# Patient Record
Sex: FEMALE | Race: WHITE | URBAN - METROPOLITAN AREA
[De-identification: names, ages, dates, MRNs, and addresses within clinical notes are randomized per-mention and may not be internally consistent; named-entity substitution may affect disease eponyms.]

---

## 2017-01-01 ENCOUNTER — ONCOLOGY VISIT (OUTPATIENT)
Dept: ONCOLOGY | Facility: CLINIC | Age: 57
End: 2017-01-01
Attending: INTERNAL MEDICINE
Payer: COMMERCIAL

## 2017-01-01 ENCOUNTER — DOCUMENTATION ONLY (OUTPATIENT)
Dept: ONCOLOGY | Facility: CLINIC | Age: 57
End: 2017-01-01

## 2017-01-01 ENCOUNTER — APPOINTMENT (OUTPATIENT)
Dept: GENERAL RADIOLOGY | Facility: CLINIC | Age: 57
End: 2017-01-01
Attending: EMERGENCY MEDICINE
Payer: COMMERCIAL

## 2017-01-01 ENCOUNTER — TELEPHONE (OUTPATIENT)
Dept: ONCOLOGY | Facility: CLINIC | Age: 57
End: 2017-01-01

## 2017-01-01 ENCOUNTER — HOSPITAL ENCOUNTER (OUTPATIENT)
Dept: MRI IMAGING | Facility: CLINIC | Age: 57
Discharge: HOME | End: 2017-11-06
Attending: INTERNAL MEDICINE | Admitting: INTERNAL MEDICINE
Payer: COMMERCIAL

## 2017-01-01 ENCOUNTER — INFUSION THERAPY VISIT (OUTPATIENT)
Dept: INFUSION THERAPY | Facility: CLINIC | Age: 57
End: 2017-01-01
Attending: INTERNAL MEDICINE
Payer: COMMERCIAL

## 2017-01-01 ENCOUNTER — APPOINTMENT (OUTPATIENT)
Dept: CARDIOLOGY | Facility: CLINIC | Age: 57
DRG: 444 | End: 2017-01-01
Payer: COMMERCIAL

## 2017-01-01 ENCOUNTER — APPOINTMENT (OUTPATIENT)
Dept: GENERAL RADIOLOGY | Facility: CLINIC | Age: 57
DRG: 199 | End: 2017-01-01
Attending: PHYSICIAN ASSISTANT
Payer: COMMERCIAL

## 2017-01-01 ENCOUNTER — SURGERY (OUTPATIENT)
Age: 57
End: 2017-01-01

## 2017-01-01 ENCOUNTER — APPOINTMENT (OUTPATIENT)
Dept: GENERAL RADIOLOGY | Facility: CLINIC | Age: 57
DRG: 199 | End: 2017-01-01
Payer: COMMERCIAL

## 2017-01-01 ENCOUNTER — HOSPITAL ENCOUNTER (EMERGENCY)
Facility: CLINIC | Age: 57
Discharge: ANOTHER HOSPITAL | End: 2017-07-12
Attending: EMERGENCY MEDICINE | Admitting: EMERGENCY MEDICINE
Payer: COMMERCIAL

## 2017-01-01 ENCOUNTER — HOSPITAL ENCOUNTER (OUTPATIENT)
Dept: CT IMAGING | Facility: CLINIC | Age: 57
Discharge: HOME | End: 2017-11-13
Attending: INTERNAL MEDICINE | Admitting: INTERNAL MEDICINE
Payer: COMMERCIAL

## 2017-01-01 ENCOUNTER — HOSPITAL ENCOUNTER (OUTPATIENT)
Facility: CLINIC | Age: 57
Discharge: HOME | End: 2017-05-25
Attending: INTERNAL MEDICINE | Admitting: INTERNAL MEDICINE
Payer: COMMERCIAL

## 2017-01-01 ENCOUNTER — DOCUMENTATION ONLY (OUTPATIENT)
Dept: SLEEP MEDICINE | Facility: CLINIC | Age: 57
End: 2017-01-01

## 2017-01-01 ENCOUNTER — MYC MEDICAL ADVICE (OUTPATIENT)
Dept: ONCOLOGY | Facility: CLINIC | Age: 57
End: 2017-01-01

## 2017-01-01 ENCOUNTER — OFFICE VISIT (OUTPATIENT)
Dept: FAMILY MEDICINE | Facility: CLINIC | Age: 57
End: 2017-01-01
Payer: COMMERCIAL

## 2017-01-01 ENCOUNTER — OFFICE VISIT (OUTPATIENT)
Dept: SLEEP MEDICINE | Facility: CLINIC | Age: 57
End: 2017-01-01
Payer: COMMERCIAL

## 2017-01-01 ENCOUNTER — HOSPITAL ENCOUNTER (INPATIENT)
Facility: CLINIC | Age: 57
LOS: 12 days | Discharge: HOME | DRG: 199 | End: 2017-07-24
Attending: EMERGENCY MEDICINE | Admitting: INTERNAL MEDICINE
Payer: COMMERCIAL

## 2017-01-01 ENCOUNTER — HOSPITAL ENCOUNTER (OUTPATIENT)
Dept: PHYSICAL THERAPY | Facility: CLINIC | Age: 57
Setting detail: THERAPIES SERIES
End: 2017-11-07
Attending: INTERNAL MEDICINE
Payer: COMMERCIAL

## 2017-01-01 ENCOUNTER — TELEPHONE (OUTPATIENT)
Dept: TRANSPLANT | Facility: CLINIC | Age: 57
End: 2017-01-01

## 2017-01-01 ENCOUNTER — ANESTHESIA (OUTPATIENT)
Dept: SURGERY | Facility: CLINIC | Age: 57
End: 2017-01-01
Payer: COMMERCIAL

## 2017-01-01 ENCOUNTER — NURSE TRIAGE (OUTPATIENT)
Dept: NURSING | Facility: CLINIC | Age: 57
End: 2017-01-01

## 2017-01-01 ENCOUNTER — HOSPITAL ENCOUNTER (EMERGENCY)
Facility: CLINIC | Age: 57
Discharge: HOME | End: 2017-10-22
Attending: PHYSICIAN ASSISTANT | Admitting: PHYSICIAN ASSISTANT
Payer: COMMERCIAL

## 2017-01-01 ENCOUNTER — TELEPHONE (OUTPATIENT)
Dept: FAMILY MEDICINE | Facility: CLINIC | Age: 57
End: 2017-01-01

## 2017-01-01 ENCOUNTER — HOSPITAL ENCOUNTER (OUTPATIENT)
Dept: PHYSICAL THERAPY | Facility: CLINIC | Age: 57
Setting detail: THERAPIES SERIES
End: 2017-10-24
Attending: INTERNAL MEDICINE
Payer: COMMERCIAL

## 2017-01-01 ENCOUNTER — HOSPITAL ENCOUNTER (OUTPATIENT)
Dept: PHYSICAL THERAPY | Facility: CLINIC | Age: 57
Setting detail: THERAPIES SERIES
End: 2017-11-21
Attending: INTERNAL MEDICINE
Payer: COMMERCIAL

## 2017-01-01 ENCOUNTER — HOSPITAL ENCOUNTER (OUTPATIENT)
Dept: GENERAL RADIOLOGY | Facility: CLINIC | Age: 57
End: 2017-07-27
Attending: INTERNAL MEDICINE
Payer: COMMERCIAL

## 2017-01-01 ENCOUNTER — OFFICE VISIT (OUTPATIENT)
Dept: SURGERY | Facility: CLINIC | Age: 57
End: 2017-01-01
Attending: STUDENT IN AN ORGANIZED HEALTH CARE EDUCATION/TRAINING PROGRAM
Payer: COMMERCIAL

## 2017-01-01 ENCOUNTER — HOSPITAL ENCOUNTER (OUTPATIENT)
Dept: PHYSICAL THERAPY | Facility: CLINIC | Age: 57
Setting detail: THERAPIES SERIES
End: 2017-11-30
Attending: INTERNAL MEDICINE
Payer: COMMERCIAL

## 2017-01-01 ENCOUNTER — TELEPHONE (OUTPATIENT)
Dept: PULMONOLOGY | Facility: CLINIC | Age: 57
End: 2017-01-01

## 2017-01-01 ENCOUNTER — HOSPITAL ENCOUNTER (OUTPATIENT)
Dept: LAB | Facility: CLINIC | Age: 57
Discharge: HOME | End: 2017-07-27
Attending: INTERNAL MEDICINE | Admitting: INTERNAL MEDICINE
Payer: COMMERCIAL

## 2017-01-01 ENCOUNTER — APPOINTMENT (OUTPATIENT)
Dept: GENERAL RADIOLOGY | Facility: CLINIC | Age: 57
End: 2017-01-01
Attending: INTERNAL MEDICINE
Payer: COMMERCIAL

## 2017-01-01 ENCOUNTER — HOSPITAL ENCOUNTER (INPATIENT)
Facility: CLINIC | Age: 57
LOS: 5 days | Discharge: HOME | DRG: 444 | End: 2017-12-12
Attending: EMERGENCY MEDICINE | Admitting: INTERNAL MEDICINE
Payer: COMMERCIAL

## 2017-01-01 ENCOUNTER — HOSPITAL ENCOUNTER (OUTPATIENT)
Dept: PHYSICAL THERAPY | Facility: CLINIC | Age: 57
Setting detail: THERAPIES SERIES
End: 2017-11-28
Attending: INTERNAL MEDICINE
Payer: COMMERCIAL

## 2017-01-01 ENCOUNTER — APPOINTMENT (OUTPATIENT)
Dept: GENERAL RADIOLOGY | Facility: CLINIC | Age: 57
DRG: 199 | End: 2017-01-01
Attending: NURSE PRACTITIONER
Payer: COMMERCIAL

## 2017-01-01 ENCOUNTER — TELEPHONE (OUTPATIENT)
Dept: PHARMACY | Facility: OTHER | Age: 57
End: 2017-01-01

## 2017-01-01 ENCOUNTER — PRE VISIT (OUTPATIENT)
Dept: SURGERY | Facility: CLINIC | Age: 57
End: 2017-01-01

## 2017-01-01 ENCOUNTER — OFFICE VISIT (OUTPATIENT)
Dept: CARDIOLOGY | Facility: CLINIC | Age: 57
End: 2017-01-01
Payer: COMMERCIAL

## 2017-01-01 ENCOUNTER — APPOINTMENT (OUTPATIENT)
Dept: CARDIOLOGY | Facility: CLINIC | Age: 57
DRG: 444 | End: 2017-01-01
Attending: PHYSICIAN ASSISTANT
Payer: COMMERCIAL

## 2017-01-01 ENCOUNTER — HOSPITAL ENCOUNTER (OUTPATIENT)
Dept: CT IMAGING | Facility: CLINIC | Age: 57
Discharge: HOME | End: 2017-09-11
Attending: INTERNAL MEDICINE | Admitting: INTERNAL MEDICINE
Payer: COMMERCIAL

## 2017-01-01 ENCOUNTER — HOSPITAL ENCOUNTER (OUTPATIENT)
Dept: CT IMAGING | Facility: CLINIC | Age: 57
Discharge: HOME | End: 2017-12-06
Attending: INTERNAL MEDICINE | Admitting: INTERNAL MEDICINE
Payer: COMMERCIAL

## 2017-01-01 ENCOUNTER — CARE COORDINATION (OUTPATIENT)
Dept: GASTROENTEROLOGY | Facility: CLINIC | Age: 57
End: 2017-01-01

## 2017-01-01 ENCOUNTER — APPOINTMENT (OUTPATIENT)
Dept: CT IMAGING | Facility: CLINIC | Age: 57
DRG: 199 | End: 2017-01-01
Attending: EMERGENCY MEDICINE
Payer: COMMERCIAL

## 2017-01-01 ENCOUNTER — DOCUMENTATION ONLY (OUTPATIENT)
Dept: LAB | Facility: CLINIC | Age: 57
End: 2017-01-01

## 2017-01-01 ENCOUNTER — TELEPHONE (OUTPATIENT)
Dept: SURGERY | Facility: CLINIC | Age: 57
End: 2017-01-01

## 2017-01-01 ENCOUNTER — APPOINTMENT (OUTPATIENT)
Dept: CT IMAGING | Facility: CLINIC | Age: 57
DRG: 199 | End: 2017-01-01
Payer: COMMERCIAL

## 2017-01-01 ENCOUNTER — HOSPITAL ENCOUNTER (OUTPATIENT)
Dept: CARDIOLOGY | Facility: CLINIC | Age: 57
Discharge: HOME | End: 2017-12-20
Attending: INTERNAL MEDICINE | Admitting: INTERNAL MEDICINE
Payer: COMMERCIAL

## 2017-01-01 ENCOUNTER — ANESTHESIA EVENT (OUTPATIENT)
Dept: SURGERY | Facility: CLINIC | Age: 57
DRG: 444 | End: 2017-01-01
Payer: COMMERCIAL

## 2017-01-01 ENCOUNTER — HOSPITAL ENCOUNTER (OUTPATIENT)
Dept: PHYSICAL THERAPY | Facility: CLINIC | Age: 57
Setting detail: THERAPIES SERIES
End: 2017-11-09
Attending: INTERNAL MEDICINE
Payer: COMMERCIAL

## 2017-01-01 ENCOUNTER — HOSPITAL ENCOUNTER (OUTPATIENT)
Dept: GENERAL RADIOLOGY | Facility: CLINIC | Age: 57
Discharge: HOME | End: 2017-12-29
Attending: INTERNAL MEDICINE | Admitting: INTERNAL MEDICINE
Payer: COMMERCIAL

## 2017-01-01 ENCOUNTER — HOSPITAL ENCOUNTER (OUTPATIENT)
Facility: CLINIC | Age: 57
Discharge: HOME | End: 2017-08-17
Attending: FAMILY MEDICINE | Admitting: FAMILY MEDICINE
Payer: COMMERCIAL

## 2017-01-01 ENCOUNTER — OFFICE VISIT (OUTPATIENT)
Dept: PULMONOLOGY | Facility: CLINIC | Age: 57
End: 2017-01-01
Attending: INTERNAL MEDICINE
Payer: COMMERCIAL

## 2017-01-01 ENCOUNTER — HOSPITAL ENCOUNTER (OUTPATIENT)
Dept: PHYSICAL THERAPY | Facility: CLINIC | Age: 57
Setting detail: THERAPIES SERIES
End: 2017-12-05
Attending: INTERNAL MEDICINE
Payer: COMMERCIAL

## 2017-01-01 ENCOUNTER — ANESTHESIA (OUTPATIENT)
Dept: SURGERY | Facility: CLINIC | Age: 57
DRG: 444 | End: 2017-01-01
Payer: COMMERCIAL

## 2017-01-01 ENCOUNTER — APPOINTMENT (OUTPATIENT)
Dept: GENERAL RADIOLOGY | Facility: CLINIC | Age: 57
DRG: 444 | End: 2017-01-01
Attending: INTERNAL MEDICINE
Payer: COMMERCIAL

## 2017-01-01 ENCOUNTER — TRANSFERRED RECORDS (OUTPATIENT)
Dept: HEALTH INFORMATION MANAGEMENT | Facility: CLINIC | Age: 57
End: 2017-01-01

## 2017-01-01 ENCOUNTER — HOSPITAL ENCOUNTER (OUTPATIENT)
Dept: PHYSICAL THERAPY | Facility: CLINIC | Age: 57
Setting detail: THERAPIES SERIES
End: 2017-11-14
Attending: INTERNAL MEDICINE
Payer: COMMERCIAL

## 2017-01-01 ENCOUNTER — HOSPITAL ENCOUNTER (OUTPATIENT)
Dept: CARDIOLOGY | Facility: CLINIC | Age: 57
Discharge: HOME | End: 2017-11-15
Attending: INTERNAL MEDICINE | Admitting: INTERNAL MEDICINE
Payer: COMMERCIAL

## 2017-01-01 ENCOUNTER — MYC MEDICAL ADVICE (OUTPATIENT)
Dept: FAMILY MEDICINE | Facility: CLINIC | Age: 57
End: 2017-01-01

## 2017-01-01 ENCOUNTER — HOSPITAL ENCOUNTER (OUTPATIENT)
Facility: CLINIC | Age: 57
Discharge: HOME | End: 2017-09-28
Attending: INTERNAL MEDICINE | Admitting: INTERNAL MEDICINE
Payer: COMMERCIAL

## 2017-01-01 ENCOUNTER — HOSPITAL ENCOUNTER (OUTPATIENT)
Dept: PHYSICAL THERAPY | Facility: CLINIC | Age: 57
Setting detail: THERAPIES SERIES
End: 2017-11-01
Attending: INTERNAL MEDICINE
Payer: COMMERCIAL

## 2017-01-01 ENCOUNTER — APPOINTMENT (OUTPATIENT)
Dept: CARDIOLOGY | Facility: CLINIC | Age: 57
DRG: 444 | End: 2017-01-01
Attending: NURSE PRACTITIONER
Payer: COMMERCIAL

## 2017-01-01 ENCOUNTER — HOSPITAL ENCOUNTER (OUTPATIENT)
Facility: CLINIC | Age: 57
Discharge: HOME | End: 2017-06-28
Attending: INTERNAL MEDICINE | Admitting: INTERNAL MEDICINE
Payer: COMMERCIAL

## 2017-01-01 ENCOUNTER — HOSPITAL ENCOUNTER (OUTPATIENT)
Dept: ULTRASOUND IMAGING | Facility: CLINIC | Age: 57
End: 2017-11-30
Attending: INTERNAL MEDICINE
Payer: COMMERCIAL

## 2017-01-01 ENCOUNTER — ANESTHESIA EVENT (OUTPATIENT)
Dept: SURGERY | Facility: CLINIC | Age: 57
End: 2017-01-01
Payer: COMMERCIAL

## 2017-01-01 ENCOUNTER — TELEPHONE (OUTPATIENT)
Dept: NURSING | Facility: CLINIC | Age: 57
End: 2017-01-01

## 2017-01-01 ENCOUNTER — ANESTHESIA (OUTPATIENT)
Dept: SURGERY | Facility: CLINIC | Age: 57
DRG: 199 | End: 2017-01-01
Payer: COMMERCIAL

## 2017-01-01 ENCOUNTER — HOSPITAL ENCOUNTER (OUTPATIENT)
Facility: CLINIC | Age: 57
Discharge: HOME | End: 2017-09-25
Attending: INTERNAL MEDICINE | Admitting: INTERNAL MEDICINE
Payer: COMMERCIAL

## 2017-01-01 ENCOUNTER — HOSPITAL ENCOUNTER (OUTPATIENT)
Facility: CLINIC | Age: 57
Discharge: HOME | End: 2017-09-07
Attending: INTERNAL MEDICINE | Admitting: INTERNAL MEDICINE
Payer: COMMERCIAL

## 2017-01-01 ENCOUNTER — APPOINTMENT (OUTPATIENT)
Dept: GENERAL RADIOLOGY | Facility: CLINIC | Age: 57
DRG: 199 | End: 2017-01-01
Attending: INTERNAL MEDICINE
Payer: COMMERCIAL

## 2017-01-01 ENCOUNTER — OFFICE VISIT (OUTPATIENT)
Dept: SURGERY | Facility: CLINIC | Age: 57
End: 2017-01-01
Attending: THORACIC SURGERY (CARDIOTHORACIC VASCULAR SURGERY)
Payer: COMMERCIAL

## 2017-01-01 ENCOUNTER — HOSPITAL ENCOUNTER (OUTPATIENT)
Facility: CLINIC | Age: 57
End: 2017-01-01
Attending: INTERNAL MEDICINE

## 2017-01-01 ENCOUNTER — APPOINTMENT (OUTPATIENT)
Dept: GENERAL RADIOLOGY | Facility: CLINIC | Age: 57
DRG: 199 | End: 2017-01-01
Attending: EMERGENCY MEDICINE
Payer: COMMERCIAL

## 2017-01-01 ENCOUNTER — HOSPITAL ENCOUNTER (OUTPATIENT)
Dept: LAB | Facility: CLINIC | Age: 57
Discharge: HOME | End: 2017-11-30
Attending: INTERNAL MEDICINE | Admitting: INTERNAL MEDICINE
Payer: COMMERCIAL

## 2017-01-01 ENCOUNTER — ANESTHESIA EVENT (OUTPATIENT)
Dept: SURGERY | Facility: CLINIC | Age: 57
DRG: 199 | End: 2017-01-01
Payer: COMMERCIAL

## 2017-01-01 ENCOUNTER — HOSPITAL ENCOUNTER (OUTPATIENT)
Facility: CLINIC | Age: 57
Discharge: HOME | End: 2017-11-09
Attending: INTERNAL MEDICINE | Admitting: INTERNAL MEDICINE
Payer: COMMERCIAL

## 2017-01-01 ENCOUNTER — APPOINTMENT (OUTPATIENT)
Dept: PHYSICAL THERAPY | Facility: CLINIC | Age: 57
DRG: 444 | End: 2017-01-01
Attending: PHYSICIAN ASSISTANT
Payer: COMMERCIAL

## 2017-01-01 ENCOUNTER — HOSPITAL ENCOUNTER (OUTPATIENT)
Facility: CLINIC | Age: 57
Discharge: HOME | End: 2017-12-21
Attending: INTERNAL MEDICINE | Admitting: INTERNAL MEDICINE
Payer: COMMERCIAL

## 2017-01-01 ENCOUNTER — HOSPITAL ENCOUNTER (OUTPATIENT)
Dept: PHYSICAL THERAPY | Facility: CLINIC | Age: 57
Setting detail: THERAPIES SERIES
End: 2017-12-07
Attending: INTERNAL MEDICINE
Payer: COMMERCIAL

## 2017-01-01 ENCOUNTER — HOSPITAL ENCOUNTER (OUTPATIENT)
Dept: LAB | Facility: CLINIC | Age: 57
Discharge: HOME | End: 2017-12-27
Attending: INTERNAL MEDICINE | Admitting: INTERNAL MEDICINE
Payer: COMMERCIAL

## 2017-01-01 VITALS
RESPIRATION RATE: 18 BRPM | HEART RATE: 61 BPM | TEMPERATURE: 98.6 F | BODY MASS INDEX: 32.62 KG/M2 | SYSTOLIC BLOOD PRESSURE: 124 MMHG | OXYGEN SATURATION: 100 % | DIASTOLIC BLOOD PRESSURE: 73 MMHG | HEIGHT: 64 IN | WEIGHT: 191.1 LBS

## 2017-01-01 VITALS
BODY MASS INDEX: 31.62 KG/M2 | OXYGEN SATURATION: 96 % | DIASTOLIC BLOOD PRESSURE: 74 MMHG | RESPIRATION RATE: 18 BRPM | HEART RATE: 113 BPM | WEIGHT: 185.2 LBS | HEIGHT: 64 IN | SYSTOLIC BLOOD PRESSURE: 110 MMHG | TEMPERATURE: 99.9 F

## 2017-01-01 VITALS
RESPIRATION RATE: 12 BRPM | DIASTOLIC BLOOD PRESSURE: 51 MMHG | HEIGHT: 64 IN | OXYGEN SATURATION: 99 % | HEART RATE: 81 BPM | WEIGHT: 197.5 LBS | SYSTOLIC BLOOD PRESSURE: 133 MMHG | TEMPERATURE: 99.3 F | BODY MASS INDEX: 33.72 KG/M2

## 2017-01-01 VITALS
DIASTOLIC BLOOD PRESSURE: 72 MMHG | RESPIRATION RATE: 18 BRPM | HEIGHT: 64 IN | SYSTOLIC BLOOD PRESSURE: 126 MMHG | BODY MASS INDEX: 32.93 KG/M2 | TEMPERATURE: 98.7 F | OXYGEN SATURATION: 99 % | WEIGHT: 192.9 LBS

## 2017-01-01 VITALS
TEMPERATURE: 100.7 F | BODY MASS INDEX: 33.27 KG/M2 | HEIGHT: 65 IN | WEIGHT: 199.7 LBS | DIASTOLIC BLOOD PRESSURE: 75 MMHG | HEART RATE: 120 BPM | SYSTOLIC BLOOD PRESSURE: 108 MMHG | RESPIRATION RATE: 20 BRPM | OXYGEN SATURATION: 97 %

## 2017-01-01 VITALS
BODY MASS INDEX: 33.38 KG/M2 | WEIGHT: 195.5 LBS | HEIGHT: 64 IN | RESPIRATION RATE: 18 BRPM | SYSTOLIC BLOOD PRESSURE: 112 MMHG | TEMPERATURE: 97.9 F | DIASTOLIC BLOOD PRESSURE: 73 MMHG | HEART RATE: 79 BPM | OXYGEN SATURATION: 99 %

## 2017-01-01 VITALS
HEIGHT: 64 IN | OXYGEN SATURATION: 96 % | WEIGHT: 200.9 LBS | TEMPERATURE: 98.6 F | SYSTOLIC BLOOD PRESSURE: 108 MMHG | RESPIRATION RATE: 18 BRPM | DIASTOLIC BLOOD PRESSURE: 73 MMHG | HEART RATE: 87 BPM | BODY MASS INDEX: 34.3 KG/M2

## 2017-01-01 VITALS
TEMPERATURE: 98.4 F | SYSTOLIC BLOOD PRESSURE: 112 MMHG | WEIGHT: 198 LBS | RESPIRATION RATE: 17 BRPM | DIASTOLIC BLOOD PRESSURE: 73 MMHG | HEIGHT: 64 IN | BODY MASS INDEX: 33.8 KG/M2 | HEART RATE: 78 BPM | OXYGEN SATURATION: 99 %

## 2017-01-01 VITALS
WEIGHT: 198.2 LBS | BODY MASS INDEX: 33.84 KG/M2 | SYSTOLIC BLOOD PRESSURE: 144 MMHG | RESPIRATION RATE: 20 BRPM | HEART RATE: 90 BPM | DIASTOLIC BLOOD PRESSURE: 59 MMHG | HEIGHT: 64 IN | TEMPERATURE: 98.8 F

## 2017-01-01 VITALS
DIASTOLIC BLOOD PRESSURE: 69 MMHG | WEIGHT: 197.8 LBS | TEMPERATURE: 98.3 F | BODY MASS INDEX: 33.77 KG/M2 | OXYGEN SATURATION: 100 % | SYSTOLIC BLOOD PRESSURE: 136 MMHG | HEART RATE: 67 BPM | RESPIRATION RATE: 16 BRPM | HEIGHT: 64 IN

## 2017-01-01 VITALS
BODY MASS INDEX: 32.61 KG/M2 | WEIGHT: 195.7 LBS | HEART RATE: 68 BPM | HEIGHT: 65 IN | SYSTOLIC BLOOD PRESSURE: 114 MMHG | TEMPERATURE: 99.6 F | DIASTOLIC BLOOD PRESSURE: 72 MMHG

## 2017-01-01 VITALS
HEIGHT: 65 IN | OXYGEN SATURATION: 100 % | DIASTOLIC BLOOD PRESSURE: 52 MMHG | RESPIRATION RATE: 18 BRPM | TEMPERATURE: 99.2 F | WEIGHT: 200.5 LBS | HEART RATE: 86 BPM | SYSTOLIC BLOOD PRESSURE: 109 MMHG | BODY MASS INDEX: 33.41 KG/M2

## 2017-01-01 VITALS
OXYGEN SATURATION: 99 % | RESPIRATION RATE: 16 BRPM | SYSTOLIC BLOOD PRESSURE: 135 MMHG | DIASTOLIC BLOOD PRESSURE: 67 MMHG | TEMPERATURE: 98.5 F | BODY MASS INDEX: 33.06 KG/M2 | WEIGHT: 195.55 LBS

## 2017-01-01 VITALS
OXYGEN SATURATION: 97 % | HEIGHT: 65 IN | HEART RATE: 82 BPM | DIASTOLIC BLOOD PRESSURE: 70 MMHG | SYSTOLIC BLOOD PRESSURE: 101 MMHG | BODY MASS INDEX: 33.47 KG/M2 | WEIGHT: 200.9 LBS

## 2017-01-01 VITALS
RESPIRATION RATE: 18 BRPM | BODY MASS INDEX: 33.27 KG/M2 | DIASTOLIC BLOOD PRESSURE: 69 MMHG | HEART RATE: 90 BPM | SYSTOLIC BLOOD PRESSURE: 114 MMHG | BODY MASS INDEX: 32.26 KG/M2 | DIASTOLIC BLOOD PRESSURE: 90 MMHG | RESPIRATION RATE: 20 BRPM | WEIGHT: 199.7 LBS | OXYGEN SATURATION: 97 % | HEART RATE: 84 BPM | TEMPERATURE: 98.5 F | WEIGHT: 190.92 LBS | HEIGHT: 65 IN | TEMPERATURE: 98.1 F | SYSTOLIC BLOOD PRESSURE: 128 MMHG | OXYGEN SATURATION: 95 %

## 2017-01-01 VITALS
WEIGHT: 191 LBS | DIASTOLIC BLOOD PRESSURE: 75 MMHG | OXYGEN SATURATION: 99 % | HEIGHT: 64 IN | HEART RATE: 69 BPM | BODY MASS INDEX: 32.61 KG/M2 | SYSTOLIC BLOOD PRESSURE: 111 MMHG | RESPIRATION RATE: 18 BRPM | TEMPERATURE: 98 F

## 2017-01-01 VITALS
OXYGEN SATURATION: 99 % | HEIGHT: 64 IN | BODY MASS INDEX: 33.17 KG/M2 | TEMPERATURE: 99.9 F | HEART RATE: 68 BPM | DIASTOLIC BLOOD PRESSURE: 70 MMHG | WEIGHT: 194.3 LBS | SYSTOLIC BLOOD PRESSURE: 120 MMHG | RESPIRATION RATE: 18 BRPM

## 2017-01-01 VITALS
DIASTOLIC BLOOD PRESSURE: 84 MMHG | OXYGEN SATURATION: 95 % | SYSTOLIC BLOOD PRESSURE: 120 MMHG | RESPIRATION RATE: 22 BRPM | HEART RATE: 99 BPM | BODY MASS INDEX: 34.5 KG/M2 | WEIGHT: 202.6 LBS | TEMPERATURE: 99.6 F

## 2017-01-01 VITALS
BODY MASS INDEX: 32.76 KG/M2 | HEIGHT: 65 IN | WEIGHT: 196.65 LBS | HEART RATE: 69 BPM | SYSTOLIC BLOOD PRESSURE: 105 MMHG | TEMPERATURE: 98.4 F | OXYGEN SATURATION: 96 % | RESPIRATION RATE: 16 BRPM | DIASTOLIC BLOOD PRESSURE: 69 MMHG

## 2017-01-01 VITALS
RESPIRATION RATE: 20 BRPM | DIASTOLIC BLOOD PRESSURE: 79 MMHG | SYSTOLIC BLOOD PRESSURE: 137 MMHG | TEMPERATURE: 93 F | HEART RATE: 105 BPM | OXYGEN SATURATION: 92 %

## 2017-01-01 VITALS
SYSTOLIC BLOOD PRESSURE: 125 MMHG | BODY MASS INDEX: 33.55 KG/M2 | TEMPERATURE: 99.3 F | HEART RATE: 108 BPM | OXYGEN SATURATION: 97 % | DIASTOLIC BLOOD PRESSURE: 61 MMHG | WEIGHT: 196.5 LBS | HEIGHT: 64 IN | RESPIRATION RATE: 16 BRPM

## 2017-01-01 VITALS
SYSTOLIC BLOOD PRESSURE: 107 MMHG | HEART RATE: 71 BPM | TEMPERATURE: 99.3 F | DIASTOLIC BLOOD PRESSURE: 53 MMHG | OXYGEN SATURATION: 98 % | RESPIRATION RATE: 16 BRPM

## 2017-01-01 VITALS
BODY MASS INDEX: 34.09 KG/M2 | WEIGHT: 199.7 LBS | SYSTOLIC BLOOD PRESSURE: 123 MMHG | RESPIRATION RATE: 16 BRPM | HEIGHT: 64 IN | TEMPERATURE: 98.4 F | OXYGEN SATURATION: 96 % | HEART RATE: 95 BPM | DIASTOLIC BLOOD PRESSURE: 68 MMHG

## 2017-01-01 VITALS
BODY MASS INDEX: 34.23 KG/M2 | WEIGHT: 200.5 LBS | HEIGHT: 64 IN | OXYGEN SATURATION: 98 % | HEART RATE: 90 BPM | SYSTOLIC BLOOD PRESSURE: 128 MMHG | TEMPERATURE: 99.9 F | DIASTOLIC BLOOD PRESSURE: 68 MMHG | RESPIRATION RATE: 16 BRPM

## 2017-01-01 VITALS
HEART RATE: 92 BPM | WEIGHT: 200.5 LBS | HEIGHT: 64 IN | BODY MASS INDEX: 34.23 KG/M2 | DIASTOLIC BLOOD PRESSURE: 69 MMHG | SYSTOLIC BLOOD PRESSURE: 116 MMHG | OXYGEN SATURATION: 97 %

## 2017-01-01 VITALS
DIASTOLIC BLOOD PRESSURE: 82 MMHG | OXYGEN SATURATION: 91 % | SYSTOLIC BLOOD PRESSURE: 132 MMHG | RESPIRATION RATE: 20 BRPM | HEART RATE: 131 BPM | HEIGHT: 64 IN | BODY MASS INDEX: 34.47 KG/M2 | WEIGHT: 201.9 LBS | TEMPERATURE: 99.6 F

## 2017-01-01 VITALS
OXYGEN SATURATION: 94 % | SYSTOLIC BLOOD PRESSURE: 138 MMHG | WEIGHT: 205.7 LBS | RESPIRATION RATE: 20 BRPM | DIASTOLIC BLOOD PRESSURE: 81 MMHG | HEART RATE: 82 BPM | HEIGHT: 64 IN | BODY MASS INDEX: 35.12 KG/M2 | TEMPERATURE: 98.9 F

## 2017-01-01 VITALS
HEIGHT: 64 IN | SYSTOLIC BLOOD PRESSURE: 119 MMHG | HEART RATE: 94 BPM | RESPIRATION RATE: 18 BRPM | TEMPERATURE: 100.1 F | DIASTOLIC BLOOD PRESSURE: 71 MMHG | WEIGHT: 191.6 LBS | OXYGEN SATURATION: 94 % | BODY MASS INDEX: 32.71 KG/M2

## 2017-01-01 VITALS — HEART RATE: 96 BPM | SYSTOLIC BLOOD PRESSURE: 138 MMHG | DIASTOLIC BLOOD PRESSURE: 59 MMHG

## 2017-01-01 DIAGNOSIS — J45.40 MODERATE PERSISTENT ASTHMA WITHOUT COMPLICATION: ICD-10-CM

## 2017-01-01 DIAGNOSIS — C78.7 SECONDARY MALIGNANT NEOPLASM OF LIVER (H): ICD-10-CM

## 2017-01-01 DIAGNOSIS — C79.51 BONE METASTASIS: ICD-10-CM

## 2017-01-01 DIAGNOSIS — C50.911 CARCINOMA OF RIGHT BREAST METASTATIC TO AXILLARY LYMPH NODE (H): ICD-10-CM

## 2017-01-01 DIAGNOSIS — T45.1X5A CHEMOTHERAPY-INDUCED NEUTROPENIA (H): ICD-10-CM

## 2017-01-01 DIAGNOSIS — C77.3 BREAST CANCER METASTASIZED TO AXILLARY LYMPH NODE, RIGHT (H): ICD-10-CM

## 2017-01-01 DIAGNOSIS — J95.811 POSTPROCEDURAL PNEUMOTHORAX: ICD-10-CM

## 2017-01-01 DIAGNOSIS — K12.31 MUCOSITIS DUE TO CHEMOTHERAPY: Primary | ICD-10-CM

## 2017-01-01 DIAGNOSIS — C50.911 BREAST CANCER METASTASIZED TO AXILLARY LYMPH NODE, RIGHT (H): ICD-10-CM

## 2017-01-01 DIAGNOSIS — E78.5 HYPERLIPIDEMIA LDL GOAL <130: Primary | ICD-10-CM

## 2017-01-01 DIAGNOSIS — C50.912 BILATERAL MALIGNANT NEOPLASM OF BREAST IN FEMALE, UNSPECIFIED SITE OF BREAST: Chronic | ICD-10-CM

## 2017-01-01 DIAGNOSIS — C78.7 CARCINOMA OF BREAST METASTATIC TO LIVER, UNSPECIFIED LATERALITY (H): ICD-10-CM

## 2017-01-01 DIAGNOSIS — C78.7 CARCINOMA OF BREAST METASTATIC TO LIVER, UNSPECIFIED LATERALITY (H): Primary | ICD-10-CM

## 2017-01-01 DIAGNOSIS — C77.3 CARCINOMA OF RIGHT BREAST METASTATIC TO AXILLARY LYMPH NODE (H): ICD-10-CM

## 2017-01-01 DIAGNOSIS — G47.01 INSOMNIA DUE TO MEDICAL CONDITION: Primary | ICD-10-CM

## 2017-01-01 DIAGNOSIS — T50.905A DRUG-RELATED HAIR LOSS: ICD-10-CM

## 2017-01-01 DIAGNOSIS — I31.39 PERICARDIAL EFFUSION: ICD-10-CM

## 2017-01-01 DIAGNOSIS — Z17.0 BILATERAL MALIGNANT NEOPLASM OF BREAST IN FEMALE, ESTROGEN RECEPTOR POSITIVE, UNSPECIFIED SITE OF BREAST (H): Primary | Chronic | ICD-10-CM

## 2017-01-01 DIAGNOSIS — J93.11 PRIMARY SPONTANEOUS PNEUMOTHORAX: Primary | ICD-10-CM

## 2017-01-01 DIAGNOSIS — N39.0 URINARY TRACT INFECTION WITHOUT HEMATURIA, SITE UNSPECIFIED: ICD-10-CM

## 2017-01-01 DIAGNOSIS — R74.8 ELEVATED LIVER ENZYMES: ICD-10-CM

## 2017-01-01 DIAGNOSIS — J43.9 EMPHYSEMATOUS BLEB OF LUNG (H): ICD-10-CM

## 2017-01-01 DIAGNOSIS — C78.7 SECONDARY MALIGNANT NEOPLASM OF LIVER (H): Primary | ICD-10-CM

## 2017-01-01 DIAGNOSIS — E03.9 HYPOTHYROIDISM, UNSPECIFIED TYPE: ICD-10-CM

## 2017-01-01 DIAGNOSIS — C50.919 CARCINOMA OF BREAST METASTATIC TO LIVER, UNSPECIFIED LATERALITY (H): Primary | ICD-10-CM

## 2017-01-01 DIAGNOSIS — C50.919 CARCINOMA OF BREAST METASTATIC TO LIVER, UNSPECIFIED LATERALITY (H): ICD-10-CM

## 2017-01-01 DIAGNOSIS — E03.9 HYPOTHYROIDISM, UNSPECIFIED TYPE: Primary | ICD-10-CM

## 2017-01-01 DIAGNOSIS — E78.5 HYPERLIPIDEMIA LDL GOAL <130: ICD-10-CM

## 2017-01-01 DIAGNOSIS — C50.911 BREAST CANCER METASTASIZED TO AXILLARY LYMPH NODE, RIGHT (H): Primary | ICD-10-CM

## 2017-01-01 DIAGNOSIS — K12.31 MUCOSITIS DUE TO CHEMOTHERAPY: ICD-10-CM

## 2017-01-01 DIAGNOSIS — C50.911 BILATERAL MALIGNANT NEOPLASM OF BREAST IN FEMALE, ESTROGEN RECEPTOR POSITIVE, UNSPECIFIED SITE OF BREAST (H): Chronic | ICD-10-CM

## 2017-01-01 DIAGNOSIS — J45.40 MODERATE PERSISTENT ASTHMA, UNCOMPLICATED: ICD-10-CM

## 2017-01-01 DIAGNOSIS — E03.4 HYPOTHYROIDISM DUE TO ACQUIRED ATROPHY OF THYROID: ICD-10-CM

## 2017-01-01 DIAGNOSIS — Z17.0 BILATERAL MALIGNANT NEOPLASM OF BREAST IN FEMALE, ESTROGEN RECEPTOR POSITIVE, UNSPECIFIED SITE OF BREAST (H): Chronic | ICD-10-CM

## 2017-01-01 DIAGNOSIS — I89.0 LYMPHEDEMA OF BREAST: ICD-10-CM

## 2017-01-01 DIAGNOSIS — K83.1 BILIARY OBSTRUCTION (H): ICD-10-CM

## 2017-01-01 DIAGNOSIS — J90 PLEURAL EFFUSION: ICD-10-CM

## 2017-01-01 DIAGNOSIS — D70.1 CHEMOTHERAPY-INDUCED NEUTROPENIA (H): ICD-10-CM

## 2017-01-01 DIAGNOSIS — L65.8 DRUG-RELATED HAIR LOSS: ICD-10-CM

## 2017-01-01 DIAGNOSIS — E78.5 HYPERLIPIDEMIA LDL GOAL <100: ICD-10-CM

## 2017-01-01 DIAGNOSIS — C50.911 BILATERAL MALIGNANT NEOPLASM OF BREAST IN FEMALE, ESTROGEN RECEPTOR POSITIVE, UNSPECIFIED SITE OF BREAST (H): Primary | Chronic | ICD-10-CM

## 2017-01-01 DIAGNOSIS — C77.3 BREAST CANCER METASTASIZED TO AXILLARY LYMPH NODE, RIGHT (H): Primary | ICD-10-CM

## 2017-01-01 DIAGNOSIS — C50.911 BILATERAL MALIGNANT NEOPLASM OF BREAST IN FEMALE, UNSPECIFIED SITE OF BREAST: Primary | Chronic | ICD-10-CM

## 2017-01-01 DIAGNOSIS — I31.31 MALIGNANT PERICARDIAL EFFUSION (H): ICD-10-CM

## 2017-01-01 DIAGNOSIS — N30.00 ACUTE CYSTITIS WITHOUT HEMATURIA: ICD-10-CM

## 2017-01-01 DIAGNOSIS — D49.89 PERICARDIAL TUMOR: ICD-10-CM

## 2017-01-01 DIAGNOSIS — R17 ELEVATED BILIRUBIN: ICD-10-CM

## 2017-01-01 DIAGNOSIS — R93.89 ABNORMAL CT SCAN, CHEST: Primary | ICD-10-CM

## 2017-01-01 DIAGNOSIS — I89.0 LYMPHEDEMA: Primary | ICD-10-CM

## 2017-01-01 DIAGNOSIS — J93.12 SECONDARY SPONTANEOUS PNEUMOTHORAX: ICD-10-CM

## 2017-01-01 DIAGNOSIS — C50.912 BILATERAL MALIGNANT NEOPLASM OF BREAST IN FEMALE, ESTROGEN RECEPTOR POSITIVE, UNSPECIFIED SITE OF BREAST (H): Primary | Chronic | ICD-10-CM

## 2017-01-01 DIAGNOSIS — C50.912 BILATERAL MALIGNANT NEOPLASM OF BREAST IN FEMALE, ESTROGEN RECEPTOR POSITIVE, UNSPECIFIED SITE OF BREAST (H): Chronic | ICD-10-CM

## 2017-01-01 DIAGNOSIS — B37.0 THRUSH: ICD-10-CM

## 2017-01-01 DIAGNOSIS — G47.01 INSOMNIA DUE TO MEDICAL CONDITION: ICD-10-CM

## 2017-01-01 DIAGNOSIS — C50.911 BILATERAL MALIGNANT NEOPLASM OF BREAST IN FEMALE, UNSPECIFIED SITE OF BREAST: Chronic | ICD-10-CM

## 2017-01-01 DIAGNOSIS — Z17.0 BILATERAL MALIGNANT NEOPLASM OF BREAST IN FEMALE, ESTROGEN RECEPTOR POSITIVE, UNSPECIFIED SITE OF BREAST (H): ICD-10-CM

## 2017-01-01 DIAGNOSIS — G47.00 INSOMNIA, UNSPECIFIED: ICD-10-CM

## 2017-01-01 DIAGNOSIS — G47.33 OSA (OBSTRUCTIVE SLEEP APNEA): ICD-10-CM

## 2017-01-01 DIAGNOSIS — J31.0 CHRONIC RHINITIS: ICD-10-CM

## 2017-01-01 DIAGNOSIS — G89.3 CANCER ASSOCIATED PAIN: ICD-10-CM

## 2017-01-01 DIAGNOSIS — C50.912 BILATERAL MALIGNANT NEOPLASM OF BREAST IN FEMALE, UNSPECIFIED SITE OF BREAST: Primary | Chronic | ICD-10-CM

## 2017-01-01 DIAGNOSIS — C50.912 BILATERAL MALIGNANT NEOPLASM OF BREAST IN FEMALE, ESTROGEN RECEPTOR POSITIVE, UNSPECIFIED SITE OF BREAST (H): ICD-10-CM

## 2017-01-01 DIAGNOSIS — C50.911 BILATERAL MALIGNANT NEOPLASM OF BREAST IN FEMALE, ESTROGEN RECEPTOR POSITIVE, UNSPECIFIED SITE OF BREAST (H): ICD-10-CM

## 2017-01-01 DIAGNOSIS — J93.9 PNEUMOTHORAX: ICD-10-CM

## 2017-01-01 DIAGNOSIS — A04.72 C. DIFFICILE COLITIS: ICD-10-CM

## 2017-01-01 DIAGNOSIS — Z85.3 PERSONAL HISTORY OF MALIGNANT NEOPLASM OF BREAST: ICD-10-CM

## 2017-01-01 DIAGNOSIS — C50.912 BILATERAL MALIGNANT NEOPLASM OF BREAST IN FEMALE (H): Chronic | ICD-10-CM

## 2017-01-01 DIAGNOSIS — J93.12 SECONDARY SPONTANEOUS PNEUMOTHORAX: Primary | ICD-10-CM

## 2017-01-01 DIAGNOSIS — C79.51 BONE METASTASIS: Primary | ICD-10-CM

## 2017-01-01 DIAGNOSIS — N39.46 MIXED INCONTINENCE URGE AND STRESS (MALE)(FEMALE): ICD-10-CM

## 2017-01-01 DIAGNOSIS — J45.909 REACTIVE AIRWAY DISEASE, UNSPECIFIED ASTHMA SEVERITY, UNCOMPLICATED: Primary | ICD-10-CM

## 2017-01-01 DIAGNOSIS — J93.9 PNEUMOTHORAX, RIGHT: ICD-10-CM

## 2017-01-01 DIAGNOSIS — C50.911 BILATERAL MALIGNANT NEOPLASM OF BREAST IN FEMALE (H): Chronic | ICD-10-CM

## 2017-01-01 DIAGNOSIS — K83.1: ICD-10-CM

## 2017-01-01 DIAGNOSIS — D25.9 UTERINE LEIOMYOMA, UNSPECIFIED LOCATION: ICD-10-CM

## 2017-01-01 DIAGNOSIS — Z01.818 PREOP GENERAL PHYSICAL EXAM: Primary | ICD-10-CM

## 2017-01-01 LAB
ABO + RH BLD: NORMAL
ABO + RH BLD: NORMAL
ADENOSINE DEAMINASE PCAR-CCNC: 13.1 U/L (ref 0–11.3)
ALBUMIN FLD-MCNC: 2.7 G/DL
ALBUMIN SERPL-MCNC: 2.2 G/DL (ref 3.4–5)
ALBUMIN SERPL-MCNC: 2.3 G/DL (ref 3.4–5)
ALBUMIN SERPL-MCNC: 2.6 G/DL (ref 3.4–5)
ALBUMIN SERPL-MCNC: 2.6 G/DL (ref 3.4–5)
ALBUMIN SERPL-MCNC: 2.8 G/DL (ref 3.4–5)
ALBUMIN SERPL-MCNC: 2.9 G/DL (ref 3.4–5)
ALBUMIN SERPL-MCNC: 2.9 G/DL (ref 3.4–5)
ALBUMIN SERPL-MCNC: 3 G/DL (ref 3.4–5)
ALBUMIN SERPL-MCNC: 3 G/DL (ref 3.4–5)
ALBUMIN SERPL-MCNC: 3.1 G/DL (ref 3.4–5)
ALBUMIN SERPL-MCNC: 3.2 G/DL (ref 3.4–5)
ALBUMIN SERPL-MCNC: 3.3 G/DL (ref 3.4–5)
ALBUMIN SERPL-MCNC: 3.5 G/DL (ref 3.4–5)
ALBUMIN SERPL-MCNC: 3.5 G/DL (ref 3.4–5)
ALBUMIN SERPL-MCNC: 3.6 G/DL (ref 3.4–5)
ALBUMIN SERPL-MCNC: 3.8 G/DL (ref 3.4–5)
ALBUMIN UR-MCNC: 10 MG/DL
ALBUMIN UR-MCNC: 30 MG/DL
ALBUMIN UR-MCNC: NEGATIVE MG/DL
ALP SERPL-CCNC: 180 U/L (ref 40–150)
ALP SERPL-CCNC: 302 U/L (ref 40–150)
ALP SERPL-CCNC: 348 U/L (ref 40–150)
ALP SERPL-CCNC: 349 U/L (ref 40–150)
ALP SERPL-CCNC: 390 U/L (ref 40–150)
ALP SERPL-CCNC: 520 U/L (ref 40–150)
ALP SERPL-CCNC: 63 U/L (ref 40–150)
ALP SERPL-CCNC: 65 U/L (ref 40–150)
ALP SERPL-CCNC: 672 U/L (ref 40–150)
ALP SERPL-CCNC: 677 U/L (ref 40–150)
ALP SERPL-CCNC: 70 U/L (ref 40–150)
ALP SERPL-CCNC: 719 U/L (ref 40–150)
ALP SERPL-CCNC: 72 U/L (ref 40–150)
ALP SERPL-CCNC: 77 U/L (ref 40–150)
ALP SERPL-CCNC: 82 U/L (ref 40–150)
ALP SERPL-CCNC: 85 U/L (ref 40–150)
ALP SERPL-CCNC: 86 U/L (ref 40–150)
ALP SERPL-CCNC: 86 U/L (ref 40–150)
ALP SERPL-CCNC: 88 U/L (ref 40–150)
ALP SERPL-CCNC: 91 U/L (ref 40–150)
ALP SERPL-CCNC: 96 U/L (ref 40–150)
ALT SERPL W P-5'-P-CCNC: 102 U/L (ref 0–50)
ALT SERPL W P-5'-P-CCNC: 14 U/L (ref 0–50)
ALT SERPL W P-5'-P-CCNC: 144 U/L (ref 0–50)
ALT SERPL W P-5'-P-CCNC: 16 U/L (ref 0–50)
ALT SERPL W P-5'-P-CCNC: 161 U/L (ref 0–50)
ALT SERPL W P-5'-P-CCNC: 17 U/L (ref 0–50)
ALT SERPL W P-5'-P-CCNC: 185 U/L (ref 0–50)
ALT SERPL W P-5'-P-CCNC: 19 U/L (ref 0–50)
ALT SERPL W P-5'-P-CCNC: 20 U/L (ref 0–50)
ALT SERPL W P-5'-P-CCNC: 21 U/L (ref 0–50)
ALT SERPL W P-5'-P-CCNC: 21 U/L (ref 0–50)
ALT SERPL W P-5'-P-CCNC: 24 U/L (ref 0–50)
ALT SERPL W P-5'-P-CCNC: 32 U/L (ref 0–50)
ALT SERPL W P-5'-P-CCNC: 394 U/L (ref 0–50)
ALT SERPL W P-5'-P-CCNC: 43 U/L (ref 0–50)
ALT SERPL W P-5'-P-CCNC: 45 U/L (ref 0–50)
ALT SERPL W P-5'-P-CCNC: 60 U/L (ref 0–50)
ALT SERPL W P-5'-P-CCNC: 74 U/L (ref 0–50)
ANION GAP SERPL CALCULATED.3IONS-SCNC: 10 MMOL/L (ref 3–14)
ANION GAP SERPL CALCULATED.3IONS-SCNC: 4 MMOL/L (ref 3–14)
ANION GAP SERPL CALCULATED.3IONS-SCNC: 4 MMOL/L (ref 3–14)
ANION GAP SERPL CALCULATED.3IONS-SCNC: 5 MMOL/L (ref 3–14)
ANION GAP SERPL CALCULATED.3IONS-SCNC: 6 MMOL/L (ref 3–14)
ANION GAP SERPL CALCULATED.3IONS-SCNC: 7 MMOL/L (ref 3–14)
ANION GAP SERPL CALCULATED.3IONS-SCNC: 8 MMOL/L (ref 3–14)
ANION GAP SERPL CALCULATED.3IONS-SCNC: 9 MMOL/L (ref 3–14)
APPEARANCE FLD: NORMAL
APPEARANCE UR: ABNORMAL
APPEARANCE UR: ABNORMAL
APPEARANCE UR: CLEAR
APTT IMM NP PPP: 36 SEC (ref 31–45)
APTT IMM NP PPP: 36 SEC (ref 31–45)
APTT PPP: 51 SEC (ref 22–37)
APTT PPP: 53 SEC (ref 22–37)
AST SERPL W P-5'-P-CCNC: 149 U/L (ref 0–45)
AST SERPL W P-5'-P-CCNC: 149 U/L (ref 0–45)
AST SERPL W P-5'-P-CCNC: 167 U/L (ref 0–45)
AST SERPL W P-5'-P-CCNC: 17 U/L (ref 0–45)
AST SERPL W P-5'-P-CCNC: 18 U/L (ref 0–45)
AST SERPL W P-5'-P-CCNC: 19 U/L (ref 0–45)
AST SERPL W P-5'-P-CCNC: 19 U/L (ref 0–45)
AST SERPL W P-5'-P-CCNC: 21 U/L (ref 0–45)
AST SERPL W P-5'-P-CCNC: 23 U/L (ref 0–45)
AST SERPL W P-5'-P-CCNC: 27 U/L (ref 0–45)
AST SERPL W P-5'-P-CCNC: 32 U/L (ref 0–45)
AST SERPL W P-5'-P-CCNC: 347 U/L (ref 0–45)
AST SERPL W P-5'-P-CCNC: 39 U/L (ref 0–45)
AST SERPL W P-5'-P-CCNC: 41 U/L (ref 0–45)
AST SERPL W P-5'-P-CCNC: 42 U/L (ref 0–45)
AST SERPL W P-5'-P-CCNC: 45 U/L (ref 0–45)
AST SERPL W P-5'-P-CCNC: 48 U/L (ref 0–45)
AST SERPL W P-5'-P-CCNC: 54 U/L (ref 0–45)
AST SERPL W P-5'-P-CCNC: 87 U/L (ref 0–45)
BACTERIA #/AREA URNS HPF: ABNORMAL /HPF
BACTERIA SPEC CULT: ABNORMAL
BACTERIA SPEC CULT: ABNORMAL
BACTERIA SPEC CULT: NO GROWTH
BACTERIA SPEC CULT: NORMAL
BASE EXCESS BLDV CALC-SCNC: 1.2 MMOL/L
BASOPHILS # BLD AUTO: 0 10E9/L (ref 0–0.2)
BASOPHILS NFR BLD AUTO: 0 %
BASOPHILS NFR BLD AUTO: 0.1 %
BASOPHILS NFR BLD AUTO: 0.2 %
BASOPHILS NFR BLD AUTO: 0.3 %
BASOPHILS NFR BLD AUTO: 0.4 %
BASOPHILS NFR BLD AUTO: 0.6 %
BASOPHILS NFR BLD AUTO: 0.8 %
BILIRUB DIRECT SERPL-MCNC: 4 MG/DL (ref 0–0.2)
BILIRUB DIRECT SERPL-MCNC: 6.1 MG/DL (ref 0–0.2)
BILIRUB SERPL-MCNC: 0.8 MG/DL (ref 0.2–1.3)
BILIRUB SERPL-MCNC: 0.8 MG/DL (ref 0.2–1.3)
BILIRUB SERPL-MCNC: 0.9 MG/DL (ref 0.2–1.3)
BILIRUB SERPL-MCNC: 1.1 MG/DL (ref 0.2–1.3)
BILIRUB SERPL-MCNC: 1.2 MG/DL (ref 0.2–1.3)
BILIRUB SERPL-MCNC: 1.5 MG/DL (ref 0.2–1.3)
BILIRUB SERPL-MCNC: 1.6 MG/DL (ref 0.2–1.3)
BILIRUB SERPL-MCNC: 1.8 MG/DL (ref 0.2–1.3)
BILIRUB SERPL-MCNC: 1.8 MG/DL (ref 0.2–1.3)
BILIRUB SERPL-MCNC: 1.9 MG/DL (ref 0.2–1.3)
BILIRUB SERPL-MCNC: 2 MG/DL (ref 0.2–1.3)
BILIRUB SERPL-MCNC: 2.3 MG/DL (ref 0.2–1.3)
BILIRUB SERPL-MCNC: 2.7 MG/DL (ref 0.2–1.3)
BILIRUB SERPL-MCNC: 3.2 MG/DL (ref 0.2–1.3)
BILIRUB SERPL-MCNC: 5.8 MG/DL (ref 0.2–1.3)
BILIRUB SERPL-MCNC: 8.1 MG/DL (ref 0.2–1.3)
BILIRUB SERPL-MCNC: 8.2 MG/DL (ref 0.2–1.3)
BILIRUB UR QL STRIP: ABNORMAL
BILIRUB UR QL STRIP: NEGATIVE
BLD GP AB SCN SERPL QL: NORMAL
BLOOD BANK CMNT PATIENT-IMP: NORMAL
BUN SERPL-MCNC: 10 MG/DL (ref 7–30)
BUN SERPL-MCNC: 11 MG/DL (ref 7–30)
BUN SERPL-MCNC: 11 MG/DL (ref 7–30)
BUN SERPL-MCNC: 12 MG/DL (ref 7–30)
BUN SERPL-MCNC: 13 MG/DL (ref 7–30)
BUN SERPL-MCNC: 14 MG/DL (ref 7–30)
BUN SERPL-MCNC: 6 MG/DL (ref 7–30)
BUN SERPL-MCNC: 8 MG/DL (ref 7–30)
BUN SERPL-MCNC: 8 MG/DL (ref 7–30)
BUN SERPL-MCNC: 9 MG/DL (ref 7–30)
C DIFF TOX B STL QL: POSITIVE
CALCIUM SERPL-MCNC: 7.5 MG/DL (ref 8.5–10.1)
CALCIUM SERPL-MCNC: 7.7 MG/DL (ref 8.5–10.1)
CALCIUM SERPL-MCNC: 7.8 MG/DL (ref 8.5–10.1)
CALCIUM SERPL-MCNC: 8 MG/DL (ref 8.5–10.1)
CALCIUM SERPL-MCNC: 8 MG/DL (ref 8.5–10.1)
CALCIUM SERPL-MCNC: 8.4 MG/DL (ref 8.5–10.1)
CALCIUM SERPL-MCNC: 8.5 MG/DL (ref 8.5–10.1)
CALCIUM SERPL-MCNC: 8.6 MG/DL (ref 8.5–10.1)
CALCIUM SERPL-MCNC: 8.7 MG/DL (ref 8.5–10.1)
CALCIUM SERPL-MCNC: 8.8 MG/DL (ref 8.5–10.1)
CALCIUM SERPL-MCNC: 8.9 MG/DL (ref 8.5–10.1)
CALCIUM SERPL-MCNC: 9 MG/DL (ref 8.5–10.1)
CALCIUM SERPL-MCNC: 9.1 MG/DL (ref 8.5–10.1)
CALCIUM SERPL-MCNC: 9.1 MG/DL (ref 8.5–10.1)
CALCIUM SERPL-MCNC: 9.2 MG/DL (ref 8.5–10.1)
CALCIUM SERPL-MCNC: 9.3 MG/DL (ref 8.5–10.1)
CALCIUM SERPL-MCNC: 9.4 MG/DL (ref 8.5–10.1)
CALCIUM SERPL-MCNC: 9.5 MG/DL (ref 8.5–10.1)
CANCER AG27-29 SERPL-ACNC: 117 U/ML (ref 0–39)
CANCER AG27-29 SERPL-ACNC: 132 U/ML (ref 0–39)
CANCER AG27-29 SERPL-ACNC: 152 U/ML (ref 0–39)
CANCER AG27-29 SERPL-ACNC: 43 U/ML (ref 0–39)
CANCER AG27-29 SERPL-ACNC: 49 U/ML (ref 0–39)
CANCER AG27-29 SERPL-ACNC: 50 U/ML (ref 0–39)
CANCER AG27-29 SERPL-ACNC: 55 U/ML (ref 0–39)
CANCER AG27-29 SERPL-ACNC: 59 U/ML (ref 0–39)
CANCER AG27-29 SERPL-ACNC: 59 U/ML (ref 0–39)
CANCER AG27-29 SERPL-ACNC: 70 U/ML (ref 0–39)
CANCER AG27-29 SERPL-ACNC: 85 U/ML (ref 0–39)
CEA SERPL-MCNC: 10.8 UG/L (ref 0–2.5)
CHLORIDE SERPL-SCNC: 102 MMOL/L (ref 94–109)
CHLORIDE SERPL-SCNC: 104 MMOL/L (ref 94–109)
CHLORIDE SERPL-SCNC: 104 MMOL/L (ref 94–109)
CHLORIDE SERPL-SCNC: 105 MMOL/L (ref 94–109)
CHLORIDE SERPL-SCNC: 105 MMOL/L (ref 94–109)
CHLORIDE SERPL-SCNC: 106 MMOL/L (ref 94–109)
CHLORIDE SERPL-SCNC: 107 MMOL/L (ref 94–109)
CHLORIDE SERPL-SCNC: 107 MMOL/L (ref 94–109)
CHLORIDE SERPL-SCNC: 108 MMOL/L (ref 94–109)
CHLORIDE SERPL-SCNC: 109 MMOL/L (ref 94–109)
CHLORIDE SERPL-SCNC: 109 MMOL/L (ref 94–109)
CHLORIDE SERPL-SCNC: 113 MMOL/L (ref 94–109)
CHOLEST SERPL-MCNC: 151 MG/DL
CO2 SERPL-SCNC: 22 MMOL/L (ref 20–32)
CO2 SERPL-SCNC: 23 MMOL/L (ref 20–32)
CO2 SERPL-SCNC: 24 MMOL/L (ref 20–32)
CO2 SERPL-SCNC: 26 MMOL/L (ref 20–32)
CO2 SERPL-SCNC: 26 MMOL/L (ref 20–32)
CO2 SERPL-SCNC: 27 MMOL/L (ref 20–32)
CO2 SERPL-SCNC: 28 MMOL/L (ref 20–32)
CO2 SERPL-SCNC: 28 MMOL/L (ref 20–32)
CO2 SERPL-SCNC: 30 MMOL/L (ref 20–32)
CO2 SERPL-SCNC: 31 MMOL/L (ref 20–32)
COLOR FLD: NORMAL
COLOR UR AUTO: ABNORMAL
COLOR UR AUTO: YELLOW
COLOR UR AUTO: YELLOW
COPATH REPORT: NORMAL
COPATH REPORT: NORMAL
CREAT SERPL-MCNC: 0.43 MG/DL (ref 0.52–1.04)
CREAT SERPL-MCNC: 0.46 MG/DL (ref 0.52–1.04)
CREAT SERPL-MCNC: 0.5 MG/DL (ref 0.52–1.04)
CREAT SERPL-MCNC: 0.54 MG/DL (ref 0.52–1.04)
CREAT SERPL-MCNC: 0.55 MG/DL (ref 0.52–1.04)
CREAT SERPL-MCNC: 0.55 MG/DL (ref 0.52–1.04)
CREAT SERPL-MCNC: 0.59 MG/DL (ref 0.52–1.04)
CREAT SERPL-MCNC: 0.59 MG/DL (ref 0.52–1.04)
CREAT SERPL-MCNC: 0.6 MG/DL (ref 0.52–1.04)
CREAT SERPL-MCNC: 0.61 MG/DL (ref 0.52–1.04)
CREAT SERPL-MCNC: 0.61 MG/DL (ref 0.52–1.04)
CREAT SERPL-MCNC: 0.63 MG/DL (ref 0.52–1.04)
CREAT SERPL-MCNC: 0.66 MG/DL (ref 0.52–1.04)
CREAT SERPL-MCNC: 0.66 MG/DL (ref 0.52–1.04)
CREAT SERPL-MCNC: 0.7 MG/DL (ref 0.52–1.04)
CREAT SERPL-MCNC: 0.71 MG/DL (ref 0.52–1.04)
CREAT SERPL-MCNC: 0.72 MG/DL (ref 0.52–1.04)
CREAT SERPL-MCNC: 0.8 MG/DL (ref 0.52–1.04)
DIFFERENTIAL METHOD BLD: ABNORMAL
DIFFERENTIAL METHOD BLD: NORMAL
EOSINOPHIL # BLD AUTO: 0 10E9/L (ref 0–0.7)
EOSINOPHIL # BLD AUTO: 0.1 10E9/L (ref 0–0.7)
EOSINOPHIL # BLD AUTO: 0.2 10E9/L (ref 0–0.7)
EOSINOPHIL # BLD AUTO: 0.3 10E9/L (ref 0–0.7)
EOSINOPHIL # BLD AUTO: 0.5 10E9/L (ref 0–0.7)
EOSINOPHIL # BLD AUTO: 0.5 10E9/L (ref 0–0.7)
EOSINOPHIL # BLD AUTO: 0.6 10E9/L (ref 0–0.7)
EOSINOPHIL NFR BLD AUTO: 0 %
EOSINOPHIL NFR BLD AUTO: 0.1 %
EOSINOPHIL NFR BLD AUTO: 0.7 %
EOSINOPHIL NFR BLD AUTO: 0.8 %
EOSINOPHIL NFR BLD AUTO: 1 %
EOSINOPHIL NFR BLD AUTO: 1.7 %
EOSINOPHIL NFR BLD AUTO: 10.3 %
EOSINOPHIL NFR BLD AUTO: 12.1 %
EOSINOPHIL NFR BLD AUTO: 2.4 %
EOSINOPHIL NFR BLD AUTO: 2.5 %
EOSINOPHIL NFR BLD AUTO: 2.7 %
EOSINOPHIL NFR BLD AUTO: 2.9 %
EOSINOPHIL NFR BLD AUTO: 3.1 %
EOSINOPHIL NFR BLD AUTO: 3.3 %
EOSINOPHIL NFR BLD AUTO: 3.9 %
EOSINOPHIL NFR BLD AUTO: 4.2 %
EOSINOPHIL NFR BLD AUTO: 4.9 %
EOSINOPHIL NFR BLD AUTO: 6.1 %
EOSINOPHIL NFR BLD AUTO: 7.7 %
ERCP: NORMAL
ERYTHROCYTE [DISTWIDTH] IN BLOOD BY AUTOMATED COUNT: 13 % (ref 10–15)
ERYTHROCYTE [DISTWIDTH] IN BLOOD BY AUTOMATED COUNT: 13.9 % (ref 10–15)
ERYTHROCYTE [DISTWIDTH] IN BLOOD BY AUTOMATED COUNT: 14.1 % (ref 10–15)
ERYTHROCYTE [DISTWIDTH] IN BLOOD BY AUTOMATED COUNT: 14.9 % (ref 10–15)
ERYTHROCYTE [DISTWIDTH] IN BLOOD BY AUTOMATED COUNT: 14.9 % (ref 10–15)
ERYTHROCYTE [DISTWIDTH] IN BLOOD BY AUTOMATED COUNT: 15 % (ref 10–15)
ERYTHROCYTE [DISTWIDTH] IN BLOOD BY AUTOMATED COUNT: 15.2 % (ref 10–15)
ERYTHROCYTE [DISTWIDTH] IN BLOOD BY AUTOMATED COUNT: 15.4 % (ref 10–15)
ERYTHROCYTE [DISTWIDTH] IN BLOOD BY AUTOMATED COUNT: 15.5 % (ref 10–15)
ERYTHROCYTE [DISTWIDTH] IN BLOOD BY AUTOMATED COUNT: 15.6 % (ref 10–15)
ERYTHROCYTE [DISTWIDTH] IN BLOOD BY AUTOMATED COUNT: 15.7 % (ref 10–15)
ERYTHROCYTE [DISTWIDTH] IN BLOOD BY AUTOMATED COUNT: 17 % (ref 10–15)
ERYTHROCYTE [DISTWIDTH] IN BLOOD BY AUTOMATED COUNT: 17.2 % (ref 10–15)
ERYTHROCYTE [DISTWIDTH] IN BLOOD BY AUTOMATED COUNT: 17.4 % (ref 10–15)
ERYTHROCYTE [DISTWIDTH] IN BLOOD BY AUTOMATED COUNT: 18.7 % (ref 10–15)
ERYTHROCYTE [DISTWIDTH] IN BLOOD BY AUTOMATED COUNT: 18.9 % (ref 10–15)
ERYTHROCYTE [DISTWIDTH] IN BLOOD BY AUTOMATED COUNT: 20.4 % (ref 10–15)
ERYTHROCYTE [DISTWIDTH] IN BLOOD BY AUTOMATED COUNT: 20.5 % (ref 10–15)
ERYTHROCYTE [DISTWIDTH] IN BLOOD BY AUTOMATED COUNT: 21.2 % (ref 10–15)
ERYTHROCYTE [DISTWIDTH] IN BLOOD BY AUTOMATED COUNT: 21.4 % (ref 10–15)
ERYTHROCYTE [DISTWIDTH] IN BLOOD BY AUTOMATED COUNT: 21.9 % (ref 10–15)
ERYTHROCYTE [DISTWIDTH] IN BLOOD BY AUTOMATED COUNT: 22.4 % (ref 10–15)
ERYTHROCYTE [DISTWIDTH] IN BLOOD BY AUTOMATED COUNT: 22.9 % (ref 10–15)
FIBRINOGEN PPP-MCNC: 574 MG/DL (ref 200–420)
FLUAV H1 2009 PAND RNA SPEC QL NAA+PROBE: NEGATIVE
FLUAV H1 RNA SPEC QL NAA+PROBE: NEGATIVE
FLUAV H3 RNA SPEC QL NAA+PROBE: NEGATIVE
FLUAV RNA SPEC QL NAA+PROBE: NEGATIVE
FLUAV+FLUBV AG SPEC QL: NEGATIVE
FLUAV+FLUBV AG SPEC QL: NEGATIVE
FLUBV RNA SPEC QL NAA+PROBE: NEGATIVE
GFR SERPL CREATININE-BSD FRML MDRD: 73 ML/MIN/1.7M2
GFR SERPL CREATININE-BSD FRML MDRD: 83 ML/MIN/1.7M2
GFR SERPL CREATININE-BSD FRML MDRD: 84 ML/MIN/1.7M2
GFR SERPL CREATININE-BSD FRML MDRD: 86 ML/MIN/1.7M2
GFR SERPL CREATININE-BSD FRML MDRD: >90 ML/MIN/1.7M2
GFR SERPL CREATININE-BSD FRML MDRD: ABNORMAL ML/MIN/1.7M2
GLUCOSE BLDC GLUCOMTR-MCNC: 97 MG/DL (ref 70–99)
GLUCOSE FLD-MCNC: 51 MG/DL
GLUCOSE SERPL-MCNC: 102 MG/DL (ref 70–99)
GLUCOSE SERPL-MCNC: 103 MG/DL (ref 70–99)
GLUCOSE SERPL-MCNC: 104 MG/DL (ref 70–99)
GLUCOSE SERPL-MCNC: 104 MG/DL (ref 70–99)
GLUCOSE SERPL-MCNC: 106 MG/DL (ref 70–99)
GLUCOSE SERPL-MCNC: 109 MG/DL (ref 70–99)
GLUCOSE SERPL-MCNC: 110 MG/DL (ref 70–99)
GLUCOSE SERPL-MCNC: 110 MG/DL (ref 70–99)
GLUCOSE SERPL-MCNC: 114 MG/DL (ref 70–99)
GLUCOSE SERPL-MCNC: 122 MG/DL (ref 70–99)
GLUCOSE SERPL-MCNC: 126 MG/DL (ref 70–99)
GLUCOSE SERPL-MCNC: 127 MG/DL (ref 70–99)
GLUCOSE SERPL-MCNC: 171 MG/DL (ref 70–99)
GLUCOSE SERPL-MCNC: 87 MG/DL (ref 70–99)
GLUCOSE SERPL-MCNC: 89 MG/DL (ref 70–99)
GLUCOSE SERPL-MCNC: 90 MG/DL (ref 70–99)
GLUCOSE SERPL-MCNC: 91 MG/DL (ref 70–99)
GLUCOSE SERPL-MCNC: 92 MG/DL (ref 70–99)
GLUCOSE SERPL-MCNC: 93 MG/DL (ref 70–99)
GLUCOSE SERPL-MCNC: 94 MG/DL (ref 70–99)
GLUCOSE UR STRIP-MCNC: 30 MG/DL
GLUCOSE UR STRIP-MCNC: 70 MG/DL
GLUCOSE UR STRIP-MCNC: NEGATIVE MG/DL
GRAM STN SPEC: NORMAL
HADV DNA SPEC QL NAA+PROBE: NEGATIVE
HADV DNA SPEC QL NAA+PROBE: NEGATIVE
HCO3 BLDV-SCNC: 26 MMOL/L (ref 21–28)
HCT VFR BLD AUTO: 34.9 % (ref 35–47)
HCT VFR BLD AUTO: 34.9 % (ref 35–47)
HCT VFR BLD AUTO: 35.2 % (ref 35–47)
HCT VFR BLD AUTO: 35.4 % (ref 35–47)
HCT VFR BLD AUTO: 35.5 % (ref 35–47)
HCT VFR BLD AUTO: 35.7 % (ref 35–47)
HCT VFR BLD AUTO: 36 % (ref 35–47)
HCT VFR BLD AUTO: 36.2 % (ref 35–47)
HCT VFR BLD AUTO: 36.3 % (ref 35–47)
HCT VFR BLD AUTO: 36.4 % (ref 35–47)
HCT VFR BLD AUTO: 36.5 % (ref 35–47)
HCT VFR BLD AUTO: 36.5 % (ref 35–47)
HCT VFR BLD AUTO: 36.6 % (ref 35–47)
HCT VFR BLD AUTO: 37.3 % (ref 35–47)
HCT VFR BLD AUTO: 37.5 % (ref 35–47)
HCT VFR BLD AUTO: 38.1 % (ref 35–47)
HCT VFR BLD AUTO: 38.8 % (ref 35–47)
HCT VFR BLD AUTO: 39.4 % (ref 35–47)
HCT VFR BLD AUTO: 40.1 % (ref 35–47)
HCT VFR BLD AUTO: 40.1 % (ref 35–47)
HCT VFR BLD AUTO: 40.2 % (ref 35–47)
HCT VFR BLD AUTO: 40.4 % (ref 35–47)
HCT VFR BLD AUTO: 40.6 % (ref 35–47)
HCT VFR BLD AUTO: 40.6 % (ref 35–47)
HCT VFR BLD AUTO: 41.3 % (ref 35–47)
HDLC SERPL-MCNC: 64 MG/DL
HGB BLD-MCNC: 10.9 G/DL (ref 11.7–15.7)
HGB BLD-MCNC: 11 G/DL (ref 11.7–15.7)
HGB BLD-MCNC: 11.3 G/DL (ref 11.7–15.7)
HGB BLD-MCNC: 11.4 G/DL (ref 11.7–15.7)
HGB BLD-MCNC: 11.4 G/DL (ref 11.7–15.7)
HGB BLD-MCNC: 11.6 G/DL (ref 11.7–15.7)
HGB BLD-MCNC: 11.8 G/DL (ref 11.7–15.7)
HGB BLD-MCNC: 11.8 G/DL (ref 11.7–15.7)
HGB BLD-MCNC: 11.9 G/DL (ref 11.7–15.7)
HGB BLD-MCNC: 11.9 G/DL (ref 11.7–15.7)
HGB BLD-MCNC: 12.1 G/DL (ref 11.7–15.7)
HGB BLD-MCNC: 12.1 G/DL (ref 11.7–15.7)
HGB BLD-MCNC: 12.2 G/DL (ref 11.7–15.7)
HGB BLD-MCNC: 12.3 G/DL (ref 11.7–15.7)
HGB BLD-MCNC: 12.5 G/DL (ref 11.7–15.7)
HGB BLD-MCNC: 12.7 G/DL (ref 11.7–15.7)
HGB BLD-MCNC: 12.8 G/DL (ref 11.7–15.7)
HGB BLD-MCNC: 12.9 G/DL (ref 11.7–15.7)
HGB BLD-MCNC: 12.9 G/DL (ref 11.7–15.7)
HGB BLD-MCNC: 13 G/DL (ref 11.7–15.7)
HGB BLD-MCNC: 13 G/DL (ref 11.7–15.7)
HGB BLD-MCNC: 13.2 G/DL (ref 11.7–15.7)
HGB BLD-MCNC: 13.7 G/DL (ref 11.7–15.7)
HGB UR QL STRIP: NEGATIVE
HMPV RNA SPEC QL NAA+PROBE: NEGATIVE
HPIV1 RNA SPEC QL NAA+PROBE: NEGATIVE
HPIV2 RNA SPEC QL NAA+PROBE: NEGATIVE
HPIV3 RNA SPEC QL NAA+PROBE: NEGATIVE
HYALINE CASTS #/AREA URNS LPF: 3 /LPF (ref 0–2)
IMM GRANULOCYTES # BLD: 0 10E9/L (ref 0–0.4)
IMM GRANULOCYTES # BLD: 0.1 10E9/L (ref 0–0.4)
IMM GRANULOCYTES # BLD: 0.1 10E9/L (ref 0–0.4)
IMM GRANULOCYTES NFR BLD: 0.2 %
IMM GRANULOCYTES NFR BLD: 0.3 %
IMM GRANULOCYTES NFR BLD: 0.4 %
IMM GRANULOCYTES NFR BLD: 0.5 %
IMM GRANULOCYTES NFR BLD: 0.6 %
IMM GRANULOCYTES NFR BLD: 0.6 %
IMM GRANULOCYTES NFR BLD: 0.8 %
IMM GRANULOCYTES NFR BLD: 1.2 %
IMM GRANULOCYTES NFR BLD: 1.7 %
INR PPP: 1 (ref 0.86–1.14)
INR PPP: 1.11 (ref 0.86–1.14)
INTERPRETATION ECG - MUSE: NORMAL
INTERPRETATION ECG - MUSE: NORMAL
KETONES UR STRIP-MCNC: NEGATIVE MG/DL
LACTATE BLD-SCNC: 0.9 MMOL/L (ref 0.7–2)
LACTATE BLD-SCNC: 1 MMOL/L (ref 0.7–2.1)
LDH FLD L TO P-CCNC: 2847 U/L
LDLC SERPL CALC-MCNC: 64 MG/DL
LEUKOCYTE ESTERASE UR QL STRIP: ABNORMAL
LIPASE SERPL-CCNC: 860 U/L (ref 73–393)
LIPASE SERPL-CCNC: 97 U/L (ref 73–393)
LYMPHOCYTES # BLD AUTO: 0.6 10E9/L (ref 0.8–5.3)
LYMPHOCYTES # BLD AUTO: 0.7 10E9/L (ref 0.8–5.3)
LYMPHOCYTES # BLD AUTO: 0.7 10E9/L (ref 0.8–5.3)
LYMPHOCYTES # BLD AUTO: 0.8 10E9/L (ref 0.8–5.3)
LYMPHOCYTES # BLD AUTO: 0.9 10E9/L (ref 0.8–5.3)
LYMPHOCYTES # BLD AUTO: 1 10E9/L (ref 0.8–5.3)
LYMPHOCYTES # BLD AUTO: 1.1 10E9/L (ref 0.8–5.3)
LYMPHOCYTES # BLD AUTO: 1.2 10E9/L (ref 0.8–5.3)
LYMPHOCYTES # BLD AUTO: 1.2 10E9/L (ref 0.8–5.3)
LYMPHOCYTES # BLD AUTO: 1.3 10E9/L (ref 0.8–5.3)
LYMPHOCYTES # BLD AUTO: 1.4 10E9/L (ref 0.8–5.3)
LYMPHOCYTES # BLD AUTO: 1.4 10E9/L (ref 0.8–5.3)
LYMPHOCYTES # BLD AUTO: 1.7 10E9/L (ref 0.8–5.3)
LYMPHOCYTES NFR BLD AUTO: 11.6 %
LYMPHOCYTES NFR BLD AUTO: 11.7 %
LYMPHOCYTES NFR BLD AUTO: 12.1 %
LYMPHOCYTES NFR BLD AUTO: 15.9 %
LYMPHOCYTES NFR BLD AUTO: 16.1 %
LYMPHOCYTES NFR BLD AUTO: 16.2 %
LYMPHOCYTES NFR BLD AUTO: 17 %
LYMPHOCYTES NFR BLD AUTO: 17.1 %
LYMPHOCYTES NFR BLD AUTO: 18.1 %
LYMPHOCYTES NFR BLD AUTO: 21.3 %
LYMPHOCYTES NFR BLD AUTO: 21.6 %
LYMPHOCYTES NFR BLD AUTO: 25.8 %
LYMPHOCYTES NFR BLD AUTO: 26.5 %
LYMPHOCYTES NFR BLD AUTO: 27 %
LYMPHOCYTES NFR BLD AUTO: 28.1 %
LYMPHOCYTES NFR BLD AUTO: 30.4 %
LYMPHOCYTES NFR BLD AUTO: 31.5 %
LYMPHOCYTES NFR BLD AUTO: 31.6 %
LYMPHOCYTES NFR BLD AUTO: 32.9 %
LYMPHOCYTES NFR BLD AUTO: 9.3 %
LYMPHOCYTES NFR BLD AUTO: 9.6 %
LYMPHOCYTES NFR FLD MANUAL: 5 %
Lab: ABNORMAL
Lab: NORMAL
MAGNESIUM SERPL-MCNC: 2.1 MG/DL (ref 1.6–2.3)
MAGNESIUM SERPL-MCNC: 2.3 MG/DL (ref 1.6–2.3)
MCH RBC QN AUTO: 28.8 PG (ref 26.5–33)
MCH RBC QN AUTO: 28.9 PG (ref 26.5–33)
MCH RBC QN AUTO: 29.6 PG (ref 26.5–33)
MCH RBC QN AUTO: 30.3 PG (ref 26.5–33)
MCH RBC QN AUTO: 30.6 PG (ref 26.5–33)
MCH RBC QN AUTO: 30.6 PG (ref 26.5–33)
MCH RBC QN AUTO: 30.8 PG (ref 26.5–33)
MCH RBC QN AUTO: 31 PG (ref 26.5–33)
MCH RBC QN AUTO: 31.2 PG (ref 26.5–33)
MCH RBC QN AUTO: 32.7 PG (ref 26.5–33)
MCH RBC QN AUTO: 33.2 PG (ref 26.5–33)
MCH RBC QN AUTO: 33.3 PG (ref 26.5–33)
MCH RBC QN AUTO: 33.9 PG (ref 26.5–33)
MCH RBC QN AUTO: 34.1 PG (ref 26.5–33)
MCH RBC QN AUTO: 34.2 PG (ref 26.5–33)
MCH RBC QN AUTO: 34.3 PG (ref 26.5–33)
MCH RBC QN AUTO: 34.4 PG (ref 26.5–33)
MCH RBC QN AUTO: 34.5 PG (ref 26.5–33)
MCH RBC QN AUTO: 34.6 PG (ref 26.5–33)
MCH RBC QN AUTO: 34.6 PG (ref 26.5–33)
MCH RBC QN AUTO: 34.8 PG (ref 26.5–33)
MCH RBC QN AUTO: 35 PG (ref 26.5–33)
MCH RBC QN AUTO: 35.1 PG (ref 26.5–33)
MCH RBC QN AUTO: 36.1 PG (ref 26.5–33)
MCH RBC QN AUTO: 36.4 PG (ref 26.5–33)
MCHC RBC AUTO-ENTMCNC: 31.1 G/DL (ref 31.5–36.5)
MCHC RBC AUTO-ENTMCNC: 31.2 G/DL (ref 31.5–36.5)
MCHC RBC AUTO-ENTMCNC: 31.7 G/DL (ref 31.5–36.5)
MCHC RBC AUTO-ENTMCNC: 31.7 G/DL (ref 31.5–36.5)
MCHC RBC AUTO-ENTMCNC: 31.8 G/DL (ref 31.5–36.5)
MCHC RBC AUTO-ENTMCNC: 31.9 G/DL (ref 31.5–36.5)
MCHC RBC AUTO-ENTMCNC: 31.9 G/DL (ref 31.5–36.5)
MCHC RBC AUTO-ENTMCNC: 32.1 G/DL (ref 31.5–36.5)
MCHC RBC AUTO-ENTMCNC: 32.3 G/DL (ref 31.5–36.5)
MCHC RBC AUTO-ENTMCNC: 32.4 G/DL (ref 31.5–36.5)
MCHC RBC AUTO-ENTMCNC: 32.4 G/DL (ref 31.5–36.5)
MCHC RBC AUTO-ENTMCNC: 32.6 G/DL (ref 31.5–36.5)
MCHC RBC AUTO-ENTMCNC: 32.7 G/DL (ref 31.5–36.5)
MCHC RBC AUTO-ENTMCNC: 33 G/DL (ref 31.5–36.5)
MCHC RBC AUTO-ENTMCNC: 33.2 G/DL (ref 31.5–36.5)
MCHC RBC AUTO-ENTMCNC: 33.3 G/DL (ref 31.5–36.5)
MCHC RBC AUTO-ENTMCNC: 33.5 G/DL (ref 31.5–36.5)
MCHC RBC AUTO-ENTMCNC: 34.2 G/DL (ref 31.5–36.5)
MCHC RBC AUTO-ENTMCNC: 34.7 G/DL (ref 31.5–36.5)
MCHC RBC AUTO-ENTMCNC: 35 G/DL (ref 31.5–36.5)
MCV RBC AUTO: 100 FL (ref 78–100)
MCV RBC AUTO: 100 FL (ref 78–100)
MCV RBC AUTO: 101 FL (ref 78–100)
MCV RBC AUTO: 104 FL (ref 78–100)
MCV RBC AUTO: 105 FL (ref 78–100)
MCV RBC AUTO: 106 FL (ref 78–100)
MCV RBC AUTO: 106 FL (ref 78–100)
MCV RBC AUTO: 107 FL (ref 78–100)
MCV RBC AUTO: 107 FL (ref 78–100)
MCV RBC AUTO: 108 FL (ref 78–100)
MCV RBC AUTO: 108 FL (ref 78–100)
MCV RBC AUTO: 109 FL (ref 78–100)
MCV RBC AUTO: 109 FL (ref 78–100)
MCV RBC AUTO: 110 FL (ref 78–100)
MCV RBC AUTO: 110 FL (ref 78–100)
MCV RBC AUTO: 91 FL (ref 78–100)
MCV RBC AUTO: 91 FL (ref 78–100)
MCV RBC AUTO: 93 FL (ref 78–100)
MCV RBC AUTO: 93 FL (ref 78–100)
MCV RBC AUTO: 94 FL (ref 78–100)
MCV RBC AUTO: 95 FL (ref 78–100)
MCV RBC AUTO: 96 FL (ref 78–100)
MCV RBC AUTO: 97 FL (ref 78–100)
MICRO REPORT STATUS: ABNORMAL
MICRO REPORT STATUS: NORMAL
MICRO REPORT STATUS: NORMAL
MICROBIOLOGIST REVIEW: ABNORMAL
MICROORGANISM SPEC CULT: ABNORMAL
MONOCYTES # BLD AUTO: 0.4 10E9/L (ref 0–1.3)
MONOCYTES # BLD AUTO: 0.5 10E9/L (ref 0–1.3)
MONOCYTES # BLD AUTO: 0.6 10E9/L (ref 0–1.3)
MONOCYTES # BLD AUTO: 0.7 10E9/L (ref 0–1.3)
MONOCYTES # BLD AUTO: 0.8 10E9/L (ref 0–1.3)
MONOCYTES # BLD AUTO: 0.9 10E9/L (ref 0–1.3)
MONOCYTES # BLD AUTO: 1 10E9/L (ref 0–1.3)
MONOCYTES NFR BLD AUTO: 10.1 %
MONOCYTES NFR BLD AUTO: 10.4 %
MONOCYTES NFR BLD AUTO: 10.5 %
MONOCYTES NFR BLD AUTO: 10.6 %
MONOCYTES NFR BLD AUTO: 10.8 %
MONOCYTES NFR BLD AUTO: 11 %
MONOCYTES NFR BLD AUTO: 11 %
MONOCYTES NFR BLD AUTO: 11.6 %
MONOCYTES NFR BLD AUTO: 11.9 %
MONOCYTES NFR BLD AUTO: 12.1 %
MONOCYTES NFR BLD AUTO: 12.2 %
MONOCYTES NFR BLD AUTO: 12.6 %
MONOCYTES NFR BLD AUTO: 13 %
MONOCYTES NFR BLD AUTO: 13.2 %
MONOCYTES NFR BLD AUTO: 13.3 %
MONOCYTES NFR BLD AUTO: 13.6 %
MONOCYTES NFR BLD AUTO: 14.1 %
MONOCYTES NFR BLD AUTO: 7.7 %
MONOCYTES NFR BLD AUTO: 8 %
MONOCYTES NFR BLD AUTO: 8.5 %
MONOCYTES NFR BLD AUTO: 9.9 %
MUCOUS THREADS #/AREA URNS LPF: PRESENT /LPF
NEUTROPHILS # BLD AUTO: 1.9 10E9/L (ref 1.6–8.3)
NEUTROPHILS # BLD AUTO: 2 10E9/L (ref 1.6–8.3)
NEUTROPHILS # BLD AUTO: 2.1 10E9/L (ref 1.6–8.3)
NEUTROPHILS # BLD AUTO: 2.2 10E9/L (ref 1.6–8.3)
NEUTROPHILS # BLD AUTO: 2.4 10E9/L (ref 1.6–8.3)
NEUTROPHILS # BLD AUTO: 2.5 10E9/L (ref 1.6–8.3)
NEUTROPHILS # BLD AUTO: 2.6 10E9/L (ref 1.6–8.3)
NEUTROPHILS # BLD AUTO: 2.6 10E9/L (ref 1.6–8.3)
NEUTROPHILS # BLD AUTO: 3 10E9/L (ref 1.6–8.3)
NEUTROPHILS # BLD AUTO: 3.5 10E9/L (ref 1.6–8.3)
NEUTROPHILS # BLD AUTO: 3.9 10E9/L (ref 1.6–8.3)
NEUTROPHILS # BLD AUTO: 4.2 10E9/L (ref 1.6–8.3)
NEUTROPHILS # BLD AUTO: 5.1 10E9/L (ref 1.6–8.3)
NEUTROPHILS # BLD AUTO: 5.2 10E9/L (ref 1.6–8.3)
NEUTROPHILS # BLD AUTO: 5.3 10E9/L (ref 1.6–8.3)
NEUTROPHILS # BLD AUTO: 5.3 10E9/L (ref 1.6–8.3)
NEUTROPHILS # BLD AUTO: 5.8 10E9/L (ref 1.6–8.3)
NEUTROPHILS # BLD AUTO: 6.2 10E9/L (ref 1.6–8.3)
NEUTROPHILS # BLD AUTO: 6.5 10E9/L (ref 1.6–8.3)
NEUTROPHILS NFR BLD AUTO: 42.2 %
NEUTROPHILS NFR BLD AUTO: 51.2 %
NEUTROPHILS NFR BLD AUTO: 51.7 %
NEUTROPHILS NFR BLD AUTO: 52.2 %
NEUTROPHILS NFR BLD AUTO: 53.2 %
NEUTROPHILS NFR BLD AUTO: 57.6 %
NEUTROPHILS NFR BLD AUTO: 58.2 %
NEUTROPHILS NFR BLD AUTO: 58.8 %
NEUTROPHILS NFR BLD AUTO: 62.3 %
NEUTROPHILS NFR BLD AUTO: 63.2 %
NEUTROPHILS NFR BLD AUTO: 64.3 %
NEUTROPHILS NFR BLD AUTO: 66.6 %
NEUTROPHILS NFR BLD AUTO: 67.3 %
NEUTROPHILS NFR BLD AUTO: 69 %
NEUTROPHILS NFR BLD AUTO: 70.6 %
NEUTROPHILS NFR BLD AUTO: 72.6 %
NEUTROPHILS NFR BLD AUTO: 72.9 %
NEUTROPHILS NFR BLD AUTO: 73.5 %
NEUTROPHILS NFR BLD AUTO: 76.3 %
NEUTROPHILS NFR BLD AUTO: 79.8 %
NEUTROPHILS NFR BLD AUTO: 81.8 %
NEUTS BAND NFR FLD MANUAL: 71 %
NITRATE UR QL: NEGATIVE
NONHDLC SERPL-MCNC: 87 MG/DL
NRBC # BLD AUTO: 0 10*3/UL
NRBC BLD AUTO-RTO: 0 /100
OTHER CELLS FLD MANUAL: 24 %
PCO2 BLDV: 40 MM HG (ref 40–50)
PH BLDV: 7.42 PH (ref 7.32–7.43)
PH UR STRIP: 5 PH (ref 5–7)
PH UR STRIP: 5.5 PH (ref 5–7)
PH UR STRIP: 5.5 PH (ref 5–7)
PH UR STRIP: 6 PH (ref 5–7)
PH UR STRIP: 6 PH (ref 5–7)
PHOSPHATE SERPL-MCNC: 2.8 MG/DL (ref 2.5–4.5)
PLATELET # BLD AUTO: 140 10E9/L (ref 150–450)
PLATELET # BLD AUTO: 153 10E9/L (ref 150–450)
PLATELET # BLD AUTO: 155 10E9/L (ref 150–450)
PLATELET # BLD AUTO: 156 10E9/L (ref 150–450)
PLATELET # BLD AUTO: 158 10E9/L (ref 150–450)
PLATELET # BLD AUTO: 162 10E9/L (ref 150–450)
PLATELET # BLD AUTO: 166 10E9/L (ref 150–450)
PLATELET # BLD AUTO: 174 10E9/L (ref 150–450)
PLATELET # BLD AUTO: 177 10E9/L (ref 150–450)
PLATELET # BLD AUTO: 181 10E9/L (ref 150–450)
PLATELET # BLD AUTO: 189 10E9/L (ref 150–450)
PLATELET # BLD AUTO: 196 10E9/L (ref 150–450)
PLATELET # BLD AUTO: 208 10E9/L (ref 150–450)
PLATELET # BLD AUTO: 211 10E9/L (ref 150–450)
PLATELET # BLD AUTO: 213 10E9/L (ref 150–450)
PLATELET # BLD AUTO: 214 10E9/L (ref 150–450)
PLATELET # BLD AUTO: 217 10E9/L (ref 150–450)
PLATELET # BLD AUTO: 237 10E9/L (ref 150–450)
PLATELET # BLD AUTO: 238 10E9/L (ref 150–450)
PLATELET # BLD AUTO: 239 10E9/L (ref 150–450)
PLATELET # BLD AUTO: 245 10E9/L (ref 150–450)
PLATELET # BLD AUTO: 247 10E9/L (ref 150–450)
PLATELET # BLD AUTO: 250 10E9/L (ref 150–450)
PLATELET # BLD AUTO: 291 10E9/L (ref 150–450)
PLATELET # BLD AUTO: 297 10E9/L (ref 150–450)
PO2 BLDV: 26 MM HG (ref 25–47)
POTASSIUM SERPL-SCNC: 3.1 MMOL/L (ref 3.4–5.3)
POTASSIUM SERPL-SCNC: 3.2 MMOL/L (ref 3.4–5.3)
POTASSIUM SERPL-SCNC: 3.3 MMOL/L (ref 3.4–5.3)
POTASSIUM SERPL-SCNC: 3.4 MMOL/L (ref 3.4–5.3)
POTASSIUM SERPL-SCNC: 3.4 MMOL/L (ref 3.4–5.3)
POTASSIUM SERPL-SCNC: 3.5 MMOL/L (ref 3.4–5.3)
POTASSIUM SERPL-SCNC: 3.7 MMOL/L (ref 3.4–5.3)
POTASSIUM SERPL-SCNC: 3.8 MMOL/L (ref 3.4–5.3)
POTASSIUM SERPL-SCNC: 3.9 MMOL/L (ref 3.4–5.3)
POTASSIUM SERPL-SCNC: 4 MMOL/L (ref 3.4–5.3)
POTASSIUM SERPL-SCNC: 4.1 MMOL/L (ref 3.4–5.3)
PROT FLD-MCNC: 5.6 G/DL
PROT SERPL-MCNC: 6 G/DL (ref 6.8–8.8)
PROT SERPL-MCNC: 6.1 G/DL (ref 6.8–8.8)
PROT SERPL-MCNC: 6.2 G/DL (ref 6.8–8.8)
PROT SERPL-MCNC: 6.3 G/DL (ref 6.8–8.8)
PROT SERPL-MCNC: 6.3 G/DL (ref 6.8–8.8)
PROT SERPL-MCNC: 6.5 G/DL (ref 6.8–8.8)
PROT SERPL-MCNC: 6.5 G/DL (ref 6.8–8.8)
PROT SERPL-MCNC: 6.6 G/DL (ref 6.8–8.8)
PROT SERPL-MCNC: 6.7 G/DL (ref 6.8–8.8)
PROT SERPL-MCNC: 6.8 G/DL (ref 6.8–8.8)
PROT SERPL-MCNC: 6.8 G/DL (ref 6.8–8.8)
PROT SERPL-MCNC: 7 G/DL (ref 6.8–8.8)
PROT SERPL-MCNC: 7 G/DL (ref 6.8–8.8)
PROT SERPL-MCNC: 7.1 G/DL (ref 6.8–8.8)
PROT SERPL-MCNC: 7.1 G/DL (ref 6.8–8.8)
PROT SERPL-MCNC: 7.2 G/DL (ref 6.8–8.8)
PROT SERPL-MCNC: 7.4 G/DL (ref 6.8–8.8)
PROT SERPL-MCNC: 7.4 G/DL (ref 6.8–8.8)
PROT SERPL-MCNC: 7.5 G/DL (ref 6.8–8.8)
RADIOLOGIST FLAGS: ABNORMAL
RBC # BLD AUTO: 3.19 10E12/L (ref 3.8–5.2)
RBC # BLD AUTO: 3.21 10E12/L (ref 3.8–5.2)
RBC # BLD AUTO: 3.3 10E12/L (ref 3.8–5.2)
RBC # BLD AUTO: 3.3 10E12/L (ref 3.8–5.2)
RBC # BLD AUTO: 3.31 10E12/L (ref 3.8–5.2)
RBC # BLD AUTO: 3.31 10E12/L (ref 3.8–5.2)
RBC # BLD AUTO: 3.32 10E12/L (ref 3.8–5.2)
RBC # BLD AUTO: 3.41 10E12/L (ref 3.8–5.2)
RBC # BLD AUTO: 3.42 10E12/L (ref 3.8–5.2)
RBC # BLD AUTO: 3.42 10E12/L (ref 3.8–5.2)
RBC # BLD AUTO: 3.5 10E12/L (ref 3.8–5.2)
RBC # BLD AUTO: 3.62 10E12/L (ref 3.8–5.2)
RBC # BLD AUTO: 3.66 10E12/L (ref 3.8–5.2)
RBC # BLD AUTO: 3.75 10E12/L (ref 3.8–5.2)
RBC # BLD AUTO: 3.86 10E12/L (ref 3.8–5.2)
RBC # BLD AUTO: 3.92 10E12/L (ref 3.8–5.2)
RBC # BLD AUTO: 3.94 10E12/L (ref 3.8–5.2)
RBC # BLD AUTO: 4.02 10E12/L (ref 3.8–5.2)
RBC # BLD AUTO: 4.04 10E12/L (ref 3.8–5.2)
RBC # BLD AUTO: 4.17 10E12/L (ref 3.8–5.2)
RBC # BLD AUTO: 4.23 10E12/L (ref 3.8–5.2)
RBC # BLD AUTO: 4.33 10E12/L (ref 3.8–5.2)
RBC # BLD AUTO: 4.45 10E12/L (ref 3.8–5.2)
RBC # BLD AUTO: 4.47 10E12/L (ref 3.8–5.2)
RBC # BLD AUTO: 4.48 10E12/L (ref 3.8–5.2)
RBC #/AREA URNS AUTO: 1 /HPF (ref 0–2)
RBC #/AREA URNS AUTO: 3 /HPF (ref 0–2)
RBC #/AREA URNS AUTO: 5 /HPF (ref 0–2)
RBC #/AREA URNS AUTO: <1 /HPF (ref 0–2)
RBC #/AREA URNS AUTO: <1 /HPF (ref 0–2)
RHINOVIRUS RNA SPEC QL NAA+PROBE: POSITIVE
RSV RNA SPEC QL NAA+PROBE: NEGATIVE
RSV RNA SPEC QL NAA+PROBE: NEGATIVE
SODIUM SERPL-SCNC: 137 MMOL/L (ref 133–144)
SODIUM SERPL-SCNC: 137 MMOL/L (ref 133–144)
SODIUM SERPL-SCNC: 138 MMOL/L (ref 133–144)
SODIUM SERPL-SCNC: 139 MMOL/L (ref 133–144)
SODIUM SERPL-SCNC: 140 MMOL/L (ref 133–144)
SODIUM SERPL-SCNC: 141 MMOL/L (ref 133–144)
SODIUM SERPL-SCNC: 141 MMOL/L (ref 133–144)
SODIUM SERPL-SCNC: 143 MMOL/L (ref 133–144)
SOURCE: ABNORMAL
SP GR UR STRIP: 1 (ref 1–1.03)
SP GR UR STRIP: 1.01 (ref 1–1.03)
SP GR UR STRIP: 1.02 (ref 1–1.03)
SPECIMEN EXP DATE BLD: NORMAL
SPECIMEN SOURCE FLD: NORMAL
SPECIMEN SOURCE: ABNORMAL
SPECIMEN SOURCE: NORMAL
SQUAMOUS #/AREA URNS AUTO: 1 /HPF (ref 0–1)
SQUAMOUS #/AREA URNS AUTO: 1 /HPF (ref 0–1)
SQUAMOUS #/AREA URNS AUTO: 2 /HPF (ref 0–1)
SQUAMOUS #/AREA URNS AUTO: 2 /HPF (ref 0–1)
SQUAMOUS #/AREA URNS AUTO: 4 /HPF (ref 0–1)
TRANS CELLS #/AREA URNS HPF: 1 /HPF (ref 0–1)
TRANS CELLS #/AREA URNS HPF: <1 /HPF (ref 0–1)
TRIGL SERPL-MCNC: 117 MG/DL
TSH SERPL DL<=0.005 MIU/L-ACNC: 1.07 MU/L (ref 0.4–4)
URN SPEC COLLECT METH UR: ABNORMAL
URN SPEC COLLECT METH UR: ABNORMAL
UROBILINOGEN UR STRIP-MCNC: 2 MG/DL (ref 0–2)
UROBILINOGEN UR STRIP-MCNC: NORMAL MG/DL (ref 0–2)
WBC # BLD AUTO: 3.6 10E9/L (ref 4–11)
WBC # BLD AUTO: 3.8 10E9/L (ref 4–11)
WBC # BLD AUTO: 3.8 10E9/L (ref 4–11)
WBC # BLD AUTO: 4 10E9/L (ref 4–11)
WBC # BLD AUTO: 4.2 10E9/L (ref 4–11)
WBC # BLD AUTO: 4.4 10E9/L (ref 4–11)
WBC # BLD AUTO: 4.6 10E9/L (ref 4–11)
WBC # BLD AUTO: 4.7 10E9/L (ref 4–11)
WBC # BLD AUTO: 5 10E9/L (ref 4–11)
WBC # BLD AUTO: 5.2 10E9/L (ref 4–11)
WBC # BLD AUTO: 5.5 10E9/L (ref 4–11)
WBC # BLD AUTO: 5.7 10E9/L (ref 4–11)
WBC # BLD AUTO: 5.7 10E9/L (ref 4–11)
WBC # BLD AUTO: 5.9 10E9/L (ref 4–11)
WBC # BLD AUTO: 6.2 10E9/L (ref 4–11)
WBC # BLD AUTO: 6.6 10E9/L (ref 4–11)
WBC # BLD AUTO: 7 10E9/L (ref 4–11)
WBC # BLD AUTO: 7.2 10E9/L (ref 4–11)
WBC # BLD AUTO: 7.4 10E9/L (ref 4–11)
WBC # BLD AUTO: 7.8 10E9/L (ref 4–11)
WBC # BLD AUTO: 7.9 10E9/L (ref 4–11)
WBC # BLD AUTO: 8.1 10E9/L (ref 4–11)
WBC # BLD AUTO: 8.2 10E9/L (ref 4–11)
WBC # BLD AUTO: 8.3 10E9/L (ref 4–11)
WBC # BLD AUTO: 9.3 10E9/L (ref 4–11)
WBC # FLD AUTO: 3093 /UL
WBC #/AREA URNS AUTO: 4 /HPF (ref 0–2)
WBC #/AREA URNS AUTO: 6 /HPF (ref 0–2)
WBC #/AREA URNS AUTO: 82 /HPF (ref 0–2)
WBC #/AREA URNS AUTO: <1 /HPF (ref 0–2)
WBC #/AREA URNS AUTO: >182 /HPF (ref 0–2)
WBC CLUMPS #/AREA URNS HPF: PRESENT /HPF

## 2017-01-01 PROCEDURE — 80053 COMPREHEN METABOLIC PANEL: CPT | Performed by: NURSE PRACTITIONER

## 2017-01-01 PROCEDURE — 25000125 ZZHC RX 250: Performed by: INTERNAL MEDICINE

## 2017-01-01 PROCEDURE — 33010 ZZHC PERICARDIOCENTESIS INITIAL: CPT

## 2017-01-01 PROCEDURE — 86300 IMMUNOASSAY TUMOR CA 15-3: CPT | Performed by: PHYSICIAN ASSISTANT

## 2017-01-01 PROCEDURE — 74177 CT ABD & PELVIS W/CONTRAST: CPT

## 2017-01-01 PROCEDURE — 99215 OFFICE O/P EST HI 40 MIN: CPT | Performed by: INTERNAL MEDICINE

## 2017-01-01 PROCEDURE — 85025 COMPLETE CBC W/AUTO DIFF WBC: CPT | Performed by: NURSE PRACTITIONER

## 2017-01-01 PROCEDURE — 25000128 H RX IP 250 OP 636: Performed by: NURSE PRACTITIONER

## 2017-01-01 PROCEDURE — 36592 COLLECT BLOOD FROM PICC: CPT | Performed by: NURSE PRACTITIONER

## 2017-01-01 PROCEDURE — 70553 MRI BRAIN STEM W/O & W/DYE: CPT

## 2017-01-01 PROCEDURE — 36415 COLL VENOUS BLD VENIPUNCTURE: CPT | Performed by: INTERNAL MEDICINE

## 2017-01-01 PROCEDURE — 84132 ASSAY OF SERUM POTASSIUM: CPT | Performed by: INTERNAL MEDICINE

## 2017-01-01 PROCEDURE — 40000099 ZZH STATISTIC LYMPHEDEMA VISIT: Performed by: PHYSICAL THERAPIST

## 2017-01-01 PROCEDURE — 36000061 ZZH SURGERY LEVEL 3 W FLUORO 1ST 30 MIN - UMMC: Performed by: INTERNAL MEDICINE

## 2017-01-01 PROCEDURE — 97530 THERAPEUTIC ACTIVITIES: CPT | Mod: GP

## 2017-01-01 PROCEDURE — 81001 URINALYSIS AUTO W/SCOPE: CPT | Performed by: NURSE PRACTITIONER

## 2017-01-01 PROCEDURE — 99211 OFF/OP EST MAY X REQ PHY/QHP: CPT

## 2017-01-01 PROCEDURE — 25000125 ZZHC RX 250: Performed by: RADIOLOGY

## 2017-01-01 PROCEDURE — 85025 COMPLETE CBC W/AUTO DIFF WBC: CPT | Performed by: EMERGENCY MEDICINE

## 2017-01-01 PROCEDURE — 25000128 H RX IP 250 OP 636: Performed by: INTERNAL MEDICINE

## 2017-01-01 PROCEDURE — 25000132 ZZH RX MED GY IP 250 OP 250 PS 637: Performed by: PHYSICIAN ASSISTANT

## 2017-01-01 PROCEDURE — 27210762 ZZH DEVICE SUTURELESS SECUREMENT EA CR2

## 2017-01-01 PROCEDURE — C9399 UNCLASSIFIED DRUGS OR BIOLOG: HCPCS | Performed by: NURSE ANESTHETIST, CERTIFIED REGISTERED

## 2017-01-01 PROCEDURE — 25000132 ZZH RX MED GY IP 250 OP 250 PS 637: Performed by: NURSE PRACTITIONER

## 2017-01-01 PROCEDURE — 71020 XR CHEST 2 VW: CPT | Mod: 77

## 2017-01-01 PROCEDURE — 93308 TTE F-UP OR LMTD: CPT | Mod: 26 | Performed by: INTERNAL MEDICINE

## 2017-01-01 PROCEDURE — 71020 XR CHEST 2 VW: CPT

## 2017-01-01 PROCEDURE — 37000008 ZZH ANESTHESIA TECHNICAL FEE, 1ST 30 MIN: Performed by: INTERNAL MEDICINE

## 2017-01-01 PROCEDURE — 93321 DOPPLER ECHO F-UP/LMTD STD: CPT | Mod: 26 | Performed by: INTERNAL MEDICINE

## 2017-01-01 PROCEDURE — 27210794 ZZH OR GENERAL SUPPLY STERILE: Performed by: INTERNAL MEDICINE

## 2017-01-01 PROCEDURE — 85025 COMPLETE CBC W/AUTO DIFF WBC: CPT | Performed by: INTERNAL MEDICINE

## 2017-01-01 PROCEDURE — 71000015 ZZH RECOVERY PHASE 1 LEVEL 2 EA ADDTL HR: Performed by: INTERNAL MEDICINE

## 2017-01-01 PROCEDURE — 99233 SBSQ HOSP IP/OBS HIGH 50: CPT | Performed by: INTERNAL MEDICINE

## 2017-01-01 PROCEDURE — 0W9930Z DRAINAGE OF RIGHT PLEURAL CAVITY WITH DRAINAGE DEVICE, PERCUTANEOUS APPROACH: ICD-10-PCS | Performed by: STUDENT IN AN ORGANIZED HEALTH CARE EDUCATION/TRAINING PROGRAM

## 2017-01-01 PROCEDURE — 71260 CT THORAX DX C+: CPT

## 2017-01-01 PROCEDURE — 25000132 ZZH RX MED GY IP 250 OP 250 PS 637: Performed by: INTERNAL MEDICINE

## 2017-01-01 PROCEDURE — 36000057 ZZH SURGERY LEVEL 3 1ST 30 MIN - UMMC: Performed by: INTERNAL MEDICINE

## 2017-01-01 PROCEDURE — 25000128 H RX IP 250 OP 636

## 2017-01-01 PROCEDURE — 71010 XR CHEST PORT 1 VW: CPT

## 2017-01-01 PROCEDURE — 80053 COMPREHEN METABOLIC PANEL: CPT | Performed by: INTERNAL MEDICINE

## 2017-01-01 PROCEDURE — 27210785 ZZH KIT PERICARDIAL TAP CR8

## 2017-01-01 PROCEDURE — 96366 THER/PROPH/DIAG IV INF ADDON: CPT

## 2017-01-01 PROCEDURE — 93005 ELECTROCARDIOGRAM TRACING: CPT

## 2017-01-01 PROCEDURE — 25000125 ZZHC RX 250: Performed by: ANESTHESIOLOGY

## 2017-01-01 PROCEDURE — 99232 SBSQ HOSP IP/OBS MODERATE 35: CPT | Mod: 25 | Performed by: INTERNAL MEDICINE

## 2017-01-01 PROCEDURE — 97161 PT EVAL LOW COMPLEX 20 MIN: CPT | Mod: GP

## 2017-01-01 PROCEDURE — 32551 INSERTION OF CHEST TUBE: CPT

## 2017-01-01 PROCEDURE — 96375 TX/PRO/DX INJ NEW DRUG ADDON: CPT

## 2017-01-01 PROCEDURE — 20000002 ZZH R&B BMT INTERMEDIATE

## 2017-01-01 PROCEDURE — 0FC98ZZ EXTIRPATION OF MATTER FROM COMMON BILE DUCT, VIA NATURAL OR ARTIFICIAL OPENING ENDOSCOPIC: ICD-10-PCS | Performed by: INTERNAL MEDICINE

## 2017-01-01 PROCEDURE — 87086 URINE CULTURE/COLONY COUNT: CPT | Performed by: EMERGENCY MEDICINE

## 2017-01-01 PROCEDURE — 99204 OFFICE O/P NEW MOD 45 MIN: CPT | Performed by: INTERNAL MEDICINE

## 2017-01-01 PROCEDURE — 99285 EMERGENCY DEPT VISIT HI MDM: CPT | Mod: 25

## 2017-01-01 PROCEDURE — 85027 COMPLETE CBC AUTOMATED: CPT | Performed by: NURSE PRACTITIONER

## 2017-01-01 PROCEDURE — 33015 ZZHC TUBE PERICARDIOSTOMY: CPT

## 2017-01-01 PROCEDURE — 36592 COLLECT BLOOD FROM PICC: CPT | Performed by: INTERNAL MEDICINE

## 2017-01-01 PROCEDURE — 85732 THROMBOPLASTIN TIME PARTIAL: CPT | Performed by: NURSE PRACTITIONER

## 2017-01-01 PROCEDURE — 96372 THER/PROPH/DIAG INJ SC/IM: CPT | Mod: XU

## 2017-01-01 PROCEDURE — 25500064 ZZH RX 255 OP 636: Performed by: INTERNAL MEDICINE

## 2017-01-01 PROCEDURE — 99213 OFFICE O/P EST LOW 20 MIN: CPT

## 2017-01-01 PROCEDURE — 37000009 ZZH ANESTHESIA TECHNICAL FEE, EACH ADDTL 15 MIN: Performed by: INTERNAL MEDICINE

## 2017-01-01 PROCEDURE — 96360 HYDRATION IV INFUSION INIT: CPT

## 2017-01-01 PROCEDURE — 76930 ECHO PERICARDIOCENTESIS: CPT | Mod: 26 | Performed by: INTERNAL MEDICINE

## 2017-01-01 PROCEDURE — 25000125 ZZHC RX 250: Performed by: NURSE ANESTHETIST, CERTIFIED REGISTERED

## 2017-01-01 PROCEDURE — 27210982 ZZH KIT RT HC TOTES DISP CR7

## 2017-01-01 PROCEDURE — 25000128 H RX IP 250 OP 636: Performed by: NURSE ANESTHETIST, CERTIFIED REGISTERED

## 2017-01-01 PROCEDURE — 97140 MANUAL THERAPY 1/> REGIONS: CPT | Mod: GP | Performed by: PHYSICAL THERAPIST

## 2017-01-01 PROCEDURE — 99212 OFFICE O/P EST SF 10 MIN: CPT | Mod: ZF

## 2017-01-01 PROCEDURE — 27210795 ZZH PAD DEFIB QUICK CR4

## 2017-01-01 PROCEDURE — 99211 OFF/OP EST MAY X REQ PHY/QHP: CPT | Mod: 25

## 2017-01-01 PROCEDURE — 83605 ASSAY OF LACTIC ACID: CPT | Performed by: INTERNAL MEDICINE

## 2017-01-01 PROCEDURE — 76930 ECHO PERICARDIOCENTESIS: CPT

## 2017-01-01 PROCEDURE — 40000986 XR CHEST PORT 1 VW

## 2017-01-01 PROCEDURE — 93308 TTE F-UP OR LMTD: CPT

## 2017-01-01 PROCEDURE — 81001 URINALYSIS AUTO W/SCOPE: CPT | Performed by: INTERNAL MEDICINE

## 2017-01-01 PROCEDURE — 0BH58GZ INSERTION OF ENDOBRONCHIAL VALVE INTO RIGHT MIDDLE LOBE BRONCHUS, VIA NATURAL OR ARTIFICIAL OPENING ENDOSCOPIC: ICD-10-PCS | Performed by: INTERNAL MEDICINE

## 2017-01-01 PROCEDURE — 87205 SMEAR GRAM STAIN: CPT | Performed by: INTERNAL MEDICINE

## 2017-01-01 PROCEDURE — 82310 ASSAY OF CALCIUM: CPT | Performed by: INTERNAL MEDICINE

## 2017-01-01 PROCEDURE — 32551 INSERTION OF CHEST TUBE: CPT | Performed by: EMERGENCY MEDICINE

## 2017-01-01 PROCEDURE — 71020 XR CHEST 2 VW: CPT | Mod: 76

## 2017-01-01 PROCEDURE — C1769 GUIDE WIRE: HCPCS | Performed by: INTERNAL MEDICINE

## 2017-01-01 PROCEDURE — 99232 SBSQ HOSP IP/OBS MODERATE 35: CPT | Performed by: INTERNAL MEDICINE

## 2017-01-01 PROCEDURE — 87088 URINE BACTERIA CULTURE: CPT | Performed by: EMERGENCY MEDICINE

## 2017-01-01 PROCEDURE — 71000014 ZZH RECOVERY PHASE 1 LEVEL 2 FIRST HR: Performed by: INTERNAL MEDICINE

## 2017-01-01 PROCEDURE — 85025 COMPLETE CBC W/AUTO DIFF WBC: CPT | Performed by: PHYSICIAN ASSISTANT

## 2017-01-01 PROCEDURE — 96374 THER/PROPH/DIAG INJ IV PUSH: CPT

## 2017-01-01 PROCEDURE — 80076 HEPATIC FUNCTION PANEL: CPT | Performed by: INTERNAL MEDICINE

## 2017-01-01 PROCEDURE — 36593 DECLOT VASCULAR DEVICE: CPT

## 2017-01-01 PROCEDURE — 71000027 ZZH RECOVERY PHASE 2 EACH 15 MINS: Performed by: INTERNAL MEDICINE

## 2017-01-01 PROCEDURE — 96365 THER/PROPH/DIAG IV INF INIT: CPT

## 2017-01-01 PROCEDURE — 25000128 H RX IP 250 OP 636: Performed by: ANESTHESIOLOGY

## 2017-01-01 PROCEDURE — 40000278 XR SURGERY CARM FLUORO LESS THAN 5 MIN: Mod: TC

## 2017-01-01 PROCEDURE — 87040 BLOOD CULTURE FOR BACTERIA: CPT | Performed by: NURSE PRACTITIONER

## 2017-01-01 PROCEDURE — 88342 IMHCHEM/IMCYTCHM 1ST ANTB: CPT | Performed by: INTERNAL MEDICINE

## 2017-01-01 PROCEDURE — 99213 OFFICE O/P EST LOW 20 MIN: CPT | Mod: ZF

## 2017-01-01 PROCEDURE — 85027 COMPLETE CBC AUTOMATED: CPT | Performed by: DERMATOLOGY

## 2017-01-01 PROCEDURE — 88305 TISSUE EXAM BY PATHOLOGIST: CPT | Performed by: INTERNAL MEDICINE

## 2017-01-01 PROCEDURE — 00000102 ZZHCL STATISTIC CYTO WRIGHT STAIN TC: Performed by: INTERNAL MEDICINE

## 2017-01-01 PROCEDURE — 71250 CT THORAX DX C-: CPT

## 2017-01-01 PROCEDURE — 93325 DOPPLER ECHO COLOR FLOW MAPG: CPT | Mod: 26 | Performed by: INTERNAL MEDICINE

## 2017-01-01 PROCEDURE — 97110 THERAPEUTIC EXERCISES: CPT | Mod: GP

## 2017-01-01 PROCEDURE — 25000132 ZZH RX MED GY IP 250 OP 250 PS 637: Performed by: HOSPITALIST

## 2017-01-01 PROCEDURE — 0F798DZ DILATION OF COMMON BILE DUCT WITH INTRALUMINAL DEVICE, VIA NATURAL OR ARTIFICIAL OPENING ENDOSCOPIC: ICD-10-PCS | Performed by: INTERNAL MEDICINE

## 2017-01-01 PROCEDURE — 99239 HOSP IP/OBS DSCHRG MGMT >30: CPT | Performed by: INTERNAL MEDICINE

## 2017-01-01 PROCEDURE — 81001 URINALYSIS AUTO W/SCOPE: CPT | Performed by: EMERGENCY MEDICINE

## 2017-01-01 PROCEDURE — 87086 URINE CULTURE/COLONY COUNT: CPT | Performed by: PHYSICIAN ASSISTANT

## 2017-01-01 PROCEDURE — 25000128 H RX IP 250 OP 636: Performed by: RADIOLOGY

## 2017-01-01 PROCEDURE — 96372 THER/PROPH/DIAG INJ SC/IM: CPT

## 2017-01-01 PROCEDURE — 86900 BLOOD TYPING SEROLOGIC ABO: CPT | Performed by: EMERGENCY MEDICINE

## 2017-01-01 PROCEDURE — 99214 OFFICE O/P EST MOD 30 MIN: CPT | Performed by: FAMILY MEDICINE

## 2017-01-01 PROCEDURE — 89051 BODY FLUID CELL COUNT: CPT | Performed by: INTERNAL MEDICINE

## 2017-01-01 PROCEDURE — 36000059 ZZH SURGERY LEVEL 3 EA 15 ADDTL MIN UMMC: Performed by: INTERNAL MEDICINE

## 2017-01-01 PROCEDURE — 82040 ASSAY OF SERUM ALBUMIN: CPT | Performed by: INTERNAL MEDICINE

## 2017-01-01 PROCEDURE — 93010 ELECTROCARDIOGRAM REPORT: CPT | Performed by: INTERNAL MEDICINE

## 2017-01-01 PROCEDURE — 84443 ASSAY THYROID STIM HORMONE: CPT | Performed by: FAMILY MEDICINE

## 2017-01-01 PROCEDURE — 80053 COMPREHEN METABOLIC PANEL: CPT | Performed by: PHYSICIAN ASSISTANT

## 2017-01-01 PROCEDURE — 99285 EMERGENCY DEPT VISIT HI MDM: CPT | Mod: 25 | Performed by: EMERGENCY MEDICINE

## 2017-01-01 PROCEDURE — 87070 CULTURE OTHR SPECIMN AEROBIC: CPT | Performed by: INTERNAL MEDICINE

## 2017-01-01 PROCEDURE — 96361 HYDRATE IV INFUSION ADD-ON: CPT

## 2017-01-01 PROCEDURE — 86300 IMMUNOASSAY TUMOR CA 15-3: CPT | Performed by: INTERNAL MEDICINE

## 2017-01-01 PROCEDURE — 40000193 ZZH STATISTIC PT WARD VISIT

## 2017-01-01 PROCEDURE — 83735 ASSAY OF MAGNESIUM: CPT | Performed by: INTERNAL MEDICINE

## 2017-01-01 PROCEDURE — C1726 CATH, BAL DIL, NON-VASCULAR: HCPCS | Performed by: INTERNAL MEDICINE

## 2017-01-01 PROCEDURE — 36591 DRAW BLOOD OFF VENOUS DEVICE: CPT

## 2017-01-01 PROCEDURE — 85384 FIBRINOGEN ACTIVITY: CPT | Performed by: NURSE PRACTITIONER

## 2017-01-01 PROCEDURE — 80076 HEPATIC FUNCTION PANEL: CPT | Performed by: NURSE PRACTITIONER

## 2017-01-01 PROCEDURE — 93306 TTE W/DOPPLER COMPLETE: CPT | Mod: 26 | Performed by: INTERNAL MEDICINE

## 2017-01-01 PROCEDURE — 76705 ECHO EXAM OF ABDOMEN: CPT

## 2017-01-01 PROCEDURE — 84100 ASSAY OF PHOSPHORUS: CPT | Performed by: INTERNAL MEDICINE

## 2017-01-01 PROCEDURE — 87040 BLOOD CULTURE FOR BACTERIA: CPT | Performed by: EMERGENCY MEDICINE

## 2017-01-01 PROCEDURE — 25000128 H RX IP 250 OP 636: Performed by: PHYSICIAN ASSISTANT

## 2017-01-01 PROCEDURE — 00000167 ZZHCL STATISTIC INR NC: Performed by: NURSE PRACTITIONER

## 2017-01-01 PROCEDURE — 99222 1ST HOSP IP/OBS MODERATE 55: CPT | Mod: 25 | Performed by: INTERNAL MEDICINE

## 2017-01-01 PROCEDURE — C1876 STENT, NON-COA/NON-COV W/DEL: HCPCS | Performed by: INTERNAL MEDICINE

## 2017-01-01 PROCEDURE — 85610 PROTHROMBIN TIME: CPT | Performed by: NURSE PRACTITIONER

## 2017-01-01 PROCEDURE — 99285 EMERGENCY DEPT VISIT HI MDM: CPT | Mod: Z6 | Performed by: EMERGENCY MEDICINE

## 2017-01-01 PROCEDURE — 0W9D30Z DRAINAGE OF PERICARDIAL CAVITY WITH DRAINAGE DEVICE, PERCUTANEOUS APPROACH: ICD-10-PCS | Performed by: INTERNAL MEDICINE

## 2017-01-01 PROCEDURE — 96413 CHEMO IV INFUSION 1 HR: CPT

## 2017-01-01 PROCEDURE — 40000170 ZZH STATISTIC PRE-PROCEDURE ASSESSMENT II: Performed by: INTERNAL MEDICINE

## 2017-01-01 PROCEDURE — 80061 LIPID PANEL: CPT | Performed by: INTERNAL MEDICINE

## 2017-01-01 PROCEDURE — 76000 FLUOROSCOPY <1 HR PHYS/QHP: CPT

## 2017-01-01 PROCEDURE — 82962 GLUCOSE BLOOD TEST: CPT

## 2017-01-01 PROCEDURE — 0BH48GZ INSERTION OF ENDOBRONCHIAL VALVE INTO RIGHT UPPER LOBE BRONCHUS, VIA NATURAL OR ARTIFICIAL OPENING ENDOSCOPIC: ICD-10-PCS | Performed by: INTERNAL MEDICINE

## 2017-01-01 PROCEDURE — 96376 TX/PRO/DX INJ SAME DRUG ADON: CPT

## 2017-01-01 PROCEDURE — 87186 SC STD MICRODIL/AGAR DIL: CPT | Performed by: PHYSICIAN ASSISTANT

## 2017-01-01 PROCEDURE — 88341 IMHCHEM/IMCYTCHM EA ADD ANTB: CPT | Performed by: INTERNAL MEDICINE

## 2017-01-01 PROCEDURE — 83690 ASSAY OF LIPASE: CPT | Performed by: NURSE PRACTITIONER

## 2017-01-01 PROCEDURE — 40000333

## 2017-01-01 PROCEDURE — 27211024 ZZHC OR SUPPLY OTHER OPNP: Performed by: INTERNAL MEDICINE

## 2017-01-01 PROCEDURE — 87633 RESP VIRUS 12-25 TARGETS: CPT | Performed by: NURSE PRACTITIONER

## 2017-01-01 PROCEDURE — 88112 CYTOPATH CELL ENHANCE TECH: CPT | Performed by: INTERNAL MEDICINE

## 2017-01-01 PROCEDURE — 99213 OFFICE O/P EST LOW 20 MIN: CPT | Performed by: PHYSICIAN ASSISTANT

## 2017-01-01 PROCEDURE — 87804 INFLUENZA ASSAY W/OPTIC: CPT | Performed by: NURSE PRACTITIONER

## 2017-01-01 PROCEDURE — 25000566 ZZH SEVOFLURANE, EA 15 MIN: Performed by: INTERNAL MEDICINE

## 2017-01-01 PROCEDURE — 12000008 ZZH R&B INTERMEDIATE UMMC

## 2017-01-01 PROCEDURE — 99213 OFFICE O/P EST LOW 20 MIN: CPT | Performed by: FAMILY MEDICINE

## 2017-01-01 PROCEDURE — 40000279 XR SURGERY CARM FLUORO GREATER THAN 5 MIN W STILLS: Mod: TC

## 2017-01-01 PROCEDURE — 85730 THROMBOPLASTIN TIME PARTIAL: CPT | Performed by: NURSE PRACTITIONER

## 2017-01-01 PROCEDURE — 80053 COMPREHEN METABOLIC PANEL: CPT | Performed by: FAMILY MEDICINE

## 2017-01-01 PROCEDURE — 99233 SBSQ HOSP IP/OBS HIGH 50: CPT | Mod: GC | Performed by: INTERNAL MEDICINE

## 2017-01-01 PROCEDURE — 84132 ASSAY OF SERUM POTASSIUM: CPT | Performed by: NURSE PRACTITIONER

## 2017-01-01 PROCEDURE — 87186 SC STD MICRODIL/AGAR DIL: CPT | Performed by: EMERGENCY MEDICINE

## 2017-01-01 PROCEDURE — 82042 OTHER SOURCE ALBUMIN QUAN EA: CPT | Performed by: INTERNAL MEDICINE

## 2017-01-01 PROCEDURE — 27210995 ZZH RX 272: Performed by: INTERNAL MEDICINE

## 2017-01-01 PROCEDURE — 80048 BASIC METABOLIC PNL TOTAL CA: CPT | Performed by: EMERGENCY MEDICINE

## 2017-01-01 PROCEDURE — 99232 SBSQ HOSP IP/OBS MODERATE 35: CPT | Mod: GC | Performed by: INTERNAL MEDICINE

## 2017-01-01 PROCEDURE — 86850 RBC ANTIBODY SCREEN: CPT | Performed by: EMERGENCY MEDICINE

## 2017-01-01 PROCEDURE — 25000128 H RX IP 250 OP 636: Performed by: EMERGENCY MEDICINE

## 2017-01-01 PROCEDURE — 82945 GLUCOSE OTHER FLUID: CPT | Performed by: INTERNAL MEDICINE

## 2017-01-01 PROCEDURE — 97161 PT EVAL LOW COMPLEX 20 MIN: CPT | Mod: GP | Performed by: PHYSICAL THERAPIST

## 2017-01-01 PROCEDURE — 82378 CARCINOEMBRYONIC ANTIGEN: CPT | Performed by: INTERNAL MEDICINE

## 2017-01-01 PROCEDURE — 40000985 XR CHEST PORT 1 VW

## 2017-01-01 PROCEDURE — 33015 ZZHC TUBE PERICARDIOSTOMY: CPT | Performed by: INTERNAL MEDICINE

## 2017-01-01 PROCEDURE — 12000006 ZZH R&B IMCU INTERMEDIATE UMMC

## 2017-01-01 PROCEDURE — 87088 URINE BACTERIA CULTURE: CPT | Performed by: PHYSICIAN ASSISTANT

## 2017-01-01 PROCEDURE — 25900017 H RX MED GY IP 259 OP 259 PS 637: Performed by: NURSE PRACTITIONER

## 2017-01-01 PROCEDURE — 99238 HOSP IP/OBS DSCHRG MGMT 30/<: CPT | Performed by: INTERNAL MEDICINE

## 2017-01-01 PROCEDURE — 83690 ASSAY OF LIPASE: CPT | Performed by: EMERGENCY MEDICINE

## 2017-01-01 PROCEDURE — 25000132 ZZH RX MED GY IP 250 OP 250 PS 637: Performed by: ANESTHESIOLOGY

## 2017-01-01 PROCEDURE — 84157 ASSAY OF PROTEIN OTHER: CPT | Performed by: INTERNAL MEDICINE

## 2017-01-01 PROCEDURE — 93306 TTE W/DOPPLER COMPLETE: CPT

## 2017-01-01 PROCEDURE — 00000401 ZZHCL STATISTIC THROMBIN TIME NC: Performed by: NURSE PRACTITIONER

## 2017-01-01 PROCEDURE — 87015 SPECIMEN INFECT AGNT CONCNTJ: CPT | Performed by: INTERNAL MEDICINE

## 2017-01-01 PROCEDURE — 99152 MOD SED SAME PHYS/QHP 5/>YRS: CPT

## 2017-01-01 PROCEDURE — 86901 BLOOD TYPING SEROLOGIC RH(D): CPT | Performed by: EMERGENCY MEDICINE

## 2017-01-01 PROCEDURE — 86300 IMMUNOASSAY TUMOR CA 15-3: CPT | Performed by: FAMILY MEDICINE

## 2017-01-01 PROCEDURE — 71010 XR CHEST PORT 1 VW: CPT | Mod: 76

## 2017-01-01 PROCEDURE — 99221 1ST HOSP IP/OBS SF/LOW 40: CPT | Performed by: INTERNAL MEDICINE

## 2017-01-01 PROCEDURE — 83615 LACTATE (LD) (LDH) ENZYME: CPT | Performed by: INTERNAL MEDICINE

## 2017-01-01 PROCEDURE — 85610 PROTHROMBIN TIME: CPT | Performed by: EMERGENCY MEDICINE

## 2017-01-01 PROCEDURE — 84311 SPECTROPHOTOMETRY: CPT | Performed by: INTERNAL MEDICINE

## 2017-01-01 PROCEDURE — 80053 COMPREHEN METABOLIC PANEL: CPT | Performed by: EMERGENCY MEDICINE

## 2017-01-01 PROCEDURE — 99214 OFFICE O/P EST MOD 30 MIN: CPT | Performed by: INTERNAL MEDICINE

## 2017-01-01 PROCEDURE — 88300 SURGICAL PATH GROSS: CPT | Performed by: INTERNAL MEDICINE

## 2017-01-01 PROCEDURE — 99207 ZZC APP CREDIT; MD BILLING SHARED VISIT: CPT | Mod: Z6 | Performed by: EMERGENCY MEDICINE

## 2017-01-01 PROCEDURE — 87493 C DIFF AMPLIFIED PROBE: CPT | Performed by: INTERNAL MEDICINE

## 2017-01-01 PROCEDURE — 82803 BLOOD GASES ANY COMBINATION: CPT | Performed by: EMERGENCY MEDICINE

## 2017-01-01 PROCEDURE — 85025 COMPLETE CBC W/AUTO DIFF WBC: CPT | Performed by: FAMILY MEDICINE

## 2017-01-01 PROCEDURE — 80076 HEPATIC FUNCTION PANEL: CPT | Performed by: EMERGENCY MEDICINE

## 2017-01-01 PROCEDURE — 83605 ASSAY OF LACTIC ACID: CPT | Performed by: EMERGENCY MEDICINE

## 2017-01-01 PROCEDURE — 27810169 ZZH OR IMPLANT GENERAL: Performed by: INTERNAL MEDICINE

## 2017-01-01 PROCEDURE — C1757 CATH, THROMBECTOMY/EMBOLECT: HCPCS | Performed by: INTERNAL MEDICINE

## 2017-01-01 PROCEDURE — 85027 COMPLETE CBC AUTOMATED: CPT | Performed by: INTERNAL MEDICINE

## 2017-01-01 PROCEDURE — A9585 GADOBUTROL INJECTION: HCPCS | Performed by: INTERNAL MEDICINE

## 2017-01-01 PROCEDURE — 12000001 ZZH R&B MED SURG/OB UMMC

## 2017-01-01 PROCEDURE — 99212 OFFICE O/P EST SF 10 MIN: CPT

## 2017-01-01 PROCEDURE — 00000155 ZZHCL STATISTIC H-CELL BLOCK W/STAIN: Performed by: INTERNAL MEDICINE

## 2017-01-01 DEVICE — STENT BILIARY EVOLUTION 10MMX6CM G23128: Type: IMPLANTABLE DEVICE | Site: BILE DUCT | Status: FUNCTIONAL

## 2017-01-01 RX ORDER — PACLITAXEL 100 MG/20ML
100 INJECTION, POWDER, LYOPHILIZED, FOR SUSPENSION INTRAVENOUS ONCE
Status: CANCELLED
Start: 2017-01-01

## 2017-01-01 RX ORDER — HEPARIN SODIUM (PORCINE) LOCK FLUSH IV SOLN 100 UNIT/ML 100 UNIT/ML
5 SOLUTION INTRAVENOUS
Status: DISCONTINUED | OUTPATIENT
Start: 2017-01-01 | End: 2017-01-01 | Stop reason: HOSPADM

## 2017-01-01 RX ORDER — DOBUTAMINE HYDROCHLORIDE 200 MG/100ML
2-20 INJECTION INTRAVENOUS CONTINUOUS PRN
Status: DISCONTINUED | OUTPATIENT
Start: 2017-01-01 | End: 2017-01-01 | Stop reason: HOSPADM

## 2017-01-01 RX ORDER — HEPARIN SODIUM,PORCINE 10 UNIT/ML
3 VIAL (ML) INTRAVENOUS ONCE
Status: CANCELLED | OUTPATIENT
Start: 2017-01-01

## 2017-01-01 RX ORDER — LIDOCAINE HYDROCHLORIDE 10 MG/ML
30 INJECTION, SOLUTION EPIDURAL; INFILTRATION; INTRACAUDAL; PERINEURAL
Status: DISCONTINUED | OUTPATIENT
Start: 2017-01-01 | End: 2017-01-01 | Stop reason: HOSPADM

## 2017-01-01 RX ORDER — MEPERIDINE HYDROCHLORIDE 25 MG/ML
25 INJECTION INTRAMUSCULAR; INTRAVENOUS; SUBCUTANEOUS EVERY 30 MIN PRN
Status: CANCELLED | OUTPATIENT
Start: 2017-01-01

## 2017-01-01 RX ORDER — PROPOFOL 10 MG/ML
INJECTION, EMULSION INTRAVENOUS PRN
Status: DISCONTINUED | OUTPATIENT
Start: 2017-01-01 | End: 2017-01-01

## 2017-01-01 RX ORDER — LEVOTHYROXINE SODIUM 25 UG/1
25 TABLET ORAL DAILY
Status: DISCONTINUED | OUTPATIENT
Start: 2017-01-01 | End: 2017-01-01 | Stop reason: HOSPADM

## 2017-01-01 RX ORDER — ACETAMINOPHEN 325 MG/1
650 TABLET ORAL EVERY 6 HOURS PRN
Status: DISCONTINUED | OUTPATIENT
Start: 2017-01-01 | End: 2017-01-01 | Stop reason: HOSPADM

## 2017-01-01 RX ORDER — HEPARIN SODIUM,PORCINE 10 UNIT/ML
5-10 VIAL (ML) INTRAVENOUS
Status: DISCONTINUED | OUTPATIENT
Start: 2017-01-01 | End: 2017-01-01 | Stop reason: HOSPADM

## 2017-01-01 RX ORDER — ALBUTEROL SULFATE 90 UG/1
2 AEROSOL, METERED RESPIRATORY (INHALATION) EVERY 6 HOURS PRN
Qty: 1 INHALER | Refills: 1 | Status: SHIPPED | OUTPATIENT
Start: 2017-01-01 | End: 2017-01-01

## 2017-01-01 RX ORDER — CHLORHEXIDINE GLUCONATE 40 MG/ML
SOLUTION TOPICAL ONCE
Status: CANCELLED | OUTPATIENT
Start: 2017-01-01 | End: 2017-01-01

## 2017-01-01 RX ORDER — SODIUM CHLORIDE 9 MG/ML
1000 INJECTION, SOLUTION INTRAVENOUS CONTINUOUS PRN
Status: CANCELLED
Start: 2017-01-01

## 2017-01-01 RX ORDER — ARGATROBAN 1 MG/ML
150 INJECTION, SOLUTION INTRAVENOUS
Status: DISCONTINUED | OUTPATIENT
Start: 2017-01-01 | End: 2017-01-01 | Stop reason: HOSPADM

## 2017-01-01 RX ORDER — EPTIFIBATIDE 2 MG/ML
2 INJECTION, SOLUTION INTRAVENOUS CONTINUOUS PRN
Status: DISCONTINUED | OUTPATIENT
Start: 2017-01-01 | End: 2017-01-01 | Stop reason: HOSPADM

## 2017-01-01 RX ORDER — HEPARIN SODIUM (PORCINE) LOCK FLUSH IV SOLN 100 UNIT/ML 100 UNIT/ML
SOLUTION INTRAVENOUS
Status: COMPLETED
Start: 2017-01-01 | End: 2017-01-01

## 2017-01-01 RX ORDER — LIDOCAINE 4 G/G
1-2 PATCH TOPICAL EVERY 24 HOURS
Qty: 30 PATCH | Refills: 0 | Status: ON HOLD | OUTPATIENT
Start: 2017-01-01 | End: 2018-01-01

## 2017-01-01 RX ORDER — ATORVASTATIN CALCIUM 10 MG/1
10 TABLET, FILM COATED ORAL DAILY
Qty: 90 TABLET | Refills: 3 | Status: ON HOLD | OUTPATIENT
Start: 2017-01-01 | End: 2017-01-01

## 2017-01-01 RX ORDER — EPINEPHRINE 1 MG/ML
0.3 INJECTION, SOLUTION, CONCENTRATE INTRAVENOUS EVERY 5 MIN PRN
Status: CANCELLED | OUTPATIENT
Start: 2017-01-01

## 2017-01-01 RX ORDER — LORAZEPAM 2 MG/ML
0.5 INJECTION INTRAMUSCULAR EVERY 4 HOURS PRN
Status: CANCELLED
Start: 2017-01-01

## 2017-01-01 RX ORDER — ONDANSETRON 4 MG/1
4 TABLET, ORALLY DISINTEGRATING ORAL EVERY 30 MIN PRN
Status: DISCONTINUED | OUTPATIENT
Start: 2017-01-01 | End: 2017-01-01 | Stop reason: HOSPADM

## 2017-01-01 RX ORDER — MEPERIDINE HYDROCHLORIDE 25 MG/ML
12.5 INJECTION INTRAMUSCULAR; INTRAVENOUS; SUBCUTANEOUS
Status: DISCONTINUED | OUTPATIENT
Start: 2017-01-01 | End: 2017-01-01 | Stop reason: HOSPADM

## 2017-01-01 RX ORDER — ASPIRIN 81 MG/1
81-324 TABLET, CHEWABLE ORAL
Status: DISCONTINUED | OUTPATIENT
Start: 2017-01-01 | End: 2017-01-01 | Stop reason: HOSPADM

## 2017-01-01 RX ORDER — NICARDIPINE HYDROCHLORIDE 2.5 MG/ML
100 INJECTION INTRAVENOUS
Status: DISCONTINUED | OUTPATIENT
Start: 2017-01-01 | End: 2017-01-01 | Stop reason: HOSPADM

## 2017-01-01 RX ORDER — LIDOCAINE HYDROCHLORIDE 20 MG/ML
INJECTION, SOLUTION INFILTRATION; PERINEURAL PRN
Status: DISCONTINUED | OUTPATIENT
Start: 2017-01-01 | End: 2017-01-01

## 2017-01-01 RX ORDER — EVEROLIMUS 10 MG/1
TABLET ORAL
COMMUNITY
Start: 2017-04-24 | End: 2017-01-01

## 2017-01-01 RX ORDER — LORAZEPAM 2 MG/ML
.5-2 INJECTION INTRAMUSCULAR EVERY 4 HOURS PRN
Status: DISCONTINUED | OUTPATIENT
Start: 2017-01-01 | End: 2017-01-01 | Stop reason: HOSPADM

## 2017-01-01 RX ORDER — METHYLPREDNISOLONE SODIUM SUCCINATE 125 MG/2ML
125 INJECTION, POWDER, LYOPHILIZED, FOR SOLUTION INTRAMUSCULAR; INTRAVENOUS
Status: DISCONTINUED | OUTPATIENT
Start: 2017-01-01 | End: 2017-01-01 | Stop reason: HOSPADM

## 2017-01-01 RX ORDER — NALOXONE HYDROCHLORIDE 0.4 MG/ML
0.4 INJECTION, SOLUTION INTRAMUSCULAR; INTRAVENOUS; SUBCUTANEOUS EVERY 5 MIN PRN
Status: DISCONTINUED | OUTPATIENT
Start: 2017-01-01 | End: 2017-01-01 | Stop reason: HOSPADM

## 2017-01-01 RX ORDER — AMOXICILLIN 250 MG
1 CAPSULE ORAL 2 TIMES DAILY PRN
Status: DISCONTINUED | OUTPATIENT
Start: 2017-01-01 | End: 2017-01-01 | Stop reason: HOSPADM

## 2017-01-01 RX ORDER — ALBUTEROL SULFATE 0.83 MG/ML
1 SOLUTION RESPIRATORY (INHALATION) EVERY 4 HOURS PRN
Status: DISCONTINUED | OUTPATIENT
Start: 2017-01-01 | End: 2017-01-01 | Stop reason: HOSPADM

## 2017-01-01 RX ORDER — NITROGLYCERIN 20 MG/100ML
.07-2 INJECTION INTRAVENOUS CONTINUOUS PRN
Status: DISCONTINUED | OUTPATIENT
Start: 2017-01-01 | End: 2017-01-01 | Stop reason: HOSPADM

## 2017-01-01 RX ORDER — HYDRALAZINE HYDROCHLORIDE 20 MG/ML
10-20 INJECTION INTRAMUSCULAR; INTRAVENOUS
Status: DISCONTINUED | OUTPATIENT
Start: 2017-01-01 | End: 2017-01-01 | Stop reason: HOSPADM

## 2017-01-01 RX ORDER — PACLITAXEL 100 MG/20ML
200 INJECTION, POWDER, LYOPHILIZED, FOR SUSPENSION INTRAVENOUS ONCE
Status: COMPLETED | OUTPATIENT
Start: 2017-01-01 | End: 2017-01-01

## 2017-01-01 RX ORDER — SODIUM CHLORIDE, SODIUM LACTATE, POTASSIUM CHLORIDE, CALCIUM CHLORIDE 600; 310; 30; 20 MG/100ML; MG/100ML; MG/100ML; MG/100ML
INJECTION, SOLUTION INTRAVENOUS CONTINUOUS
Status: DISCONTINUED | OUTPATIENT
Start: 2017-01-01 | End: 2017-01-01 | Stop reason: HOSPADM

## 2017-01-01 RX ORDER — CIPROFLOXACIN 500 MG/1
500 TABLET, FILM COATED ORAL EVERY 12 HOURS SCHEDULED
Status: DISCONTINUED | OUTPATIENT
Start: 2017-01-01 | End: 2017-01-01

## 2017-01-01 RX ORDER — SODIUM CHLORIDE, SODIUM LACTATE, POTASSIUM CHLORIDE, CALCIUM CHLORIDE 600; 310; 30; 20 MG/100ML; MG/100ML; MG/100ML; MG/100ML
INJECTION, SOLUTION INTRAVENOUS CONTINUOUS PRN
Status: DISCONTINUED | OUTPATIENT
Start: 2017-01-01 | End: 2017-01-01

## 2017-01-01 RX ORDER — HYDROMORPHONE HYDROCHLORIDE 1 MG/ML
.3-.5 INJECTION, SOLUTION INTRAMUSCULAR; INTRAVENOUS; SUBCUTANEOUS EVERY 10 MIN PRN
Status: DISCONTINUED | OUTPATIENT
Start: 2017-01-01 | End: 2017-01-01 | Stop reason: HOSPADM

## 2017-01-01 RX ORDER — ACETAMINOPHEN 325 MG/1
650 TABLET ORAL EVERY 4 HOURS PRN
Status: DISCONTINUED | OUTPATIENT
Start: 2017-01-01 | End: 2017-01-01 | Stop reason: HOSPADM

## 2017-01-01 RX ORDER — FENTANYL CITRATE 50 UG/ML
25-50 INJECTION, SOLUTION INTRAMUSCULAR; INTRAVENOUS EVERY 5 MIN PRN
Status: DISCONTINUED | OUTPATIENT
Start: 2017-01-01 | End: 2017-01-01 | Stop reason: HOSPADM

## 2017-01-01 RX ORDER — OXYCODONE HYDROCHLORIDE 5 MG/1
5 TABLET ORAL EVERY 4 HOURS PRN
Qty: 60 TABLET | Refills: 0 | Status: ON HOLD | OUTPATIENT
Start: 2017-01-01 | End: 2018-01-01

## 2017-01-01 RX ORDER — ATROPINE SULFATE 0.1 MG/ML
.5-1 INJECTION INTRAVENOUS
Status: DISCONTINUED | OUTPATIENT
Start: 2017-01-01 | End: 2017-01-01 | Stop reason: HOSPADM

## 2017-01-01 RX ORDER — METOCLOPRAMIDE 10 MG/1
10 TABLET ORAL 2 TIMES DAILY PRN
Status: DISCONTINUED | OUTPATIENT
Start: 2017-01-01 | End: 2017-01-01 | Stop reason: HOSPADM

## 2017-01-01 RX ORDER — CAPECITABINE 500 MG/1
TABLET, FILM COATED ORAL
Qty: 126 TABLET | Refills: 0 | Status: SHIPPED | OUTPATIENT
Start: 2017-01-01 | End: 2017-01-01

## 2017-01-01 RX ORDER — EPINEPHRINE 0.3 MG/.3ML
0.3 INJECTION SUBCUTANEOUS EVERY 5 MIN PRN
Status: CANCELLED | OUTPATIENT
Start: 2017-01-01

## 2017-01-01 RX ORDER — POTASSIUM CHLORIDE 750 MG/1
20-40 TABLET, EXTENDED RELEASE ORAL
Status: DISCONTINUED | OUTPATIENT
Start: 2017-01-01 | End: 2017-01-01 | Stop reason: HOSPADM

## 2017-01-01 RX ORDER — HYDROMORPHONE HYDROCHLORIDE 2 MG/ML
INJECTION, SOLUTION INTRAMUSCULAR; INTRAVENOUS; SUBCUTANEOUS
Status: COMPLETED
Start: 2017-01-01 | End: 2017-01-01

## 2017-01-01 RX ORDER — PROMETHAZINE HYDROCHLORIDE 25 MG/ML
6.25-25 INJECTION, SOLUTION INTRAMUSCULAR; INTRAVENOUS EVERY 4 HOURS PRN
Status: DISCONTINUED | OUTPATIENT
Start: 2017-01-01 | End: 2017-01-01 | Stop reason: HOSPADM

## 2017-01-01 RX ORDER — IOPAMIDOL 510 MG/ML
50 INJECTION, SOLUTION INTRAVASCULAR ONCE
Status: COMPLETED | OUTPATIENT
Start: 2017-01-01 | End: 2017-01-01

## 2017-01-01 RX ORDER — PROCHLORPERAZINE MALEATE 10 MG
10 TABLET ORAL EVERY 6 HOURS PRN
Qty: 30 TABLET | Refills: 2 | Status: SHIPPED | OUTPATIENT
Start: 2017-01-01 | End: 2017-01-01

## 2017-01-01 RX ORDER — FLUCONAZOLE 100 MG/1
100 TABLET ORAL DAILY
Qty: 5 TABLET | Refills: 0 | Status: SHIPPED | OUTPATIENT
Start: 2017-01-01 | End: 2017-01-01

## 2017-01-01 RX ORDER — SODIUM CHLORIDE 9 MG/ML
INJECTION, SOLUTION INTRAVENOUS CONTINUOUS PRN
Status: DISCONTINUED | OUTPATIENT
Start: 2017-01-01 | End: 2017-01-01

## 2017-01-01 RX ORDER — POTASSIUM CHLORIDE 29.8 MG/ML
20 INJECTION INTRAVENOUS
Status: DISCONTINUED | OUTPATIENT
Start: 2017-01-01 | End: 2017-01-01 | Stop reason: RX

## 2017-01-01 RX ORDER — DIPHENHYDRAMINE HYDROCHLORIDE 50 MG/ML
50 INJECTION INTRAMUSCULAR; INTRAVENOUS
Status: CANCELLED
Start: 2017-01-01

## 2017-01-01 RX ORDER — SCOLOPAMINE TRANSDERMAL SYSTEM 1 MG/1
1 PATCH, EXTENDED RELEASE TRANSDERMAL ONCE
Status: COMPLETED | OUTPATIENT
Start: 2017-01-01 | End: 2017-01-01

## 2017-01-01 RX ORDER — FUROSEMIDE 10 MG/ML
20-100 INJECTION INTRAMUSCULAR; INTRAVENOUS
Status: DISCONTINUED | OUTPATIENT
Start: 2017-01-01 | End: 2017-01-01 | Stop reason: HOSPADM

## 2017-01-01 RX ORDER — POTASSIUM CHLORIDE 7.45 MG/ML
10 INJECTION INTRAVENOUS
Status: DISCONTINUED | OUTPATIENT
Start: 2017-01-01 | End: 2017-01-01 | Stop reason: HOSPADM

## 2017-01-01 RX ORDER — LIDOCAINE 40 MG/G
CREAM TOPICAL
Status: DISCONTINUED | OUTPATIENT
Start: 2017-01-01 | End: 2017-01-01 | Stop reason: HOSPADM

## 2017-01-01 RX ORDER — FENTANYL CITRATE 50 UG/ML
50 INJECTION, SOLUTION INTRAMUSCULAR; INTRAVENOUS ONCE
Status: COMPLETED | OUTPATIENT
Start: 2017-01-01 | End: 2017-01-01

## 2017-01-01 RX ORDER — DIPHENHYDRAMINE HCL 25 MG
25 TABLET ORAL EVERY 8 HOURS PRN
Qty: 1 TABLET | Refills: 0 | COMMUNITY
Start: 2017-01-01

## 2017-01-01 RX ORDER — ALBUTEROL SULFATE 90 UG/1
1-2 AEROSOL, METERED RESPIRATORY (INHALATION)
Status: CANCELLED
Start: 2017-01-01

## 2017-01-01 RX ORDER — ADENOSINE 3 MG/ML
12-12000 INJECTION, SOLUTION INTRAVENOUS
Status: DISCONTINUED | OUTPATIENT
Start: 2017-01-01 | End: 2017-01-01 | Stop reason: HOSPADM

## 2017-01-01 RX ORDER — CIPROFLOXACIN 500 MG/1
500 TABLET, FILM COATED ORAL EVERY 12 HOURS SCHEDULED
Status: DISCONTINUED | OUTPATIENT
Start: 2017-01-01 | End: 2017-01-01 | Stop reason: HOSPADM

## 2017-01-01 RX ORDER — HEPARIN SODIUM,PORCINE 10 UNIT/ML
5-10 VIAL (ML) INTRAVENOUS EVERY 24 HOURS
Status: DISCONTINUED | OUTPATIENT
Start: 2017-01-01 | End: 2017-01-01 | Stop reason: HOSPADM

## 2017-01-01 RX ORDER — FENTANYL CITRATE 50 UG/ML
25-50 INJECTION, SOLUTION INTRAMUSCULAR; INTRAVENOUS
Status: DISCONTINUED | OUTPATIENT
Start: 2017-01-01 | End: 2017-01-01 | Stop reason: HOSPADM

## 2017-01-01 RX ORDER — FEXOFENADINE HCL 60 MG/1
60 TABLET, FILM COATED ORAL DAILY
Status: DISCONTINUED | OUTPATIENT
Start: 2017-01-01 | End: 2017-01-01 | Stop reason: HOSPADM

## 2017-01-01 RX ORDER — MAGNESIUM CARB/ALUMINUM HYDROX 105-160MG
296 TABLET,CHEWABLE ORAL DAILY PRN
Status: DISCONTINUED | OUTPATIENT
Start: 2017-01-01 | End: 2017-01-01 | Stop reason: HOSPADM

## 2017-01-01 RX ORDER — KETOROLAC TROMETHAMINE 30 MG/ML
30 INJECTION, SOLUTION INTRAMUSCULAR; INTRAVENOUS EVERY 6 HOURS PRN
Status: DISCONTINUED | OUTPATIENT
Start: 2017-01-01 | End: 2017-01-01 | Stop reason: HOSPADM

## 2017-01-01 RX ORDER — AMOXICILLIN 250 MG
2 CAPSULE ORAL 2 TIMES DAILY PRN
Status: DISCONTINUED | OUTPATIENT
Start: 2017-01-01 | End: 2017-01-01 | Stop reason: HOSPADM

## 2017-01-01 RX ORDER — CAPECITABINE 500 MG/1
2000 TABLET, FILM COATED ORAL 2 TIMES DAILY
Qty: 112 TABLET | Refills: 0 | Status: SHIPPED | OUTPATIENT
Start: 2017-01-01 | End: 2017-01-01

## 2017-01-01 RX ORDER — ONDANSETRON 2 MG/ML
8 INJECTION INTRAMUSCULAR; INTRAVENOUS EVERY 8 HOURS PRN
Status: DISCONTINUED | OUTPATIENT
Start: 2017-01-01 | End: 2017-01-01

## 2017-01-01 RX ORDER — ONDANSETRON 2 MG/ML
4 INJECTION INTRAMUSCULAR; INTRAVENOUS EVERY 30 MIN PRN
Status: DISCONTINUED | OUTPATIENT
Start: 2017-01-01 | End: 2017-01-01 | Stop reason: HOSPADM

## 2017-01-01 RX ORDER — LABETALOL HYDROCHLORIDE 5 MG/ML
INJECTION, SOLUTION INTRAVENOUS PRN
Status: DISCONTINUED | OUTPATIENT
Start: 2017-01-01 | End: 2017-01-01

## 2017-01-01 RX ORDER — ZOLPIDEM TARTRATE 5 MG/1
10 TABLET ORAL AT BEDTIME
Status: DISCONTINUED | OUTPATIENT
Start: 2017-01-01 | End: 2017-01-01 | Stop reason: HOSPADM

## 2017-01-01 RX ORDER — PHENYLEPHRINE HCL IN 0.9% NACL 1 MG/10 ML
20-100 SYRINGE (ML) INTRAVENOUS
Status: DISCONTINUED | OUTPATIENT
Start: 2017-01-01 | End: 2017-01-01 | Stop reason: HOSPADM

## 2017-01-01 RX ORDER — NYSTATIN 100000/ML
500000 SUSPENSION, ORAL (FINAL DOSE FORM) ORAL 4 TIMES DAILY
Status: DISCONTINUED | OUTPATIENT
Start: 2017-01-01 | End: 2017-01-01 | Stop reason: HOSPADM

## 2017-01-01 RX ORDER — OXYBUTYNIN CHLORIDE 5 MG/1
10 TABLET, EXTENDED RELEASE ORAL AT BEDTIME
Status: DISCONTINUED | OUTPATIENT
Start: 2017-01-01 | End: 2017-01-01 | Stop reason: HOSPADM

## 2017-01-01 RX ORDER — AMOXICILLIN 250 MG
1-2 CAPSULE ORAL 2 TIMES DAILY
Status: DISCONTINUED | OUTPATIENT
Start: 2017-01-01 | End: 2017-01-01 | Stop reason: HOSPADM

## 2017-01-01 RX ORDER — CEFTRIAXONE 1 G/1
1 INJECTION, POWDER, FOR SOLUTION INTRAMUSCULAR; INTRAVENOUS EVERY 12 HOURS
Status: DISCONTINUED | OUTPATIENT
Start: 2017-01-01 | End: 2017-01-01

## 2017-01-01 RX ORDER — ONDANSETRON 2 MG/ML
4 INJECTION INTRAMUSCULAR; INTRAVENOUS EVERY 4 HOURS PRN
Status: DISCONTINUED | OUTPATIENT
Start: 2017-01-01 | End: 2017-01-01 | Stop reason: HOSPADM

## 2017-01-01 RX ORDER — POTASSIUM CHLORIDE 29.8 MG/ML
20 INJECTION INTRAVENOUS
Status: DISCONTINUED | OUTPATIENT
Start: 2017-01-01 | End: 2017-01-01 | Stop reason: HOSPADM

## 2017-01-01 RX ORDER — METRONIDAZOLE 500 MG/1
500 TABLET ORAL EVERY 6 HOURS
Qty: 12 TABLET | Refills: 0 | Status: SHIPPED | OUTPATIENT
Start: 2017-01-01 | End: 2017-01-01

## 2017-01-01 RX ORDER — NALOXONE HYDROCHLORIDE 0.4 MG/ML
.1-.4 INJECTION, SOLUTION INTRAMUSCULAR; INTRAVENOUS; SUBCUTANEOUS
Status: DISCONTINUED | OUTPATIENT
Start: 2017-01-01 | End: 2017-01-01 | Stop reason: HOSPADM

## 2017-01-01 RX ORDER — DEXAMETHASONE 0.5 MG/5ML
SOLUTION ORAL
Refills: 1 | COMMUNITY
Start: 2017-01-01 | End: 2017-01-01

## 2017-01-01 RX ORDER — HEPARIN SODIUM (PORCINE) LOCK FLUSH IV SOLN 100 UNIT/ML 100 UNIT/ML
5 SOLUTION INTRAVENOUS ONCE
Status: COMPLETED | OUTPATIENT
Start: 2017-01-01 | End: 2017-01-01

## 2017-01-01 RX ORDER — DEXAMETHASONE 4 MG/1
TABLET ORAL
Qty: 10 TABLET | Refills: 0 | Status: SHIPPED | OUTPATIENT
Start: 2017-01-01 | End: 2018-01-01

## 2017-01-01 RX ORDER — MAGNESIUM HYDROXIDE 1200 MG/15ML
1000 LIQUID ORAL CONTINUOUS PRN
Status: DISCONTINUED | OUTPATIENT
Start: 2017-01-01 | End: 2017-01-01 | Stop reason: HOSPADM

## 2017-01-01 RX ORDER — PROTAMINE SULFATE 10 MG/ML
25-100 INJECTION, SOLUTION INTRAVENOUS EVERY 5 MIN PRN
Status: DISCONTINUED | OUTPATIENT
Start: 2017-01-01 | End: 2017-01-01 | Stop reason: HOSPADM

## 2017-01-01 RX ORDER — ONDANSETRON 4 MG/1
4 TABLET, FILM COATED ORAL 2 TIMES DAILY
Qty: 60 TABLET | Refills: 1 | Status: ON HOLD | OUTPATIENT
Start: 2017-01-01 | End: 2017-01-01

## 2017-01-01 RX ORDER — NALOXONE HYDROCHLORIDE 0.4 MG/ML
.1-.4 INJECTION, SOLUTION INTRAMUSCULAR; INTRAVENOUS; SUBCUTANEOUS
Status: CANCELLED | OUTPATIENT
Start: 2017-01-01 | End: 2017-01-01

## 2017-01-01 RX ORDER — OXYBUTYNIN CHLORIDE 10 MG/1
10 TABLET, EXTENDED RELEASE ORAL DAILY
Qty: 90 TABLET | Refills: 0 | Status: SHIPPED | OUTPATIENT
Start: 2017-01-01 | End: 2017-01-01

## 2017-01-01 RX ORDER — HYDROMORPHONE HYDROCHLORIDE 1 MG/ML
.3-.5 INJECTION, SOLUTION INTRAMUSCULAR; INTRAVENOUS; SUBCUTANEOUS
Status: DISCONTINUED | OUTPATIENT
Start: 2017-01-01 | End: 2017-01-01

## 2017-01-01 RX ORDER — METOPROLOL TARTRATE 1 MG/ML
1-2 INJECTION, SOLUTION INTRAVENOUS EVERY 5 MIN PRN
Status: DISCONTINUED | OUTPATIENT
Start: 2017-01-01 | End: 2017-01-01 | Stop reason: HOSPADM

## 2017-01-01 RX ORDER — NITROGLYCERIN 5 MG/ML
100-200 VIAL (ML) INTRAVENOUS
Status: DISCONTINUED | OUTPATIENT
Start: 2017-01-01 | End: 2017-01-01 | Stop reason: HOSPADM

## 2017-01-01 RX ORDER — IOPAMIDOL 755 MG/ML
97 INJECTION, SOLUTION INTRAVASCULAR ONCE
Status: COMPLETED | OUTPATIENT
Start: 2017-01-01 | End: 2017-01-01

## 2017-01-01 RX ORDER — ZOLPIDEM TARTRATE 10 MG/1
TABLET ORAL
Qty: 30 TABLET | Refills: 0 | Status: SHIPPED | OUTPATIENT
Start: 2017-01-01 | End: 2017-01-01

## 2017-01-01 RX ORDER — LORAZEPAM 0.5 MG/1
0.5 TABLET ORAL EVERY 4 HOURS PRN
Status: DISCONTINUED | OUTPATIENT
Start: 2017-01-01 | End: 2017-01-01 | Stop reason: HOSPADM

## 2017-01-01 RX ORDER — PRASUGREL 10 MG/1
10-60 TABLET, FILM COATED ORAL
Status: DISCONTINUED | OUTPATIENT
Start: 2017-01-01 | End: 2017-01-01 | Stop reason: HOSPADM

## 2017-01-01 RX ORDER — EPTIFIBATIDE 2 MG/ML
180 INJECTION, SOLUTION INTRAVENOUS EVERY 10 MIN PRN
Status: DISCONTINUED | OUTPATIENT
Start: 2017-01-01 | End: 2017-01-01 | Stop reason: HOSPADM

## 2017-01-01 RX ORDER — FLUMAZENIL 0.1 MG/ML
0.2 INJECTION, SOLUTION INTRAVENOUS
Status: CANCELLED | OUTPATIENT
Start: 2017-01-01 | End: 2017-01-01

## 2017-01-01 RX ORDER — OXYCODONE HYDROCHLORIDE 5 MG/1
5 TABLET ORAL EVERY 4 HOURS PRN
Qty: 30 TABLET | Refills: 0 | Status: SHIPPED | OUTPATIENT
Start: 2017-01-01 | End: 2017-01-01

## 2017-01-01 RX ORDER — ZOLPIDEM TARTRATE 10 MG/1
TABLET ORAL
Qty: 30 TABLET | Refills: 3 | Status: SHIPPED | OUTPATIENT
Start: 2017-01-01 | End: 2017-01-01

## 2017-01-01 RX ORDER — ALBUTEROL SULFATE 90 UG/1
2 AEROSOL, METERED RESPIRATORY (INHALATION) EVERY 4 HOURS PRN
Status: DISCONTINUED | OUTPATIENT
Start: 2017-01-01 | End: 2017-01-01 | Stop reason: HOSPADM

## 2017-01-01 RX ORDER — HEPARIN SODIUM 1000 [USP'U]/ML
1000-10000 INJECTION, SOLUTION INTRAVENOUS; SUBCUTANEOUS EVERY 5 MIN PRN
Status: DISCONTINUED | OUTPATIENT
Start: 2017-01-01 | End: 2017-01-01 | Stop reason: HOSPADM

## 2017-01-01 RX ORDER — GLYCOPYRROLATE 0.2 MG/ML
INJECTION, SOLUTION INTRAMUSCULAR; INTRAVENOUS PRN
Status: DISCONTINUED | OUTPATIENT
Start: 2017-01-01 | End: 2017-01-01

## 2017-01-01 RX ORDER — SULFAMETHOXAZOLE/TRIMETHOPRIM 800-160 MG
1 TABLET ORAL 2 TIMES DAILY
Qty: 6 TABLET | Refills: 0 | Status: SHIPPED | OUTPATIENT
Start: 2017-01-01 | End: 2017-01-01

## 2017-01-01 RX ORDER — ASPIRIN 325 MG
325 TABLET ORAL
Status: DISCONTINUED | OUTPATIENT
Start: 2017-01-01 | End: 2017-01-01 | Stop reason: HOSPADM

## 2017-01-01 RX ORDER — ONDANSETRON 2 MG/ML
INJECTION INTRAMUSCULAR; INTRAVENOUS PRN
Status: DISCONTINUED | OUTPATIENT
Start: 2017-01-01 | End: 2017-01-01

## 2017-01-01 RX ORDER — CAPECITABINE 500 MG/1
2500 TABLET, FILM COATED ORAL EVERY MORNING
Status: COMPLETED | OUTPATIENT
Start: 2017-01-01 | End: 2017-01-01

## 2017-01-01 RX ORDER — NITROGLYCERIN 5 MG/ML
100-500 VIAL (ML) INTRAVENOUS
Status: DISCONTINUED | OUTPATIENT
Start: 2017-01-01 | End: 2017-01-01 | Stop reason: HOSPADM

## 2017-01-01 RX ORDER — DIPHENHYDRAMINE HYDROCHLORIDE 50 MG/ML
25-50 INJECTION INTRAMUSCULAR; INTRAVENOUS
Status: DISCONTINUED | OUTPATIENT
Start: 2017-01-01 | End: 2017-01-01 | Stop reason: HOSPADM

## 2017-01-01 RX ORDER — POTASSIUM CL/LIDO/0.9 % NACL 10MEQ/0.1L
10 INTRAVENOUS SOLUTION, PIGGYBACK (ML) INTRAVENOUS
Status: DISCONTINUED | OUTPATIENT
Start: 2017-01-01 | End: 2017-01-01 | Stop reason: HOSPADM

## 2017-01-01 RX ORDER — NIFEDIPINE 10 MG/1
10 CAPSULE ORAL
Status: DISCONTINUED | OUTPATIENT
Start: 2017-01-01 | End: 2017-01-01 | Stop reason: HOSPADM

## 2017-01-01 RX ORDER — ARGATROBAN 1 MG/ML
350 INJECTION, SOLUTION INTRAVENOUS
Status: DISCONTINUED | OUTPATIENT
Start: 2017-01-01 | End: 2017-01-01 | Stop reason: HOSPADM

## 2017-01-01 RX ORDER — CAPECITABINE 500 MG/1
2500 TABLET, FILM COATED ORAL EVERY MORNING
Status: DISCONTINUED | OUTPATIENT
Start: 2017-01-01 | End: 2017-01-01

## 2017-01-01 RX ORDER — LEVOFLOXACIN 750 MG/1
750 TABLET, FILM COATED ORAL DAILY
Status: DISCONTINUED | OUTPATIENT
Start: 2017-01-01 | End: 2017-01-01

## 2017-01-01 RX ORDER — DEXTROSE MONOHYDRATE 25 G/50ML
12.5-5 INJECTION, SOLUTION INTRAVENOUS EVERY 30 MIN PRN
Status: DISCONTINUED | OUTPATIENT
Start: 2017-01-01 | End: 2017-01-01 | Stop reason: HOSPADM

## 2017-01-01 RX ORDER — MOMETASONE FUROATE AND FORMOTEROL FUMARATE DIHYDRATE 200; 5 UG/1; UG/1
AEROSOL RESPIRATORY (INHALATION)
Qty: 3 INHALER | Refills: 3 | Status: SHIPPED | OUTPATIENT
Start: 2017-01-01 | End: 2018-01-01

## 2017-01-01 RX ORDER — ONDANSETRON 8 MG/1
8 TABLET, ORALLY DISINTEGRATING ORAL EVERY 8 HOURS PRN
Status: DISCONTINUED | OUTPATIENT
Start: 2017-01-01 | End: 2017-01-01 | Stop reason: HOSPADM

## 2017-01-01 RX ORDER — PROTAMINE SULFATE 10 MG/ML
1-5 INJECTION, SOLUTION INTRAVENOUS
Status: DISCONTINUED | OUTPATIENT
Start: 2017-01-01 | End: 2017-01-01 | Stop reason: HOSPADM

## 2017-01-01 RX ORDER — DIPHENHYDRAMINE HYDROCHLORIDE 50 MG/ML
50 INJECTION INTRAMUSCULAR; INTRAVENOUS
Status: CANCELLED | OUTPATIENT
Start: 2017-01-01

## 2017-01-01 RX ORDER — POTASSIUM CHLORIDE 750 MG/1
20-40 TABLET, EXTENDED RELEASE ORAL
Status: DISCONTINUED | OUTPATIENT
Start: 2017-01-01 | End: 2017-01-01

## 2017-01-01 RX ORDER — EXEMESTANE 25 MG/1
25 TABLET ORAL DAILY
Qty: 28 TABLET | Refills: 0 | Status: SHIPPED | OUTPATIENT
Start: 2017-01-01 | End: 2017-01-01 | Stop reason: ALTCHOICE

## 2017-01-01 RX ORDER — DOCUSATE SODIUM 100 MG/1
100 CAPSULE, LIQUID FILLED ORAL DAILY
Status: DISCONTINUED | OUTPATIENT
Start: 2017-01-01 | End: 2017-01-01 | Stop reason: HOSPADM

## 2017-01-01 RX ORDER — FLUCONAZOLE 100 MG/1
100 TABLET ORAL DAILY
Qty: 7 TABLET | Refills: 0 | Status: SHIPPED | OUTPATIENT
Start: 2017-01-01 | End: 2018-01-01

## 2017-01-01 RX ORDER — ALBUTEROL SULFATE 0.83 MG/ML
1 SOLUTION RESPIRATORY (INHALATION) EVERY 4 HOURS PRN
Qty: 30 VIAL | Refills: 3 | Status: SHIPPED | OUTPATIENT
Start: 2017-01-01

## 2017-01-01 RX ORDER — PREDNISONE 20 MG/1
20 TABLET ORAL 2 TIMES DAILY
Qty: 10 TABLET | Refills: 1 | Status: SHIPPED | OUTPATIENT
Start: 2017-01-01 | End: 2018-01-01

## 2017-01-01 RX ORDER — POTASSIUM CHLORIDE 1.5 G/1.58G
20-40 POWDER, FOR SOLUTION ORAL
Status: DISCONTINUED | OUTPATIENT
Start: 2017-01-01 | End: 2017-01-01 | Stop reason: HOSPADM

## 2017-01-01 RX ORDER — LIDOCAINE HYDROCHLORIDE 10 MG/ML
INJECTION, SOLUTION EPIDURAL; INFILTRATION; INTRACAUDAL; PERINEURAL
Status: DISCONTINUED
Start: 2017-01-01 | End: 2017-01-01 | Stop reason: HOSPADM

## 2017-01-01 RX ORDER — IOPAMIDOL 755 MG/ML
94 INJECTION, SOLUTION INTRAVASCULAR ONCE
Status: COMPLETED | OUTPATIENT
Start: 2017-01-01 | End: 2017-01-01

## 2017-01-01 RX ORDER — MAGNESIUM SULFATE HEPTAHYDRATE 40 MG/ML
4 INJECTION, SOLUTION INTRAVENOUS EVERY 4 HOURS PRN
Status: DISCONTINUED | OUTPATIENT
Start: 2017-01-01 | End: 2017-01-01 | Stop reason: HOSPADM

## 2017-01-01 RX ORDER — CHLORHEXIDINE GLUCONATE 40 MG/ML
SOLUTION TOPICAL ONCE
Status: DISCONTINUED | OUTPATIENT
Start: 2017-01-01 | End: 2017-01-01 | Stop reason: HOSPADM

## 2017-01-01 RX ORDER — PROPOFOL 10 MG/ML
INJECTION, EMULSION INTRAVENOUS CONTINUOUS PRN
Status: DISCONTINUED | OUTPATIENT
Start: 2017-01-01 | End: 2017-01-01

## 2017-01-01 RX ORDER — HEPARIN SODIUM,PORCINE 10 UNIT/ML
3 VIAL (ML) INTRAVENOUS
Status: DISCONTINUED | OUTPATIENT
Start: 2017-01-01 | End: 2017-01-01 | Stop reason: HOSPADM

## 2017-01-01 RX ORDER — METOCLOPRAMIDE 10 MG/1
10 TABLET ORAL 2 TIMES DAILY PRN
Qty: 120 TABLET | Refills: 1 | Status: SHIPPED | OUTPATIENT
Start: 2017-01-01

## 2017-01-01 RX ORDER — AZELASTINE 1 MG/ML
1-2 SPRAY, METERED NASAL 2 TIMES DAILY PRN
Status: DISCONTINUED | OUTPATIENT
Start: 2017-01-01 | End: 2017-01-01 | Stop reason: HOSPADM

## 2017-01-01 RX ORDER — ZOLPIDEM TARTRATE 6.25 MG/1
12.5 TABLET, FILM COATED, EXTENDED RELEASE ORAL
Status: DISCONTINUED | OUTPATIENT
Start: 2017-01-01 | End: 2017-01-01 | Stop reason: HOSPADM

## 2017-01-01 RX ORDER — EPINEPHRINE 1 MG/ML
0.3 INJECTION, SOLUTION, CONCENTRATE INTRAVENOUS
Status: DISCONTINUED | OUTPATIENT
Start: 2017-01-01 | End: 2017-01-01 | Stop reason: HOSPADM

## 2017-01-01 RX ORDER — SULFAMETHOXAZOLE/TRIMETHOPRIM 800-160 MG
1 TABLET ORAL 2 TIMES DAILY
Status: COMPLETED | OUTPATIENT
Start: 2017-01-01 | End: 2017-01-01

## 2017-01-01 RX ORDER — METRONIDAZOLE 500 MG/1
500 TABLET ORAL 3 TIMES DAILY
Qty: 20 TABLET | Refills: 0 | Status: SHIPPED | OUTPATIENT
Start: 2017-01-01 | End: 2018-01-01

## 2017-01-01 RX ORDER — LORATADINE 10 MG/1
10 TABLET ORAL DAILY
Status: DISCONTINUED | OUTPATIENT
Start: 2017-01-01 | End: 2017-01-01

## 2017-01-01 RX ORDER — CEFTRIAXONE 2 G/1
2 INJECTION, POWDER, FOR SOLUTION INTRAMUSCULAR; INTRAVENOUS EVERY 24 HOURS
Status: DISCONTINUED | OUTPATIENT
Start: 2017-01-01 | End: 2017-01-01

## 2017-01-01 RX ORDER — PROCHLORPERAZINE MALEATE 10 MG
10 TABLET ORAL EVERY 6 HOURS PRN
Qty: 30 TABLET | Refills: 2 | Status: SHIPPED | OUTPATIENT
Start: 2017-01-01

## 2017-01-01 RX ORDER — CIPROFLOXACIN 500 MG/1
500 TABLET, FILM COATED ORAL EVERY 12 HOURS
Qty: 6 TABLET | Refills: 0 | Status: SHIPPED | OUTPATIENT
Start: 2017-01-01 | End: 2017-01-01

## 2017-01-01 RX ORDER — LORAZEPAM 0.5 MG/1
0.5 TABLET ORAL EVERY 4 HOURS PRN
Qty: 30 TABLET | Refills: 2 | Status: SHIPPED | OUTPATIENT
Start: 2017-01-01 | End: 2017-01-01

## 2017-01-01 RX ORDER — GUAIFENESIN/DEXTROMETHORPHAN 100-10MG/5
5 SYRUP ORAL EVERY 4 HOURS PRN
Status: DISCONTINUED | OUTPATIENT
Start: 2017-01-01 | End: 2017-01-01 | Stop reason: HOSPADM

## 2017-01-01 RX ORDER — HEPARIN SODIUM (PORCINE) LOCK FLUSH IV SOLN 100 UNIT/ML 100 UNIT/ML
5 SOLUTION INTRAVENOUS
Status: CANCELLED | OUTPATIENT
Start: 2017-01-01

## 2017-01-01 RX ORDER — EXEMESTANE 25 MG/1
TABLET ORAL
Refills: 0 | COMMUNITY
Start: 2017-01-01 | End: 2017-01-01

## 2017-01-01 RX ORDER — LETROZOLE 2.5 MG/1
2.5 TABLET, FILM COATED ORAL DAILY
Qty: 30 TABLET | Refills: 3 | Status: ON HOLD | OUTPATIENT
Start: 2017-01-01 | End: 2018-01-01

## 2017-01-01 RX ORDER — METRONIDAZOLE 500 MG/1
500 TABLET ORAL EVERY 6 HOURS SCHEDULED
Status: DISCONTINUED | OUTPATIENT
Start: 2017-01-01 | End: 2017-01-01

## 2017-01-01 RX ORDER — CEFTRIAXONE 1 G/1
1 INJECTION, POWDER, FOR SOLUTION INTRAMUSCULAR; INTRAVENOUS ONCE
Status: COMPLETED | OUTPATIENT
Start: 2017-01-01 | End: 2017-01-01

## 2017-01-01 RX ORDER — CELECOXIB 50 MG/1
50 CAPSULE ORAL ONCE
Status: COMPLETED | OUTPATIENT
Start: 2017-01-01 | End: 2017-01-01

## 2017-01-01 RX ORDER — AMOXICILLIN 250 MG
1-2 CAPSULE ORAL 2 TIMES DAILY PRN
Qty: 100 TABLET | COMMUNITY
Start: 2017-01-01 | End: 2018-01-01

## 2017-01-01 RX ORDER — METRONIDAZOLE 500 MG/1
500 TABLET ORAL EVERY 6 HOURS SCHEDULED
Status: DISCONTINUED | OUTPATIENT
Start: 2017-01-01 | End: 2017-01-01 | Stop reason: HOSPADM

## 2017-01-01 RX ORDER — HYDROMORPHONE HYDROCHLORIDE 1 MG/ML
.5-1 INJECTION, SOLUTION INTRAMUSCULAR; INTRAVENOUS; SUBCUTANEOUS
Status: DISCONTINUED | OUTPATIENT
Start: 2017-01-01 | End: 2017-01-01 | Stop reason: HOSPADM

## 2017-01-01 RX ORDER — FENTANYL CITRATE 50 UG/ML
INJECTION, SOLUTION INTRAMUSCULAR; INTRAVENOUS PRN
Status: DISCONTINUED | OUTPATIENT
Start: 2017-01-01 | End: 2017-01-01

## 2017-01-01 RX ORDER — HEPARIN SODIUM,PORCINE 10 UNIT/ML
3 VIAL (ML) INTRAVENOUS ONCE
Status: COMPLETED | OUTPATIENT
Start: 2017-01-01 | End: 2017-01-01

## 2017-01-01 RX ORDER — METHYLPREDNISOLONE SODIUM SUCCINATE 125 MG/2ML
125 INJECTION, POWDER, LYOPHILIZED, FOR SOLUTION INTRAMUSCULAR; INTRAVENOUS
Status: CANCELLED
Start: 2017-01-01

## 2017-01-01 RX ORDER — LIDOCAINE 4 G/G
1-2 PATCH TOPICAL
Status: DISCONTINUED | OUTPATIENT
Start: 2017-01-01 | End: 2017-01-01 | Stop reason: HOSPADM

## 2017-01-01 RX ORDER — OXYCODONE HYDROCHLORIDE 5 MG/1
5 TABLET ORAL EVERY 4 HOURS PRN
Status: DISCONTINUED | OUTPATIENT
Start: 2017-01-01 | End: 2017-01-01

## 2017-01-01 RX ORDER — DEXAMETHASONE SODIUM PHOSPHATE 4 MG/ML
INJECTION, SOLUTION INTRA-ARTICULAR; INTRALESIONAL; INTRAMUSCULAR; INTRAVENOUS; SOFT TISSUE PRN
Status: DISCONTINUED | OUTPATIENT
Start: 2017-01-01 | End: 2017-01-01

## 2017-01-01 RX ORDER — ALBUTEROL SULFATE 0.83 MG/ML
2.5 SOLUTION RESPIRATORY (INHALATION) EVERY 4 HOURS PRN
Status: DISCONTINUED | OUTPATIENT
Start: 2017-01-01 | End: 2017-01-01 | Stop reason: HOSPADM

## 2017-01-01 RX ORDER — MORPHINE SULFATE 15 MG/1
15 TABLET, FILM COATED, EXTENDED RELEASE ORAL EVERY 12 HOURS
Qty: 60 TABLET | Refills: 0 | Status: SHIPPED | OUTPATIENT
Start: 2017-01-01 | End: 2018-01-01

## 2017-01-01 RX ORDER — SODIUM CHLORIDE 9 MG/ML
1000 INJECTION, SOLUTION INTRAVENOUS CONTINUOUS
Status: DISCONTINUED | OUTPATIENT
Start: 2017-01-01 | End: 2017-01-01 | Stop reason: HOSPADM

## 2017-01-01 RX ORDER — CAPECITABINE 500 MG/1
2000 TABLET, FILM COATED ORAL EVERY EVENING
Status: DISCONTINUED | OUTPATIENT
Start: 2017-01-01 | End: 2017-01-01

## 2017-01-01 RX ORDER — GADOBUTROL 604.72 MG/ML
8 INJECTION INTRAVENOUS ONCE
Status: COMPLETED | OUTPATIENT
Start: 2017-01-01 | End: 2017-01-01

## 2017-01-01 RX ORDER — ACETAMINOPHEN 325 MG/1
975 TABLET ORAL
Status: COMPLETED | OUTPATIENT
Start: 2017-01-01 | End: 2017-01-01

## 2017-01-01 RX ORDER — POTASSIUM CHLORIDE 1.5 G/1.58G
20-40 POWDER, FOR SOLUTION ORAL
Status: DISCONTINUED | OUTPATIENT
Start: 2017-01-01 | End: 2017-01-01

## 2017-01-01 RX ORDER — LORATADINE 10 MG/1
10 TABLET ORAL DAILY PRN
COMMUNITY

## 2017-01-01 RX ORDER — OXYCODONE HYDROCHLORIDE 5 MG/1
5-10 TABLET ORAL EVERY 4 HOURS PRN
Status: DISCONTINUED | OUTPATIENT
Start: 2017-01-01 | End: 2017-01-01 | Stop reason: HOSPADM

## 2017-01-01 RX ORDER — LABETALOL HYDROCHLORIDE 5 MG/ML
10 INJECTION, SOLUTION INTRAVENOUS
Status: DISCONTINUED | OUTPATIENT
Start: 2017-01-01 | End: 2017-01-01 | Stop reason: HOSPADM

## 2017-01-01 RX ORDER — LIDOCAINE 50 MG/G
1-2 PATCH TOPICAL
Status: DISCONTINUED | OUTPATIENT
Start: 2017-01-01 | End: 2017-01-01 | Stop reason: RX

## 2017-01-01 RX ORDER — FEXOFENADINE HCL 180 MG/1
180 TABLET ORAL DAILY PRN
Status: DISCONTINUED | OUTPATIENT
Start: 2017-01-01 | End: 2017-01-01 | Stop reason: HOSPADM

## 2017-01-01 RX ORDER — CARBOXYMETHYLCELLULOSE SODIUM 5 MG/ML
2 SOLUTION/ DROPS OPHTHALMIC 3 TIMES DAILY PRN
Status: DISCONTINUED | OUTPATIENT
Start: 2017-01-01 | End: 2017-01-01 | Stop reason: HOSPADM

## 2017-01-01 RX ORDER — ALBUTEROL SULFATE 0.83 MG/ML
2.5 SOLUTION RESPIRATORY (INHALATION) EVERY 6 HOURS PRN
Status: DISCONTINUED | OUTPATIENT
Start: 2017-01-01 | End: 2017-01-01 | Stop reason: HOSPADM

## 2017-01-01 RX ORDER — ALBUTEROL SULFATE 0.83 MG/ML
2.5 SOLUTION RESPIRATORY (INHALATION) ONCE
Status: COMPLETED | OUTPATIENT
Start: 2017-01-01 | End: 2017-01-01

## 2017-01-01 RX ORDER — FEXOFENADINE HCL 180 MG/1
180 TABLET ORAL DAILY PRN
COMMUNITY

## 2017-01-01 RX ORDER — NITROGLYCERIN 0.4 MG/1
0.4 TABLET SUBLINGUAL EVERY 5 MIN PRN
Status: DISCONTINUED | OUTPATIENT
Start: 2017-01-01 | End: 2017-01-01 | Stop reason: HOSPADM

## 2017-01-01 RX ORDER — ALBUTEROL SULFATE 0.83 MG/ML
2.5 SOLUTION RESPIRATORY (INHALATION)
Status: CANCELLED | OUTPATIENT
Start: 2017-01-01

## 2017-01-01 RX ORDER — POLYETHYLENE GLYCOL 3350 17 G/17G
17 POWDER, FOR SOLUTION ORAL DAILY
Status: DISCONTINUED | OUTPATIENT
Start: 2017-01-01 | End: 2017-01-01 | Stop reason: HOSPADM

## 2017-01-01 RX ORDER — POTASSIUM CL/LIDO/0.9 % NACL 10MEQ/0.1L
10 INTRAVENOUS SOLUTION, PIGGYBACK (ML) INTRAVENOUS
Status: DISCONTINUED | OUTPATIENT
Start: 2017-01-01 | End: 2017-01-01

## 2017-01-01 RX ORDER — ZOLPIDEM TARTRATE 10 MG/1
TABLET ORAL
Refills: 0 | COMMUNITY
Start: 2017-01-01 | End: 2018-01-01

## 2017-01-01 RX ORDER — CLOPIDOGREL BISULFATE 75 MG/1
300-600 TABLET ORAL
Status: DISCONTINUED | OUTPATIENT
Start: 2017-01-01 | End: 2017-01-01 | Stop reason: HOSPADM

## 2017-01-01 RX ORDER — HYDROMORPHONE HYDROCHLORIDE 1 MG/ML
.3-.5 INJECTION, SOLUTION INTRAMUSCULAR; INTRAVENOUS; SUBCUTANEOUS EVERY 5 MIN PRN
Status: DISCONTINUED | OUTPATIENT
Start: 2017-01-01 | End: 2017-01-01 | Stop reason: HOSPADM

## 2017-01-01 RX ORDER — LORATADINE 10 MG/1
10 TABLET ORAL DAILY PRN
Status: DISCONTINUED | OUTPATIENT
Start: 2017-01-01 | End: 2017-01-01 | Stop reason: HOSPADM

## 2017-01-01 RX ORDER — POTASSIUM CHLORIDE 7.45 MG/ML
10 INJECTION INTRAVENOUS
Status: DISCONTINUED | OUTPATIENT
Start: 2017-01-01 | End: 2017-01-01

## 2017-01-01 RX ORDER — ZOLPIDEM TARTRATE 12.5 MG/1
TABLET, FILM COATED, EXTENDED RELEASE ORAL
Qty: 30 TABLET | Refills: 5 | Status: ON HOLD | OUTPATIENT
Start: 2017-01-01 | End: 2018-01-01

## 2017-01-01 RX ORDER — POLYETHYLENE GLYCOL 3350 17 G/17G
17 POWDER, FOR SOLUTION ORAL DAILY PRN
Status: DISCONTINUED | OUTPATIENT
Start: 2017-01-01 | End: 2017-01-01 | Stop reason: HOSPADM

## 2017-01-01 RX ORDER — SODIUM CHLORIDE 9 MG/ML
INJECTION, SOLUTION INTRAVENOUS CONTINUOUS
Status: DISCONTINUED | OUTPATIENT
Start: 2017-01-01 | End: 2017-01-01

## 2017-01-01 RX ORDER — CAPECITABINE 500 MG/1
TABLET, FILM COATED ORAL
Qty: 140 TABLET | Refills: 0 | Status: SHIPPED | OUTPATIENT
Start: 2017-01-01 | End: 2017-01-01

## 2017-01-01 RX ORDER — PREDNISONE 20 MG/1
20 TABLET ORAL DAILY
Qty: 5 TABLET | Refills: 0 | Status: SHIPPED | OUTPATIENT
Start: 2017-01-01 | End: 2017-01-01

## 2017-01-01 RX ORDER — PROCHLORPERAZINE MALEATE 5 MG
5-10 TABLET ORAL EVERY 6 HOURS PRN
Status: DISCONTINUED | OUTPATIENT
Start: 2017-01-01 | End: 2017-01-01 | Stop reason: HOSPADM

## 2017-01-01 RX ORDER — METOPROLOL TARTRATE 1 MG/ML
5 INJECTION, SOLUTION INTRAVENOUS EVERY 5 MIN PRN
Status: DISCONTINUED | OUTPATIENT
Start: 2017-01-01 | End: 2017-01-01 | Stop reason: HOSPADM

## 2017-01-01 RX ORDER — CAPECITABINE 500 MG/1
2000 TABLET, FILM COATED ORAL 2 TIMES DAILY
Qty: 112 TABLET | Refills: 3 | Status: CANCELLED | OUTPATIENT
Start: 2017-01-01 | End: 2017-01-01

## 2017-01-01 RX ORDER — ONDANSETRON 8 MG/1
8 TABLET, FILM COATED ORAL EVERY 8 HOURS PRN
Status: DISCONTINUED | OUTPATIENT
Start: 2017-01-01 | End: 2017-01-01 | Stop reason: HOSPADM

## 2017-01-01 RX ORDER — IPRATROPIUM BROMIDE AND ALBUTEROL SULFATE 2.5; .5 MG/3ML; MG/3ML
3 SOLUTION RESPIRATORY (INHALATION) ONCE
Status: DISCONTINUED | OUTPATIENT
Start: 2017-01-01 | End: 2017-01-01 | Stop reason: HOSPADM

## 2017-01-01 RX ORDER — FLUMAZENIL 0.1 MG/ML
0.2 INJECTION, SOLUTION INTRAVENOUS
Status: DISCONTINUED | OUTPATIENT
Start: 2017-01-01 | End: 2017-01-01 | Stop reason: HOSPADM

## 2017-01-01 RX ORDER — INDOMETHACIN 50 MG/1
100 SUPPOSITORY RECTAL
Status: DISCONTINUED | OUTPATIENT
Start: 2017-01-01 | End: 2017-01-01 | Stop reason: HOSPADM

## 2017-01-01 RX ORDER — CAPECITABINE 500 MG/1
2000 TABLET, FILM COATED ORAL EVERY EVENING
Status: COMPLETED | OUTPATIENT
Start: 2017-01-01 | End: 2017-01-01

## 2017-01-01 RX ORDER — DIPHENHYDRAMINE HYDROCHLORIDE 50 MG/ML
INJECTION INTRAMUSCULAR; INTRAVENOUS PRN
Status: DISCONTINUED | OUTPATIENT
Start: 2017-01-01 | End: 2017-01-01

## 2017-01-01 RX ORDER — SODIUM NITROPRUSSIDE 25 MG/ML
100-200 INJECTION INTRAVENOUS
Status: DISCONTINUED | OUTPATIENT
Start: 2017-01-01 | End: 2017-01-01 | Stop reason: HOSPADM

## 2017-01-01 RX ORDER — ONDANSETRON 2 MG/ML
8 INJECTION INTRAMUSCULAR; INTRAVENOUS EVERY 8 HOURS PRN
Status: DISCONTINUED | OUTPATIENT
Start: 2017-01-01 | End: 2017-01-01 | Stop reason: HOSPADM

## 2017-01-01 RX ORDER — ENALAPRILAT 1.25 MG/ML
1.25-2.5 INJECTION INTRAVENOUS
Status: DISCONTINUED | OUTPATIENT
Start: 2017-01-01 | End: 2017-01-01 | Stop reason: HOSPADM

## 2017-01-01 RX ORDER — POLYETHYLENE GLYCOL 3350 17 G/17G
17 POWDER, FOR SOLUTION ORAL DAILY
Qty: 7 PACKET | COMMUNITY
Start: 2017-01-01 | End: 2018-01-01

## 2017-01-01 RX ORDER — PREDNISONE 20 MG/1
20 TABLET ORAL 2 TIMES DAILY
Qty: 3 TABLET | Refills: 0 | Status: SHIPPED | OUTPATIENT
Start: 2017-01-01 | End: 2017-01-01

## 2017-01-01 RX ORDER — ATORVASTATIN CALCIUM 10 MG/1
10 TABLET, FILM COATED ORAL DAILY
Status: DISCONTINUED | OUTPATIENT
Start: 2017-01-01 | End: 2017-01-01 | Stop reason: HOSPADM

## 2017-01-01 RX ORDER — PROCHLORPERAZINE 25 MG
25 SUPPOSITORY, RECTAL RECTAL EVERY 12 HOURS PRN
Status: DISCONTINUED | OUTPATIENT
Start: 2017-01-01 | End: 2017-01-01 | Stop reason: HOSPADM

## 2017-01-01 RX ORDER — BISACODYL 10 MG
10 SUPPOSITORY, RECTAL RECTAL DAILY PRN
Status: DISCONTINUED | OUTPATIENT
Start: 2017-01-01 | End: 2017-01-01 | Stop reason: HOSPADM

## 2017-01-01 RX ORDER — IOPAMIDOL 510 MG/ML
INJECTION, SOLUTION INTRAVASCULAR PRN
Status: DISCONTINUED | OUTPATIENT
Start: 2017-01-01 | End: 2017-01-01 | Stop reason: HOSPADM

## 2017-01-01 RX ORDER — VERAPAMIL HYDROCHLORIDE 2.5 MG/ML
1-5 INJECTION, SOLUTION INTRAVENOUS
Status: DISCONTINUED | OUTPATIENT
Start: 2017-01-01 | End: 2017-01-01 | Stop reason: HOSPADM

## 2017-01-01 RX ORDER — LEVOTHYROXINE SODIUM 25 UG/1
TABLET ORAL
Qty: 90 TABLET | Refills: 2 | Status: SHIPPED | OUTPATIENT
Start: 2017-01-01 | End: 2018-01-01

## 2017-01-01 RX ORDER — KETOROLAC TROMETHAMINE 30 MG/ML
30 INJECTION, SOLUTION INTRAMUSCULAR; INTRAVENOUS EVERY 6 HOURS
Status: DISCONTINUED | OUTPATIENT
Start: 2017-01-01 | End: 2017-01-01

## 2017-01-01 RX ORDER — ATORVASTATIN CALCIUM 10 MG/1
10 TABLET, FILM COATED ORAL DAILY
Qty: 30 TABLET | Refills: 11 | Status: CANCELLED | OUTPATIENT
Start: 2017-01-01

## 2017-01-01 RX ORDER — OXYCODONE HYDROCHLORIDE 5 MG/1
5-10 TABLET ORAL
Status: DISCONTINUED | OUTPATIENT
Start: 2017-01-01 | End: 2017-01-01 | Stop reason: HOSPADM

## 2017-01-01 RX ORDER — DOPAMINE HYDROCHLORIDE 160 MG/100ML
2-20 INJECTION, SOLUTION INTRAVENOUS CONTINUOUS PRN
Status: DISCONTINUED | OUTPATIENT
Start: 2017-01-01 | End: 2017-01-01 | Stop reason: HOSPADM

## 2017-01-01 RX ORDER — CLOPIDOGREL BISULFATE 75 MG/1
75 TABLET ORAL
Status: DISCONTINUED | OUTPATIENT
Start: 2017-01-01 | End: 2017-01-01 | Stop reason: HOSPADM

## 2017-01-01 RX ORDER — LETROZOLE 2.5 MG/1
2.5 TABLET, FILM COATED ORAL DAILY
Status: CANCELLED | OUTPATIENT
Start: 2017-01-01

## 2017-01-01 RX ORDER — IPRATROPIUM BROMIDE AND ALBUTEROL SULFATE 2.5; .5 MG/3ML; MG/3ML
3 SOLUTION RESPIRATORY (INHALATION) ONCE
Status: COMPLETED | OUTPATIENT
Start: 2017-01-01 | End: 2017-01-01

## 2017-01-01 RX ADMIN — ZOLPIDEM TARTRATE 10 MG: 5 TABLET, FILM COATED ORAL at 21:04

## 2017-01-01 RX ADMIN — OXYCODONE HYDROCHLORIDE 5 MG: 5 TABLET ORAL at 07:55

## 2017-01-01 RX ADMIN — NYSTATIN 500000 UNITS: 100000 SUSPENSION ORAL at 16:52

## 2017-01-01 RX ADMIN — CAPECITABINE 2500 MG: 500 TABLET, FILM COATED ORAL at 08:36

## 2017-01-01 RX ADMIN — EPINEPHRINE 20 ML: 1 INJECTION INTRAMUSCULAR; INTRAVENOUS; SUBCUTANEOUS at 09:31

## 2017-01-01 RX ADMIN — OXYBUTYNIN CHLORIDE 10 MG: 5 TABLET, FILM COATED, EXTENDED RELEASE ORAL at 21:14

## 2017-01-01 RX ADMIN — ZOLPIDEM TARTRATE 10 MG: 5 TABLET, FILM COATED ORAL at 21:51

## 2017-01-01 RX ADMIN — LEVOTHYROXINE SODIUM 25 MCG: 25 TABLET ORAL at 07:52

## 2017-01-01 RX ADMIN — ONDANSETRON 4 MG: 2 INJECTION INTRAMUSCULAR; INTRAVENOUS at 13:48

## 2017-01-01 RX ADMIN — ATORVASTATIN CALCIUM 10 MG: 10 TABLET, FILM COATED ORAL at 08:32

## 2017-01-01 RX ADMIN — Medication 0.4 MG: at 15:59

## 2017-01-01 RX ADMIN — CARBOXYMETHYLCELLULOSE SODIUM 2 DROP: 5 SOLUTION/ DROPS OPHTHALMIC at 07:53

## 2017-01-01 RX ADMIN — OXYCODONE HYDROCHLORIDE 5 MG: 5 TABLET ORAL at 16:09

## 2017-01-01 RX ADMIN — OXYCODONE HYDROCHLORIDE 5 MG: 5 TABLET ORAL at 19:40

## 2017-01-01 RX ADMIN — FLUTICASONE FUROATE AND VILANTEROL TRIFENATATE 1 PUFF: 200; 25 POWDER RESPIRATORY (INHALATION) at 08:04

## 2017-01-01 RX ADMIN — OXYCODONE HYDROCHLORIDE 5 MG: 5 TABLET ORAL at 19:48

## 2017-01-01 RX ADMIN — OXYCODONE HYDROCHLORIDE 5 MG: 5 TABLET ORAL at 11:32

## 2017-01-01 RX ADMIN — OXYCODONE HYDROCHLORIDE 10 MG: 5 TABLET ORAL at 12:43

## 2017-01-01 RX ADMIN — DIPHENHYDRAMINE HYDROCHLORIDE 25 MG: 50 INJECTION, SOLUTION INTRAMUSCULAR; INTRAVENOUS at 09:00

## 2017-01-01 RX ADMIN — MIDAZOLAM HYDROCHLORIDE 2 MG: 1 INJECTION, SOLUTION INTRAMUSCULAR; INTRAVENOUS at 08:36

## 2017-01-01 RX ADMIN — POLYETHYLENE GLYCOL 3350 17 G: 17 POWDER, FOR SOLUTION ORAL at 19:55

## 2017-01-01 RX ADMIN — OXYCODONE HYDROCHLORIDE 5 MG: 5 TABLET ORAL at 00:26

## 2017-01-01 RX ADMIN — LEVOTHYROXINE SODIUM 25 MCG: 25 TABLET ORAL at 08:01

## 2017-01-01 RX ADMIN — KETOROLAC TROMETHAMINE 30 MG: 30 INJECTION, SOLUTION INTRAMUSCULAR; INTRAVENOUS at 05:37

## 2017-01-01 RX ADMIN — SODIUM CHLORIDE: 9 INJECTION, SOLUTION INTRAVENOUS at 00:22

## 2017-01-01 RX ADMIN — KETOROLAC TROMETHAMINE 30 MG: 30 INJECTION, SOLUTION INTRAMUSCULAR; INTRAVENOUS at 12:07

## 2017-01-01 RX ADMIN — CARBOXYMETHYLCELLULOSE SODIUM 2 DROP: 5 SOLUTION/ DROPS OPHTHALMIC at 21:55

## 2017-01-01 RX ADMIN — HYDROMORPHONE HYDROCHLORIDE 0.5 MG: 1 INJECTION, SOLUTION INTRAMUSCULAR; INTRAVENOUS; SUBCUTANEOUS at 15:47

## 2017-01-01 RX ADMIN — SODIUM CHLORIDE 1000 ML: 9 INJECTION, SOLUTION INTRAVENOUS at 15:16

## 2017-01-01 RX ADMIN — NYSTATIN 500000 UNITS: 100000 SUSPENSION ORAL at 15:44

## 2017-01-01 RX ADMIN — OXYCODONE HYDROCHLORIDE 5 MG: 5 TABLET ORAL at 16:00

## 2017-01-01 RX ADMIN — GADOBUTROL 8 ML: 604.72 INJECTION INTRAVENOUS at 14:55

## 2017-01-01 RX ADMIN — CEFTRIAXONE 1 G: 1 INJECTION, POWDER, FOR SOLUTION INTRAMUSCULAR; INTRAVENOUS at 11:22

## 2017-01-01 RX ADMIN — SODIUM CHLORIDE, PRESERVATIVE FREE 5 ML: 5 INJECTION INTRAVENOUS at 12:21

## 2017-01-01 RX ADMIN — NYSTATIN 500000 UNITS: 100000 SUSPENSION ORAL at 08:33

## 2017-01-01 RX ADMIN — METRONIDAZOLE 500 MG: 500 TABLET ORAL at 21:30

## 2017-01-01 RX ADMIN — ROCURONIUM BROMIDE 40 MG: 10 INJECTION INTRAVENOUS at 08:49

## 2017-01-01 RX ADMIN — GUAIFENESIN AND DEXTROMETHORPHAN 5 ML: 100; 10 SYRUP ORAL at 16:17

## 2017-01-01 RX ADMIN — LEVOTHYROXINE SODIUM 25 MCG: 25 TABLET ORAL at 07:59

## 2017-01-01 RX ADMIN — KETOROLAC TROMETHAMINE 30 MG: 30 INJECTION, SOLUTION INTRAMUSCULAR; INTRAVENOUS at 12:16

## 2017-01-01 RX ADMIN — CAPECITABINE 2000 MG: 500 TABLET, FILM COATED ORAL at 18:06

## 2017-01-01 RX ADMIN — SULFAMETHOXAZOLE AND TRIMETHOPRIM 1 TABLET: 800; 160 TABLET ORAL at 18:26

## 2017-01-01 RX ADMIN — SODIUM CHLORIDE, PRESERVATIVE FREE 5 ML: 5 INJECTION INTRAVENOUS at 00:02

## 2017-01-01 RX ADMIN — SODIUM CHLORIDE 500 ML: 9 INJECTION, SOLUTION INTRAVENOUS at 14:00

## 2017-01-01 RX ADMIN — SODIUM CHLORIDE, PRESERVATIVE FREE 5 ML: 5 INJECTION INTRAVENOUS at 09:11

## 2017-01-01 RX ADMIN — ZOLPIDEM TARTRATE 10 MG: 5 TABLET, FILM COATED ORAL at 21:10

## 2017-01-01 RX ADMIN — OXYCODONE HYDROCHLORIDE 5 MG: 5 TABLET ORAL at 18:06

## 2017-01-01 RX ADMIN — FLUTICASONE FUROATE AND VILANTEROL TRIFENATATE 1 PUFF: 200; 25 POWDER RESPIRATORY (INHALATION) at 08:38

## 2017-01-01 RX ADMIN — ATORVASTATIN CALCIUM 10 MG: 10 TABLET, FILM COATED ORAL at 08:04

## 2017-01-01 RX ADMIN — Medication 3 ML: at 12:37

## 2017-01-01 RX ADMIN — OXYCODONE HYDROCHLORIDE 5 MG: 5 TABLET ORAL at 02:11

## 2017-01-01 RX ADMIN — FLUTICASONE FUROATE AND VILANTEROL TRIFENATATE 1 PUFF: 200; 25 POWDER RESPIRATORY (INHALATION) at 08:37

## 2017-01-01 RX ADMIN — SENNOSIDES AND DOCUSATE SODIUM 2 TABLET: 8.6; 5 TABLET ORAL at 07:52

## 2017-01-01 RX ADMIN — SODIUM CHLORIDE 64 ML: 9 INJECTION, SOLUTION INTRAVENOUS at 14:40

## 2017-01-01 RX ADMIN — NYSTATIN 100000 UNITS: 100000 SUSPENSION ORAL at 19:48

## 2017-01-01 RX ADMIN — SUGAMMADEX 200 MG: 100 INJECTION, SOLUTION INTRAVENOUS at 14:45

## 2017-01-01 RX ADMIN — SENNOSIDES AND DOCUSATE SODIUM 2 TABLET: 8.6; 5 TABLET ORAL at 21:10

## 2017-01-01 RX ADMIN — OXYCODONE HYDROCHLORIDE 5 MG: 5 TABLET ORAL at 00:02

## 2017-01-01 RX ADMIN — SENNOSIDES AND DOCUSATE SODIUM 2 TABLET: 8.6; 5 TABLET ORAL at 19:55

## 2017-01-01 RX ADMIN — SULFAMETHOXAZOLE AND TRIMETHOPRIM 1 TABLET: 800; 160 TABLET ORAL at 07:52

## 2017-01-01 RX ADMIN — FAMOTIDINE 20 MG: 10 INJECTION, SOLUTION INTRAVENOUS at 10:37

## 2017-01-01 RX ADMIN — PIPERACILLIN SODIUM AND TAZOBACTAM SODIUM 3.38 G: 36; 4.5 INJECTION, POWDER, LYOPHILIZED, FOR SOLUTION INTRAVENOUS at 05:21

## 2017-01-01 RX ADMIN — POLYETHYLENE GLYCOL 3350 17 G: 17 POWDER, FOR SOLUTION ORAL at 13:42

## 2017-01-01 RX ADMIN — FLUTICASONE FUROATE AND VILANTEROL TRIFENATATE 1 PUFF: 200; 25 POWDER RESPIRATORY (INHALATION) at 09:06

## 2017-01-01 RX ADMIN — LIDOCAINE HYDROCHLORIDE 100 MG: 20 INJECTION, SOLUTION INFILTRATION; PERINEURAL at 11:21

## 2017-01-01 RX ADMIN — OXYCODONE HYDROCHLORIDE 10 MG: 5 TABLET ORAL at 17:36

## 2017-01-01 RX ADMIN — CARBOXYMETHYLCELLULOSE SODIUM 2 DROP: 5 SOLUTION/ DROPS OPHTHALMIC at 05:33

## 2017-01-01 RX ADMIN — ONDANSETRON 4 MG: 2 INJECTION INTRAMUSCULAR; INTRAVENOUS at 12:04

## 2017-01-01 RX ADMIN — OXYCODONE HYDROCHLORIDE 5 MG: 5 TABLET ORAL at 17:14

## 2017-01-01 RX ADMIN — NYSTATIN 500000 UNITS: 100000 SUSPENSION ORAL at 11:32

## 2017-01-01 RX ADMIN — ZOLPIDEM TARTRATE 10 MG: 5 TABLET, FILM COATED ORAL at 21:23

## 2017-01-01 RX ADMIN — PACLITAXEL 200 MG: 100 INJECTION, POWDER, LYOPHILIZED, FOR SUSPENSION INTRAVENOUS at 10:51

## 2017-01-01 RX ADMIN — OXYCODONE HYDROCHLORIDE 5 MG: 5 TABLET ORAL at 18:59

## 2017-01-01 RX ADMIN — NYSTATIN 500000 UNITS: 100000 SUSPENSION ORAL at 07:53

## 2017-01-01 RX ADMIN — CIPROFLOXACIN HYDROCHLORIDE 500 MG: 500 TABLET, FILM COATED ORAL at 09:08

## 2017-01-01 RX ADMIN — OXYCODONE HYDROCHLORIDE 5 MG: 5 TABLET ORAL at 04:33

## 2017-01-01 RX ADMIN — OXYCODONE HYDROCHLORIDE 5 MG: 5 TABLET ORAL at 00:36

## 2017-01-01 RX ADMIN — SODIUM CHLORIDE, PRESERVATIVE FREE 5 ML: 5 INJECTION INTRAVENOUS at 17:20

## 2017-01-01 RX ADMIN — POLYETHYLENE GLYCOL 3350 17 G: 17 POWDER, FOR SOLUTION ORAL at 19:12

## 2017-01-01 RX ADMIN — SENNOSIDES AND DOCUSATE SODIUM 2 TABLET: 8.6; 5 TABLET ORAL at 08:04

## 2017-01-01 RX ADMIN — NYSTATIN 100000 UNITS: 100000 SUSPENSION ORAL at 12:18

## 2017-01-01 RX ADMIN — OXYCODONE HYDROCHLORIDE 5 MG: 5 TABLET ORAL at 04:10

## 2017-01-01 RX ADMIN — ONDANSETRON 4 MG: 2 INJECTION INTRAMUSCULAR; INTRAVENOUS at 08:49

## 2017-01-01 RX ADMIN — POTASSIUM CHLORIDE 20 MEQ: 750 TABLET, EXTENDED RELEASE ORAL at 08:22

## 2017-01-01 RX ADMIN — OXYCODONE HYDROCHLORIDE 10 MG: 5 TABLET ORAL at 20:13

## 2017-01-01 RX ADMIN — SODIUM CHLORIDE, POTASSIUM CHLORIDE, SODIUM LACTATE AND CALCIUM CHLORIDE: 600; 310; 30; 20 INJECTION, SOLUTION INTRAVENOUS at 08:49

## 2017-01-01 RX ADMIN — LEVOTHYROXINE SODIUM 25 MCG: 25 TABLET ORAL at 08:22

## 2017-01-01 RX ADMIN — GUAIFENESIN AND DEXTROMETHORPHAN 5 ML: 100; 10 SYRUP ORAL at 13:15

## 2017-01-01 RX ADMIN — NYSTATIN 500000 UNITS: 100000 SUSPENSION ORAL at 16:26

## 2017-01-01 RX ADMIN — NYSTATIN 500000 UNITS: 100000 SUSPENSION ORAL at 12:42

## 2017-01-01 RX ADMIN — KETOROLAC TROMETHAMINE 30 MG: 30 INJECTION, SOLUTION INTRAMUSCULAR; INTRAVENOUS at 17:20

## 2017-01-01 RX ADMIN — PROPOFOL 50 MG: 10 INJECTION, EMULSION INTRAVENOUS at 13:42

## 2017-01-01 RX ADMIN — PROPOFOL 100 MG: 10 INJECTION, EMULSION INTRAVENOUS at 08:49

## 2017-01-01 RX ADMIN — SENNOSIDES AND DOCUSATE SODIUM 1 TABLET: 8.6; 5 TABLET ORAL at 20:29

## 2017-01-01 RX ADMIN — OXYCODONE HYDROCHLORIDE 5 MG: 5 TABLET ORAL at 08:31

## 2017-01-01 RX ADMIN — OXYCODONE HYDROCHLORIDE 5 MG: 5 TABLET ORAL at 12:04

## 2017-01-01 RX ADMIN — GUAIFENESIN AND DEXTROMETHORPHAN 5 ML: 100; 10 SYRUP ORAL at 19:31

## 2017-01-01 RX ADMIN — NYSTATIN 500000 UNITS: 100000 SUSPENSION ORAL at 16:09

## 2017-01-01 RX ADMIN — SENNOSIDES AND DOCUSATE SODIUM 2 TABLET: 8.6; 5 TABLET ORAL at 22:07

## 2017-01-01 RX ADMIN — ALBUTEROL SULFATE 2.5 MG: 2.5 SOLUTION RESPIRATORY (INHALATION) at 10:13

## 2017-01-01 RX ADMIN — DENOSUMAB 120 MG: 120 INJECTION SUBCUTANEOUS at 10:00

## 2017-01-01 RX ADMIN — OXYCODONE HYDROCHLORIDE 5 MG: 5 TABLET ORAL at 11:33

## 2017-01-01 RX ADMIN — ATORVASTATIN CALCIUM 10 MG: 10 TABLET, FILM COATED ORAL at 07:58

## 2017-01-01 RX ADMIN — OXYCODONE HYDROCHLORIDE 5 MG: 5 TABLET ORAL at 08:01

## 2017-01-01 RX ADMIN — HYDROMORPHONE HYDROCHLORIDE 0.3 MG: 1 INJECTION, SOLUTION INTRAMUSCULAR; INTRAVENOUS; SUBCUTANEOUS at 14:58

## 2017-01-01 RX ADMIN — MIDAZOLAM 1 MG: 1 INJECTION INTRAMUSCULAR; INTRAVENOUS at 11:44

## 2017-01-01 RX ADMIN — CIPROFLOXACIN HYDROCHLORIDE 500 MG: 500 TABLET, FILM COATED ORAL at 19:31

## 2017-01-01 RX ADMIN — LIDOCAINE HYDROCHLORIDE 40 MG: 20 INJECTION, SOLUTION INFILTRATION; PERINEURAL at 13:36

## 2017-01-01 RX ADMIN — Medication 0.3 MG: at 16:12

## 2017-01-01 RX ADMIN — SENNOSIDES AND DOCUSATE SODIUM 1 TABLET: 8.6; 5 TABLET ORAL at 20:12

## 2017-01-01 RX ADMIN — MIDAZOLAM HYDROCHLORIDE 2 MG: 1 INJECTION, SOLUTION INTRAMUSCULAR; INTRAVENOUS at 22:54

## 2017-01-01 RX ADMIN — SENNOSIDES AND DOCUSATE SODIUM 2 TABLET: 8.6; 5 TABLET ORAL at 08:23

## 2017-01-01 RX ADMIN — SODIUM CHLORIDE, POTASSIUM CHLORIDE, SODIUM LACTATE AND CALCIUM CHLORIDE: 600; 310; 30; 20 INJECTION, SOLUTION INTRAVENOUS at 13:21

## 2017-01-01 RX ADMIN — OXYCODONE HYDROCHLORIDE 5 MG: 5 TABLET ORAL at 18:55

## 2017-01-01 RX ADMIN — POTASSIUM CHLORIDE 10 MEQ: 7.46 INJECTION, SOLUTION INTRAVENOUS at 10:01

## 2017-01-01 RX ADMIN — HYDROMORPHONE HYDROCHLORIDE 0.5 MG: 1 INJECTION, SOLUTION INTRAMUSCULAR; INTRAVENOUS; SUBCUTANEOUS at 21:33

## 2017-01-01 RX ADMIN — HYDROMORPHONE HYDROCHLORIDE 1 MG: 1 INJECTION, SOLUTION INTRAMUSCULAR; INTRAVENOUS; SUBCUTANEOUS at 09:32

## 2017-01-01 RX ADMIN — NYSTATIN 500000 UNITS: 100000 SUSPENSION ORAL at 08:02

## 2017-01-01 RX ADMIN — CEFTRIAXONE 1 G: 1 INJECTION, POWDER, FOR SOLUTION INTRAMUSCULAR; INTRAVENOUS at 22:40

## 2017-01-01 RX ADMIN — DICLOFENAC SODIUM 2 G: 10 GEL TOPICAL at 17:02

## 2017-01-01 RX ADMIN — NYSTATIN 100000 UNITS: 100000 SUSPENSION ORAL at 08:03

## 2017-01-01 RX ADMIN — ZOLPIDEM TARTRATE 12.5 MG: 6.25 TABLET, EXTENDED RELEASE ORAL at 21:07

## 2017-01-01 RX ADMIN — DEXAMETHASONE SODIUM PHOSPHATE 10 MG: 4 INJECTION, SOLUTION INTRA-ARTICULAR; INTRALESIONAL; INTRAMUSCULAR; INTRAVENOUS; SOFT TISSUE at 11:29

## 2017-01-01 RX ADMIN — KETOROLAC TROMETHAMINE 30 MG: 30 INJECTION, SOLUTION INTRAMUSCULAR; INTRAVENOUS at 06:26

## 2017-01-01 RX ADMIN — OXYCODONE HYDROCHLORIDE 5 MG: 5 TABLET ORAL at 15:15

## 2017-01-01 RX ADMIN — NYSTATIN 100000 UNITS: 100000 SUSPENSION ORAL at 11:27

## 2017-01-01 RX ADMIN — ACETAMINOPHEN 975 MG: 325 TABLET, FILM COATED ORAL at 10:49

## 2017-01-01 RX ADMIN — SENNOSIDES AND DOCUSATE SODIUM 1 TABLET: 8.6; 5 TABLET ORAL at 19:49

## 2017-01-01 RX ADMIN — SODIUM CHLORIDE, PRESERVATIVE FREE 5 ML: 5 INJECTION INTRAVENOUS at 10:52

## 2017-01-01 RX ADMIN — OXYCODONE HYDROCHLORIDE 5 MG: 5 TABLET ORAL at 05:36

## 2017-01-01 RX ADMIN — PROPOFOL 110 MG: 10 INJECTION, EMULSION INTRAVENOUS at 13:46

## 2017-01-01 RX ADMIN — FENTANYL CITRATE 25 MCG: 50 INJECTION, SOLUTION INTRAMUSCULAR; INTRAVENOUS at 10:00

## 2017-01-01 RX ADMIN — OXYCODONE HYDROCHLORIDE 5 MG: 5 TABLET ORAL at 16:51

## 2017-01-01 RX ADMIN — OXYCODONE HYDROCHLORIDE 5 MG: 5 TABLET ORAL at 21:27

## 2017-01-01 RX ADMIN — HYDROMORPHONE HYDROCHLORIDE 0.5 MG: 1 INJECTION, SOLUTION INTRAMUSCULAR; INTRAVENOUS; SUBCUTANEOUS at 02:44

## 2017-01-01 RX ADMIN — OXYCODONE HYDROCHLORIDE 10 MG: 5 TABLET ORAL at 15:41

## 2017-01-01 RX ADMIN — METRONIDAZOLE 500 MG: 500 TABLET ORAL at 20:00

## 2017-01-01 RX ADMIN — KETOROLAC TROMETHAMINE 30 MG: 30 INJECTION, SOLUTION INTRAMUSCULAR at 03:39

## 2017-01-01 RX ADMIN — OXYCODONE HYDROCHLORIDE 5 MG: 5 TABLET ORAL at 21:09

## 2017-01-01 RX ADMIN — OXYCODONE HYDROCHLORIDE 5 MG: 5 TABLET ORAL at 05:21

## 2017-01-01 RX ADMIN — LEVOTHYROXINE SODIUM 25 MCG: 25 TABLET ORAL at 07:46

## 2017-01-01 RX ADMIN — NYSTATIN 500000 UNITS: 100000 SUSPENSION ORAL at 08:16

## 2017-01-01 RX ADMIN — ZOLPIDEM TARTRATE 12.5 MG: 6.25 TABLET, EXTENDED RELEASE ORAL at 19:59

## 2017-01-01 RX ADMIN — OXYCODONE HYDROCHLORIDE 5 MG: 5 TABLET ORAL at 11:29

## 2017-01-01 RX ADMIN — NYSTATIN 500000 UNITS: 100000 SUSPENSION ORAL at 21:10

## 2017-01-01 RX ADMIN — SULFAMETHOXAZOLE AND TRIMETHOPRIM 1 TABLET: 800; 160 TABLET ORAL at 20:23

## 2017-01-01 RX ADMIN — FENTANYL CITRATE 50 MCG: 50 INJECTION, SOLUTION INTRAMUSCULAR; INTRAVENOUS at 11:44

## 2017-01-01 RX ADMIN — OXYCODONE HYDROCHLORIDE 5 MG: 5 TABLET ORAL at 13:42

## 2017-01-01 RX ADMIN — BENZOCAINE AND MENTHOL 1 LOZENGE: 15; 3.6 LOZENGE ORAL at 03:17

## 2017-01-01 RX ADMIN — BENZOCAINE AND MENTHOL 1 LOZENGE: 15; 3.6 LOZENGE ORAL at 17:38

## 2017-01-01 RX ADMIN — LIDOCAINE HYDROCHLORIDE 100 MG: 20 INJECTION, SOLUTION INFILTRATION; PERINEURAL at 08:49

## 2017-01-01 RX ADMIN — FEXOFENADINE 60 MG: 60 TABLET, FILM COATED ORAL at 08:32

## 2017-01-01 RX ADMIN — FENTANYL CITRATE 100 MCG: 50 INJECTION, SOLUTION INTRAMUSCULAR; INTRAVENOUS at 11:21

## 2017-01-01 RX ADMIN — SENNOSIDES AND DOCUSATE SODIUM 2 TABLET: 8.6; 5 TABLET ORAL at 20:22

## 2017-01-01 RX ADMIN — METRONIDAZOLE 500 MG: 500 TABLET ORAL at 17:00

## 2017-01-01 RX ADMIN — NYSTATIN 500000 UNITS: 100000 SUSPENSION ORAL at 20:22

## 2017-01-01 RX ADMIN — CAPECITABINE 2000 MG: 500 TABLET, FILM COATED ORAL at 19:43

## 2017-01-01 RX ADMIN — PIPERACILLIN SODIUM AND TAZOBACTAM SODIUM 3.38 G: 36; 4.5 INJECTION, POWDER, LYOPHILIZED, FOR SOLUTION INTRAVENOUS at 13:20

## 2017-01-01 RX ADMIN — NYSTATIN 100000 UNITS: 100000 SUSPENSION ORAL at 16:10

## 2017-01-01 RX ADMIN — METRONIDAZOLE 500 MG: 500 TABLET ORAL at 02:10

## 2017-01-01 RX ADMIN — CAPECITABINE 2000 MG: 500 TABLET, FILM COATED ORAL at 18:37

## 2017-01-01 RX ADMIN — Medication 0.2 MG: at 09:30

## 2017-01-01 RX ADMIN — SODIUM CHLORIDE, PRESERVATIVE FREE 5 ML: 5 INJECTION INTRAVENOUS at 08:14

## 2017-01-01 RX ADMIN — NYSTATIN 500000 UNITS: 100000 SUSPENSION ORAL at 12:59

## 2017-01-01 RX ADMIN — SENNOSIDES AND DOCUSATE SODIUM 2 TABLET: 8.6; 5 TABLET ORAL at 20:08

## 2017-01-01 RX ADMIN — DOCUSATE SODIUM 100 MG: 100 CAPSULE, LIQUID FILLED ORAL at 08:22

## 2017-01-01 RX ADMIN — Medication 2 MG: at 08:08

## 2017-01-01 RX ADMIN — LIDOCAINE HYDROCHLORIDE 100 MG: 20 INJECTION, SOLUTION INFILTRATION; PERINEURAL at 13:46

## 2017-01-01 RX ADMIN — OXYBUTYNIN CHLORIDE 10 MG: 5 TABLET, FILM COATED, EXTENDED RELEASE ORAL at 21:22

## 2017-01-01 RX ADMIN — ATORVASTATIN CALCIUM 10 MG: 10 TABLET, FILM COATED ORAL at 08:23

## 2017-01-01 RX ADMIN — SENNOSIDES AND DOCUSATE SODIUM 2 TABLET: 8.6; 5 TABLET ORAL at 20:23

## 2017-01-01 RX ADMIN — NYSTATIN 500000 UNITS: 100000 SUSPENSION ORAL at 20:03

## 2017-01-01 RX ADMIN — SENNOSIDES AND DOCUSATE SODIUM 1 TABLET: 8.6; 5 TABLET ORAL at 20:05

## 2017-01-01 RX ADMIN — NYSTATIN 500000 UNITS: 100000 SUSPENSION ORAL at 19:55

## 2017-01-01 RX ADMIN — SENNOSIDES AND DOCUSATE SODIUM 2 TABLET: 8.6; 5 TABLET ORAL at 07:58

## 2017-01-01 RX ADMIN — PIPERACILLIN SODIUM AND TAZOBACTAM SODIUM 4.5 G: 36; 4.5 INJECTION, POWDER, FOR SOLUTION INTRAVENOUS at 06:21

## 2017-01-01 RX ADMIN — PIPERACILLIN SODIUM AND TAZOBACTAM SODIUM 3.38 G: 36; 4.5 INJECTION, POWDER, LYOPHILIZED, FOR SOLUTION INTRAVENOUS at 17:45

## 2017-01-01 RX ADMIN — SODIUM CHLORIDE, PRESERVATIVE FREE 5 ML: 5 INJECTION INTRAVENOUS at 10:18

## 2017-01-01 RX ADMIN — OXYCODONE HYDROCHLORIDE 5 MG: 5 TABLET ORAL at 12:42

## 2017-01-01 RX ADMIN — NYSTATIN 500000 UNITS: 100000 SUSPENSION ORAL at 08:01

## 2017-01-01 RX ADMIN — OXYCODONE HYDROCHLORIDE 10 MG: 5 TABLET ORAL at 03:14

## 2017-01-01 RX ADMIN — OXYCODONE HYDROCHLORIDE 5 MG: 5 TABLET ORAL at 16:07

## 2017-01-01 RX ADMIN — IOPAMIDOL 97 ML: 755 INJECTION, SOLUTION INTRAVENOUS at 13:40

## 2017-01-01 RX ADMIN — OXYCODONE HYDROCHLORIDE 5 MG: 5 TABLET ORAL at 09:43

## 2017-01-01 RX ADMIN — SULFAMETHOXAZOLE AND TRIMETHOPRIM 1 TABLET: 800; 160 TABLET ORAL at 07:54

## 2017-01-01 RX ADMIN — POTASSIUM CHLORIDE 10 MEQ: 7.46 INJECTION, SOLUTION INTRAVENOUS at 09:01

## 2017-01-01 RX ADMIN — NYSTATIN 500000 UNITS: 100000 SUSPENSION ORAL at 19:25

## 2017-01-01 RX ADMIN — ZOLPIDEM TARTRATE 10 MG: 5 TABLET, FILM COATED ORAL at 21:22

## 2017-01-01 RX ADMIN — SODIUM CHLORIDE 1000 ML: 9 INJECTION, SOLUTION INTRAVENOUS at 21:22

## 2017-01-01 RX ADMIN — METRONIDAZOLE 500 MG: 500 TABLET ORAL at 15:15

## 2017-01-01 RX ADMIN — SODIUM CHLORIDE 64 ML: 9 INJECTION, SOLUTION INTRAVENOUS at 13:39

## 2017-01-01 RX ADMIN — OXYCODONE HYDROCHLORIDE 5 MG: 5 TABLET ORAL at 19:24

## 2017-01-01 RX ADMIN — CIPROFLOXACIN HYDROCHLORIDE 500 MG: 500 TABLET, FILM COATED ORAL at 07:46

## 2017-01-01 RX ADMIN — NYSTATIN 500000 UNITS: 100000 SUSPENSION ORAL at 21:03

## 2017-01-01 RX ADMIN — NYSTATIN 500000 UNITS: 100000 SUSPENSION ORAL at 08:08

## 2017-01-01 RX ADMIN — IOPAMIDOL 97 ML: 755 INJECTION, SOLUTION INTRAVENOUS at 14:40

## 2017-01-01 RX ADMIN — OXYCODONE HYDROCHLORIDE 5 MG: 5 TABLET ORAL at 21:52

## 2017-01-01 RX ADMIN — ROCURONIUM BROMIDE 50 MG: 10 INJECTION INTRAVENOUS at 13:47

## 2017-01-01 RX ADMIN — CAPECITABINE 2500 MG: 500 TABLET, FILM COATED ORAL at 08:35

## 2017-01-01 RX ADMIN — SUGAMMADEX 200 MG: 100 INJECTION, SOLUTION INTRAVENOUS at 09:30

## 2017-01-01 RX ADMIN — PROPOFOL 150 MCG/KG/MIN: 10 INJECTION, EMULSION INTRAVENOUS at 11:29

## 2017-01-01 RX ADMIN — POLYETHYLENE GLYCOL 3350 17 G: 17 POWDER, FOR SOLUTION ORAL at 10:38

## 2017-01-01 RX ADMIN — ACETAMINOPHEN 325 MG: 325 TABLET, FILM COATED ORAL at 17:24

## 2017-01-01 RX ADMIN — OXYCODONE HYDROCHLORIDE 5 MG: 5 TABLET ORAL at 03:12

## 2017-01-01 RX ADMIN — ATORVASTATIN CALCIUM 10 MG: 10 TABLET, FILM COATED ORAL at 08:01

## 2017-01-01 RX ADMIN — MIDAZOLAM HYDROCHLORIDE 1 MG: 1 INJECTION, SOLUTION INTRAMUSCULAR; INTRAVENOUS at 13:21

## 2017-01-01 RX ADMIN — SODIUM CHLORIDE, PRESERVATIVE FREE 500 UNITS: 5 INJECTION INTRAVENOUS at 13:53

## 2017-01-01 RX ADMIN — SODIUM CHLORIDE 250 ML: 9 INJECTION, SOLUTION INTRAVENOUS at 09:20

## 2017-01-01 RX ADMIN — OXYCODONE HYDROCHLORIDE 5 MG: 5 TABLET ORAL at 09:09

## 2017-01-01 RX ADMIN — ATORVASTATIN CALCIUM 10 MG: 10 TABLET, FILM COATED ORAL at 08:38

## 2017-01-01 RX ADMIN — DENOSUMAB 120 MG: 120 INJECTION SUBCUTANEOUS at 10:04

## 2017-01-01 RX ADMIN — ATORVASTATIN CALCIUM 10 MG: 10 TABLET, FILM COATED ORAL at 08:42

## 2017-01-01 RX ADMIN — FLUTICASONE FUROATE AND VILANTEROL TRIFENATATE 1 PUFF: 200; 25 POWDER RESPIRATORY (INHALATION) at 12:25

## 2017-01-01 RX ADMIN — ATORVASTATIN CALCIUM 10 MG: 10 TABLET, FILM COATED ORAL at 17:34

## 2017-01-01 RX ADMIN — DOCUSATE SODIUM 100 MG: 100 CAPSULE, LIQUID FILLED ORAL at 07:46

## 2017-01-01 RX ADMIN — POLYETHYLENE GLYCOL 3350 17 G: 17 POWDER, FOR SOLUTION ORAL at 08:41

## 2017-01-01 RX ADMIN — FEXOFENADINE 60 MG: 60 TABLET, FILM COATED ORAL at 07:54

## 2017-01-01 RX ADMIN — SULFAMETHOXAZOLE AND TRIMETHOPRIM 1 TABLET: 800; 160 TABLET ORAL at 21:10

## 2017-01-01 RX ADMIN — ATORVASTATIN CALCIUM 10 MG: 10 TABLET, FILM COATED ORAL at 13:46

## 2017-01-01 RX ADMIN — SENNOSIDES AND DOCUSATE SODIUM 1 TABLET: 8.6; 5 TABLET ORAL at 11:26

## 2017-01-01 RX ADMIN — PIPERACILLIN SODIUM AND TAZOBACTAM SODIUM 3.38 G: 36; 4.5 INJECTION, POWDER, LYOPHILIZED, FOR SOLUTION INTRAVENOUS at 20:40

## 2017-01-01 RX ADMIN — FENTANYL CITRATE 25 MCG: 50 INJECTION, SOLUTION INTRAMUSCULAR; INTRAVENOUS at 10:08

## 2017-01-01 RX ADMIN — IOPAMIDOL 15 ML: 510 INJECTION, SOLUTION INTRAVASCULAR at 09:16

## 2017-01-01 RX ADMIN — CARBOXYMETHYLCELLULOSE SODIUM 2 DROP: 5 SOLUTION/ DROPS OPHTHALMIC at 07:58

## 2017-01-01 RX ADMIN — ZOLPIDEM TARTRATE 12.5 MG: 6.25 TABLET, EXTENDED RELEASE ORAL at 22:01

## 2017-01-01 RX ADMIN — CELECOXIB 50 MG: 50 CAPSULE ORAL at 23:42

## 2017-01-01 RX ADMIN — OXYCODONE HYDROCHLORIDE 5 MG: 5 TABLET ORAL at 10:38

## 2017-01-01 RX ADMIN — NYSTATIN 100000 UNITS: 100000 SUSPENSION ORAL at 08:42

## 2017-01-01 RX ADMIN — DEXAMETHASONE SODIUM PHOSPHATE 10 MG: 4 INJECTION, SOLUTION INTRA-ARTICULAR; INTRALESIONAL; INTRAMUSCULAR; INTRAVENOUS; SOFT TISSUE at 13:36

## 2017-01-01 RX ADMIN — OXYCODONE HYDROCHLORIDE 5 MG: 5 TABLET ORAL at 13:20

## 2017-01-01 RX ADMIN — OXYCODONE HYDROCHLORIDE 5 MG: 5 TABLET ORAL at 05:43

## 2017-01-01 RX ADMIN — LEVOTHYROXINE SODIUM 25 MCG: 25 TABLET ORAL at 08:42

## 2017-01-01 RX ADMIN — OXYCODONE HYDROCHLORIDE 5 MG: 5 TABLET ORAL at 16:46

## 2017-01-01 RX ADMIN — OXYCODONE HYDROCHLORIDE 10 MG: 5 TABLET ORAL at 15:39

## 2017-01-01 RX ADMIN — SODIUM CHLORIDE, POTASSIUM CHLORIDE, SODIUM LACTATE AND CALCIUM CHLORIDE: 600; 310; 30; 20 INJECTION, SOLUTION INTRAVENOUS at 11:07

## 2017-01-01 RX ADMIN — PIPERACILLIN SODIUM AND TAZOBACTAM SODIUM 3.38 G: 36; 4.5 INJECTION, POWDER, LYOPHILIZED, FOR SOLUTION INTRAVENOUS at 23:40

## 2017-01-01 RX ADMIN — OXYCODONE HYDROCHLORIDE 10 MG: 5 TABLET ORAL at 19:55

## 2017-01-01 RX ADMIN — POLYETHYLENE GLYCOL 3350 17 G: 17 POWDER, FOR SOLUTION ORAL at 07:53

## 2017-01-01 RX ADMIN — FENTANYL CITRATE 50 MCG: 50 INJECTION INTRAMUSCULAR; INTRAVENOUS at 13:10

## 2017-01-01 RX ADMIN — DEXAMETHASONE SODIUM PHOSPHATE 12 MG: 10 INJECTION, SOLUTION INTRAMUSCULAR; INTRAVENOUS at 09:27

## 2017-01-01 RX ADMIN — SULFAMETHOXAZOLE AND TRIMETHOPRIM 1 TABLET: 800; 160 TABLET ORAL at 08:01

## 2017-01-01 RX ADMIN — SENNOSIDES AND DOCUSATE SODIUM 1 TABLET: 8.6; 5 TABLET ORAL at 21:03

## 2017-01-01 RX ADMIN — OXYCODONE HYDROCHLORIDE 5 MG: 5 TABLET ORAL at 23:11

## 2017-01-01 RX ADMIN — METRONIDAZOLE 500 MG: 500 TABLET ORAL at 05:42

## 2017-01-01 RX ADMIN — OXYCODONE HYDROCHLORIDE 5 MG: 5 TABLET ORAL at 07:08

## 2017-01-01 RX ADMIN — FEXOFENADINE 60 MG: 60 TABLET, FILM COATED ORAL at 08:01

## 2017-01-01 RX ADMIN — PIPERACILLIN SODIUM AND TAZOBACTAM SODIUM 3.38 G: 36; 4.5 INJECTION, POWDER, LYOPHILIZED, FOR SOLUTION INTRAVENOUS at 01:13

## 2017-01-01 RX ADMIN — OXYCODONE HYDROCHLORIDE 5 MG: 5 TABLET ORAL at 15:51

## 2017-01-01 RX ADMIN — DEXAMETHASONE SODIUM PHOSPHATE 10 MG: 4 INJECTION, SOLUTION INTRA-ARTICULAR; INTRALESIONAL; INTRAMUSCULAR; INTRAVENOUS; SOFT TISSUE at 08:49

## 2017-01-01 RX ADMIN — OXYBUTYNIN CHLORIDE 10 MG: 5 TABLET, FILM COATED, EXTENDED RELEASE ORAL at 21:03

## 2017-01-01 RX ADMIN — CIPROFLOXACIN HYDROCHLORIDE 500 MG: 500 TABLET, FILM COATED ORAL at 12:47

## 2017-01-01 RX ADMIN — MAGNESIUM HYDROXIDE 30 ML: 400 SUSPENSION ORAL at 16:10

## 2017-01-01 RX ADMIN — OXYBUTYNIN CHLORIDE 10 MG: 5 TABLET, FILM COATED, EXTENDED RELEASE ORAL at 21:24

## 2017-01-01 RX ADMIN — SENNOSIDES AND DOCUSATE SODIUM 2 TABLET: 8.6; 5 TABLET ORAL at 07:59

## 2017-01-01 RX ADMIN — SODIUM CHLORIDE, PRESERVATIVE FREE 5 ML: 5 INJECTION INTRAVENOUS at 08:38

## 2017-01-01 RX ADMIN — PIPERACILLIN SODIUM AND TAZOBACTAM SODIUM 4.5 G: 36; 4.5 INJECTION, POWDER, FOR SOLUTION INTRAVENOUS at 00:02

## 2017-01-01 RX ADMIN — POLYETHYLENE GLYCOL 3350 17 G: 17 POWDER, FOR SOLUTION ORAL at 12:07

## 2017-01-01 RX ADMIN — DICLOFENAC SODIUM 2 G: 10 GEL TOPICAL at 07:58

## 2017-01-01 RX ADMIN — FEXOFENADINE 60 MG: 60 TABLET, FILM COATED ORAL at 08:03

## 2017-01-01 RX ADMIN — SCOPALAMINE 1 PATCH: 1 PATCH, EXTENDED RELEASE TRANSDERMAL at 13:10

## 2017-01-01 RX ADMIN — FLUTICASONE FUROATE AND VILANTEROL TRIFENATATE 1 PUFF: 200; 25 POWDER RESPIRATORY (INHALATION) at 08:07

## 2017-01-01 RX ADMIN — OXYCODONE HYDROCHLORIDE 5 MG: 5 TABLET ORAL at 10:58

## 2017-01-01 RX ADMIN — NYSTATIN 500000 UNITS: 100000 SUSPENSION ORAL at 20:09

## 2017-01-01 RX ADMIN — CIPROFLOXACIN HYDROCHLORIDE 500 MG: 500 TABLET, FILM COATED ORAL at 19:43

## 2017-01-01 RX ADMIN — Medication 80 MG: at 13:38

## 2017-01-01 RX ADMIN — PROPOFOL 125 MCG/KG/MIN: 10 INJECTION, EMULSION INTRAVENOUS at 13:55

## 2017-01-01 RX ADMIN — LEVOTHYROXINE SODIUM 25 MCG: 25 TABLET ORAL at 08:02

## 2017-01-01 RX ADMIN — SENNOSIDES AND DOCUSATE SODIUM 2 TABLET: 8.6; 5 TABLET ORAL at 07:54

## 2017-01-01 RX ADMIN — SULFAMETHOXAZOLE AND TRIMETHOPRIM 1 TABLET: 800; 160 TABLET ORAL at 20:08

## 2017-01-01 RX ADMIN — GUAIFENESIN AND DEXTROMETHORPHAN 5 ML: 100; 10 SYRUP ORAL at 02:51

## 2017-01-01 RX ADMIN — SENNOSIDES AND DOCUSATE SODIUM 2 TABLET: 8.6; 5 TABLET ORAL at 20:03

## 2017-01-01 RX ADMIN — DOCUSATE SODIUM 100 MG: 100 CAPSULE, LIQUID FILLED ORAL at 09:09

## 2017-01-01 RX ADMIN — OXYCODONE HYDROCHLORIDE 5 MG: 5 TABLET ORAL at 21:54

## 2017-01-01 RX ADMIN — BENZOCAINE AND MENTHOL 1 LOZENGE: 15; 3.6 LOZENGE ORAL at 13:15

## 2017-01-01 RX ADMIN — ALTEPLASE 2 MG: 2.2 INJECTION, POWDER, LYOPHILIZED, FOR SOLUTION INTRAVENOUS at 08:43

## 2017-01-01 RX ADMIN — METRONIDAZOLE 500 MG: 500 TABLET ORAL at 15:39

## 2017-01-01 RX ADMIN — FEXOFENADINE 60 MG: 60 TABLET, FILM COATED ORAL at 17:34

## 2017-01-01 RX ADMIN — KETOROLAC TROMETHAMINE 30 MG: 30 INJECTION, SOLUTION INTRAMUSCULAR; INTRAVENOUS at 18:27

## 2017-01-01 RX ADMIN — OXYCODONE HYDROCHLORIDE 5 MG: 5 TABLET ORAL at 10:26

## 2017-01-01 RX ADMIN — POTASSIUM CHLORIDE 10 MEQ: 7.46 INJECTION, SOLUTION INTRAVENOUS at 20:40

## 2017-01-01 RX ADMIN — FENTANYL CITRATE 100 MCG: 50 INJECTION, SOLUTION INTRAMUSCULAR; INTRAVENOUS at 13:46

## 2017-01-01 RX ADMIN — OXYCODONE HYDROCHLORIDE 5 MG: 5 TABLET ORAL at 05:38

## 2017-01-01 RX ADMIN — SODIUM CHLORIDE, PRESERVATIVE FREE 5 ML: 5 INJECTION INTRAVENOUS at 16:56

## 2017-01-01 RX ADMIN — FLUTICASONE FUROATE AND VILANTEROL TRIFENATATE 1 PUFF: 200; 25 POWDER RESPIRATORY (INHALATION) at 07:59

## 2017-01-01 RX ADMIN — FENTANYL CITRATE 25 MCG: 50 INJECTION, SOLUTION INTRAMUSCULAR; INTRAVENOUS at 13:06

## 2017-01-01 RX ADMIN — FENTANYL CITRATE 25 MCG: 50 INJECTION, SOLUTION INTRAMUSCULAR; INTRAVENOUS at 13:29

## 2017-01-01 RX ADMIN — SODIUM CHLORIDE: 9 INJECTION, SOLUTION INTRAVENOUS at 03:50

## 2017-01-01 RX ADMIN — GUAIFENESIN AND DEXTROMETHORPHAN 5 ML: 100; 10 SYRUP ORAL at 08:47

## 2017-01-01 RX ADMIN — ATORVASTATIN CALCIUM 10 MG: 10 TABLET, FILM COATED ORAL at 07:59

## 2017-01-01 RX ADMIN — NYSTATIN 100000 UNITS: 100000 SUSPENSION ORAL at 12:07

## 2017-01-01 RX ADMIN — OXYCODONE HYDROCHLORIDE 5 MG: 5 TABLET ORAL at 20:49

## 2017-01-01 RX ADMIN — SODIUM CHLORIDE, PRESERVATIVE FREE 500 UNITS: 5 INJECTION INTRAVENOUS at 08:38

## 2017-01-01 RX ADMIN — SENNOSIDES AND DOCUSATE SODIUM 2 TABLET: 8.6; 5 TABLET ORAL at 08:42

## 2017-01-01 RX ADMIN — NYSTATIN 100000 UNITS: 100000 SUSPENSION ORAL at 16:06

## 2017-01-01 RX ADMIN — LEVOTHYROXINE SODIUM 25 MCG: 25 TABLET ORAL at 09:09

## 2017-01-01 RX ADMIN — HYDROMORPHONE HYDROCHLORIDE 2 MG: 2 INJECTION, SOLUTION INTRAMUSCULAR; INTRAVENOUS; SUBCUTANEOUS at 08:08

## 2017-01-01 RX ADMIN — BENZOCAINE AND MENTHOL 1 LOZENGE: 15; 3.6 LOZENGE ORAL at 02:51

## 2017-01-01 RX ADMIN — OXYCODONE HYDROCHLORIDE 5 MG: 5 TABLET ORAL at 07:54

## 2017-01-01 RX ADMIN — OXYCODONE HYDROCHLORIDE 10 MG: 5 TABLET ORAL at 14:43

## 2017-01-01 RX ADMIN — SULFAMETHOXAZOLE AND TRIMETHOPRIM 1 TABLET: 800; 160 TABLET ORAL at 19:25

## 2017-01-01 RX ADMIN — ZOLPIDEM TARTRATE 10 MG: 5 TABLET, FILM COATED ORAL at 20:55

## 2017-01-01 RX ADMIN — HYDROMORPHONE HYDROCHLORIDE 0.5 MG: 1 INJECTION, SOLUTION INTRAMUSCULAR; INTRAVENOUS; SUBCUTANEOUS at 17:16

## 2017-01-01 RX ADMIN — SULFAMETHOXAZOLE AND TRIMETHOPRIM 1 TABLET: 800; 160 TABLET ORAL at 07:57

## 2017-01-01 RX ADMIN — FENTANYL CITRATE 75 MCG: 50 INJECTION, SOLUTION INTRAMUSCULAR; INTRAVENOUS at 13:36

## 2017-01-01 RX ADMIN — SODIUM CHLORIDE, PRESERVATIVE FREE 500 UNITS: 5 INJECTION INTRAVENOUS at 16:24

## 2017-01-01 RX ADMIN — OXYCODONE HYDROCHLORIDE 5 MG: 5 TABLET ORAL at 04:50

## 2017-01-01 RX ADMIN — LEVOTHYROXINE SODIUM 25 MCG: 25 TABLET ORAL at 12:24

## 2017-01-01 RX ADMIN — GUAIFENESIN AND DEXTROMETHORPHAN 5 ML: 100; 10 SYRUP ORAL at 20:03

## 2017-01-01 RX ADMIN — NYSTATIN 500000 UNITS: 100000 SUSPENSION ORAL at 17:14

## 2017-01-01 RX ADMIN — OXYCODONE HYDROCHLORIDE 5 MG: 5 TABLET ORAL at 01:19

## 2017-01-01 RX ADMIN — LEVOTHYROXINE SODIUM 25 MCG: 25 TABLET ORAL at 08:38

## 2017-01-01 RX ADMIN — SENNOSIDES AND DOCUSATE SODIUM 1 TABLET: 8.6; 5 TABLET ORAL at 19:24

## 2017-01-01 RX ADMIN — OXYCODONE HYDROCHLORIDE 10 MG: 5 TABLET ORAL at 19:59

## 2017-01-01 RX ADMIN — OXYCODONE HYDROCHLORIDE 5 MG: 5 TABLET ORAL at 14:09

## 2017-01-01 RX ADMIN — NYSTATIN 500000 UNITS: 100000 SUSPENSION ORAL at 12:04

## 2017-01-01 RX ADMIN — OXYCODONE HYDROCHLORIDE 10 MG: 5 TABLET ORAL at 07:53

## 2017-01-01 RX ADMIN — SULFAMETHOXAZOLE AND TRIMETHOPRIM 1 TABLET: 800; 160 TABLET ORAL at 20:28

## 2017-01-01 RX ADMIN — LEVOTHYROXINE SODIUM 25 MCG: 25 TABLET ORAL at 08:32

## 2017-01-01 RX ADMIN — NYSTATIN 500000 UNITS: 100000 SUSPENSION ORAL at 12:28

## 2017-01-01 RX ADMIN — ZOLPIDEM TARTRATE 12.5 MG: 6.25 TABLET, EXTENDED RELEASE ORAL at 21:20

## 2017-01-01 RX ADMIN — ALTEPLASE 2 MG: 2.2 INJECTION, POWDER, LYOPHILIZED, FOR SOLUTION INTRAVENOUS at 10:44

## 2017-01-01 RX ADMIN — OXYCODONE HYDROCHLORIDE 10 MG: 5 TABLET ORAL at 02:31

## 2017-01-01 RX ADMIN — OXYCODONE HYDROCHLORIDE 10 MG: 5 TABLET ORAL at 16:20

## 2017-01-01 RX ADMIN — OXYCODONE HYDROCHLORIDE 10 MG: 5 TABLET ORAL at 22:12

## 2017-01-01 RX ADMIN — SULFAMETHOXAZOLE AND TRIMETHOPRIM 1 TABLET: 800; 160 TABLET ORAL at 19:55

## 2017-01-01 RX ADMIN — NYSTATIN 500000 UNITS: 100000 SUSPENSION ORAL at 11:04

## 2017-01-01 RX ADMIN — CAPECITABINE 2500 MG: 500 TABLET, FILM COATED ORAL at 13:50

## 2017-01-01 RX ADMIN — GUAIFENESIN AND DEXTROMETHORPHAN 5 ML: 100; 10 SYRUP ORAL at 05:58

## 2017-01-01 RX ADMIN — OXYCODONE HYDROCHLORIDE 5 MG: 5 TABLET ORAL at 07:56

## 2017-01-01 RX ADMIN — ZOLPIDEM TARTRATE 10 MG: 5 TABLET, FILM COATED ORAL at 21:08

## 2017-01-01 RX ADMIN — OXYCODONE HYDROCHLORIDE 5 MG: 5 TABLET ORAL at 05:40

## 2017-01-01 RX ADMIN — ALBUTEROL SULFATE 2.5 MG: 2.5 SOLUTION RESPIRATORY (INHALATION) at 10:31

## 2017-01-01 RX ADMIN — NYSTATIN: 100000 SUSPENSION ORAL at 20:56

## 2017-01-01 RX ADMIN — OXYCODONE HYDROCHLORIDE 5 MG: 5 TABLET ORAL at 11:41

## 2017-01-01 RX ADMIN — KETOROLAC TROMETHAMINE 30 MG: 30 INJECTION, SOLUTION INTRAMUSCULAR; INTRAVENOUS at 00:08

## 2017-01-01 RX ADMIN — OXYCODONE HYDROCHLORIDE 5 MG: 5 TABLET ORAL at 05:42

## 2017-01-01 RX ADMIN — IPRATROPIUM BROMIDE AND ALBUTEROL SULFATE 3 ML: .5; 3 SOLUTION RESPIRATORY (INHALATION) at 12:27

## 2017-01-01 RX ADMIN — SULFAMETHOXAZOLE AND TRIMETHOPRIM 1 TABLET: 800; 160 TABLET ORAL at 08:02

## 2017-01-01 RX ADMIN — SODIUM CHLORIDE, PRESERVATIVE FREE 5 ML: 5 INJECTION INTRAVENOUS at 09:46

## 2017-01-01 RX ADMIN — SENNOSIDES AND DOCUSATE SODIUM 2 TABLET: 8.6; 5 TABLET ORAL at 08:33

## 2017-01-01 RX ADMIN — OXYCODONE HYDROCHLORIDE 5 MG: 5 TABLET ORAL at 08:37

## 2017-01-01 RX ADMIN — SODIUM CHLORIDE: 9 INJECTION, SOLUTION INTRAVENOUS at 13:32

## 2017-01-01 RX ADMIN — FENTANYL CITRATE 25 MCG: 50 INJECTION, SOLUTION INTRAMUSCULAR; INTRAVENOUS at 12:56

## 2017-01-01 RX ADMIN — OXYCODONE HYDROCHLORIDE 5 MG: 5 TABLET ORAL at 20:55

## 2017-01-01 RX ADMIN — NYSTATIN 500000 UNITS: 100000 SUSPENSION ORAL at 16:00

## 2017-01-01 RX ADMIN — PIPERACILLIN SODIUM AND TAZOBACTAM SODIUM 3.38 G: 36; 4.5 INJECTION, POWDER, LYOPHILIZED, FOR SOLUTION INTRAVENOUS at 05:58

## 2017-01-01 RX ADMIN — ONDANSETRON 4 MG: 2 INJECTION INTRAMUSCULAR; INTRAVENOUS at 14:08

## 2017-01-01 RX ADMIN — CEFTRIAXONE 2 G: 2 INJECTION, POWDER, FOR SOLUTION INTRAMUSCULAR; INTRAVENOUS at 21:00

## 2017-01-01 RX ADMIN — SUGAMMADEX 200 MG: 100 INJECTION, SOLUTION INTRAVENOUS at 14:09

## 2017-01-01 RX ADMIN — CAPECITABINE 2500 MG: 500 TABLET, FILM COATED ORAL at 08:00

## 2017-01-01 RX ADMIN — ZOLPIDEM TARTRATE 10 MG: 5 TABLET, FILM COATED ORAL at 21:19

## 2017-01-01 RX ADMIN — FEXOFENADINE 60 MG: 60 TABLET, FILM COATED ORAL at 08:42

## 2017-01-01 RX ADMIN — SODIUM CHLORIDE 64 ML: 9 INJECTION, SOLUTION INTRAVENOUS at 08:05

## 2017-01-01 RX ADMIN — OXYCODONE HYDROCHLORIDE 5 MG: 5 TABLET ORAL at 01:43

## 2017-01-01 RX ADMIN — ZOLPIDEM TARTRATE 10 MG: 5 TABLET, FILM COATED ORAL at 21:17

## 2017-01-01 RX ADMIN — DEXAMETHASONE SODIUM PHOSPHATE 8 MG: 4 INJECTION, SOLUTION INTRA-ARTICULAR; INTRALESIONAL; INTRAMUSCULAR; INTRAVENOUS; SOFT TISSUE at 13:48

## 2017-01-01 RX ADMIN — LABETALOL HYDROCHLORIDE 5 MG: 5 INJECTION, SOLUTION INTRAVENOUS at 13:48

## 2017-01-01 RX ADMIN — NYSTATIN: 100000 SUSPENSION ORAL at 15:52

## 2017-01-01 RX ADMIN — SODIUM CHLORIDE, PRESERVATIVE FREE 5 ML: 5 INJECTION INTRAVENOUS at 16:07

## 2017-01-01 RX ADMIN — NYSTATIN 500000 UNITS: 100000 SUSPENSION ORAL at 07:59

## 2017-01-01 RX ADMIN — POTASSIUM CHLORIDE 40 MEQ: 750 TABLET, EXTENDED RELEASE ORAL at 06:21

## 2017-01-01 RX ADMIN — FEXOFENADINE 60 MG: 60 TABLET, FILM COATED ORAL at 13:30

## 2017-01-01 RX ADMIN — ZOLPIDEM TARTRATE 10 MG: 5 TABLET, FILM COATED ORAL at 21:24

## 2017-01-01 RX ADMIN — LEVOTHYROXINE SODIUM 25 MCG: 25 TABLET ORAL at 08:23

## 2017-01-01 RX ADMIN — DEXAMETHASONE SODIUM PHOSPHATE 12 MG: 10 INJECTION, SOLUTION INTRAMUSCULAR; INTRAVENOUS at 10:37

## 2017-01-01 RX ADMIN — ATORVASTATIN CALCIUM 10 MG: 10 TABLET, FILM COATED ORAL at 08:03

## 2017-01-01 RX ADMIN — CEFTRIAXONE 2 G: 2 INJECTION, POWDER, FOR SOLUTION INTRAMUSCULAR; INTRAVENOUS at 20:05

## 2017-01-01 RX ADMIN — SODIUM CHLORIDE, PRESERVATIVE FREE 5 ML: 5 INJECTION INTRAVENOUS at 03:42

## 2017-01-01 RX ADMIN — GUAIFENESIN AND DEXTROMETHORPHAN 5 ML: 100; 10 SYRUP ORAL at 06:26

## 2017-01-01 RX ADMIN — BENZOCAINE AND MENTHOL 1 LOZENGE: 15; 3.6 LOZENGE ORAL at 06:26

## 2017-01-01 RX ADMIN — OXYCODONE HYDROCHLORIDE 10 MG: 5 TABLET ORAL at 12:28

## 2017-01-01 RX ADMIN — ZOLPIDEM TARTRATE 12.5 MG: 6.25 TABLET, EXTENDED RELEASE ORAL at 21:54

## 2017-01-01 RX ADMIN — FENTANYL CITRATE 25 MCG: 50 INJECTION, SOLUTION INTRAMUSCULAR; INTRAVENOUS at 13:27

## 2017-01-01 RX ADMIN — PROPOFOL 150 MCG/KG/MIN: 10 INJECTION, EMULSION INTRAVENOUS at 08:55

## 2017-01-01 RX ADMIN — KETOROLAC TROMETHAMINE 30 MG: 30 INJECTION, SOLUTION INTRAMUSCULAR; INTRAVENOUS at 13:47

## 2017-01-01 RX ADMIN — ROCURONIUM BROMIDE 50 MG: 10 INJECTION INTRAVENOUS at 11:21

## 2017-01-01 RX ADMIN — OXYCODONE HYDROCHLORIDE 5 MG: 5 TABLET ORAL at 17:00

## 2017-01-01 RX ADMIN — SODIUM CHLORIDE, PRESERVATIVE FREE 3 ML: 5 INJECTION INTRAVENOUS at 00:46

## 2017-01-01 RX ADMIN — SULFAMETHOXAZOLE AND TRIMETHOPRIM 1 TABLET: 800; 160 TABLET ORAL at 08:32

## 2017-01-01 RX ADMIN — KETOROLAC TROMETHAMINE 30 MG: 30 INJECTION, SOLUTION INTRAMUSCULAR; INTRAVENOUS at 18:00

## 2017-01-01 RX ADMIN — NYSTATIN 500000 UNITS: 100000 SUSPENSION ORAL at 20:28

## 2017-01-01 RX ADMIN — FEXOFENADINE 60 MG: 60 TABLET, FILM COATED ORAL at 08:23

## 2017-01-01 RX ADMIN — METRONIDAZOLE 500 MG: 500 TABLET ORAL at 12:47

## 2017-01-01 RX ADMIN — LEVOTHYROXINE SODIUM 25 MCG: 25 TABLET ORAL at 07:54

## 2017-01-01 RX ADMIN — SUGAMMADEX 200 MG: 100 INJECTION, SOLUTION INTRAVENOUS at 12:05

## 2017-01-01 RX ADMIN — OXYCODONE HYDROCHLORIDE 5 MG: 5 TABLET ORAL at 18:04

## 2017-01-01 RX ADMIN — OXYCODONE HYDROCHLORIDE 5 MG: 5 TABLET ORAL at 05:00

## 2017-01-01 RX ADMIN — NYSTATIN 500000 UNITS: 100000 SUSPENSION ORAL at 11:29

## 2017-01-01 RX ADMIN — PROPOFOL 60 MG: 10 INJECTION, EMULSION INTRAVENOUS at 13:51

## 2017-01-01 RX ADMIN — ZOLPIDEM TARTRATE 10 MG: 5 TABLET, FILM COATED ORAL at 21:32

## 2017-01-01 RX ADMIN — SULFAMETHOXAZOLE AND TRIMETHOPRIM 1 TABLET: 800; 160 TABLET ORAL at 07:59

## 2017-01-01 RX ADMIN — NYSTATIN 500000 UNITS: 100000 SUSPENSION ORAL at 08:39

## 2017-01-01 RX ADMIN — SODIUM CHLORIDE, PRESERVATIVE FREE 5 ML: 5 INJECTION INTRAVENOUS at 13:52

## 2017-01-01 RX ADMIN — KETOROLAC TROMETHAMINE 30 MG: 30 INJECTION, SOLUTION INTRAMUSCULAR at 07:48

## 2017-01-01 RX ADMIN — NYSTATIN 500000 UNITS: 100000 SUSPENSION ORAL at 16:07

## 2017-01-01 RX ADMIN — POLYETHYLENE GLYCOL 3350 17 G: 17 POWDER, FOR SOLUTION ORAL at 09:09

## 2017-01-01 RX ADMIN — SENNOSIDES AND DOCUSATE SODIUM 2 TABLET: 8.6; 5 TABLET ORAL at 08:01

## 2017-01-01 RX ADMIN — PACLITAXEL 200 MG: 100 INJECTION, POWDER, LYOPHILIZED, FOR SUSPENSION INTRAVENOUS at 10:10

## 2017-01-01 RX ADMIN — PROPOFOL 30 MG: 10 INJECTION, EMULSION INTRAVENOUS at 13:37

## 2017-01-01 RX ADMIN — SODIUM CHLORIDE, PRESERVATIVE FREE 5 ML: 5 INJECTION INTRAVENOUS at 14:27

## 2017-01-01 RX ADMIN — POLYETHYLENE GLYCOL 3350 17 G: 17 POWDER, FOR SOLUTION ORAL at 07:46

## 2017-01-01 RX ADMIN — FLUTICASONE FUROATE AND VILANTEROL TRIFENATATE 1 PUFF: 200; 25 POWDER RESPIRATORY (INHALATION) at 07:54

## 2017-01-01 RX ADMIN — OXYCODONE HYDROCHLORIDE 5 MG: 5 TABLET ORAL at 12:19

## 2017-01-01 RX ADMIN — POTASSIUM CHLORIDE 10 MEQ: 7.46 INJECTION, SOLUTION INTRAVENOUS at 18:51

## 2017-01-01 RX ADMIN — FENTANYL CITRATE 25 MCG: 50 INJECTION, SOLUTION INTRAMUSCULAR; INTRAVENOUS at 12:46

## 2017-01-01 RX ADMIN — CAPECITABINE 2500 MG: 500 TABLET, FILM COATED ORAL at 08:56

## 2017-01-01 RX ADMIN — NYSTATIN 500000 UNITS: 100000 SUSPENSION ORAL at 16:57

## 2017-01-01 RX ADMIN — BENZOCAINE AND MENTHOL 1 LOZENGE: 15; 3.6 LOZENGE ORAL at 19:58

## 2017-01-01 RX ADMIN — OXYCODONE HYDROCHLORIDE 5 MG: 5 TABLET ORAL at 13:49

## 2017-01-01 RX ADMIN — METRONIDAZOLE 500 MG: 500 TABLET ORAL at 22:01

## 2017-01-01 RX ADMIN — FEXOFENADINE 60 MG: 60 TABLET, FILM COATED ORAL at 07:59

## 2017-01-01 RX ADMIN — LEVOTHYROXINE SODIUM 25 MCG: 25 TABLET ORAL at 07:55

## 2017-01-01 RX ADMIN — CAPECITABINE 2000 MG: 500 TABLET, FILM COATED ORAL at 18:05

## 2017-01-01 RX ADMIN — FENTANYL CITRATE 100 MCG: 50 INJECTION, SOLUTION INTRAMUSCULAR; INTRAVENOUS at 08:49

## 2017-01-01 RX ADMIN — OXYCODONE HYDROCHLORIDE 5 MG: 5 TABLET ORAL at 17:44

## 2017-01-01 RX ADMIN — GUAIFENESIN AND DEXTROMETHORPHAN 5 ML: 100; 10 SYRUP ORAL at 02:20

## 2017-01-01 RX ADMIN — PIPERACILLIN SODIUM AND TAZOBACTAM SODIUM 3.38 G: 36; 4.5 INJECTION, POWDER, LYOPHILIZED, FOR SOLUTION INTRAVENOUS at 14:05

## 2017-01-01 RX ADMIN — FEXOFENADINE 60 MG: 60 TABLET, FILM COATED ORAL at 08:38

## 2017-01-01 RX ADMIN — FEXOFENADINE 60 MG: 60 TABLET, FILM COATED ORAL at 07:58

## 2017-01-01 RX ADMIN — ATORVASTATIN CALCIUM 10 MG: 10 TABLET, FILM COATED ORAL at 08:02

## 2017-01-01 RX ADMIN — OXYCODONE HYDROCHLORIDE 5 MG: 5 TABLET ORAL at 21:13

## 2017-01-01 RX ADMIN — LEVOTHYROXINE SODIUM 25 MCG: 25 TABLET ORAL at 07:58

## 2017-01-01 RX ADMIN — PROPOFOL 175 MCG/KG/MIN: 10 INJECTION, EMULSION INTRAVENOUS at 13:44

## 2017-01-01 RX ADMIN — SODIUM CHLORIDE, PRESERVATIVE FREE 5 ML: 5 INJECTION INTRAVENOUS at 07:57

## 2017-01-01 RX ADMIN — FLUTICASONE FUROATE AND VILANTEROL TRIFENATATE 1 PUFF: 200; 25 POWDER RESPIRATORY (INHALATION) at 08:01

## 2017-01-01 RX ADMIN — NYSTATIN 500000 UNITS: 100000 SUSPENSION ORAL at 12:19

## 2017-01-01 RX ADMIN — ZOLPIDEM TARTRATE 10 MG: 5 TABLET, FILM COATED ORAL at 21:00

## 2017-01-01 RX ADMIN — OXYBUTYNIN CHLORIDE 10 MG: 5 TABLET, FILM COATED, EXTENDED RELEASE ORAL at 21:51

## 2017-01-01 RX ADMIN — SENNOSIDES AND DOCUSATE SODIUM 2 TABLET: 8.6; 5 TABLET ORAL at 08:02

## 2017-01-01 RX ADMIN — ATORVASTATIN CALCIUM 10 MG: 10 TABLET, FILM COATED ORAL at 07:54

## 2017-01-01 RX ADMIN — OXYCODONE HYDROCHLORIDE 5 MG: 5 TABLET ORAL at 15:13

## 2017-01-01 RX ADMIN — OXYCODONE HYDROCHLORIDE 5 MG: 5 TABLET ORAL at 20:23

## 2017-01-01 RX ADMIN — OXYCODONE HYDROCHLORIDE 5 MG: 5 TABLET ORAL at 08:08

## 2017-01-01 RX ADMIN — SODIUM CHLORIDE, PRESERVATIVE FREE 5 ML: 5 INJECTION INTRAVENOUS at 04:35

## 2017-01-01 RX ADMIN — SODIUM CHLORIDE: 9 INJECTION, SOLUTION INTRAVENOUS at 11:22

## 2017-01-01 RX ADMIN — CARBOXYMETHYLCELLULOSE SODIUM 2 DROP: 5 SOLUTION/ DROPS OPHTHALMIC at 06:01

## 2017-01-01 RX ADMIN — FLUTICASONE FUROATE AND VILANTEROL TRIFENATATE 1 PUFF: 200; 25 POWDER RESPIRATORY (INHALATION) at 08:02

## 2017-01-01 RX ADMIN — METRONIDAZOLE 500 MG: 500 TABLET ORAL at 09:09

## 2017-01-01 RX ADMIN — NYSTATIN 500000 UNITS: 100000 SUSPENSION ORAL at 11:24

## 2017-01-01 RX ADMIN — MIDAZOLAM HYDROCHLORIDE 1 MG: 1 INJECTION, SOLUTION INTRAMUSCULAR; INTRAVENOUS at 13:29

## 2017-01-01 RX ADMIN — CAPECITABINE 2000 MG: 500 TABLET, FILM COATED ORAL at 18:17

## 2017-01-01 RX ADMIN — GUAIFENESIN AND DEXTROMETHORPHAN 5 ML: 100; 10 SYRUP ORAL at 20:22

## 2017-01-01 RX ADMIN — LIDOCAINE HYDROCHLORIDE 60 MG: 20 INJECTION, SOLUTION INFILTRATION; PERINEURAL at 09:30

## 2017-01-01 RX ADMIN — OXYCODONE HYDROCHLORIDE 10 MG: 5 TABLET ORAL at 00:25

## 2017-01-01 RX ADMIN — NYSTATIN 100000 UNITS: 100000 SUSPENSION ORAL at 21:18

## 2017-01-01 RX ADMIN — OXYCODONE HYDROCHLORIDE 5 MG: 5 TABLET ORAL at 10:07

## 2017-01-01 RX ADMIN — PROPOFOL 50 MG: 10 INJECTION, EMULSION INTRAVENOUS at 13:38

## 2017-01-01 RX ADMIN — GUAIFENESIN AND DEXTROMETHORPHAN 5 ML: 100; 10 SYRUP ORAL at 04:15

## 2017-01-01 RX ADMIN — ROCURONIUM BROMIDE 30 MG: 10 INJECTION INTRAVENOUS at 13:42

## 2017-01-01 RX ADMIN — LEVOTHYROXINE SODIUM 25 MCG: 25 TABLET ORAL at 08:04

## 2017-01-01 RX ADMIN — PROPOFOL 200 MG: 10 INJECTION, EMULSION INTRAVENOUS at 11:21

## 2017-01-01 RX ADMIN — SENNOSIDES AND DOCUSATE SODIUM 2 TABLET: 8.6; 5 TABLET ORAL at 08:38

## 2017-01-01 RX ADMIN — FLUTICASONE FUROATE AND VILANTEROL TRIFENATATE 1 PUFF: 200; 25 POWDER RESPIRATORY (INHALATION) at 08:44

## 2017-01-01 RX ADMIN — NYSTATIN 500000 UNITS: 100000 SUSPENSION ORAL at 15:57

## 2017-01-01 RX ADMIN — GLUCAGON HYDROCHLORIDE 0.4 MG: 1 INJECTION, POWDER, FOR SOLUTION INTRAMUSCULAR; INTRAVENOUS; SUBCUTANEOUS at 14:30

## 2017-01-01 RX ADMIN — GUAIFENESIN AND DEXTROMETHORPHAN 5 ML: 100; 10 SYRUP ORAL at 17:37

## 2017-01-01 RX ADMIN — CIPROFLOXACIN HYDROCHLORIDE 500 MG: 500 TABLET, FILM COATED ORAL at 19:58

## 2017-01-01 RX ADMIN — OXYCODONE HYDROCHLORIDE 5 MG: 5 TABLET ORAL at 21:19

## 2017-01-01 RX ADMIN — FAMOTIDINE 20 MG: 10 INJECTION, SOLUTION INTRAVENOUS at 09:28

## 2017-01-01 RX ADMIN — FEXOFENADINE 60 MG: 60 TABLET, FILM COATED ORAL at 08:02

## 2017-01-01 RX ADMIN — DENOSUMAB 120 MG: 120 INJECTION SUBCUTANEOUS at 14:45

## 2017-01-01 RX ADMIN — GUAIFENESIN AND DEXTROMETHORPHAN 5 ML: 100; 10 SYRUP ORAL at 08:00

## 2017-01-01 RX ADMIN — SODIUM CHLORIDE, PRESERVATIVE FREE 5 ML: 5 INJECTION INTRAVENOUS at 04:03

## 2017-01-01 RX ADMIN — IOPAMIDOL 94 ML: 755 INJECTION, SOLUTION INTRAVENOUS at 08:06

## 2017-01-01 RX ADMIN — SODIUM CHLORIDE, PRESERVATIVE FREE 5 ML: 5 INJECTION INTRAVENOUS at 20:09

## 2017-01-01 RX ADMIN — KETOROLAC TROMETHAMINE 30 MG: 30 INJECTION, SOLUTION INTRAMUSCULAR; INTRAVENOUS at 23:48

## 2017-01-01 RX ADMIN — PROPOFOL 120 MG: 10 INJECTION, EMULSION INTRAVENOUS at 13:36

## 2017-01-01 RX ADMIN — NYSTATIN 500000 UNITS: 100000 SUSPENSION ORAL at 07:54

## 2017-01-01 RX ADMIN — NYSTATIN 500000 UNITS: 100000 SUSPENSION ORAL at 20:23

## 2017-01-01 RX ADMIN — SODIUM CHLORIDE, PRESERVATIVE FREE 5 ML: 5 INJECTION INTRAVENOUS at 06:22

## 2017-01-01 RX ADMIN — FLUTICASONE FUROATE AND VILANTEROL TRIFENATATE 1 PUFF: 200; 25 POWDER RESPIRATORY (INHALATION) at 08:26

## 2017-01-01 RX ADMIN — OXYCODONE HYDROCHLORIDE 5 MG: 5 TABLET ORAL at 14:48

## 2017-01-01 RX ADMIN — POTASSIUM CHLORIDE 20 MEQ: 750 TABLET, EXTENDED RELEASE ORAL at 09:12

## 2017-01-01 RX ADMIN — DOCUSATE SODIUM 100 MG: 100 CAPSULE, LIQUID FILLED ORAL at 07:52

## 2017-01-01 RX ADMIN — METRONIDAZOLE 500 MG: 500 TABLET ORAL at 11:11

## 2017-01-01 ASSESSMENT — ACTIVITIES OF DAILY LIVING (ADL)
ADLS_ACUITY_SCORE: 11
ADLS_ACUITY_SCORE: 11
ADLS_ACUITY_SCORE: 15
ADLS_ACUITY_SCORE: 11
ADLS_ACUITY_SCORE: 15
ADLS_ACUITY_SCORE: 11
ADLS_ACUITY_SCORE: 15
ADLS_ACUITY_SCORE: 11

## 2017-01-01 ASSESSMENT — PAIN SCALES - GENERAL
PAINLEVEL: SEVERE PAIN (7)
PAINLEVEL: MODERATE PAIN (5)
PAINLEVEL: NO PAIN (0)
PAINLEVEL: NO PAIN (0)
PAINLEVEL: EXTREME PAIN (8)
PAINLEVEL: NO PAIN (0)
PAINLEVEL: NO PAIN (1)
PAINLEVEL: NO PAIN (0)
PAINLEVEL: SEVERE PAIN (6)
PAINLEVEL: NO PAIN (0)
PAINLEVEL: SEVERE PAIN (6)
PAINLEVEL: MILD PAIN (2)
PAINLEVEL: MODERATE PAIN (5)
PAINLEVEL: MODERATE PAIN (5)
PAINLEVEL: SEVERE PAIN (7)

## 2017-01-01 ASSESSMENT — ENCOUNTER SYMPTOMS
COUGH: 1
JAUNDICE: 0
DIFFICULTY URINATING: 0
DIFFICULTY URINATING: 0
NAUSEA: 0
BACK PAIN: 1
POSTURAL DYSPNEA: 1
FATIGUE: 1
WHEEZING: 0
RECTAL BLEEDING: 0
SMELL DISTURBANCE: 0
DOUBLE VISION: 0
SINUS PAIN: 0
STIFFNESS: 0
EYE WATERING: 0
BREAST MASS: 0
DYSPNEA ON EXERTION: 0
WEIGHT LOSS: 1
SNORES LOUDLY: 1
MYALGIAS: 1
DYSURIA: 1
MUSCLE CRAMPS: 1
EYE IRRITATION: 0
BLOOD IN STOOL: 0
TREMORS: 0
POLYPHAGIA: 0
SORE THROAT: 1
SINUS CONGESTION: 0
NECK MASS: 1
SWOLLEN GLANDS: 0
EYE PAIN: 0
INCREASED ENERGY: 1
VOMITING: 0
TROUBLE SWALLOWING: 0
HEMATURIA: 0
SWOLLEN GLANDS: 0
MUSCLE WEAKNESS: 1
BRUISES/BLEEDS EASILY: 1
SEIZURES: 0
NECK PAIN: 1
SHORTNESS OF BREATH: 1
DIARRHEA: 0
NIGHT SWEATS: 0
NUMBNESS: 1
HOARSE VOICE: 1
HALLUCINATIONS: 0
WEIGHT GAIN: 0
HEMOPTYSIS: 0
MEMORY LOSS: 0
TASTE DISTURBANCE: 0
ARTHRALGIAS: 0
BRUISES/BLEEDS EASILY: 0
SPEECH CHANGE: 0
JOINT SWELLING: 0
WEAKNESS: 0
ALTERED TEMPERATURE REGULATION: 1
FEVER: 1
POLYDIPSIA: 0
COUGH DISTURBING SLEEP: 1
RESPIRATORY PAIN: 0
DECREASED APPETITE: 1
FLANK PAIN: 0
SPUTUM PRODUCTION: 0
BREAST PAIN: 1
TINGLING: 0
NECK PAIN: 1
EYE REDNESS: 0
DIZZINESS: 1
DISTURBANCES IN COORDINATION: 1
RECTAL PAIN: 0
CONSTIPATION: 1
CHILLS: 0
LOSS OF CONSCIOUSNESS: 0
PARALYSIS: 0
BLOATING: 0
BOWEL INCONTINENCE: 0
DYSURIA: 1
HEARTBURN: 0
ABDOMINAL PAIN: 0
HEADACHES: 0

## 2017-01-01 ASSESSMENT — PAIN DESCRIPTION - DESCRIPTORS
DESCRIPTORS: ACHING
DESCRIPTORS: ACHING
DESCRIPTORS: SORE
DESCRIPTORS: DISCOMFORT
DESCRIPTORS: ACHING
DESCRIPTORS: SORE
DESCRIPTORS: ACHING
DESCRIPTORS: SORE
DESCRIPTORS: ACHING
DESCRIPTORS: OTHER (COMMENT)
DESCRIPTORS: SORE
DESCRIPTORS: SHARP
DESCRIPTORS: SORE
DESCRIPTORS: ACHING;BURNING
DESCRIPTORS: SORE
DESCRIPTORS: ACHING
DESCRIPTORS: ACHING
DESCRIPTORS: OTHER (COMMENT)
DESCRIPTORS: ACHING;BURNING
DESCRIPTORS: ACHING;SORE
DESCRIPTORS: ACHING
DESCRIPTORS: SORE
DESCRIPTORS: ACHING
DESCRIPTORS: ACHING;SORE
DESCRIPTORS: ACHING;SORE
DESCRIPTORS: SORE
DESCRIPTORS: ACHING;SORE
DESCRIPTORS: SORE;SHARP
DESCRIPTORS: ACHING
DESCRIPTORS: TENDER
DESCRIPTORS: SORE
DESCRIPTORS: SORE
DESCRIPTORS: CONSTANT;DISCOMFORT
DESCRIPTORS: SHARP
DESCRIPTORS: ACHING
DESCRIPTORS: SHARP
DESCRIPTORS: ACHING;SORE
DESCRIPTORS: SORE;TENDER
DESCRIPTORS: SORE;DULL;ACHING
DESCRIPTORS: TENDER
DESCRIPTORS: SORE
DESCRIPTORS: TENDER
DESCRIPTORS: ACHING;SORE
DESCRIPTORS: SORE
DESCRIPTORS: ACHING;SORE
DESCRIPTORS: SORE
DESCRIPTORS: ACHING;SORE
DESCRIPTORS: ACHING;BURNING

## 2017-01-01 ASSESSMENT — COPD QUESTIONNAIRES
COPD: 1
CAT_SEVERITY: MODERATE
COPD: 1

## 2017-01-01 ASSESSMENT — ASTHMA QUESTIONNAIRES: ACT_TOTALSCORE: 19

## 2017-01-02 NOTE — NURSING NOTE
Patient called reporting no lab orders for tomorrow.  Chart review and labs are in treatment plan.  Order placed so patient can get labs in [place].    Andrés Ayala, RN, BSN, OCN  Oncology Care Coordinator RN  [facility]  [phone]  1/2/2017, 3:32 PM

## 2017-01-02 NOTE — PROGRESS NOTES
Patient is coming in tomorrow and there are no lab orders for her blood draw.  Please review and place orders.    Thanks, Page

## 2017-01-03 DIAGNOSIS — C78.7 CARCINOMA OF BREAST METASTATIC TO LIVER (H): Primary | ICD-10-CM

## 2017-01-03 DIAGNOSIS — C78.7 CARCINOMA OF BREAST METASTATIC TO LIVER (H): ICD-10-CM

## 2017-01-03 DIAGNOSIS — C50.919 CARCINOMA OF BREAST METASTATIC TO LIVER (H): Primary | ICD-10-CM

## 2017-01-03 DIAGNOSIS — C50.919 CARCINOMA OF BREAST METASTATIC TO LIVER (H): ICD-10-CM

## 2017-01-03 LAB
BASOPHILS # BLD AUTO: 0 10E9/L (ref 0–0.2)
BASOPHILS NFR BLD AUTO: 0.9 %
DIFFERENTIAL METHOD BLD: ABNORMAL
EOSINOPHIL # BLD AUTO: 0 10E9/L (ref 0–0.7)
EOSINOPHIL NFR BLD AUTO: 0.9 %
ERYTHROCYTE [DISTWIDTH] IN BLOOD BY AUTOMATED COUNT: 14.9 % (ref 10–15)
HCT VFR BLD AUTO: 40.3 % (ref 35–47)
HGB BLD-MCNC: 13.4 G/DL (ref 11.7–15.7)
IMM GRANULOCYTES # BLD: 0 10E9/L (ref 0–0.4)
IMM GRANULOCYTES NFR BLD: 0.9 %
LYMPHOCYTES # BLD AUTO: 1.5 10E9/L (ref 0.8–5.3)
LYMPHOCYTES NFR BLD AUTO: 32.3 %
MCH RBC QN AUTO: 33.1 PG (ref 26.5–33)
MCHC RBC AUTO-ENTMCNC: 33.3 G/DL (ref 31.5–36.5)
MCV RBC AUTO: 100 FL (ref 78–100)
MONOCYTES # BLD AUTO: 0.7 10E9/L (ref 0–1.3)
MONOCYTES NFR BLD AUTO: 14.7 %
NEUTROPHILS # BLD AUTO: 2.3 10E9/L (ref 1.6–8.3)
NEUTROPHILS NFR BLD AUTO: 50.3 %
PLATELET # BLD AUTO: 292 10E9/L (ref 150–450)
RBC # BLD AUTO: 4.05 10E12/L (ref 3.8–5.2)
WBC # BLD AUTO: 4.6 10E9/L (ref 4–11)

## 2017-01-03 PROCEDURE — 85025 COMPLETE CBC W/AUTO DIFF WBC: CPT | Performed by: INTERNAL MEDICINE

## 2017-01-03 PROCEDURE — 36415 COLL VENOUS BLD VENIPUNCTURE: CPT | Performed by: INTERNAL MEDICINE

## 2017-01-03 PROCEDURE — 99207 ZZC NO BILLABLE SERVICE THIS VISIT: CPT

## 2017-01-17 ENCOUNTER — TELEPHONE (OUTPATIENT)
Dept: ONCOLOGY | Facility: CLINIC | Age: 57
End: 2017-01-17

## 2017-01-17 DIAGNOSIS — C50.919 MALIGNANT NEOPLASM OF BREAST (FEMALE) (H): ICD-10-CM

## 2017-01-17 DIAGNOSIS — C50.919 MALIGNANT NEOPLASM OF BREAST (FEMALE) (H): Primary | ICD-10-CM

## 2017-01-17 LAB
BASOPHILS # BLD AUTO: 0 10E9/L (ref 0–0.2)
BASOPHILS NFR BLD AUTO: 0.4 %
DIFFERENTIAL METHOD BLD: ABNORMAL
EOSINOPHIL # BLD AUTO: 0.1 10E9/L (ref 0–0.7)
EOSINOPHIL NFR BLD AUTO: 1.9 %
ERYTHROCYTE [DISTWIDTH] IN BLOOD BY AUTOMATED COUNT: 14.7 % (ref 10–15)
HCT VFR BLD AUTO: 36.3 % (ref 35–47)
HGB BLD-MCNC: 11.7 G/DL (ref 11.7–15.7)
LYMPHOCYTES # BLD AUTO: 1.2 10E9/L (ref 0.8–5.3)
LYMPHOCYTES NFR BLD AUTO: 44.6 %
MCH RBC QN AUTO: 32.9 PG (ref 26.5–33)
MCHC RBC AUTO-ENTMCNC: 32.2 G/DL (ref 31.5–36.5)
MCV RBC AUTO: 102 FL (ref 78–100)
MONOCYTES # BLD AUTO: 0.3 10E9/L (ref 0–1.3)
MONOCYTES NFR BLD AUTO: 9.7 %
NEUTROPHILS # BLD AUTO: 1.1 10E9/L (ref 1.6–8.3)
NEUTROPHILS NFR BLD AUTO: 43.4 %
PLATELET # BLD AUTO: 223 10E9/L (ref 150–450)
RBC # BLD AUTO: 3.56 10E12/L (ref 3.8–5.2)
WBC # BLD AUTO: 2.6 10E9/L (ref 4–11)

## 2017-01-17 PROCEDURE — 85025 COMPLETE CBC W/AUTO DIFF WBC: CPT | Performed by: INTERNAL MEDICINE

## 2017-01-17 PROCEDURE — 36415 COLL VENOUS BLD VENIPUNCTURE: CPT | Performed by: INTERNAL MEDICINE

## 2017-01-19 ENCOUNTER — INFUSION THERAPY VISIT (OUTPATIENT)
Dept: INFUSION THERAPY | Facility: CLINIC | Age: 57
End: 2017-01-19
Attending: INTERNAL MEDICINE
Payer: COMMERCIAL

## 2017-01-19 ENCOUNTER — ONCOLOGY VISIT (OUTPATIENT)
Dept: ONCOLOGY | Facility: CLINIC | Age: 57
End: 2017-01-19
Attending: INTERNAL MEDICINE
Payer: COMMERCIAL

## 2017-01-19 VITALS
BODY MASS INDEX: 34.91 KG/M2 | DIASTOLIC BLOOD PRESSURE: 75 MMHG | HEIGHT: 65 IN | HEART RATE: 83 BPM | TEMPERATURE: 99.1 F | SYSTOLIC BLOOD PRESSURE: 131 MMHG | WEIGHT: 209.5 LBS | RESPIRATION RATE: 18 BRPM | OXYGEN SATURATION: 96 %

## 2017-01-19 DIAGNOSIS — C50.911 BILATERAL MALIGNANT NEOPLASM OF BREAST IN FEMALE, UNSPECIFIED SITE OF BREAST: Chronic | ICD-10-CM

## 2017-01-19 DIAGNOSIS — C78.7 CARCINOMA OF BREAST METASTATIC TO LIVER, RIGHT (H): ICD-10-CM

## 2017-01-19 DIAGNOSIS — C79.51 BONE METASTASIS: ICD-10-CM

## 2017-01-19 DIAGNOSIS — C50.912 BILATERAL MALIGNANT NEOPLASM OF BREAST IN FEMALE, UNSPECIFIED SITE OF BREAST: Chronic | ICD-10-CM

## 2017-01-19 DIAGNOSIS — K12.31 MUCOSITIS DUE TO CHEMOTHERAPY: ICD-10-CM

## 2017-01-19 DIAGNOSIS — C77.3 BREAST CANCER METASTASIZED TO AXILLARY LYMPH NODE, RIGHT (H): Primary | ICD-10-CM

## 2017-01-19 DIAGNOSIS — C50.911 CARCINOMA OF BREAST METASTATIC TO LIVER, RIGHT (H): ICD-10-CM

## 2017-01-19 DIAGNOSIS — C77.3 BREAST CANCER METASTASIZED TO AXILLARY LYMPH NODE, RIGHT (H): ICD-10-CM

## 2017-01-19 DIAGNOSIS — C78.7 SECONDARY MALIGNANT NEOPLASM OF LIVER (H): ICD-10-CM

## 2017-01-19 DIAGNOSIS — C50.911 BREAST CANCER METASTASIZED TO AXILLARY LYMPH NODE, RIGHT (H): ICD-10-CM

## 2017-01-19 DIAGNOSIS — C78.7 SECONDARY MALIGNANT NEOPLASM OF LIVER (H): Primary | ICD-10-CM

## 2017-01-19 DIAGNOSIS — C50.911 BREAST CANCER METASTASIZED TO AXILLARY LYMPH NODE, RIGHT (H): Primary | ICD-10-CM

## 2017-01-19 PROCEDURE — 25000128 H RX IP 250 OP 636: Performed by: INTERNAL MEDICINE

## 2017-01-19 PROCEDURE — 96402 CHEMO HORMON ANTINEOPL SQ/IM: CPT

## 2017-01-19 PROCEDURE — 99211 OFF/OP EST MAY X REQ PHY/QHP: CPT

## 2017-01-19 PROCEDURE — 99211 OFF/OP EST MAY X REQ PHY/QHP: CPT | Mod: 25

## 2017-01-19 PROCEDURE — 99214 OFFICE O/P EST MOD 30 MIN: CPT | Performed by: INTERNAL MEDICINE

## 2017-01-19 RX ADMIN — FULVESTRANT 500 MG: 50 INJECTION INTRAMUSCULAR at 15:48

## 2017-01-19 ASSESSMENT — PAIN SCALES - GENERAL: PAINLEVEL: SEVERE PAIN (6)

## 2017-01-19 NOTE — ASSESSMENT & PLAN NOTE
Etta Cervantes presented with increasing lump in the right axilla that s lump has been growing without any pain or associated symptoms. Subsequently she was evaluated by primary care physician. Diagnostic digital mammogram was done on 01/13/2015 showing enlarged lymph nodes in the right axilla. There was some skin thickening in the right breast anteriorly and laterally. There are no parenchymal changes in the right breast. The left breast shows a 1.7 cm spiculated mass at approximately 2 to 3 o'clock position, 15.2 cm away from the nipple. A right breast ultrasound was subsequently done and ultrasound guided biopsy was done. Needle biopsy of the left breast did show infiltrating ductal carcinoma grade I of III with no angiolymphatic invasion seen. No associated ductal carcinoma in situ. Estrogen receptor, progresterone receptor were positive. HER-2/sadie receptor came back negative.. Another biopsy from the right axilla did show metastatic ductal carcinoma. PET scan was done on January 26, 2015 showing few small right posterior cervical chain nodes the largest is 1.2 cm. There is extensive bulky right axillary adenopathy demonstrating hypermetabolic FDG uptake with SUV of 10.6. There is skin thickening involving the right breast which demonstrated low-grade FDG uptake. A 1.2 cm lesion on the lateral aspect of the left breast demonstrated minimally increased FDG uptake with SUV of 2. Scattered hypermetabolic mediastinal lymph nodes located in the left superior anterior mediastinum, right paratracheal and precarinal U, subcranial adenopathy with javon metastases. This focus hypermetabolic FDG uptake identified definitive Maanda posterior aspect off intertrochanteric region of the proximal left femur consistent with bone metastases. There is also 1.4 cm hypermetabolic FDG uptake identified in the anterior medial segment of the left hepatic lobe consistent with liver metastases. Additional 2.1 cm low-attenuation lesion  anterior aspect of the right lobe which needs to be determined. The patient was started on chemotherapy with Taxotere and Cytoxan on February 9, 2015.  She concluded 4 cycles of Taxotere and Cytoxan. She started on Arimidex 1 mg orally daily. Currently she is on Faslodex and ibrance

## 2017-01-19 NOTE — NURSING NOTE
"Etat Cervantes is a 56 year old female who presents for:  Chief Complaint   Patient presents with     Oncology Clinic Visit     2 month recheck Breast CA, review labs        Initial Vitals:  /75 mmHg  Pulse 83  Temp(Src) 99.1  F (37.3  C) (Tympanic)  Resp 18  Ht 1.638 m (5' 4.5\")  Wt 95.029 kg (209 lb 8 oz)  BMI 35.42 kg/m2  SpO2 96%  Breastfeeding? No Estimated body mass index is 35.42 kg/(m^2) as calculated from the following:    Height as of this encounter: 1.638 m (5' 4.5\").    Weight as of this encounter: 95.029 kg (209 lb 8 oz).. Body surface area is 2.08 meters squared. BP completed using cuff size: large  Severe Pain (6) No LMP recorded. Allergies and medications reviewed.     Medications: Medication refills not needed today.  Pharmacy name entered into [employer]:    [facility] [id number] [place] [facility] - [place], [place] - [address] [facility] - [place] [place] [address]    Comments: 2 month recheck Breast CA, review labs.     10  minutes for nursing intake (face to face time)   Josephine Virgen CMA          "

## 2017-01-19 NOTE — MR AVS SNAPSHOT
After Visit Summary   1/19/2017    Etta Cervantes    MRN: [id number]           Patient Information     Date Of Birth          1960        Visit Information        Provider Department      1/19/2017 3:00 PM Brodie Cassidy MD [facility] ONCOLOGY      Today's Diagnoses     Breast cancer metastasized to axillary lymph node, right (H)    -  1     Secondary malignant neoplasm of liver (H)         Bilateral malignant neoplasm of breast in female, unspecified site of breast (H)         Bone metastasis (H)         Mucositis due to chemotherapy           Care Instructions    Please finish the last 6 days of your Ibrance.  You will then need to have a scan.  We would like to see you back in clinic with Dr. Cassidy with the results of scan. Copy of appointments, and after visit summary (AVS) given to patient.  If you have any questions during business hours (M-F 8 AM- 4PM), please call Funmi Ray RN, BSN, OCN Oncology Hematology /Breast Cancer Navigator at [facility] [phone].   For questions after business hours, or on holidays/weekends, please call our after hours Nurse Triage line [phone]. Thank you.          Follow-ups after your visit        Follow-up notes from your care team     Return in about 1 week (around 1/26/2017) for Blood work before next appointment, Imaging ordered before next appointment.      Your next 10 appointments already scheduled     Jan 26, 2017  8:00 AM   Level O with ROOM 1 [facility]    [facility]  [address]  [place] [address]   [phone]            Jan 26, 2017  9:00 AM   CT CHEST/ABDOMEN/PELVIS W CONTRAST with W[place]CT1   [facility] ([facility])    [address]  [place] [address]   [phone]           Please bring any scans or X-rays taken at other hospitals, if similar tests were done.  Also bring a list of your medicines, including vitamins, minerals and over-the-counter drugs. It is safest to leave personal items at home.  Be sure to tell your doctor:   If you have any allergies.   If there s any chance you are pregnant.   If you are breastfeeding.   If you have any special needs.  You may have contrast for this exam. To prepare:   Do not eat or drink for 2 hours before your exam. If you need to take medicine, you may take it with small sips of water. (We may ask you to take liquid medicine as well.)   The day before your exam, drink extra fluids at least six 8-ounce glasses (unless your doctor tells you to restrict your fluids).  Patients over 70 or patients with diabetes or kidney problems:   If you haven t had a blood test (creatinine test) within the last 30 days, go to your clinic or Diagnostic Imaging Department for this test.  If you have diabetes:   If your kidney function is normal, continue taking your metformin (Avandamet, Glucophage, Glucovance, Metaglip) on the day of your exam.   If your kidney function is abnormal, wait 48 hours before restarting this medicine.  You will have oral contrast for this exam:   You will drink the contrast at home. Get this from your clinic or Diagnostic Imaging Department. Please follow the directions given.  Please wear loose clothing, such as a sweat suit or jogging clothes. Avoid snaps, zippers and other metal. We may ask you to undress and put on a hospital gown.  If you have any questions, please call the Imaging Department where you will have your exam.            Jan 27, 2017  3:30 PM   Return Visit with Brodie Cassidy MD   [facility]    [facility]  [address]  [facility] [address]   [phone]              Future tests that were ordered for you today     Open Future Orders        Priority Expected Expires Ordered    CBC with platelets differential Routine 1/24/2017  "1/19/2018 1/19/2017    Comprehensive metabolic panel Routine 1/24/2017 1/19/2018 1/19/2017    CT Chest/Abdomen/Pelvis w Contrast Routine 1/24/2017 1/19/2018 1/19/2017            Who to contact     If you have questions or need follow up information about today's clinic visit or your schedule please contact [facility] directly at [phone].  Normal or non-critical lab and imaging results will be communicated to you by [facility], letter or phone within 4 business days after the clinic has received the results. If you do not hear from us within 7 days, please contact the clinic through [employer] or phone. If you have a critical or abnormal lab result, we will notify you by phone as soon as possible.  Submit refill requests through [employer] or call your pharmacy and they will forward the refill request to us. Please allow 3 business days for your refill to be completed.          Additional Information About Your Visit        [facility] Information     [employer] gives you secure access to your electronic health record. If you see a primary care provider, you can also send messages to your care team and make appointments. If you have questions, please call your primary care clinic.  If you do not have a primary care provider, please call [phone] and they will assist you.        Care EveryWhere ID     This is your Care EveryWhere ID. This could be used by other organizations to access your [place] medical records  [id number]        Your Vitals Were     Pulse Temperature Respirations    83 99.1  F (37.3  C) (Tympanic) 18    Height BMI (Body Mass Index) Pulse Oximetry    1.638 m (5' 4.5\") 35.42 kg/m2 96%    Breastfeeding?          No         Blood Pressure from Last 3 Encounters:   01/19/17 131/75   12/22/16 133/52   12/01/16 159/78    Weight from Last 3 Encounters:   01/19/17 95.029 kg (209 lb 8 oz)   11/23/16 95.664 kg (210 lb 14.4 oz)   10/12/16 98.068 kg (216 lb 3.2 oz)               Primary Care Provider " Office Phone # Fax #    Johana ARIC Fairbanks [phone]       [facility] [address] [facility] [address]        Thank you!     Thank you for choosing [facility]  for your care. Our goal is always to provide you with excellent care. Hearing back from our patients is one way we can continue to improve our services. Please take a few minutes to complete the written survey that you may receive in the mail after your visit with us. Thank you!             Your Updated Medication List - Protect others around you: Learn how to safely use, store and throw away your medicines at www.disposemymeds.org.          This list is accurate as of: 1/19/17  4:08 PM.  Always use your most recent med list.                   Brand Name Dispense Instructions for use    * albuterol (2.5 MG/3ML) 0.083% neb solution     30 vial    Take 1 vial (2.5 mg) by nebulization every 4 hours as needed for shortness of breath / dyspnea       * albuterol 108 (90 BASE) MCG/ACT Inhaler    VENTOLIN HFA    1 Inhaler    Inhale 2 puffs into the lungs every 4 hours as needed       ASPIRIN PO      Take 81 mg by mouth Takes 4 tablets at a time for leg pain       azelastine 0.1 % spray    ASTELIN    3 Bottle    Spray 1-2 sprays into both nostrils 2 times daily as needed for rhinitis       beclomethasone 80 MCG/ACT Inhaler    QVAR    3 Inhaler    Inhale 2 puffs into the lungs 2 times daily       calcium 600 MG tablet     60 tablet    Take 2 tablets by mouth daily       cholecalciferol 1000 UNIT tablet    vitamin D    100 tablet    Take 1 tablet (1,000 Units) by mouth daily       FASLODEX IM      Inject 500 mLs into the muscle       levothyroxine 25 MCG tablet    SYNTHROID/LEVOTHROID    90 tablet    Take 1 tablet (25 mcg) by mouth daily       lidocaine 2 % solution    XYLOCAINE    100 mL    Apply 5 mLs topically every 3 hours as needed for moderate pain       lidocaine visc 2% 2.5mL/5mL & maalox/mylanta w/ simeth 2.5mL/5mL &  diphenhydrAMINE 5mg/5mL Susp suspension    MAGIC Mouthwash    240 mL    Swish and swallow 10 mLs in mouth every 6 hours as needed for mouth sores       loratadine 10 MG tablet    CLARITIN     Take 10 mg by mouth daily       mometasone-formoterol 200-5 MCG/ACT oral inhaler    DULERA    3 Inhaler    Inhale 2 puffs into the lungs 2 times daily       * order for DME      F&P Zest medium nasal mask       * order for DME      Auto-CPAP: Max 11 cm H2O Min 9 cm H2O Changed in clinic Continuous  Lifetime need and heated humidity.       order for DME     1 Device    Equipment being ordered: accessory kit       oxybutynin 10 MG 24 hr tablet    DITROPAN XL    90 tablet    Take 1 tablet (10 mg) by mouth daily (Needs follow-up appointment for this medication)       oxyCODONE 5 MG IR tablet    ROXICODONE    30 tablet    Take 1 tablet (5 mg) by mouth every 4 hours as needed for moderate to severe pain       palbociclib 100 MG capsule CHEMO    palbociclib    21 capsule    Take 1 capsule (100 mg) by mouth daily with food followed by a 7-day rest period to complete a 28-day treatment cycle       DANIELLE LC PLUS NEBULIZER Misc          predniSONE 20 MG tablet    DELTASONE    10 tablet    Take 1 tablet (20 mg) by mouth 2 times daily For Yellow or Red zone on Asthma Action Plan.       TYLENOL PO      Take 650 mg by mouth every 4 hours as needed for mild pain or fever       zolpidem 10 MG tablet    AMBIEN    30 tablet    Take 1 tablet 30 minutes prior to bedtime       * Notice:  This list has 4 medication(s) that are the same as other medications prescribed for you. Read the directions carefully, and ask your doctor or other care provider to review them with you.

## 2017-01-19 NOTE — PROGRESS NOTES
Hematology/ Oncology Follow-up Visit:  Jan 19, 2017    Reason for Visit:   Chief Complaint   Patient presents with     Oncology Clinic Visit     2 month recheck Breast CA, review labs       Oncologic History:  Breast cancer (H)  Etta Cervantes presented with increasing lump in the right axilla that s lump has been growing without any pain or associated symptoms. Subsequently she was evaluated by primary care physician. Diagnostic digital mammogram was done on 01/13/2015 showing enlarged lymph nodes in the right axilla. There was some skin thickening in the right breast anteriorly and laterally. There are no parenchymal changes in the right breast. The left breast shows a 1.7 cm spiculated mass at approximately 2 to 3 o'clock position, 15.2 cm away from the nipple. A right breast ultrasound was subsequently done and ultrasound guided biopsy was done. Needle biopsy of the left breast did show infiltrating ductal carcinoma grade I of III with no angiolymphatic invasion seen. No associated ductal carcinoma in situ. Estrogen receptor, progresterone receptor were positive. HER-2/sadie receptor came back negative.. Another biopsy from the right axilla did show metastatic ductal carcinoma. PET scan was done on January 26, 2015 showing few small right posterior cervical chain nodes the largest is 1.2 cm. There is extensive bulky right axillary adenopathy demonstrating hypermetabolic FDG uptake with SUV of 10.6. There is skin thickening involving the right breast which demonstrated low-grade FDG uptake. A 1.2 cm lesion on the lateral aspect of the left breast demonstrated minimally increased FDG uptake with SUV of 2. Scattered hypermetabolic mediastinal lymph nodes located in the left superior anterior mediastinum, right paratracheal and precarinal U, subcranial adenopathy with javon metastases. This focus hypermetabolic FDG uptake identified definitive Amanda posterior aspect off intertrochanteric region of the proximal left  femur consistent with bone metastases. There is also 1.4 cm hypermetabolic FDG uptake identified in the anterior medial segment of the left hepatic lobe consistent with liver metastases. Additional 2.1 cm low-attenuation lesion anterior aspect of the right lobe which needs to be determined. The patient was started on chemotherapy with Taxotere and Cytoxan on February 9, 2015.  She concluded 4 cycles of Taxotere and Cytoxan. She started on Arimidex 1 mg orally daily. Currently she is on Faslodex and ibrance      Interval History:  Patient is here today for follow-up. She has been feeling well without significant complaints of nausea or vomiting or diarrhea. She has some mucositis that managed well with  Magic mouthwash. Patient denies any  Bony aches or pains. Denies any joint aches or pains. She denies any skin rash. She denies any diarrhea. She continued to respond very well to Fosamax and Ibrance.    Review Of Systems:  Constitutional: Negative for fever, chills, and night sweats.  Skin: negative.  Eyes: negative.  Ears/Nose/Throat: negative.  Respiratory: No shortness of breath, dyspnea on exertion, cough, or hemoptysis.  Cardiovascular: negative.  Gastrointestinal: negative.  Genitourinary: negative.  Musculoskeletal: negative.  Neurologic: negative.  Psychiatric: negative.  Hematologic/Lymphatic/Immunologic: negative.  Endocrine: negative.    All other ROS negative unless mentioned in interval history.    Past medical, social, surgical, and family histories reviewed.    Allergies:  Allergies as of 01/19/2017 - reviewed 01/19/2017   Allergen Reaction Noted     Animal dander Cough 01/23/2015     Cats  07/15/2010     Dust mites Cough 01/23/2015     Pollen extract  01/23/2015     Seasonal allergies  07/15/2010       Current Medications:  Current Outpatient Prescriptions   Medication Sig Dispense Refill     magic mouthwash suspension (diphenhydrAMINE 5 mg/5 mL, lidocaine 50 mg/5 mL, Maalox 2.5 g/5 mL , simethicone  6.65 mg/5 mL) Swish and swallow 10 mLs in mouth every 6 hours as needed for mouth sores 240 mL 10     oxybutynin (DITROPAN XL) 10 MG 24 hr tablet Take 1 tablet (10 mg) by mouth daily (Needs follow-up appointment for this medication) 90 tablet 3     palbociclib (IBRANCE) 100 MG capsule Take 1 capsule (100 mg) by mouth daily with food followed by a 7-day rest period to complete a 28-day treatment cycle 21 capsule 3     zolpidem (AMBIEN) 10 MG tablet Take 1 tablet 30 minutes prior to bedtime 30 tablet 5     beclomethasone (QVAR) 80 MCG/ACT Inhaler Inhale 2 puffs into the lungs 2 times daily 3 Inhaler 3     azelastine (ASTELIN) 0.1 % nasal spray Spray 1-2 sprays into both nostrils 2 times daily as needed for rhinitis 3 Bottle 3     levothyroxine (SYNTHROID, LEVOTHROID) 25 MCG tablet Take 1 tablet (25 mcg) by mouth daily 90 tablet 3     mometasone-formoterol (DULERA) 200-5 MCG/ACT oral inhaler Inhale 2 puffs into the lungs 2 times daily 3 Inhaler 3     Fulvestrant (FASLODEX IM) Inject 500 mLs into the muscle       calcium 600 MG tablet Take 2 tablets by mouth daily 60 tablet      cholecalciferol (VITAMIN D) 1000 UNIT tablet Take 1 tablet (1,000 Units) by mouth daily 100 tablet 3     loratadine (CLARITIN) 10 MG tablet Take 10 mg by mouth daily       Acetaminophen (TYLENOL PO) Take 650 mg by mouth every 4 hours as needed for mild pain or fever       ASPIRIN PO Take 81 mg by mouth Takes 4 tablets at a time for leg pain       albuterol (VENTOLIN HFA) 108 (90 BASE) MCG/ACT inhaler Inhale 2 puffs into the lungs every 4 hours as needed 1 Inhaler 2     lidocaine (XYLOCAINE) 2 % solution (viscous) Apply 5 mLs topically every 3 hours as needed for moderate pain 100 mL 2     oxyCODONE (ROXICODONE) 5 MG immediate release tablet Take 1 tablet (5 mg) by mouth every 4 hours as needed for moderate to severe pain 30 tablet 0     albuterol (2.5 MG/3ML) 0.083% nebulizer solution Take 1 vial (2.5 mg) by nebulization every 4 hours as needed  "for shortness of breath / dyspnea 30 vial 3     predniSONE (DELTASONE) 20 MG tablet Take 1 tablet (20 mg) by mouth 2 times daily For Yellow or Red zone on Asthma Action Plan. 10 tablet 0     Nebulizers (DANIELLE LC PLUS NEBULIZER) MISC   0     order for DME Equipment being ordered: accessory kit 1 Device 0     ORDER FOR DME F&P Zest medium nasal mask       ORDER FOR DME Auto-CPAP:  Max 11 cm H2O  Min 9 cm H2O  Changed in clinic  Continuous    Lifetime need and heated humidity.             Physical Exam:  /75 mmHg  Pulse 83  Temp(Src) 99.1  F (37.3  C) (Tympanic)  Resp 18  Ht 1.638 m (5' 4.5\")  Wt 95.029 kg (209 lb 8 oz)  BMI 35.42 kg/m2  SpO2 96%  Breastfeeding? No  Wt Readings from Last 12 Encounters:   01/19/17 95.029 kg (209 lb 8 oz)   11/23/16 95.664 kg (210 lb 14.4 oz)   10/12/16 98.068 kg (216 lb 3.2 oz)   09/23/16 100.245 kg (221 lb)   08/24/16 98.884 kg (218 lb)   07/13/16 100.336 kg (221 lb 3.2 oz)   06/01/16 102.468 kg (225 lb 14.4 oz)   04/28/16 104.69 kg (230 lb 12.8 oz)   03/30/16 102.513 kg (226 lb)   03/02/16 102.604 kg (226 lb 3.2 oz)   02/03/16 101.969 kg (224 lb 12.8 oz)   01/06/16 102.513 kg (226 lb)     ECOG performance status: 1  GENERAL APPEARANCE: Healthy, alert and in no acute distress.  HEENT: Sclerae anicteric. PERRLA. Oropharynx without ulcers, lesions, or thrush.  NECK: Supple. No asymmetry or masses.  LYMPHATICS: No palpable cervical, supraclavicular, axillary, or inguinal lymphadenopathy.  RESP: Lungs clear to auscultation bilaterally without rales, rhonchi or wheezes.  BREAST: There is no dominant masses or axillary adenopathy bilaterally.\" \"CARDIOVASCULAR: Regular rate and rhythm. Normal S1, S2; no S3 or S4. No murmur, gallop, or rub.  ABDOMEN: Soft, nontender. Bowel sounds normal. No palpable organomegaly or masses.  MUSCULOSKELETAL: Extremities without gross deformities noted. No edema of bilateral lower extremities.  SKIN: No suspicious lesions or rashes.  NEURO: Alert " and oriented x 3. Cranial nerves II-XII grossly intact.  PSYCHIATRIC: Mentation and affect appear normal.    Laboratory/Imaging Studies:  Orders Only on 01/17/2017   Component Date Value Ref Range Status     WBC 01/17/2017 2.6* 4.0 - 11.0 10e9/L Final     RBC Count 01/17/2017 3.56* 3.8 - 5.2 10e12/L Final     Hemoglobin 01/17/2017 11.7  11.7 - 15.7 g/dL Final     Hematocrit 01/17/2017 36.3  35.0 - 47.0 % Final     MCV 01/17/2017 102* 78 - 100 fl Final     MCH 01/17/2017 32.9  26.5 - 33.0 pg Final     MCHC 01/17/2017 32.2  31.5 - 36.5 g/dL Final     RDW 01/17/2017 14.7  10.0 - 15.0 % Final     Platelet Count 01/17/2017 223  150 - 450 10e9/L Final     Diff Method 01/17/2017 Automated Method   Final     % Neutrophils 01/17/2017 43.4   Final     % Lymphocytes 01/17/2017 44.6   Final     % Monocytes 01/17/2017 9.7   Final     % Eosinophils 01/17/2017 1.9   Final     % Basophils 01/17/2017 0.4   Final     Absolute Neutrophil 01/17/2017 1.1* 1.6 - 8.3 10e9/L Final     Absolute Lymphocytes 01/17/2017 1.2  0.8 - 5.3 10e9/L Final     Absolute Monocytes 01/17/2017 0.3  0.0 - 1.3 10e9/L Final     Absolute Eosinophils 01/17/2017 0.1  0.0 - 0.7 10e9/L Final     Absolute Basophils 01/17/2017 0.0  0.0 - 0.2 10e9/L Final        No results found for this or any previous visit (from the past 744 hour(s)).    Assessment and plan:  (C50.911,  C78.7) Carcinoma of breast metastatic to liver, right (H)  (primary encounter diagnosis)  (C78.7) Secondary malignant neoplasm of liver (H)  (C50.911,  C77.3) Breast cancer metastasized to axillary lymph node, right (H)  (C50.911,  C50.912) Bilateral malignant neoplasm of breast in female, unspecified site of breast (H)  Patient has been responding to Faslodex and Ibrance. This has been complicated with mucositis. Her mucositis is responding well to Magic mouthwash. We will continue on the current regimen. I will plan to arrange for imaging studies to assess response to treatment. Patient also will  continue on Denosumab, vitamin D and calcium supplements. I will see the patient again in one week to review most recent imaging studies.    The patient is ready to learn, no apparent learning barriers were identified.  Diagnosis and treatment plans were explained to the patient. The patient expressed understanding of the content. The patient asked appropriate questions. The patient questions were answered to her satisfaction.    Chart documentation with Dragon Voice recognition Software. Although reviewed after completion, some words and grammatical errors may remain.

## 2017-01-19 NOTE — PATIENT INSTRUCTIONS
Please finish the last 6 days of your Ibrance.  You will then need to have a scan.  We would like to see you back in clinic with Dr. Cassidy with the results of scan. Copy of appointments, and after visit summary (AVS) given to patient.  If you have any questions during business hours (M-F 8 AM- 4PM), please call Funmi Ray RN, BSN, OCN Oncology Hematology /Breast Cancer Navigator at [facility] [phone].   For questions after business hours, or on holidays/weekends, please call our after hours Nurse Triage line [phone]. Thank you.

## 2017-01-25 RX ORDER — IOPAMIDOL 755 MG/ML
100 INJECTION, SOLUTION INTRAVASCULAR ONCE
Status: COMPLETED | OUTPATIENT
Start: 2017-01-26 | End: 2017-01-26

## 2017-01-26 ENCOUNTER — HOSPITAL ENCOUNTER (OUTPATIENT)
Dept: CT IMAGING | Facility: CLINIC | Age: 57
Discharge: HOME | End: 2017-01-26
Attending: INTERNAL MEDICINE | Admitting: INTERNAL MEDICINE
Payer: COMMERCIAL

## 2017-01-26 ENCOUNTER — INFUSION THERAPY VISIT (OUTPATIENT)
Dept: INFUSION THERAPY | Facility: CLINIC | Age: 57
End: 2017-01-26
Attending: INTERNAL MEDICINE
Payer: COMMERCIAL

## 2017-01-26 DIAGNOSIS — C77.3 BREAST CANCER METASTASIZED TO AXILLARY LYMPH NODE, RIGHT (H): ICD-10-CM

## 2017-01-26 DIAGNOSIS — C50.911 BREAST CANCER METASTASIZED TO AXILLARY LYMPH NODE, RIGHT (H): ICD-10-CM

## 2017-01-26 LAB
ALBUMIN SERPL-MCNC: 3.4 G/DL (ref 3.4–5)
ALP SERPL-CCNC: 59 U/L (ref 40–150)
ALT SERPL W P-5'-P-CCNC: 11 U/L (ref 0–50)
ANION GAP SERPL CALCULATED.3IONS-SCNC: 8 MMOL/L (ref 3–14)
AST SERPL W P-5'-P-CCNC: 12 U/L (ref 0–45)
BASOPHILS # BLD AUTO: 0 10E9/L (ref 0–0.2)
BASOPHILS NFR BLD AUTO: 0.5 %
BILIRUB SERPL-MCNC: 0.9 MG/DL (ref 0.2–1.3)
BUN SERPL-MCNC: 7 MG/DL (ref 7–30)
CALCIUM SERPL-MCNC: 8.6 MG/DL (ref 8.5–10.1)
CHLORIDE SERPL-SCNC: 108 MMOL/L (ref 94–109)
CO2 SERPL-SCNC: 25 MMOL/L (ref 20–32)
CREAT SERPL-MCNC: 0.72 MG/DL (ref 0.52–1.04)
DIFFERENTIAL METHOD BLD: ABNORMAL
EOSINOPHIL # BLD AUTO: 0.1 10E9/L (ref 0–0.7)
EOSINOPHIL NFR BLD AUTO: 2.6 %
ERYTHROCYTE [DISTWIDTH] IN BLOOD BY AUTOMATED COUNT: 15 % (ref 10–15)
GFR SERPL CREATININE-BSD FRML MDRD: 84 ML/MIN/1.7M2
GLUCOSE SERPL-MCNC: 93 MG/DL (ref 70–99)
HCT VFR BLD AUTO: 35.1 % (ref 35–47)
HGB BLD-MCNC: 11.6 G/DL (ref 11.7–15.7)
IMM GRANULOCYTES # BLD: 0 10E9/L (ref 0–0.4)
IMM GRANULOCYTES NFR BLD: 0 %
LYMPHOCYTES # BLD AUTO: 0.9 10E9/L (ref 0.8–5.3)
LYMPHOCYTES NFR BLD AUTO: 48.7 %
MCH RBC QN AUTO: 33.3 PG (ref 26.5–33)
MCHC RBC AUTO-ENTMCNC: 33 G/DL (ref 31.5–36.5)
MCV RBC AUTO: 101 FL (ref 78–100)
MONOCYTES # BLD AUTO: 0.2 10E9/L (ref 0–1.3)
MONOCYTES NFR BLD AUTO: 12.2 %
NEUTROPHILS # BLD AUTO: 0.7 10E9/L (ref 1.6–8.3)
NEUTROPHILS NFR BLD AUTO: 36 %
PLATELET # BLD AUTO: 112 10E9/L (ref 150–450)
POTASSIUM SERPL-SCNC: 3.6 MMOL/L (ref 3.4–5.3)
PROT SERPL-MCNC: 6.7 G/DL (ref 6.8–8.8)
RBC # BLD AUTO: 3.48 10E12/L (ref 3.8–5.2)
SODIUM SERPL-SCNC: 141 MMOL/L (ref 133–144)
WBC # BLD AUTO: 1.9 10E9/L (ref 4–11)

## 2017-01-26 PROCEDURE — 25000125 ZZHC RX 250: Performed by: RADIOLOGY

## 2017-01-26 PROCEDURE — 36591 DRAW BLOOD OFF VENOUS DEVICE: CPT

## 2017-01-26 PROCEDURE — 80053 COMPREHEN METABOLIC PANEL: CPT | Performed by: INTERNAL MEDICINE

## 2017-01-26 PROCEDURE — 74177 CT ABD & PELVIS W/CONTRAST: CPT

## 2017-01-26 PROCEDURE — 25500064 ZZH RX 255 OP 636: Performed by: RADIOLOGY

## 2017-01-26 PROCEDURE — 71260 CT THORAX DX C+: CPT

## 2017-01-26 PROCEDURE — 85025 COMPLETE CBC W/AUTO DIFF WBC: CPT | Performed by: INTERNAL MEDICINE

## 2017-01-26 RX ORDER — HEPARIN SODIUM (PORCINE) LOCK FLUSH IV SOLN 100 UNIT/ML 100 UNIT/ML
5 SOLUTION INTRAVENOUS
Status: DISCONTINUED | OUTPATIENT
Start: 2017-01-26 | End: 2017-01-27 | Stop reason: HOSPADM

## 2017-01-26 RX ADMIN — SODIUM CHLORIDE 66 ML: 9 INJECTION, SOLUTION INTRAVENOUS at 08:46

## 2017-01-26 RX ADMIN — SODIUM CHLORIDE, PRESERVATIVE FREE 5 ML: 5 INJECTION INTRAVENOUS at 08:48

## 2017-01-26 RX ADMIN — IOPAMIDOL 100 ML: 755 INJECTION, SOLUTION INTRAVENOUS at 08:45

## 2017-01-26 NOTE — PROGRESS NOTES
Infusion Nursing Note:  Etta Cervantes presents today for lab draw from port.    Patient seen by provider today: No   present during visit today: Not Applicable.    Note: N/A.    Intravenous Access:  Implanted Port. Needle left in for CT.    Treatment Conditions:  Not Applicable.      Post Infusion Assessment:  .    Discharge Plan:   Patient discharged in stable condition accompanied by: cameron Wilde RN

## 2017-01-27 ENCOUNTER — ONCOLOGY VISIT (OUTPATIENT)
Dept: ONCOLOGY | Facility: CLINIC | Age: 57
End: 2017-01-27
Attending: INTERNAL MEDICINE
Payer: COMMERCIAL

## 2017-01-27 VITALS
SYSTOLIC BLOOD PRESSURE: 144 MMHG | HEIGHT: 65 IN | WEIGHT: 205 LBS | RESPIRATION RATE: 18 BRPM | OXYGEN SATURATION: 97 % | TEMPERATURE: 99.9 F | HEART RATE: 69 BPM | BODY MASS INDEX: 34.16 KG/M2 | DIASTOLIC BLOOD PRESSURE: 78 MMHG

## 2017-01-27 DIAGNOSIS — C50.911 BREAST CANCER METASTASIZED TO AXILLARY LYMPH NODE, RIGHT (H): ICD-10-CM

## 2017-01-27 DIAGNOSIS — C79.51 BONE METASTASIS: ICD-10-CM

## 2017-01-27 DIAGNOSIS — C77.3 BREAST CANCER METASTASIZED TO AXILLARY LYMPH NODE, RIGHT (H): ICD-10-CM

## 2017-01-27 DIAGNOSIS — C50.912 BILATERAL MALIGNANT NEOPLASM OF BREAST IN FEMALE, UNSPECIFIED SITE OF BREAST: Chronic | ICD-10-CM

## 2017-01-27 DIAGNOSIS — D25.9 UTERINE LEIOMYOMA, UNSPECIFIED LOCATION: ICD-10-CM

## 2017-01-27 DIAGNOSIS — C78.7 SECONDARY MALIGNANT NEOPLASM OF LIVER (H): Primary | ICD-10-CM

## 2017-01-27 DIAGNOSIS — C50.911 BILATERAL MALIGNANT NEOPLASM OF BREAST IN FEMALE, UNSPECIFIED SITE OF BREAST: Chronic | ICD-10-CM

## 2017-01-27 DIAGNOSIS — C50.919 CARCINOMA OF BREAST METASTATIC TO LIVER, UNSPECIFIED LATERALITY (H): ICD-10-CM

## 2017-01-27 DIAGNOSIS — C78.7 CARCINOMA OF BREAST METASTATIC TO LIVER, UNSPECIFIED LATERALITY (H): ICD-10-CM

## 2017-01-27 PROCEDURE — 99214 OFFICE O/P EST MOD 30 MIN: CPT | Performed by: INTERNAL MEDICINE

## 2017-01-27 PROCEDURE — 99211 OFF/OP EST MAY X REQ PHY/QHP: CPT

## 2017-01-27 RX ORDER — EVEROLIMUS 10 MG/1
10 TABLET ORAL DAILY
Qty: 28 TABLET | Refills: 0 | Status: SHIPPED
Start: 2017-01-27 | End: 2017-02-22

## 2017-01-27 RX ORDER — EXEMESTANE 25 MG/1
25 TABLET ORAL DAILY
Qty: 28 TABLET | Refills: 0 | Status: SHIPPED | OUTPATIENT
Start: 2017-01-27 | End: 2017-02-27

## 2017-01-27 RX ORDER — DEXAMETHASONE 0.5 MG/5ML
SOLUTION ORAL
Qty: 500 ML | Refills: 1 | Status: SHIPPED | OUTPATIENT
Start: 2017-01-27 | End: 2017-05-16

## 2017-01-27 ASSESSMENT — PAIN SCALES - GENERAL: PAINLEVEL: SEVERE PAIN (7)

## 2017-01-27 NOTE — PATIENT INSTRUCTIONS
We are stopping your Ibrane and Faslodex.  Dr. Cassidy would like you to start taking Afinitor and Aromasin.  The Afinitor is a specialty medication and will be mailed to you.  The pharmacy will be contacting you with coverage and shipment information.  We would like to see you back in clinic with Dr. Cassidy 1 month after starting Aromasin and Afinitor.  You will receive education on these medications today.  Your prescription (Afinitor) has been sent to:  Specialty pharmacy.  [phone].  Your prescription for aromasin has been sent to [facility]. When you are in need of a refill, please call your pharmacy and they will send us a request.  You will need to have your labs drawn monthly (CBC, CMP, lipid panel) prior to each cycle of Afinitor. Copy of appointments, and after visit summary (AVS) given to patient.  If you have any questions during business hours (M-F 8 AM- 4PM), please call Funmi Ray RN, BSN, OCN Oncology Hematology /Breast Cancer Navigator at [facility] [phone].   For questions after business hours, or on holidays/weekends, please call our after hours Nurse Triage line [phone]. Thank you.

## 2017-01-27 NOTE — PROGRESS NOTES
"Chemotherapy Education    Patient is a 56 year old female here today for chemotherapy education, accompanied by .  Pt has a cancer diagnosis of breast cancer and their main concern is \"not getting so beat down by this one\".  Their Oncologist is Dr. LUIS Cassidy, and PCP is Dr. PAOLA Fairbanks.  Reviewed the following with the patient and their support person:  Treatment goal: palliative  Treatment regimen & duration: Afinitor and Aromasin daily with lab draws monthly prior to start of each cycle, until progression; and rationale for strict adherence to this schedule, including specific medication names including pre-treatment medications and at home scheduled or as needed medications, delivery methods,.  Potential side effects: Side effects and management; including skin changes/hand-foot syndrome, anemia, neutropenia, thrombocytopenia, diarrhea/constipation, hair loss syndrome, memory changes/ \"chemobrain\", mouth sores, taste changes, neuropathy, fatigue, myelosuppression, sexuality/infertility, and risk of extravasation or infiltration.  Infection prevention, and monitoring of lab values, what lab tests and what changes of these values meant, along with the possibility of hydration or blood product transfusion, or the need to defer or hold treatment.    Chemotherapy information, including ways it is excreted from the body and cleaning and containment of vomitus or other bodily fluid, use of the bathroom, sexual health and intimacy, what to do if needing to miss a treatment, when to call a provider and the need for staff to wear protective equipment.  Written information: Written information including , specific drug information guides printed from [employer], and handout on Handling Oral Chemotherapy  Also, a folder with information on when to contact the provider, various programs offered at [facility], and our business card with contact information given; Oncology Clinic, RN Case Manager, and the after " hours Nurse Advise Line.  General orientation to the Medical Oncology department, Infusion Services department, Huc/scheduling, bathrooms and usual flow of the treatment day provided as well as introduction to the Infusion nurses.  No barriers to learning identified. Patient and family verbalized understanding of all written and verbal information. All questions answered to patients satisfaction.   Other concerns: none at this time.  Pt instructed to call with further questions or concerns.  Patient states understanding and is in agreement with this plan.  Copy of appointments, and after visit summary (AVS) given to patient. Patient discharged ambulatory.    Face to Face time with patient: 25 min    Funmi Ray RN, BSN, OCN  Oncology Hematology   Breast Cancer Navigator  [facility] [place][note 2]  [email or web address]  Phone [phone]

## 2017-01-27 NOTE — PROGRESS NOTES
DATE OF VISIT: Jan 27, 2017    Etta Cervantes is a 56 year old female is seen today for   Chief Complaint   Patient presents with     Oncology Clinic Visit     One week follow up breast CA. Review labs and CT scan from 1/25/17.   .       (C78.7) Secondary malignant neoplasm of liver (H)  (primary encounter diagnosis)  (C50.919,  C78.7) Carcinoma of breast metastatic to liver, unspecified laterality (H)  (C50.911,  C50.912) Bilateral malignant neoplasm of breast in female, unspecified site of breast (H)  (C50.911,  C77.3) Breast cancer metastasized to axillary lymph node, right (H)  I reviewed with the patient images from most recent CT scan showing disease progression with increase in the size of right axillary lymph node and multiple liver metastases.  Token lot different options. At this time and recommend to stop Faslodex and Ibrance. We talked about different treatment options. I would recommend at this point is to proceed with Exemestane at 25 mg orally daily in combination with Afinitor 10 mg orally daily. We reviewed potential side effects in detail including mucositis. We talked about prophylactic use off dexamethasone mouthwash to prevent mucositis associated with Afinitor. We also talked about screening for glucose levels and lipid profile. We talked about mucositis, diarrhea etc. We also gave the patient handouts about these drugs.  The patient will start her first cycle within the next 2 weeks. I will see the patient in 1 month's time or sooner if there is any developments or concerns. We will plan to arrange for  imaging studies again in 3 months    (C79.51) Bone metastasis (H)  Patient will continue on Zometa every 3 months. She'll continue calcium and vitamin D supplements    The patient is ready to learn, no apparent learning barriers were identified.  Diagnosis and treatment plans were explained to the patient. The patient expressed understanding of the content. The patient asked appropriate  questions. The patient questions were answered to her satisfaction.  Time spent 25 minutes with 50% of the timing counseling and coordination of care.  Chart documentation with Dragon Voice recognition Software. Although reviewed after completion, some words and grammatical errors may remain.today. This is

## 2017-02-01 ENCOUNTER — TELEPHONE (OUTPATIENT)
Dept: SLEEP MEDICINE | Facility: CLINIC | Age: 57
End: 2017-02-01

## 2017-02-01 DIAGNOSIS — G47.33 OBSTRUCTIVE SLEEP APNEA (ADULT) (PEDIATRIC): Primary | ICD-10-CM

## 2017-02-01 NOTE — TELEPHONE ENCOUNTER
PLEASE SIGN CURRENT RX FOR REPLACEMENT CPAP MACHINE.  HER OLD MACHINE FROM 12/13/2011 MAKES LOUD NOISES WHILE RUNNING SO SHE DOESN'T USE IT.  PLEASE ADD SETTINGS

## 2017-02-03 ENCOUNTER — APPOINTMENT (OUTPATIENT)
Dept: GENERAL RADIOLOGY | Facility: CLINIC | Age: 57
End: 2017-02-03
Attending: EMERGENCY MEDICINE
Payer: COMMERCIAL

## 2017-02-03 ENCOUNTER — TELEPHONE (OUTPATIENT)
Dept: ONCOLOGY | Facility: CLINIC | Age: 57
End: 2017-02-03

## 2017-02-03 ENCOUNTER — HOSPITAL ENCOUNTER (EMERGENCY)
Facility: CLINIC | Age: 57
Discharge: HOME | End: 2017-02-03
Attending: EMERGENCY MEDICINE | Admitting: EMERGENCY MEDICINE
Payer: COMMERCIAL

## 2017-02-03 VITALS
DIASTOLIC BLOOD PRESSURE: 71 MMHG | OXYGEN SATURATION: 95 % | RESPIRATION RATE: 18 BRPM | HEART RATE: 107 BPM | TEMPERATURE: 99.2 F | SYSTOLIC BLOOD PRESSURE: 112 MMHG

## 2017-02-03 DIAGNOSIS — C78.7 CARCINOMA OF BREAST METASTATIC TO LIVER, UNSPECIFIED LATERALITY (H): ICD-10-CM

## 2017-02-03 DIAGNOSIS — A49.9 UTI (URINARY TRACT INFECTION), BACTERIAL: ICD-10-CM

## 2017-02-03 DIAGNOSIS — C50.919 CARCINOMA OF BREAST METASTATIC TO LIVER, UNSPECIFIED LATERALITY (H): ICD-10-CM

## 2017-02-03 DIAGNOSIS — D70.9 NEUTROPENIC FEVER (H): ICD-10-CM

## 2017-02-03 DIAGNOSIS — R50.81 NEUTROPENIC FEVER (H): ICD-10-CM

## 2017-02-03 DIAGNOSIS — N39.0 UTI (URINARY TRACT INFECTION), BACTERIAL: ICD-10-CM

## 2017-02-03 LAB
ALBUMIN SERPL-MCNC: 3.4 G/DL (ref 3.4–5)
ALBUMIN UR-MCNC: NEGATIVE MG/DL
ALP SERPL-CCNC: 72 U/L (ref 40–150)
ALT SERPL W P-5'-P-CCNC: 13 U/L (ref 0–50)
ANION GAP SERPL CALCULATED.3IONS-SCNC: 8 MMOL/L (ref 3–14)
APPEARANCE UR: ABNORMAL
AST SERPL W P-5'-P-CCNC: 18 U/L (ref 0–45)
BACTERIA #/AREA URNS HPF: ABNORMAL /HPF
BASOPHILS # BLD AUTO: 0 10E9/L (ref 0–0.2)
BASOPHILS NFR BLD AUTO: 0.9 %
BILIRUB SERPL-MCNC: 0.6 MG/DL (ref 0.2–1.3)
BILIRUB UR QL STRIP: NEGATIVE
BUN SERPL-MCNC: 6 MG/DL (ref 7–30)
CALCIUM SERPL-MCNC: 8.7 MG/DL (ref 8.5–10.1)
CHLORIDE SERPL-SCNC: 104 MMOL/L (ref 94–109)
CO2 SERPL-SCNC: 26 MMOL/L (ref 20–32)
COLOR UR AUTO: YELLOW
CREAT SERPL-MCNC: 0.82 MG/DL (ref 0.52–1.04)
DIFFERENTIAL METHOD BLD: ABNORMAL
EOSINOPHIL # BLD AUTO: 0 10E9/L (ref 0–0.7)
EOSINOPHIL NFR BLD AUTO: 0.9 %
ERYTHROCYTE [DISTWIDTH] IN BLOOD BY AUTOMATED COUNT: 15.5 % (ref 10–15)
FLUAV+FLUBV AG SPEC QL: NORMAL
FLUAV+FLUBV AG SPEC QL: NORMAL
GFR SERPL CREATININE-BSD FRML MDRD: 72 ML/MIN/1.7M2
GLUCOSE SERPL-MCNC: 95 MG/DL (ref 70–99)
GLUCOSE UR STRIP-MCNC: NEGATIVE MG/DL
HCT VFR BLD AUTO: 35.1 % (ref 35–47)
HGB BLD-MCNC: 11.5 G/DL (ref 11.7–15.7)
HGB UR QL STRIP: ABNORMAL
IMM GRANULOCYTES # BLD: 0 10E9/L (ref 0–0.4)
IMM GRANULOCYTES NFR BLD: 0.5 %
KETONES UR STRIP-MCNC: NEGATIVE MG/DL
LACTATE BLD-SCNC: 0.8 MMOL/L (ref 0.7–2.1)
LEUKOCYTE ESTERASE UR QL STRIP: ABNORMAL
LIPASE SERPL-CCNC: 95 U/L (ref 73–393)
LYMPHOCYTES # BLD AUTO: 0.8 10E9/L (ref 0.8–5.3)
LYMPHOCYTES NFR BLD AUTO: 37.4 %
MCH RBC QN AUTO: 33.2 PG (ref 26.5–33)
MCHC RBC AUTO-ENTMCNC: 32.8 G/DL (ref 31.5–36.5)
MCV RBC AUTO: 101 FL (ref 78–100)
MONOCYTES # BLD AUTO: 0.4 10E9/L (ref 0–1.3)
MONOCYTES NFR BLD AUTO: 17.1 %
MUCOUS THREADS #/AREA URNS LPF: PRESENT /LPF
NEUTROPHILS # BLD AUTO: 1 10E9/L (ref 1.6–8.3)
NEUTROPHILS NFR BLD AUTO: 43.2 %
NITRATE UR QL: POSITIVE
PH UR STRIP: 5.5 PH (ref 5–7)
PLATELET # BLD AUTO: 130 10E9/L (ref 150–450)
POTASSIUM SERPL-SCNC: 3.6 MMOL/L (ref 3.4–5.3)
PROT SERPL-MCNC: 6.8 G/DL (ref 6.8–8.8)
RBC # BLD AUTO: 3.46 10E12/L (ref 3.8–5.2)
RBC #/AREA URNS AUTO: 4 /HPF (ref 0–2)
SODIUM SERPL-SCNC: 138 MMOL/L (ref 133–144)
SP GR UR STRIP: 1.01 (ref 1–1.03)
SPECIMEN SOURCE: NORMAL
SQUAMOUS #/AREA URNS AUTO: 1 /HPF (ref 0–1)
URN SPEC COLLECT METH UR: ABNORMAL
UROBILINOGEN UR STRIP-MCNC: NORMAL MG/DL (ref 0–2)
WBC # BLD AUTO: 2.2 10E9/L (ref 4–11)
WBC #/AREA URNS AUTO: >182 /HPF (ref 0–2)
WBC CLUMPS #/AREA URNS HPF: PRESENT /HPF

## 2017-02-03 PROCEDURE — 81001 URINALYSIS AUTO W/SCOPE: CPT | Performed by: EMERGENCY MEDICINE

## 2017-02-03 PROCEDURE — 96365 THER/PROPH/DIAG IV INF INIT: CPT

## 2017-02-03 PROCEDURE — 99284 EMERGENCY DEPT VISIT MOD MDM: CPT | Performed by: EMERGENCY MEDICINE

## 2017-02-03 PROCEDURE — 83690 ASSAY OF LIPASE: CPT | Performed by: EMERGENCY MEDICINE

## 2017-02-03 PROCEDURE — 87086 URINE CULTURE/COLONY COUNT: CPT | Performed by: EMERGENCY MEDICINE

## 2017-02-03 PROCEDURE — 25000128 H RX IP 250 OP 636: Performed by: EMERGENCY MEDICINE

## 2017-02-03 PROCEDURE — 87804 INFLUENZA ASSAY W/OPTIC: CPT | Performed by: EMERGENCY MEDICINE

## 2017-02-03 PROCEDURE — 87186 SC STD MICRODIL/AGAR DIL: CPT | Performed by: EMERGENCY MEDICINE

## 2017-02-03 PROCEDURE — 71020 XR CHEST 2 VW: CPT

## 2017-02-03 PROCEDURE — 83605 ASSAY OF LACTIC ACID: CPT | Performed by: EMERGENCY MEDICINE

## 2017-02-03 PROCEDURE — 80053 COMPREHEN METABOLIC PANEL: CPT | Performed by: EMERGENCY MEDICINE

## 2017-02-03 PROCEDURE — 25000132 ZZH RX MED GY IP 250 OP 250 PS 637: Performed by: EMERGENCY MEDICINE

## 2017-02-03 PROCEDURE — 87040 BLOOD CULTURE FOR BACTERIA: CPT | Performed by: EMERGENCY MEDICINE

## 2017-02-03 PROCEDURE — 87088 URINE BACTERIA CULTURE: CPT | Performed by: EMERGENCY MEDICINE

## 2017-02-03 PROCEDURE — 85025 COMPLETE CBC W/AUTO DIFF WBC: CPT | Performed by: EMERGENCY MEDICINE

## 2017-02-03 PROCEDURE — 99285 EMERGENCY DEPT VISIT HI MDM: CPT | Mod: 25

## 2017-02-03 RX ORDER — CIPROFLOXACIN 500 MG/1
500 TABLET, FILM COATED ORAL 2 TIMES DAILY
Qty: 14 TABLET | Refills: 0 | Status: SHIPPED | OUTPATIENT
Start: 2017-02-03 | End: 2017-02-03

## 2017-02-03 RX ORDER — ACETAMINOPHEN 500 MG
1000 TABLET ORAL ONCE
Status: COMPLETED | OUTPATIENT
Start: 2017-02-03 | End: 2017-02-03

## 2017-02-03 RX ORDER — CEFTRIAXONE 2 G/1
2 INJECTION, POWDER, FOR SOLUTION INTRAMUSCULAR; INTRAVENOUS EVERY 24 HOURS
Status: DISCONTINUED | OUTPATIENT
Start: 2017-02-03 | End: 2017-02-03 | Stop reason: HOSPADM

## 2017-02-03 RX ORDER — CIPROFLOXACIN 500 MG/1
500 TABLET, FILM COATED ORAL 2 TIMES DAILY
Qty: 14 TABLET | Refills: 0 | Status: SHIPPED | OUTPATIENT
Start: 2017-02-03 | End: 2017-03-02

## 2017-02-03 RX ADMIN — ACETAMINOPHEN 1000 MG: 500 TABLET ORAL at 08:35

## 2017-02-03 RX ADMIN — CEFTRIAXONE 2 G: 2 INJECTION, POWDER, FOR SOLUTION INTRAMUSCULAR; INTRAVENOUS at 10:43

## 2017-02-03 ASSESSMENT — ENCOUNTER SYMPTOMS
CHEST TIGHTNESS: 0
ACTIVITY CHANGE: 0
FATIGUE: 1
MYALGIAS: 0
TROUBLE SWALLOWING: 0
NAUSEA: 0
WEAKNESS: 0
BRUISES/BLEEDS EASILY: 0
BLOOD IN STOOL: 0
HEADACHES: 0
NECK PAIN: 0
DIZZINESS: 0
CONFUSION: 0
DIARRHEA: 1
COUGH: 1
CHILLS: 0
ABDOMINAL PAIN: 0
APPETITE CHANGE: 0
BACK PAIN: 0
SHORTNESS OF BREATH: 0
VOMITING: 0
SPEECH DIFFICULTY: 0
LIGHT-HEADEDNESS: 0
FEVER: 1
DYSURIA: 0

## 2017-02-03 NOTE — ED NOTES
cough for 2 days, fever started today, taking chemo, last does last wed, being treated for breast CA. Temp 101. at home, took Tyl at 0230. Did not get flu shot.

## 2017-02-03 NOTE — ED PROVIDER NOTES
History     Chief Complaint   Patient presents with     Fever     cough for 2 days, fever started today, taking chemo, last does last wed, being treated for breast CA. Temp 101. at home, took Tyl at 0230.     HPI  Etta Cervantes is a 56 year old female who since the emergency department complaining of fever congestion and nausea.  Patient has a history of metastatic breast cancer and has been on chemotherapy agents with the last taken 9 days ago.  She is switching over to new chemotherapy agents.  Patient has been battling oral lesions and developed a head cold yesterday with congestion and a mild cough.  She did not have a fever yesterday but about 2:30 this morning developed a temp of 102.2.  She took some Tylenol and her temperature came down to 101.  She has been a little tired and otherwise has not had any nausea vomiting abdominal pain.  She denies any chest pain.  She has not had a headache.  She has nasal congestion with a moderate cough.  Her throat is not sore.  She denies any urinary symptoms.  She had some loose stools yesterday but none today.  Denies any focal numbness weakness in any extremity.  Past Medical History   Diagnosis Date     Hypothyroid      Cataract 2010     surgery both eyes     Fibroids      Menorrhagia      Hypercholesterolemia      Obesity      Incontinence of urine      mixed     ASCUS favor benign 1/2015     Neg HPV     PONV (postoperative nausea and vomiting)      Uncomplicated asthma      Cancer (H)        No current facility-administered medications on file prior to encounter.  Current Outpatient Prescriptions on File Prior to Encounter:  everolimus (AFINITOR) 10 MG tablet CHEMO Take 1 tablet (10 mg) by mouth daily for 28 days Avoid grapefruit and grapefruit juice. May be given with or without food, but should be consistent.   exemestane (AROMASIN) 25 MG tablet Take 1 tablet (25 mg) by mouth daily for 28 days Take after a meal.   dexamethasone 0.1 MG/ML solution swish for 2  minutes and spit 4 times per day and not to eat for 1 hour after using the mouthwash.   magic mouthwash suspension (diphenhydrAMINE 5 mg/5 mL, lidocaine 50 mg/5 mL, Maalox 2.5 g/5 mL , simethicone 6.65 mg/5 mL) Swish and swallow 10 mLs in mouth every 6 hours as needed for mouth sores   oxybutynin (DITROPAN XL) 10 MG 24 hr tablet Take 1 tablet (10 mg) by mouth daily (Needs follow-up appointment for this medication)   ASPIRIN PO Take 81 mg by mouth Takes 4 tablets at a time for leg pain   zolpidem (AMBIEN) 10 MG tablet Take 1 tablet 30 minutes prior to bedtime   beclomethasone (QVAR) 80 MCG/ACT Inhaler Inhale 2 puffs into the lungs 2 times daily   azelastine (ASTELIN) 0.1 % nasal spray Spray 1-2 sprays into both nostrils 2 times daily as needed for rhinitis   levothyroxine (SYNTHROID, LEVOTHROID) 25 MCG tablet Take 1 tablet (25 mcg) by mouth daily   mometasone-formoterol (DULERA) 200-5 MCG/ACT oral inhaler Inhale 2 puffs into the lungs 2 times daily   albuterol (VENTOLIN HFA) 108 (90 BASE) MCG/ACT inhaler Inhale 2 puffs into the lungs every 4 hours as needed   Fulvestrant (FASLODEX IM) Inject 500 mLs into the muscle   lidocaine (XYLOCAINE) 2 % solution (viscous) Apply 5 mLs topically every 3 hours as needed for moderate pain   oxyCODONE (ROXICODONE) 5 MG immediate release tablet Take 1 tablet (5 mg) by mouth every 4 hours as needed for moderate to severe pain   albuterol (2.5 MG/3ML) 0.083% nebulizer solution Take 1 vial (2.5 mg) by nebulization every 4 hours as needed for shortness of breath / dyspnea   predniSONE (DELTASONE) 20 MG tablet Take 1 tablet (20 mg) by mouth 2 times daily For Yellow or Red zone on Asthma Action Plan.   Nebulizers (DANIELLE LC PLUS NEBULIZER) MISC    order for DME Equipment being ordered: accessory kit   calcium 600 MG tablet Take 2 tablets by mouth daily   cholecalciferol (VITAMIN D) 1000 UNIT tablet Take 1 tablet (1,000 Units) by mouth daily   loratadine (CLARITIN) 10 MG tablet Take 10 mg by  mouth daily   Acetaminophen (TYLENOL PO) Take 650 mg by mouth every 4 hours as needed for mild pain or fever   ORDER FOR DME F&P Zest medium nasal mask   ORDER FOR DME Auto-CPAP:Max 11 cm H2OMin 9 cm [id number] in [facility] need and heated humidity.      Social History     Social History     Marital Status:      Spouse Name: Lucian Cervantes     Number of Children: 2     Years of Education: 15+     Occupational History     Customer Service [employer]     Social History Main Topics     Smoking status: Never Smoker      Smokeless tobacco: Never Used     Alcohol Use: No     Drug Use: No     Sexual Activity:     Partners: Male     Birth Control/ Protection: Post-menopausal     Other Topics Concern     Parent/Sibling W/ Cabg, Mi Or Angioplasty Before 65f 55m? No     Social History Narrative       I have reviewed the Medications, Allergies, Past Medical and Surgical History, and Social History in the Epic system.    Review of Systems   Constitutional: Positive for fever and fatigue. Negative for chills, activity change and appetite change.   HENT: Positive for congestion. Negative for trouble swallowing.    Respiratory: Positive for cough. Negative for chest tightness and shortness of breath.    Gastrointestinal: Positive for diarrhea. Negative for nausea, vomiting, abdominal pain and blood in stool.   Genitourinary: Negative for dysuria and decreased urine volume.   Musculoskeletal: Negative for myalgias, back pain and neck pain.   Skin: Negative for rash.   Neurological: Negative for dizziness, speech difficulty, weakness, light-headedness and headaches.   Hematological: Does not bruise/bleed easily.   Psychiatric/Behavioral: Negative for confusion.       Physical Exam   BP: (!) 139/95 mmHg  Pulse: 107  Temp: 99.2  F (37.3  C)  Resp: 18  SpO2: 94 %  Physical Exam   Constitutional: She appears well-developed and well-nourished.   HENT:   Head: Normocephalic.   Mouth/Throat: Oropharynx is clear and  moist.   U superficial oral lesions noted patient has had these and they are improved.  Posterior pharynx without significant erythema or edema.   Eyes: Conjunctivae are normal. Pupils are equal, round, and reactive to light.   Neck: Normal range of motion. Neck supple.   Cardiovascular: Normal rate, regular rhythm, normal heart sounds and intact distal pulses.    No murmur heard.  Pulmonary/Chest: Effort normal and breath sounds normal. She has no wheezes. She has no rales.   Abdominal: Soft. Bowel sounds are normal. There is no tenderness. There is no rebound.   Musculoskeletal: Normal range of motion. She exhibits no tenderness.   Neurological: She is alert. She exhibits normal muscle tone.   Skin: Skin is warm and dry. No rash noted.   Psychiatric: She has a normal mood and affect.   Nursing note and vitals reviewed.      ED Course   Procedures             Critical Care time:  none               Labs Ordered and Resulted from Time of ED Arrival Up to the Time of Departure from the ED   CBC WITH PLATELETS DIFFERENTIAL - Abnormal; Notable for the following:     WBC 2.2 (*)     RBC Count 3.46 (*)     Hemoglobin 11.5 (*)      (*)     MCH 33.2 (*)     RDW 15.5 (*)     Platelet Count 130 (*)     Absolute Neutrophil 1.0 (*)     All other components within normal limits   COMPREHENSIVE METABOLIC PANEL - Abnormal; Notable for the following:     Urea Nitrogen 6 (*)     All other components within normal limits   URINE MACROSCOPIC WITH REFLEX TO MICRO - Abnormal; Notable for the following:     Blood Urine Trace (*)     Nitrite Urine Positive (*)     Leukocyte Esterase Urine Large (*)     RBC Urine 4 (*)     WBC Urine >182 (*)     WBC Clumps Present (*)     Bacteria Urine Moderate (*)     Mucous Urine Present (*)     All other components within normal limits   LIPASE   LACTIC ACID WHOLE BLOOD   BLOOD CULTURE   BLOOD CULTURE   INFLUENZA A/B ANTIGEN     Results for orders placed or performed during the hospital  encounter of 02/03/17   Chest XR,  PA & LAT    Narrative    XR CHEST 2 VW 2/3/2017 10:06 AM    HISTORY: Fever.    COMPARISON: July 18, 2016      Impression    IMPRESSION: No significant change from prior exam. Left-sided  portacatheter in position as before. Large right upper lung bulla and  mid lung scarring or atelectasis is unchanged. No new focal pulmonary  opacities. No pleural effusions.    LUIS HERRMANN MD     Medications   acetaminophen (TYLENOL) tablet 1,000 mg (1,000 mg Oral Given 2/3/17 0835)       Assessments & Plan (with Medical Decision Making) labs were obtained as well as blood cultures old records were reviewed.  White count was 2.2 which is low but improved from previous.  Platelet count was 1:30 absolute neutrophil was 1.0.  Comprehensive metabolic panel without significant abnormality.  Influenza was negative.  Lactic acid was 0.8 .  Urinalysis had nitrate positive with a large loose skull situs rate greater than 182 WBCs with WC clumps present and moderate bacteria.  Findings discussed with patient.  I'm going to cover the patient with 2 g of ceftriaxone.  I discussed the case with her Oncologist  Dr. Goetz who  was in agreement with this plan.  He recommended covering with Cipro orally and having her follow-up for repeat CBC on Monday either in oncology clinic or with her primary care.  Patient is feeling better at this time and feels comfortable going home at this time.  She will return if worsening fevers chills altered mental status weakness decreased urine output or other symptoms present.       I have reviewed the nursing notes.    I have reviewed the findings, diagnosis, plan and need for follow up with the patient.    Discharge Medication List as of 2/3/2017 11:39 AM      START taking these medications    Details   ciprofloxacin (CIPRO) 500 MG tablet Take 1 tablet (500 mg) by mouth 2 times daily, Disp-14 tablet, R-0, Local Print             Final diagnoses:   Neutropenic fever (H)    UTI (urinary tract infection), bacterial   Carcinoma of breast metastatic to liver, unspecified laterality (H)       2/3/2017   [facility] EMERGENCY DEPARTMENT      Milton Mckay MD  02/04/17 0667

## 2017-02-03 NOTE — ED NOTES
Pt d/c instructions reviewed and received. There are no unanswered questions at the time of discharge. Pt escorted to lobby for discharge.

## 2017-02-03 NOTE — TELEPHONE ENCOUNTER
Called Pt to inform her that her Afinitor prescription has been sent to [facility] and is covered at a $0 copayment and that they should be contacting her for delivery. Pt verbalized understanding stating that she Dr. Cassidy advised her to wait until her mouth sores cleared up and to check her labs prior to starting her Afinitor. Pt stated that she still has the mouth sores but hopes that they'll clear up within the next week or so and will call us at that time to schedule her lab check prior to initiating tx.     Pt also stated that her PORT seems to be a little sluggish lately and wondering if anything could be done for that. Informed Pt that we could make her an appt in our infusion center to check it at any time to evaluate it but pt stated that she'd prefer to wait until she needs to come in for her lab draw.     Pt also aware that she will need to schedule a 1 month f/u with Dr. Gonzalez and for labs once she has strated the Afinitor.

## 2017-02-03 NOTE — ED AVS SNAPSHOT
" [facility] Emergency Department    [address] [facility] [address]    Phone:  [phone]    Fax:  [phone]                                       Etta Cervantes   MRN: [id number]    Department:  [facility] Emergency Department   Date of Visit:  2/3/2017           Patient Information     Date Of Birth          1960        Your diagnoses for this visit were:     Neutropenic fever (H)     UTI (urinary tract infection), bacterial     Carcinoma of breast metastatic to liver, unspecified laterality (H)        You were seen by Milton Mckay MD.      Follow-up Information     Follow up with Johana Fairbanks MD.    Specialty:  Family Practice    Why:  On Monday for recheck of CBC    Contact information:    [facility]  [address] [id number] [phone]          Follow up with [facility] Emergency Department.    Specialty:  EMERGENCY MEDICINE    Why:  If symptoms worsen    Contact information:    [address] [place] [address] [phone]    Additional information:    The medical center is located at   [address] [facility] (between [address] and   [address] in [place], four miles north   of [place]).        Discharge Instructions          Return if symptoms worsen or new symptoms develop.  Follow-up with primary care physician next available.  Drink plenty of fluids.  If increased fevers, flank or abdominal pain, weakness and altered mental status or other symptoms present please return for recheck.  Have your CBC rechecked in oncology clinic on Monday.  Take antibiotics as directed.  * BLADDER INFECTION,Female (Adult)    A bladder infection (\"cystitis\" or \"UTI\") usually causes a constant urge to urinate and a burning when passing urine. Urine may be cloudy, smelly or dark. There may be pain in the lower abdomen. A bladder infection occurs when bacteria from the vaginal area enter the bladder opening (urethra). This can occur from sexual " intercourse, wearing tight clothing, dehydration and other factors.  HOME CARE:    Drink lots of fluids (at least 6-8 glasses a day, unless you must restrict fluids for other medical reasons). This will force the medicine into your urinary system and flush the bacteria out of your body. Cranberry juice has been shown to help clear out the bacteria.    Avoid sexual intercourse until your symptoms are gone.    A bladder infection is treated with antibiotics. You may also be given Pyridium (generic = phenazopyridine) to reduce the burning sensation. This medicine will cause your urine to become a bright orange color. The orange urine may stain clothing. You may wear a pad or panty-liner to protect clothing.  PREVENTING FUTURE INFECTIONS:    Always wipe from front to back after a bowel movement.    Keep the genital area clean and dry.    Drink plenty of fluids each day to avoid dehydration.    Urinate right after intercourse to flush out the bladder.    Wear cotton underwear and cotton-lined panty hose; avoid tight-fitting pants.    If you are on birth control pills and are having frequent bladder infections, discuss with your doctor.  FOLLOW UP: Return to this facility or see your doctor if ALL symptoms are not gone after three days of treatment.  GET PROMPT MEDICAL ATTENTION if any of the following occur:    Fever over 101 F (38.3 C)    No improvement by the third day of treatment    Increasing back or abdominal pain    Repeated vomiting; unable to keep medicine down    Weakness, dizziness or fainting    Vaginal discharge    Pain, redness or swelling in the labia (outer vaginal area)    8927-7170 [employer], [address] [place] [address], [place] [address]. All rights reserved. This information is not intended as a substitute for professional medical care. Always follow your healthcare professional's instructions.      Neutropenia  White blood cells (WBCs) help protect the body from infection. Neutrophils are a  type of white blood cell. Their main job is to help the body fight bacterial and fungal infections. Neutropenia occurs when there are fewer neutrophils in the blood than normal. It can range from mild to severe. This depends on the number of neutrophils in the blood. Severe neutropenia puts a person at higher risk for having more infections. Bacterial and fungal infections are most common. Your doctor can tell you more about your condition and whether it needs to be treated.    Types and causes of neutropenia  There are 2 main types of neutropenia: congenital and acquired. Each type has many causes:    Congenital neutropenia. These are the types that are present at birth. They are caused by certain rare genetic conditions, such as Kostmann s syndrome.    Acquired neutropenia. This type is not present at birth. Causes include:    Certain medicines, such as antibiotics and chemotherapy drugs    Certain autoimmune conditions    Certain viral, bacterial, or parasitic infections    Too little folate or vitamin B12 in the diet    Underlying bone marrow problem, such as leukemia or myelodysplastic syndrome (MDS)    Other causes  Diagnosing neutropenia  Your doctor may check for neutropenia if you have frequent infections. Your doctor may also check for neutropenia if you re having certain treatments, such as chemotherapy, which is known to cause a lower neutrophil count. Tests will be done to confirm the problem. These may include:    A complete blood cell count (CBC). This test measures the amounts of the different types of cells in your blood. This includes the WBCs. The WBC count can be broken down further to find the number of neutrophils and immature neutrophils (bands) in your blood. This is called an absolute neutrophil count (ANC).    A blood smear. This test checks for the different types of blood cells in your blood and how they appear. A sample of your blood is spread on a glass slide and viewed under a  microscope. A stain is used so the blood cells can be seen.    A bone marrow aspiration and biopsy. This test checks for problems with how your bone marrow makes blood cells. A needle is used to remove a sample of the bone marrow in your hip bone. The sample is then sent to a lab to be tested for problems.  Treating neutropenia    If there is a clear cause of neutropenia, it is addressed. For instance, if a medicine is the cause, it may be stopped or changed.    For mild cases, often no treatment is needed. Your doctor monitors your symptoms to see if they improve. Blood tests are also done to see if your neutrophil count returns to normal on its own.    For moderate to severe cases, treatment is likely needed. This may include:    G-CSF (granulocyte-colony stimulating factor). This is a special type of protein. It helps promote the growth and activity of neutrophils. G-CSF is given by injection.    Bone marrow transplant. This treatment replaces diseased bone marrow cells with healthy cells from a matched donor. This treatment is only done in specific severe cases.  Long-term outcome of neutropenia  The outcome of neutropenia varies for each person. For some people, neutropenia may resolve after a few weeks or months. For other people, it may be long-lasting. In these cases, ongoing care and treatment is needed. Your doctor will talk to you more about what to expect from your condition.  When should I call my doctor?  Call your doctor right away if you have any of the following:    Fever of 100.4 F (38 C) or higher (call 911 or 24-hour urgent care -- this is especially important if you have severe neutropenia, which puts you at higher risk for life-threatening infection)    Cold sweat or chills    Chest pain or trouble breathing    Sore throat    Extreme tiredness or fatigue    Nausea and vomiting    Redness, warmth, or drainage from any open cuts or wounds    Pain or burning with urination; frequent  urination    Pain, burning, or bleeding in the rectum    Severe constipation or diarrhea    Bloody stool or urine    Preventing infections: What you can do  With neutropenia, you need to take extra care to protect yourself from infection. Be sure to:    Wash your hands often, especially before eating and after using the bathroom. Use warm water and soap. Or use a hand gel that contains at least 60 percent alcohol.    Avoid close contact with others who may be ill.    Clean items you use often with disinfectant wipes. This includes phones and computer keyboards.    Avoid touching your eyes, nose, and mouth, especially if your hands are not clean.    Practice good oral hygiene. Use a soft toothbrush. Also, brush and floss your teeth gently.    Always wipe from front to back after a bowel movement.    Keep cuts and scrapes clean and covered until they heal.    Avoid sharing items such as towels, toothbrushes, razors, clothing, and sports equipment.    Store and handle foods safely to prevent food-borne illness.    Ask your doctor if you need to take antibiotics before and after having any dental or medical procedures.    Ask your doctor if you need to wear a special mask near construction sites or farm areas.    4428-3585 The . [address] [place] [address]. All rights reserved. This information is not intended as a substitute for professional medical care. Always follow your healthcare professional's instructions.          Future Appointments        Provider Department Dept Phone Center    2/13/2017 8:00 AM [facility] [phone] [facility]      24 Hour Appointment Hotline       To make an appointment at any [facility], call [phone] ([phone]). If you don't have a family doctor or clinic, we will help you find one. [facility] are conveniently located to serve the needs of you and your family.             Review of your medicines       START taking        Dose / Directions Last dose taken    ciprofloxacin 500 MG tablet   Commonly known as:  CIPRO   Dose:  500 mg   Quantity:  14 tablet        Take 1 tablet (500 mg) by mouth 2 times daily   Refills:  0          Our records show that you are taking the medicines listed below. If these are incorrect, please call your family doctor or clinic.        Dose / Directions Last dose taken    * albuterol (2.5 MG/3ML) 0.083% neb solution   Dose:  1 vial   Quantity:  30 vial        Take 1 vial (2.5 mg) by nebulization every 4 hours as needed for shortness of breath / dyspnea   Refills:  3        * albuterol 108 (90 BASE) MCG/ACT Inhaler   Commonly known as:  VENTOLIN HFA   Dose:  2 puff   Quantity:  1 Inhaler        Inhale 2 puffs into the lungs every 4 hours as needed   Refills:  2        ASPIRIN PO   Dose:  81 mg        Take 81 mg by mouth Takes 4 tablets at a time for leg pain   Refills:  0        azelastine 0.1 % spray   Commonly known as:  ASTELIN   Dose:  1-2 spray   Quantity:  3 Bottle        Spray 1-2 sprays into both nostrils 2 times daily as needed for rhinitis   Refills:  3        beclomethasone 80 MCG/ACT Inhaler   Commonly known as:  QVAR   Dose:  2 puff   Quantity:  3 Inhaler        Inhale 2 puffs into the lungs 2 times daily   Refills:  3        calcium 600 MG tablet   Dose:  2 tablet   Quantity:  60 tablet        Take 2 tablets by mouth daily   Refills:  0        cholecalciferol 1000 UNIT tablet   Commonly known as:  vitamin D   Dose:  1000 Units   Quantity:  100 tablet        Take 1 tablet (1,000 Units) by mouth daily   Refills:  3        dexamethasone 0.1 MG/ML solution   Quantity:  500 mL        swish for 2 minutes and spit 4 times per day and not to eat for 1 hour after using the mouthwash.   Refills:  1        everolimus 10 MG tablet CHEMO   Commonly known as:  AFINITOR   Dose:  10 mg   Quantity:  28 tablet        Take 1 tablet (10 mg) by mouth daily for 28 days Avoid grapefruit and  grapefruit juice. May be given with or without food, but should be consistent.   Refills:  0        exemestane 25 MG tablet   Commonly known as:  AROMASIN   Dose:  25 mg   Quantity:  28 tablet        Take 1 tablet (25 mg) by mouth daily for 28 days Take after a meal.   Refills:  0        FASLODEX IM   Dose:  500 mL        Inject 500 mLs into the muscle   Refills:  0        levothyroxine 25 MCG tablet   Commonly known as:  SYNTHROID/LEVOTHROID   Dose:  25 mcg   Quantity:  90 tablet        Take 1 tablet (25 mcg) by mouth daily   Refills:  3        lidocaine 2 % solution   Commonly known as:  XYLOCAINE   Dose:  5 mL   Quantity:  100 mL        Apply 5 mLs topically every 3 hours as needed for moderate pain   Refills:  2        lidocaine visc 2% 2.5mL/5mL & maalox/mylanta w/ simeth 2.5mL/5mL & diphenhydrAMINE 5mg/5mL Susp suspension   Commonly known as:  MAGIC Mouthwash   Dose:  10 mL   Quantity:  240 mL        Swish and swallow 10 mLs in mouth every 6 hours as needed for mouth sores   Refills:  10        loratadine 10 MG tablet   Commonly known as:  CLARITIN   Dose:  10 mg        Take 10 mg by mouth daily   Refills:  0        mometasone-formoterol 200-5 MCG/ACT oral inhaler   Commonly known as:  DULERA   Dose:  2 puff   Quantity:  3 Inhaler        Inhale 2 puffs into the lungs 2 times daily   Refills:  3        * order for DME        F&P Zest medium nasal mask   Refills:  0        * order for DME        Auto-CPAP: Max 11 cm H2O Min 9 cm H2O Changed in clinic Continuous  Lifetime need and heated humidity.   Refills:  0        order for DME   Quantity:  1 Device        Equipment being ordered: accessory kit   Refills:  0        oxybutynin 10 MG 24 hr tablet   Commonly known as:  DITROPAN XL   Dose:  10 mg   Quantity:  90 tablet        Take 1 tablet (10 mg) by mouth daily (Needs follow-up appointment for this medication)   Refills:  3        oxyCODONE 5 MG IR tablet   Commonly known as:  ROXICODONE   Dose:  5 mg    Quantity:  30 tablet        Take 1 tablet (5 mg) by mouth every 4 hours as needed for moderate to severe pain   Refills:  0        DANIELLE LC PLUS NEBULIZER Misc        Refills:  0        predniSONE 20 MG tablet   Commonly known as:  DELTASONE   Dose:  20 mg   Quantity:  10 tablet        Take 1 tablet (20 mg) by mouth 2 times daily For Yellow or Red zone on Asthma Action Plan.   Refills:  0        TYLENOL PO   Dose:  650 mg        Take 650 mg by mouth every 4 hours as needed for mild pain or fever   Refills:  0        zolpidem 10 MG tablet   Commonly known as:  AMBIEN   Indication:  Trouble Sleeping   Quantity:  30 tablet        Take 1 tablet 30 minutes prior to bedtime   Refills:  5        * Notice:  This list has 4 medication(s) that are the same as other medications prescribed for you. Read the directions carefully, and ask your doctor or other care provider to review them with you.            Prescriptions were sent or printed at these locations (1 Prescription)                   [facility] [address] [place] [address] [place] [id number]    Telephone:  [phone]   Fax:  [phone]   Hours:                  Printed at Department/Unit printer (1 of 1)         ciprofloxacin (CIPRO) 500 MG tablet                Procedures and tests performed during your visit     Procedure/Test Number of Times Performed    Blood culture 2    CBC with platelets, differential 1    Chest XR,  PA & LAT 1    Comprehensive metabolic panel 1    Influenza A/B antigen 1    Lactic acid whole blood 1    Lipase 1    UA reflex to Microscopic 1    Urine Culture Aerobic Bacterial 1      Orders Needing Specimen Collection     None      Pending Results     Date and Time Order Name Status Description    2/3/2017 1018 Urine Culture Aerobic Bacterial In process     2/3/2017 0833 Blood culture In process     2/3/2017 0833 Blood culture In process             Pending Culture Results     Date and Time Order  Name Status Description    2/3/2017 1018 Urine Culture Aerobic Bacterial In process     2/3/2017 0833 Blood culture In process     2/3/2017 0833 Blood culture In process        Test Results from your hospital stay           2/3/2017  9:37 AM - Interface, Flexilab Results      Component Results     Component Value Ref Range & Units Status    WBC 2.2 (L) 4.0 - 11.0 10e9/L Final    RBC Count 3.46 (L) 3.8 - 5.2 10e12/L Final    Hemoglobin 11.5 (L) 11.7 - 15.7 g/dL Final    Hematocrit 35.1 35.0 - 47.0 % Final     (H) 78 - 100 fl Final    MCH 33.2 (H) 26.5 - 33.0 pg Final    MCHC 32.8 31.5 - 36.5 g/dL Final    RDW 15.5 (H) 10.0 - 15.0 % Final    Platelet Count 130 (L) 150 - 450 10e9/L Final    Diff Method Automated Method  Final    % Neutrophils 43.2 % Final    % Lymphocytes 37.4 % Final    % Monocytes 17.1 % Final    % Eosinophils 0.9 % Final    % Basophils 0.9 % Final    % Immature Granulocytes 0.5 % Final    Absolute Neutrophil 1.0 (L) 1.6 - 8.3 10e9/L Final    Absolute Lymphocytes 0.8 0.8 - 5.3 10e9/L Final    Absolute Monocytes 0.4 0.0 - 1.3 10e9/L Final    Absolute Eosinophils 0.0 0.0 - 0.7 10e9/L Final    Absolute Basophils 0.0 0.0 - 0.2 10e9/L Final    Abs Immature Granulocytes 0.0 0 - 0.4 10e9/L Final         2/3/2017  9:49 AM - Interface, Flexilab Results      Component Results     Component Value Ref Range & Units Status    Sodium 138 133 - 144 mmol/L Final    Potassium 3.6 3.4 - 5.3 mmol/L Final    Chloride 104 94 - 109 mmol/L Final    Carbon Dioxide 26 20 - 32 mmol/L Final    Anion Gap 8 3 - 14 mmol/L Final    Glucose 95 70 - 99 mg/dL Final    Urea Nitrogen 6 (L) 7 - 30 mg/dL Final    Creatinine 0.82 0.52 - 1.04 mg/dL Final    GFR Estimate 72 >60 mL/min/1.7m2 Final    Non  GFR Calc    GFR Estimate If Black 87 >60 mL/min/1.7m2 Final    African American GFR Calc    Calcium 8.7 8.5 - 10.1 mg/dL Final    Bilirubin Total 0.6 0.2 - 1.3 mg/dL Final    Albumin 3.4 3.4 - 5.0 g/dL Final     Protein Total 6.8 6.8 - 8.8 g/dL Final    Alkaline Phosphatase 72 40 - 150 U/L Final    ALT 13 0 - 50 U/L Final    AST 18 0 - 45 U/L Final         2/3/2017  9:47 AM - Interface, Flexilab Results      Component Results     Component Value Ref Range & Units Status    Lipase 95 73 - 393 U/L Final         2/3/2017  9:31 AM - Interface, Flexilab Results      Component Results     Component Value Ref Range & Units Status    Lactic Acid 0.8 0.7 - 2.1 mmol/L Final         2/3/2017 10:16 AM - Interface, Flexilab Results      Component Results     Component Value Ref Range & Units Status    Color Urine Yellow  Final    Appearance Urine Slightly Cloudy  Final    Glucose Urine Negative NEG mg/dL Final    Bilirubin Urine Negative NEG Final    Ketones Urine Negative NEG mg/dL Final    Specific Gravity Urine 1.009 1.003 - 1.035 Final    Blood Urine Trace (A) NEG Final    pH Urine 5.5 5.0 - 7.0 pH Final    Protein Albumin Urine Negative NEG mg/dL Final    Urobilinogen mg/dL Normal 0.0 - 2.0 mg/dL Final    Nitrite Urine Positive (A) NEG Final    Leukocyte Esterase Urine Large (A) NEG Final    Source Midstream Urine  Final    RBC Urine 4 (H) 0 - 2 /HPF Final    WBC Urine >182 (H) 0 - 2 /HPF Final    WBC Clumps Present (A) NEG /HPF Final    Bacteria Urine Moderate (A) NEG /HPF Final    Squamous Epithelial /HPF Urine 1 0 - 1 /HPF Final    Mucous Urine Present (A) NEG /LPF Final         2/3/2017 10:16 AM - Interface, Radiant Ib      Narrative     XR CHEST 2 VW 2/3/2017 10:06 AM    HISTORY: Fever.    COMPARISON: July 18, 2016        Impression     IMPRESSION: No significant change from prior exam. Left-sided  portacatheter in position as before. Large right upper lung bulla and  mid lung scarring or atelectasis is unchanged. No new focal pulmonary  opacities. No pleural effusions.    LUIS HERRMANN MD         2/3/2017  9:29 AM - Interface, Flexilab Results         2/3/2017  9:53 AM - Interface, Flexilab Results         2/3/2017 10:09 AM -  Interface, Flexilab Results      Component Results     Component Value Ref Range & Units Status    Influenza A/B Agn Specimen Nasopharyngeal  Final    Influenza A  NEG Final    Negative   Test results must be correlated with clinical data. If necessary, results   should be confirmed by a molecular assay or viral culture.      Influenza B  NEG Final    Negative   Test results must be correlated with clinical data. If necessary, results   should be confirmed by a molecular assay or viral culture.           2/3/2017 10:23 AM - Interface, Flexilab Results                Thank you for choosing [facility]       Thank you for choosing [facility] for your care. Our goal is always to provide you with excellent care. Hearing back from our patients is one way we can continue to improve our services. Please take a few minutes to complete the written survey that you may receive in the mail after you visit with us. Thank you!        [employer] Information     [employer] gives you secure access to your electronic health record. If you see a primary care provider, you can also send messages to your care team and make appointments. If you have questions, please call your primary care clinic.  If you do not have a primary care provider, please call [phone] and they will assist you.        Care EveryWhere ID     This is your Care EveryWhere ID. This could be used by other organizations to access your [facility] medical records  [id number]        After Visit Summary       This is your record. Keep this with you and show to your community pharmacist(s) and doctor(s) at your next visit.

## 2017-02-03 NOTE — ED AVS SNAPSHOT
[facility] Emergency Department    [address] [facility] [facility] [address]    Phone:  [phone]    Fax:  [phone]                                       Etta Cervantes   MRN: [id number]    Department:  [facility] Emergency Department   Date of Visit:  2/3/2017           After Visit Summary Signature Page     I have received my discharge instructions, and my questions have been answered. I have discussed any challenges I see with this plan with the nurse or doctor.    ..........................................................................................................................................  Patient/Patient Representative Signature      ..........................................................................................................................................  Patient Representative Print Name and Relationship to Patient    ..................................................               ................................................  Date                                            Time    ..........................................................................................................................................  Reviewed by Signature/Title    ...................................................              ..............................................  Date                                                            Time

## 2017-02-03 NOTE — ED NOTES
Pt having sinus congestion and feeling like cold is more in head.  Had fever of 102 at 0230 this morning, took Tylenol at that time.  Fever 101 this morning.  Pt took last oral chemo, Ibrance, 9 days ago.  Congested cough.

## 2017-02-03 NOTE — DISCHARGE INSTRUCTIONS
"   Return if symptoms worsen or new symptoms develop.  Follow-up with primary care physician next available.  Drink plenty of fluids.  If increased fevers, flank or abdominal pain, weakness and altered mental status or other symptoms present please return for recheck.  Have your CBC rechecked in oncology clinic on Monday.  Take antibiotics as directed.  * BLADDER INFECTION,Female (Adult)    A bladder infection (\"cystitis\" or \"UTI\") usually causes a constant urge to urinate and a burning when passing urine. Urine may be cloudy, smelly or dark. There may be pain in the lower abdomen. A bladder infection occurs when bacteria from the vaginal area enter the bladder opening (urethra). This can occur from sexual intercourse, wearing tight clothing, dehydration and other factors.  HOME CARE:    Drink lots of fluids (at least 6-8 glasses a day, unless you must restrict fluids for other medical reasons). This will force the medicine into your urinary system and flush the bacteria out of your body. Cranberry juice has been shown to help clear out the bacteria.    Avoid sexual intercourse until your symptoms are gone.    A bladder infection is treated with antibiotics. You may also be given Pyridium (generic = phenazopyridine) to reduce the burning sensation. This medicine will cause your urine to become a bright orange color. The orange urine may stain clothing. You may wear a pad or panty-liner to protect clothing.  PREVENTING FUTURE INFECTIONS:    Always wipe from front to back after a bowel movement.    Keep the genital area clean and dry.    Drink plenty of fluids each day to avoid dehydration.    Urinate right after intercourse to flush out the bladder.    Wear cotton underwear and cotton-lined panty hose; avoid tight-fitting pants.    If you are on birth control pills and are having frequent bladder infections, discuss with your doctor.  FOLLOW UP: Return to this facility or see your doctor if ALL symptoms are not gone " after three days of treatment.  GET PROMPT MEDICAL ATTENTION if any of the following occur:    Fever over 101 F (38.3 C)    No improvement by the third day of treatment    Increasing back or abdominal pain    Repeated vomiting; unable to keep medicine down    Weakness, dizziness or fainting    Vaginal discharge    Pain, redness or swelling in the labia (outer vaginal area)    3259-6504 [employer], [address] [place] [address], [place] [address]. All rights reserved. This information is not intended as a substitute for professional medical care. Always follow your healthcare professional's instructions.      Neutropenia  White blood cells (WBCs) help protect the body from infection. Neutrophils are a type of white blood cell. Their main job is to help the body fight bacterial and fungal infections. Neutropenia occurs when there are fewer neutrophils in the blood than normal. It can range from mild to severe. This depends on the number of neutrophils in the blood. Severe neutropenia puts a person at higher risk for having more infections. Bacterial and fungal infections are most common. Your doctor can tell you more about your condition and whether it needs to be treated.    Types and causes of neutropenia  There are 2 main types of neutropenia: congenital and acquired. Each type has many causes:    Congenital neutropenia. These are the types that are present at birth. They are caused by certain rare genetic conditions, such as Kostmann s syndrome.    Acquired neutropenia. This type is not present at birth. Causes include:    Certain medicines, such as antibiotics and chemotherapy drugs    Certain autoimmune conditions    Certain viral, bacterial, or parasitic infections    Too little folate or vitamin B12 in the diet    Underlying bone marrow problem, such as leukemia or myelodysplastic syndrome (MDS)    Other causes  Diagnosing neutropenia  Your doctor may check for neutropenia if you have frequent infections.  Your doctor may also check for neutropenia if you re having certain treatments, such as chemotherapy, which is known to cause a lower neutrophil count. Tests will be done to confirm the problem. These may include:    A complete blood cell count (CBC). This test measures the amounts of the different types of cells in your blood. This includes the WBCs. The WBC count can be broken down further to find the number of neutrophils and immature neutrophils (bands) in your blood. This is called an absolute neutrophil count (ANC).    A blood smear. This test checks for the different types of blood cells in your blood and how they appear. A sample of your blood is spread on a glass slide and viewed under a microscope. A stain is used so the blood cells can be seen.    A bone marrow aspiration and biopsy. This test checks for problems with how your bone marrow makes blood cells. A needle is used to remove a sample of the bone marrow in your hip bone. The sample is then sent to a lab to be tested for problems.  Treating neutropenia    If there is a clear cause of neutropenia, it is addressed. For instance, if a medicine is the cause, it may be stopped or changed.    For mild cases, often no treatment is needed. Your doctor monitors your symptoms to see if they improve. Blood tests are also done to see if your neutrophil count returns to normal on its own.    For moderate to severe cases, treatment is likely needed. This may include:    G-CSF (granulocyte-colony stimulating factor). This is a special type of protein. It helps promote the growth and activity of neutrophils. G-CSF is given by injection.    Bone marrow transplant. This treatment replaces diseased bone marrow cells with healthy cells from a matched donor. This treatment is only done in specific severe cases.  Long-term outcome of neutropenia  The outcome of neutropenia varies for each person. For some people, neutropenia may resolve after a few weeks or months. For  other people, it may be long-lasting. In these cases, ongoing care and treatment is needed. Your doctor will talk to you more about what to expect from your condition.  When should I call my doctor?  Call your doctor right away if you have any of the following:    Fever of 100.4 F (38 C) or higher (call 911 or 24-hour urgent care -- this is especially important if you have severe neutropenia, which puts you at higher risk for life-threatening infection)    Cold sweat or chills    Chest pain or trouble breathing    Sore throat    Extreme tiredness or fatigue    Nausea and vomiting    Redness, warmth, or drainage from any open cuts or wounds    Pain or burning with urination; frequent urination    Pain, burning, or bleeding in the rectum    Severe constipation or diarrhea    Bloody stool or urine    Preventing infections: What you can do  With neutropenia, you need to take extra care to protect yourself from infection. Be sure to:    Wash your hands often, especially before eating and after using the bathroom. Use warm water and soap. Or use a hand gel that contains at least 60 percent alcohol.    Avoid close contact with others who may be ill.    Clean items you use often with disinfectant wipes. This includes phones and computer keyboards.    Avoid touching your eyes, nose, and mouth, especially if your hands are not clean.    Practice good oral hygiene. Use a soft toothbrush. Also, brush and floss your teeth gently.    Always wipe from front to back after a bowel movement.    Keep cuts and scrapes clean and covered until they heal.    Avoid sharing items such as towels, toothbrushes, razors, clothing, and sports equipment.    Store and handle foods safely to prevent food-borne illness.    Ask your doctor if you need to take antibiotics before and after having any dental or medical procedures.    Ask your doctor if you need to wear a special mask near construction sites or farm areas.    4900-1667 [facility]  [employer], [employer]. [address] [place] [address], [place] [address]. All rights reserved. This information is not intended as a substitute for professional medical care. Always follow your healthcare professional's instructions.

## 2017-02-05 LAB
BACTERIA SPEC CULT: ABNORMAL
MICRO REPORT STATUS: ABNORMAL
MICROORGANISM SPEC CULT: ABNORMAL
SPECIMEN SOURCE: ABNORMAL

## 2017-02-09 LAB
BACTERIA SPEC CULT: NORMAL
BACTERIA SPEC CULT: NORMAL
Lab: NORMAL
MICRO REPORT STATUS: NORMAL
MICRO REPORT STATUS: NORMAL
SPECIMEN SOURCE: NORMAL
SPECIMEN SOURCE: NORMAL

## 2017-02-13 ENCOUNTER — DOCUMENTATION ONLY (OUTPATIENT)
Dept: SLEEP MEDICINE | Facility: CLINIC | Age: 57
End: 2017-02-13

## 2017-02-13 ENCOUNTER — TELEPHONE (OUTPATIENT)
Dept: ONCOLOGY | Facility: CLINIC | Age: 57
End: 2017-02-13

## 2017-02-13 ENCOUNTER — TELEPHONE (OUTPATIENT)
Dept: SLEEP MEDICINE | Facility: CLINIC | Age: 57
End: 2017-02-13

## 2017-02-13 DIAGNOSIS — C50.912 BILATERAL MALIGNANT NEOPLASM OF BREAST IN FEMALE, UNSPECIFIED SITE OF BREAST: Primary | Chronic | ICD-10-CM

## 2017-02-13 DIAGNOSIS — G47.33 OBSTRUCTIVE SLEEP APNEA (ADULT) (PEDIATRIC): Primary | ICD-10-CM

## 2017-02-13 DIAGNOSIS — C50.911 BILATERAL MALIGNANT NEOPLASM OF BREAST IN FEMALE, UNSPECIFIED SITE OF BREAST: Primary | Chronic | ICD-10-CM

## 2017-02-13 NOTE — PROGRESS NOTES
Patient was offered choice of vendor and chose [facility].  Patient Etta Cervantes was set up at [facility]  on February 13, 2017. Patient received a Resmed AirSense 10 Auto. Pressures were set at 9-11 cm H2O.   Patient s ramp is 5 cm H2O for Auto and FLEX/EPR is 2.   , heated tubing and heated humidifier.  Patient is not enrolled in the STM Program and does not need to meet compliance.   Adeline Gustafson

## 2017-02-13 NOTE — TELEPHONE ENCOUNTER
"Received VM from patient to return call.  Called patient back.  Patient reports that he mouth sores are much better.  \"There are some white spots but getting much better\".  Patient reports that this is much better than it has been in the past.  She also reports that she has had a cold and was seen in the ER on 2/3/17 for a UTI and was started on antibiotics.  Patient denies needing follow up at this time and will wait until after she start Afinitor.  Chart review and patient is to wait until her mouth sores heal up, do labs, and then start Afinitor.  Patient feels as though she would like to go ahead with labs in order to get her port assessed due to it being sluggish in the past.  Scheduling will call patient to schedule.    Andrés Ayala RN, BSN, OCN  2/13/2017, 3:54 PM      "

## 2017-02-14 ENCOUNTER — INFUSION THERAPY VISIT (OUTPATIENT)
Dept: INFUSION THERAPY | Facility: CLINIC | Age: 57
End: 2017-02-14
Attending: INTERNAL MEDICINE
Payer: COMMERCIAL

## 2017-02-14 ENCOUNTER — HOSPITAL ENCOUNTER (OUTPATIENT)
Facility: CLINIC | Age: 57
Discharge: HOME | End: 2017-02-14
Attending: INTERNAL MEDICINE | Admitting: INTERNAL MEDICINE
Payer: COMMERCIAL

## 2017-02-14 DIAGNOSIS — C50.911 BILATERAL MALIGNANT NEOPLASM OF BREAST IN FEMALE, UNSPECIFIED SITE OF BREAST: Chronic | ICD-10-CM

## 2017-02-14 DIAGNOSIS — C78.7 SECONDARY MALIGNANT NEOPLASM OF LIVER (H): ICD-10-CM

## 2017-02-14 DIAGNOSIS — C77.3 BREAST CANCER METASTASIZED TO AXILLARY LYMPH NODE, RIGHT (H): ICD-10-CM

## 2017-02-14 DIAGNOSIS — C79.51 BONE METASTASIS: ICD-10-CM

## 2017-02-14 DIAGNOSIS — C50.912 BILATERAL MALIGNANT NEOPLASM OF BREAST IN FEMALE, UNSPECIFIED SITE OF BREAST: Chronic | ICD-10-CM

## 2017-02-14 DIAGNOSIS — C50.919 CARCINOMA OF BREAST METASTATIC TO LIVER, UNSPECIFIED LATERALITY (H): ICD-10-CM

## 2017-02-14 DIAGNOSIS — D25.9 UTERINE LEIOMYOMA, UNSPECIFIED LOCATION: ICD-10-CM

## 2017-02-14 DIAGNOSIS — C50.911 BREAST CANCER METASTASIZED TO AXILLARY LYMPH NODE, RIGHT (H): ICD-10-CM

## 2017-02-14 DIAGNOSIS — C78.7 CARCINOMA OF BREAST METASTATIC TO LIVER, UNSPECIFIED LATERALITY (H): ICD-10-CM

## 2017-02-14 LAB
ALBUMIN SERPL-MCNC: 3.5 G/DL (ref 3.4–5)
ALP SERPL-CCNC: 74 U/L (ref 40–150)
ALT SERPL W P-5'-P-CCNC: 31 U/L (ref 0–50)
ANION GAP SERPL CALCULATED.3IONS-SCNC: 9 MMOL/L (ref 3–14)
AST SERPL W P-5'-P-CCNC: 26 U/L (ref 0–45)
BASOPHILS # BLD AUTO: 0.1 10E9/L (ref 0–0.2)
BASOPHILS NFR BLD AUTO: 1 %
BILIRUB SERPL-MCNC: 0.7 MG/DL (ref 0.2–1.3)
BUN SERPL-MCNC: 10 MG/DL (ref 7–30)
CALCIUM SERPL-MCNC: 8.9 MG/DL (ref 8.5–10.1)
CHLORIDE SERPL-SCNC: 108 MMOL/L (ref 94–109)
CHOLEST SERPL-MCNC: 234 MG/DL
CO2 SERPL-SCNC: 25 MMOL/L (ref 20–32)
CREAT SERPL-MCNC: 0.68 MG/DL (ref 0.52–1.04)
DIFFERENTIAL METHOD BLD: ABNORMAL
EOSINOPHIL # BLD AUTO: 0.1 10E9/L (ref 0–0.7)
EOSINOPHIL NFR BLD AUTO: 2 %
ERYTHROCYTE [DISTWIDTH] IN BLOOD BY AUTOMATED COUNT: 14.9 % (ref 10–15)
GFR SERPL CREATININE-BSD FRML MDRD: 89 ML/MIN/1.7M2
GLUCOSE SERPL-MCNC: 92 MG/DL (ref 70–99)
HCT VFR BLD AUTO: 36 % (ref 35–47)
HDLC SERPL-MCNC: 68 MG/DL
HGB BLD-MCNC: 12 G/DL (ref 11.7–15.7)
IMM GRANULOCYTES # BLD: 0 10E9/L (ref 0–0.4)
IMM GRANULOCYTES NFR BLD: 0.4 %
LDLC SERPL CALC-MCNC: 134 MG/DL
LYMPHOCYTES # BLD AUTO: 1.4 10E9/L (ref 0.8–5.3)
LYMPHOCYTES NFR BLD AUTO: 27.8 %
MCH RBC QN AUTO: 33.2 PG (ref 26.5–33)
MCHC RBC AUTO-ENTMCNC: 33.3 G/DL (ref 31.5–36.5)
MCV RBC AUTO: 100 FL (ref 78–100)
MONOCYTES # BLD AUTO: 0.8 10E9/L (ref 0–1.3)
MONOCYTES NFR BLD AUTO: 16.9 %
NEUTROPHILS # BLD AUTO: 2.6 10E9/L (ref 1.6–8.3)
NEUTROPHILS NFR BLD AUTO: 51.9 %
NONHDLC SERPL-MCNC: 166 MG/DL
PLATELET # BLD AUTO: 258 10E9/L (ref 150–450)
POTASSIUM SERPL-SCNC: 3.7 MMOL/L (ref 3.4–5.3)
PROT SERPL-MCNC: 6.9 G/DL (ref 6.8–8.8)
RBC # BLD AUTO: 3.61 10E12/L (ref 3.8–5.2)
SODIUM SERPL-SCNC: 142 MMOL/L (ref 133–144)
TRIGL SERPL-MCNC: 160 MG/DL
WBC # BLD AUTO: 5 10E9/L (ref 4–11)

## 2017-02-14 PROCEDURE — 80061 LIPID PANEL: CPT | Performed by: INTERNAL MEDICINE

## 2017-02-14 PROCEDURE — 85025 COMPLETE CBC W/AUTO DIFF WBC: CPT | Performed by: INTERNAL MEDICINE

## 2017-02-14 PROCEDURE — 80053 COMPREHEN METABOLIC PANEL: CPT | Performed by: INTERNAL MEDICINE

## 2017-02-14 PROCEDURE — 25000128 H RX IP 250 OP 636

## 2017-02-14 PROCEDURE — 36591 DRAW BLOOD OFF VENOUS DEVICE: CPT

## 2017-02-14 RX ORDER — HEPARIN SODIUM (PORCINE) LOCK FLUSH IV SOLN 100 UNIT/ML 100 UNIT/ML
SOLUTION INTRAVENOUS
Status: COMPLETED
Start: 2017-02-14 | End: 2017-02-14

## 2017-02-14 RX ADMIN — SODIUM CHLORIDE, PRESERVATIVE FREE 500 UNITS: 5 INJECTION INTRAVENOUS at 15:44

## 2017-02-14 NOTE — PROGRESS NOTES
Infusion Nursing Note:  Etta Cervantes presents today for PAC labs.    Patient seen by provider today: No   present during visit today: Not Applicable.    Note: N/A.    Intravenous Access:  Implanted Port.    Treatment Conditions:  Not Applicable.      Post Infusion Assessment:  Patient tolerated procedure without incident.    Discharge Plan:   Patient discharged in stable condition accompanied by: self.    Hunter Urbina RN

## 2017-02-14 NOTE — MR AVS SNAPSHOT
After Visit Summary   2/14/2017    Etta Cervantes    MRN: [id number]           Patient Information     Date Of Birth          1960        Visit Information        Provider Department      2/14/2017 3:30 PM ROOM 1 [facility]        Today's Diagnoses     Secondary malignant neoplasm of liver (H)        Uterine leiomyoma, unspecified location        Carcinoma of breast metastatic to liver, unspecified laterality (H)        Bilateral malignant neoplasm of breast in female, unspecified site of breast (H)        Bone metastasis (H)        Breast cancer metastasized to axillary lymph node, right (H)           Follow-ups after your visit        Who to contact     If you have questions or need follow up information about today's clinic visit or your schedule please contact [facility] directly at [phone].  Normal or non-critical lab and imaging results will be communicated to you by Nimbithart, letter or phone within 4 business days after the clinic has received the results. If you do not hear from us within 7 days, please contact the clinic through [employer] or phone. If you have a critical or abnormal lab result, we will notify you by phone as soon as possible.  Submit refill requests through [employer] or call your pharmacy and they will forward the refill request to us. Please allow 3 business days for your refill to be completed.          Additional Information About Your Visit        My[employer]DataRank Information     [employer] gives you secure access to your electronic health record. If you see a primary care provider, you can also send messages to your care team and make appointments. If you have questions, please call your primary care clinic.  If you do not have a primary care provider, please call [phone] and they will assist you.        Care EveryWhere ID     This is your Care EveryWhere ID. This could be used by other organizations to access your [facility]  records  [id number]         Blood Pressure from Last 3 Encounters:   02/03/17 112/71   01/27/17 144/78   01/19/17 131/75    Weight from Last 3 Encounters:   01/27/17 93 kg (205 lb)   01/19/17 95 kg (209 lb 8 oz)   11/23/16 95.7 kg (210 lb 14.4 oz)              We Performed the Following     CBC with platelets differential     Comprehensive metabolic panel     Lipid Profile        Primary Care Provider Office Phone # Fax #    Johana Fairbanks [phone]       [facility] [address] [facility] [address]        Thank you!     Thank you for choosing [facility]  for your care. Our goal is always to provide you with excellent care. Hearing back from our patients is one way we can continue to improve our services. Please take a few minutes to complete the written survey that you may receive in the mail after your visit with us. Thank you!             Your Updated Medication List - Protect others around you: Learn how to safely use, store and throw away your medicines at www.disposemymeds.org.          This list is accurate as of: 2/14/17  4:05 PM.  Always use your most recent med list.                   Brand Name Dispense Instructions for use    * albuterol (2.5 MG/3ML) 0.083% neb solution     30 vial    Take 1 vial (2.5 mg) by nebulization every 4 hours as needed for shortness of breath / dyspnea       * albuterol 108 (90 BASE) MCG/ACT Inhaler    VENTOLIN HFA    1 Inhaler    Inhale 2 puffs into the lungs every 4 hours as needed       ASPIRIN PO      Take 81 mg by mouth Takes 4 tablets at a time for leg pain       azelastine 0.1 % spray    ASTELIN    3 Bottle    Spray 1-2 sprays into both nostrils 2 times daily as needed for rhinitis       beclomethasone 80 MCG/ACT Inhaler    QVAR    3 Inhaler    Inhale 2 puffs into the lungs 2 times daily       calcium 600 MG tablet     60 tablet    Take 2 tablets by mouth daily       cholecalciferol 1000 UNIT tablet    vitamin D    100  tablet    Take 1 tablet (1,000 Units) by mouth daily       ciprofloxacin 500 MG tablet    CIPRO    14 tablet    Take 1 tablet (500 mg) by mouth 2 times daily       dexamethasone 0.1 MG/ML solution     500 mL    swish for 2 minutes and spit 4 times per day and not to eat for 1 hour after using the mouthwash.       everolimus 10 MG tablet CHEMO    AFINITOR    28 tablet    Take 1 tablet (10 mg) by mouth daily for 28 days Avoid grapefruit and grapefruit juice. May be given with or without food, but should be consistent.       exemestane 25 MG tablet    AROMASIN    28 tablet    Take 1 tablet (25 mg) by mouth daily for 28 days Take after a meal.       FASLODEX IM      Inject 500 mLs into the muscle       levothyroxine 25 MCG tablet    SYNTHROID/LEVOTHROID    90 tablet    Take 1 tablet (25 mcg) by mouth daily       lidocaine 2 % solution    XYLOCAINE    100 mL    Apply 5 mLs topically every 3 hours as needed for moderate pain       lidocaine visc 2% 2.5mL/5mL & maalox/mylanta w/ simeth 2.5mL/5mL & diphenhydrAMINE 5mg/5mL Susp suspension    MAGIC Mouthwash    240 mL    Swish and swallow 10 mLs in mouth every 6 hours as needed for mouth sores       loratadine 10 MG tablet    CLARITIN     Take 10 mg by mouth daily       mometasone-formoterol 200-5 MCG/ACT oral inhaler    DULERA    3 Inhaler    Inhale 2 puffs into the lungs 2 times daily       * order for DME      F&P Zest medium nasal mask       order for DME      Equipment being ordered: CPAP AIRSENSE 10 9-11 CM H20 # [id number]  DN# [id number]       * order for DME      Auto-CPAP: Max 11 cm H2O Min 9 cm H2O Changed in clinic Continuous  Lifetime need and heated humidity.       order for DME     1 Device    Equipment being ordered: accessory kit       oxybutynin 10 MG 24 hr tablet    DITROPAN XL    90 tablet    Take 1 tablet (10 mg) by mouth daily (Needs follow-up appointment for this medication)       oxyCODONE 5 MG IR tablet    ROXICODONE    30 tablet    Take 1 tablet (5 mg)  by mouth every 4 hours as needed for moderate to severe pain       DANIELLE LC PLUS NEBULIZER Misc          predniSONE 20 MG tablet    DELTASONE    10 tablet    Take 1 tablet (20 mg) by mouth 2 times daily For Yellow or Red zone on Asthma Action Plan.       TYLENOL PO      Take 650 mg by mouth every 4 hours as needed for mild pain or fever       zolpidem 10 MG tablet    AMBIEN    30 tablet    Take 1 tablet 30 minutes prior to bedtime       * Notice:  This list has 4 medication(s) that are the same as other medications prescribed for you. Read the directions carefully, and ask your doctor or other care provider to review them with you.

## 2017-02-16 NOTE — TELEPHONE ENCOUNTER
"Touched base with patient in regards to mouth sores.  Patient reports they are still present, \"but healing every day\".  Blood counts are improving.  Patient will have a repeat CBC next week and call us with an update on the mouth sores.  Sores need to be completely healed prior to starting Afinitor.  "

## 2017-02-22 DIAGNOSIS — D25.9 UTERINE LEIOMYOMA, UNSPECIFIED LOCATION: ICD-10-CM

## 2017-02-22 DIAGNOSIS — C78.7 CARCINOMA OF BREAST METASTATIC TO LIVER, UNSPECIFIED LATERALITY (H): ICD-10-CM

## 2017-02-22 DIAGNOSIS — C50.911 BILATERAL MALIGNANT NEOPLASM OF BREAST IN FEMALE, UNSPECIFIED SITE OF BREAST: Chronic | ICD-10-CM

## 2017-02-22 DIAGNOSIS — C50.919 CARCINOMA OF BREAST METASTATIC TO LIVER, UNSPECIFIED LATERALITY (H): ICD-10-CM

## 2017-02-22 DIAGNOSIS — C50.911 BREAST CANCER METASTASIZED TO AXILLARY LYMPH NODE, RIGHT (H): ICD-10-CM

## 2017-02-22 DIAGNOSIS — C78.7 SECONDARY MALIGNANT NEOPLASM OF LIVER (H): ICD-10-CM

## 2017-02-22 DIAGNOSIS — C79.51 BONE METASTASIS: ICD-10-CM

## 2017-02-22 DIAGNOSIS — J45.40 MODERATE PERSISTENT ASTHMA WITHOUT COMPLICATION: ICD-10-CM

## 2017-02-22 DIAGNOSIS — C50.912 BILATERAL MALIGNANT NEOPLASM OF BREAST IN FEMALE, UNSPECIFIED SITE OF BREAST: Chronic | ICD-10-CM

## 2017-02-22 DIAGNOSIS — C77.3 BREAST CANCER METASTASIZED TO AXILLARY LYMPH NODE, RIGHT (H): ICD-10-CM

## 2017-02-22 RX ORDER — EVEROLIMUS 10 MG/1
10 TABLET ORAL DAILY
Qty: 28 TABLET | Refills: 0 | Status: SHIPPED | OUTPATIENT
Start: 2017-02-22 | End: 2017-03-22

## 2017-02-22 NOTE — TELEPHONE ENCOUNTER
Medication is being filled for 1 time refill only due to:  Patient needs to be seen because due for 6 month follow up.. Manoj Brewer RN

## 2017-02-22 NOTE — TELEPHONE ENCOUNTER
mometasone-formoterol (DULERA) 200-5 MCG/ACT oral inhaler       Last Written Prescription Date: 8/24/16  Last Fill Quantity: 3, # refills: 3    Last Office Visit with [facility], P or [facility] prescribing provider:  8/24/16   Future Office Visit:       Date of Last Asthma Action Plan Letter:   Asthma Action Plan Q1 Year    Topic Date Due     Asthma Action Plan - yearly  08/24/2017      Asthma Control Test:   ACT Total Scores 8/24/2016   ACT TOTAL SCORE -   ASTHMA ER VISITS -   ASTHMA HOSPITALIZATIONS -   ACT TOTAL SCORE (Goal Greater than or Equal to 20) 18   In the past 12 months, how many times did you visit the emergency room for your asthma without being admitted to the hospital? 0   In the past 12 months, how many times were you hospitalized overnight because of your asthma? 0       Date of Last Spirometry Test:   No results found for this or any previous visit.

## 2017-02-23 DIAGNOSIS — C50.911 BILATERAL MALIGNANT NEOPLASM OF BREAST IN FEMALE, UNSPECIFIED SITE OF BREAST: Chronic | ICD-10-CM

## 2017-02-23 DIAGNOSIS — C50.912 BILATERAL MALIGNANT NEOPLASM OF BREAST IN FEMALE, UNSPECIFIED SITE OF BREAST: Chronic | ICD-10-CM

## 2017-02-23 LAB
BASOPHILS # BLD AUTO: 0 10E9/L (ref 0–0.2)
BASOPHILS NFR BLD AUTO: 0.2 %
DIFFERENTIAL METHOD BLD: ABNORMAL
EOSINOPHIL # BLD AUTO: 0.1 10E9/L (ref 0–0.7)
EOSINOPHIL NFR BLD AUTO: 1.5 %
ERYTHROCYTE [DISTWIDTH] IN BLOOD BY AUTOMATED COUNT: 14.5 % (ref 10–15)
HCT VFR BLD AUTO: 37 % (ref 35–47)
HGB BLD-MCNC: 12.7 G/DL (ref 11.7–15.7)
LYMPHOCYTES # BLD AUTO: 1 10E9/L (ref 0.8–5.3)
LYMPHOCYTES NFR BLD AUTO: 18.8 %
MCH RBC QN AUTO: 34.3 PG (ref 26.5–33)
MCHC RBC AUTO-ENTMCNC: 34.3 G/DL (ref 31.5–36.5)
MCV RBC AUTO: 100 FL (ref 78–100)
MONOCYTES # BLD AUTO: 0.5 10E9/L (ref 0–1.3)
MONOCYTES NFR BLD AUTO: 9.9 %
NEUTROPHILS # BLD AUTO: 3.7 10E9/L (ref 1.6–8.3)
NEUTROPHILS NFR BLD AUTO: 69.6 %
PLATELET # BLD AUTO: 233 10E9/L (ref 150–450)
RBC # BLD AUTO: 3.7 10E12/L (ref 3.8–5.2)
WBC # BLD AUTO: 5.3 10E9/L (ref 4–11)

## 2017-02-23 PROCEDURE — 85025 COMPLETE CBC W/AUTO DIFF WBC: CPT | Performed by: INTERNAL MEDICINE

## 2017-02-23 PROCEDURE — 36415 COLL VENOUS BLD VENIPUNCTURE: CPT | Performed by: INTERNAL MEDICINE

## 2017-02-23 NOTE — TELEPHONE ENCOUNTER
Message left for patient to please return call to clinic in regards to status check, and reminder left that labs need to be drawn this week.  Direct line provided.

## 2017-02-24 NOTE — TELEPHONE ENCOUNTER
CBC results and Dr. Cassidy's recommendations reviewed with patient.  Continue to hold Afinitor, utilize magic mouthwash for mouth sores, and repeat CBC next week.  Patient denies questions or concerns, and is in agreement with this plan.  Will call back if any arise.  Direct line provided.

## 2017-02-27 DIAGNOSIS — C78.7 SECONDARY MALIGNANT NEOPLASM OF LIVER (H): ICD-10-CM

## 2017-02-27 DIAGNOSIS — C50.911 BREAST CANCER METASTASIZED TO AXILLARY LYMPH NODE, RIGHT (H): ICD-10-CM

## 2017-02-27 DIAGNOSIS — C50.911 BILATERAL MALIGNANT NEOPLASM OF BREAST IN FEMALE, UNSPECIFIED SITE OF BREAST: Chronic | ICD-10-CM

## 2017-02-27 DIAGNOSIS — C79.51 BONE METASTASIS: ICD-10-CM

## 2017-02-27 DIAGNOSIS — C50.919 CARCINOMA OF BREAST METASTATIC TO LIVER, UNSPECIFIED LATERALITY (H): ICD-10-CM

## 2017-02-27 DIAGNOSIS — C78.7 CARCINOMA OF BREAST METASTATIC TO LIVER, UNSPECIFIED LATERALITY (H): ICD-10-CM

## 2017-02-27 DIAGNOSIS — C77.3 BREAST CANCER METASTASIZED TO AXILLARY LYMPH NODE, RIGHT (H): ICD-10-CM

## 2017-02-27 DIAGNOSIS — C50.912 BILATERAL MALIGNANT NEOPLASM OF BREAST IN FEMALE, UNSPECIFIED SITE OF BREAST: Chronic | ICD-10-CM

## 2017-02-27 DIAGNOSIS — D25.9 UTERINE LEIOMYOMA, UNSPECIFIED LOCATION: ICD-10-CM

## 2017-02-27 RX ORDER — EXEMESTANE 25 MG/1
25 TABLET ORAL DAILY
Qty: 28 TABLET | Refills: 0 | Status: SHIPPED | OUTPATIENT
Start: 2017-02-27 | End: 2017-03-22

## 2017-02-28 ENCOUNTER — TELEPHONE (OUTPATIENT)
Dept: ONCOLOGY | Facility: CLINIC | Age: 57
End: 2017-02-28

## 2017-02-28 NOTE — TELEPHONE ENCOUNTER
Received a call from Pt stating that she continues to have mucositis and has developed several new lesions in the last few days. Pt states that she was supposed to start Afinitor weeks ago once these mouth sores resolved and was told to schedule a f/u appt with Dr. Cassidy if they did not.     Pt schedule for labs and a f/u this Thursday 3/2/17 at 8:30 am with Dr. Cassidy. Pt verbalized understanding.

## 2017-03-02 ENCOUNTER — ONCOLOGY VISIT (OUTPATIENT)
Dept: ONCOLOGY | Facility: CLINIC | Age: 57
End: 2017-03-02
Attending: INTERNAL MEDICINE
Payer: COMMERCIAL

## 2017-03-02 ENCOUNTER — HOSPITAL ENCOUNTER (OUTPATIENT)
Dept: LAB | Facility: CLINIC | Age: 57
Discharge: HOME | End: 2017-03-02
Attending: INTERNAL MEDICINE | Admitting: INTERNAL MEDICINE
Payer: COMMERCIAL

## 2017-03-02 VITALS
HEART RATE: 71 BPM | OXYGEN SATURATION: 98 % | WEIGHT: 203.1 LBS | BODY MASS INDEX: 33.84 KG/M2 | SYSTOLIC BLOOD PRESSURE: 143 MMHG | DIASTOLIC BLOOD PRESSURE: 84 MMHG | TEMPERATURE: 98.5 F | RESPIRATION RATE: 18 BRPM | HEIGHT: 65 IN

## 2017-03-02 DIAGNOSIS — C50.911 BILATERAL MALIGNANT NEOPLASM OF BREAST IN FEMALE, UNSPECIFIED SITE OF BREAST: Chronic | ICD-10-CM

## 2017-03-02 DIAGNOSIS — C78.7 SECONDARY MALIGNANT NEOPLASM OF LIVER (H): ICD-10-CM

## 2017-03-02 DIAGNOSIS — C77.3 BREAST CANCER METASTASIZED TO AXILLARY LYMPH NODE, RIGHT (H): ICD-10-CM

## 2017-03-02 DIAGNOSIS — C78.7 CARCINOMA OF BREAST METASTATIC TO LIVER, UNSPECIFIED LATERALITY (H): Primary | ICD-10-CM

## 2017-03-02 DIAGNOSIS — C50.919 CARCINOMA OF BREAST METASTATIC TO LIVER, UNSPECIFIED LATERALITY (H): Primary | ICD-10-CM

## 2017-03-02 DIAGNOSIS — C50.911 BREAST CANCER METASTASIZED TO AXILLARY LYMPH NODE, RIGHT (H): ICD-10-CM

## 2017-03-02 DIAGNOSIS — C50.912 BILATERAL MALIGNANT NEOPLASM OF BREAST IN FEMALE, UNSPECIFIED SITE OF BREAST: Chronic | ICD-10-CM

## 2017-03-02 DIAGNOSIS — C79.51 BONE METASTASIS: ICD-10-CM

## 2017-03-02 DIAGNOSIS — C50.919 CARCINOMA OF BREAST METASTATIC TO LIVER, UNSPECIFIED LATERALITY (H): ICD-10-CM

## 2017-03-02 DIAGNOSIS — C78.7 CARCINOMA OF BREAST METASTATIC TO LIVER, UNSPECIFIED LATERALITY (H): ICD-10-CM

## 2017-03-02 DIAGNOSIS — D25.9 UTERINE LEIOMYOMA, UNSPECIFIED LOCATION: ICD-10-CM

## 2017-03-02 LAB
ALBUMIN SERPL-MCNC: 3.7 G/DL (ref 3.4–5)
ALP SERPL-CCNC: 96 U/L (ref 40–150)
ALT SERPL W P-5'-P-CCNC: 38 U/L (ref 0–50)
ANION GAP SERPL CALCULATED.3IONS-SCNC: 7 MMOL/L (ref 3–14)
AST SERPL W P-5'-P-CCNC: 33 U/L (ref 0–45)
BASOPHILS # BLD AUTO: 0 10E9/L (ref 0–0.2)
BASOPHILS NFR BLD AUTO: 0.8 %
BILIRUB SERPL-MCNC: 1 MG/DL (ref 0.2–1.3)
BUN SERPL-MCNC: 11 MG/DL (ref 7–30)
CALCIUM SERPL-MCNC: 9.2 MG/DL (ref 8.5–10.1)
CHLORIDE SERPL-SCNC: 106 MMOL/L (ref 94–109)
CHOLEST SERPL-MCNC: 258 MG/DL
CO2 SERPL-SCNC: 29 MMOL/L (ref 20–32)
CREAT SERPL-MCNC: 0.68 MG/DL (ref 0.52–1.04)
DIFFERENTIAL METHOD BLD: ABNORMAL
EOSINOPHIL # BLD AUTO: 0.2 10E9/L (ref 0–0.7)
EOSINOPHIL NFR BLD AUTO: 4.7 %
ERYTHROCYTE [DISTWIDTH] IN BLOOD BY AUTOMATED COUNT: 13.5 % (ref 10–15)
GFR SERPL CREATININE-BSD FRML MDRD: 88 ML/MIN/1.7M2
GLUCOSE SERPL-MCNC: 90 MG/DL (ref 70–99)
HCT VFR BLD AUTO: 40.4 % (ref 35–47)
HDLC SERPL-MCNC: 73 MG/DL
HGB BLD-MCNC: 13.2 G/DL (ref 11.7–15.7)
IMM GRANULOCYTES # BLD: 0 10E9/L (ref 0–0.4)
IMM GRANULOCYTES NFR BLD: 0.6 %
LDLC SERPL CALC-MCNC: 159 MG/DL
LYMPHOCYTES # BLD AUTO: 1.2 10E9/L (ref 0.8–5.3)
LYMPHOCYTES NFR BLD AUTO: 24.9 %
MCH RBC QN AUTO: 33.2 PG (ref 26.5–33)
MCHC RBC AUTO-ENTMCNC: 32.7 G/DL (ref 31.5–36.5)
MCV RBC AUTO: 102 FL (ref 78–100)
MONOCYTES # BLD AUTO: 0.7 10E9/L (ref 0–1.3)
MONOCYTES NFR BLD AUTO: 13.5 %
NEUTROPHILS # BLD AUTO: 2.7 10E9/L (ref 1.6–8.3)
NEUTROPHILS NFR BLD AUTO: 55.5 %
NONHDLC SERPL-MCNC: 185 MG/DL
PLATELET # BLD AUTO: 206 10E9/L (ref 150–450)
POTASSIUM SERPL-SCNC: 4 MMOL/L (ref 3.4–5.3)
PROT SERPL-MCNC: 7.4 G/DL (ref 6.8–8.8)
RBC # BLD AUTO: 3.97 10E12/L (ref 3.8–5.2)
SODIUM SERPL-SCNC: 142 MMOL/L (ref 133–144)
TRIGL SERPL-MCNC: 132 MG/DL
WBC # BLD AUTO: 4.9 10E9/L (ref 4–11)

## 2017-03-02 PROCEDURE — 80061 LIPID PANEL: CPT | Performed by: INTERNAL MEDICINE

## 2017-03-02 PROCEDURE — 36415 COLL VENOUS BLD VENIPUNCTURE: CPT | Performed by: INTERNAL MEDICINE

## 2017-03-02 PROCEDURE — 99211 OFF/OP EST MAY X REQ PHY/QHP: CPT

## 2017-03-02 PROCEDURE — 99214 OFFICE O/P EST MOD 30 MIN: CPT | Performed by: INTERNAL MEDICINE

## 2017-03-02 PROCEDURE — 80053 COMPREHEN METABOLIC PANEL: CPT | Performed by: INTERNAL MEDICINE

## 2017-03-02 PROCEDURE — 85025 COMPLETE CBC W/AUTO DIFF WBC: CPT | Performed by: INTERNAL MEDICINE

## 2017-03-02 RX ORDER — PALBOCICLIB 100 MG/1
CAPSULE ORAL
COMMUNITY
Start: 2016-12-27 | End: 2017-03-22

## 2017-03-02 RX ORDER — FLUCONAZOLE 100 MG/1
100 TABLET ORAL DAILY
Qty: 5 TABLET | Refills: 0 | Status: SHIPPED | OUTPATIENT
Start: 2017-03-02 | End: 2017-03-22

## 2017-03-02 ASSESSMENT — PAIN SCALES - GENERAL: PAINLEVEL: MILD PAIN (3)

## 2017-03-02 NOTE — PATIENT INSTRUCTIONS
We would like to see you back in clinic with Dr. Cassidy 03.22.17 when you are here for your Xgeva.  You may start your Afinitor today.  Your prescription (Diflucan) has been sent to:   [id number] [place] [facility] [address] [facility]  [address] [facility] [address]  Phone: [phone] Fax: [phone]  When you are in need of a refill, please call your pharmacy and they will send us a request.  Copy of appointments, and after visit summary (AVS) given to patient.  If you have any questions during business hours (M-F 8 AM- 4PM), please call Funmi Ray RN, BSN, OCN Oncology Hematology /Breast Cancer Navigator at [facility] [phone].   For questions after business hours, or on holidays/weekends, please call our after hours Nurse Triage line [phone]. Thank you.

## 2017-03-02 NOTE — PROGRESS NOTES
Hematology/ Oncology Follow-up Visit:  Mar 2, 2017    Reason for Visit:   Chief Complaint   Patient presents with     Oncology Clinic Visit     Recheck Breast CA & Mucocitis, Labs today       Oncologic History:  Breast cancer (H)  Etta Cervantes presented with increasing lump in the right axilla that s lump has been growing without any pain or associated symptoms. Subsequently she was evaluated by primary care physician. Diagnostic digital mammogram was done on 01/13/2015 showing enlarged lymph nodes in the right axilla. There was some skin thickening in the right breast anteriorly and laterally. There are no parenchymal changes in the right breast. The left breast shows a 1.7 cm spiculated mass at approximately 2 to 3 o'clock position, 15.2 cm away from the nipple. A right breast ultrasound was subsequently done and ultrasound guided biopsy was done. Needle biopsy of the left breast did show infiltrating ductal carcinoma grade I of III with no angiolymphatic invasion seen. No associated ductal carcinoma in situ. Estrogen receptor, progresterone receptor were positive. HER-2/sadie receptor came back negative.. Another biopsy from the right axilla did show metastatic ductal carcinoma. PET scan was done on January 26, 2015 showing few small right posterior cervical chain nodes the largest is 1.2 cm. There is extensive bulky right axillary adenopathy demonstrating hypermetabolic FDG uptake with SUV of 10.6. There is skin thickening involving the right breast which demonstrated low-grade FDG uptake. A 1.2 cm lesion on the lateral aspect of the left breast demonstrated minimally increased FDG uptake with SUV of 2. Scattered hypermetabolic mediastinal lymph nodes located in the left superior anterior mediastinum, right paratracheal and precarinal U, subcranial adenopathy with javon metastases. This focus hypermetabolic FDG uptake identified definitive Amanda posterior aspect off intertrochanteric region of the proximal left  femur consistent with bone metastases. There is also 1.4 cm hypermetabolic FDG uptake identified in the anterior medial segment of the left hepatic lobe consistent with liver metastases. Additional 2.1 cm low-attenuation lesion anterior aspect of the right lobe which needs to be determined. The patient was started on chemotherapy with Taxotere and Cytoxan on February 9, 2015.  She concluded 4 cycles of Taxotere and Cytoxan. She started on Arimidex 1 mg orally daily. Currently she is on Faslodex and ibrance    Interval History:  Patient is attended today for follow-up. She denies having issues with h mucositis and mouth ulcers. She has been using Magic mouthwash with some improvement. She is eating well without any significant dysphagia. She denies any recent weight loss. She denies any shortness of breath or cough or wheezing. She denies any nausea or vomiting or diarrhea.    Review Of Systems:  Constitutional: Negative for fever, chills, and night sweats.  Skin: negative.  Eyes: negative.  Ears/Nose/Throat: Mouth ulcers as mentioned above  Respiratory: No shortness of breath, dyspnea on exertion, cough, or hemoptysis.  Cardiovascular: negative.  Gastrointestinal: negative.  Genitourinary: negative.  Musculoskeletal: negative.  Neurologic: negative.  Psychiatric: negative.  Hematologic/Lymphatic/Immunologic: negative.  Endocrine: negative.    All other ROS negative unless mentioned in interval history.    Past medical, social, surgical, and family histories reviewed.    Allergies:  Allergies as of 03/02/2017 - Manuel as Reviewed 03/02/2017   Allergen Reaction Noted     Animal dander Cough 01/23/2015     Cats  07/15/2010     Dust mites Cough 01/23/2015     Pollen extract  01/23/2015     Seasonal allergies  07/15/2010       Current Medications:  Current Outpatient Prescriptions   Medication Sig Dispense Refill     fluconazole (DIFLUCAN) 100 MG tablet Take 1 tablet (100 mg) by mouth daily 5 tablet 0      mometasone-formoterol (DULERA) 200-5 MCG/ACT oral inhaler Inhale 2 puffs into the lungs 2 times daily 3 Inhaler 0     magic mouthwash suspension (diphenhydrAMINE 5 mg/5 mL, lidocaine 50 mg/5 mL, Maalox 2.5 g/5 mL , simethicone 6.65 mg/5 mL) Swish and swallow 10 mLs in mouth every 6 hours as needed for mouth sores 240 mL 10     oxybutynin (DITROPAN XL) 10 MG 24 hr tablet Take 1 tablet (10 mg) by mouth daily (Needs follow-up appointment for this medication) 90 tablet 3     zolpidem (AMBIEN) 10 MG tablet Take 1 tablet 30 minutes prior to bedtime 30 tablet 5     beclomethasone (QVAR) 80 MCG/ACT Inhaler Inhale 2 puffs into the lungs 2 times daily 3 Inhaler 3     azelastine (ASTELIN) 0.1 % nasal spray Spray 1-2 sprays into both nostrils 2 times daily as needed for rhinitis 3 Bottle 3     levothyroxine (SYNTHROID, LEVOTHROID) 25 MCG tablet Take 1 tablet (25 mcg) by mouth daily 90 tablet 3     albuterol (VENTOLIN HFA) 108 (90 BASE) MCG/ACT inhaler Inhale 2 puffs into the lungs every 4 hours as needed 1 Inhaler 2     albuterol (2.5 MG/3ML) 0.083% nebulizer solution Take 1 vial (2.5 mg) by nebulization every 4 hours as needed for shortness of breath / dyspnea 30 vial 3     calcium 600 MG tablet Take 2 tablets by mouth daily 60 tablet      cholecalciferol (VITAMIN D) 1000 UNIT tablet Take 1 tablet (1,000 Units) by mouth daily 100 tablet 3     loratadine (CLARITIN) 10 MG tablet Take 10 mg by mouth daily       Acetaminophen (TYLENOL PO) Take 650 mg by mouth every 4 hours as needed for mild pain or fever       PALBOCICLIB 100 MG capsule        exemestane (AROMASIN) 25 MG tablet Take 1 tablet (25 mg) by mouth daily Take after a meal. (Patient not taking: Reported on 3/2/2017) 28 tablet 0     everolimus (AFINITOR) 10 MG tablet CHEMO Take 1 tablet (10 mg) by mouth daily Avoid grapefruit and grapefruit juice. May be given with or without food, but should be consistent. (Patient not taking: Reported on 3/2/2017) 28 tablet 0     order  "for DME Equipment being ordered: CPAP  AIRSENSE 10  9-11 CM H20  # [id number]  DN# [id number]       dexamethasone 0.1 MG/ML solution swish for 2 minutes and spit 4 times per day and not to eat for 1 hour after using the mouthwash. (Patient not taking: Reported on 3/2/2017) 500 mL 1     ASPIRIN PO Take 81 mg by mouth Reported on 3/2/2017       lidocaine (XYLOCAINE) 2 % solution (viscous) Apply 5 mLs topically every 3 hours as needed for moderate pain (Patient not taking: Reported on 3/2/2017) 100 mL 2     oxyCODONE (ROXICODONE) 5 MG immediate release tablet Take 1 tablet (5 mg) by mouth every 4 hours as needed for moderate to severe pain (Patient not taking: Reported on 3/2/2017) 30 tablet 0     predniSONE (DELTASONE) 20 MG tablet Take 1 tablet (20 mg) by mouth 2 times daily For Yellow or Red zone on Asthma Action Plan. (Patient not taking: Reported on 3/2/2017) 10 tablet 0     Nebulizers (DANIELLE LC PLUS NEBULIZER) MISC   0     order for DME Equipment being ordered: accessory kit 1 Device 0     ORDER FOR DME F&P Zest medium nasal mask       ORDER FOR DME Auto-CPAP:  Max 11 cm H2O  Min 9 cm H2O  Changed in clinic  Continuous    Lifetime need and heated humidity.             Physical Exam:  /84 (BP Location: Right arm, Patient Position: Chair, Cuff Size: Adult Large)  Pulse 71  Temp 98.5  F (36.9  C) (Tympanic)  Resp 18  Ht 1.638 m (5' 4.5\")  Wt 92.1 kg (203 lb 1.6 oz)  SpO2 98%  Breastfeeding? No  BMI 34.32 kg/m2  Wt Readings from Last 12 Encounters:   03/02/17 92.1 kg (203 lb 1.6 oz)   01/27/17 93 kg (205 lb)   01/19/17 95 kg (209 lb 8 oz)   11/23/16 95.7 kg (210 lb 14.4 oz)   10/12/16 98.1 kg (216 lb 3.2 oz)   09/23/16 100.2 kg (221 lb)   08/24/16 98.9 kg (218 lb)   07/13/16 100.3 kg (221 lb 3.2 oz)   06/01/16 102.5 kg (225 lb 14.4 oz)   04/28/16 104.7 kg (230 lb 12.8 oz)   03/30/16 102.5 kg (226 lb)   03/02/16 102.6 kg (226 lb 3.2 oz)     ECOG performance status: 1  GENERAL APPEARANCE: Healthy, alert and in no " acute distress.  HEENT: Sclerae anicteric. PERRLA. Oropharynx without ulcers, lesions, or thrush.  NECK: Supple. No asymmetry or masses.  LYMPHATICS: No palpable cervical, supraclavicular, axillary, or inguinal lymphadenopathy.  RESP: Lungs clear to auscultation bilaterally without rales, rhonchi or wheezes.  CARDIOVASCULAR: Regular rate and rhythm. Normal S1, S2; no S3 or S4. No murmur, gallop, or rub.  ABDOMEN: Soft, nontender. Bowel sounds normal. No palpable organomegaly or masses.  MUSCULOSKELETAL: Extremities without gross deformities noted. No edema of bilateral lower extremities.  SKIN: No suspicious lesions or rashes.  NEURO: Alert and oriented x 3. Cranial nerves II-XII grossly intact.  PSYCHIATRIC: Mentation and affect appear normal.    Laboratory/Imaging Studies:  Orders Only on 02/23/2017   Component Date Value Ref Range Status     WBC 02/23/2017 5.3  4.0 - 11.0 10e9/L Final     RBC Count 02/23/2017 3.70* 3.8 - 5.2 10e12/L Final     Hemoglobin 02/23/2017 12.7  11.7 - 15.7 g/dL Final     Hematocrit 02/23/2017 37.0  35.0 - 47.0 % Final     MCV 02/23/2017 100  78 - 100 fl Final     MCH 02/23/2017 34.3* 26.5 - 33.0 pg Final     MCHC 02/23/2017 34.3  31.5 - 36.5 g/dL Final     RDW 02/23/2017 14.5  10.0 - 15.0 % Final     Platelet Count 02/23/2017 233  150 - 450 10e9/L Final     Diff Method 02/23/2017 Automated Method   Final     % Neutrophils 02/23/2017 69.6  % Final     % Lymphocytes 02/23/2017 18.8  % Final     % Monocytes 02/23/2017 9.9  % Final     % Eosinophils 02/23/2017 1.5  % Final     % Basophils 02/23/2017 0.2  % Final     Absolute Neutrophil 02/23/2017 3.7  1.6 - 8.3 10e9/L Final     Absolute Lymphocytes 02/23/2017 1.0  0.8 - 5.3 10e9/L Final     Absolute Monocytes 02/23/2017 0.5  0.0 - 1.3 10e9/L Final     Absolute Eosinophils 02/23/2017 0.1  0.0 - 0.7 10e9/L Final     Absolute Basophils 02/23/2017 0.0  0.0 - 0.2 10e9/L Final        Recent Results (from the past 744 hour(s))   Chest XR,  PA & LAT     Narrative    XR CHEST 2 VW 2/3/2017 10:06 AM    HISTORY: Fever.    COMPARISON: July 18, 2016      Impression    IMPRESSION: No significant change from prior exam. Left-sided  portacatheter in position as before. Large right upper lung bulla and  mid lung scarring or atelectasis is unchanged. No new focal pulmonary  opacities. No pleural effusions.    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 I would recommend at this point is to proceed with Exemestane at 25 mg orally daily in combination with Afinitor 10 mg orally daily. We reviewed potential side effects in detail including mucositis. We talked about prophylactic use off dexamethasone mouthwash to prevent mucositis associated with Afinitor. We also talked about screening for glucose levels and lipid profile. We talked about mucositis, diarrhea etc. We also gave the patient handouts about these drugs. The patient will start her first cycle today. I will see the patient again in one month time or sooner if there is new developments or concerns.    (C79.51) Bone metastasis (H)  Patient will continue on Denosumab 120 mg subcutaneously every 3 months. She will continue calcium and vitamin D supplements.    The patient is ready to learn, no apparent learning barriers were identified.  Diagnosis and treatment plans were explained to the patient. The patient expressed understanding of the content. The patient asked appropriate questions. The patient questions were answered to her satisfaction.    Chart documentation with Dragon Voice recognition Software. Although reviewed after completion, some words  and grammatical errors may remain.

## 2017-03-02 NOTE — MR AVS SNAPSHOT
After Visit Summary   3/2/2017    Etta Cervantes    MRN: [id number]           Patient Information     Date Of Birth          1960        Visit Information        Provider Department      3/2/2017 8:30 AM Brodie Cassidy MD [facility] ONCOLOGY      Today's Diagnoses     Carcinoma of breast metastatic to liver, unspecified laterality (H)    -  1    Bilateral malignant neoplasm of breast in female, unspecified site of breast (H)        Bone metastasis (H)        Breast cancer metastasized to axillary lymph node, right (H)        Secondary malignant neoplasm of liver (H)          Care Instructions    We would like to see you back in clinic with Dr. Cassidy 03.22.17 when you are here for your Xgeva.  You may start your Afinitor today.  Your prescription (Diflucan) has been sent [facility]:   [id number] [place] [facility] [address]  [address] [address]  Phone: [phone] Fax: [phone]  When you are in need of a refill, please call your pharmacy and they will send us a request.  Copy of appointments, and after visit summary (AVS) given to patient.  If you have any questions during business hours (M-F 8 AM- 4PM), please call Funmi Ray RN, BSN, OCN Oncology Hematology /Breast Cancer Navigator at [facility] [phone].   For questions after business hours, or on holidays/weekends, please call our after hours Nurse Triage line [phone]. Thank you.          Follow-ups after your visit        Follow-up notes from your care team     Return in about 3 weeks (around 3/22/2017).      Your next 10 appointments already scheduled     Mar 22, 2017  8:30 AM CDT   Level 1 with ROOM 4 [facility] ([facility])    n [facility]  [address]  [facility] [place] [address]   [phone]            Mar 22, 2017  9:00 AM CDT   Return Visit with Brodie Cassidy MD  "  [facility])    [facility]  [address]  [facility] [address] [phone]              Who to contact     If you have questions or need follow up information about today's clinic visit or your schedule please contact [facility] directly at [phone].  Normal or non-critical lab and imaging results will be communicated to you by MyChart, letter or phone within 4 business days after the clinic has received the results. If you do not hear from us within 7 days, please contact the clinic through [facility] or phone. If you have a critical or abnormal lab result, we will notify you by phone as soon as possible.  Submit refill requests through [employer] or call your pharmacy and they will forward the refill request to us. Please allow 3 business days for your refill to be completed.          Additional Information About Your Visit        [facility] pte Information     [facility] gives you secure access to your electronic health record. If you see a primary care provider, you can also send messages to your care team and make appointments. If you have questions, please call your primary care clinic.  If you do not have a primary care provider, please call [phone] and they will assist you.        Care EveryWhere ID     This is your Care EveryWhere ID. This could be used by other organizations to access your [facility] medical records  [id number]        Your Vitals Were     Pulse Temperature Respirations Height Pulse Oximetry Breastfeeding?    71 98.5  F (36.9  C) (Tympanic) 18 1.638 m (5' 4.5\") 98% No    BMI (Body Mass Index)                   34.32 kg/m2            Blood Pressure from Last 3 Encounters:   03/02/17 143/84   02/03/17 112/71   01/27/17 144/78    Weight from Last 3 Encounters:   03/02/17 92.1 kg (203 lb 1.6 oz)   01/27/17 93 kg (205 lb)   01/19/17 95 kg (209 lb 8 oz)              Today, you had the following     No orders found for " display         Today's Medication Changes          These changes are accurate as of: 3/2/17  8:55 AM.  If you have any questions, ask your nurse or doctor.               Start taking these medicines.        Dose/Directions    fluconazole 100 MG tablet   Commonly known as:  DIFLUCAN   Used for:  Bilateral malignant neoplasm of breast in female, unspecified site of breast (H)   Started by:  Brodie Cassidy MD        Dose:  100 mg   Take 1 tablet (100 mg) by mouth daily   Quantity:  5 tablet   Refills:  0            Where to get your medicines      These medications were sent to [facility] [place], [place] - [address]  [address] [address]     Phone:  [phone]     fluconazole 100 MG tablet                Primary Care Provider Office Phone # Fax #    Johana Fairbanks [phone]       [facility] [address] [address]        Thank you!     Thank you for choosing [facility]  for your care. Our goal is always to provide you with excellent care. Hearing back from our patients is one way we can continue to improve our services. Please take a few minutes to complete the written survey that you may receive in the mail after your visit with us. Thank you!             Your Updated Medication List - Protect others around you: Learn how to safely use, store and throw away your medicines at www.disposemymeds.org.          This list is accurate as of: 3/2/17  8:55 AM.  Always use your most recent med list.                   Brand Name Dispense Instructions for use    * albuterol (2.5 MG/3ML) 0.083% neb solution     30 vial    Take 1 vial (2.5 mg) by nebulization every 4 hours as needed for shortness of breath / dyspnea       * albuterol 108 (90 BASE) MCG/ACT Inhaler    VENTOLIN HFA    1 Inhaler    Inhale 2 puffs into the lungs every 4 hours as needed       ASPIRIN PO      Take 81 mg by mouth Reported on 3/2/2017       azelastine 0.1 %  spray    ASTELIN    3 Bottle    Spray 1-2 sprays into both nostrils 2 times daily as needed for rhinitis       beclomethasone 80 MCG/ACT Inhaler    QVAR    3 Inhaler    Inhale 2 puffs into the lungs 2 times daily       calcium 600 MG tablet     60 tablet    Take 2 tablets by mouth daily       cholecalciferol 1000 UNIT tablet    vitamin D    100 tablet    Take 1 tablet (1,000 Units) by mouth daily       dexamethasone 0.1 MG/ML solution     500 mL    swish for 2 minutes and spit 4 times per day and not to eat for 1 hour after using the mouthwash.       everolimus 10 MG tablet CHEMO    AFINITOR    28 tablet    Take 1 tablet (10 mg) by mouth daily Avoid grapefruit and grapefruit juice. May be given with or without food, but should be consistent.       exemestane 25 MG tablet    AROMASIN    28 tablet    Take 1 tablet (25 mg) by mouth daily Take after a meal.       fluconazole 100 MG tablet    DIFLUCAN    5 tablet    Take 1 tablet (100 mg) by mouth daily       levothyroxine 25 MCG tablet    SYNTHROID/LEVOTHROID    90 tablet    Take 1 tablet (25 mcg) by mouth daily       lidocaine 2 % solution    XYLOCAINE    100 mL    Apply 5 mLs topically every 3 hours as needed for moderate pain       lidocaine visc 2% 2.5mL/5mL & maalox/mylanta w/ simeth 2.5mL/5mL & diphenhydrAMINE 5mg/5mL Susp suspension    MAGIC Mouthwash    240 mL    Swish and swallow 10 mLs in mouth every 6 hours as needed for mouth sores       loratadine 10 MG tablet    CLARITIN     Take 10 mg by mouth daily       mometasone-formoterol 200-5 MCG/ACT oral inhaler    DULERA    3 Inhaler    Inhale 2 puffs into the lungs 2 times daily       * order for DME      F&P Zest medium nasal mask       order for DME      Equipment being ordered: CPAP AIRSENSE 10 9-11 CM H20 # [id number]  DN# [id number]       * order for DME      Auto-CPAP: Max 11 cm H2O Min 9 cm H2O Changed in clinic Continuous  Lifetime need and heated humidity.       order for DME     1 Device    Equipment being  ordered: accessory kit       oxybutynin 10 MG 24 hr tablet    DITROPAN XL    90 tablet    Take 1 tablet (10 mg) by mouth daily (Needs follow-up appointment for this medication)       oxyCODONE 5 MG IR tablet    ROXICODONE    30 tablet    Take 1 tablet (5 mg) by mouth every 4 hours as needed for moderate to severe pain       palbociclib 100 MG capsule CHEMO   Generic drug:  palbociclib          DANIELLE LC PLUS NEBULIZER Misc          predniSONE 20 MG tablet    DELTASONE    10 tablet    Take 1 tablet (20 mg) by mouth 2 times daily For Yellow or Red zone on Asthma Action Plan.       TYLENOL PO      Take 650 mg by mouth every 4 hours as needed for mild pain or fever       zolpidem 10 MG tablet    AMBIEN    30 tablet    Take 1 tablet 30 minutes prior to bedtime       * Notice:  This list has 4 medication(s) that are the same as other medications prescribed for you. Read the directions carefully, and ask your doctor or other care provider to review them with you.

## 2017-03-02 NOTE — NURSING NOTE
"Etta Cervantes is a 56 year old female who presents for:  Chief Complaint   Patient presents with     Oncology Clinic Visit     Recheck Breast CA & Mucocitis, Labs today        Initial Vitals:  /84 (BP Location: Right arm, Patient Position: Chair, Cuff Size: Adult Large)  Pulse 71  Temp 98.5  F (36.9  C) (Tympanic)  Resp 18  Ht 1.638 m (5' 4.5\")  Wt 92.1 kg (203 lb 1.6 oz)  SpO2 98%  Breastfeeding? No  BMI 34.32 kg/m2 Estimated body mass index is 34.32 kg/(m^2) as calculated from the following:    Height as of this encounter: 1.638 m (5' 4.5\").    Weight as of this encounter: 92.1 kg (203 lb 1.6 oz).. Body surface area is 2.05 meters squared. BP completed using cuff size: large  Mild Pain (3) No LMP recorded. Allergies and medications reviewed.     Medications: Medication refills not needed today.  Pharm[facility] entered into [id number] [place] [address]    Comments: Recheck Breast CA & Mucocitis, Labs today.     7  minutes for nursing intake (face to face time)   Josephine Virgen CMA        "

## 2017-03-02 NOTE — ASSESSMENT & PLAN NOTE
Etta Cervantes presented with increasing lump in the right axilla that s lump has been growing without any pain or associated symptoms. Subsequently she was evaluated by primary care physician. Diagnostic digital mammogram was done on 01/13/2015 showing enlarged lymph nodes in the right axilla. There was some skin thickening in the right breast anteriorly and laterally. There are no parenchymal changes in the right breast. The left breast shows a 1.7 cm spiculated mass at approximately 2 to 3 o'clock position, 15.2 cm away from the nipple. A right breast ultrasound was subsequently done and ultrasound guided biopsy was done. Needle biopsy of the left breast did show infiltrating ductal carcinoma grade I of III with no angiolymphatic invasion seen. No associated ductal carcinoma in situ. Estrogen receptor, progresterone receptor were positive. HER-2/sadie receptor came back negative.. Another biopsy from the right axilla did show metastatic ductal carcinoma. PET scan was done on January 26, 2015 showing few small right posterior cervical chain nodes the largest is 1.2 cm. There is extensive bulky right axillary adenopathy demonstrating hypermetabolic FDG uptake with SUV of 10.6. There is skin thickening involving the right breast which demonstrated low-grade FDG uptake. A 1.2 cm lesion on the lateral aspect of the left breast demonstrated minimally increased FDG uptake with SUV of 2. Scattered hypermetabolic mediastinal lymph nodes located in the left superior anterior mediastinum, right paratracheal and precarinal U, subcranial adenopathy with javon metastases. This focus hypermetabolic FDG uptake identified definitive Amanda posterior aspect off intertrochanteric region of the proximal left femur consistent with bone metastases. There is also 1.4 cm hypermetabolic FDG uptake identified in the anterior medial segment of the left hepatic lobe consistent with liver metastases. Additional 2.1 cm low-attenuation lesion  anterior aspect of the right lobe which needs to be determined. The patient was started on chemotherapy with Taxotere and Cytoxan on February 9, 2015.  She concluded 4 cycles of Taxotere and Cytoxan. She started on Arimidex 1 mg orally daily. Currently she is on Faslodex and ibrance

## 2017-03-20 DIAGNOSIS — G47.00 INSOMNIA, UNSPECIFIED: ICD-10-CM

## 2017-03-20 RX ORDER — ZOLPIDEM TARTRATE 10 MG/1
TABLET ORAL
Qty: 30 TABLET | Refills: 5 | Status: SHIPPED | OUTPATIENT
Start: 2017-03-20 | End: 2017-01-01

## 2017-03-20 NOTE — TELEPHONE ENCOUNTER
Refill request received via fax by pharmacy    Pending Prescriptions:                       Disp   Refills    zolpidem (AMBIEN) 10 MG tablet            30 tab*5            Sig: Take 1 tablet 30 minutes prior to bedtime         Last Written Prescription Date:  09/23/2016  Last Fill Quantity: 30 per 30,   # refills: 5  Last Office Visit with [facility], [facility] or [facility] prescribing provider: 09/23/2016  Future Office visit: one year   Next 5 appointments (look out 90 days)     Mar 22, 2017  9:00 AM CDT   Return Visit with Brodie Cassidy MD   [facility] ([facility])    [facility]  [address]  [facility] [address]   [phone]                   Routing refill request to provider for review/approval because:  Drug not on the [facility], [facility] or  [facility] refill protocol or controlled substance

## 2017-03-22 ENCOUNTER — ONCOLOGY VISIT (OUTPATIENT)
Dept: ONCOLOGY | Facility: CLINIC | Age: 57
End: 2017-03-22
Attending: INTERNAL MEDICINE
Payer: COMMERCIAL

## 2017-03-22 ENCOUNTER — INFUSION THERAPY VISIT (OUTPATIENT)
Dept: INFUSION THERAPY | Facility: CLINIC | Age: 57
End: 2017-03-22
Attending: INTERNAL MEDICINE
Payer: COMMERCIAL

## 2017-03-22 ENCOUNTER — HOSPITAL ENCOUNTER (OUTPATIENT)
Facility: CLINIC | Age: 57
Discharge: HOME | End: 2017-03-22
Attending: INTERNAL MEDICINE | Admitting: INTERNAL MEDICINE
Payer: COMMERCIAL

## 2017-03-22 VITALS
HEART RATE: 67 BPM | DIASTOLIC BLOOD PRESSURE: 76 MMHG | WEIGHT: 207.3 LBS | BODY MASS INDEX: 35.39 KG/M2 | TEMPERATURE: 98.2 F | SYSTOLIC BLOOD PRESSURE: 156 MMHG | RESPIRATION RATE: 18 BRPM | OXYGEN SATURATION: 100 % | HEIGHT: 64 IN

## 2017-03-22 DIAGNOSIS — C50.911 BILATERAL MALIGNANT NEOPLASM OF BREAST IN FEMALE, UNSPECIFIED SITE OF BREAST: Chronic | ICD-10-CM

## 2017-03-22 DIAGNOSIS — C77.3 BREAST CANCER METASTASIZED TO AXILLARY LYMPH NODE, RIGHT (H): ICD-10-CM

## 2017-03-22 DIAGNOSIS — C79.51 BONE METASTASIS: Primary | ICD-10-CM

## 2017-03-22 DIAGNOSIS — C50.912 BILATERAL MALIGNANT NEOPLASM OF BREAST IN FEMALE, UNSPECIFIED SITE OF BREAST: Chronic | ICD-10-CM

## 2017-03-22 DIAGNOSIS — C50.911 BREAST CANCER METASTASIZED TO AXILLARY LYMPH NODE, RIGHT (H): ICD-10-CM

## 2017-03-22 DIAGNOSIS — C50.919 CARCINOMA OF BREAST METASTATIC TO LIVER, UNSPECIFIED LATERALITY (H): ICD-10-CM

## 2017-03-22 DIAGNOSIS — C78.7 CARCINOMA OF BREAST METASTATIC TO LIVER, UNSPECIFIED LATERALITY (H): ICD-10-CM

## 2017-03-22 DIAGNOSIS — C50.912 BILATERAL MALIGNANT NEOPLASM OF BREAST IN FEMALE, UNSPECIFIED SITE OF BREAST: Primary | Chronic | ICD-10-CM

## 2017-03-22 DIAGNOSIS — C78.7 SECONDARY MALIGNANT NEOPLASM OF LIVER (H): ICD-10-CM

## 2017-03-22 DIAGNOSIS — C50.911 BILATERAL MALIGNANT NEOPLASM OF BREAST IN FEMALE, UNSPECIFIED SITE OF BREAST: Primary | Chronic | ICD-10-CM

## 2017-03-22 DIAGNOSIS — C79.51 BONE METASTASIS: ICD-10-CM

## 2017-03-22 LAB
ALBUMIN SERPL-MCNC: 3.3 G/DL (ref 3.4–5)
BASOPHILS # BLD AUTO: 0 10E9/L (ref 0–0.2)
BASOPHILS NFR BLD AUTO: 0.2 %
CALCIUM SERPL-MCNC: 8.3 MG/DL (ref 8.5–10.1)
DIFFERENTIAL METHOD BLD: NORMAL
EOSINOPHIL # BLD AUTO: 0.1 10E9/L (ref 0–0.7)
EOSINOPHIL NFR BLD AUTO: 3 %
ERYTHROCYTE [DISTWIDTH] IN BLOOD BY AUTOMATED COUNT: 13.1 % (ref 10–15)
HCT VFR BLD AUTO: 38.3 % (ref 35–47)
HGB BLD-MCNC: 12.4 G/DL (ref 11.7–15.7)
IMM GRANULOCYTES # BLD: 0 10E9/L (ref 0–0.4)
IMM GRANULOCYTES NFR BLD: 0.4 %
LYMPHOCYTES # BLD AUTO: 1.8 10E9/L (ref 0.8–5.3)
LYMPHOCYTES NFR BLD AUTO: 38.4 %
MAGNESIUM SERPL-MCNC: 2.1 MG/DL (ref 1.6–2.3)
MCH RBC QN AUTO: 32 PG (ref 26.5–33)
MCHC RBC AUTO-ENTMCNC: 32.4 G/DL (ref 31.5–36.5)
MCV RBC AUTO: 99 FL (ref 78–100)
MONOCYTES # BLD AUTO: 0.5 10E9/L (ref 0–1.3)
MONOCYTES NFR BLD AUTO: 9.5 %
NEUTROPHILS # BLD AUTO: 2.3 10E9/L (ref 1.6–8.3)
NEUTROPHILS NFR BLD AUTO: 48.5 %
PLATELET # BLD AUTO: 184 10E9/L (ref 150–450)
RBC # BLD AUTO: 3.88 10E12/L (ref 3.8–5.2)
WBC # BLD AUTO: 4.7 10E9/L (ref 4–11)

## 2017-03-22 PROCEDURE — 82310 ASSAY OF CALCIUM: CPT | Performed by: INTERNAL MEDICINE

## 2017-03-22 PROCEDURE — 83735 ASSAY OF MAGNESIUM: CPT | Performed by: INTERNAL MEDICINE

## 2017-03-22 PROCEDURE — 99214 OFFICE O/P EST MOD 30 MIN: CPT | Performed by: INTERNAL MEDICINE

## 2017-03-22 PROCEDURE — 25000128 H RX IP 250 OP 636: Performed by: INTERNAL MEDICINE

## 2017-03-22 PROCEDURE — 85025 COMPLETE CBC W/AUTO DIFF WBC: CPT | Performed by: INTERNAL MEDICINE

## 2017-03-22 PROCEDURE — 96401 CHEMO ANTI-NEOPL SQ/IM: CPT

## 2017-03-22 PROCEDURE — 82040 ASSAY OF SERUM ALBUMIN: CPT | Performed by: INTERNAL MEDICINE

## 2017-03-22 RX ORDER — EVEROLIMUS 10 MG/1
10 TABLET ORAL DAILY
Qty: 28 TABLET | Refills: 0 | Status: SHIPPED | OUTPATIENT
Start: 2017-03-22 | End: 2017-04-19

## 2017-03-22 RX ORDER — HEPARIN SODIUM (PORCINE) LOCK FLUSH IV SOLN 100 UNIT/ML 100 UNIT/ML
5 SOLUTION INTRAVENOUS
Status: DISCONTINUED | OUTPATIENT
Start: 2017-03-22 | End: 2017-03-22 | Stop reason: HOSPADM

## 2017-03-22 RX ORDER — EXEMESTANE 25 MG/1
25 TABLET ORAL DAILY
Qty: 28 TABLET | Refills: 0 | Status: SHIPPED | OUTPATIENT
Start: 2017-03-22 | End: 2017-04-19

## 2017-03-22 RX ADMIN — DENOSUMAB 120 MG: 120 INJECTION SUBCUTANEOUS at 09:35

## 2017-03-22 RX ADMIN — SODIUM CHLORIDE, PRESERVATIVE FREE 5 ML: 5 INJECTION INTRAVENOUS at 09:35

## 2017-03-22 ASSESSMENT — PAIN SCALES - GENERAL: PAINLEVEL: MODERATE PAIN (5)

## 2017-03-22 NOTE — MR AVS SNAPSHOT
After Visit Summary   3/22/2017    Etta Cervantes    MRN: [id number]           Patient Information     Date Of Birth          1960        Visit Information        Provider Department      3/22/2017 9:00 AM Brodie Cassidy MD [facility] ONCOLOGY      Today's Diagnoses     Bilateral malignant neoplasm of breast in female, unspecified site of breast (H)    -  1    Secondary malignant neoplasm of liver (H)        Carcinoma of breast metastatic to liver, unspecified laterality (H)        Bone metastasis (H)        Breast cancer metastasized to axillary lymph node, right (H)          Care Instructions    We would like to see you back in 4 weeks for a follow up with labs prior.  Your prescription has been sent to:     [facility] - [place], [place] - [address]  [address]  [address]  [facility] [address]  Phone: [phone] Fax: [phone]    When you are in need of a refill, please call your pharmacy and they will send us a request.  Copy of appointments, and after visit summary (AVS) given to patient.  If you have any questions during business hours (M-F 8 AM- 4PM), please call Funmi Ray RN, BSN, OCN Oncology Hematology /Breast Cancer Navigator at [facility] [phone].   For questions after business hours, or on holidays/weekends, please call our after hours Nurse Triage line [phone]. Thank you.          Follow-ups after your visit        Follow-up notes from your care team     Return in about 4 weeks (around 4/19/2017) for Blood work before next appointment, Schedule for chemotherapy as per treatment plan.      Your next 10 appointments already scheduled     Mar 28, 2017  8:00 AM CDT   Level O with ROOM 2 [facility] ([facility])    [facility]  [address]  [facility] [address]   [phone]            Apr 19, 2017  8:30 AM CDT  "  Level O with ROOM 2 [facility] ([facility])    [facility]  [address]  [facility] [address] [phone]            Apr 19, 2017  9:30 AM CDT   Return Visit with Brodie Cassidy MD   [facility] ([facility])    [facility]  [address]  [facility] [id number] [phone]              Who to contact     If you have questions or need follow up information about today's clinic visit or your schedule please contact [facility] directly at [phone].  Normal or non-critical lab and imaging results will be communicated to you by IPextremehart, letter or phone within 4 business days after the clinic has received the results. If you do not hear from us within 7 days, please contact the clinic through IPextremehart or phone. If you have a critical or abnormal lab result, we will notify you by phone as soon as possible.  Submit refill requests through [employer] or call your pharmacy and they will forward the refill request to us. Please allow 3 business days for your refill to be completed.          Additional Information About Your Visit        IPextremeharNautal Information     [employer] gives you secure access to your electronic health record. If you see a primary care provider, you can also send messages to your care team and make appointments. If you have questions, please call your primary care clinic.  If you do not have a primary care provider, please call [phone] and they will assist you.        Care EveryWhere ID     This is your Care EveryWhere ID. This could be used by other organizations to access your [place] medical records  [id number]        Your Vitals Were     Pulse Temperature Respirations Height Pulse Oximetry Breastfeeding?    67 98.2  F (36.8  C) (Tympanic) 18 1.638 m (5' 4.49\") 100% No    BMI (Body Mass Index)                   35.05 kg/m2            Blood Pressure from Last 3 " Encounters:   03/22/17 156/76   03/02/17 143/84   02/03/17 112/71    Weight from Last 3 Encounters:   03/22/17 94 kg (207 lb 4.8 oz)   03/02/17 92.1 kg (203 lb 1.6 oz)   01/27/17 93 kg (205 lb)              Today, you had the following     No orders found for display         Today's Medication Changes          These changes are accurate as of: 3/22/17  9:51 AM.  If you have any questions, ask your nurse or doctor.               Stop taking these medicines if you haven't already. Please contact your care team if you have questions.     palbociclib 100 MG capsule CHEMO   Generic drug:  palbociclib   Stopped by:  Brodie Cassidy MD                Where to get your medicines      These medications were sent to [facility] - [place], [place] - [address]  [address], [facility] [address]     Phone:  [phone]     everolimus 10 MG tablet CHEMO    exemestane 25 MG tablet                Primary Care Provider Office Phone # Fax #    Johana Fairbanks [phone]       [facility] [address] [facility] [address]        Thank you!     Thank you for choosing [facility]  for your care. Our goal is always to provide you with excellent care. Hearing back from our patients is one way we can continue to improve our services. Please take a few minutes to complete the written survey that you may receive in the mail after your visit with us. Thank you!             Your Updated Medication List - Protect others around you: Learn how to safely use, store and throw away your medicines at www.disposemymeds.org.          This list is accurate as of: 3/22/17  9:51 AM.  Always use your most recent med list.                   Brand Name Dispense Instructions for use    * albuterol (2.5 MG/3ML) 0.083% neb solution     30 vial    Take 1 vial (2.5 mg) by nebulization every 4 hours as needed for shortness of breath / dyspnea       * albuterol 108 (90 BASE)  MCG/ACT Inhaler    VENTOLIN HFA    1 Inhaler    Inhale 2 puffs into the lungs every 4 hours as needed       azelastine 0.1 % spray    ASTELIN    3 Bottle    Spray 1-2 sprays into both nostrils 2 times daily as needed for rhinitis       beclomethasone 80 MCG/ACT Inhaler    QVAR    3 Inhaler    Inhale 2 puffs into the lungs 2 times daily       calcium 600 MG tablet     60 tablet    Take 2 tablets by mouth daily       cholecalciferol 1000 UNIT tablet    vitamin D    100 tablet    Take 1 tablet (1,000 Units) by mouth daily       dexamethasone 0.1 MG/ML solution     500 mL    swish for 2 minutes and spit 4 times per day and not to eat for 1 hour after using the mouthwash.       everolimus 10 MG tablet CHEMO    AFINITOR    28 tablet    Take 1 tablet (10 mg) by mouth daily Avoid grapefruit and grapefruit juice. May be given with or without food, but should be consistent.       exemestane 25 MG tablet    AROMASIN    28 tablet    Take 1 tablet (25 mg) by mouth daily Take after a meal.       levothyroxine 25 MCG tablet    SYNTHROID/LEVOTHROID    90 tablet    Take 1 tablet (25 mcg) by mouth daily       lidocaine 2 % solution    XYLOCAINE    100 mL    Apply 5 mLs topically every 3 hours as needed for moderate pain       lidocaine visc 2% 2.5mL/5mL & maalox/mylanta w/ simeth 2.5mL/5mL & diphenhydrAMINE 5mg/5mL Susp suspension    MAGIC Mouthwash    240 mL    Swish and swallow 10 mLs in mouth every 6 hours as needed for mouth sores       loratadine 10 MG tablet    CLARITIN     Take 10 mg by mouth daily       mometasone-formoterol 200-5 MCG/ACT oral inhaler    DULERA    3 Inhaler    Inhale 2 puffs into the lungs 2 times daily       * order for DME      F&P Zest medium nasal mask       order for DME      Equipment being ordered: CPAP AIRSENSE 10 9-11 CM H20 SN# [id number]  DN# [id number]       * order for DME      Auto-CPAP: Max 11 cm H2O Min 9 cm H2O Changed in clinic Continuous  Lifetime need and heated humidity.       order for DME      1 Device    Equipment being ordered: accessory kit       oxybutynin 10 MG 24 hr tablet    DITROPAN XL    90 tablet    Take 1 tablet (10 mg) by mouth daily (Needs follow-up appointment for this medication)       oxyCODONE 5 MG IR tablet    ROXICODONE    30 tablet    Take 1 tablet (5 mg) by mouth every 4 hours as needed for moderate to severe pain       DANIELLE LC PLUS NEBULIZER Misc          predniSONE 20 MG tablet    DELTASONE    10 tablet    Take 1 tablet (20 mg) by mouth 2 times daily For Yellow or Red zone on Asthma Action Plan.       TYLENOL PO      Take 650 mg by mouth every 4 hours as needed for mild pain or fever       zolpidem 10 MG tablet    AMBIEN    30 tablet    Take 1 tablet 30 minutes prior to bedtime       * Notice:  This list has 4 medication(s) that are the same as other medications prescribed for you. Read the directions carefully, and ask your doctor or other care provider to review them with you.

## 2017-03-22 NOTE — MR AVS SNAPSHOT
After Visit Summary   3/22/2017    Etta Cervantes    MRN: [id number]           Patient Information     Date Of Birth          1960        Visit Information        Provider Department      3/22/2017 8:30 AM ROOM 4 [facility]        Today's Diagnoses     Bone metastasis (H)    -  1    Breast cancer metastasized to axillary lymph node, right (H)        Bilateral malignant neoplasm of breast in female, unspecified site of breast (H)           Follow-ups after your visit        Your next 10 appointments already scheduled     Mar 28, 2017  8:00 AM CDT   Level O with ROOM 2 [facility] ([facility])    [facility]  [address]  [facility] [address]   [phone]            Apr 19, 2017  8:30 AM CDT   Level O with ROOM 2 [facility] ([facility])    [facility]  [address]  [facility] [address]   [phone]            Apr 19, 2017  9:30 AM CDT   Return Visit with Brodie Cassidy MD   [facility] ([facility])    [facility]  [address]  [facility] [address]   [phone]              Who to contact     If you have questions or need follow up information about today's clinic visit or your schedule please contact [facility] directly at [phone].  Normal or non-critical lab and imaging results will be communicated to you by MyChart, letter or phone within 4 business days after the clinic has received the results. If you do not hear from us within 7 days, please contact the clinic through MyChart or phone. If you have a critical or abnormal lab result, we will notify you by phone as soon as possible.  Submit refill requests through [employer] or call your pharmacy and they will forward the refill request to us. Please allow 3 business days for your refill to be completed.          Additional  Information About Your Visit        MyChart Information     [employer] gives you secure access to your electronic health record. If you see a primary care provider, you can also send messages to your care team and make appointments. If you have questions, please call your primary care clinic.  If you do not have a primary care provider, please call [phone] and they will assist you.        Care EveryWhere ID     This is your Care EveryWhere ID. This could be used by other organizations to access your [place] medical records  [id number]         Blood Pressure from Last 3 Encounters:   03/22/17 156/76   03/02/17 143/84   02/03/17 112/71    Weight from Last 3 Encounters:   03/22/17 94 kg (207 lb 4.8 oz)   03/02/17 92.1 kg (203 lb 1.6 oz)   01/27/17 93 kg (205 lb)              We Performed the Following     Albumin level     Calcium     CBC with platelets differential     Central Venous Catheter: implanted port (Port-A-Cath, Power Port)     Magnesium     Treatment Conditions          Today's Medication Changes          These changes are accurate as of: 3/22/17  1:23 PM.  If you have any questions, ask your nurse or doctor.               Stop taking these medicines if you haven't already. Please contact your care team if you have questions.     palbociclib 100 MG capsule CHEMO   Generic drug:  palbociclib   Stopped by:  Brdoie Cassidy MD                Where to get your medicines      These medications were sent to [facility] - [place], [place] - [address]  [address], [facility] [address]     Phone:  [phone]     everolimus 10 MG tablet CHEMO    exemestane 25 MG tablet                Primary Care Provider Office Phone # Fax #    Johana Fairbanks [phone]       [facility] [address] [facility] [address]        Thank you!     Thank you for choosing [facility]  for your care. Our goal is always to provide you with  excellent care. Hearing back from our patients is one way we can continue to improve our services. Please take a few minutes to complete the written survey that you may receive in the mail after your visit with us. Thank you!             Your Updated Medication List - Protect others around you: Learn how to safely use, store and throw away your medicines at www.disposemymeds.org.          This list is accurate as of: 3/22/17  1:23 PM.  Always use your most recent med list.                   Brand Name Dispense Instructions for use    * albuterol (2.5 MG/3ML) 0.083% neb solution     30 vial    Take 1 vial (2.5 mg) by nebulization every 4 hours as needed for shortness of breath / dyspnea       * albuterol 108 (90 BASE) MCG/ACT Inhaler    VENTOLIN HFA    1 Inhaler    Inhale 2 puffs into the lungs every 4 hours as needed       azelastine 0.1 % spray    ASTELIN    3 Bottle    Spray 1-2 sprays into both nostrils 2 times daily as needed for rhinitis       beclomethasone 80 MCG/ACT Inhaler    QVAR    3 Inhaler    Inhale 2 puffs into the lungs 2 times daily       calcium 600 MG tablet     60 tablet    Take 2 tablets by mouth daily       cholecalciferol 1000 UNIT tablet    vitamin D    100 tablet    Take 1 tablet (1,000 Units) by mouth daily       dexamethasone 0.1 MG/ML solution     500 mL    swish for 2 minutes and spit 4 times per day and not to eat for 1 hour after using the mouthwash.       everolimus 10 MG tablet CHEMO    AFINITOR    28 tablet    Take 1 tablet (10 mg) by mouth daily Avoid grapefruit and grapefruit juice. May be given with or without food, but should be consistent.       exemestane 25 MG tablet    AROMASIN    28 tablet    Take 1 tablet (25 mg) by mouth daily Take after a meal.       levothyroxine 25 MCG tablet    SYNTHROID/LEVOTHROID    90 tablet    Take 1 tablet (25 mcg) by mouth daily       lidocaine 2 % solution    XYLOCAINE    100 mL    Apply 5 mLs topically every 3 hours as needed for moderate pain        lidocaine visc 2% 2.5mL/5mL & maalox/mylanta w/ simeth 2.5mL/5mL & diphenhydrAMINE 5mg/5mL Susp suspension    MAGIC Mouthwash    240 mL    Swish and swallow 10 mLs in mouth every 6 hours as needed for mouth sores       loratadine 10 MG tablet    CLARITIN     Take 10 mg by mouth daily       mometasone-formoterol 200-5 MCG/ACT oral inhaler    DULERA    3 Inhaler    Inhale 2 puffs into the lungs 2 times daily       * order for DME      F&P Zest medium nasal mask       order for DME      Equipment being ordered: CPAP AIRSENSE 10 9-11 CM H20 # [id number]  DN# [id number]       * order for DME      Auto-CPAP: Max 11 cm H2O Min 9 cm H2O Changed in clinic Continuous  Lifetime need and heated humidity.       order for DME     1 Device    Equipment being ordered: accessory kit       oxybutynin 10 MG 24 hr tablet    DITROPAN XL    90 tablet    Take 1 tablet (10 mg) by mouth daily (Needs follow-up appointment for this medication)       oxyCODONE 5 MG IR tablet    ROXICODONE    30 tablet    Take 1 tablet (5 mg) by mouth every 4 hours as needed for moderate to severe pain       DANIELLE LC PLUS NEBULIZER Misc          predniSONE 20 MG tablet    DELTASONE    10 tablet    Take 1 tablet (20 mg) by mouth 2 times daily For Yellow or Red zone on Asthma Action Plan.       TYLENOL PO      Take 650 mg by mouth every 4 hours as needed for mild pain or fever       zolpidem 10 MG tablet    AMBIEN    30 tablet    Take 1 tablet 30 minutes prior to bedtime       * Notice:  This list has 4 medication(s) that are the same as other medications prescribed for you. Read the directions carefully, and ask your doctor or other care provider to review them with you.

## 2017-03-22 NOTE — PROGRESS NOTES
Hematology/ Oncology Follow-up Visit:  Mar 22, 2017    Reason for Visit:   Chief Complaint   Patient presents with     Oncology Clinic Visit     Recheck Breast CA, Labs        Oncologic History:  Breast cancer (H)  Etta Cervantes presented with increasing lump in the right axilla that s lump has been growing without any pain or associated symptoms. Subsequently she was evaluated by primary care physician. Diagnostic digital mammogram was done on 01/13/2015 showing enlarged lymph nodes in the right axilla. There was some skin thickening in the right breast anteriorly and laterally. There are no parenchymal changes in the right breast. The left breast shows a 1.7 cm spiculated mass at approximately 2 to 3 o'clock position, 15.2 cm away from the nipple. A right breast ultrasound was subsequently done and ultrasound guided biopsy was done. Needle biopsy of the left breast did show infiltrating ductal carcinoma grade I of III with no angiolymphatic invasion seen. No associated ductal carcinoma in situ. Estrogen receptor, progresterone receptor were positive. HER-2/sadie receptor came back negative.. Another biopsy from the right axilla did show metastatic ductal carcinoma. PET scan was done on January 26, 2015 showing few small right posterior cervical chain nodes the largest is 1.2 cm. There is extensive bulky right axillary adenopathy demonstrating hypermetabolic FDG uptake with SUV of 10.6. There is skin thickening involving the right breast which demonstrated low-grade FDG uptake. A 1.2 cm lesion on the lateral aspect of the left breast demonstrated minimally increased FDG uptake with SUV of 2. Scattered hypermetabolic mediastinal lymph nodes located in the left superior anterior mediastinum, right paratracheal and precarinal U, subcranial adenopathy with javon metastases. This focus hypermetabolic FDG uptake identified definitive Amanda posterior aspect off intertrochanteric region of the proximal left femur consistent  with bone metastases. There is also 1.4 cm hypermetabolic FDG uptake identified in the anterior medial segment of the left hepatic lobe consistent with liver metastases. Additional 2.1 cm low-attenuation lesion anterior aspect of the right lobe which needs to be determined. The patient was started on chemotherapy with Taxotere and Cytoxan on February 9, 2015.  She concluded 4 cycles of Taxotere and Cytoxan. She started on Arimidex 1 mg orally daily. Currently she is on Faslodex and ibrance    Interval History:  Patient is here today for follow-up visit. She started on Afinitor and exemestane 3 weeks ago. She has been tolerating the drug very well without significant side effects. Mucositis has been under control. She has been using dexamethasone mouthwash.She denies any bony aches or pains. She denies any shortness of breath or cough or wheezing. She denies any nausea vomiting or diarrhea.    Review Of Systems:  Constitutional: Negative for fever, chills, and night sweats.  Skin: negative.  Eyes: negative.  Ears/Nose/Throat: negative.  Respiratory: No shortness of breath, dyspnea on exertion, cough, or hemoptysis.  Cardiovascular: negative.  Gastrointestinal: negative.  Genitourinary: negative.  Musculoskeletal: negative.  Neurologic: negative.  Psychiatric: negative.  Hematologic/Lymphatic/Immunologic: negative.  Endocrine: negative.    All other ROS negative unless mentioned in interval history.    Past medical, social, surgical, and family histories reviewed.    Allergies:  Allergies as of 03/22/2017 - Manuel as Reviewed 03/22/2017   Allergen Reaction Noted     Animal dander Cough 01/23/2015     Cats  07/15/2010     Dust mites Cough 01/23/2015     Pollen extract  01/23/2015     Seasonal allergies  07/15/2010       Current Medications:  Current Outpatient Prescriptions   Medication Sig Dispense Refill     exemestane (AROMASIN) 25 MG tablet Take 1 tablet (25 mg) by mouth daily Take after a meal. 28 tablet 0      everolimus (AFINITOR) 10 MG tablet CHEMO Take 1 tablet (10 mg) by mouth daily Avoid grapefruit and grapefruit juice. May be given with or without food, but should be consistent. 28 tablet 0     zolpidem (AMBIEN) 10 MG tablet Take 1 tablet 30 minutes prior to bedtime 30 tablet 5     mometasone-formoterol (DULERA) 200-5 MCG/ACT oral inhaler Inhale 2 puffs into the lungs 2 times daily 3 Inhaler 0     dexamethasone 0.1 MG/ML solution swish for 2 minutes and spit 4 times per day and not to eat for 1 hour after using the mouthwash. 500 mL 1     magic mouthwash suspension (diphenhydrAMINE 5 mg/5 mL, lidocaine 50 mg/5 mL, Maalox 2.5 g/5 mL , simethicone 6.65 mg/5 mL) Swish and swallow 10 mLs in mouth every 6 hours as needed for mouth sores 240 mL 10     oxybutynin (DITROPAN XL) 10 MG 24 hr tablet Take 1 tablet (10 mg) by mouth daily (Needs follow-up appointment for this medication) 90 tablet 3     beclomethasone (QVAR) 80 MCG/ACT Inhaler Inhale 2 puffs into the lungs 2 times daily 3 Inhaler 3     azelastine (ASTELIN) 0.1 % nasal spray Spray 1-2 sprays into both nostrils 2 times daily as needed for rhinitis 3 Bottle 3     levothyroxine (SYNTHROID, LEVOTHROID) 25 MCG tablet Take 1 tablet (25 mcg) by mouth daily 90 tablet 3     albuterol (VENTOLIN HFA) 108 (90 BASE) MCG/ACT inhaler Inhale 2 puffs into the lungs every 4 hours as needed 1 Inhaler 2     albuterol (2.5 MG/3ML) 0.083% nebulizer solution Take 1 vial (2.5 mg) by nebulization every 4 hours as needed for shortness of breath / dyspnea 30 vial 3     calcium 600 MG tablet Take 2 tablets by mouth daily 60 tablet      cholecalciferol (VITAMIN D) 1000 UNIT tablet Take 1 tablet (1,000 Units) by mouth daily 100 tablet 3     loratadine (CLARITIN) 10 MG tablet Take 10 mg by mouth daily       Acetaminophen (TYLENOL PO) Take 650 mg by mouth every 4 hours as needed for mild pain or fever       [DISCONTINUED] exemestane (AROMASIN) 25 MG tablet Take 1 tablet (25 mg) by mouth daily  "Take after a meal. 28 tablet 0     [DISCONTINUED] everolimus (AFINITOR) 10 MG tablet CHEMO Take 1 tablet (10 mg) by mouth daily Avoid grapefruit and grapefruit juice. May be given with or without food, but should be consistent. 28 tablet 0     order for DME Equipment being ordered: CPAP  AIRSENSE 10  9-11 CM H20  SN# [id number]  DN# [id number]       lidocaine (XYLOCAINE) 2 % solution (viscous) Apply 5 mLs topically every 3 hours as needed for moderate pain (Patient not taking: Reported on 3/2/2017) 100 mL 2     oxyCODONE (ROXICODONE) 5 MG immediate release tablet Take 1 tablet (5 mg) by mouth every 4 hours as needed for moderate to severe pain (Patient not taking: Reported on 3/2/2017) 30 tablet 0     predniSONE (DELTASONE) 20 MG tablet Take 1 tablet (20 mg) by mouth 2 times daily For Yellow or Red zone on Asthma Action Plan. (Patient not taking: Reported on 3/2/2017) 10 tablet 0     Nebulizers (CommonTime LC PLUS NEBULIZER) MISC   0     order for DME Equipment being ordered: accessory kit 1 Device 0     ORDER FOR DME F&P Zest medium nasal mask       ORDER FOR DME Auto-CPAP:  Max 11 cm H2O  Min 9 cm H2O  Changed in clinic  Continuous    Lifetime need and heated humidity.             Physical Exam:  /76 (BP Location: Right arm, Patient Position: Chair, Cuff Size: Adult Large)  Pulse 67  Temp 98.2  F (36.8  C) (Tympanic)  Resp 18  Ht 1.638 m (5' 4.49\")  Wt 94 kg (207 lb 4.8 oz)  SpO2 100%  Breastfeeding? No  BMI 35.05 kg/m2  Wt Readings from Last 12 Encounters:   03/22/17 94 kg (207 lb 4.8 oz)   03/02/17 92.1 kg (203 lb 1.6 oz)   01/27/17 93 kg (205 lb)   01/19/17 95 kg (209 lb 8 oz)   11/23/16 95.7 kg (210 lb 14.4 oz)   10/12/16 98.1 kg (216 lb 3.2 oz)   09/23/16 100.2 kg (221 lb)   08/24/16 98.9 kg (218 lb)   07/13/16 100.3 kg (221 lb 3.2 oz)   06/01/16 102.5 kg (225 lb 14.4 oz)   04/28/16 104.7 kg (230 lb 12.8 oz)   03/30/16 102.5 kg (226 lb)     ECOG performance status: 1  GENERAL APPEARANCE: Healthy, alert and " in no acute distress.  HEENT: Sclerae anicteric. PERRLA. Oropharynx without ulcers, lesions, or thrush.  NECK: Supple. No asymmetry or masses.  LYMPHATICS: No palpable cervical, supraclavicular, axillary, or inguinal lymphadenopathy.  RESP: Lungs clear to auscultation bilaterally without rales, rhonchi or wheezes.  CARDIOVASCULAR: Regular rate and rhythm. Normal S1, S2; no S3 or S4. No murmur, gallop, or rub.  ABDOMEN: Soft, nontender. Bowel sounds normal. No palpable organomegaly or masses.  MUSCULOSKELETAL: Extremities without gross deformities noted. No edema of bilateral lower extremities.  SKIN: No suspicious lesions or rashes.  NEURO: Alert and oriented x 3. Cranial nerves II-XII grossly intact.  PSYCHIATRIC: Mentation and affect appear normal.    Laboratory/Imaging Studies:  Infusion Therapy Visit on 03/22/2017   Component Date Value Ref Range Status     Calcium 03/22/2017 8.3* 8.5 - 10.1 mg/dL Final     Albumin 03/22/2017 3.3* 3.4 - 5.0 g/dL Final     WBC 03/22/2017 4.7  4.0 - 11.0 10e9/L Final     RBC Count 03/22/2017 3.88  3.8 - 5.2 10e12/L Final     Hemoglobin 03/22/2017 12.4  11.7 - 15.7 g/dL Final     Hematocrit 03/22/2017 38.3  35.0 - 47.0 % Final     MCV 03/22/2017 99  78 - 100 fl Final     MCH 03/22/2017 32.0  26.5 - 33.0 pg Final     MCHC 03/22/2017 32.4  31.5 - 36.5 g/dL Final     RDW 03/22/2017 13.1  10.0 - 15.0 % Final     Platelet Count 03/22/2017 184  150 - 450 10e9/L Final     Diff Method 03/22/2017 Automated Method   Final     % Neutrophils 03/22/2017 48.5  % Final     % Lymphocytes 03/22/2017 38.4  % Final     % Monocytes 03/22/2017 9.5  % Final     % Eosinophils 03/22/2017 3.0  % Final     % Basophils 03/22/2017 0.2  % Final     % Immature Granulocytes 03/22/2017 0.4  % Final     Absolute Neutrophil 03/22/2017 2.3  1.6 - 8.3 10e9/L Final     Absolute Lymphocytes 03/22/2017 1.8  0.8 - 5.3 10e9/L Final     Absolute Monocytes 03/22/2017 0.5  0.0 - 1.3 10e9/L Final     Absolute Eosinophils  03/22/2017 0.1  0.0 - 0.7 10e9/L Final     Absolute Basophils 03/22/2017 0.0  0.0 - 0.2 10e9/L Final     Abs Immature Granulocytes 03/22/2017 0.0  0 - 0.4 10e9/L Final     Magnesium 03/22/2017 2.1  1.6 - 2.3 mg/dL Final          Assessment and plan:    (C50.911,  C50.912) Bilateral malignant neoplasm of breast in female, unspecified site of breast (H)  (primary encounter diagnosis)  (C50.911,  C77.3) Breast cancer metastasized to axillary lymph node, right (H)  (C78.7) Secondary malignant neoplasm of liver (H)  (C50.919,  C78.7) Carcinoma of breast metastatic to liver, unspecified laterality (H)  Patient has been tolerating treatment with Afinitor and exemestane.. We will continue on the current regimen. We will plan to repeat imaging studies again after cycle #3 I will see the patient again in 1 month or sooner if there is new developments or concerns.    Plan: exemestane (AROMASIN) 25 MG tablet, everolimus (AFINITOR) 10 MG tablet     (C79.51) Bone metastasis (H)  Patient will continue on denosumab 120 mg subcutaneously every 3 months.    The patient is ready to learn, no apparent learning barriers were identified.  Diagnosis and treatment plans were explained to the patient. The patient expressed understanding of the content. The patient asked appropriate questions. The patient questions were answered to her satisfaction.    Chart documentation with Dragon Voice recognition Software. Although reviewed after completion, some words and grammatical errors may remain.

## 2017-03-22 NOTE — PATIENT INSTRUCTIONS
We would like to see you back in 4 weeks for a follow up with labs prior.  Your prescription has been sent to:     [facility] - [place], [place] - [address]  [address]  [address]  [facility] [address]  Phone: [phone] Fax: [phone]    When you are in need of a refill, please call your pharmacy and they will send us a request.  Copy of appointments, and after visit summary (AVS) given to patient.  If you have any questions during business hours (M-F 8 AM- 4PM), please call Funmi Ray RN, BSN, OCN Oncology Hematology /Breast Cancer Navigator at [facility] [phone].   For questions after business hours, or on holidays/weekends, please call our after hours Nurse Triage line [phone]. Thank you.

## 2017-03-22 NOTE — NURSING NOTE
"Etta Cervantes is a 56 year old female who presents for:  Chief Complaint   Patient presents with     Oncology Clinic Visit     Recheck Breast CA, Labs         Initial Vitals:  /76 (BP Location: Right arm, Patient Position: Chair, Cuff Size: Adult Large)  Pulse 67  Temp 98.2  F (36.8  C) (Tympanic)  Resp 18  Ht 1.638 m (5' 4.49\")  Wt 94 kg (207 lb 4.8 oz)  SpO2 100%  Breastfeeding? No  BMI 35.05 kg/m2 Estimated body mass index is 35.05 kg/(m^2) as calculated from the following:    Height as of this encounter: 1.638 m (5' 4.49\").    Weight as of this encounter: 94 kg (207 lb 4.8 oz).. Body surface area is 2.07 meters squared. BP completed using cuff size: large  Moderate Pain (5) No LMP recorded. Allergies and medications reviewed.     Medications: MEDICATION REFILLS NEEDED TODAY.  Pharmacy name entered into [facility]:    [facility] [id number] [place] [facility] - [place], [place] - [address] [place] [address] [facility] - [place], [place] [address]    Comments: Recheck Breast CA, Labs. Patient reports some insomnia.     7  minutes for nursing intake (face to face time)   Josephine Virgen CMA        "

## 2017-03-22 NOTE — PROGRESS NOTES
Infusion Nursing Note:  Etta Cervantes presents today for port lab draw and Xgeva.    Patient seen by provider today: Yes: Dr. Cassidy   present during visit today: Not Applicable.    Note: N/A.    Intravenous Access:  Implanted Port.    Treatment Conditions:  Lab Results   Component Value Date     03/02/2017                   Lab Results   Component Value Date    POTASSIUM 4.0 03/02/2017           No results found for: MAG         Lab Results   Component Value Date    CR 0.68 03/02/2017                   Lab Results   Component Value Date    BEN 8.3 03/22/2017                Lab Results   Component Value Date    BILITOTAL 1.0 03/02/2017           Lab Results   Component Value Date    ALBUMIN 3.3 03/22/2017                    Lab Results   Component Value Date    ALT 38 03/02/2017           Lab Results   Component Value Date    AST 33 03/02/2017     Results reviewed, labs MET treatment parameters, ok to proceed with treatment.      Post Infusion Assessment:  Patient tolerated injection without incident.  Blood return noted from port.  Site patent and intact, free from redness, edema or discomfort.  No evidence of extravasations.  Access discontinued per protocol.    Discharge Plan:   Patient discharged in stable condition accompanied by: .  Departure Mode: Ambulatory.    Dionne Zayas RN

## 2017-03-24 ENCOUNTER — MYC MEDICAL ADVICE (OUTPATIENT)
Dept: FAMILY MEDICINE | Facility: CLINIC | Age: 57
End: 2017-03-24

## 2017-03-24 DIAGNOSIS — Z11.59 NEED FOR HEPATITIS C SCREENING TEST: Primary | ICD-10-CM

## 2017-03-28 ENCOUNTER — INFUSION THERAPY VISIT (OUTPATIENT)
Dept: INFUSION THERAPY | Facility: CLINIC | Age: 57
End: 2017-03-28
Attending: INTERNAL MEDICINE
Payer: COMMERCIAL

## 2017-03-28 ENCOUNTER — HOSPITAL ENCOUNTER (OUTPATIENT)
Facility: CLINIC | Age: 57
Discharge: HOME | End: 2017-03-28
Attending: INTERNAL MEDICINE | Admitting: INTERNAL MEDICINE
Payer: COMMERCIAL

## 2017-03-28 DIAGNOSIS — Z11.59 NEED FOR HEPATITIS C SCREENING TEST: ICD-10-CM

## 2017-03-28 DIAGNOSIS — C78.7 SECONDARY MALIGNANT NEOPLASM OF LIVER (H): ICD-10-CM

## 2017-03-28 DIAGNOSIS — C78.7 CARCINOMA OF BREAST METASTATIC TO LIVER, UNSPECIFIED LATERALITY (H): ICD-10-CM

## 2017-03-28 DIAGNOSIS — C50.912 BILATERAL MALIGNANT NEOPLASM OF BREAST IN FEMALE, UNSPECIFIED SITE OF BREAST: Primary | Chronic | ICD-10-CM

## 2017-03-28 DIAGNOSIS — C50.911 BREAST CANCER METASTASIZED TO AXILLARY LYMPH NODE, RIGHT (H): ICD-10-CM

## 2017-03-28 DIAGNOSIS — C79.51 BONE METASTASIS: ICD-10-CM

## 2017-03-28 DIAGNOSIS — C50.919 CARCINOMA OF BREAST METASTATIC TO LIVER, UNSPECIFIED LATERALITY (H): ICD-10-CM

## 2017-03-28 DIAGNOSIS — C50.911 BILATERAL MALIGNANT NEOPLASM OF BREAST IN FEMALE, UNSPECIFIED SITE OF BREAST: Primary | Chronic | ICD-10-CM

## 2017-03-28 DIAGNOSIS — C77.3 BREAST CANCER METASTASIZED TO AXILLARY LYMPH NODE, RIGHT (H): ICD-10-CM

## 2017-03-28 LAB
ALBUMIN SERPL-MCNC: 3.4 G/DL (ref 3.4–5)
ALP SERPL-CCNC: 65 U/L (ref 40–150)
ALT SERPL W P-5'-P-CCNC: 28 U/L (ref 0–50)
ANION GAP SERPL CALCULATED.3IONS-SCNC: 7 MMOL/L (ref 3–14)
AST SERPL W P-5'-P-CCNC: 23 U/L (ref 0–45)
BASOPHILS # BLD AUTO: 0 10E9/L (ref 0–0.2)
BASOPHILS NFR BLD AUTO: 0 %
BILIRUB SERPL-MCNC: 0.6 MG/DL (ref 0.2–1.3)
BUN SERPL-MCNC: 8 MG/DL (ref 7–30)
CALCIUM SERPL-MCNC: 8.3 MG/DL (ref 8.5–10.1)
CHLORIDE SERPL-SCNC: 110 MMOL/L (ref 94–109)
CHOLEST SERPL-MCNC: 263 MG/DL
CO2 SERPL-SCNC: 27 MMOL/L (ref 20–32)
CREAT SERPL-MCNC: 0.55 MG/DL (ref 0.52–1.04)
DIFFERENTIAL METHOD BLD: NORMAL
EOSINOPHIL # BLD AUTO: 0.1 10E9/L (ref 0–0.7)
EOSINOPHIL NFR BLD AUTO: 2.2 %
ERYTHROCYTE [DISTWIDTH] IN BLOOD BY AUTOMATED COUNT: 12.9 % (ref 10–15)
GFR SERPL CREATININE-BSD FRML MDRD: ABNORMAL ML/MIN/1.7M2
GLUCOSE SERPL-MCNC: 84 MG/DL (ref 70–99)
HCT VFR BLD AUTO: 39 % (ref 35–47)
HCV AB SERPL QL IA: NORMAL
HDLC SERPL-MCNC: 69 MG/DL
HGB BLD-MCNC: 12.7 G/DL (ref 11.7–15.7)
IMM GRANULOCYTES # BLD: 0 10E9/L (ref 0–0.4)
IMM GRANULOCYTES NFR BLD: 0.2 %
LDLC SERPL CALC-MCNC: 163 MG/DL
LYMPHOCYTES # BLD AUTO: 1.4 10E9/L (ref 0.8–5.3)
LYMPHOCYTES NFR BLD AUTO: 33.3 %
MCH RBC QN AUTO: 32.1 PG (ref 26.5–33)
MCHC RBC AUTO-ENTMCNC: 32.6 G/DL (ref 31.5–36.5)
MCV RBC AUTO: 99 FL (ref 78–100)
MONOCYTES # BLD AUTO: 0.5 10E9/L (ref 0–1.3)
MONOCYTES NFR BLD AUTO: 12.6 %
NEUTROPHILS # BLD AUTO: 2.1 10E9/L (ref 1.6–8.3)
NEUTROPHILS NFR BLD AUTO: 51.7 %
NONHDLC SERPL-MCNC: 194 MG/DL
PLATELET # BLD AUTO: 184 10E9/L (ref 150–450)
POTASSIUM SERPL-SCNC: 3.7 MMOL/L (ref 3.4–5.3)
PROT SERPL-MCNC: 6.9 G/DL (ref 6.8–8.8)
RBC # BLD AUTO: 3.96 10E12/L (ref 3.8–5.2)
SODIUM SERPL-SCNC: 144 MMOL/L (ref 133–144)
TRIGL SERPL-MCNC: 157 MG/DL
WBC # BLD AUTO: 4.1 10E9/L (ref 4–11)

## 2017-03-28 PROCEDURE — 36591 DRAW BLOOD OFF VENOUS DEVICE: CPT

## 2017-03-28 PROCEDURE — 80053 COMPREHEN METABOLIC PANEL: CPT | Performed by: INTERNAL MEDICINE

## 2017-03-28 PROCEDURE — 25000128 H RX IP 250 OP 636: Performed by: INTERNAL MEDICINE

## 2017-03-28 PROCEDURE — 80061 LIPID PANEL: CPT | Performed by: INTERNAL MEDICINE

## 2017-03-28 PROCEDURE — 86803 HEPATITIS C AB TEST: CPT | Performed by: INTERNAL MEDICINE

## 2017-03-28 PROCEDURE — 85025 COMPLETE CBC W/AUTO DIFF WBC: CPT | Performed by: INTERNAL MEDICINE

## 2017-03-28 RX ORDER — HEPARIN SODIUM (PORCINE) LOCK FLUSH IV SOLN 100 UNIT/ML 100 UNIT/ML
5 SOLUTION INTRAVENOUS
Status: DISCONTINUED | OUTPATIENT
Start: 2017-03-28 | End: 2017-03-28 | Stop reason: HOSPADM

## 2017-03-28 RX ADMIN — SODIUM CHLORIDE, PRESERVATIVE FREE 5 ML: 5 INJECTION INTRAVENOUS at 08:17

## 2017-03-28 NOTE — MR AVS SNAPSHOT
After Visit Summary   3/28/2017    Etta Cervantes    MRN: [id number]           Patient Information     Date Of Birth          1960        Visit Information        Provider Department      3/28/2017 8:00 AM ROOM 2 [facility]        Today's Diagnoses     Bilateral malignant neoplasm of breast in female, unspecified site of breast (H)    -  1    Secondary malignant neoplasm of liver (H)        Carcinoma of breast metastatic to liver, unspecified laterality (H)        Bone metastasis (H)        Breast cancer metastasized to axillary lymph node, right (H)        Need for hepatitis C screening test           Follow-ups after your visit        Your next 10 appointments already scheduled     Apr 19, 2017  8:30 AM CDT   Level O with ROOM 2 [facility] ([facility])    [facility]  [address]  [facility] [address]   [phone]            Apr 19, 2017  9:30 AM CDT   Return Visit with Brodie Cassidy MD   [facility] ([facility])    [facility]  [address]  [facility] [address]   [phone]              Who to contact     If you have questions or need follow up information about today's clinic visit or your schedule please contact [facility] directly at [phone].  Normal or non-critical lab and imaging results will be communicated to you by MyChart, letter or phone within 4 business days after the clinic has received the results. If you do not hear from us within 7 days, please contact the clinic through MyChart or phone. If you have a critical or abnormal lab result, we will notify you by phone as soon as possible.  Submit refill requests through [employer] or call your pharmacy and they will forward the refill request to us. Please allow 3 business days for your refill to be completed.          Additional Information About Your Visit        [facility]  Information     [employer] gives you secure access to your electronic health record. If you see a primary care provider, you can also send messages to your care team and make appointments. If you have questions, please call your primary care clinic.  If you do not have a primary care provider, please call [phone] and they will assist you.        Care EveryWhere ID     This is your Care EveryWhere ID. This could be used by other organizations to access your [place] medical records  [id number]         Blood Pressure from Last 3 Encounters:   03/22/17 156/76   03/02/17 143/84   02/03/17 112/71    Weight from Last 3 Encounters:   03/22/17 94 kg (207 lb 4.8 oz)   03/02/17 92.1 kg (203 lb 1.6 oz)   01/27/17 93 kg (205 lb)              We Performed the Following     CBC with platelets differential     Central Venous Catheter: implanted port (Port-A-Cath, Power Port)     Comprehensive metabolic panel     Hepatitis C antibody     Lipid panel        Primary Care Provider Office Phone # Fax #    Johana Fairbanks [phone]       [facility] [address] [facility] [address]        Thank you!     Thank you for choosing [facility]  for your care. Our goal is always to provide you with excellent care. Hearing back from our patients is one way we can continue to improve our services. Please take a few minutes to complete the written survey that you may receive in the mail after your visit with us. Thank you!             Your Updated Medication List - Protect others around you: Learn how to safely use, store and throw away your medicines at www.disposemymeds.org.          This list is accurate as of: 3/28/17  8:44 AM.  Always use your most recent med list.                   Brand Name Dispense Instructions for use    * albuterol (2.5 MG/3ML) 0.083% neb solution     30 vial    Take 1 vial (2.5 mg) by nebulization every 4 hours as needed for shortness of breath / dyspnea       *  albuterol 108 (90 BASE) MCG/ACT Inhaler    VENTOLIN HFA    1 Inhaler    Inhale 2 puffs into the lungs every 4 hours as needed       azelastine 0.1 % spray    ASTELIN    3 Bottle    Spray 1-2 sprays into both nostrils 2 times daily as needed for rhinitis       beclomethasone 80 MCG/ACT Inhaler    QVAR    3 Inhaler    Inhale 2 puffs into the lungs 2 times daily       calcium 600 MG tablet     60 tablet    Take 2 tablets by mouth daily       cholecalciferol 1000 UNIT tablet    vitamin D    100 tablet    Take 1 tablet (1,000 Units) by mouth daily       dexamethasone 0.1 MG/ML solution     500 mL    swish for 2 minutes and spit 4 times per day and not to eat for 1 hour after using the mouthwash.       everolimus 10 MG tablet CHEMO    AFINITOR    28 tablet    Take 1 tablet (10 mg) by mouth daily Avoid grapefruit and grapefruit juice. May be given with or without food, but should be consistent.       exemestane 25 MG tablet    AROMASIN    28 tablet    Take 1 tablet (25 mg) by mouth daily Take after a meal.       levothyroxine 25 MCG tablet    SYNTHROID/LEVOTHROID    90 tablet    Take 1 tablet (25 mcg) by mouth daily       lidocaine 2 % solution    XYLOCAINE    100 mL    Apply 5 mLs topically every 3 hours as needed for moderate pain       lidocaine visc 2% 2.5mL/5mL & maalox/mylanta w/ simeth 2.5mL/5mL & diphenhydrAMINE 5mg/5mL Susp suspension    MAGIC Mouthwash    240 mL    Swish and swallow 10 mLs in mouth every 6 hours as needed for mouth sores       loratadine 10 MG tablet    CLARITIN     Take 10 mg by mouth daily       mometasone-formoterol 200-5 MCG/ACT oral inhaler    DULERA    3 Inhaler    Inhale 2 puffs into the lungs 2 times daily       * order for DME      F&P Zest medium nasal mask       order for DME      Equipment being ordered: CPAP AIRSENSE 10 9-11 CM H20 # [id number]  DN# [id number]       * order for DME      Auto-CPAP: Max 11 cm H2O Min 9 cm H2O Changed in clinic Continuous  Lifetime need and heated humidity.        order for DME     1 Device    Equipment being ordered: accessory kit       oxybutynin 10 MG 24 hr tablet    DITROPAN XL    90 tablet    Take 1 tablet (10 mg) by mouth daily (Needs follow-up appointment for this medication)       oxyCODONE 5 MG IR tablet    ROXICODONE    30 tablet    Take 1 tablet (5 mg) by mouth every 4 hours as needed for moderate to severe pain       DANIELLE LC PLUS NEBULIZER Misc          predniSONE 20 MG tablet    DELTASONE    10 tablet    Take 1 tablet (20 mg) by mouth 2 times daily For Yellow or Red zone on Asthma Action Plan.       TYLENOL PO      Take 650 mg by mouth every 4 hours as needed for mild pain or fever       zolpidem 10 MG tablet    AMBIEN    30 tablet    Take 1 tablet 30 minutes prior to bedtime       * Notice:  This list has 4 medication(s) that are the same as other medications prescribed for you. Read the directions carefully, and ask your doctor or other care provider to review them with you.

## 2017-04-19 ENCOUNTER — ONCOLOGY VISIT (OUTPATIENT)
Dept: ONCOLOGY | Facility: CLINIC | Age: 57
End: 2017-04-19
Attending: INTERNAL MEDICINE
Payer: COMMERCIAL

## 2017-04-19 ENCOUNTER — INFUSION THERAPY VISIT (OUTPATIENT)
Dept: INFUSION THERAPY | Facility: CLINIC | Age: 57
End: 2017-04-19
Attending: INTERNAL MEDICINE
Payer: COMMERCIAL

## 2017-04-19 ENCOUNTER — HOSPITAL ENCOUNTER (OUTPATIENT)
Facility: CLINIC | Age: 57
Discharge: HOME | End: 2017-04-19
Attending: INTERNAL MEDICINE | Admitting: INTERNAL MEDICINE
Payer: COMMERCIAL

## 2017-04-19 VITALS
WEIGHT: 206.8 LBS | HEIGHT: 64 IN | TEMPERATURE: 98.4 F | OXYGEN SATURATION: 99 % | BODY MASS INDEX: 35.3 KG/M2 | DIASTOLIC BLOOD PRESSURE: 80 MMHG | HEART RATE: 75 BPM | SYSTOLIC BLOOD PRESSURE: 134 MMHG | RESPIRATION RATE: 18 BRPM

## 2017-04-19 DIAGNOSIS — C50.911 BILATERAL MALIGNANT NEOPLASM OF BREAST IN FEMALE, UNSPECIFIED SITE OF BREAST: Chronic | ICD-10-CM

## 2017-04-19 DIAGNOSIS — C50.912 BILATERAL MALIGNANT NEOPLASM OF BREAST IN FEMALE, UNSPECIFIED SITE OF BREAST: Chronic | ICD-10-CM

## 2017-04-19 DIAGNOSIS — E03.9 HYPOTHYROIDISM, UNSPECIFIED TYPE: Primary | ICD-10-CM

## 2017-04-19 DIAGNOSIS — C78.7 CARCINOMA OF BREAST METASTATIC TO LIVER, UNSPECIFIED LATERALITY (H): ICD-10-CM

## 2017-04-19 DIAGNOSIS — C50.911 BREAST CANCER METASTASIZED TO AXILLARY LYMPH NODE, RIGHT (H): ICD-10-CM

## 2017-04-19 DIAGNOSIS — C79.51 BONE METASTASIS: ICD-10-CM

## 2017-04-19 DIAGNOSIS — C78.7 SECONDARY MALIGNANT NEOPLASM OF LIVER (H): ICD-10-CM

## 2017-04-19 DIAGNOSIS — D25.9 UTERINE LEIOMYOMA, UNSPECIFIED LOCATION: ICD-10-CM

## 2017-04-19 DIAGNOSIS — C50.919 CARCINOMA OF BREAST METASTATIC TO LIVER, UNSPECIFIED LATERALITY (H): ICD-10-CM

## 2017-04-19 DIAGNOSIS — K12.30 STOMATITIS AND MUCOSITIS: ICD-10-CM

## 2017-04-19 DIAGNOSIS — K12.1 STOMATITIS AND MUCOSITIS: ICD-10-CM

## 2017-04-19 DIAGNOSIS — G47.01 INSOMNIA DUE TO MEDICAL CONDITION: ICD-10-CM

## 2017-04-19 DIAGNOSIS — C77.3 BREAST CANCER METASTASIZED TO AXILLARY LYMPH NODE, RIGHT (H): ICD-10-CM

## 2017-04-19 DIAGNOSIS — K12.31 MUCOSITIS DUE TO CHEMOTHERAPY: ICD-10-CM

## 2017-04-19 LAB
ALBUMIN SERPL-MCNC: 3.3 G/DL (ref 3.4–5)
ALP SERPL-CCNC: 70 U/L (ref 40–150)
ALT SERPL W P-5'-P-CCNC: 24 U/L (ref 0–50)
ANION GAP SERPL CALCULATED.3IONS-SCNC: 9 MMOL/L (ref 3–14)
AST SERPL W P-5'-P-CCNC: 18 U/L (ref 0–45)
BASOPHILS # BLD AUTO: 0 10E9/L (ref 0–0.2)
BASOPHILS NFR BLD AUTO: 0.2 %
BILIRUB SERPL-MCNC: 0.7 MG/DL (ref 0.2–1.3)
BUN SERPL-MCNC: 12 MG/DL (ref 7–30)
CALCIUM SERPL-MCNC: 8.7 MG/DL (ref 8.5–10.1)
CHLORIDE SERPL-SCNC: 108 MMOL/L (ref 94–109)
CHOLEST SERPL-MCNC: 253 MG/DL
CO2 SERPL-SCNC: 26 MMOL/L (ref 20–32)
CREAT SERPL-MCNC: 0.58 MG/DL (ref 0.52–1.04)
DIFFERENTIAL METHOD BLD: NORMAL
EOSINOPHIL # BLD AUTO: 0.1 10E9/L (ref 0–0.7)
EOSINOPHIL NFR BLD AUTO: 1.3 %
ERYTHROCYTE [DISTWIDTH] IN BLOOD BY AUTOMATED COUNT: 12.7 % (ref 10–15)
GFR SERPL CREATININE-BSD FRML MDRD: ABNORMAL ML/MIN/1.7M2
GLUCOSE SERPL-MCNC: 93 MG/DL (ref 70–99)
HCT VFR BLD AUTO: 40 % (ref 35–47)
HDLC SERPL-MCNC: 81 MG/DL
HGB BLD-MCNC: 12.9 G/DL (ref 11.7–15.7)
IMM GRANULOCYTES # BLD: 0 10E9/L (ref 0–0.4)
IMM GRANULOCYTES NFR BLD: 0 %
LDLC SERPL CALC-MCNC: 146 MG/DL
LYMPHOCYTES # BLD AUTO: 1.7 10E9/L (ref 0.8–5.3)
LYMPHOCYTES NFR BLD AUTO: 37.3 %
MCH RBC QN AUTO: 30.7 PG (ref 26.5–33)
MCHC RBC AUTO-ENTMCNC: 32.3 G/DL (ref 31.5–36.5)
MCV RBC AUTO: 95 FL (ref 78–100)
MONOCYTES # BLD AUTO: 0.5 10E9/L (ref 0–1.3)
MONOCYTES NFR BLD AUTO: 11.6 %
NEUTROPHILS # BLD AUTO: 2.3 10E9/L (ref 1.6–8.3)
NEUTROPHILS NFR BLD AUTO: 49.6 %
NONHDLC SERPL-MCNC: 172 MG/DL
PLATELET # BLD AUTO: 179 10E9/L (ref 150–450)
POTASSIUM SERPL-SCNC: 3.6 MMOL/L (ref 3.4–5.3)
PROT SERPL-MCNC: 6.9 G/DL (ref 6.8–8.8)
RBC # BLD AUTO: 4.2 10E12/L (ref 3.8–5.2)
SODIUM SERPL-SCNC: 143 MMOL/L (ref 133–144)
TRIGL SERPL-MCNC: 131 MG/DL
WBC # BLD AUTO: 4.6 10E9/L (ref 4–11)

## 2017-04-19 PROCEDURE — 25000128 H RX IP 250 OP 636: Performed by: INTERNAL MEDICINE

## 2017-04-19 PROCEDURE — 99215 OFFICE O/P EST HI 40 MIN: CPT | Performed by: INTERNAL MEDICINE

## 2017-04-19 PROCEDURE — 80061 LIPID PANEL: CPT | Performed by: INTERNAL MEDICINE

## 2017-04-19 PROCEDURE — 85025 COMPLETE CBC W/AUTO DIFF WBC: CPT | Performed by: INTERNAL MEDICINE

## 2017-04-19 PROCEDURE — 80053 COMPREHEN METABOLIC PANEL: CPT | Performed by: INTERNAL MEDICINE

## 2017-04-19 PROCEDURE — 99211 OFF/OP EST MAY X REQ PHY/QHP: CPT

## 2017-04-19 PROCEDURE — 36591 DRAW BLOOD OFF VENOUS DEVICE: CPT

## 2017-04-19 RX ORDER — EVEROLIMUS 10 MG/1
10 TABLET ORAL DAILY
Qty: 28 TABLET | Refills: 0 | Status: SHIPPED | OUTPATIENT
Start: 2017-04-19 | End: 2017-01-01 | Stop reason: ALTCHOICE

## 2017-04-19 RX ORDER — EXEMESTANE 25 MG/1
25 TABLET ORAL DAILY
Qty: 28 TABLET | Refills: 0 | Status: SHIPPED | OUTPATIENT
Start: 2017-04-19 | End: 2017-01-01

## 2017-04-19 RX ORDER — HEPARIN SODIUM (PORCINE) LOCK FLUSH IV SOLN 100 UNIT/ML 100 UNIT/ML
SOLUTION INTRAVENOUS
Status: DISCONTINUED
Start: 2017-04-19 | End: 2017-04-19 | Stop reason: HOSPADM

## 2017-04-19 RX ORDER — HEPARIN SODIUM (PORCINE) LOCK FLUSH IV SOLN 100 UNIT/ML 100 UNIT/ML
500 SOLUTION INTRAVENOUS ONCE
Status: COMPLETED | OUTPATIENT
Start: 2017-04-19 | End: 2017-04-19

## 2017-04-19 RX ADMIN — SODIUM CHLORIDE, PRESERVATIVE FREE 500 UNITS: 5 INJECTION INTRAVENOUS at 08:52

## 2017-04-19 ASSESSMENT — PAIN SCALES - GENERAL: PAINLEVEL: SEVERE PAIN (7)

## 2017-04-19 NOTE — PROGRESS NOTES
Hematology/ Oncology Follow-up Visit:  Apr 19, 2017    Reason for Visit:   Chief Complaint   Patient presents with     Oncology Clinic Visit     Recheck Breast CA, review labs        Oncologic History:  Breast cancer (H)  Etta Cervantes presented with increasing lump in the right axilla that s lump has been growing without any pain or associated symptoms. Subsequently she was evaluated by primary care physician. Diagnostic digital mammogram was done on 01/13/2015 showing enlarged lymph nodes in the right axilla. There was some skin thickening in the right breast anteriorly and laterally. There are no parenchymal changes in the right breast. The left breast shows a 1.7 cm spiculated mass at approximately 2 to 3 o'clock position, 15.2 cm away from the nipple. A right breast ultrasound was subsequently done and ultrasound guided biopsy was done. Needle biopsy of the left breast did show infiltrating ductal carcinoma grade I of III with no angiolymphatic invasion seen. No associated ductal carcinoma in situ. Estrogen receptor, progresterone receptor were positive. HER-2/sadie receptor came back negative.. Another biopsy from the right axilla did show metastatic ductal carcinoma. PET scan was done on January 26, 2015 showing few small right posterior cervical chain nodes the largest is 1.2 cm. There is extensive bulky right axillary adenopathy demonstrating hypermetabolic FDG uptake with SUV of 10.6. There is skin thickening involving the right breast which demonstrated low-grade FDG uptake. A 1.2 cm lesion on the lateral aspect of the left breast demonstrated minimally increased FDG uptake with SUV of 2. Scattered hypermetabolic mediastinal lymph nodes located in the left superior anterior mediastinum, right paratracheal and precarinal U, subcranial adenopathy with javon metastases. This focus hypermetabolic FDG uptake identified definitive Amanda posterior aspect off intertrochanteric region of the proximal left femur  consistent with bone metastases. There is also 1.4 cm hypermetabolic FDG uptake identified in the anterior medial segment of the left hepatic lobe consistent with liver metastases. Additional 2.1 cm low-attenuation lesion anterior aspect of the right lobe which needs to be determined. The patient was started on chemotherapy with Taxotere and Cytoxan on February 9, 2015.  She concluded 4 cycles of Taxotere and Cytoxan. She started on Arimidex 1 mg orally daily. Currently she is on Faslodex and ibrance      Interval History:  Patient is here today for follow-up visit. She has been on Afinitor and exemestane without significant side effects. She denies any recent history of nausea or vomiting or diarrhea. Her mucositis has been under control with dexamethasone mouthwash. She's been having lower back pain greatly. She denies any shortness of breath or cough or wheezing.    Review Of Systems:  Constitutional: Negative for fever, chills, and night sweats.  Skin: negative.  Eyes: negative.  Ears/Nose/Throat: negative.  Respiratory: No shortness of breath, dyspnea on exertion, cough, or hemoptysis.  Cardiovascular: negative.  Gastrointestinal: negative.  Genitourinary: negative.  Musculoskeletal: Lower back pain as mentioned above  Neurologic: negative.  Psychiatric: negative.  Hematologic/Lymphatic/Immunologic: negative.  Endocrine: negative.    All other ROS negative unless mentioned in interval history.    Past medical, social, surgical, and family histories reviewed.    Allergies:  Allergies as of 04/19/2017 - Manuel as Reviewed 04/19/2017   Allergen Reaction Noted     Animal dander Cough 01/23/2015     Cats  07/15/2010     Dust mites Cough 01/23/2015     Pollen extract  01/23/2015     Seasonal allergies  07/15/2010       Current Medications:  Current Outpatient Prescriptions   Medication Sig Dispense Refill     exemestane (AROMASIN) 25 MG tablet Take 1 tablet (25 mg) by mouth daily Take after a meal. 28 tablet 0      everolimus (AFINITOR) 10 MG tablet CHEMO Take 1 tablet (10 mg) by mouth daily Avoid grapefruit and grapefruit juice. May be given with or without food, but should be consistent. 28 tablet 0     zolpidem (AMBIEN) 10 MG tablet Take 1 tablet 30 minutes prior to bedtime 30 tablet 5     dexamethasone 0.1 MG/ML solution swish for 2 minutes and spit 4 times per day and not to eat for 1 hour after using the mouthwash. 500 mL 1     magic mouthwash suspension (diphenhydrAMINE 5 mg/5 mL, lidocaine 50 mg/5 mL, Maalox 2.5 g/5 mL , simethicone 6.65 mg/5 mL) Swish and swallow 10 mLs in mouth every 6 hours as needed for mouth sores 240 mL 10     oxybutynin (DITROPAN XL) 10 MG 24 hr tablet Take 1 tablet (10 mg) by mouth daily (Needs follow-up appointment for this medication) 90 tablet 3     beclomethasone (QVAR) 80 MCG/ACT Inhaler Inhale 2 puffs into the lungs 2 times daily 3 Inhaler 3     azelastine (ASTELIN) 0.1 % nasal spray Spray 1-2 sprays into both nostrils 2 times daily as needed for rhinitis 3 Bottle 3     levothyroxine (SYNTHROID, LEVOTHROID) 25 MCG tablet Take 1 tablet (25 mcg) by mouth daily 90 tablet 3     albuterol (VENTOLIN HFA) 108 (90 BASE) MCG/ACT inhaler Inhale 2 puffs into the lungs every 4 hours as needed 1 Inhaler 2     albuterol (2.5 MG/3ML) 0.083% nebulizer solution Take 1 vial (2.5 mg) by nebulization every 4 hours as needed for shortness of breath / dyspnea 30 vial 3     loratadine (CLARITIN) 10 MG tablet Take 10 mg by mouth daily       Acetaminophen (TYLENOL PO) Take 650 mg by mouth every 4 hours as needed for mild pain or fever       [DISCONTINUED] exemestane (AROMASIN) 25 MG tablet Take 1 tablet (25 mg) by mouth daily Take after a meal. 28 tablet 0     [DISCONTINUED] everolimus (AFINITOR) 10 MG tablet CHEMO Take 1 tablet (10 mg) by mouth daily Avoid grapefruit and grapefruit juice. May be given with or without food, but should be consistent. 28 tablet 0     mometasone-formoterol (DULERA) 200-5 MCG/ACT  "oral inhaler Inhale 2 puffs into the lungs 2 times daily 3 Inhaler 0     order for DME Equipment being ordered: CPAP  AIRSENSE 10  9-11 CM H20  # [id number]  DN# [id number]       oxyCODONE (ROXICODONE) 5 MG immediate release tablet Take 1 tablet (5 mg) by mouth every 4 hours as needed for moderate to severe pain (Patient not taking: Reported on 4/19/2017) 30 tablet 0     predniSONE (DELTASONE) 20 MG tablet Take 1 tablet (20 mg) by mouth 2 times daily For Yellow or Red zone on Asthma Action Plan. (Patient not taking: Reported on 3/2/2017) 10 tablet 0     Nebulizers (DANIELLE LC PLUS NEBULIZER) MISC   0     order for DME Equipment being ordered: accessory kit 1 Device 0     calcium 600 MG tablet Take 3 tablets by mouth daily  60 tablet      cholecalciferol (VITAMIN D) 1000 UNIT tablet Take 1,500 Units by mouth daily  100 tablet 3     ORDER FOR DME F&P Zest medium nasal mask       ORDER FOR DME Auto-CPAP:  Max 11 cm H2O  Min 9 cm H2O  Changed in clinic  Continuous    Lifetime need and heated humidity.             Physical Exam:  /80 (BP Location: Right arm, Patient Position: Chair, Cuff Size: Adult Large)  Pulse 75  Temp 98.4  F (36.9  C) (Tympanic)  Resp 18  Ht 1.638 m (5' 4.49\")  Wt 93.8 kg (206 lb 12.8 oz)  SpO2 99%  Breastfeeding? No  BMI 34.96 kg/m2  Wt Readings from Last 12 Encounters:   04/19/17 93.8 kg (206 lb 12.8 oz)   03/22/17 94 kg (207 lb 4.8 oz)   03/02/17 92.1 kg (203 lb 1.6 oz)   01/27/17 93 kg (205 lb)   01/19/17 95 kg (209 lb 8 oz)   11/23/16 95.7 kg (210 lb 14.4 oz)   10/12/16 98.1 kg (216 lb 3.2 oz)   09/23/16 100.2 kg (221 lb)   08/24/16 98.9 kg (218 lb)   07/13/16 100.3 kg (221 lb 3.2 oz)   06/01/16 102.5 kg (225 lb 14.4 oz)   04/28/16 104.7 kg (230 lb 12.8 oz)     ECOG performance status: 1  GENERAL APPEARANCE: Healthy, alert and in no acute distress.  HEENT: Sclerae anicteric. PERRLA. Oropharynx without ulcers, lesions, or thrush.  NECK: Supple. No asymmetry or masses.  LYMPHATICS: No " "palpable cervical, supraclavicular, axillary, or inguinal lymphadenopathy.  RESP: Lungs clear to auscultation bilaterally without rales, rhonchi or wheezes.  BREAST:there are 2 axillary lymph nodes in the right axilla. No dominant masses or axillary adenopathy on the left breast.\"CARDIOVASCULAR: Regular rate and rhythm. Normal S1, S2; no S3 or S4. No murmur, gallop, or rub.  ABDOMEN: Soft, nontender. Bowel sounds normal. No palpable organomegaly or masses.  MUSCULOSKELETAL: Extremities without gross deformities noted. No edema of bilateral lower extremities.  SKIN: No suspicious lesions or rashes.  NEURO: Alert and oriented x 3. Cranial nerves II-XII grossly intact.  PSYCHIATRIC: Mentation and affect appear normal.    Laboratory/Imaging Studies:  Infusion Therapy Visit on 04/19/2017   Component Date Value Ref Range Status     WBC 04/19/2017 4.6  4.0 - 11.0 10e9/L Final     RBC Count 04/19/2017 4.20  3.8 - 5.2 10e12/L Final     Hemoglobin 04/19/2017 12.9  11.7 - 15.7 g/dL Final     Hematocrit 04/19/2017 40.0  35.0 - 47.0 % Final     MCV 04/19/2017 95  78 - 100 fl Final     MCH 04/19/2017 30.7  26.5 - 33.0 pg Final     MCHC 04/19/2017 32.3  31.5 - 36.5 g/dL Final     RDW 04/19/2017 12.7  10.0 - 15.0 % Final     Platelet Count 04/19/2017 179  150 - 450 10e9/L Final     Diff Method 04/19/2017 Automated Method   Final     % Neutrophils 04/19/2017 49.6  % Final     % Lymphocytes 04/19/2017 37.3  % Final     % Monocytes 04/19/2017 11.6  % Final     % Eosinophils 04/19/2017 1.3  % Final     % Basophils 04/19/2017 0.2  % Final     % Immature Granulocytes 04/19/2017 0.0  % Final     Absolute Neutrophil 04/19/2017 2.3  1.6 - 8.3 10e9/L Final     Absolute Lymphocytes 04/19/2017 1.7  0.8 - 5.3 10e9/L Final     Absolute Monocytes 04/19/2017 0.5  0.0 - 1.3 10e9/L Final     Absolute Eosinophils 04/19/2017 0.1  0.0 - 0.7 10e9/L Final     Absolute Basophils 04/19/2017 0.0  0.0 - 0.2 10e9/L Final     Abs Immature Granulocytes " 04/19/2017 0.0  0 - 0.4 10e9/L Final     Sodium 04/19/2017 143  133 - 144 mmol/L Final     Potassium 04/19/2017 3.6  3.4 - 5.3 mmol/L Final     Chloride 04/19/2017 108  94 - 109 mmol/L Final     Carbon Dioxide 04/19/2017 26  20 - 32 mmol/L Final     Anion Gap 04/19/2017 9  3 - 14 mmol/L Final     Glucose 04/19/2017 93  70 - 99 mg/dL Final     Urea Nitrogen 04/19/2017 12  7 - 30 mg/dL Final     Creatinine 04/19/2017 0.58  0.52 - 1.04 mg/dL Final     GFR Estimate 04/19/2017   >60 mL/min/1.7m2 Final                    Value:>90  Non  GFR Calc       GFR Estimate If Black 04/19/2017   >60 mL/min/1.7m2 Final                    Value:>90   GFR Calc       Calcium 04/19/2017 8.7  8.5 - 10.1 mg/dL Final     Bilirubin Total 04/19/2017 0.7  0.2 - 1.3 mg/dL Final     Albumin 04/19/2017 3.3* 3.4 - 5.0 g/dL Final     Protein Total 04/19/2017 6.9  6.8 - 8.8 g/dL Final     Alkaline Phosphatase 04/19/2017 70  40 - 150 U/L Final     ALT 04/19/2017 24  0 - 50 U/L Final     AST 04/19/2017 18  0 - 45 U/L Final     Cholesterol 04/19/2017 253* <200 mg/dL Final    Desirable:       <200 mg/dl     Triglycerides 04/19/2017 131  <150 mg/dL Final     HDL Cholesterol 04/19/2017 81  >49 mg/dL Final     LDL Cholesterol Calculated 04/19/2017 146* <100 mg/dL Final    Comment: Above desirable:  100-129 mg/dl   Borderline High:  130-159 mg/dL   High:             160-189 mg/dL   Very high:       >189 mg/dl       Non HDL Cholesterol 04/19/2017 172* <130 mg/dL Final    Comment: Above Desirable:  130-159 mg/dl   Borderline high:  160-189 mg/dl   High:             190-219 mg/dl   Very high:       >219 mg/dl          Assessment and plan:  (E03.9) Hypothyroidism, unspecified type  (  (C50.911,  C50.912) Bilateral malignant neoplasm of breast in female, unspecified site of breast (H)  (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 We will continue on the current regimen. We will plan to arrange for staging workup with a bone scan and CT scan of the chest abdomen and pelvis in 1 month time. I will see the patient again in one month or sooner as needed developments or concerns.    Plan: exemestane (AROMASIN) 25 MG tablet, everolimus (AFINITOR) 10 MG tablet CHEMO, CBC with platelets differential, Comprehensive metabolic panel, Ca27.29  breast tumor marker, CEA, CT Chest/Abdomen/Pelvis w Contrast, NM Bone Scan  Whole Body    (C79.51) Bone metastasis (H)  Patient will continue on Denosumab 120 mg subcutaneously every 3 months. The patient also will continue on calcium and vitamin D supplements.    (K12.31) Mucositis due to chemotherapy  Patient will continue on dexamethasone Mouthwash.    (G47.01) Insomnia due to medical condition  Patient is currently on Ambien 10 mg orally every night.    The patient is ready to learn, no apparent learning barriers were identified.  Diagnosis and treatment plans were explained to the patient. The patient expressed understanding of the content. The patient asked appropriate questions. The patient questions were answered to her satisfaction.    Chart documentation with Dragon Voice recognition Software. Although reviewed after completion, some words and grammatical errors may remain.

## 2017-04-19 NOTE — MR AVS SNAPSHOT
After Visit Summary   4/19/2017    Etta Cervantes    MRN: [id number]           Patient Information     Date Of Birth          1960        Visit Information        Provider Department      4/19/2017 8:30 AM ROOM 2 [facility]        Today's Diagnoses     Secondary malignant neoplasm of liver (H)        Uterine leiomyoma, unspecified location        Carcinoma of breast metastatic to liver, unspecified laterality (H)        Bilateral malignant neoplasm of breast in female, unspecified site of breast (H)        Bone metastasis (H)        Breast cancer metastasized to axillary lymph node, right (H)           Follow-ups after your visit        Your next 10 appointments already scheduled     Apr 19, 2017  9:30 AM CDT   Return Visit with Brodie Cassidy MD   [facility] ([facility])    [facility]  [address]  [facility] [address] [phone]              Who to contact     If you have questions or need follow up information about today's clinic visit or your schedule please contact [facility] [place] [facility] directly at [phone].  Normal or non-critical lab and imaging results will be communicated to you by KnotProfithart, letter or phone within 4 business days after the clinic has received the results. If you do not hear from us within 7 days, please contact the clinic through KnotProfithart or phone. If you have a critical or abnormal lab result, we will notify you by phone as soon as possible.  Submit refill requests through Snapfish or call your pharmacy and they will forward the refill request to us. Please allow 3 business days for your refill to be completed.          Additional Information About Your Visit        MyChart Information     Snapfish gives you secure access to your electronic health record. If you see a primary care provider, you can also send messages to your care team and make appointments. If you have questions, please  call your primary care clinic.  If you do not have a primary care provider, please call [phone] and they will assist you.        Care EveryWhere ID     This is your Care EveryWhere ID. This could be used by other organizations to access your [place] medical records  [id number]         Blood Pressure from Last 3 Encounters:   03/22/17 156/76   03/02/17 143/84   02/03/17 112/71    Weight from Last 3 Encounters:   03/22/17 94 kg (207 lb 4.8 oz)   03/02/17 92.1 kg (203 lb 1.6 oz)   01/27/17 93 kg (205 lb)              We Performed the Following     CBC with platelets differential     Comprehensive metabolic panel     Lipid Profile        Primary Care Provider Office Phone # Fax #    Johana Fairbanks [phone]       [facility] [address] [facility] [address]        Thank you!     Thank you for choosing [facility]  for your care. Our goal is always to provide you with excellent care. Hearing back from our patients is one way we can continue to improve our services. Please take a few minutes to complete the written survey that you may receive in the mail after your visit with us. Thank you!             Your Updated Medication List - Protect others around you: Learn how to safely use, store and throw away your medicines at www.disposemymeds.org.          This list is accurate as of: 4/19/17  9:12 AM.  Always use your most recent med list.                   Brand Name Dispense Instructions for use    * albuterol (2.5 MG/3ML) 0.083% neb solution     30 vial    Take 1 vial (2.5 mg) by nebulization every 4 hours as needed for shortness of breath / dyspnea       * albuterol 108 (90 BASE) MCG/ACT Inhaler    VENTOLIN HFA    1 Inhaler    Inhale 2 puffs into the lungs every 4 hours as needed       azelastine 0.1 % spray    ASTELIN    3 Bottle    Spray 1-2 sprays into both nostrils 2 times daily as needed for rhinitis       beclomethasone 80 MCG/ACT Inhaler    QVAR    3 Inhaler     Inhale 2 puffs into the lungs 2 times daily       calcium 600 MG tablet     60 tablet    Take 3 tablets by mouth daily       cholecalciferol 1000 UNIT tablet    vitamin D    100 tablet    Take 1,500 Units by mouth daily       dexamethasone 0.1 MG/ML solution     500 mL    swish for 2 minutes and spit 4 times per day and not to eat for 1 hour after using the mouthwash.       everolimus 10 MG tablet CHEMO    AFINITOR    28 tablet    Take 1 tablet (10 mg) by mouth daily Avoid grapefruit and grapefruit juice. May be given with or without food, but should be consistent.       exemestane 25 MG tablet    AROMASIN    28 tablet    Take 1 tablet (25 mg) by mouth daily Take after a meal.       levothyroxine 25 MCG tablet    SYNTHROID/LEVOTHROID    90 tablet    Take 1 tablet (25 mcg) by mouth daily       lidocaine visc 2% 2.5mL/5mL & maalox/mylanta w/ simeth 2.5mL/5mL & diphenhydrAMINE 5mg/5mL Susp suspension    [facility] Mouth[facility]    240 mL    Swish and swallow 10 mLs in mouth every 6 hours as needed for mouth sores       loratadine 10 MG tablet    CLARITIN     Take 10 mg by mouth daily       mometasone-formoterol 200-5 MCG/ACT oral inhaler    DULERA    3 Inhaler    Inhale 2 puffs into the lungs 2 times daily       * order for DME      F&P Zest medium nasal mask       order for DME      Equipment being ordered: CPAP AIRSENSE 10 9-11 CM H20 # [id number]  DN# [id number]       * order for DME      Auto-CPAP: Max 11 cm H2O Min 9 cm H2O Changed in clinic Continuous  Lifetime need and heated humidity.       order for DME     1 Device    Equipment being ordered: accessory kit       oxybutynin 10 MG 24 hr tablet    DITROPAN XL    90 tablet    Take 1 tablet (10 mg) by mouth daily (Needs follow-up appointment for this medication)       oxyCODONE 5 MG IR tablet    ROXICODONE    30 tablet    Take 1 tablet (5 mg) by mouth every 4 hours as needed for moderate to severe pain       DANIELLE LC PLUS NEBULIZER Misc          predniSONE 20 MG tablet     DELTASONE    10 tablet    Take 1 tablet (20 mg) by mouth 2 times daily For Yellow or Red zone on Asthma Action Plan.       TYLENOL PO      Take 650 mg by mouth every 4 hours as needed for mild pain or fever       zolpidem 10 MG tablet    AMBIEN    30 tablet    Take 1 tablet 30 minutes prior to bedtime       * Notice:  This list has 4 medication(s) that are the same as other medications prescribed for you. Read the directions carefully, and ask your doctor or other care provider to review them with you.

## 2017-04-19 NOTE — NURSING NOTE
"Etta Cervantes is a 56 year old female who presents for:  Chief Complaint   Patient presents with     Oncology Clinic Visit     Recheck Breast CA, review labs         Initial Vitals:  /80 (BP Location: Right arm, Patient Position: Chair, Cuff Size: Adult Large)  Pulse 75  Temp 98.4  F (36.9  C) (Tympanic)  Resp 18  Ht 1.638 m (5' 4.49\")  Wt 93.8 kg (206 lb 12.8 oz)  SpO2 99%  Breastfeeding? No  BMI 34.96 kg/m2 Estimated body mass index is 34.96 kg/(m^2) as calculated from the following:    Height as of this encounter: 1.638 m (5' 4.49\").    Weight as of this encounter: 93.8 kg (206 lb 12.8 oz).. Body surface area is 2.07 meters squared. BP completed using cuff size: large  Severe Pain (7) No LMP recorded. Allergies and medications reviewed.     Medications: MEDICATION REFILLS NEEDED TODAY.  Pharmacy name entered into Digital Path:    [facility] [id number] [place] [facility] - [place], [place] - [address] [place] [address] [facility] - [place], [place] - [address]    Comments:Recheck Breast CA, review labs.     10  minutes for nursing intake (face to face time)   Josephine Virgen CMA        "

## 2017-04-19 NOTE — PROGRESS NOTES
Infusion Nursing Note:  Etta Cervantes presents today for PAC labs.    Patient seen by provider today: Yes: Dr. Cassidy   present during visit today: Not Applicable.    Note: N/A.    Intravenous Access:  Implanted Port.    Treatment Conditions:  Not Applicable.      Post Infusion Assessment:  Patient tolerated infusion without incident.    Discharge Plan:   Patient discharged in stable condition accompanied by: self.    Hunter Urbina RN

## 2017-04-19 NOTE — PATIENT INSTRUCTIONS
Raul Quiles~           We'll see you be back in clinic with Dr. Cassidy in about 4 weeks with imaging and lab work to be done beforehand.  Thank you.    As always, please done hesitate to call us with any questions or concerns.      Sincerely,    Pratima Felipe RN, OCN  Oncology/Hematology  [phone]

## 2017-04-19 NOTE — MR AVS SNAPSHOT
After Visit Summary   4/19/2017    Etta Cervantes    MRN: [id number]           Patient Information     Date Of Birth          1960        Visit Information        Provider Department      4/19/2017 9:30 AM Brodie Cassidy MD [facility] ONCOLOGY      Today's Diagnoses     Secondary malignant neoplasm of liver (H)        Carcinoma of breast metastatic to liver, unspecified laterality (H)        Bone metastasis (H)        Breast cancer metastasized to axillary lymph node, right (H)        Bilateral malignant neoplasm of breast in female, unspecified site of breast (H)          Care Instructions    Hi Etta~           We'll see you be back in clinic with Dr. Cassidy in about 4 weeks with imaging and lab work to be done beforehand.  Thank you.    As always, please done hesitate to call us with any questions or concerns.      Sincerely,    Pratima Felipe RN, N  Oncology/Hematology  [phone]        Follow-ups after your visit        Follow-up notes from your care team     Return in about 4 weeks (around 5/17/2017) for Imaging ordered before next appointment, Blood work before next appointment.      Your next 10 appointments already scheduled     May 16, 2017  7:45 AM CDT   NM INJECTION with [id number]   [facility] ([facility])    [address] [address]   [phone]            May 16, 2017  8:00 AM CDT   Level O with ROOM 2 [facility] ([facility])    n [facility]  [address]  [facility] [address]   [phone]            May 16, 2017  9:30 AM CDT   CT CHEST/ABDOMEN/PELVIS W CONTRAST with WYCT1   [facility] CT ([facility])    [address] [address]   [phone]           Please bring any scans or X-rays taken at other hospitals, if similar tests were done. Also bring a list of your medicines, including vitamins,  minerals and over-the-counter drugs. It is safest to leave personal items at home.  Be sure to tell your doctor:   If you have any allergies.   If there s any chance you are pregnant.   If you are breastfeeding.   If you have any special needs.  You may have contrast for this exam. To prepare:   Do not eat or drink for 2 hours before your exam. If you need to take medicine, you may take it with small sips of water. (We may ask you to take liquid medicine as well.)   The day before your exam, drink extra fluids at least six 8-ounce glasses (unless your doctor tells you to restrict your fluids).  Patients over 70 or patients with diabetes or kidney problems:   If you haven t had a blood test (creatinine test) within the last 30 days, go to your clinic or Diagnostic Imaging Department for this test.  If you have diabetes:   If your kidney function is normal, continue taking your metformin (Avandamet, Glucophage, Glucovance, Metaglip) on the day of your exam.   If your kidney function is abnormal, wait 48 hours before restarting this medicine.  You will have oral contrast for this exam:   You will drink the contrast at home. Get this from your clinic or Diagnostic Imaging Department. Please follow the directions given.  Please wear loose clothing, such as a sweat suit or jogging clothes. Avoid snaps, zippers and other metal. We may ask you to undress and put on a hospital gown.  If you have any questions, please call the Imaging Department where you will have your exam.            May 16, 2017 11:00 AM CDT   NM BONE SCAN WHOLE BODY with WYNM1   [facility] ([facility])    [address] [address]   [phone]           Please bring a list of your medicines to the exam. (Include vitamins, minerals and over-the-counter drugs.) You should wear comfortable clothes. Leave your valuables at home. Please bring related prior results and films. Tell your doctor:   If you  are breastfeeding or may be pregnant.   If you have had a barium test within the past few days. Barium may change the results of certain exams.   If you think you may need sedation (medicine to help you relax).  You may eat and drink as normal.  Drink plenty of fluids and empty bladder frequently between injection and scans.            May 18, 2017  9:30 AM CDT   Return Visit with Brodie Cassidy MD   [facility] ([facility])    [facility]  [address] [facility] [address]  [facility] [address]   [phone]              Future tests that were ordered for you today     Open Future Orders        Priority Expected Expires Ordered    CBC with platelets differential Routine 5/17/2017 4/19/2018 4/19/2017    Comprehensive metabolic panel Routine 5/17/2017 4/19/2018 4/19/2017    Ca27.29  breast tumor marker Routine 5/17/2017 4/19/2018 4/19/2017    CEA Routine 5/17/2017 4/19/2018 4/19/2017    CT Chest/Abdomen/Pelvis w Contrast Routine 5/17/2017 4/19/2018 4/19/2017    NM Bone Scan Whole Body Routine 5/17/2017 4/19/2018 4/19/2017            Who to contact     If you have questions or need follow up information about today's clinic visit or your schedule please contact [facility] directly at [phone].  Normal or non-critical lab and imaging results will be communicated to you by Krauttoolshart, letter or phone within 4 business days after the clinic has received the results. If you do not hear from us within 7 days, please contact the clinic through Krauttoolshart or phone. If you have a critical or abnormal lab result, we will notify you by phone as soon as possible.  Submit refill requests through Flywheel or call your pharmacy and they will forward the refill request to us. Please allow 3 business days for your refill to be completed.          Additional Information About Your Visit        [employer] Information     Flywheel gives you secure access to your electronic health record. If  "you see a primary care provider, you can also send messages to your care team and make appointments. If you have questions, please call your primary care clinic.  If you do not have a primary care provider, please call [phone] and they will assist you.        Care EveryWhere ID     This is your Care EveryWhere ID. This could be used by other organizations to access your [facility] medical records  [id number]        Your Vitals Were     Pulse Temperature Respirations Height Pulse Oximetry Breastfeeding?    75 98.4  F (36.9  C) (Tympanic) 18 1.638 m (5' 4.49\") 99% No    BMI (Body Mass Index)                   34.96 kg/m2            Blood Pressure from Last 3 Encounters:   04/19/17 134/80   03/22/17 156/76   03/02/17 143/84    Weight from Last 3 Encounters:   04/19/17 93.8 kg (206 lb 12.8 oz)   03/22/17 94 kg (207 lb 4.8 oz)   03/02/17 92.1 kg (203 lb 1.6 oz)                 Where to get your medicines      These medications were sent to [facility] - [place], [place] - [address]  [address], [facility] [address]     Phone:  [phone]     everolimus 10 MG tablet CHEMO    exemestane 25 MG tablet          Primary Care Provider Office Phone # Fax #    Johana Fairbanks [phone]       [facility] [address] [facility] [address]        Thank you!     Thank you for choosing [facility]  for your care. Our goal is always to provide you with excellent care. Hearing back from our patients is one way we can continue to improve our services. Please take a few minutes to complete the written survey that you may receive in the mail after your visit with us. Thank you!             Your Updated Medication List - Protect others around you: Learn how to safely use, store and throw away your medicines at www.disposemymeds.org.          This list is accurate as of: 4/19/17  9:57 AM.  Always use your most recent med list.                   Brand " Name Dispense Instructions for use    * albuterol (2.5 MG/3ML) 0.083% neb solution     30 vial    Take 1 vial (2.5 mg) by nebulization every 4 hours as needed for shortness of breath / dyspnea       * albuterol 108 (90 BASE) MCG/ACT Inhaler    VENTOLIN HFA    1 Inhaler    Inhale 2 puffs into the lungs every 4 hours as needed       azelastine 0.1 % spray    ASTELIN    3 Bottle    Spray 1-2 sprays into both nostrils 2 times daily as needed for rhinitis       beclomethasone 80 MCG/ACT Inhaler    QVAR    3 Inhaler    Inhale 2 puffs into the lungs 2 times daily       calcium 600 MG tablet     60 tablet    Take 3 tablets by mouth daily       cholecalciferol 1000 UNIT tablet    vitamin D    100 tablet    Take 1,500 Units by mouth daily       dexamethasone 0.1 MG/ML solution     500 mL    swish for 2 minutes and spit 4 times per day and not to eat for 1 hour after using the mouthwash.       everolimus 10 MG tablet CHEMO    AFINITOR    28 tablet    Take 1 tablet (10 mg) by mouth daily Avoid grapefruit and grapefruit juice. May be given with or without food, but should be consistent.       exemestane 25 MG tablet    AROMASIN    28 tablet    Take 1 tablet (25 mg) by mouth daily Take after a meal.       levothyroxine 25 MCG tablet    SYNTHROID/LEVOTHROID    90 tablet    Take 1 tablet (25 mcg) by mouth daily       lidocaine visc 2% 2.5mL/5mL & maalox/mylanta w/ simeth 2.5mL/5mL & diphenhydrAMINE 5mg/5mL Susp suspension    [facility] Mouth[facility]    240 mL    Swish and swallow 10 mLs in mouth every 6 hours as needed for mouth sores       loratadine 10 MG tablet    CLARITIN     Take 10 mg by mouth daily       mometasone-formoterol 200-5 MCG/ACT oral inhaler    DULERA    3 Inhaler    Inhale 2 puffs into the lungs 2 times daily       * order for DME      F&P Zest medium nasal mask       order for DME      Equipment being ordered: CPAP AIRSENSE 10 9-11 CM H20 # [id number]  DN# [id number]       * order for DME      Auto-CPAP: Max 11 cm  H2O Min 9 cm H2O Changed in clinic Continuous  Lifetime need and heated humidity.       order for DME     1 Device    Equipment being ordered: accessory kit       oxybutynin 10 MG 24 hr tablet    DITROPAN XL    90 tablet    Take 1 tablet (10 mg) by mouth daily (Needs follow-up appointment for this medication)       oxyCODONE 5 MG IR tablet    ROXICODONE    30 tablet    Take 1 tablet (5 mg) by mouth every 4 hours as needed for moderate to severe pain       DANIELLE LC PLUS NEBULIZER Misc          predniSONE 20 MG tablet    DELTASONE    10 tablet    Take 1 tablet (20 mg) by mouth 2 times daily For Yellow or Red zone on Asthma Action Plan.       TYLENOL PO      Take 650 mg by mouth every 4 hours as needed for mild pain or fever       zolpidem 10 MG tablet    AMBIEN    30 tablet    Take 1 tablet 30 minutes prior to bedtime       * Notice:  This list has 4 medication(s) that are the same as other medications prescribed for you. Read the directions carefully, and ask your doctor or other care provider to review them with you.

## 2017-04-28 ENCOUNTER — OFFICE VISIT (OUTPATIENT)
Dept: FAMILY MEDICINE | Facility: CLINIC | Age: 57
End: 2017-04-28
Payer: COMMERCIAL

## 2017-04-28 VITALS
BODY MASS INDEX: 34.16 KG/M2 | SYSTOLIC BLOOD PRESSURE: 122 MMHG | HEIGHT: 65 IN | WEIGHT: 205 LBS | TEMPERATURE: 99.6 F | HEART RATE: 96 BPM | DIASTOLIC BLOOD PRESSURE: 82 MMHG

## 2017-04-28 DIAGNOSIS — E78.5 HYPERLIPIDEMIA LDL GOAL <100: Primary | ICD-10-CM

## 2017-04-28 DIAGNOSIS — S29.012A MUSCLE STRAIN OF RIGHT UPPER BACK, INITIAL ENCOUNTER: ICD-10-CM

## 2017-04-28 PROCEDURE — 99213 OFFICE O/P EST LOW 20 MIN: CPT | Performed by: FAMILY MEDICINE

## 2017-04-28 RX ORDER — ATORVASTATIN CALCIUM 10 MG/1
10 TABLET, FILM COATED ORAL DAILY
Qty: 30 TABLET | Refills: 11 | Status: SHIPPED | OUTPATIENT
Start: 2017-04-28 | End: 2017-01-01

## 2017-04-28 NOTE — PROGRESS NOTES
SUBJECTIVE:                                                    Etta Cervantes is a 56 year old female who presents to clinic today for the following health issues:    Chief Complaint   Patient presents with     Medication Problem     Lipid are increasing potentially from exemestane (AROMASIN) 25 MG tablet       Problem list and histories reviewed & adjusted, as indicated.  Additional history:   LIPIDS RECHECK (brief)  Etta Cervantes presents for initial treatment of hyperlipidemia.    The patient reports no nausea or myalgias.   The patient is currently following a low fat diet plan and is not following a regular exercise plan.   Her chemo seems to be making her have the much higher cholesterol   Also has some right flank pain   Hurt with certain movements and right before bowel movements   She is going on a 2 week trip in 4 weeks   Patient Active Problem List   Diagnosis     Hypothyroid     Fibroids     CARDIOVASCULAR SCREENING; LDL GOAL LESS THAN 160     Menorrhagia     Moderate persistent asthma     DIAMOND (obstructive sleep apnea)     Insomnia     Health Care Home     ASCUS favor benign     Breast cancer (H)     Carcinoma of breast metastatic to liver (H)     Bone metastasis (H)     Breast cancer metastasized to axillary lymph node (H)     Drug-related hair loss     Mucositis due to chemotherapy     Secondary malignant neoplasm of liver (H)     Thyroid nodule     Emphysematous bleb of lung (H)     Chemotherapy-induced neutropenia (H)     Lipid Panel:  Recent Labs   Lab Test  04/19/17   0850  03/28/17   0810   03/18/15   0822  05/28/13   0935   CHOL  253*  263*   < >  177  221*   HDL  81  69   < >  49*  57   LDL  146*  163*   < >  101  141*   TRIG  131  157*   < >  133  117   CHOLHDLRATIO   --    --    --   3.6  4.0    < > = values in this interval not displayed.     Current Outpatient Prescriptions   Medication Sig Dispense Refill     Docusate Calcium (STOOL SOFTENER PO) Take by mouth as needed       CRANBERRY PO Take  by mouth daily       atorvastatin (LIPITOR) 10 MG tablet Take 1 tablet (10 mg) by mouth daily 30 tablet 11     exemestane (AROMASIN) 25 MG tablet Take 1 tablet (25 mg) by mouth daily Take after a meal. 28 tablet 0     everolimus (AFINITOR) 10 MG tablet CHEMO Take 1 tablet (10 mg) by mouth daily Avoid grapefruit and grapefruit juice. May be given with or without food, but should be consistent. 28 tablet 0     zolpidem (AMBIEN) 10 MG tablet Take 1 tablet 30 minutes prior to bedtime 30 tablet 5     mometasone-formoterol (DULERA) 200-5 MCG/ACT oral inhaler Inhale 2 puffs into the lungs 2 times daily 3 Inhaler 0     order for DME Equipment being ordered: CPAP  AIRSENSE 10  9-11 CM H20  # [id number]  DN# [id number]       dexamethasone 0.1 MG/ML solution swish for 2 minutes and spit 4 times per day and not to eat for 1 hour after using the mouthwash. 500 mL 1     magic mouthwash suspension (diphenhydrAMINE 5 mg/5 mL, lidocaine 50 mg/5 mL, Maalox 2.5 g/5 mL , simethicone 6.65 mg/5 mL) Swish and swallow 10 mLs in mouth every 6 hours as needed for mouth sores 240 mL 10     oxybutynin (DITROPAN XL) 10 MG 24 hr tablet Take 1 tablet (10 mg) by mouth daily (Needs follow-up appointment for this medication) 90 tablet 3     beclomethasone (QVAR) 80 MCG/ACT Inhaler Inhale 2 puffs into the lungs 2 times daily 3 Inhaler 3     azelastine (ASTELIN) 0.1 % nasal spray Spray 1-2 sprays into both nostrils 2 times daily as needed for rhinitis 3 Bottle 3     levothyroxine (SYNTHROID, LEVOTHROID) 25 MCG tablet Take 1 tablet (25 mcg) by mouth daily 90 tablet 3     albuterol (VENTOLIN HFA) 108 (90 BASE) MCG/ACT inhaler Inhale 2 puffs into the lungs every 4 hours as needed 1 Inhaler 2     albuterol (2.5 MG/3ML) 0.083% nebulizer solution Take 1 vial (2.5 mg) by nebulization every 4 hours as needed for shortness of breath / dyspnea 30 vial 3     predniSONE (DELTASONE) 20 MG tablet Take 1 tablet (20 mg) by mouth 2 times daily For Yellow or Red zone on  "Asthma Action Plan. 10 tablet 0     Nebulizers (DANIELLE LC PLUS NEBULIZER) MISC   0     order for DME Equipment being ordered: accessory kit 1 Device 0     calcium 600 MG tablet Take 3 tablets by mouth daily  60 tablet      cholecalciferol (VITAMIN D) 1000 UNIT tablet Take 1,500 Units by mouth daily  100 tablet 3     loratadine (CLARITIN) 10 MG tablet Take 10 mg by mouth daily       Acetaminophen (TYLENOL PO) Take 650 mg by mouth every 4 hours as needed for mild pain or fever       ORDER FOR DME F&P Zest medium nasal mask       ORDER FOR DME Auto-CPAP:  Max 11 cm H2O  Min 9 cm H2O  Changed in clinic  Continuous    Lifetime need and heated humidity.          oxyCODONE (ROXICODONE) 5 MG immediate release tablet Take 1 tablet (5 mg) by mouth every 4 hours as needed for moderate to severe pain (Patient not taking: Reported on 4/19/2017) 30 tablet 0     O: NAD      A: Hyperlipidemia    P: Regular exercise and a low fat diet are encouraged.  A fasting lipid profile is obtained today.  Side effects of cholesterol medications discussed.  Etta Cervantes is getting regular lipid testing for her chemo and will review this in 4 and 8 weeks to see how this is affecting her.       Labs reviewed in EPIC    ROS:  Constitutional, HEENT, cardiovascular, pulmonary, gi and gu systems are negative, except as otherwise noted.    OBJECTIVE:                                                    /82  Pulse 96  Temp 99.6  F (37.6  C) (Tympanic)  Ht 5' 4.5\" (1.638 m)  Wt 205 lb (93 kg)  BMI 34.64 kg/m2 Body mass index is 34.64 kg/(m^2).   GENERAL APPEARANCE: healthy, alert and no distress  Comprehensive back pain exam:  Tenderness of right lateral flank muscles mild no bony thoracic tenderness, Range of motion not limited by pain and Lower extremity strength functional and equal on both sides       ASSESSMENT/PLAN:                                                    1. Hyperlipidemia LDL goal <100statrt med   - atorvastatin (LIPITOR) 10 MG " tablet; Take 1 tablet (10 mg) by mouth daily  Dispense: 30 tablet; Refill: 11    2. Muscle strain of right upper back, initial encounter  stretchign encouraged follow up if not improving will be having bone scan      reports that she has never smoked. She has never used smokeless tobacco.          Johana Fairbanks M.D.  [facility]

## 2017-04-28 NOTE — MR AVS SNAPSHOT
After Visit Summary   4/28/2017    Etta Cervantes    MRN: [id number]           Patient Information     Date Of Birth          1960        Visit Information        Provider Department      4/28/2017 8:30 AM Johana Fairbanks MD [facility]        Today's Diagnoses     Hyperlipidemia LDL goal <100    -  1    Muscle strain of right upper back, initial encounter           Follow-ups after your visit        Your next 10 appointments already scheduled     May 16, 2017  7:45 AM CDT   NM INJECTION with WYNMINJ1   [facility] Medicine ([facility])    [address] [facility] [address]   [phone]            May 16, 2017  8:00 AM CDT   Level O with ROOM 2 [facility]fusion ([facility])    [facility]  [address]  [facility] [address]   [phone]            May 16, 2017  9:30 AM CDT   CT CHEST/ABDOMEN/PELVIS W CONTRAST with WYCT1   [facility] CT ([facility])    [address] [facility] [address]   [phone]           Please bring any scans or X-rays taken at other hospitals, if similar tests were done. Also bring a list of your medicines, including vitamins, minerals and over-the-counter drugs. It is safest to leave personal items at home.  Be sure to tell your doctor:   If you have any allergies.   If there s any chance you are pregnant.   If you are breastfeeding.   If you have any special needs.  You may have contrast for this exam. To prepare:   Do not eat or drink for 2 hours before your exam. If you need to take medicine, you may take it with small sips of water. (We may ask you to take liquid medicine as well.)   The day before your exam, drink extra fluids at least six 8-ounce glasses (unless your doctor tells you to restrict your fluids).  Patients over 70 or patients with diabetes or kidney problems:   If you haven t had a blood test (creatinine test)  within the last 30 days, go to your clinic or Diagnostic Imaging Department for this test.  If you have diabetes:   If your kidney function is normal, continue taking your metformin (Avandamet, Glucophage, Glucovance, Metaglip) on the day of your exam.   If your kidney function is abnormal, wait 48 hours before restarting this medicine.  You will have oral contrast for this exam:   You will drink the contrast at home. Get this from your clinic or Diagnostic Imaging Department. Please follow the directions given.  Please wear loose clothing, such as a sweat suit or jogging clothes. Avoid snaps, zippers and other metal. We may ask you to undress and put on a hospital gown.  If you have any questions, please call the Imaging Department where you will have your exam.            May 16, 2017 11:00 AM CDT   NM BONE SCAN WHOLE BODY with WYNM1   [facility] Medicine ([facility])    [address] [facility] [address]   [phone]           Please bring a list of your medicines to the exam. (Include vitamins, minerals and over-the-counter drugs.) You should wear comfortable clothes. Leave your valuables at home. Please bring related prior results and films. Tell your doctor:   If you are breastfeeding or may be pregnant.   If you have had a barium test within the past few days. Barium may change the results of certain exams.   If you think you may need sedation (medicine to help you relax).  You may eat and drink as normal.  Drink plenty of fluids and empty bladder frequently between injection and scans.            May 16, 2017  1:00 PM CDT   SLEEP DME MASK FITTING with CL SC DME   [facility] ([facility])    [address]  [facility] [address]   [phone]            May 18, 2017  9:30 AM CDT   Return Visit with Brodie Cassidy MD   [facility] ([facility])    [facility]  [address] [facility]  [address]  [facility] [address]   [phone]              Who to contact     Normal or non-critical lab and imaging results will be communicated to you by Optimushart, letter or phone within 4 business days after the clinic has received the results. If you do not hear from us within 7 days, please contact the clinic through Optimushart or phone. If you have a critical or abnormal lab result, we will notify you by phone as soon as possible.  Submit refill requests through icomasoft or call your pharmacy and they will forward the refill request to us. Please allow 3 business days for your refill to be completed.          If you need to speak with a  for additional information , please call: [phone]             Additional Information About Your Visit        icomasoft Information     icomasoft gives you secure access to your electronic health record. If you see a primary care provider, you can also send messages to your care team and make appointments. If you have questions, please call your primary care clinic.  If you do not have a primary care provider, please call [phone] and they will assist you.        Care EveryWhere ID     This is your Care EveryWhere ID. This could be used by other organizations to access your [place] medical records  [id number]        Your Vitals Were     Pulse Temperature Height BMI (Body Mass Index)          96 99.6  F (37.6  C) (Tympanic) 5' 4.5\" (1.638 m) 34.64 kg/m2         Blood Pressure from Last 3 Encounters:   04/28/17 122/82   04/19/17 134/80   03/22/17 156/76    Weight from Last 3 Encounters:   04/28/17 205 lb (93 kg)   04/19/17 206 lb 12.8 oz (93.8 kg)   03/22/17 207 lb 4.8 oz (94 kg)              Today, you had the following     No orders found for display         Today's Medication Changes          These changes are accurate as of: 4/28/17  9:28 AM.  If you have any questions, ask your nurse or doctor.               Start taking these medicines.        " Dose/Directions    atorvastatin 10 MG tablet   Commonly known as:  LIPITOR   Used for:  Hyperlipidemia LDL goal <100   Started by:  Johana Fairbanks MD        Dose:  10 mg   Take 1 tablet (10 mg) by mouth daily   Quantity:  30 tablet   Refills:  11            Where to get your medicines      These medications were sent to [place] [address] [place] [facility] - [place], [place] - [address] [facility]  [address] [facility] [address]     Phone:  [phone]     atorvastatin 10 MG tablet                Primary Care Provider Office Phone # Fax #    Johana Fairbanks [phone]       [facility] [address] [facility] [address]        Thank you!     Thank you for choosing [facility]  for your care. Our goal is always to provide you with excellent care. Hearing back from our patients is one way we can continue to improve our services. Please take a few minutes to complete the written survey that you may receive in the mail after your visit with us. Thank you!             Your Updated Medication List - Protect others around you: Learn how to safely use, store and throw away your medicines at www.disposemymeds.org.          This list is accurate as of: 4/28/17  9:28 AM.  Always use your most recent med list.                   Brand Name Dispense Instructions for use    * albuterol (2.5 MG/3ML) 0.083% neb solution     30 vial    Take 1 vial (2.5 mg) by nebulization every 4 hours as needed for shortness of breath / dyspnea       * albuterol 108 (90 BASE) MCG/ACT Inhaler    VENTOLIN HFA    1 Inhaler    Inhale 2 puffs into the lungs every 4 hours as needed       atorvastatin 10 MG tablet    LIPITOR    30 tablet    Take 1 tablet (10 mg) by mouth daily       azelastine 0.1 % spray    ASTELIN    3 Bottle    Spray 1-2 sprays into both nostrils 2 times daily as needed for rhinitis       beclomethasone 80 MCG/ACT Inhaler    QVAR    3 Inhaler    Inhale 2 puffs into the lungs 2 times daily       calcium  600 MG tablet     60 tablet    Take 3 tablets by mouth daily       cholecalciferol 1000 UNIT tablet    vitamin D    100 tablet    Take 1,500 Units by mouth daily       CRANBERRY PO      Take by mouth daily       dexamethasone 0.1 MG/ML solution     500 mL    swish for 2 minutes and spit 4 times per day and not to eat for 1 hour after using the mouthwash.       everolimus 10 MG tablet CHEMO    AFINITOR    28 tablet    Take 1 tablet (10 mg) by mouth daily Avoid grapefruit and grapefruit juice. May be given with or without food, but should be consistent.       exemestane 25 MG tablet    AROMASIN    28 tablet    Take 1 tablet (25 mg) by mouth daily Take after a meal.       levothyroxine 25 MCG tablet    SYNTHROID/LEVOTHROID    90 tablet    Take 1 tablet (25 mcg) by mouth daily       lidocaine visc 2% 2.5mL/5mL & maalox/mylanta w/ simeth 2.5mL/5mL & diphenhydrAMINE 5mg/5mL Susp suspension    [facility] Mouth[facility]    240 mL    Swish and swallow 10 mLs in mouth every 6 hours as needed for mouth sores       loratadine 10 MG tablet    CLARITIN     Take 10 mg by mouth daily       mometasone-formoterol 200-5 MCG/ACT oral inhaler    DULERA    3 Inhaler    Inhale 2 puffs into the lungs 2 times daily       * order for DME      F&P Zest medium nasal mask       order for DME      Equipment being ordered: CPAP AIRSENSE 10 9-11 CM H20 # [id number]  DN# [id number]       * order for DME      Auto-CPAP: Max 11 cm H2O Min 9 cm H2O Changed in clinic Continuous  Lifetime need and heated humidity.       order for DME     1 Device    Equipment being ordered: accessory kit       oxybutynin 10 MG 24 hr tablet    DITROPAN XL    90 tablet    Take 1 tablet (10 mg) by mouth daily (Needs follow-up appointment for this medication)       oxyCODONE 5 MG IR tablet    ROXICODONE    30 tablet    Take 1 tablet (5 mg) by mouth every 4 hours as needed for moderate to severe pain       DANIELLE LC PLUS NEBULIZER Misc          predniSONE 20 MG tablet    DELTASONE     10 tablet    Take 1 tablet (20 mg) by mouth 2 times daily For Yellow or Red zone on Asthma Action Plan.       STOOL SOFTENER PO      Take by mouth as needed       TYLENOL PO      Take 650 mg by mouth every 4 hours as needed for mild pain or fever       zolpidem 10 MG tablet    AMBIEN    30 tablet    Take 1 tablet 30 minutes prior to bedtime       * Notice:  This list has 4 medication(s) that are the same as other medications prescribed for you. Read the directions carefully, and ask your doctor or other care provider to review them with you.

## 2017-04-28 NOTE — NURSING NOTE
"Chief Complaint   Patient presents with     Medication Problem     Lipid are increasing potentially from exemestane (AROMASIN) 25 MG tablet       Initial /82  Pulse 96  Temp 99.6  F (37.6  C) (Tympanic)  Ht 5' 4.5\" (1.638 m)  Wt 205 lb (93 kg)  BMI 34.64 kg/m2 Estimated body mass index is 34.64 kg/(m^2) as calculated from the following:    Height as of this encounter: 5' 4.5\" (1.638 m).    Weight as of this encounter: 205 lb (93 kg).  Medication Reconciliation: complete   Gilma Velasquez CMA    "

## 2017-04-29 ASSESSMENT — ASTHMA QUESTIONNAIRES: ACT_TOTALSCORE: 24

## 2017-05-15 RX ORDER — IOPAMIDOL 755 MG/ML
100 INJECTION, SOLUTION INTRAVASCULAR ONCE
Status: COMPLETED | OUTPATIENT
Start: 2017-05-16 | End: 2017-05-16

## 2017-05-16 ENCOUNTER — INFUSION THERAPY VISIT (OUTPATIENT)
Dept: INFUSION THERAPY | Facility: CLINIC | Age: 57
End: 2017-05-16
Attending: INTERNAL MEDICINE
Payer: COMMERCIAL

## 2017-05-16 ENCOUNTER — HOSPITAL ENCOUNTER (OUTPATIENT)
Dept: NUCLEAR MEDICINE | Facility: CLINIC | Age: 57
Setting detail: NUCLEAR MEDICINE
End: 2017-05-16
Attending: INTERNAL MEDICINE
Payer: COMMERCIAL

## 2017-05-16 ENCOUNTER — DOCUMENTATION ONLY (OUTPATIENT)
Dept: SLEEP MEDICINE | Facility: CLINIC | Age: 57
End: 2017-05-16

## 2017-05-16 ENCOUNTER — HOSPITAL ENCOUNTER (OUTPATIENT)
Dept: CT IMAGING | Facility: CLINIC | Age: 57
Discharge: HOME | End: 2017-05-16
Attending: INTERNAL MEDICINE | Admitting: INTERNAL MEDICINE
Payer: COMMERCIAL

## 2017-05-16 DIAGNOSIS — C77.3 BREAST CANCER METASTASIZED TO AXILLARY LYMPH NODE, RIGHT (H): ICD-10-CM

## 2017-05-16 DIAGNOSIS — C50.912 BILATERAL MALIGNANT NEOPLASM OF BREAST IN FEMALE, UNSPECIFIED SITE OF BREAST: Chronic | ICD-10-CM

## 2017-05-16 DIAGNOSIS — C78.7 CARCINOMA OF BREAST METASTATIC TO LIVER, UNSPECIFIED LATERALITY (H): ICD-10-CM

## 2017-05-16 DIAGNOSIS — C79.51 BONE METASTASIS: ICD-10-CM

## 2017-05-16 DIAGNOSIS — C50.919 CARCINOMA OF BREAST METASTATIC TO LIVER, UNSPECIFIED LATERALITY (H): ICD-10-CM

## 2017-05-16 DIAGNOSIS — C50.911 BILATERAL MALIGNANT NEOPLASM OF BREAST IN FEMALE, UNSPECIFIED SITE OF BREAST: Chronic | ICD-10-CM

## 2017-05-16 DIAGNOSIS — C50.911 BREAST CANCER METASTASIZED TO AXILLARY LYMPH NODE, RIGHT (H): ICD-10-CM

## 2017-05-16 DIAGNOSIS — C78.7 SECONDARY MALIGNANT NEOPLASM OF LIVER (H): ICD-10-CM

## 2017-05-16 DIAGNOSIS — D25.9 UTERINE LEIOMYOMA, UNSPECIFIED LOCATION: ICD-10-CM

## 2017-05-16 LAB
ALBUMIN SERPL-MCNC: 3.2 G/DL (ref 3.4–5)
ALP SERPL-CCNC: 69 U/L (ref 40–150)
ALT SERPL W P-5'-P-CCNC: 23 U/L (ref 0–50)
ANION GAP SERPL CALCULATED.3IONS-SCNC: 11 MMOL/L (ref 3–14)
AST SERPL W P-5'-P-CCNC: 35 U/L (ref 0–45)
BASOPHILS # BLD AUTO: 0 10E9/L (ref 0–0.2)
BASOPHILS NFR BLD AUTO: 0.2 %
BILIRUB SERPL-MCNC: 0.7 MG/DL (ref 0.2–1.3)
BUN SERPL-MCNC: 9 MG/DL (ref 7–30)
CALCIUM SERPL-MCNC: 8.4 MG/DL (ref 8.5–10.1)
CANCER AG27-29 SERPL-ACNC: 96 U/ML (ref 0–39)
CEA SERPL-MCNC: 7.7 UG/L (ref 0–2.5)
CHLORIDE SERPL-SCNC: 110 MMOL/L (ref 94–109)
CO2 SERPL-SCNC: 23 MMOL/L (ref 20–32)
CREAT SERPL-MCNC: 0.63 MG/DL (ref 0.52–1.04)
DIFFERENTIAL METHOD BLD: NORMAL
EOSINOPHIL # BLD AUTO: 0.1 10E9/L (ref 0–0.7)
EOSINOPHIL NFR BLD AUTO: 1.9 %
ERYTHROCYTE [DISTWIDTH] IN BLOOD BY AUTOMATED COUNT: 12.8 % (ref 10–15)
GFR SERPL CREATININE-BSD FRML MDRD: ABNORMAL ML/MIN/1.7M2
GLUCOSE SERPL-MCNC: 125 MG/DL (ref 70–99)
HCT VFR BLD AUTO: 40.3 % (ref 35–47)
HGB BLD-MCNC: 12.9 G/DL (ref 11.7–15.7)
IMM GRANULOCYTES # BLD: 0 10E9/L (ref 0–0.4)
IMM GRANULOCYTES NFR BLD: 0 %
LYMPHOCYTES # BLD AUTO: 1.1 10E9/L (ref 0.8–5.3)
LYMPHOCYTES NFR BLD AUTO: 24.4 %
MCH RBC QN AUTO: 29.8 PG (ref 26.5–33)
MCHC RBC AUTO-ENTMCNC: 32 G/DL (ref 31.5–36.5)
MCV RBC AUTO: 93 FL (ref 78–100)
MONOCYTES # BLD AUTO: 0.5 10E9/L (ref 0–1.3)
MONOCYTES NFR BLD AUTO: 10.4 %
NEUTROPHILS # BLD AUTO: 2.9 10E9/L (ref 1.6–8.3)
NEUTROPHILS NFR BLD AUTO: 63.1 %
PLATELET # BLD AUTO: 194 10E9/L (ref 150–450)
POTASSIUM SERPL-SCNC: 3.3 MMOL/L (ref 3.4–5.3)
PROT SERPL-MCNC: 7.2 G/DL (ref 6.8–8.8)
RBC # BLD AUTO: 4.33 10E12/L (ref 3.8–5.2)
SODIUM SERPL-SCNC: 144 MMOL/L (ref 133–144)
WBC # BLD AUTO: 4.6 10E9/L (ref 4–11)

## 2017-05-16 PROCEDURE — 36591 DRAW BLOOD OFF VENOUS DEVICE: CPT

## 2017-05-16 PROCEDURE — 78306 BONE IMAGING WHOLE BODY: CPT

## 2017-05-16 PROCEDURE — 34300033 ZZH RX 343: Performed by: INTERNAL MEDICINE

## 2017-05-16 PROCEDURE — A9561 TC99M OXIDRONATE: HCPCS | Performed by: INTERNAL MEDICINE

## 2017-05-16 RX ORDER — DEXAMETHASONE 0.5 MG/5ML
SOLUTION ORAL
Qty: 500 ML | Refills: 1 | Status: SHIPPED | OUTPATIENT
Start: 2017-05-16 | End: 2017-01-01 | Stop reason: ALTCHOICE

## 2017-05-16 RX ORDER — HEPARIN SODIUM (PORCINE) LOCK FLUSH IV SOLN 100 UNIT/ML 100 UNIT/ML
500 SOLUTION INTRAVENOUS ONCE
Status: COMPLETED | OUTPATIENT
Start: 2017-05-16 | End: 2017-05-16

## 2017-05-16 RX ADMIN — SODIUM CHLORIDE 65 ML: 9 INJECTION, SOLUTION INTRAVENOUS at 09:30

## 2017-05-16 RX ADMIN — IOPAMIDOL 100 ML: 755 INJECTION, SOLUTION INTRAVENOUS at 09:30

## 2017-05-16 RX ADMIN — SODIUM CHLORIDE, PRESERVATIVE FREE 500 UNITS: 5 INJECTION INTRAVENOUS at 09:37

## 2017-05-16 RX ADMIN — Medication 26.2 MILLICURIE: at 08:29

## 2017-05-16 NOTE — MR AVS SNAPSHOT
After Visit Summary   5/16/2017    Etta Cervantes    MRN: [id number]           Patient Information     Date Of Birth          1960        Visit Information        Provider Department      5/16/2017 8:00 AM ROOM 2 [facility]        Today's Diagnoses     Carcinoma of breast metastatic to liver, unspecified laterality (H)           Follow-ups after your visit        Your next 10 appointments already scheduled     May 16, 2017  9:30 AM CDT   CT CHEST/ABDOMEN/PELVIS W CONTRAST with WYCT1   [facility] CT ([facility])    [address] [facility] [address]   [phone]           Please bring any scans or X-rays taken at other hospitals, if similar tests were done. Also bring a list of your medicines, including vitamins, minerals and over-the-counter drugs. It is safest to leave personal items at home.  Be sure to tell your doctor:   If you have any allergies.   If there s any chance you are pregnant.   If you are breastfeeding.   If you have any special needs.  You may have contrast for this exam. To prepare:   Do not eat or drink for 2 hours before your exam. If you need to take medicine, you may take it with small sips of water. (We may ask you to take liquid medicine as well.)   The day before your exam, drink extra fluids at least six 8-ounce glasses (unless your doctor tells you to restrict your fluids).  Patients over 70 or patients with diabetes or kidney problems:   If you haven t had a blood test (creatinine test) within the last 30 days, go to your clinic or Diagnostic Imaging Department for this test.  If you have diabetes:   If your kidney function is normal, continue taking your metformin (Avandamet, Glucophage, Glucovance, Metaglip) on the day of your exam.   If your kidney function is abnormal, wait 48 hours before restarting this medicine.  You will have oral contrast for this exam:   You will drink the contrast at home. Get this from your  clinic or Diagnostic Imaging Department. Please follow the directions given.  Please wear loose clothing, such as a sweat suit or jogging clothes. Avoid snaps, zippers and other metal. We may ask you to undress and put on a hospital gown.  If you have any questions, please call the Imaging Department where you will have your exam.            May 16, 2017 11:00 AM CDT   NM BONE SCAN WHOLE BODY with WYNM1   [facility] Medicine ([facility])    [address]  [facility] [address] [phone]           Please bring a list of your medicines to the exam. (Include vitamins, minerals and over-the-counter drugs.) You should wear comfortable clothes. Leave your valuables at home. Please bring related prior results and films. Tell your doctor:   If you are breastfeeding or may be pregnant.   If you have had a barium test within the past few days. Barium may change the results of certain exams.   If you think you may need sedation (medicine to help you relax).  You may eat and drink as normal.  Drink plenty of fluids and empty bladder frequently between injection and scans.            May 16, 2017  1:00 PM CDT   SLEEP DME MASK FITTING with CL SC DME   [facility] ([facility])    [address]  [facility] [address] [phone]            May 18, 2017  9:30 AM CDT   Return Visit with Brodie Cassidy MD   [facility] ([facility])    [facility]  [address]  [facility] [address] [phone]              Who to contact     If you have questions or need follow up information about today's clinic visit or your schedule please contact [facility] directly at [phone].  Normal or non-critical lab and imaging results will be communicated to you by MyChart, letter or phone within 4 business days after the clinic has received the results. If you do not hear from us within 7  days, please contact the clinic through Funanga or phone. If you have a critical or abnormal lab result, we will notify you by phone as soon as possible.  Submit refill requests through Funanga or call your pharmacy and they will forward the refill request to us. Please allow 3 business days for your refill to be completed.          Additional Information About Your Visit        [employer]Access [employer] Information     Funanga gives you secure access to your electronic health record. If you see a primary care provider, you can also send messages to your care team and make appointments. If you have questions, please call your primary care clinic.  If you do not have a primary care provider, please call [phone] and they will assist you.        Care EveryWhere ID     This is your Care EveryWhere ID. This could be used by other organizations to access your [place] medical records  [id number]         Blood Pressure from Last 3 Encounters:   04/28/17 122/82   04/19/17 134/80   03/22/17 156/76    Weight from Last 3 Encounters:   04/28/17 93 kg (205 lb)   04/19/17 93.8 kg (206 lb 12.8 oz)   03/22/17 94 kg (207 lb 4.8 oz)              We Performed the Following     Ca27.29  breast tumor marker     CBC with platelets differential     CEA     Comprehensive metabolic panel        Primary Care Provider Office Phone # Fax #    Johana Fairbanks [phone]       [facility] [address] [facility] [address]        Thank you!     Thank you for choosing [facility]  for your care. Our goal is always to provide you with excellent care. Hearing back from our patients is one way we can continue to improve our services. Please take a few minutes to complete the written survey that you may receive in the mail after your visit with us. Thank you!             Your Updated Medication List - Protect others around you: Learn how to safely use, store and throw away your medicines at www.disposemymeds.org.           This list is accurate as of: 5/16/17  8:05 AM.  Always use your most recent med list.                   Brand Name Dispense Instructions for use    * albuterol (2.5 MG/3ML) 0.083% neb solution     30 vial    Take 1 vial (2.5 mg) by nebulization every 4 hours as needed for shortness of breath / dyspnea       * albuterol 108 (90 BASE) MCG/ACT Inhaler    VENTOLIN HFA    1 Inhaler    Inhale 2 puffs into the lungs every 4 hours as needed       atorvastatin 10 MG tablet    LIPITOR    30 tablet    Take 1 tablet (10 mg) by mouth daily       azelastine 0.1 % spray    ASTELIN    3 Bottle    Spray 1-2 sprays into both nostrils 2 times daily as needed for rhinitis       beclomethasone 80 MCG/ACT Inhaler    QVAR    3 Inhaler    Inhale 2 puffs into the lungs 2 times daily       calcium 600 MG tablet     60 tablet    Take 3 tablets by mouth daily       cholecalciferol 1000 UNIT tablet    vitamin D    100 tablet    Take 1,500 Units by mouth daily       CRANBERRY PO      Take by mouth daily       dexamethasone 0.1 MG/ML solution     500 mL    swish for 2 minutes and spit 4 times per day and not to eat for 1 hour after using the mouthwash.       everolimus 10 MG tablet CHEMO    AFINITOR    28 tablet    Take 1 tablet (10 mg) by mouth daily Avoid grapefruit and grapefruit juice. May be given with or without food, but should be consistent.       exemestane 25 MG tablet    AROMASIN    28 tablet    Take 1 tablet (25 mg) by mouth daily Take after a meal.       levothyroxine 25 MCG tablet    SYNTHROID/LEVOTHROID    90 tablet    Take 1 tablet (25 mcg) by mouth daily       lidocaine visc 2% 2.5mL/5mL & maalox/mylanta w/ simeth 2.5mL/5mL & diphenhydrAMINE 5mg/5mL Susp suspension    [facility] Mouthwash [facility]    240 mL    Swish and swallow 10 mLs in mouth every 6 hours as needed for mouth sores       loratadine 10 MG tablet    CLARITIN     Take 10 mg by mouth daily       mometasone-formoterol 200-5 MCG/ACT oral inhaler    DULERA    3 Inhaler     Inhale 2 puffs into the lungs 2 times daily       * order for DME      F&P Zest medium nasal mask       order for DME      Equipment being ordered: CPAP AIRSENSE 10 9-11 CM H20 # [id number]  DN# [id number]       * order for DME      Auto-CPAP: Max 11 cm H2O Min 9 cm H2O Changed in clinic Continuous  Lifetime need and heated humidity.       order for DME     1 Device    Equipment being ordered: accessory kit       oxybutynin 10 MG 24 hr tablet    DITROPAN XL    90 tablet    Take 1 tablet (10 mg) by mouth daily (Needs follow-up appointment for this medication)       oxyCODONE 5 MG IR tablet    ROXICODONE    30 tablet    Take 1 tablet (5 mg) by mouth every 4 hours as needed for moderate to severe pain       DANIELLE LC PLUS NEBULIZER Misc          predniSONE 20 MG tablet    DELTASONE    10 tablet    Take 1 tablet (20 mg) by mouth 2 times daily For Yellow or Red zone on Asthma Action Plan.       STOOL SOFTENER PO      Take by mouth as needed       TYLENOL PO      Take 650 mg by mouth every 4 hours as needed for mild pain or fever       zolpidem 10 MG tablet    AMBIEN    30 tablet    Take 1 tablet 30 minutes prior to bedtime       * Notice:  This list has 4 medication(s) that are the same as other medications prescribed for you. Read the directions carefully, and ask your doctor or other care provider to review them with you.

## 2017-05-16 NOTE — PROGRESS NOTES
BOLIVAR CAME TO SLEEP LAB WANTING NEW NASAL MASK/HEADGEAR  SHE TRIED ON AIRFIT N20 SMALL , MEDIUM AND LARGE  MIRAGE FX WIDE AND SMALL  ESON MEDIUM AND LARGE  ESON 2 MEDIUM AND LARGE.  HER MASK OF CHOICE IS ESON 2 MEDIUM.  LISSETH ALSO RECEIVED FILTERS and TUBING.

## 2017-05-18 NOTE — PATIENT INSTRUCTIONS
Stop taking the Afinitor in 6 days (complete your cycle).  Please continue taking the Aromoasin until you are back from vacation, June 11th.  June 12th is when you should start taking the Xeloda.  You will need to have lab draw prior to starting this. You will receive education today on Xeloda. We would like to see you back in clinic with Dr. Cassidy around June 28th, after you have been on the Xeloda for 2 weeks with lab prior.      Your prescription (Xeloda) has been sent to:  Specialty pharmacy. Your prescriptions (Compazine, Ativan) have been sent to: [facility].When you are in need of a refill, please call your pharmacy and they will send us a request.      Copy of appointments, and after visit summary (AVS) given to patient.  If you have any questions during business hours (M-F 8 AM- 4PM), please call Funmi Ray RN, BSN, OCN Oncology Hematology /Breast Cancer Navigator at [facility] [phone].   For questions after business hours, or on holidays/weekends, please call our after hours Nurse Triage line [phone]. Thank you.

## 2017-05-18 NOTE — NURSING NOTE
"Oncology Rooming Note    May 18, 2017 9:36 AM   Etta Cervantes is a 56 year old female who presents for:    Chief Complaint   Patient presents with     Oncology Clinic Visit     Recheck Breast CA, review Labs & CT      Initial Vitals: /81 (BP Location: Right arm, Patient Position: Chair, Cuff Size: Adult Large)  Pulse 82  Temp 98.9  F (37.2  C) (Tympanic)  Resp 20  Ht 1.632 m (5' 4.25\")  Wt 93.3 kg (205 lb 11.2 oz)  SpO2 94%  Breastfeeding? No  BMI 35.03 kg/m2 Estimated body mass index is 35.03 kg/(m^2) as calculated from the following:    Height as of this encounter: 1.632 m (5' 4.25\").    Weight as of this encounter: 93.3 kg (205 lb 11.2 oz). Body surface area is 2.06 meters squared.  Severe Pain (6) Comment: & mid back   No LMP recorded.  Allergies reviewed: Yes  Medications reviewed: Yes    Medications: MEDICATION REFILLS NEEDED TODAY. Provider was notified.  Pharmacy name entered into HealthR[employer]:    [place] [id number] [place] [facility] [place], [place] - [address] [facility] [place], [place] - [address]    Clinical concerns: Recheck Breast CA, review Labs & CT. Please refill Affinitor & Aromasin.     7 minutes for nursing intake (face to face time)     Josephine Virgen CMA              "

## 2017-05-18 NOTE — PROGRESS NOTES
Hematology/ Oncology Follow-up Visit:  May 18, 2017    Reason for Visit:   Chief Complaint   Patient presents with     Oncology Clinic Visit     Recheck Breast CA, review Labs & CT        Oncologic History:  Breast cancer (H)  Etta Cervantes presented with increasing lump in the right axilla that s lump has been growing without any pain or associated symptoms. Subsequently she was evaluated by primary care physician. Diagnostic digital mammogram was done on 01/13/2015 showing enlarged lymph nodes in the right axilla. There was some skin thickening in the right breast anteriorly and laterally. There are no parenchymal changes in the right breast. The left breast shows a 1.7 cm spiculated mass at approximately 2 to 3 o'clock position, 15.2 cm away from the nipple. A right breast ultrasound was subsequently done and ultrasound guided biopsy was done. Needle biopsy of the left breast did show infiltrating ductal carcinoma grade I of III with no angiolymphatic invasion seen. No associated ductal carcinoma in situ. Estrogen receptor, progresterone receptor were positive. HER-2/sadie receptor came back negative.. Another biopsy from the right axilla did show metastatic ductal carcinoma. PET scan was done on January 26, 2015 showing few small right posterior cervical chain nodes the largest is 1.2 cm. There is extensive bulky right axillary adenopathy demonstrating hypermetabolic FDG uptake with SUV of 10.6. There is skin thickening involving the right breast which demonstrated low-grade FDG uptake. A 1.2 cm lesion on the lateral aspect of the left breast demonstrated minimally increased FDG uptake with SUV of 2. Scattered hypermetabolic mediastinal lymph nodes located in the left superior anterior mediastinum, right paratracheal and precarinal U, subcranial adenopathy with javon metastases. This focus hypermetabolic FDG uptake identified definitive Amanda posterior aspect off intertrochanteric region of the proximal left  femur consistent with bone metastases. There is also 1.4 cm hypermetabolic FDG uptake identified in the anterior medial segment of the left hepatic lobe consistent with liver metastases. Additional 2.1 cm low-attenuation lesion anterior aspect of the right lobe which needs to be determined. The patient was started on chemotherapy with Taxotere and Cytoxan on February 9, 2015.  She concluded 4 cycles of Taxotere and Cytoxan. She started on Arimidex 1 mg orally daily. Currently she is on Faslodex and ibrance    Interval History:  Patient is returning today for follow-up. She's been having pain in the middle of her back. She denies any shortness of breath or cough or wheezing. Denies any nausea or vomiting or diarrhea. She denies any fever or chills. She is currently on Afinitor and Aromasin without significant side effects.    Review Of Systems:  Constitutional: Negative for fever, chills, and night sweats.  Skin: negative.  Eyes: negative.  Ears/Nose/Throat: negative.  Respiratory: No shortness of breath, dyspnea on exertion, cough, or hemoptysis.  Cardiovascular: negative.  Gastrointestinal: negative.  Genitourinary: negative.  Musculoskeletal: negative.  Neurologic: negative.  Psychiatric: negative.  Hematologic/Lymphatic/Immunologic: negative.  Endocrine: negative.    All other ROS negative unless mentioned in interval history.    Past medical, social, surgical, and family histories reviewed.    Allergies:  Allergies as of 05/18/2017 - Manuel as Reviewed 05/18/2017   Allergen Reaction Noted     Animal dander Cough 01/23/2015     Cats  07/15/2010     Dust mites Cough 01/23/2015     Pollen extract  01/23/2015     Seasonal allergies  07/15/2010       Current Medications:  Current Outpatient Prescriptions   Medication Sig Dispense Refill     MAGIC MOUTHWASH, ENTER INGREDIENTS IN COMMENTS, SWISH AND SWALLOW 10 ML BY MOUTH EVERY 6 HOURS AS NEEDED FOR MOUTH SORES  10     lidocaine (XYLOCAINE) 2 % solution        exemestane  (AROMASIN) 25 MG tablet Take 1 tablet (25 mg) by mouth daily Take after a meal. 28 tablet 0     dexamethasone 0.1 MG/ML solution swish for 2 minutes and spit 4 times per day and not to eat for 1 hour after using the mouthwash. 500 mL 1     Docusate Calcium (STOOL SOFTENER PO) Take by mouth as needed       CRANBERRY PO Take by mouth daily       atorvastatin (LIPITOR) 10 MG tablet Take 1 tablet (10 mg) by mouth daily 30 tablet 11     everolimus (AFINITOR) 10 MG tablet CHEMO Take 1 tablet (10 mg) by mouth daily Avoid grapefruit and grapefruit juice. May be given with or without food, but should be consistent. 28 tablet 0     zolpidem (AMBIEN) 10 MG tablet Take 1 tablet 30 minutes prior to bedtime 30 tablet 5     mometasone-formoterol (DULERA) 200-5 MCG/ACT oral inhaler Inhale 2 puffs into the lungs 2 times daily 3 Inhaler 0     order for DME Equipment being ordered: CPAP  AIRSENSE 10  9-11 CM H20  # [id number]  DN# [id number]       magic mouthwash suspension (diphenhydrAMINE 5 mg/5 mL, lidocaine 50 mg/5 mL, Maalox 2.5 g/5 mL , simethicone 6.65 mg/5 mL) Swish and swallow 10 mLs in mouth every 6 hours as needed for mouth sores 240 mL 10     oxybutynin (DITROPAN XL) 10 MG 24 hr tablet Take 1 tablet (10 mg) by mouth daily (Needs follow-up appointment for this medication) 90 tablet 3     beclomethasone (QVAR) 80 MCG/ACT Inhaler Inhale 2 puffs into the lungs 2 times daily 3 Inhaler 3     azelastine (ASTELIN) 0.1 % nasal spray Spray 1-2 sprays into both nostrils 2 times daily as needed for rhinitis 3 Bottle 3     levothyroxine (SYNTHROID, LEVOTHROID) 25 MCG tablet Take 1 tablet (25 mcg) by mouth daily 90 tablet 3     albuterol (VENTOLIN HFA) 108 (90 BASE) MCG/ACT inhaler Inhale 2 puffs into the lungs every 4 hours as needed 1 Inhaler 2     oxyCODONE (ROXICODONE) 5 MG immediate release tablet Take 1 tablet (5 mg) by mouth every 4 hours as needed for moderate to severe pain 30 tablet 0     albuterol (2.5 MG/3ML) 0.083% nebulizer  "solution Take 1 vial (2.5 mg) by nebulization every 4 hours as needed for shortness of breath / dyspnea 30 vial 3     predniSONE (DELTASONE) 20 MG tablet Take 1 tablet (20 mg) by mouth 2 times daily For Yellow or Red zone on Asthma Action Plan. 10 tablet 0     Nebulizers (DANIELLE LC PLUS NEBULIZER) MISC   0     order for DME Equipment being ordered: accessory kit 1 Device 0     calcium 600 MG tablet Take 3 tablets by mouth daily  60 tablet      cholecalciferol (VITAMIN D) 1000 UNIT tablet Take 1,500 Units by mouth daily  100 tablet 3     loratadine (CLARITIN) 10 MG tablet Take 10 mg by mouth daily       Acetaminophen (TYLENOL PO) Take 650 mg by mouth every 4 hours as needed for mild pain or fever       ORDER FOR DME F&P Zest medium nasal mask       ORDER FOR DME Auto-CPAP:  Max 11 cm H2O  Min 9 cm H2O  Changed in clinic  Continuous    Lifetime need and heated humidity.          LORazepam (ATIVAN) 0.5 MG tablet Take 1 tablet (0.5 mg) by mouth every 4 hours as needed (Anxiety, Nausea/Vomiting or Sleep) 30 tablet 2     prochlorperazine (COMPAZINE) 10 MG tablet Take 1 tablet (10 mg) by mouth every 6 hours as needed (Nausea/Vomiting) 30 tablet 2     [DISCONTINUED] exemestane (AROMASIN) 25 MG tablet Take 1 tablet (25 mg) by mouth daily Take after a meal. 28 tablet 0        Physical Exam:  /81 (BP Location: Right arm, Patient Position: Chair, Cuff Size: Adult Large)  Pulse 82  Temp 98.9  F (37.2  C) (Tympanic)  Resp 20  Ht 1.632 m (5' 4.25\")  Wt 93.3 kg (205 lb 11.2 oz)  SpO2 94%  Breastfeeding? No  BMI 35.03 kg/m2  Wt Readings from Last 12 Encounters:   05/18/17 93.3 kg (205 lb 11.2 oz)   04/28/17 93 kg (205 lb)   04/19/17 93.8 kg (206 lb 12.8 oz)   03/22/17 94 kg (207 lb 4.8 oz)   03/02/17 92.1 kg (203 lb 1.6 oz)   01/27/17 93 kg (205 lb)   01/19/17 95 kg (209 lb 8 oz)   11/23/16 95.7 kg (210 lb 14.4 oz)   10/12/16 98.1 kg (216 lb 3.2 oz)   09/23/16 100.2 kg (221 lb)   08/24/16 98.9 kg (218 lb)   07/13/16 " 100.3 kg (221 lb 3.2 oz)     ECOG performance status: 1  GENERAL APPEARANCE: Healthy, alert and in no acute distress.  HEENT: Sclerae anicteric. PERRLA. Oropharynx without ulcers, lesions, or thrush.  NECK: Supple. No asymmetry or masses.  LYMPHATICS: No palpable cervical, supraclavicular, , or inguinal lymphadenopathy. There is right axillary adenopathy about 2 x 2 centimeter. RESP: Lungs clear to auscultation bilaterally without rales, rhonchi or wheezes.  CARDIOVASCULAR: Regular rate and rhythm. Normal S1, S2; no S3 or S4. No murmur, gallop, or rub.  ABDOMEN: Soft, nontender. Bowel sounds normal. No palpable organomegaly or masses.  MUSCULOSKELETAL: Extremities without gross deformities noted. No edema of bilateral lower extremities.  SKIN: No suspicious lesions or rashes.  NEURO: Alert and oriented x 3. Cranial nerves II-XII grossly intact.  PSYCHIATRIC: Mentation and affect appear normal.    Laboratory/Imaging Studies:  Infusion Therapy Visit on 05/16/2017   Component Date Value Ref Range Status     WBC 05/16/2017 4.6  4.0 - 11.0 10e9/L Final     RBC Count 05/16/2017 4.33  3.8 - 5.2 10e12/L Final     Hemoglobin 05/16/2017 12.9  11.7 - 15.7 g/dL Final     Hematocrit 05/16/2017 40.3  35.0 - 47.0 % Final     MCV 05/16/2017 93  78 - 100 fl Final     MCH 05/16/2017 29.8  26.5 - 33.0 pg Final     MCHC 05/16/2017 32.0  31.5 - 36.5 g/dL Final     RDW 05/16/2017 12.8  10.0 - 15.0 % Final     Platelet Count 05/16/2017 194  150 - 450 10e9/L Final     Diff Method 05/16/2017 Automated Method   Final     % Neutrophils 05/16/2017 63.1  % Final     % Lymphocytes 05/16/2017 24.4  % Final     % Monocytes 05/16/2017 10.4  % Final     % Eosinophils 05/16/2017 1.9  % Final     % Basophils 05/16/2017 0.2  % Final     % Immature Granulocytes 05/16/2017 0.0  % Final     Absolute Neutrophil 05/16/2017 2.9  1.6 - 8.3 10e9/L Final     Absolute Lymphocytes 05/16/2017 1.1  0.8 - 5.3 10e9/L Final     Absolute Monocytes 05/16/2017 0.5  0.0 -  1.3 10e9/L Final     Absolute Eosinophils 05/16/2017 0.1  0.0 - 0.7 10e9/L Final     Absolute Basophils 05/16/2017 0.0  0.0 - 0.2 10e9/L Final     Abs Immature Granulocytes 05/16/2017 0.0  0 - 0.4 10e9/L Final     Sodium 05/16/2017 144  133 - 144 mmol/L Final     Potassium 05/16/2017 3.3* 3.4 - 5.3 mmol/L Final     Chloride 05/16/2017 110* 94 - 109 mmol/L Final     Carbon Dioxide 05/16/2017 23  20 - 32 mmol/L Final     Anion Gap 05/16/2017 11  3 - 14 mmol/L Final     Glucose 05/16/2017 125* 70 - 99 mg/dL Final     Urea Nitrogen 05/16/2017 9  7 - 30 mg/dL Final     Creatinine 05/16/2017 0.63  0.52 - 1.04 mg/dL Final     GFR Estimate 05/16/2017   >60 mL/min/1.7m2 Final                    Value:>90  Non  GFR Calc       GFR Estimate If Black 05/16/2017   >60 mL/min/1.7m2 Final                    Value:>90   GFR Calc       Calcium 05/16/2017 8.4* 8.5 - 10.1 mg/dL Final     Bilirubin Total 05/16/2017 0.7  0.2 - 1.3 mg/dL Final     Albumin 05/16/2017 3.2* 3.4 - 5.0 g/dL Final     Protein Total 05/16/2017 7.2  6.8 - 8.8 g/dL Final     Alkaline Phosphatase 05/16/2017 69  40 - 150 U/L Final     ALT 05/16/2017 23  0 - 50 U/L Final     AST 05/16/2017 35  0 - 45 U/L Final     CA 27-29 05/16/2017 96* 0 - 39 U/mL Final    Assay Method:  Chemiluminescence using Siemens Centaur XP     CEA 05/16/2017 7.7* 0 - 2.5 ug/L Final    Comment: Smoking may cause CEA results to be elevated.   Assay Method:  Chemiluminescence using Siemens Centaur XP          Recent Results (from the past 744 hour(s))   CT Chest/Abdomen/Pelvis w Contrast    Narrative    CT CHEST/ABDOMEN/PELVIS W CONTRAST 5/16/2017 9:43 AM    HISTORY: Breast cancer. Follow-up.    CONTRAST:  100 mL Isovue 370.    TECHNIQUE: CT of the chest, abdomen, and pelvis is performed with IV  contrast.    Routine evaluated structures in the chest include the lungs,  mediastinal structures, pleura, and chest wall.    Routine assessed structures in the abdomen  include the liver, spleen,  pancreas, adrenal glands, and kidneys. Also assessed are the  retroperitoneum, gastrointestinal tract, and the abdominal wall.    Intrapelvic anatomy is also assessed.    Radiation dose for this scan is reduced using automated exposure  control, adjustment of the mA and/or kV according to patient size, or  iterative reconstruction technique.    COMPARISON: 1/26/2017.    FINDINGS:    Chest: A large cyst in the right lung is again seen. There is more  skin thickening and edema in the right breast compared to the prior  study. There is more adenopathy in the right axilla. A dominant lymph  node measures 2.1 cm compared to 1.4 cm on the prior study. A  multinodular thyroid gland remains stable.    Abdomen: Liver metastases have worsened in the interim. A large one in  the left lobe on image #55 measures 5.1 cm compared to 2.3 cm on the  prior study. There is new gastrohepatic ligament adenopathy and there  is adenopathy in the portal area. A gastrohepatic ligament lymph node  on image #59 measures 1.4 cm. A portal lymph node on image #65  measures 2.4 cm.    Pelvis: Sigmoid diverticulosis is present. Bilateral fat-containing  inguinal hernias are present.      Impression    IMPRESSION:  1.  There is more/larger right axillary adenopathy.  2. Hepatic metastases have worsened.  3. Gastrohepatic ligament/portal adenopathy are present.    LETI CHASE MD   NM Bone Scan Whole Body    Narrative    NUCLEAR MEDICINE BONE SCAN WHOLE BODY   5/16/2017 11:36 AM    HISTORY: Malignant neoplasm of unspecified site of unspecified female  breast. Secondary malignant neoplasm of liver and intrahepatic bile  duct.    DOSE:  26.2 mCi technetium HDP.    COMPARISON:  Prior bone scan: None available.   Relevant imaging study: Chest CT from today    FINDINGS: Increased activity along the patient's central line.  Increased activity at the medial aspect of the right fifth rib;  corresponding sclerosis is seen on chest  CT from today. There is a  tiny asymmetric focus within the T8 vertebral body on the right. Chest  CT demonstrates some faint asymmetric sclerosis, possibly representing  a second subtle metastatic lesion.      Impression    IMPRESSION: Right fifth rib lesion, consistent with metastasis.  Possible metastasis within T8.    TARA BERGER MD       Assessment and plan:    (C50.911,  C77.3) Breast cancer metastasized to axillary lymph node, right (H)  (primary encounter diagnosis)  (C78.7) Secondary malignant neoplasm of liver (H)  (C50.919,  C78.7) Carcinoma of breast metastatic to liver, unspecified laterality (H)  C50.911,  C50.912) Bilateral malignant neoplasm of breast in female, unspecified site of breast   I reviewed with the patient today the images from the CT scan and bone scan. There is disease progression.   progression. We reviewed the different treatment options. I would recommend capecitabine 2000 mg twice daily for 14 days on and 7 days off.  Capecitabine in detail as well as major side effects including, but not limited to immediate/short term side effects of  life-threatening infection, fever,headache, nausea/vomiting, diarrhea, mucositis, appetite changes, rare alopecia, hand-foot syndrome, eye and skin irritation, myalgias, photosensitivity, anemia, neutropenia, thrombocytopenia, and weakness and fatigue. The patient was instructed to take Capecitabine following a meal with a full glass of water and to discontinue and refrain from administration of non-steroidal anti-inflammatory medications, aspirin, and blood thinners. Patient instructed to call with signs or symptoms of infection including, but not limited to temperature greater than or equal to 100.5 degrees Fahrenheit, chills, severe sore throat, ear or sinus pain, mouth sores, cough, painful urination, and/or non-healing wound. The patient was given written information about  Capecitabine, agrees to proceed with treatment, and denies any further  questions at this time.    (C79.51) Bone metastasis (H)  Patient will continue on denosumab 120 mg subcutaneously every 3 months. Patient will continue on calcium and vitamin D supplements.    (K12.31) Mucositis due to chemotherapy  The patient will continue on Magic mouth wash.    (E03.9) Hypothyroidism, unspecified type  Patient will continue on 25  g daily    (E78.5) Hyperlipidemia LDL goal <130  Patient currently on Lipitor 10 mg orally daily.    The patient is ready to learn, no apparent learning barriers were identified.  Diagnosis and treatment plans were explained to the patient. The patient expressed understanding of the content. The patient asked appropriate questions. The patient questions were answered to her satisfaction.    Time spent 40 minutes more than 50% of the time in counseling and coordination of care including discussion of capecitabine potential side effects, monitoring and management of side effects.    Chart documentation with Dragon Voice recognition Software. Although reviewed after completion, some words and grammatical errors may remain.

## 2017-05-18 NOTE — MR AVS SNAPSHOT
After Visit Summary   5/18/2017    Etta Cervantes    MRN: [id number]           Patient Information     Date Of Birth          1960        Visit Information        Provider Department      5/18/2017 9:30 AM Brodie Cassidy MD [facility] ONCOLOGY      Today's Diagnoses     Secondary malignant neoplasm of liver (H)    -  1    Carcinoma of breast metastatic to liver, unspecified laterality (H)        Bone metastasis (H)        Breast cancer metastasized to axillary lymph node, right (H)        Bilateral malignant neoplasm of breast in female, unspecified site of breast (H)          Care Instructions    Stop taking the Afinitor in 6 days (complete your cycle).  Please continue taking the Aromoasin until you are back from vacation, June 11th.  June 12th is when you should start taking the Xeloda.  You will need to have lab draw prior to starting this. You will receive education today on Xeloda. We would like to see you back in clinic with Dr. Cassidy around June 28th, after you have been on the Xeloda for 2 weeks with lab prior.      Your prescription (Xeloda) has been sent to:  Specialty pharmacy. Your prescriptions (Compazine, Ativan) have been sent to: [facility].When you are in need of a refill, please call your pharmacy and they will send us a request.      Copy of appointments, and after visit summary (AVS) given to patient.  If you have any questions during business hours (M-F 8 AM- 4PM), please call Funmi Ray RN, BSN, OCN Oncology Hematology /Breast Cancer Navigator at [facility] [phone].   For questions after business hours, or on holidays/weekends, please call our after hours Nurse Triage line [phone]. Thank you.          Follow-ups after your visit        Your next 10 appointments already scheduled     Jun 12, 2017  8:15 AM CDT   LAB with [facility] LAB   [facility] ([facility] [place])    [id number]  [place] [facility] [address]   [phone]           Patient must bring picture ID.  Patient should be prepared to give a urine specimen  Please do not eat 10-12 hours before your appointment if you are coming in fasting for labs on lipids, cholesterol, or glucose (sugar).  Pregnant women should follow their Care Team instructions. Water with medications is okay. Do not drink coffee or other fluids.   If you have concerns about taking  your medications, please ask at office or if scheduling via [email or web address], send a message by clicking on Secure Messaging, Message Your Care Team.            Jun 28, 2017  8:30 AM CDT   Level O with ROOM 8 [facility]    [facility]  [address]  [facility] [address]   [phone]            Jun 28, 2017  9:30 AM CDT   Return Visit with Brodie Cassidy MD   [facility] ([facility])    [facility]  [address]  [facility] [address]   [phone]              Who to contact     If you have questions or need follow up information about today's clinic visit or your schedule please contact [facility] directly at [phone].  Normal or non-critical lab and imaging results will be communicated to you by Chegginhart, letter or phone within 4 business days after the clinic has received the results. If you do not hear from us within 7 days, please contact the clinic through Chegginhart or phone. If you have a critical or abnormal lab result, we will notify you by phone as soon as possible.  Submit refill requests through [email or web address] or call your pharmacy and they will forward the refill request to us. Please allow 3 business days for your refill to be completed.          Additional Information About Your Visit        [email or web address] Information     [email or web address] gives you secure access to your electronic health record. If you see a primary care provider, you can also send  "messages to your care team and make appointments. If you have questions, please call your primary care clinic.  If you do not have a primary care provider, please call [phone] and they will assist you.        Care EveryWhere ID     This is your Care EveryWhere ID. This could be used by other organizations to access your [facility] medical records  [id number]        Your Vitals Were     Pulse Temperature Respirations Height Pulse Oximetry Breastfeeding?    82 98.9  F (37.2  C) (Tympanic) 20 1.632 m (5' 4.25\") 94% No    BMI (Body Mass Index)                   35.03 kg/m2            Blood Pressure from Last 3 Encounters:   05/18/17 138/81   04/28/17 122/82   04/19/17 134/80    Weight from Last 3 Encounters:   05/18/17 93.3 kg (205 lb 11.2 oz)   04/28/17 93 kg (205 lb)   04/19/17 93.8 kg (206 lb 12.8 oz)              Today, you had the following     No orders found for display         Today's Medication Changes          These changes are accurate as of: 5/18/17 10:35 AM.  If you have any questions, ask your nurse or doctor.               Start taking these medicines.        Dose/Directions    LORazepam 0.5 MG tablet   Commonly known as:  ATIVAN   Used for:  Secondary malignant neoplasm of liver (H), Carcinoma of breast metastatic to liver, unspecified laterality (H), Bone metastasis (H), Breast cancer metastasized to axillary lymph node, right (H)   Started by:  Brodie Cassidy MD        Dose:  0.5 mg   Take 1 tablet (0.5 mg) by mouth every 4 hours as needed (Anxiety, Nausea/Vomiting or Sleep)   Quantity:  30 tablet   Refills:  2       prochlorperazine 10 MG tablet   Commonly known as:  COMPAZINE   Used for:  Secondary malignant neoplasm of liver (H), Carcinoma of breast metastatic to liver, unspecified laterality (H), Bone metastasis (H), Breast cancer metastasized to axillary lymph node, right (H)   Started by:  Brodie Cassidy MD        Dose:  10 mg   Take 1 tablet (10 mg) by mouth every 6 hours as " needed (Nausea/Vomiting)   Quantity:  30 tablet   Refills:  2            Where to get your medicines      [id number] [place], [place] - [address]  [address] [address]     Phone:  [phone]     exemestane 25 MG tablet    prochlorperazine 10 MG tablet         Some of these will need a paper prescription and others can be bought over the counter.  Ask your nurse if you have questions.     Bring a paper prescription for each of these medications     LORazepam 0.5 MG tablet                Primary Care Provider Office Phone # Fax #    Johana Fairbanks [phone]       [facility] [address] [facility] [address]        Thank you!     Thank you for choosing [facility]  for your care. Our goal is always to provide you with excellent care. Hearing back from our patients is one way we can continue to improve our services. Please take a few minutes to complete the written survey that you may receive in the mail after your visit with us. Thank you!             Your Updated Medication List - Protect others around you: Learn how to safely use, store and throw away your medicines at www.disposemymeds.org.          This list is accurate as of: 5/18/17 10:35 AM.  Always use your most recent med list.                   Brand Name Dispense Instructions for use    * albuterol (2.5 MG/3ML) 0.083% neb solution     30 vial    Take 1 vial (2.5 mg) by nebulization every 4 hours as needed for shortness of breath / dyspnea       * albuterol 108 (90 BASE) MCG/ACT Inhaler    VENTOLIN HFA    1 Inhaler    Inhale 2 puffs into the lungs every 4 hours as needed       atorvastatin 10 MG tablet    LIPITOR    30 tablet    Take 1 tablet (10 mg) by mouth daily       azelastine 0.1 % spray    ASTELIN    3 Bottle    Spray 1-2 sprays into both nostrils 2 times daily as needed for rhinitis       beclomethasone 80 MCG/ACT Inhaler    QVAR    3  Inhaler    Inhale 2 puffs into the lungs 2 times daily       calcium 600 MG tablet     60 tablet    Take 3 tablets by mouth daily       cholecalciferol 1000 UNIT tablet    vitamin D    100 tablet    Take 1,500 Units by mouth daily       CRANBERRY PO      Take by mouth daily       dexamethasone 0.1 MG/ML solution     500 mL    swish for 2 minutes and spit 4 times per day and not to eat for 1 hour after using the mouthwash.       everolimus 10 MG tablet CHEMO    AFINITOR    28 tablet    Take 1 tablet (10 mg) by mouth daily Avoid grapefruit and grapefruit juice. May be given with or without food, but should be consistent.       exemestane 25 MG tablet    AROMASIN    28 tablet    Take 1 tablet (25 mg) by mouth daily Take after a meal.       levothyroxine 25 MCG tablet    SYNTHROID/LEVOTHROID    90 tablet    Take 1 tablet (25 mcg) by mouth daily       lidocaine 2 % solution    XYLOCAINE         lidocaine visc 2% 2.5mL/5mL & maalox/mylanta w/ simeth 2.5mL/5mL & diphenhydrAMINE 5mg/5mL Susp suspension    MAGIC Mouthwash HOSPITAL    240 mL    Swish and swallow 10 mLs in mouth every 6 hours as needed for mouth sores       loratadine 10 MG tablet    CLARITIN     Take 10 mg by mouth daily       LORazepam 0.5 MG tablet    ATIVAN    30 tablet    Take 1 tablet (0.5 mg) by mouth every 4 hours as needed (Anxiety, Nausea/Vomiting or Sleep)       MAGIC MOUTHWASH (ENTER INGREDIENTS IN COMMENTS)      SWISH AND SWALLOW 10 ML BY MOUTH EVERY 6 HOURS AS NEEDED FOR MOUTH SORES       mometasone-formoterol 200-5 MCG/ACT oral inhaler    DULERA    3 Inhaler    Inhale 2 puffs into the lungs 2 times daily       * order for DME      F&P Zest medium nasal mask       order for DME      Equipment being ordered: CPAP AIRSENSE 10 9-11 CM H20 # [id number]  DN# [id number]       * order for DME      Auto-CPAP: Max 11 cm H2O Min 9 cm H2O Changed in clinic Continuous  Lifetime need and heated humidity.       order for DME     1 Device    Equipment being  ordered: accessory kit       oxybutynin 10 MG 24 hr tablet    DITROPAN XL    90 tablet    Take 1 tablet (10 mg) by mouth daily (Needs follow-up appointment for this medication)       oxyCODONE 5 MG IR tablet    ROXICODONE    30 tablet    Take 1 tablet (5 mg) by mouth every 4 hours as needed for moderate to severe pain       DANIELLE LC PLUS NEBULIZER Misc          predniSONE 20 MG tablet    DELTASONE    10 tablet    Take 1 tablet (20 mg) by mouth 2 times daily For Yellow or Red zone on Asthma Action Plan.       prochlorperazine 10 MG tablet    COMPAZINE    30 tablet    Take 1 tablet (10 mg) by mouth every 6 hours as needed (Nausea/Vomiting)       STOOL SOFTENER PO      Take by mouth as needed       TYLENOL PO      Take 650 mg by mouth every 4 hours as needed for mild pain or fever       zolpidem 10 MG tablet    AMBIEN    30 tablet    Take 1 tablet 30 minutes prior to bedtime       * Notice:  This list has 4 medication(s) that are the same as other medications prescribed for you. Read the directions carefully, and ask your doctor or other care provider to review them with you.

## 2017-05-18 NOTE — PROGRESS NOTES
"Chemotherapy Education    Patient is a 56 year old female here today for chemotherapy education, accompanied by spouse.  Pt has a cancer diagnosis of breast cancer and their main concern is \"hopefuly this one works\".  Their Oncologist is Dr. LUIS Cassidy, and PCP is Johana Fairbanks.  Reviewed the following with the patient and their support person:  Treatment goal: Palliative  Treatment regimen & duration: Xeloda 2000 mg twice daily x 14 days then 7 off, every 21 days; and rationale for strict adherence to this schedule, including specific medication names including pre-treatment medications and at home scheduled or as needed medications, delivery methods,.  Potential side effects: Side effects and management; including skin changes/hand-foot syndrome, anemia, neutropenia, thrombocytopenia, diarrhea/constipation, hair loss syndrome, memory changes/ \"chemobrain\", mouth sores, taste changes, neuropathy, fatigue, myelosuppression, sexuality/infertility, and risk of extravasation or infiltration.  Infection prevention, and monitoring of lab values, what lab tests and what changes of these values meant, along with the possibility of hydration or blood product transfusion, or the need to defer or hold treatment.    Chemotherapy information, including ways it is excreted from the body and cleaning and containment of vomitus or other bodily fluid, use of the bathroom, sexual health and intimacy, what to do if needing to miss a treatment, when to call a provider and the need for staff to wear protective equipment.  Importance of Central line care (port) or IV site care.  Proper use of the take home infusion pump, troubleshooting, and who to call if there is a malfunction.  Written information: Written information including the \"Getting Ready for Chemotherapy: What to Expect, Before, During, and After your Treatment\" booklet, specific drug information guides printed from [employer], [employer] booklets \"Eating Hints: Before, " "During, and After Cancer Treatment\" and \"Chemotherapy and You\".  Also, a folder with information on when to contact the provider, various programs offered at [facility], and our business card with contact information given; Oncology Clinic, RN Case Manager, and the after hours Nurse Advise Line.  General orientation to the Medical Oncology department, Infusion Services department, Huc/scheduling, bathrooms and usual flow of the treatment day provided as well as introduction to the Infusion nurses.  No barriers to learning identified. Patient and family verbalized understanding of all written and verbal information. All questions answered to patients satisfaction.   Other concerns: None at this time.  Pt instructed to call with further questions or concerns.  Patient states understanding and is in agreement with this plan.  Copy of appointments, and after visit summary (AVS) given to patient. Patient discharged ambulatory.    Face to Face time with patient: 20 min    Funmi Ray RN, BSN, OCN  Oncology Hematology   Breast Cancer Navigator  [facility]  [email or web address]  Phone [phone]    "

## 2017-05-23 NOTE — PROGRESS NOTES
Xeloda (Capecitabine) RX was sent to  Specialty pharmacy 05.18.17.  Patient is restricted to [facility].  Capecitabine e-scribed to [facility] today 05.23.17.  Spoke with Kaykay at [facility], they have received the script.  Patient is going on vacation this weekend for approximately 10 days and would like this prescription delivered to her by Thursday 05.25.17 if at all possible.  Per Kaykay, patient is able to call the following number to expedite delivery:  [phone], select option #1 (pt order line) then option #3 (oncology services).  Per Kaykay, the capecitabine should be able to go out on delivery tomorrow (Wednesday) for delivery date of Thursday 05.25.17, and they will call us to let us know once delivery has been scheduled.    Message left with patient, stating the above and to please call with questions or concerns. Direct line provided.

## 2017-05-23 NOTE — TELEPHONE ENCOUNTER
Telephone Triage:    Pt is a 56 year old female, on cycle 3 Afinitor/Aromasin.  2 capsules of Afinitor left.  Is to discontinue these medications once Xeloda has been received.      Diagnosis: Breast cancer    Primary care provider is: Johana Fairbanks    Oncology provider is:  Dr. LUIS Cassidy    Chief complaint: sore tongue, mouth, and throat.  White patches on tongue, roof of mouth feels swollen and bumpy.  Any other side effects noted from treatment?  Assessment: Patient reports the above was noted Friday and over the weekend has progressively worsened.  Low grade temp ranging 99-100F over the weekend.  Afebrile today T 97.7F. No other issues at this time.  Recommendations: Per Dr. Cassidy, continue with last 2 days of Afinitor, stop dexamethasone mouth wash, start magic mouth wash.  Prescription for Fluconazole escribed to [facility] pharmacy.  Pt is to call us if symptoms persist. Also, advised patient to utilize PRN medications as needed, eat nutritious meals, drink plenty of fluids, and keep scheduled appointments. If symptoms or other concerns develop after hours, encouraged patient to call our after hours number: [phone].  Patient instructed to call with any questions or concerns.  Patient states understanding and is in agreement with this plan.      Patient instructed to call with any questions or concerns.  Patient states understanding and is in agreement with this plan.    Approximately 10 minutes spent on telephone with patient reviewing assessment and symptom(s) and providing symptom management.    Funmi Ray, RN, BSN, OCN  Oncology Hematology   Breast Cancer Navigator  [facility]  [email or web address]  [phone]

## 2017-05-24 NOTE — PROGRESS NOTES
Prior Authorization Approval    Authorization Effective Date:05/24/2017    Authorization Expiration Date:  05/24/2018  Medication: xeloda (capecitabine) 500mg tablets  Approved Dose/Quantity: not indicated  Reference #: [id number]   Insurance Company:  [facility]  Expected CoPay: $90.00  CoPay Card Available:   No, generic  Foundation Assistance Needed:  ?  Which Pharmacy is filling the prescription (Not needed for infusion/clinic administered):  [facility]

## 2017-05-24 NOTE — PROGRESS NOTES
[facility] and they are stating that we do not need to do anything on our end, they are waiting for the insurance ([employer]?) to respond back to them with an update on the prior authorization.     I will call them again tomorrow morning to check on the status.    Marcie Sparrow [facility] Oncology Pharmacy Liaison  [phone]

## 2017-05-24 NOTE — PROGRESS NOTES
After further review I was able to give the pharmacy the diagnosis code and state that the xeloda is being prescribed as a monotherapy.    The prior auth has been approved for a year.    The pharmacy is contacting the patient now to set up delivery.

## 2017-05-24 NOTE — PROGRESS NOTES
Patient called to update our office that [facility] called and told her a prior authorization was required for the Xeloda.  Email message sent to ISIDORO Viramontes to see if she is able to look into this for us.

## 2017-05-25 NOTE — MR AVS SNAPSHOT
After Visit Summary   5/25/2017    Etta Cervantes    MRN: [id number]           Patient Information     Date Of Birth          1960        Visit Information        Provider Department      5/25/2017 3:00 PM ROOM 1 [facility]        Today's Diagnoses     Bilateral malignant neoplasm of breast in female, unspecified site of breast (H)    -  1    Secondary malignant neoplasm of liver (H)        Carcinoma of breast metastatic to liver, unspecified laterality (H)        Bone metastasis (H)        Breast cancer metastasized to axillary lymph node, right (H)        Mucositis due to chemotherapy           Follow-ups after your visit        Your next 10 appointments already scheduled     Jun 12, 2017  8:15 AM CDT   LAB with [facility] ([facility])    [address] [address] [phone]           Patient must bring picture ID.  Patient should be prepared to give a urine specimen  Please do not eat 10-12 hours before your appointment if you are coming in fasting for labs on lipids, cholesterol, or glucose (sugar).  Pregnant women should follow their Care Team instructions. Water with medications is okay. Do not drink coffee or other fluids.   If you have concerns about taking  your medications, please ask at office or if scheduling via [employer], send a message by clicking on Secure Messaging, Message Your Care Team.            Jun 28, 2017  8:30 AM CDT   Level O with ROOM 8 [facility] ([facility])    [facility]  [address]  [facility] [place] [address]   [phone]            Jun 28, 2017  9:30 AM CDT   Return Visit with Brodie Cassidy MD   [facility] ([facility])    [facility]  [address]  [facility] [place] [address]   [phone]              Future tests that were ordered for you today     Open Future Orders         Priority Expected Expires Ordered    CBC with platelets differential Routine 5/25/2017 5/25/2018 5/25/2017            Who to contact     If you have questions or need follow up information about today's clinic visit or your schedule please contact [facility] directly at [phone].  Normal or non-critical lab and imaging results will be communicated to you by MyChart, letter or phone within 4 business days after the clinic has received the results. If you do not hear from us within 7 days, please contact the clinic through [employer] or phone. If you have a critical or abnormal lab result, we will notify you by phone as soon as possible.  Submit refill requests through [employer] or call your pharmacy and they will forward the refill request to us. Please allow 3 business days for your refill to be completed.          Additional Information About Your Visit        MyChart Information     [employer] gives you secure access to your electronic health record. If you see a primary care provider, you can also send messages to your care team and make appointments. If you have questions, please call your primary care clinic.  If you do not have a primary care provider, please call [phone] and they will assist you.        Care EveryWhere ID     This is your Care EveryWhere ID. This could be used by other organizations to access your [place] medical records  [id number]         Blood Pressure from Last 3 Encounters:   05/25/17 132/82   05/18/17 138/81   04/28/17 122/82    Weight from Last 3 Encounters:   05/25/17 91.6 kg (201 lb 14.4 oz)   05/18/17 93.3 kg (205 lb 11.2 oz)   04/28/17 93 kg (205 lb)              We Performed the Following     CBC with platelets differential        Primary Care Provider Office Phone # Fax #    Johana Fairbanks [phone]       [facility] [address] [address]        Thank you!     Thank you for choosing [facility]  for your care.  Our goal is always to provide you with excellent care. Hearing back from our patients is one way we can continue to improve our services. Please take a few minutes to complete the written survey that you may receive in the mail after your visit with us. Thank you!             Your Updated Medication List - Protect others around you: Learn how to safely use, store and throw away your medicines at www.disposemymeds.org.          This list is accurate as of: 5/25/17  4:31 PM.  Always use your most recent med list.                   Brand Name Dispense Instructions for use    AFINITOR 10 MG tablet CHEMO   Generic drug:  everolimus          * albuterol (2.5 MG/3ML) 0.083% neb solution     30 vial    Take 1 vial (2.5 mg) by nebulization every 4 hours as needed for shortness of breath / dyspnea       * albuterol 108 (90 BASE) MCG/ACT Inhaler    VENTOLIN HFA    1 Inhaler    Inhale 2 puffs into the lungs every 4 hours as needed       atorvastatin 10 MG tablet    LIPITOR    30 tablet    Take 1 tablet (10 mg) by mouth daily       azelastine 0.1 % spray    ASTELIN    3 Bottle    Spray 1-2 sprays into both nostrils 2 times daily as needed for rhinitis       beclomethasone 80 MCG/ACT Inhaler    QVAR    3 Inhaler    Inhale 2 puffs into the lungs 2 times daily       calcium 600 MG tablet     60 tablet    Take 3 tablets by mouth daily       capecitabine 500 MG tablet CHEMO    XELODA    112 tablet    Take 4 tablets (2,000 mg) by mouth 2 times daily for 14 days Days 1 through 14, then off for 7 days. Take within 30 mins after meal.       cholecalciferol 1000 UNIT tablet    vitamin D    100 tablet    Take 1,500 Units by mouth daily       CRANBERRY PO      Take by mouth daily       dexamethasone 0.1 MG/ML solution      SWISH 5-10ML FOR 2 MIN AND SPIT 4 TIMES PER DAY. NOT TO EAT FOR 1 HOUR AFTER USING THE MOUTHWASH.       exemestane 25 MG tablet    AROMASIN     TAKE 1 TABLET BY MOUTH DAILY AFTER A MEAL.       fluconazole 100 MG tablet     DIFLUCAN    5 tablet    Take 1 tablet (100 mg) by mouth daily       levothyroxine 25 MCG tablet    SYNTHROID/LEVOTHROID    90 tablet    Take 1 tablet (25 mcg) by mouth daily       lidocaine 2 % solution    XYLOCAINE         lidocaine visc 2% 2.5mL/5mL & maalox/mylanta w/ simeth 2.5mL/5mL & diphenhydrAMINE 5mg/5mL Susp suspension    MAGIC Mouthwash HOSPITAL    240 mL    Swish and swallow 10 mLs in mouth every 6 hours as needed for mouth sores       loratadine 10 MG tablet    CLARITIN     Take 10 mg by mouth daily       LORazepam 0.5 MG tablet    ATIVAN    30 tablet    Take 1 tablet (0.5 mg) by mouth every 4 hours as needed (Anxiety, Nausea/Vomiting or Sleep)       MAGIC MOUTHWASH (ENTER INGREDIENTS IN COMMENTS)      SWISH AND SWALLOW 10 ML BY MOUTH EVERY 6 HOURS AS NEEDED FOR MOUTH SORES       mometasone-formoterol 200-5 MCG/ACT oral inhaler    DULERA    3 Inhaler    Inhale 2 puffs into the lungs 2 times daily       * order for DME      F&P Zest medium nasal mask       order for DME      Equipment being ordered: CPAP AIRSENSE 10 9-11 CM H20 # [id number]  DN# [id number]       * order for DME      Auto-CPAP: Max 11 cm H2O Min 9 cm H2O Changed in clinic Continuous  Lifetime need and heated humidity.       order for DME     1 Device    Equipment being ordered: accessory kit       oxybutynin 10 MG 24 hr tablet    DITROPAN XL    90 tablet    Take 1 tablet (10 mg) by mouth daily (Needs follow-up appointment for this medication)       oxyCODONE 5 MG IR tablet    ROXICODONE    30 tablet    Take 1 tablet (5 mg) by mouth every 4 hours as needed for moderate to severe pain       DANIELLE LC PLUS NEBULIZER Misc          predniSONE 20 MG tablet    DELTASONE    10 tablet    Take 1 tablet (20 mg) by mouth 2 times daily For Yellow or Red zone on Asthma Action Plan.       prochlorperazine 10 MG tablet    COMPAZINE    30 tablet    Take 1 tablet (10 mg) by mouth every 6 hours as needed (Nausea/Vomiting)       STOOL SOFTENER PO      Take by  mouth as needed       TYLENOL PO      Take 650 mg by mouth every 4 hours as needed for mild pain or fever       zolpidem 10 MG tablet    AMBIEN    30 tablet    Take 1 tablet 30 minutes prior to bedtime       * Notice:  This list has 4 medication(s) that are the same as other medications prescribed for you. Read the directions carefully, and ask your doctor or other care provider to review them with you.

## 2017-05-25 NOTE — MR AVS SNAPSHOT
After Visit Summary   5/25/2017    Etta Cervantes    MRN: [id number]           Patient Information     Date Of Birth          1960        Visit Information        Provider Department      5/25/2017 2:30 PM Brodie Cassidy MD [facility]        Today's Diagnoses     Carcinoma of breast metastatic to liver, unspecified laterality (H)    -  1      Care Instructions    You will need to have labs drawn today, we will call you with your results.  You will also need to have 1 liter of IV fluid today.We would like to see you back in clinic with Dr. Cassidy as previously scheduled.  Copy of appointments, and after visit summary (AVS) given to patient.  If you have any questions during business hours (M-F 8 AM- 4PM), please call Funmi Ray RN, BSN, OCN Oncology Hematology /Breast Cancer Navigator at [facility] [phone].   For questions after business hours, or on holidays/weekends, please call our after hours Nurse Triage line [phone]. Thank you.            Follow-ups after your visit        Your next 10 appointments already scheduled     May 25, 2017  3:00 PM CDT   Level 1 with ROOM 1 [facility] ([facility])    [facility]  [address]  [facility] [address]   [phone]            Jun 12, 2017  8:15 AM CDT   LAB with [facility] ([facility])    [address] [address] [phone]           Patient must bring picture ID.  Patient should be prepared to give a urine specimen  Please do not eat 10-12 hours before your appointment if you are coming in fasting for labs on lipids, cholesterol, or glucose (sugar).  Pregnant women should follow their Care Team instructions. Water with medications is okay. Do not drink coffee or other fluids.   If you have concerns about taking  your medications, please ask at office or if  scheduling via [employer], send a message by clicking on Secure Messaging, Message Your Care Team.            Jun 28, 2017  8:30 AM CDT   Level O with ROOM 8 [facility] ([facility])    [facility]  [address]  [facility] [place] [address]   [phone]            Jun 28, 2017  9:30 AM CDT   Return Visit with Brodie Cassidy MD   [facility] ([facility])    [facility]  [address]  [facility] [address]   [phone]              Future tests that were ordered for you today     Open Future Orders        Priority Expected Expires Ordered    CBC with platelets differential Routine 5/25/2017 5/25/2018 5/25/2017            Who to contact     If you have questions or need follow up information about today's clinic visit or your schedule please contact [facility] directly at [phone].  Normal or non-critical lab and imaging results will be communicated to you by [employer], letter or phone within 4 business days after the clinic has received the results. If you do not hear from us within 7 days, please contact the clinic through [employer] or phone. If you have a critical or abnormal lab result, we will notify you by phone as soon as possible.  Submit refill requests through [employer] or call your pharmacy and they will forward the refill request to us. Please allow 3 business days for your refill to be completed.          Additional Information About Your Visit        [employer] Information     [employer] gives you secure access to your electronic health record. If you see a primary care provider, you can also send messages to your care team and make appointments. If you have questions, please call your primary care clinic.  If you do not have a primary care provider, please call [phone] and they will assist you.        Care EveryWhere ID     This is your Care EveryWhere ID. This could be used by  "other organizations to access your [facility] medical records  [id number]        Your Vitals Were     Pulse Temperature Respirations Height Pulse Oximetry Breastfeeding?    131 99.6  F (37.6  C) (Tympanic) 20 1.632 m (5' 4.25\") 91% No    BMI (Body Mass Index)                   34.38 kg/m2            Blood Pressure from Last 3 Encounters:   05/25/17 132/82   05/18/17 138/81   04/28/17 122/82    Weight from Last 3 Encounters:   05/25/17 91.6 kg (201 lb 14.4 oz)   05/18/17 93.3 kg (205 lb 11.2 oz)   04/28/17 93 kg (205 lb)              We Performed the Following     Comprehensive metabolic panel     Magnesium        Primary Care Provider Office Phone # Fax #    Johana Fairbanks [phone]       [facility] [address] [address] [address]        Thank you!     Thank you for choosing [facility]  for your care. Our goal is always to provide you with excellent care. Hearing back from our patients is one way we can continue to improve our services. Please take a few minutes to complete the written survey that you may receive in the mail after your visit with us. Thank you!             Your Updated Medication List - Protect others around you: Learn how to safely use, store and throw away your medicines at www.disposemymeds.org.          This list is accurate as of: 5/25/17  2:57 PM.  Always use your most recent med list.                   Brand Name Dispense Instructions for use    AFINITOR 10 MG tablet CHEMO   Generic drug:  everolimus          * albuterol (2.5 MG/3ML) 0.083% neb solution     30 vial    Take 1 vial (2.5 mg) by nebulization every 4 hours as needed for shortness of breath / dyspnea       * albuterol 108 (90 BASE) MCG/ACT Inhaler    VENTOLIN HFA    1 Inhaler    Inhale 2 puffs into the lungs every 4 hours as needed       atorvastatin 10 MG tablet    LIPITOR    30 tablet    Take 1 tablet (10 mg) by mouth daily       azelastine 0.1 % spray    ASTELIN    3 Bottle    Spray " 1-2 sprays into both nostrils 2 times daily as needed for rhinitis       beclomethasone 80 MCG/ACT Inhaler    QVAR    3 Inhaler    Inhale 2 puffs into the lungs 2 times daily       calcium 600 MG tablet     60 tablet    Take 3 tablets by mouth daily       capecitabine 500 MG tablet CHEMO    XELODA    112 tablet    Take 4 tablets (2,000 mg) by mouth 2 times daily for 14 days Days 1 through 14, then off for 7 days. Take within 30 mins after meal.       cholecalciferol 1000 UNIT tablet    vitamin D    100 tablet    Take 1,500 Units by mouth daily       CRANBERRY PO      Take by mouth daily       dexamethasone 0.1 MG/ML solution      SWISH 5-10ML FOR 2 MIN AND SPIT 4 TIMES PER DAY. NOT TO EAT FOR 1 HOUR AFTER USING THE MOUTHWASH.       exemestane 25 MG tablet    AROMASIN     TAKE 1 TABLET BY MOUTH DAILY AFTER A MEAL.       fluconazole 100 MG tablet    DIFLUCAN    5 tablet    Take 1 tablet (100 mg) by mouth daily       levothyroxine 25 MCG tablet    SYNTHROID/LEVOTHROID    90 tablet    Take 1 tablet (25 mcg) by mouth daily       lidocaine 2 % solution    XYLOCAINE         lidocaine visc 2% 2.5mL/5mL & maalox/mylanta w/ simeth 2.5mL/5mL & diphenhydrAMINE 5mg/5mL Susp suspension    MAGIC Mouthwash HOSPITAL    240 mL    Swish and swallow 10 mLs in mouth every 6 hours as needed for mouth sores       loratadine 10 MG tablet    CLARITIN     Take 10 mg by mouth daily       LORazepam 0.5 MG tablet    ATIVAN    30 tablet    Take 1 tablet (0.5 mg) by mouth every 4 hours as needed (Anxiety, Nausea/Vomiting or Sleep)       MAGIC MOUTHWASH (ENTER INGREDIENTS IN COMMENTS)      SWISH AND SWALLOW 10 ML BY MOUTH EVERY 6 HOURS AS NEEDED FOR MOUTH SORES       mometasone-formoterol 200-5 MCG/ACT oral inhaler    DULERA    3 Inhaler    Inhale 2 puffs into the lungs 2 times daily       * order for DME      F&P Zest medium nasal mask       order for DME      Equipment being ordered: CPAP AIRSENSE 10 9-11 CM H20 # [id number]  DN# [id number]       *  order for DME      Auto-CPAP: Max 11 cm H2O Min 9 cm H2O Changed in clinic Continuous  Lifetime need and heated humidity.       order for DME     1 Device    Equipment being ordered: accessory kit       oxybutynin 10 MG 24 hr tablet    DITROPAN XL    90 tablet    Take 1 tablet (10 mg) by mouth daily (Needs follow-up appointment for this medication)       oxyCODONE 5 MG IR tablet    ROXICODONE    30 tablet    Take 1 tablet (5 mg) by mouth every 4 hours as needed for moderate to severe pain       DANIELLE LC PLUS NEBULIZER Misc          predniSONE 20 MG tablet    DELTASONE    10 tablet    Take 1 tablet (20 mg) by mouth 2 times daily For Yellow or Red zone on Asthma Action Plan.       prochlorperazine 10 MG tablet    COMPAZINE    30 tablet    Take 1 tablet (10 mg) by mouth every 6 hours as needed (Nausea/Vomiting)       STOOL SOFTENER PO      Take by mouth as needed       TYLENOL PO      Take 650 mg by mouth every 4 hours as needed for mild pain or fever       zolpidem 10 MG tablet    AMBIEN    30 tablet    Take 1 tablet 30 minutes prior to bedtime       * Notice:  This list has 4 medication(s) that are the same as other medications prescribed for you. Read the directions carefully, and ask your doctor or other care provider to review them with you.

## 2017-05-25 NOTE — PROGRESS NOTES
Infusion Nursing Note:  Etta Cervantes presents today for IV fluids and lab draw.    Patient seen by provider today: Yes: Dr. Cassidy   present during visit today: Not Applicable.    Note: N/A.    Intravenous Access:  Labs drawn via port.    Treatment Conditions:  Not Applicable.      Post Infusion Assessment:  Patient tolerated infusion without incident.  Access discontinued per protocol.    Discharge Plan:   Patient discharged in stable condition accompanied by: .  Departure Mode: Ambulatory.  Pt will return for follow up with MD as scheduled on 6/28.    Kathy Cavazos RN

## 2017-05-25 NOTE — NURSING NOTE
"Oncology Rooming Note    May 25, 2017 2:33 PM   Etta Cervantes is a 56 year old female who presents for:    Chief Complaint   Patient presents with     Oncology Clinic Visit     Recheck Breast CA, thrush, fever & rash     Initial Vitals: /82 (BP Location: Right arm, Patient Position: Chair, Cuff Size: Adult Large)  Pulse 131  Temp 99.6  F (37.6  C) (Tympanic)  Resp 20  Ht 1.632 m (5' 4.25\")  Wt 91.6 kg (201 lb 14.4 oz)  SpO2 91%  Breastfeeding? No  BMI 34.38 kg/m2 Estimated body mass index is 34.38 kg/(m^2) as calculated from the following:    Height as of this encounter: 1.632 m (5' 4.25\").    Weight as of this encounter: 91.6 kg (201 lb 14.4 oz). Body surface area is 2.04 meters squared.  Moderate Pain (5) Comment: mid back    No LMP recorded.  Allergies reviewed: No  Medications reviewed: Yes    Medications: Medication refills not needed today.  Pharmacy name entered into EPIC: Data Unavailable    Clinical concerns Recheck Breast CA, thrush, fever & rash    7  minutes for nursing intake (face to face time)     Josephine Virgen CMA              "

## 2017-05-25 NOTE — PATIENT INSTRUCTIONS
You will need to have labs drawn today, we will call you with your results.  You will also need to have 1 liter of IV fluid today.We would like to see you back in clinic with Dr. Cassidy as previously scheduled.  Copy of appointments, and after visit summary (AVS) given to patient.  If you have any questions during business hours (M-F 8 AM- 4PM), please call Funmi Ray RN, BSN, OCN Oncology Hematology /Breast Cancer Navigator at [facility] [phone].   For questions after business hours, or on holidays/weekends, please call our after hours Nurse Triage line [phone]. Thank you.

## 2017-05-26 NOTE — PROGRESS NOTES
"Status Check:    Pt is a 56 year old female, recently in clinic for thrush, fever, and mouth sores.  Call placed for status check.    Primary care provider is: Johana Fairbanks    Diagnosis: Breast cancer     Oncology provider is:  Dr. LUIS Cassidy    How are you doing/feeling?: \"much better, the fluids really helped\".  Denies fever nor chills, is eating soft, bland foods and drinking water and gatorade. Denies requiring IV fluids today.  Any further issues?: reviewed lab results and Dr. Cassidy's recommendations with patient. Also, reviewed Xeloda start date and the monitoring of this medication. Patient will be picking this up today.  Next Follow up/Recommendations: Advised patient to utilize PRN medications as needed, eat nutritious meals, drink plenty of fluids, and keep scheduled appointments. If symptoms or other concerns develop after hours, encouraged patient to call our after hours number: [phone].  Patient instructed to call with any questions or concerns.  Patient states understanding and is in agreement with this plan.    Patient instructed to call with any questions or concerns.  Patient states understanding and is in agreement with this plan.    Approximately 6 minutes spent on telephone with patient reviewing assessment and symptom(s) and providing symptom management.    Funmi Ray RN, BSN, OCN  Oncology Hematology   Breast Cancer Navigator  [facility]  [email or web address]  [phone]    "

## 2017-05-26 NOTE — PROGRESS NOTES
Hematology/ Oncology Follow-up Visit:  May 25, 2017    Reason for Visit:   Chief Complaint   Patient presents with     Oncology Clinic Visit     Recheck Breast CA, thrush, fever & rash       Oncologic History:  Breast cancer (H)  Etta Cervantes presented with increasing lump in the right axilla that s lump has been growing without any pain or associated symptoms. Subsequently she was evaluated by primary care physician. Diagnostic digital mammogram was done on 01/13/2015 showing enlarged lymph nodes in the right axilla. There was some skin thickening in the right breast anteriorly and laterally. There are no parenchymal changes in the right breast. The left breast shows a 1.7 cm spiculated mass at approximately 2 to 3 o'clock position, 15.2 cm away from the nipple. A right breast ultrasound was subsequently done and ultrasound guided biopsy was done. Needle biopsy of the left breast did show infiltrating ductal carcinoma grade I of III with no angiolymphatic invasion seen. No associated ductal carcinoma in situ. Estrogen receptor, progresterone receptor were positive. HER-2/sadie receptor came back negative.. Another biopsy from the right axilla did show metastatic ductal carcinoma. PET scan was done on January 26, 2015 showing few small right posterior cervical chain nodes the largest is 1.2 cm. There is extensive bulky right axillary adenopathy demonstrating hypermetabolic FDG uptake with SUV of 10.6. There is skin thickening involving the right breast which demonstrated low-grade FDG uptake. A 1.2 cm lesion on the lateral aspect of the left breast demonstrated minimally increased FDG uptake with SUV of 2. Scattered hypermetabolic mediastinal lymph nodes located in the left superior anterior mediastinum, right paratracheal and precarinal U, subcranial adenopathy with javon metastases. This focus hypermetabolic FDG uptake identified definitive Amanda posterior aspect off intertrochanteric region of the proximal left  femur consistent with bone metastases. There is also 1.4 cm hypermetabolic FDG uptake identified in the anterior medial segment of the left hepatic lobe consistent with liver metastases. Additional 2.1 cm low-attenuation lesion anterior aspect of the right lobe which needs to be determined. The patient was started on chemotherapy with Taxotere and Cytoxan on February 9, 2015.  She concluded 4 cycles of Taxotere and Cytoxan. She started on Arimidex 1 mg orally daily. Currently she is on Faslodex and ibrance      Interval History:  Patient is here today because of increasing difficulty with swallowing and sore throat. She also has a low-grade fever. She denies any cough or wheezing. She denies any nausea or vomiting or diarrhea.    Review Of Systems:  Constitutional: Negative for fever, chills, and night sweats.  Skin: negative.  Eyes: negative.  Ears/Nose/Throat: negative.  Respiratory: No shortness of breath, dyspnea on exertion, cough, or hemoptysis.  Cardiovascular: negative.  Gastrointestinal: negative.  Genitourinary: negative.  Musculoskeletal: negative.  Neurologic: negative.  Psychiatric: negative.  Hematologic/Lymphatic/Immunologic: negative.  Endocrine: negative.    All other ROS negative unless mentioned in interval history.    Past medical, social, surgical, and family histories reviewed.    Allergies:  Allergies as of 05/25/2017 - Manuel as Reviewed 05/25/2017   Allergen Reaction Noted     Animal dander Cough 01/23/2015     Cats  07/15/2010     Dust mites Cough 01/23/2015     Pollen extract  01/23/2015     Seasonal allergies  07/15/2010       Current Medications:  Current Outpatient Prescriptions   Medication Sig Dispense Refill     AFINITOR 10 MG tablet CHEMO        magic mouthwash suspension (diphenhydrAMINE, lidocaine, aluminum-magnesium & simethicone) Swish and swallow 10 mLs in mouth every 6 hours as needed for mouth sores 240 mL 10     fluconazole (DIFLUCAN) 100 MG tablet Take 1 tablet (100 mg) by  mouth daily 5 tablet 0     capecitabine (XELODA) 500 MG tablet CHEMO Take 4 tablets (2,000 mg) by mouth 2 times daily for 14 days Days 1 through 14, then off for 7 days. Take within 30 mins after meal. 112 tablet 0     MAGIC MOUTHWASH, ENTER INGREDIENTS IN COMMENTS, SWISH AND SWALLOW 10 ML BY MOUTH EVERY 6 HOURS AS NEEDED FOR MOUTH SORES  10     lidocaine (XYLOCAINE) 2 % solution        LORazepam (ATIVAN) 0.5 MG tablet Take 1 tablet (0.5 mg) by mouth every 4 hours as needed (Anxiety, Nausea/Vomiting or Sleep) 30 tablet 2     prochlorperazine (COMPAZINE) 10 MG tablet Take 1 tablet (10 mg) by mouth every 6 hours as needed (Nausea/Vomiting) 30 tablet 2     Docusate Calcium (STOOL SOFTENER PO) Take by mouth as needed       CRANBERRY PO Take by mouth daily       atorvastatin (LIPITOR) 10 MG tablet Take 1 tablet (10 mg) by mouth daily 30 tablet 11     zolpidem (AMBIEN) 10 MG tablet Take 1 tablet 30 minutes prior to bedtime 30 tablet 5     mometasone-formoterol (DULERA) 200-5 MCG/ACT oral inhaler Inhale 2 puffs into the lungs 2 times daily 3 Inhaler 0     order for DME Equipment being ordered: CPAP  AIRSENSE 10  9-11 CM H20  SN# [id number]  DN# [id number]       oxybutynin (DITROPAN XL) 10 MG 24 hr tablet Take 1 tablet (10 mg) by mouth daily (Needs follow-up appointment for this medication) 90 tablet 3     beclomethasone (QVAR) 80 MCG/ACT Inhaler Inhale 2 puffs into the lungs 2 times daily 3 Inhaler 3     azelastine (ASTELIN) 0.1 % nasal spray Spray 1-2 sprays into both nostrils 2 times daily as needed for rhinitis 3 Bottle 3     levothyroxine (SYNTHROID, LEVOTHROID) 25 MCG tablet Take 1 tablet (25 mcg) by mouth daily 90 tablet 3     albuterol (VENTOLIN HFA) 108 (90 BASE) MCG/ACT inhaler Inhale 2 puffs into the lungs every 4 hours as needed 1 Inhaler 2     oxyCODONE (ROXICODONE) 5 MG immediate release tablet Take 1 tablet (5 mg) by mouth every 4 hours as needed for moderate to severe pain 30 tablet 0     albuterol (2.5 MG/3ML)  "0.083% nebulizer solution Take 1 vial (2.5 mg) by nebulization every 4 hours as needed for shortness of breath / dyspnea 30 vial 3     predniSONE (DELTASONE) 20 MG tablet Take 1 tablet (20 mg) by mouth 2 times daily For Yellow or Red zone on Asthma Action Plan. 10 tablet 0     Nebulizers (DANIELLE LC PLUS NEBULIZER) MISC   0     order for DME Equipment being ordered: accessory kit 1 Device 0     calcium 600 MG tablet Take 3 tablets by mouth daily  60 tablet      cholecalciferol (VITAMIN D) 1000 UNIT tablet Take 1,500 Units by mouth daily  100 tablet 3     loratadine (CLARITIN) 10 MG tablet Take 10 mg by mouth daily       Acetaminophen (TYLENOL PO) Take 650 mg by mouth every 4 hours as needed for mild pain or fever       ORDER FOR DME F&P Zest medium nasal mask       ORDER FOR DME Auto-CPAP:  Max 11 cm H2O  Min 9 cm H2O  Changed in clinic  Continuous    Lifetime need and heated humidity.          exemestane (AROMASIN) 25 MG tablet TAKE 1 TABLET BY MOUTH DAILY AFTER A MEAL.  0     dexamethasone 0.1 MG/ML solution SWISH 5-10ML FOR 2 MIN AND SPIT 4 TIMES PER DAY. NOT TO EAT FOR 1 HOUR AFTER USING THE MOUTHWASH.  1        Physical Exam:  /82 (BP Location: Right arm, Patient Position: Chair, Cuff Size: Adult Large)  Pulse 131  Temp 99.6  F (37.6  C) (Tympanic)  Resp 20  Ht 1.632 m (5' 4.25\")  Wt 91.6 kg (201 lb 14.4 oz)  SpO2 91%  Breastfeeding? No  BMI 34.38 kg/m2  Wt Readings from Last 12 Encounters:   05/25/17 91.6 kg (201 lb 14.4 oz)   05/18/17 93.3 kg (205 lb 11.2 oz)   04/28/17 93 kg (205 lb)   04/19/17 93.8 kg (206 lb 12.8 oz)   03/22/17 94 kg (207 lb 4.8 oz)   03/02/17 92.1 kg (203 lb 1.6 oz)   01/27/17 93 kg (205 lb)   01/19/17 95 kg (209 lb 8 oz)   11/23/16 95.7 kg (210 lb 14.4 oz)   10/12/16 98.1 kg (216 lb 3.2 oz)   09/23/16 100.2 kg (221 lb)   08/24/16 98.9 kg (218 lb)     ECOG performance status: 1  GENERAL APPEARANCE: Healthy, alert and in no acute distress.  HEENT: Sclerae anicteric. There is 1 x " 3 cm lesion on the rule for the mouth with ulceration. There is thrush. NECK: Supple. No asymmetry or masses.  LYMPHATICS: No palpable cervical, supraclavicular, axillary, or inguinal lymphadenopathy.  RESP: Lungs clear to auscultation bilaterally without rales, rhonchi or wheezes.  CARDIOVASCULAR: Regular rate and rhythm. Normal S1, S2; no S3 or S4. No murmur, gallop, or rub.  ABDOMEN: Soft, nontender. Bowel sounds normal. No palpable organomegaly or masses.  MUSCULOSKELETAL: Extremities without gross deformities noted. No edema of bilateral lower extremities.  SKIN: No suspicious lesions or rashes.  NEURO: Alert and oriented x 3. Cranial nerves II-XII grossly intact.  PSYCHIATRIC: Mentation and affect appear normal.    Laboratory/Imaging Studies:  Infusion Therapy Visit on 05/16/2017   Component Date Value Ref Range Status     WBC 05/16/2017 4.6  4.0 - 11.0 10e9/L Final     RBC Count 05/16/2017 4.33  3.8 - 5.2 10e12/L Final     Hemoglobin 05/16/2017 12.9  11.7 - 15.7 g/dL Final     Hematocrit 05/16/2017 40.3  35.0 - 47.0 % Final     MCV 05/16/2017 93  78 - 100 fl Final     MCH 05/16/2017 29.8  26.5 - 33.0 pg Final     MCHC 05/16/2017 32.0  31.5 - 36.5 g/dL Final     RDW 05/16/2017 12.8  10.0 - 15.0 % Final     Platelet Count 05/16/2017 194  150 - 450 10e9/L Final     Diff Method 05/16/2017 Automated Method   Final     % Neutrophils 05/16/2017 63.1  % Final     % Lymphocytes 05/16/2017 24.4  % Final     % Monocytes 05/16/2017 10.4  % Final     % Eosinophils 05/16/2017 1.9  % Final     % Basophils 05/16/2017 0.2  % Final     % Immature Granulocytes 05/16/2017 0.0  % Final     Absolute Neutrophil 05/16/2017 2.9  1.6 - 8.3 10e9/L Final     Absolute Lymphocytes 05/16/2017 1.1  0.8 - 5.3 10e9/L Final     Absolute Monocytes 05/16/2017 0.5  0.0 - 1.3 10e9/L Final     Absolute Eosinophils 05/16/2017 0.1  0.0 - 0.7 10e9/L Final     Absolute Basophils 05/16/2017 0.0  0.0 - 0.2 10e9/L Final     Abs Immature Granulocytes  05/16/2017 0.0  0 - 0.4 10e9/L Final     Sodium 05/16/2017 144  133 - 144 mmol/L Final     Potassium 05/16/2017 3.3* 3.4 - 5.3 mmol/L Final     Chloride 05/16/2017 110* 94 - 109 mmol/L Final     Carbon Dioxide 05/16/2017 23  20 - 32 mmol/L Final     Anion Gap 05/16/2017 11  3 - 14 mmol/L Final     Glucose 05/16/2017 125* 70 - 99 mg/dL Final     Urea Nitrogen 05/16/2017 9  7 - 30 mg/dL Final     Creatinine 05/16/2017 0.63  0.52 - 1.04 mg/dL Final     GFR Estimate 05/16/2017   >60 mL/min/1.7m2 Final                    Value:>90  Non  GFR Calc       GFR Estimate If Black 05/16/2017   >60 mL/min/1.7m2 Final                    Value:>90   GFR Calc       Calcium 05/16/2017 8.4* 8.5 - 10.1 mg/dL Final     Bilirubin Total 05/16/2017 0.7  0.2 - 1.3 mg/dL Final     Albumin 05/16/2017 3.2* 3.4 - 5.0 g/dL Final     Protein Total 05/16/2017 7.2  6.8 - 8.8 g/dL Final     Alkaline Phosphatase 05/16/2017 69  40 - 150 U/L Final     ALT 05/16/2017 23  0 - 50 U/L Final     AST 05/16/2017 35  0 - 45 U/L Final     CA 27-29 05/16/2017 96* 0 - 39 U/mL Final    Assay Method:  Chemiluminescence using Siemens Centaur XP     CEA 05/16/2017 7.7* 0 - 2.5 ug/L Final    Comment: Smoking may cause CEA results to be elevated.   Assay Method:  Chemiluminescence using Siemens Centaur XP          Recent Results (from the past 744 hour(s))   CT Chest/Abdomen/Pelvis w Contrast    Narrative    CT CHEST/ABDOMEN/PELVIS W CONTRAST 5/16/2017 9:43 AM    HISTORY: Breast cancer. Follow-up.    CONTRAST:  100 mL Isovue 370.    TECHNIQUE: CT of the chest, abdomen, and pelvis is performed with IV  contrast.    Routine evaluated structures in the chest include the lungs,  mediastinal structures, pleura, and chest wall.    Routine assessed structures in the abdomen include the liver, spleen,  pancreas, adrenal glands, and kidneys. Also assessed are the  retroperitoneum, gastrointestinal tract, and the abdominal wall.    Intrapelvic  anatomy is also assessed.    Radiation dose for this scan is reduced using automated exposure  control, adjustment of the mA and/or kV according to patient size, or  iterative reconstruction technique.    COMPARISON: 1/26/2017.    FINDINGS:    Chest: A large cyst in the right lung is again seen. There is more  skin thickening and edema in the right breast compared to the prior  study. There is more adenopathy in the right axilla. A dominant lymph  node measures 2.1 cm compared to 1.4 cm on the prior study. A  multinodular thyroid gland remains stable.    Abdomen: Liver metastases have worsened in the interim. A large one in  the left lobe on image #55 measures 5.1 cm compared to 2.3 cm on the  prior study. There is new gastrohepatic ligament adenopathy and there  is adenopathy in the portal area. A gastrohepatic ligament lymph node  on image #59 measures 1.4 cm. A portal lymph node on image #65  measures 2.4 cm.    Pelvis: Sigmoid diverticulosis is present. Bilateral fat-containing  inguinal hernias are present.      Impression    IMPRESSION:  1.  There is more/larger right axillary adenopathy.  2. Hepatic metastases have worsened.  3. Gastrohepatic ligament/portal adenopathy are present.    LETI CHASE MD   NM Bone Scan Whole Body    Narrative    NUCLEAR MEDICINE BONE SCAN WHOLE BODY   5/16/2017 11:36 AM    HISTORY: Malignant neoplasm of unspecified site of unspecified female  breast. Secondary malignant neoplasm of liver and intrahepatic bile  duct.    DOSE:  26.2 mCi technetium HDP.    COMPARISON:  Prior bone scan: None available.   Relevant imaging study: Chest CT from today    FINDINGS: Increased activity along the patient's central line.  Increased activity at the medial aspect of the right fifth rib;  corresponding sclerosis is seen on chest CT from today. There is a  tiny asymmetric focus within the T8 vertebral body on the right. Chest  CT demonstrates some faint asymmetric sclerosis, possibly  representing  a second subtle metastatic lesion.      Impression    IMPRESSION: Right fifth rib lesion, consistent with metastasis.  Possible metastasis within T8.    TARA BERGER MD       Assessment and plan:  C50.911,  C77.3) Breast cancer metastasized to axillary lymph node, right (H)  (primary encounter diagnosis)  (C78.7) Secondary malignant neoplasm of liver (H)  (C50.919,  C78.7) Carcinoma of breast metastatic to liver, unspecified laterality (H)  C50.911,  C50.912) Bilateral malignant neoplasm of breast in female, unspecified site of breast   I reviewed with the patient today the images from the CT scan and bone scan. There is disease progression.   progression. We reviewed the different treatment options. I would recommend capecitabine 2000 mg twice daily for 14 days on and 7 days off.  Capecitabine in detail as well as major side effects including, but not limited to immediate/short term side effects of  life-threatening infection, fever,headache, nausea/vomiting, diarrhea, mucositis, appetite changes, rare alopecia, hand-foot syndrome, eye and skin irritation, myalgias, photosensitivity, anemia, neutropenia, thrombocytopenia, and weakness and fatigue. The patient was instructed to take Capecitabine following a meal with a full glass of water and to discontinue and refrain from administration of non-steroidal anti-inflammatory medications, aspirin, and blood thinners. Patient instructed to call with signs or symptoms of infection including, but not limited to temperature greater than or equal to 100.5 degrees Fahrenheit, chills, severe sore throat, ear or sinus pain, mouth sores, cough, painful urination, and/or non-healing wound. The patient was given written information about  Capecitabine, agrees to proceed with treatment, and denies any further questions at this time.     (C79.51) Bone metastasis (H)  Patient will continue on denosumab 120 mg subcutaneously every 3 months. Patient will continue on  calcium and vitamin D supplements.     (K12.31) Mucositis due to chemotherapy  The patient will continue on Magic mouth wash. she will also continue on Diflucan. Because of poor oral intake and dehydration we will proceed with 1 L of normal saline today.     (E03.9) Hypothyroidism, unspecified type  Patient will continue on 25  g daily     (E78.5) Hyperlipidemia LDL goal <130  Patient currently on Lipitor 10 mg orally daily.    The patient is ready to learn, no apparent learning barriers were identified.  Diagnosis and treatment plans were explained to the patient. The patient expressed understanding of the content. The patient asked appropriate questions. The patient questions were answered to her satisfaction.    Chart documentation with Dragon Voice recognition Software. Although reviewed after completion, some words and grammatical errors may remain.

## 2017-06-01 NOTE — TELEPHONE ENCOUNTER
Pharmacy gave her a coupon and patient paid for this out of pocket. Verbal is not needed.  Consuelo Payton RN

## 2017-06-01 NOTE — TELEPHONE ENCOUNTER
[facility] pharmacy from [place], [place] is calling.  Etta apparently has moved her scripts there.  Her insurance has changed and they are looking for a verbal OK to fill proair inhaler for her instead of ventolin.  Insurance will not cover the ventolin.  Please review and advise. Thank you..Sandra Parekh

## 2017-06-19 NOTE — MR AVS SNAPSHOT
After Visit Summary   6/19/2017    Etta Cervantes    MRN: [id number]           Patient Information     Date Of Birth          1960        Visit Information        Provider Department      6/19/2017 1:00 PM Johana Fairbanks MD [facility]        Today's Diagnoses     Moderate persistent asthma, uncomplicated           Follow-ups after your visit        Your next 10 appointments already scheduled     Jun 28, 2017  8:30 AM CDT   Level O with ROOM 8 [facility] ([facility])    [facility]  [address]  [facility] [address]   [phone]            Jun 28, 2017  9:30 AM CDT   Return Visit with Brodie Cassidy MD   [facility] ([facility])    [facility]  [address]  [facility] [address]   [phone]              Who to contact     Normal or non-critical lab and imaging results will be communicated to you by GlobalTranzhart, letter or phone within 4 business days after the clinic has received the results. If you do not hear from us within 7 days, please contact the clinic through [employer] or phone. If you have a critical or abnormal lab result, we will notify you by phone as soon as possible.  Submit refill requests through Amicus or call your pharmacy and they will forward the refill request to us. Please allow 3 business days for your refill to be completed.          If you need to speak with a  for additional information , please call: [phone]             Additional Information About Your Visit        Amicus Information     [employer]micus gives you secure access to your electronic health record. If you see a primary care provider, you can also send messages to your care team and make appointments. If you have questions, please call your primary care clinic.  If you do not have a primary care provider, please call [phone] and they will assist you.         Care EveryWhere ID     This is your Care EveryWhere ID. This could be used by other organizations to access your [facility] medical records  [id number]        Your Vitals Were     Pulse Temperature Respirations Pulse Oximetry BMI (Body Mass Index)       99 99.6  F (37.6  C) (Tympanic) 22 95% 34.5 kg/m2        Blood Pressure from Last 3 Encounters:   06/19/17 120/84   05/25/17 132/82   05/18/17 138/81    Weight from Last 3 Encounters:   06/19/17 202 lb 9.6 oz (91.9 kg)   05/25/17 201 lb 14.4 oz (91.6 kg)   05/18/17 205 lb 11.2 oz (93.3 kg)              Today, you had the following     No orders found for display         Today's Medication Changes          These changes are accurate as of: 6/19/17  2:03 PM.  If you have any questions, ask your nurse or doctor.               These medicines have changed or have updated prescriptions.        Dose/Directions    * albuterol 108 (90 BASE) MCG/ACT Inhaler   Commonly known as:  VENTOLIN HFA   This may have changed:  Another medication with the same name was added. Make sure you understand how and when to take each.   Used for:  Moderate persistent asthma, uncomplicated   Changed by:  Johana Fairbanks MD        Dose:  2 puff   Inhale 2 puffs into the lungs every 4 hours as needed   Quantity:  1 Inhaler   Refills:  2       * albuterol (2.5 MG/3ML) 0.083% neb solution   This may have changed:  Another medication with the same name was added. Make sure you understand how and when to take each.   Used for:  Moderate persistent asthma, uncomplicated   Changed by:  Johana Fairbanks MD        Dose:  1 vial   Take 1 vial (2.5 mg) by nebulization every 4 hours as needed for shortness of breath / dyspnea   Quantity:  30 vial   Refills:  3       * albuterol 108 (90 BASE) MCG/ACT Inhaler   Commonly known as:  PROAIR HFA/PROVENTIL HFA/VENTOLIN HFA   This may have changed:  You were already taking a medication with the same name, and this prescription was added. Make sure you  understand how and when to take each.   Used for:  Moderate persistent asthma, uncomplicated   Changed by:  Johana Fairbanks MD        Dose:  2 puff   Inhale 2 puffs into the lungs every 6 hours as needed for shortness of breath / dyspnea or wheezing   Quantity:  1 Inhaler   Refills:  1       * Notice:  This list has 3 medication(s) that are the same as other medications prescribed for you. Read the directions carefully, and ask your doctor or other care provider to review them with you.         Where to get your medicines      These medications were sent to [place] [address] [facility] [address]  [address] [facility] [address]     Phone:  [phone]     albuterol (2.5 MG/3ML) 0.083% neb solution    albuterol 108 (90 BASE) MCG/ACT Inhaler    predniSONE 20 MG tablet                Primary Care Provider Office Phone # Fax #    Johana Fairbanks [phone]       [facility] [id number] [place] [address]        Thank you!     Thank you for choosing [facility]  for your care. Our goal is always to provide you with excellent care. Hearing back from our patients is one way we can continue to improve our services. Please take a few minutes to complete the written survey that you may receive in the mail after your visit with us. Thank you!             Your Updated Medication List - Protect others around you: Learn how to safely use, store and throw away your medicines at www.disposemymeds.org.          This list is accurate as of: 6/19/17  2:03 PM.  Always use your most recent med list.                   Brand Name Dispense Instructions for use    AFINITOR 10 MG tablet CHEMO   Generic drug:  everolimus          * albuterol 108 (90 BASE) MCG/ACT Inhaler    VENTOLIN HFA    1 Inhaler    Inhale 2 puffs into the lungs every 4 hours as needed       * albuterol (2.5 MG/3ML) 0.083% neb solution     30 vial    Take 1 vial (2.5 mg) by nebulization every 4  hours as needed for shortness of breath / dyspnea       * albuterol 108 (90 BASE) MCG/ACT Inhaler    PROAIR HFA/PROVENTIL HFA/VENTOLIN HFA    1 Inhaler    Inhale 2 puffs into the lungs every 6 hours as needed for shortness of breath / dyspnea or wheezing       atorvastatin 10 MG tablet    LIPITOR    30 tablet    Take 1 tablet (10 mg) by mouth daily       azelastine 0.1 % spray    ASTELIN    3 Bottle    Spray 1-2 sprays into both nostrils 2 times daily as needed for rhinitis       beclomethasone 80 MCG/ACT Inhaler    QVAR    3 Inhaler    Inhale 2 puffs into the lungs 2 times daily       calcium 600 MG tablet     60 tablet    Take 3 tablets by mouth daily       capecitabine 500 MG tablet CHEMO    XELODA    112 tablet    Take 4 tablets (2,000 mg) by mouth 2 times daily for 14 days Days 1 through 14, then off for 7 days. Take within 30 mins after meal.       cholecalciferol 1000 UNIT tablet    vitamin D    100 tablet    Take 1,500 Units by mouth daily       CRANBERRY PO      Take by mouth daily       dexamethasone 0.1 MG/ML solution      SWISH 5-10ML FOR 2 MIN AND SPIT 4 TIMES PER DAY. NOT TO EAT FOR 1 HOUR AFTER USING THE MOUTHWASH.       exemestane 25 MG tablet    AROMASIN     TAKE 1 TABLET BY MOUTH DAILY AFTER A MEAL.       levothyroxine 25 MCG tablet    SYNTHROID/LEVOTHROID    90 tablet    Take 1 tablet (25 mcg) by mouth daily       lidocaine 2 % solution    XYLOCAINE         lidocaine visc 2% 2.5mL/5mL & maalox/mylanta w/ simeth 2.5mL/5mL & diphenhydrAMINE 5mg/5mL Susp suspension    MAGIC Mouthwash [facility]    240 mL    Swish and swallow 10 mLs in mouth every 6 hours as needed for mouth sores       loratadine 10 MG tablet    CLARITIN     Take 10 mg by mouth daily       LORazepam 0.5 MG tablet    ATIVAN    30 tablet    Take 1 tablet (0.5 mg) by mouth every 4 hours as needed (Anxiety, Nausea/Vomiting or Sleep)       MAGIC MOUTHWASH (ENTER INGREDIENTS IN COMMENTS)      SWISH AND SWALLOW 10 ML BY MOUTH EVERY 6 HOURS AS  NEEDED FOR MOUTH SORES       mometasone-formoterol 200-5 MCG/ACT oral inhaler    DULERA    3 Inhaler    Inhale 2 puffs into the lungs 2 times daily       * order for DME      F&P Zest medium nasal mask       order for DME      Equipment being ordered: CPAP AIRSENSE 10 9-11 CM H20 # [id number]  DN# [id number]       * order for DME      Auto-CPAP: Max 11 cm H2O Min 9 cm H2O Changed in clinic Continuous  Lifetime need and heated humidity.       order for DME     1 Device    Equipment being ordered: accessory kit       oxybutynin 10 MG 24 hr tablet    DITROPAN XL    90 tablet    Take 1 tablet (10 mg) by mouth daily (Needs follow-up appointment for this medication)       oxyCODONE 5 MG IR tablet    ROXICODONE    30 tablet    Take 1 tablet (5 mg) by mouth every 4 hours as needed for moderate to severe pain       DANIELLE LC PLUS NEBULIZER Misc          predniSONE 20 MG tablet    DELTASONE    10 tablet    Take 1 tablet (20 mg) by mouth 2 times daily For Yellow or Red zone on Asthma Action Plan.       prochlorperazine 10 MG tablet    COMPAZINE    30 tablet    Take 1 tablet (10 mg) by mouth every 6 hours as needed (Nausea/Vomiting)       STOOL SOFTENER PO      Take by mouth as needed       TYLENOL PO      Take 650 mg by mouth every 4 hours as needed for mild pain or fever       zolpidem 10 MG tablet    AMBIEN    30 tablet    Take 1 tablet 30 minutes prior to bedtime       * Notice:  This list has 5 medication(s) that are the same as other medications prescribed for you. Read the directions carefully, and ask your doctor or other care provider to review them with you.

## 2017-06-19 NOTE — NURSING NOTE
"Chief Complaint   Patient presents with     Asthma     Flare Up       Initial /84 (BP Location: Right arm, Patient Position: Chair, Cuff Size: Adult Large)  Pulse 99  Temp 99.6  F (37.6  C) (Tympanic)  Resp 22  Wt 202 lb 9.6 oz (91.9 kg)  SpO2 95%  BMI 34.5 kg/m2 Estimated body mass index is 34.5 kg/(m^2) as calculated from the following:    Height as of 5/25/17: 5' 4.25\" (1.632 m).    Weight as of this encounter: 202 lb 9.6 oz (91.9 kg).  Medication Reconciliation: complete    Health Maintenance that is potentially due pending provider review:  NONE    n/a      "

## 2017-06-19 NOTE — PROGRESS NOTES
SUBJECTIVE:                                                    Etta Cervantes is a 57 year old female who presents to clinic today for the following health issues:      Asthma Flare Up      Duration: 3 weeks    Description (location/character/radiation): Patient can not get out of yellow zone for ashtma action plan. Patient has not felt well for about 3 weeks, she saw her oncologist recently and was ok's to go on vacation. The elevation where she went was very high and she was in the red zone for asthma action plan. She used inhaler and nebs. She started a new chemo drug 1 week ago, she experienced enlarged breast, this has been getting slowly better. Overall patient does not feel well.     Intensity:  moderate    Accompanying signs and symptoms: hard time breathing, wheezing, hard time holding breath, coughing    History (similar episodes/previous evaluation): None    Precipitating or alleviating factors: None    Therapies tried and outcome: inhalers, nebulizers, prednisone       She was out in [facility] and she was up at elevation she was not feeling the greatest while she was out there . She took the prednisone while she was out there and has been using her neb 2 times per day   She was missing work before she even went on vacation  Just started new chemo for the breast cancer  Feels like she can't breath as well  When she uses the nebulizer she feels like it is better for a little while  She is on the dulera that had been working well  She has been not using the albuterol inhaler  The right lung has a large bleb in it and she also has a large tumor mass/load in the right axilla and the breast itself has enlarged. The skin had been very tight       Problem list and histories reviewed & adjusted, as indicated.  Additional history: as documented    Patient Active Problem List   Diagnosis     Hypothyroid     Fibroids     CARDIOVASCULAR SCREENING; LDL GOAL LESS THAN 160     Menorrhagia     Moderate persistent  asthma     DIAMOND (obstructive sleep apnea)     Insomnia     Health Care Home     ASCUS favor benign     Breast cancer (H)     Carcinoma of breast metastatic to liver (H)     Bone metastasis (H)     Breast cancer metastasized to axillary lymph node (H)     Drug-related hair loss     Mucositis due to chemotherapy     Secondary malignant neoplasm of liver (H)     Thyroid nodule     Emphysematous bleb of lung (H)     Chemotherapy-induced neutropenia (H)     Hyperlipidemia LDL goal <130     Past Surgical History:   Procedure Laterality Date     CATARACT IOL, RT/LT  2010     COLONOSCOPY  2010     DILATION AND CURETTAGE, HYSTEROSCOPY, ABLATE ENDOMETRIUM NOVASURE, COMBINED  3/2/2011    COMBINED DILATION AND CURETTAGE, HYSTEROSCOPY, ABLATE ENDOMETRIUM NOVASURE performed by LAURA VARGAS at [facility]     INSERT PORT VASCULAR ACCESS N/A 2/12/2015    Procedure: INSERT PORT VASCULAR ACCESS;  Surgeon: Noé Schaefer MD;  Location: [facility]     SURGICAL HISTORY OF -   2005    bladder sling     SURGICAL HISTORY OF -       Cystectomy-lower lip     TUBAL LIGATION         Social History   Substance Use Topics     Smoking status: Never Smoker     Smokeless tobacco: Never Used     Alcohol use No     Family History   Problem Relation Age of Onset     Depression Mother      Eye Disorder Mother      Gynecology Mother      Alzheimer Disease Mother      CANCER Father      Other Cancer Father      HEART DISEASE Maternal Grandfather      Allergies Paternal Grandfather      Allergies Son            Reviewed and updated as needed this visit by clinical staff  Tobacco  Allergies  Med Hx  Surg Hx  Fam Hx  Soc Hx      Reviewed and updated as needed this visit by Provider         ROS:  Constitutional, HEENT, cardiovascular, pulmonary, gi and gu systems are negative, except as otherwise noted.    OBJECTIVE:                                                    /84 (BP Location: Right arm, Patient Position: Chair, Cuff Size: Adult Large)   Pulse 99  Temp 99.6  F (37.6  C) (Tympanic)  Resp 22  Wt 202 lb 9.6 oz (91.9 kg)  SpO2 95%  BMI 34.5 kg/m2  Body mass index is 34.5 kg/(m^2).  GENERAL APPEARANCE: alert and no distress  NECK: no asymmetry, masses, or scars  RESP: right decreased sounds no wheezing left with no wheezing   CV: regular rates and rhythm, normal S1 S2, no S3 or S4 and no murmur, click or rub    Diagnostic test results:  Diagnostic Test Results:  none      ASSESSMENT/PLAN:                                                    1. Moderate persistent asthma, uncomplicated  Suspect this may be due to some external compression as she is not really noting a lot of improvement from the albuterol she can do this more often if she would like.   - albuterol (2.5 MG/3ML) 0.083% neb solution; Take 1 vial (2.5 mg) by nebulization every 4 hours as needed for shortness of breath / dyspnea  Dispense: 30 vial; Refill: 3  - predniSONE (DELTASONE) 20 MG tablet; Take 1 tablet (20 mg) by mouth 2 times daily For Yellow or Red zone on Asthma Action Plan.  Dispense: 10 tablet; Refill: 1  - albuterol (PROAIR HFA/PROVENTIL HFA/VENTOLIN HFA) 108 (90 BASE) MCG/ACT Inhaler; Inhale 2 puffs into the lungs every 6 hours as needed for shortness of breath / dyspnea or wheezing  Dispense: 1 Inhaler; Refill: 1      Follow up if not improving     Johana Fairbanks MD  [facility]

## 2017-06-21 NOTE — TELEPHONE ENCOUNTER
Status Check:    Pt is a 56 year old female, recently called clinic with c/o breast pain.  Call placed for status check. Message left for patient to return call to clinic. Message was left 06.19.17 as well.    Primary care provider is: Johana Fairbanks    Diagnosis:   Encounter Diagnoses   Name Primary?     Carcinoma of breast metastatic to liver, unspecified laterality (H) Yes     Secondary malignant neoplasm of liver (H)      Bone metastasis (H)      Breast cancer metastasized to axillary lymph node, right (H)        Oncology provider is: Dr. Cassidy    How are you doing/feeling?: unknown.  Any further issues?: unknown  Next Follow up/Recommendations: Advised patient to return call to clinic. Direct line provided.    Patient instructed to call with any questions or concerns.  Patient states understanding and is in agreement with this plan.    Approximately 0 minutes spent on telephone with patient reviewing assessment and symptom(s) and providing symptom management.    Funmi Ray RN, BSN, OCN  Oncology Hematology   Breast Cancer Navigator  [facility]  [email or web address]  [phone]

## 2017-06-21 NOTE — TELEPHONE ENCOUNTER
"Patient returned call to clinic.  Reports pain is better, under control and the swelling has decreased \"quite a bit\".  Pt will call back if there are any changes, worsening or new symptoms.  "

## 2017-06-28 NOTE — PROGRESS NOTES
Hematology/ Oncology Follow-up Visit:  Jun 28, 2017    Reason for Visit:   Chief Complaint   Patient presents with     Oncology Clinic Visit     6 week recheck Breast CA, Labs today       Oncologic History:  Breast cancer (H)  Etta Cervantes presented with increasing lump in the right axilla that s lump has been growing without any pain or associated symptoms. Subsequently she was evaluated by primary care physician. Diagnostic digital mammogram was done on 01/13/2015 showing enlarged lymph nodes in the right axilla. There was some skin thickening in the right breast anteriorly and laterally. There are no parenchymal changes in the right breast. The left breast shows a 1.7 cm spiculated mass at approximately 2 to 3 o'clock position, 15.2 cm away from the nipple. A right breast ultrasound was subsequently done and ultrasound guided biopsy was done. Needle biopsy of the left breast did show infiltrating ductal carcinoma grade I of III with no angiolymphatic invasion seen. No associated ductal carcinoma in situ. Estrogen receptor, progresterone receptor were positive. HER-2/sadie receptor came back negative.. Another biopsy from the right axilla did show metastatic ductal carcinoma. PET scan was done on January 26, 2015 showing few small right posterior cervical chain nodes the largest is 1.2 cm. There is extensive bulky right axillary adenopathy demonstrating hypermetabolic FDG uptake with SUV of 10.6. There is skin thickening involving the right breast which demonstrated low-grade FDG uptake. A 1.2 cm lesion on the lateral aspect of the left breast demonstrated minimally increased FDG uptake with SUV of 2. Scattered hypermetabolic mediastinal lymph nodes located in the left superior anterior mediastinum, right paratracheal and precarinal U, subcranial adenopathy with javon metastases. This focus hypermetabolic FDG uptake identified definitive Amanda posterior aspect off intertrochanteric region of the proximal left  femur consistent with bone metastases. There is also 1.4 cm hypermetabolic FDG uptake identified in the anterior medial segment of the left hepatic lobe consistent with liver metastases. Additional 2.1 cm low-attenuation lesion anterior aspect of the right lobe which needs to be determined. The patient was started on chemotherapy with Taxotere and Cytoxan on February 9, 2015.  She concluded 4 cycles of Taxotere and Cytoxan. She started on Arimidex 1 mg orally daily. Currently she is on Faslodex and ibrance      Interval History:  Patient is here today for follow-up. She concluded her first cycle of Xeloda. She tolerated Xeloda very well except for mild nausea. She denies any diarrhea. She noted discomfort, swelling and redness of the right breast. Her mucositis has improved. She denies any weight loss or fever or chills.She has been having exacerbation of asthma responded well to oral steroids and inhalers..    Review Of Systems:  Constitutional: Negative for fever, chills, and night sweats.  Skin: negative.  Eyes: negative.  Ears/Nose/Throat: negative.  Respiratory: Increasing shortness of breath, wheezing and cough utilize makes is a patient.  Cardiovascular: negative.  Gastrointestinal: Mild nausea. She denies any vomiting or diarrhea  Genitourinary: negative.  Musculoskeletal: swelling and discomfort in the right breast.  Neurologic: negative.  Psychiatric: negative.  Hematologic/Lymphatic/Immunologic: negative.  Endocrine: negative.    All other ROS negative unless mentioned in interval history.    Past medical, social, surgical, and family histories reviewed.    Allergies:  Allergies as of 06/28/2017 - Manuel as Reviewed 06/28/2017   Allergen Reaction Noted     Animal dander Cough 01/23/2015     Cats  07/15/2010     Dust mites Cough 01/23/2015     Pollen extract  01/23/2015     Seasonal allergies  07/15/2010       Current Medications:  Current Outpatient Prescriptions   Medication Sig Dispense Refill      ondansetron (ZOFRAN) 4 MG tablet Take 1 tablet (4 mg) by mouth 2 times daily 30 minutes before Xeloda 60 tablet 1     albuterol (2.5 MG/3ML) 0.083% neb solution Take 1 vial (2.5 mg) by nebulization every 4 hours as needed for shortness of breath / dyspnea 30 vial 3     capecitabine (XELODA) 500 MG tablet CHEMO Take 4 tablets (2,000 mg) by mouth 2 times daily for 14 days Days 1 through 14, then off for 7 days. Take within 30 mins after meal. 112 tablet 0     prochlorperazine (COMPAZINE) 10 MG tablet Take 1 tablet (10 mg) by mouth every 6 hours as needed (Nausea/Vomiting) 30 tablet 2     atorvastatin (LIPITOR) 10 MG tablet Take 1 tablet (10 mg) by mouth daily 30 tablet 11     zolpidem (AMBIEN) 10 MG tablet Take 1 tablet 30 minutes prior to bedtime 30 tablet 5     mometasone-formoterol (DULERA) 200-5 MCG/ACT oral inhaler Inhale 2 puffs into the lungs 2 times daily 3 Inhaler 0     beclomethasone (QVAR) 80 MCG/ACT Inhaler Inhale 2 puffs into the lungs 2 times daily 3 Inhaler 3     azelastine (ASTELIN) 0.1 % nasal spray Spray 1-2 sprays into both nostrils 2 times daily as needed for rhinitis 3 Bottle 3     levothyroxine (SYNTHROID, LEVOTHROID) 25 MCG tablet Take 1 tablet (25 mcg) by mouth daily 90 tablet 3     albuterol (VENTOLIN HFA) 108 (90 BASE) MCG/ACT inhaler Inhale 2 puffs into the lungs every 4 hours as needed 1 Inhaler 2     cholecalciferol (VITAMIN D) 1000 UNIT tablet Take 1,500 Units by mouth daily  100 tablet 3     loratadine (CLARITIN) 10 MG tablet Take 10 mg by mouth daily       Acetaminophen (TYLENOL PO) Take 650 mg by mouth every 4 hours as needed for mild pain or fever       capecitabine (XELODA) 500 MG tablet CHEMO Take 5 cap in AM and 4 cap in PM on Days 1 through 14, then off for 7 days. Take within 30 mins after meal. 126 tablet 0     predniSONE (DELTASONE) 20 MG tablet Take 1 tablet (20 mg) by mouth 2 times daily For Yellow or Red zone on Asthma Action Plan. (Patient not taking: Reported on 6/28/2017)  10 tablet 1     [DISCONTINUED] albuterol (PROAIR HFA/PROVENTIL HFA/VENTOLIN HFA) 108 (90 BASE) MCG/ACT Inhaler Inhale 2 puffs into the lungs every 6 hours as needed for shortness of breath / dyspnea or wheezing 1 Inhaler 1     dexamethasone 0.1 MG/ML solution SWISH 5-10ML FOR 2 MIN AND SPIT 4 TIMES PER DAY. NOT TO EAT FOR 1 HOUR AFTER USING THE MOUTHWASH.  1     magic mouthwash suspension (diphenhydrAMINE, lidocaine, aluminum-magnesium & simethicone) Swish and swallow 10 mLs in mouth every 6 hours as needed for mouth sores (Patient not taking: Reported on 6/28/2017) 240 mL 10     MAGIC MOUTHWASH, ENTER INGREDIENTS IN COMMENTS, SWISH AND SWALLOW 10 ML BY MOUTH EVERY 6 HOURS AS NEEDED FOR MOUTH SORES  10     lidocaine (XYLOCAINE) 2 % solution        LORazepam (ATIVAN) 0.5 MG tablet Take 1 tablet (0.5 mg) by mouth every 4 hours as needed (Anxiety, Nausea/Vomiting or Sleep) (Patient not taking: Reported on 6/28/2017) 30 tablet 2     Docusate Calcium (STOOL SOFTENER PO) Take by mouth as needed       CRANBERRY PO Take by mouth daily       order for DME Equipment being ordered: CPAP  AIRSENSE 10  9-11 CM H20  # [id number]  DN# [id number]       oxybutynin (DITROPAN XL) 10 MG 24 hr tablet Take 1 tablet (10 mg) by mouth daily (Needs follow-up appointment for this medication) 90 tablet 3     oxyCODONE (ROXICODONE) 5 MG immediate release tablet Take 1 tablet (5 mg) by mouth every 4 hours as needed for moderate to severe pain 30 tablet 0     Nebulizers (DANIELLE LC PLUS NEBULIZER) MISC   0     order for DME Equipment being ordered: accessory kit 1 Device 0     calcium 600 MG tablet Take 3 tablets by mouth daily  60 tablet      ORDER FOR DME F&P Zest medium nasal mask       ORDER FOR DME Auto-CPAP:  Max 11 cm H2O  Min 9 cm H2O  Changed in clinic  Continuous    Lifetime need and heated humidity.             Physical Exam:  /90 (BP Location: Left arm, Patient Position: Sitting, Cuff Size: Adult Regular)  Pulse 90  Temp 98.5  F  "(36.9  C) (Tympanic)  Resp 20  Ht 1.638 m (5' 4.5\")  Wt 90.6 kg (199 lb 11.2 oz)  SpO2 95%  Breastfeeding? No  BMI 33.75 kg/m2  Wt Readings from Last 12 Encounters:   06/28/17 90.6 kg (199 lb 11.2 oz)   06/19/17 91.9 kg (202 lb 9.6 oz)   05/25/17 91.6 kg (201 lb 14.4 oz)   05/18/17 93.3 kg (205 lb 11.2 oz)   04/28/17 93 kg (205 lb)   04/19/17 93.8 kg (206 lb 12.8 oz)   03/22/17 94 kg (207 lb 4.8 oz)   03/02/17 92.1 kg (203 lb 1.6 oz)   01/27/17 93 kg (205 lb)   01/19/17 95 kg (209 lb 8 oz)   11/23/16 95.7 kg (210 lb 14.4 oz)   10/12/16 98.1 kg (216 lb 3.2 oz)     ECOG performance status: 1  GENERAL APPEARANCE: Healthy, alert and in no acute distress.  HEENT: Sclerae anicteric. PERRLA. Oropharynx without ulcers, lesions, or thrush.  NECK: Supple. No asymmetry or masses.  LYMPHATICS: No palpable cervical, supraclavicular, axillary, or inguinal lymphadenopathy.  RESP: Lungs clear to auscultation bilaterally without rales, rhonchi or wheezes.  BREAST: Right breast and there is induration, redness and peau d'orange appearance. Right axillary adenopathy. Left breast no dominant masses or axillary adenopathy. \"CARDIOVASCULAR: Regular rate and rhythm. Normal S1, S2; no S3 or S4. No murmur, gallop, or rub.  ABDOMEN: Soft, nontender. Bowel sounds normal. No palpable organomegaly or masses.  MUSCULOSKELETAL: Extremities without gross deformities noted. No edema of bilateral lower extremities.  SKIN: No suspicious lesions or rashes.  NEURO: Alert and oriented x 3. Cranial nerves II-XII grossly intact.  PSYCHIATRIC: Mentation and affect appear normal.    Laboratory/Imaging Studies:  Infusion Therapy Visit on 06/28/2017   Component Date Value Ref Range Status     WBC 06/28/2017 5.2  4.0 - 11.0 10e9/L Final     RBC Count 06/28/2017 4.33  3.8 - 5.2 10e12/L Final     Hemoglobin 06/28/2017 12.8  11.7 - 15.7 g/dL Final     Hematocrit 06/28/2017 40.2  35.0 - 47.0 % Final     MCV 06/28/2017 93  78 - 100 fl Final     MCH 06/28/2017 " 29.6  26.5 - 33.0 pg Final     MCHC 06/28/2017 31.8  31.5 - 36.5 g/dL Final     RDW 06/28/2017 18.7* 10.0 - 15.0 % Final     Platelet Count 06/28/2017 250  150 - 450 10e9/L Final     Diff Method 06/28/2017 Automated Method   Final     % Neutrophils 06/28/2017 58.8  % Final     % Lymphocytes 06/28/2017 27.0  % Final     % Monocytes 06/28/2017 10.1  % Final     % Eosinophils 06/28/2017 2.5  % Final     % Basophils 06/28/2017 0.4  % Final     % Immature Granulocytes 06/28/2017 1.2  % Final     Absolute Neutrophil 06/28/2017 3.0  1.6 - 8.3 10e9/L Final     Absolute Lymphocytes 06/28/2017 1.4  0.8 - 5.3 10e9/L Final     Absolute Monocytes 06/28/2017 0.5  0.0 - 1.3 10e9/L Final     Absolute Eosinophils 06/28/2017 0.1  0.0 - 0.7 10e9/L Final     Absolute Basophils 06/28/2017 0.0  0.0 - 0.2 10e9/L Final     Abs Immature Granulocytes 06/28/2017 0.1  0 - 0.4 10e9/L Final     Sodium 06/28/2017 141  133 - 144 mmol/L Final     Potassium 06/28/2017 3.4  3.4 - 5.3 mmol/L Final     Chloride 06/28/2017 109  94 - 109 mmol/L Final     Carbon Dioxide 06/28/2017 24  20 - 32 mmol/L Final     Anion Gap 06/28/2017 8  3 - 14 mmol/L Final     Glucose 06/28/2017 93  70 - 99 mg/dL Final     Urea Nitrogen 06/28/2017 9  7 - 30 mg/dL Final     Creatinine 06/28/2017 0.60  0.52 - 1.04 mg/dL Final     GFR Estimate 06/28/2017   >60 mL/min/1.7m2 Final                    Value:>90  Non  GFR Calc       GFR Estimate If Black 06/28/2017   >60 mL/min/1.7m2 Final                    Value:>90   GFR Calc       Calcium 06/28/2017 8.9  8.5 - 10.1 mg/dL Final     Bilirubin Total 06/28/2017 1.2  0.2 - 1.3 mg/dL Final     Albumin 06/28/2017 3.3* 3.4 - 5.0 g/dL Final     Protein Total 06/28/2017 7.0  6.8 - 8.8 g/dL Final     Alkaline Phosphatase 06/28/2017 86  40 - 150 U/L Final     ALT 06/28/2017 24  0 - 50 U/L Final     AST 06/28/2017 32  0 - 45 U/L Final     Cholesterol 06/28/2017 151  <200 mg/dL Final     Triglycerides 06/28/2017  117  <150 mg/dL Final     HDL Cholesterol 06/28/2017 64  >49 mg/dL Final     LDL Cholesterol Calculated 06/28/2017 64  <100 mg/dL Final    Desirable:       <100 mg/dl     Non HDL Cholesterol 06/28/2017 87  <130 mg/dL Final            Assessment and plan:  (C78.7) Secondary malignant neoplasm of liver (H)  (C50.911,  Z17.0,  C50.912) Bilateral malignant neoplasm of breast in female, estrogen receptor positive, unspecified site of breast (H)  (primary encounter diagnosis)  (C50.911,  C77.3) Breast cancer metastasized to axillary lymph node, right (H)  I explained to the patient the discomfort in the right breast probably related to disease progression. We will continue to monitor these symptoms.We may consider palliative radiation therapy if symptoms worsen. The patient has been tolerating Xeloda treatment. I reviewed with her management of nausea. Patient will be receiving Zofran 8 mg orally daily twice daily before her Xeloda treatment. The dose of Xeloda will be increased to 2500 mg orally in the morning and 2000 Milligram in the evening daily for 14 days on and 7 days off. I will see the patient  After next cycle to reassess side effects.    (C79.51) Bone metastasis (H)  Patient will continue Denosumab 120 mg subcutaneously every 90 days. The patient will continue on calcium and vitamin D supplements.    (E78.5) Hyperlipidemia LDL goal <130  Patient will continue on Lipitor 10 mg orally daily.    (E03.9) Hypothyroidism, unspecified type  Patient will continue on Synthroid 25  g daily.    (D70.1) Chemotherapy-induced neutropenia (H)  Neutrophil count is normal.    (K12.31) Mucositis due to chemotherapy  The patient mucositis has improved.    The patient is ready to learn, no apparent learning barriers were identified.  Diagnosis and treatment plans were explained to the patient. The patient expressed understanding of the content. The patient asked appropriate questions. The patient questions were answered to her  satisfaction.    Chart documentation with Dragon Voice recognition Software. Although reviewed after completion, some words and grammatical errors may remain.

## 2017-06-28 NOTE — PROGRESS NOTES
Infusion Nursing Note:  Etta Cervantes presents today for PAC labs and flush    Patient seen by provider today: Yes: Dr. Cassidy   present during visit today: Not Applicable.    Note: N/A.    Intravenous Access:  Labs drawn without difficulty.  Implanted Port.    Treatment Conditions:  Labs reviewed, within parameters for Xgeva.       Post Infusion Assessment:  Pt tolerated Xgeva injection SQ to left upper arm without incident.  Patient tolerated port labs and flush.  Blood return noted.  Access discontinued per protocol.    Discharge Plan:   Patient discharged in stable condition accompanied by: .  Departure Mode: Ambulatory.  Pt sees Dr. Cassidy today.    Pratima Felipe RN

## 2017-06-28 NOTE — PATIENT INSTRUCTIONS
You will need a tumor marker drawn today and next week.   Dr. Cassidy has changed the dosing of your Xeloda; Please start taking 5 tablets in the morning and 4 tablets in the evening.  You will need to take Zofran prior to each of these doses to help prevent nausea. We would like to see you back in clinic with Dr. Cassidy in 3 weeks with chemotherapy to be scheduled per treatment plan.  Your prescription (Zofran) has been sent to:   [place] [id number] [place] [id number] [place] [id number] [facility] [address]  Phone: [phone] Fax: [phone]  Your prescription (Xeloda) has been escribed to [facility] pharmacy.   When you are in need of a refill, please call your pharmacy and they will send us a request.  Copy of appointments, and after visit summary (AVS) given to patient.  If you have any questions during business hours (M-F 8 AM- 4PM), please call Funmi Ray RN, BSN, OCN Oncology Hematology /Breast Cancer Navigator at [facility] [phone].   For questions after business hours, or on holidays/weekends, please call our after hours Nurse Triage line [phone]. Thank you.

## 2017-06-28 NOTE — MR AVS SNAPSHOT
After Visit Summary   6/28/2017    Etta Cervantes    MRN: [id number]           Patient Information     Date Of Birth          1960        Visit Information        Provider Department      6/28/2017 8:30 AM ROOM 8 [facility]        Today's Diagnoses     Secondary malignant neoplasm of liver (H)        Uterine leiomyoma, unspecified location        Carcinoma of breast metastatic to liver, unspecified laterality (H)        Bilateral malignant neoplasm of breast in female (H)        Bone metastasis (H)        Breast cancer metastasized to axillary lymph node, right (H)           Follow-ups after your visit        Your next 10 appointments already scheduled     Jun 28, 2017  9:30 AM CDT   Return Visit with Brodie Cassidy MD   [facility] ([facility])    [facility]  [address]  [facility] [address] [phone]              Who to contact     If you have questions or need follow up information about today's clinic visit or your schedule please contact [facility] directly at [phone].  Normal or non-critical lab and imaging results will be communicated to you by Hycretehart, letter or phone within 4 business days after the clinic has received the results. If you do not hear from us within 7 days, please contact the clinic through Roomtagt or phone. If you have a critical or abnormal lab result, we will notify you by phone as soon as possible.  Submit refill requests through zulily or call your pharmacy and they will forward the refill request to us. Please allow 3 business days for your refill to be completed.          Additional Information About Your Visit        Hycretehart Information     zulily gives you secure access to your electronic health record. If you see a primary care provider, you can also send messages to your care team and make appointments. If you have questions, please call your primary care clinic.   If you do not have a primary care provider, please call [phone] and they will assist you.        Care EveryWhere ID     This is your Care EveryWhere ID. This could be used by other organizations to access your [place] medical records  [id number]         Blood Pressure from Last 3 Encounters:   06/19/17 120/84   05/25/17 132/82   05/18/17 138/81    Weight from Last 3 Encounters:   06/19/17 91.9 kg (202 lb 9.6 oz)   05/25/17 91.6 kg (201 lb 14.4 oz)   05/18/17 93.3 kg (205 lb 11.2 oz)              We Performed the Following     CBC with platelets differential     Comprehensive metabolic panel     Lipid Profile        Primary Care Provider Office Phone # Fax #    Johana Fairbanks [phone]       [facility] [address] [facility] [address]        Equal Access to Services     [facility] Campus[facility] : Hadii aad ku hadasho Soomaali, waaxda luqadaha, qaybta kaalmada adeegyada, waxay melviin haylorrin geoffrey rodriguez . So [facility] [phone].    ATENCIÓN: Si habla español, tiene a parekh disposición servicios gratuitos de asistencia lingüística. [place] al [phone].    We comply with applicable federal civil rights laws and [place] laws. We do not discriminate on the basis of race, color, national origin, age, disability sex, sexual orientation or gender identity.            Thank you!     Thank you for choosing [facility]  for your care. Our goal is always to provide you with excellent care. Hearing back from our patients is one way we can continue to improve our services. Please take a few minutes to complete the written survey that you may receive in the mail after your visit with us. Thank you!             Your Updated Medication List - Protect others around you: Learn how to safely use, store and throw away your medicines at www.disposemymeds.org.          This list is accurate as of: 6/28/17  9:00 AM.  Always use your most recent med list.                   Brand Name Dispense  Instructions for use Diagnosis    * albuterol 108 (90 BASE) MCG/ACT Inhaler    VENTOLIN HFA    1 Inhaler    Inhale 2 puffs into the lungs every 4 hours as needed    Moderate persistent asthma, uncomplicated       * albuterol (2.5 MG/3ML) 0.083% neb solution     30 vial    Take 1 vial (2.5 mg) by nebulization every 4 hours as needed for shortness of breath / dyspnea    Moderate persistent asthma, uncomplicated       atorvastatin 10 MG tablet    LIPITOR    30 tablet    Take 1 tablet (10 mg) by mouth daily    Hyperlipidemia LDL goal <100       azelastine 0.1 % spray    ASTELIN    3 Bottle    Spray 1-2 sprays into both nostrils 2 times daily as needed for rhinitis    Chronic rhinitis       beclomethasone 80 MCG/ACT Inhaler    QVAR    3 Inhaler    Inhale 2 puffs into the lungs 2 times daily    Chronic rhinitis       calcium 600 MG tablet     60 tablet    Take 3 tablets by mouth daily        capecitabine 500 MG tablet CHEMO    XELODA    112 tablet    Take 4 tablets (2,000 mg) by mouth 2 times daily for 14 days Days 1 through 14, then off for 7 days. Take within 30 mins after meal.    Secondary malignant neoplasm of liver (H), Carcinoma of breast metastatic to liver, unspecified laterality (H), Bone metastasis (H), Breast cancer metastasized to axillary lymph node, right (H)       cholecalciferol 1000 UNIT tablet    vitamin D    100 tablet    Take 1,500 Units by mouth daily        CRANBERRY PO      Take by mouth daily        dexamethasone 0.1 MG/ML solution      SWISH 5-10ML FOR 2 MIN AND SPIT 4 TIMES PER DAY. NOT TO EAT FOR 1 HOUR AFTER USING THE MOUTHWASH.        exemestane 25 MG tablet    AROMASIN     TAKE 1 TABLET BY MOUTH DAILY AFTER A MEAL.        levothyroxine 25 MCG tablet    SYNTHROID/LEVOTHROID    90 tablet    Take 1 tablet (25 mcg) by mouth daily    Hypothyroidism due to acquired atrophy of thyroid       lidocaine 2 % solution    XYLOCAINE          lidocaine visc 2% 2.5mL/5mL & maalox/mylanta w/ simeth 2.5mL/5mL  & diphenhydrAMINE 5mg/5mL Susp suspension    MAGIC Mouthwash [facility]    240 mL    Swish and swallow 10 mLs in mouth every 6 hours as needed for mouth sores    Mucositis due to chemotherapy, Breast cancer metastasized to axillary lymph node, right (H)       loratadine 10 MG tablet    CLARITIN     Take 10 mg by mouth daily        LORazepam 0.5 MG tablet    ATIVAN    30 tablet    Take 1 tablet (0.5 mg) by mouth every 4 hours as needed (Anxiety, Nausea/Vomiting or Sleep)    Secondary malignant neoplasm of liver (H), Carcinoma of breast metastatic to liver, unspecified laterality (H), Bone metastasis (H), Breast cancer metastasized to axillary lymph node, right (H)       MAGIC MOUTHWASH (ENTER INGREDIENTS IN COMMENTS)      SWISH AND SWALLOW 10 ML BY MOUTH EVERY 6 HOURS AS NEEDED FOR MOUTH SORES        mometasone-formoterol 200-5 MCG/ACT oral inhaler    DULERA    3 Inhaler    Inhale 2 puffs into the lungs 2 times daily    Moderate persistent asthma without complication       * order for DME      F&P Zest medium nasal mask        order for DME      Equipment being ordered: CPAP AIRSENSE 10 9-11 CM H20 # [id number]  DN# [id number]        * order for DME      Auto-CPAP: Max 11 cm H2O Min 9 cm H2O Changed in clinic Continuous  Lifetime need and heated humidity.    DIAMOND (obstructive sleep apnea)       order for DME     1 Device    Equipment being ordered: accessory kit    Asthma exacerbation       oxybutynin 10 MG 24 hr tablet    DITROPAN XL    90 tablet    Take 1 tablet (10 mg) by mouth daily (Needs follow-up appointment for this medication)    Mixed incontinence urge and stress (male)(female)       oxyCODONE 5 MG IR tablet    ROXICODONE    30 tablet    Take 1 tablet (5 mg) by mouth every 4 hours as needed for moderate to severe pain    Secondary malignant neoplasm of liver (H)       DANIELLE LC PLUS NEBULIZER Misc           predniSONE 20 MG tablet    DELTASONE    10 tablet    Take 1 tablet (20 mg) by mouth 2 times daily For Yellow  or Red zone on Asthma Action Plan.    Moderate persistent asthma, uncomplicated       prochlorperazine 10 MG tablet    COMPAZINE    30 tablet    Take 1 tablet (10 mg) by mouth every 6 hours as needed (Nausea/Vomiting)    Secondary malignant neoplasm of liver (H), Carcinoma of breast metastatic to liver, unspecified laterality (H), Bone metastasis (H), Breast cancer metastasized to axillary lymph node, right (H)       STOOL SOFTENER PO      Take by mouth as needed        TYLENOL PO      Take 650 mg by mouth every 4 hours as needed for mild pain or fever        zolpidem 10 MG tablet    AMBIEN    30 tablet    Take 1 tablet 30 minutes prior to bedtime    Insomnia, unspecified       * Notice:  This list has 4 medication(s) that are the same as other medications prescribed for you. Read the directions carefully, and ask your doctor or other care provider to review them with you.

## 2017-06-28 NOTE — NURSING NOTE
"Oncology Rooming Note    June 28, 2017 9:00 AM   Etta Cervantes is a 57 year old female who presents for:    Chief Complaint   Patient presents with     Oncology Clinic Visit     6 week recheck Breast CA, Labs today     Initial Vitals: /90 (BP Location: Left arm, Patient Position: Sitting, Cuff Size: Adult Regular)  Pulse 90  Temp 98.5  F (36.9  C) (Tympanic)  Resp 20  Ht 1.638 m (5' 4.5\")  Wt 90.6 kg (199 lb 11.2 oz)  SpO2 95%  Breastfeeding? No  BMI 33.75 kg/m2 Estimated body mass index is 33.75 kg/(m^2) as calculated from the following:    Height as of this encounter: 1.638 m (5' 4.5\").    Weight as of this encounter: 90.6 kg (199 lb 11.2 oz). Body surface area is 2.03 meters squared.  Severe Pain (7) Comment: Right Neck & Breast    No LMP recorded.  Allergies reviewed: Yes  Medications reviewed: Yes    Medications: Medication refills not needed today.  Pharmacy name entered into [employer]: [id number] [place] [address]    Clinical concerns: 6 week recheck Breast CA, Labs today. Patient reports she is in the Yellow Zone for her Asthma & should be taking her prednisone but is NOT. She reports nebulizer & inhalers are not helping her difficulty with breathing. She also reports Right breast swelling & hardness.   10 minutes for nursing intake (face to face time)     Josephine Virgen [facility]              "

## 2017-06-28 NOTE — MR AVS SNAPSHOT
After Visit Summary   6/28/2017    Etta Cervantes    MRN: [id number]           Patient Information     Date Of Birth          1960        Visit Information        Provider Department      6/28/2017 9:30 AM Brodie Cassidy MD [facility] ONCOLOGY      Today's Diagnoses     Bilateral malignant neoplasm of breast in female, estrogen receptor positive, unspecified site of breast (H)    -  1    Breast cancer metastasized to axillary lymph node, right (H)        Bone metastasis (H)        Carcinoma of breast metastatic to liver, unspecified laterality (H)        Hyperlipidemia LDL goal <130        Hypothyroidism, unspecified type        Chemotherapy-induced neutropenia (H)        Mucositis due to chemotherapy        Secondary malignant neoplasm of liver (H)          Care Instructions    You will need a tumor marker drawn today and next week.   Dr. Cassidy has changed the dosing of your Xeloda; Please start taking 5 tablets in the morning and 4 tablets in the evening.  You will need to take Zofran prior to each of these doses to help prevent nausea. We would like to see you back in clinic with Dr. Cassidy in 3 weeks with chemotherapy to be scheduled per treatment plan.  Your prescription (Zofran) has been sent to:   [place] [address] [place] [address] [address] [place] [address]  Phone: [phone] Fax: [phone]  Your prescription (Xeloda) has been escribed to [facility] pharmacy.   When you are in need of a refill, please call your pharmacy and they will send us a request.  Copy of appointments, and after visit summary (AVS) given to patient.  If you have any questions during business hours (M-F 8 AM- 4PM), please call Funmi Ray RN, BSN, OCN Oncology Hematology /Breast Cancer Navigator at [facility] [phone].   For questions after business hours, or on holidays/weekends, please call our after  hours Nurse Triage line [phone]. Thank you.            Follow-ups after your visit        Follow-up notes from your care team     Return in about 3 weeks (around 7/19/2017) for Schedule for chemotherapy as per treatment plan.      Your next 10 appointments already scheduled     Jul 20, 2017  9:30 AM CDT   Level O with ROOM 9 [facility] ([facility])    [facility]  [address]  [facility] [address]   [phone]            Jul 20, 2017 10:30 AM CDT   Return Visit with Brodie Cassidy MD   [facility] ([facility])    [facility]  [address]  [facility] [address] [phone]              Who to contact     If you have questions or need follow up information about today's clinic visit or your schedule please contact [facility] directly at [phone].  Normal or non-critical lab and imaging results will be communicated to you by [employer], letter or phone within 4 business days after the clinic has received the results. If you do not hear from us within 7 days, please contact the clinic through [employer] or phone. If you have a critical or abnormal lab result, we will notify you by phone as soon as possible.  Submit refill requests through [employer] or call your pharmacy and they will forward the refill request to us. Please allow 3 business days for your refill to be completed.          Additional Information About Your Visit        [employer] Information     [employer] gives you secure access to your electronic health record. If you see a primary care provider, you can also send messages to your care team and make appointments. If you have questions, please call your primary care clinic.  If you do not have a primary care provider, please call [phone] and they will assist you.        Care EveryWhere ID     This is your Care EveryWhere ID. This could be used by other  "organizations to access your [place] medical records  [id number]        Your Vitals Were     Pulse Temperature Respirations Height Pulse Oximetry Breastfeeding?    90 98.5  F (36.9  C) (Tympanic) 20 1.638 m (5' 4.5\") 95% No    BMI (Body Mass Index)                   33.75 kg/m2            Blood Pressure from Last 3 Encounters:   06/28/17 128/90   06/19/17 120/84   05/25/17 132/82    Weight from Last 3 Encounters:   06/28/17 90.6 kg (199 lb 11.2 oz)   06/19/17 91.9 kg (202 lb 9.6 oz)   05/25/17 91.6 kg (201 lb 14.4 oz)              We Performed the Following     Ca27.29  breast tumor marker          Today's Medication Changes          These changes are accurate as of: 6/28/17  9:51 AM.  If you have any questions, ask your nurse or doctor.               Start taking these medicines.        Dose/Directions    ondansetron 4 MG tablet   Commonly known as:  ZOFRAN   Used for:  Bilateral malignant neoplasm of breast in female, estrogen receptor positive, unspecified site of breast (H)   Started by:  Brodie Cassidy MD        Dose:  4 mg   Take 1 tablet (4 mg) by mouth 2 times daily 30 minutes before Xeloda   Quantity:  60 tablet   Refills:  1         These medicines have changed or have updated prescriptions.        Dose/Directions    * capecitabine 500 MG tablet CHEMO   Commonly known as:  XELODA   This may have changed:  Another medication with the same name was added. Make sure you understand how and when to take each.   Used for:  Secondary malignant neoplasm of liver (H), Carcinoma of breast metastatic to liver, unspecified laterality (H), Bone metastasis (H), Breast cancer metastasized to axillary lymph node, right (H)   Changed by:  Funmi Ray RN        Dose:  2000 mg   Take 4 tablets (2,000 mg) by mouth 2 times daily for 14 days Days 1 through 14, then off for 7 days. Take within 30 mins after meal.   Quantity:  112 tablet   Refills:  0       * capecitabine 500 MG tablet CHEMO   Commonly known as:  " XELODA   This may have changed:  You were already taking a medication with the same name, and this prescription was added. Make sure you understand how and when to take each.   Used for:  Secondary malignant neoplasm of liver (H), Carcinoma of breast metastatic to liver, unspecified laterality (H), Bone metastasis (H), Breast cancer metastasized to axillary lymph node, right (H)   Changed by:  Brodie Cassidy MD        Take 5 cap in AM and 4 cap in PM on Days 1 through 14, then off for 7 days. Take within 30 mins after meal.   Quantity:  126 tablet   Refills:  0       * Notice:  This list has 2 medication(s) that are the same as other medications prescribed for you. Read the directions carefully, and ask your doctor or other care provider to review them with you.         Where to get your medicines      These medications were sent to [place] [address] [place] [facility] [address]  [address] [facility] [address]     Phone:  [phone]     capecitabine 500 MG tablet CHEMO    ondansetron 4 MG tablet                Primary Care Provider Office Phone # Fax #    Johana Fairbanks [phone]       [facility] [address] [facility] [address]        Equal Access to Services     JACKIE MIX AH: Hadjavier deleono Soisaura, waaxda luqadaha, qaybta kaalmada adeegyada, hari guardado. So [facility] [phone].    ATENCIÓN: Si habla español, tiene a parekh disposición servicios gratuitos de asistencia lingüística. Llame al [phone].    We comply with applicable federal civil rights laws and [place] laws. We do not discriminate on the basis of race, color, national origin, age, disability sex, sexual orientation or gender identity.            Thank you!     Thank you for choosing [facility]  for your care. Our goal is always to provide you with excellent care. Hearing back from our patients is one way we can continue to improve our  services. Please take a few minutes to complete the written survey that you may receive in the mail after your visit with us. Thank you!             Your Updated Medication List - Protect others around you: Learn how to safely use, store and throw away your medicines at www.disposemymeds.org.          This list is accurate as of: 6/28/17  9:51 AM.  Always use your most recent med list.                   Brand Name Dispense Instructions for use Diagnosis    * albuterol 108 (90 BASE) MCG/ACT Inhaler    VENTOLIN HFA    1 Inhaler    Inhale 2 puffs into the lungs every 4 hours as needed    Moderate persistent asthma, uncomplicated       * albuterol (2.5 MG/3ML) 0.083% neb solution     30 vial    Take 1 vial (2.5 mg) by nebulization every 4 hours as needed for shortness of breath / dyspnea    Moderate persistent asthma, uncomplicated       atorvastatin 10 MG tablet    LIPITOR    30 tablet    Take 1 tablet (10 mg) by mouth daily    Hyperlipidemia LDL goal <100       azelastine 0.1 % spray    ASTELIN    3 Bottle    Spray 1-2 sprays into both nostrils 2 times daily as needed for rhinitis    Chronic rhinitis       beclomethasone 80 MCG/ACT Inhaler    QVAR    3 Inhaler    Inhale 2 puffs into the lungs 2 times daily    Chronic rhinitis       calcium 600 MG tablet     60 tablet    Take 3 tablets by mouth daily        * capecitabine 500 MG tablet CHEMO    XELODA    112 tablet    Take 4 tablets (2,000 mg) by mouth 2 times daily for 14 days Days 1 through 14, then off for 7 days. Take within 30 mins after meal.    Secondary malignant neoplasm of liver (H), Carcinoma of breast metastatic to liver, unspecified laterality (H), Bone metastasis (H), Breast cancer metastasized to axillary lymph node, right (H)       * capecitabine 500 MG tablet CHEMO    XELODA    126 tablet    Take 5 cap in AM and 4 cap in PM on Days 1 through 14, then off for 7 days. Take within 30 mins after meal.    Secondary malignant neoplasm of liver (H), Carcinoma  of breast metastatic to liver, unspecified laterality (H), Bone metastasis (H), Breast cancer metastasized to axillary lymph node, right (H)       cholecalciferol 1000 UNIT tablet    vitamin D    100 tablet    Take 1,500 Units by mouth daily        CRANBERRY PO      Take by mouth daily        dexamethasone 0.1 MG/ML solution      SWISH 5-10ML FOR 2 MIN AND SPIT 4 TIMES PER DAY. NOT TO EAT FOR 1 HOUR AFTER USING THE MOUTHWASH.        levothyroxine 25 MCG tablet    SYNTHROID/LEVOTHROID    90 tablet    Take 1 tablet (25 mcg) by mouth daily    Hypothyroidism due to acquired atrophy of thyroid       lidocaine 2 % solution    XYLOCAINE          lidocaine visc 2% 2.5mL/5mL & maalox/mylanta w/ simeth 2.5mL/5mL & diphenhydrAMINE 5mg/5mL Susp suspension    MAGIC Mouthwash HOSPITAL    240 mL    Swish and swallow 10 mLs in mouth every 6 hours as needed for mouth sores    Mucositis due to chemotherapy, Breast cancer metastasized to axillary lymph node, right (H)       loratadine 10 MG tablet    CLARITIN     Take 10 mg by mouth daily        LORazepam 0.5 MG tablet    ATIVAN    30 tablet    Take 1 tablet (0.5 mg) by mouth every 4 hours as needed (Anxiety, Nausea/Vomiting or Sleep)    Secondary malignant neoplasm of liver (H), Carcinoma of breast metastatic to liver, unspecified laterality (H), Bone metastasis (H), Breast cancer metastasized to axillary lymph node, right (H)       MAGIC MOUTHWASH (ENTER INGREDIENTS IN COMMENTS)      SWISH AND SWALLOW 10 ML BY MOUTH EVERY 6 HOURS AS NEEDED FOR MOUTH SORES        mometasone-formoterol 200-5 MCG/ACT oral inhaler    DULERA    3 Inhaler    Inhale 2 puffs into the lungs 2 times daily    Moderate persistent asthma without complication       ondansetron 4 MG tablet    ZOFRAN    60 tablet    Take 1 tablet (4 mg) by mouth 2 times daily 30 minutes before Xeloda    Bilateral malignant neoplasm of breast in female, estrogen receptor positive, unspecified site of breast (H)       * order for DME       F&P Zest medium nasal mask        order for DME      Equipment being ordered: CPAP AIRSENSE 10 9-11 CM H20 # [id number]  DN# [id number]        * order for DME      Auto-CPAP: Max 11 cm H2O Min 9 cm H2O Changed in clinic Continuous  Lifetime need and heated humidity.    DIAMOND (obstructive sleep apnea)       order for DME     1 Device    Equipment being ordered: accessory kit    Asthma exacerbation       oxybutynin 10 MG 24 hr tablet    DITROPAN XL    90 tablet    Take 1 tablet (10 mg) by mouth daily (Needs follow-up appointment for this medication)    Mixed incontinence urge and stress (male)(female)       oxyCODONE 5 MG IR tablet    ROXICODONE    30 tablet    Take 1 tablet (5 mg) by mouth every 4 hours as needed for moderate to severe pain    Secondary malignant neoplasm of liver (H)       DANIELLE LC PLUS NEBULIZER Misc           predniSONE 20 MG tablet    DELTASONE    10 tablet    Take 1 tablet (20 mg) by mouth 2 times daily For Yellow or Red zone on Asthma Action Plan.    Moderate persistent asthma, uncomplicated       prochlorperazine 10 MG tablet    COMPAZINE    30 tablet    Take 1 tablet (10 mg) by mouth every 6 hours as needed (Nausea/Vomiting)    Secondary malignant neoplasm of liver (H), Carcinoma of breast metastatic to liver, unspecified laterality (H), Bone metastasis (H), Breast cancer metastasized to axillary lymph node, right (H)       STOOL SOFTENER PO      Take by mouth as needed        TYLENOL PO      Take 650 mg by mouth every 4 hours as needed for mild pain or fever        zolpidem 10 MG tablet    AMBIEN    30 tablet    Take 1 tablet 30 minutes prior to bedtime    Insomnia, unspecified       * Notice:  This list has 6 medication(s) that are the same as other medications prescribed for you. Read the directions carefully, and ask your doctor or other care provider to review them with you.

## 2017-06-29 NOTE — PROGRESS NOTES
Pt is having labs drawn at [facility].  They're unable to release labs from tx plan.  Orders placed in open orders.

## 2017-07-06 NOTE — TELEPHONE ENCOUNTER
Refill request received via phone by patient    Pending Prescriptions:                       Disp   Refills    zolpidem (AMBIEN) 10 MG tablet            30 tab*5            Sig: Take 1 tablet 30 minutes prior to bedtime         Last Written Prescription Date:  03/20/2017  Last Fill Quantity: 30 per 30 days,   # refills: 5  Last Office Visit with [facility], [facility] or [facility] prescribing provider: 09/23/2016  Future Office visit:    Next 5 appointments (look out 90 days)     Jul 20, 2017 10:30 AM CDT   Return Visit with Brodie Cassidy MD   [facility] ([facility])    [facility]  [address]  [facility] [address]   [phone]                   Routing refill request to provider for review/approval because:  Drug not on the [facility], [facility] or [facility] refill protocol or controlled substance       Etta visited [place] and had her prescription transferred there not realizing it wouldn't get transferred again. She is now back home. She was notified that we would want a yearly follow up in Sept 2017.

## 2017-07-06 NOTE — TELEPHONE ENCOUNTER
Prescription manually faxed to [facility] [address] [place] [facility] [place], [place] - [address] informed of message via telephone call.

## 2017-07-07 NOTE — TELEPHONE ENCOUNTER
"Pt called reporting being \"extemely constipated\" and no BM since Monday. Pt reported she has been drinking lots of fluids and only taking Tylenol for pain meds since last week when she had taken Oxycodone. Pt report she has been taking Dulcolax since Wednesday with no results. Advised pt to take Magnesium citrate per our guidelines. Advised pt to take 20ml PO up to four times a day and to call the us back or the after hours nurse line if this does not work in the next day or so. Pt verbalized understanding.  "

## 2017-07-12 PROBLEM — N10 ACUTE PYELONEPHRITIS: Status: ACTIVE | Noted: 2017-01-01

## 2017-07-12 PROBLEM — J93.9 PNEUMOTHORAX: Status: ACTIVE | Noted: 2017-01-01

## 2017-07-12 NOTE — TELEPHONE ENCOUNTER
"  Reason for Disposition    Fever > 100.5 F (38.1 C)    Additional Information    Negative: Shock suspected (e.g., cold/pale/clammy skin, too weak to stand, low BP, rapid pulse)    Negative: Sounds like a life-threatening emergency to the triager    Negative: Followed a genital area injury    Negative: Taking antibiotic for urinary tract infection (UTI)    Negative: Pregnant    Negative: Postpartum < 1 month    Negative: [1] Unable to urinate (or only a few drops) > 4 hours AND     [2] bladder feels very full (e.g., palpable bladder or strong urge to urinate)    Negative: Patient sounds very sick or weak to the triager    Negative: [1] SEVERE pain with urination  (e.g., excruciating) AND [2] not improved after 2 hours of pain medicine and Sitz bath    Protocols used: URINATION PAIN - FEMALE-ADULT-AH  \"I have had burning with urination last evening and today. This evening my temp is 100.5 po. I have also been dealing with a slight cough and asthma symptoms. I take Xeloda.\" This nurse placed call to Dr Paz for second level triage because Etta is currently taking oral chemotherapy medication. Dr Paz recommends ER tonight. Called Etta back and gave her this information. Her  will drive her to the hospital.\"  Almita Hood RN  [facility]    "

## 2017-07-12 NOTE — ED NOTES
MD at bedside tried various setting of suction unable to obtain flutter   Is able to pull air back with syringe easily

## 2017-07-12 NOTE — PROGRESS NOTES
Patient's cook catheter connected to low continuous suction directly to suction canister without atrium per Dr. Jewell's request.

## 2017-07-12 NOTE — PHARMACY-ADMISSION MEDICATION HISTORY
Admission medication history interview status for the 7/12/2017 admission is complete. See Epic admission navigator for allergy information, pharmacy, prior to admission medications and immunization status.     Medication history interview sources:  Patient, epic generated med list    Changes made to PTA medication list (reason):  Added:   -Allegra (Fexofenidine) 180 mg Tablet: Take one tablet by mouth once daily. (per patient prefers Allegra over Claritin.   Deleted:   -Loratadine (Claritin) 10 mg Tablet: Take one tablet by mouth daily. (per patient)  -Lidocaine 2%: no instructions listed (per patient not using)   -Calcium 600 mg Tablet: Take 3 tablets by mouth daily. (per patient)  -Ondansetron 4 mg Tablet: Take 1 tablet by mouth 2 times daily 30 minutes before Xeloda.   Changed: None     Additional medication history information (including reliability of information, actions taken by pharmacist):  -Spoke with patient who was knowledgeable about medications, indications, strengths and last doses.   -Patient is currently on Chemotherapy regimen with Xeloda. She takes the Cranberry supplement for UTI prevention due to increased UTI's associated with chemo.   -She stated that when she takes ondansetron prior to Xeloda she gets severely constipated so she discontinued using it.   -Prednisone prescription on file for Asthma action plan. She completed a 5 day course at the end of May and another 5 day course 7/7-7/9 for shortness of breath.   -Verified allergies with patient. No changes made.   -Patient's home pharmacy is in [place], [place] at [facility] (PH: [phone])  -Patient did not get her influenza vaccination last fall due to cancer/chemotherapy.     Prior to Admission medications    Medication Sig Last Dose Taking? Auth Provider   fexofenadine (ALLEGRA ALLERGY) 180 MG tablet Take 180 mg by mouth daily as needed for allergies Past Week at PRN Yes Unknown, Entered By History   calcium carb-cholecalciferol (  CALCIUM 600+D3) 600-500 MG-UNIT CAPS Take 2 tablets by mouth daily 7/10/2017 at AM Yes Reported, Patient   zolpidem (AMBIEN) 10 MG tablet Take 1 tablet 30 minutes prior to bedtime 7/10/2017 at HS Yes Rajesh Ahuja MD   capecitabine (XELODA) 500 MG tablet CHEMO Take 5 cap in AM and 4 cap in PM on Days 1 through 14, then off for 7 days. Take within 30 mins after meal. 7/10/2017 at PM Yes Brodie Cassidy MD   albuterol (2.5 MG/3ML) 0.083% neb solution Take 1 vial (2.5 mg) by nebulization every 4 hours as needed for shortness of breath / dyspnea Past Week at PRN Yes Johana Fairbanks MD   magic mouthwash suspension (diphenhydrAMINE, lidocaine, aluminum-magnesium & simethicone) Swish and swallow 10 mLs in mouth every 6 hours as needed for mouth sores 7/10/2017 at HS Yes Brodie Cassidy MD   prochlorperazine (COMPAZINE) 10 MG tablet Take 1 tablet (10 mg) by mouth every 6 hours as needed (Nausea/Vomiting) Past Week at PRN Yes Brodie Cassidy MD   Docusate Calcium (STOOL SOFTENER PO) Take 100 mg by mouth daily  7/11/2017 at AM Yes Reported, Patient   CRANBERRY PO Take 1 capsule by mouth daily  7/10/2017 at AM Yes Reported, Patient   atorvastatin (LIPITOR) 10 MG tablet Take 1 tablet (10 mg) by mouth daily 7/10/2017 at HS Yes Johana Fairbanks MD   mometasone-formoterol (DULERA) 200-5 MCG/ACT oral inhaler Inhale 2 puffs into the lungs 2 times daily 7/10/2017 at PM Yes Johana Fairbanks MD   oxybutynin (DITROPAN XL) 10 MG 24 hr tablet Take 1 tablet (10 mg) by mouth daily (Needs follow-up appointment for this medication) 7/10/2017 at 0630 Yes SELAM Grady MD   beclomethasone (QVAR) 80 MCG/ACT Inhaler Inhale 2 puffs into the lungs 2 times daily 7/10/2017 at HS Yes Johana Fairbanks MD   azelastine (ASTELIN) 0.1 % nasal spray Spray 1-2 sprays into both nostrils 2 times daily as needed for rhinitis Past Week at PRN Yes Johana Fairbanks MD   levothyroxine (SYNTHROID, LEVOTHROID) 25 MCG tablet Take 1 tablet  (25 mcg) by mouth daily 7/10/2017 at PM Yes Johana Fairbanks MD   albuterol (VENTOLIN HFA) 108 (90 BASE) MCG/ACT inhaler Inhale 2 puffs into the lungs every 4 hours as needed Past Week at PRN Yes Johana Fairbanks MD   oxyCODONE (ROXICODONE) 5 MG immediate release tablet Take 1 tablet (5 mg) by mouth every 4 hours as needed for moderate to severe pain 7/11/2017 at 1600 Yes Brodie Cassidy MD   Acetaminophen (TYLENOL PO) Take 650 mg by mouth every 4 hours as needed for mild pain or fever 7/11/2017 at 2240 Yes Reported, Patient   predniSONE (DELTASONE) 20 MG tablet Take 1 tablet (20 mg) by mouth 2 times daily For Yellow or Red zone on Asthma Action Plan. 7/9/2017 at AM  Johana Fairbanks MD   LORazepam (ATIVAN) 0.5 MG tablet Take 1 tablet (0.5 mg) by mouth every 4 hours as needed (Anxiety, Nausea/Vomiting or Sleep)  Patient not taking: Reported on 6/28/2017 More than a month at PRN  Brodie Cassidy MD     Medication history completed by:   Laura Ledezma, PharmD Student

## 2017-07-12 NOTE — ED NOTES
Patient had fever at home, concerned that she had a UTI she went to the ED. They incidentally found a pneumothorax, cook catheter placed x 2 without resolution of pneumothorax. Transfer here for thoracic surgery evaluation.

## 2017-07-12 NOTE — H&P
[facility], [place]    History and Physical  Hematology / Oncology     Date of Admission:  7/12/2017  Date of Service (when I saw the patient): 07/12/17    Assessment & Plan     Principal Problem:    Pneumothorax  Active Problems:    Carcinoma of breast metastatic to liver (H)    Bone metastasis (H)    Breast cancer metastasized to axillary lymph node (H)    Emphysematous bleb of lung (H)    Acute pyelonephritis    Breast cancer (H)    #Large right pneumothorax:  -Emphysematous bleb seen on imaging, and large right pneumothorax.  -Thoracic surgery successfully placed right chest tube, which is attached to suction with present airleak, draining ~80cc since placement.  Continuous suction. Monitor output.   -Repeat CXR this afternoon to monitor, moderate pneumothorax remains, improved from earlier this AM.     #UTI with possible pyelonephritis:   -dysuria, urinary frequency; history of UTIs.  Last UTI 2/2017 with Klebsiella.   -UA positive for large amt leukocytes, high WBC, and bacteria.    - Micro with >100,000 colonies/ml Gram Negative Rods; awaiting sensitivities  - Continue cetriaxone; transition to 2g Daily tonight; This should have good coverage for klebsiella   -UA/UCx, Blood cultures x2 in ED 7/11PM    #Metastatic Breast Cancer (bone/liver):  - Currently under care of Dr. Cassidy; On Xeloda 2500mg QAM and 2000mgQPM, 14 days on, 7 days off. She missed her dose last night, and has not eaten this AM. We can restart tonight.    #Nausea:   - Nausea: She does have nausea after she takes Xeloda. Severe constipation with Zofran. We can trial compazine PO in hospital to see if she tolerates better; also available IV.  Ativan available PRN. Zofran last option given previous constipation.     #Pain:  - chest, back and neck pain, secondary to metastatic breast cancer, exacerbated by pneumothorax and ([id number] and successful) chest tube placement, and possible pyelonephritis.  -Continue  oxycodone 5-10mg PO Q4 hours PRN for pain  -Dilaudid 1mg IV Q4hrs PRN; Can increase frequency if needed to manage pain.   - Toradol 30mg QID.   -OIC bowel regimen.     #Thrush:  -Coating on tongue, start nystatin QID.   -She also has magic mouth wash available if needed.     #DIAMOND:   -CPAP with home machine per home orders    #FEN:   -NS 100ml/hr  -Electrolytes: replete as needed  -Nutrition: As tolerated    #PPx:  -GERD: Deferred at time.  -VTE: Hold anticoagulation at the time s/p chest tube placement; PCD ordered; encouraged ambulation. We can reassess during admission.     #Disposition: Anticipate d/c home on 7/14 if pneumothorax continues to improve/resolve, afebrile and improvement of UTI.     Code Status   Full Code    Primary Care Physician   Johana Cassidy ([facility])    Chief Complaint   Large right pneumothorax  UTI with possible Pyelonephritis    History is obtained from the patient    History of Present Illness   Etta Cervantes is a 57 year old female with metastatic breast cancer (bone/liver) on oral chemotherapy who presents with fever last night and urinary discomfort, lower back pain for the past few days.  She reports a history of UTIs since starting chemotherapy 2.5 years ago. She also had complaints of cough and right sided chest/neck pain for several days. She usually takes oxycodone for her cancer related pain. She does note significant constipation last week while on zofran as a premedication for her PO Xeloda (chemotherapy).  She did manage this with mag citrate and since has discontinued the zofran. She notes nausea after taking Xeloda, no vomiting.   She was seen at OSH ED and diagnosed with of pyelonephritis and right pneumothorax. They did attempt to place chest tube twice (anteriorly) without success and she was thus transferred to [facility].    No abdominal pain, black or bloody stools.  No urinary tract symptoms.  No new or unusual musculoskeletal symptoms. No new bleeding  or bruising.    Past Medical History    I have reviewed this patient's medical history and updated it with pertinent information if needed.   Past Medical History:   Diagnosis Date     ASCUS favor benign 1/2015    Neg HPV     Cancer (H)      Cataract 2010    surgery both eyes     Emphysematous bleb of lung (H)      Fibroids      Hypercholesterolemia      Hypothyroid      Incontinence of urine     mixed     Menorrhagia      Obesity      Pneumothorax      PONV (postoperative nausea and vomiting)      Uncomplicated asthma        Past Surgical History   I have reviewed this patient's surgical history and updated it with pertinent information if needed.  Past Surgical History:   Procedure Laterality Date     CATARACT IOL, RT/LT  2010     COLONOSCOPY  2010     DILATION AND CURETTAGE, HYSTEROSCOPY, ABLATE ENDOMETRIUM NOVASURE, COMBINED  3/2/2011    COMBINED DILATION AND CURETTAGE, HYSTEROSCOPY, ABLATE ENDOMETRIUM NOVASURE performed by LAURA VARGAS at [facility] OR     INSERT PORT VASCULAR ACCESS N/A 2/12/2015    Procedure: INSERT PORT VASCULAR ACCESS;  Surgeon: Noé Schaefer MD;  Location: [facility] OR     SURGICAL HISTORY OF -   2005    bladder sling     SURGICAL HISTORY OF -       Cystectomy-lower lip     TUBAL LIGATION  1987       Prior to Admission Medications   Prior to Admission Medications   Prescriptions Last Dose Informant Patient Reported? Taking?   Acetaminophen (TYLENOL PO)  Self Yes No   Sig: Take 650 mg by mouth every 4 hours as needed for mild pain or fever   CRANBERRY PO  Self Yes No   Sig: Take 1 capsule by mouth daily    Docusate Calcium (STOOL SOFTENER PO)  Self Yes No   Sig: Take 100 mg by mouth daily    LORazepam (ATIVAN) 0.5 MG tablet  Self No No   Sig: Take 1 tablet (0.5 mg) by mouth every 4 hours as needed (Anxiety, Nausea/Vomiting or Sleep)   Patient not taking: Reported on 6/28/2017   Nebulizers (DANIELLE LC PLUS NEBULIZER) MISC  Self Yes No   ORDER FOR DME  Self No No   Sig: Auto-CPAP:  Max 11 cm  H2O  Min 9 cm H2O  Changed in clinic  Continuous    Lifetime need and heated humidity.      ORDER FOR DME  Self Yes No   Sig: F&P Zest medium nasal mask   albuterol (2.5 MG/3ML) 0.083% neb solution  Self No No   Sig: Take 1 vial (2.5 mg) by nebulization every 4 hours as needed for shortness of breath / dyspnea   albuterol (VENTOLIN HFA) 108 (90 BASE) MCG/ACT inhaler  Self No No   Sig: Inhale 2 puffs into the lungs every 4 hours as needed   atorvastatin (LIPITOR) 10 MG tablet  Self No No   Sig: Take 1 tablet (10 mg) by mouth daily   azelastine (ASTELIN) 0.1 % nasal spray  Self No No   Sig: Spray 1-2 sprays into both nostrils 2 times daily as needed for rhinitis   beclomethasone (QVAR) 80 MCG/ACT Inhaler  Self No No   Sig: Inhale 2 puffs into the lungs 2 times daily   calcium 600 MG tablet  Self Yes No   Sig: Take 3 tablets by mouth daily    calcium carb-cholecalciferol (KP CALCIUM 600+D3) 600-500 MG-UNIT CAPS  Self Yes No   Sig: Take 2 tablets by mouth daily   capecitabine (XELODA) 500 MG tablet CHEMO  Self No No   Sig: Take 5 cap in AM and 4 cap in PM on Days 1 through 14, then off for 7 days. Take within 30 mins after meal.   levothyroxine (SYNTHROID, LEVOTHROID) 25 MCG tablet  Self No No   Sig: Take 1 tablet (25 mcg) by mouth daily   lidocaine (XYLOCAINE) 2 % solution  Self Yes No   loratadine (CLARITIN) 10 MG tablet  Self Yes No   Sig: Take 10 mg by mouth daily   magic mouthwash suspension (diphenhydrAMINE, lidocaine, aluminum-magnesium & simethicone)  Self No No   Sig: Swish and swallow 10 mLs in mouth every 6 hours as needed for mouth sores   mometasone-formoterol (DULERA) 200-5 MCG/ACT oral inhaler  Self No No   Sig: Inhale 2 puffs into the lungs 2 times daily   ondansetron (ZOFRAN) 4 MG tablet  Self No No   Sig: Take 1 tablet (4 mg) by mouth 2 times daily 30 minutes before Xeloda   order for DME  Self No No   Sig: Equipment being ordered: accessory kit   order for DME  Self Yes No   Sig: Equipment being  ordered: CPAP  AIRSENSE 10  9-11 CM H20  # [id number]  DN# [id number]   oxyCODONE (ROXICODONE) 5 MG immediate release tablet  Self No No   Sig: Take 1 tablet (5 mg) by mouth every 4 hours as needed for moderate to severe pain   oxybutynin (DITROPAN XL) 10 MG 24 hr tablet  Self No No   Sig: Take 1 tablet (10 mg) by mouth daily (Needs follow-up appointment for this medication)   predniSONE (DELTASONE) 20 MG tablet  Self No No   Sig: Take 1 tablet (20 mg) by mouth 2 times daily For Yellow or Red zone on Asthma Action Plan.   prochlorperazine (COMPAZINE) 10 MG tablet  Self No No   Sig: Take 1 tablet (10 mg) by mouth every 6 hours as needed (Nausea/Vomiting)   zolpidem (AMBIEN) 10 MG tablet  Self No No   Sig: Take 1 tablet 30 minutes prior to bedtime      Facility-Administered Medications: None     Allergies   Allergies   Allergen Reactions     Animal Dander Cough     Itchy eyes     Cats      Dust Mites Cough     Itchy eyes     Pollen Extract      Other reaction(s): Cough  Itchy eyes     Seasonal Allergies        Social History   I have reviewed this patient's social history and updated it with pertinent information if needed. Etta Cervantes  reports that she has never smoked. She has never used smokeless tobacco. She reports that she does not drink alcohol or use illicit drugs.    Family History   I have reviewed this patient's family history and updated it with pertinent information if needed.   Family History   Problem Relation Age of Onset     Depression Mother      Eye Disorder Mother      Gynecology Mother      Alzheimer Disease Mother      CANCER Father      Other Cancer Father      HEART DISEASE Maternal Grandfather      Allergies Paternal Grandfather      Allergies Son        Review of Systems   See HPI above.      Physical Exam   Temp: 97.8  F (36.6  C) Temp src: Oral BP: 114/57 Pulse: 99 Heart Rate: 80 Resp: 16 SpO2: 94 % O2 Device: Nasal cannula Oxygen Delivery: 1 LPM  Vital Signs with Ranges  Temp:  [93  F (33.9   C)-100.7  F (38.2  C)] 97.8  F (36.6  C)  Pulse:  [] 99  Heart Rate:  [] 80  Resp:  [14-22] 16  BP: (110-157)/() 114/57  SpO2:  [91 %-98 %] 94 %  0 lbs 0 oz    Constitutional: Pleasant female seen resting comfortably, appears in pain, grimacing. Alert and interactive.   HEENT: NCAT. PERRL, EOMI, anicteric sclera. Oral mucosa pink and moist with no lesions. + thrush.  Hematologic / Lymphatic: No overt bleeding. No cervical or clavicular adenopathy.  Respiratory: Non-labored breathing, good air exchange, lungs clear to auscultation in left lung. RLL absent, RUL diminished. No cough or wheeze noted.   Cardiovascular: Regular rate and rhythm. No murmur or rub.   GI: Normoactive bowel sounds. Abdomen soft, non-distended, and non-tender. No palpable masses or organomegaly.  Genitourinary: Deferred.   Skin: Warm and dry, slightly flushed. No concerning lesions or rash on exposed surfaces.  Right chest tube covered, no drainage.   Musculoskeletal: Extremities grossly normal, non-tender, no edema. Strong peripheral pulses. Good strength and ROM in bed.   Neurologic: A&O x 3, CNs 2-12 grossly intact, speech normal, gait normal, sensation to light touch grossly WNL. Grossly non-focal.  Neuropsychiatric: Mentation and affect appear normal/appropriate.  Vascular Access: RCW mediport is CDI without erythema, swelling, or discharge.       Data   I personally reviewed the chest x-ray image(s) showing large right pneumothorax with mild improvement s/p 3rd chest tube placement attempt.   and the chest CT image(s) showing large right pneumothorax with midline shift, bleb and misplaced chest tube.  .  Results for orders placed or performed during the hospital encounter of 07/12/17 (from the past 24 hour(s))   Chest XR,  PA & LAT    Narrative    Chest radiograph 7/12/2017 2:40 AM     HISTORY: ptx    COMPARISON: 7/11/2017, 00:7 AM.    FINDINGS: Left Port-A-Cath tip in distal SVC. Unchanged right  collateral drain.  Redemonstration of large right pneumothorax with  atelectatic right lung. Stable mild left de leon mediastinal shift. No  left sided pneumothorax or pleural effusion.      Impression    IMPRESSION: Unchanged large right pneumothorax with near complete  collapse of the right lung and mild leftward mediastinal shift.    I have personally reviewed the examination and initial interpretation  and I agree with the findings.    SALINAS VIRAMONTES MD   Chest CT w/o contrast    Narrative    EXAMINATION: Chest CT  7/12/2017     CLINICAL HISTORY: Pneumothorax.    COMPARISON: Prior chest Radiographs. CT cap 5/16/2017.    TECHNIQUE: CT imaging obtained through the chest without contrast.  Coronal and axial MIP reformatted images obtained.     FINDINGS:  Large right-sided pneumothorax with mild leftward mediastinal shift  and near total collapse of the right lung. Large right apical bulla.  Minimal left basilar atelectasis. No left-sided pneumothorax or  pleural effusion. The trachea and left main bronchus is patent.  Proximal right main bronchus appears to be patent.    Heart size is normal. No pericardial effusion.  Normal thoracic  vasculature. Multiple enlarged right axillary lymph nodes and  surrounding fatty stranding.    Bones and soft tissues: No suspicious osseous finding. Right anterior  approach chest drain with the tip terminates in the right breast  tissue, anterior to the right pectoralis major. Anterior right chest  wall deep subcutaneous emphysema, predominantly in the right  pectoralis major. Cutaneous thickening of the right breast with  asymmetric right breast enlarging.    Partially imaged upper abdomen: Unchanged multiple liver metastasis.      Impression    IMPRESSION:   1. Large right-sided pneumothorax with leftward mediastinal shift and  near total collapse of the right lung.  2. Large right apical bulla.  3. Asymmetric enlargement of the right breast with large skin  thickening. Enlarged right axillary  lymph nodes with surrounding fatty  stranding.  4. Right anterior approach chest drain tip is not within the lung. The  tip is in the right breast soft tissue, anterior to the right  pectoralis major. There is subcutaneous emphysema of the right  anterior chest wall.    Chest tube position discussed with Dr. Campoverde by Dr. Chan at 0900  7/12/17    I have personally reviewed the examination and initial interpretation  and I agree with the findings.    LAURA CHAN MD   XR Chest Port 1 View    Narrative    Exam: XR CHEST PORT 1 VW, 7/12/2017 9:03 AM    Indication: Post CT placement    Comparison: Same-day chest CT and chest radiograph.    Findings:   Single AP view of the chest. Interval placement right-sided chest tube  with decreasing moderate-sized right-sided pneumothorax. The trachea  and mediastinum are essentially midline, which is improved from prior  radiograph which demonstrated leftward displacement. No pleural  effusion or left-sided pneumothorax. Patchy bibasilar opacities. Left  chest wall port catheter with tip at the mid SVC.      Impression    Impression:   1. Interval placement right-sided chest tube with decreasing  right-sided pneumothorax. The mediastinum is midline. Extensive right  chest wall subcutaneous emphysema  2. Patchy bibasilar opacities, favor atelectasis.    I have personally reviewed the examination and initial interpretation  and I agree with the findings.    KAVON MILLS MD   XR Chest 2 Views    Narrative    CHEST TWO VIEWS  7/12/2017 12:47 PM     HISTORY: Status post right chest tube placement for large  pneumothorax. Please evaluate for interval change since previous this  AM.    COMPARISON: 7/12/2017 at 8:58 AM.    FINDINGS: Moderate-sized right pneumothorax has slightly decreased in  size compared to the prior study. The chest tube has been  repositioned. No pneumothorax on the left. No significant pleural  fluid on either side. There is subcutaneous emphysema over the  right  chest wall. Heart size normal. Left-sided Port-A-Cath again noted.       Impression    IMPRESSION: Persistent moderate-sized right pneumothorax, improved  compared to prior.      Above plan discussed with staff physician, Dr. Liang.    Kerri Sanchez, [facility]  Hematology/Oncology  [phone]

## 2017-07-12 NOTE — ED PROVIDER NOTES
History     Chief Complaint   Patient presents with     Chest Injury     HPI  Etta Cervantes is a 57 year old female with a history of metastatic breast cancer on oral chemotherapy who was transferred from an outside ER with a pneumothorax and diagnosis of pyelonephritis.  She states that she had a fever at home of 101.4 along with dysuria.  She has no back pain and has no vomiting or diarrhea.  Urinalysis was consistent with infection and she was treated with Rocephin prior to transfer.  She was also noted to have a  right-sided complete pneumothorax, and a Cook catheter was attempted twice without any improvement in her pneumothorax.  She denies chest pain or shortness of breath.  She has no fever or chills.   She was transferred to the [facility] for evaluation.       PAST MEDICAL HISTORY:   Past Medical History:   Diagnosis Date     ASCUS favor benign 1/2015    Neg HPV     Cancer (H)      Cataract 2010    surgery both eyes     Emphysematous bleb of lung (H)      Fibroids      Hypercholesterolemia      Hypothyroid      Incontinence of urine     mixed     Menorrhagia      Obesity      PONV (postoperative nausea and vomiting)      Uncomplicated asthma        PAST SURGICAL HISTORY:   Past Surgical History:   Procedure Laterality Date     CATARACT IOL, RT/LT  2010     COLONOSCOPY  2010     DILATION AND CURETTAGE, HYSTEROSCOPY, ABLATE ENDOMETRIUM NOVASURE, COMBINED  3/2/2011    COMBINED DILATION AND CURETTAGE, HYSTEROSCOPY, ABLATE ENDOMETRIUM NOVASURE performed by LAURA VARGAS at [facility]     INSERT PORT VASCULAR ACCESS N/A 2/12/2015    Procedure: INSERT PORT VASCULAR ACCESS;  Surgeon: Noé Schaefer MD;  Location: [facility] OR     SURGICAL HISTORY OF -   2005    bladder sling     SURGICAL HISTORY OF -       Cystectomy-lower lip     TUBAL LIGATION         FAMILY HISTORY:   Family History   Problem Relation Age of Onset     Depression Mother      Eye Disorder Mother      Gynecology Mother      Alzheimer Disease  Mother      CANCER Father      Other Cancer Father      HEART DISEASE Maternal Grandfather      Allergies Paternal Grandfather      Allergies Son        SOCIAL HISTORY:   Social History   Substance Use Topics     Smoking status: Never Smoker     Smokeless tobacco: Never Used     Alcohol use No       Patient's Medications   New Prescriptions    No medications on file   Previous Medications    ACETAMINOPHEN (TYLENOL PO)    Take 650 mg by mouth every 4 hours as needed for mild pain or fever    ALBUTEROL (2.5 MG/3ML) 0.083% NEB SOLUTION    Take 1 vial (2.5 mg) by nebulization every 4 hours as needed for shortness of breath / dyspnea    ALBUTEROL (VENTOLIN HFA) 108 (90 BASE) MCG/ACT INHALER    Inhale 2 puffs into the lungs every 4 hours as needed    ATORVASTATIN (LIPITOR) 10 MG TABLET    Take 1 tablet (10 mg) by mouth daily    AZELASTINE (ASTELIN) 0.1 % NASAL SPRAY    Spray 1-2 sprays into both nostrils 2 times daily as needed for rhinitis    BECLOMETHASONE (QVAR) 80 MCG/ACT INHALER    Inhale 2 puffs into the lungs 2 times daily    CALCIUM 600 MG TABLET    Take 3 tablets by mouth daily     CALCIUM CARB-CHOLECALCIFEROL ( CALCIUM 600+D3) 600-500 MG-UNIT CAPS    Take 2 tablets by mouth daily    CAPECITABINE (XELODA) 500 MG TABLET CHEMO    Take 5 cap in AM and 4 cap in PM on Days 1 through 14, then off for 7 days. Take within 30 mins after meal.    CRANBERRY PO    Take 1 capsule by mouth daily     DOCUSATE CALCIUM (STOOL SOFTENER PO)    Take 100 mg by mouth daily     LEVOTHYROXINE (SYNTHROID, LEVOTHROID) 25 MCG TABLET    Take 1 tablet (25 mcg) by mouth daily    LIDOCAINE (XYLOCAINE) 2 % SOLUTION        LORATADINE (CLARITIN) 10 MG TABLET    Take 10 mg by mouth daily    LORAZEPAM (ATIVAN) 0.5 MG TABLET    Take 1 tablet (0.5 mg) by mouth every 4 hours as needed (Anxiety, Nausea/Vomiting or Sleep)    MAGIC MOUTHWASH SUSPENSION (DIPHENHYDRAMINE, LIDOCAINE, ALUMINUM-MAGNESIUM & SIMETHICONE)    Swish and swallow 10 mLs in mouth  every 6 hours as needed for mouth sores    MOMETASONE-FORMOTEROL (DULERA) 200-5 MCG/ACT ORAL INHALER    Inhale 2 puffs into the lungs 2 times daily    NEBULIZERS (DANIELLE LC PLUS NEBULIZER) MISC        ONDANSETRON (ZOFRAN) 4 MG TABLET    Take 1 tablet (4 mg) by mouth 2 times daily 30 minutes before Xeloda    ORDER FOR DME    Auto-CPAP:  Max 11 cm H2O  Min 9 cm H2O  Changed in clinic  Continuous    Lifetime need and heated humidity.       ORDER FOR DME    F&P Zest medium nasal mask    ORDER FOR DME    Equipment being ordered: accessory kit    ORDER FOR DME    Equipment being ordered: CPAP  AIRSENSE 10  9-11 CM H20  # [id number]  DN# [id number]    OXYBUTYNIN (DITROPAN XL) 10 MG 24 HR TABLET    Take 1 tablet (10 mg) by mouth daily (Needs follow-up appointment for this medication)    OXYCODONE (ROXICODONE) 5 MG IMMEDIATE RELEASE TABLET    Take 1 tablet (5 mg) by mouth every 4 hours as needed for moderate to severe pain    PREDNISONE (DELTASONE) 20 MG TABLET    Take 1 tablet (20 mg) by mouth 2 times daily For Yellow or Red zone on Asthma Action Plan.    PROCHLORPERAZINE (COMPAZINE) 10 MG TABLET    Take 1 tablet (10 mg) by mouth every 6 hours as needed (Nausea/Vomiting)    ZOLPIDEM (AMBIEN) 10 MG TABLET    Take 1 tablet 30 minutes prior to bedtime   Modified Medications    No medications on file   Discontinued Medications    No medications on file          Allergies   Allergen Reactions     Animal Dander Cough     Itchy eyes     Cats      Dust Mites Cough     Itchy eyes     Pollen Extract      Other reaction(s): Cough  Itchy eyes     Seasonal Allergies          I have reviewed the Medications, Allergies, Past Medical and Surgical History, and Social History in the Epic system.    Review of Systems   All other systems reviewed and are negative.      Physical Exam   BP: 141/86  Pulse: 99  Heart Rate: 92  Temp: 100.7  F (38.2  C)  Resp: 18  SpO2: 96 %  Physical Exam   Constitutional: She is oriented to person, place, and time. No  distress.   HENT:   Head: Normocephalic and atraumatic.   Right Ear: External ear normal.   Left Ear: External ear normal.   Mouth/Throat: Oropharynx is clear and moist. No oropharyngeal exudate.   Eyes: Conjunctivae are normal. Pupils are equal, round, and reactive to light. Right eye exhibits no discharge. Left eye exhibits no discharge. No scleral icterus.   Neck: Normal range of motion. Neck supple.   Cardiovascular: Normal rate and intact distal pulses.    Pulmonary/Chest: Effort normal. No respiratory distress. She has no wheezes. She has no rales. She exhibits no tenderness.   Cook catheter in the R chest wall   Abdominal: Soft. She exhibits no distension. There is no tenderness. There is no rebound and no guarding.   Musculoskeletal: Normal range of motion. She exhibits no edema or tenderness.   Neurological: She is alert and oriented to person, place, and time. She exhibits normal muscle tone.   Skin: Skin is warm and dry. No rash noted. She is not diaphoretic.   Psychiatric: She has a normal mood and affect. Her behavior is normal. Thought content normal.   Nursing note and vitals reviewed.      ED Course     ED Course     Procedures             Critical Care time:  none               Labs Ordered and Resulted from Time of ED Arrival Up to the Time of Departure from the ED - No data to display         Assessments & Plan (with Medical Decision Making)   1.  PTX  2.  Pyelonephritis    57-year-old female with history of metastatic breast cancer on chemotherapy who was transferred from an outside ER with pneumothorax and pyelonephritis.  CT of the chest showed the right Cook catheter was in the chest wall, and thoracic surgery was consulted for placement of a chest tube.  The patient remained hemodynamically stable in the ER.  She received Rocephin for her UTI prior to transfer.  She will be admitted to the Hem/Onc for continued IV antibiotics and monitoring of her right pneumothorax. Thoracic surgery will  place a Chest tube in the ER.    I have reviewed the nursing notes.    I have reviewed the findings, diagnosis, plan and need for follow up with the patient.    New Prescriptions    No medications on file       Final diagnoses:   Pneumothorax       7/12/2017   [facility], [place], EMERGENCY DEPARTMENT     Kp Jewell MD  07/12/17 0758       Kp Jewell MD  07/12/17 0814       Kp Jewell MD  07/12/17 0816

## 2017-07-12 NOTE — ED NOTES
Pt on her second round of oral Chemo she is on day 9 of 14. Stage 4 breast CA that is now at her C-5 and has had pain in this area today along with her rt shoulder and underarm but also a lymph node that she feels is getting larger again. Is also have dysuria for the past 2 days. Up this am not really feeling well today left work early, home to rest, has been sleeping/resting all day and has used her pain meds today also. Pt states her temp at home 101.4. Not ate or drank a lot today due to sleeping most of the day

## 2017-07-12 NOTE — PROCEDURES
Thoracic Surgery Procedure Note  Preprocedure Diagnosis: R pneumothorax  Postprocedure Diagnosis: R pneumothorax  Procedure: R sided chest tube placement .  (28F in 5th intercostal space at mid axillary line.  Secured at 16cm.)    Premed: 2mg dilaudid  Additional Meds: local lido    Etta Cervantes was prepped and draped in the usual fashion. Lidocaine was locally injected into the subcutaneous tissue as well as periostium and the pleural space.  After pleural air was aspirated to confirm presence in the pleural space, it was entered with a Milagros clamp and the chest tube was placed guided in a posterior and superior direction.  There was immediate evacuation of approximately 30cc of serosanguinous fluid.  The patient tolerated the procedure well, and there were no complications.     A STAT CXR was ordered to confirm proper placement. The tube was in the pleural cavity. The tube appeared to be at a right angle and potentially kinked, but at the level of the draining hole in the tube. However, she was tidling and there was a positive airleak on the pleuravac..    Procedure was performed with oversight by staff, Dr. Guevara, and the fellow, Dr. Griffin.    Cristino Driscoll MD  PGY-1 Surgery  pager [phone]

## 2017-07-12 NOTE — ED NOTES
Bed: ED02  Expected date:   Expected time:   Means of arrival: Ambulance  Comments:  Etta Cervantes (37 years old)   Stage 4 breast with mets to liver, spine, and rib.  Pt complaining of fever (101.4 home t-max) and dysuria, pt has a UTI - rocephin 1g given.  Pt has a 18 R AC    Cxr showed that pt has a R sided tension pneumothorax - cook cath placed but ended up in the bleb.  150 cc air was taken out of bleb.   catheter was pulled and another chest tube placed.  Pts lung   is still not inflated with the second tube but the MD was able to pull greater than 200 cc of air.     Pt is A/Ox4 -  Vitally stable 137/79, 93 HR, 94%-96% RA

## 2017-07-12 NOTE — CONSULTS
Thoracic Surgery Consult Note    Staff: Dr. Guevara  Date: 7/12/2017   Consulted for: right sided pneumothorax by ED    Assessment/Plan:  57 year old female with metastatic breast cancer and chronic large emphysematous bleb who presents with right sided pneumothorax. She endorses some change in her breathing over the past several weeks but largely appears to be minimally symptomatic. She is hemodynamically stable. CXR demonstrates cook catheter in correct position but with continued collapse of right long and stable but mild mediastinal shift.     - Additional imaging: CT chest  - Cook catheter to pneumovac and place to suction   - Admit to medicine, thoracic surgery to follow    Discussed with Dr Griffin, Thoracic fellow    Neli Campoverde MD  General Surgery, PGY-2  Pg [phone]    ------------------------------------------  HPI:   Etta Cervantes is a 57 year old female who is being evaluated for right sided pneumothorax. PMH is significant for stage IV breast cancer and asthma Patient has been feeling generally poor over the past 2-3 days. She developed right sided abdominal and shoulder pain. Has also had fevers and chills. Endorses difficulty urinating which as been longstanding but has also had more burning with urination. Endorses a history of recurrent UTIs. She presented to her local ED and CXR demonstrated a right sided complete pneumothorax. There is a long history of large right sided bleb that has just been monitored with serial imaging. She noticed some change in breathing for the last several weeks but attributed it to her asthma. She had a cook catheter placed at the OSH ED but has not noticed any change in her breathing or pain since the catheter was placed.    Review of Systems:  ROS: 10 point ROS neg other than the symptoms noted above in the HPI.    PMH:  Past Medical History:   Diagnosis Date     ASCUS favor benign 1/2015    Neg HPV     Cancer (H)      Cataract 2010    surgery both eyes      Emphysematous bleb of lung (H)      Fibroids      Hypercholesterolemia      Hypothyroid      Incontinence of urine     mixed     Menorrhagia      Obesity      PONV (postoperative nausea and vomiting)      Uncomplicated asthma         PSHx:  Past Surgical History:   Procedure Laterality Date     CATARACT IOL, RT/LT  2010     COLONOSCOPY  2010     DILATION AND CURETTAGE, HYSTEROSCOPY, ABLATE ENDOMETRIUM NOVASURE, COMBINED  3/2/2011    COMBINED DILATION AND CURETTAGE, HYSTEROSCOPY, ABLATE ENDOMETRIUM NOVASURE performed by LAURA VARGAS at [facility]     INSERT PORT VASCULAR ACCESS N/A 2/12/2015    Procedure: INSERT PORT VASCULAR ACCESS;  Surgeon: Noé Schaefer MD;  Location: [facility] OR     SURGICAL HISTORY OF -   2005    bladder sling     SURGICAL HISTORY OF -       Cystectomy-lower lip     TUBAL LIGATION          Medications:    Current Facility-Administered Medications on File Prior to Encounter:  [COMPLETED] 0.9% sodium chloride BOLUS   [COMPLETED] cefTRIAXone (ROCEPHIN) 1 g vial to attach to  mL bag for ADULTS or NS 50 mL bag for PEDS   [COMPLETED] midazolam (VERSED) injection 2 mg   [DISCONTINUED] 0.9% sodium chloride infusion   [DISCONTINUED] ondansetron (ZOFRAN) injection 4 mg     Current Outpatient Prescriptions on File Prior to Encounter:  calcium carb-cholecalciferol ( CALCIUM 600+D3) 600-500 MG-UNIT CAPS Take 2 tablets by mouth daily   zolpidem (AMBIEN) 10 MG tablet Take 1 tablet 30 minutes prior to bedtime   ondansetron (ZOFRAN) 4 MG tablet Take 1 tablet (4 mg) by mouth 2 times daily 30 minutes before Xeloda   capecitabine (XELODA) 500 MG tablet CHEMO Take 5 cap in AM and 4 cap in PM on Days 1 through 14, then off for 7 days. Take within 30 mins after meal.   albuterol (2.5 MG/3ML) 0.083% neb solution Take 1 vial (2.5 mg) by nebulization every 4 hours as needed for shortness of breath / dyspnea   predniSONE (DELTASONE) 20 MG tablet Take 1 tablet (20 mg) by mouth 2 times daily For Yellow or Red  zone on Asthma Action Plan.   magic mouthwash suspension (diphenhydrAMINE, lidocaine, aluminum-magnesium & simethicone) Swish and swallow 10 mLs in mouth every 6 hours as needed for mouth sores   lidocaine (XYLOCAINE) 2 % solution    LORazepam (ATIVAN) 0.5 MG tablet Take 1 tablet (0.5 mg) by mouth every 4 hours as needed (Anxiety, Nausea/Vomiting or Sleep) (Patient not taking: Reported on 6/28/2017)   prochlorperazine (COMPAZINE) 10 MG tablet Take 1 tablet (10 mg) by mouth every 6 hours as needed (Nausea/Vomiting)   Docusate Calcium (STOOL SOFTENER PO) Take 100 mg by mouth daily    CRANBERRY PO Take 1 capsule by mouth daily    atorvastatin (LIPITOR) 10 MG tablet Take 1 tablet (10 mg) by mouth daily   mometasone-formoterol (DULERA) 200-5 MCG/ACT oral inhaler Inhale 2 puffs into the lungs 2 times daily   order for DME Equipment being ordered: CPAPAIRSENSE 109-11 CM H20SN# [id number]  DN# [id number]   oxybutynin (DITROPAN XL) 10 MG 24 hr tablet Take 1 tablet (10 mg) by mouth daily (Needs follow-up appointment for this medication)   beclomethasone (QVAR) 80 MCG/ACT Inhaler Inhale 2 puffs into the lungs 2 times daily   azelastine (ASTELIN) 0.1 % nasal spray Spray 1-2 sprays into both nostrils 2 times daily as needed for rhinitis   levothyroxine (SYNTHROID, LEVOTHROID) 25 MCG tablet Take 1 tablet (25 mcg) by mouth daily   albuterol (VENTOLIN HFA) 108 (90 BASE) MCG/ACT inhaler Inhale 2 puffs into the lungs every 4 hours as needed   oxyCODONE (ROXICODONE) 5 MG immediate release tablet Take 1 tablet (5 mg) by mouth every 4 hours as needed for moderate to severe pain   Nebulizers (DANIELLE LC PLUS NEBULIZER) MISC    order for DME Equipment being ordered: accessory kit   calcium 600 MG tablet Take 3 tablets by mouth daily    loratadine (CLARITIN) 10 MG tablet Take 10 mg by mouth daily   Acetaminophen (TYLENOL PO) Take 650 mg by mouth every 4 hours as needed for mild pain or fever   ORDER FOR DME F&P Zest medium nasal mask   ORDER FOR  DME Auto-CPAP:Max 11 cm H2OMin 9 cm [id number] in clinicContinuousLifetime need and heated humidity.        Allergies:   Animal dander; Cats; Dust mites; Pollen extract; and Seasonal allergies     SocHx:  Social History     Social History     Marital status:      Spouse name: Lucian Cervantes     Number of children: 2     Years of education: 15+     Occupational History     Customer Service [employer]     Social History Main Topics     Smoking status: Never Smoker     Smokeless tobacco: Never Used     Alcohol use No     Drug use: No     Sexual activity: Yes     Partners: Male     Birth control/ protection: Post-menopausal     Other Topics Concern     Parent/Sibling W/ Cabg, Mi Or Angioplasty Before 65f 55m? No     Social History Narrative     Denies tobacco, alcohol, and illicit drug use.    FamHx:  Family History   Problem Relation Age of Onset     Depression Mother      Eye Disorder Mother      Gynecology Mother      Alzheimer Disease Mother      CANCER Father      Other Cancer Father      HEART DISEASE Maternal Grandfather      Allergies Paternal Grandfather      Allergies Son       Negative for bleeding disorders, clotting disorders, or problems with anesthesia    Physical Examination   /84  Pulse 99  Temp 100.7  F (38.2  C) (Oral)  Resp 14  SpO2 97%  General: Awake and alert. NAD  Pulm: Absent breath sounds on right side, no tachypnea. No tracheal deviation.  CV: RRR, no JVD  Musculoskel/Extremities: no edema, erythema, tenderness   Skin: no rashes, no diaphoresis    Labs: Reviewed   Cr 0.72  Lactate 1.0  WBC 8.2  Hgb 13.7    Imaging: Reviewed series of CXRs and prior CTs

## 2017-07-12 NOTE — ED NOTES
Mahesh removed by MD vyas placed to the side X ray called for confirmation  Pt tolerated well  Spouse remains at bedside

## 2017-07-12 NOTE — IP AVS SNAPSHOT
[address] [address]    Phone:  [phone]    Fax:  [phone]                                       After Visit Summary   7/12/2017    Etta Cervantes    MRN: [id number]           After Visit Summary Signature Page     I have received my discharge instructions, and my questions have been answered. I have discussed any challenges I see with this plan with the nurse or doctor.    ..........................................................................................................................................  Patient/Patient Representative Signature      ..........................................................................................................................................  Patient Representative Print Name and Relationship to Patient    ..................................................               ................................................  Date                                            Time    ..........................................................................................................................................  Reviewed by Signature/Title    ...................................................              ..............................................  Date                                                            Time

## 2017-07-12 NOTE — ED NOTES
Report to [facility], belongings sent with . Sharita signed. Report to [facility] RN. Pt ambulatory to cart in [facility]. VSS Pain controlled.

## 2017-07-12 NOTE — PLAN OF CARE
Problem: Goal Outcome Summary  Goal: Goal Outcome Summary  Pt arrived from ED this am for right sided pneumothorax. Chest tube to - 20 H20. Dressing with small amount of bleeding noted. Reinforced with pressure dressing. Air leak noted and thoracic is aware. Repeat chest x ray done. Some improvement noted. Pt ambulating with sba. Voiding spont. Fair appetite. AVSS. Pt sating mid 90's on 2 liters nc. Dyspnea with exertion. Cough frequent, nonproductive. Port accessed. Admission profile done. Reviewed PTA meds. Rocephin for UTI. Right sided back pain managed with scheduled toradol and prn oxycodone. Continue with poc.

## 2017-07-12 NOTE — IP AVS SNAPSHOT
MRN:[id number]                      After Visit Summary   7/12/2017    Etta Cervantes    MRN: [id number]           Thank you!     Thank you for choosing [facility] for your care. Our goal is always to provide you with excellent care. Hearing back from our patients is one way we can continue to improve our services. Please take a few minutes to complete the written survey that you may receive in the mail after you visit with us. Thank you!        Patient Information     Date Of Birth          1960        Designated Caregiver       Most Recent Value    Caregiver    Will someone help with your care after discharge? yes    Name of designated caregiver Lucian Cervantes     Phone number of caregiver [phone]    Caregiver address [address]      About your hospital stay     You were admitted on:  July 12, 2017 You last received care in the:  [address] [facility]    You were discharged on:  July 24, 2017        Reason for your hospital stay       You were here because there was air in your chest, which caused your lung to collapse (pneumothorax).                  Who to Call     For medical emergencies, please call 911.  For non-urgent questions about your medical care, please call your primary care provider or clinic, [phone]  For questions related to your surgery, please call your surgery clinic        Attending Provider     Provider Specialty    Kp Jewell MD Emergency Medicine    [facility], Tobi Khan MD Oncology       Primary Care Provider Office Phone # Fax #    Johana Fairbanks [phone]       When to contact your care team       Please call the [facility] at [phone] for temperature above 100.4, shortness of breath, chest pain, headaches, vision changes, bleeding, uncontrolled nausea, vomiting, diarrhea, or pain.                  After Care Instructions     Activity       Your  activity upon discharge: activity as tolerated            Diet       Follow this diet upon discharge: regular                  Follow-up Appointments     Follow Up and recommended labs and tests       Follow-up appointments as listed below.                  Your next 10 appointments already scheduled     Jul 27, 2017  9:00 AM CDT   Return Visit with Brodie Cassidy MD   [facility] ([facility])    [facility]  [address]  [facility] [address]   [phone]            Sep 28, 2017  9:00 AM CDT   Level O with ROOM 8 [facility] ([facility])    [facility]  [address]  [facility] [address]   [phone]              Further instructions from your care team       Post Bronchoscopy Patient Instructions:    July 20, 2017  Etta Cervantes    Your procedure completed (bronchoscopy with valve placement in the right lung) without any immediate complications.  You may cough up scant amount of blood for the next 12 hours. If you have excessive cough with blood, chest pain, shortness of breath, please report to the closest emergency room.    You may experience low grade (less than 100.5 F) fever next 24 hours, if so you can take tylenol. If the fever persists more than 24 hours contact to our office or your primary care provider.    Our office (Thoracic/Pulmonary--[phone]) will call you as soon as the results of biopsies are ready.    Should you have any question, please do not hesitate to call our office.    BETZAIDA Mcgowan MD    Additional Information     If you use hormonal birth control (such as the pill, patch, ring or implants): You'll need a second form of birth control for 7 days (condoms, a diaphragm or contraceptive foam). While in the hospital, you received a medicine called Bridion. Your normal birth control will not work as well for a week after taking this medicine.          Pending  Results     No orders found from 7/10/2017 to 7/13/2017.            Statement of Approval     Ordered          07/24/17 1115  I have reviewed and agree with all the recommendations and orders detailed in this document.  EFFECTIVE NOW     Approved and electronically signed by:  Alessandra Menjivar PA-C             Admission Information     Date & Time Provider Department Dept. Phone    7/12/2017 Tobi Liang MD [address] [facility] [place] [phone]      Your Vitals Were     Blood Pressure Pulse Temperature Respirations Weight Pulse Oximetry    114/69 (BP Location: Right arm) 84 98.1  F (36.7  C) (Axillary) 18 86.6 kg (190 lb 14.7 oz) 97%    BMI (Body Mass Index)                   32.26 kg/m2           MyChart Information     [facility] gives you secure access to your electronic health record. If you see a primary care provider, you can also send messages to your care team and make appointments. If you have questions, please call your primary care clinic.  If you do not have a primary care provider, please call [phone] and they will assist you.        Care EveryWhere ID     This is your Care EveryWhere ID. This could be used by other organizations to access your [facility] medical records  [id number]        Equal Access to Services     JACKIE MIX AH: Rupali Rodriguez, wamary jauregui, qagerardo kaalmada lexi, hari guardado. So [facility] [phone].    ATENCIÓN: Si habla español, tiene a parekh disposición servicios gratuitos de asistencia lingüística. Llame al [phone].    We comply with applicable federal civil rights laws and [place] laws. We do not discriminate on the basis of race, color, national origin, age, disability sex, sexual orientation or gender identity.               Review of your medicines      CONTINUE these medicines which have NOT CHANGED        Dose / Directions    * albuterol 108 (90 BASE) MCG/ACT Inhaler   Commonly known as:  VENTOLIN  HFA   Used for:  Moderate persistent asthma, uncomplicated        Dose:  2 puff   Inhale 2 puffs into the lungs every 4 hours as needed   Quantity:  1 Inhaler   Refills:  2       * albuterol (2.5 MG/3ML) 0.083% neb solution   Used for:  Moderate persistent asthma, uncomplicated        Dose:  1 vial   Take 1 vial (2.5 mg) by nebulization every 4 hours as needed for shortness of breath / dyspnea   Quantity:  30 vial   Refills:  3       ALLEGRA ALLERGY 180 MG tablet   Generic drug:  fexofenadine        Dose:  180 mg   Take 180 mg by mouth daily as needed for allergies   Refills:  0       atorvastatin 10 MG tablet   Commonly known as:  LIPITOR   Used for:  Hyperlipidemia LDL goal <100        Dose:  10 mg   Take 1 tablet (10 mg) by mouth daily   Quantity:  30 tablet   Refills:  11       azelastine 0.1 % spray   Commonly known as:  ASTELIN   Used for:  Chronic rhinitis        Dose:  1-2 spray   Spray 1-2 sprays into both nostrils 2 times daily as needed for rhinitis   Quantity:  3 Bottle   Refills:  3       beclomethasone 80 MCG/ACT Inhaler   Commonly known as:  QVAR   Used for:  Chronic rhinitis        Dose:  2 puff   Inhale 2 puffs into the lungs 2 times daily   Quantity:  3 Inhaler   Refills:  3       capecitabine 500 MG tablet CHEMO   Commonly known as:  XELODA   Used for:  Secondary malignant neoplasm of liver (H), Carcinoma of breast metastatic to liver, unspecified laterality (H), Bone metastasis (H), Breast cancer metastasized to axillary lymph node, right (H), Hyperlipidemia LDL goal <130, Hypothyroidism, unspecified type, Chemotherapy-induced neutropenia (H), Mucositis due to chemotherapy        Take 5 cap in AM and 4 cap in PM on Days 1 through 14, then off for 7 days. Take within 30 mins after meal.   Quantity:  126 tablet   Refills:  0       CRANBERRY PO        Dose:  1 capsule   Take 1 capsule by mouth daily   Refills:  0       KP CALCIUM 600+D3 600-500 MG-UNIT Caps   Generic drug:  calcium  carb-cholecalciferol        Dose:  2 tablet   Take 2 tablets by mouth daily   Refills:  0       levothyroxine 25 MCG tablet   Commonly known as:  SYNTHROID/LEVOTHROID   Used for:  Hypothyroidism due to acquired atrophy of thyroid        Dose:  25 mcg   Take 1 tablet (25 mcg) by mouth daily   Quantity:  90 tablet   Refills:  3       lidocaine visc 2% 2.5mL/5mL & maalox/mylanta w/ simeth 2.5mL/5mL & diphenhydrAMINE 5mg/5mL Susp suspension   Commonly known as:  [facility]IC [facility]wash [facility]   Used for:  Mucositis due to chemotherapy, Breast cancer metastasized to axillary lymph node, right (H)        Dose:  10 mL   Swish and swallow 10 mLs in mouth every 6 hours as needed for mouth sores   Quantity:  240 mL   Refills:  10       LORazepam 0.5 MG tablet   Commonly known as:  ATIVAN   Used for:  Secondary malignant neoplasm of liver (H), Carcinoma of breast metastatic to liver, unspecified laterality (H), Bone metastasis (H), Breast cancer metastasized to axillary lymph node, right (H)        Dose:  0.5 mg   Take 1 tablet (0.5 mg) by mouth every 4 hours as needed (Anxiety, Nausea/Vomiting or Sleep)   Quantity:  30 tablet   Refills:  2       mometasone-formoterol 200-5 MCG/ACT oral inhaler   Commonly known as:  DULERA   Used for:  Moderate persistent asthma without complication        Dose:  2 puff   Inhale 2 puffs into the lungs 2 times daily   Quantity:  3 Inhaler   Refills:  0       oxybutynin 10 MG 24 hr tablet   Commonly known as:  DITROPAN XL   Used for:  Mixed incontinence urge and stress (male)(female)        Dose:  10 mg   Take 1 tablet (10 mg) by mouth daily (Needs follow-up appointment for this medication)   Quantity:  90 tablet   Refills:  3       oxyCODONE 5 MG IR tablet   Commonly known as:  ROXICODONE   Used for:  Secondary malignant neoplasm of liver (H)        Dose:  5 mg   Take 1 tablet (5 mg) by mouth every 4 hours as needed for moderate to severe pain   Quantity:  30 tablet   Refills:  0       predniSONE 20  MG tablet   Commonly known as:  DELTASONE   Used for:  Moderate persistent asthma, uncomplicated        Dose:  20 mg   Take 1 tablet (20 mg) by mouth 2 times daily For Yellow or Red zone on Asthma Action Plan.   Quantity:  10 tablet   Refills:  1       prochlorperazine 10 MG tablet   Commonly known as:  COMPAZINE   Used for:  Secondary malignant neoplasm of liver (H), Carcinoma of breast metastatic to liver, unspecified laterality (H), Bone metastasis (H), Breast cancer metastasized to axillary lymph node, right (H)        Dose:  10 mg   Take 1 tablet (10 mg) by mouth every 6 hours as needed (Nausea/Vomiting)   Quantity:  30 tablet   Refills:  2       STOOL SOFTENER PO        Dose:  100 mg   Take 100 mg by mouth daily   Refills:  0       TYLENOL PO        Dose:  650 mg   Take 650 mg by mouth every 4 hours as needed for mild pain or fever   Refills:  0       zolpidem 10 MG tablet   Commonly known as:  AMBIEN   Indication:  Trouble Sleeping   Used for:  Insomnia, unspecified        Take 1 tablet 30 minutes prior to bedtime   Quantity:  30 tablet   Refills:  3       * Notice:  This list has 2 medication(s) that are the same as other medications prescribed for you. Read the directions carefully, and ask your doctor or other care provider to review them with you.             Protect others around you: Learn how to safely use, store and throw away your medicines at www.disposemymeds.org.             Medication List: This is a list of all your medications and when to take them. Check marks below indicate your daily home schedule. Keep this list as a reference.      Medications           Morning Afternoon Evening Bedtime As Needed    * albuterol 108 (90 BASE) MCG/ACT Inhaler   Commonly known as:  VENTOLIN HFA   Inhale 2 puffs into the lungs every 4 hours as needed                                * albuterol (2.5 MG/3ML) 0.083% neb solution   Take 1 vial (2.5 mg) by nebulization every 4 hours as needed for shortness of breath  / dyspnea   Last time this was given:  2.5 mg on 7/20/2017 10:31 AM                                ALLEGRA ALLERGY 180 MG tablet   Take 180 mg by mouth daily as needed for allergies   Last time this was given:  60 mg on 7/24/2017  8:42 AM   Generic drug:  fexofenadine                                atorvastatin 10 MG tablet   Commonly known as:  LIPITOR   Take 1 tablet (10 mg) by mouth daily   Last time this was given:  10 mg on 7/24/2017  8:42 AM                                azelastine 0.1 % spray   Commonly known as:  ASTELIN   Spray 1-2 sprays into both nostrils 2 times daily as needed for rhinitis                                beclomethasone 80 MCG/ACT Inhaler   Commonly known as:  QVAR   Inhale 2 puffs into the lungs 2 times daily   Last time this was given:  2 puffs on 7/24/2017  8:42 AM                                capecitabine 500 MG tablet CHEMO   Commonly known as:  XELODA   Take 5 cap in AM and 4 cap in PM on Days 1 through 14, then off for 7 days. Take within 30 mins after meal.   Last time this was given:  2,000 mg on 7/16/2017  6:37 PM                                CRANBERRY PO   Take 1 capsule by mouth daily                                KP CALCIUM 600+D3 600-500 MG-UNIT Caps   Take 2 tablets by mouth daily   Generic drug:  calcium carb-cholecalciferol                                levothyroxine 25 MCG tablet   Commonly known as:  SYNTHROID/LEVOTHROID   Take 1 tablet (25 mcg) by mouth daily   Last time this was given:  25 mcg on 7/24/2017  8:42 AM                                lidocaine visc 2% 2.5mL/5mL & maalox/mylanta w/ simeth 2.5mL/5mL & diphenhydrAMINE 5mg/5mL Susp suspension   Commonly known as:  [facility]IC Mouthwash [facility]   Swish and swallow 10 mLs in mouth every 6 hours as needed for mouth sores                                LORazepam 0.5 MG tablet   Commonly known as:  ATIVAN   Take 1 tablet (0.5 mg) by mouth every 4 hours as needed (Anxiety, Nausea/Vomiting or Sleep)                                 mometasone-formoterol 200-5 MCG/ACT oral inhaler   Commonly known as:  DULERA   Inhale 2 puffs into the lungs 2 times daily                                oxybutynin 10 MG 24 hr tablet   Commonly known as:  DITROPAN XL   Take 1 tablet (10 mg) by mouth daily (Needs follow-up appointment for this medication)   Last time this was given:  10 mg on 7/23/2017  9:22 PM                                oxyCODONE 5 MG IR tablet   Commonly known as:  ROXICODONE   Take 1 tablet (5 mg) by mouth every 4 hours as needed for moderate to severe pain   Last time this was given:  5 mg on 7/23/2017  8:08 AM                                predniSONE 20 MG tablet   Commonly known as:  DELTASONE   Take 1 tablet (20 mg) by mouth 2 times daily For Yellow or Red zone on Asthma Action Plan.                                prochlorperazine 10 MG tablet   Commonly known as:  COMPAZINE   Take 1 tablet (10 mg) by mouth every 6 hours as needed (Nausea/Vomiting)                                STOOL SOFTENER PO   Take 100 mg by mouth daily                                TYLENOL PO   Take 650 mg by mouth every 4 hours as needed for mild pain or fever                                zolpidem 10 MG tablet   Commonly known as:  AMBIEN   Take 1 tablet 30 minutes prior to bedtime   Last time this was given:  10 mg on 7/23/2017  9:22 PM                                * Notice:  This list has 2 medication(s) that are the same as other medications prescribed for you. Read the directions carefully, and ask your doctor or other care provider to review them with you.

## 2017-07-12 NOTE — ED PROVIDER NOTES
"  History     Chief Complaint   Patient presents with     Fever     cancer patient with fever  is on chemo  oral  XLEODA is oral medication   had traditional chemo  2 years ago        HPI  Etta Cervantes is a 57 year old female who presents with complaints of not feeling well.  Patient states when she went to work today she was having some right-sided shoulder and arm pain.  This radiated up into her neck.  She admits to mild nonproductive cough.  Patient also developed a progressively increasing fever and maximum was 101.4.  She feels nauseated but has not vomited.  She's also had some burning with urination.  She describes some right-sided flank and abdominal pain.  She was to heating pad on this area and may have a first-degree burn from the heating pad.  Currently she denies headache.  No change in her hearing, vision or speech.  She denies difficulty with speech or swallowing.  She denies chest pain currently but states she took Percocet 2 times today for the discomfort in her arm and neck.  She does not feel short of breath.  She has a history of asthma.  History of pneumonia in her 20s.  She has breast cancer and is on her 2nd round of oral chemo.  She had additional chemo 2 years ago.  Patient had been diagnosed with stage IV breast cancer.  She states one week ago her blood tests were \"good\".  She states someone at work that is a few cubicles behind her has been coughing.  She was in [facility] for a few weeks earlier this year but had difficulty with breathing due to the elevation.  She is taking daily inhalers and has nebulization to use if necessary.  She admits to previous urinary tract infections but does not think she's had pyelonephritis.  She took Tylenol in addition to the Percocet earlier today.  She does state that her right breast skin is usually reddened color.  This is been going on for a long time and does not think it's change.  She denies any discharge from the nipple.  She did have " significant swelling of the breast and states that's better since starting chemo.    I have reviewed the Medications, Allergies, Past Medical and Surgical History, and Social History in the Epic system.         Review of Systems all other systems reviewed and are negative.  Past Medical History:   Diagnosis Date     ASCUS favor benign 1/2015    Neg HPV     Cancer (H)      Cataract 2010    surgery both eyes     Emphysematous bleb of lung (H)      Fibroids      Hypercholesterolemia      Hypothyroid      Incontinence of urine     mixed     Menorrhagia      Obesity      PONV (postoperative nausea and vomiting)      Uncomplicated asthma      Patient Active Problem List   Diagnosis     Hypothyroid     Fibroids     CARDIOVASCULAR SCREENING; LDL GOAL LESS THAN 160     Menorrhagia     Moderate persistent asthma     DIAMOND (obstructive sleep apnea)     Insomnia     Health Care Home     ASCUS favor benign     Breast cancer (H)     Carcinoma of breast metastatic to liver (H)     Bone metastasis (H)     Breast cancer metastasized to axillary lymph node (H)     Drug-related hair loss     Mucositis due to chemotherapy     Secondary malignant neoplasm of liver (H)     Thyroid nodule     Emphysematous bleb of lung (H)     Chemotherapy-induced neutropenia (H)     Hyperlipidemia LDL goal <130     Current Facility-Administered Medications   Medication     0.9% sodium chloride infusion     ondansetron (ZOFRAN) injection 4 mg     Current Outpatient Prescriptions   Medication     calcium carb-cholecalciferol ( CALCIUM 600+D3) 600-500 MG-UNIT CAPS     zolpidem (AMBIEN) 10 MG tablet     capecitabine (XELODA) 500 MG tablet CHEMO     albuterol (2.5 MG/3ML) 0.083% neb solution     predniSONE (DELTASONE) 20 MG tablet     magic mouthwash suspension (diphenhydrAMINE, lidocaine, aluminum-magnesium & simethicone)     Docusate Calcium (STOOL SOFTENER PO)     CRANBERRY PO     atorvastatin (LIPITOR) 10 MG tablet     mometasone-formoterol (DULERA) 200-5  MCG/ACT oral inhaler     oxybutynin (DITROPAN XL) 10 MG 24 hr tablet     beclomethasone (QVAR) 80 MCG/ACT Inhaler     azelastine (ASTELIN) 0.1 % nasal spray     levothyroxine (SYNTHROID, LEVOTHROID) 25 MCG tablet     albuterol (VENTOLIN HFA) 108 (90 BASE) MCG/ACT inhaler     oxyCODONE (ROXICODONE) 5 MG immediate release tablet     calcium 600 MG tablet     Acetaminophen (TYLENOL PO)     ORDER FOR DME     ondansetron (ZOFRAN) 4 MG tablet     lidocaine (XYLOCAINE) 2 % solution     LORazepam (ATIVAN) 0.5 MG tablet     prochlorperazine (COMPAZINE) 10 MG tablet     order for DME     Nebulizers (DANIELLE LC PLUS NEBULIZER) MISC     order for DME     loratadine (CLARITIN) 10 MG tablet     ORDER FOR DME        Allergies   Allergen Reactions     Animal Dander Cough     Itchy eyes     Cats      Dust Mites Cough     Itchy eyes     Pollen Extract      Other reaction(s): Cough  Itchy eyes     Seasonal Allergies      Social History     Social History     Marital status:      Spouse name: Lucian Cervantes     Number of children: 2     Years of education: 15+     Occupational History     Customer Service [employer]     Social History Main Topics     Smoking status: Never Smoker     Smokeless tobacco: Never Used     Alcohol use No     Drug use: No     Sexual activity: Yes     Partners: Male     Birth control/ protection: Post-menopausal     Other Topics Concern     Parent/Sibling W/ Cabg, Mi Or Angioplasty Before 65f 55m? No     Social History Narrative     Family History   Problem Relation Age of Onset     Depression Mother      Eye Disorder Mother      Gynecology Mother      Alzheimer Disease Mother      CANCER Father      Other Cancer Father      HEART DISEASE Maternal Grandfather      Allergies Paternal Grandfather      Allergies Son      Physical Exam   BP: 142/77  Pulse: 105  Temp: 93  F (33.9  C)  Resp: 18  SpO2: 93 %  Physical Exam Gen. alert cooperative female in mild to moderate distress.  HEENT shows no facial  "swelling or asymmetry.  Her ears are clear bilaterally.  Eyes show no injection or mattering.  Nasal passage are patent without discharge.  Orally no lesion and because is moist.  Speech is clear and concise.  Neck is supple without bruits or stridor.  Lungs reveal hyperresonance on the right lung field where she has a \"bleb\".  Cardiac regular rate without murmur.  Back she has right CVA tenderness to percussion.  Abdomen reveals active bowel sounds.  Palpation does not have localizing tenderness on palpation causes her to feel somewhat nauseated.  There is no organomegaly or masses.  Extremities reveal no swelling, calf or thigh tenderness, and Homans was negative.  Neurologically nonfocal.  Skin exam she does have erythema and warmth on the superior aspect of her breast.  No fluctuance is felt.  She also has a reddened area on her right lateral chest where she had a heating pad may suspect this is a first-degree burn.    ED Course     ED Course    I personally reviewed the patient's EKG.  It shows a normal sinus rhythm.  Rate of 95 bpm.  Nonspecific T-wave flattening.  No acute ST elevation.  Compared to previous EKG the T waves in the anterior leads are more flattened.  Final diagnosis normal sinus EKG  Procedures       After discussion of the patient's chest x-ray showing a complete pneumothorax on the right side where she has her extensive bleb formation.  Discussion for treatment including chest tube for decompression.  Patient and  are in agreement and consent is signed.  Patient has the right-sided breast cancer and quite pendulous breasts.  In addition the tissue in the lateral chest is edematous and thickened.  Decision was made to try an anterior approach in the inferior clavicular region.  The skin was prepped with Betadine and then ChloraPrep.  Marcaine for anesthetic.  #11 blade for a stab incision.  A 8 [place] Cook catheter was advanced without difficulty.  Initially was kinking positioning had " excursion of air.  I was able to suction approximately 120 cc of air.  However there was no further description of air.  Despite attempts with repositioning this persisted.  An x-ray was obtained and showed that the tube was in the large bleb.  Discussed this with the patient and we reattempted the more lateral aspect with the identical prep and approach.  At that point we had excursion and was able to withdraw greater than 250 cc easily.  Heimlich valve was placed.  Repeat x-ray showed the catheter in good location outside the bleb but persistent pneumothorax.  Question if the bleb continues to leak.  I am easily able to remove air with a syringe.  We attempted intermittent low-grade wall suction without success.     Results for orders placed or performed during the hospital encounter of 07/11/17   XR Chest 2 Views     Value    Radiologist flags Pneumothorax (AA)    Narrative    CHEST TWO VIEW   7/11/2017 9:58 PM     HISTORY: Cough with right-sided chest and neck pain.    COMPARISON: 02/03/2017    FINDINGS: There is a large right pneumothorax not present on the prior  exam. There is a huge right apical bleb that was present on the prior  exam. This bleb may have ruptured in the interval resulting in  pneumothorax. Port-A-Cath in place with tip in the SVC. Left lung  clear.      Impression    IMPRESSION: Large right pneumothorax.    [Critical Result: Pneumothorax]    Finding was identified on 7/11/2017 10:03 PM.     Dr. Liz was contacted by me on 7/11/2017 10:05 PM and verbalized  understanding of the critical result.     LETI JAMA MD   Chest  XR, 1 view portable     Value    Radiologist flags Tension pneumothorax (AA)    Narrative    CHEST SINGLE VIEW PORTABLE  7/11/2017 11:32 PM     HISTORY: Chest tube placement.    COMPARISON: 7/11/2017 at 2200 hours.    FINDINGS: A large right pneumothorax with near-complete collapse of  the right lung is again noted. A large bleb is again noted in the  upper right  lung. A right pleural drain is not definitely visualized.  There is slight shift of the mediastinum to the left. The left lung is  clear. Normal-sized cardiac silhouette. Left anterior chest wall port  with catheter entering the left subclavian vein and distal tip in the  superior vena cava again noted.      Impression    IMPRESSION:  1. No convincing interval change since 7/11/2017 at 2200 hours.  2. Large right pneumothorax with near-complete collapse of the right  lung again noted. There is slight shift of the mediastinum to the  left, suggesting tension pneumothorax.  3. The chest tube described in the clinical history is not definitely  visualized on this study.    [Critical Result: Tension pneumothorax]    Finding was identified on 7/11/2017 11:58 PM.     Dr. Liz was contacted by me on 7/12/2017 12:04 AM and verbalized  understanding of the critical result.     NIESHA MAJOR MD   XR Chest Port 1 View    Narrative    CHEST SINGLE VIEW PORTABLE  7/12/2017 12:16 AM     HISTORY: Chest tube placement.    COMPARISON: 7/11/2017 at 2326 hours.    FINDINGS: Large right pneumothorax with near complete collapse of the  right lung again noted, not convincingly changed since the recent  study dated 7/11/2017 at 2326 hours. A large bleb is again noted in  the upper right lung. Interval placement of a right pleural drain with  distal tip projecting over the mid right lung. There is again mild  shift of the mediastinum to the right. The left lung is clear.  Normal-sized cardiac silhouette. Left anterior chest wall chest port  with catheter entering the left subclavian vein and distal tip in the  superior vena cava.      Impression    IMPRESSION:  1. Interval placement of a right pleural drain with distal tip  projecting over the mid right lung.  2. Persistent large right pneumothorax with near complete collapse of  the right lung, unchanged. There is again mild shift of the  mediastinum to the right, suggesting a tension  pneumothorax.       Critical Care time:  none               Labs Ordered and Resulted from Time of ED Arrival Up to the Time of Departure from the ED   CBC WITH PLATELETS DIFFERENTIAL - Abnormal; Notable for the following:        Result Value    RDW 21.2 (*)     All other components within normal limits   COMPREHENSIVE METABOLIC PANEL - Abnormal; Notable for the following:     Glucose 110 (*)     Bilirubin Total 1.9 (*)     All other components within normal limits   URINE MACROSCOPIC WITH REFLEX TO MICRO - Abnormal; Notable for the following:     Leukocyte Esterase Urine Large (*)     WBC Urine >182 (*)     WBC Clumps Present (*)     Bacteria Urine Few (*)     Squamous Epithelial /HPF Urine 2 (*)     Mucous Urine Present (*)     All other components within normal limits   INR   LIPASE   LACTIC ACID WHOLE BLOOD   BLOOD GAS VENOUS   BLOOD CULTURE   BLOOD CULTURE   URINE CULTURE AEROBIC BACTERIAL     IV established and blood work is obtained.  Patient a chest x-ray obtained.  And showed the large right-sided bleb with a complete pneumothorax.  This is probably what the patient experienced earlier with her right-sided upper chest and neck pain.  Patient's blood work was relatively unremarkable except her total bilirubin was 1.9.  Her urine was markedly positive and a culture is added.  She's also had blood cultures obtained.  She is given IV Rocephin.  Assessments & Plan (with Medical Decision Making)   Patient is a 57-year-old female with stage IV breast cancer currently on 2nd course of oral chemotherapy.  Traditional chemotherapy 2 years ago.  She presents with complaints of right sided upper chest and neck pain earlier today that improved with oral Percocet.  However she has a large right-sided bleb that was previously known.  Initial evaluation she had hyperresonant breath sounds on the right.  An x-ray was obtained and showed 100% pneumothorax with the aforementioned bleb.  We attempted to address this with chest  tubes described above.  The 1st tube was unsuccessful and I feel it may have been in the bleb itself.  2nd tube were able to remove air with a syringe quite easily but could not reinflate the lung.  Suspect she has a leak in the bleb.  We've attempted wall suction but this was not successful.  Question if the rubber catheter collapses with that suction.  In addition we worked her up for her fever and did find a urinary tract infection.  Urine culture and blood cultures are obtained.  She was given IV fluids and Rocephin.  The main concern for me this evening is not her urinary tract infection and she is vitally stable and not neutropenic.  She needs further assessment from the pneumothorax standpoint as we cannot reinflate the lung despite repeated attempts.  Suspect the bleb is still leaking.  He'll be transferred by a ALS ambulance to [facility] ER for reassessment and discussion with thoracic surgeon for possible surgical treatment. The patient and her  are aware of this and are in agreement with the plan.  Just prior to transfer the patient had a pulse of 105.  Her blood pressure is 129/77 and her sats were 95%.  Patient was talking in complete sentences.  She denies different chest pain or shortness of breath.  She had a mild nonproductive cough.  I discussed the case with  in the emergency room and patient will be seen on arrival.  I have reviewed the nursing notes.    I have reviewed the findings, diagnosis, plan and need for follow up with the patient.       New Prescriptions    No medications on file       Final diagnoses:   Pneumothorax, right   Urinary tract infection without hematuria, site unspecified       7/11/2017   [facility] EMERGENCY DEPARTMENT     Boyd Liz MD  07/12/17 0102       Boyd Liz MD  07/12/17 0103

## 2017-07-12 NOTE — ED NOTES
family at bedside  MD at bedside R side chest tube decompression in process pt tolerating procedure well

## 2017-07-13 NOTE — PROGRESS NOTES
Thoracic Surgery Progress Note    Subjective:  No acute issues overnight. Lung expanded well yesterday after chest tube placed in ED. Pain controlled. Denies fevers, chills, CP, SOB, and N/V.    Vitals:  /73  Pulse 78  Temp 98.4  F (36.9  C) (Axillary)  Resp 16  Wt 98.4 kg (216 lb 14.9 oz)  SpO2 92%  BMI 36.66 kg/m2  Gen: Awake, alert, NAD , interactive  Resp: non-labored  CT: Good seal. Air leak. To suction.   Abd: Soft, non-distended, NTTP  Ext: warm and well perfused, no edema    A/P: 57 year old female with metastatic breast cancer and chronic large emphysematous bleb who presents with right sided pneumothorax no s/p chest tube. Lung has re-expanded well but patient still has an air leak.    -AM CXR  -Keep CT to waterseal w/ repeat CXR at 1800. If OK, keep to water seal. If increased PTX, please put back on suction.   -Recommend hep lock IVF  -No CPAP or BiPAP for 1 week as lung heals      Seen and discussed with Dr. Griffin, fellow. To be discussed with staff.    Cristino Driscoll MD  PGY-1 Surgery  pager [phone]

## 2017-07-13 NOTE — PLAN OF CARE
Problem: Goal Outcome Summary  Goal: Goal Outcome Summary  AVSS. Pt sating low 90's on room air. Chest tube to -20 H2O. Air leak present. Team aware. Chest x ray done this am, imaging shows decrease in pneumothorax. Pressure dressing to chest tube site c/d/i. Moderate serosang output recorded. Cough becoming less frequent. Pt tolerating regular diet. Ambulating with sba. Voiding spont. Commode available if needed. Port infusing. Scheduled toradol given, along with prn oxycodone x 1. Continue with poc.     Spoke to thoracic surgery. Pt placed to water seal at 1430. Portable chest xray ordered for 1800. May resume suction if pt becomes sob.

## 2017-07-13 NOTE — PROGRESS NOTES
[facility], [place]    Hematology / Oncology Progress Note    Date of Service (when I saw the patient): 07/13/2017  Hospital Day 2     Assessment & Plan   Etta Cervantes is a 57 year old female with metastatic breast cancer (bone/liver) on oral chemotherapy (Xeloda) admitted for UTI with possible pyelonephritis and large right pneumothorax on 7/12.     #Large right pneumothorax:  -Emphysematous bleb seen on imaging, and large right pneumothorax 7/11.  -Thoracic surgery successfully placed right chest tube, which is attached to suction with present airleak, small amount of drainage since placement.    -Repeat CXR 7/13AM showed significant improvement of pneumothorax- now only small. Opacities in RML and RLL, ? atelectasis, hemorrhage, or pulmonary edema. Thoracic surgery may want repeat CT chest.   - Continuous suction for now (per thoracic team). Monitor output.   - HOLD CPAP until 7/22 to allow lungs to heal.     #UTI with possible pyelonephritis:   -dysuria, urinary frequency; history of UTIs.  Last UTI 2/2017 with Klebsiella.   -UA positive for large amt leukocytes, high WBC, and bacteria.    - Micro with >100,000 colonies/ml Gram Negative Rods; awaiting micro/sensitivities  - Continue ceftriaxone; transitioned to 2g daily; This should have good coverage for klebsiella   -UA/UCx, Blood cultures x2 in ED 7/11PM     #Metastatic Breast Cancer (bone/liver):  - Currently under care of Dr. Cassidy; On Xeloda 2500mg QAM and 2000mgQPM, 14 days on, 7 days off. Continue while inpatient.      #Nausea:   - Nausea: She does have nausea after she takes Xeloda. Severe constipation with Zofran. We can trial compazine PO in hospital to see if she tolerates better; also available IV.  Ativan available PRN. Zofran last option given previous constipation. She took this AM without any nausea premedications, or reported nausea.      #Pain:  - chest, back and neck pain, secondary to metastatic breast cancer,  exacerbated by pneumothorax and ([id number] and [id number]) chest tube placement, and possible pyelonephritis. Improving.   -Continue oxycodone 5-10mg PO Q4 hours PRN for pain  -Dilaudid 1mg IV Q4hrs PRN; has not required since 7/12PM.   - Toradol 30mg QID.   -OIC bowel regimen.      #Thrush:  -Coating on tongue, start nystatin QID.   -She also has magic mouth wash available if needed.      #DIAMOND:   -CPAP on HOLD as above due to pneumothorax.      #FEN:   -NS 100ml/hr  -Electrolytes: replete as needed  -Nutrition: As tolerated     #PPx:  -GERD: Deferred at time.  -VTE: Hold anticoagulation at the time s/p chest tube placement w bleeding; PCD ordered; encouraged ambulation. We can reassess during admission.      #Disposition: Anticipate d/c home on 7/14 or 7/15 if pneumothorax continues to improve/resolve, afebrile and micro/sensitivies for UTI.      Interval History   She feels significantly better than yesterday. She denies any fevers, chills or significant malaise. She feels her breathing is significantly better, and she is coughing less.  She finds her pain is overall much better, though she does have discomfort mostly at the CT site. She is able to ambulate. She has not had a BM since Tuesday, and is agreeable to trialing stool softener/laxatives. She denies dysuria, foul odor or urinary frequency. She does not she has a hard time distinguishing between need to urinate vs BM, and states this has been the case for several years.  She denies any nausea or vomiting.     Physical Exam   Temp: 99.4  F (37.4  C) Temp src: Axillary BP: 128/68 Pulse: 78 Heart Rate: 91 Resp: 16 SpO2: 93 % O2 Device: None (Room air) Oxygen Delivery: 1 LPM  Vitals:    07/13/17 0840   Weight: 98.4 kg (216 lb 14.9 oz)     Vital Signs with Ranges  Temp:  [98.4  F (36.9  C)-99.4  F (37.4  C)] 99.4  F (37.4  C)  Pulse:  [78] 78  Heart Rate:  [79-95] 91  Resp:  [16] 16  BP: (110-129)/(46-79) 128/68  SpO2:  [92 %-97 %] 93 %  I/O last 3  completed shifts:  In: 2351.67 [P.O.:1140; I.V.:1211.67]  Out: 1223 [Urine:950; Chest Tube:273]    Constitutional: Pleasant female seen resting comfortably, appears in pain, grimacing. Alert and interactive.   HEENT: NCAT. PERRL, EOMI, anicteric sclera. Oral mucosa pink and moist with no lesions. + thrush.  Hematologic / Lymphatic: No overt bleeding. No cervical or clavicular adenopathy.  Respiratory: Non-labored breathing, good air exchange, lungs clear to auscultation in left lung. Right lung with +lung sounds, +crackles at bases.  No cough or wheeze noted.   Cardiovascular: Regular rate and rhythm. No murmur or rub.   GI: Normoactive bowel sounds. Abdomen soft, non-distended, and non-tender. No palpable masses or organomegaly.  Genitourinary: Deferred.   Skin: Warm and dry, slightly flushed. No concerning lesions or rash on exposed surfaces.  Right chest tube covered, no drainage.   Musculoskeletal: Extremities grossly normal, non-tender, no edema. Up walking in room.    Neurologic: A&O x 3, CNs 2-12 grossly intact, speech normal, gait normal, sensation to light touch grossly WNL. Grossly non-focal.  Neuropsychiatric: Mentation and affect appear normal/appropriate.  Vascular Access: LCW mediport is CDI without erythema, swelling, or discharge.     Medications     - MEDICATION INSTRUCTIONS -       NaCl 100 mL/hr at 07/12/17 1122       fexofenadine  60 mg Oral Daily     senna-docusate  1-2 tablet Oral BID     atorvastatin  10 mg Oral Daily     levothyroxine  25 mcg Oral Daily     fluticasone-vilanterol  1 puff Inhalation Daily     zolpidem  10 mg Oral At Bedtime     nystatin  100,000 Units Oral 4x Daily     capecitabine  2,500 mg Oral QAM     capecitabine  2,000 mg Oral QPM     ketorolac  30 mg Intravenous Q6H     cefTRIAXone  2 g Intravenous Q24H     beclomethasone  2 puff Inhalation BID     heparin lock flush  5-10 mL Intracatheter Q24H     heparin  5 mL Intracatheter Q28 Days       Data   Results for orders  placed or performed during the hospital encounter of 07/12/17 (from the past 24 hour(s))   CBC with platelets   Result Value Ref Range    WBC 7.4 4.0 - 11.0 10e9/L    RBC Count 4.02 3.8 - 5.2 10e12/L    Hemoglobin 12.3 11.7 - 15.7 g/dL    Hematocrit 38.1 35.0 - 47.0 %    MCV 95 78 - 100 fl    MCH 30.6 26.5 - 33.0 pg    MCHC 32.3 31.5 - 36.5 g/dL    RDW 21.9 (H) 10.0 - 15.0 %    Platelet Count 162 150 - 450 10e9/L   Comprehensive metabolic panel   Result Value Ref Range    Sodium 140 133 - 144 mmol/L    Potassium 3.7 3.4 - 5.3 mmol/L    Chloride 108 94 - 109 mmol/L    Carbon Dioxide 26 20 - 32 mmol/L    Anion Gap 6 3 - 14 mmol/L    Glucose 90 70 - 99 mg/dL    Urea Nitrogen 9 7 - 30 mg/dL    Creatinine 0.54 0.52 - 1.04 mg/dL    GFR Estimate >90  Non  GFR Calc   >60 mL/min/1.7m2    GFR Estimate If Black >90   GFR Calc   >60 mL/min/1.7m2    Calcium 8.0 (L) 8.5 - 10.1 mg/dL    Bilirubin Total 1.1 0.2 - 1.3 mg/dL    Albumin 2.8 (L) 3.4 - 5.0 g/dL    Protein Total 6.3 (L) 6.8 - 8.8 g/dL    Alkaline Phosphatase 63 40 - 150 U/L    ALT 14 0 - 50 U/L    AST 17 0 - 45 U/L   Magnesium   Result Value Ref Range    Magnesium 2.1 1.6 - 2.3 mg/dL   Phosphorus   Result Value Ref Range    Phosphorus 2.8 2.5 - 4.5 mg/dL   XR Chest 2 Views    Narrative    XR CHEST 2 VW  7/13/2017 8:49 AM      HISTORY: s/p R chest tube placement. please eval for interval change    COMPARISON: 7/12/2017    FINDINGS: Decrease in size of right apical pneumothorax, now small. No  significant change in right lateral and anterior chest wall  subcutaneous emphysema. Right apical chest tube in place. Left chest  Port-A-Cath tip projects over the mid SVC. No left-sided pneumothorax.  Cardiac silhouette is not enlarged. Upper abdomen is unremarkable. No  acute osseous abnormalities. Airspace opacities of the right middle  and lower lobe.      Impression    IMPRESSION:   1. Decrease in size in right apical pneumothorax, now  small.  Ipsilateral chest tube in place.  2. Opacities of the right middle and lower lobes, which may represent  atelectasis, hemorrhage, or pulmonary edema.  3. No significant change in subcutaneous emphysema of the right  lateral and anterior chest wall, which may be iatrogenic.    I have personally reviewed the examination and initial interpretation  and I agree with the findings.    KAVON MILLS MD

## 2017-07-13 NOTE — PLAN OF CARE
Problem: Goal Outcome Summary  Goal: Goal Outcome Summary  Outcome: No Change  Neuro: A&Ox4.   Cardiac: VSS, afebrile.            Respiratory: CPAP at night, tolerating, Sats >92.     GI/: Voiding spontaneously. No BM this shift.   Diet/appetite: Tolerating regular diet. Denies nausea   Activity: Up with 1 assist    Pain: . Denies. Scheduled toradol administered.   Skin: Intact, no new deficits noted.  Lines: Port infusing NS at 100ml/hr   Drains: Chest tube on R side to -20mmhg suction, air leak present-MD aware. Lung sounds diminished   Replacements: None      Pt has been resting comfortably throughout night, will continue to monitor and follow plan of care.

## 2017-07-13 NOTE — PLAN OF CARE
Problem: Respiratory Insufficiency (Adult)  Intervention: Provide Oxygenation/Ventilation/Perfusion Support    07/12/17 2241   Activity   Activity Type activity adjusted per tolerance;ambulated to bathroom   Positioning   Head of Bed (HOB) HOB at 30-45 degrees   Respiratory Interventions   Airway/Ventilation Management airway patency maintained;calming measures promoted;pulmonary hygiene promoted       Intervention: Optimize/Manage Energy Expenditure    07/12/17 2241   Coping/Psychosocial Interventions   Environmental Support calm environment promoted;personal routine supported;rest periods encouraged   Nutrition Interventions   Oral Nutrition Promotion calorie dense foods provided;physical activity promoted;rest periods promoted   Pain/Comfort/Sleep Interventions   Sleep/Rest Enhancement awakenings minimized;consistent schedule promoted;family presence promoted;noise level reduced;regular sleep/rest pattern promoted;relaxation techniques promoted         AVSS. Weaned to room air this afternoon from 2L, tolerating well. Pt instructed on use of IS and has been using as directed. Pt on home CPAP overnight, sats 92-93% with no additional O2. If additional O2 is provided she will need to apply nasal cannula underneath the CPAP. Chest tube to right lateral chest to -20cm wall suction. 114cc of serosanguinous output, airleak is present (treating pneumothorax). Pt is also getting rocephin for UTI. Portacath to L chest with NS infusing at 100 ml/hr. SBA to bathroom, calls in appropriately with needs. Given oxycodone for R chest pain x1 with good relief. Pt is also using aqua-K pad.      Goal: Identify Related Risk Factors and Signs and Symptoms  Related risk factors and signs and symptoms are identified upon initiation of Human Response Clinical Practice Guideline (CPG)  Outcome: Improving    07/12/17 2241   Respiratory Insufficiency   Related Risk Factors (Respiratory Insufficiency) activity intolerance;knowledge  deficit;obesity;physiological factors;sleep disturbance   Signs and Symptoms (Respiratory Insufficiency) cough (productive/ineffective);decreased oxygen saturation       Goal: Acid/Base Balance  Patient will demonstrate the desired outcomes by discharge/transition of care.  Outcome: Improving    07/12/17 9082   Respiratory Insufficiency (Adult)   Acid/Base Balance making progress toward outcome

## 2017-07-14 NOTE — PLAN OF CARE
Problem: Goal Outcome Summary  Goal: Goal Outcome Summary  Outcome: No Change  Neuro: A&Ox4.   Cardiac: VSS, afebrile.            Respiratory: 2L at night Sats >92.  Pt is not to use CPAP for 1 week.   GI/: Voiding spontaneously. No BM this shift.   Diet/appetite: Tolerating regular diet. Denies nausea   Activity: Up with 1 assist    Pain: . Treated with oxycodone x1   Skin: Intact, no new deficits noted.  Lines: Port infusing NS at 100ml/hr   Drains: Chest tube on R side to water seal, air leak present-MD aware. Lung sounds diminished. Output has increased overnight- 240cc serosang  Replacements: None       Pt has been resting comfortably throughout night, will continue to monitor and follow plan of care

## 2017-07-14 NOTE — PROGRESS NOTES
Thoracic Surgery Progress Note    Subjective:  No acute issues overnight. Lung continues to be well expanded after placing to waterseal. Pain controlled. Denies fevers, chills, CP, SOB, and N/V.    Vitals:  /63  Pulse 100  Temp 98.9  F (37.2  C) (Axillary)  Resp 16  Wt 98.4 kg (216 lb 14.9 oz)  SpO2 98%  BMI 36.66 kg/m2  Gen: Awake, alert, NAD , interactive  Resp: non-labored  CT: Good seal. Very small air leak  Abd: Soft, non-distended, NTTP  Ext: warm and well perfused, no edema    A/P: 57 year old female with metastatic breast cancer and chronic large emphysematous bleb who presents with right sided pneumothorax now s/p chest tube. Lung has re-expanded well from initial, but still has decent pneumothorax.     -AM CXR revealed slightly larger ptx  -Keep Chest tube to water seal for now  -Recommend hep lock IVF  -No CPAP or BiPAP for 1 week as lung heals      Seen and discussed with Dr. Griffin, fellow. To be discussed with staff.    Cristino Driscoll MD  PGY-1 Surgery  pager [phone]

## 2017-07-14 NOTE — PLAN OF CARE
Problem: Goal Outcome Summary  Goal: Goal Outcome Summary  Outcome: Therapy, progress toward functional goals is gradual  Lungs sound very diminished on the right side, clear on the left. Denies SOB/RODRIGUEZ. O2 sat 92-98 % room air. Chest tube intact, site C/D. 90 cc serosanguinous drng this shift. Pt is hoping to have CT removed but CXR showed increased pneumothorax. No orders have been written for suction as of yet. Stand by assist up to commode. Constipated, no stool since July 11. Tash lax  & senna given. Appetite good. VSS. Refused levaquin as this drug makes her have bad dreams and rash.

## 2017-07-14 NOTE — PLAN OF CARE
Problem: Respiratory Insufficiency (Adult)  Goal: Identify Related Risk Factors and Signs and Symptoms  Related risk factors and signs and symptoms are identified upon initiation of Human Response Clinical Practice Guideline (CPG)   Outcome: Improving    07/13/17 4006   Respiratory Insufficiency   Related Risk Factors (Respiratory Insufficiency) obesity;pain;physiological factors   Signs and Symptoms (Respiratory Insufficiency) abnormal breath sounds;chest pain;cough (productive/ineffective);sleeplessness/fatigue      AVSS on room air. O2 sats in mid to high 90s on room air. Pt is not to use CPAP again for a week, her  took CPAP machine home. Breath sounds diminished to bases. Pt has pain to chest tube site, given oxy x2 and scheduled toradol. 40cc out of chest tube, chest tube to water seal, air leak continues. CXR this evening was improved since this morning, repeat ordered tomorrow morning, will remain on water seal overnight. Chest tube dressing changed this evening, multiple layers of gauze were saturated with serosanguinous drainage. Pt complained of dysuria, on rocephin for UTI. NS infusing at 100 ml/hr into portacath. Regular diet, good appetite. Pt complains of fatigue, rest promoted. Given PO chemo this evening.    Pt down to 85% when sleeping, 2L NC applied.

## 2017-07-14 NOTE — PROGRESS NOTES
[facility], [place]    Hematology / Oncology Progress Note    Date of Service (when I saw the patient): 07/14/2017  Hospital Day 2     Assessment & Plan   Etta Cervantes is a 57 year old female with metastatic breast cancer (bone/liver) on oral chemotherapy (Xeloda) admitted for UTI with possible pyelonephritis and large right pneumothorax on 7/12.     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  -Thoracic surgery successfully placed right chest tube.  - Tube has had water seal since 7/13 at 1400. Follow up CXR 7/13PM show significant improvement.  Slight increase in drainage output, now over 700cc total drained since placement.  7/14AM CXR show enlarging right apical pneumothorax. We will therefore maintain chest tube to water seal currently.  We will re-image 7/15AM.    - HOLD CPAP until 7/22 to allow lungs to heal.     #UTI with possible pyelonephritis:   -dysuria, urinary frequency; history of UTIs.  Last UTI 2/2017 with Klebsiella.   -UA positive for large amt leukocytes, high WBC, and bacteria.    - Micro with >100,000 colonies/ml Gram Negative Rods; Micro and sensitivities indicate it is E.Coli.    - We will transition from ceftriaxone 2g daily to Bactrim DS BID.  This should have good coverage for klebsiella   -UA/UCx, Blood cultures x2 in ED 7/11PM  - Repeat UA with reflex given dysuria overnight; if negative, continue abx plan.       #Metastatic Breast Cancer (bone/liver):  - Currently under care of Dr. Cassidy; On Xeloda 2500mg QAM and 2000mgQPM, 14 days on, 7 days off. Continue while inpatient. Last dose 7/16PM.     #Nausea:   - Nausea: She does have nausea after she takes Xeloda. Severe constipation with Zofran. We can trial compazine PO in hospital to see if she tolerates better; also available IV.  Ativan available PRN. Zofran last option given previous constipation. She took this AM without any nausea  premedications, or reported nausea.      #Pain:  - chest, back and neck pain, secondary to metastatic breast cancer, exacerbated by pneumothorax and ([id number] and [id number]) chest tube placement, and possible pyelonephritis. Improving.   -Continue oxycodone 5-10mg PO Q4 hours PRN for pain  -Dilaudid 1mg IV Q4hrs PRN; has not required since 7/12PM.   - Toradol 30mg QID.   -OIC bowel regimen.      #Thrush:  -Coating on tongue, start nystatin QID.   -She also has magic mouth wash available if needed.      #DIAMOND:   -CPAP on HOLD as above due to pneumothorax.      #FEN:   -NS 100ml/hr  -Electrolytes: replete as needed  -Nutrition: As tolerated     #PPx:  -GERD: Deferred at time.  -VTE: Hold anticoagulation at the time s/p chest tube placement w bleeding; PCD ordered; encouraged ambulation. We can reassess during admission.      #Disposition: Anticipate d/c home on 7/14 or 7/15 if pneumothorax continues to improve/resolve, afebrile and micro/sensitivies for UTI.      Interval History   She continues to feel well, with no SOB, cough. She does have pain, mostly at the CT site. She is able to ambulate. She has not had a BM since Tuesday, she will trial miralax now. She did have dysuria last night, but that has since resolved this AM. She denies any nausea or vomiting.She does feel the right breast has been softening up and the tumor is improved since starting this second cycle of chemotherapy.  Otherwise denies fevers, chills, malaise,headaches, vision changes, nasal congestion, sore throat, shortness of breath, cough, swelling, abdominal pain, nausea, vomiting, diarrhea, or dysuria.     Physical Exam   Temp: 97.7  F (36.5  C) Temp src: Axillary BP: 129/57 Pulse: 64 Heart Rate: 68 Resp: 16 SpO2: 98 % O2 Device: Nasal cannula Oxygen Delivery: 2 LPM  Vitals:    07/13/17 0840 07/14/17 0714   Weight: 98.4 kg (216 lb 14.9 oz) 91.6 kg (202 lb)     Vital Signs with Ranges  Temp:  [97.7  F (36.5  C)-99.8  F (37.7  C)] 97.7  F  (36.5  C)  Pulse:  [] 64  Heart Rate:  [68-98] 68  Resp:  [16] 16  BP: (110-143)/(57-68) 129/57  SpO2:  [85 %-98 %] 98 %  I/O last 3 completed shifts:  In: 3758.33 [P.O.:1260; I.V.:2498.33]  Out: 2475 [Urine:2050; Chest Tube:425]    Constitutional: Pleasant female seen resting comfortably, appears in pain, grimacing. Alert and interactive.   HEENT: NCAT. PERRL, EOMI, anicteric sclera. Oral mucosa pink and moist with no lesions. + thrush.  Hematologic / Lymphatic: No overt bleeding. No cervical or clavicular adenopathy.  Respiratory: Non-labored breathing, good air exchange, lungs clear to auscultation in left lung. Right lung with +lung sounds, +crackles at bases.  No cough or wheeze noted.   Cardiovascular: Regular rate and rhythm. No murmur or rub.   GI: Normoactive bowel sounds. Abdomen soft, non-distended, and non-tender. No palpable masses or organomegaly.  Genitourinary: Deferred.   Skin: Warm and dry, slightly flushed. No concerning lesions or rash on exposed surfaces.  Right chest tube covered, no drainage.   Musculoskeletal: Extremities grossly normal, non-tender, no edema. Up walking in room.    Neurologic: A&O x 3, CNs 2-12 grossly intact, speech normal, gait normal, sensation to light touch grossly WNL. Grossly non-focal.  Neuropsychiatric: Mentation and affect appear normal/appropriate.  Vascular Access: LCW mediport is CDI without erythema, swelling, or discharge.     Medications     - MEDICATION INSTRUCTIONS -       NaCl 100 mL/hr at 07/14/17 0350       fexofenadine  60 mg Oral Daily     senna-docusate  1-2 tablet Oral BID     atorvastatin  10 mg Oral Daily     levothyroxine  25 mcg Oral Daily     fluticasone-vilanterol  1 puff Inhalation Daily     zolpidem  10 mg Oral At Bedtime     nystatin  100,000 Units Oral 4x Daily     capecitabine  2,500 mg Oral QAM     capecitabine  2,000 mg Oral QPM     ketorolac  30 mg Intravenous Q6H     cefTRIAXone  2 g Intravenous Q24H     beclomethasone  2 puff  Inhalation BID     heparin lock flush  5-10 mL Intracatheter Q24H     heparin  5 mL Intracatheter Q28 Days     Data  Results for orders placed or performed during the hospital encounter of 07/12/17 (from the past 24 hour(s))   XR Chest Port 1 View    Narrative    History: Interval change.    COMPARISON: Two-view chest performed today at 0817 hours.    FINDINGS: Right-sided chest tube again noted with left-sided  subclavian central venous catheter with subcutaneous port also again  noted. The tip of the catheter located over the azygos vein. There  appears to have been significant improvement in the right upper lobe  pneumothorax. It appears that at least one of the pleural lines is  located under the third posterior rib versus under the fourth  posterior rib. Subjacent emphysema about the right lateral chest wall  extending to the upper abdomen and the base of the right neck is  improved. Improvement in ill-defined opacity about the right  infrahilar region may reflect pulmonary hemorrhage but is nonspecific.  No pneumothorax, focal infiltrate or pleural effusion on the left. No  pleural effusion on the right. The cardiac silhouette and pulmonary  vasculature are unchanged within normal limits.      Impression    IMPRESSION: Poorly defined improvement in right-sided pneumothorax  with right chest tube in place. There is also been improvement in  right subcutaneous emphysema.    VENESSA RODRIUGES MD   XR Chest 2 Views   Result Value Ref Range    Radiologist flags Enlarging right apical pneumothorax (Urgent)     Narrative    XR CHEST 2 VW  7/14/2017 9:32 AM      HISTORY: s/p R chest tube placement for spontaneous pneumo w/ blebs.  Placed to water seal last night. Please eval for interval changes of  pneumo    COMPARISON: 7/13/2017    FINDINGS: Enlarging right apical pneumothorax. Right apical chest  tube. Today's emphysema of the right lateral chest wall. Left  Port-A-Cath tip projects over the high SVC. Cardiac  silhouette is not  enlarged. No left-sided pneumothorax. No pleural effusion. Right  middle and lower lobe mixed opacities. No significant change.  Degenerative changes of the thoracic spine, including anterior  osteophytes. No acute osseous      Impression    IMPRESSION:   1. Enlarging right apical pneumothorax with ipsilateral chest tube in  place.  2. No significant change in right middle and lower lobe mixed  pulmonary opacities, which may represent atelectasis, pneumonia, or  hemorrhage.    [Urgent Result: Enlarging right apical pneumothorax]    Finding was identified on 7/14/2017 9:43 AM.     Dr. Cristino Driscoll was contacted by Dr. Blackwell at 7/14/2017 9:45 AM  and verbalized understanding of the urgent finding.     I have personally reviewed the examination and initial interpretation  and I agree with the findings.    KAVON MILLS MD   UA reflex to Microscopic and Culture   Result Value Ref Range    Color Urine Yellow     Appearance Urine Clear     Glucose Urine Negative NEG mg/dL    Bilirubin Urine Negative NEG    Ketones Urine Negative NEG mg/dL    Specific Gravity Urine 1.008 1.003 - 1.035    Blood Urine Negative NEG    pH Urine 5.5 5.0 - 7.0 pH    Protein Albumin Urine Negative NEG mg/dL    Urobilinogen mg/dL Normal 0.0 - 2.0 mg/dL    Nitrite Urine Negative NEG    Leukocyte Esterase Urine Trace (A) NEG    Source Midstream Urine     RBC Urine <1 0 - 2 /HPF    WBC Urine 6 (H) 0 - 2 /HPF    Squamous Epithelial /HPF Urine 1 0 - 1 /HPF    Transitional Epi <1 0 - 1 /HPF

## 2017-07-14 NOTE — PLAN OF CARE
Problem: Goal Outcome Summary  Goal: Goal Outcome Summary  s-pt CT returns yellow at start of shift...serousang at 1700 ...this occurred while pt still recumbant in bed. Pt up in rock for walk around the unit.. Slow steady pace with not SOB noted. Able to converse and push iv pole. Pt able to endure entire walk steadily. Up in chiar after for meal. Up to BR with results of laxative... Med amt soft form BM. Ate 3/4 of meal --salad- cantaloupe-beverage.  in room and supportive. Lungs unchanged--clear left--dim right.   b-continued monitoring of pneumothorax need  A-pt's cxr indicated increase of pneumothorax.  r-continue per md order on water-seal... Monitor respiratory status. Encourage activity and IS use cautiously-    Pt up walking to BR hourly post laxatives.

## 2017-07-14 NOTE — TELEPHONE ENCOUNTER
Reason for Call:  Other call back    Detailed comments: Etta LEFT MESSAGE:  She is currently in hospital due to a collapsed lung and kidney infection.  Antibiotic that they have her on is not working and want to switch her antibiotic.  She states that a few years back she was treated for something here and she had a reaction to a medication and the hospital needs to know what that is.  Could you please call and assess. Thank you..Sandra Parekh    Phone Number Patient can be reached at: Cell number on file:    Telephone Information:   Mobile [phone]       Best Time: anytime    Can we leave a detailed message on this number? did not specify in message    Call taken on 7/14/2017 at 2:02 PM by Sandra Parekh

## 2017-07-15 NOTE — PLAN OF CARE
Problem: Goal Outcome Summary  Goal: Goal Outcome Summary  Outcome: Therapy, progress towards functional goals is fair  Ambulated to the bathroom and stood at the sink to get cleaned up. Otherwise has been resting in bed most of the day. Company visiting this afternoon. Appetite good. Mild soreness from CTube relieved with oxy 5. Chest tube to waterseal drng 160 cc/shift. Return yellowish this morning, tan this afternoon. CXR done showing stable pneumothorax. O2 sats 92-96% room air. CT drsg C/D/I. VSS. Lesion on upper palate unchanged.

## 2017-07-15 NOTE — PROGRESS NOTES
[facility], [place]    Hematology / Oncology Progress Note    Date of Service (when I saw the patient): 07/15/2017  Hospital Day 2     Assessment & Plan   Etta Cervantes is a 57 year old female with metastatic breast cancer (bone/liver) on oral chemotherapy (Xeloda) admitted for UTI with possible pyelonephritis and large right pneumothorax.    Plan today:  -CXR this AM. Results will dictate chest tube next steps.    -Continue to treat UTI/pyelo with bactrim.   -Pain control with oxycodone prn.     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  -Thoracic surgery successfully placed right chest tube on admission.  -Tube has had water seal since 7/13 at 1400. Follow up CXR 7/13PM show significant improvement. 7/14AM CXR showed enlarging right apical pneumothorax so kept at water seal. 7/15AM CXR demonstrates stable ptx - ?keep to water seal vs trial clamping. Will touch base with T Surgery.   -HOLD CPAP until 7/22 to allow lungs to heal.     #UTI with possible pyelonephritis:   -dysuria, urinary frequency; history of UTIs.  Last UTI 2/2017 with Klebsiella.   -UA positive for large amt leukocytes, high WBC, and bacteria.    - Micro with >100,000 colonies/ml Gram Negative Rods; Micro and sensitivities indicate it is E.Coli.    -We will transition from ceftriaxone 2g daily to Bactrim DS BID.    -UA/UCx, Blood cultures x2 in ED 7/11PM     #Metastatic Breast Cancer (bone/liver):  -Currently under care of Dr. Cassidy; On Xeloda 2500mg QAM and 2000mgQPM, 14 days on, 7 days off. Continue while inpatient. Last dose 7/16PM then will be on week break.      #Nausea:    Nausea: She does have nausea after she takes Xeloda. Severe constipation with Zofran. We can trial compazine PO in hospital to see if she tolerates better; also available IV.  Ativan available PRN. Zofran last option given previous constipation. She took this AM without any nausea  premedications, or reported nausea.      #Pain:  -Chest, back and neck pain, secondary to metastatic breast cancer, exacerbated by pneumothorax and ([id number] and [id number]) chest tube placement, and possible pyelonephritis. Improving.   -Continue oxycodone 5-10mg PO Q4 hours PRN for pain. This is controlling pain so will d/c toradol and IV dilaudid.   -OIC bowel regimen.      #Thrush:  -Coating on tongue, continue nystatin rinses.   -She also has magic mouth wash available if needed.      #DIAMOND:   -CPAP on HOLD as above due to pneumothorax.      #FEN:   -Encourage oral hydration  -Electrolytes: replete as needed  -Nutrition: As tolerated     #PPx:  -VTE: mechanical      #Disposition: Anticipate d/c home on 7/16 if pneumothorax continues to improve/resolve and chest tube is removed.      Interval History   Etta is doing ok. She went for a walk yesterday and tolerated well with no increased SOB. She continues to have pain at chest tube insertion site that is controlled with oxycodone if she takes q4 hours. She had good BM last night with mag citrate. Eating and drinking adequately. Dysuria improved. Denies fever, sob, abd pain, n/v. Anxious to have chest tube removed.      Physical Exam   Temp: 97.3  F (36.3  C) Temp src: Axillary BP: 116/59   Heart Rate: 69 Resp: 18 SpO2: 95 % O2 Device: None (Room air) Oxygen Delivery: 2 LPM  Vitals:    07/13/17 0840 07/14/17 0714 07/15/17 0735   Weight: 98.4 kg (216 lb 14.9 oz) 91.6 kg (202 lb) 92 kg (202 lb 13.2 oz)     Vital Signs with Ranges  Temp:  [97  F (36.1  C)-98.9  F (37.2  C)] 97.3  F (36.3  C)  Heart Rate:  [68-84] 69  Resp:  [16-20] 18  BP: (111-134)/(55-75) 116/59  SpO2:  [94 %-99 %] 95 %  I/O last 3 completed shifts:  In: 4720 [P.O.:2320; I.V.:2400]  Out: 2660 [Urine:2200; Stool:200; Chest Tube:260]    Constitutional: Pleasant woman seen resting in bed in NAD. Alert and interactive.   HEENT: NCAT. Oral mucosa pink and moist with no lesions. + thrush-  improving.  Respiratory: Non-labored breathing on RA, good air exchange, lungs clear to auscultation in left lung. Right lung with +lung sounds, diminished with +crackles at base. No cough or wheeze noted.   Cardiovascular: Regular rate and rhythm. No murmur or rub.   GI: Normoactive bowel sounds. Abdomen soft, non-distended, and non-tender.  Skin: Warm and dry. No concerning lesions or rash on exposed surfaces. Right chest tube covered, no drainage.   Musculoskeletal: Extremities grossly normal, non-tender, no edema.   Neurologic: A&O, speech normal, no focal deficits.   Neuropsychiatric: Mentation and affect appear normal/appropriate.  Vascular Access: PAC is CDI.    Medications   Current Facility-Administered Medications   Medication     carboxymethylcellulose (REFRESH PLUS) 0.5 % ophthalmic solution 2 drop     nystatin (MYCOSTATIN) suspension 500,000 Units     capecitabine (XELODA) tablet CHEMO 2,500 mg     capecitabine (XELODA) tablet CHEMO 2,000 mg     sulfamethoxazole-trimethoprim (BACTRIM DS/SEPTRA DS) 800-160 MG per tablet 1 tablet     magnesium hydroxide (MILK OF MAGNESIA) suspension 30 mL     ketorolac (TORADOL) injection 30 mg     fexofenadine (ALLEGRA) tablet 60 mg     Medication Instruction     prochlorperazine (COMPAZINE) tablet 5-10 mg     ondansetron (ZOFRAN-ODT) ODT tab 8 mg     acetaminophen (TYLENOL) tablet 650 mg     guaiFENesin-dextromethorphan (ROBITUSSIN DM) 100-10 MG/5ML syrup 5 mL     senna-docusate (SENOKOT-S;PERICOLACE) 8.6-50 MG per tablet 1-2 tablet     polyethylene glycol (MIRALAX/GLYCOLAX) Packet 17 g     albuterol neb solution 2.5 mg     albuterol (PROAIR HFA/PROVENTIL HFA/VENTOLIN HFA) Inhaler 2 puff     atorvastatin (LIPITOR) tablet 10 mg     azelastine (ASTELIN) 0.1 % spray 1-2 spray     levothyroxine (SYNTHROID/LEVOTHROID) tablet 25 mcg     LORazepam (ATIVAN) tablet 0.5 mg     magic mouthwash suspension (diphenhydrAMINE, lidocaine, aluminum-magnesium & simethicone)      fluticasone-vilanterol (BREO ELLIPTA) 200-25 MCG/INH oral inhaler 1 puff     oxyCODONE (ROXICODONE) IR tablet 5-10 mg     zolpidem (AMBIEN) tablet 10 mg     naloxone (NARCAN) injection 0.1-0.4 mg     beclomethasone (QVAR) 80 MCG/ACT Inhaler 2 puff     lidocaine 1 % 1 mL     lidocaine (LMX4) kit     sodium chloride (PF) 0.9% PF flush 10-20 mL     heparin lock flush 10 UNIT/ML injection 5-10 mL     heparin lock flush 10 UNIT/ML injection 5-10 mL     heparin 100 UNIT/ML injection 5 mL     sodium chloride (PF) 0.9% PF flush 10-20 mL       Data  Results for orders placed or performed during the hospital encounter of 07/12/17 (from the past 24 hour(s))   XR Chest 2 Views    Narrative    XR CHEST 2 VW  7/15/2017 10:33 AM      HISTORY: s/p R chest tube placement for pneumothorax w/ blebs.     COMPARISON: 7/14/2017    FINDINGS: Table small right apical pneumothorax. Right apical chest  tube. No significant change regarding the subcutaneous emphysema of  the right lateral chest wall. Left Port-A-Cath tip projects over the  high SVC. Cardiac silhouette is not enlarged. No left-sided  pneumothorax. No pleural effusion. Right middle and lower lobe mixed  opacities, stable.  Degenerative changes in the spine.      Impression    IMPRESSION:   1. Stable right apical pneumothorax. Right apical chest tube in place.  2. No significant change in right middle and lower lobe opacities.    FAVIAN COBURN MD

## 2017-07-15 NOTE — PROGRESS NOTES
Thoracic Surgery Progress Note    Subjective:  No acute issues overnight. Lung continues to be well expanded after placing to waterseal. Pain controlled. Denies fevers, chills, CP, SOB, and N/V.    Vitals:  /60 (BP Location: Right arm)  Pulse 64  Temp 98.2  F (36.8  C) (Axillary)  Resp 20  Wt 91.6 kg (202 lb)  SpO2 97%  BMI 34.14 kg/m2  Gen: Awake, alert, NAD , interactive  Resp: non-labored  CT: Good seal. Very small air leak  Abd: Soft, non-distended, NTTP  Ext: warm and well perfused, no edema    A/P: 57 year old female with metastatic breast cancer and chronic large emphysematous bleb who presents with right sided pneumothorax now s/p chest tube. Lung has re-expanded well from initial, but still has decent pneumothorax.     -AM CXR, if OK will keep chest tube to waterseal   -if increased ptx, will put to suction.  -Recommend hep lock IVF  -No CPAP or BiPAP for 1 week as lung heals      Seen and discussed with Dr. Griffin, fellow. To be discussed with staff.    Cristino Driscoll MD  PGY-1 Surgery  pager [phone]

## 2017-07-15 NOTE — PLAN OF CARE
Problem: Goal Outcome Summary  Goal: Goal Outcome Summary  Outcome: No Change  Patient using supplemental oxygen per nasal cannula at 2 L instead of usual cpap machine due to pneumothorax. Continuous oximetry monitoring. Occasional cough. Patient has chest tube right side, to water seal. Serosang drainage from chest tube. Had 130 ml CT output overnight. Having right sided chest pain. Using Oxycodone and Toradol for pain management. Up to commode at bedside, with assist of one. Voiding yellow urine, less discomfort with voiding. Patient responded to laxatives given yesterday, but no stool overnight. Patient complained of dry eyes. New order for Refresh Plus eye drops, which were given. Afebrile, vitals stable. Maintenance IV fluid at 100 ml/hour.     Problem: Respiratory Insufficiency (Adult)  Goal: Identify Related Risk Factors and Signs and Symptoms  Related risk factors and signs and symptoms are identified upon initiation of Human Response Clinical Practice Guideline (CPG)   Outcome: No Change    07/15/17 0712   Respiratory Insufficiency   Related Risk Factors (Respiratory Insufficiency) pain   Signs and Symptoms (Respiratory Insufficiency) abnormal breath sounds;dyspnea;shortness of breath

## 2017-07-16 NOTE — PROGRESS NOTES
[facility], [place]    Hematology / Oncology Progress Note    Date of Service (when I saw the patient): 07/16/2017  Hospital Day 2     Assessment & Plan   Etta Cervantes is a 57 year old female with metastatic breast cancer (bone/liver) on oral chemotherapy (Xeloda) admitted for UTI with possible pyelonephritis and large right pneumothorax.    Plan today:  -CXR this AM. If stable/improved, will clamp chest tube for 24 hours. If worse, will keep to water seal. Repeat CXR tmrw AM.   -Continue to treat UTI/pyelo with bactrim.   -Pain control with oxycodone prn.     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  -Thoracic surgery successfully placed right chest tube on admission.  -Tube has had water seal since 7/13 at 1400. Follow up CXR 7/13PM showed significant improvement. 7/14AM CXR showed enlarging ptx. 7/15AM CXR showed stable ptx. 7/16AM CXR pending.  -HOLD CPAP until 7/22 to allow lungs to heal.     #UTI with possible pyelonephritis:   Dysuria, urinary frequency; history of UTIs (h/o klebsiella).  -UA positive for large amt leukocytes, high WBC, and bacteria.    -Micro with >100,000 colonies/ml E.Coli.    -We will transition from ceftriaxone 2g daily to Bactrim DS BID.    -Blood cultures x2 in ED 7/11PM neg to date.     #Metastatic Breast Cancer (bone/liver):  -Currently under care of Dr. Cassidy; On Xeloda 2500mg QAM and 2000mgQPM, 14 days on, 7 days off. Continue while inpatient. Day 14 is 7/16PM then will be on week break.      #Nausea:   She does have nausea after she takes Xeloda, less during this admission than at home. Severe constipation with Zofran. We can trial compazine PO in hospital to see if she tolerates better; also available IV.  Ativan available PRN. Zofran last option given previous constipation. She took this AM without any nausea premedications, or reported nausea.      #Pain:  Chest, back and neck pain,  secondary to metastatic breast cancer, exacerbated by pneumothorax and ([id number] and [id number]) chest tube placement, and possible pyelonephritis. Improving.   -Continue oxycodone 5-10mg PO Q4 hours PRN for pain. This is controlling pain so will d/c toradol and IV dilaudid.   -OIC bowel regimen.      #Thrush:  -Coating on tongue, continue nystatin rinses.   -She also has magic mouth wash available if needed.      #DIAMOND:   -CPAP on HOLD as above due to pneumothorax.      #FEN:   -Encourage oral hydration  -Electrolytes: replete as needed  -Nutrition: diet as tolerated     #PPx:  -VTE: mechanical      #Disposition: Anticipate d/c home on 7/17 if pneumothorax continues to improve and chest tube is removed.      Interval History   Etta is doing ok. She is tolerating OOB activity without increased chest pain or SOB. Good BM 2 days ago, no BM yesterday. Will let us know if she needs more laxatives. Continues to have pain at chest tube insertion site that is controlled with oxycodone if she takes q4 hours. Denies fever, sob, abd pain, n/v.     Physical Exam   Temp: 97.5  F (36.4  C) Temp src: Axillary BP: 136/71   Heart Rate: 67 Resp: 18 SpO2: 95 % O2 Device: None (Room air) Oxygen Delivery: 2 LPM  Vitals:    07/14/17 0714 07/15/17 0735 07/16/17 0726   Weight: 91.6 kg (202 lb) 92 kg (202 lb 13.2 oz) 91.2 kg (201 lb 1.6 oz)     Vital Signs with Ranges  Temp:  [97.3  F (36.3  C)-100.2  F (37.9  C)] 97.5  F (36.4  C)  Heart Rate:  [67-80] 67  Resp:  [18] 18  BP: (116-136)/(56-71) 136/71  SpO2:  [91 %-97 %] 95 %  I/O last 3 completed shifts:  In: 1640 [P.O.:940; I.V.:700]  Out: 2860 [Urine:2500; Chest Tube:360]    Constitutional: Pleasant woman seen resting in bed in NAD. Alert and interactive.   HEENT: NCAT. Oral mucosa pink and moist, thrush improving.   Respiratory: Non-labored breathing on RA, good air exchange, left lung clear to auscultation. Right lung with +lung sounds, diminished with +crackles at base but o/w  better air movement today. No cough or wheeze noted.   Cardiovascular: Regular rate and rhythm. No murmur or rub.   GI: NABS. Abd soft, ntnd.   Skin: Warm and dry. No concerning lesions or rash on exposed surfaces. Right chest tube covered.  Musculoskeletal: Extremities grossly normal, non-tender, no edema.   Neurologic: A&O, speech normal, no focal deficits.   Neuropsychiatric: Mentation and affect appear normal/appropriate.  Vascular Access: PAC is CDI.    Medications   Current Facility-Administered Medications   Medication     magnesium citrate solution 296 mL     carboxymethylcellulose (REFRESH PLUS) 0.5 % ophthalmic solution 2 drop     nystatin (MYCOSTATIN) suspension 500,000 Units     capecitabine (XELODA) tablet CHEMO 2,000 mg     sulfamethoxazole-trimethoprim (BACTRIM DS/SEPTRA DS) 800-160 MG per tablet 1 tablet     magnesium hydroxide (MILK OF MAGNESIA) suspension 30 mL     ketorolac (TORADOL) injection 30 mg     fexofenadine (ALLEGRA) tablet 60 mg     Medication Instruction     prochlorperazine (COMPAZINE) tablet 5-10 mg     ondansetron (ZOFRAN-ODT) ODT tab 8 mg     acetaminophen (TYLENOL) tablet 650 mg     guaiFENesin-dextromethorphan (ROBITUSSIN DM) 100-10 MG/5ML syrup 5 mL     senna-docusate (SENOKOT-S;PERICOLACE) 8.6-50 MG per tablet 1-2 tablet     polyethylene glycol (MIRALAX/GLYCOLAX) Packet 17 g     albuterol neb solution 2.5 mg     albuterol (PROAIR HFA/PROVENTIL HFA/VENTOLIN HFA) Inhaler 2 puff     atorvastatin (LIPITOR) tablet 10 mg     azelastine (ASTELIN) 0.1 % spray 1-2 spray     levothyroxine (SYNTHROID/LEVOTHROID) tablet 25 mcg     LORazepam (ATIVAN) tablet 0.5 mg     magic mouthwash suspension (diphenhydrAMINE, lidocaine, aluminum-magnesium & simethicone)     fluticasone-vilanterol (BREO ELLIPTA) 200-25 MCG/INH oral inhaler 1 puff     oxyCODONE (ROXICODONE) IR tablet 5-10 mg     zolpidem (AMBIEN) tablet 10 mg     naloxone (NARCAN) injection 0.1-0.4 mg     beclomethasone (QVAR) 80 MCG/ACT  Inhaler 2 puff     lidocaine 1 % 1 mL     lidocaine (LMX4) kit     sodium chloride (PF) 0.9% PF flush 10-20 mL     heparin lock flush 10 UNIT/ML injection 5-10 mL     heparin lock flush 10 UNIT/ML injection 5-10 mL     heparin 100 UNIT/ML injection 5 mL     sodium chloride (PF) 0.9% PF flush 10-20 mL       Data  Results for orders placed or performed during the hospital encounter of 07/12/17 (from the past 24 hour(s))   XR Chest 2 Views    Narrative    XR CHEST 2 VW  7/15/2017 10:33 AM      HISTORY: s/p R chest tube placement for pneumothorax w/ blebs.     COMPARISON: 7/14/2017    FINDINGS: Table small right apical pneumothorax. Right apical chest  tube. No significant change regarding the subcutaneous emphysema of  the right lateral chest wall. Left Port-A-Cath tip projects over the  high SVC. Cardiac silhouette is not enlarged. No left-sided  pneumothorax. No pleural effusion. Right middle and lower lobe mixed  opacities, stable.  Degenerative changes in the spine.      Impression    IMPRESSION:   1. Stable right apical pneumothorax. Right apical chest tube in place.  2. No significant change in right middle and lower lobe opacities.    FAVIAN COBURN MD

## 2017-07-16 NOTE — PLAN OF CARE
Problem: Goal Outcome Summary  Goal: Goal Outcome Summary  Outcome: No Change  Patient sleeping soundly at 2400 after taking Ambien for sleep on previous PM shift. Visually checked patient, breathing unlabored. When patient awoken later, she declined pain med, saying the pain wasn't bad while she lying still. Patient getting up to use commode at bedside. Voiding ok. No further bowel movements overnight. Intermittent constipation. Needs additional laxatives later today; especially with narcotic use.  Restarted supplemental oxygen at 2 L per nasal cannula due to lower sats of 91-92% on room air. Her sats improved immediately  into mid to high 90's. Infrequent non-productive cough. Continues to have right sided chest tube to water seal. Chest tube: yellow drainage; 90 ml of drainage overnight. Continues to have intermittent discomfort at chest tube insertion site. Given Oxycodone once for that pain. Temperature afebrile. No labs ordered for this morning. Patient heplocked overnight as maintenance IV fluids dc'd yesterday.     Problem: Respiratory Insufficiency (Adult)  Goal: Identify Related Risk Factors and Signs and Symptoms  Related risk factors and signs and symptoms are identified upon initiation of Human Response Clinical Practice Guideline (CPG)   Outcome: No Change    07/16/17 0871   Respiratory Insufficiency   Related Risk Factors (Respiratory Insufficiency) pain   Signs and Symptoms (Respiratory Insufficiency) abnormal breath sounds;decreased oxygen saturation;shortness of breath

## 2017-07-16 NOTE — PLAN OF CARE
Problem: Goal Outcome Summary  Goal: Goal Outcome Summary  Outcome: Therapy, unable to show any progress toward functional goals  Dr Matos (radiologist) called 5C alerting Shasha Tatum RN that CXR showed increased R pneumothorax. Shasha text paged moonlighter. Awaiting orders.  Ctube to water seal drng 70/shift yellow secretions. Lungs clear on the left, diminished on the right. O2 sats WNL. Denies SOB/RODRIGUEZ. Ctube site is sore and discomfort relieved with oxycodone, taking 5-10 q4hr. No stool today despite senna & miralax. Accessed port WNL. Parish bra to support right enlarged breast.

## 2017-07-16 NOTE — PROGRESS NOTES
Thoracic Surgery Progress Note    Subjective:  No acute issues overnight. Lung continues to be well expanded after placing to waterseal. Pain controlled. Denies fevers, chills, CP, SOB, and N/V.    Vitals:  /63 (BP Location: Right arm)  Pulse 64  Temp 97.9  F (36.6  C) (Axillary)  Resp 18  Wt 92 kg (202 lb 13.2 oz)  SpO2 97%  BMI 34.28 kg/m2  Gen: Awake, alert, NAD , interactive  Resp: non-labored  CT: Good seal. No air leak  Abd: Soft, non-distended, NTTP  Ext: warm and well perfused, no edema    A/P: 57 year old female with metastatic breast cancer and chronic large emphysematous bleb who presents with right sided pneumothorax now s/p chest tube. Lung has re-expanded well from initial, but still has decent pneumothorax.       -AM CXR, if ptx stable or decreased, will clamp for 24 hours   -if increased ptx, will keep to waterseal  -Recommend hep lock IVF  -No CPAP or BiPAP for 1 week as lung heals      Seen and discussed with Dr. Griffin, fellow. To be discussed with staff.    Cristino Driscoll MD  PGY-1 Surgery  pager [phone]

## 2017-07-16 NOTE — PLAN OF CARE
Problem: Goal Outcome Summary  Goal: Goal Outcome Summary  Outcome: No Change  Tmax 100.2 axillary, decreased on own.  Other VSS.  Oxycodone x's 2 for pain at chest tube site.  Denies nausea and vomiting.  Up with standby assist.  Voiding without issue.  No BM per patient today.  Walked in halls with  and RN once around unit.  Using IS independently at bedside.  Chest tube dressing C/D/I, still at water seal, output on shift was 270cc. Given scheduled Ambien for sleep.   at bedside during evening shift.  Continue to monitor.

## 2017-07-17 NOTE — PLAN OF CARE
"Problem: Goal Outcome Summary  Goal: Goal Outcome Summary  Outcome: No Change  Tmax 100F. OVSS on RA. LS CTA, although fairly diminished in RLL/RML; using IS regularly. CT to waterseal and draining minimal amount of serous fluid, no airleaks detected. Disappointed and surprised to hear that imaging described pneumo as \"worse\", as pt states she has been feeling like her breathing is so much \"better\". C/o pain r/t breast and CT, given 5 mg oxy x 2 w/ acceptable relief. Denies N/V/D; very small BM x 1, continues to use senna. Appetite good. Good UOP, no c/o dysuria. Last dose of PO chemo tonight, now 1 week off. Offers no other complaints.  here much of evening, and ambulated in rock w/ pt x 2. Will continue to monitor per POC.       Problem: Respiratory Insufficiency (Adult)  Goal: Identify Related Risk Factors and Signs and Symptoms  Related risk factors and signs and symptoms are identified upon initiation of Human Response Clinical Practice Guideline (CPG)   Outcome: No Change    07/16/17 2213   Respiratory Insufficiency   Related Risk Factors (Respiratory Insufficiency) physiological factors   Signs and Symptoms (Respiratory Insufficiency) abnormal breath sounds;chest pain       Goal: Acid/Base Balance  Patient will demonstrate the desired outcomes by discharge/transition of care.   Outcome: No Change    07/16/17 2213   Respiratory Insufficiency (Adult)   Acid/Base Balance making progress toward outcome       Goal: Effective Ventilation  Patient will demonstrate the desired outcomes by discharge/transition of care.   Outcome: No Change    07/16/17 2213   Respiratory Insufficiency (Adult)   Effective Ventilation making progress toward outcome           "

## 2017-07-17 NOTE — TELEPHONE ENCOUNTER
Patient called and states it was the Levaquin that patient had a reaction to. Updated the allergy list to reflect this. Patient states she had violent dreams and a rash from this medication in the past. RIANA Garcia

## 2017-07-17 NOTE — PROGRESS NOTES
[facility], [place]  Hematology / Oncology Progress Note    Date of Service (when I saw the patient): 07/17/2017     Assessment & Plan   Etta Cervantes is a 57 year old female with metastatic breast cancer (bone/liver) on oral chemotherapy (Xeloda) admitted for UTI with possible pyelonephritis and large right pneumothorax.    Plan today:  -CXR this AM with ? Slight increase in PTX. Per CT surgery, clamp tube x 4 hours and re-check CXR at 1530 today.  -Continue to treat UTI/pyelo with Bactrim DS, plan for 10 day course.     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  -Thoracic surgery successfully placed right chest tube on admission.  -Tube has had water seal since 7/13 at 1400. Follow up CXR 7/13PM showed significant improvement. 7/14AM CXR showed enlarging ptx. 7/15AM CXR showed stable ptx. 7/16 and 7/17 CXRs showed slight worsening of PTX respectively.  -HOLD CPAP until 7/22 to allow lungs to heal.   -Per CT surgery, to clamp tube x 4 hours and re-check CXR this afternoon to determine plan.    #UTI with possible pyelonephritis:   Dysuria, urinary frequency; history of UTIs (h/o klebsiella).  -UA positive for large amt leukocytes, high WBC, and bacteria. Culture positive for >100,000 colonies/ml E.Coli.    -We will transition from ceftriaxone 2g daily to Bactrim DS BID - continue through Thursday 7/20/17 to complete 10-day course of antibiotics.    -Blood cultures x2 in ED 7/11PM neg to date.     #Metastatic Breast Cancer (bone/liver):  -Currently under care of Dr. Cassidy; On Xeloda 2500mg QAM and 2000mgQPM, 14 days on, 7 days off - completed 14 day cycle yesterday, now 7 days off.      #Nausea:   She does have nausea after she takes Xeloda, less during this admission than at home. Improved over the past couple of days, continue PRN antiemetics.     #Pain:  Chest, back and neck pain, secondary to metastatic breast cancer,  exacerbated by pneumothorax and ([id number] and [id number]) chest tube placement, and possible pyelonephritis. Improving.   -Continue oxycodone 5-10mg PO Q4 hours PRN for pain. This is controlling pain so will d/c toradol and IV dilaudid.   -OIC bowel regimen.      #Thrush:  -Coating on tongue, continue nystatin rinses.   -She also has magic mouth wash available if needed.      #DIAMOND:   -CPAP on HOLD as above due to pneumothorax.      #FEN:   -Encourage oral hydration  -Electrolytes: replete as needed  -Nutrition: diet as tolerated     #PPx:  -VTE: mechanical      #Disposition: Awaiting stability/improvement of PTX. Hopeful for d/c in next 1-2 days if possible.    Patient and plan of care discussed with staff attending, Dr. Liang.     Trish Kilpatrick PA-C  Hematology/Oncology  [phone]     Interval History   Etta is doing ok today. Able to get up and shower this morning. Not having increased pain or shortness of breath. She had a BM overnight. Urinating normally. No fevers, sweats or chills. No nausea or vomiting. Eating and drinking ok.    Physical Exam   Temp: 98.3  F (36.8  C) Temp src: Axillary BP: 125/57   Heart Rate: 80 Resp: 18 SpO2: 94 % O2 Device: None (Room air)    Vitals:    07/15/17 0735 07/16/17 0726 07/17/17 0811   Weight: 92 kg (202 lb 13.2 oz) 91.2 kg (201 lb 1.6 oz) 89.9 kg (198 lb 3.2 oz)     Vital Signs with Ranges  Temp:  [97.1  F (36.2  C)-100  F (37.8  C)] 98.3  F (36.8  C)  Heart Rate:  [66-80] 80  Resp:  [14-20] 18  BP: (125-137)/(54-68) 125/57  SpO2:  [93 %-98 %] 94 %  I/O last 3 completed shifts:  In: 570 [P.O.:570]  Out: 2860 [Urine:2800; Chest Tube:60]    Constitutional: Awake, alert, interactive, no acute distress.  Respiratory: Non-labored breathing on RA, good air exchange, left lung clear to auscultation. Right lung with +lung sounds, diminished with +crackles at base. No cough or wheeze noted.   Cardiovascular: Regular rate and rhythm. No murmur or rub.   GI: NABS. Abd soft,  ntnd.   Skin: Warm and dry. No concerning lesions or rash on exposed surfaces. Right chest tube covered.  Musculoskeletal: Extremities grossly normal, non-tender, no edema.   Neurologic: A&O, speech normal, no focal deficits.   Neuropsychiatric: Mentation and affect appear normal/appropriate.  Vascular Access: PAC is CDI.    Medications   Current Facility-Administered Medications   Medication     magnesium citrate solution 296 mL     carboxymethylcellulose (REFRESH PLUS) 0.5 % ophthalmic solution 2 drop     nystatin (MYCOSTATIN) suspension 500,000 Units     sulfamethoxazole-trimethoprim (BACTRIM DS/SEPTRA DS) 800-160 MG per tablet 1 tablet     magnesium hydroxide (MILK OF MAGNESIA) suspension 30 mL     ketorolac (TORADOL) injection 30 mg     fexofenadine (ALLEGRA) tablet 60 mg     Medication Instruction     prochlorperazine (COMPAZINE) tablet 5-10 mg     ondansetron (ZOFRAN-ODT) ODT tab 8 mg     acetaminophen (TYLENOL) tablet 650 mg     guaiFENesin-dextromethorphan (ROBITUSSIN DM) 100-10 MG/5ML syrup 5 mL     senna-docusate (SENOKOT-S;PERICOLACE) 8.6-50 MG per tablet 1-2 tablet     polyethylene glycol (MIRALAX/GLYCOLAX) Packet 17 g     albuterol neb solution 2.5 mg     albuterol (PROAIR HFA/PROVENTIL HFA/VENTOLIN HFA) Inhaler 2 puff     atorvastatin (LIPITOR) tablet 10 mg     azelastine (ASTELIN) 0.1 % spray 1-2 spray     levothyroxine (SYNTHROID/LEVOTHROID) tablet 25 mcg     LORazepam (ATIVAN) tablet 0.5 mg     magic mouthwash suspension (diphenhydrAMINE, lidocaine, aluminum-magnesium & simethicone)     fluticasone-vilanterol (BREO ELLIPTA) 200-25 MCG/INH oral inhaler 1 puff     oxyCODONE (ROXICODONE) IR tablet 5-10 mg     zolpidem (AMBIEN) tablet 10 mg     naloxone (NARCAN) injection 0.1-0.4 mg     beclomethasone (QVAR) 80 MCG/ACT Inhaler 2 puff     lidocaine 1 % 1 mL     lidocaine (LMX4) kit     sodium chloride (PF) 0.9% PF flush 10-20 mL     heparin lock flush 10 UNIT/ML injection 5-10 mL     heparin lock flush  10 UNIT/ML injection 5-10 mL     heparin 100 UNIT/ML injection 5 mL     sodium chloride (PF) 0.9% PF flush 10-20 mL       Data  Results for orders placed or performed during the hospital encounter of 07/12/17 (from the past 24 hour(s))   XR Chest 2 Views   Result Value Ref Range    Radiologist flags Enlarging right apical pneumothorax (Urgent)     Narrative    XR CHEST 2 VW  7/17/2017 9:07 AM      HISTORY: f/u right PTX    COMPARISON: 7/16/2017    FINDINGS: Right apical chest tube. Left chest Port-A-Cath, tip  projecting over the mid SVC. Enlarging right apical pneumothorax. No  left-sided pneumothorax. Right lateral and anterior chest wall  subcutaneous emphysema. No pleural effusion. The cardiac silhouette is  not enlarged. Right mid lung linear opacities. Anterior osteophytes of  the thoracic spine.      Impression    IMPRESSION:   1. Enlarging right apical pneumothorax. Ipsilateral chest tube in  place.  2. Subcutaneous emphysema of the right lateral and anterior chest  wall, likely iatrogenic.  3. Right mid lung linear opacities, suggestive of atelectasis.    [Urgent Result: Enlarging right apical pneumothorax]    Finding was identified on 7/17/2017 9:18 AM.     Trish Kilpatrick NP was contacted by Dr. Blackwell at 7/17/2017 9:20 AM  and verbalized understanding of the urgent finding.     I have personally reviewed the examination and initial interpretation  and I agree with the findings.    KAVON MILLS MD

## 2017-07-17 NOTE — PLAN OF CARE
Problem: Goal Outcome Summary  Goal: Goal Outcome Summary  Outcome: No Change  Patient sleeping soundly at 2400. Breathing regular without distress. Occasional cough. Patient reporting pain right side of chest (site of chest tube). Medicated with Oxycodone twice overnight for chest tube pain, with some relief. Chest tube remains to water seal. Chest tube had 50 ml serous, yellow drainage by morning. Patient afebrile. Vitals stable. Sats ok on room air. Infrequent non-productive cough. Lungs diminished right side. Due for chest xray again today for follow up for pneumothorax. Indra cath heplocked overnight. No IV fluid. Patient using commode at bedside to void. Voiding clear yellow urine. Has been having ongoing constipation, especially with narcotic management of pain. Offered laxative this morning but she declined. She went into bathroom and had medium soft BM. Also passing flatus. Patient using eye gtts for dry eyes. No labs (blood draw) ordered for this morning. Patient denies having any nausea following last dose of this cycle of oral chemo. Has one week off chemo now for this period.     Problem: Respiratory Insufficiency (Adult)  Goal: Identify Related Risk Factors and Signs and Symptoms  Related risk factors and signs and symptoms are identified upon initiation of Human Response Clinical Practice Guideline (CPG)   Outcome: No Change    07/17/17 0656   Respiratory Insufficiency   Signs and Symptoms (Respiratory Insufficiency) abnormal breath sounds;chest pain

## 2017-07-17 NOTE — PROGRESS NOTES
Thoracic Surgery Progress Note    Subjective:  No acute issues overnight. Lung continues to be fairly well expanded after placing to waterseal. Pain controlled. Denies fevers, chills, CP, SOB, and N/V.    Vitals:  /59 (BP Location: Right arm)  Pulse 64  Temp 97.1  F (36.2  C) (Axillary)  Resp 20  Wt 91.2 kg (201 lb 1.6 oz)  SpO2 98%  BMI 33.99 kg/m2  Gen: Awake, alert, NAD , interactive  Resp: non-labored  CT: Good seal. No air leak  Abd: Soft, non-distended, NTTP  Ext: warm and well perfused, no edema    A/P: 57 year old female with metastatic breast cancer and chronic large emphysematous bleb who presents with right sided pneumothorax now s/p chest tube. Lung has re-expanded well from initial, but still has decent pneumothorax.      -AM CXR, if ptx stable or decreased, will clamp for 4 hours with repeat CXR, then continue clamp for 24 hours if stable.   -if increased ptx, will keep to waterseal  -Recommend hep lock IVF  -No CPAP or BiPAP for 1 week as lung heals      Seen and discussed with Dr. Griffin, fellow. To be discussed with staff.    Cristino Driscoll MD  PGY-1 Surgery  pager [phone]

## 2017-07-17 NOTE — PLAN OF CARE
Problem: Goal Outcome Summary  Goal: Goal Outcome Summary  Outcome: Therapy, progress toward functional goals is gradual  CXR done, pneumothorax enlarging. Spirits dampened but then MD clamped chest tube around 1130 and ordered CXR at 1530. Etta hopeful. Lungs sound unchanged, diminished  (very faint/absent RLL) on the right, clear left. Comfortably managed chest tube site pain with  oxycodone 5 mg q2hr. Stools x 2 today. Senna and rhona lax daily working well. Appetite good. Up and about in room. Steady on feet.

## 2017-07-18 NOTE — PROGRESS NOTES
[facility], [place]  Hematology / Oncology Progress Note    Date of Service (when I saw the patient): 07/18/2017     Assessment & Plan   Etta Cervantes is a 57 year old female with metastatic breast cancer (bone/liver) on oral chemotherapy (Xeloda) admitted for UTI with possible pyelonephritis and large right pneumothorax.    Plan today:  -CXR this AM with continued increase in PTX. Discussed at thoracic conference, per CT surgery will plan for endobronchial stent with valve placement, timing as directed by their team.   -Continue to treat UTI/pyelo with Bactrim DS, plan for 10 day course.     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  -Thoracic surgery successfully placed right chest tube on admission.  -Tube has had water seal since 7/13 at 1400. Follow up CXR 7/13PM showed significant improvement. 7/14AM CXR showed enlarging ptx. 7/15AM CXR showed stable ptx. 7/16 and 7/17 CXRs showed slight worsening of PTX respectively, continued worsening as of this AM.  -HOLD CPAP until 7/22 to allow lungs to heal.   -Per CT surgery, plan to place endobronchial stent with valve.    #UTI with possible pyelonephritis:   Dysuria, urinary frequency; history of UTIs (h/o klebsiella).  -UA positive for large amt leukocytes, high WBC, and bacteria. Culture positive for >100,000 colonies/ml E.Coli.    -We will transition from ceftriaxone 2g daily to Bactrim DS BID - continue through Thursday 7/20/17 to complete 10-day course of antibiotics.    -Blood cultures x2 in ED 7/11PM neg to date.     #Metastatic Breast Cancer (bone/liver):  -Currently under care of Dr. Cassidy; On Xeloda 2500mg QAM and 2000mgQPM, 14 days on, 7 days off - completed 14 day cycle 7/16, now 7 days off.      #Nausea:   She does have nausea after she takes Xeloda, less during this admission than at home. Improved over the past couple of days, continue PRN  antiemetics.     #Pain:  Chest, back and neck pain, secondary to metastatic breast cancer, exacerbated by pneumothorax and ([id number] and [id number]) chest tube placement, and possible pyelonephritis. Improving.   -Continue oxycodone 5-10mg PO Q4 hours PRN for pain. This is controlling pain so will d/c toradol and IV dilaudid.   -OIC bowel regimen.      #Thrush:  -Coating on tongue, continue nystatin rinses.   -She also has magic mouth wash available if needed.      #DIAMOND:   -CPAP on HOLD as above due to pneumothorax.      #FEN:   -Encourage oral hydration  -Electrolytes: replete as needed  -Nutrition: diet as tolerated     #PPx:  -VTE: mechanical      #Disposition: Awaiting placement of endobronchial stent with valve.    Patient and plan of care discussed with staff attending, Dr. Liang.     Trish Kilpatrick PA-C  Hematology/Oncology  [phone]     Interval History   Etta is doing ok today. Needed oxygen overnight while sleeping. Pain controlled. Breathing about the same. Having BMs. Urinating normally. No fevers, sweats or chills. No nausea or vomiting. Eating and drinking ok.    Physical Exam   Temp: 97.3  F (36.3  C) Temp src: Axillary BP: 118/56   Heart Rate: 84 Resp: 18 SpO2: 96 % O2 Device: None (Room air) Oxygen Delivery: 3 LPM  Vitals:    07/16/17 0726 07/17/17 0811 07/18/17 0837   Weight: 91.2 kg (201 lb 1.6 oz) 89.9 kg (198 lb 3.2 oz) 89.4 kg (197 lb 1.5 oz)     Vital Signs with Ranges  Temp:  [96.4  F (35.8  C)-98.6  F (37  C)] 97.3  F (36.3  C)  Heart Rate:  [70-88] 84  Resp:  [16-18] 18  BP: (118-131)/(56-75) 118/56  SpO2:  [93 %-98 %] 96 %  I/O last 3 completed shifts:  In: 3280 [P.O.:3280]  Out: 2525 [Urine:2525]    Constitutional: Awake, alert, interactive, no acute distress. Seen sitting up in chair.  Respiratory: Non-labored breathing on RA, good air exchange, left lung clear to auscultation. Right lung with +lung sounds, at base only. Frequent dry cough. Chest tube in place to R lung,  clamped.  Cardiovascular: Regular rate and rhythm. No murmur or rub.   GI: NABS. Abd soft, ntnd.   Skin: Warm and dry. No concerning lesions or rash on exposed surfaces. Right chest tube covered.  Musculoskeletal: Extremities grossly normal, non-tender, no edema.   Neurologic: A&O, speech normal, no focal deficits.   Neuropsychiatric: Mentation and affect appear normal/appropriate.  Vascular Access: PAC is CDI.    Medications   Current Facility-Administered Medications   Medication     magnesium citrate solution 296 mL     carboxymethylcellulose (REFRESH PLUS) 0.5 % ophthalmic solution 2 drop     nystatin (MYCOSTATIN) suspension 500,000 Units     sulfamethoxazole-trimethoprim (BACTRIM DS/SEPTRA DS) 800-160 MG per tablet 1 tablet     magnesium hydroxide (MILK OF MAGNESIA) suspension 30 mL     ketorolac (TORADOL) injection 30 mg     fexofenadine (ALLEGRA) tablet 60 mg     Medication Instruction     prochlorperazine (COMPAZINE) tablet 5-10 mg     ondansetron (ZOFRAN-ODT) ODT tab 8 mg     acetaminophen (TYLENOL) tablet 650 mg     guaiFENesin-dextromethorphan (ROBITUSSIN DM) 100-10 MG/5ML syrup 5 mL     senna-docusate (SENOKOT-S;PERICOLACE) 8.6-50 MG per tablet 1-2 tablet     polyethylene glycol (MIRALAX/GLYCOLAX) Packet 17 g     albuterol neb solution 2.5 mg     albuterol (PROAIR HFA/PROVENTIL HFA/VENTOLIN HFA) Inhaler 2 puff     atorvastatin (LIPITOR) tablet 10 mg     azelastine (ASTELIN) 0.1 % spray 1-2 spray     levothyroxine (SYNTHROID/LEVOTHROID) tablet 25 mcg     LORazepam (ATIVAN) tablet 0.5 mg     magic mouthwash suspension (diphenhydrAMINE, lidocaine, aluminum-magnesium & simethicone)     fluticasone-vilanterol (BREO ELLIPTA) 200-25 MCG/INH oral inhaler 1 puff     oxyCODONE (ROXICODONE) IR tablet 5-10 mg     zolpidem (AMBIEN) tablet 10 mg     naloxone (NARCAN) injection 0.1-0.4 mg     beclomethasone (QVAR) 80 MCG/ACT Inhaler 2 puff     lidocaine 1 % 1 mL     lidocaine (LMX4) kit     sodium chloride (PF) 0.9% PF  flush 10-20 mL     heparin lock flush 10 UNIT/ML injection 5-10 mL     heparin lock flush 10 UNIT/ML injection 5-10 mL     heparin 100 UNIT/ML injection 5 mL     sodium chloride (PF) 0.9% PF flush 10-20 mL       Data  Results for orders placed or performed during the hospital encounter of 07/12/17 (from the past 24 hour(s))   XR Chest 2 Views   Result Value Ref Range    Radiologist flags Increase in right apical pneumothorax (Urgent)     Narrative    Exam: XR CHEST 2 VW, 7/18/2017 8:45 AM    Indication: Interval Change    Comparison: 7/17/2017.    Findings:   PA and lateral views the chest. Left chest wall Port-A-Cath with tip  at the high SVC and right apically oriented chest tube. Cardiac  silhouette is stable and midline. Slight increase in right apical  pneumothorax. No left-sided pneumothorax. Mixed airspace opacities are  unchanged. Right midlung streaky opacities. Small right-sided pleural  effusion. Left costophrenic clear. Right chest wall subcutaneous  emphysema.      Impression    Impression:   1. Increase in right apical pneumothorax. Chest tubes in position.  Right chest wall associates.  2. Patchy mixed opacities are unchanged.  3. Small right-sided pleural effusion.    [Urgent Result: Increase in right apical pneumothorax]    Finding was identified on 7/18/2017 8:46 AM.     Dr. Soria was contacted by Dr. Smith at 7/18/2017 9:05 AM and verbalized  understanding of the urgent finding.     I have personally reviewed the examination and initial interpretation  and I agree with the findings.    KAVON MILLS MD

## 2017-07-18 NOTE — PLAN OF CARE
Problem: Goal Outcome Summary  Goal: Goal Outcome Summary  Outcome: No Change  Patient afebrile, other VSS. Continues to receive Oxycodone at least q 2 hrs for pain in her chest tube sites. Denies any nausea and vomiting. Eating ok. Given Miralax x1. Chest tube to water seal - site c/d/i. CXR showed worsened PTX. She is NPO after midnight tonight for planned endobronchial stent placement tomorrow. Patient aware.  at bedside and is very supportive. Walked in the rock x1 and tolerated it good. Continue with plan of care.

## 2017-07-18 NOTE — PLAN OF CARE
Problem: Individualization  Goal: Patient Preferences  s-pt up in room in chair in-ambulated rock. MD- reviewed latest xray after 4hrs of CT clamped-ordered to continue with CT clamping. Pt with dry weak cough that she reports due to dry throat-declined ice chips. witll try freq sips of water. Reports decreased cough. Pt requesting oxycodone q2 hr of 5mg that she reports works better for her than 10mg q4hrs. Instructed on oxycontin if she needs on going pain coverage and will bring to MD's attention to consider ordering this.  Pt states her breathing is good. sats wnl and on room air.  at bedside this kay and supportive.   b-pul w/sl changes with clamped CT   A-pt stable repiratory status with ongong clamp of CT  r-f/u with md for long acting analgesia-oxycontin. F/u with md on pulmonary status/plan as pt/ are asking when the tube will be out and when she can be discharged

## 2017-07-18 NOTE — PROGRESS NOTES
Thoracic Surgery Progress Note    Subjective:  No acute issues overnight. Lung continues to be fairly well expanded after clamping. Pain controlled. Denies fevers, chills, CP, SOB, and N/V.    Vitals:  /65 (BP Location: Right arm)  Pulse 64  Temp 98.6  F (37  C) (Axillary)  Resp 18  Wt 89.9 kg (198 lb 3.2 oz)  SpO2 97%  BMI 33.5 kg/m2  Gen: Awake, alert, NAD , interactive  Resp: non-labored  CT: Good seal. small air leak  Abd: Soft, non-distended, NTTP  Ext: warm and well perfused, no edema    A/P: 57 year old female with metastatic breast cancer and chronic large emphysematous bleb who presents with right sided pneumothorax now s/p chest tube. Lung has re-expanded well from initial, but still has decent pneumothorax.      -AM CXR revealed increased pneumothorax on clamp trial. Will place to waterseal  -Plan to go with pulmonology to place airway valves on Thursday  -Recommend hep lock IVF  -No CPAP or BiPAP for 1 week as lung heals      Seen and discussed with Dr. Griffin, fellow. To be discussed with staff.    Cristino Driscoll MD  PGY-1 Surgery  pager [phone]

## 2017-07-18 NOTE — PLAN OF CARE
Problem: Goal Outcome Summary  Goal: Goal Outcome Summary  Outcome: Improving  [id number] : Patient reports she slept well last night; Oxygen per NC at 3 LPM overnight; O2 sats remained between 96-98% over course of night.  Patient denies pain this morning but states that she would like to take her PRN oxycodone in order to stay ahead of pain once she is up for the day.  5mg Oxycodone given at 0530.  Patient up to bedside commode; voiding adequate amounts.  Chest tube remains clamped.  Dressing is clean, dry and intact.  No new complaints; continue to monitor and notify care team of any status changes.  Continue current plan of care.

## 2017-07-19 NOTE — PROGRESS NOTES
Thoracic Surgery Progress Note    Subjective:  No acute issues overnight. Chest tube back to suction after enlarged ptx. Pain controlled. Denies fevers, chills, CP, SOB, and N/V.    Vitals:  /63 (BP Location: Right arm)  Pulse 74  Temp 98.3  F (36.8  C) (Axillary)  Resp 18  Wt 88.5 kg (195 lb 3.2 oz)  SpO2 95%  BMI 32.99 kg/m2  Gen: Awake, alert, NAD , interactive  Resp: non-labored  CT: Good seal. small air leak  Abd: Soft, non-distended, NTTP  Ext: warm and well perfused, no edema    A/P: 57 year old female with metastatic breast cancer and chronic large emphysematous bleb who presents with right sided pneumothorax now s/p chest tube. Lung has re-expanded well from initial, but still has decent pneumothorax.      -AM CXR revealed increased pneumothorax on clamp trial. Will place to waterseal  -Plan to go with pulmonology to OR for endobronchial valves on Thursday  -Recommend hep lock IVF  -No CPAP or BiPAP for 1 week as lung heals      Noel Wells PA-C  P: [phone]

## 2017-07-19 NOTE — PLAN OF CARE
Problem: Goal Outcome Summary  Goal: Goal Outcome Summary  Outcome: No Change  Patient afebrile, other VSS. Pain meds at leats q 2 hrs for chest tube site pain with relief. Port-a-cath needle changed and dressing changed. Eating well. Chest tube to water seal with serosanguinous drainage. Up to walk in the rock x2. Up to BR independently. She is NPO after midnight tonight for endobronchial stent with valve placement. Patient aware. Daughter was here to visit patient. She is in good spirit. Continue with plan of care.

## 2017-07-19 NOTE — PLAN OF CARE
Problem: Goal Outcome Summary  Goal: Goal Outcome Summary  Outcome: No Change  /60 (BP Location: Right arm)  Pulse 74  Temp 97  F (36.1  C) (Axillary)  Resp 20  Wt 89.4 kg (197 lb 1.5 oz)  SpO2 95%  BMI 33.31 kg/m2  Pt's AVSS, maintaining sat's in the mid 90's while on RA and continue to c/o left lateral chest tube site pain. Oxycodone given as ordered. Pt is npo for Endobronchial stent with valve placement today per MD note. Right chest tube intact, continue to be on water seal with small output. Keep monitoring pt as ordered and notify MD with any new changes.

## 2017-07-19 NOTE — PROGRESS NOTES
[facility], [place]  Hematology / Oncology Progress Note    Date of Service (when I saw the patient): 07/19/2017     Assessment & Plan   Etta Cervantes is a 57 year old female with metastatic breast cancer (bone/liver) on oral chemotherapy (Xeloda) admitted for UTI with possible pyelonephritis and large right pneumothorax.    Plan today:  -Discussed at thoracic conference 7/18, per CT surgery will plan for endobronchial stent with valve placement which is planned for tomorrow 7/20. NPO at midnight.  -Continue to treat UTI/pyelo with Bactrim DS, plan for 10 day course, to finish tomorrow.     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  -Thoracic surgery successfully placed right chest tube on admission.  -Tube has had water seal since 7/13 at 1400. Follow up CXR 7/13PM showed significant improvement. 7/14AM CXR showed enlarging ptx. 7/15AM CXR showed stable ptx. 7/16 and 7/17 CXRs showed slight worsening of PTX respectively, continued worsening as of 7/18  -HOLD CPAP until 7/22 to allow lungs to heal.   -Per CT surgery, plan to place endobronchial stent with valve. Procedure planned for tomorrow.    #UTI with possible pyelonephritis:   Dysuria, urinary frequency; history of UTIs (h/o klebsiella).  -UA positive for large amt leukocytes, high WBC, and bacteria. Culture positive for >100,000 colonies/ml E.Coli.    -We will transition from ceftriaxone 2g daily to Bactrim DS BID - continue through tomorrow, Thursday 7/20/17 to complete 10-day course of antibiotics.    -Blood cultures x2 in ED 7/11PM neg to date.     #Metastatic Breast Cancer (bone/liver):  -Currently under care of Dr. Cassidy; On Xeloda 2500mg QAM and 2000mgQPM, 14 days on, 7 days off - completed 14 day cycle 7/16, now 7 days off. Message sent to Dr. Cassidy and [facility] to discuss resumption of Xeloda on Monday as planned, labs ordered for tomorrow (CBC, CMP, CA  27.29).     #Nausea:   She does have nausea after she takes Xeloda, less during this admission than at home. Improved over the past couple of days, continue PRN antiemetics.     #Pain:  Chest, back and neck pain, secondary to metastatic breast cancer, exacerbated by pneumothorax and ([id number] and [id number]) chest tube placement, and possible pyelonephritis. [id number].   -Continue oxycodone 5-10mg PO Q4 hours PRN for pain.  -OIC bowel regimen.      #Thrush:  -Coating on tongue, continue nystatin rinses.   -She also has magic mouth wash available if needed.      #DIAMOND:   -CPAP on HOLD as above due to pneumothorax.      #FEN:   -Encourage oral hydration  -Electrolytes: replete as needed  -Nutrition: diet as tolerated     #PPx:  -VTE: mechanical      #Disposition: Awaiting placement of endobronchial stent with valve tomorrow.    Patient and plan of care discussed with staff attending, Dr. Liang.     JANET TidwellC  Hematology/Oncology  [phone]     Interval History   Etta feels about the same today. Pain controlled. Disappointed that procedure isn't happening until tomorrow so she will miss appt with Dr. Cassidy. Cough stable, possibly a bit better. No nausea or vomiting. Having BMs. Urinating normally. No fevers, sweats or chills. Eating and drinking ok.    Physical Exam   Temp: 98.3  F (36.8  C) Temp src: Axillary BP: 124/63 Pulse: 74 Heart Rate: 71 Resp: 18 SpO2: 95 % O2 Device: None (Room air)    Vitals:    07/17/17 0811 07/18/17 0837 07/19/17 0837   Weight: 89.9 kg (198 lb 3.2 oz) 89.4 kg (197 lb 1.5 oz) 88.5 kg (195 lb 3.2 oz)     Vital Signs with Ranges  Temp:  [97  F (36.1  C)-99.6  F (37.6  C)] 98.3  F (36.8  C)  Pulse:  [74-81] 74  Heart Rate:  [71-84] 71  Resp:  [18-20] 18  BP: (111-133)/(55-71) 124/63  SpO2:  [93 %-96 %] 95 %  I/O last 3 completed shifts:  In: 1340 [P.O.:1340]  Out: 3010 [Urine:2900; Chest Tube:110]    Constitutional: Awake, alert, interactive, no acute distress.  Seen sitting up in chair.  Respiratory: Non-labored breathing on RA, good air exchange, left lung clear to auscultation. Right lung with +lung sounds, at base only. Frequent dry cough. Chest tube in place to R lung, to water seal.  Cardiovascular: Regular rate and rhythm. No murmur or rub.   GI: NABS. Abd soft, ntnd.   Skin: Warm and dry. No concerning lesions or rash on exposed surfaces. Right chest tube covered.  Musculoskeletal: Extremities grossly normal, non-tender, no edema.   Neurologic: A&O, speech normal, no focal deficits.   Neuropsychiatric: Mentation and affect appear normal/appropriate.  Vascular Access: PAC is CDI.    Medications   Current Facility-Administered Medications   Medication     oxybutynin (DITROPAN-XL) 24 hr tablet 10 mg     magnesium citrate solution 296 mL     carboxymethylcellulose (REFRESH PLUS) 0.5 % ophthalmic solution 2 drop     nystatin (MYCOSTATIN) suspension 500,000 Units     sulfamethoxazole-trimethoprim (BACTRIM DS/SEPTRA DS) 800-160 MG per tablet 1 tablet     magnesium hydroxide (MILK OF MAGNESIA) suspension 30 mL     ketorolac (TORADOL) injection 30 mg     fexofenadine (ALLEGRA) tablet 60 mg     Medication Instruction     prochlorperazine (COMPAZINE) tablet 5-10 mg     ondansetron (ZOFRAN-ODT) ODT tab 8 mg     acetaminophen (TYLENOL) tablet 650 mg     guaiFENesin-dextromethorphan (ROBITUSSIN DM) 100-10 MG/5ML syrup 5 mL     senna-docusate (SENOKOT-S;PERICOLACE) 8.6-50 MG per tablet 1-2 tablet     polyethylene glycol (MIRALAX/GLYCOLAX) Packet 17 g     albuterol neb solution 2.5 mg     albuterol (PROAIR HFA/PROVENTIL HFA/VENTOLIN HFA) Inhaler 2 puff     atorvastatin (LIPITOR) tablet 10 mg     azelastine (ASTELIN) 0.1 % spray 1-2 spray     levothyroxine (SYNTHROID/LEVOTHROID) tablet 25 mcg     LORazepam (ATIVAN) tablet 0.5 mg     magic mouthwash suspension (diphenhydrAMINE, lidocaine, aluminum-magnesium & simethicone)     fluticasone-vilanterol (BREO ELLIPTA) 200-25 MCG/INH oral  inhaler 1 puff     oxyCODONE (ROXICODONE) IR tablet 5-10 mg     zolpidem (AMBIEN) tablet 10 mg     naloxone (NARCAN) injection 0.1-0.4 mg     beclomethasone (QVAR) 80 MCG/ACT Inhaler 2 puff     lidocaine 1 % 1 mL     lidocaine (LMX4) kit     sodium chloride (PF) 0.9% PF flush 10-20 mL     heparin lock flush 10 UNIT/ML injection 5-10 mL     heparin lock flush 10 UNIT/ML injection 5-10 mL     heparin 100 UNIT/ML injection 5 mL     sodium chloride (PF) 0.9% PF flush 10-20 mL       Data  Results for orders placed or performed during the hospital encounter of 07/12/17 (from the past 24 hour(s))   CBC with platelets   Result Value Ref Range    WBC 3.8 (L) 4.0 - 11.0 10e9/L    RBC Count 3.86 3.8 - 5.2 10e12/L    Hemoglobin 11.8 11.7 - 15.7 g/dL    Hematocrit 36.4 35.0 - 47.0 %    MCV 94 78 - 100 fl    MCH 30.6 26.5 - 33.0 pg    MCHC 32.4 31.5 - 36.5 g/dL    RDW 22.4 (H) 10.0 - 15.0 %    Platelet Count 174 150 - 450 10e9/L

## 2017-07-19 NOTE — PROGRESS NOTES
"CLINICAL NUTRITION SERVICES - ASSESSMENT NOTE     Nutrition Prescription  Malnutrition Status:    Criteria not met, but at risk.    Future/Additional Recommendations:  Encourage intake of >75% of meals/snacks throughout the day. Encourage intake of good protein sources TID for healing and maintenance of muscle mass. If pt is eating poor or less than this, start on calorie counts and offer nutrition supplements PRN to support nutrition.         REASON FOR ASSESSMENT  Etta Cervantes is a/an 57 year old female assessed by the dietitian for LOS    NUTRITION HISTORY  - Etta reports eating well PTA. She notes intentionally losing 50 lbs for health maintenance/dieting and then once she started chemotherapy she became weaker and lost another 25 lbs unintentionally. She reports nausea at home d/t taking all her pills before work without eating anything after.     CURRENT NUTRITION ORDERS  Diet: Regular  Intake/Tolerance: Good. Pt reports eating 3 good meals per day in the past week. Noted that she has not had nausea since admission. Intake documented as % of meals (usually 75% at minimum).    LABS  Labs reviewed    MEDICATIONS  Medications reviewed    ANTHROPOMETRICS  Ht Readings from Last 1 Encounters:   06/28/17 1.638 m (5' 4.5\")   Most Recent Weight: 88.5 kg (195 lb 3.2 oz)    IBW: 55.7 kg  BMI: Obesity Grade I BMI 30-34.9  Weight History:  Wt Readings from Last 20 Encounters:   07/19/17 88.5 kg (195 lb 3.2 oz)   06/28/17 90.6 kg (199 lb 11.2 oz)   06/19/17 91.9 kg (202 lb 9.6 oz)   05/25/17 91.6 kg (201 lb 14.4 oz)   05/18/17 93.3 kg (205 lb 11.2 oz)   04/28/17 93 kg (205 lb)   04/19/17 93.8 kg (206 lb 12.8 oz)   03/22/17 94 kg (207 lb 4.8 oz)   03/02/17 92.1 kg (203 lb 1.6 oz)   01/27/17 93 kg (205 lb)   01/19/17 95 kg (209 lb 8 oz)   11/23/16 95.7 kg (210 lb 14.4 oz)   10/12/16 98.1 kg (216 lb 3.2 oz)   09/23/16 100.2 kg (221 lb)   08/24/16 98.9 kg (218 lb)   07/13/16 100.3 kg (221 lb 3.2 oz)   06/01/16 102.5 kg " (225 lb 14.4 oz)   04/28/16 104.7 kg (230 lb 12.8 oz)   03/30/16 102.5 kg (226 lb)   03/02/16 102.6 kg (226 lb 3.2 oz)   26 lb weight loss (12%) in the past year, 10 lbs (5%) in the past 2 months.    Dosing Weight: 64 kg (adjusted weight using driest weight this admission of 88.5 kg taken 7/19/17)    ASSESSED NUTRITION NEEDS  Estimated Energy Needs: 1280 - 1600+ kcals/day (20 - 25 + kcals/kg)  Justification: Overweight; Post-op healing  Estimated Protein Needs: 96 - 128 grams protein/day (1.5 - 2 grams of pro/kg)  Justification: [place] needs d/t overweight; Post-op healing   Estimated Fluid Needs: 1 mL/kcal  Justification: Maintenance    PHYSICAL FINDINGS  See malnutrition section below.    MALNUTRITION  % Intake: Decreased intake does not meet criteria  % Weight Loss: Weight loss does not meet criteria  Subcutaneous Fat Loss: None observed  Muscle Loss: None observed  Fluid Accumulation/Edema: None noted  Malnutrition Diagnosis: Patient does not meet two of the above criteria necessary for diagnosing malnutrition but is at risk for malnutrition    NUTRITION DIAGNOSIS  Unintentional weight loss r/t nausea from chemotherapy hindering intake AEB 26 lb weight loss (12%) in the past year, 10 lbs (5%) in the past 2 months.      INTERVENTIONS  Implementation  Nutrition education for recommended modifications   - Encouraged Etta to follow a high protein diet at minimum while nausea is well controlled d/t upcoming surgery/procedures and ongoing weight loss. Discussed importance of protein intake for healing post-operatively and for maintenance of remaining muscle mass. Reviewed protein sources.     Goals  1.  Weight to remain at or above IBW of 55.7 kg throughout this admission.  2.  Patient to consume % of nutritionally adequate meal trays TID, or the equivalent with supplements/snacks.     Monitoring/Evaluation  Progress toward goals will be monitored and evaluated per protocol.    Demario Ferguson RD, FABIO  5C/BMT  Dietitian  Pager: [phone]

## 2017-07-20 NOTE — PROCEDURES
Procedure(s):    Bronchoscopy  Balloon occlusion  Intrabronchial valve placement x 5    Indication:  Prolonged air leak, previously known large right lung bulla, hx of breast cancer (mets to liver and bone)    Attending of Record:     MD Feroz Pepe MD    Trainees Present:   Magaly Mata MD    Medications:    General Anesthesia - See anesthesia flowsheet for details    Sedation Time:  Per Anesthesia Care Provider    Time Out:  Performed    The patient's medical record has been reviewed.  The indication for the procedure was reviewed.  The necessary history and physical examination was performed and reviewed.  The risks, benefits and alternatives of the procedure were discussed with the the patient in detail and she had the opportunity to ask questions.  I discussed in particular the potential complications including risks of minor or life-threatening bleeding and/or infection, respiratory failure, vocal cord trauma / paralysis, pneumothorax, and discomfort. Sedation risks were also discussed including abnormal heart rhythms, low blood pressure, and respiratory failure. All questions were answered to the best of my ability.  Verbal and written informed consent was obtained.  The proposed procedure and the patient's identification were verified prior to the procedure by the physician and the nurse.    The patient was assessed for the adequacy for the procedure and to receive medications.   Mental Status:  Alert and oriented x 3  Airway examination:  Class I (Complete visualization of soft palate)  Pulmonary:  Clear to ausculation bilaterally  CV:  RRR, no murmurs or gallops  ASA Grade:  (III)  Severe systemic disease that is not incapacitating    After clinical evaluation and reviewing the indication, risks, alternatives and benefits of the procedure the patient was deemed to be in satisfactory condition to undergo the procedure.      Immediately before administration of medications the  patient was re-assessed for adequacy to receive sedatives including the heart rate, respiratory rate, mental status, oxygen saturation, blood pressure and adequacy of pulmonary ventilation. These same parameters were continuously monitored throughout the procedure.    Maneuvers / Procedure:     The bronchoscope was inserted through the mouth via ETT.      Airway Examination:  A complete airway examination was performed from the distal trachea to the subsegmental level in each lobe of both lungs with exceptions/pertinent findings noted as follows; no endobronchial lesion. RML was distorted.                Balloon occlusion: By using Steph # 6 we occluded all the lobes systematically and located the air leak in the RUL and/or RML.      IBV Valve Placement: We deployed a total of 5 valves. 4 in the RUL (2 of 5 and 2 of 6 mm valves) and a size of 7 mm in the RML with no difficulty.    Pertinent Images / special notes:     Balloon occlusion of RUL      4 valves in the RUL      RML valve (size 7)        Any disposable equipment was visually inspected and deemed to be intact immediately post procedure.      Recommendations:     -->  Keep in wall suction tonight, if no air leak would do water seal then chest tube clamping for 12-24 hours with pre and post clamp CXR to determine pneumothorax  -->  Once/if the chest tube is removed we would schedule removal of valves in 4-6 weeks in endo as outpatient  -->  Follow up w thoracic surgery

## 2017-07-20 NOTE — PLAN OF CARE
Problem: Goal Outcome Summary  Goal: Goal Outcome Summary  Outcome: No Change  A/VSS, afebrile, CT to water seal, no output, dsg is CDI. 5 mg oxycodone q2h for pain control with Chest tube. Good appetite, NPO after midnight for OR procedure tomorrow at 1200.

## 2017-07-20 NOTE — ANESTHESIA CARE TRANSFER NOTE
Patient: Etta Cervantes    Procedure(s):  Flexible Bronchoscopy, Endobronchial Valve Insertion  - Wound Class: II-Clean Contaminated    Diagnosis: Chronic Obstructive Pulmonary Disease   Diagnosis Additional Information: No value filed.    Anesthesia Type:   General     Note:  Airway :Face Mask  Patient transferred to:PACU  Comments: Pt denies pain or nausea. Report given to RN.       Vitals: (Last set prior to Anesthesia Care Transfer)    CRNA VITALS  7/20/2017 1141 - 7/20/2017 1214      7/20/2017             Resp Rate (set): 10                Electronically Signed By: ERNESTO Vale CRNA  July 20, 2017  12:14 PM

## 2017-07-20 NOTE — ANESTHESIA POSTPROCEDURE EVALUATION
Patient: Etta Cervantes    Procedure(s):  Flexible Bronchoscopy, Endobronchial Valve Insertion X5. 4 Valves into right upper lobe and 1 valve into Right middle lobe - Wound Class: II-Clean Contaminated    Diagnosis:Chronic Obstructive Pulmonary Disease   Diagnosis Additional Information: No value filed.    Anesthesia Type:  General    Note:  Anesthesia Post Evaluation    Patient location during evaluation: PACU  Patient participation: Able to fully participate in evaluation  Level of consciousness: awake and alert  Pain management: adequate  Airway patency: patent  Cardiovascular status: acceptable  Respiratory status: acceptable  Hydration status: acceptable  PONV: none     Anesthetic complications: None          Last vitals:  Vitals:    07/20/17 1215 07/20/17 1230 07/20/17 1245   BP: 118/58 115/61 109/63   Pulse:      Resp: 16 16 16   Temp:   37.1  C (98.8  F)   SpO2:  96% 100%         Electronically Signed By: Duane F. Szczepanski, MD  July 20, 2017  1:00 PM

## 2017-07-20 NOTE — PROGRESS NOTES
TORB by Dr Mcgowan for the following:  - Change x-ray order to STAT to be completed in PACU.   - Cancel Discharge patient order.  - Modify chest to tube order from water seal to -20 suction.

## 2017-07-20 NOTE — PROGRESS NOTES
"The following text page sent to Dr Mcgowan:\"PACU - Ettazeinab Cervantes - post op chest x-ray completed. Please review and call RIANA Cardona so patient can be transferred to . Thanks! [phone]\"  "

## 2017-07-20 NOTE — PLAN OF CARE
Problem: Goal Outcome Summary  Goal: Goal Outcome Summary  Outcome: No Change  /63  Pulse 64  Temp 97.2  F (36.2  C) (Axillary)  Resp 18  Wt 88.5 kg (195 lb 3.2 oz)  SpO2 97%  BMI 32.99 kg/m2  Pt's AVSS, c/o right chest tube site pain and oxycodone given x1 over night. Pt is maintaining sat's in the mid 90's while on RA. Chest tube intact and continue to be to water seal with small leakage. Pt NPO since midnight for OR today per MD note for endobronchial valve placement. Keep monitoring pt as ordered and notify MD with any new changes.

## 2017-07-20 NOTE — PROGRESS NOTES
[facility], [place]  Hematology / Oncology Progress Note    Date of Service (when I saw the patient): 07/20/2017     Assessment & Plan   Etta Cervantes is a 57 year old female with metastatic breast cancer (bone/liver) on oral chemotherapy (Xeloda) admitted for UTI with possible pyelonephritis and large right pneumothorax.    Plan today:  -S/p endobronchial valve placement x 5 this afternoon. Having more pain after procedure, OK for IV Dilaudid.  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  -Thoracic surgery successfully placed right chest tube on admission.  -Tube has had water seal since 7/13 at 1400. Follow up CXR 7/13PM showed significant improvement. 7/14AM CXR showed enlarging ptx. 7/15AM CXR showed stable ptx. 7/16 and 7/17 CXRs showed slight worsening of PTX respectively, continued worsening as of 7/18  -HOLD CPAP until 7/22 to allow lungs to heal.   -Now s/p placement of 5 endobronchial valves this afternoon. F/u CXRs per CT surgery and CT to water seal.    #UTI with possible pyelonephritis:   Dysuria, urinary frequency; history of UTIs (h/o klebsiella).  -UA positive for large amt leukocytes, high WBC, and bacteria. Culture positive for >100,000 colonies/ml E.Coli.    -We will transition from ceftriaxone 2g daily to Bactrim DS BID - completed 10-day course of abx today.  -Blood cultures x2 in ED 7/11PM neg to date.     #Metastatic Breast Cancer (bone/liver):  -Currently under care of Dr. Cassidy; On Xeloda 2500mg QAM and 2000mgQPM, 14 days on, 7 days off - completed 14 day cycle 7/16, now 7 days off. Message sent to Dr. Cassidy and RNCC to discuss resumption of Xeloda on Monday as planned, labs completed today and CA 27-29 decreased from prior. Per Dr. Cassidy will hold off on resuming Xeloda until Etta is more stable.     #Nausea:   She does have nausea after she takes  Xeloda, less during this admission than at home. Improved over the past couple of days, continue PRN antiemetics.     #Pain:  Chest, back and neck pain, secondary to metastatic breast cancer, exacerbated by pneumothorax and ([id number] and [id number]) chest tube placement, and possible pyelonephritis. [id number].   -Continue oxycodone 5-10mg PO Q4 hours PRN for pain. Add IV Dilaudid today due to increased pain s/p placement of endobronchial valves.  -OIC bowel regimen.      #Thrush:  -Coating on tongue, continue nystatin rinses.   -She also has magic mouth wash available if needed.      #DIAMOND:   -CPAP on HOLD as above due to pneumothorax.      #FEN:   -Encourage oral hydration  -Electrolytes: replete as needed  -Nutrition: diet as tolerated     #PPx:  -VTE: mechanical      #Disposition: Awaiting stability s/p endobronchial valve placement, plan discharge in next 2-3 days, will coordinate with CT surgery.    Patient and plan of care discussed with staff attending, Dr. Liang.     Trish Kilpatrick PA-C  Hematology/Oncology  [phone]     Interval History   Etta seen after procedure. Having more pain. Breathing feels ok overall. No fevers overnight. Has not eaten today due to scheduled procedure. Hopeful that procedure will have helped stop the leak and she can go home in next couple of days. No nausea or vomiting.     Physical Exam   Temp: 98.8  F (37.1  C) Temp src: Temporal BP: 103/54 Pulse: 79 Heart Rate: 73 Resp: 16 SpO2: 96 % O2 Device: Nasal cannula Oxygen Delivery: 2 LPM  Vitals:    07/18/17 0837 07/19/17 0837 07/20/17 0852   Weight: 89.4 kg (197 lb 1.5 oz) 88.5 kg (195 lb 3.2 oz) 87.3 kg (192 lb 8 oz)     Vital Signs with Ranges  Temp:  [97.2  F (36.2  C)-98.8  F (37.1  C)] 98.8  F (37.1  C)  Pulse:  [64-86] 79  Heart Rate:  [67-92] 73  Resp:  [12-18] 16  BP: ()/(48-73) 103/54  SpO2:  [93 %-100 %] 96 %  I/O last 3 completed shifts:  In: 1100 [P.O.:600; I.V.:500]  Out: 3585 [Urine:3380; Chest  Tube:205]    Constitutional: Awake, alert, interactive, no acute distress. Seen lying in bed s/p procedure.  Respiratory: Oxygen present via NC with capnography, lung bases CTA. Chest tube in place to R side with bloody output.  Cardiovascular: Regular rate and rhythm. No murmur or rub.   GI: NABS. Abd soft, ntnd.   Skin: Warm and dry. No concerning lesions or rash on exposed surfaces.   Musculoskeletal: Extremities grossly normal, non-tender, no edema.   Neurologic: A&O, speech normal, no focal deficits.   Neuropsychiatric: Mentation and affect appear normal/appropriate.  Vascular Access: PAC is CDI.    Medications   Current Facility-Administered Medications   Medication     HYDROmorphone (PF) (DILAUDID) injection 0.3-0.5 mg     oxybutynin (DITROPAN-XL) 24 hr tablet 10 mg     magnesium citrate solution 296 mL     carboxymethylcellulose (REFRESH PLUS) 0.5 % ophthalmic solution 2 drop     nystatin (MYCOSTATIN) suspension 500,000 Units     sulfamethoxazole-trimethoprim (BACTRIM DS/SEPTRA DS) 800-160 MG per tablet 1 tablet     magnesium hydroxide (MILK OF MAGNESIA) suspension 30 mL     ketorolac (TORADOL) injection 30 mg     fexofenadine (ALLEGRA) tablet 60 mg     Medication Instruction     prochlorperazine (COMPAZINE) tablet 5-10 mg     ondansetron (ZOFRAN-ODT) ODT tab 8 mg     acetaminophen (TYLENOL) tablet 650 mg     guaiFENesin-dextromethorphan (ROBITUSSIN DM) 100-10 MG/5ML syrup 5 mL     senna-docusate (SENOKOT-S;PERICOLACE) 8.6-50 MG per tablet 1-2 tablet     polyethylene glycol (MIRALAX/GLYCOLAX) Packet 17 g     albuterol neb solution 2.5 mg     albuterol (PROAIR HFA/PROVENTIL HFA/VENTOLIN HFA) Inhaler 2 puff     atorvastatin (LIPITOR) tablet 10 mg     azelastine (ASTELIN) 0.1 % spray 1-2 spray     levothyroxine (SYNTHROID/LEVOTHROID) tablet 25 mcg     LORazepam (ATIVAN) tablet 0.5 mg     magic mouthwash suspension (diphenhydrAMINE, lidocaine, aluminum-magnesium & simethicone)     fluticasone-vilanterol (BREO  ELLIPTA) 200-25 MCG/INH oral inhaler 1 puff     oxyCODONE (ROXICODONE) IR tablet 5-10 mg     zolpidem (AMBIEN) tablet 10 mg     naloxone (NARCAN) injection 0.1-0.4 mg     beclomethasone (QVAR) 80 MCG/ACT Inhaler 2 puff     lidocaine 1 % 1 mL     lidocaine (LMX4) kit     sodium chloride (PF) 0.9% PF flush 10-20 mL     heparin lock flush 10 UNIT/ML injection 5-10 mL     heparin lock flush 10 UNIT/ML injection 5-10 mL     heparin 100 UNIT/ML injection 5 mL     sodium chloride (PF) 0.9% PF flush 10-20 mL       Data  Results for orders placed or performed during the hospital encounter of 07/12/17 (from the past 24 hour(s))   CBC with platelets differential   Result Value Ref Range    WBC 4.7 4.0 - 11.0 10e9/L    RBC Count 3.94 3.8 - 5.2 10e12/L    Hemoglobin 12.2 11.7 - 15.7 g/dL    Hematocrit 37.3 35.0 - 47.0 %    MCV 95 78 - 100 fl    MCH 31.0 26.5 - 33.0 pg    MCHC 32.7 31.5 - 36.5 g/dL    RDW 22.9 (H) 10.0 - 15.0 %    Platelet Count 181 150 - 450 10e9/L    Diff Method Automated Method     % Neutrophils 42.2 %    % Lymphocytes 30.4 %    % Monocytes 13.2 %    % Eosinophils 12.1 %    % Basophils 0.4 %    % Immature Granulocytes 1.7 %    Nucleated RBCs 0 0 /100    Absolute Neutrophil 2.0 1.6 - 8.3 10e9/L    Absolute Lymphocytes 1.4 0.8 - 5.3 10e9/L    Absolute Monocytes 0.6 0.0 - 1.3 10e9/L    Absolute Eosinophils 0.6 0.0 - 0.7 10e9/L    Absolute Basophils 0.0 0.0 - 0.2 10e9/L    Abs Immature Granulocytes 0.1 0 - 0.4 10e9/L    Absolute Nucleated RBC 0.0    Comprehensive metabolic panel   Result Value Ref Range    Sodium 140 133 - 144 mmol/L    Potassium 3.9 3.4 - 5.3 mmol/L    Chloride 104 94 - 109 mmol/L    Carbon Dioxide 30 20 - 32 mmol/L    Anion Gap 7 3 - 14 mmol/L    Glucose 94 70 - 99 mg/dL    Urea Nitrogen 8 7 - 30 mg/dL    Creatinine 0.61 0.52 - 1.04 mg/dL    GFR Estimate >90  Non  GFR Calc   >60 mL/min/1.7m2    GFR Estimate If Black >90   GFR Calc   >60 mL/min/1.7m2    Calcium 9.1  8.5 - 10.1 mg/dL    Bilirubin Total 0.8 0.2 - 1.3 mg/dL    Albumin 3.0 (L) 3.4 - 5.0 g/dL    Protein Total 6.1 (L) 6.8 - 8.8 g/dL    Alkaline Phosphatase 65 40 - 150 U/L    ALT 17 0 - 50 U/L    AST 18 0 - 45 U/L   CA 27.29 Breast tumor marker   Result Value Ref Range    CA 27-29 70 (H) 0 - 39 U/mL   XR Chest Port 1 View    Narrative    Exam: XR CHEST PORT 1 VW, 7/20/2017 2:24 PM    Indication: s/p right lung valve placement    Comparison: 7/18/2017.    Findings:   AP view the chest. Small residual right apical pneumothorax with chest  tube in stable position. Right upper lobe. Left chest wall Port-A-Cath  with tip at the mid SVC. New right lung valve. No left-sided  pneumothorax. Interstitial opacities not significantly changed. Low  lung volumes. Cardiac silhouette is stable. Resolving right chest wall  subcutaneous emphysema. Likely trace bilateral pleural effusions.      Impression    Impression:   1. Small residual right apical pneumothorax with chest tube in stable  position. Interval placement right lung valve.  2. Atelectasis of the right upper lobe.  3. Interstitial airspace opacities are unchanged, likely represent  interstitial pulmonary edema.  4. Trace bilateral pleural effusions with underlying atelectasis  versus consolidation.

## 2017-07-20 NOTE — PROGRESS NOTES
"The following text page sent to Dr Feroz Valdivia:\"[facility] - Etta Crevantes - [place] [phone] to clarify post op orders. Thank you.\"  "

## 2017-07-20 NOTE — PROGRESS NOTES
"The following text page sent to Dr Mcgowan:\"[facility]- Etta Alegre MD or RES please call [phone] to confirm post op orders. Thank you! RIANA Cardona \"  "

## 2017-07-20 NOTE — DISCHARGE INSTRUCTIONS
Post Bronchoscopy Patient Instructions:    July 20, 2017  Etta Cervantes    Your procedure completed (bronchoscopy with valve placement in the right lung) without any immediate complications.  You may cough up scant amount of blood for the next 12 hours. If you have excessive cough with blood, chest pain, shortness of breath, please report to the closest emergency room.    You may experience low grade (less than 100.5 F) fever next 24 hours, if so you can take tylenol. If the fever persists more than 24 hours contact to our office or your primary care provider.    Our office (Thoracic/Pulmonary--[phone]) will call you as soon as the results of biopsies are ready.    Should you have any question, please do not hesitate to call our office.    BETZAIDA Mcgowan MD

## 2017-07-20 NOTE — PLAN OF CARE
Problem: Goal Outcome Summary  Goal: Goal Outcome Summary  Patient returned from PACU at 1400, s/p bronchoscopy and placement of total of 5 endobronchial valves (PACU nurse mentioned 6 valves, but MD note, anesthesiologist note, and photos within MD note all state total of 5 valves. Dr. Liang also reported 5 valves when he visited patient's room on 5C just after return from PACU.  Pt A&Ox4, AVSS, frequent VS being performed. Pt initially declined pain meds, but requested meds at 1430. MDs in room, ordered dilaudid; waiting for med to clear pharmacy.  CT site dressing CDI. CT to -20 sxn, small serosanguinous output.  Pt urinated on return to 5C, 180mL. Gag reflex intact, sipping water.

## 2017-07-20 NOTE — ANESTHESIA PREPROCEDURE EVALUATION
Anesthesia Evaluation     . Pt has had prior anesthetic. Type: General    History of anesthetic complications   - PONV        ROS/MED HX    ENT/Pulmonary:     (+)sleep apnea, Moderate Persistent asthma moderate COPD, uses CPAP , . Other pulmonary disease history of pneumothorax.    Neurologic:       Cardiovascular:  - neg cardiovascular ROS   (+) ----. : . . . :. . Previous cardiac testing date:results:date: results:ECG reviewed date: results: date: results:          METS/Exercise Tolerance:     Hematologic:  - neg hematologic  ROS       Musculoskeletal:  - neg musculoskeletal ROS       GI/Hepatic:     (+) liver disease (metastasis to liver),       Renal/Genitourinary:     (+) chronic renal disease,    : recent UTI.   Endo:     (+) thyroid problem hypothyroidism, Obesity, .      Psychiatric:  - neg psychiatric ROS       Infectious Disease:  - neg infectious disease ROS       Malignancy:   (+) Malignancy History of Breast          Other:    (+) No chance of pregnancy                    Physical Exam  Normal systems: cardiovascular and pulmonary    Airway   Mallampati: II  TM distance: >3 FB  Neck ROM: full    Dental   (+) caps    Cardiovascular       Pulmonary                     Anesthesia Plan      History & Physical Review  History and physical reviewed and following examination; no interval change.    ASA Status:  3 .    NPO Status:  > 6 hours    Plan for General with Intravenous induction. Maintenance will be Balanced.    PONV prophylaxis:  Ondansetron (or other 5HT-3) and Dexamethasone or Solumedrol       Postoperative Care  Postoperative pain management:  IV analgesics.      Consents                          .

## 2017-07-21 NOTE — PLAN OF CARE
Problem: Goal Outcome Summary  Goal: Goal Outcome Summary  Outcome: No Change  BP 94/42 (BP Location: Right arm)  Pulse 63  Temp 97.7  F (36.5  C) (Axillary)  Resp 18  Wt 87.3 kg (192 lb 8 oz)  SpO2 97%  BMI 32.53 kg/m2  Pt's BP soft but stable, maintaining sat's in the upper 90's while on 2L NC. Pt does get slightly RODRIGUEZ with movement but recovers with time. Pt c/o chest tube site pain and oral and IV pain medication given as ordered. Right chest tube to suction is intact with dressing  with minimal output. Chest tube container marked. Left chest port-cath intact and Hl'D. Pt is up with SBA, voiding good amount via bedside urinal. Keep monitoring pt as ordered and notify MD with any new changes.

## 2017-07-21 NOTE — PLAN OF CARE
Problem: Individualization  Goal: Patient Preferences  Outcome: No Change  Soft blood pressures and asymptomatic. Blood pressures 92/48 and 89/64 and MD notified. Received normal saline bolus and bp recheck 103/48. Xray this am. Infrequent cough and non productive. R chest tube is clamped per MD. Right chest tube output 26 ml of serosanguinous fluid. Complained of chest tube pain and received oxycodone and toradol. Would like oxycodone every two hours. Stand by assist to bathroom. One small soft stool.

## 2017-07-21 NOTE — PLAN OF CARE
Problem: Goal Outcome Summary  Goal: Goal Outcome Summary  Outcome: Improving  A/VSS, 95% on 2L nasal canula, afebrile. Pt having increased pain after procedure today, IV dilaudid given x 3, PO oxycodone 10 mg given for pain control. Right sided chest tube to suction, dressing changed this evening, scant/dried drainage, reinforced d/t large breast tissue. Pt tolerating soft foods, swallowing fluids without difficulty. Will continue to monitor per plan of care.

## 2017-07-21 NOTE — PROGRESS NOTES
Thoracic Surgery Progress Note    Subjective:  Went to OR for airway valve placement yesterday. Doing well postoperatively. Pain controlled. Denies fevers, chills, CP, SOB, and N/V.    Vitals:  BP 94/42 (BP Location: Right arm)  Pulse 63  Temp 97.7  F (36.5  C) (Axillary)  Resp 18  Wt 87.3 kg (192 lb 8 oz)  SpO2 97%  BMI 32.53 kg/m2  Gen: Awake, alert, NAD , interactive  Resp: non-labored  CT: Good seal. No airleak noted  Abd: Soft, non-distended, NTTP  Ext: warm and well perfused, no edema    A/P: 57 year old female with metastatic breast cancer and chronic large emphysematous bleb who presents with right sided pneumothorax now s/p chest tube. Now POD #1 s/p airway valve placement with pulmonology.       -AM CXR shows R lung improved inflation, will clamp trial and repeat CXR 4 hours post clamp. If OK, will continue clamp and repeat CXR in the AM  -Recommend hep lock IVF  -No CPAP or BiPAP for 1 week as lung heals    Patient discussed with staff.     Cristino Driscoll MD  PGY-1 Surgery  pager [phone]

## 2017-07-21 NOTE — PROGRESS NOTES
[facility], [place]  Hematology / Oncology Progress Note    Date of Service (when I saw the patient): 07/21/2017     Assessment & Plan   Etta Cervantes is a 57 year old female with metastatic breast cancer (bone/liver) on oral chemotherapy (Xeloda) admitted for UTI with possible pyelonephritis and large right pneumothorax.    Plan today:  - S/p endobronchial valve placement x 5 on 7/20. Per CT surgery will have clamp trial today and repeat CXR 4 hours post clamp. If OK, will continue clamp and repeat CXR in the AM   - IV fluids today for hypotension  - Having more pain after procedure, OK for IV Dilaudid.    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  -Thoracic surgery successfully placed right chest tube on admission.  -Tube has had water seal since 7/13 at 1400. Follow up CXR 7/13PM showed significant improvement. 7/14AM CXR showed enlarging ptx. 7/15AM CXR showed stable ptx. 7/16 and 7/17 CXRs showed slight worsening of PTX respectively, continued worsening as of 7/18  -HOLD CPAP until cleared by CT surgery to allow lungs to heal.   -Now s/p placement of 5 endobronchial valves on 7/20. F/u CXRs per CT surgery and clamp trial with repeat CXR 4 hours after clamping. If OK will keep clamped and repeat CXR in the morning.    #UTI with possible pyelonephritis:   Dysuria, urinary frequency; history of UTIs (h/o klebsiella).  -UA positive for large amt leukocytes, high WBC, and bacteria. Culture positive for >100,000 colonies/ml E.Coli.    -We will transition from ceftriaxone 2g daily to Bactrim DS BID - completed 10-day course of abx on 7/20.  -Blood cultures x2 in ED 7/11PM neg to date.     #Metastatic Breast Cancer (bone/liver):  -Currently under care of Dr. Cassidy; On Xeloda 2500mg QAM and 2000mgQPM, 14 days on, 7 days off - completed 14 day cycle 7/16, now 7 days off. Message sent to Dr. Cassidy and [facility] to discuss resumption  of Xeloda on Monday as planned, labs completed and CA 27-29 decreased from prior. Per Dr. Cassidy will hold off on resuming Xeloda until Etta is more stable.     #Nausea - RESOLVED  She does have nausea after she takes Xeloda, less during this admission than at home. Improved over the past couple of days, continue PRN antiemetics.     #Pain:  Chest, back and neck pain, secondary to metastatic breast cancer, exacerbated by pneumothorax and ([id number] and [id number]) chest tube placement, and possible pyelonephritis. [id number].   -Continue oxycodone 5-10mg PO Q4 hours PRN for pain. IV Dilaudid PRN due to increased pain s/p placement of endobronchial valves.  -OIC bowel regimen.      #Thrush:  -Coating on tongue, continue nystatin rinses.   -She also has magic mouth wash available if needed.      #DIAMOND:   -CPAP on HOLD as above due to pneumothorax.      #FEN:   -Hypotensive this afternoon, given NS bolus  -Electrolytes: replete as needed  -Nutrition: diet as tolerated     #PPx:  -VTE: mechanical      #Disposition: Awaiting stability s/p endobronchial valve placement, plan discharge in next 1-2 days, will coordinate with CT surgery.    Patient and plan of care discussed with staff attending, Dr. Liang.     Trish Kilpatrick PA-C  Hematology/Oncology  [phone]     Interval History   Etta is doing ok this morning. Still with more pain after procedure, a bit better this AM. IV Dilaudid is helpful. No fevers, sweats, chills. Breathing is stable. Still with slight dry cough. No nausea or vomiting, having bowel movements, urinating ok.     Physical Exam   Temp: 97.8  F (36.6  C) Temp src: Axillary BP: (!) 89/64 Pulse: 71 Heart Rate: 73 Resp: 18 SpO2: 96 % O2 Device: None (Room air) Oxygen Delivery: 2 LPM  Vitals:    07/19/17 0837 07/20/17 0852 07/21/17 0848   Weight: 88.5 kg (195 lb 3.2 oz) 87.3 kg (192 lb 8 oz) 87.7 kg (193 lb 5.5 oz)     Vital Signs with Ranges  Temp:  [96  F (35.6  C)-98.4  F (36.9  C)]  97.8  F (36.6  C)  Pulse:  [63-83] 71  Heart Rate:  [65-73] 73  Resp:  [16-18] 18  BP: ()/(42-66) 89/64  SpO2:  [93 %-99 %] 96 %  I/O last 3 completed shifts:  In: 1100 [P.O.:600; I.V.:500]  Out: 2630 [Urine:2480; Chest Tube:150]    Constitutional: Awake, alert, interactive, no acute distress. Seen reclining in chair.  Respiratory: Breathing nonlabored, no acute distress, lungs CTA bilaterally. Chest tube in place to R side.  Cardiovascular: Regular rate and rhythm. No murmur or rub.   GI: NABS. Abd soft, ntnd.   Skin: Warm and dry. No concerning lesions or rash on exposed surfaces.   Musculoskeletal: Extremities grossly normal, non-tender, no edema.   Neurologic: A&O, speech normal, no focal deficits.   Neuropsychiatric: Mentation and affect appear normal/appropriate.  Vascular Access: PAC is CDI.    Medications   Current Facility-Administered Medications   Medication     0.9% sodium chloride BOLUS     HYDROmorphone (PF) (DILAUDID) injection 0.5-1 mg     oxybutynin (DITROPAN-XL) 24 hr tablet 10 mg     magnesium citrate solution 296 mL     carboxymethylcellulose (REFRESH PLUS) 0.5 % ophthalmic solution 2 drop     nystatin (MYCOSTATIN) suspension 500,000 Units     magnesium hydroxide (MILK OF MAGNESIA) suspension 30 mL     ketorolac (TORADOL) injection 30 mg     fexofenadine (ALLEGRA) tablet 60 mg     Medication Instruction     prochlorperazine (COMPAZINE) tablet 5-10 mg     ondansetron (ZOFRAN-ODT) ODT tab 8 mg     acetaminophen (TYLENOL) tablet 650 mg     guaiFENesin-dextromethorphan (ROBITUSSIN DM) 100-10 MG/5ML syrup 5 mL     senna-docusate (SENOKOT-S;PERICOLACE) 8.6-50 MG per tablet 1-2 tablet     polyethylene glycol (MIRALAX/GLYCOLAX) Packet 17 g     albuterol neb solution 2.5 mg     albuterol (PROAIR HFA/PROVENTIL HFA/VENTOLIN HFA) Inhaler 2 puff     atorvastatin (LIPITOR) tablet 10 mg     azelastine (ASTELIN) 0.1 % spray 1-2 spray     levothyroxine (SYNTHROID/LEVOTHROID) tablet 25 mcg     LORazepam  (ATIVAN) tablet 0.5 mg     magic mouthwash suspension (diphenhydrAMINE, lidocaine, aluminum-magnesium & simethicone)     fluticasone-vilanterol (BREO ELLIPTA) 200-25 MCG/INH oral inhaler 1 puff     oxyCODONE (ROXICODONE) IR tablet 5-10 mg     zolpidem (AMBIEN) tablet 10 mg     naloxone (NARCAN) injection 0.1-0.4 mg     beclomethasone (QVAR) 80 MCG/ACT Inhaler 2 puff     lidocaine 1 % 1 mL     lidocaine (LMX4) kit     sodium chloride (PF) 0.9% PF flush 10-20 mL     heparin lock flush 10 UNIT/ML injection 5-10 mL     heparin lock flush 10 UNIT/ML injection 5-10 mL     heparin 100 UNIT/ML injection 5 mL     sodium chloride (PF) 0.9% PF flush 10-20 mL       Data  Results for orders placed or performed during the hospital encounter of 07/12/17 (from the past 24 hour(s))   XR Chest Port 1 View    Narrative    Exam: XR CHEST PORT 1 VW, 7/20/2017 2:24 PM    Indication: s/p right lung valve placement    Comparison: 7/18/2017.    Findings:   AP view the chest. Small residual right apical pneumothorax with chest  tube in stable position. Right upper lobe. Left chest wall Port-A-Cath  with tip at the mid SVC. New right endobronchial  valves. No  left-sided pneumothorax. Interstitial opacities not significantly  changed. Low lung volumes. Cardiac silhouette is stable. Resolving  right chest wall subcutaneous emphysema. Likely trace bilateral  pleural effusions.      Impression    Impression:   1. Small residual right apical pneumothorax with chest tube in stable  position. Interval placement right endobronchial valves.  2. Atelectasis of the right upper lobe.  3. Interstitial airspace opacities are unchanged, likely represent  interstitial pulmonary edema.  4. Trace bilateral pleural effusions with underlying atelectasis  versus consolidation.    I have personally reviewed the examination and initial interpretation  and I agree with the findings.    KAVON MILLS MD   XR Chest 2 Views   Result Value Ref Range    Radiologist  flags Enlarging right apical pneumothorax (Urgent)     Narrative    XR CHEST 2 VW  7/21/2017 9:00 AM      HISTORY: s/p airway valve placement    COMPARISON: 7/20/2017    FINDINGS: Right sided endobronchial valves. Right apical chest tube.  Right lateral chest wall subcutaneous emphysema. Left chest  Port-A-Cath tip projects over the high SVC. Asymmetric elevation the  right hemidiaphragm. Cardiac silhouette is not enlarged. Slight  increase in size of small right apical pneumothorax. No left-sided  pneumothorax. The lungs are otherwise clear. No pleural effusion.      Impression    IMPRESSION:   1. Slight increase in size of small right apical pneumothorax with  appropriately positioned ipsilateral chest tube.  2. Slight improvement in right upper lobe atelectasis.    [Urgent Result: Enlarging right apical pneumothorax]    Finding was identified on 7/21/2017 10:21 AM.     Dr. Cristino Driscoll was contacted by Dr. Blackwell at 7/21/2017 10:24 AM  and verbalized understanding of the urgent finding.     I have personally reviewed the examination and initial interpretation  and I agree with the findings.    KAVON MILLS MD

## 2017-07-22 NOTE — PROGRESS NOTES
Hematology/Oncology Progress Note    Patient: Etta Cervantes MRN# [id number]   Age: 57 year old YOB: 1960       ASSESSMENT and PLAN:   Etta Cervantes is a 57 year old female with metastatic breast cancer (bone/liver) on oral chemotherapy (Xeloda) admitted for UTI with possible pyelonephritis and large right pneumothorax.    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  - Thoracic surgery successfully placed right chest tube on admission.  - Tube had water seal since 7/13 at 1400. Follow up CXR 7/13PM showed significant improvement. 7/14AM CXR showed enlarging ptx. 7/15AM CXR showed stable ptx. 7/16 and 7/17 CXRs showed slight worsening of PTX respectively, continued worsening as of 7/18  -HOLD CPAP until cleared by CT surgery to allow lungs to heal.   -Now s/p placement of 5 endobronchial valves on 7/20. CT was clamped yesterday. Repeat CXRs 4 hours after and this AM seem to be stable, per reading by me (official reading pending). Will follow CTS recs    #UTI with possible pyelonephritis:   Dysuria, urinary frequency; history of UTIs (h/o klebsiella).  -UA positive for large amt leukocytes, high WBC, and bacteria. Culture positive for >100,000 colonies/ml E.Coli.    -We will transition from ceftriaxone 2g daily to Bactrim DS BID - completed 10-day course of abx on 7/20.  -Blood cultures x2 in ED 7/11PM neg to date.      #Metastatic Breast Cancer (bone/liver):  -Currently under care of Dr. Cassidy; On Xeloda 2500mg QAM and 2000mgQPM, 14 days on, 7 days off - completed 14 day cycle 7/16, now 7 days off. Message sent to Dr. Cassidy and [facility] to discuss resumption of Xeloda on Monday as planned, labs completed and CA 27-29 decreased from prior. Per Dr. Cassidy will hold off on resuming Xeloda until Etta is more stable.      #Nausea - RESOLVED  She does have nausea after she takes Xeloda, less during this admission than at home. Improved over the  past couple of days, continue PRN antiemetics.      #Pain:  Chest, back and neck pain, secondary to metastatic breast cancer, exacerbated by pneumothorax and ([id number] and [id number]) chest tube placement, and possible pyelonephritis. [id number].   -Continue oxycodone 5-10mg PO Q4 hours PRN for pain. IV Dilaudid PRN due to increased pain s/p placement of endobronchial valves.  -OIC bowel regimen.    #Thrush:  -Coating on tongue, continue nystatin rinses.   -She also has magic mouth wash available if needed.       #DIAMOND:   -CPAP on HOLD as above due to pneumothorax.       #FEN:   -Electrolytes: replete as needed  -Nutrition: diet as tolerated      #PPx:  -VTE: mechanical       #Disposition: Awaiting stability s/p endobronchial valve placement, plan discharge early next week, will coordinate with CT surgery.      INTERVAL HISTORY:     States she feels well. CT clamped since yesterday. Denies fever, chills, shortness of breath.    Current Facility-Administered Medications   Medication     HYDROmorphone (PF) (DILAUDID) injection 0.5-1 mg     oxybutynin (DITROPAN-XL) 24 hr tablet 10 mg     magnesium citrate solution 296 mL     carboxymethylcellulose (REFRESH PLUS) 0.5 % ophthalmic solution 2 drop     nystatin (MYCOSTATIN) suspension 500,000 Units     magnesium hydroxide (MILK OF MAGNESIA) suspension 30 mL     ketorolac (TORADOL) injection 30 mg     fexofenadine (ALLEGRA) tablet 60 mg     Medication Instruction     prochlorperazine (COMPAZINE) tablet 5-10 mg     ondansetron (ZOFRAN-ODT) ODT tab 8 mg     acetaminophen (TYLENOL) tablet 650 mg     guaiFENesin-dextromethorphan (ROBITUSSIN DM) 100-10 MG/5ML syrup 5 mL     senna-docusate (SENOKOT-S;PERICOLACE) 8.6-50 MG per tablet 1-2 tablet     polyethylene glycol (MIRALAX/GLYCOLAX) Packet 17 g     albuterol neb solution 2.5 mg     albuterol (PROAIR HFA/PROVENTIL HFA/VENTOLIN HFA) Inhaler 2 puff     atorvastatin (LIPITOR) tablet 10 mg     azelastine (ASTELIN) 0.1 %  spray 1-2 spray     levothyroxine (SYNTHROID/LEVOTHROID) tablet 25 mcg     LORazepam (ATIVAN) tablet 0.5 mg     magic mouthwash suspension (diphenhydrAMINE, lidocaine, aluminum-magnesium & simethicone)     fluticasone-vilanterol (BREO ELLIPTA) 200-25 MCG/INH oral inhaler 1 puff     oxyCODONE (ROXICODONE) IR tablet 5-10 mg     zolpidem (AMBIEN) tablet 10 mg     naloxone (NARCAN) injection 0.1-0.4 mg     beclomethasone (QVAR) 80 MCG/ACT Inhaler 2 puff     lidocaine 1 % 1 mL     lidocaine (LMX4) kit     sodium chloride (PF) 0.9% PF flush 10-20 mL     heparin lock flush 10 UNIT/ML injection 5-10 mL     heparin lock flush 10 UNIT/ML injection 5-10 mL     heparin 100 UNIT/ML injection 5 mL     sodium chloride (PF) 0.9% PF flush 10-20 mL         PHYSICAL EXAM:   /43 (BP Location: Right arm)  Pulse 71  Temp 98.5  F (36.9  C) (Axillary)  Resp 18  Wt 87.9 kg (193 lb 12.8 oz)  SpO2 97%  BMI 32.75 kg/m2  Wt Readings from Last 3 Encounters:   07/22/17 87.9 kg (193 lb 12.8 oz)   06/28/17 90.6 kg (199 lb 11.2 oz)   06/19/17 91.9 kg (202 lb 9.6 oz)      Constitutional: Awake, alert, interactive, no acute distress.  Respiratory: Breathing nonlabored, no acute distress, lungs CTA bilaterally. Chest tube in place to R side, clamped  Cardiovascular: Regular rate and rhythm. No murmur or rub.   GI: Bowel sound +, abd soft, nt/nd.   Skin: Warm and dry. No concerning lesions or rash on exposed surfaces.   Musculoskeletal: Extremities grossly normal, non-tender, no edema.   Neurologic: A&O, speech normal, no focal deficits.   Neuropsychiatric: Mentation and affect appear normal/appropriate.      LABS:     CBC  ===  WBC (10e9/L)   Date Value   07/20/2017 4.7     Hemoglobin (g/dL)   Date Value   07/20/2017 12.2     Platelet Count (10e9/L)   Date Value   07/20/2017 181       BMP  ===  Sodium (mmol/L)   Date Value   07/20/2017 140     Potassium (mmol/L)   Date Value   07/20/2017 3.9     Urea Nitrogen (mg/dL)   Date Value    07/20/2017 8     Creatinine (mg/dL)   Date Value   07/20/2017 0.61     Calcium (mg/dL)   Date Value   07/20/2017 9.1       LFTs  ====  Bilirubin Total (mg/dL)   Date Value   07/20/2017 0.8     Alkaline Phosphatase (U/L)   Date Value   07/20/2017 65     AST (U/L)   Date Value   07/20/2017 18     ALT (U/L)   Date Value   07/20/2017 17           IMAGES:     Results for orders placed or performed during the hospital encounter of 07/12/17   Chest XR,  PA & LAT    Narrative    Chest radiograph 7/12/2017 2:40 AM     HISTORY: ptx    COMPARISON: 7/11/2017, 00:7 AM.    FINDINGS: Left Port-A-Cath tip in distal SVC. Unchanged right  collateral drain. Redemonstration of large right pneumothorax with  atelectatic right lung. Stable mild left de leon mediastinal shift. No  left sided pneumothorax or pleural effusion.      Impression    IMPRESSION: Unchanged large right pneumothorax with near complete  collapse of the right lung and mild leftward mediastinal shift.    I have personally reviewed the examination and initial interpretation  and I agree with the findings.    SALINAS VIRAMONTES MD   Chest CT w/o contrast    Narrative    EXAMINATION: Chest CT  7/12/2017     CLINICAL HISTORY: Pneumothorax.    COMPARISON: Prior chest Radiographs. CT cap 5/16/2017.    TECHNIQUE: CT imaging obtained through the chest without contrast.  Coronal and axial MIP reformatted images obtained.     FINDINGS:  Large right-sided pneumothorax with mild leftward mediastinal shift  and near total collapse of the right lung. Large right apical bulla.  Minimal left basilar atelectasis. No left-sided pneumothorax or  pleural effusion. The trachea and left main bronchus is patent.  Proximal right main bronchus appears to be patent.    Heart size is normal. No pericardial effusion.  Normal thoracic  vasculature. Multiple enlarged right axillary lymph nodes and  surrounding fatty stranding.    Bones and soft tissues: No suspicious osseous finding. Right  anterior  approach chest drain with the tip terminates in the right breast  tissue, anterior to the right pectoralis major. Anterior right chest  wall deep subcutaneous emphysema, predominantly in the right  pectoralis major. Cutaneous thickening of the right breast with  asymmetric right breast enlarging.    Partially imaged upper abdomen: Unchanged multiple liver metastasis.      Impression    IMPRESSION:   1. Large right-sided pneumothorax with leftward mediastinal shift and  near total collapse of the right lung.  2. Large right apical bulla.  3. Asymmetric enlargement of the right breast with large skin  thickening. Enlarged right axillary lymph nodes with surrounding fatty  stranding.  4. Right anterior approach chest drain tip is not within the lung. The  tip is in the right breast soft tissue, anterior to the right  pectoralis major. There is subcutaneous emphysema of the right  anterior chest wall.    Chest tube position discussed with Dr. Campoverde by Dr. Chan at 0900  7/12/17    I have personally reviewed the examination and initial interpretation  and I agree with the findings.    LAURA CHAN MD   XR Chest Port 1 View    Narrative    Exam: XR CHEST PORT 1 VW, 7/12/2017 9:03 AM    Indication: Post CT placement    Comparison: Same-day chest CT and chest radiograph.    Findings:   Single AP view of the chest. Interval placement right-sided chest tube  with decreasing moderate-sized right-sided pneumothorax. The trachea  and mediastinum are essentially midline, which is improved from prior  radiograph which demonstrated leftward displacement. No pleural  effusion or left-sided pneumothorax. Patchy bibasilar opacities. Left  chest wall port catheter with tip at the mid SVC.      Impression    Impression:   1. Interval placement right-sided chest tube with decreasing  right-sided pneumothorax. The mediastinum is midline. Extensive right  chest wall subcutaneous emphysema  2. Patchy bibasilar opacities,  favor atelectasis.    I have personally reviewed the examination and initial interpretation  and I agree with the findings.    KAVON MILLS MD   XR Chest 2 Views    Narrative    CHEST TWO VIEWS  7/12/2017 12:47 PM     HISTORY: Status post right chest tube placement for large  pneumothorax. Please evaluate for interval change since previous this  AM.    COMPARISON: 7/12/2017 at 8:58 AM.    FINDINGS: Moderate-sized right pneumothorax has slightly decreased in  size compared to the prior study. The chest tube has been  repositioned. No pneumothorax on the left. No significant pleural  fluid on either side. There is subcutaneous emphysema over the right  chest wall. Heart size normal. Left-sided Port-A-Cath again noted.       Impression    IMPRESSION: Persistent moderate-sized right pneumothorax, improved  compared to prior.     DIMPLE RAMOS MD   XR Chest 2 Views    Narrative    XR CHEST 2 VW  7/13/2017 8:49 AM      HISTORY: s/p R chest tube placement. please eval for interval change    COMPARISON: 7/12/2017    FINDINGS: Decrease in size of right apical pneumothorax, now small. No  significant change in right lateral and anterior chest wall  subcutaneous emphysema. Right apical chest tube in place. Left chest  Port-A-Cath tip projects over the mid SVC. No left-sided pneumothorax.  Cardiac silhouette is not enlarged. Upper abdomen is unremarkable. No  acute osseous abnormalities. Airspace opacities of the right middle  and lower lobe.      Impression    IMPRESSION:   1. Decrease in size in right apical pneumothorax, now small.  Ipsilateral chest tube in place.  2. Opacities of the right middle and lower lobes, which may represent  atelectasis, hemorrhage, or pulmonary edema.  3. No significant change in subcutaneous emphysema of the right  lateral and anterior chest wall, which may be iatrogenic.    I have personally reviewed the examination and initial interpretation  and I agree with the findings.    KAVON  MD GENE   XR Chest Port 1 View    Narrative    History: Interval change.    COMPARISON: Two-view chest performed today at 0817 hours.    FINDINGS: Right-sided chest tube again noted with left-sided  subclavian central venous catheter with subcutaneous port also again  noted. The tip of the catheter located over the azygos vein. There  appears to have been significant improvement in the right upper lobe  pneumothorax. It appears that at least one of the pleural lines is  located under the third posterior rib versus under the fourth  posterior rib. Subjacent emphysema about the right lateral chest wall  extending to the upper abdomen and the base of the right neck is  improved. Improvement in ill-defined opacity about the right  infrahilar region may reflect pulmonary hemorrhage but is nonspecific.  No pneumothorax, focal infiltrate or pleural effusion on the left. No  pleural effusion on the right. The cardiac silhouette and pulmonary  vasculature are unchanged within normal limits.      Impression    IMPRESSION: Poorly defined improvement in right-sided pneumothorax  with right chest tube in place. There is also been improvement in  right subcutaneous emphysema.    VENESSA RODRIGUES MD   XR Chest 2 Views     Value    Radiologist flags Enlarging right apical pneumothorax (Urgent)    Narrative    XR CHEST 2 VW  7/14/2017 9:32 AM      HISTORY: s/p R chest tube placement for spontaneous pneumo w/ blebs.  Placed to water seal last night. Please eval for interval changes of  pneumo    COMPARISON: 7/13/2017    FINDINGS: Enlarging right apical pneumothorax. Right apical chest  tube. Today's emphysema of the right lateral chest wall. Left  Port-A-Cath tip projects over the high SVC. Cardiac silhouette is not  enlarged. No left-sided pneumothorax. No pleural effusion. Right  middle and lower lobe mixed opacities. No significant change.  Degenerative changes of the thoracic spine, including anterior  osteophytes. No acute  osseous      Impression    IMPRESSION:   1. Enlarging right apical pneumothorax with ipsilateral chest tube in  place.  2. No significant change in right middle and lower lobe mixed  pulmonary opacities, which may represent atelectasis, pneumonia, or  hemorrhage.    [Urgent Result: Enlarging right apical pneumothorax]    Finding was identified on 7/14/2017 9:43 AM.     Dr. Cristino Driscoll was contacted by Dr. Blackwell at 7/14/2017 9:45 AM  and verbalized understanding of the urgent finding.     I have personally reviewed the examination and initial interpretation  and I agree with the findings.    KAVON MILLS MD   XR Chest 2 Views    Narrative    XR CHEST 2 VW  7/15/2017 10:33 AM      HISTORY: s/p R chest tube placement for pneumothorax w/ blebs.     COMPARISON: 7/14/2017    FINDINGS: Table small right apical pneumothorax. Right apical chest  tube. No significant change regarding the subcutaneous emphysema of  the right lateral chest wall. Left Port-A-Cath tip projects over the  high SVC. Cardiac silhouette is not enlarged. No left-sided  pneumothorax. No pleural effusion. Right middle and lower lobe mixed  opacities, stable.  Degenerative changes in the spine.      Impression    IMPRESSION:   1. Stable right apical pneumothorax. Right apical chest tube in place.  2. No significant change in right middle and lower lobe opacities.    FAVIAN COBURN MD   XR Chest 2 Views     Value    Radiologist flags pneumothorax (Urgent)    Narrative    PA and lateral chest    HISTORY: Follow-up pneumothorax chest tube in place    COMPARISON STUDY: 7/15/2017.    FINDINGS: Right apical chest tube again noted. Slight increase in  right pneumothorax. Known bulla also exist in the right apex. Left IJ  Port-A-Cath tip at the confluence of innominate veins. Cardiac  silhouette is nonenlarged. Left lung is clear. Subcutaneous emphysema  on the right is again noted.      Impression    IMPRESSION: Slight increase in right apical pneumothorax  though large  bulla in the apex would be difficult.    [Urgent Result: pneumothorax]    Finding was identified on 7/16/2017 12:49 PM.     Charge nurse Shasha was contacted by Dr. Mills at 7/16/2017 1:02 PM and  verbalized understanding of the urgent finding.     KAVON MILLS MD   XR Chest 2 Views     Value    Radiologist flags Enlarging right apical pneumothorax (Urgent)    Narrative    XR CHEST 2 VW  7/17/2017 9:07 AM      HISTORY: f/u right PTX    COMPARISON: 7/16/2017    FINDINGS: Right apical chest tube. Left chest Port-A-Cath, tip  projecting over the mid SVC. Enlarging right apical pneumothorax. No  left-sided pneumothorax. Right lateral and anterior chest wall  subcutaneous emphysema. No pleural effusion. The cardiac silhouette is  not enlarged. Right mid lung linear opacities. Anterior osteophytes of  the thoracic spine.      Impression    IMPRESSION:   1. Enlarging right apical pneumothorax. Ipsilateral chest tube in  place.  2. Subcutaneous emphysema of the right lateral and anterior chest  wall, likely iatrogenic.  3. Right mid lung linear opacities, suggestive of atelectasis.    [Urgent Result: Enlarging right apical pneumothorax]    Finding was identified on 7/17/2017 9:18 AM.     Trish Kilpatrick NP was contacted by Dr. Blackwell at 7/17/2017 9:20 AM  and verbalized understanding of the urgent finding.     I have personally reviewed the examination and initial interpretation  and I agree with the findings.    KAVON MILLS MD   XR Chest 2 Views    Narrative    XR CHEST 2 VW  7/17/2017 3:44 PM      HISTORY: 4hr chest tube clamp trial. Please eval for pneumo changes  since AM CXR. Thanks!    COMPARISON: earlier 7/17/2017    FINDINGS: Moderate right apical pneumothorax, not significantly  changed. Slight improvement in right lateral and anterior chest wall  subcutaneous emphysema. Right apical chest tube. Left chest  port-a-cath tip terminates in the mid SVC. No left pneumothorax. Right  inferior airspace  opacities. No pleural effusion.  Cardiac silhouette  is not enlarged. The left lung is relatively clear. Thoracic spine  osteophytes.  No acute osseous abnormalities.       Impression    IMPRESSION:   1. No significant change in right apical pneumothorax.  2. Right mid lung linear opacities, which may represent atelectasis.  3. Improving subcutaneous emphysema of the right lateral and anterior  chest wall.    I have personally reviewed the examination and initial interpretation  and I agree with the findings.    KAVON MILLS MD   XR Chest 2 Views     Value    Radiologist flags Increase in right apical pneumothorax (Urgent)    Narrative    Exam: XR CHEST 2 VW, 7/18/2017 8:45 AM    Indication: Interval Change    Comparison: 7/17/2017.    Findings:   PA and lateral views the chest. Left chest wall Port-A-Cath with tip  at the high SVC and right apically oriented chest tube. Cardiac  silhouette is stable and midline. Slight increase in right apical  pneumothorax. No left-sided pneumothorax. Mixed airspace opacities are  unchanged. Right midlung streaky opacities. Small right-sided pleural  effusion. Left costophrenic clear. Right chest wall subcutaneous  emphysema.      Impression    Impression:   1. Increase in right apical pneumothorax. Chest tubes in position.  Right chest wall associates.  2. Patchy mixed opacities are unchanged.  3. Small right-sided pleural effusion.    [Urgent Result: Increase in right apical pneumothorax]    Finding was identified on 7/18/2017 8:46 AM.     Dr. Soria was contacted by Dr. Smith at 7/18/2017 9:05 AM and verbalized  understanding of the urgent finding.     I have personally reviewed the examination and initial interpretation  and I agree with the findings.    KAVON MILLS MD   XR Chest Port 1 View    Narrative    Exam: XR CHEST PORT 1 VW, 7/20/2017 2:24 PM    Indication: s/p right lung valve placement    Comparison: 7/18/2017.    Findings:   AP view the chest. Small residual right  apical pneumothorax with chest  tube in stable position. Right upper lobe. Left chest wall Port-A-Cath  with tip at the mid SVC. New right endobronchial  valves. No  left-sided pneumothorax. Interstitial opacities not significantly  changed. Low lung volumes. Cardiac silhouette is stable. Resolving  right chest wall subcutaneous emphysema. Likely trace bilateral  pleural effusions.      Impression    Impression:   1. Small residual right apical pneumothorax with chest tube in stable  position. Interval placement right endobronchial valves.  2. Atelectasis of the right upper lobe.  3. Interstitial airspace opacities are unchanged, likely represent  interstitial pulmonary edema.  4. Trace bilateral pleural effusions with underlying atelectasis  versus consolidation.    I have personally reviewed the examination and initial interpretation  and I agree with the findings.    KAVON MILLS MD   XR Chest 2 Views     Value    Radiologist flags Enlarging right apical pneumothorax (Urgent)    Narrative    XR CHEST 2 VW  7/21/2017 9:00 AM      HISTORY: s/p airway valve placement    COMPARISON: 7/20/2017    FINDINGS: Right sided endobronchial valves. Right apical chest tube.  Right lateral chest wall subcutaneous emphysema. Left chest  Port-A-Cath tip projects over the high SVC. Asymmetric elevation the  right hemidiaphragm. Cardiac silhouette is not enlarged. Slight  increase in size of small right apical pneumothorax. No left-sided  pneumothorax. The lungs are otherwise clear. No pleural effusion.      Impression    IMPRESSION:   1. Slight increase in size of small right apical pneumothorax with  appropriately positioned ipsilateral chest tube.  2. Slight improvement in right upper lobe atelectasis.    [Urgent Result: Enlarging right apical pneumothorax]    Finding was identified on 7/21/2017 10:21 AM.     Dr. Cristino Driscoll was contacted by Dr. Blackwell at 7/21/2017 10:24 AM  and verbalized understanding of the urgent  finding.     I have personally reviewed the examination and initial interpretation  and I agree with the findings.    KAVON MILLS MD   XR Chest 2 Views    Narrative    Exam: XR CHEST 2 VW, 7/21/2017 4:27 PM    Indication: s/p R chest tube clamp trial. Please eval for interval  pneumo changes    Comparison: Same-day radiograph.    Findings:   PA and lateral views of the chest. Left chest wall Port-A-Cath with  tip over the high SVC. Right-sided endobronchial valves. Right  apically oriented chest tube without significant change in small right  apical pneumothorax and hydropneumothorax. Right chest wall  subcutaneous emphysema. Cardiac silhouette is stable. Pulmonary  vasculature is distinct. No left-sided pneumothorax. No pleural  effusion. No significant change right upper lobe atelectasis.       Impression    Impression:  1. No significant change in small right apical pneumothorax and  hydropneumothorax. Right chest tube stable position.  2. Right upper lobe atelectasis is unchanged.    I have personally reviewed the examination and initial interpretation  and I agree with the findings.    KAVON MILLS MD       Patient was seen and staffed with Dr. Garth Whitaker MD  Hem/Onc Fellow

## 2017-07-22 NOTE — PLAN OF CARE
Problem: Goal Outcome Summary  Goal: Goal Outcome Summary  Outcome: No Change  /43 (BP Location: Right arm)  Pulse 71  Temp 98.5  F (36.9  C) (Axillary)  Resp 18  Wt 87.9 kg (193 lb 12.8 oz)  SpO2 97%  BMI 32.75 kg/m2  Pt up in the chair most of the day, A&O X 4, prn oxycodone x 3 reports pain @ chest tube site improved from yesterday however burning/aching intermittently. CXR planned this afternoon to monitor pneumothorax. Chest tube clamped, small amount of serous fluid in tubing. Pt eating fair. Good urine output. Continue to monitor per poc.    Problem: Respiratory Insufficiency (Adult)  Goal: Effective Ventilation  Patient will demonstrate the desired outcomes by discharge/transition of care.   Outcome: No Change    07/22/17 1446   Respiratory Insufficiency (Adult)   Effective Ventilation making progress toward outcome

## 2017-07-22 NOTE — PROGRESS NOTES
Thoracic Surgery Progress Note    Subjective:  No acute issues overnight.     Vitals:  /50 (BP Location: Right arm)  Pulse 71  Temp 98  F (36.7  C) (Axillary)  Resp 16  Wt 87.7 kg (193 lb 5.5 oz)  SpO2 96%  BMI 32.67 kg/m2  Gen: Awake, alert, NAD , interactive  Resp: non-labored  CT: Good seal. No airleak noted  Abd: Soft, non-distended, NTTP  Ext: warm and well perfused, no edema     A/P: 57 year old female with metastatic breast cancer and chronic large emphysematous bleb who presents with right sided pneumothorax now s/p chest tube. Now POD #2 s/p airway valve placement with pulmonology.      -AM CXR shows R lung with increased PTX this AM. Will leave clamped and check another CXR in 4 hours. If PTX continues to increase, will place CT to waterseal.  -No CPAP or BiPAP for 1 week as lung heals    Seen and discussed with thoracic fellow. To be discussed with staff.    Souleymane Soria MD  General Surgery PGY-3

## 2017-07-22 NOTE — PLAN OF CARE
Problem: Respiratory Insufficiency (Adult)  Goal: Acid/Base Balance  Patient will demonstrate the desired outcomes by discharge/transition of care.   Outcome: No Change  S: AVSS, Pt's 02 saturation was 89 % during the night.  Pt continue to c/o right Chest Tube site pain. A/O x 4. Lung sounds      Diminished, Continue to have infrequent non productive cough. Chest Tube was clapped at x 2 areas Has serous drainage       In tube through all tube to dressing. Using bedside commode independently voiding clear yellow urine. No stool noted this shift.  B: Hx metastatic breast CA to (bone, Liver). On po Chemotherapy.  Admitted for  UTI with possible Pyelonephrotic and large right      Pneumothorax. Hx COPD and Multiple Bronchi stents.  A: 02 2 L NC applied with good saturation.  Chest tube was drained 64 ML of clear serous drainage and re clamped right away.       Pain medications given q 2 hours 5 mg of oxy per pt's request. CT site dressing changed and area around it is red. No leak       Noted. Robitussin given for cough x 2.   R: Will be having x 2 view X-Ray this morning. Continue to monitor pt and continue with plan of care.    07/16/17 4021   Respiratory Insufficiency (Adult)   Acid/Base Balance making progress toward outcome

## 2017-07-22 NOTE — PROGRESS NOTES
Pt had cxr this afternoon, ct remains clamped. BP 99/73 pt up in chair. Intermittent pain at ct insertion site, oxycodone x 2 with good pain control. Continue to monitor per poc.

## 2017-07-23 NOTE — PROGRESS NOTES
Thoracic Surgery Progress Note    Subjective:  No acute issues overnight. Pain controlled.    Vitals:  /63 (BP Location: Right arm)  Pulse 71  Temp 97.7  F (36.5  C) (Axillary)  Resp 16  Wt 87.9 kg (193 lb 12.8 oz)  SpO2 97%  BMI 32.75 kg/m2  Gen: Awake, alert, NAD , interactive  Resp: non-labored  CT: Good seal. No airleak noted  Abd: Soft, non-distended, NTTP  Ext: warm and well perfused, no edema      A/P: 57 year old female with metastatic breast cancer and chronic large emphysematous bleb who presents with right sided pneumothorax now s/p chest tube. Now POD #3 s/p airway valve placement with pulmonology.       -AM CXR shows no change from yesterday. Will remove chest tube and check another CXR in 4 hours.  -Chest CT today as well for baseline per pulmonology  -No CPAP or BiPAP for 1 week as lung heals  -If no increase in PTX after removal, thoracic surgery will sign off. Please call with questions. Patient does not need specific thoracic surgery follow up    Seen and discussed with thoracic fellow. To be discussed with staff.    Souleymane Soria MD  General Surgery PGY-3

## 2017-07-23 NOTE — PLAN OF CARE
Problem: Individualization  Goal: Patient Preferences  s-pt denied SOB with activity going to Xray--walked rock with stable gait. Lungs clear left and and clear/diminished right. Up in room and activity with visitors. vss-asking about xray results and read the summery in the report as'no significant changes'. CT with drainage in tube-this nurse did not drain into pleual vac with MD orders to allow for this. Pt requiring analgesia of 5mg q2-3 hrs.  b-CT in place and continues to drain  A-pt able to tolerated drain device with the aide of oxycodone. Pulmonary status stable at this time with it clamped  r-f/u with md if to be continued clamped per MD note today or to suction as most recent order states. Continue to support pt in tolerating tube discomfort.                      >>>Order received to continue with CTs to be clamped<<<    Exit ct site reddened...f/u with MD if topical antibiotic ointment is needed

## 2017-07-23 NOTE — PLAN OF CARE
Problem: Goal Outcome Summary  Goal: Goal Outcome Summary  Outcome: Improving  AVSS. Pt was given oxycodone 5mg x2 overnight for pain at chest tube site. Clarification on Draining vs Clamping is needed for RN's, some have left it alone and others have released fluids into chamber and re-clamped. Pt has repeat xray this am. No other acute changes.    Problem: Respiratory Insufficiency (Adult)  Goal: Identify Related Risk Factors and Signs and Symptoms  Related risk factors and signs and symptoms are identified upon initiation of Human Response Clinical Practice Guideline (CPG)   Outcome: Improving    07/23/17 0627   Respiratory Insufficiency   Related Risk Factors (Respiratory Insufficiency) activity intolerance;physiological factors   Signs and Symptoms (Respiratory Insufficiency) abnormal breath sounds;dyspnea

## 2017-07-23 NOTE — PROGRESS NOTES
Hematology/Oncology Progress Note    Patient: Etta Cervantes MRN# [id number]   Age: 57 year old YOB: 1960       ASSESSMENT and PLAN:   Etta Cervantes is a 57 year old female with metastatic breast cancer (bone/liver) on oral chemotherapy (Xeloda) admitted for UTI with possible pyelonephritis and large right pneumothorax.    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  - Thoracic surgery successfully placed right chest tube on admission.  - Tube had water seal since 7/13 at 1400. Follow up CXR 7/13PM showed significant improvement. 7/14AM CXR showed enlarging ptx. 7/15AM CXR showed stable ptx. 7/16 and 7/17 CXRs showed slight worsening of PTX respectively, continued worsening as of 7/18  - Hold CPAP until cleared by CT surgery to allow lungs to heal.   - Now s/p placement of 5 endobronchial valves on 7/20. CT was clamped on 7/21.  - CXR pending for this morning  - Will follow CTS recs    #UTI with possible pyelonephritis:   Dysuria, urinary frequency; history of UTIs (h/o klebsiella).  - UA positive for large amt leukocytes, high WBC, and bacteria. Culture positive for >100,000 colonies/ml E.Coli.    - Transitioned from ceftriaxone 2g daily to Bactrim DS BID - completed 10-day course of abx on 7/20.  - Blood cultures x2 in ED 7/11PM negative     #Metastatic Breast Cancer (bone/liver):  - Currently under care of Dr. Cassidy; On Xeloda 2500mg QAM and 2000mgQPM, 14 days on, 7 days off - completed 14 day cycle 7/16, now 7 days off. Message sent to Dr. Cassidy and [facility] to discuss resumption of Xeloda on Monday as planned, labs completed and CA 27-29 decreased from prior. Per Dr. Cassidy will hold off on resuming Xeloda until Etta is more stable.      #Pain:  Chest, back and neck pain, secondary to metastatic breast cancer, exacerbated by pneumothorax and ([id number] and [id number]) chest tube placement  - Continue oxycodone 5-10mg PO Q4 hours PRN  for pain. IV Dilaudid PRN due to increased pain s/p placement of endobronchial valves.  - OIC bowel regimen.    #Thrush:  - Coating on tongue, continue nystatin rinses.   - She also has magic mouth wash available if needed.       #DIAMOND:   - CPAP on HOLD as above due to pneumothorax.     #FEN:   - Electrolytes: replete as needed  - Nutrition: diet as tolerated      #PPx:  - VTE: mechanical       #Disposition: Awaiting stability s/p endobronchial valve placement, plan discharge early next week, will coordinate with CT surgery.      INTERVAL HISTORY:     Feels well. No new complaints. CT remains. Denies fever, chills, shortness of breath.    Current Facility-Administered Medications   Medication     HYDROmorphone (PF) (DILAUDID) injection 0.5-1 mg     oxybutynin (DITROPAN-XL) 24 hr tablet 10 mg     magnesium citrate solution 296 mL     carboxymethylcellulose (REFRESH PLUS) 0.5 % ophthalmic solution 2 drop     nystatin (MYCOSTATIN) suspension 500,000 Units     magnesium hydroxide (MILK OF MAGNESIA) suspension 30 mL     ketorolac (TORADOL) injection 30 mg     fexofenadine (ALLEGRA) tablet 60 mg     Medication Instruction     prochlorperazine (COMPAZINE) tablet 5-10 mg     ondansetron (ZOFRAN-ODT) ODT tab 8 mg     acetaminophen (TYLENOL) tablet 650 mg     guaiFENesin-dextromethorphan (ROBITUSSIN DM) 100-10 MG/5ML syrup 5 mL     senna-docusate (SENOKOT-S;PERICOLACE) 8.6-50 MG per tablet 1-2 tablet     polyethylene glycol (MIRALAX/GLYCOLAX) Packet 17 g     albuterol neb solution 2.5 mg     albuterol (PROAIR HFA/PROVENTIL HFA/VENTOLIN HFA) Inhaler 2 puff     atorvastatin (LIPITOR) tablet 10 mg     azelastine (ASTELIN) 0.1 % spray 1-2 spray     levothyroxine (SYNTHROID/LEVOTHROID) tablet 25 mcg     LORazepam (ATIVAN) tablet 0.5 mg     magic mouthwash suspension (diphenhydrAMINE, lidocaine, aluminum-magnesium & simethicone)     fluticasone-vilanterol (BREO ELLIPTA) 200-25 MCG/INH oral inhaler 1 puff     oxyCODONE (ROXICODONE)  IR tablet 5-10 mg     zolpidem (AMBIEN) tablet 10 mg     naloxone (NARCAN) injection 0.1-0.4 mg     beclomethasone (QVAR) 80 MCG/ACT Inhaler 2 puff     lidocaine 1 % 1 mL     lidocaine (LMX4) kit     sodium chloride (PF) 0.9% PF flush 10-20 mL     heparin lock flush 10 UNIT/ML injection 5-10 mL     heparin lock flush 10 UNIT/ML injection 5-10 mL     heparin 100 UNIT/ML injection 5 mL     sodium chloride (PF) 0.9% PF flush 10-20 mL         PHYSICAL EXAM:   /67  Pulse 71  Temp 98  F (36.7  C) (Axillary)  Resp 16  Wt 87 kg (191 lb 12.8 oz)  SpO2 94%  BMI 32.41 kg/m2  Wt Readings from Last 3 Encounters:   07/23/17 87 kg (191 lb 12.8 oz)   06/28/17 90.6 kg (199 lb 11.2 oz)   06/19/17 91.9 kg (202 lb 9.6 oz)      Constitutional: Awake, alert, interactive, no acute distress.  Respiratory: Breathing nonlabored, no acute distress, lungs CTA bilaterally. Chest tube in place to right side, clamped  Cardiovascular: Regular rate and rhythm. No murmur or rub.   GI: Bowel sound +, abd soft, nt/nd.  Skin: Warm and dry. No concerning lesions or rash on exposed surfaces.   Musculoskeletal: Extremities grossly normal, non-tender, no edema.   Neurologic: A&O X 3, speech normal, no focal deficits.   Neuropsychiatric: Mentation and affect appear normal/appropriate.      LABS:     CBC  ===  WBC (10e9/L)   Date Value   07/20/2017 4.7     Hemoglobin (g/dL)   Date Value   07/20/2017 12.2     Platelet Count (10e9/L)   Date Value   07/20/2017 181       BMP  ===  Sodium (mmol/L)   Date Value   07/20/2017 140     Potassium (mmol/L)   Date Value   07/20/2017 3.9     Urea Nitrogen (mg/dL)   Date Value   07/20/2017 8     Creatinine (mg/dL)   Date Value   07/20/2017 0.61     Calcium (mg/dL)   Date Value   07/20/2017 9.1       LFTs  ====  Bilirubin Total (mg/dL)   Date Value   07/20/2017 0.8     Alkaline Phosphatase (U/L)   Date Value   07/20/2017 65     AST (U/L)   Date Value   07/20/2017 18     ALT (U/L)   Date Value   07/20/2017 17            IMAGES:     Results for orders placed or performed during the hospital encounter of 07/12/17   Chest XR,  PA & LAT    Narrative    Chest radiograph 7/12/2017 2:40 AM     HISTORY: ptx    COMPARISON: 7/11/2017, 00:7 AM.    FINDINGS: Left Port-A-Cath tip in distal SVC. Unchanged right  collateral drain. Redemonstration of large right pneumothorax with  atelectatic right lung. Stable mild left de leon mediastinal shift. No  left sided pneumothorax or pleural effusion.      Impression    IMPRESSION: Unchanged large right pneumothorax with near complete  collapse of the right lung and mild leftward mediastinal shift.    I have personally reviewed the examination and initial interpretation  and I agree with the findings.    SALINAS VIRAMONTES MD   Chest CT w/o contrast    Narrative    EXAMINATION: Chest CT  7/12/2017     CLINICAL HISTORY: Pneumothorax.    COMPARISON: Prior chest Radiographs. CT cap 5/16/2017.    TECHNIQUE: CT imaging obtained through the chest without contrast.  Coronal and axial MIP reformatted images obtained.     FINDINGS:  Large right-sided pneumothorax with mild leftward mediastinal shift  and near total collapse of the right lung. Large right apical bulla.  Minimal left basilar atelectasis. No left-sided pneumothorax or  pleural effusion. The trachea and left main bronchus is patent.  Proximal right main bronchus appears to be patent.    Heart size is normal. No pericardial effusion.  Normal thoracic  vasculature. Multiple enlarged right axillary lymph nodes and  surrounding fatty stranding.    Bones and soft tissues: No suspicious osseous finding. Right anterior  approach chest drain with the tip terminates in the right breast  tissue, anterior to the right pectoralis major. Anterior right chest  wall deep subcutaneous emphysema, predominantly in the right  pectoralis major. Cutaneous thickening of the right breast with  asymmetric right breast enlarging.    Partially imaged upper abdomen:  Unchanged multiple liver metastasis.      Impression    IMPRESSION:   1. Large right-sided pneumothorax with leftward mediastinal shift and  near total collapse of the right lung.  2. Large right apical bulla.  3. Asymmetric enlargement of the right breast with large skin  thickening. Enlarged right axillary lymph nodes with surrounding fatty  stranding.  4. Right anterior approach chest drain tip is not within the lung. The  tip is in the right breast soft tissue, anterior to the right  pectoralis major. There is subcutaneous emphysema of the right  anterior chest wall.    Chest tube position discussed with Dr. Campoverde by Dr. Chan at 0900  7/12/17    I have personally reviewed the examination and initial interpretation  and I agree with the findings.    LAURA CHAN MD   XR Chest Port 1 View    Narrative    Exam: XR CHEST PORT 1 VW, 7/12/2017 9:03 AM    Indication: Post CT placement    Comparison: Same-day chest CT and chest radiograph.    Findings:   Single AP view of the chest. Interval placement right-sided chest tube  with decreasing moderate-sized right-sided pneumothorax. The trachea  and mediastinum are essentially midline, which is improved from prior  radiograph which demonstrated leftward displacement. No pleural  effusion or left-sided pneumothorax. Patchy bibasilar opacities. Left  chest wall port catheter with tip at the mid SVC.      Impression    Impression:   1. Interval placement right-sided chest tube with decreasing  right-sided pneumothorax. The mediastinum is midline. Extensive right  chest wall subcutaneous emphysema  2. Patchy bibasilar opacities, favor atelectasis.    I have personally reviewed the examination and initial interpretation  and I agree with the findings.    KAVON MILLS MD   XR Chest 2 Views    Narrative    CHEST TWO VIEWS  7/12/2017 12:47 PM     HISTORY: Status post right chest tube placement for large  pneumothorax. Please evaluate for interval change since previous  this  AM.    COMPARISON: 7/12/2017 at 8:58 AM.    FINDINGS: Moderate-sized right pneumothorax has slightly decreased in  size compared to the prior study. The chest tube has been  repositioned. No pneumothorax on the left. No significant pleural  fluid on either side. There is subcutaneous emphysema over the right  chest wall. Heart size normal. Left-sided Port-A-Cath again noted.       Impression    IMPRESSION: Persistent moderate-sized right pneumothorax, improved  compared to prior.     DIMPLE RAMOS MD   XR Chest 2 Views    Narrative    XR CHEST 2 VW  7/13/2017 8:49 AM      HISTORY: s/p R chest tube placement. please eval for interval change    COMPARISON: 7/12/2017    FINDINGS: Decrease in size of right apical pneumothorax, now small. No  significant change in right lateral and anterior chest wall  subcutaneous emphysema. Right apical chest tube in place. Left chest  Port-A-Cath tip projects over the mid SVC. No left-sided pneumothorax.  Cardiac silhouette is not enlarged. Upper abdomen is unremarkable. No  acute osseous abnormalities. Airspace opacities of the right middle  and lower lobe.      Impression    IMPRESSION:   1. Decrease in size in right apical pneumothorax, now small.  Ipsilateral chest tube in place.  2. Opacities of the right middle and lower lobes, which may represent  atelectasis, hemorrhage, or pulmonary edema.  3. No significant change in subcutaneous emphysema of the right  lateral and anterior chest wall, which may be iatrogenic.    I have personally reviewed the examination and initial interpretation  and I agree with the findings.    KAVON MILLS MD   XR Chest Port 1 View    Narrative    History: Interval change.    COMPARISON: Two-view chest performed today at 0817 hours.    FINDINGS: Right-sided chest tube again noted with left-sided  subclavian central venous catheter with subcutaneous port also again  noted. The tip of the catheter located over the azygos vein. There  appears  to have been significant improvement in the right upper lobe  pneumothorax. It appears that at least one of the pleural lines is  located under the third posterior rib versus under the fourth  posterior rib. Subjacent emphysema about the right lateral chest wall  extending to the upper abdomen and the base of the right neck is  improved. Improvement in ill-defined opacity about the right  infrahilar region may reflect pulmonary hemorrhage but is nonspecific.  No pneumothorax, focal infiltrate or pleural effusion on the left. No  pleural effusion on the right. The cardiac silhouette and pulmonary  vasculature are unchanged within normal limits.      Impression    IMPRESSION: Poorly defined improvement in right-sided pneumothorax  with right chest tube in place. There is also been improvement in  right subcutaneous emphysema.    VENESSA RODRIGUES MD   XR Chest 2 Views     Value    Radiologist flags Enlarging right apical pneumothorax (Urgent)    Narrative    XR CHEST 2 VW  7/14/2017 9:32 AM      HISTORY: s/p R chest tube placement for spontaneous pneumo w/ blebs.  Placed to water seal last night. Please eval for interval changes of  pneumo    COMPARISON: 7/13/2017    FINDINGS: Enlarging right apical pneumothorax. Right apical chest  tube. Today's emphysema of the right lateral chest wall. Left  Port-A-Cath tip projects over the high SVC. Cardiac silhouette is not  enlarged. No left-sided pneumothorax. No pleural effusion. Right  middle and lower lobe mixed opacities. No significant change.  Degenerative changes of the thoracic spine, including anterior  osteophytes. No acute osseous      Impression    IMPRESSION:   1. Enlarging right apical pneumothorax with ipsilateral chest tube in  place.  2. No significant change in right middle and lower lobe mixed  pulmonary opacities, which may represent atelectasis, pneumonia, or  hemorrhage.    [Urgent Result: Enlarging right apical pneumothorax]    Finding was identified on  7/14/2017 9:43 AM.     Dr. Cristino Driscoll was contacted by Dr. Blackwell at 7/14/2017 9:45 AM  and verbalized understanding of the urgent finding.     I have personally reviewed the examination and initial interpretation  and I agree with the findings.    KAVON MATOS MD   XR Chest 2 Views    Narrative    XR CHEST 2 VW  7/15/2017 10:33 AM      HISTORY: s/p R chest tube placement for pneumothorax w/ blebs.     COMPARISON: 7/14/2017    FINDINGS: Table small right apical pneumothorax. Right apical chest  tube. No significant change regarding the subcutaneous emphysema of  the right lateral chest wall. Left Port-A-Cath tip projects over the  high SVC. Cardiac silhouette is not enlarged. No left-sided  pneumothorax. No pleural effusion. Right middle and lower lobe mixed  opacities, stable.  Degenerative changes in the spine.      Impression    IMPRESSION:   1. Stable right apical pneumothorax. Right apical chest tube in place.  2. No significant change in right middle and lower lobe opacities.    FAVIAN COBURN MD   XR Chest 2 Views     Value    Radiologist flags pneumothorax (Urgent)    Narrative    PA and lateral chest    HISTORY: Follow-up pneumothorax chest tube in place    COMPARISON STUDY: 7/15/2017.    FINDINGS: Right apical chest tube again noted. Slight increase in  right pneumothorax. Known bulla also exist in the right apex. Left IJ  Port-A-Cath tip at the confluence of innominate veins. Cardiac  silhouette is nonenlarged. Left lung is clear. Subcutaneous emphysema  on the right is again noted.      Impression    IMPRESSION: Slight increase in right apical pneumothorax though large  bulla in the apex would be difficult.    [Urgent Result: pneumothorax]    Finding was identified on 7/16/2017 12:49 PM.     Charge nurse Shasha was contacted by Dr. Matos at 7/16/2017 1:02 PM and  verbalized understanding of the urgent finding.     KAVON MATOS MD   XR Chest 2 Views     Value    Radiologist flags Enlarging right  apical pneumothorax (Urgent)    Narrative    XR CHEST 2 VW  7/17/2017 9:07 AM      HISTORY: f/u right PTX    COMPARISON: 7/16/2017    FINDINGS: Right apical chest tube. Left chest Port-A-Cath, tip  projecting over the mid SVC. Enlarging right apical pneumothorax. No  left-sided pneumothorax. Right lateral and anterior chest wall  subcutaneous emphysema. No pleural effusion. The cardiac silhouette is  not enlarged. Right mid lung linear opacities. Anterior osteophytes of  the thoracic spine.      Impression    IMPRESSION:   1. Enlarging right apical pneumothorax. Ipsilateral chest tube in  place.  2. Subcutaneous emphysema of the right lateral and anterior chest  wall, likely iatrogenic.  3. Right mid lung linear opacities, suggestive of atelectasis.    [Urgent Result: Enlarging right apical pneumothorax]    Finding was identified on 7/17/2017 9:18 AM.     Trish Kilpatrick NP was contacted by Dr. Blackwell at 7/17/2017 9:20 AM  and verbalized understanding of the urgent finding.     I have personally reviewed the examination and initial interpretation  and I agree with the findings.    KAVON MILLS MD   XR Chest 2 Views    Narrative    XR CHEST 2 VW  7/17/2017 3:44 PM      HISTORY: 4hr chest tube clamp trial. Please eval for pneumo changes  since AM CXR. Thanks!    COMPARISON: earlier 7/17/2017    FINDINGS: Moderate right apical pneumothorax, not significantly  changed. Slight improvement in right lateral and anterior chest wall  subcutaneous emphysema. Right apical chest tube. Left chest  port-a-cath tip terminates in the mid SVC. No left pneumothorax. Right  inferior airspace opacities. No pleural effusion.  Cardiac silhouette  is not enlarged. The left lung is relatively clear. Thoracic spine  osteophytes.  No acute osseous abnormalities.       Impression    IMPRESSION:   1. No significant change in right apical pneumothorax.  2. Right mid lung linear opacities, which may represent atelectasis.  3. Improving  subcutaneous emphysema of the right lateral and anterior  chest wall.    I have personally reviewed the examination and initial interpretation  and I agree with the findings.    KAVON MILLS MD   XR Chest 2 Views     Value    Radiologist flags Increase in right apical pneumothorax (Urgent)    Narrative    Exam: XR CHEST 2 VW, 7/18/2017 8:45 AM    Indication: Interval Change    Comparison: 7/17/2017.    Findings:   PA and lateral views the chest. Left chest wall Port-A-Cath with tip  at the high SVC and right apically oriented chest tube. Cardiac  silhouette is stable and midline. Slight increase in right apical  pneumothorax. No left-sided pneumothorax. Mixed airspace opacities are  unchanged. Right midlung streaky opacities. Small right-sided pleural  effusion. Left costophrenic clear. Right chest wall subcutaneous  emphysema.      Impression    Impression:   1. Increase in right apical pneumothorax. Chest tubes in position.  Right chest wall associates.  2. Patchy mixed opacities are unchanged.  3. Small right-sided pleural effusion.    [Urgent Result: Increase in right apical pneumothorax]    Finding was identified on 7/18/2017 8:46 AM.     Dr. Soria was contacted by Dr. Smith at 7/18/2017 9:05 AM and verbalized  understanding of the urgent finding.     I have personally reviewed the examination and initial interpretation  and I agree with the findings.    KAVON MILLS MD   XR Chest Port 1 View    Narrative    Exam: XR CHEST PORT 1 VW, 7/20/2017 2:24 PM    Indication: s/p right lung valve placement    Comparison: 7/18/2017.    Findings:   AP view the chest. Small residual right apical pneumothorax with chest  tube in stable position. Right upper lobe. Left chest wall Port-A-Cath  with tip at the mid SVC. New right endobronchial  valves. No  left-sided pneumothorax. Interstitial opacities not significantly  changed. Low lung volumes. Cardiac silhouette is stable. Resolving  right chest wall subcutaneous  emphysema. Likely trace bilateral  pleural effusions.      Impression    Impression:   1. Small residual right apical pneumothorax with chest tube in stable  position. Interval placement right endobronchial valves.  2. Atelectasis of the right upper lobe.  3. Interstitial airspace opacities are unchanged, likely represent  interstitial pulmonary edema.  4. Trace bilateral pleural effusions with underlying atelectasis  versus consolidation.    I have personally reviewed the examination and initial interpretation  and I agree with the findings.    KAVON MILLS MD   XR Chest 2 Views     Value    Radiologist flags Enlarging right apical pneumothorax (Urgent)    Narrative    XR CHEST 2 VW  7/21/2017 9:00 AM      HISTORY: s/p airway valve placement    COMPARISON: 7/20/2017    FINDINGS: Right sided endobronchial valves. Right apical chest tube.  Right lateral chest wall subcutaneous emphysema. Left chest  Port-A-Cath tip projects over the high SVC. Asymmetric elevation the  right hemidiaphragm. Cardiac silhouette is not enlarged. Slight  increase in size of small right apical pneumothorax. No left-sided  pneumothorax. The lungs are otherwise clear. No pleural effusion.      Impression    IMPRESSION:   1. Slight increase in size of small right apical pneumothorax with  appropriately positioned ipsilateral chest tube.  2. Slight improvement in right upper lobe atelectasis.    [Urgent Result: Enlarging right apical pneumothorax]    Finding was identified on 7/21/2017 10:21 AM.     Dr. Cristino Driscoll was contacted by Dr. Blackwell at 7/21/2017 10:24 AM  and verbalized understanding of the urgent finding.     I have personally reviewed the examination and initial interpretation  and I agree with the findings.    KAVON MILLS MD   XR Chest 2 Views    Narrative    Exam: XR CHEST 2 VW, 7/21/2017 4:27 PM    Indication: s/p R chest tube clamp trial. Please eval for interval  pneumo changes    Comparison: Same-day  radiograph.    Findings:   PA and lateral views of the chest. Left chest wall Port-A-Cath with  tip over the high SVC. Right-sided endobronchial valves. Right  apically oriented chest tube without significant change in small right  apical pneumothorax and hydropneumothorax. Right chest wall  subcutaneous emphysema. Cardiac silhouette is stable. Pulmonary  vasculature is distinct. No left-sided pneumothorax. No pleural  effusion. No significant change right upper lobe atelectasis.       Impression    Impression:  1. No significant change in small right apical pneumothorax and  hydropneumothorax. Right chest tube stable position.  2. Right upper lobe atelectasis is unchanged.    I have personally reviewed the examination and initial interpretation  and I agree with the findings.    KAVON MILLS MD   XR Chest 2 Views    Narrative    PA and lateral chest    HISTORY: Interval change    COMPARISON STUDY: 7/21/2017    FINDINGS: Right upper lobectomy changes with right sided endobronchial  valves. Left IJ Port-A-Cath tip at the confluence of the innominate  veins. Small right apical pneumothorax and hydropneumothorax minimally  increased. Right upper lung atelectatic changes. Elevated right  hemidiaphragm. Cardiac silhouette is nonenlarged. Subcutaneous  emphysema along the right chest wall.      Impression    IMPRESSION: Slight increase in small right apical pneumothorax and  hydropneumothorax.    KAVON MILLS MD   XR Chest 2 Views    Narrative    PA and lateral chest x-ray    Comparisons: 7/22/2017 at 8:40 AM    HISTORY: Interval change of pneumothorax.    FINDINGS: Right-sided chest tube remains in place with unchanged small  right apical pneumothorax. Endobronchial valves and postoperative  changes of right upper lobectomy. Elevated right hemidiaphragm. Mild  subcutaneous emphysema. Port-A-Cath along the left chest wall is  unchanged. Unchanged left lung which is clear.      Impression    IMPRESSION: No significant  change in small right apical pneumothorax  and changes of right upper lobectomy.    MARLO MARKS MD     Patient was seen and staffed with Dr. Garth Whitaker MD  Hem/Onc Fellow

## 2017-07-23 NOTE — PLAN OF CARE
Problem: Goal Outcome Summary  Goal: Goal Outcome Summary  Outcome: Therapy, progress towards functional goals is fair  Patient is afebrile, BP this morning 87/45, other vitals stable. BP at noon 117/70, and this evening 129/44. C/O pain this morning on the chest tube side, Oxy 5 mg po given with relief. Chest tube was removed at bedside and patient tolerated well. Post chest tube removal, denied pain and sob. The dressing on the side is c/d/i. Chest XR x 2 and chest ct done per order.  Family at bedside. Continue to monitor pt status.

## 2017-07-24 NOTE — PLAN OF CARE
Problem: Goal Outcome Summary  Goal: Goal Outcome Summary  Outcome: No Change  Patient's VSS. Denies pain. Denies shortness of breath. Old chest tube site dressing is clean, dry, intact. Uncomfortable cough- robitussin given. Good UO. Continue to monitor and plan of care.    Problem: Respiratory Insufficiency (Adult)  Goal: Identify Related Risk Factors and Signs and Symptoms  Related risk factors and signs and symptoms are identified upon initiation of Human [employer] Clinical Practice Guideline (CPG)     07/24/17 0604   Respiratory Insufficiency   Related Risk Factors (Respiratory Insufficiency) activity intolerance;medication effects;physiological factors   Signs and Symptoms (Respiratory Insufficiency) abnormal breath sounds;cough (productive/ineffective)

## 2017-07-24 NOTE — DISCHARGE SUMMARY
[facility], [place]    Discharge Summary  Hematology / Oncology    Date of Admission:  7/12/2017  Date of Discharge:  07/24/2017  Discharging Provider: Alessandra Menjivar  Date of Service (when I saw the patient): 07/24/2017    Discharge Diagnoses   Large R pneumothorax  UTI with possible  Pyelonephritis  Metastatic BrCa  Cancer related pain  Thrush  DIAMOND    History of Present Illness   ### Adopted from H&P ###    Etta Cervantes is a 57 year old female with metastatic breast cancer (bone/liver) on oral chemotherapy who presents with fever last night and urinary discomfort, lower back pain for the past few days.  She reports a history of UTIs since starting chemotherapy 2.5 years ago. She also had complaints of cough and right sided chest/neck pain for several days. She usually takes oxycodone for her cancer related pain. She does note significant constipation last week while on zofran as a premedication for her PO Xeloda (chemotherapy).  She did manage this with mag citrate and since has discontinued the zofran. She notes nausea after taking Xeloda, no vomiting.   She was seen at OSH ED and diagnosed with of pyelonephritis and right pneumothorax. They did attempt to place chest tube twice (anteriorly) without success and she was thus transferred to [facility].    No abdominal pain, black or bloody stools.  No urinary tract symptoms.  No new or unusual musculoskeletal symptoms. No new bleeding or bruising.    Hospital Course   Etta Cervantes is a 57 year old female with metastatic breast cancer (bone/liver) on oral chemotherapy (Xeloda) admitted on 7/12/2017 for UTI with possible pyelonephritis and large right pneumothorax. 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 Thoracic surgery successfully placed right chest tube on admission. Tube had water seal since 7/13 at  1400. Follow up CXR 7/13PM showed significant improvement. 7/14AM CXR showed enlarging ptx. 7/15AM CXR showed stable ptx. 7/16 and 7/17 CXRs showed slight worsening of PTX respectively, continued worsening as of 7/18. Now s/p placement of 5 endobronchial valves on 7/20. CT was clamped on 7/21 and removed on 7/23. CXR 7/24 is unchanged with small right apical pneumothorax and persistent right chest wall SQ emphysema.    #Re-expansion acute pulmonary edema  CT and CXR after placement of chest tube demonstrated RML and RLL edema consistent with re-expansion pulmonary edema.  No further action needed except monitoring.     #UTI with possible pyelonephritis: Dysuria, urinary frequency; history of UTIs (h/o klebsiella). UA positive for large amt leukocytes, high WBC, and bacteria. Culture positive for >100,000 colonies/ml E.Coli. Transitioned from ceftriaxone 2g daily to Bactrim DS BID and completed 10-day course of abx on 7/20. Blood cultures x2 in ED 7/11 PM negative      #Metastatic Breast Cancer (bone/liver): Currently under care of Dr. Cassidy; On Xeloda 2500mg QAM and 2000mgQPM, 14 days on, 7 days off - completed 14 day cycle 7/16, now 7 days off. Message sent to Dr. Cassidy and [facility] to discuss resumption of Xeloda on Monday as planned, labs completed and CA 27-29 decreased from prior. Per Dr. Cassidy will hold off on resuming Xeloda until Etta is more stable. She will f/u with Dr. Cassidy on Thursday.      #Cancer related pain: Chest, back and neck pain, secondary to metastatic breast cancer, exacerbated by pneumothorax and (U0spmsvbgan and T3bpdimdakbz) chest tube placement. IV Dilaudid PRN used temporarily due to increased pain s/p placement of endobronchial valves. Continue oxycodone 5-10mg PO Q4 hours PRN for pain.     #Thrush: Coating still present on tongue, continue nystatin rinses.       #DIAMOND: CPAP on HOLD due to pneumothorax. Per CT surgery, no CPAP for 7 more days starting 7/24.    Alessandra HILL  BETH Menjivar  Hematology/Oncology  Pager: [phone]    Code Status   Full Code    Primary Care Physician   Johana Fairbanks    Vital Signs with Ranges  Temp:  [98.1  F (36.7  C)-99.3  F (37.4  C)] 98.1  F (36.7  C)  Pulse:  [84-98] 84  Heart Rate:  [] 124  Resp:  [16-18] 18  BP: (114-129)/(42-73) 114/69  SpO2:  [93 %-97 %] 97 %  190 lbs 14.69 oz    Physical Exam   Constitutional: Pleasant and cooperative female seen sitting up in chair. Awake, alert, no apparent distress.  HEENT: NC/AT, EOMI, sclera clear, conjunctiva normal, oral pharynx with moist mucus membranes, +thrush  Respiratory: No increased work of breathing, CTAB, no crackles or wheezing  Cardiovascular: RRR, normal S1 and S2, no murmur noted and no edema  Skin: No bruising, bleeding, rashes, or lesions   Neurologic:  Answers questions appropriately. Moves all extremities spontaneously.  Neuropsychiatric: Calm, appropriate affect    Discharge Disposition   Discharged to home  Condition at discharge: Good    Consultations This Hospital Stay   MEDICATION HISTORY IP PHARMACY CONSULT    Discharge Orders     Reason for your hospital stay   You were here because there was air in your chest, which caused your lung to collapse (pneumothorax).     Follow Up and recommended labs and tests   Follow-up appointments as listed below.     Activity   Your activity upon discharge: activity as tolerated     When to contact your care team   Please call the [facility] at [phone] for temperature above 100.4, shortness of breath, chest pain, headaches, vision changes, bleeding, uncontrolled nausea, vomiting, diarrhea, or pain.     Full Code     Diet   Follow this diet upon discharge: regular       Discharge Medications   Current Discharge Medication List      CONTINUE these medications which have NOT CHANGED    Details   fexofenadine (ALLEGRA ALLERGY) 180 MG tablet Take 180 mg by mouth daily as needed for allergies       calcium carb-cholecalciferol ( CALCIUM 600+D3) 600-500 MG-UNIT CAPS Take 2 tablets by mouth daily      zolpidem (AMBIEN) 10 MG tablet Take 1 tablet 30 minutes prior to bedtime  Qty: 30 tablet, Refills: 3    Associated Diagnoses: Insomnia, unspecified      capecitabine (XELODA) 500 MG tablet CHEMO Take 5 cap in AM and 4 cap in PM on Days 1 through 14, then off for 7 days. Take within 30 mins after meal.  Qty: 126 tablet, Refills: 0    Associated Diagnoses: Secondary malignant neoplasm of liver (H); Carcinoma of breast metastatic to liver, unspecified laterality (H); Bone metastasis (H); Breast cancer metastasized to axillary lymph node, right (H); Bilateral malignant neoplasm of breast in female, estrogen receptor positive, unspecified site of breast (H); Hyperlipidemia LDL goal <130; Hypothyroidism, unspecified type; Chemotherapy-induced neutropenia (H); Mucositis due to chemotherapy      albuterol (2.5 MG/3ML) 0.083% neb solution Take 1 vial (2.5 mg) by nebulization every 4 hours as needed for shortness of breath / dyspnea  Qty: 30 vial, Refills: 3    Associated Diagnoses: Moderate persistent asthma, uncomplicated      magic mouthwash suspension (diphenhydrAMINE, lidocaine, aluminum-magnesium & simethicone) Swish and swallow 10 mLs in mouth every 6 hours as needed for mouth sores  Qty: 240 mL, Refills: 10    Associated Diagnoses: Mucositis due to chemotherapy; Breast cancer metastasized to axillary lymph node, right (H)      prochlorperazine (COMPAZINE) 10 MG tablet Take 1 tablet (10 mg) by mouth every 6 hours as needed (Nausea/Vomiting)  Qty: 30 tablet, Refills: 2    Associated Diagnoses: Secondary malignant neoplasm of liver (H); Carcinoma of breast metastatic to liver, unspecified laterality (H); Bone metastasis (H); Breast cancer metastasized to axillary lymph node, right (H)      Docusate Calcium (STOOL SOFTENER PO) Take 100 mg by mouth daily       CRANBERRY PO Take 1 capsule by mouth daily        atorvastatin (LIPITOR) 10 MG tablet Take 1 tablet (10 mg) by mouth daily  Qty: 30 tablet, Refills: 11    Associated Diagnoses: Hyperlipidemia LDL goal <100      mometasone-formoterol (DULERA) 200-5 MCG/ACT oral inhaler Inhale 2 puffs into the lungs 2 times daily  Qty: 3 Inhaler, Refills: 0    Comments: OK to fill today. Needs MD appointment before further refills as due for 6 month follow up  Associated Diagnoses: Moderate persistent asthma without complication      oxybutynin (DITROPAN XL) 10 MG 24 hr tablet Take 1 tablet (10 mg) by mouth daily (Needs follow-up appointment for this medication)  Qty: 90 tablet, Refills: 3    Associated Diagnoses: Mixed incontinence urge and stress (male)(female)      beclomethasone (QVAR) 80 MCG/ACT Inhaler Inhale 2 puffs into the lungs 2 times daily  Qty: 3 Inhaler, Refills: 3    Associated Diagnoses: Chronic rhinitis      azelastine (ASTELIN) 0.1 % nasal spray Spray 1-2 sprays into both nostrils 2 times daily as needed for rhinitis  Qty: 3 Bottle, Refills: 3    Associated Diagnoses: Chronic rhinitis      levothyroxine (SYNTHROID, LEVOTHROID) 25 MCG tablet Take 1 tablet (25 mcg) by mouth daily  Qty: 90 tablet, Refills: 3    Associated Diagnoses: Hypothyroidism due to acquired atrophy of thyroid      albuterol (VENTOLIN HFA) 108 (90 BASE) MCG/ACT inhaler Inhale 2 puffs into the lungs every 4 hours as needed  Qty: 1 Inhaler, Refills: 2    Associated Diagnoses: Moderate persistent asthma, uncomplicated      oxyCODONE (ROXICODONE) 5 MG immediate release tablet Take 1 tablet (5 mg) by mouth every 4 hours as needed for moderate to severe pain  Qty: 30 tablet, Refills: 0    Associated Diagnoses: Secondary malignant neoplasm of liver (H)      Acetaminophen (TYLENOL PO) Take 650 mg by mouth every 4 hours as needed for mild pain or fever      predniSONE (DELTASONE) 20 MG tablet Take 1 tablet (20 mg) by mouth 2 times daily For Yellow or Red zone on Asthma Action Plan.  Qty: 10 tablet,  Refills: 1    Associated Diagnoses: Moderate persistent asthma, uncomplicated      LORazepam (ATIVAN) 0.5 MG tablet Take 1 tablet (0.5 mg) by mouth every 4 hours as needed (Anxiety, Nausea/Vomiting or Sleep)  Qty: 30 tablet, Refills: 2    Associated Diagnoses: Secondary malignant neoplasm of liver (H); Carcinoma of breast metastatic to liver, unspecified laterality (H); Bone metastasis (H); Breast cancer metastasized to axillary lymph node, right (H)           Allergies   Allergies   Allergen Reactions     Animal Dander Cough     Itchy eyes     Cats      Dust Mites Cough     Itchy eyes     Pollen Extract      Other reaction(s): Cough  Itchy eyes     Seasonal Allergies      Levaquin [Levofloxacin] Rash     States she has violent dreams from taking this       Data   Most Recent 3 CBC's:  Recent Labs   Lab Test  07/20/17   0404  07/19/17   0439  07/13/17   0233   WBC  4.7  3.8*  7.4   HGB  12.2  11.8  12.3   MCV  95  94  95   PLT  181  174  162      Most Recent 3 BMP's:  Recent Labs   Lab Test  07/20/17   0404  07/13/17   0233  07/11/17   2100   NA  140  140  138   POTASSIUM  3.9  3.7  3.7   CHLORIDE  104  108  106   CO2  30  26  27   BUN  8  9  9   CR  0.61  0.54  0.72   ANIONGAP  7  6  5   BEN  9.1  8.0*  9.3   GLC  94  90  110*     Most Recent 2 LFT's:  Recent Labs   Lab Test  07/20/17   0404  07/13/17   0233   AST  18  17   ALT  17  14   ALKPHOS  65  63   BILITOTAL  0.8  1.1

## 2017-07-24 NOTE — PLAN OF CARE
Problem: Goal Outcome Summary  Goal: Goal Outcome Summary  Outcome: Adequate for Discharge Date Met:  07/24/17  Pt afeb, vss, denies pain/nausea. Robitussin x 1 for cough with relief. Pt has no other complaints. Discharge medications and instructions/appts reviewed. Pt discharged to home accompanied by spouse.

## 2017-07-25 NOTE — TELEPHONE ENCOUNTER
ED/UC/IP follow up phone call:  Licha  DOS  7/24/17    RN please call to follow up.    Number of ED visits in past 12 months = 2/1

## 2017-07-25 NOTE — TELEPHONE ENCOUNTER
"ED/Discharge Protocol    \"Hi, my name is Consuelo Payton, a registered nurse, and I am calling on behalf of Dr. Fairbanks's office at [place].  I am calling to follow up and see how things are going for you after your recent visit.\"    \"I see that you were in the (ER/UC/IP) on 7/12/17.    How are you doing now that you are home?\" Doing better since discharge.   Consuelo Payton RN  "

## 2017-07-27 NOTE — PROGRESS NOTES
Reviewed results and recommendations with patient.  Denies questions or concerns.  Will call back if questions arise.  Direct chaim e provided.

## 2017-07-27 NOTE — PROGRESS NOTES
Hematology/ Oncology Follow-up Visit:  Jul 27, 2017    Reason for Visit:   Chief Complaint   Patient presents with     Oncology Clinic Visit     6 week Breast CA & Post hopsital F/U        Oncologic History:  Breast cancer (H)  Etta Cervantes presented with increasing lump in the right axilla that s lump has been growing without any pain or associated symptoms. Subsequently she was evaluated by primary care physician. Diagnostic digital mammogram was done on 01/13/2015 showing enlarged lymph nodes in the right axilla. There was some skin thickening in the right breast anteriorly and laterally. There are no parenchymal changes in the right breast. The left breast shows a 1.7 cm spiculated mass at approximately 2 to 3 o'clock position, 15.2 cm away from the nipple. A right breast ultrasound was subsequently done and ultrasound guided biopsy was done. Needle biopsy of the left breast did show infiltrating ductal carcinoma grade I of III with no angiolymphatic invasion seen. No associated ductal carcinoma in situ. Estrogen receptor, progresterone receptor were positive. HER-2/sadie receptor came back negative.. Another biopsy from the right axilla did show metastatic ductal carcinoma. PET scan was done on January 26, 2015 showing few small right posterior cervical chain nodes the largest is 1.2 cm. There is extensive bulky right axillary adenopathy demonstrating hypermetabolic FDG uptake with SUV of 10.6. There is skin thickening involving the right breast which demonstrated low-grade FDG uptake. A 1.2 cm lesion on the lateral aspect of the left breast demonstrated minimally increased FDG uptake with SUV of 2. Scattered hypermetabolic mediastinal lymph nodes located in the left superior anterior mediastinum, right paratracheal and precarinal U, subcranial adenopathy with javon metastases. This focus hypermetabolic FDG uptake identified definitive Amanda posterior aspect off intertrochanteric region of the proximal left  femur consistent with bone metastases. There is also 1.4 cm hypermetabolic FDG uptake identified in the anterior medial segment of the left hepatic lobe consistent with liver metastases. Additional 2.1 cm low-attenuation lesion anterior aspect of the right lobe which needs to be determined. The patient was started on chemotherapy with Taxotere and Cytoxan on February 9, 2015.  She concluded 4 cycles of Taxotere and Cytoxan. She started on Arimidex 1 mg orally daily. Currently she is on Faslodex and ibrance      Interval History:  Patient is instructed today for following hospitalization because of right pneumothorax. Patient was in the hospital for 12 days with difficulty in chest tube insertion. Eventually she was discharged she has been feeling better with improvement of her pneumothorax. Xeloda has been held for the last week. Her shortness of breath has been improving. She denies any nausea or vomiting or diarrhea. She tolerated Xeloda without significant side effects including hand and foot syndrome.    Review Of Systems:  Constitutional: Negative for fever, chills, and night sweats.  Skin: negative.  Eyes: negative.  Ears/Nose/Throat: negative.  Respiratory: Shortness of breath on exertion. Denies any cough or wheezing.  Cardiovascular: negative.  Gastrointestinal: negative.  Genitourinary: negative.  Musculoskeletal: negative.  Neurologic: negative.  Psychiatric: negative.  Hematologic/Lymphatic/Immunologic: negative.  Endocrine: negative.    All other ROS negative unless mentioned in interval history.    Past medical, social, surgical, and family histories reviewed.    Allergies:  Allergies as of 07/27/2017 - Manuel as Reviewed 07/27/2017   Allergen Reaction Noted     Animal dander Cough 01/23/2015     Cats  07/15/2010     Dust mites Cough 01/23/2015     Pollen extract  01/23/2015     Seasonal allergies  07/15/2010     Levaquin [levofloxacin] Rash 07/17/2017       Current Medications:  Current Outpatient  Prescriptions   Medication Sig Dispense Refill     metoclopramide (REGLAN) 10 MG tablet Take 1 tablet (10 mg) by mouth 2 times daily as needed 120 tablet 1     oxyCODONE (ROXICODONE) 5 MG IR tablet Take 1 tablet (5 mg) by mouth every 4 hours as needed for moderate to severe pain 30 tablet 0     fexofenadine (ALLEGRA ALLERGY) 180 MG tablet Take 180 mg by mouth daily as needed for allergies       calcium carb-cholecalciferol (KP CALCIUM 600+D3) 600-500 MG-UNIT CAPS Take 2 tablets by mouth daily       zolpidem (AMBIEN) 10 MG tablet Take 1 tablet 30 minutes prior to bedtime 30 tablet 3     Docusate Calcium (STOOL SOFTENER PO) Take 100 mg by mouth daily        atorvastatin (LIPITOR) 10 MG tablet Take 1 tablet (10 mg) by mouth daily 30 tablet 11     mometasone-formoterol (DULERA) 200-5 MCG/ACT oral inhaler Inhale 2 puffs into the lungs 2 times daily 3 Inhaler 0     oxybutynin (DITROPAN XL) 10 MG 24 hr tablet Take 1 tablet (10 mg) by mouth daily (Needs follow-up appointment for this medication) 90 tablet 3     beclomethasone (QVAR) 80 MCG/ACT Inhaler Inhale 2 puffs into the lungs 2 times daily 3 Inhaler 3     levothyroxine (SYNTHROID, LEVOTHROID) 25 MCG tablet Take 1 tablet (25 mcg) by mouth daily 90 tablet 3     capecitabine (XELODA) 500 MG tablet CHEMO Take 5 cap in AM and 4 cap in PM on Days 1 through 14, then off for 7 days. Take within 30 mins after meal. 126 tablet 0     capecitabine (XELODA) 500 MG tablet CHEMO Take 5 cap in AM and 4 cap in PM on Days 1 through 14, then off for 7 days. Take within 30 mins after meal. (Patient not taking: Reported on 7/27/2017) 126 tablet 0     albuterol (2.5 MG/3ML) 0.083% neb solution Take 1 vial (2.5 mg) by nebulization every 4 hours as needed for shortness of breath / dyspnea (Patient not taking: Reported on 7/27/2017) 30 vial 3     predniSONE (DELTASONE) 20 MG tablet Take 1 tablet (20 mg) by mouth 2 times daily For Yellow or Red zone on Asthma Action Plan. (Patient not taking:  "Reported on 7/27/2017) 10 tablet 1     magic mouthwash suspension (diphenhydrAMINE, lidocaine, aluminum-magnesium & simethicone) Swish and swallow 10 mLs in mouth every 6 hours as needed for mouth sores (Patient not taking: Reported on 7/27/2017) 240 mL 10     LORazepam (ATIVAN) 0.5 MG tablet Take 1 tablet (0.5 mg) by mouth every 4 hours as needed (Anxiety, Nausea/Vomiting or Sleep) (Patient not taking: Reported on 6/28/2017) 30 tablet 2     prochlorperazine (COMPAZINE) 10 MG tablet Take 1 tablet (10 mg) by mouth every 6 hours as needed (Nausea/Vomiting) (Patient not taking: Reported on 7/27/2017) 30 tablet 2     CRANBERRY PO Take 1 capsule by mouth daily        azelastine (ASTELIN) 0.1 % nasal spray Spray 1-2 sprays into both nostrils 2 times daily as needed for rhinitis (Patient not taking: Reported on 7/27/2017) 3 Bottle 3     albuterol (VENTOLIN HFA) 108 (90 BASE) MCG/ACT inhaler Inhale 2 puffs into the lungs every 4 hours as needed (Patient not taking: Reported on 7/27/2017) 1 Inhaler 2     Acetaminophen (TYLENOL PO) Take 650 mg by mouth every 4 hours as needed for mild pain or fever          Physical Exam:  /71 (BP Location: Right arm, Patient Position: Sitting, Cuff Size: Adult Regular)  Pulse 94  Temp 100.1  F (37.8  C) (Tympanic)  Resp 18  Ht 1.638 m (5' 4.49\")  Wt 86.9 kg (191 lb 9.6 oz)  SpO2 94%  Breastfeeding? No  BMI 32.39 kg/m2  Wt Readings from Last 12 Encounters:   07/27/17 86.9 kg (191 lb 9.6 oz)   07/24/17 86.6 kg (190 lb 14.7 oz)   06/28/17 90.6 kg (199 lb 11.2 oz)   06/19/17 91.9 kg (202 lb 9.6 oz)   05/25/17 91.6 kg (201 lb 14.4 oz)   05/18/17 93.3 kg (205 lb 11.2 oz)   04/28/17 93 kg (205 lb)   04/19/17 93.8 kg (206 lb 12.8 oz)   03/22/17 94 kg (207 lb 4.8 oz)   03/02/17 92.1 kg (203 lb 1.6 oz)   01/27/17 93 kg (205 lb)   01/19/17 95 kg (209 lb 8 oz)     ECOG performance status:1  GENERAL APPEARANCE: Healthy, alert and in no acute distress.  HEENT: Sclerae anicteric. PERRLA. " Oropharynx without ulcers, lesions, or thrush.  NECK: Supple. No asymmetry or masses.  LYMPHATICS: No palpable cervical, supraclavicular, axillary, or inguinal lymphadenopathy.  RESP: Lungs clear to auscultation bilaterally without rales, rhonchi or wheezes.  CARDIOVASCULAR: Regular rate and rhythm. Normal S1, S2; no S3 or S4. No murmur, gallop, or rub.  ABDOMEN: Soft, nontender. Bowel sounds normal. No palpable organomegaly or masses.  MUSCULOSKELETAL: Extremities without gross deformities noted. No edema of bilateral lower extremities.  SKIN: No suspicious lesions or rashes.  NEURO: Alert and oriented x 3. Cranial nerves II-XII grossly intact.  PSYCHIATRIC: Mentation and affect appear normal.    Laboratory/Imaging Studies:  No visits with results within 2 Week(s) from this visit.  Latest known visit with results is:    Orders Only on 07/05/2017   Component Date Value Ref Range Status     CA 27-29 07/05/2017 117* 0 - 39 U/mL Final    Assay Method:  Chemiluminescence using Siemens Centaur XP     WBC 07/05/2017 7.8  4.0 - 11.0 10e9/L Final     RBC Count 07/05/2017 4.23  3.8 - 5.2 10e12/L Final     Hemoglobin 07/05/2017 12.8  11.7 - 15.7 g/dL Final     Hematocrit 07/05/2017 40.1  35.0 - 47.0 % Final     MCV 07/05/2017 95  78 - 100 fl Final     MCH 07/05/2017 30.3  26.5 - 33.0 pg Final     MCHC 07/05/2017 31.9  31.5 - 36.5 g/dL Final     RDW 07/05/2017 20.4* 10.0 - 15.0 % Final     Platelet Count 07/05/2017 196  150 - 450 10e9/L Final     Diff Method 07/05/2017 Automated Method   Final     % Neutrophils 07/05/2017 67.3  % Final     % Lymphocytes 07/05/2017 21.6  % Final     % Monocytes 07/05/2017 7.7  % Final     % Eosinophils 07/05/2017 3.1  % Final     % Basophils 07/05/2017 0.3  % Final     Absolute Neutrophil 07/05/2017 5.3  1.6 - 8.3 10e9/L Final     Absolute Lymphocytes 07/05/2017 1.7  0.8 - 5.3 10e9/L Final     Absolute Monocytes 07/05/2017 0.6  0.0 - 1.3 10e9/L Final     Absolute Eosinophils 07/05/2017 0.2  0.0  - 0.7 10e9/L Final     Absolute Basophils 07/05/2017 0.0  0.0 - 0.2 10e9/L Final     Sodium 07/05/2017 137  133 - 144 mmol/L Final     Potassium 07/05/2017 3.5  3.4 - 5.3 mmol/L Final     Chloride 07/05/2017 102  94 - 109 mmol/L Final     Carbon Dioxide 07/05/2017 31  20 - 32 mmol/L Final     Anion Gap 07/05/2017 4  3 - 14 mmol/L Final     Glucose 07/05/2017 92  70 - 99 mg/dL Final     Urea Nitrogen 07/05/2017 13  7 - 30 mg/dL Final     Creatinine 07/05/2017 0.80  0.52 - 1.04 mg/dL Final     GFR Estimate 07/05/2017 73  >60 mL/min/1.7m2 Final    Non  GFR Calc     GFR Estimate If Black 07/05/2017 88  >60 mL/min/1.7m2 Final    African American GFR Calc     Calcium 07/05/2017 8.7  8.5 - 10.1 mg/dL Final     Bilirubin Total 07/05/2017 2.3* 0.2 - 1.3 mg/dL Final     Albumin 07/05/2017 3.3* 3.4 - 5.0 g/dL Final     Protein Total 07/05/2017 6.3* 6.8 - 8.8 g/dL Final     Alkaline Phosphatase 07/05/2017 72  40 - 150 U/L Final     ALT 07/05/2017 19  0 - 50 U/L Final     AST 07/05/2017 23  0 - 45 U/L Final        Recent Results (from the past 744 hour(s))   XR Chest 2 Views   Result Value    Radiologist flags Pneumothorax (AA)    Narrative    CHEST TWO VIEW   7/11/2017 9:58 PM     HISTORY: Cough with right-sided chest and neck pain.    COMPARISON: 02/03/2017    FINDINGS: There is a large right pneumothorax not present on the prior  exam. There is a huge right apical bleb that was present on the prior  exam. This bleb may have ruptured in the interval resulting in  pneumothorax. Port-A-Cath in place with tip in the SVC. Left lung  clear.      Impression    IMPRESSION: Large right pneumothorax.    [Critical Result: Pneumothorax]    Finding was identified on 7/11/2017 10:03 PM.     Dr. Liz was contacted by me on 7/11/2017 10:05 PM and verbalized  understanding of the critical result.     LETI JAMA MD   Chest  XR, 1 view portable   Result Value    Radiologist flags Tension pneumothorax (AA)    Narrative     CHEST SINGLE VIEW PORTABLE  7/11/2017 11:32 PM     HISTORY: Chest tube placement.    COMPARISON: 7/11/2017 at 2200 hours.    FINDINGS: A large right pneumothorax with near-complete collapse of  the right lung is again noted. A large bleb is again noted in the  upper right lung. A right pleural drain is not definitely visualized.  There is slight shift of the mediastinum to the left. The left lung is  clear. Normal-sized cardiac silhouette. Left anterior chest wall port  with catheter entering the left subclavian vein and distal tip in the  superior vena cava again noted.      Impression    IMPRESSION:  1. No convincing interval change since 7/11/2017 at 2200 hours.  2. Large right pneumothorax with near-complete collapse of the right  lung again noted. There is slight shift of the mediastinum to the  left, suggesting tension pneumothorax.  3. The chest tube described in the clinical history is not definitely  visualized on this study.    [Critical Result: Tension pneumothorax]    Finding was identified on 7/11/2017 11:58 PM.     Dr. Liz was contacted by me on 7/12/2017 12:04 AM and verbalized  understanding of the critical result.     NIESHA MAJOR MD   XR Chest Port 1 View    Narrative    CHEST SINGLE VIEW PORTABLE  7/12/2017 12:16 AM     HISTORY: Chest tube placement.    COMPARISON: 7/11/2017 at 2326 hours.    FINDINGS: Large right pneumothorax with near-complete collapse of the  right lung again noted, not convincingly changed since the recent  study dated 7/11/2017 at 2326 hours. A large bleb is again noted in  the upper right lung. Interval placement of a right pleural drain with  distal tip projecting over the mid right lung. There is again mild  shift of the mediastinum to the left. The left lung is clear.  Normal-sized cardiac silhouette. Left anterior chest wall chest port  with catheter entering the left subclavian vein and distal tip in the  superior vena cava.      Impression    IMPRESSION:  1.  Interval placement of a right pleural drain with distal tip  projecting over the mid right lung.  2. Persistent large right pneumothorax with near-complete collapse of  the right lung, unchanged. There is again mild shift of the  mediastinum to the left, suggesting a tension pneumothorax.    NIESHA MAJOR MD   Chest XR,  PA & LAT    Narrative    Chest radiograph 7/12/2017 2:40 AM     HISTORY: ptx    COMPARISON: 7/11/2017, 00:7 AM.    FINDINGS: Left Port-A-Cath tip in distal SVC. Unchanged right  collateral drain. Redemonstration of large right pneumothorax with  atelectatic right lung. Stable mild left de leon mediastinal shift. No  left sided pneumothorax or pleural effusion.      Impression    IMPRESSION: Unchanged large right pneumothorax with near complete  collapse of the right lung and mild leftward mediastinal shift.    I have personally reviewed the examination and initial interpretation  and I agree with the findings.    SALINAS VIRAMONTES MD   Chest CT w/o contrast    Narrative    EXAMINATION: Chest CT  7/12/2017     CLINICAL HISTORY: Pneumothorax.    COMPARISON: Prior chest Radiographs. CT cap 5/16/2017.    TECHNIQUE: CT imaging obtained through the chest without contrast.  Coronal and axial MIP reformatted images obtained.     FINDINGS:  Large right-sided pneumothorax with mild leftward mediastinal shift  and near total collapse of the right lung. Large right apical bulla.  Minimal left basilar atelectasis. No left-sided pneumothorax or  pleural effusion. The trachea and left main bronchus is patent.  Proximal right main bronchus appears to be patent.    Heart size is normal. No pericardial effusion.  Normal thoracic  vasculature. Multiple enlarged right axillary lymph nodes and  surrounding fatty stranding.    Bones and soft tissues: No suspicious osseous finding. Right anterior  approach chest drain with the tip terminates in the right breast  tissue, anterior to the right pectoralis major. Anterior  right chest  wall deep subcutaneous emphysema, predominantly in the right  pectoralis major. Cutaneous thickening of the right breast with  asymmetric right breast enlarging.    Partially imaged upper abdomen: Unchanged multiple liver metastasis.      Impression    IMPRESSION:   1. Large right-sided pneumothorax with leftward mediastinal shift and  near total collapse of the right lung.  2. Large right apical bulla.  3. Asymmetric enlargement of the right breast with large skin  thickening. Enlarged right axillary lymph nodes with surrounding fatty  stranding.  4. Right anterior approach chest drain tip is not within the lung. The  tip is in the right breast soft tissue, anterior to the right  pectoralis major. There is subcutaneous emphysema of the right  anterior chest wall.    Chest tube position discussed with Dr. Campoverde by Dr. Chan at 0900  7/12/17    I have personally reviewed the examination and initial interpretation  and I agree with the findings.    LAURA CHAN MD   XR Chest Port 1 View    Narrative    Exam: XR CHEST PORT 1 VW, 7/12/2017 9:03 AM    Indication: Post CT placement    Comparison: Same-day chest CT and chest radiograph.    Findings:   Single AP view of the chest. Interval placement right-sided chest tube  with decreasing moderate-sized right-sided pneumothorax. The trachea  and mediastinum are essentially midline, which is improved from prior  radiograph which demonstrated leftward displacement. No pleural  effusion or left-sided pneumothorax. Patchy bibasilar opacities. Left  chest wall port catheter with tip at the mid SVC.      Impression    Impression:   1. Interval placement right-sided chest tube with decreasing  right-sided pneumothorax. The mediastinum is midline. Extensive right  chest wall subcutaneous emphysema  2. Patchy bibasilar opacities, favor atelectasis.    I have personally reviewed the examination and initial interpretation  and I agree with the findings.    KAVON  MD GENE   XR Chest 2 Views    Narrative    CHEST TWO VIEWS  7/12/2017 12:47 PM     HISTORY: Status post right chest tube placement for large  pneumothorax. Please evaluate for interval change since previous this  AM.    COMPARISON: 7/12/2017 at 8:58 AM.    FINDINGS: Moderate-sized right pneumothorax has slightly decreased in  size compared to the prior study. The chest tube has been  repositioned. No pneumothorax on the left. No significant pleural  fluid on either side. There is subcutaneous emphysema over the right  chest wall. Heart size normal. Left-sided Port-A-Cath again noted.       Impression    IMPRESSION: Persistent moderate-sized right pneumothorax, improved  compared to prior.     DIMPLE RAMOS MD   XR Chest 2 Views    Narrative    XR CHEST 2 VW  7/13/2017 8:49 AM      HISTORY: s/p R chest tube placement. please eval for interval change    COMPARISON: 7/12/2017    FINDINGS: Decrease in size of right apical pneumothorax, now small. No  significant change in right lateral and anterior chest wall  subcutaneous emphysema. Right apical chest tube in place. Left chest  Port-A-Cath tip projects over the mid SVC. No left-sided pneumothorax.  Cardiac silhouette is not enlarged. Upper abdomen is unremarkable. No  acute osseous abnormalities. Airspace opacities of the right middle  and lower lobe.      Impression    IMPRESSION:   1. Decrease in size in right apical pneumothorax, now small.  Ipsilateral chest tube in place.  2. Opacities of the right middle and lower lobes, which may represent  atelectasis, hemorrhage, or pulmonary edema.  3. No significant change in subcutaneous emphysema of the right  lateral and anterior chest wall, which may be iatrogenic.    I have personally reviewed the examination and initial interpretation  and I agree with the findings.    KAVON MILLS MD   XR Chest Port 1 View    Narrative    History: Interval change.    COMPARISON: Two-view chest performed today at 0817  hours.    FINDINGS: Right-sided chest tube again noted with left-sided  subclavian central venous catheter with subcutaneous port also again  noted. The tip of the catheter located over the azygos vein. There  appears to have been significant improvement in the right upper lobe  pneumothorax. It appears that at least one of the pleural lines is  located under the third posterior rib versus under the fourth  posterior rib. Subjacent emphysema about the right lateral chest wall  extending to the upper abdomen and the base of the right neck is  improved. Improvement in ill-defined opacity about the right  infrahilar region may reflect pulmonary hemorrhage but is nonspecific.  No pneumothorax, focal infiltrate or pleural effusion on the left. No  pleural effusion on the right. The cardiac silhouette and pulmonary  vasculature are unchanged within normal limits.      Impression    IMPRESSION: Poorly defined improvement in right-sided pneumothorax  with right chest tube in place. There is also been improvement in  right subcutaneous emphysema.    VENESSA RODRIGUES MD   XR Chest 2 Views   Result Value    Radiologist flags Enlarging right apical pneumothorax (Urgent)    Narrative    XR CHEST 2 VW  7/14/2017 9:32 AM      HISTORY: s/p R chest tube placement for spontaneous pneumo w/ blebs.  Placed to water seal last night. Please eval for interval changes of  pneumo    COMPARISON: 7/13/2017    FINDINGS: Enlarging right apical pneumothorax. Right apical chest  tube. Today's emphysema of the right lateral chest wall. Left  Port-A-Cath tip projects over the high SVC. Cardiac silhouette is not  enlarged. No left-sided pneumothorax. No pleural effusion. Right  middle and lower lobe mixed opacities. No significant change.  Degenerative changes of the thoracic spine, including anterior  osteophytes. No acute osseous      Impression    IMPRESSION:   1. Enlarging right apical pneumothorax with ipsilateral chest tube in  place.  2. No  significant change in right middle and lower lobe mixed  pulmonary opacities, which may represent atelectasis, pneumonia, or  hemorrhage.    [Urgent Result: Enlarging right apical pneumothorax]    Finding was identified on 7/14/2017 9:43 AM.     Dr. Cristino Driscoll was contacted by Dr. Blackwell at 7/14/2017 9:45 AM  and verbalized understanding of the urgent finding.     I have personally reviewed the examination and initial interpretation  and I agree with the findings.    KAVON MILLS MD   XR Chest 2 Views    Narrative    XR CHEST 2 VW  7/15/2017 10:33 AM      HISTORY: s/p R chest tube placement for pneumothorax w/ blebs.     COMPARISON: 7/14/2017    FINDINGS: Table small right apical pneumothorax. Right apical chest  tube. No significant change regarding the subcutaneous emphysema of  the right lateral chest wall. Left Port-A-Cath tip projects over the  high SVC. Cardiac silhouette is not enlarged. No left-sided  pneumothorax. No pleural effusion. Right middle and lower lobe mixed  opacities, stable.  Degenerative changes in the spine.      Impression    IMPRESSION:   1. Stable right apical pneumothorax. Right apical chest tube in place.  2. No significant change in right middle and lower lobe opacities.    FAVIAN COBURN MD   XR Chest 2 Views   Result Value    Radiologist flags pneumothorax (Urgent)    Narrative    PA and lateral chest    HISTORY: Follow-up pneumothorax chest tube in place    COMPARISON STUDY: 7/15/2017.    FINDINGS: Right apical chest tube again noted. Slight increase in  right pneumothorax. Known bulla also exist in the right apex. Left IJ  Port-A-Cath tip at the confluence of innominate veins. Cardiac  silhouette is nonenlarged. Left lung is clear. Subcutaneous emphysema  on the right is again noted.      Impression    IMPRESSION: Slight increase in right apical pneumothorax though large  bulla in the apex would be difficult.    [Urgent Result: pneumothorax]    Finding was identified on  7/16/2017 12:49 PM.     Charge nurse Shasha was contacted by Dr. Mills at 7/16/2017 1:02 PM and  verbalized understanding of the urgent finding.     KAVON MILLS MD   XR Chest 2 Views   Result Value    Radiologist flags Enlarging right apical pneumothorax (Urgent)    Narrative    XR CHEST 2 VW  7/17/2017 9:07 AM      HISTORY: f/u right PTX    COMPARISON: 7/16/2017    FINDINGS: Right apical chest tube. Left chest Port-A-Cath, tip  projecting over the mid SVC. Enlarging right apical pneumothorax. No  left-sided pneumothorax. Right lateral and anterior chest wall  subcutaneous emphysema. No pleural effusion. The cardiac silhouette is  not enlarged. Right mid lung linear opacities. Anterior osteophytes of  the thoracic spine.      Impression    IMPRESSION:   1. Enlarging right apical pneumothorax. Ipsilateral chest tube in  place.  2. Subcutaneous emphysema of the right lateral and anterior chest  wall, likely iatrogenic.  3. Right mid lung linear opacities, suggestive of atelectasis.    [Urgent Result: Enlarging right apical pneumothorax]    Finding was identified on 7/17/2017 9:18 AM.     Trish Kilpatrick NP was contacted by Dr. Blackwell at 7/17/2017 9:20 AM  and verbalized understanding of the urgent finding.     I have personally reviewed the examination and initial interpretation  and I agree with the findings.    KAVON MILLS MD   XR Chest 2 Views    Narrative    XR CHEST 2 VW  7/17/2017 3:44 PM      HISTORY: 4hr chest tube clamp trial. Please eval for pneumo changes  since AM CXR. Thanks!    COMPARISON: earlier 7/17/2017    FINDINGS: Moderate right apical pneumothorax, not significantly  changed. Slight improvement in right lateral and anterior chest wall  subcutaneous emphysema. Right apical chest tube. Left chest  port-a-cath tip terminates in the mid SVC. No left pneumothorax. Right  inferior airspace opacities. No pleural effusion.  Cardiac silhouette  is not enlarged. The left lung is relatively clear.  Thoracic spine  osteophytes.  No acute osseous abnormalities.       Impression    IMPRESSION:   1. No significant change in right apical pneumothorax.  2. Right mid lung linear opacities, which may represent atelectasis.  3. Improving subcutaneous emphysema of the right lateral and anterior  chest wall.    I have personally reviewed the examination and initial interpretation  and I agree with the findings.    KAVON MILLS MD   XR Chest 2 Views   Result Value    Radiologist flags Increase in right apical pneumothorax (Urgent)    Narrative    Exam: XR CHEST 2 VW, 7/18/2017 8:45 AM    Indication: Interval Change    Comparison: 7/17/2017.    Findings:   PA and lateral views the chest. Left chest wall Port-A-Cath with tip  at the high SVC and right apically oriented chest tube. Cardiac  silhouette is stable and midline. Slight increase in right apical  pneumothorax. No left-sided pneumothorax. Mixed airspace opacities are  unchanged. Right midlung streaky opacities. Small right-sided pleural  effusion. Left costophrenic clear. Right chest wall subcutaneous  emphysema.      Impression    Impression:   1. Increase in right apical pneumothorax. Chest tubes in position.  Right chest wall associates.  2. Patchy mixed opacities are unchanged.  3. Small right-sided pleural effusion.    [Urgent Result: Increase in right apical pneumothorax]    Finding was identified on 7/18/2017 8:46 AM.     Dr. Soria was contacted by Dr. Smith at 7/18/2017 9:05 AM and verbalized  understanding of the urgent finding.     I have personally reviewed the examination and initial interpretation  and I agree with the findings.    KAVON MILLS MD   XR Chest Port 1 View    Narrative    Exam: XR CHEST PORT 1 VW, 7/20/2017 2:24 PM    Indication: s/p right lung valve placement    Comparison: 7/18/2017.    Findings:   AP view the chest. Small residual right apical pneumothorax with chest  tube in stable position. Right upper lobe. Left chest wall  Port-A-Cath  with tip at the mid SVC. New right endobronchial  valves. No  left-sided pneumothorax. Interstitial opacities not significantly  changed. Low lung volumes. Cardiac silhouette is stable. Resolving  right chest wall subcutaneous emphysema. Likely trace bilateral  pleural effusions.      Impression    Impression:   1. Small residual right apical pneumothorax with chest tube in stable  position. Interval placement right endobronchial valves.  2. Atelectasis of the right upper lobe.  3. Interstitial airspace opacities are unchanged, likely represent  interstitial pulmonary edema.  4. Trace bilateral pleural effusions with underlying atelectasis  versus consolidation.    I have personally reviewed the examination and initial interpretation  and I agree with the findings.    KAVON MILLS MD   XR Chest 2 Views   Result Value    Radiologist flags Enlarging right apical pneumothorax (Urgent)    Narrative    XR CHEST 2 VW  7/21/2017 9:00 AM      HISTORY: s/p airway valve placement    COMPARISON: 7/20/2017    FINDINGS: Right sided endobronchial valves. Right apical chest tube.  Right lateral chest wall subcutaneous emphysema. Left chest  Port-A-Cath tip projects over the high SVC. Asymmetric elevation the  right hemidiaphragm. Cardiac silhouette is not enlarged. Slight  increase in size of small right apical pneumothorax. No left-sided  pneumothorax. The lungs are otherwise clear. No pleural effusion.      Impression    IMPRESSION:   1. Slight increase in size of small right apical pneumothorax with  appropriately positioned ipsilateral chest tube.  2. Slight improvement in right upper lobe atelectasis.    [Urgent Result: Enlarging right apical pneumothorax]    Finding was identified on 7/21/2017 10:21 AM.     Dr. Cristino Driscoll was contacted by Dr. Blackwell at 7/21/2017 10:24 AM  and verbalized understanding of the urgent finding.     I have personally reviewed the examination and initial interpretation  and I  agree with the findings.    KAVON MILLS MD   XR Chest 2 Views    Narrative    Exam: XR CHEST 2 VW, 7/21/2017 4:27 PM    Indication: s/p R chest tube clamp trial. Please eval for interval  pneumo changes    Comparison: Same-day radiograph.    Findings:   PA and lateral views of the chest. Left chest wall Port-A-Cath with  tip over the high SVC. Right-sided endobronchial valves. Right  apically oriented chest tube without significant change in small right  apical pneumothorax and hydropneumothorax. Right chest wall  subcutaneous emphysema. Cardiac silhouette is stable. Pulmonary  vasculature is distinct. No left-sided pneumothorax. No pleural  effusion. No significant change right upper lobe atelectasis.       Impression    Impression:  1. No significant change in small right apical pneumothorax and  hydropneumothorax. Right chest tube stable position.  2. Right upper lobe atelectasis is unchanged.    I have personally reviewed the examination and initial interpretation  and I agree with the findings.    KAVON MILLS MD   XR Chest 2 Views    Narrative    PA and lateral chest    HISTORY: Interval change    COMPARISON STUDY: 7/21/2017    FINDINGS: Right upper lobectomy changes with right sided endobronchial  valves. Left IJ Port-A-Cath tip at the confluence of the innominate  veins. Small right apical pneumothorax and hydropneumothorax minimally  increased. Right upper lung atelectatic changes. Elevated right  hemidiaphragm. Cardiac silhouette is nonenlarged. Subcutaneous  emphysema along the right chest wall.      Impression    IMPRESSION: Slight increase in small right apical pneumothorax and  hydropneumothorax.    KAVON MILLS MD   XR Chest 2 Views    Narrative    PA and lateral chest x-ray    Comparisons: 7/22/2017 at 8:40 AM    HISTORY: Interval change of pneumothorax.    FINDINGS: Right-sided chest tube remains in place with unchanged small  right apical pneumothorax. Endobronchial valves and  postoperative  changes of right upper lobectomy. Elevated right hemidiaphragm. Mild  subcutaneous emphysema. Port-A-Cath along the left chest wall is  unchanged. Unchanged left lung which is clear.      Impression    IMPRESSION: No significant change in small right apical pneumothorax  and changes of right upper lobectomy.    MARLO MARKS MD   XR Chest 2 Views    Narrative     Examination:  XR CHEST 2 VW     Date:  7/23/2017 11:49 AM      Clinical Information: interval change of PTX     Additional Information: none    Comparison: 7/22/2017, 1550 8:00 PM chest x-ray.    Findings:     The left lung remains well-expanded and clear. The tiny right apical  pneumothorax is unchanged. Right-sided chest tube directed at the apex  of the hemithorax is unchanged. Right hilar in super hilar regions  continue to show consolidative changes. The left central line has a  tip projected in the upper SVC. The upper abdominal bowel gas pattern  is normal.      Impression    Impression:    1. No change in right-sided pneumothorax.  2. No change in right hilar and upper lobe parenchymal changes.  3. Left lung is clear..    ALLAN DORSEY MD   CT Chest w/o Contrast    Narrative    EXAMINATION: CT CHEST W/O CONTRAST, 7/23/2017 2:50 PM    TECHNIQUE:  Helical CT images of the chest were obtained without IV  contrast.     COMPARISON: CT chest 7/12/2017, 2/22/2015    HISTORY: Eval lungs for baseline; per pulmonology; OK to do at same  time as PM CXR    FINDINGS:    Chest: Left-sided port tip within the upper SVC. Chest tube has been  removed. Heart size is normal. Normal caliber of the aorta and  pulmonary artery. 2 cm hypoattenuating right thyroid nodule, is not  significantly changed from 2/22/2015. No mediastinal or hilar  lymphadenopathy. Endobronchial devices in right upper and middle lobe  bronchi. Complete collapse of the right upper and middle lobes.  Small-moderate hydropneumothorax on the right side, with possible  small  loculation versus residual bleb at the apex (series 6 image 25).  Small amount groundglass opacity and consolidation in the apex of the  right lower lobe. There are a few scattered pulmonary nodules,  including 3 mm right lower lobe nodule (series 6 image 109), and  adjacent 2 mm nodules in the left upper lobe (series 6 image 81).    Upper abdomen: Multiple hypoattenuating lesions in the liver and a few  mildly prominent lymph nodes are not significantly changed. Limited  noncontrast evaluation of the upper abdomen is otherwise unremarkable.    Bones and soft tissues: Subcutaneous emphysema presumably related to  prior chest tube placement. Poorly defined soft tissue density in the  right axilla, some of which may represent edema or hemorrhage from  recent chest tubes, although there was adenopathy in the right axilla  on 5/16/2017. Soft tissue thickening in the right breast and right  breast skin.      Impression    IMPRESSION:   1. Small-moderate residual hydropneumothorax, with small possible  loculation versus residual bleb at the right lung apex.  2. Groundglass opacity and consolidation at the apex of the right  lower lobe could represent hemorrhage associated with recent chest  tube, or reexpansion pulmonary edema.  3. Poorly defined soft tissue in the right axilla, some of which may  represent hemorrhage associated with recent chest tube although there  was adenopathy here seen on 5/16/2017. There is also soft tissue  thickening and skin thickening in the right breast.  4. 2 cm hypoattenuating right lobe thyroid nodules not significantly  changed from 2015.  5. Scattered small indeterminate pulmonary nodules.    I have personally reviewed the examination and initial interpretation  and I agree with the findings.    MARLO MARKS MD   XR Chest 2 Views    Narrative    History: Chest tube removal. Evaluation for pneumothorax.    COMPARISON: Two-view chest performed today at 1138 hours and CT  chest  performed today at 1440 hours.    FINDINGS: Left subclavian central venous catheter with subcutaneous  port again noted. Bronchial valves noted about the right hilar region.  The right-sided chest tube has been removed. There is been slight  increase in the right-sided pneumothorax following chest tube removal.  Small amount of subcutaneous emphysema is stable about the right mid  and lower lateral chest wall. The left lung is clear. No pleural  effusions or left pneumothorax. The cardiac silhouette and pulmonary  vasculature are unchanged within normal limits. No acute bony  abnormality is about the thorax.      Impression    IMPRESSION: Slight increase in right apical pneumothorax following  right chest tube removal.    VENESSA RODRIGUES MD   XR Chest 2 Views    Narrative    EXAM: XR CHEST 2 VW  7/24/2017 7:49 AM      HISTORY: interval change of pneumothorax, breast cancer    COMPARISON: Chest radiograph 7/23/2017, CT 7/23/2017    FINDINGS:     PA and lateral views of the chest. Left chest wall shreyas catheter tips  projecting in the high SVC. Right-sided bronchial valves.     The cardiomediastinal silhouette is within normal limits. Slight  increased right apical pulmonary opacities.    No pleural effusion. Stable small right apical pneumothorax.  Persistent right chest wall subcutaneous emphysema.      Impression    IMPRESSION:   1.  Stable small right apical pneumothorax. Persistent right chest  wall subcutaneous emphysema.  2.  Slight increase right apical pulmonary opacities, increased  pulmonary hemorrhage versus atelectasis.    I have personally reviewed the examination and initial interpretation  and I agree with the findings.    KAVON MILLS MD       Assessment and plan:  (C50.911,  Z17.0,  C50.912) Bilateral malignant neoplasm of breast in female, estrogen receptor positive, unspecified site of breast (H)  (primary encounter diagnosis)  (C50.919,  C78.7) Carcinoma of breast metastatic to liver,  unspecified laterality (H)  (C50.911,  C77.3) Breast cancer metastasized to axillary lymph node, right (H)  Patient tumor marker has been improving. Patient has been tolerating Xeloda very well. We will continue on the current dose. I will see her again in 3 weeks' time or sooner if there are new developments or concerns.    (C79.51) Bone metastasis (H)  Patient will continue on Denosumab 120 mg every 3 months.    (E03.9) Hypothyroidism, unspecified type  Patient currently on Synthroid 25  g daily.    (J45.40) Moderate persistent asthma without complication  Her shortness of breath has been improving. She is using albuterol nebulizer beclomethasone inhaler. She is also on Dulera inhaler      (E78.5) Hyperlipidemia LDL goal <130  She is currently on Lipitor 20 mg orally daily.    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 We will continue to monitor. The patient will need follow-up with thoracic surgery in 6 weeks    Mucositis.  Patient is using Magic mouthwash intermittently.    Nausea and vomiting related to chemotherapy.  Today we added Reglan 10 mg 15 minutes prior to Xeloda pills twice daily.    Cancer associated pain.   The patient currently on oxycodone p.r.n. for pain.    The patient is ready to learn, no apparent learning barriers were identified.  Diagnosis and treatment plans were explained to the patient. The patient expressed understanding of the content. The patient asked appropriate questions. The patient questions were answered to her satisfaction.    Chart documentation with Dragon Voice recognition Software. Although reviewed after completion, some words and grammatical errors may remain.

## 2017-07-27 NOTE — PATIENT INSTRUCTIONS
You will need to have a chest xray today.  We will call you with the results. We would like to see you back in clinic with Dr. Cassidy in 3 weeks with chemotherapy to be scheduled per treatment plan; lab draw prior (labs from xeloda plan).  Your prescription (Oxycodone) has been given to you to hand carry to the pharmacy of your choice. Your prescription (Reglan) has been sent to [facility] pharmacy. When you are in need of a refill, please call your pharmacy and they will send us a request.  Copy of appointments, and after visit summary (AVS) given to patient.  If you have any questions during business hours (M-F 8 AM- 4PM), please call Funmi Ray RN, BSN, OCN Oncology Hematology /Breast Cancer Navigator at [facility] [phone].   For questions after business hours, or on holidays/weekends, please call our after hours Nurse Triage line [phone]. Thank you.

## 2017-07-27 NOTE — NURSING NOTE
"Oncology Rooming Note    July 27, 2017 9:18 AM   Etta Cervantes is a 57 year old female who presents for:    Chief Complaint   Patient presents with     Oncology Clinic Visit     6 week Breast CA & Post hopsital F/U      Initial Vitals: /71 (BP Location: Right arm, Patient Position: Sitting, Cuff Size: Adult Regular)  Pulse 94  Temp 100.1  F (37.8  C) (Tympanic)  Resp 18  Ht 1.638 m (5' 4.49\")  Wt 86.9 kg (191 lb 9.6 oz)  SpO2 94%  Breastfeeding? No  BMI 32.39 kg/m2 Estimated body mass index is 32.39 kg/(m^2) as calculated from the following:    Height as of this encounter: 1.638 m (5' 4.49\").    Weight as of this encounter: 86.9 kg (191 lb 9.6 oz). Body surface area is 1.99 meters squared.  No Pain (0) Comment: Data Unavailable   No LMP recorded.  Allergies reviewed: Yes  Medications reviewed: Yes    Medications: Medication refills not needed today.  Pharmacy name entered into [facility]: [id number] [place] [address]    Clinical concerns:  6 week Breast CA & Post hopsital F/U.    1. Patient asking for a CXR  today.     7 minutes for nursing intake (face to face time)     Josephine Virgen [facility]              "

## 2017-08-02 NOTE — PROGRESS NOTES
Forms from  Connect received, completed.  Signed and faxed to [phone]. Original in envelope at  for patient, copy to scanning.

## 2017-08-02 NOTE — PROGRESS NOTES
The [place] attending physician statement-Progress update form received.  Completed, signed, and faxed back.  Original to patient, copy to scanning.

## 2017-08-04 NOTE — TELEPHONE ENCOUNTER
1.  Date/reason for appt: 17 - bullae, prolonged air leak   2.  Referring provider: Dr Brodie Cassidy  3.  Call to patient (Yes / No - short description): yes  4.  Previous care at / records requested from: [facility] - records in Epic   5.  Recent Imagin17 CT chest, 17 XR chest

## 2017-08-07 NOTE — TELEPHONE ENCOUNTER
atorvastatin (LIPITOR) 10 MG tablet     Last Written Prescription Date: 4/28/17  Last Fill Quantity: 30, # refills: 11  Last Office Visit with G, P or [facility] prescribing provider: 6/19/17  Next 5 appointments (look out 90 days)     Aug 17, 2017  3:00 PM CDT   Return Visit with Brodie Cassidy MD   [facility] ([facility])    [facility]  [address]  [place] [address]   [phone]                   Lab Results   Component Value Date    CHOL 151 06/28/2017     Lab Results   Component Value Date    HDL 64 06/28/2017     Lab Results   Component Value Date    LDL 64 06/28/2017     Lab Results   Component Value Date    TRIG 117 06/28/2017     Lab Results   Component Value Date    CHOLHDLRATIO 3.6 03/18/2015

## 2017-08-10 NOTE — TELEPHONE ENCOUNTER
LEVOTHYROXINE 25 MCG TABLET       Last Written Prescription Date: 8/24/16  Last Quantity: 90, # refills: 3  Last Office Visit with [facility] or [facility] prescribing provider: 6/19/17   Next 5 appointments (look out 90 days)     Aug 17, 2017  3:00 PM CDT   Return Visit with Brodie Casisdy MD   [facility] ([facility])    [facility]  [address]  [place] [address]   [phone]                   TSH   Date Value Ref Range Status   08/03/2016 0.97 0.40 - 4.00 mU/L Final

## 2017-08-16 NOTE — PROGRESS NOTES
THORACIC SURGERY - ESTABLISHED PATIENT OFFICE VISIT        I saw Ms. Cervantes at Dr. Liang s request in consultation for the evaluation and treatment of right pneumothorax .     HPI  Mrs. Etta Cervantes is a 57 year old year-old female with known metastatic breast cancer (on oral chemotherapy). She was admitted last month under medicine and was found to have right pneumothorax due to ruptured emphysematous bleb. Endobronchial valves x 5 were installed due to worsening pneumothorax. Currently, she is asymptomatic.    Previsit Tests   CXR (8/16/17): RUL/RML collapse, no pneumothorax    PMH    Past Medical History:   Diagnosis Date     ASCUS favor benign 1/2015    Neg HPV     Cancer (H)      Cataract 2010    surgery both eyes     Emphysematous bleb of lung (H)      Fibroids      Hypercholesterolemia      Hypothyroid      Incontinence of urine     mixed     Menorrhagia      Obesity      Pneumothorax      PONV (postoperative nausea and vomiting)      Uncomplicated asthma         PSH    Past Surgical History:   Procedure Laterality Date     BRONCHOSCOPY, INSERT BRONCHIAL VALVE N/A 7/20/2017    Procedure: BRONCHOSCOPY, INSERT BRONCHIAL VALVE;  Flexible Bronchoscopy, Endobronchial Valve Insertion X5. 4 Valves into right upper lobe and 1 valve into Right middle lobe;  Surgeon: Carlos Mcgowan MD;  Location: [facility] OR     CATARACT IOL, RT/LT  2010     COLONOSCOPY  2010     DILATION AND CURETTAGE, HYSTEROSCOPY, ABLATE ENDOMETRIUM NOVASURE, COMBINED  3/2/2011    COMBINED DILATION AND CURETTAGE, HYSTEROSCOPY, ABLATE ENDOMETRIUM NOVASURE performed by LAURA VARGAS at [facility] OR     INSERT PORT VASCULAR ACCESS N/A 2/12/2015    Procedure: INSERT PORT VASCULAR ACCESS;  Surgeon: Noé Schaefer MD;  Location: [facility] OR     SURGICAL HISTORY OF -   2005    bladder sling     SURGICAL HISTORY OF -       Cystectomy-lower lip     TUBAL LIGATION  1987        ETOH non consumer  TOB non smoker    Physical examination  BMI  32.29  Breathing without distress on room air    From a personal perspective, she is here with her , Lucian. 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 She lives in [place] and currently looks optimistic about her diagnosis.    IMPRESSION (J93.11) Primary spontaneous pneumothorax  (primary encounter diagnosis)      57 year old year-old female with spontanous pneumothorax, S/P endobronchial valve placement  Metastatic breast cancer    PLAN  I spent a total of 15 minutes with Ms. Cervantes and Lucian, more than 50% of which were spent in counseling, coordination of care, and face-to-face time. I reviewed the plan as follows:  Procedure planned: valve removal by Dr. Mcgowan in 2-3 weeks (we will await instructions from Dr. Cassidy regarding any potential considerations with systemic therapy).    All questions were answered and Etta Cervantes and present family were in agreement with the plan.  I appreciate the opportunity to participate in the care of your patient and will keep you updated.  Sincerely,

## 2017-08-16 NOTE — LETTER
8/16/2017      RE: Etta Cervantes  [address] [place]  [place] [address]       THORACIC SURGERY - ESTABLISHED PATIENT OFFICE VISIT        I saw Ms. Cervantes at Dr. Liang s request in consultation for the evaluation and treatment of right pneumothorax .     HPI  Mrs. Etta Cervantes is a 57 year old year-old female with known metastatic breast cancer (on oral chemotherapy). She was admitted last month under medicine and was found to have right pneumothorax due to ruptured emphysematous bleb. Endobronchial valves x 5 were installed due to worsening pneumothorax. Currently, she is asymptomatic.    Previsit Tests   CXR (8/16/17): RUL/RML collapse, no pneumothorax    PMH    Past Medical History:   Diagnosis Date     ASCUS favor benign 1/2015    Neg HPV     Cancer (H)      Cataract 2010    surgery both eyes     Emphysematous bleb of lung (H)      Fibroids      Hypercholesterolemia      Hypothyroid      Incontinence of urine     mixed     Menorrhagia      Obesity      Pneumothorax      PONV (postoperative nausea and vomiting)      Uncomplicated asthma         PSH    Past Surgical History:   Procedure Laterality Date     BRONCHOSCOPY, INSERT BRONCHIAL VALVE N/A 7/20/2017    Procedure: BRONCHOSCOPY, INSERT BRONCHIAL VALVE;  Flexible Bronchoscopy, Endobronchial Valve Insertion X5. 4 Valves into right upper lobe and 1 valve into Right middle lobe;  Surgeon: Carlos Mcgowan MD;  Location: [facility] OR     CATARACT IOL, RT/LT  2010     COLONOSCOPY  2010     DILATION AND CURETTAGE, HYSTEROSCOPY, ABLATE ENDOMETRIUM NOVASURE, COMBINED  3/2/2011    COMBINED DILATION AND CURETTAGE, HYSTEROSCOPY, ABLATE ENDOMETRIUM NOVASURE performed by LAURA VARGAS at [facility]     INSERT PORT VASCULAR ACCESS N/A 2/12/2015    Procedure: INSERT PORT VASCULAR ACCESS;  Surgeon: Noé Schaefer MD;  Location: [facility]     SURGICAL HISTORY OF -   2005    bladder sling     SURGICAL HISTORY OF -       Cystectomy-lower lip     TUBAL LIGATION   1987        ETOH non consumer  TOB non smoker    Physical examination  BMI 32.29  Breathing without distress on room air    From a personal perspective, she is here with her , Lucian. 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 She lives in \[facility]\"" and currently looks optimistic about her diagnosis.    IMPRESSION (J93.11) Primary spontaneous pneumothorax  (primary encounter diagnosis)      57 year old year-old female with spontanous pneumothorax, S/P endobronchial valve placement  Metastatic breast cancer    PLAN  I spent a total of 15 minutes with [facility] and Lucian, more than 50% of which were spent in counseling, coordination of care, and face-to-face time. I reviewed the plan as follows:  Procedure planned: valve removal by Dr. Mcgowan in 2-3 weeks (we will await instructions from Dr. Cassidy regarding any potential considerations with systemic therapy).    All questions were answered and Etta Cervantes and present family were in agreement with the plan.  I appreciate the opportunity to participate in the care of your patient and will keep you updated.  Sincerely,      Dylan Coburn MD

## 2017-08-16 NOTE — NURSING NOTE
"Oncology Rooming Note    August 16, 2017 8:21 AM   Etta Cervantes is a 57 year old female who presents for:    Chief Complaint   Patient presents with     Oncology Clinic Visit     Follow up visit related to bullae prolonged air leak.     Initial Vitals: /75 (BP Location: Left arm, Patient Position: Sitting, Cuff Size: Adult Regular)  Pulse 69  Temp 98  F (36.7  C) (Oral)  Resp 18  Ht 1.638 m (5' 4.49\")  Wt 86.6 kg (191 lb)  LMP 02/06/2013  SpO2 99%  BMI 32.29 kg/m2 Estimated body mass index is 32.29 kg/(m^2) as calculated from the following:    Height as of this encounter: 1.638 m (5' 4.49\").    Weight as of this encounter: 86.6 kg (191 lb). Body surface area is 1.99 meters squared.  No Pain (0) Comment: Data Unavailable   Patient's last menstrual period was 02/06/2013.  Allergies reviewed: Yes  Medications reviewed: Yes    Medications: Medication refills not needed today.  Pharmacy name entered into [email or web address]: [id number] [place] [address]    Clinical concerns: No new concerns. Provider was NOT notified.    10 minutes for nursing intake (face to face time)     Kasey Dunlap LPN            "

## 2017-08-16 NOTE — MR AVS SNAPSHOT
After Visit Summary   8/16/2017    Etta Cervantes    MRN: [id number]           Patient Information     Date Of Birth          1960        Visit Information        Provider Department      8/16/2017 8:30 AM Dylan Coburn MD [facility]        Today's Diagnoses     Primary spontaneous pneumothorax    -  1       Follow-ups after your visit        Your next 10 appointments already scheduled     Aug 16, 2017 11:45 AM CDT   (Arrive by 11:30 AM)   XR CHEST 2 VIEWS with UCXR1   [facility] ([facility])    [address] [address] [phone]           Please bring a list of your current medicines to your exam. (Include vitamins, minerals and over-thecounter medicines.) Leave your valuables at home.  Tell your doctor if there is a chance you may be pregnant.  You do not need to do anything special for this exam.            Aug 17, 2017  2:00 PM CDT   Level O with ROOM 2 [facility] ([facility])    [facility]  [address]  [facility] [address]   [phone]            Aug 17, 2017  3:00 PM CDT   Return Visit with Brodie Cassidy MD   [facility] ([facility])    [facility]  [address]  [facility] [address]   [phone]            Sep 28, 2017  9:00 AM CDT   Level O with ROOM 8 [facility] ([facility])    [facility]  [address]  [facility] [address]   [phone]              Who to contact     If you have questions or need follow up information about today's clinic visit or your schedule please contact [facility] directly at [phone].  Normal or non-critical lab and imaging results will be communicated to you by MyChart, letter or phone within 4 business days after the clinic has  "received the results. If you do not hear from us within 7 days, please contact the clinic through [employer] or phone. If you have a critical or abnormal lab result, we will notify you by phone as soon as possible.  Submit refill requests through [employer] or call your pharmacy and they will forward the refill request to us. Please allow 3 business days for your refill to be completed.          Additional Information About Your Visit        [employer]anch[employer] Information     [employer] gives you secure access to your electronic health record. If you see a primary care provider, you can also send messages to your care team and make appointments. If you have questions, please call your primary care clinic.  If you do not have a primary care provider, please call [phone] and they will assist you.        Care EveryWhere ID     This is your Care EveryWhere ID. This could be used by other organizations to access your [facility] medical records  [id number]        Your Vitals Were     Pulse Temperature Respirations Height Last Period Pulse Oximetry    69 98  F (36.7  C) (Oral) 18 1.638 m (5' 4.49\") 02/06/2013 99%    BMI (Body Mass Index)                   32.29 kg/m2            Blood Pressure from Last 3 Encounters:   08/16/17 111/75   07/27/17 119/71   07/24/17 114/69    Weight from Last 3 Encounters:   08/16/17 86.6 kg (191 lb)   07/27/17 86.9 kg (191 lb 9.6 oz)   07/24/17 86.6 kg (190 lb 14.7 oz)              We Performed the Following     Eve-Operative Worksheet (Thoracic)        Primary Care Provider Office Phone # Fax #    Johana Fairbanks [phone] [address] [facility] [address]        Equal Access to Services     [facility] Center[facility] : Hadii han Rodriguez, wamanuelada luqadaha, qaybta kaalmahari neal. So [facility] [phone].    ATENCIÓN: Si habla español, tiene a parekh disposición servicios gratuitos de asistencia lingüística. Llame al [phone].    We " comply with applicable federal civil rights laws and [place] laws. We do not discriminate on the basis of race, color, national origin, age, disability sex, sexual orientation or gender identity.            Thank you!     Thank you for choosing [facility]  for your care. Our goal is always to provide you with excellent care. Hearing back from our patients is one way we can continue to improve our services. Please take a few minutes to complete the written survey that you may receive in the mail after your visit with us. Thank you!             Your Updated Medication List - Protect others around you: Learn how to safely use, store and throw away your medicines at www.disposemymeds.org.          This list is accurate as of: 8/16/17 10:10 AM.  Always use your most recent med list.                   Brand Name Dispense Instructions for use Diagnosis    * albuterol 108 (90 BASE) MCG/ACT Inhaler    VENTOLIN HFA    1 Inhaler    Inhale 2 puffs into the lungs every 4 hours as needed    Moderate persistent asthma, uncomplicated       * albuterol (2.5 MG/3ML) 0.083% neb solution     30 vial    Take 1 vial (2.5 mg) by nebulization every 4 hours as needed for shortness of breath / dyspnea    Moderate persistent asthma, uncomplicated       ALLEGRA ALLERGY 180 MG tablet   Generic drug:  fexofenadine      Take 180 mg by mouth daily as needed for allergies        atorvastatin 10 MG tablet    LIPITOR    90 tablet    Take 1 tablet (10 mg) by mouth daily    Hyperlipidemia LDL goal <100       azelastine 0.1 % spray    ASTELIN    3 Bottle    Spray 1-2 sprays into both nostrils 2 times daily as needed for rhinitis    Chronic rhinitis       beclomethasone 80 MCG/ACT Inhaler    QVAR    3 Inhaler    Inhale 2 puffs into the lungs 2 times daily    Chronic rhinitis       * capecitabine 500 MG tablet CHEMO    XELODA    126 tablet    Take 5 cap in AM and 4 cap in PM on Days 1 through 14, then off for 7 days. Take within 30  mins after meal.    Secondary malignant neoplasm of liver (H), Carcinoma of breast metastatic to liver, unspecified laterality (H), Bone metastasis (H), Breast cancer metastasized to axillary lymph node, right (H), Bilateral malignant neoplasm of breast in female, estrogen receptor positive, unspecified site of breast (H), Hyperlipidemia LDL goal <130, Hypothyroidism, unspecified type, Chemotherapy-induced neutropenia (H), Mucositis due to chemotherapy       * capecitabine 500 MG tablet CHEMO    XELODA    126 tablet    Take 5 cap in AM and 4 cap in PM on Days 1 through 14, then off for 7 days. Take within 30 mins after meal.    Secondary malignant neoplasm of liver (H), Carcinoma of breast metastatic to liver, unspecified laterality (H), Bone metastasis (H), Breast cancer metastasized to axillary lymph node, right (H)       CRANBERRY PO      Take 1 capsule by mouth daily        KP CALCIUM 600+D3 600-500 MG-UNIT Caps   Generic drug:  calcium carb-cholecalciferol      Take 2 tablets by mouth daily        levothyroxine 25 MCG tablet    SYNTHROID/LEVOTHROID    90 tablet    TAKE 1 TABLET (25 MCG) BY MOUTH DAILY    Hypothyroidism due to acquired atrophy of thyroid       lidocaine (viscous) 2 % solution    XYLOCAINE     Apply topically as needed        lidocaine visc 2% 2.5mL/5mL & maalox/mylanta w/ simeth 2.5mL/5mL & diphenhydrAMINE 5mg/5mL Susp suspension    MAGIC Mouthwash [facility]OSPITAL    240 mL    Swish and swallow 10 mLs in mouth every 6 hours as needed for mouth sores    Mucositis due to chemotherapy, Breast cancer metastasized to axillary lymph node, right (H)       LORazepam 0.5 MG tablet    ATIVAN    30 tablet    Take 1 tablet (0.5 mg) by mouth every 4 hours as needed (Anxiety, Nausea/Vomiting or Sleep)    Secondary malignant neoplasm of liver (H), Carcinoma of breast metastatic to liver, unspecified laterality (H), Bone metastasis (H), Breast cancer metastasized to axillary lymph node, right (H)       MAGIC MOUTHWASH  (ENTER INGREDIENTS IN COMMENTS)      SWISH AND SWALLOW 10 MLS IN MOUTH EVERY 6 HOURS AS NEEDED FOR MOUTH SORES        metoclopramide 10 MG tablet    REGLAN    120 tablet    Take 1 tablet (10 mg) by mouth 2 times daily as needed    Bilateral malignant neoplasm of breast in female, estrogen receptor positive, unspecified site of breast (H)       mometasone-formoterol 200-5 MCG/ACT oral inhaler    DULERA    3 Inhaler    Inhale 2 puffs into the lungs 2 times daily    Moderate persistent asthma without complication       oxybutynin 10 MG 24 hr tablet    DITROPAN XL    90 tablet    Take 1 tablet (10 mg) by mouth daily (Needs follow-up appointment for this medication)    Mixed incontinence urge and stress (male)(female)       oxyCODONE 5 MG IR tablet    ROXICODONE    30 tablet    Take 1 tablet (5 mg) by mouth every 4 hours as needed for moderate to severe pain    Secondary malignant neoplasm of liver (H)       predniSONE 20 MG tablet    DELTASONE    10 tablet    Take 1 tablet (20 mg) by mouth 2 times daily For Yellow or Red zone on Asthma Action Plan.    Moderate persistent asthma, uncomplicated       prochlorperazine 10 MG tablet    COMPAZINE    30 tablet    Take 1 tablet (10 mg) by mouth every 6 hours as needed (Nausea/Vomiting)    Secondary malignant neoplasm of liver (H), Carcinoma of breast metastatic to liver, unspecified laterality (H), Bone metastasis (H), Breast cancer metastasized to axillary lymph node, right (H)       STOOL SOFTENER PO      Take 100 mg by mouth daily        TYLENOL PO      Take 650 mg by mouth every 4 hours as needed for mild pain or fever        zolpidem 10 MG tablet    AMBIEN    30 tablet    Take 1 tablet 30 minutes prior to bedtime    Insomnia, unspecified       * Notice:  This list has 4 medication(s) that are the same as other medications prescribed for you. Read the directions carefully, and ask your doctor or other care provider to review them with you.

## 2017-08-17 NOTE — PROGRESS NOTES
Hematology/ Oncology Follow-up Visit:  Aug 17, 2017    Reason for Visit:   Chief Complaint   Patient presents with     Oncology Clinic Visit     3 week recheck Breast CA, labs today       Oncologic History:  Breast cancer (H)  Etta Cervantes presented with increasing lump in the right axilla that s lump has been growing without any pain or associated symptoms. Subsequently she was evaluated by primary care physician. Diagnostic digital mammogram was done on 01/13/2015 showing enlarged lymph nodes in the right axilla. There was some skin thickening in the right breast anteriorly and laterally. There are no parenchymal changes in the right breast. The left breast shows a 1.7 cm spiculated mass at approximately 2 to 3 o'clock position, 15.2 cm away from the nipple. A right breast ultrasound was subsequently done and ultrasound guided biopsy was done. Needle biopsy of the left breast did show infiltrating ductal carcinoma grade I of III with no angiolymphatic invasion seen. No associated ductal carcinoma in situ. Estrogen receptor, progresterone receptor were positive. HER-2/sadie receptor came back negative.. Another biopsy from the right axilla did show metastatic ductal carcinoma. PET scan was done on January 26, 2015 showing few small right posterior cervical chain nodes the largest is 1.2 cm. There is extensive bulky right axillary adenopathy demonstrating hypermetabolic FDG uptake with SUV of 10.6. There is skin thickening involving the right breast which demonstrated low-grade FDG uptake. A 1.2 cm lesion on the lateral aspect of the left breast demonstrated minimally increased FDG uptake with SUV of 2. Scattered hypermetabolic mediastinal lymph nodes located in the left superior anterior mediastinum, right paratracheal and precarinal U, subcranial adenopathy with javon metastases. This focus hypermetabolic FDG uptake identified definitive Amanda posterior aspect off intertrochanteric region of the proximal left  femur consistent with bone metastases. There is also 1.4 cm hypermetabolic FDG uptake identified in the anterior medial segment of the left hepatic lobe consistent with liver metastases. Additional 2.1 cm low-attenuation lesion anterior aspect of the right lobe which needs to be determined. The patient was started on chemotherapy with Taxotere and Cytoxan on February 9, 2015.  She concluded 4 cycles of Taxotere and Cytoxan. She started on Arimidex 1 mg orally daily. Currently she is on Faslodex and ibrance    Interval History:  Patient is here today for follow-up. She has been tolerating chemotherapy well. She has been on Xeloda without significant side effects. She denies any nausea or vomiting or diarrhea. She denies any fever or chills. She denies any shortness of breath or wheezing. She denies any bone aches or pains. She was seen yesterday by thoracic surgery. Pneumothorax has resolved.     Review Of Systems:  Constitutional: Negative for fever, chills, and night sweats.  Skin: negative.  Eyes: negative.  Ears/Nose/Throat: negative.  Respiratory: No shortness of breath, dyspnea on exertion, cough, or hemoptysis.  Cardiovascular: negative.  Gastrointestinal: negative.  Genitourinary: negative.  Musculoskeletal: negative.  Neurologic: negative.  Psychiatric: negative.  Hematologic/Lymphatic/Immunologic: negative.  Endocrine: negative.    All other ROS negative unless mentioned in interval history.    Past medical, social, surgical, and family histories reviewed.    Allergies:  Allergies as of 08/17/2017 - Manuel as Reviewed 08/17/2017   Allergen Reaction Noted     Animal dander Cough 01/23/2015     Cats  07/15/2010     Dust mites Cough 01/23/2015     Pollen extract  01/23/2015     Seasonal allergies  07/15/2010     Levaquin [levofloxacin] Rash 07/17/2017       Current Medications:  Current Outpatient Prescriptions   Medication Sig Dispense Refill     MAGIC MOUTHWASH, ENTER INGREDIENTS IN COMMENTS, SWISH AND SWALLOW  10 MLS IN MOUTH EVERY 6 HOURS AS NEEDED FOR MOUTH SORES  10     lidocaine, viscous, (XYLOCAINE) 2 % solution Apply topically as needed       levothyroxine (SYNTHROID/LEVOTHROID) 25 MCG tablet TAKE 1 TABLET (25 MCG) BY MOUTH DAILY 90 tablet 2     atorvastatin (LIPITOR) 10 MG tablet Take 1 tablet (10 mg) by mouth daily 90 tablet 3     metoclopramide (REGLAN) 10 MG tablet Take 1 tablet (10 mg) by mouth 2 times daily as needed 120 tablet 1     oxyCODONE (ROXICODONE) 5 MG IR tablet Take 1 tablet (5 mg) by mouth every 4 hours as needed for moderate to severe pain 30 tablet 0     capecitabine (XELODA) 500 MG tablet CHEMO Take 5 cap in AM and 4 cap in PM on Days 1 through 14, then off for 7 days. Take within 30 mins after meal. 126 tablet 0     fexofenadine (ALLEGRA ALLERGY) 180 MG tablet Take 180 mg by mouth daily as needed for allergies       calcium carb-cholecalciferol (KP CALCIUM 600+D3) 600-500 MG-UNIT CAPS Take 2 tablets by mouth daily       zolpidem (AMBIEN) 10 MG tablet Take 1 tablet 30 minutes prior to bedtime 30 tablet 3     capecitabine (XELODA) 500 MG tablet CHEMO Take 5 cap in AM and 4 cap in PM on Days 1 through 14, then off for 7 days. Take within 30 mins after meal. 126 tablet 0     albuterol (2.5 MG/3ML) 0.083% neb solution Take 1 vial (2.5 mg) by nebulization every 4 hours as needed for shortness of breath / dyspnea 30 vial 3     predniSONE (DELTASONE) 20 MG tablet Take 1 tablet (20 mg) by mouth 2 times daily For Yellow or Red zone on Asthma Action Plan. 10 tablet 1     magic mouthwash suspension (diphenhydrAMINE, lidocaine, aluminum-magnesium & simethicone) Swish and swallow 10 mLs in mouth every 6 hours as needed for mouth sores 240 mL 10     LORazepam (ATIVAN) 0.5 MG tablet Take 1 tablet (0.5 mg) by mouth every 4 hours as needed (Anxiety, Nausea/Vomiting or Sleep) 30 tablet 2     prochlorperazine (COMPAZINE) 10 MG tablet Take 1 tablet (10 mg) by mouth every 6 hours as needed (Nausea/Vomiting) 30 tablet  "2     Docusate Calcium (STOOL SOFTENER PO) Take 100 mg by mouth daily        CRANBERRY PO Take 1 capsule by mouth daily        mometasone-formoterol (DULERA) 200-5 MCG/ACT oral inhaler Inhale 2 puffs into the lungs 2 times daily 3 Inhaler 0     oxybutynin (DITROPAN XL) 10 MG 24 hr tablet Take 1 tablet (10 mg) by mouth daily (Needs follow-up appointment for this medication) 90 tablet 3     beclomethasone (QVAR) 80 MCG/ACT Inhaler Inhale 2 puffs into the lungs 2 times daily 3 Inhaler 3     azelastine (ASTELIN) 0.1 % nasal spray Spray 1-2 sprays into both nostrils 2 times daily as needed for rhinitis 3 Bottle 3     albuterol (VENTOLIN HFA) 108 (90 BASE) MCG/ACT inhaler Inhale 2 puffs into the lungs every 4 hours as needed 1 Inhaler 2     Acetaminophen (TYLENOL PO) Take 650 mg by mouth every 4 hours as needed for mild pain or fever       capecitabine (XELODA) 500 MG tablet CHEMO Take 5 cap in AM and 4 cap in PM on Days 1 through 14, then off for 7 days. Take within 30 mins after meal. 126 tablet 0        Physical Exam:  /73 (BP Location: Right arm, Patient Position: Sitting, Cuff Size: Adult Regular)  Pulse 61  Temp 98.6  F (37  C) (Tympanic)  Resp 18  Ht 1.638 m (5' 4.49\")  Wt 86.7 kg (191 lb 1.6 oz)  LMP 02/06/2013  SpO2 100%  Breastfeeding? No  BMI 32.31 kg/m2  Wt Readings from Last 12 Encounters:   08/17/17 86.7 kg (191 lb 1.6 oz)   08/16/17 86.6 kg (191 lb)   07/27/17 86.9 kg (191 lb 9.6 oz)   07/24/17 86.6 kg (190 lb 14.7 oz)   06/28/17 90.6 kg (199 lb 11.2 oz)   06/19/17 91.9 kg (202 lb 9.6 oz)   05/25/17 91.6 kg (201 lb 14.4 oz)   05/18/17 93.3 kg (205 lb 11.2 oz)   04/28/17 93 kg (205 lb)   04/19/17 93.8 kg (206 lb 12.8 oz)   03/22/17 94 kg (207 lb 4.8 oz)   03/02/17 92.1 kg (203 lb 1.6 oz)     ECOG performance status:1  GENERAL APPEARANCE: Healthy, alert and in no acute distress.  HEENT: Sclerae anicteric. PERRLA. Oropharynx without ulcers, lesions, or thrush.  NECK: Supple. No asymmetry or " "masses.  LYMPHATICS: No palpable cervical, supraclavicular, axillary, or inguinal lymphadenopathy.  RESP: Lungs clear to auscultation bilaterally without rales, rhonchi or wheezes.  BREAST: Right breast and there is a 2 cm lesion. Right axillary adenopathy appreciated. Left breast no dominant masses or axillary adenopathy.\" \"CARDIOVASCULAR: Regular rate and rhythm. Normal S1, S2; no S3 or S4. No murmur, gallop, or rub.  ABDOMEN: Soft, nontender. Bowel sounds normal. No palpable organomegaly or masses.  MUSCULOSKELETAL: Extremities without gross deformities noted. No edema of bilateral lower extremities.  SKIN: No suspicious lesions or rashes.  NEURO: Alert and oriented x 3. Cranial nerves II-XII grossly intact.  PSYCHIATRIC: Mentation and affect appear normal.    Laboratory/Imaging Studies:  Infusion Therapy Visit on 08/17/2017   Component Date Value Ref Range Status     TSH 08/17/2017 1.07  0.40 - 4.00 mU/L Final     WBC 08/17/2017 4.6  4.0 - 11.0 10e9/L Final     RBC Count 08/17/2017 3.75* 3.8 - 5.2 10e12/L Final     Hemoglobin 08/17/2017 12.5  11.7 - 15.7 g/dL Final     Hematocrit 08/17/2017 36.5  35.0 - 47.0 % Final     MCV 08/17/2017 97  78 - 100 fl Final     MCH 08/17/2017 33.3* 26.5 - 33.0 pg Final     MCHC 08/17/2017 34.2  31.5 - 36.5 g/dL Final     RDW 08/17/2017 20.5* 10.0 - 15.0 % Final     Platelet Count 08/17/2017 155  150 - 450 10e9/L Final     Diff Method 08/17/2017 Automated Method   Final     % Neutrophils 08/17/2017 57.6  % Final     % Lymphocytes 08/17/2017 28.1  % Final     % Monocytes 08/17/2017 11.0  % Final     % Eosinophils 08/17/2017 2.9  % Final     % Basophils 08/17/2017 0.2  % Final     % Immature Granulocytes 08/17/2017 0.2  % Final     Absolute Neutrophil 08/17/2017 2.6  1.6 - 8.3 10e9/L Final     Absolute Lymphocytes 08/17/2017 1.3  0.8 - 5.3 10e9/L Final     Absolute Monocytes 08/17/2017 0.5  0.0 - 1.3 10e9/L Final     Absolute Eosinophils 08/17/2017 0.1  0.0 - 0.7 10e9/L Final     " Absolute Basophils 08/17/2017 0.0  0.0 - 0.2 10e9/L Final     Abs Immature Granulocytes 08/17/2017 0.0  0 - 0.4 10e9/L Final     Sodium 08/17/2017 140  133 - 144 mmol/L Final     Potassium 08/17/2017 3.7  3.4 - 5.3 mmol/L Final     Chloride 08/17/2017 108  94 - 109 mmol/L Final     Carbon Dioxide 08/17/2017 28  20 - 32 mmol/L Final     Anion Gap 08/17/2017 4  3 - 14 mmol/L Final     Glucose 08/17/2017 103* 70 - 99 mg/dL Final     Urea Nitrogen 08/17/2017 11  7 - 30 mg/dL Final     Creatinine 08/17/2017 0.60  0.52 - 1.04 mg/dL Final     GFR Estimate 08/17/2017 >90  >60 mL/min/1.7m2 Final    Non  GFR Calc     GFR Estimate If Black 08/17/2017 >90  >60 mL/min/1.7m2 Final    African American GFR Calc     Calcium 08/17/2017 9.2  8.5 - 10.1 mg/dL Final     Bilirubin Total 08/17/2017 0.9  0.2 - 1.3 mg/dL Final     Albumin 08/17/2017 3.5  3.4 - 5.0 g/dL Final     Protein Total 08/17/2017 6.5* 6.8 - 8.8 g/dL Final     Alkaline Phosphatase 08/17/2017 77  40 - 150 U/L Final     ALT 08/17/2017 43  0 - 50 U/L Final     AST 08/17/2017 39  0 - 45 U/L Final        Recent Results (from the past 744 hour(s))   XR Chest 2 Views   Result Value    Radiologist flags Increase in right apical pneumothorax (Urgent)    Narrative    Exam: XR CHEST 2 VW, 7/18/2017 8:45 AM    Indication: Interval Change    Comparison: 7/17/2017.    Findings:   PA and lateral views the chest. Left chest wall Port-A-Cath with tip  at the high SVC and right apically oriented chest tube. Cardiac  silhouette is stable and midline. Slight increase in right apical  pneumothorax. No left-sided pneumothorax. Mixed airspace opacities are  unchanged. Right midlung streaky opacities. Small right-sided pleural  effusion. Left costophrenic clear. Right chest wall subcutaneous  emphysema.      Impression    Impression:   1. Increase in right apical pneumothorax. Chest tubes in position.  Right chest wall associates.  2. Patchy mixed opacities are unchanged.  3.  Small right-sided pleural effusion.    [Urgent Result: Increase in right apical pneumothorax]    Finding was identified on 7/18/2017 8:46 AM.     Dr. Soria was contacted by Dr. Smith at 7/18/2017 9:05 AM and verbalized  understanding of the urgent finding.     I have personally reviewed the examination and initial interpretation  and I agree with the findings.    KAVON MILLS MD   XR Chest Port 1 View    Narrative    Exam: XR CHEST PORT 1 VW, 7/20/2017 2:24 PM    Indication: s/p right lung valve placement    Comparison: 7/18/2017.    Findings:   AP view the chest. Small residual right apical pneumothorax with chest  tube in stable position. Right upper lobe. Left chest wall Port-A-Cath  with tip at the mid SVC. New right endobronchial  valves. No  left-sided pneumothorax. Interstitial opacities not significantly  changed. Low lung volumes. Cardiac silhouette is stable. Resolving  right chest wall subcutaneous emphysema. Likely trace bilateral  pleural effusions.      Impression    Impression:   1. Small residual right apical pneumothorax with chest tube in stable  position. Interval placement right endobronchial valves.  2. Atelectasis of the right upper lobe.  3. Interstitial airspace opacities are unchanged, likely represent  interstitial pulmonary edema.  4. Trace bilateral pleural effusions with underlying atelectasis  versus consolidation.    I have personally reviewed the examination and initial interpretation  and I agree with the findings.    KAVON MILLS MD   XR Chest 2 Views   Result Value    Radiologist flags Enlarging right apical pneumothorax (Urgent)    Narrative    XR CHEST 2 VW  7/21/2017 9:00 AM      HISTORY: s/p airway valve placement    COMPARISON: 7/20/2017    FINDINGS: Right sided endobronchial valves. Right apical chest tube.  Right lateral chest wall subcutaneous emphysema. Left chest  Port-A-Cath tip projects over the high SVC. Asymmetric elevation the  right hemidiaphragm. Cardiac  silhouette is not enlarged. Slight  increase in size of small right apical pneumothorax. No left-sided  pneumothorax. The lungs are otherwise clear. No pleural effusion.      Impression    IMPRESSION:   1. Slight increase in size of small right apical pneumothorax with  appropriately positioned ipsilateral chest tube.  2. Slight improvement in right upper lobe atelectasis.    [Urgent Result: Enlarging right apical pneumothorax]    Finding was identified on 7/21/2017 10:21 AM.     Dr. Cristino Driscoll was contacted by Dr. Blackwell at 7/21/2017 10:24 AM  and verbalized understanding of the urgent finding.     I have personally reviewed the examination and initial interpretation  and I agree with the findings.    KAVON MILLS MD   XR Chest 2 Views    Narrative    Exam: XR CHEST 2 VW, 7/21/2017 4:27 PM    Indication: s/p R chest tube clamp trial. Please eval for interval  pneumo changes    Comparison: Same-day radiograph.    Findings:   PA and lateral views of the chest. Left chest wall Port-A-Cath with  tip over the high SVC. Right-sided endobronchial valves. Right  apically oriented chest tube without significant change in small right  apical pneumothorax and hydropneumothorax. Right chest wall  subcutaneous emphysema. Cardiac silhouette is stable. Pulmonary  vasculature is distinct. No left-sided pneumothorax. No pleural  effusion. No significant change right upper lobe atelectasis.       Impression    Impression:  1. No significant change in small right apical pneumothorax and  hydropneumothorax. Right chest tube stable position.  2. Right upper lobe atelectasis is unchanged.    I have personally reviewed the examination and initial interpretation  and I agree with the findings.    KAVON MILLS MD   XR Chest 2 Views    Narrative    PA and lateral chest    HISTORY: Interval change    COMPARISON STUDY: 7/21/2017    FINDINGS: Right upper lobectomy changes with right sided endobronchial  valves. Left IJ Port-A-Cath tip  at the confluence of the innominate  veins. Small right apical pneumothorax and hydropneumothorax minimally  increased. Right upper lung atelectatic changes. Elevated right  hemidiaphragm. Cardiac silhouette is nonenlarged. Subcutaneous  emphysema along the right chest wall.      Impression    IMPRESSION: Slight increase in small right apical pneumothorax and  hydropneumothorax.    KAVON MILLS MD   XR Chest 2 Views    Narrative    PA and lateral chest x-ray    Comparisons: 7/22/2017 at 8:40 AM    HISTORY: Interval change of pneumothorax.    FINDINGS: Right-sided chest tube remains in place with unchanged small  right apical pneumothorax. Endobronchial valves and postoperative  changes of right upper lobectomy. Elevated right hemidiaphragm. Mild  subcutaneous emphysema. Port-A-Cath along the left chest wall is  unchanged. Unchanged left lung which is clear.      Impression    IMPRESSION: No significant change in small right apical pneumothorax  and changes of right upper lobectomy.    MARLO MARKS MD   XR Chest 2 Views    Narrative     Examination:  XR CHEST 2 VW     Date:  7/23/2017 11:49 AM      Clinical Information: interval change of PTX     Additional Information: none    Comparison: 7/22/2017, 1550 8:00 PM chest x-ray.    Findings:     The left lung remains well-expanded and clear. The tiny right apical  pneumothorax is unchanged. Right-sided chest tube directed at the apex  of the hemithorax is unchanged. Right hilar in super hilar regions  continue to show consolidative changes. The left central line has a  tip projected in the upper SVC. The upper abdominal bowel gas pattern  is normal.      Impression    Impression:    1. No change in right-sided pneumothorax.  2. No change in right hilar and upper lobe parenchymal changes.  3. Left lung is clear..    ALLAN DORSEY MD   CT Chest w/o Contrast    Narrative    EXAMINATION: CT CHEST W/O CONTRAST, 7/23/2017 2:50 PM    TECHNIQUE:  Helical CT images  of the chest were obtained without IV  contrast.     COMPARISON: CT chest 7/12/2017, 2/22/2015    HISTORY: Eval lungs for baseline; per pulmonology; OK to do at same  time as PM CXR    FINDINGS:    Chest: Left-sided port tip within the upper SVC. Chest tube has been  removed. Heart size is normal. Normal caliber of the aorta and  pulmonary artery. 2 cm hypoattenuating right thyroid nodule, is not  significantly changed from 2/22/2015. No mediastinal or hilar  lymphadenopathy. Endobronchial devices in right upper and middle lobe  bronchi. Complete collapse of the right upper and middle lobes.  Small-moderate hydropneumothorax on the right side, with possible  small loculation versus residual bleb at the apex (series 6 image 25).  Small amount groundglass opacity and consolidation in the apex of the  right lower lobe. There are a few scattered pulmonary nodules,  including 3 mm right lower lobe nodule (series 6 image 109), and  adjacent 2 mm nodules in the left upper lobe (series 6 image 81).    Upper abdomen: Multiple hypoattenuating lesions in the liver and a few  mildly prominent lymph nodes are not significantly changed. Limited  noncontrast evaluation of the upper abdomen is otherwise unremarkable.    Bones and soft tissues: Subcutaneous emphysema presumably related to  prior chest tube placement. Poorly defined soft tissue density in the  right axilla, some of which may represent edema or hemorrhage from  recent chest tubes, although there was adenopathy in the right axilla  on 5/16/2017. Soft tissue thickening in the right breast and right  breast skin.      Impression    IMPRESSION:   1. Small-moderate residual hydropneumothorax, with small possible  loculation versus residual bleb at the right lung apex.  2. Groundglass opacity and consolidation at the apex of the right  lower lobe could represent hemorrhage associated with recent chest  tube, or reexpansion pulmonary edema.  3. Poorly defined soft tissue in  the right axilla, some of which may  represent hemorrhage associated with recent chest tube although there  was adenopathy here seen on 5/16/2017. There is also soft tissue  thickening and skin thickening in the right breast.  4. 2 cm hypoattenuating right lobe thyroid nodules not significantly  changed from 2015.  5. Scattered small indeterminate pulmonary nodules.    I have personally reviewed the examination and initial interpretation  and I agree with the findings.    MARLO MARKS MD   XR Chest 2 Views    Narrative    History: Chest tube removal. Evaluation for pneumothorax.    COMPARISON: Two-view chest performed today at 1138 hours and CT chest  performed today at 1440 hours.    FINDINGS: Left subclavian central venous catheter with subcutaneous  port again noted. Bronchial valves noted about the right hilar region.  The right-sided chest tube has been removed. There is been slight  increase in the right-sided pneumothorax following chest tube removal.  Small amount of subcutaneous emphysema is stable about the right mid  and lower lateral chest wall. The left lung is clear. No pleural  effusions or left pneumothorax. The cardiac silhouette and pulmonary  vasculature are unchanged within normal limits. No acute bony  abnormality is about the thorax.      Impression    IMPRESSION: Slight increase in right apical pneumothorax following  right chest tube removal.    VENESSA RODRIGUES MD   XR Chest 2 Views    Narrative    EXAM: XR CHEST 2 VW  7/24/2017 7:49 AM      HISTORY: interval change of pneumothorax, breast cancer    COMPARISON: Chest radiograph 7/23/2017, CT 7/23/2017    FINDINGS:     PA and lateral views of the chest. Left chest wall shreyas catheter tips  projecting in the high SVC. Right-sided bronchial valves.     The cardiomediastinal silhouette is within normal limits. Slight  increased right apical pulmonary opacities.    No pleural effusion. Stable small right apical pneumothorax.  Persistent  right chest wall subcutaneous emphysema.      Impression    IMPRESSION:   1.  Stable small right apical pneumothorax. Persistent right chest  wall subcutaneous emphysema.  2.  Slight increase right apical pulmonary opacities, increased  pulmonary hemorrhage versus atelectasis.    I have personally reviewed the examination and initial interpretation  and I agree with the findings.    KAVON MILLS MD   XR Chest 2 Views    Narrative    CHEST TWO VIEWS  7/27/2017 10:37 AM     HISTORY:  Malignant neoplasm of unspecified site of right female  breast. Estrogen receptor positive status.    COMPARISON: 7/24/2017.      Impression    IMPRESSION: Small right apical pneumothorax is still present but  smaller, decreased from 2.0 cm thickness on the previous exam to 1.0  cm thickness. Small fluid level laterally. Apical pleural thickening.  Elevated right diaphragm.    Port from the left with the tip in the superior vena cava. Normal left  lung. Normal heart size and pulmonary vascularity.    GIRMA MEJIA MD   X-ray Chest 2 vws*    Narrative    EXAM: XR CHEST 2 VW  8/16/2017 10:01 AM     HISTORY:  Primary spontaneous pneumothorax       COMPARISON: Chest radiograph 7/27/2017    FINDINGS: PA and lateral views of chest. Decreased pneumothorax  component of the right apical hydropneumothorax. Partial right upper  lobe collapse. 4 right upper lung endobronchial stents. Cardiac  silhouette is within normal. Tented/peak appearance of right  diaphragmatic (juxtaphrenic peak sign). No left-sided pneumothorax or  pleural effusion.      Impression    IMPRESSION:   1. Decreased pneumothorax compartment of the right apical  hydropneumothorax.  2. Partial right upper lobe collapse with right juxtaphrenic peak  sign, which alternatively or additionally could represent increased  fluid component of the right apical hydropneumothorax.    I have personally reviewed the examination and initial interpretation  and I agree with the  findings.    LAURA CHAN MD       Assessment and plan:  (C50.911,  Z17.0,  C50.912) Bilateral malignant neoplasm of breast in female, estrogen receptor positive, unspecified site of breast (H)  (C50.919,  C78.7) Carcinoma of breast metastatic to liver, unspecified laterality (H)  (primary encounter diagnosis)  (C78.7) Secondary malignant neoplasm of liver (H)  (C50.911,  C77.3) Breast cancer metastasized to axillary lymph node, right (H)  Patient has been tolerating Xeloda without significant side effects. We will continue on Xeloda according to the treatment plan. We will plan to irradiate for imaging studies after next cycle. I will see the patient again in 3 weeks or sooner if there are new developments or concerns.     (C79.51) Bone metastasis (H)  Patient will continue on xjeva 120 mg every 90 days.    (E03.9) Hypothyroidism, unspecified type  Patient currently on Synthroid 25  g daily.    (J95.811) Postprocedural pneumothorax  Chest x-ray from yesterday showed pneumothorax had resolved. Patient is due to remove the valves in 3 weeks.    (K12.31) Mucositis due to chemotherapy  Patient has been using Magic mouthwash.    (J45.40) Moderate persistent asthma without complication  Patient currently on Dulara inhaler, beclomethasone inhaler. Albuterol p.r.n.    (G47.01) Insomnia due to medical condition  Patient currently on Ambien 10 mg orally daily at night.     (E78.5) Hyperlipidemia LDL goal <130  Patient currently on Lipitor 10 mg orally daily.    The patient is ready to learn, no apparent learning barriers were identified.  Diagnosis and treatment plans were explained to the patient. The patient expressed understanding of the content. The patient asked appropriate questions. The patient questions were answered to her satisfaction.    Chart documentation with Dragon Voice recognition Software. Although reviewed after completion, some words and grammatical errors may remain.

## 2017-08-17 NOTE — NURSING NOTE
"Oncology Rooming Note    August 17, 2017 2:01 PM   Etta Cervantes is a 57 year old female who presents for:    Chief Complaint   Patient presents with     Oncology Clinic Visit     3 week recheck Breast CA, labs today     Initial Vitals: /73 (BP Location: Right arm, Patient Position: Sitting, Cuff Size: Adult Regular)  Pulse 61  Temp 98.6  F (37  C) (Tympanic)  Resp 18  Ht 1.638 m (5' 4.49\")  Wt 86.7 kg (191 lb 1.6 oz)  LMP 02/06/2013  SpO2 100%  Breastfeeding? No  BMI 32.31 kg/m2 Estimated body mass index is 32.31 kg/(m^2) as calculated from the following:    Height as of this encounter: 1.638 m (5' 4.49\").    Weight as of this encounter: 86.7 kg (191 lb 1.6 oz). Body surface area is 1.99 meters squared.  Moderate Pain (5) Comment: Data Unavailable   Patient's last menstrual period was 02/06/2013.  Allergies reviewed: Yes  Medications reviewed: Yes    Medications: Medication refill not needed  Pharmacy name entered into [facility]: [id number] [place] [address]    Clinical concerns:  3 week recheck Breast CA, labs today.  1. Patient requsesting a refill of Xeloda if she is suppose to stay on it.     7 minutes for nursing intake (face to face time)     Josephine Virgen CMA              "

## 2017-08-17 NOTE — MR AVS SNAPSHOT
After Visit Summary   8/17/2017    Etta Cervantes    MRN: [id number]           Patient Information     Date Of Birth          1960        Visit Information        Provider Department      8/17/2017 3:00 PM Brodie Cassidy MD [facility]        Today's Diagnoses     Carcinoma of breast metastatic to liver, unspecified laterality (H)    -  1    Secondary malignant neoplasm of liver (H)        Bone metastasis (H)        Breast cancer metastasized to axillary lymph node, right (H)           Follow-ups after your visit        Your next 10 appointments already scheduled     Aug 17, 2017  3:00 PM CDT   Return Visit with Brodie Cassidy MD   [facility] ([facility])    [facility]  [address]  [place] [address]   [phone]            Sep 11, 2017  8:00 AM CDT   CT CHEST/ABDOMEN/PELVIS W CONTRAST with [id number]   [facility] ([facility])    [address] [place]  [facility] [address]   [phone]           Please bring any scans or X-rays taken at other hospitals, if similar tests were done. Also bring a list of your medicines, including vitamins, minerals and over-the-counter drugs. It is safest to leave personal items at home.  Be sure to tell your doctor:   If you have any allergies.   If there s any chance you are pregnant.   If you are breastfeeding.   If you have any special needs.  You may have contrast for this exam. To prepare:   Do not eat or drink for 2 hours before your exam. If you need to take medicine, you may take it with small sips of water. (We may ask you to take liquid medicine as well.)   The day before your exam, drink extra fluids at least six 8-ounce glasses (unless your doctor tells you to restrict your fluids).  Patients over 70 or patients with diabetes or kidney problems:   If you haven t had a blood test (creatinine test) within the last 30 days, go to your clinic or Diagnostic  Imaging Department for this test.  If you have diabetes:   If your kidney function is normal, continue taking your metformin (Avandamet, Glucophage, Glucovance, Metaglip) on the day of your exam.   If your kidney function is abnormal, wait 48 hours before restarting this medicine.  You will have oral contrast for this exam:   You will drink the contrast at home. Get this from your clinic or Diagnostic Imaging Department. Please follow the directions given.  Please wear loose clothing, such as a sweat suit or jogging clothes. Avoid snaps, zippers and other metal. We may ask you to undress and put on a hospital gown.  If you have any questions, please call the Imaging Department where you will have your exam.            Sep 13, 2017  1:00 PM CDT   Return Visit with Brodie Cassidy MD   [facility] ([facility])    [facility]  [address]  [place] [address]   [phone]            Sep 28, 2017  9:00 AM CDT   Level O with ROOM 8 [facility] ([facility])    [facility]  [address]  [place] [address]   [phone]              Future tests that were ordered for you today     Open Future Orders        Priority Expected Expires Ordered    CT Chest/Abdomen/Pelvis w Contrast Routine 9/13/2017 8/17/2018 8/17/2017            Who to contact     If you have questions or need follow up information about today's clinic visit or your schedule please contact [facility] [place] directly at [phone].  Normal or non-critical lab and imaging results will be communicated to you by MyChart, letter or phone within 4 business days after the clinic has received the results. If you do not hear from us within 7 days, please contact the clinic through MyChart or phone. If you have a critical or abnormal lab result, we will notify you by phone as soon as possible.  Submit refill requests through [employer] or  "call your pharmacy and they will forward the refill request to us. Please allow 3 business days for your refill to be completed.          Additional Information About Your Visit        [employer] Information     [employer] gives you secure access to your electronic health record. If you see a primary care provider, you can also send messages to your care team and make appointments. If you have questions, please call your primary care clinic.  If you do not have a primary care provider, please call [phone] and they will assist you.        Care EveryWhere ID     This is your Care EveryWhere ID. This could be used by other organizations to access your [facility] medical records  [id number]        Your Vitals Were     Pulse Temperature Respirations Height Last Period Pulse Oximetry    61 98.6  F (37  C) (Tympanic) 18 1.638 m (5' 4.49\") 02/06/2013 100%    Breastfeeding? BMI (Body Mass Index)                No 32.31 kg/m2           Blood Pressure from Last 3 Encounters:   08/17/17 124/73   08/16/17 111/75   07/27/17 119/71    Weight from Last 3 Encounters:   08/17/17 86.7 kg (191 lb 1.6 oz)   08/16/17 86.6 kg (191 lb)   07/27/17 86.9 kg (191 lb 9.6 oz)                 Today's Medication Changes          These changes are accurate as of: 8/17/17  2:50 PM.  If you have any questions, ask your nurse or doctor.               These medicines have changed or have updated prescriptions.        Dose/Directions    * capecitabine 500 MG tablet CHEMO   Commonly known as:  XELODA   This may have changed:  Another medication with the same name was added. Make sure you understand how and when to take each.   Used for:  Secondary malignant neoplasm of liver (H), Carcinoma of breast metastatic to liver, unspecified laterality (H), Bone metastasis (H), Breast cancer metastasized to axillary lymph node, right (H), Bilateral malignant neoplasm of breast in female, estrogen receptor positive, unspecified site of breast (H), Hyperlipidemia LDL " goal <130, Hypothyroidism, unspecified type, Chemotherapy-induced neutropenia (H), Mucositis due to chemotherapy   Changed by:  Brodie Cassidy MD        Take 5 cap in AM and 4 cap in PM on Days 1 through 14, then off for 7 days. Take within 30 mins after meal.   Quantity:  126 tablet   Refills:  0       * capecitabine 500 MG tablet CHEMO   Commonly known as:  XELODA   This may have changed:  Another medication with the same name was added. Make sure you understand how and when to take each.   Used for:  Secondary malignant neoplasm of liver (H), Carcinoma of breast metastatic to liver, unspecified laterality (H), Bone metastasis (H), Breast cancer metastasized to axillary lymph node, right (H)   Changed by:  Brodie Cassidy MD        Take 5 cap in AM and 4 cap in PM on Days 1 through 14, then off for 7 days. Take within 30 mins after meal.   Quantity:  126 tablet   Refills:  0       * capecitabine 500 MG tablet CHEMO   Commonly known as:  XELODA   This may have changed:  You were already taking a medication with the same name, and this prescription was added. Make sure you understand how and when to take each.   Used for:  Secondary malignant neoplasm of liver (H), Carcinoma of breast metastatic to liver, unspecified laterality (H), Bone metastasis (H), Breast cancer metastasized to axillary lymph node, right (H)   Changed by:  Brodie Cassidy MD        Take 5 cap in AM and 4 cap in PM on Days 1 through 14, then off for 7 days. Take within 30 mins after meal.   Quantity:  126 tablet   Refills:  0       * Notice:  This list has 3 medication(s) that are the same as other medications prescribed for you. Read the directions carefully, and ask your doctor or other care provider to review them with you.         Where to get your medicines      These medications were sent to [facility] - [place], [place] - [address]  [address], [facility] [address]     Phone:   [phone]     capecitabine 500 MG tablet CHEMO                Primary Care Provider Office Phone # Fax #    Johana Fairbanks [phone] [address] [place] [address]        Equal Access to Services     JACYANIYAH DOMINGUEZ : Hadii han ku hadkasieo Soomaali, waaxda luqadaha, qaybta kaalmada adeemily, hari cristobalyvette debby. So [facility] [phone].    ATENCIÓN: Si habla español, tiene a parekh disposición servicios gratuitos de asistencia lingüística. Llame al [phone].    We comply with applicable federal civil rights laws and [place] laws. We do not discriminate on the basis of race, color, national origin, age, disability sex, sexual orientation or gender identity.            Thank you!     Thank you for choosing [facility]  for your care. Our goal is always to provide you with excellent care. Hearing back from our patients is one way we can continue to improve our services. Please take a few minutes to complete the written survey that you may receive in the mail after your visit with us. Thank you!             Your Updated Medication List - Protect others around you: Learn how to safely use, store and throw away your medicines at www.disposemymeds.org.          This list is accurate as of: 8/17/17  2:50 PM.  Always use your most recent med list.                   Brand Name Dispense Instructions for use Diagnosis    * albuterol 108 (90 BASE) MCG/ACT Inhaler    VENTOLIN HFA    1 Inhaler    Inhale 2 puffs into the lungs every 4 hours as needed    Moderate persistent asthma, uncomplicated       * albuterol (2.5 MG/3ML) 0.083% neb solution     30 vial    Take 1 vial (2.5 mg) by nebulization every 4 hours as needed for shortness of breath / dyspnea    Moderate persistent asthma, uncomplicated       ALLEGRA ALLERGY 180 MG tablet   Generic drug:  fexofenadine      Take 180 mg by mouth daily as needed for allergies        atorvastatin 10 MG tablet    LIPITOR    90  tablet    Take 1 tablet (10 mg) by mouth daily    Hyperlipidemia LDL goal <100       azelastine 0.1 % spray    ASTELIN    3 Bottle    Spray 1-2 sprays into both nostrils 2 times daily as needed for rhinitis    Chronic rhinitis       beclomethasone 80 MCG/ACT Inhaler    QVAR    3 Inhaler    Inhale 2 puffs into the lungs 2 times daily    Chronic rhinitis       * capecitabine 500 MG tablet CHEMO    XELODA    126 tablet    Take 5 cap in AM and 4 cap in PM on Days 1 through 14, then off for 7 days. Take within 30 mins after meal.    Secondary malignant neoplasm of liver (H), Carcinoma of breast metastatic to liver, unspecified laterality (H), Bone metastasis (H), Breast cancer metastasized to axillary lymph node, right (H), Bilateral malignant neoplasm of breast in female, estrogen receptor positive, unspecified site of breast (H), Hyperlipidemia LDL goal <130, Hypothyroidism, unspecified type, Chemotherapy-induced neutropenia (H), Mucositis due to chemotherapy       * capecitabine 500 MG tablet CHEMO    XELODA    126 tablet    Take 5 cap in AM and 4 cap in PM on Days 1 through 14, then off for 7 days. Take within 30 mins after meal.    Secondary malignant neoplasm of liver (H), Carcinoma of breast metastatic to liver, unspecified laterality (H), Bone metastasis (H), Breast cancer metastasized to axillary lymph node, right (H)       * capecitabine 500 MG tablet CHEMO    XELODA    126 tablet    Take 5 cap in AM and 4 cap in PM on Days 1 through 14, then off for 7 days. Take within 30 mins after meal.    Secondary malignant neoplasm of liver (H), Carcinoma of breast metastatic to liver, unspecified laterality (H), Bone metastasis (H), Breast cancer metastasized to axillary lymph node, right (H)       CRANBERRY PO      Take 1 capsule by mouth daily        KP CALCIUM 600+D3 600-500 MG-UNIT Caps   Generic drug:  calcium carb-cholecalciferol      Take 2 tablets by mouth daily        levothyroxine 25 MCG tablet     SYNTHROID/LEVOTHROID    90 tablet    TAKE 1 TABLET (25 MCG) BY MOUTH DAILY    Hypothyroidism due to acquired atrophy of thyroid       lidocaine (viscous) 2 % solution    XYLOCAINE     Apply topically as needed        lidocaine visc 2% 2.5mL/5mL & maalox/mylanta w/ simeth 2.5mL/5mL & diphenhydrAMINE 5mg/5mL Susp suspension    MAGIC Mouthwash [facility]    240 mL    Swish and swallow 10 mLs in mouth every 6 hours as needed for mouth sores    Mucositis due to chemotherapy, Breast cancer metastasized to axillary lymph node, right (H)       LORazepam 0.5 MG tablet    ATIVAN    30 tablet    Take 1 tablet (0.5 mg) by mouth every 4 hours as needed (Anxiety, Nausea/Vomiting or Sleep)    Secondary malignant neoplasm of liver (H), Carcinoma of breast metastatic to liver, unspecified laterality (H), Bone metastasis (H), Breast cancer metastasized to axillary lymph node, right (H)       MAGIC MOUTHWASH (ENTER INGREDIENTS IN COMMENTS)      SWISH AND SWALLOW 10 MLS IN MOUTH EVERY 6 HOURS AS NEEDED FOR MOUTH SORES        metoclopramide 10 MG tablet    REGLAN    120 tablet    Take 1 tablet (10 mg) by mouth 2 times daily as needed    Bilateral malignant neoplasm of breast in female, estrogen receptor positive, unspecified site of breast (H)       mometasone-formoterol 200-5 MCG/ACT oral inhaler    DULERA    3 Inhaler    Inhale 2 puffs into the lungs 2 times daily    Moderate persistent asthma without complication       oxybutynin 10 MG 24 hr tablet    DITROPAN XL    90 tablet    Take 1 tablet (10 mg) by mouth daily (Needs follow-up appointment for this medication)    Mixed incontinence urge and stress (male)(female)       oxyCODONE 5 MG IR tablet    ROXICODONE    30 tablet    Take 1 tablet (5 mg) by mouth every 4 hours as needed for moderate to severe pain    Secondary malignant neoplasm of liver (H)       predniSONE 20 MG tablet    DELTASONE    10 tablet    Take 1 tablet (20 mg) by mouth 2 times daily For Yellow or Red zone on Asthma  Action Plan.    Moderate persistent asthma, uncomplicated       prochlorperazine 10 MG tablet    COMPAZINE    30 tablet    Take 1 tablet (10 mg) by mouth every 6 hours as needed (Nausea/Vomiting)    Secondary malignant neoplasm of liver (H), Carcinoma of breast metastatic to liver, unspecified laterality (H), Bone metastasis (H), Breast cancer metastasized to axillary lymph node, right (H)       STOOL SOFTENER PO      Take 100 mg by mouth daily        TYLENOL PO      Take 650 mg by mouth every 4 hours as needed for mild pain or fever        zolpidem 10 MG tablet    AMBIEN    30 tablet    Take 1 tablet 30 minutes prior to bedtime    Insomnia, unspecified       * Notice:  This list has 5 medication(s) that are the same as other medications prescribed for you. Read the directions carefully, and ask your doctor or other care provider to review them with you.

## 2017-08-17 NOTE — PATIENT INSTRUCTIONS
We would like to see you back in clinic with Dr. Cassidy with CT results.  We will help you arrange the valve replacement surgery as it will need to be done on your off week (Week of 09/04/17).  Cycle 5 will start prior to you being seen by Dr. Cassidy. Your prescription has been sent to: [facility]. When you are in need of a refill, please call your pharmacy and they will send us a request.  Copy of appointments, and after visit summary (AVS) given to patient.  If you have any questions during business hours (M-F 8 AM- 4PM), please call Funmi Ray RN, BSN, OCN Oncology Hematology /Breast Cancer Navigator at [facility] [phone].   For questions after business hours, or on holidays/weekends, please call our after hours Nurse Triage line [phone]. Thank you.

## 2017-08-17 NOTE — MR AVS SNAPSHOT
After Visit Summary   8/17/2017    Etta Cervantes    MRN: [id number]           Patient Information     Date Of Birth          1960        Visit Information        Provider Department      8/17/2017 2:00 PM ROOM 2 [facility]        Today's Diagnoses     Hypothyroidism, unspecified type    -  1    Secondary malignant neoplasm of liver (H)        Carcinoma of breast metastatic to liver, unspecified laterality (H)        Bone metastasis (H)        Breast cancer metastasized to axillary lymph node, right (H)           Follow-ups after your visit        Your next 10 appointments already scheduled     Aug 17, 2017  2:00 PM CDT   Level O with ROOM 2 [facility] ([facility])    [facility]  [address]  [facility] [address]   [phone]            Aug 17, 2017  3:00 PM CDT   Return Visit with Brodie Cassidy MD   [facility] ([facility])    [facility]  [address]  [facility] [address]   [phone]            Sep 28, 2017  9:00 AM CDT   Level O with ROOM 8 [facility] ([facility])    [facility]  [address]  [facility] [address]   [phone]              Who to contact     If you have questions or need follow up information about today's clinic visit or your schedule please contact [facility] directly at [phone].  Normal or non-critical lab and imaging results will be communicated to you by MyChart, letter or phone within 4 business days after the clinic has received the results. If you do not hear from us within 7 days, please contact the clinic through MyChart or phone. If you have a critical or abnormal lab result, we will notify you by phone as soon as possible.  Submit refill requests through [employer] or call your pharmacy and they will forward the refill request to us. Please  allow 3 business days for your refill to be completed.          Additional Information About Your Visit        MyChart Information     [email or web address] gives you secure access to your electronic health record. If you see a primary care provider, you can also send messages to your care team and make appointments. If you have questions, please call your primary care clinic.  If you do not have a primary care provider, please call [phone] and they will assist you.        Care EveryWhere ID     This is your Care EveryWhere ID. This could be used by other organizations to access your [facility] medical records  [id number]        Your Vitals Were     Last Period                   02/06/2013            Blood Pressure from Last 3 Encounters:   08/16/17 111/75   07/27/17 119/71   07/24/17 114/69    Weight from Last 3 Encounters:   08/16/17 86.6 kg (191 lb)   07/27/17 86.9 kg (191 lb 9.6 oz)   07/24/17 86.6 kg (190 lb 14.7 oz)              We Performed the Following     Ca27.29  breast tumor marker     CBC with platelets differential     Comprehensive metabolic panel     TSH with free T4 reflex        Primary Care Provider Office Phone # Fax #    Johana Fairbanks [phone] [address] [facility] [address]        Equal Access to Services     JACKIE MIX : Hadii han ku hadasho Soomaali, waaxda luqadaha, qaybta kaalmada adeegyada, hari ugardado. So [facility] [phone].    ATENCIÓN: Si habla español, tiene a parekh disposición servicios gratuitos de asistencia lingüística. Llame al [phone].    We comply with applicable federal civil rights laws and [place] laws. We do not discriminate on the basis of race, color, national origin, age, disability sex, sexual orientation or gender identity.            Thank you!     Thank you for choosing [facility]  for your care. Our goal is always to provide you with excellent care. Hearing back from our patients is one way we  can continue to improve our services. Please take a few minutes to complete the written survey that you may receive in the mail after your visit with us. Thank you!             Your Updated Medication List - Protect others around you: Learn how to safely use, store and throw away your medicines at www.disposemymeds.org.          This list is accurate as of: 8/17/17  1:53 PM.  Always use your most recent med list.                   Brand Name Dispense Instructions for use Diagnosis    * albuterol 108 (90 BASE) MCG/ACT Inhaler    VENTOLIN HFA    1 Inhaler    Inhale 2 puffs into the lungs every 4 hours as needed    Moderate persistent asthma, uncomplicated       * albuterol (2.5 MG/3ML) 0.083% neb solution     30 vial    Take 1 vial (2.5 mg) by nebulization every 4 hours as needed for shortness of breath / dyspnea    Moderate persistent asthma, uncomplicated       ALLEGRA ALLERGY 180 MG tablet   Generic drug:  fexofenadine      Take 180 mg by mouth daily as needed for allergies        atorvastatin 10 MG tablet    LIPITOR    90 tablet    Take 1 tablet (10 mg) by mouth daily    Hyperlipidemia LDL goal <100       azelastine 0.1 % spray    ASTELIN    3 Bottle    Spray 1-2 sprays into both nostrils 2 times daily as needed for rhinitis    Chronic rhinitis       beclomethasone 80 MCG/ACT Inhaler    QVAR    3 Inhaler    Inhale 2 puffs into the lungs 2 times daily    Chronic rhinitis       * capecitabine 500 MG tablet CHEMO    XELODA    126 tablet    Take 5 cap in AM and 4 cap in PM on Days 1 through 14, then off for 7 days. Take within 30 mins after meal.    Secondary malignant neoplasm of liver (H), Carcinoma of breast metastatic to liver, unspecified laterality (H), Bone metastasis (H), Breast cancer metastasized to axillary lymph node, right (H), Bilateral malignant neoplasm of breast in female, estrogen receptor positive, unspecified site of breast (H), Hyperlipidemia LDL goal <130, Hypothyroidism, unspecified type,  Chemotherapy-induced neutropenia (H), Mucositis due to chemotherapy       * capecitabine 500 MG tablet CHEMO    XELODA    126 tablet    Take 5 cap in AM and 4 cap in PM on Days 1 through 14, then off for 7 days. Take within 30 mins after meal.    Secondary malignant neoplasm of liver (H), Carcinoma of breast metastatic to liver, unspecified laterality (H), Bone metastasis (H), Breast cancer metastasized to axillary lymph node, right (H)       CRANBERRY PO      Take 1 capsule by mouth daily        KP CALCIUM 600+D3 600-500 MG-UNIT Caps   Generic drug:  calcium carb-cholecalciferol      Take 2 tablets by mouth daily        levothyroxine 25 MCG tablet    SYNTHROID/LEVOTHROID    90 tablet    TAKE 1 TABLET (25 MCG) BY MOUTH DAILY    Hypothyroidism due to acquired atrophy of thyroid       lidocaine (viscous) 2 % solution    XYLOCAINE     Apply topically as needed        lidocaine visc 2% 2.5mL/5mL & maalox/mylanta w/ simeth 2.5mL/5mL & diphenhydrAMINE 5mg/5mL Susp suspension    [facility] Mouthwash [facility]    240 mL    Swish and swallow 10 mLs in mouth every 6 hours as needed for mouth sores    Mucositis due to chemotherapy, Breast cancer metastasized to axillary lymph node, right (H)       LORazepam 0.5 MG tablet    ATIVAN    30 tablet    Take 1 tablet (0.5 mg) by mouth every 4 hours as needed (Anxiety, Nausea/Vomiting or Sleep)    Secondary malignant neoplasm of liver (H), Carcinoma of breast metastatic to liver, unspecified laterality (H), Bone metastasis (H), Breast cancer metastasized to axillary lymph node, right (H)       MAGIC MOUTHWASH (ENTER INGREDIENTS IN COMMENTS)      SWISH AND SWALLOW 10 MLS IN MOUTH EVERY 6 HOURS AS NEEDED FOR MOUTH SORES        metoclopramide 10 MG tablet    REGLAN    120 tablet    Take 1 tablet (10 mg) by mouth 2 times daily as needed    Bilateral malignant neoplasm of breast in female, estrogen receptor positive, unspecified site of breast (H)       mometasone-formoterol 200-5 MCG/ACT oral  inhaler    DULERA    3 Inhaler    Inhale 2 puffs into the lungs 2 times daily    Moderate persistent asthma without complication       oxybutynin 10 MG 24 hr tablet    DITROPAN XL    90 tablet    Take 1 tablet (10 mg) by mouth daily (Needs follow-up appointment for this medication)    Mixed incontinence urge and stress (male)(female)       oxyCODONE 5 MG IR tablet    ROXICODONE    30 tablet    Take 1 tablet (5 mg) by mouth every 4 hours as needed for moderate to severe pain    Secondary malignant neoplasm of liver (H)       predniSONE 20 MG tablet    DELTASONE    10 tablet    Take 1 tablet (20 mg) by mouth 2 times daily For Yellow or Red zone on Asthma Action Plan.    Moderate persistent asthma, uncomplicated       prochlorperazine 10 MG tablet    COMPAZINE    30 tablet    Take 1 tablet (10 mg) by mouth every 6 hours as needed (Nausea/Vomiting)    Secondary malignant neoplasm of liver (H), Carcinoma of breast metastatic to liver, unspecified laterality (H), Bone metastasis (H), Breast cancer metastasized to axillary lymph node, right (H)       STOOL SOFTENER PO      Take 100 mg by mouth daily        TYLENOL PO      Take 650 mg by mouth every 4 hours as needed for mild pain or fever        zolpidem 10 MG tablet    AMBIEN    30 tablet    Take 1 tablet 30 minutes prior to bedtime    Insomnia, unspecified       * Notice:  This list has 4 medication(s) that are the same as other medications prescribed for you. Read the directions carefully, and ask your doctor or other care provider to review them with you.

## 2017-08-18 NOTE — TELEPHONE ENCOUNTER
Spoke with Etta about upcoming procedure with Dr. Mcgowan on 9/7/17, she is aware she needs a pre-op physical with her primary within 30day. A letter will be sent out with instructions, she did not have nay questions at this time. [phone]

## 2017-08-18 NOTE — TELEPHONE ENCOUNTER
Left a message for Etta to return my call at [phone] to schedule her upcoming procedure with Dr. Mcgowan per Dr. Cassidy.

## 2017-08-18 NOTE — PROGRESS NOTES
Etta,  Your lab results were normal/stable. Please feel free to my chart or call the office with questions. Johana Fairbanks M.D.

## 2017-08-18 NOTE — PROGRESS NOTES
Called and reviewed results with the patient per Dr. Cassidy. Patient had no further questions or concerns at this time and also verbalized understanding. Direct line provided if any further questions/concerns arise.

## 2017-09-01 NOTE — PROGRESS NOTES
Patient is coming in Tuesday for a lab draw and has no orders, please review and advise.    Page Dawn MLT

## 2017-09-05 NOTE — MR AVS SNAPSHOT
After Visit Summary   9/5/2017    Etta Cervantes    MRN: [id number]           Patient Information     Date Of Birth          1960        Visit Information        Provider Department      9/5/2017 1:30 PM Johana Fairbanks MD [facility]        Today's Diagnoses     Preop general physical exam    -  1    Secondary spontaneous pneumothorax          Care Instructions      Before Your Surgery      Call your surgeon if there is any change in your health. This includes signs of a cold or flu (such as a sore throat, runny nose, cough, rash or fever).    Do not smoke, drink alcohol or take over the counter medicine (unless your surgeon or primary care doctor tells you to) for the 24 hours before and after surgery.    If you take prescribed drugs: Follow your doctor s orders about which medicines to take and which to stop until after surgery.    Eating and drinking prior to surgery: follow the instructions from your surgeon    Take a shower or bath the night before surgery. Use the soap your surgeon gave you to gently clean your skin. If you do not have soap from your surgeon, use your regular soap. Do not shave or scrub the surgery site.  Wear clean pajamas and have clean sheets on your bed.           Follow-ups after your visit        Follow-up notes from your care team     Return if symptoms worsen or fail to improve.      Your next 10 appointments already scheduled     Sep 11, 2017  8:00 AM CDT   CT CHEST/ABDOMEN/PELVIS W CONTRAST with [facility]   [facility] ([facility])    [address] [facility] [address] [phone]           Please bring any scans or X-rays taken at other hospitals, if similar tests were done. Also bring a list of your medicines, including vitamins, minerals and over-the-counter drugs. It is safest to leave personal items at home.  Be sure to tell your doctor:   If you have any allergies.   If there s any chance you are pregnant.   If you  are breastfeeding.   If you have any special needs.  You may have contrast for this exam. To prepare:   Do not eat or drink for 2 hours before your exam. If you need to take medicine, you may take it with small sips of water. (We may ask you to take liquid medicine as well.)   The day before your exam, drink extra fluids at least six 8-ounce glasses (unless your doctor tells you to restrict your fluids).  Patients over 70 or patients with diabetes or kidney problems:   If you haven t had a blood test (creatinine test) within the last 30 days, go to your clinic or Diagnostic Imaging Department for this test.  If you have diabetes:   If your kidney function is normal, continue taking your metformin (Avandamet, Glucophage, Glucovance, Metaglip) on the day of your exam.   If your kidney function is abnormal, wait 48 hours before restarting this medicine.  You will have oral contrast for this exam:   You will drink the contrast at home. Get this from your clinic or Diagnostic Imaging Department. Please follow the directions given.  Please wear loose clothing, such as a sweat suit or jogging clothes. Avoid snaps, zippers and other metal. We may ask you to undress and put on a hospital gown.  If you have any questions, please call the Imaging Department where you will have your exam.            Sep 13, 2017  1:00 PM CDT   Return Visit with Brodie Cassidy MD   [facility] ([facility])    [facility]  [address]  [facility] [address]   [phone]            Sep 28, 2017  9:00 AM CDT   Level O with ROOM 8 [facility] ([facility])    [facility]  [address]  [facility] [address]   [phone]              Who to contact     Normal or non-critical lab and imaging results will be communicated to you by MyChart, letter or phone within 4 business days after the clinic has received the results. If  "you do not hear from us within 7 days, please contact the clinic through [employer] or phone. If you have a critical or abnormal lab result, we will notify you by phone as soon as possible.  Submit refill requests through [employer] or call your pharmacy and they will forward the refill request to us. Please allow 3 business days for your refill to be completed.          If you need to speak with a  for additional information , please call: [phone]             Additional Information About Your Visit        [employer] Information     [employer] gives you secure access to your electronic health record. If you see a primary care provider, you can also send messages to your care team and make appointments. If you have questions, please call your primary care clinic.  If you do not have a primary care provider, please call [phone] and they will assist you.        Care EveryWhere ID     This is your Care EveryWhere ID. This could be used by other organizations to access your [place] medical records  [id number]        Your Vitals Were     Pulse Temperature Height Last Period BMI (Body Mass Index)       68 99.6  F (37.6  C) (Tympanic) 5' 4.5\" (1.638 m) 02/06/2013 33.07 kg/m2        Blood Pressure from Last 3 Encounters:   09/07/17 125/85   09/05/17 114/72   08/17/17 124/73    Weight from Last 3 Encounters:   09/07/17 192 lb 14.4 oz (87.5 kg)   09/05/17 195 lb 11.2 oz (88.8 kg)   08/17/17 191 lb 1.6 oz (86.7 kg)              Today, you had the following     No orders found for display       Primary Care Provider Office Phone # Fax #    Johana Fairbanks [phone] [address] [facility] [address]        Equal Access to Services     JACKIE MIX : Rupali Rodriguez, nish jauregui, antolin kaalmada lexi, hari guardado. So [facility] [phone].    ATENCIÓN: Si habla español, tiene a parekh disposición servicios gratuitos de asistencia " lingüísticaSinai Mead al [phone].    We comply with applicable federal civil rights laws and [place] laws. We do not discriminate on the basis of race, color, national origin, age, disability sex, sexual orientation or gender identity.            Thank you!     Thank you for choosing [facility]  for your care. Our goal is always to provide you with excellent care. Hearing back from our patients is one way we can continue to improve our services. Please take a few minutes to complete the written survey that you may receive in the mail after your visit with us. Thank you!             Your Updated Medication List - Protect others around you: Learn how to safely use, store and throw away your medicines at www.disposemymeds.org.          This list is accurate as of: 9/5/17 11:59 PM.  Always use your most recent med list.                   Brand Name Dispense Instructions for use Diagnosis    * albuterol 108 (90 BASE) MCG/ACT Inhaler    VENTOLIN HFA    1 Inhaler    Inhale 2 puffs into the lungs every 4 hours as needed    Moderate persistent asthma, uncomplicated       * albuterol (2.5 MG/3ML) 0.083% neb solution     30 vial    Take 1 vial (2.5 mg) by nebulization every 4 hours as needed for shortness of breath / dyspnea    Moderate persistent asthma, uncomplicated       ALLEGRA ALLERGY 180 MG tablet   Generic drug:  fexofenadine      Take 180 mg by mouth daily as needed for allergies        atorvastatin 10 MG tablet    LIPITOR    90 tablet    Take 1 tablet (10 mg) by mouth daily    Hyperlipidemia LDL goal <100       azelastine 0.1 % spray    ASTELIN    3 Bottle    Spray 1-2 sprays into both nostrils 2 times daily as needed for rhinitis    Chronic rhinitis       beclomethasone 80 MCG/ACT Inhaler    QVAR    3 Inhaler    Inhale 2 puffs into the lungs 2 times daily    Chronic rhinitis       * capecitabine 500 MG tablet CHEMO    XELODA    126 tablet    Take 5 cap in AM and 4 cap in PM on Days 1 through 14, then  off for 7 days. Take within 30 mins after meal.    Secondary malignant neoplasm of liver (H), Carcinoma of breast metastatic to liver, unspecified laterality (H), Bone metastasis (H), Breast cancer metastasized to axillary lymph node, right (H), Bilateral malignant neoplasm of breast in female, estrogen receptor positive, unspecified site of breast (H), Hyperlipidemia LDL goal <130, Hypothyroidism, unspecified type, Chemotherapy-induced neutropenia (H), Mucositis due to chemotherapy       * capecitabine 500 MG tablet CHEMO    XELODA    126 tablet    Take 5 cap in AM and 4 cap in PM on Days 1 through 14, then off for 7 days. Take within 30 mins after meal.    Secondary malignant neoplasm of liver (H), Carcinoma of breast metastatic to liver, unspecified laterality (H), Bone metastasis (H), Breast cancer metastasized to axillary lymph node, right (H)       * capecitabine 500 MG tablet CHEMO    XELODA    126 tablet    Take 5 cap in AM and 4 cap in PM on Days 1 through 14, then off for 7 days. Take within 30 mins after meal.    Secondary malignant neoplasm of liver (H), Carcinoma of breast metastatic to liver, unspecified laterality (H), Bone metastasis (H), Breast cancer metastasized to axillary lymph node, right (H)       CRANBERRY PO      Take 1 capsule by mouth daily        KP CALCIUM 600+D3 600-500 MG-UNIT Caps   Generic drug:  calcium carb-cholecalciferol      Take 2 tablets by mouth daily        levothyroxine 25 MCG tablet    SYNTHROID/LEVOTHROID    90 tablet    TAKE 1 TABLET (25 MCG) BY MOUTH DAILY    Hypothyroidism due to acquired atrophy of thyroid       lidocaine (viscous) 2 % solution    XYLOCAINE     Apply topically as needed        lidocaine visc 2% 2.5mL/5mL & maalox/mylanta w/ simeth 2.5mL/5mL & diphenhydrAMINE 5mg/5mL Susp suspension    [facility]    240 mL    Swish and swallow 10 mLs in mouth every 6 hours as needed for mouth sores    Mucositis due to chemotherapy, Breast cancer  metastasized to axillary lymph node, right (H)       LORazepam 0.5 MG tablet    ATIVAN    30 tablet    Take 1 tablet (0.5 mg) by mouth every 4 hours as needed (Anxiety, Nausea/Vomiting or Sleep)    Secondary malignant neoplasm of liver (H), Carcinoma of breast metastatic to liver, unspecified laterality (H), Bone metastasis (H), Breast cancer metastasized to axillary lymph node, right (H)       MAGIC MOUTHWASH (ENTER INGREDIENTS IN COMMENTS)      SWISH AND SWALLOW 10 MLS IN MOUTH EVERY 6 HOURS AS NEEDED FOR MOUTH SORES        metoclopramide 10 MG tablet    REGLAN    120 tablet    Take 1 tablet (10 mg) by mouth 2 times daily as needed    Bilateral malignant neoplasm of breast in female, estrogen receptor positive, unspecified site of breast (H)       mometasone-formoterol 200-5 MCG/ACT oral inhaler    DULERA    3 Inhaler    Inhale 2 puffs into the lungs 2 times daily    Moderate persistent asthma without complication       oxybutynin 10 MG 24 hr tablet    DITROPAN XL    90 tablet    Take 1 tablet (10 mg) by mouth daily (Needs follow-up appointment for this medication)    Mixed incontinence urge and stress (male)(female)       oxyCODONE 5 MG IR tablet    ROXICODONE    30 tablet    Take 1 tablet (5 mg) by mouth every 4 hours as needed for moderate to severe pain    Secondary malignant neoplasm of liver (H)       predniSONE 20 MG tablet    DELTASONE    10 tablet    Take 1 tablet (20 mg) by mouth 2 times daily For Yellow or Red zone on Asthma Action Plan.    Moderate persistent asthma, uncomplicated       prochlorperazine 10 MG tablet    COMPAZINE    30 tablet    Take 1 tablet (10 mg) by mouth every 6 hours as needed (Nausea/Vomiting)    Secondary malignant neoplasm of liver (H), Carcinoma of breast metastatic to liver, unspecified laterality (H), Bone metastasis (H), Breast cancer metastasized to axillary lymph node, right (H)       STOOL SOFTENER PO      Take 100 mg by mouth daily        TYLENOL PO      Take 650 mg by  mouth every 4 hours as needed for mild pain or fever        zolpidem 10 MG tablet    AMBIEN    30 tablet    Take 1 tablet 30 minutes prior to bedtime    Insomnia, unspecified       * Notice:  This list has 5 medication(s) that are the same as other medications prescribed for you. Read the directions carefully, and ask your doctor or other care provider to review them with you.

## 2017-09-05 NOTE — PROGRESS NOTES
[facility]  [address] [facility] [address]  [phone]  Dept: [phone]    PRE-OP EVALUATION:  Today's date: 2017    Etta Cervantes (: 1960) presents for pre-operative evaluation assessment as requested by Dr. Mcgowan, Carlos Cortés MD.  She requires evaluation and anesthesia risk assessment prior to undergoing surgery/procedure for treatment of BRONCHOSCOPY, REMOVE BRONCHIAL VALVE .  Proposed procedure: Flexible Bronchoscopy, Removal Of Endobronchial Valves    Date of Surgery/ Procedure: 17  Time of Surgery/ Procedure: 12:05pm  Hospital/Surgical Facility: [facility]  Primary Physician: Johana Fairbanks  Type of Anesthesia Anticipated: General    Patient has a Health Care Directive or Living Will:  NO    1. NO - Do you have a history of heart attack, stroke, stent, bypass or surgery on an artery in the head, neck, heart or legs?  2. NO - Do you ever have any pain or discomfort in your chest?  3. NO - Do you have a history of Heart Failure?  4. YES - Are you troubled by shortness of breath when: walking on the level, up a slight hill or at night?  5. NO - Do you currently have a cold, bronchitis or other respiratory infection?  6. YES - Do you have a cough, shortness of breath or wheezing?  7. NO - Do you sometimes get pains in the calves of your legs when you walk?  8. YES - Do you or anyone in your family have previous history of blood clots? Great Aunt  9. NO - Do you or does anyone in your family have a serious bleeding problem such as prolonged bleeding following surgeries or cuts?  10. NO - Have you ever had problems with anemia or been told to take iron pills?  11. YES - Have you had any abnormal blood loss such as black, tarry or bloody stools, or abnormal vaginal bleeding? Before ablation   12. NO - Have you ever had a blood transfusion?  13. YES - Have you or any of your relatives ever had problems with anesthesia?   14. YES - Do you have sleep apnea, excessive  snoring or daytime drowsiness?  15. NO - Do you have any prosthetic heart valves?  16. NO - Do you have prosthetic joints?  17. NO - Is there any chance that you may be pregnant?        HPI:                                                      Brief HPI related to upcoming procedure: had right pneumothorax and now will be having stent removal       See problem list for active medical problems.  Problems all longstanding and stable, except as noted/documented.  See ROS for pertinent symptoms related to these conditions.                                                                                                  .    MEDICAL HISTORY:                                                    Patient Active Problem List    Diagnosis Date Noted     Secondary malignant neoplasm of liver (H) 04/22/2015     Priority: High     Carcinoma of breast metastatic to liver (H) 02/04/2015     Priority: High     Diagnosis updated by automated process. Provider to review and confirm.       Bone metastasis (H) 02/04/2015     Priority: High     Breast cancer metastasized to axillary lymph node (H) 02/04/2015     Priority: High     Pneumothorax 07/12/2017     Priority: Medium     Acute pyelonephritis 07/12/2017     Priority: Medium     Hyperlipidemia LDL goal <130 05/18/2017     Priority: Medium     Chemotherapy-induced neutropenia (H) 10/12/2016     Priority: Medium     Thyroid nodule 05/14/2015     Priority: Medium     Emphysematous bleb of lung (H) 05/14/2015     Priority: Medium     Mucositis due to chemotherapy 02/19/2015     Priority: Medium     Drug-related hair loss 02/11/2015     Priority: Medium     Breast cancer (H) 01/23/2015     Priority: Medium     ASCUS favor benign 01/06/2015     Priority: Medium     1/6/15: Pap - ASCUS, Neg HPV. Plan pap/hpv in 3 years.        DIAMOND (obstructive sleep apnea) 01/24/2012     Priority: Medium     AHI 44       Insomnia 01/24/2012     Priority: Medium     Problem list name updated by automated  process. Provider to review       Moderate persistent asthma 03/28/2011     Priority: Medium     Menorrhagia 02/24/2011     Priority: Medium     CARDIOVASCULAR SCREENING; LDL GOAL LESS THAN 160 10/31/2010     Priority: Medium     Hypothyroid      Priority: Medium     Fibroids      Priority: Medium     Health Care Home 05/28/2013     Priority: Low     EMERGENCY CARE PLAN  May 28, 2013: No current Care Coordination follow up planned. Please refer if Care Coordination services are needed.    Presenting Problem Signs and Symptoms Treatment Plan   Questions or concerns   during clinic hours   I will call my clinic directly:  [place] [address] [place] [address]  [phone].    Questions or concerns outside clinic hours   I will call the 24 hour nurse line at   [phone] or [facility].   Need to schedule an appointment   I will call the 24 hour scheduling team at [phone] or my clinic directly at [phone].    Same day treatment     I will call my clinic first, nurse line if after hours, urgent care and express care if needed.   Clinic care coordination services (regular clinic hours)     I will call a clinic care coordinator directly:     Mannie Blevins RN  Mon, Tues, Fri - [phone]  Wed, Thurs - [phone]    Barbara Burk :    [phone]    Or call my clinic at [phone] and ask to speak with care coordination.   Crisis Services: Behavioral or Mental Health  C[facility]is [facility] 24 Hour Phone Line  [phone]    [facility] 24 Hour Crisis Services  [phone]    [facility] (Behavioral Health Providers) Network [phone]    [facility]   [phone]       Emergency treatment -- Immediately    CAll 911           Past Medical History:   Diagnosis Date     ASCUS favor benign 1/2015    Neg HPV     Cancer (H)      Cataract 2010    surgery both eyes     Emphysematous bleb of lung (H)      Fibroids      Hypercholesterolemia      Hypothyroid       Incontinence of urine     mixed     Menorrhagia      Obesity      Pneumothorax      PONV (postoperative nausea and vomiting)      Uncomplicated asthma      Past Surgical History:   Procedure Laterality Date     BRONCHOSCOPY, INSERT BRONCHIAL VALVE N/A 7/20/2017    Procedure: BRONCHOSCOPY, INSERT BRONCHIAL VALVE;  Flexible Bronchoscopy, Endobronchial Valve Insertion X5. 4 Valves into right upper lobe and 1 valve into Right middle lobe;  Surgeon: Carlos Mcgowan MD;  Location: [facility] OR     CATARACT IOL, RT/LT  2010     COLONOSCOPY  2010     DILATION AND CURETTAGE, HYSTEROSCOPY, ABLATE ENDOMETRIUM NOVASURE, COMBINED  3/2/2011    COMBINED DILATION AND CURETTAGE, HYSTEROSCOPY, ABLATE ENDOMETRIUM NOVASURE performed by LAURA VARGAS at [facility] OR     INSERT PORT VASCULAR ACCESS N/A 2/12/2015    Procedure: INSERT PORT VASCULAR ACCESS;  Surgeon: Noé Schaefer MD;  Location: [facility] OR     SURGICAL HISTORY OF -   2005    bladder sling     SURGICAL HISTORY OF -       Cystectomy-lower lip     TUBAL LIGATION  1987     Current Outpatient Prescriptions   Medication Sig Dispense Refill     capecitabine (XELODA) 500 MG tablet CHEMO Take 5 cap in AM and 4 cap in PM on Days 1 through 14, then off for 7 days. Take within 30 mins after meal. 126 tablet 0     MAGIC MOUTHWASH, ENTER INGREDIENTS IN COMMENTS, SWISH AND SWALLOW 10 MLS IN MOUTH EVERY 6 HOURS AS NEEDED FOR MOUTH SORES  10     lidocaine, viscous, (XYLOCAINE) 2 % solution Apply topically as needed       levothyroxine (SYNTHROID/LEVOTHROID) 25 MCG tablet TAKE 1 TABLET (25 MCG) BY MOUTH DAILY 90 tablet 2     atorvastatin (LIPITOR) 10 MG tablet Take 1 tablet (10 mg) by mouth daily 90 tablet 3     metoclopramide (REGLAN) 10 MG tablet Take 1 tablet (10 mg) by mouth 2 times daily as needed 120 tablet 1     oxyCODONE (ROXICODONE) 5 MG IR tablet Take 1 tablet (5 mg) by mouth every 4 hours as needed for moderate to severe pain 30 tablet 0     capecitabine (XELODA) 500 MG tablet  CHEMO Take 5 cap in AM and 4 cap in PM on Days 1 through 14, then off for 7 days. Take within 30 mins after meal. 126 tablet 0     fexofenadine (ALLEGRA ALLERGY) 180 MG tablet Take 180 mg by mouth daily as needed for allergies       calcium carb-cholecalciferol ( CALCIUM 600+D3) 600-500 MG-UNIT CAPS Take 2 tablets by mouth daily       zolpidem (AMBIEN) 10 MG tablet Take 1 tablet 30 minutes prior to bedtime 30 tablet 3     capecitabine (XELODA) 500 MG tablet CHEMO Take 5 cap in AM and 4 cap in PM on Days 1 through 14, then off for 7 days. Take within 30 mins after meal. 126 tablet 0     albuterol (2.5 MG/3ML) 0.083% neb solution Take 1 vial (2.5 mg) by nebulization every 4 hours as needed for shortness of breath / dyspnea 30 vial 3     predniSONE (DELTASONE) 20 MG tablet Take 1 tablet (20 mg) by mouth 2 times daily For Yellow or Red zone on Asthma Action Plan. 10 tablet 1     magic mouthwash suspension (diphenhydrAMINE, lidocaine, aluminum-magnesium & simethicone) Swish and swallow 10 mLs in mouth every 6 hours as needed for mouth sores 240 mL 10     LORazepam (ATIVAN) 0.5 MG tablet Take 1 tablet (0.5 mg) by mouth every 4 hours as needed (Anxiety, Nausea/Vomiting or Sleep) 30 tablet 2     prochlorperazine (COMPAZINE) 10 MG tablet Take 1 tablet (10 mg) by mouth every 6 hours as needed (Nausea/Vomiting) 30 tablet 2     Docusate Calcium (STOOL SOFTENER PO) Take 100 mg by mouth daily        CRANBERRY PO Take 1 capsule by mouth daily        mometasone-formoterol (DULERA) 200-5 MCG/ACT oral inhaler Inhale 2 puffs into the lungs 2 times daily 3 Inhaler 0     oxybutynin (DITROPAN XL) 10 MG 24 hr tablet Take 1 tablet (10 mg) by mouth daily (Needs follow-up appointment for this medication) 90 tablet 3     beclomethasone (QVAR) 80 MCG/ACT Inhaler Inhale 2 puffs into the lungs 2 times daily 3 Inhaler 3     azelastine (ASTELIN) 0.1 % nasal spray Spray 1-2 sprays into both nostrils 2 times daily as needed for rhinitis 3 Bottle 3  "    albuterol (VENTOLIN HFA) 108 (90 BASE) MCG/ACT inhaler Inhale 2 puffs into the lungs every 4 hours as needed 1 Inhaler 2     Acetaminophen (TYLENOL PO) Take 650 mg by mouth every 4 hours as needed for mild pain or fever       OTC products: None, except as noted above    Allergies   Allergen Reactions     Animal Dander Cough     Itchy eyes     Cats      Dust Mites Cough     Itchy eyes     Pollen Extract      Other reaction(s): Cough  Itchy eyes     Seasonal Allergies      Levaquin [Levofloxacin] Rash     States she has violent dreams from taking this      Latex Allergy: NO    Social History   Substance Use Topics     Smoking status: Never Smoker     Smokeless tobacco: Never Used     Alcohol use No     History   Drug Use No       REVIEW OF SYSTEMS:                                                    Constitutional, neuro, ENT, endocrine, pulmonary, cardiac, gastrointestinal, genitourinary, musculoskeletal, integument and psychiatric systems are negative, except as otherwise noted.  Still feeling weaker but has been improving daily since she was discharged from the hospital     EXAM:                                                    /72 (BP Location: Right arm, Patient Position: Chair, Cuff Size: Adult Large)  Pulse 68  Temp 99.6  F (37.6  C) (Tympanic)  Ht 5' 4.5\" (1.638 m)  Wt 195 lb 11.2 oz (88.8 kg)  LMP 02/06/2013  BMI 33.07 kg/m2    GENERAL APPEARANCE: healthy, alert and no distress     EYES: EOMI, PERRL     HENT: ear canals and TM's normal and nose and mouth without ulcers or lesions     NECK: no adenopathy, no asymmetry, masses, or scars and thyroid normal to palpation     RESP: lungs clear to auscultation - no rales, rhonchi or wheezes     CV: regular rates and rhythm, normal S1 S2, no S3 or S4 and no murmur, click or rub     ABDOMEN:  soft, nontender, no HSM or masses and bowel sounds normal     MS: extremities normal- no gross deformities noted, no evidence of inflammation in joints, FROM in " all extremities.     SKIN: no suspicious lesions or rashes     SKIN: small bruises over lower extensor surfaces      NEURO: Normal strength and tone, sensory exam grossly normal, mentation intact and speech normal     PSYCH: mentation appears normal. and affect normal/bright     LYMPHATICS: No axillary, cervical, or supraclavicular nodes    DIAGNOSTICS:                                                    EKG: Not indicated due to non-vascular surgery and last ekg on 7/11/2017 (within 30 days for CAD history or last year for cardiac risk factors)    Recent Labs   Lab Test  08/17/17   1345  07/27/17   0905   07/11/17   2100   05/28/13   0935   02/11/11   0905   HGB  12.5  13.0   < >  13.7   < >   --    --   11.9   PLT  155  247   < >  189   < >   --    --   305   INR   --    --    --   1.00   --    --    --    --    NA  140  141   < >  138   < >  142   < >   --    POTASSIUM  3.7  3.5   < >  3.7   < >  4.4   < >   --    CR  0.60  0.59   < >  0.72   < >  0.73   < >   --    A1C   --    --    --    --    --   5.8   --   6.3*    < > = values in this interval not displayed.        IMPRESSION:                                                    Reason for surgery/procedure: stent removal from lung      The proposed surgical procedure is considered INTERMEDIATE risk.    REVISED CARDIAC RISK INDEX  The patient has the following serious cardiovascular risks for perioperative complications such as (MI, PE, VFib and 3  AV Block):  No serious cardiac risks  INTERPRETATION: 0 risks: Class I (very low risk - 0.4% complication rate)    The patient has the following additional risks for perioperative complications:  No identified additional risks      ICD-10-CM    1. Preop general physical exam Z01.818    2. Secondary spontaneous pneumothorax J93.12        RECOMMENDATIONS:                                                        Pulmonary Risk          --Patient is to take all scheduled medications on the day of surgery EXCEPT for  modifications listed below.    APPROVAL GIVEN to proceed with proposed procedure, without further diagnostic evaluation       Signed Electronically by: Johana Fairbanks MD    Copy of this evaluation report is provided to requesting physician.    [facility] Preop Guidelines

## 2017-09-07 NOTE — OR NURSING
Paged Dr. Mcgowan concering op-note and CXR completion. Patient also wanted to speak with surgeon concerning findings.

## 2017-09-07 NOTE — ANESTHESIA POSTPROCEDURE EVALUATION
Patient: Etta Cervantes    Procedure(s):  Flexible Bronchoscopy, Removal Of Endobronchial Valves x 2 - Wound Class: II-Clean Contaminated    Diagnosis:Primary Spontaneous Pneumothorax   Diagnosis Additional Information: No value filed.    Anesthesia Type:  General, ETT    Note:  Anesthesia Post Evaluation    Patient location during evaluation: PACU  Patient participation: Able to fully participate in evaluation  Level of consciousness: awake and alert  Pain management: adequate  Airway patency: patent  Cardiovascular status: acceptable  Respiratory status: acceptable  Hydration status: acceptable  PONV: none             Last vitals:  Vitals:    09/07/17 1047   BP: 125/85   Resp: 18   Temp: 36.6  C (97.9  F)   SpO2: 100%         Electronically Signed By: Baldemar Jenkins MD  September 7, 2017  2:28 PM

## 2017-09-07 NOTE — OP NOTE
Procedure(s):    Bronchoscopy  Endobronchial Valve Removal (2 valves)    Indication:  Spontaneous ptx on the right side, known large bulla on the right, known met breast cancer, s/p valve placement in the right upper (4 valves) and RML (1 valve) on 7/20/2017. Came in today for removal of valves.    Attending of Record:     BETZAIDA Mcgowan MD    Medications:    General Anesthesia - See anesthesia flowsheet for details    Sedation Time:  Per Anesthesia Care Provider    Time Out:  Performed    The patient's medical record has been reviewed.  The indication for the procedure was reviewed.  The necessary history and physical examination was performed and reviewed.  The risks, benefits and alternatives of the procedure were discussed with the the patient in detail and she had the opportunity to ask questions.  I discussed in particular the potential complications including risks of minor or life-threatening bleeding and/or infection, respiratory failure, vocal cord trauma / paralysis, pneumothorax, and discomfort. Sedation risks were also discussed including abnormal heart rhythms, low blood pressure, and respiratory failure. All questions were answered to the best of my ability.  Verbal and written informed consent was obtained.  The proposed procedure and the patient's identification were verified prior to the procedure by the physician and the nurse.    The patient was assessed for the adequacy for the procedure and to receive medications.   Mental Status:  Alert and oriented x 3  Airway examination:  Class II (Complete visualization of uvula)  Pulmonary:  Decreased breathsounds in bilateral bases  CV:  RRR, no murmurs or gallops  ASA Grade:  (III)  Severe systemic disease that is not incapacitating    After clinical evaluation and reviewing the indication, risks, alternatives and benefits of the procedure the patient was deemed to be in satisfactory condition to undergo the procedure.      Immediately before  administration of medications the patient was re-assessed for adequacy to receive sedatives including the heart rate, respiratory rate, mental status, oxygen saturation, blood pressure and adequacy of pulmonary ventilation. These same parameters were continuously monitored throughout the procedure.    Maneuvers / Procedure:     The bronchoscope was inserted through the mouth via ETT.      Airway Examination:  A complete airway examination was performed from the distal trachea to the subsegmental level in each lobe of both lungs with exceptions/pertinent findings noted as follows; 3 of the RUL valves were seen but on eof the valves in the post segment could not be seen although orifice of segment was visualized. the RML bronchus appeared to be collapsed so the valve in RML could not be seen.                 IBV Valve Removal:  Two Valves - The bronchoscope was inserted and the valve was identified in the RUL Apical.  Forceps were used to grasp the center padmini and the valve was removed.  Fluoroscopic guidance was not needed.  The next valve was identified in the RUL Anterior and the forceps were used to grasp the center padmini and the valve was removed.  Fluoroscopic guidance was not needed.  The valvse were inspected and they were intact. Other 2 valves in the posterior segment x 2 and RML x 1 were not removed. RML valve could not be seen as well as one of the RUL post segment valves. The patient still has 3 valves in; 1 in RML and 2 in RUL.    Pertinent Images / special notes:    RUL bronchus after removing 2 valves      RML orifice, 11 o'clock      Any disposable equipment was visually inspected and deemed to be intact immediately post procedure.      Recommendations:     -->  CT chest and clinic f/u in 1 week to decide whether the remaining valves need to be removed or not.   -->  Post procedure CXR today

## 2017-09-07 NOTE — IP AVS SNAPSHOT
Post Anesthesia Care Unit [address] [address]    Phone:  [phone]                                       After Visit Summary   9/7/2017    Etta Cervantes    MRN: [id number]           After Visit Summary Signature Page     I have received my discharge instructions, and my questions have been answered. I have discussed any challenges I see with this plan with the nurse or doctor.    ..........................................................................................................................................  Patient/Patient Representative Signature      ..........................................................................................................................................  Patient Representative Print Name and Relationship to Patient    ..................................................               ................................................  Date                                            Time    ..........................................................................................................................................  Reviewed by Signature/Title    ...................................................              ..............................................  Date                                                            Time

## 2017-09-07 NOTE — OR NURSING
Patient spoke with Dr. Mcgowan via phone. He explained procedure and findings to patient. CXR reviewed, patient can transfer to Phase II then home. Spoke with surgeon about home CPAP machine. Has been using since 2011. Patient explained that she takes Ambien every night to assist with sleep. Dr. Mcgowan does not want her to use CPAP at home until he sees her in his clinic next week. (This information was given to patient via phone call with Dr. Mcgowan.) Patient has chest CT scheduled in [place], [place].

## 2017-09-07 NOTE — OR NURSING
Dc instructions reviewed with patient and her Lucian raya.  Verbalized understanding. Pt questioning when to resume oral chemo meds, advised her to contact her oncologist tomorrow.  Indra cath hep locked and de-accessed.

## 2017-09-07 NOTE — OR NURSING
Patient has had her chest xray done along with a duoneb given. Patient did not want pre op fentanyl and Dr. Mcgowan was not aware why it was ordered.

## 2017-09-07 NOTE — DISCHARGE INSTRUCTIONS
If you have obstructive sleep apnea     You were given anesthesia medicine during surgery to keep you comfortable and free of pain. After surgery, you may have more apnea spells because of this medicine and other medicines you were given. The spells may last longer than usual.     At home:        Keep using the continuous positive airway pressure (CPAP) device when you sleep. Unless your health care provider tells you not to, use it when you sleep, day or night. CPAP is a common device used to treat obstructive sleep apnea.  Same-Day Surgery   Adult Discharge Orders & Instructions     For 24 hours after surgery:  1. Get plenty of rest.  A responsible adult must stay with you for at least 24 hours after you leave the hospital.   2. Pain medication can slow your reflexes. Do not drive or use heavy equipment.  If you have weakness or tingling, don't drive or use heavy equipment until this feeling goes away.  3. Mixing alcohol and pain medication can cause dizziness and slow your breathing. It can even be fatal. Do not drink alcohol while taking pain medication.  4. Avoid strenuous or risky activities.  Ask for help when climbing stairs.   5. You may feel lightheaded.  If so, sit for a few minutes before standing.  Have someone help you get up.   6. If you have nausea (feel sick to your stomach), drink only clear liquids such as apple juice, ginger ale, broth or 7-Up.  Rest may also help.  Be sure to drink enough fluids.  Move to a regular diet as you feel able. Take pain medications with a small amount of solid food, such as toast or crackers, to avoid nausea.   7. A slight fever is normal. Call the doctor if your fever is over 100 F (37.7 C) (taken under the tongue) or lasts longer than 24 hours.  8. You may have a dry mouth, muscle aches, trouble sleeping or a sore throat.  These symptoms should go away after 24 hours.  9. Do not make important or legal decisions.   Pain Management:      1. Take pain medication (if  prescribed) for pain as directed by your physician.        2. WARNING: If the pain medication you have been prescribed contains Tylenol  (acetaminophen), DO NOT take additional doses of Tylenol (acetaminophen).     Call your doctor for any of the followin.  Signs of infection (fever, growing tenderness at the surgery site, severe pain, a large amount of drainage or bleeding, foul-smelling drainage, redness, swelling).    2.  It has been over 8 to 10 hours since surgery and you are still not able to urinate (pee).    3.  Headache for over 24 hours.    4.  Numbness, tingling or weakness the day after surgery (if you had spinal anesthesia).  To contact a doctor, call __Dr. Mcgowan's office at [phone]__ or:      [phone] and ask for the Resident On Call for:          __Pulmonary / Surgery __ (answered 24 hours a day)      Emergency Department:  [place] [facility]: [phone]  [place] [facility]: [phone]               Rev. 10/2014       [facility]  Same-Day Surgery   Adult Discharge Orders & Instructions     For 24 hours after surgery    1. Get plenty of rest.  A responsible adult must stay with you for at least 24 hours after you leave the hospital.   2. Do not drive or use heavy equipment.  If you have weakness or tingling, don't drive or use heavy equipment until this feeling goes away.  3. Do not drink alcohol.  4. Avoid strenuous or risky activities.  Ask for help when climbing stairs.   5. You may feel lightheaded.  IF so, sit for a few minutes before standing.  Have someone help you get up.   6. If you have nausea (feel sick to your stomach): Drink only clear liquids such as apple juice, ginger ale, broth or 7-Up.  Rest may also help.  Be sure to drink enough fluids.  Move to a regular diet as you feel able.  7. You may have a slight fever. Call the doctor if your fever is over 100 F (37.7 C) (taken under the tongue) or lasts longer  than 24 hours.  8. You may have a dry mouth, a sore throat, muscle aches or trouble sleeping.  These should go away after 24 hours.  9. Do not make important or legal decisions.   Call your doctor for any of the followin.  Signs of infection (fever, growing tenderness at the surgery site, a large amount of drainage or bleeding, severe pain, foul-smelling drainage, redness, swelling).    2. It has been over 8 to 10 hours since surgery and you are still not able to urinate (pass water).    3.  Headache for over 24 hours.    4.  Numbness, tingling or weakness the day after surgery (if you had spinal anesthesia).  To contact a doctor, call ________________________________________ or:        [phone] and ask for the resident on call for   ______________________________________________ (answered 24 hours a day)      Emergency Department:    [facility]: [phone]       ([facility] for hearing impaired: [phone])    [facility]: [phone]       ([facility] for hearing impaired: [phone])

## 2017-09-07 NOTE — IP AVS SNAPSHOT
MRN:[id number]                      After Visit Summary   9/7/2017    Etta Cervantes    MRN: [id number]           Thank you!     Thank you for choosing [facility] for your care. Our goal is always to provide you with excellent care. Hearing back from our patients is one way we can continue to improve our services. Please take a few minutes to complete the written survey that you may receive in the mail after you visit with us. Thank you!        Patient Information     Date Of Birth          1960        About your hospital stay     You were admitted on:  September 7, 2017 You last received care in the:  Post Anesthesia Care Unit [facility]    You were discharged on:  September 7, 2017       Who to Call     For medical emergencies, please call 911.  For non-urgent questions about your medical care, please call your primary care provider or clinic, [phone]  For questions related to your surgery, please call your surgery clinic        Attending Provider     Provider Specialty    Dincer, Carlos Cortés MD Pulmonary       Primary Care Provider Office Phone # Fax #    Johana Fairbanks [phone]      Your next 10 appointments already scheduled     Sep 11, 2017  8:00 AM CDT   CT CHEST/ABDOMEN/PELVIS W CONTRAST with WYCT1   [facility] CT ([facility])    [address] [address] [phone]           Please bring any scans or X-rays taken at other hospitals, if similar tests were done. Also bring a list of your medicines, including vitamins, minerals and over-the-counter drugs. It is safest to leave personal items at home.  Be sure to tell your doctor:   If you have any allergies.   If there s any chance you are pregnant.   If you are breastfeeding.   If you have any special needs.  You may have contrast for this exam. To prepare:   Do not eat or drink for 2 hours before your exam. If you need to take medicine, you may take it with small  sips of water. (We may ask you to take liquid medicine as well.)   The day before your exam, drink extra fluids at least six 8-ounce glasses (unless your doctor tells you to restrict your fluids).  Patients over 70 or patients with diabetes or kidney problems:   If you haven t had a blood test (creatinine test) within the last 30 days, go to your clinic or Diagnostic Imaging Department for this test.  If you have diabetes:   If your kidney function is normal, continue taking your metformin (Avandamet, Glucophage, Glucovance, Metaglip) on the day of your exam.   If your kidney function is abnormal, wait 48 hours before restarting this medicine.  You will have oral contrast for this exam:   You will drink the contrast at home. Get this from your clinic or Diagnostic Imaging Department. Please follow the directions given.  Please wear loose clothing, such as a sweat suit or jogging clothes. Avoid snaps, zippers and other metal. We may ask you to undress and put on a hospital gown.  If you have any questions, please call the Imaging Department where you will have your exam.            Sep 13, 2017  1:00 PM CDT   Return Visit with Brodie Cassidy MD   [facility] ([facility])    [facility]  [address]  [facility] [address]   [phone]            Sep 28, 2017  9:00 AM CDT   Level O with ROOM 8 [facility] ([facility])    [facility]  [address]  [facility] [address]   [phone]              Further instructions from your care team       If you have obstructive sleep apnea     You were given anesthesia medicine during surgery to keep you comfortable and free of pain. After surgery, you may have more apnea spells because of this medicine and other medicines you were given. The spells may last longer than usual.     At home:        Keep using the continuous positive airway pressure (CPAP)  device when you sleep. Unless your health care provider tells you not to, use it when you sleep, day or night. CPAP is a common device used to treat obstructive sleep apnea.  Same-Day Surgery   Adult Discharge Orders & Instructions     For 24 hours after surgery:  1. Get plenty of rest.  A responsible adult must stay with you for at least 24 hours after you leave the hospital.   2. Pain medication can slow your reflexes. Do not drive or use heavy equipment.  If you have weakness or tingling, don't drive or use heavy equipment until this feeling goes away.  3. Mixing alcohol and pain medication can cause dizziness and slow your breathing. It can even be fatal. Do not drink alcohol while taking pain medication.  4. Avoid strenuous or risky activities.  Ask for help when climbing stairs.   5. You may feel lightheaded.  If so, sit for a few minutes before standing.  Have someone help you get up.   6. If you have nausea (feel sick to your stomach), drink only clear liquids such as apple juice, ginger ale, broth or 7-Up.  Rest may also help.  Be sure to drink enough fluids.  Move to a regular diet as you feel able. Take pain medications with a small amount of solid food, such as toast or crackers, to avoid nausea.   7. A slight fever is normal. Call the doctor if your fever is over 100 F (37.7 C) (taken under the tongue) or lasts longer than 24 hours.  8. You may have a dry mouth, muscle aches, trouble sleeping or a sore throat.  These symptoms should go away after 24 hours.  9. Do not make important or legal decisions.   Pain Management:      1. Take pain medication (if prescribed) for pain as directed by your physician.        2. WARNING: If the pain medication you have been prescribed contains Tylenol  (acetaminophen), DO NOT take additional doses of Tylenol (acetaminophen).     Call your doctor for any of the followin.  Signs of infection (fever, growing tenderness at the surgery site, severe pain, a large amount  of drainage or bleeding, foul-smelling drainage, redness, swelling).    2.  It has been over 8 to 10 hours since surgery and you are still not able to urinate (pee).    3.  Headache for over 24 hours.    4.  Numbness, tingling or weakness the day after surgery (if you had spinal anesthesia).  To contact a doctor, call __Dr. Mcgowan's office at [phone]__ or:      [phone] and ask for the Resident On Call for:          __Pulmonary / Surgery __ (answered 24 hours a day)      Emergency Department:  [facility]: [phone]  [place] [facility]: [phone]               Rev. 10/2014       [facility]  Same-Day Surgery   Adult Discharge Orders & Instructions     For 24 hours after surgery    1. Get plenty of rest.  A responsible adult must stay with you for at least 24 hours after you leave the hospital.   2. Do not drive or use heavy equipment.  If you have weakness or tingling, don't drive or use heavy equipment until this feeling goes away.  3. Do not drink alcohol.  4. Avoid strenuous or risky activities.  Ask for help when climbing stairs.   5. You may feel lightheaded.  IF so, sit for a few minutes before standing.  Have someone help you get up.   6. If you have nausea (feel sick to your stomach): Drink only clear liquids such as apple juice, ginger ale, broth or 7-Up.  Rest may also help.  Be sure to drink enough fluids.  Move to a regular diet as you feel able.  7. You may have a slight fever. Call the doctor if your fever is over 100 F (37.7 C) (taken under the tongue) or lasts longer than 24 hours.  8. You may have a dry mouth, a sore throat, muscle aches or trouble sleeping.  These should go away after 24 hours.  9. Do not make important or legal decisions.   Call your doctor for any of the followin.  Signs of infection (fever, growing tenderness at the surgery site, a large amount of drainage or bleeding, severe pain,  "foul-smelling drainage, redness, swelling).    2. It has been over 8 to 10 hours since surgery and you are still not able to urinate (pass water).    3.  Headache for over 24 hours.    4.  Numbness, tingling or weakness the day after surgery (if you had spinal anesthesia).  To contact a doctor, call ________________________________________ or:        [phone] and ask for the resident on call for   ______________________________________________ (answered 24 hours a day)      Emergency Department:    [facility]: [phone]       (TTY for hearing impaired: [phone])    [facility]: [phone]       (TTY for hearing impaired: [phone])        Additional Information     If you use hormonal birth control (such as the pill, patch, ring or implants): You'll need a second form of birth control for 7 days (condoms, a diaphragm or contraceptive foam). While in the hospital, you received a medicine called Bridion. Your normal birth control will not work as well for a week after taking this medicine.          Pending Results     Date and Time Order Name Status Description    9/7/2017 1417 XR Chest Port 1 View Preliminary     9/7/2017 1238 XR Chest Port 1 View Preliminary             Admission Information     Date & Time Provider Department Dept. Phone    9/7/2017 Carlos Mcgowan MD Post Anesthesia Care Unit [facility] [phone]      Your Vitals Were     Blood Pressure Temperature Respirations Height Weight Last Period    124/61 (BP Location: Right arm, Cuff Size: Adult Regular) 98.9  F (37.2  C) (Oral) 14 1.638 m (5' 4.49\") 87.5 kg (192 lb 14.4 oz) 02/06/2013    Pulse Oximetry BMI (Body Mass Index)                94% 32.61 kg/m2          MyChart Information     [facility] gives you secure access to your electronic health record. If you see a primary care provider, you can also send messages to your care team and make appointments. If you have questions, please call your primary care clinic. "  If you do not have a primary care provider, please call [phone] and they will assist you.        Care EveryWhere ID     This is your Care EveryWhere ID. This could be used by other organizations to access your [place] medical records  [id number]        Equal Access to Services     JACYANIYAH DOMINGUEZ : Hadii aad ku hadkasieraúl Rodriguez, wamanuelada luqadaha, qaybta kaalmada paigekatierajesh, hari blanchardkaelynobdulia guardado. So [facility] [phone].    ATENCIÓN: Si habla español, tiene a parekh disposición servicios gratuitos de asistencia lingüística. Llame al [phone].    We comply with applicable federal civil rights laws and [place] laws. We do not discriminate on the basis of race, color, national origin, age, disability sex, sexual orientation or gender identity.               Review of your medicines      CONTINUE these medicines which have NOT CHANGED        Dose / Directions    * albuterol 108 (90 BASE) MCG/ACT Inhaler   Commonly known as:  VENTOLIN HFA   Used for:  Moderate persistent asthma, uncomplicated        Dose:  2 puff   Inhale 2 puffs into the lungs every 4 hours as needed   Quantity:  1 Inhaler   Refills:  2       * albuterol (2.5 MG/3ML) 0.083% neb solution   Used for:  Moderate persistent asthma, uncomplicated        Dose:  1 vial   Take 1 vial (2.5 mg) by nebulization every 4 hours as needed for shortness of breath / dyspnea   Quantity:  30 vial   Refills:  3       ALLEGRA ALLERGY 180 MG tablet   Generic drug:  fexofenadine        Dose:  180 mg   Take 180 mg by mouth daily as needed for allergies   Refills:  0       atorvastatin 10 MG tablet   Commonly known as:  LIPITOR   Used for:  Hyperlipidemia LDL goal <100        Dose:  10 mg   Take 1 tablet (10 mg) by mouth daily   Quantity:  90 tablet   Refills:  3       azelastine 0.1 % spray   Commonly known as:  ASTELIN   Used for:  Chronic rhinitis        Dose:  1-2 spray   Spray 1-2 sprays into both nostrils 2 times daily as needed for rhinitis    Quantity:  3 Bottle   Refills:  3       beclomethasone 80 MCG/ACT Inhaler   Commonly known as:  QVAR   Used for:  Chronic rhinitis        Dose:  2 puff   Inhale 2 puffs into the lungs 2 times daily   Quantity:  3 Inhaler   Refills:  3       * capecitabine 500 MG tablet CHEMO   Commonly known as:  XELODA   Used for:  Secondary malignant neoplasm of liver (H), Carcinoma of breast metastatic to liver, unspecified laterality (H), Bone metastasis (H), Breast cancer metastasized to axillary lymph node, right (H), Bilateral malignant neoplasm of breast in female, estrogen receptor positive, unspecified site of breast (H), Hyperlipidemia LDL goal <130, Hypothyroidism, unspecified type, Chemotherapy-induced neutropenia (H), Mucositis due to chemotherapy        Take 5 cap in AM and 4 cap in PM on Days 1 through 14, then off for 7 days. Take within 30 mins after meal.   Quantity:  126 tablet   Refills:  0       * capecitabine 500 MG tablet CHEMO   Commonly known as:  XELODA   Used for:  Secondary malignant neoplasm of liver (H), Carcinoma of breast metastatic to liver, unspecified laterality (H), Bone metastasis (H), Breast cancer metastasized to axillary lymph node, right (H)        Take 5 cap in AM and 4 cap in PM on Days 1 through 14, then off for 7 days. Take within 30 mins after meal.   Quantity:  126 tablet   Refills:  0       * capecitabine 500 MG tablet CHEMO   Commonly known as:  XELODA   Used for:  Secondary malignant neoplasm of liver (H), Carcinoma of breast metastatic to liver, unspecified laterality (H), Bone metastasis (H), Breast cancer metastasized to axillary lymph node, right (H)        Take 5 cap in AM and 4 cap in PM on Days 1 through 14, then off for 7 days. Take within 30 mins after meal.   Quantity:  126 tablet   Refills:  0       * capecitabine 500 MG tablet CHEMO   Commonly known as:  XELODA   Used for:  Secondary malignant neoplasm of liver (H), Carcinoma of breast metastatic to liver, unspecified  laterality (H), Bone metastasis (H), Breast cancer metastasized to axillary lymph node, right (H)        Take 5 cap in AM and 4 cap in PM on Days 1 through 14, then off for 7 days. Take within 30 mins after meal.   Quantity:  126 tablet   Refills:  0       CRANBERRY PO        Dose:  1 capsule   Take 1 capsule by mouth daily   Refills:  0       KP CALCIUM 600+D3 600-500 MG-UNIT Caps   Generic drug:  calcium carb-cholecalciferol        Dose:  2 tablet   Take 2 tablets by mouth daily   Refills:  0       levothyroxine 25 MCG tablet   Commonly known as:  SYNTHROID/LEVOTHROID   Used for:  Hypothyroidism due to acquired atrophy of thyroid        TAKE 1 TABLET (25 MCG) BY MOUTH DAILY   Quantity:  90 tablet   Refills:  2       lidocaine (viscous) 2 % solution   Commonly known as:  XYLOCAINE        Apply topically as needed   Refills:  0       lidocaine visc 2% 2.5mL/5mL & maalox/mylanta w/ simeth 2.5mL/5mL & diphenhydrAMINE 5mg/5mL Susp suspension   Commonly known as:  MAGIC Mouthwash [facility]OSPITAL   Used for:  Mucositis due to chemotherapy, Breast cancer metastasized to axillary lymph node, right (H)        Dose:  10 mL   Swish and swallow 10 mLs in mouth every 6 hours as needed for mouth sores   Quantity:  240 mL   Refills:  10       LORazepam 0.5 MG tablet   Commonly known as:  ATIVAN   Used for:  Secondary malignant neoplasm of liver (H), Carcinoma of breast metastatic to liver, unspecified laterality (H), Bone metastasis (H), Breast cancer metastasized to axillary lymph node, right (H)        Dose:  0.5 mg   Take 1 tablet (0.5 mg) by mouth every 4 hours as needed (Anxiety, Nausea/Vomiting or Sleep)   Quantity:  30 tablet   Refills:  2       MAGIC MOUTHWASH (ENTER INGREDIENTS IN COMMENTS)        SWISH AND SWALLOW 10 MLS IN MOUTH EVERY 6 HOURS AS NEEDED FOR MOUTH SORES   Refills:  10       metoclopramide 10 MG tablet   Commonly known as:  REGLAN   Used for:  Bilateral malignant neoplasm of breast in female, estrogen receptor  positive, unspecified site of breast (H)        Dose:  10 mg   Take 1 tablet (10 mg) by mouth 2 times daily as needed   Quantity:  120 tablet   Refills:  1       mometasone-formoterol 200-5 MCG/ACT oral inhaler   Commonly known as:  DULERA   Used for:  Moderate persistent asthma without complication        Dose:  2 puff   Inhale 2 puffs into the lungs 2 times daily   Quantity:  3 Inhaler   Refills:  0       oxybutynin 10 MG 24 hr tablet   Commonly known as:  DITROPAN XL   Used for:  Mixed incontinence urge and stress (male)(female)        Dose:  10 mg   Take 1 tablet (10 mg) by mouth daily (Needs follow-up appointment for this medication)   Quantity:  90 tablet   Refills:  3       oxyCODONE 5 MG IR tablet   Commonly known as:  ROXICODONE   Used for:  Secondary malignant neoplasm of liver (H)        Dose:  5 mg   Take 1 tablet (5 mg) by mouth every 4 hours as needed for moderate to severe pain   Quantity:  30 tablet   Refills:  0       predniSONE 20 MG tablet   Commonly known as:  DELTASONE   Used for:  Moderate persistent asthma, uncomplicated        Dose:  20 mg   Take 1 tablet (20 mg) by mouth 2 times daily For Yellow or Red zone on Asthma Action Plan.   Quantity:  10 tablet   Refills:  1       prochlorperazine 10 MG tablet   Commonly known as:  COMPAZINE   Used for:  Secondary malignant neoplasm of liver (H), Carcinoma of breast metastatic to liver, unspecified laterality (H), Bone metastasis (H), Breast cancer metastasized to axillary lymph node, right (H)        Dose:  10 mg   Take 1 tablet (10 mg) by mouth every 6 hours as needed (Nausea/Vomiting)   Quantity:  30 tablet   Refills:  2       STOOL SOFTENER PO        Dose:  100 mg   Take 100 mg by mouth daily   Refills:  0       TYLENOL PO        Dose:  650 mg   Take 650 mg by mouth every 4 hours as needed for mild pain or fever   Refills:  0       zolpidem 10 MG tablet   Commonly known as:  AMBIEN   Indication:  Trouble Sleeping   Used for:  Insomnia,  unspecified        Take 1 tablet 30 minutes prior to bedtime   Quantity:  30 tablet   Refills:  3       * Notice:  This list has 6 medication(s) that are the same as other medications prescribed for you. Read the directions carefully, and ask your doctor or other care provider to review them with you.             Protect others around you: Learn how to safely use, store and throw away your medicines at www.disposemymeds.org.             Medication List: This is a list of all your medications and when to take them. Check marks below indicate your daily home schedule. Keep this list as a reference.      Medications           Morning Afternoon Evening Bedtime As Needed    * albuterol 108 (90 BASE) MCG/ACT Inhaler   Commonly known as:  VENTOLIN HFA   Inhale 2 puffs into the lungs every 4 hours as needed                                * albuterol (2.5 MG/3ML) 0.083% neb solution   Take 1 vial (2.5 mg) by nebulization every 4 hours as needed for shortness of breath / dyspnea                                ALLEGRA ALLERGY 180 MG tablet   Take 180 mg by mouth daily as needed for allergies   Generic drug:  fexofenadine                                atorvastatin 10 MG tablet   Commonly known as:  LIPITOR   Take 1 tablet (10 mg) by mouth daily                                azelastine 0.1 % spray   Commonly known as:  ASTELIN   Spray 1-2 sprays into both nostrils 2 times daily as needed for rhinitis                                beclomethasone 80 MCG/ACT Inhaler   Commonly known as:  QVAR   Inhale 2 puffs into the lungs 2 times daily                                * capecitabine 500 MG tablet CHEMO   Commonly known as:  XELODA   Take 5 cap in AM and 4 cap in PM on Days 1 through 14, then off for 7 days. Take within 30 mins after meal.                                * capecitabine 500 MG tablet CHEMO   Commonly known as:  XELODA   Take 5 cap in AM and 4 cap in PM on Days 1 through 14, then off for 7 days. Take within 30 mins  after meal.                                * capecitabine 500 MG tablet CHEMO   Commonly known as:  XELODA   Take 5 cap in AM and 4 cap in PM on Days 1 through 14, then off for 7 days. Take within 30 mins after meal.                                * capecitabine 500 MG tablet CHEMO   Commonly known as:  XELODA   Take 5 cap in AM and 4 cap in PM on Days 1 through 14, then off for 7 days. Take within 30 mins after meal.                                CRANBERRY PO   Take 1 capsule by mouth daily                                KP CALCIUM 600+D3 600-500 MG-UNIT Caps   Take 2 tablets by mouth daily   Generic drug:  calcium carb-cholecalciferol                                levothyroxine 25 MCG tablet   Commonly known as:  SYNTHROID/LEVOTHROID   TAKE 1 TABLET (25 MCG) BY MOUTH DAILY                                lidocaine (viscous) 2 % solution   Commonly known as:  XYLOCAINE   Apply topically as needed                                lidocaine visc 2% 2.5mL/5mL & maalox/mylanta w/ simeth 2.5mL/5mL & diphenhydrAMINE 5mg/5mL Susp suspension   Commonly known as:  [facility] Mouthwash [facility]   Swish and swallow 10 mLs in mouth every 6 hours as needed for mouth sores                                LORazepam 0.5 MG tablet   Commonly known as:  ATIVAN   Take 1 tablet (0.5 mg) by mouth every 4 hours as needed (Anxiety, Nausea/Vomiting or Sleep)                                MAGIC MOUTHWASH (ENTER INGREDIENTS IN COMMENTS)   SWISH AND SWALLOW 10 MLS IN MOUTH EVERY 6 HOURS AS NEEDED FOR MOUTH SORES                                metoclopramide 10 MG tablet   Commonly known as:  REGLAN   Take 1 tablet (10 mg) by mouth 2 times daily as needed                                mometasone-formoterol 200-5 MCG/ACT oral inhaler   Commonly known as:  DULERA   Inhale 2 puffs into the lungs 2 times daily                                oxybutynin 10 MG 24 hr tablet   Commonly known as:  DITROPAN XL   Take 1 tablet (10 mg) by mouth daily (Needs  follow-up appointment for this medication)                                oxyCODONE 5 MG IR tablet   Commonly known as:  ROXICODONE   Take 1 tablet (5 mg) by mouth every 4 hours as needed for moderate to severe pain                                predniSONE 20 MG tablet   Commonly known as:  DELTASONE   Take 1 tablet (20 mg) by mouth 2 times daily For Yellow or Red zone on Asthma Action Plan.                                prochlorperazine 10 MG tablet   Commonly known as:  COMPAZINE   Take 1 tablet (10 mg) by mouth every 6 hours as needed (Nausea/Vomiting)                                STOOL SOFTENER PO   Take 100 mg by mouth daily                                TYLENOL PO   Take 650 mg by mouth every 4 hours as needed for mild pain or fever                                zolpidem 10 MG tablet   Commonly known as:  AMBIEN   Take 1 tablet 30 minutes prior to bedtime                                * Notice:  This list has 6 medication(s) that are the same as other medications prescribed for you. Read the directions carefully, and ask your doctor or other care provider to review them with you.

## 2017-09-07 NOTE — ANESTHESIA CARE TRANSFER NOTE
Patient: Etta Cervantes    Procedure(s):  Flexible Bronchoscopy, Removal Of Endobronchial Valves x 2 - Wound Class: II-Clean Contaminated    Diagnosis: Primary Spontaneous Pneumothorax   Diagnosis Additional Information: No value filed.    Anesthesia Type:   General, ETT     Note:  Airway :Face Mask  Patient transferred to:PACU        Vitals: (Last set prior to Anesthesia Care Transfer)    CRNA VITALS  9/7/2017 1348 - 9/7/2017 1422      9/7/2017             Pulse: 94    SpO2: 95 %                Electronically Signed By: ERNESTO Yanes CRNA  September 7, 2017  2:22 PM

## 2017-09-07 NOTE — ANESTHESIA PREPROCEDURE EVALUATION
Anesthesia Evaluation     .             ROS/MED HX    ENT/Pulmonary:     (+)sleep apnea, DIAMOND risk factors Moderate Persistent asthma Treatment: Inhaler daily,  COPD, uses CPAP , . Other pulmonary disease ruptured bleb with pneumothorax requiring chest tube 7/17/2017.  Treated with bronchial valves at that time.    Neurologic:       Cardiovascular:         METS/Exercise Tolerance:     Hematologic:         Musculoskeletal:         GI/Hepatic:         Renal/Genitourinary:         Endo:     (+) Chronic steroid usage for Other .      Psychiatric:         Infectious Disease:         Malignancy:   (+) Malignancy History of Breast  Breast CA Active status post Chemo.         Other:                     Physical Exam  Normal systems: cardiovascular    Airway   Mallampati: III  TM distance: >3 FB  Neck ROM: limited    Dental     Cardiovascular       Pulmonary (+) decreased breath sounds                       Anesthesia Plan      History & Physical Review  History and physical reviewed and following examination; no interval change.    ASA Status:  4 .    NPO Status:  > 8 hours    Plan for General and ETT with Intravenous induction. Maintenance will be TIVA.    PONV prophylaxis:  Ondansetron (or other 5HT-3)  Additional equipment: Videolaryngoscope I have reviewed the medical record and examined the patient  6 weeks ago patient had bronchoscopy with placement of bronchial valves for pneumothorax resulting from ruptured bleb.  Very anterior airway noted at that time with DL  Also with metastatic breast cancer to liver.  On chronic steroids     Plan:  GA with ETT, video laryngoscope, stress dose steroids    Baldemar Jenkins MD      Postoperative Care  Postoperative pain management:  IV analgesics.      Consents  Anesthetic plan, risks, benefits and alternatives discussed with:  Patient..                          .

## 2017-09-13 NOTE — PATIENT INSTRUCTIONS
We would like to see you back in clinic with Dr. Cassidy in 6 weeks with chemotherapy to be scheduled per treatment plan.  Copy of appointments, and after visit summary (AVS) given to patient.  If you have any questions during business hours (M-F 8 AM- 4PM), please call Funmi Ray RN, BSN, OCN Oncology Hematology /Breast Cancer Navigator at [facility] [phone].   For questions after business hours, or on holidays/weekends, please call our after hours Nurse Triage line [phone]. Thank you.

## 2017-09-13 NOTE — MR AVS SNAPSHOT
After Visit Summary   9/13/2017    Etta Cervantes    MRN: [id number]           Patient Information     Date Of Birth          1960        Visit Information        Provider Department      9/13/2017 1:00 PM Brodie Cassidy MD [facility] ONCOLOGY      Today's Diagnoses     Breast cancer metastasized to axillary lymph node, right (H)    -  1      Care Instructions    We would like to see you back in clinic with Dr. Cassidy in 6 weeks with chemotherapy to be scheduled per treatment plan.  Copy of appointments, and after visit summary (AVS) given to patient.  If you have any questions during business hours (M-F 8 AM- 4PM), please call Funmi Ray RN, BSN, OCN Oncology Hematology /Breast Cancer Navigator at [facility] [phone].   For questions after business hours, or on holidays/weekends, please call our after hours Nurse Triage line [phone]. Thank you.            Follow-ups after your visit        Follow-up notes from your care team     Return in about 6 weeks (around 10/25/2017) for Schedule for chemotherapy as per treatment plan.      Your next 10 appointments already scheduled     Sep 28, 2017  9:00 AM CDT   Level O with ROOM 8 [facility] ([facility])    [facility]  [address]  [facility] [place] [address]   [phone]            Oct 17, 2017  7:45 AM CDT   LAB with [facility] ([facility])    [address] [address] [phone]           Patient must bring picture ID. Patient should be prepared to give a urine specimen  Please do not eat 10-12 hours before your appointment if you are coming in fasting for labs on lipids, cholesterol, or glucose (sugar). Pregnant women should follow their Care Team instructions. Water with medications is okay. Do not drink coffee or other fluids. If you have concerns about taking   "your medications, please ask at office or if scheduling via "[employer]", send a message by clicking on Secure Messaging, Message Your Care Team.            Oct 18, 2017  2:00 PM CDT   Return Visit with Brodie Cassidy MD   [facility] ([facility])    [facility]  [address]  [facility] [address] [phone]              Who to contact     If you have questions or need follow up information about today's clinic visit or your schedule please contact [facility] directly at [phone].  Normal or non-critical lab and imaging results will be communicated to you by MyChart, letter or phone within 4 business days after the clinic has received the results. If you do not hear from us within 7 days, please contact the clinic through atokoret or phone. If you have a critical or abnormal lab result, we will notify you by phone as soon as possible.  Submit refill requests through "[employer]" or call your pharmacy and they will forward the refill request to us. Please allow 3 business days for your refill to be completed.          Additional Information About Your Visit        [employer] Information     "[employer] gives you secure access to your electronic health record. If you see a primary care provider, you can also send messages to your care team and make appointments. If you have questions, please call your primary care clinic.  If you do not have a primary care provider, please call [phone] and they will assist you.        Care EveryWhere ID     This is your Care EveryWhere ID. This could be used by other organizations to access your [facility] medical records  [id number]        Your Vitals Were     Pulse Temperature Respirations Height Last Period Pulse Oximetry    68 99.9  F (37.7  C) (Tympanic) 18 1.638 m (5' 4.49\") 02/06/2013 99%    Breastfeeding? BMI (Body Mass Index)                No 32.85 kg/m2           Blood Pressure from Last 3 Encounters: "   09/13/17 120/70   09/13/17 112/73   09/07/17 126/72    Weight from Last 3 Encounters:   09/13/17 88.1 kg (194 lb 4.8 oz)   09/13/17 88.7 kg (195 lb 8 oz)   09/07/17 87.5 kg (192 lb 14.4 oz)              Today, you had the following     No orders found for display       Primary Care Provider Office Phone # Fax #    Johana Fairbanks [phone] [address] [place] [address]        Equal Access to Services     [facility]: Hadii aad ku hadasho Soomaali, waaxda luqadaha, qaybta kaalmada adeemily, hari rodriguez . So [facility] [phone].    ATENCIÓN: Si habla español, tiene a parekh disposición servicios gratuitos de asistencia lingüística. [facility] [phone].    We comply with applicable federal civil rights laws and [place] laws. We do not discriminate on the basis of race, color, national origin, age, disability sex, sexual orientation or gender identity.            Thank you!     Thank you for choosing [facility]  for your care. Our goal is always to provide you with excellent care. Hearing back from our patients is one way we can continue to improve our services. Please take a few minutes to complete the written survey that you may receive in the mail after your visit with us. Thank you!             Your Updated Medication List - Protect others around you: Learn how to safely use, store and throw away your medicines at www.disposemymeds.org.          This list is accurate as of: 9/13/17  2:25 PM.  Always use your most recent med list.                   Brand Name Dispense Instructions for use Diagnosis    * albuterol 108 (90 BASE) MCG/ACT Inhaler    VENTOLIN HFA    1 Inhaler    Inhale 2 puffs into the lungs every 4 hours as needed    Moderate persistent asthma, uncomplicated       * albuterol (2.5 MG/3ML) 0.083% neb solution     30 vial    Take 1 vial (2.5 mg) by nebulization every 4 hours as needed for shortness of breath / dyspnea    Moderate  persistent asthma, uncomplicated       ALLEGRA ALLERGY 180 MG tablet   Generic drug:  fexofenadine      Take 180 mg by mouth daily as needed for allergies        atorvastatin 10 MG tablet    LIPITOR    90 tablet    Take 1 tablet (10 mg) by mouth daily    Hyperlipidemia LDL goal <100       azelastine 0.1 % spray    ASTELIN    3 Bottle    Spray 1-2 sprays into both nostrils 2 times daily as needed for rhinitis    Chronic rhinitis       beclomethasone 80 MCG/ACT Inhaler    QVAR    3 Inhaler    Inhale 2 puffs into the lungs 2 times daily    Chronic rhinitis       * capecitabine 500 MG tablet CHEMO    XELODA    126 tablet    Take 5 cap in AM and 4 cap in PM on Days 1 through 14, then off for 7 days. Take within 30 mins after meal.    Secondary malignant neoplasm of liver (H), Carcinoma of breast metastatic to liver, unspecified laterality (H), Bone metastasis (H), Breast cancer metastasized to axillary lymph node, right (H), Bilateral malignant neoplasm of breast in female, estrogen receptor positive, unspecified site of breast (H), Hyperlipidemia LDL goal <130, Hypothyroidism, unspecified type, Chemotherapy-induced neutropenia (H), Mucositis due to chemotherapy       * capecitabine 500 MG tablet CHEMO    XELODA    126 tablet    Take 5 cap in AM and 4 cap in PM on Days 1 through 14, then off for 7 days. Take within 30 mins after meal.    Secondary malignant neoplasm of liver (H), Carcinoma of breast metastatic to liver, unspecified laterality (H), Bone metastasis (H), Breast cancer metastasized to axillary lymph node, right (H)       * capecitabine 500 MG tablet CHEMO    XELODA    126 tablet    Take 5 cap in AM and 4 cap in PM on Days 1 through 14, then off for 7 days. Take within 30 mins after meal.    Secondary malignant neoplasm of liver (H), Carcinoma of breast metastatic to liver, unspecified laterality (H), Bone metastasis (H), Breast cancer metastasized to axillary lymph node, right (H)       * capecitabine 500 MG  tablet CHEMO    XELODA    126 tablet    Take 5 cap in AM and 4 cap in PM on Days 1 through 14, then off for 7 days. Take within 30 mins after meal.    Secondary malignant neoplasm of liver (H), Carcinoma of breast metastatic to liver, unspecified laterality (H), Bone metastasis (H), Breast cancer metastasized to axillary lymph node, right (H)       CRANBERRY PO      Take 1 capsule by mouth daily        KP CALCIUM 600+D3 600-500 MG-UNIT Caps   Generic drug:  calcium carb-cholecalciferol      Take 2 tablets by mouth daily        levothyroxine 25 MCG tablet    SYNTHROID/LEVOTHROID    90 tablet    TAKE 1 TABLET (25 MCG) BY MOUTH DAILY    Hypothyroidism due to acquired atrophy of thyroid       lidocaine (viscous) 2 % solution    XYLOCAINE     Apply topically as needed        lidocaine visc 2% 2.5mL/5mL & maalox/mylanta w/ simeth 2.5mL/5mL & diphenhydrAMINE 5mg/5mL Susp suspension    MAGIC Mouthwash HOSPITAL    240 mL    Swish and swallow 10 mLs in mouth every 6 hours as needed for mouth sores    Mucositis due to chemotherapy, Breast cancer metastasized to axillary lymph node, right (H)       LORazepam 0.5 MG tablet    ATIVAN    30 tablet    Take 1 tablet (0.5 mg) by mouth every 4 hours as needed (Anxiety, Nausea/Vomiting or Sleep)    Secondary malignant neoplasm of liver (H), Carcinoma of breast metastatic to liver, unspecified laterality (H), Bone metastasis (H), Breast cancer metastasized to axillary lymph node, right (H)       MAGIC MOUTHWASH (ENTER INGREDIENTS IN COMMENTS)      SWISH AND SWALLOW 10 MLS IN MOUTH EVERY 6 HOURS AS NEEDED FOR MOUTH SORES        metoclopramide 10 MG tablet    REGLAN    120 tablet    Take 1 tablet (10 mg) by mouth 2 times daily as needed    Bilateral malignant neoplasm of breast in female, estrogen receptor positive, unspecified site of breast (H)       mometasone-formoterol 200-5 MCG/ACT oral inhaler    DULERA    3 Inhaler    Inhale 2 puffs into the lungs 2 times daily    Moderate  persistent asthma without complication       oxybutynin 10 MG 24 hr tablet    DITROPAN XL    90 tablet    Take 1 tablet (10 mg) by mouth daily (Needs follow-up appointment for this medication)    Mixed incontinence urge and stress (male)(female)       oxyCODONE 5 MG IR tablet    ROXICODONE    30 tablet    Take 1 tablet (5 mg) by mouth every 4 hours as needed for moderate to severe pain    Secondary malignant neoplasm of liver (H)       predniSONE 20 MG tablet    DELTASONE    10 tablet    Take 1 tablet (20 mg) by mouth 2 times daily For Yellow or Red zone on Asthma Action Plan.    Moderate persistent asthma, uncomplicated       prochlorperazine 10 MG tablet    COMPAZINE    30 tablet    Take 1 tablet (10 mg) by mouth every 6 hours as needed (Nausea/Vomiting)    Secondary malignant neoplasm of liver (H), Carcinoma of breast metastatic to liver, unspecified laterality (H), Bone metastasis (H), Breast cancer metastasized to axillary lymph node, right (H)       ROBITUSSIN COUGH/COLD CF PO      Take by mouth as needed        STOOL SOFTENER PO      Take 100 mg by mouth daily        TYLENOL PO      Take 650 mg by mouth every 4 hours as needed for mild pain or fever        zolpidem 10 MG tablet    AMBIEN    30 tablet    Take 1 tablet 30 minutes prior to bedtime    Insomnia, unspecified       * Notice:  This list has 6 medication(s) that are the same as other medications prescribed for you. Read the directions carefully, and ask your doctor or other care provider to review them with you.

## 2017-09-13 NOTE — NURSING NOTE
"Oncology Rooming Note    September 13, 2017 1:04 PM   Etta Cervantes is a 57 year old female who presents for:    Chief Complaint   Patient presents with     Oncology Clinic Visit     4 Week recheck Breast CA, review CT results      Initial Vitals: /70 (BP Location: Right arm, Patient Position: Sitting, Cuff Size: Adult Regular)  Pulse 68  Temp 99.9  F (37.7  C) (Tympanic)  Resp 18  Ht 1.638 m (5' 4.49\")  Wt 88.1 kg (194 lb 4.8 oz)  LMP 02/06/2013  SpO2 99%  Breastfeeding? No  BMI 32.85 kg/m2 Estimated body mass index is 32.85 kg/(m^2) as calculated from the following:    Height as of this encounter: 1.638 m (5' 4.49\").    Weight as of this encounter: 88.1 kg (194 lb 4.8 oz). Body surface area is 2 meters squared.  No Pain (0) Comment: Data Unavailable   Patient's last menstrual period was 02/06/2013.  Allergies reviewed: Yes  Medications reviewed: Yes    Medications: Medication refills not needed today.  Pharmacy name entered into [employer]:    [facility] [id number] [place] [facility] - [place], [place] - [address] [facility] - [place], [place] - [address]    Clinical concerns:  4 week recheck Breast CA, review CT results.    1. Mouth sores started this weekend.   2. Neck pain 8/10.     7 minutes for nursing intake (face to face time)     Josephine Virgen [facility]              "

## 2017-09-13 NOTE — MR AVS SNAPSHOT
After Visit Summary   9/13/2017    Etta Cervantes    MRN: [id number]           Patient Information     Date Of Birth          1960        Visit Information        Provider Department      9/13/2017 9:00 AM Carlos Mcgowan MD [facility]        Today's Diagnoses     Secondary spontaneous pneumothorax    -  1       Follow-ups after your visit        Your next 10 appointments already scheduled     Sep 28, 2017  9:00 AM CDT   Level O with ROOM 8 [facility] ([facility])    [facility]  [address]  [facility] [place], [place] [address]   [phone]            Sep 28, 2017   Procedure with Carlos Mcgowan MD   [facility] [place], Same Day Surgery (--)    [address]  [place] [id number] [phone]            Oct 17, 2017  7:45 AM CDT   LAB with [facility] ([facility])    [address] [facility] [place] [address] [phone]           Patient must bring picture ID. Patient should be prepared to give a urine specimen  Please do not eat 10-12 hours before your appointment if you are coming in fasting for labs on lipids, cholesterol, or glucose (sugar). Pregnant women should follow their Care Team instructions. Water with medications is okay. Do not drink coffee or other fluids. If you have concerns about taking  your medications, please ask at office or if scheduling via [employer], send a message by clicking on Secure Messaging, Message Your Care Team.            Oct 18, 2017  2:00 PM CDT   Return Visit with Brodie Cassidy MD   [facility] ([facility])    [facility]  [address]  [facility] [place] [place] [address]   [phone]              Who to contact     If you have questions or need follow up information about today's clinic visit or your schedule please contact [facility] directly at [phone].  Normal or  "non-critical lab and imaging results will be communicated to you by MyChart, letter or phone within 4 business days after the clinic has received the results. If you do not hear from us within 7 days, please contact the clinic through [employer] or phone. If you have a critical or abnormal lab result, we will notify you by phone as soon as possible.  Submit refill requests through [employer] or call your pharmacy and they will forward the refill request to us. Please allow 3 business days for your refill to be completed.          Additional Information About Your Visit        [employer]Spot Information     [employer] gives you secure access to your electronic health record. If you see a primary care provider, you can also send messages to your care team and make appointments. If you have questions, please call your primary care clinic.  If you do not have a primary care provider, please call [phone] and they will assist you.        Care EveryWhere ID     This is your Care EveryWhere ID. This could be used by other organizations to access your [place] medical records  [id number]        Your Vitals Were     Pulse Temperature Respirations Height Last Period Pulse Oximetry    79 97.9  F (36.6  C) (Oral) 18 1.638 m (5' 4.49\") 02/06/2013 99%    BMI (Body Mass Index)                   33.05 kg/m2            Blood Pressure from Last 3 Encounters:   09/13/17 120/70   09/13/17 112/73   09/07/17 126/72    Weight from Last 3 Encounters:   09/13/17 88.1 kg (194 lb 4.8 oz)   09/13/17 88.7 kg (195 lb 8 oz)   09/07/17 87.5 kg (192 lb 14.4 oz)              Today, you had the following     No orders found for display       Primary Care Provider Office Phone # Fax #    Johana Fairbanks [phone] [address] [place] [address]        Equal Access to Services     [facility] AH: Hadii han deleono Soomaali, waaxda luqadaha, qaybta kaalmada adeegyada, hari guardado. So [facility] " [phone].    ATENCIÓN: Si janell moser, tiene a parekh disposición servicios gratuitos de asistencia lingüística. [place] bond [phone].    We comply with applicable federal civil rights laws and [place] laws. We do not discriminate on the basis of race, color, national origin, age, disability sex, sexual orientation or gender identity.            Thank you!     Thank you for choosing [facility]  for your care. Our goal is always to provide you with excellent care. Hearing back from our patients is one way we can continue to improve our services. Please take a few minutes to complete the written survey that you may receive in the mail after your visit with us. Thank you!             Your Updated Medication List - Protect others around you: Learn how to safely use, store and throw away your medicines at www.disposemymeds.org.          This list is accurate as of: 9/13/17 11:59 PM.  Always use your most recent med list.                   Brand Name Dispense Instructions for use Diagnosis    * albuterol 108 (90 BASE) MCG/ACT Inhaler    VENTOLIN HFA    1 Inhaler    Inhale 2 puffs into the lungs every 4 hours as needed    Moderate persistent asthma, uncomplicated       * albuterol (2.5 MG/3ML) 0.083% neb solution     30 vial    Take 1 vial (2.5 mg) by nebulization every 4 hours as needed for shortness of breath / dyspnea    Moderate persistent asthma, uncomplicated       ALLEGRA ALLERGY 180 MG tablet   Generic drug:  fexofenadine      Take 180 mg by mouth daily as needed for allergies        atorvastatin 10 MG tablet    LIPITOR    90 tablet    Take 1 tablet (10 mg) by mouth daily    Hyperlipidemia LDL goal <100       azelastine 0.1 % spray    ASTELIN    3 Bottle    Spray 1-2 sprays into both nostrils 2 times daily as needed for rhinitis    Chronic rhinitis       * capecitabine 500 MG tablet CHEMO    XELODA    126 tablet    Take 5 cap in AM and 4 cap in PM on Days 1 through 14, then off for 7 days.  Take within 30 mins after meal.    Secondary malignant neoplasm of liver (H), Carcinoma of breast metastatic to liver, unspecified laterality (H), Bone metastasis (H), Breast cancer metastasized to axillary lymph node, right (H), Bilateral malignant neoplasm of breast in female, estrogen receptor positive, unspecified site of breast (H), Hyperlipidemia LDL goal <130, Hypothyroidism, unspecified type, Chemotherapy-induced neutropenia (H), Mucositis due to chemotherapy       * capecitabine 500 MG tablet CHEMO    XELODA    126 tablet    Take 5 cap in AM and 4 cap in PM on Days 1 through 14, then off for 7 days. Take within 30 mins after meal.    Secondary malignant neoplasm of liver (H), Carcinoma of breast metastatic to liver, unspecified laterality (H), Bone metastasis (H), Breast cancer metastasized to axillary lymph node, right (H)       * capecitabine 500 MG tablet CHEMO    XELODA    126 tablet    Take 5 cap in AM and 4 cap in PM on Days 1 through 14, then off for 7 days. Take within 30 mins after meal.    Secondary malignant neoplasm of liver (H), Carcinoma of breast metastatic to liver, unspecified laterality (H), Bone metastasis (H), Breast cancer metastasized to axillary lymph node, right (H)       * capecitabine 500 MG tablet CHEMO    XELODA    126 tablet    Take 5 cap in AM and 4 cap in PM on Days 1 through 14, then off for 7 days. Take within 30 mins after meal.    Secondary malignant neoplasm of liver (H), Carcinoma of breast metastatic to liver, unspecified laterality (H), Bone metastasis (H), Breast cancer metastasized to axillary lymph node, right (H)       CRANBERRY PO      Take 1 capsule by mouth daily        KP CALCIUM 600+D3 600-500 MG-UNIT Caps   Generic drug:  calcium carb-cholecalciferol      Take 2 tablets by mouth daily        levothyroxine 25 MCG tablet    SYNTHROID/LEVOTHROID    90 tablet    TAKE 1 TABLET (25 MCG) BY MOUTH DAILY    Hypothyroidism due to acquired atrophy of thyroid        lidocaine (viscous) 2 % solution    XYLOCAINE     Apply topically as needed        lidocaine visc 2% 2.5mL/5mL & maalox/mylanta w/ simeth 2.5mL/5mL & diphenhydrAMINE 5mg/5mL Susp suspension    MAGIC Mouthwash [facility]    240 mL    Swish and swallow 10 mLs in mouth every 6 hours as needed for mouth sores    Mucositis due to chemotherapy, Breast cancer metastasized to axillary lymph node, right (H)       LORazepam 0.5 MG tablet    ATIVAN    30 tablet    Take 1 tablet (0.5 mg) by mouth every 4 hours as needed (Anxiety, Nausea/Vomiting or Sleep)    Secondary malignant neoplasm of liver (H), Carcinoma of breast metastatic to liver, unspecified laterality (H), Bone metastasis (H), Breast cancer metastasized to axillary lymph node, right (H)       MAGIC MOUTHWASH (ENTER INGREDIENTS IN COMMENTS)      SWISH AND SWALLOW 10 MLS IN MOUTH EVERY 6 HOURS AS NEEDED FOR MOUTH SORES        metoclopramide 10 MG tablet    REGLAN    120 tablet    Take 1 tablet (10 mg) by mouth 2 times daily as needed    Bilateral malignant neoplasm of breast in female, estrogen receptor positive, unspecified site of breast (H)       mometasone-formoterol 200-5 MCG/ACT oral inhaler    DULERA    3 Inhaler    Inhale 2 puffs into the lungs 2 times daily    Moderate persistent asthma without complication       oxybutynin 10 MG 24 hr tablet    DITROPAN XL    90 tablet    Take 1 tablet (10 mg) by mouth daily (Needs follow-up appointment for this medication)    Mixed incontinence urge and stress (male)(female)       oxyCODONE 5 MG IR tablet    ROXICODONE    30 tablet    Take 1 tablet (5 mg) by mouth every 4 hours as needed for moderate to severe pain    Secondary malignant neoplasm of liver (H)       predniSONE 20 MG tablet    DELTASONE    10 tablet    Take 1 tablet (20 mg) by mouth 2 times daily For Yellow or Red zone on Asthma Action Plan.    Moderate persistent asthma, uncomplicated       prochlorperazine 10 MG tablet    COMPAZINE    30 tablet    Take 1  tablet (10 mg) by mouth every 6 hours as needed (Nausea/Vomiting)    Secondary malignant neoplasm of liver (H), Carcinoma of breast metastatic to liver, unspecified laterality (H), Bone metastasis (H), Breast cancer metastasized to axillary lymph node, right (H)       ROBITUSSIN COUGH/COLD CF PO      Take by mouth as needed    Breast cancer metastasized to axillary lymph node, right (H)       STOOL SOFTENER PO      Take 100 mg by mouth daily        TYLENOL PO      Take 650 mg by mouth every 4 hours as needed for mild pain or fever        zolpidem 10 MG tablet    AMBIEN    30 tablet    Take 1 tablet 30 minutes prior to bedtime    Insomnia, unspecified       * Notice:  This list has 6 medication(s) that are the same as other medications prescribed for you. Read the directions carefully, and ask your doctor or other care provider to review them with you.

## 2017-09-13 NOTE — NURSING NOTE
"Oncology Rooming Note    September 13, 2017 8:40 AM   Etta Cervantes is a 57 year old female who presents for:    Chief Complaint   Patient presents with     Oncology Clinic Visit     Return visit related to post opp     Initial Vitals: /73  Pulse 79  Temp 97.9  F (36.6  C) (Oral)  Resp 18  Ht 1.638 m (5' 4.49\")  Wt 88.7 kg (195 lb 8 oz)  LMP 02/06/2013  SpO2 99%  BMI 33.05 kg/m2 Estimated body mass index is 33.05 kg/(m^2) as calculated from the following:    Height as of this encounter: 1.638 m (5' 4.49\").    Weight as of this encounter: 88.7 kg (195 lb 8 oz). Body surface area is 2.01 meters squared.  No Pain (0) Comment: Data Unavailable   Patient's last menstrual period was 02/06/2013.  Allergies reviewed: Yes  Medications reviewed: Yes    Medications: Medication refills not needed today.  Pharmacy name entered into "[employer]":    [facility] [address] [facility] - [place], [place] [address]  [facility] - [place], [place] - [address]    Clinical concerns: No new concerns. Provider was notified.    10 minutes for nursing intake (face to face time)     Kasey Dunlap LPN            "

## 2017-09-13 NOTE — LETTER
9/13/2017       RE: Etta Cervantes  [address]  [place] [address]     Dear Colleague,    Thank you for referring your patient, Etta Cervantes, to the [facility] at [facility]. Please see a copy of my visit note below.    [facility]  Lung Nodule Clinic Note  September 13, 2017    Chief complaint:  Etta Cervantes is a 57 year old female seen in the Pulmonary Clinic  for   Chief Complaint   Patient presents with     Oncology Clinic Visit     Return visit related to post opp       Assessment and Plan:  #1 right upper lung bulla and prolonged air leak due to spontaneous pneumothorax that was treated with chest tube placement and intrabronchial valve replacements ×5(4 in the upper lobe, one in the middle lobe). We removed 2 of 5 valves from the upper lobe valves last week and repeat CT scan of the chest this week revealed no pneumothorax or bulla. However partial right lobe collapse observed. We will plan to remove the rest of the valves within 2 weeks during her chemotherapy break week.  #2 metastatic breast cancer on chemotherapy.  #3 asthma well-controlled with current inhalers.    I spent more than 25 minutes face to face and greater than 50% of time was for counseling and coordination of care about pneumothorax.    History of Present Illness:  This is a 57 years old woman accompanied by her  today. She was hospitalized for prolonged air leak due to a spontaneous pneumothorax on the right side. Treatment was above. She is here today to discuss her new CT scan findings. She is occasional wheezing for which she is using inhalers.    Exposure history: Asbestos;  No , TB;  No , Radiation;   No     Allergies   Allergen Reactions     Animal Dander Cough     Itchy eyes     Cats      Dust Mites Cough     Itchy eyes     Pollen Extract      Other reaction(s): Cough  Itchy eyes     Seasonal Allergies      Levaquin [Levofloxacin] Rash     States she has  violent dreams from taking this        Past Medical History:   Diagnosis Date     ASCUS favor benign 1/2015    Neg HPV     Cancer (H)      Cataract 2010    surgery both eyes     Emphysematous bleb of lung (H)      Fibroids      Hypercholesterolemia      Hypothyroid      Incontinence of urine     mixed     Menorrhagia      Obesity      Pneumothorax      PONV (postoperative nausea and vomiting)      Sleep apnea     uses a cpap     Uncomplicated asthma         Past Surgical History:   Procedure Laterality Date     BIOPSY  Jan 2015, 2016    Breast cancer, thyroid     BRONCHOSCOPY, INSERT BRONCHIAL VALVE N/A 7/20/2017    Procedure: BRONCHOSCOPY, INSERT BRONCHIAL VALVE;  Flexible Bronchoscopy, Endobronchial Valve Insertion X5. 4 Valves into right upper lobe and 1 valve into Right middle lobe;  Surgeon: Carlos Mcgowan MD;  Location: [facility]     BRONCHOSCOPY, REMOVE BRONCHIAL VALVE N/A 9/7/2017    Procedure: BRONCHOSCOPY, REMOVE BRONCHIAL VALVE;  Flexible Bronchoscopy, Removal Of Endobronchial Valves x 2;  Surgeon: Carlos Mcgowan MD;  Location: [facility] OR     CATARACT IOL, RT/LT  2010     COLONOSCOPY  2010     DILATION AND CURETTAGE, HYSTEROSCOPY, ABLATE ENDOMETRIUM NOVASURE, COMBINED  3/2/2011    COMBINED DILATION AND CURETTAGE, HYSTEROSCOPY, ABLATE ENDOMETRIUM NOVASURE performed by LAURA VARGAS at [facility]     GENITOURINARY SURGERY  2005    Uretha sling     INSERT PORT VASCULAR ACCESS N/A 2/12/2015    Procedure: INSERT PORT VASCULAR ACCESS;  Surgeon: Noé Schaefer MD;  Location: [facility]     SURGICAL HISTORY OF -   2005    bladder sling     SURGICAL HISTORY OF -       Cystectomy-lower lip     TUBAL LIGATION  1987        Social History     Social History     Marital status:      Spouse name: Lucian Cervantes     Number of children: 2     Years of education: 15+     Occupational History     [employer] Service [employer]     Social History Main Topics     Smoking status: Never Smoker     Smokeless  tobacco: Never Used     Alcohol use No     Drug use: No     Sexual activity: Yes     Partners: Male     Birth control/ protection: Post-menopausal     Other Topics Concern     Parent/Sibling W/ Cabg, Mi Or Angioplasty Before 65f 55m? No     Social History Narrative        Family History   Problem Relation Age of Onset     Depression Mother      Eye Disorder Mother      Gynecology Mother      Alzheimer Disease Mother      CANCER Father      Other Cancer Father      HEART DISEASE Maternal Grandfather      Allergies Paternal Grandfather      Allergies Son         Immunization History   Administered Date(s) Administered     Tdap (Adacel,Boostrix) 04/04/2008       Current Outpatient Prescriptions   Medication Sig     capecitabine (XELODA) 500 MG tablet CHEMO Take 5 cap in AM and 4 cap in PM on Days 1 through 14, then off for 7 days. Take within 30 mins after meal.     capecitabine (XELODA) 500 MG tablet CHEMO Take 5 cap in AM and 4 cap in PM on Days 1 through 14, then off for 7 days. Take within 30 mins after meal.     MAGIC MOUTHWASH, ENTER INGREDIENTS IN COMMENTS, SWISH AND SWALLOW 10 MLS IN MOUTH EVERY 6 HOURS AS NEEDED FOR MOUTH SORES     lidocaine, viscous, (XYLOCAINE) 2 % solution Apply topically as needed     levothyroxine (SYNTHROID/LEVOTHROID) 25 MCG tablet TAKE 1 TABLET (25 MCG) BY MOUTH DAILY     atorvastatin (LIPITOR) 10 MG tablet Take 1 tablet (10 mg) by mouth daily     metoclopramide (REGLAN) 10 MG tablet Take 1 tablet (10 mg) by mouth 2 times daily as needed     oxyCODONE (ROXICODONE) 5 MG IR tablet Take 1 tablet (5 mg) by mouth every 4 hours as needed for moderate to severe pain     capecitabine (XELODA) 500 MG tablet CHEMO Take 5 cap in AM and 4 cap in PM on Days 1 through 14, then off for 7 days. Take within 30 mins after meal.     fexofenadine (ALLEGRA ALLERGY) 180 MG tablet Take 180 mg by mouth daily as needed for allergies     calcium carb-cholecalciferol ( CALCIUM 600+D3) 600-500 MG-UNIT CAPS Take  2 tablets by mouth daily     zolpidem (AMBIEN) 10 MG tablet Take 1 tablet 30 minutes prior to bedtime     capecitabine (XELODA) 500 MG tablet CHEMO Take 5 cap in AM and 4 cap in PM on Days 1 through 14, then off for 7 days. Take within 30 mins after meal.     albuterol (2.5 MG/3ML) 0.083% neb solution Take 1 vial (2.5 mg) by nebulization every 4 hours as needed for shortness of breath / dyspnea     predniSONE (DELTASONE) 20 MG tablet Take 1 tablet (20 mg) by mouth 2 times daily For Yellow or Red zone on Asthma Action Plan.     magic mouthwash suspension (diphenhydrAMINE, lidocaine, aluminum-magnesium & simethicone) Swish and swallow 10 mLs in mouth every 6 hours as needed for mouth sores     LORazepam (ATIVAN) 0.5 MG tablet Take 1 tablet (0.5 mg) by mouth every 4 hours as needed (Anxiety, Nausea/Vomiting or Sleep)     prochlorperazine (COMPAZINE) 10 MG tablet Take 1 tablet (10 mg) by mouth every 6 hours as needed (Nausea/Vomiting)     Docusate Calcium (STOOL SOFTENER PO) Take 100 mg by mouth daily      CRANBERRY PO Take 1 capsule by mouth daily      mometasone-formoterol (DULERA) 200-5 MCG/ACT oral inhaler Inhale 2 puffs into the lungs 2 times daily     oxybutynin (DITROPAN XL) 10 MG 24 hr tablet Take 1 tablet (10 mg) by mouth daily (Needs follow-up appointment for this medication)     beclomethasone (QVAR) 80 MCG/ACT Inhaler Inhale 2 puffs into the lungs 2 times daily     azelastine (ASTELIN) 0.1 % nasal spray Spray 1-2 sprays into both nostrils 2 times daily as needed for rhinitis     albuterol (VENTOLIN HFA) 108 (90 BASE) MCG/ACT inhaler Inhale 2 puffs into the lungs every 4 hours as needed     Acetaminophen (TYLENOL PO) Take 650 mg by mouth every 4 hours as needed for mild pain or fever     No current facility-administered medications for this visit.         Review of Systems:  I have done 10 points of review systems and pertinent findings are wheezing ,otherwise negative.    Physical  examination  Constitutional: Alert, oriented, not in distress  Vitals: B/P: 112/73, T: 97.9, P: 79, R: 18  Eyes: No icterus, nystagmus, pupils isocoric  Musculoskeletal: Normal muscle mass, no dephormity  Integumentary:  No rash, normal texture and turgor, no edema  Neurological: Alert, orientedx3, no motor deficits, cranial nerves grossly normal  Psychiatric:  Mood and affect are appropriate with insight into his/her medical condition  Hematologic/Immunologic/Lymphatic: No bruise, no lymph node enargement     Data:  Lab Results   Component Value Date    WBC 3.6 09/05/2017     Lab Results   Component Value Date    RBC 3.66 09/05/2017     Lab Results   Component Value Date    HGB 12.8 09/05/2017     Lab Results   Component Value Date    HCT 36.6 09/05/2017     Lab Results   Component Value Date     09/05/2017     Lab Results   Component Value Date    MCH 35.0 09/05/2017     Lab Results   Component Value Date    MCHC 35.0 09/05/2017     Lab Results   Component Value Date    RDW 18.9 09/05/2017     Lab Results   Component Value Date     09/05/2017       Lab Results   Component Value Date     09/05/2017      Lab Results   Component Value Date    POTASSIUM 3.7 09/05/2017     Lab Results   Component Value Date    CHLORIDE 106 09/05/2017     Lab Results   Component Value Date    BEN 9.1 09/05/2017     Lab Results   Component Value Date    CO2 24 09/05/2017     Lab Results   Component Value Date    BUN 12 09/05/2017     Lab Results   Component Value Date    CR 0.59 09/05/2017     Lab Results   Component Value Date    GLC 89 09/05/2017       Thank you for allowing me participate in the care of Etta Cervantes.    BETZAIDA Mcgowan MD

## 2017-09-13 NOTE — PROGRESS NOTES
[facility]  Lung Nodule Clinic Note  September 13, 2017    Chief complaint:  Etta Cervantes is a 57 year old female seen in the Pulmonary Clinic  for   Chief Complaint   Patient presents with     Oncology Clinic Visit     Return visit related to post opp       Assessment and Plan:  #1 right upper lung bulla and prolonged air leak due to spontaneous pneumothorax that was treated with chest tube placement and intrabronchial valve replacements ×5(4 in the upper lobe, one in the middle lobe). We removed 2 of 5 valves from the upper lobe valves last week and repeat CT scan of the chest this week revealed no pneumothorax or bulla. However partial right lobe collapse observed. We will plan to remove the rest of the valves within 2 weeks during her chemotherapy break week.  #2 metastatic breast cancer on chemotherapy.  #3 asthma well-controlled with current inhalers.    I spent more than 25 minutes face to face and greater than 50% of time was for counseling and coordination of care about pneumothorax.    History of Present Illness:  This is a 57 years old woman accompanied by her  today. She was hospitalized for prolonged air leak due to a spontaneous pneumothorax on the right side. Treatment was above. She is here today to discuss her new CT scan findings. She is occasional wheezing for which she is using inhalers.    Exposure history: Asbestos;  No , TB;  No , Radiation;   No     Allergies   Allergen Reactions     Animal Dander Cough     Itchy eyes     Cats      Dust Mites Cough     Itchy eyes     Pollen Extract      Other reaction(s): Cough  Itchy eyes     Seasonal Allergies      Levaquin [Levofloxacin] Rash     States she has violent dreams from taking this        Past Medical History:   Diagnosis Date     ASCUS favor benign 1/2015    Neg HPV     Cancer (H)      Cataract 2010    surgery both eyes     Emphysematous bleb of lung (H)      Fibroids      Hypercholesterolemia      Hypothyroid      Incontinence of urine      mixed     Menorrhagia      Obesity      Pneumothorax      PONV (postoperative nausea and vomiting)      Sleep apnea     uses a cpap     Uncomplicated asthma         Past Surgical History:   Procedure Laterality Date     BIOPSY  Jan 2015, 2016    Breast cancer, thyroid     BRONCHOSCOPY, INSERT BRONCHIAL VALVE N/A 7/20/2017    Procedure: BRONCHOSCOPY, INSERT BRONCHIAL VALVE;  Flexible Bronchoscopy, Endobronchial Valve Insertion X5. 4 Valves into right upper lobe and 1 valve into Right middle lobe;  Surgeon: Carlos Mcgowan MD;  Location: [facility] OR     BRONCHOSCOPY, REMOVE BRONCHIAL VALVE N/A 9/7/2017    Procedure: BRONCHOSCOPY, REMOVE BRONCHIAL VALVE;  Flexible Bronchoscopy, Removal Of Endobronchial Valves x 2;  Surgeon: Carlos Mcgowan MD;  Location: [facility] OR     CATARACT IOL, RT/LT  2010     COLONOSCOPY  2010     DILATION AND CURETTAGE, HYSTEROSCOPY, ABLATE ENDOMETRIUM NOVASURE, COMBINED  3/2/2011    COMBINED DILATION AND CURETTAGE, HYSTEROSCOPY, ABLATE ENDOMETRIUM NOVASURE performed by LAURA VARGAS at [facility]     GENITOURINARY SURGERY  2005    Uretha sling     INSERT PORT VASCULAR ACCESS N/A 2/12/2015    Procedure: INSERT PORT VASCULAR ACCESS;  Surgeon: Noé Schaefer MD;  Location: [facility] OR     SURGICAL HISTORY OF -   2005    bladder sling     SURGICAL HISTORY OF -       Cystectomy-lower lip     TUBAL LIGATION  1987        Social History     Social History     Marital status:      Spouse name: Lucian Cervantes     Number of children: 2     Years of education: 15+     Occupational History     Customer Service [employer]     Social History Main Topics     Smoking status: Never Smoker     Smokeless tobacco: Never Used     Alcohol use No     Drug use: No     Sexual activity: Yes     Partners: Male     Birth control/ protection: Post-menopausal     Other Topics Concern     Parent/Sibling W/ Cabg, Mi Or Angioplasty Before 65f 55m? No     Social History Narrative        Family History    Problem Relation Age of Onset     Depression Mother      Eye Disorder Mother      Gynecology Mother      Alzheimer Disease Mother      CANCER Father      Other Cancer Father      HEART DISEASE Maternal Grandfather      Allergies Paternal Grandfather      Allergies Son         Immunization History   Administered Date(s) Administered     Tdap (Adacel,Boostrix) 04/04/2008       Current Outpatient Prescriptions   Medication Sig     capecitabine (XELODA) 500 MG tablet CHEMO Take 5 cap in AM and 4 cap in PM on Days 1 through 14, then off for 7 days. Take within 30 mins after meal.     capecitabine (XELODA) 500 MG tablet CHEMO Take 5 cap in AM and 4 cap in PM on Days 1 through 14, then off for 7 days. Take within 30 mins after meal.     MAGIC MOUTHWASH, ENTER INGREDIENTS IN COMMENTS, SWISH AND SWALLOW 10 MLS IN MOUTH EVERY 6 HOURS AS NEEDED FOR MOUTH SORES     lidocaine, viscous, (XYLOCAINE) 2 % solution Apply topically as needed     levothyroxine (SYNTHROID/LEVOTHROID) 25 MCG tablet TAKE 1 TABLET (25 MCG) BY MOUTH DAILY     atorvastatin (LIPITOR) 10 MG tablet Take 1 tablet (10 mg) by mouth daily     metoclopramide (REGLAN) 10 MG tablet Take 1 tablet (10 mg) by mouth 2 times daily as needed     oxyCODONE (ROXICODONE) 5 MG IR tablet Take 1 tablet (5 mg) by mouth every 4 hours as needed for moderate to severe pain     capecitabine (XELODA) 500 MG tablet CHEMO Take 5 cap in AM and 4 cap in PM on Days 1 through 14, then off for 7 days. Take within 30 mins after meal.     fexofenadine (ALLEGRA ALLERGY) 180 MG tablet Take 180 mg by mouth daily as needed for allergies     calcium carb-cholecalciferol ( CALCIUM 600+D3) 600-500 MG-UNIT CAPS Take 2 tablets by mouth daily     zolpidem (AMBIEN) 10 MG tablet Take 1 tablet 30 minutes prior to bedtime     capecitabine (XELODA) 500 MG tablet CHEMO Take 5 cap in AM and 4 cap in PM on Days 1 through 14, then off for 7 days. Take within 30 mins after meal.     albuterol (2.5 MG/3ML)  0.083% neb solution Take 1 vial (2.5 mg) by nebulization every 4 hours as needed for shortness of breath / dyspnea     predniSONE (DELTASONE) 20 MG tablet Take 1 tablet (20 mg) by mouth 2 times daily For Yellow or Red zone on Asthma Action Plan.     magic mouthwash suspension (diphenhydrAMINE, lidocaine, aluminum-magnesium & simethicone) Swish and swallow 10 mLs in mouth every 6 hours as needed for mouth sores     LORazepam (ATIVAN) 0.5 MG tablet Take 1 tablet (0.5 mg) by mouth every 4 hours as needed (Anxiety, Nausea/Vomiting or Sleep)     prochlorperazine (COMPAZINE) 10 MG tablet Take 1 tablet (10 mg) by mouth every 6 hours as needed (Nausea/Vomiting)     Docusate Calcium (STOOL SOFTENER PO) Take 100 mg by mouth daily      CRANBERRY PO Take 1 capsule by mouth daily      mometasone-formoterol (DULERA) 200-5 MCG/ACT oral inhaler Inhale 2 puffs into the lungs 2 times daily     oxybutynin (DITROPAN XL) 10 MG 24 hr tablet Take 1 tablet (10 mg) by mouth daily (Needs follow-up appointment for this medication)     beclomethasone (QVAR) 80 MCG/ACT Inhaler Inhale 2 puffs into the lungs 2 times daily     azelastine (ASTELIN) 0.1 % nasal spray Spray 1-2 sprays into both nostrils 2 times daily as needed for rhinitis     albuterol (VENTOLIN HFA) 108 (90 BASE) MCG/ACT inhaler Inhale 2 puffs into the lungs every 4 hours as needed     Acetaminophen (TYLENOL PO) Take 650 mg by mouth every 4 hours as needed for mild pain or fever     No current facility-administered medications for this visit.         Review of Systems:  I have done 10 points of review systems and pertinent findings are wheezing ,otherwise negative.    Physical examination  Constitutional: Alert, oriented, not in distress  Vitals: B/P: 112/73, T: 97.9, P: 79, R: 18  Eyes: No icterus, nystagmus, pupils isocoric  Musculoskeletal: Normal muscle mass, no dephormity  Integumentary:  No rash, normal texture and turgor, no edema  Neurological: Alert, orientedx3, no motor  deficits, cranial nerves grossly normal  Psychiatric:  Mood and affect are appropriate with insight into his/her medical condition  Hematologic/Immunologic/Lymphatic: No bruise, no lymph node enargement     Data:  Lab Results   Component Value Date    WBC 3.6 09/05/2017     Lab Results   Component Value Date    RBC 3.66 09/05/2017     Lab Results   Component Value Date    HGB 12.8 09/05/2017     Lab Results   Component Value Date    HCT 36.6 09/05/2017     Lab Results   Component Value Date     09/05/2017     Lab Results   Component Value Date    MCH 35.0 09/05/2017     Lab Results   Component Value Date    MCHC 35.0 09/05/2017     Lab Results   Component Value Date    RDW 18.9 09/05/2017     Lab Results   Component Value Date     09/05/2017       Lab Results   Component Value Date     09/05/2017      Lab Results   Component Value Date    POTASSIUM 3.7 09/05/2017     Lab Results   Component Value Date    CHLORIDE 106 09/05/2017     Lab Results   Component Value Date    BEN 9.1 09/05/2017     Lab Results   Component Value Date    CO2 24 09/05/2017     Lab Results   Component Value Date    BUN 12 09/05/2017     Lab Results   Component Value Date    CR 0.59 09/05/2017     Lab Results   Component Value Date    GLC 89 09/05/2017       Thank you for allowing me participate in the care of Etta Cervantes.    BETZAIDA Mcgowan MD

## 2017-09-14 NOTE — PROGRESS NOTES
Hematology/ Oncology Follow-up Visit:  Sep 13, 2017    Reason for Visit:   Chief Complaint   Patient presents with     Oncology Clinic Visit     4 week recheck Breast CA, review CT results        Oncologic History:  Breast cancer (H)  Etta Cervantes presented with increasing lump in the right axilla that s lump has been growing without any pain or associated symptoms. Subsequently she was evaluated by primary care physician. Diagnostic digital mammogram was done on 01/13/2015 showing enlarged lymph nodes in the right axilla. There was some skin thickening in the right breast anteriorly and laterally. There are no parenchymal changes in the right breast. The left breast shows a 1.7 cm spiculated mass at approximately 2 to 3 o'clock position, 15.2 cm away from the nipple. A right breast ultrasound was subsequently done and ultrasound guided biopsy was done. Needle biopsy of the left breast did show infiltrating ductal carcinoma grade I of III with no angiolymphatic invasion seen. No associated ductal carcinoma in situ. Estrogen receptor, progresterone receptor were positive. HER-2/sadie receptor came back negative.. Another biopsy from the right axilla did show metastatic ductal carcinoma. PET scan was done on January 26, 2015 showing few small right posterior cervical chain nodes the largest is 1.2 cm. There is extensive bulky right axillary adenopathy demonstrating hypermetabolic FDG uptake with SUV of 10.6. There is skin thickening involving the right breast which demonstrated low-grade FDG uptake. A 1.2 cm lesion on the lateral aspect of the left breast demonstrated minimally increased FDG uptake with SUV of 2. Scattered hypermetabolic mediastinal lymph nodes located in the left superior anterior mediastinum, right paratracheal and precarinal U, subcranial adenopathy with javon metastases. This focus hypermetabolic FDG uptake identified definitive Amanda posterior aspect off intertrochanteric region of the proximal  left femur consistent with bone metastases. There is also 1.4 cm hypermetabolic FDG uptake identified in the anterior medial segment of the left hepatic lobe consistent with liver metastases. Additional 2.1 cm low-attenuation lesion anterior aspect of the right lobe which needs to be determined. The patient was started on chemotherapy with Taxotere and Cytoxan on February 9, 2015.  She concluded 4 cycles of Taxotere and Cytoxan. She started on Arimidex 1 mg orally daily. Currently she is on Faslodex and ibrance      Interval History:  Patient is in today for follow-up. She has been feeling well without any recent complaints of shortness of breath or cough or wheezing. She denies any nausea vomiting or diarrhea. She denies any fever or chills. She is currently on Xeloda without any significant side effects.    Review Of Systems:  Constitutional: Negative for fever, chills, and night sweats.  Skin: negative.  Eyes: negative.  Ears/Nose/Throat: negative.  Respiratory: No shortness of breath, dyspnea on exertion, cough, or hemoptysis.  Cardiovascular: negative.  Gastrointestinal: negative.  Genitourinary: negative.  Musculoskeletal: negative.  Neurologic: negative.  Psychiatric: negative.  Hematologic/Lymphatic/Immunologic: negative.  Endocrine: negative.    All other ROS negative unless mentioned in interval history.    Past medical, social, surgical, and family histories reviewed.    Allergies:  Allergies as of 09/13/2017 - Manuel as Reviewed 09/13/2017   Allergen Reaction Noted     Animal dander Cough 01/23/2015     Cats  07/15/2010     Dust mites Cough 01/23/2015     Pollen extract  01/23/2015     Seasonal allergies  07/15/2010     Levaquin [levofloxacin] Rash 07/17/2017       Current Medications:  Current Outpatient Prescriptions   Medication Sig Dispense Refill     Phenylephrine-DM-GG (ROBITUSSIN COUGH/COLD CF PO) Take by mouth as needed       QVAR 80 MCG/ACT Inhaler INHALE 1 PUFFS INTO THE EACH NOSTRIL 2 TIMES  DAILY 26.1 g 3     capecitabine (XELODA) 500 MG tablet CHEMO Take 5 cap in AM and 4 cap in PM on Days 1 through 14, then off for 7 days. Take within 30 mins after meal. 126 tablet 0     capecitabine (XELODA) 500 MG tablet CHEMO Take 5 cap in AM and 4 cap in PM on Days 1 through 14, then off for 7 days. Take within 30 mins after meal. 126 tablet 0     MAGIC MOUTHWASH, ENTER INGREDIENTS IN COMMENTS, SWISH AND SWALLOW 10 MLS IN MOUTH EVERY 6 HOURS AS NEEDED FOR MOUTH SORES  10     lidocaine, viscous, (XYLOCAINE) 2 % solution Apply topically as needed       levothyroxine (SYNTHROID/LEVOTHROID) 25 MCG tablet TAKE 1 TABLET (25 MCG) BY MOUTH DAILY 90 tablet 2     atorvastatin (LIPITOR) 10 MG tablet Take 1 tablet (10 mg) by mouth daily 90 tablet 3     metoclopramide (REGLAN) 10 MG tablet Take 1 tablet (10 mg) by mouth 2 times daily as needed 120 tablet 1     oxyCODONE (ROXICODONE) 5 MG IR tablet Take 1 tablet (5 mg) by mouth every 4 hours as needed for moderate to severe pain 30 tablet 0     capecitabine (XELODA) 500 MG tablet CHEMO Take 5 cap in AM and 4 cap in PM on Days 1 through 14, then off for 7 days. Take within 30 mins after meal. 126 tablet 0     fexofenadine (ALLEGRA ALLERGY) 180 MG tablet Take 180 mg by mouth daily as needed for allergies       calcium carb-cholecalciferol (KP CALCIUM 600+D3) 600-500 MG-UNIT CAPS Take 2 tablets by mouth daily       zolpidem (AMBIEN) 10 MG tablet Take 1 tablet 30 minutes prior to bedtime 30 tablet 3     capecitabine (XELODA) 500 MG tablet CHEMO Take 5 cap in AM and 4 cap in PM on Days 1 through 14, then off for 7 days. Take within 30 mins after meal. 126 tablet 0     albuterol (2.5 MG/3ML) 0.083% neb solution Take 1 vial (2.5 mg) by nebulization every 4 hours as needed for shortness of breath / dyspnea 30 vial 3     predniSONE (DELTASONE) 20 MG tablet Take 1 tablet (20 mg) by mouth 2 times daily For Yellow or Red zone on Asthma Action Plan. 10 tablet 1     magic mouthwash  "suspension (diphenhydrAMINE, lidocaine, aluminum-magnesium & simethicone) Swish and swallow 10 mLs in mouth every 6 hours as needed for mouth sores 240 mL 10     LORazepam (ATIVAN) 0.5 MG tablet Take 1 tablet (0.5 mg) by mouth every 4 hours as needed (Anxiety, Nausea/Vomiting or Sleep) 30 tablet 2     prochlorperazine (COMPAZINE) 10 MG tablet Take 1 tablet (10 mg) by mouth every 6 hours as needed (Nausea/Vomiting) 30 tablet 2     Docusate Calcium (STOOL SOFTENER PO) Take 100 mg by mouth daily        CRANBERRY PO Take 1 capsule by mouth daily        mometasone-formoterol (DULERA) 200-5 MCG/ACT oral inhaler Inhale 2 puffs into the lungs 2 times daily 3 Inhaler 0     oxybutynin (DITROPAN XL) 10 MG 24 hr tablet Take 1 tablet (10 mg) by mouth daily (Needs follow-up appointment for this medication) 90 tablet 3     [DISCONTINUED] beclomethasone (QVAR) 80 MCG/ACT Inhaler Inhale 2 puffs into the lungs 2 times daily 3 Inhaler 3     azelastine (ASTELIN) 0.1 % nasal spray Spray 1-2 sprays into both nostrils 2 times daily as needed for rhinitis 3 Bottle 3     albuterol (VENTOLIN HFA) 108 (90 BASE) MCG/ACT inhaler Inhale 2 puffs into the lungs every 4 hours as needed 1 Inhaler 2     Acetaminophen (TYLENOL PO) Take 650 mg by mouth every 4 hours as needed for mild pain or fever          Physical Exam:  /70 (BP Location: Right arm, Patient Position: Sitting, Cuff Size: Adult Regular)  Pulse 68  Temp 99.9  F (37.7  C) (Tympanic)  Resp 18  Ht 1.638 m (5' 4.49\")  Wt 88.1 kg (194 lb 4.8 oz)  LMP 02/06/2013  SpO2 99%  Breastfeeding? No  BMI 32.85 kg/m2  Wt Readings from Last 12 Encounters:   09/13/17 88.1 kg (194 lb 4.8 oz)   09/13/17 88.7 kg (195 lb 8 oz)   09/07/17 87.5 kg (192 lb 14.4 oz)   09/05/17 88.8 kg (195 lb 11.2 oz)   08/17/17 86.7 kg (191 lb 1.6 oz)   08/16/17 86.6 kg (191 lb)   07/27/17 86.9 kg (191 lb 9.6 oz)   07/24/17 86.6 kg (190 lb 14.7 oz)   06/28/17 90.6 kg (199 lb 11.2 oz)   06/19/17 91.9 kg (202 lb " 9.6 oz)   05/25/17 91.6 kg (201 lb 14.4 oz)   05/18/17 93.3 kg (205 lb 11.2 oz)     ECOG performance status: 1  GENERAL APPEARANCE: Healthy, alert and in no acute distress.  HEENT: Sclerae anicteric. PERRLA. Oropharynx without ulcers, lesions, or thrush.  NECK: Supple. No asymmetry or masses.  LYMPHATICS: No palpable cervical, supraclavicular, axillary, or inguinal lymphadenopathy.  RESP: Lungs clear to auscultation bilaterally without rales, rhonchi or wheezes.  CARDIOVASCULAR: Regular rate and rhythm. Normal S1, S2; no S3 or S4. No murmur, gallop, or rub.  ABDOMEN: Soft, nontender. Bowel sounds normal. No palpable organomegaly or masses.  MUSCULOSKELETAL: Extremities without gross deformities noted. No edema of bilateral lower extremities.  SKIN: No suspicious lesions or rashes.  NEURO: Alert and oriented x 3. Cranial nerves II-XII grossly intact.  PSYCHIATRIC: Mentation and affect appear normal.    Laboratory/Imaging Studies:  Orders Only on 09/05/2017   Component Date Value Ref Range Status     CA 27-29 09/05/2017 50* 0 - 39 U/mL Final    Assay Method:  Chemiluminescence using Siemens Centaur XP     WBC 09/05/2017 3.6* 4.0 - 11.0 10e9/L Final     RBC Count 09/05/2017 3.66* 3.8 - 5.2 10e12/L Final     Hemoglobin 09/05/2017 12.8  11.7 - 15.7 g/dL Final     Hematocrit 09/05/2017 36.6  35.0 - 47.0 % Final     MCV 09/05/2017 100  78 - 100 fl Final     MCH 09/05/2017 35.0* 26.5 - 33.0 pg Final     MCHC 09/05/2017 35.0  31.5 - 36.5 g/dL Final     RDW 09/05/2017 18.9* 10.0 - 15.0 % Final     Platelet Count 09/05/2017 153  150 - 450 10e9/L Final     Diff Method 09/05/2017 Automated Method   Final     % Neutrophils 09/05/2017 52.2  % Final     % Lymphocytes 09/05/2017 31.5  % Final     % Monocytes 09/05/2017 12.1  % Final     % Eosinophils 09/05/2017 3.9  % Final     % Basophils 09/05/2017 0.3  % Final     Absolute Neutrophil 09/05/2017 1.9  1.6 - 8.3 10e9/L Final     Absolute Lymphocytes 09/05/2017 1.1  0.8 - 5.3 10e9/L  Final     Absolute Monocytes 09/05/2017 0.4  0.0 - 1.3 10e9/L Final     Absolute Eosinophils 09/05/2017 0.1  0.0 - 0.7 10e9/L Final     Absolute Basophils 09/05/2017 0.0  0.0 - 0.2 10e9/L Final     Sodium 09/05/2017 138  133 - 144 mmol/L Final     Potassium 09/05/2017 3.7  3.4 - 5.3 mmol/L Final     Chloride 09/05/2017 106  94 - 109 mmol/L Final     Carbon Dioxide 09/05/2017 24  20 - 32 mmol/L Final     Anion Gap 09/05/2017 8  3 - 14 mmol/L Final     Glucose 09/05/2017 89  70 - 99 mg/dL Final     Urea Nitrogen 09/05/2017 12  7 - 30 mg/dL Final     Creatinine 09/05/2017 0.59  0.52 - 1.04 mg/dL Final     GFR Estimate 09/05/2017 >90  >60 mL/min/1.7m2 Final    Non  GFR Calc     GFR Estimate If Black 09/05/2017 >90  >60 mL/min/1.7m2 Final    African American GFR Calc     Calcium 09/05/2017 9.1  8.5 - 10.1 mg/dL Final     Bilirubin Total 09/05/2017 0.9  0.2 - 1.3 mg/dL Final     Albumin 09/05/2017 3.8  3.4 - 5.0 g/dL Final     Protein Total 09/05/2017 7.1  6.8 - 8.8 g/dL Final     Alkaline Phosphatase 09/05/2017 88  40 - 150 U/L Final     ALT 09/05/2017 32  0 - 50 U/L Final     AST 09/05/2017 27  0 - 45 U/L Final        Recent Results (from the past 744 hour(s))   X-ray Chest 2 vws*    Narrative    EXAM: XR CHEST 2 VW  8/16/2017 10:01 AM     HISTORY:  Primary spontaneous pneumothorax       COMPARISON: Chest radiograph 7/27/2017    FINDINGS: PA and lateral views of chest. Decreased pneumothorax  component of the right apical hydropneumothorax. Partial right upper  lobe collapse. 4 right upper lung endobronchial stents. Cardiac  silhouette is within normal. Tented/peak appearance of right  diaphragmatic (juxtaphrenic peak sign). No left-sided pneumothorax or  pleural effusion.      Impression    IMPRESSION:   1. Decreased pneumothorax compartment of the right apical  hydropneumothorax.  2. Partial right upper lobe collapse with right juxtaphrenic peak  sign, which alternatively or additionally could  represent increased  fluid component of the right apical hydropneumothorax.    I have personally reviewed the examination and initial interpretation  and I agree with the findings.    LAURA CHAN MD   XR Chest Port 1 View    Narrative    XR CHEST PORT 1 VW 9/7/2017 1:00 PM    Comparison: 8/16/2017    History: eval of right pneumothorax    Findings: Single AP view of the chest. Left IJ Port-A-Cath tip  projects over the high SVC. Postoperative changes of right-sided  endobronchial valves placement, unchanged. Improved aeration right  upper upper lobe. No significant residual pneumothorax component of  the previously described right apical arch pneumothorax. Stable  elevation of the right hemidiaphragm. No acute airspace disease.  Cardiac silhouette and pulmonary vasculature are within normal limits.      Impression    Impression:   Postoperative changes of the chest with improved right upper lung  field aeration. No significant residual pneumothorax component of the  previously described right apical hydropneumothorax. Improved right  upper lobe aeration.    I have personally reviewed the examination and initial interpretation  and I agree with the findings.    KAVON MILLS MD   XR Chest Port 1 View    Narrative    XR CHEST PORT 1 VW  9/7/2017 2:51 PM      HISTORY: removed 2 valves from RUL    COMPARISON: 9/7/2017    FINDINGS: Portable AP view of the chest. Left chest wall Port-A-Cath  unchanged in position. Interval removal of 2 right upper lobe  bronchial valves. 3 remain in place. Mildly decreased aeration of the  right upper lobe with associated right-sided volume loss. Heart size  is normal. No appreciable pneumothorax or pleural effusion. The heart  size is normal.      Impression    IMPRESSION: Interval removal of 2 valves the right upper lobe with  increased right upper lobe atelectasis. No appreciable pneumothorax.    I have personally reviewed the examination and initial interpretation  and I agree  with the findings.    KAVON MILLS MD   CT Chest/Abdomen/Pelvis w Contrast    Narrative    CT CHEST/ABDOMEN/PELVIS WITH CONTRAST 9/11/2017 8:19 AM    TECHNIQUE: Images from thoracic inlet to pubic symphysis 94 mL Isovue  370 IV contrast and oral contrast.  Radiation dose for this scan was  reduced using automated exposure control, adjustment of the mA and/or  kV according to patient size, or iterative reconstruction technique.    HISTORY: Follow-up breast cancer with liver metastasis.    COMPARISON: 5/16/2017 CT chest, abdomen and pelvis.    FINDINGS:   Chest:  Diffuse skin thickening and soft tissue stranding throughout  the right breast redemonstrated. Improved right axillary adenopathy.  Representative previously seen 2.1 cm right axillary adenopathy is  currently 1.1 cm. Some of the enhancing nodules in the lateral right  breast seen in May are improved consistent with improved tumor.  Irregular 4.4 x 2.8 cm opacity in the lower right axilla, image 18, is  unchanged. Redemonstrated multinodular thyroid.    Previous large cyst in the right lung is no longer identified.  Currently small fluid and atelectasis in the region of the prior cyst  at superior segment right lower lobe. This atelectasis extends to the  right hilum. The lungs are otherwise clear.    Abdomen and pelvis:  Liver metastasis have improved. A 5.1 cm AP  diameter lesion in the left lobe of the liver is currently 3.1 cm AP  dimension with some adjacent capsular retraction. Multiple additional  liver metastasis also appear improved. Normal-appearing gallbladder.  Stable 0.8 cm hyperdense focus in the mid spleen could be an enhancing  lesion.    Improved periportal and gastrohepatic adenopathy. 1.8 cm periportal  node on image 59 was previously 2.4 cm. Few periaortic nodes are  slightly larger than before. For example image 63 below the left renal  vein. No pelvic adenopathy, free fluid, or acute bowel abnormality.  Cecum is in the right upper  quadrant just inferior to the liver. No  aggressive bone lesions.      Impression    IMPRESSION:   1. Improved right axillary adenopathy and improved liver metastasis.  2. Continued right breast skin thickening and diffuse stranding as  well as an irregular 4.4 x 2.9 cm axillary opacity which is  indeterminate, concerning for metastasis but stable.  3. Improved enhancing nodules lateral right breast consistent with  improved tumor.  4. Improved periportal and gastrohepatic adenopathy.  5. Mild increase in size of retroperitoneal nodes up to 1 cm in size.    CHYNA CALLEJAS MD       Assessment and plan:  (C50.911,  C77.3) Breast cancer metastasized to axillary lymph node, right (H)  (primary encounter diagnosis)  (C50.919,  C78.7) Carcinoma of breast metastatic to liver, unspecified laterality (H)  I reviewed with the patient submitted images from most recent set scan. It appears she has good response to treatment. Patient will continue on the current treatment with capecitabine. I will see the patient again in 6 weeks or sooner if there is new that vomits or concerns.    (C79.51) Bone metastasis (H)  Patient currently on  Denosumab causative treatment plan. The patient also on calcium and vitamin D supplements.     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 She is scheduled to see them next week.     (J45.40) Moderate persistent asthma without complication  Patient as well as been stable.    (K12.31) Mucositis due to chemotherapy  Patient will continue on Magic mouthwash if needed.    (E03.9) Hypothyroidism, unspecified type  Patient will continue on Synthroid 25  g daily.    The patient is ready to learn, no apparent learning barriers were identified.  Diagnosis and treatment plans were explained to the patient. The patient expressed understanding of the content. The patient asked appropriate questions. The patient questions were answered to her satisfaction.    Chart  documentation with Dragon Voice recognition Software. Although reviewed after completion, some words and grammatical errors may remain.

## 2017-09-14 NOTE — TELEPHONE ENCOUNTER
QVAR 80 MCG ORAL INHALER         Last Written Prescription Date: 9/14/16  Last Fill Quantity: 3, # refills: 3    Last Office Visit with [facility], [facility] or [facility] prescribing provider:  9/5/17   Future Office Visit:    Next 5 appointments (look out 90 days)     Oct 18, 2017  2:00 PM CDT   Return Visit with Brodie Cassidy MD   [facility] ([facility])    [facility]  [address]  [facility] [address]   [phone]                   Date of Last Asthma Action Plan Letter:   Asthma Action Plan Q1 Year    Topic Date Due     Asthma Action Plan - yearly  08/24/2017      Asthma Control Test:   ACT Total Scores 4/28/2017   ACT TOTAL SCORE -   ASTHMA ER VISITS -   ASTHMA HOSPITALIZATIONS -   ACT TOTAL SCORE (Goal Greater than or Equal to 20) 24   In the past 12 months, how many times did you visit the emergency room for your asthma without being admitted to the hospital? 0   In the past 12 months, how many times were you hospitalized overnight because of your asthma? 0       Date of Last Spirometry Test:   No results found for this or any previous visit.

## 2017-09-14 NOTE — TELEPHONE ENCOUNTER
Prescription approved per [facility] Refill Protocol or patient Primary care provider (PCP)  TATIANA Segovia RN/Liborio Jackson

## 2017-09-15 NOTE — TELEPHONE ENCOUNTER
Pt called and left a message reporting she has an appt with Dr. Mcgowan on 9/28 to have the stents removed. She reports this will be her off week of chemo but she will also be due for Xgeva that week. She was unsure if there is a specific timeframe around the time she has Xgeva that is is not safe to have the stents removed and if she should wait on the Xgeva? Also questioning when to do her labs that week. Called her back to let her know we will review with Dr. Cassidy and get back to her. Pt verbalized understanding.

## 2017-09-20 NOTE — TELEPHONE ENCOUNTER
"Oncology Distress Screening Follow-up  Clinical Social Work  [facility]    Identified Concern and Score From Distress Screening: Patient triggered oncology distress screening regarding concerns about work and home life issues that may be affected by your cancer (6/10).    Date of Distress Screenin2017    Data: Patient completed two distress screenings on 2017.  Per chart review, patient is a 57 year old female who was seen in lung nodule clinic at the [facility] and completed oncology distress screening during this non-oncology related visit.  She does have a diagnosis of metastatic breast cancer and subsequently met with her oncology on the same day at [facility] where she again completed the oncology distress screening.  Clinical Oncology SW at the [facility] followed up on distress screening.    Intervention: Social work contacted patient by phone regarding distress screening results. Social work spoke with patient who relayed that she is primarily struggling with her employer.  She explained that she had recently had a lengthy hospitalization and following her return to work has been struggling to being able to return full time.  Patient stated she started working half days initially and has been slowing increasing her hours but felt that a full day may be too much.  She discussed with this worker that her oncology team at [facility] was able to write a letter indicating she would be able to work 6 hours a day.  Patient also shared frustration with this worker about attempts to set boundaries regarding \"work and home life.\" She stated feeling that she needs to be able to \"leave work at work at the end of the day\" and relaying to her employer \"sometimes I'm not doing great and need to take a break.\" Social work provided validation, supportive listening and counseling to patient regarding her frustrations and indicated the importance of self care.  Additionally, SW provided general information about eligibility for " "SSDI if patient is unable to continue working.  Patient discussed that she does enjoy the structure and socialization of going to work and stated \"I'm not ready for that yet\" which SW supported.  SW offered patient education about Cancer Legal Line to assist with any employer concerns if needed in the future.  Patient indicated appreciation for SW intervention and indicated she would let this worker if any other needs arise.    Education Provided: Cancer Legal Line, SSDI    Follow-up Required: No follow up planned at this time.  Patient to let staff know of any other needs.      Soo Yeon Han LICSW  9/20/2017  Pager: [phone]  Phone Number: [phone]    "

## 2017-09-25 NOTE — MR AVS SNAPSHOT
After Visit Summary   9/25/2017    Etta Cervantes    MRN: [id number]           Patient Information     Date Of Birth          1960        Visit Information        Provider Department      9/25/2017 2:00 PM ROOM 9 [facility]        Today's Diagnoses     Secondary malignant neoplasm of liver (H)    -  1    Carcinoma of breast metastatic to liver, unspecified laterality (H)        Bone metastasis (H)        Breast cancer metastasized to axillary lymph node, right (H)           Follow-ups after your visit        Your next 10 appointments already scheduled     Sep 28, 2017   Procedure with Carlos Mcgowan MD   [facility], [place], Same Day Surgery (--)    [address]  [place] [address]   [phone]            Oct 17, 2017  7:45 AM CDT   LAB with [facility] ([facility])    [address] [address] [phone]           Patient must bring picture ID. Patient should be prepared to give a urine specimen  Please do not eat 10-12 hours before your appointment if you are coming in fasting for labs on lipids, cholesterol, or glucose (sugar). Pregnant women should follow their Care Team instructions. Water with medications is okay. Do not drink coffee or other fluids. If you have concerns about taking  your medications, please ask at office or if scheduling via [employer], send a message by clicking on Secure Messaging, Message Your Care Team.            Oct 18, 2017  2:00 PM CDT   Return Visit with Brodie Cassidy MD   [facility] ([facility])    [facility]  [address]  [facility] [id number] [phone]              Who to contact     If you have questions or need follow up information about today's clinic visit or your schedule please contact [facility] directly at [phone].  Normal or non-critical lab and imaging results will be communicated to you by [employer],  letter or phone within 4 business days after the clinic has received the results. If you do not hear from us within 7 days, please contact the clinic through [employer] or phone. If you have a critical or abnormal lab result, we will notify you by phone as soon as possible.  Submit refill requests through [employer] or call your pharmacy and they will forward the refill request to us. Please allow 3 business days for your refill to be completed.          Additional Information About Your Visit        [employer] Information     [employer] gives you secure access to your electronic health record. If you see a primary care provider, you can also send messages to your care team and make appointments. If you have questions, please call your primary care clinic.  If you do not have a primary care provider, please call [phone] and they will assist you.        Care EveryWhere ID     This is your Care EveryWhere ID. This could be used by other organizations to access your [place] medical records  [id number]        Your Vitals Were     Pulse Temperature Respirations Last Period Pulse Oximetry       71 99.3  F (37.4  C) (Oral) 16 02/06/2013 98%        Blood Pressure from Last 3 Encounters:   09/25/17 107/53   09/13/17 120/70   09/13/17 112/73    Weight from Last 3 Encounters:   09/13/17 88.1 kg (194 lb 4.8 oz)   09/13/17 88.7 kg (195 lb 8 oz)   09/07/17 87.5 kg (192 lb 14.4 oz)              We Performed the Following     Ca27.29  breast tumor marker     CBC with platelets differential     Comprehensive metabolic panel        Primary Care Provider Office Phone # Fax #    Johana Fairbanks [phone] [address]  [place] [address]        Equal Access to Services     [facility] Hospital[facility] : Hadjavier Rodriguez, nish jauregui, hari malik. So [facility] [phone].    ATENCIÓN: Si habla español, tiene a parekh disposición servicios gratuitos de  asistencia lingüística. [place] al [phone].    We comply with applicable federal civil rights laws and [place] laws. We do not discriminate on the basis of race, color, national origin, age, disability sex, sexual orientation or gender identity.            Thank you!     Thank you for choosing [facility]  for your care. Our goal is always to provide you with excellent care. Hearing back from our patients is one way we can continue to improve our services. Please take a few minutes to complete the written survey that you may receive in the mail after your visit with us. Thank you!             Your Updated Medication List - Protect others around you: Learn how to safely use, store and throw away your medicines at www.disposemymeds.org.          This list is accurate as of: 9/25/17  3:06 PM.  Always use your most recent med list.                   Brand Name Dispense Instructions for use Diagnosis    * albuterol 108 (90 BASE) MCG/ACT Inhaler    VENTOLIN HFA    1 Inhaler    Inhale 2 puffs into the lungs every 4 hours as needed    Moderate persistent asthma, uncomplicated       * albuterol (2.5 MG/3ML) 0.083% neb solution     30 vial    Take 1 vial (2.5 mg) by nebulization every 4 hours as needed for shortness of breath / dyspnea    Moderate persistent asthma, uncomplicated       ALLEGRA ALLERGY 180 MG tablet   Generic drug:  fexofenadine      Take 180 mg by mouth daily as needed for allergies        atorvastatin 10 MG tablet    LIPITOR    90 tablet    Take 1 tablet (10 mg) by mouth daily    Hyperlipidemia LDL goal <100       azelastine 0.1 % spray    ASTELIN    3 Bottle    Spray 1-2 sprays into both nostrils 2 times daily as needed for rhinitis    Chronic rhinitis       * capecitabine 500 MG tablet CHEMO    XELODA    126 tablet    Take 5 cap in AM and 4 cap in PM on Days 1 through 14, then off for 7 days. Take within 30 mins after meal.    Secondary malignant neoplasm of liver (H), Carcinoma of breast  metastatic to liver, unspecified laterality (H), Bone metastasis (H), Breast cancer metastasized to axillary lymph node, right (H), Bilateral malignant neoplasm of breast in female, estrogen receptor positive, unspecified site of breast (H), Hyperlipidemia LDL goal <130, Hypothyroidism, unspecified type, Chemotherapy-induced neutropenia (H), Mucositis due to chemotherapy       * capecitabine 500 MG tablet CHEMO    XELODA    126 tablet    Take 5 cap in AM and 4 cap in PM on Days 1 through 14, then off for 7 days. Take within 30 mins after meal.    Secondary malignant neoplasm of liver (H), Carcinoma of breast metastatic to liver, unspecified laterality (H), Bone metastasis (H), Breast cancer metastasized to axillary lymph node, right (H)       * capecitabine 500 MG tablet CHEMO    XELODA    126 tablet    Take 5 cap in AM and 4 cap in PM on Days 1 through 14, then off for 7 days. Take within 30 mins after meal.    Secondary malignant neoplasm of liver (H), Carcinoma of breast metastatic to liver, unspecified laterality (H), Bone metastasis (H), Breast cancer metastasized to axillary lymph node, right (H)       * capecitabine 500 MG tablet CHEMO    XELODA    126 tablet    Take 5 cap in AM and 4 cap in PM on Days 1 through 14, then off for 7 days. Take within 30 mins after meal.    Secondary malignant neoplasm of liver (H), Carcinoma of breast metastatic to liver, unspecified laterality (H), Bone metastasis (H), Breast cancer metastasized to axillary lymph node, right (H)       CRANBERRY PO      Take 1 capsule by mouth daily        KP CALCIUM 600+D3 600-500 MG-UNIT Caps   Generic drug:  calcium carb-cholecalciferol      Take 2 tablets by mouth daily        levothyroxine 25 MCG tablet    SYNTHROID/LEVOTHROID    90 tablet    TAKE 1 TABLET (25 MCG) BY MOUTH DAILY    Hypothyroidism due to acquired atrophy of thyroid       lidocaine (viscous) 2 % solution    XYLOCAINE     Apply topically as needed        lidocaine visc 2%  2.5mL/5mL & maalox/mylanta w/ simeth 2.5mL/5mL & diphenhydrAMINE 5mg/5mL Susp suspension    MAGIC Mouthwash HOSPITAL    240 mL    Swish and swallow 10 mLs in mouth every 6 hours as needed for mouth sores    Mucositis due to chemotherapy, Breast cancer metastasized to axillary lymph node, right (H)       LORazepam 0.5 MG tablet    ATIVAN    30 tablet    Take 1 tablet (0.5 mg) by mouth every 4 hours as needed (Anxiety, Nausea/Vomiting or Sleep)    Secondary malignant neoplasm of liver (H), Carcinoma of breast metastatic to liver, unspecified laterality (H), Bone metastasis (H), Breast cancer metastasized to axillary lymph node, right (H)       MAGIC MOUTHWASH (ENTER INGREDIENTS IN COMMENTS)      SWISH AND SWALLOW 10 MLS IN MOUTH EVERY 6 HOURS AS NEEDED FOR MOUTH SORES        metoclopramide 10 MG tablet    REGLAN    120 tablet    Take 1 tablet (10 mg) by mouth 2 times daily as needed    Bilateral malignant neoplasm of breast in female, estrogen receptor positive, unspecified site of breast (H)       mometasone-formoterol 200-5 MCG/ACT oral inhaler    DULERA    3 Inhaler    Inhale 2 puffs into the lungs 2 times daily    Moderate persistent asthma without complication       oxybutynin 10 MG 24 hr tablet    DITROPAN XL    90 tablet    Take 1 tablet (10 mg) by mouth daily (Needs follow-up appointment for this medication)    Mixed incontinence urge and stress (male)(female)       oxyCODONE 5 MG IR tablet    ROXICODONE    30 tablet    Take 1 tablet (5 mg) by mouth every 4 hours as needed for moderate to severe pain    Secondary malignant neoplasm of liver (H)       predniSONE 20 MG tablet    DELTASONE    10 tablet    Take 1 tablet (20 mg) by mouth 2 times daily For Yellow or Red zone on Asthma Action Plan.    Moderate persistent asthma, uncomplicated       prochlorperazine 10 MG tablet    COMPAZINE    30 tablet    Take 1 tablet (10 mg) by mouth every 6 hours as needed (Nausea/Vomiting)    Secondary malignant neoplasm of  liver (H), Carcinoma of breast metastatic to liver, unspecified laterality (H), Bone metastasis (H), Breast cancer metastasized to axillary lymph node, right (H)       QVAR 80 MCG/ACT Inhaler   Generic drug:  beclomethasone     26.1 g    INHALE 1 PUFFS INTO THE EACH NOSTRIL 2 TIMES DAILY    Chronic rhinitis       ROBITUSSIN COUGH/COLD CF PO      Take by mouth as needed    Breast cancer metastasized to axillary lymph node, right (H)       STOOL SOFTENER PO      Take 100 mg by mouth daily        TYLENOL PO      Take 650 mg by mouth every 4 hours as needed for mild pain or fever        zolpidem 10 MG tablet    AMBIEN    30 tablet    Take 1 tablet 30 minutes prior to bedtime    Insomnia, unspecified       * Notice:  This list has 6 medication(s) that are the same as other medications prescribed for you. Read the directions carefully, and ask your doctor or other care provider to review them with you.

## 2017-09-25 NOTE — PROGRESS NOTES
Infusion Nursing Note:  Etta Cervantes presents today for labs & Xgeva.    Patient seen by provider today: No   present during visit today: Not Applicable.    Note: N/A.    Intravenous Access:  Implanted Port.    Treatment Conditions:  Not Applicable.      Post Infusion Assessment:  Patient tolerated injection without incident.    Discharge Plan:   Patient discharged in stable condition accompanied by: self. Lab appt. 10/1/17, to see Dr. Cassidy on 10/18/17.    Truman Duong RN

## 2017-09-28 NOTE — IP AVS SNAPSHOT
Same Day Surgery [address] [address]    Phone:  [phone]                                       After Visit Summary   9/28/2017    Etta Cervantes    MRN: [id number]           After Visit Summary Signature Page     I have received my discharge instructions, and my questions have been answered. I have discussed any challenges I see with this plan with the nurse or doctor.    ..........................................................................................................................................  Patient/Patient Representative Signature      ..........................................................................................................................................  Patient Representative Print Name and Relationship to Patient    ..................................................               ................................................  Date                                            Time    ..........................................................................................................................................  Reviewed by Signature/Title    ...................................................              ..............................................  Date                                                            Time

## 2017-09-28 NOTE — ANESTHESIA PREPROCEDURE EVALUATION
Anesthesia Evaluation     .             ROS/MED HX    ENT/Pulmonary:     (+)sleep apnea, DIAMOND risk factors Moderate Persistent asthma Treatment: Inhaler daily,  COPD, uses CPAP , . Other pulmonary disease ruptured bleb with pneumothorax requiring chest tube 7/17/2017.  Treated with bronchial valves at that time.    Neurologic:       Cardiovascular:     (+) Dyslipidemia, ----. : . . . :. .       METS/Exercise Tolerance:     Hematologic:         Musculoskeletal:         GI/Hepatic:     (+) liver disease (Secondary malignant neoplasm of liver (H)),       Renal/Genitourinary:     (+) chronic renal disease, Other Renal/ Genitourinary, Acute pyelonephritis      Endo:     (+) thyroid problem (Thyroid nodule) hypothyroidism, Chronic steroid usage for Other Obesity, .      Psychiatric:         Infectious Disease:         Malignancy:   (+) Malignancy History of Breast  Breast CA Active status post Chemo.         Other:                     Physical Exam  Normal systems: cardiovascular and dental    Airway   Mallampati: II  TM distance: >3 FB  Neck ROM: full    Dental     Cardiovascular   Rhythm and rate: regular and normal      Pulmonary (+) decreased breath sounds                       Anesthesia Plan      History & Physical Review  History and physical reviewed and following examination; no interval change.    ASA Status:  3 .    NPO Status:  > 8 hours    Plan for General and ETT with Intravenous and Propofol induction. Maintenance will be Balanced.    PONV prophylaxis:  Ondansetron (or other 5HT-3) and Dexamethasone or Solumedrol  Additional equipment: Videolaryngoscope and 2nd IV      Postoperative Care  Postoperative pain management:  IV analgesics.      Consents  Anesthetic plan, risks, benefits and alternatives discussed with:  Patient.  Use of blood products discussed: No .   .                          .

## 2017-09-28 NOTE — IP AVS SNAPSHOT
MRN:[id number]                      After Visit Summary   9/28/2017    Etta Cervantes    MRN: [id number]           Thank you!     Thank you for choosing [facility] for your care. Our goal is always to provide you with excellent care. Hearing back from our patients is one way we can continue to improve our services. Please take a few minutes to complete the written survey that you may receive in the mail after you visit with us. Thank you!        Patient Information     Date Of Birth          1960        About your hospital stay     You were admitted on:  September 28, 2017 You last received care in the:  Same Day Surgery [facility]    You were discharged on:  September 28, 2017       Who to Call     For medical emergencies, please call 911.  For non-urgent questions about your medical care, please call your primary care provider or clinic, [phone]  For questions related to your surgery, please call your surgery clinic        Attending Provider     Provider Specialty    Dinleonel, Carlos Cortés MD Pulmonary       Primary Care Provider Office Phone # Fax #    Johana Fairbanks [phone]      Your next 10 appointments already scheduled     Oct 17, 2017  7:45 AM CDT   LAB with [facility] ([facility])    [address] [address] [phone]           Patient must bring picture ID. Patient should be prepared to give a urine specimen  Please do not eat 10-12 hours before your appointment if you are coming in fasting for labs on lipids, cholesterol, or glucose (sugar). Pregnant women should follow their Care Team instructions. Water with medications is okay. Do not drink coffee or other fluids. If you have concerns about taking  your medications, please ask at office or if scheduling via [facility], send a message by clicking on Secure Messaging, Message Your Care Team.            Oct 18, 2017  2:00 PM CDT   Return Visit with  Brodie Cassidy MD   [facility] ([facility])    [facility]  [address]  [facility] [address] [phone]              Further instructions from your care team       Post Bronchoscopy Patient Instructions:    September 28, 2017  Etta Cervantes    Your procedure completed (bronchoscopy with removal of 3 valves) without any immediate complications.  You may cough up scant amount of blood for the next 12 hours. If you have excessive cough with blood, chest pain, shortness of breath, please report to the closest emergency room.    You may experience low grade (less than 100.5 F) fever next 24 hours, if so you can take tylenol. If the fever persists more than 24 hours contact to our office or your primary care provider.    Please call Our office (Thoracic/Pulmonary--[phone]) if you have any questions.    May resume your regular diet upon discharge today.    Avoid strenuous exertion next few days and okay to go back work on October 2nd, 2017.    Should you have any question, please do not hesitate to call our office.    Please  your prescription before going home today and take prednisone 20 mg tablet once a day for 5 days.   BETZAIDA Mcgowan MD      Same-Day Surgery   Adult Discharge Orders & Instructions     For 24 hours after surgery:  1. Get plenty of rest.  A responsible adult must stay with you for at least 24 hours after you leave the hospital.   2. Pain medication can slow your reflexes. Do not drive or use heavy equipment.  If you have weakness or tingling, don't drive or use heavy equipment until this feeling goes away.  3. Mixing alcohol and pain medication can cause dizziness and slow your breathing. It can even be fatal. Do not drink alcohol while taking pain medication.  4. Avoid strenuous or risky activities.  Ask for help when climbing stairs.   5. You may feel lightheaded.  If so, sit for a few minutes before standing.  Have  someone help you get up.   6. If you have nausea (feel sick to your stomach), drink only clear liquids such as apple juice, ginger ale, broth or 7-Up.  Rest may also help.  Be sure to drink enough fluids.  Move to a regular diet as you feel able. Take pain medications with a small amount of solid food, such as toast or crackers, to avoid nausea.   7. A slight fever is normal. Call the doctor if your fever is over 100 F (37.7 C) (taken under the tongue) or lasts longer than 24 hours.  8. You may have a dry mouth, muscle aches, trouble sleeping or a sore throat.  These symptoms should go away after 24 hours.  9. Do not make important or legal decisions.   Pain Management:      1. Take pain medication (if prescribed) for pain as directed by your physician.        2. WARNING: If the pain medication you have been prescribed contains Tylenol (acetaminophen), DO NOT take additional doses of Tylenol (acetaminophen).     Call your doctor for any of the followin.  Signs of infection (fever, growing tenderness at the surgery site, severe pain, a large amount of drainage or bleeding, foul-smelling drainage, redness, swelling).    2.  It has been over 8 to 10 hours since surgery and you are still not able to urinate (pee).    3.  Headache for over 24 hours.    To contact a doctor, call Dr. Mcgowan's clinic # [phone] or:      [phone] and ask for the Resident On Call for: Thoracic/Pulmonary  (answered 24 hours a day)      Emergency Department:  [facility]: [phone]  [facility]: [phone]               Rev. 10/2014       If you have obstructive sleep apnea     You were given anesthesia medicine during surgery to keep you comfortable and free of pain. After surgery, you may have more apnea spells because of this medicine and other medicines you were given. The spells may last longer than usual.     At home:        Keep using the continuous positive airway pressure (CPAP)  "device when you sleep. Unless your health care provider tells you not to, use it when you sleep, day or night. CPAP is a common device used to treat obstructive sleep apnea.    Dr. Juan M correa with patient using CPAP at home after 9/28/17 procedure.       Additional Information     If you use hormonal birth control (such as the pill, patch, ring or implants): You'll need a second form of birth control for 7 days (condoms, a diaphragm or contraceptive foam). While in the hospital, you received a medicine called Bridion. Your normal birth control will not work as well for a week after taking this medicine.          Pending Results     Date and Time Order Name Status Description    9/28/2017 0940 XR Chest Port 1 View Preliminary     9/28/2017 0940 Surgical pathology exam In process             Admission Information     Date & Time Provider Department Dept. Phone    9/28/2017 Carlos Mcgowan MD Same Day Surgery [facility] [phone]      Your Vitals Were     Blood Pressure Pulse Temperature Respirations Height Weight    109/59 69 98.4  F (36.9  C) (Oral) 14 1.638 m (5' 4.5\") 89.2 kg (196 lb 10.4 oz)    Last Period Pulse Oximetry BMI (Body Mass Index)             02/06/2013 98% 33.23 kg/m2         [employer] Information     [employer] gives you secure access to your electronic health record. If you see a primary care provider, you can also send messages to your care team and make appointments. If you have questions, please call your primary care clinic.  If you do not have a primary care provider, please call [phone] and they will assist you.        Care EveryWhere ID     This is your Care EveryWhere ID. This could be used by other organizations to access your [facility] medical records  [id number]        Equal Access to Services     JACKIE MIX : nish Gutierrez, hari malik. So [facility] [phone].    ATENCIÓN: Si habla " español, tiene a parekh disposición servicios gratuitos de asistencia lingüística. [place] bond [phone].    We comply with applicable federal civil rights laws and [place] laws. We do not discriminate on the basis of race, color, national origin, age, disability sex, sexual orientation or gender identity.               Review of your medicines      UNREVIEWED medicines. Ask your doctor about these medicines        Dose / Directions    * predniSONE 20 MG tablet   Commonly known as:  DELTASONE   This may have changed:  Another medication with the same name was added. Make sure you understand how and when to take each.   Used for:  Moderate persistent asthma, uncomplicated   Ask about: Which instructions should I use?        Dose:  20 mg   Take 1 tablet (20 mg) by mouth 2 times daily For Yellow or Red zone on Asthma Action Plan.   Quantity:  10 tablet   Refills:  1       * predniSONE 20 MG tablet   Commonly known as:  DELTASONE   This may have changed:  You were already taking a medication with the same name, and this prescription was added. Make sure you understand how and when to take each.   Used for:  Reactive airway disease, unspecified asthma severity, uncomplicated   Ask about: Which instructions should I use?        Dose:  20 mg   Take 1 tablet (20 mg) by mouth daily   Quantity:  5 tablet   Refills:  0       * Notice:  This list has 2 medication(s) that are the same as other medications prescribed for you. Read the directions carefully, and ask your doctor or other care provider to review them with you.      CONTINUE these medicines which have NOT CHANGED        Dose / Directions    * albuterol 108 (90 BASE) MCG/ACT Inhaler   Commonly known as:  VENTOLIN HFA   Used for:  Moderate persistent asthma, uncomplicated        Dose:  2 puff   Inhale 2 puffs into the lungs every 4 hours as needed   Quantity:  1 Inhaler   Refills:  2       * albuterol (2.5 MG/3ML) 0.083% neb solution   Used for:  Moderate persistent  asthma, uncomplicated        Dose:  1 vial   Take 1 vial (2.5 mg) by nebulization every 4 hours as needed for shortness of breath / dyspnea   Quantity:  30 vial   Refills:  3       ALLEGRA ALLERGY 180 MG tablet   Generic drug:  fexofenadine        Dose:  180 mg   Take 180 mg by mouth daily as needed for allergies   Refills:  0       atorvastatin 10 MG tablet   Commonly known as:  LIPITOR   Used for:  Hyperlipidemia LDL goal <100        Dose:  10 mg   Take 1 tablet (10 mg) by mouth daily   Quantity:  90 tablet   Refills:  3       azelastine 0.1 % spray   Commonly known as:  ASTELIN   Used for:  Chronic rhinitis        Dose:  1-2 spray   Spray 1-2 sprays into both nostrils 2 times daily as needed for rhinitis   Quantity:  3 Bottle   Refills:  3       * capecitabine 500 MG tablet CHEMO   Commonly known as:  XELODA   Used for:  Secondary malignant neoplasm of liver (H), Carcinoma of breast metastatic to liver, unspecified laterality (H), Bone metastasis (H), Breast cancer metastasized to axillary lymph node, right (H), Bilateral malignant neoplasm of breast in female, estrogen receptor positive, unspecified site of breast (H), Hyperlipidemia LDL goal <130, Hypothyroidism, unspecified type, Chemotherapy-induced neutropenia (H), Mucositis due to chemotherapy        Take 5 cap in AM and 4 cap in PM on Days 1 through 14, then off for 7 days. Take within 30 mins after meal.   Quantity:  126 tablet   Refills:  0       * capecitabine 500 MG tablet CHEMO   Commonly known as:  XELODA   Used for:  Secondary malignant neoplasm of liver (H), Carcinoma of breast metastatic to liver, unspecified laterality (H), Bone metastasis (H), Breast cancer metastasized to axillary lymph node, right (H)        Take 5 cap in AM and 4 cap in PM on Days 1 through 14, then off for 7 days. Take within 30 mins after meal.   Quantity:  126 tablet   Refills:  0       * capecitabine 500 MG tablet CHEMO   Commonly known as:  XELODA   Used for:  Secondary  malignant neoplasm of liver (H), Carcinoma of breast metastatic to liver, unspecified laterality (H), Bone metastasis (H), Breast cancer metastasized to axillary lymph node, right (H)        Take 5 cap in AM and 4 cap in PM on Days 1 through 14, then off for 7 days. Take within 30 mins after meal.   Quantity:  126 tablet   Refills:  0       * capecitabine 500 MG tablet CHEMO   Commonly known as:  XELODA   Used for:  Secondary malignant neoplasm of liver (H), Carcinoma of breast metastatic to liver, unspecified laterality (H), Bone metastasis (H), Breast cancer metastasized to axillary lymph node, right (H)        Take 5 cap in AM and 4 cap in PM on Days 1 through 14, then off for 7 days. Take within 30 mins after meal.   Quantity:  126 tablet   Refills:  0       * capecitabine 500 MG tablet CHEMO   Commonly known as:  XELODA   Used for:  Secondary malignant neoplasm of liver (H), Carcinoma of breast metastatic to liver, unspecified laterality (H), Bone metastasis (H), Breast cancer metastasized to axillary lymph node, right (H)        Take 5 cap in AM and 4 cap in PM on Days 1 through 14, then off for 7 days. Take within 30 mins after meal.   Quantity:  126 tablet   Refills:  0       CRANBERRY PO        Dose:  1 capsule   Take 1 capsule by mouth daily   Refills:  0       KP CALCIUM 600+D3 600-500 MG-UNIT Caps   Generic drug:  calcium carb-cholecalciferol        Dose:  2 tablet   Take 2 tablets by mouth daily   Refills:  0       levothyroxine 25 MCG tablet   Commonly known as:  SYNTHROID/LEVOTHROID   Used for:  Hypothyroidism due to acquired atrophy of thyroid        TAKE 1 TABLET (25 MCG) BY MOUTH DAILY   Quantity:  90 tablet   Refills:  2       lidocaine (viscous) 2 % solution   Commonly known as:  XYLOCAINE        Apply topically as needed   Refills:  0       lidocaine visc 2% 2.5mL/5mL & maalox/mylanta w/ simeth 2.5mL/5mL & diphenhydrAMINE 5mg/5mL Susp suspension   Commonly known as:  [facility]\""    Used for:  Mucositis due to chemotherapy, Breast cancer metastasized to axillary lymph node, right (H)        Dose:  10 mL   Swish and swallow 10 mLs in mouth every 6 hours as needed for mouth sores   Quantity:  240 mL   Refills:  10       LORazepam 0.5 MG tablet   Commonly known as:  ATIVAN   Used for:  Secondary malignant neoplasm of liver (H), Carcinoma of breast metastatic to liver, unspecified laterality (H), Bone metastasis (H), Breast cancer metastasized to axillary lymph node, right (H)        Dose:  0.5 mg   Take 1 tablet (0.5 mg) by mouth every 4 hours as needed (Anxiety, Nausea/Vomiting or Sleep)   Quantity:  30 tablet   Refills:  2       MAGIC MOUTHWASH (ENTER INGREDIENTS IN COMMENTS)        SWISH AND SWALLOW 10 MLS IN MOUTH EVERY 6 HOURS AS NEEDED FOR MOUTH SORES   Refills:  10       metoclopramide 10 MG tablet   Commonly known as:  REGLAN   Used for:  Bilateral malignant neoplasm of breast in female, estrogen receptor positive, unspecified site of breast (H)        Dose:  10 mg   Take 1 tablet (10 mg) by mouth 2 times daily as needed   Quantity:  120 tablet   Refills:  1       mometasone-formoterol 200-5 MCG/ACT oral inhaler   Commonly known as:  DULERA   Used for:  Moderate persistent asthma without complication        Dose:  2 puff   Inhale 2 puffs into the lungs 2 times daily   Quantity:  3 Inhaler   Refills:  0       oxybutynin 10 MG 24 hr tablet   Commonly known as:  DITROPAN XL   Used for:  Mixed incontinence urge and stress (male)(female)        Dose:  10 mg   Take 1 tablet (10 mg) by mouth daily (Needs follow-up appointment for this medication)   Quantity:  90 tablet   Refills:  3       oxyCODONE 5 MG IR tablet   Commonly known as:  ROXICODONE   Used for:  Secondary malignant neoplasm of liver (H)        Dose:  5 mg   Take 1 tablet (5 mg) by mouth every 4 hours as needed for moderate to severe pain   Quantity:  30 tablet   Refills:  0       prochlorperazine 10 MG tablet   Commonly known as:   COMPAZINE   Used for:  Secondary malignant neoplasm of liver (H), Carcinoma of breast metastatic to liver, unspecified laterality (H), Bone metastasis (H), Breast cancer metastasized to axillary lymph node, right (H)        Dose:  10 mg   Take 1 tablet (10 mg) by mouth every 6 hours as needed (Nausea/Vomiting)   Quantity:  30 tablet   Refills:  2       QVAR 80 MCG/ACT Inhaler   Used for:  Chronic rhinitis   Generic drug:  beclomethasone        INHALE 1 PUFFS INTO THE EACH NOSTRIL 2 TIMES DAILY   Quantity:  26.1 g   Refills:  3       ROBITUSSIN COUGH/COLD CF PO   Used for:  Breast cancer metastasized to axillary lymph node, right (H)        Take by mouth as needed   Refills:  0       STOOL SOFTENER PO        Dose:  100 mg   Take 100 mg by mouth daily   Refills:  0       TYLENOL PO        Dose:  650 mg   Take 650 mg by mouth every 4 hours as needed for mild pain or fever   Refills:  0       zolpidem 10 MG tablet   Commonly known as:  AMBIEN   Indication:  Trouble Sleeping   Used for:  Insomnia, unspecified        Take 1 tablet 30 minutes prior to bedtime   Quantity:  30 tablet   Refills:  3       * Notice:  This list has 7 medication(s) that are the same as other medications prescribed for you. Read the directions carefully, and ask your doctor or other care provider to review them with you.         Where to get your medicines      These medications were sent to [facility] [address] [address]     Phone:  [phone]     predniSONE 20 MG tablet                Protect others around you: Learn how to safely use, store and throw away your medicines at www.disposemymeds.org.             Medication List: This is a list of all your medications and when to take them. Check marks below indicate your daily home schedule. Keep this list as a reference.      Medications           Morning Afternoon Evening Bedtime As Needed    * albuterol 108 (90  BASE) MCG/ACT Inhaler   Commonly known as:  VENTOLIN HFA   Inhale 2 puffs into the lungs every 4 hours as needed                                * albuterol (2.5 MG/3ML) 0.083% neb solution   Take 1 vial (2.5 mg) by nebulization every 4 hours as needed for shortness of breath / dyspnea   Last time this was given:  2.5 mg on 9/28/2017 10:13 AM                                ALLEGRA ALLERGY 180 MG tablet   Take 180 mg by mouth daily as needed for allergies   Generic drug:  fexofenadine                                atorvastatin 10 MG tablet   Commonly known as:  LIPITOR   Take 1 tablet (10 mg) by mouth daily                                azelastine 0.1 % spray   Commonly known as:  ASTELIN   Spray 1-2 sprays into both nostrils 2 times daily as needed for rhinitis                                * capecitabine 500 MG tablet CHEMO   Commonly known as:  XELODA   Take 5 cap in AM and 4 cap in PM on Days 1 through 14, then off for 7 days. Take within 30 mins after meal.                                * capecitabine 500 MG tablet CHEMO   Commonly known as:  XELODA   Take 5 cap in AM and 4 cap in PM on Days 1 through 14, then off for 7 days. Take within 30 mins after meal.                                * capecitabine 500 MG tablet CHEMO   Commonly known as:  XELODA   Take 5 cap in AM and 4 cap in PM on Days 1 through 14, then off for 7 days. Take within 30 mins after meal.                                * capecitabine 500 MG tablet CHEMO   Commonly known as:  XELODA   Take 5 cap in AM and 4 cap in PM on Days 1 through 14, then off for 7 days. Take within 30 mins after meal.                                * capecitabine 500 MG tablet CHEMO   Commonly known as:  XELODA   Take 5 cap in AM and 4 cap in PM on Days 1 through 14, then off for 7 days. Take within 30 mins after meal.                                CRANBERRY PO   Take 1 capsule by mouth daily                                KP CALCIUM 600+D3 600-500 MG-UNIT Caps    Take 2 tablets by mouth daily   Generic drug:  calcium carb-cholecalciferol                                levothyroxine 25 MCG tablet   Commonly known as:  SYNTHROID/LEVOTHROID   TAKE 1 TABLET (25 MCG) BY MOUTH DAILY                                lidocaine (viscous) 2 % solution   Commonly known as:  XYLOCAINE   Apply topically as needed                                lidocaine visc 2% 2.5mL/5mL & maalox/mylanta w/ simeth 2.5mL/5mL & diphenhydrAMINE 5mg/5mL Susp suspension   Commonly known as:  [facility]   Swish and swallow 10 mLs in mouth every 6 hours as needed for mouth sores                                LORazepam 0.5 MG tablet   Commonly known as:  ATIVAN   Take 1 tablet (0.5 mg) by mouth every 4 hours as needed (Anxiety, Nausea/Vomiting or Sleep)                                MAGIC MOUTHWASH (ENTER INGREDIENTS IN COMMENTS)   SWISH AND SWALLOW 10 MLS IN MOUTH EVERY 6 HOURS AS NEEDED FOR MOUTH SORES                                metoclopramide 10 MG tablet   Commonly known as:  REGLAN   Take 1 tablet (10 mg) by mouth 2 times daily as needed                                mometasone-formoterol 200-5 MCG/ACT oral inhaler   Commonly known as:  DULERA   Inhale 2 puffs into the lungs 2 times daily                                oxybutynin 10 MG 24 hr tablet   Commonly known as:  DITROPAN XL   Take 1 tablet (10 mg) by mouth daily (Needs follow-up appointment for this medication)                                oxyCODONE 5 MG IR tablet   Commonly known as:  ROXICODONE   Take 1 tablet (5 mg) by mouth every 4 hours as needed for moderate to severe pain                                prochlorperazine 10 MG tablet   Commonly known as:  COMPAZINE   Take 1 tablet (10 mg) by mouth every 6 hours as needed (Nausea/Vomiting)                                QVAR 80 MCG/ACT Inhaler   INHALE 1 PUFFS INTO THE EACH NOSTRIL 2 TIMES DAILY   Generic drug:  beclomethasone                                ROBITUSSIN  COUGH/COLD CF PO   Take by mouth as needed                                STOOL SOFTENER PO   Take 100 mg by mouth daily                                TYLENOL PO   Take 650 mg by mouth every 4 hours as needed for mild pain or fever   Last time this was given:  975 mg on 9/28/2017 10:49 AM                                zolpidem 10 MG tablet   Commonly known as:  AMBIEN   Take 1 tablet 30 minutes prior to bedtime                                * Notice:  This list has 7 medication(s) that are the same as other medications prescribed for you. Read the directions carefully, and ask your doctor or other care provider to review them with you.      ASK your doctor about these medications           Morning Afternoon Evening Bedtime As Needed    * predniSONE 20 MG tablet   Commonly known as:  DELTASONE   Take 1 tablet (20 mg) by mouth 2 times daily For Yellow or Red zone on Asthma Action Plan.   Ask about: Which instructions should I use?                                * predniSONE 20 MG tablet   Commonly known as:  DELTASONE   Take 1 tablet (20 mg) by mouth daily   Ask about: Which instructions should I use?                                * Notice:  This list has 2 medication(s) that are the same as other medications prescribed for you. Read the directions carefully, and ask your doctor or other care provider to review them with you.

## 2017-09-28 NOTE — ANESTHESIA POSTPROCEDURE EVALUATION
Patient: Etta Cevrantes    Procedure(s):  Flexible Bronchoscopy, Removal Of Intra-Bronchial Valves x 3 - Wound Class: II-Clean Contaminated    Diagnosis:Persistent Air Leak   Diagnosis Additional Information: No value filed.    Anesthesia Type:  General, ETT    Note:  Anesthesia Post Evaluation    Patient location during evaluation: PACU  Patient participation: Able to fully participate in evaluation  Level of consciousness: awake and alert  Pain management: adequate  Airway patency: patent  Cardiovascular status: acceptable  Respiratory status: acceptable  Hydration status: acceptable  PONV: none     Anesthetic complications: None          Last vitals:  Vitals:    09/28/17 0708 09/28/17 0940   BP: 131/65 128/61   Pulse: 69    Resp: 16    Temp: 37.4  C (99.3  F)    SpO2: 100% 100%         Electronically Signed By: Vishnu Garcia MD  September 28, 2017  10:00 AM

## 2017-09-28 NOTE — PROCEDURES
Procedure(s):    Bronchoscopy  Endobronchial Valve Removal (3 valves)    Indication: s/p spontaneous pneumothorax and placament of 5 IBV in to the RUL (4) and RML (1). Removed 2 from RUL 2 weeks ago and came to today removal of remaining 3 valves (2 from RUL, 1 from RML)    Attending of Record:     BETZAIDA Mcgowan MD    Medications:    General Anesthesia - See anesthesia flowsheet for details    Sedation Time:  Per Anesthesia Care Provider    Time Out:  Performed    The patient's medical record has been reviewed.  The indication for the procedure was reviewed.  The necessary history and physical examination was performed and reviewed.  The risks, benefits and alternatives of the procedure were discussed with the the patient in detail and she had the opportunity to ask questions.  I discussed in particular the potential complications including risks of minor or life-threatening bleeding and/or infection, respiratory failure, vocal cord trauma / paralysis, pneumothorax, and discomfort. Sedation risks were also discussed including abnormal heart rhythms, low blood pressure, and respiratory failure. All questions were answered to the best of my ability.  Verbal and written informed consent was obtained.  The proposed procedure and the patient's identification were verified prior to the procedure by the physician and the nurse.    The patient was assessed for the adequacy for the procedure and to receive medications.   Mental Status:  Alert and oriented x 3  Airway examination:  Class I (Complete visualization of soft palate)  Pulmonary:  Decreased breathsound throughout  CV:  RRR, no murmurs or gallops  ASA Grade:  (III)  Severe systemic disease that is not incapacitating    After clinical evaluation and reviewing the indication, risks, alternatives and benefits of the procedure the patient was deemed to be in satisfactory condition to undergo the procedure.      Maneuvers / Procedure:     The bronchoscope was  inserted through the mouth via ETT.      Airway Examination:  A complete airway examination was performed from the distal trachea to the subsegmental level in each lobe of both lungs with exceptions/pertinent findings noted as follows; inflamed airways and oozing with minimal trauma. RUL apical segment is collapsed (see picture). RML is also collapsed (see picture).            IBV Valve Removal:  Three Valves - The bronchoscope was inserted and the valve was identified in the RUL Posterior.  Forceps were used to grasp the center padmini and the valve was removed.  Fluoroscopic guidance was needed.  The next valve was identified in the RUL Posterior and the forceps were used to grasp the center padmini and the valve was removed.  Fluoroscopic guidance was needed.  The next valve was identified in the RML and the forceps were used to grasp the center padmini and the valve was removed.  Fluoroscopic guidance was needed.  The valvse were inspected and they were intact.    Pertinent Images / special notes:     RUL post IBV removal. Anterior segment, posterior segment (2), apical (collapsed at 12 o'clock)  Valves removed from 1 of each from posterior segments      RUL apical segment, collapsed      View from BI, RML and RLL        Any disposable equipment was visually inspected and deemed to be intact immediately post procedure.      Recommendations:     -->  CXR post procedure  -->  Prednisone 20 mg day for 5 days

## 2017-09-28 NOTE — OR NURSING
Dr. Mcgowan read pacu stat chest xray. Dr. Mcgowan okay with patient using cpap at home- no contradictions from procedure.

## 2017-09-28 NOTE — OR NURSING
Dr. Garcia at bedside, aware of sinus tach and no orders received. Plan of care albuterol treatment for wheezing. Asked MD for tylenol orders for pain control when tolerating orals. MD to place heparin de-access for port. MD completed anesthesia sign out.

## 2017-09-28 NOTE — ANESTHESIA CARE TRANSFER NOTE
Patient: Etta Cervantes    Procedure(s):  Flexible Bronchoscopy, Removal Of Intra-Bronchial Valves x 3 - Wound Class: II-Clean Contaminated    Diagnosis: Persistent Air Leak   Diagnosis Additional Information: No value filed.    Anesthesia Type:   General, ETT     Note:  Airway :Face Mask  Patient transferred to:PACU  Comments: Pt extubated in the OR without incident or complications. Pt VSS upon arrival to the PACU. Pt has no c/o pain/N/V. Pt care report given to receiving RN.       Vitals: (Last set prior to Anesthesia Care Transfer)    CRNA VITALS  9/28/2017 0906 - 9/28/2017 0942      9/28/2017             Pulse: 109    Ht Rate: 111    SpO2: 100 %    Resp Rate (observed): (!)  6                Electronically Signed By: ERNESTO Worthington CRNA  September 28, 2017  9:42 AM

## 2017-09-28 NOTE — DISCHARGE INSTRUCTIONS
Post Bronchoscopy Patient Instructions:    September 28, 2017  Etta Cervantes    Your procedure completed (bronchoscopy with removal of 3 valves) without any immediate complications.  You may cough up scant amount of blood for the next 12 hours. If you have excessive cough with blood, chest pain, shortness of breath, please report to the closest emergency room.    You may experience low grade (less than 100.5 F) fever next 24 hours, if so you can take tylenol. If the fever persists more than 24 hours contact to our office or your primary care provider.    Please call Our office (Thoracic/Pulmonary--[phone]) if you have any questions.    May resume your regular diet upon discharge today.    Avoid strenuous exertion next few days and okay to go back work on October 2nd, 2017.    Should you have any question, please do not hesitate to call our office.    Please  your prescription before going home today and take prednisone 20 mg tablet once a day for 5 days.   BETZAIDA Mcgowan MD      Same-Day Surgery   Adult Discharge Orders & Instructions     For 24 hours after surgery:  1. Get plenty of rest.  A responsible adult must stay with you for at least 24 hours after you leave the hospital.   2. Pain medication can slow your reflexes. Do not drive or use heavy equipment.  If you have weakness or tingling, don't drive or use heavy equipment until this feeling goes away.  3. Mixing alcohol and pain medication can cause dizziness and slow your breathing. It can even be fatal. Do not drink alcohol while taking pain medication.  4. Avoid strenuous or risky activities.  Ask for help when climbing stairs.   5. You may feel lightheaded.  If so, sit for a few minutes before standing.  Have someone help you get up.   6. If you have nausea (feel sick to your stomach), drink only clear liquids such as apple juice, ginger ale, broth or 7-Up.  Rest may also help.  Be sure to drink enough fluids.  Move to a regular diet as you  feel able. Take pain medications with a small amount of solid food, such as toast or crackers, to avoid nausea.   7. A slight fever is normal. Call the doctor if your fever is over 100 F (37.7 C) (taken under the tongue) or lasts longer than 24 hours.  8. You may have a dry mouth, muscle aches, trouble sleeping or a sore throat.  These symptoms should go away after 24 hours.  9. Do not make important or legal decisions.   Pain Management:      1. Take pain medication (if prescribed) for pain as directed by your physician.        2. WARNING: If the pain medication you have been prescribed contains Tylenol (acetaminophen), DO NOT take additional doses of Tylenol (acetaminophen).     Call your doctor for any of the followin.  Signs of infection (fever, growing tenderness at the surgery site, severe pain, a large amount of drainage or bleeding, foul-smelling drainage, redness, swelling).    2.  It has been over 8 to 10 hours since surgery and you are still not able to urinate (pee).    3.  Headache for over 24 hours.    To contact a doctor, call Dr. Mcgowan's clinic # [phone] or:      [phone] and ask for the Resident On Call for: Thoracic/Pulmonary  (answered 24 hours a day)      Emergency Department:  [facility]: [phone]  [place] [facility]: [phone]               Rev. 10/2014       If you have obstructive sleep apnea     You were given anesthesia medicine during surgery to keep you comfortable and free of pain. After surgery, you may have more apnea spells because of this medicine and other medicines you were given. The spells may last longer than usual.     At home:        Keep using the continuous positive airway pressure (CPAP) device when you sleep. Unless your health care provider tells you not to, use it when you sleep, day or night. CPAP is a common device used to treat obstructive sleep apnea.    Dr. Mcgowan okay with patient using CPAP at home after  9/28/17 procedure.

## 2017-10-06 NOTE — TELEPHONE ENCOUNTER
DULERA 200 MCG/5 MCG INHALER         Last Written Prescription Date: 9/22/17  Last Fill Quantity: 3, # refills: 0    Last Office Visit with [facility] or [facility] prescribing provider:  9/5/17   Future Office Visit:    Next 5 appointments (look out 90 days)     Oct 18, 2017  2:00 PM CDT   Return Visit with Brodie Cassidy MD   [facility])    [facility]  [address]  [facility] [place] [address]   [phone]                   Date of Last Asthma Action Plan Letter:   Asthma Action Plan Q1 Year    Topic Date Due     Asthma Action Plan - yearly  08/24/2017      Asthma Control Test:   ACT Total Scores 10/6/2017   ACT TOTAL SCORE -   ASTHMA ER VISITS -   ASTHMA HOSPITALIZATIONS -   ACT TOTAL SCORE (Goal Greater than or Equal to 20) 19   In the past 12 months, how many times did you visit the emergency room for your asthma without being admitted to the hospital? 0   In the past 12 months, how many times were you hospitalized overnight because of your asthma? 0       Date of Last Spirometry Test:   No results found for this or any previous visit.     Phan with Dr. Fairbanks.  Md is aware of patient's ACT score, Md gave verbal order to sent Rx for Dulera 3 inhalers/3 refills to pharmacy for patient.    Patient notified of rx sent to pharmacy for Dulera  Patient reports she has seen pulmonology las Thursday and has a follow up appt next week  Patient verbalized understanding and and is aware of Dulera Rx.    Ana LITTLE Rn

## 2017-10-06 NOTE — TELEPHONE ENCOUNTER
Left message for patient to return call to clinic.  Medication does not appear to be on med list    Ana LITTLE Rn

## 2017-10-18 NOTE — NURSING NOTE
"Oncology Rooming Note    October 18, 2017 1:58 PM   Etta Cervantes is a 57 year old female who presents for:    Chief Complaint   Patient presents with     Oncology Clinic Visit     5 week recheck Breast CA, review labs     Initial Vitals: /69 (BP Location: Right arm, Patient Position: Sitting, Cuff Size: Adult Regular)  Pulse 67  Temp 98.3  F (36.8  C) (Tympanic)  Resp 16  Ht 1.638 m (5' 4.49\")  Wt 89.7 kg (197 lb 12.8 oz)  LMP 02/06/2013  SpO2 100%  Breastfeeding? No  BMI 33.44 kg/m2 Estimated body mass index is 33.44 kg/(m^2) as calculated from the following:    Height as of this encounter: 1.638 m (5' 4.49\").    Weight as of this encounter: 89.7 kg (197 lb 12.8 oz). Body surface area is 2.02 meters squared.  Extreme Pain (8) Comment: feet 4/10   Patient's last menstrual period was 02/06/2013.  Allergies reviewed: Yes  Medications reviewed: Yes    Medications: Medication refills not needed today.  Pharmacy name entered into [employer]:    [facility] [id number] [place] [facility] - [place], [place] - [address] [place] [address] [facility] - [place], [place] - [address]    Clinical concerns: 5 week recheck Breast CA, review labs.     1. Needs refill of Xeloda.    7 minutes for nursing intake (face to face time)     Josephine Virgen, [facility]              "

## 2017-10-18 NOTE — MR AVS SNAPSHOT
After Visit Summary   10/18/2017    Etta Cervantes    MRN: [id number]           Patient Information     Date Of Birth          1960        Visit Information        Provider Department      10/18/2017 2:00 PM Brodie Cassidy MD [facility] ONCOLOGY      Today's Diagnoses     Bilateral malignant neoplasm of breast in female, estrogen receptor positive, unspecified site of breast (H)    -  1    Bone metastasis (H)        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)        Carcinoma of breast metastatic to liver, unspecified laterality (H)        Moderate persistent asthma without complication        Mucositis due to chemotherapy        Secondary malignant neoplasm of liver (H)        Breast cancer metastasized to axillary lymph node, right (H)          Care Instructions    We would like to see you back in 3 weeks.  Your prescription has been sent to:    [facility] - [place], [place] - [address]  [address]  [address]  [facility] [address]  Phone: [phone] Fax: [phone]     When you are in need of a refill, please call your pharmacy and they will send us a request.  Copy of appointments, and after visit summary (AVS) given to patient.  If you have any questions during business hours (M-F 8 AM- 4PM), please call Funmi Ray RN, BSN, OCN Oncology Hematology /Breast Cancer Navigator at [facility] [phone].   For questions after business hours, or on holidays/weekends, please call our after hours Nurse Triage line [phone]. Thank you.            Follow-ups after your visit        Additional Services     LYMPHEDEMA THERAPY REFERRAL                 Follow-up notes from your care team     Return in about 3 weeks (around 11/8/2017) for Schedule for chemotherapy as per treatment plan, Imaging ordered before next appointment.      Your next 10 appointments already scheduled     Nov 06,  2017  2:00 PM CST   Level O with ROOM 9 [facility] ([facility])    [facility]  [address]  [facility] [address]   [phone]            Nov 06, 2017  3:00 PM CST   MR BRAIN W/O & W CONTRAST with WY2   [facility] MRI ([facility])    [address] [place]  [facility] [address]   [phone]           Take your medicines as usual, unless your doctor tells you not to. Bring a list of your current medicines to your exam (including vitamins, minerals and over-the-counter drugs).  You will be given intravenous contrast for this exam. To prepare:   The day before your exam, drink extra fluids at least six 8-ounce glasses (unless your doctor tells you to restrict your fluids).   Have a blood test (creatinine test) within 30 days of your exam. Go to your clinic or Diagnostic Imaging Department for this test.  The MRI machine uses a strong magnet. Please wear clothes without metal (snaps, zippers). A sweatsuit works well, or we may give you a hospital gown.  Please remove any body piercings and hair extensions before you arrive. You will also remove watches, jewelry, hairpins, wallets, dentures, partial dental plates and hearing aids. You may wear contact lenses, and you may be able to wear your rings. We have a safe place to keep your personal items, but it is safer to leave them at home.   **IMPORTANT** THE INSTRUCTIONS BELOW ARE ONLY FOR THOSE PATIENTS WHO HAVE BEEN TOLD THEY WILL RECEIVE SEDATION OR GENERAL ANESTHESIA DURING THEIR MRI PROCEDURE:  IF YOU WILL RECEIVE SEDATION (take medicine to help you relax during your exam):   You must get the medicine from your doctor before you arrive. Bring the medicine to the exam. Do not take it at home.   Arrive one hour early. Bring someone who can take you home after the test. Your medicine will make you sleepy. After the exam, you may not drive, take a bus or take a taxi by yourself.    No eating 8 hours before your exam. You may have clear liquids up until 4 hours before your exam. (Clear liquids include water, clear tea, black coffee and fruit juice without pulp.)  IF YOU WILL RECEIVE ANESTHESIA (be asleep for your exam):   Arrive 1 1/2 hours early. Bring someone who can take you home after the test. You may not drive, take a bus or take a taxi by yourself.   No eating 8 hours before your exam. You may have clear liquids up until 4 hours before your exam. (Clear liquids include water, clear tea, black coffee and fruit juice without pulp.)  Please call the Imaging Department at your exam site with any questions.            Nov 08, 2017  8:00 AM CST   (Arrive by 7:45 AM)   Return Visit with Carlos Mcgowan MD   [facility] ([facility])    [address]  [facility] [id number] [phone]            Nov 10, 2017  1:30 PM CST   Return Visit with Ria Solo MD   [facility] ([facility])    [facility]  [address]  [facility] [address] [phone]              Future tests that were ordered for you today     Open Future Orders        Priority Expected Expires Ordered    MR Brain w/o & w Contrast Routine  12/2/2017 10/18/2017            Who to contact     If you have questions or need follow up information about today's clinic visit or your schedule please contact [facility] directly at [phone].  Normal or non-critical lab and imaging results will be communicated to you by MyChart, letter or phone within 4 business days after the clinic has received the results. If you do not hear from us within 7 days, please contact the clinic through [employer] or phone. If you have a critical or abnormal lab result, we will notify you by phone as soon as possible.  Submit refill requests through [employer] or call your pharmacy and they will forward the refill request to  "us. Please allow 3 business days for your refill to be completed.          Additional Information About Your Visit        [facility] Information     [email or web address] gives you secure access to your electronic health record. If you see a primary care provider, you can also send messages to your care team and make appointments. If you have questions, please call your primary care clinic.  If you do not have a primary care provider, please call [phone] and they will assist you.        Care EveryWhere ID     This is your Care EveryWhere ID. This could be used by other organizations to access your [facility] medical records  [id number]        Your Vitals Were     Pulse Temperature Respirations Height Last Period Pulse Oximetry    67 98.3  F (36.8  C) (Tympanic) 16 1.638 m (5' 4.49\") 02/06/2013 100%    Breastfeeding? BMI (Body Mass Index)                No 33.44 kg/m2           Blood Pressure from Last 3 Encounters:   10/18/17 136/69   09/28/17 105/69   09/25/17 107/53    Weight from Last 3 Encounters:   10/18/17 89.7 kg (197 lb 12.8 oz)   09/28/17 89.2 kg (196 lb 10.4 oz)   09/13/17 88.1 kg (194 lb 4.8 oz)              We Performed the Following     LYMPHEDEMA THERAPY REFERRAL          Today's Medication Changes          These changes are accurate as of: 10/18/17  2:59 PM.  If you have any questions, ask your nurse or doctor.               These medicines have changed or have updated prescriptions.        Dose/Directions    * capecitabine 500 MG tablet CHEMO   Commonly known as:  XELODA   This may have changed:  Another medication with the same name was added. Make sure you understand how and when to take each.   Used for:  Secondary malignant neoplasm of liver (H), Carcinoma of breast metastatic to liver, unspecified laterality (H), Bone metastasis (H), Breast cancer metastasized to axillary lymph node, right (H)   Changed by:  Funmi Ray, RN        Take 5 cap in AM and 4 cap in PM on Days 1 through 14, then off for 7 " days. Take within 30 mins after meal.   Quantity:  126 tablet   Refills:  0       * capecitabine 500 MG tablet CHEMO   Commonly known as:  XELODA   This may have changed:  You were already taking a medication with the same name, and this prescription was added. Make sure you understand how and when to take each.   Used for:  Secondary malignant neoplasm of liver (H), Carcinoma of breast metastatic to liver, unspecified laterality (H), Bone metastasis (H), Breast cancer metastasized to axillary lymph node, right (H)   Changed by:  Brodie Cassidy MD        Take 5 cap in AM and 5 cap in PM on Days 1 through 14, then off for 7 days. Take within 30 mins after meal.   Quantity:  140 tablet   Refills:  0       * Notice:  This list has 2 medication(s) that are the same as other medications prescribed for you. Read the directions carefully, and ask your doctor or other care provider to review them with you.         Where to get your medicines      These medications were sent to [facility] - [place], [place] - [facility] [address]  [address], [facility] [address]     Phone:  [phone]     capecitabine 500 MG tablet CHEMO                Primary Care Provider Office Phone # Fax #    Johana Fairbanks [phone] [address] [facility] [address]        Equal Access to Services     JACKIE MIX AH: Rupali deleono Soisaura, waaxda luqadaha, qaybta kaalmada adeegyada, hari guardado. So [facility] [phone].    ATENCIÓN: Si habla español, tiene a parekh disposición servicios gratuitos de asistencia lingüística. Llame al [phone].    We comply with applicable federal civil rights laws and [place] laws. We do not discriminate on the basis of race, color, national origin, age, disability, sex, sexual orientation, or gender identity.            Thank you!     Thank you for choosing [facility]  for your care. Our goal is always to  provide you with excellent care. Hearing back from our patients is one way we can continue to improve our services. Please take a few minutes to complete the written survey that you may receive in the mail after your visit with us. Thank you!             Your Updated Medication List - Protect others around you: Learn how to safely use, store and throw away your medicines at www.disposemymeds.org.          This list is accurate as of: 10/18/17  2:59 PM.  Always use your most recent med list.                   Brand Name Dispense Instructions for use Diagnosis    * albuterol 108 (90 BASE) MCG/ACT Inhaler    VENTOLIN HFA    1 Inhaler    Inhale 2 puffs into the lungs every 4 hours as needed    Moderate persistent asthma, uncomplicated       * albuterol (2.5 MG/3ML) 0.083% neb solution     30 vial    Take 1 vial (2.5 mg) by nebulization every 4 hours as needed for shortness of breath / dyspnea    Moderate persistent asthma, uncomplicated       ALLEGRA ALLERGY 180 MG tablet   Generic drug:  fexofenadine      Take 180 mg by mouth daily as needed for allergies        atorvastatin 10 MG tablet    LIPITOR    90 tablet    Take 1 tablet (10 mg) by mouth daily    Hyperlipidemia LDL goal <100       azelastine 0.1 % spray    ASTELIN    3 Bottle    Spray 1-2 sprays into both nostrils 2 times daily as needed for rhinitis    Chronic rhinitis       * capecitabine 500 MG tablet CHEMO    XELODA    126 tablet    Take 5 cap in AM and 4 cap in PM on Days 1 through 14, then off for 7 days. Take within 30 mins after meal.    Secondary malignant neoplasm of liver (H), Carcinoma of breast metastatic to liver, unspecified laterality (H), Bone metastasis (H), Breast cancer metastasized to axillary lymph node, right (H)       * capecitabine 500 MG tablet CHEMO    XELODA    140 tablet    Take 5 cap in AM and 5 cap in PM on Days 1 through 14, then off for 7 days. Take within 30 mins after meal.    Secondary malignant neoplasm of liver (H),  Carcinoma of breast metastatic to liver, unspecified laterality (H), Bone metastasis (H), Breast cancer metastasized to axillary lymph node, right (H)       CRANBERRY PO      Take 1 capsule by mouth daily        DULERA 200-5 MCG/ACT oral inhaler   Generic drug:  mometasone-formoterol     3 Inhaler    INHALE 2 PUFFS INTO THE LUNGS TWICE DAILY    Moderate persistent asthma without complication       KP CALCIUM 600+D3 600-500 MG-UNIT Caps   Generic drug:  calcium carb-cholecalciferol      Take 2 tablets by mouth daily        levothyroxine 25 MCG tablet    SYNTHROID/LEVOTHROID    90 tablet    TAKE 1 TABLET (25 MCG) BY MOUTH DAILY    Hypothyroidism due to acquired atrophy of thyroid       lidocaine (viscous) 2 % solution    XYLOCAINE     Apply topically as needed        lidocaine visc 2% 2.5mL/5mL & maalox/mylanta w/ simeth 2.5mL/5mL & diphenhydrAMINE 5mg/5mL Susp suspension    MAGIC Mouthwash [facility]    240 mL    Swish and swallow 10 mLs in mouth every 6 hours as needed for mouth sores    Mucositis due to chemotherapy, Breast cancer metastasized to axillary lymph node, right (H)       LORazepam 0.5 MG tablet    ATIVAN    30 tablet    Take 1 tablet (0.5 mg) by mouth every 4 hours as needed (Anxiety, Nausea/Vomiting or Sleep)    Secondary malignant neoplasm of liver (H), Carcinoma of breast metastatic to liver, unspecified laterality (H), Bone metastasis (H), Breast cancer metastasized to axillary lymph node, right (H)       MAGIC MOUTHWASH (ENTER INGREDIENTS IN COMMENTS)      SWISH AND SWALLOW 10 MLS IN MOUTH EVERY 6 HOURS AS NEEDED FOR MOUTH SORES        metoclopramide 10 MG tablet    REGLAN    120 tablet    Take 1 tablet (10 mg) by mouth 2 times daily as needed    Bilateral malignant neoplasm of breast in female, estrogen receptor positive, unspecified site of breast (H)       oxybutynin 10 MG 24 hr tablet    DITROPAN XL    90 tablet    Take 1 tablet (10 mg) by mouth daily (Needs follow-up appointment for this  medication)    Mixed incontinence urge and stress (male)(female)       oxyCODONE 5 MG IR tablet    ROXICODONE    30 tablet    Take 1 tablet (5 mg) by mouth every 4 hours as needed for moderate to severe pain    Secondary malignant neoplasm of liver (H)       * predniSONE 20 MG tablet    DELTASONE    10 tablet    Take 1 tablet (20 mg) by mouth 2 times daily For Yellow or Red zone on Asthma Action Plan.    Moderate persistent asthma, uncomplicated       * predniSONE 20 MG tablet    DELTASONE    5 tablet    Take 1 tablet (20 mg) by mouth daily    Reactive airway disease, unspecified asthma severity, uncomplicated       prochlorperazine 10 MG tablet    COMPAZINE    30 tablet    Take 1 tablet (10 mg) by mouth every 6 hours as needed (Nausea/Vomiting)    Secondary malignant neoplasm of liver (H), Carcinoma of breast metastatic to liver, unspecified laterality (H), Bone metastasis (H), Breast cancer metastasized to axillary lymph node, right (H)       QVAR 80 MCG/ACT Inhaler   Generic drug:  beclomethasone     26.1 g    INHALE 1 PUFFS INTO THE EACH NOSTRIL 2 TIMES DAILY    Chronic rhinitis       ROBITUSSIN COUGH/COLD CF PO      Take by mouth as needed    Breast cancer metastasized to axillary lymph node, right (H)       STOOL SOFTENER PO      Take 100 mg by mouth daily        TYLENOL PO      Take 650 mg by mouth every 4 hours as needed for mild pain or fever        zolpidem 10 MG tablet    AMBIEN    30 tablet    Take 1 tablet 30 minutes prior to bedtime    Insomnia, unspecified       * Notice:  This list has 6 medication(s) that are the same as other medications prescribed for you. Read the directions carefully, and ask your doctor or other care provider to review them with you.

## 2017-10-18 NOTE — PROGRESS NOTES
Hematology/ Oncology Follow-up Visit:  Oct 18, 2017    Reason for Visit:   Chief Complaint   Patient presents with     Oncology Clinic Visit     5 week recheck Breast CA, review labs       Oncologic History:  Breast cancer (H)  Etta Cervantes presented with increasing lump in the right axilla that s lump has been growing without any pain or associated symptoms. Subsequently she was evaluated by primary care physician. Diagnostic digital mammogram was done on 01/13/2015 showing enlarged lymph nodes in the right axilla. There was some skin thickening in the right breast anteriorly and laterally. There are no parenchymal changes in the right breast. The left breast shows a 1.7 cm spiculated mass at approximately 2 to 3 o'clock position, 15.2 cm away from the nipple. A right breast ultrasound was subsequently done and ultrasound guided biopsy was done. Needle biopsy of the left breast did show infiltrating ductal carcinoma grade I of III with no angiolymphatic invasion seen. No associated ductal carcinoma in situ. Estrogen receptor, progresterone receptor were positive. HER-2/sadie receptor came back negative.. Another biopsy from the right axilla did show metastatic ductal carcinoma. PET scan was done on January 26, 2015 showing few small right posterior cervical chain nodes the largest is 1.2 cm. There is extensive bulky right axillary adenopathy demonstrating hypermetabolic FDG uptake with SUV of 10.6. There is skin thickening involving the right breast which demonstrated low-grade FDG uptake. A 1.2 cm lesion on the lateral aspect of the left breast demonstrated minimally increased FDG uptake with SUV of 2. Scattered hypermetabolic mediastinal lymph nodes located in the left superior anterior mediastinum, right paratracheal and precarinal U, subcranial adenopathy with javon metastases. This focus hypermetabolic FDG uptake identified definitive Amanda posterior aspect off intertrochanteric region of the proximal left  femur consistent with bone metastases. There is also 1.4 cm hypermetabolic FDG uptake identified in the anterior medial segment of the left hepatic lobe consistent with liver metastases. Additional 2.1 cm low-attenuation lesion anterior aspect of the right lobe which needs to be determined. The patient was started on chemotherapy with Taxotere and Cytoxan on February 9, 2015.  She concluded 4 cycles of Taxotere and Cytoxan. She started on Arimidex 1 mg orally daily. Currently she is on Faslodex and ibrance      Interval History:   Patient is here today for follow-up. She has concluded cycle number 6 Xeloda. Tumor marker has been stable. The patient continued to have pain in the right breast. She denies shortness of breath or cough or wheezing. She denies any nausea or vomiting. She has some soreness in her feet. She has episodes of dizziness at times.    Review Of Systems:  Constitutional: Negative for fever, chills, and night sweats.  Skin: negative.  Eyes: negative.  Ears/Nose/Throat: negative.  Respiratory: No shortness of breath, dyspnea on exertion, cough, or hemoptysis.  Cardiovascular: negative.  Gastrointestinal: negative.  Genitourinary: negative.  Musculoskeletal: negative.  Neurologic: negative.  Psychiatric: negative.  Hematologic/Lymphatic/Immunologic: negative.  Endocrine: negative.    All other ROS negative unless mentioned in interval history.    Past medical, social, surgical, and family histories reviewed.    Allergies:  Allergies as of 10/18/2017 - Manuel as Reviewed 10/18/2017   Allergen Reaction Noted     Animal dander Cough 01/23/2015     Cats  07/15/2010     Dust mites Cough 01/23/2015     Pollen extract  01/23/2015     Seasonal allergies  07/15/2010     Levaquin [levofloxacin] Rash 07/17/2017       Current Medications:  Current Outpatient Prescriptions   Medication Sig Dispense Refill     capecitabine (XELODA) 500 MG tablet CHEMO Take 5 cap in AM and 5 cap in PM on Days 1 through 14, then off  for 7 days. Take within 30 mins after meal. 140 tablet 0     DULERA 200-5 MCG/ACT oral inhaler INHALE 2 PUFFS INTO THE LUNGS TWICE DAILY 3 Inhaler 3     predniSONE (DELTASONE) 20 MG tablet Take 1 tablet (20 mg) by mouth daily 5 tablet 0     capecitabine (XELODA) 500 MG tablet CHEMO Take 5 cap in AM and 4 cap in PM on Days 1 through 14, then off for 7 days. Take within 30 mins after meal. 126 tablet 0     QVAR 80 MCG/ACT Inhaler INHALE 1 PUFFS INTO THE EACH NOSTRIL 2 TIMES DAILY 26.1 g 3     Phenylephrine-DM-GG (ROBITUSSIN COUGH/COLD CF PO) Take by mouth as needed       MAGIC MOUTHWASH, ENTER INGREDIENTS IN COMMENTS, SWISH AND SWALLOW 10 MLS IN MOUTH EVERY 6 HOURS AS NEEDED FOR MOUTH SORES  10     lidocaine, viscous, (XYLOCAINE) 2 % solution Apply topically as needed       levothyroxine (SYNTHROID/LEVOTHROID) 25 MCG tablet TAKE 1 TABLET (25 MCG) BY MOUTH DAILY 90 tablet 2     atorvastatin (LIPITOR) 10 MG tablet Take 1 tablet (10 mg) by mouth daily 90 tablet 3     metoclopramide (REGLAN) 10 MG tablet Take 1 tablet (10 mg) by mouth 2 times daily as needed 120 tablet 1     oxyCODONE (ROXICODONE) 5 MG IR tablet Take 1 tablet (5 mg) by mouth every 4 hours as needed for moderate to severe pain 30 tablet 0     fexofenadine (ALLEGRA ALLERGY) 180 MG tablet Take 180 mg by mouth daily as needed for allergies       calcium carb-cholecalciferol ( CALCIUM 600+D3) 600-500 MG-UNIT CAPS Take 2 tablets by mouth daily       zolpidem (AMBIEN) 10 MG tablet Take 1 tablet 30 minutes prior to bedtime 30 tablet 3     albuterol (2.5 MG/3ML) 0.083% neb solution Take 1 vial (2.5 mg) by nebulization every 4 hours as needed for shortness of breath / dyspnea 30 vial 3     predniSONE (DELTASONE) 20 MG tablet Take 1 tablet (20 mg) by mouth 2 times daily For Yellow or Red zone on Asthma Action Plan. 10 tablet 1     magic mouthwash suspension (diphenhydrAMINE, lidocaine, aluminum-magnesium & simethicone) Swish and swallow 10 mLs in mouth every 6  "hours as needed for mouth sores 240 mL 10     LORazepam (ATIVAN) 0.5 MG tablet Take 1 tablet (0.5 mg) by mouth every 4 hours as needed (Anxiety, Nausea/Vomiting or Sleep) 30 tablet 2     prochlorperazine (COMPAZINE) 10 MG tablet Take 1 tablet (10 mg) by mouth every 6 hours as needed (Nausea/Vomiting) 30 tablet 2     Docusate Calcium (STOOL SOFTENER PO) Take 100 mg by mouth daily        CRANBERRY PO Take 1 capsule by mouth daily        oxybutynin (DITROPAN XL) 10 MG 24 hr tablet Take 1 tablet (10 mg) by mouth daily (Needs follow-up appointment for this medication) 90 tablet 3     azelastine (ASTELIN) 0.1 % nasal spray Spray 1-2 sprays into both nostrils 2 times daily as needed for rhinitis 3 Bottle 3     albuterol (VENTOLIN HFA) 108 (90 BASE) MCG/ACT inhaler Inhale 2 puffs into the lungs every 4 hours as needed 1 Inhaler 2     Acetaminophen (TYLENOL PO) Take 650 mg by mouth every 4 hours as needed for mild pain or fever          Physical Exam:  /69 (BP Location: Right arm, Patient Position: Sitting, Cuff Size: Adult Regular)  Pulse 67  Temp 98.3  F (36.8  C) (Tympanic)  Resp 16  Ht 1.638 m (5' 4.49\")  Wt 89.7 kg (197 lb 12.8 oz)  LMP 02/06/2013  SpO2 100%  Breastfeeding? No  BMI 33.44 kg/m2  Wt Readings from Last 12 Encounters:   10/18/17 89.7 kg (197 lb 12.8 oz)   09/28/17 89.2 kg (196 lb 10.4 oz)   09/13/17 88.1 kg (194 lb 4.8 oz)   09/13/17 88.7 kg (195 lb 8 oz)   09/07/17 87.5 kg (192 lb 14.4 oz)   09/05/17 88.8 kg (195 lb 11.2 oz)   08/17/17 86.7 kg (191 lb 1.6 oz)   08/16/17 86.6 kg (191 lb)   07/27/17 86.9 kg (191 lb 9.6 oz)   07/24/17 86.6 kg (190 lb 14.7 oz)   06/28/17 90.6 kg (199 lb 11.2 oz)   06/19/17 91.9 kg (202 lb 9.6 oz)     ECOG performance status: 1  GENERAL APPEARANCE: Healthy, alert and in no acute distress.  HEENT: Sclerae anicteric. PERRLA. Oropharynx without ulcers, lesions, or thrush.  NECK: Supple. No asymmetry or masses.  LYMPHATICS: No palpable cervical, supraclavicular, " axillary, or inguinal lymphadenopathy.  RESP: Lungs clear to auscultation bilaterally without rales, rhonchi or wheezes.  CARDIOVASCULAR: Regular rate and rhythm. Normal S1, S2; no S3 or S4. No murmur, gallop, or rub.  BREAST: Enlarged lymph node in the right axilla. Some lymphedema of the right breast.   ABDOMEN: Soft, nontender. Bowel sounds normal. No palpable organomegaly or masses.  MUSCULOSKELETAL: Extremities without gross deformities noted. No edema of bilateral lower extremities.  SKIN: No suspicious lesions or rashes.  NEURO: Alert and oriented x 3. Cranial nerves II-XII grossly intact.  PSYCHIATRIC: Mentation and affect appear normal.    Laboratory/Imaging Studies:  Orders Only on 10/17/2017   Component Date Value Ref Range Status     WBC 10/17/2017 4.2  4.0 - 11.0 10e9/L Final     RBC Count 10/17/2017 3.32* 3.8 - 5.2 10e12/L Final     Hemoglobin 10/17/2017 12.1  11.7 - 15.7 g/dL Final     Hematocrit 10/17/2017 34.9* 35.0 - 47.0 % Final     MCV 10/17/2017 105* 78 - 100 fl Final     MCH 10/17/2017 36.4* 26.5 - 33.0 pg Final     MCHC 10/17/2017 34.7  31.5 - 36.5 g/dL Final     RDW 10/17/2017 17.2* 10.0 - 15.0 % Final     Platelet Count 10/17/2017 158  150 - 450 10e9/L Final     Diff Method 10/17/2017 Automated Method   Final     % Neutrophils 10/17/2017 53.2  % Final     % Lymphocytes 10/17/2017 31.6  % Final     % Monocytes 10/17/2017 12.6  % Final     % Eosinophils 10/17/2017 2.4  % Final     % Basophils 10/17/2017 0.2  % Final     Absolute Neutrophil 10/17/2017 2.2  1.6 - 8.3 10e9/L Final     Absolute Lymphocytes 10/17/2017 1.3  0.8 - 5.3 10e9/L Final     Absolute Monocytes 10/17/2017 0.5  0.0 - 1.3 10e9/L Final     Absolute Eosinophils 10/17/2017 0.1  0.0 - 0.7 10e9/L Final     Absolute Basophils 10/17/2017 0.0  0.0 - 0.2 10e9/L Final     Sodium 10/17/2017 138  133 - 144 mmol/L Final     Potassium 10/17/2017 3.7  3.4 - 5.3 mmol/L Final     Chloride 10/17/2017 105  94 - 109 mmol/L Final     Carbon  Dioxide 10/17/2017 27  20 - 32 mmol/L Final     Anion Gap 10/17/2017 6  3 - 14 mmol/L Final     Glucose 10/17/2017 91  70 - 99 mg/dL Final     Urea Nitrogen 10/17/2017 13  7 - 30 mg/dL Final     Creatinine 10/17/2017 0.70  0.52 - 1.04 mg/dL Final     GFR Estimate 10/17/2017 86  >60 mL/min/1.7m2 Final    Non  GFR Calc     GFR Estimate If Black 10/17/2017 >90  >60 mL/min/1.7m2 Final    African American GFR Calc     Calcium 10/17/2017 9.4  8.5 - 10.1 mg/dL Final     Bilirubin Total 10/17/2017 1.6* 0.2 - 1.3 mg/dL Final     Albumin 10/17/2017 3.5  3.4 - 5.0 g/dL Final     Protein Total 10/17/2017 6.8  6.8 - 8.8 g/dL Final     Alkaline Phosphatase 10/17/2017 96  40 - 150 U/L Final     ALT 10/17/2017 19  0 - 50 U/L Final     AST 10/17/2017 19  0 - 45 U/L Final     CA 27-29 10/17/2017 49* 0 - 39 U/mL Final    Assay Method:  Chemiluminescence using Siemens Centaur XP        Recent Results (from the past 744 hour(s))   XR Surgery MUNA L/T 5 Min Fluoro    Narrative    This exam was marked as non-reportable because it will not be read by a   radiologist or a [facility] non-radiologist provider.             XR Chest Port 1 View    Narrative    Exam: XR CHEST PORT 1 VW, 9/28/2017 10:11 AM    Indication: Removed 3 valves, 2 from right upper lobe, one from right  middle lobe    Comparison: Chest radiograph 9/7/2017    Findings:   Portable AP view of the chest. Left chest wall Port-A-Cath unchanged  in position. Interval removal of 2 right upper lobe bronchial valves  and 1 right middle lobe bronchial valve. Interval improvement in right  upper lobe atelectasis. No pneumothorax. No pleural effusion. The  trachea is midline. Cardiomediastinal silhouette is within normal  limits. No acute bony abnormalities. The visualized upper abdomen is  unremarkable.      Impression    Impression: Interval removal of 3 bronchial valves with interval  decrease in right upper lobe atelectasis. No pneumothorax.    I have personally  reviewed the examination and initial interpretation  and I agree with the findings.    KAVON MILLS MD       Assessment and plan:    (C50.911,  Z17.0,  C50.912) Bilateral malignant neoplasm of breast in female, estrogen receptor positive, unspecified site of breast (H)  (primary encounter diagnosis)  (C50.911,  C77.3) 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)  (C50.911,  C77.3) Breast cancer metastasized to axillary lymph node, right (H)  (C50.919,  C78.7) Carcinoma of breast metastatic to liver, unspecified laterality (H)     Patient has been tolerating treatment with Xeloda without significant side effects. We will continue with Xeloda. The dose of Xeloda will be increasing to 2500 mg orally twice daily for 14 days on and 14 days off. I will see the patient again in 3 weeks time or sooner if there are new developments or concerns. Because of her headache and dizziness I'm going to arrange for an MRI of the brain. Because of the swelling of the right breast, I am going to refer the patient to lymphedema clinic.      (C79.51) Bone metastasis (H)  Patient will continue on dasatinib 120 mg every 3 months. The patient will continue calcium and vitamin D supplements.     (J45.40) Moderate persistent asthma without complication  Stable on the current regimen.    (K12.31) Mucositis due to chemotherapy  continue magic mouth wash.    The patient is ready to learn, no apparent learning barriers were identified.  Diagnosis and treatment plans were explained to the patient. The patient expressed understanding of the content. The patient asked appropriate questions. The patient questions were answered to her satisfaction.    Chart documentation with Dragon Voice recognition Software. Although reviewed after completion, some words and grammatical errors may remain.

## 2017-10-18 NOTE — PATIENT INSTRUCTIONS
We would like to see you back in 3 weeks.  Your prescription has been sent to:    [facility] - [place], [place] - [address]  [address]  [address]  [facility] [address]  Phone: [phone] Fax: [phone]     When you are in need of a refill, please call your pharmacy and they will send us a request.  Copy of appointments, and after visit summary (AVS) given to patient.  If you have any questions during business hours (M-F 8 AM- 4PM), please call Funmi Ray RN, BSN, OCN Oncology Hematology /Breast Cancer Navigator at [facility] [phone].   For questions after business hours, or on holidays/weekends, please call our after hours Nurse Triage line [phone]. Thank you.

## 2017-10-22 NOTE — ED AVS SNAPSHOT
[facility] Emergency Department    [address] [facility] [address]    Phone:  [phone]    Fax:  [phone]                                       Etta Cervantes   MRN: [id number]    Department:  [facility] Emergency Department   Date of Visit:  10/22/2017           After Visit Summary Signature Page     I have received my discharge instructions, and my questions have been answered. I have discussed any challenges I see with this plan with the nurse or doctor.    ..........................................................................................................................................  Patient/Patient Representative Signature      ..........................................................................................................................................  Patient Representative Print Name and Relationship to Patient    ..................................................               ................................................  Date                                            Time    ..........................................................................................................................................  Reviewed by Signature/Title    ...................................................              ..............................................  Date                                                            Time

## 2017-10-22 NOTE — DISCHARGE INSTRUCTIONS
Understanding Urinary Tract Infections (UTIs)  Most UTIs are caused by bacteria, although they may also be caused by viruses or fungi. Bacteria from the bowel are the most common source of infection. The infection may start because of any of the following:    Sexual activity. During sex, bacteria can travel from the penis, vagina, or rectum into the urethra.     Bacteria on the skin outside the rectum may travel into the urethra. This is more common in women since the rectum and urethra are closer to each other than in men. Wiping from front to back after using the toilet and keeping the area clean can help prevent germs from getting to the urethra.    Blockage of urine flow through the urinary tract. If urine sits too long, germs may start to grow out of control.      Parts of the urinary tract  The infection can occur in any part of the urinary tract.    The kidneys collect and store urine.    The ureters carry urine from the kidneys to the bladder.    The bladder holds urine until you are ready to let it out.    The urethra carries urine from the bladder out of the body. It is shorter in women, so bacteria can move through it more easily. The urethra is longer in men, so a UTI is less likely to reach the bladder or kidneys in men.  Date Last Reviewed: 1/1/2017 2000-2017 [employer]. [address] [place] [address], [place] [address]. All rights reserved. This information is not intended as a substitute for professional medical care. Always follow your healthcare professional's instructions.

## 2017-10-22 NOTE — ED NOTES
Patient here for UTI, symptoms started 2 days ago.  Patient presents ambulatory to the urgent care.

## 2017-10-22 NOTE — ED AVS SNAPSHOT
[facility]    [address] [address]    Phone:  [phone]    Fax:  [phone]                                       Etta Cervantes   MRN: [id number]    Department:  [facility]   Date of Visit:  10/22/2017           Patient Information     Date Of Birth          1960        Your diagnoses for this visit were:     Acute cystitis without hematuria        You were seen by Tobi Jimenez PA-C.        Discharge Instructions         Understanding Urinary Tract Infections (UTIs)  Most UTIs are caused by bacteria, although they may also be caused by viruses or fungi. Bacteria from the bowel are the most common source of infection. The infection may start because of any of the following:    Sexual activity. During sex, bacteria can travel from the penis, vagina, or rectum into the urethra.     Bacteria on the skin outside the rectum may travel into the urethra. This is more common in women since the rectum and urethra are closer to each other than in men. Wiping from front to back after using the toilet and keeping the area clean can help prevent germs from getting to the urethra.    Blockage of urine flow through the urinary tract. If urine sits too long, germs may start to grow out of control.      Parts of the urinary tract  The infection can occur in any part of the urinary tract.    The kidneys collect and store urine.    The ureters carry urine from the kidneys to the bladder.    The bladder holds urine until you are ready to let it out.    The urethra carries urine from the bladder out of the body. It is shorter in women, so bacteria can move through it more easily. The urethra is longer in men, so a UTI is less likely to reach the bladder or kidneys in men.  Date Last Reviewed: 1/1/2017 2000-2017 [employer]. [address] [place] [address], [place] [address]. All rights reserved. This information is not intended as a  substitute for professional medical care. Always follow your healthcare professional's instructions.          Future Appointments        Provider Department Dept Phone Center    10/24/2017 2:45 PM Brianne Lundberg, PT [facility] Lymphedema [phone] [facility]    11/6/2017 2:00 PM [place] [facility] Infusion [phone] [facility]    11/6/2017 3:00 PM [facility] MRI ROOM 2 [facility] [phone] [facility]    11/8/2017 8:00 AM Carlos Mcgowan MD [facility] [phone] [facility]    11/10/2017 1:30 PM Ria Solo MD, MD [facility] [phone] [facility]      24 Hour Appointment Hotline       To make an appointment at any [facility], call [phone] ([phone]). If you don't have a family doctor or clinic, we will help you find one. [facility] are conveniently located to serve the needs of you and your family.             Review of your medicines      START taking        Dose / Directions Last dose taken    sulfamethoxazole-trimethoprim 800-160 MG per tablet   Commonly known as:  BACTRIM DS   Dose:  1 tablet   Quantity:  6 tablet        Take 1 tablet by mouth 2 times daily for 3 days   Refills:  0          Our records show that you are taking the medicines listed below. If these are incorrect, please call your family doctor or clinic.        Dose / Directions Last dose taken    * albuterol 108 (90 BASE) MCG/ACT Inhaler   Commonly known as:  VENTOLIN HFA   Dose:  2 puff   Quantity:  1 Inhaler        Inhale 2 puffs into the lungs every 4 hours as needed   Refills:  2        * albuterol (2.5 MG/3ML) 0.083% neb solution   Dose:  1 vial   Quantity:  30 vial        Take 1 vial (2.5 mg) by nebulization every 4 hours as needed for shortness of breath / dyspnea   Refills:  3        ALLEGRA ALLERGY 180 MG tablet   Dose:  180 mg   Generic drug:  fexofenadine        Take 180 mg by mouth daily as needed for allergies   Refills:   0        atorvastatin 10 MG tablet   Commonly known as:  LIPITOR   Dose:  10 mg   Quantity:  90 tablet        Take 1 tablet (10 mg) by mouth daily   Refills:  3        azelastine 0.1 % spray   Commonly known as:  ASTELIN   Dose:  1-2 spray   Quantity:  3 Bottle        Spray 1-2 sprays into both nostrils 2 times daily as needed for rhinitis   Refills:  3        * capecitabine 500 MG tablet CHEMO   Commonly known as:  XELODA   Quantity:  126 tablet        Take 5 cap in AM and 4 cap in PM on Days 1 through 14, then off for 7 days. Take within 30 mins after meal.   Refills:  0        * capecitabine 500 MG tablet CHEMO   Commonly known as:  XELODA   Quantity:  140 tablet        Take 5 cap in AM and 5 cap in PM on Days 1 through 14, then off for 7 days. Take within 30 mins after meal.   Refills:  0        CRANBERRY PO   Dose:  1 capsule        Take 1 capsule by mouth daily   Refills:  0        DULERA 200-5 MCG/ACT oral inhaler   Quantity:  3 Inhaler   Generic drug:  mometasone-formoterol        INHALE 2 PUFFS INTO THE LUNGS TWICE DAILY   Refills:  3        KP CALCIUM 600+D3 600-500 MG-UNIT Caps   Dose:  2 tablet   Generic drug:  calcium carb-cholecalciferol        Take 2 tablets by mouth daily   Refills:  0        levothyroxine 25 MCG tablet   Commonly known as:  SYNTHROID/LEVOTHROID   Quantity:  90 tablet        TAKE 1 TABLET (25 MCG) BY MOUTH DAILY   Refills:  2        lidocaine (viscous) 2 % solution   Commonly known as:  XYLOCAINE        Apply topically as needed   Refills:  0        lidocaine visc 2% 2.5mL/5mL & maalox/mylanta w/ simeth 2.5mL/5mL & diphenhydrAMINE 5mg/5mL Susp suspension   Commonly known as:  MAGIC Mouthwash [facility]   Dose:  10 mL   Quantity:  240 mL        Swish and swallow 10 mLs in mouth every 6 hours as needed for mouth sores   Refills:  10        LORazepam 0.5 MG tablet   Commonly known as:  ATIVAN   Dose:  0.5 mg   Quantity:  30 tablet        Take 1 tablet (0.5 mg) by mouth every 4 hours as  needed (Anxiety, Nausea/Vomiting or Sleep)   Refills:  2        MAGIC MOUTHWASH (ENTER INGREDIENTS IN COMMENTS)        SWISH AND SWALLOW 10 MLS IN MOUTH EVERY 6 HOURS AS NEEDED FOR MOUTH SORES   Refills:  10        metoclopramide 10 MG tablet   Commonly known as:  REGLAN   Dose:  10 mg   Quantity:  120 tablet        Take 1 tablet (10 mg) by mouth 2 times daily as needed   Refills:  1        oxybutynin 10 MG 24 hr tablet   Commonly known as:  DITROPAN XL   Dose:  10 mg   Quantity:  90 tablet        Take 1 tablet (10 mg) by mouth daily (Needs follow-up appointment for this medication)   Refills:  3        oxyCODONE 5 MG IR tablet   Commonly known as:  ROXICODONE   Dose:  5 mg   Quantity:  30 tablet        Take 1 tablet (5 mg) by mouth every 4 hours as needed for moderate to severe pain   Refills:  0        * predniSONE 20 MG tablet   Commonly known as:  DELTASONE   Dose:  20 mg   Quantity:  10 tablet        Take 1 tablet (20 mg) by mouth 2 times daily For Yellow or Red zone on Asthma Action Plan.   Refills:  1        * predniSONE 20 MG tablet   Commonly known as:  DELTASONE   Dose:  20 mg   Quantity:  5 tablet        Take 1 tablet (20 mg) by mouth daily   Refills:  0        prochlorperazine 10 MG tablet   Commonly known as:  COMPAZINE   Dose:  10 mg   Quantity:  30 tablet        Take 1 tablet (10 mg) by mouth every 6 hours as needed (Nausea/Vomiting)   Refills:  2        QVAR 80 MCG/ACT Inhaler   Quantity:  26.1 g   Generic drug:  beclomethasone        INHALE 1 PUFFS INTO THE EACH NOSTRIL 2 TIMES DAILY   Refills:  3        ROBITUSSIN COUGH/COLD CF PO        Take by mouth as needed   Refills:  0        STOOL SOFTENER PO   Dose:  100 mg        Take 100 mg by mouth daily   Refills:  0        TYLENOL PO   Dose:  650 mg        Take 650 mg by mouth every 4 hours as needed for mild pain or fever   Refills:  0        zolpidem 10 MG tablet   Commonly known as:  AMBIEN   Indication:  Trouble Sleeping   Quantity:  30 tablet         Take 1 tablet 30 minutes prior to bedtime   Refills:  3        * Notice:  This list has 6 medication(s) that are the same as other medications prescribed for you. Read the directions carefully, and ask your doctor or other care provider to review them with you.            Prescriptions were sent or printed at these locations (1 Prescription)                   [id number] - [place], [place] - [address], [place] [address]    Telephone:  [phone]   Fax:  [phone]   Hours:                  E-Prescribed (1 of 1)         sulfamethoxazole-trimethoprim (BACTRIM DS) 800-160 MG per tablet                Procedures and tests performed during your visit     UA reflex to Microscopic    Urine Culture      Orders Needing Specimen Collection     None      Pending Results     No orders found from 10/20/2017 to 10/23/2017.            Pending Culture Results     No orders found from 10/20/2017 to 10/23/2017.            Pending Results Instructions     If you had any lab results that were not finalized at the time of your Discharge, you can call the ED Lab Result RN at [phone]. You will be contacted by this team for any positive Lab results or changes in treatment. The nurses are available 7 days a week from 10A to 6:30P.  You can leave a message 24 hours per day and they will return your call.        Test Results From Your Hospital Stay        10/22/2017  3:15 PM      Component Results     Component Value Ref Range & Units Status    Color Urine Yellow  Final    Appearance Urine Slightly Cloudy  Final    Glucose Urine 30 (A) NEG^Negative mg/dL Final    Bilirubin Urine Negative NEG^Negative Final    Ketones Urine Negative NEG^Negative mg/dL Final    Specific Gravity Urine 1.012 1.003 - 1.035 Final    Blood Urine Negative NEG^Negative Final    pH Urine 5.0 5.0 - 7.0 pH Final    Protein Albumin Urine Negative NEG^Negative mg/dL Final    Urobilinogen  mg/dL Normal 0.0 - 2.0 mg/dL Final    Nitrite Urine Negative NEG^Negative Final    Leukocyte Esterase Urine Large (A) NEG^Negative Final    Source Midstream Urine  Final    RBC Urine 5 (H) 0 - 2 /HPF Final    WBC Urine 82 (H) 0 - 2 /HPF Final    Squamous Epithelial /HPF Urine 1 0 - 1 /HPF Final    Mucous Urine Present (A) NEG^Negative /LPF Final                Thank you for choosing [facility]       Thank you for choosing [facility] for your care. Our goal is always to provide you with excellent care. Hearing back from our patients is one way we can continue to improve our services. Please take a few minutes to complete the written survey that you may receive in the mail after you visit with us. Thank you!        [employer] Information     [facility] gives you secure access to your electronic health record. If you see a primary care provider, you can also send messages to your care team and make appointments. If you have questions, please call your primary care clinic.  If you do not have a primary care provider, please call [phone] and they will assist you.        Care EveryWhere ID     This is your Care EveryWhere ID. This could be used by other organizations to access your [facility] medical records  [id number]        Equal Access to Services     JACKIE MIX : Hadii han Rodriguez, nish jauregui, antolin elliott, hari guardado. So [facility] [phone].    ATENCIÓN: Si habla español, tiene a parekh disposición servicios gratuitos de asistencia lingüística. Llame al [phone].    We comply with applicable federal civil rights laws and [place] laws. We do not discriminate on the basis of race, color, national origin, age, disability, sex, sexual orientation, or gender identity.            After Visit Summary       This is your record. Keep this with you and show to your community pharmacist(s) and doctor(s) at your next visit.

## 2017-10-23 NOTE — TELEPHONE ENCOUNTER
Patient is a 57 year old female dx metastatic breast cancer on active chemotherapy. C7 Xeloda to start today.  Patient is calling to report she was seen in ED over the weekend and has new dx acute cystitis and is on oral antibiotics until Wednesday AM.     Per Dr. Cassidy, patient is to delay the start of C7 until Thursday AM.      Pt updated with the above.  Denies questions or concerns.  Will call back if any arise.  Direct line provided.

## 2017-10-24 NOTE — PROGRESS NOTES
"  LYMPHEDEMA / EDEMA REHAB EVALUATION  [facility]     Patient: Etta Cervantes  : 1960  Referring Provider: Dr. Khanh MD    Visit Type  Type of visit: Initial Edema Evaluation    Discipline  Discipline: PT       present: No     General Information   Start of care: 10/24/17   Orders: Evaluate and treat as indicated    Order date: 10/18/17   Medical diagnosis: R-breast cancer       Affected body parts: Other (R-breast lymphedema)       Edema etiology comments: R-breast cancer (stage4) with bone mets (L-femur) and liver mets (L-hepatic lobe); started chemotherapy 2015 and per chart review \"tumor stable\"; diagnostic mammogram 1/13/15 showed enlarged LN of R-axilla; biopsy of R-axilla showed metastatic ductal carcinoma   Pertinent history of current problem (PT: include personal factors and/or comorbidities that impact the POC; OT: include additional occupational profile info): R-breast lymphedema goes up and down depending where pt is on Xeloda treatment, 2 weeks on and 1 week off; discomfort when edema is up; discomfort if boucned or emir (ex. 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 Cassidy re: possible MLD    Patient / Family Goals  Patient / " family goals statement: to see what else I can do to reduce swelling       Pain   denies       Vital Signs  Vital signs: Pulse, SPO2  Pulse: 82bpm   SPO2: 96%     Cognitive Status  Orientation: Orientation to person, place and time  Level of consciousness: Alert  Follows commands and answers questions: 100% of the time  Personal safety and judgement: Intact  Memory: Intact     Edema Exam / Assessment  Skin condition: Non-pitting, Intact  Skin condition comments: R-breast is fibrotic and firm especially on underside (lateral >medial), non-pitting edema      Scar: Yes  Location: just distal to R-axilla from biopsy      Ulceration: No      Girth Measurements  Girth Measurements: Refer to separate girth measurement flowsheet  R-breast girth > L-breast girth by 9.3cm        Range of Motion   ROM comments: functional UE range    Strength   Strength comments: functional UE strength    Posture  Posture: Normal     Palpation  Palpation: denies discomfort with palpation     Activities of Daily Living  Activities of Daily Living: Independent with functional mobility and ADL    Sensory  Sensory perception: Light touch  Light touch: Intact     Coordination  Coordination: Gross motor coordination appropriate       Muscle Tone  Muscle tone: No deficits were identified       Planned Edema Interventions  Planned edema interventions: Fit for compression garment, Exercises, Precautions to prevent infection / exacerbation, Education, Manual therapy, Skin care / precautions, Home management program development, Other (MLD if Dr. Cassidy ok's to do)       Clinical Impression  Criteria for skilled therapeutic intervention met: Yes  Therapy diagnosis: R-breast lymphedema     Influenced by the following impairments / conditions: Stage 2 (due to fibrosis)  Functional limitations due to impairments / conditions: denies        Treatment frequency: 1 time / week (increasing to 2x/week if ok to initiate MLD)  Treatment duration: 8 weeks  Patient  / family and/or staff in agreement with plan of care: Yes  Risks and benefits of therapy have been explained: Yes       Education Assessment  Preferred learning style: Listening  Barriers to learning: No barriers    Edema Goals  Goal 1  Goal identifier: stg  Goal description: pt to have at least daytime tolerance to Komprex2 at R-breast for decrease edema and fibrosis  Target date: 11/07/17       Goal 2  Goal identifier: ltg  Goal description: pt to have at least 5cm reduction in R-breast girth for increased comfort  Target date: 12/23/17       Goal 3  Goal identifier: ltg  Goal description: pt to be independent with R-breast lymphedema management via compression and self-MLD (as appropriate)   Target date: 12/23/17       Total Evaluation Time  Total evaluation time: 15

## 2017-11-08 NOTE — MR AVS SNAPSHOT
After Visit Summary   11/8/2017    Etta Cervantes    MRN: [id number]           Patient Information     Date Of Birth          1960        Visit Information        Provider Department      11/8/2017 8:00 AM Carlos Mcgowan MD [facility]        Today's Diagnoses     Abnormal CT scan, chest    -  1       Follow-ups after your visit        Your next 10 appointments already scheduled     Nov 09, 2017  9:15 AM CST   Lymphedema Treatment with Brianne Lundberg PT   [facility] ([facility])    [address] [address]   [phone]            Nov 10, 2017  1:30 PM CST   Return Visit with Ria Solo MD   [facility] ([facility])    [facility]  [address]  [facility] [place] [address]   [phone]            Nov 14, 2017  2:15 PM CST   Lymphedema Treatment with Brianne Lundberg PT   [facility] ([facility])    [address] [address]   [phone]            Nov 16, 2017  1:00 PM CST   Lymphedema Treatment with Brianne Lundberg PT   [facility] ([facility])    [address] [address]   [phone]            Dec 01, 2017  3:30 PM CST   Return Sleep Patient with Rajesh Ahuja MD   [facility] ([facility])    [address]  [facility] [address]   [phone]              Who to contact     If you have questions or need follow up information about today's clinic visit or your schedule please contact [facility] directly at [phone].  Normal or non-critical lab and imaging results will be communicated to you by MyChart, letter or phone within 4 business days after the clinic has received the results. If you do not hear from us within 7 days, please contact the clinic through MyChart or phone. If  "you have a critical or abnormal lab result, we will notify you by phone as soon as possible.  Submit refill requests through [employer] or call your pharmacy and they will forward the refill request to us. Please allow 3 business days for your refill to be completed.          Additional Information About Your Visit        Animocahart Information     [employer] gives you secure access to your electronic health record. If you see a primary care provider, you can also send messages to your care team and make appointments. If you have questions, please call your primary care clinic.  If you do not have a primary care provider, please call [phone] and they will assist you.        Care EveryWhere ID     This is your Care EveryWhere ID. This could be used by other organizations to access your [facility] medical records  [id number]        Your Vitals Were     Pulse Temperature Respirations Height Last Period Pulse Oximetry    87 98.6  F (37  C) (Tympanic) 18 1.638 m (5' 4.49\") 02/06/2013 96%    BMI (Body Mass Index)                   33.96 kg/m2            Blood Pressure from Last 3 Encounters:   11/08/17 108/73   10/22/17 135/67   10/18/17 136/69    Weight from Last 3 Encounters:   11/08/17 91.1 kg (200 lb 14.4 oz)   10/22/17 88.7 kg (195 lb 8.8 oz)   10/18/17 89.7 kg (197 lb 12.8 oz)                 Where to get your medicines      These medications were sent to [place] [address] [place] [facility] [address] [facility]  [address] [address]     Phone:  [phone]     oxybutynin 10 MG 24 hr tablet          Primary Care Provider Office Phone # Fax #    Johana Fairbanks [phone] [address]  [facility] [address]        Equal Access to Services     [facility] DOMINGUEZ AH: Hadii han gaitan Soisaura, waaxda luqadaha, qaybta kaalmada lexi, hari guardado. So [facility] [phone].    ATENCIÓN: Si habla español, tiene a parekh disposición servicios gratuitos de " asistencia lingüística. [place] al [phone].    We comply with applicable federal civil rights laws and [place] laws. We do not discriminate on the basis of race, color, national origin, age, disability, sex, sexual orientation, or gender identity.            Thank you!     Thank you for choosing [facility]  for your care. Our goal is always to provide you with excellent care. Hearing back from our patients is one way we can continue to improve our services. Please take a few minutes to complete the written survey that you may receive in the mail after your visit with us. Thank you!             Your Updated Medication List - Protect others around you: Learn how to safely use, store and throw away your medicines at www.disposemymeds.org.          This list is accurate as of: 11/8/17 11:59 PM.  Always use your most recent med list.                   Brand Name Dispense Instructions for use Diagnosis    * albuterol 108 (90 BASE) MCG/ACT Inhaler    VENTOLIN HFA    1 Inhaler    Inhale 2 puffs into the lungs every 4 hours as needed    Moderate persistent asthma, uncomplicated       * albuterol (2.5 MG/3ML) 0.083% neb solution     30 vial    Take 1 vial (2.5 mg) by nebulization every 4 hours as needed for shortness of breath / dyspnea    Moderate persistent asthma, uncomplicated       ALLEGRA ALLERGY 180 MG tablet   Generic drug:  fexofenadine      Take 180 mg by mouth daily as needed for allergies        atorvastatin 10 MG tablet    LIPITOR    90 tablet    Take 1 tablet (10 mg) by mouth daily    Hyperlipidemia LDL goal <100       azelastine 0.1 % spray    ASTELIN    3 Bottle    Spray 1-2 sprays into both nostrils 2 times daily as needed for rhinitis    Chronic rhinitis       * capecitabine 500 MG tablet CHEMO    XELODA    126 tablet    Take 5 cap in AM and 4 cap in PM on Days 1 through 14, then off for 7 days. Take within 30 mins after meal.    Secondary malignant neoplasm of liver (H), Carcinoma  of breast metastatic to liver, unspecified laterality (H), Bone metastasis (H), Breast cancer metastasized to axillary lymph node, right (H)       * capecitabine 500 MG tablet CHEMO    XELODA    140 tablet    Take 5 cap in AM and 5 cap in PM on Days 1 through 14, then off for 7 days. Take within 30 mins after meal.    Secondary malignant neoplasm of liver (H), Carcinoma of breast metastatic to liver, unspecified laterality (H), Bone metastasis (H), Breast cancer metastasized to axillary lymph node, right (H)       CRANBERRY PO      Take 1 capsule by mouth daily        DULERA 200-5 MCG/ACT oral inhaler   Generic drug:  mometasone-formoterol     3 Inhaler    INHALE 2 PUFFS INTO THE LUNGS TWICE DAILY    Moderate persistent asthma without complication       KP CALCIUM 600+D3 600-500 MG-UNIT Caps   Generic drug:  calcium carb-cholecalciferol      Take 2 tablets by mouth daily        levothyroxine 25 MCG tablet    SYNTHROID/LEVOTHROID    90 tablet    TAKE 1 TABLET (25 MCG) BY MOUTH DAILY    Hypothyroidism due to acquired atrophy of thyroid       lidocaine (viscous) 2 % solution    XYLOCAINE     Apply topically as needed        lidocaine visc 2% 2.5mL/5mL & maalox/mylanta w/ simeth 2.5mL/5mL & diphenhydrAMINE 5mg/5mL Susp suspension    MAGIC Mouthwash HOSPITAL    240 mL    Swish and swallow 10 mLs in mouth every 6 hours as needed for mouth sores    Mucositis due to chemotherapy, Breast cancer metastasized to axillary lymph node, right (H)       LORazepam 0.5 MG tablet    ATIVAN    30 tablet    Take 1 tablet (0.5 mg) by mouth every 4 hours as needed (Anxiety, Nausea/Vomiting or Sleep)    Secondary malignant neoplasm of liver (H), Carcinoma of breast metastatic to liver, unspecified laterality (H), Bone metastasis (H), Breast cancer metastasized to axillary lymph node, right (H)       MAGIC MOUTHWASH (ENTER INGREDIENTS IN COMMENTS)      SWISH AND SWALLOW 10 MLS IN MOUTH EVERY 6 HOURS AS NEEDED FOR MOUTH SORES         metoclopramide 10 MG tablet    REGLAN    120 tablet    Take 1 tablet (10 mg) by mouth 2 times daily as needed    Bilateral malignant neoplasm of breast in female, estrogen receptor positive, unspecified site of breast (H)       oxybutynin 10 MG 24 hr tablet    DITROPAN XL    90 tablet    Take 1 tablet (10 mg) by mouth daily (Needs follow-up appointment for this medication)    Mixed incontinence urge and stress (male)(female)       oxyCODONE IR 5 MG tablet    ROXICODONE    30 tablet    Take 1 tablet (5 mg) by mouth every 4 hours as needed for moderate to severe pain    Secondary malignant neoplasm of liver (H)       * predniSONE 20 MG tablet    DELTASONE    10 tablet    Take 1 tablet (20 mg) by mouth 2 times daily For Yellow or Red zone on Asthma Action Plan.    Moderate persistent asthma, uncomplicated       * predniSONE 20 MG tablet    DELTASONE    5 tablet    Take 1 tablet (20 mg) by mouth daily    Reactive airway disease, unspecified asthma severity, uncomplicated       prochlorperazine 10 MG tablet    COMPAZINE    30 tablet    Take 1 tablet (10 mg) by mouth every 6 hours as needed (Nausea/Vomiting)    Secondary malignant neoplasm of liver (H), Carcinoma of breast metastatic to liver, unspecified laterality (H), Bone metastasis (H), Breast cancer metastasized to axillary lymph node, right (H)       QVAR 80 MCG/ACT Inhaler   Generic drug:  beclomethasone     26.1 g    INHALE 1 PUFFS INTO THE EACH NOSTRIL 2 TIMES DAILY    Chronic rhinitis       ROBITUSSIN COUGH/COLD CF PO      Take by mouth as needed    Breast cancer metastasized to axillary lymph node, right (H)       STOOL SOFTENER PO      Take 100 mg by mouth daily        TYLENOL PO      Take 650 mg by mouth every 4 hours as needed for mild pain or fever        zolpidem 10 MG tablet    AMBIEN    30 tablet    Take 1 tablet 30 minutes prior to bedtime    Insomnia, unspecified       * Notice:  This list has 6 medication(s) that are the same as other medications  prescribed for you. Read the directions carefully, and ask your doctor or other care provider to review them with you.

## 2017-11-08 NOTE — PROGRESS NOTES
[facility]  Lung Nodule Clinic Note  November 8, 2017    Chief complaint:  Etta Cervantes is a 57 year old female seen in the Pulmonary Clinic  for   Chief Complaint   Patient presents with     Oncology Clinic Visit     Return visit        Assessment and Plan:  #1 right upper lobe bulla, prolonged air leak due to spontaneous pneumothorax that was treated with chest tube placement and intrabronchial valve placement ×5(  4 in the right upper lobe, one in the right middle lobe). Patient is status post valve removal in September 2017 in 2 different occasions due to inability to see the valves. Patient has no respiratory symptoms at this point and we'll get chest x-ray after this clinic visit to establish her baseline after the valves removed.  #2 metastatic breast cancer on chemotherapy  #3 asthma well-controlled with dulera and qvar.  #4 right flank pain likely musculoskeletal, improved.  #5  Lymphedema in torso.    I will not see her the patient on regular basis in my pulmonary clinic.    History of Present Illness:  This is a 57 years old woman with history of metastatic breast cancer and spontaneous pneumothorax  Accompanied by her  today. She was hospitalized for a prolonged air leak due to spontaneous pneumothorax on the right side. Patient has history of asthma she describes no wheezing or shortness of breath or productive cough currently. She has upper torso swelling due to lymphedema.. She also did elope right-sided flank pain for a short period of time when she was using a bandage to decrease lymphedema. She has been using her inhalers for asthma is well controlled. In terms of her breast cancer which is metastatic and has been treated by oncology.    Exposure history: Asbestos;  No , TB;  No , Radiation;   No     Allergies   Allergen Reactions     Animal Dander Cough     Itchy eyes     Cats      Dust Mites Cough     Itchy eyes     Pollen Extract      Other reaction(s): Cough  Itchy eyes     Seasonal  Allergies      Levaquin [Levofloxacin] Rash     States she has violent dreams from taking this        Past Medical History:   Diagnosis Date     ASCUS favor benign 1/2015    Neg HPV     Cancer (H)      Cataract 2010    surgery both eyes     Emphysematous bleb of lung (H)      Fibroids      Hypercholesterolemia      Hypothyroid      Incontinence of urine     mixed     Menorrhagia      Obesity      Pneumothorax      PONV (postoperative nausea and vomiting)      Sleep apnea     uses a cpap     Uncomplicated asthma         Past Surgical History:   Procedure Laterality Date     BIOPSY  Jan 2015, 2016    Breast cancer, thyroid     BRONCHOSCOPY, INSERT BRONCHIAL VALVE N/A 7/20/2017    Procedure: BRONCHOSCOPY, INSERT BRONCHIAL VALVE;  Flexible Bronchoscopy, Endobronchial Valve Insertion X5. 4 Valves into right upper lobe and 1 valve into Right middle lobe;  Surgeon: Carlos Mcgowan MD;  Location: [facility] OR     BRONCHOSCOPY, REMOVE BRONCHIAL VALVE N/A 9/7/2017    Procedure: BRONCHOSCOPY, REMOVE BRONCHIAL VALVE;  Flexible Bronchoscopy, Removal Of Endobronchial Valves x 2;  Surgeon: Carlos Mcgowan MD;  Location: [facility] OR     BRONCHOSCOPY, REMOVE BRONCHIAL VALVE N/A 9/28/2017    Procedure: BRONCHOSCOPY, REMOVE BRONCHIAL VALVE;  Flexible Bronchoscopy, Removal Of Intra-Bronchial Valves x 3;  Surgeon: Carlos Mcgowan MD;  Location: [facility]     CATARACT IOL, RT/LT  2010     COLONOSCOPY  2010     DILATION AND CURETTAGE, HYSTEROSCOPY, ABLATE ENDOMETRIUM NOVASURE, COMBINED  3/2/2011    COMBINED DILATION AND CURETTAGE, HYSTEROSCOPY, ABLATE ENDOMETRIUM NOVASURE performed by LAURA VARGAS at [facility]     GENITOURINARY SURGERY  2005    Uretha sling     INSERT PORT VASCULAR ACCESS N/A 2/12/2015    Procedure: INSERT PORT VASCULAR ACCESS;  Surgeon: Noé Schaefer MD;  Location: [facility] OR     SURGICAL HISTORY OF -   2005    bladder sling     SURGICAL HISTORY OF -       Cystectomy-lower lip     TUBAL LIGATION  1987         Social History     Social History     Marital status:      Spouse name: Lucian Cervantes     Number of children: 2     Years of education: 15+     Occupational History     [employer]     Social History Main Topics     Smoking status: Never Smoker     Smokeless tobacco: Never Used     Alcohol use No     Drug use: No     Sexual activity: Yes     Partners: Male     Birth control/ protection: Post-menopausal     Other Topics Concern     Parent/Sibling W/ Cabg, Mi Or Angioplasty Before 65f 55m? No     Social History Narrative        Family History   Problem Relation Age of Onset     Depression Mother      Eye Disorder Mother      Gynecology Mother      Alzheimer Disease Mother      CANCER Father      Other Cancer Father      HEART DISEASE Maternal Grandfather      Allergies Paternal Grandfather      Allergies Son         Immunization History   Administered Date(s) Administered     Tdap (Adacel,Boostrix) 04/04/2008       Current Outpatient Prescriptions   Medication Sig     capecitabine (XELODA) 500 MG tablet CHEMO Take 5 cap in AM and 5 cap in PM on Days 1 through 14, then off for 7 days. Take within 30 mins after meal.     DULERA 200-5 MCG/ACT oral inhaler INHALE 2 PUFFS INTO THE LUNGS TWICE DAILY     predniSONE (DELTASONE) 20 MG tablet Take 1 tablet (20 mg) by mouth daily     capecitabine (XELODA) 500 MG tablet CHEMO Take 5 cap in AM and 4 cap in PM on Days 1 through 14, then off for 7 days. Take within 30 mins after meal.     QVAR 80 MCG/ACT Inhaler INHALE 1 PUFFS INTO THE EACH NOSTRIL 2 TIMES DAILY     Phenylephrine-DM-GG (ROBITUSSIN COUGH/COLD CF PO) Take by mouth as needed     MAGIC MOUTHWASH, ENTER INGREDIENTS IN COMMENTS, SWISH AND SWALLOW 10 MLS IN MOUTH EVERY 6 HOURS AS NEEDED FOR MOUTH SORES     lidocaine, viscous, (XYLOCAINE) 2 % solution Apply topically as needed     levothyroxine (SYNTHROID/LEVOTHROID) 25 MCG tablet TAKE 1 TABLET (25 MCG) BY MOUTH DAILY     atorvastatin  (LIPITOR) 10 MG tablet Take 1 tablet (10 mg) by mouth daily     metoclopramide (REGLAN) 10 MG tablet Take 1 tablet (10 mg) by mouth 2 times daily as needed     oxyCODONE (ROXICODONE) 5 MG IR tablet Take 1 tablet (5 mg) by mouth every 4 hours as needed for moderate to severe pain     fexofenadine (ALLEGRA ALLERGY) 180 MG tablet Take 180 mg by mouth daily as needed for allergies     calcium carb-cholecalciferol (KP CALCIUM 600+D3) 600-500 MG-UNIT CAPS Take 2 tablets by mouth daily     zolpidem (AMBIEN) 10 MG tablet Take 1 tablet 30 minutes prior to bedtime     albuterol (2.5 MG/3ML) 0.083% neb solution Take 1 vial (2.5 mg) by nebulization every 4 hours as needed for shortness of breath / dyspnea     predniSONE (DELTASONE) 20 MG tablet Take 1 tablet (20 mg) by mouth 2 times daily For Yellow or Red zone on Asthma Action Plan.     magic mouthwash suspension (diphenhydrAMINE, lidocaine, aluminum-magnesium & simethicone) Swish and swallow 10 mLs in mouth every 6 hours as needed for mouth sores     LORazepam (ATIVAN) 0.5 MG tablet Take 1 tablet (0.5 mg) by mouth every 4 hours as needed (Anxiety, Nausea/Vomiting or Sleep)     prochlorperazine (COMPAZINE) 10 MG tablet Take 1 tablet (10 mg) by mouth every 6 hours as needed (Nausea/Vomiting)     Docusate Calcium (STOOL SOFTENER PO) Take 100 mg by mouth daily      CRANBERRY PO Take 1 capsule by mouth daily      oxybutynin (DITROPAN XL) 10 MG 24 hr tablet Take 1 tablet (10 mg) by mouth daily (Needs follow-up appointment for this medication)     azelastine (ASTELIN) 0.1 % nasal spray Spray 1-2 sprays into both nostrils 2 times daily as needed for rhinitis     albuterol (VENTOLIN HFA) 108 (90 BASE) MCG/ACT inhaler Inhale 2 puffs into the lungs every 4 hours as needed     Acetaminophen (TYLENOL PO) Take 650 mg by mouth every 4 hours as needed for mild pain or fever     No current facility-administered medications for this visit.         Review of Systems:  I have done 10 points of  review systems and pertinent findings are negative.    Physical examination  Constitutional: Alert, oriented, not in distress  Vitals: B/P: 108/73, T: 98.6, P: 87, R: 18  Eyes: No icterus, nystagmus, pupils isocoric  ENT/Mouth:  No ear discharge, moist mucosa, no ulceration, tonsillar hypertrophy  Cardiovascular: Normal S1 and S2, no additional heart sounds, murmur, rub, normal peripheral pulses  Respiratory: Both hemithoraces are symmetrical, normal to palpation, no dullness to percussion, auscultation of lungs are normal  Gastrointestinal/Rectal: Bowel sounds present, soft, non tender, non distended, no organomegaly, ascitis, mass  Musculoskeletal: Normal muscle mass, no dephormity  Integumentary:  No rash, normal texture and turgor, no edema  Neurological: Alert, orientedx3, no motor deficits, cranial nerves grossly normal  Psychiatric:  Mood and affect are appropriate with insight into his/her medical condition  Hematologic/Immunologic/Lymphatic: No bruise, no lymph node enargement     Data:  Lab Results   Component Value Date    WBC 4.2 10/17/2017     Lab Results   Component Value Date    RBC 3.32 10/17/2017     Lab Results   Component Value Date    HGB 12.1 10/17/2017     Lab Results   Component Value Date    HCT 34.9 10/17/2017     Lab Results   Component Value Date     10/17/2017     Lab Results   Component Value Date    MCH 36.4 10/17/2017     Lab Results   Component Value Date    MCHC 34.7 10/17/2017     Lab Results   Component Value Date    RDW 17.2 10/17/2017     Lab Results   Component Value Date     10/17/2017       Lab Results   Component Value Date     10/17/2017      Lab Results   Component Value Date    POTASSIUM 3.7 10/17/2017     Lab Results   Component Value Date    CHLORIDE 105 10/17/2017     Lab Results   Component Value Date    BEN 9.4 10/17/2017     Lab Results   Component Value Date    CO2 27 10/17/2017     Lab Results   Component Value Date    BUN 13 10/17/2017     Lab  Results   Component Value Date    CR 0.70 10/17/2017     Lab Results   Component Value Date    GLC 91 10/17/2017       Thank you for allowing me participate in the care of Etta Cervantes.    BETZAIDA Mcgowan MD

## 2017-11-08 NOTE — LETTER
11/8/2017       RE: Etta Cervantes  [address]  [place] [address]     Dear Colleague,    Thank you for referring your patient, Etta Cervantes, to the [facility] CANCER CLINIC at [facility]. Please see a copy of my visit note below.    [facility]  Lung Nodule Clinic Note  November 8, 2017    Chief complaint:  Etta Cervantes is a 57 year old female seen in the Pulmonary Clinic  for   Chief Complaint   Patient presents with     Oncology Clinic Visit     Return visit        Assessment and Plan:  #1 right upper lobe bulla, prolonged air leak due to spontaneous pneumothorax that was treated with chest tube placement and intrabronchial valve placement ×5(  4 in the right upper lobe, one in the right middle lobe). Patient is status post valve removal in September 2017 in 2 different occasions due to inability to see the valves. Patient has no respiratory symptoms at this point and we'll get chest x-ray after this clinic visit to establish her baseline after the valves removed.  #2 metastatic breast cancer on chemotherapy  #3 asthma well-controlled with dulera and qvar.  #4 right flank pain likely musculoskeletal, improved.  #5  Lymphedema in torso.    I will not see her the patient on regular basis in my pulmonary clinic.    History of Present Illness:  This is a 57 years old woman with history of metastatic breast cancer and spontaneous pneumothorax  Accompanied by her  today. She was hospitalized for a prolonged air leak due to spontaneous pneumothorax on the right side. Patient has history of asthma she describes no wheezing or shortness of breath or productive cough currently. She has upper torso swelling due to lymphedema.. She also did elope right-sided flank pain for a short period of time when she was using a bandage to decrease lymphedema. She has been using her inhalers for asthma is well controlled. In terms of her breast cancer which is metastatic and  has been treated by oncology.    Exposure history: Asbestos;  No , TB;  No , Radiation;   No     Allergies   Allergen Reactions     Animal Dander Cough     Itchy eyes     Cats      Dust Mites Cough     Itchy eyes     Pollen Extract      Other reaction(s): Cough  Itchy eyes     Seasonal Allergies      Levaquin [Levofloxacin] Rash     States she has violent dreams from taking this        Past Medical History:   Diagnosis Date     ASCUS favor benign 1/2015    Neg HPV     Cancer (H)      Cataract 2010    surgery both eyes     Emphysematous bleb of lung (H)      Fibroids      Hypercholesterolemia      Hypothyroid      Incontinence of urine     mixed     Menorrhagia      Obesity      Pneumothorax      PONV (postoperative nausea and vomiting)      Sleep apnea     uses a cpap     Uncomplicated asthma         Past Surgical History:   Procedure Laterality Date     BIOPSY  Jan 2015, 2016    Breast cancer, thyroid     BRONCHOSCOPY, INSERT BRONCHIAL VALVE N/A 7/20/2017    Procedure: BRONCHOSCOPY, INSERT BRONCHIAL VALVE;  Flexible Bronchoscopy, Endobronchial Valve Insertion X5. 4 Valves into right upper lobe and 1 valve into Right middle lobe;  Surgeon: Carlos Mcgowan MD;  Location: [facility] OR     BRONCHOSCOPY, REMOVE BRONCHIAL VALVE N/A 9/7/2017    Procedure: BRONCHOSCOPY, REMOVE BRONCHIAL VALVE;  Flexible Bronchoscopy, Removal Of Endobronchial Valves x 2;  Surgeon: Carlos Mcgowan MD;  Location: [facility] OR     BRONCHOSCOPY, REMOVE BRONCHIAL VALVE N/A 9/28/2017    Procedure: BRONCHOSCOPY, REMOVE BRONCHIAL VALVE;  Flexible Bronchoscopy, Removal Of Intra-Bronchial Valves x 3;  Surgeon: Carlos Mcgowan MD;  Location: [facility] OR     CATARACT IOL, RT/LT  2010     COLONOSCOPY  2010     DILATION AND CURETTAGE, HYSTEROSCOPY, ABLATE ENDOMETRIUM NOVASURE, COMBINED  3/2/2011    COMBINED DILATION AND CURETTAGE, HYSTEROSCOPY, ABLATE ENDOMETRIUM NOVASURE performed by LAURA VARGAS at [facility]     GENITOURINARY SURGERY  2005     Uretha sling     INSERT PORT VASCULAR ACCESS N/A 2/12/2015    Procedure: INSERT PORT VASCULAR ACCESS;  Surgeon: Noé Schaefer MD;  Location: [facility] OR     SURGICAL HISTORY OF -   2005    bladder sling     SURGICAL HISTORY OF -       Cystectomy-lower lip     TUBAL LIGATION  1987        Social History     Social History     Marital status:      Spouse name: Lucian Cervantes     Number of children: 2     Years of education: 15+     Occupational History     Customer Service [employer]     Social History Main Topics     Smoking status: Never Smoker     Smokeless tobacco: Never Used     Alcohol use No     Drug use: No     Sexual activity: Yes     Partners: Male     Birth control/ protection: Post-menopausal     Other Topics Concern     Parent/Sibling W/ Cabg, Mi Or Angioplasty Before 65f 55m? No     Social History Narrative        Family History   Problem Relation Age of Onset     Depression Mother      Eye Disorder Mother      Gynecology Mother      Alzheimer Disease Mother      CANCER Father      Other Cancer Father      HEART DISEASE Maternal Grandfather      Allergies Paternal Grandfather      Allergies Son         Immunization History   Administered Date(s) Administered     Tdap (Adacel,Boostrix) 04/04/2008       Current Outpatient Prescriptions   Medication Sig     capecitabine (XELODA) 500 MG tablet CHEMO Take 5 cap in AM and 5 cap in PM on Days 1 through 14, then off for 7 days. Take within 30 mins after meal.     DULERA 200-5 MCG/ACT oral inhaler INHALE 2 PUFFS INTO THE LUNGS TWICE DAILY     predniSONE (DELTASONE) 20 MG tablet Take 1 tablet (20 mg) by mouth daily     capecitabine (XELODA) 500 MG tablet CHEMO Take 5 cap in AM and 4 cap in PM on Days 1 through 14, then off for 7 days. Take within 30 mins after meal.     QVAR 80 MCG/ACT Inhaler INHALE 1 PUFFS INTO THE EACH NOSTRIL 2 TIMES DAILY     Phenylephrine-DM-GG (ROBITUSSIN COUGH/COLD CF PO) Take by mouth as needed     MAGIC MOUTHWASH, ENTER  INGREDIENTS IN COMMENTS, SWISH AND SWALLOW 10 MLS IN MOUTH EVERY 6 HOURS AS NEEDED FOR MOUTH SORES     lidocaine, viscous, (XYLOCAINE) 2 % solution Apply topically as needed     levothyroxine (SYNTHROID/LEVOTHROID) 25 MCG tablet TAKE 1 TABLET (25 MCG) BY MOUTH DAILY     atorvastatin (LIPITOR) 10 MG tablet Take 1 tablet (10 mg) by mouth daily     metoclopramide (REGLAN) 10 MG tablet Take 1 tablet (10 mg) by mouth 2 times daily as needed     oxyCODONE (ROXICODONE) 5 MG IR tablet Take 1 tablet (5 mg) by mouth every 4 hours as needed for moderate to severe pain     fexofenadine (ALLEGRA ALLERGY) 180 MG tablet Take 180 mg by mouth daily as needed for allergies     calcium carb-cholecalciferol (KP CALCIUM 600+D3) 600-500 MG-UNIT CAPS Take 2 tablets by mouth daily     zolpidem (AMBIEN) 10 MG tablet Take 1 tablet 30 minutes prior to bedtime     albuterol (2.5 MG/3ML) 0.083% neb solution Take 1 vial (2.5 mg) by nebulization every 4 hours as needed for shortness of breath / dyspnea     predniSONE (DELTASONE) 20 MG tablet Take 1 tablet (20 mg) by mouth 2 times daily For Yellow or Red zone on Asthma Action Plan.     magic mouthwash suspension (diphenhydrAMINE, lidocaine, aluminum-magnesium & simethicone) Swish and swallow 10 mLs in mouth every 6 hours as needed for mouth sores     LORazepam (ATIVAN) 0.5 MG tablet Take 1 tablet (0.5 mg) by mouth every 4 hours as needed (Anxiety, Nausea/Vomiting or Sleep)     prochlorperazine (COMPAZINE) 10 MG tablet Take 1 tablet (10 mg) by mouth every 6 hours as needed (Nausea/Vomiting)     Docusate Calcium (STOOL SOFTENER PO) Take 100 mg by mouth daily      CRANBERRY PO Take 1 capsule by mouth daily      oxybutynin (DITROPAN XL) 10 MG 24 hr tablet Take 1 tablet (10 mg) by mouth daily (Needs follow-up appointment for this medication)     azelastine (ASTELIN) 0.1 % nasal spray Spray 1-2 sprays into both nostrils 2 times daily as needed for rhinitis     albuterol (VENTOLIN HFA) 108 (90 BASE)  MCG/ACT inhaler Inhale 2 puffs into the lungs every 4 hours as needed     Acetaminophen (TYLENOL PO) Take 650 mg by mouth every 4 hours as needed for mild pain or fever     No current facility-administered medications for this visit.         Review of Systems:  I have done 10 points of review systems and pertinent findings are negative.    Physical examination  Constitutional: Alert, oriented, not in distress  Vitals: B/P: 108/73, T: 98.6, P: 87, R: 18  Eyes: No icterus, nystagmus, pupils isocoric  ENT/Mouth:  No ear discharge, moist mucosa, no ulceration, tonsillar hypertrophy  Cardiovascular: Normal S1 and S2, no additional heart sounds, murmur, rub, normal peripheral pulses  Respiratory: Both hemithoraces are symmetrical, normal to palpation, no dullness to percussion, auscultation of lungs are normal  Gastrointestinal/Rectal: Bowel sounds present, soft, non tender, non distended, no organomegaly, ascitis, mass  Musculoskeletal: Normal muscle mass, no dephormity  Integumentary:  No rash, normal texture and turgor, no edema  Neurological: Alert, orientedx3, no motor deficits, cranial nerves grossly normal  Psychiatric:  Mood and affect are appropriate with insight into his/her medical condition  Hematologic/Immunologic/Lymphatic: No bruise, no lymph node enargement     Data:  Lab Results   Component Value Date    WBC 4.2 10/17/2017     Lab Results   Component Value Date    RBC 3.32 10/17/2017     Lab Results   Component Value Date    HGB 12.1 10/17/2017     Lab Results   Component Value Date    HCT 34.9 10/17/2017     Lab Results   Component Value Date     10/17/2017     Lab Results   Component Value Date    MCH 36.4 10/17/2017     Lab Results   Component Value Date    MCHC 34.7 10/17/2017     Lab Results   Component Value Date    RDW 17.2 10/17/2017     Lab Results   Component Value Date     10/17/2017       Lab Results   Component Value Date     10/17/2017      Lab Results   Component Value  Date    POTASSIUM 3.7 10/17/2017     Lab Results   Component Value Date    CHLORIDE 105 10/17/2017     Lab Results   Component Value Date    BEN 9.4 10/17/2017     Lab Results   Component Value Date    CO2 27 10/17/2017     Lab Results   Component Value Date    BUN 13 10/17/2017     Lab Results   Component Value Date    CR 0.70 10/17/2017     Lab Results   Component Value Date    GLC 91 10/17/2017       Thank you for allowing me participate in the care of Etta Cervantes.    BETZAIDA Mcgowan MD

## 2017-11-08 NOTE — NURSING NOTE
"Oncology Rooming Note    November 8, 2017 8:01 AM   Etta Cervantes is a 57 year old female who presents for:    Chief Complaint   Patient presents with     Oncology Clinic Visit     Return visit      Initial Vitals: /73  Pulse 87  Temp 98.6  F (37  C) (Tympanic)  Resp 18  Ht 1.638 m (5' 4.49\")  Wt 91.1 kg (200 lb 14.4 oz)  LMP 02/06/2013  SpO2 96%  BMI 33.96 kg/m2 Estimated body mass index is 33.96 kg/(m^2) as calculated from the following:    Height as of this encounter: 1.638 m (5' 4.49\").    Weight as of this encounter: 91.1 kg (200 lb 14.4 oz). Body surface area is 2.04 meters squared.  No Pain (0) Comment: Data Unavailable   Patient's last menstrual period was 02/06/2013.  Allergies reviewed: Yes  Medications reviewed: Yes    Medications: Medication refills not needed today.  Pharmacy name entered into SupportSpace:    [facility] [id number] [place] [facility] - [address] SPECIALTY PHARMACY - [place], [place] - [address] [facility]  [id number] - [address] AT CORNER OF [facility]    Clinical concerns: No new concerns. Provider was notified.    10 minutes for nursing intake (face to face time)     Kasey Dunlap LPN            "

## 2017-11-09 NOTE — PROGRESS NOTES
Infusion Nursing Note:  Etta Cervantes presents today for PAC labs   Patient seen by provider today: No   present during visit today: Not Applicable.    Note: N/A.    Intravenous Access:  Labs drawn without difficulty.  Implanted Port.    Treatment Conditions:  Not Applicable.      Post Infusion Assessment:  Patient tolerated port flush without incident.  Blood return noted.  Access discontinued per protocol.    Discharge Plan:   Patient discharged in stable condition accompanied by: self.  Departure Mode: Ambulatory.  Pt to return on 11/10/17 at 1:30 pm for clinic appt with Dr. Solo as Dr. Cassidy is out of the office.      Pratima Felipe RN

## 2017-11-09 NOTE — MR AVS SNAPSHOT
After Visit Summary   11/9/2017    Etta Cervantes    MRN: [id number]           Patient Information     Date Of Birth          1960        Visit Information        Provider Department      11/9/2017 8:30 AM ROOM 9 [facility] Cancer Infusion        Today's Diagnoses     Secondary malignant neoplasm of liver (H)    -  1    Carcinoma of breast metastatic to liver, unspecified laterality (H)        Bone metastasis (H)        Breast cancer metastasized to axillary lymph node, right (H)           Follow-ups after your visit        Your next 10 appointments already scheduled     Nov 09, 2017  9:15 AM CST   Lymphedema Treatment with Brianne Lundberg PT   [facility] Lymphedema ([facility])    [address] [facility] [address]   [phone]            Nov 10, 2017  1:30 PM CST   Return Visit with Ria Solo MD   [facility] Cancer [facility] ([facility])    [facility]  [address]  [facility] [address]   [phone]            Nov 14, 2017  2:15 PM CST   Lymphedema Treatment with Brianne Lundberg PT   [facility] Lymphedema ([facility])    [address] [facility] [address]   [phone]            Nov 16, 2017  1:00 PM CST   Lymphedema Treatment with Brianne Lundberg PT   [facility] Lymphedema ([facility])    [address] [facility] [address]   [phone]            Dec 01, 2017  3:30 PM CST   Return Sleep Patient with Rajesh Ahuja MD   [facility] ([facility])    [address]  [facility] [id number] [phone]              Who to contact     If you have questions or need follow up information about today's clinic visit or your schedule please contact [facility] CANCER [facility] directly at [phone].  Normal or non-critical lab and imaging results will be communicated to you by MyChart, letter or phone within 4  business days after the clinic has received the results. If you do not hear from us within 7 days, please contact the clinic through Content[employer] or phone. If you have a critical or abnormal lab result, we will notify you by phone as soon as possible.  Submit refill requests through Contentful or call your pharmacy and they will forward the refill request to us. Please allow 3 business days for your refill to be completed.          Additional Information About Your Visit        [employer] Information     [employer] gives you secure access to your electronic health record. If you see a primary care provider, you can also send messages to your care team and make appointments. If you have questions, please call your primary care clinic.  If you do not have a primary care provider, please call [phone] and they will assist you.        Care EveryWhere ID     This is your Care EveryWhere ID. This could be used by other organizations to access your [place] medical records  [id number]        Your Vitals Were     Last Period                   02/06/2013            Blood Pressure from Last 3 Encounters:   11/08/17 108/73   10/22/17 135/67   10/18/17 136/69    Weight from Last 3 Encounters:   11/08/17 91.1 kg (200 lb 14.4 oz)   10/22/17 88.7 kg (195 lb 8.8 oz)   10/18/17 89.7 kg (197 lb 12.8 oz)              We Performed the Following     Ca27.29  breast tumor marker     CBC with platelets differential     Comprehensive metabolic panel        Primary Care Provider Office Phone # Fax #    Johana Fairbanks [phone] [address] [facility] [address]        Equal Access to Services     ANIYAH MIX : Hadii aad ku hadasho Soomaali, waaxda luqadaha, qaybta kaalmada adeegangie, hari guardado. So [facility] [phone].    ATENCIÓN: Si habla español, tiene a parekh disposición servicios gratuitos de asistencia lingüística. Llame al [phone].    We comply with applicable federal civil rights  laws and [place] laws. We do not discriminate on the basis of race, color, national origin, age, disability, sex, sexual orientation, or gender identity.            Thank you!     Thank you for choosing [facility]  for your care. Our goal is always to provide you with excellent care. Hearing back from our patients is one way we can continue to improve our services. Please take a few minutes to complete the written survey that you may receive in the mail after your visit with us. Thank you!             Your Updated Medication List - Protect others around you: Learn how to safely use, store and throw away your medicines at www.disposemymeds.org.          This list is accurate as of: 11/9/17  8:59 AM.  Always use your most recent med list.                   Brand Name Dispense Instructions for use Diagnosis    * albuterol 108 (90 BASE) MCG/ACT Inhaler    VENTOLIN HFA    1 Inhaler    Inhale 2 puffs into the lungs every 4 hours as needed    Moderate persistent asthma, uncomplicated       * albuterol (2.5 MG/3ML) 0.083% neb solution     30 vial    Take 1 vial (2.5 mg) by nebulization every 4 hours as needed for shortness of breath / dyspnea    Moderate persistent asthma, uncomplicated       ALLEGRA ALLERGY 180 MG tablet   Generic drug:  fexofenadine      Take 180 mg by mouth daily as needed for allergies        atorvastatin 10 MG tablet    LIPITOR    90 tablet    Take 1 tablet (10 mg) by mouth daily    Hyperlipidemia LDL goal <100       azelastine 0.1 % spray    ASTELIN    3 Bottle    Spray 1-2 sprays into both nostrils 2 times daily as needed for rhinitis    Chronic rhinitis       * capecitabine 500 MG tablet CHEMO    XELODA    126 tablet    Take 5 cap in AM and 4 cap in PM on Days 1 through 14, then off for 7 days. Take within 30 mins after meal.    Secondary malignant neoplasm of liver (H), Carcinoma of breast metastatic to liver, unspecified laterality (H), Bone metastasis (H), Breast cancer metastasized  to axillary lymph node, right (H)       * capecitabine 500 MG tablet CHEMO    XELODA    140 tablet    Take 5 cap in AM and 5 cap in PM on Days 1 through 14, then off for 7 days. Take within 30 mins after meal.    Secondary malignant neoplasm of liver (H), Carcinoma of breast metastatic to liver, unspecified laterality (H), Bone metastasis (H), Breast cancer metastasized to axillary lymph node, right (H)       CRANBERRY PO      Take 1 capsule by mouth daily        DULERA 200-5 MCG/ACT oral inhaler   Generic drug:  mometasone-formoterol     3 Inhaler    INHALE 2 PUFFS INTO THE LUNGS TWICE DAILY    Moderate persistent asthma without complication       KP CALCIUM 600+D3 600-500 MG-UNIT Caps   Generic drug:  calcium carb-cholecalciferol      Take 2 tablets by mouth daily        levothyroxine 25 MCG tablet    SYNTHROID/LEVOTHROID    90 tablet    TAKE 1 TABLET (25 MCG) BY MOUTH DAILY    Hypothyroidism due to acquired atrophy of thyroid       lidocaine (viscous) 2 % solution    XYLOCAINE     Apply topically as needed        lidocaine visc 2% 2.5mL/5mL & maalox/mylanta w/ simeth 2.5mL/5mL & diphenhydrAMINE 5mg/5mL Susp suspension    MAGIC Mouthwash [facility]    240 mL    Swish and swallow 10 mLs in mouth every 6 hours as needed for mouth sores    Mucositis due to chemotherapy, Breast cancer metastasized to axillary lymph node, right (H)       LORazepam 0.5 MG tablet    ATIVAN    30 tablet    Take 1 tablet (0.5 mg) by mouth every 4 hours as needed (Anxiety, Nausea/Vomiting or Sleep)    Secondary malignant neoplasm of liver (H), Carcinoma of breast metastatic to liver, unspecified laterality (H), Bone metastasis (H), Breast cancer metastasized to axillary lymph node, right (H)       MAGIC MOUTHWASH (ENTER INGREDIENTS IN COMMENTS)      SWISH AND SWALLOW 10 MLS IN MOUTH EVERY 6 HOURS AS NEEDED FOR MOUTH SORES        metoclopramide 10 MG tablet    REGLAN    120 tablet    Take 1 tablet (10 mg) by mouth 2 times daily as needed     Bilateral malignant neoplasm of breast in female, estrogen receptor positive, unspecified site of breast (H)       oxybutynin 10 MG 24 hr tablet    DITROPAN XL    90 tablet    Take 1 tablet (10 mg) by mouth daily (Needs follow-up appointment for this medication)    Mixed incontinence urge and stress (male)(female)       oxyCODONE IR 5 MG tablet    ROXICODONE    30 tablet    Take 1 tablet (5 mg) by mouth every 4 hours as needed for moderate to severe pain    Secondary malignant neoplasm of liver (H)       * predniSONE 20 MG tablet    DELTASONE    10 tablet    Take 1 tablet (20 mg) by mouth 2 times daily For Yellow or Red zone on Asthma Action Plan.    Moderate persistent asthma, uncomplicated       * predniSONE 20 MG tablet    DELTASONE    5 tablet    Take 1 tablet (20 mg) by mouth daily    Reactive airway disease, unspecified asthma severity, uncomplicated       prochlorperazine 10 MG tablet    COMPAZINE    30 tablet    Take 1 tablet (10 mg) by mouth every 6 hours as needed (Nausea/Vomiting)    Secondary malignant neoplasm of liver (H), Carcinoma of breast metastatic to liver, unspecified laterality (H), Bone metastasis (H), Breast cancer metastasized to axillary lymph node, right (H)       QVAR 80 MCG/ACT Inhaler   Generic drug:  beclomethasone     26.1 g    INHALE 1 PUFFS INTO THE EACH NOSTRIL 2 TIMES DAILY    Chronic rhinitis       ROBITUSSIN COUGH/COLD CF PO      Take by mouth as needed    Breast cancer metastasized to axillary lymph node, right (H)       STOOL SOFTENER PO      Take 100 mg by mouth daily        TYLENOL PO      Take 650 mg by mouth every 4 hours as needed for mild pain or fever        zolpidem 10 MG tablet    AMBIEN    30 tablet    Take 1 tablet 30 minutes prior to bedtime    Insomnia, unspecified       * Notice:  This list has 6 medication(s) that are the same as other medications prescribed for you. Read the directions carefully, and ask your doctor or other care provider to review them with  you.

## 2017-11-10 NOTE — NURSING NOTE
"Oncology Rooming Note    November 10, 2017 1:48 PM   Etta Cervantes is a 57 year old female who presents for:    Chief Complaint   Patient presents with     Oncology Clinic Visit     3 week follow up breast CA. Review labs, MR brain.      Initial Vitals: /52 (BP Location: Right arm, Patient Position: Sitting, Cuff Size: Adult Large)  Pulse 86  Temp 99.2  F (37.3  C) (Tympanic)  Resp 18  Ht 1.638 m (5' 4.5\")  Wt 90.9 kg (200 lb 8 oz)  LMP 02/06/2013  SpO2 100%  Breastfeeding? No  BMI 33.88 kg/m2 Estimated body mass index is 33.88 kg/(m^2) as calculated from the following:    Height as of this encounter: 1.638 m (5' 4.5\").    Weight as of this encounter: 90.9 kg (200 lb 8 oz). Body surface area is 2.03 meters squared.  No Pain (0) Comment: Data Unavailable   Patient's last menstrual period was 02/06/2013.  Allergies reviewed: Yes  Medications reviewed: Yes    Medications: Medication refills not needed today.  Pharmacy name entered into Euroling:    [facility] [id number] [place] [facility] - [place], [place] - [address] [place] [address] SPECIALTY PHARMACY - [place], [place] - [address] [facility]  [id number] [place], [place] - [address] [place] [address] [address] AT CORNER OF [facility]    Clinical concerns: 3 week follow up breast CA. Review labs and MR results.     7 minutes for nursing intake (face to face time)     Claudia Rodriguez, TYLER            "

## 2017-11-10 NOTE — PATIENT INSTRUCTIONS
1- CT scan chest abdomen and pelvis on Monday.  2- Please have Dr. Cassidy review the scan on Tuesday 11/14 and decide about Xeloda. Clinic staff to help coordinate.  3- Schedule a follow up with Dr. Cassidy to review results Wednesday 11/15

## 2017-11-10 NOTE — LETTER
11/10/2017         RE: Etta Cervantes  [address]  [place] [address]        Dear Colleague,    Thank you for referring your patient, Etta Cervantes, to the [facility]. Please see a copy of my visit note below.    This clinic visit note has been dictated.  [id number]        [facility] PHYSICIANS  HEMATOLOGY ONCOLOGY    ONCOLOGY FOLLOWUP NOTE      DATE OF SERVICE:  11/10/2017.      DIAGNOSIS:  Metastatic breast cancer.     Etta Cervantes has a complex history.  Initially, was diagnosed with bilateral breast cancer.  Right breast ultrasound-guided biopsy showed infiltrating ductal carcinoma, ER/NV positive, HER-2 non-amplified.  Right axilla showed metastatic ductal carcinoma and PET/CT scan 07/2015 showed right posterior cervical chain node which was enlarged.  There was extensive right axillary adenopathy.  There was a 1.2 cm lesion in the left breast and hypermetabolic adenopathy in left superior mediastinum, right paratracheal and precarinal/subcarinal adenopathy.  There was metastasis in the liver as well.      Initially treated with Taxotere and Cytoxan, completed 4 doses, was switched to Arimidex after that and later on, she was switched to Faslodex and Ibrance.  Lately, she has been on oral Xeloda.      SUBJECTIVE:  Patient was seen as a followup today.  She is on Xeloda.  She has expected side effects from the Xeloda.  She has peeling of the skin and there are fissures on both of her thumbs, palmar surface.  She does not have diarrhea.  Her mucositis appeared better.     REVIEW OF SYSTEMS:  A complete review of systems was performed and found to be negative other than pertinent positives mentioned in history of present illness.      Past medical, social histories reviewed.     Meds- Reviewed.     PHYSICAL EXAMINATION:     VITAL SIGNS:  Her blood pressure was 109/52, pulse 86, respirations 18, temperature 99.2.   CONSTITUTIONAL: Sitting comfortably.   HEENT: Pupils are equal.  Oropharynx is clear.   NECK: No cervical or supraclavicular lymphadenopathy.   RESPIRATORY: Clear bilaterally.   CARD/VASC: S1, S2, regular.   GI: Soft, nontender, nondistended, no hepatosplenomegaly.   MUSKULOSKELETAL: Warm, well perfused.   NEUROLOGIC: Alert, awake.   INTEGUMENT:She also had peeling of the skin of her hand and 2 small fissures on each thumbs, palmar surface.   LYMPHATICS: No edema.   PSYCH: She appeared quite anxious.    LABORATORY DATA AND IMAGING REVIEWED DURING THIS VISIT:  Recent Labs   Lab Test  11/09/17   0845  10/17/17   0754   07/13/17   0233   05/25/17   1500   NA  143  138   < >  140   < >  139   POTASSIUM  3.8  3.7   < >  3.7   < >  3.2*   CHLORIDE  113*  105   < >  108   < >  106   CO2  23  27   < >  26   < >  24   ANIONGAP  7  6   < >  6   < >  9   BUN  13  13   < >  9   < >  13   CR  0.60  0.70   < >  0.54   < >  0.66   GLC  102*  91   < >  90   < >  127*   BEN  8.7  9.4   < >  8.0*   < >  8.5   MAG   --    --    --   2.1   --   2.3   PHOS   --    --    --   2.8   --    --     < > = values in this interval not displayed.     Recent Labs   Lab Test  11/09/17   0845  10/17/17   0754  09/25/17   1425   WBC  4.4  4.2  4.0   HGB  11.9  12.1  12.1   PLT  156  158  140*   MCV  108*  105*  104*   NEUTROPHIL  58.2  53.2  51.7     Recent Labs   Lab Test  11/09/17   0845  10/17/17   0754  09/25/17   1425   01/06/15   1600   BILITOTAL  1.5*  1.6*  1.1   < >  1.0   ALKPHOS  85  96  91   < >  102   ALT  19  19  21   < >  19   AST  21  19  21   < >  16   ALBUMIN  3.3*  3.5  3.3*   < >  4.1   LDH   --    --    --    --   207    < > = values in this interval not displayed.      ECOG PERFORMANCE STATUS:  1.      ASSESSMENT:  This is a 57-year-old lady who is undergoing palliative intent chemotherapy for metastatic breast cancer.  She has been on Xeloda.  On her last visit with Dr. Cassidy, 10/18/2017, the dose was increased to 2500 mg twice daily due to continued slow elevation of CA 27-29.      Labs  were reviewed with the patient.  Her CA 27-29 has a slight uptrend.  It is 59; previously it was 49.      It appears that the hand-foot syndrome has some worsening as well after increasing the dose of Xeloda.  She has for fissures on both of her thumbs as well.      Given slow continued up trend elevation of CA 27-29, I think it would be reasonable to perform a CT scan of the chest, abdomen and pelvis and decide about further treatment with Xeloda.      PLAN:    1.  CT scan chest, abdomen and pelvis on Monday.   2.  Dr. Cassidy to review the scan on Tuesday and decide about continuing Xeloda.   3.  Schedule a followup with Dr. Cassidy to review results.         JJ RAMOS MD             D: 11/10/2017 15:40   T: 11/10/2017 20:03   MT: TD      Name:     LISSETH STEPHENSON   MRN:      [id number]        Account:      [id number]   :      1960           Visit Date:   11/10/2017      Document: [id number]       cc: Brodie Cassidy MD       Again, thank you for allowing me to participate in the care of your patient.        Sincerely,        Jj Ramos MD

## 2017-11-10 NOTE — MR AVS SNAPSHOT
After Visit Summary   11/10/2017    Etta Cervantes    MRN: [id number]           Patient Information     Date Of Birth          1960        Visit Information        Provider Department      11/10/2017 2:00 PM Jj Ramos MD [facility]        Today's Diagnoses     Carcinoma of breast metastatic to liver, unspecified laterality (H)    -  1      Care Instructions    1- CT scan chest abdomen and pelvis on Monday.  2- Please have Dr. Cassidy review the scan on Tuesday 11/14 and decide about Xeloda. Clinic staff to help coordinate.  3- Schedule a follow up with Dr. Cassidy to review results Wednesday 11/15          Follow-ups after your visit        Your next 10 appointments already scheduled     Nov 13, 2017  3:00 PM CST   CT CHEST/ABDOMEN/PELVIS W CONTRAST with WYCT1   [facility] CT ([facility])    [address] [facility] [address]   [phone]           Please bring any scans or X-rays taken at other hospitals, if similar tests were done. Also bring a list of your medicines, including vitamins, minerals and over-the-counter drugs. It is safest to leave personal items at home.  Be sure to tell your doctor:   If you have any allergies.   If there s any chance you are pregnant.   If you are breastfeeding.   If you have any special needs.  You may have contrast for this exam. To prepare:   Do not eat or drink for 2 hours before your exam. If you need to take medicine, you may take it with small sips of water. (We may ask you to take liquid medicine as well.)   The day before your exam, drink extra fluids at least six 8-ounce glasses (unless your doctor tells you to restrict your fluids).  Patients over 70 or patients with diabetes or kidney problems:   If you haven t had a blood test (creatinine test) within the last 30 days, go to your clinic or Diagnostic Imaging Department for this test.  If you have diabetes:   If your kidney function is normal, continue  taking your metformin (Avandamet, Glucophage, Glucovance, Metaglip) on the day of your exam.   If your kidney function is abnormal, wait 48 hours before restarting this medicine.  You will have oral contrast for this exam:   You will drink the contrast at home. Get this from your clinic or Diagnostic Imaging Department. Please follow the directions given.  Please wear loose clothing, such as a sweat suit or jogging clothes. Avoid snaps, zippers and other metal. We may ask you to undress and put on a hospital gown.  If you have any questions, please call the Imaging Department where you will have your exam.            Nov 14, 2017  2:15 PM CST   Lymphedema Treatment with Brianne Lundberg PT   [facility] ([facility])    [address] [address]   [phone]            Nov 16, 2017  1:00 PM CST   Lymphedema Treatment with Brianne Lundberg PT   [facility] ([facility])    [address] [address]   [phone]            Nov 16, 2017  2:15 PM CST   Return Visit with Brodie Cassidy MD   [facility] ([facility])    [facility]  [address]  [facility] [address]   [phone]            Nov 21, 2017  2:30 PM CST   Lymphedema Treatment with Brianne Lundberg PT   [facility] ([facility])    [address] [address]   [phone]            Nov 28, 2017  8:00 AM CST   Lymphedema Treatment with Brianne Lundberg PT   [facility] ([facility])    [address] [address]   [phone]            Nov 30, 2017  8:00 AM CST   Lymphedema Treatment with Brianne Lundberg PT   [facility] ([facility])    [address] [facility] [address] [address]   [phone]            Dec 01, 2017  3:30 PM CST   Return Sleep Patient with Rajesh  "Lonnie Ahuja MD   [facility] ([facility])    [address]  [facility] [address] [phone]              Future tests that were ordered for you today     Open Future Orders        Priority Expected Expires Ordered    CT Chest/Abdomen/Pelvis w Contrast Routine  11/10/2018 11/10/2017            Who to contact     If you have questions or need follow up information about today's clinic visit or your schedule please contact [facility] directly at [phone].  Normal or non-critical lab and imaging results will be communicated to you by Growishhart, letter or phone within 4 business days after the clinic has received the results. If you do not hear from us within 7 days, please contact the clinic through Lamppostt or phone. If you have a critical or abnormal lab result, we will notify you by phone as soon as possible.  Submit refill requests through [facility] or call your pharmacy and they will forward the refill request to us. Please allow 3 business days for your refill to be completed.          Additional Information About Your Visit        Growishhart Information     [facility] gives you secure access to your electronic health record. If you see a primary care provider, you can also send messages to your care team and make appointments. If you have questions, please call your primary care clinic.  If you do not have a primary care provider, please call [phone] and they will assist you.        Care EveryWhere ID     This is your Care EveryWhere ID. This could be used by other organizations to access your [facility] medical records  [id number]        Your Vitals Were     Pulse Temperature Respirations Height Last Period Pulse Oximetry    86 99.2  F (37.3  C) (Tympanic) 18 1.638 m (5' 4.5\") 02/06/2013 100%    Breastfeeding? BMI (Body Mass Index)                No 33.88 kg/m2           Blood Pressure from Last 3 Encounters:   11/10/17 109/52   11/08/17 108/73 "   10/22/17 135/67    Weight from Last 3 Encounters:   11/10/17 90.9 kg (200 lb 8 oz)   11/08/17 91.1 kg (200 lb 14.4 oz)   10/22/17 88.7 kg (195 lb 8.8 oz)               Primary Care Provider Office Phone # Fax #    Johana Fairbanks [phone] [address] [facility] [address]        Equal Access to Services     ANIYAH MIX : Hadii aad ku hadasho Soomaali, waaxda luqadaha, qaybta kaalmada adeegyada, waxay idiin hayaan adeeg kharash la'lorrin . So [facility] [phone].    ATENCIÓN: Si habla español, tiene a parekh disposición servicios gratuitos de asistencia lingüística. [facility] [phone].    We comply with applicable federal civil rights laws and [place] laws. We do not discriminate on the basis of race, color, national origin, age, disability, sex, sexual orientation, or gender identity.            Thank you!     Thank you for choosing [facility]  for your care. Our goal is always to provide you with excellent care. Hearing back from our patients is one way we can continue to improve our services. Please take a few minutes to complete the written survey that you may receive in the mail after your visit with us. Thank you!             Your Updated Medication List - Protect others around you: Learn how to safely use, store and throw away your medicines at www.disposemymeds.org.          This list is accurate as of: 11/10/17  2:48 PM.  Always use your most recent med list.                   Brand Name Dispense Instructions for use Diagnosis    * albuterol 108 (90 BASE) MCG/ACT Inhaler    VENTOLIN HFA    1 Inhaler    Inhale 2 puffs into the lungs every 4 hours as needed    Moderate persistent asthma, uncomplicated       * albuterol (2.5 MG/3ML) 0.083% neb solution     30 vial    Take 1 vial (2.5 mg) by nebulization every 4 hours as needed for shortness of breath / dyspnea    Moderate persistent asthma, uncomplicated       ALLEGRA ALLERGY 180 MG tablet   Generic drug:  fexofenadine       Take 180 mg by mouth daily as needed for allergies        atorvastatin 10 MG tablet    LIPITOR    90 tablet    Take 1 tablet (10 mg) by mouth daily    Hyperlipidemia LDL goal <100       azelastine 0.1 % spray    ASTELIN    3 Bottle    Spray 1-2 sprays into both nostrils 2 times daily as needed for rhinitis    Chronic rhinitis       * capecitabine 500 MG tablet CHEMO    XELODA    126 tablet    Take 5 cap in AM and 4 cap in PM on Days 1 through 14, then off for 7 days. Take within 30 mins after meal.    Secondary malignant neoplasm of liver (H), Carcinoma of breast metastatic to liver, unspecified laterality (H), Bone metastasis (H), Breast cancer metastasized to axillary lymph node, right (H)       * capecitabine 500 MG tablet CHEMO    XELODA    140 tablet    Take 5 cap in AM and 5 cap in PM on Days 1 through 14, then off for 7 days. Take within 30 mins after meal.    Secondary malignant neoplasm of liver (H), Carcinoma of breast metastatic to liver, unspecified laterality (H), Bone metastasis (H), Breast cancer metastasized to axillary lymph node, right (H)       CRANBERRY PO      Take 1 capsule by mouth daily        DULERA 200-5 MCG/ACT oral inhaler   Generic drug:  mometasone-formoterol     3 Inhaler    INHALE 2 PUFFS INTO THE LUNGS TWICE DAILY    Moderate persistent asthma without complication       KP CALCIUM 600+D3 600-500 MG-UNIT Caps   Generic drug:  calcium carb-cholecalciferol      Take 2 tablets by mouth daily        levothyroxine 25 MCG tablet    SYNTHROID/LEVOTHROID    90 tablet    TAKE 1 TABLET (25 MCG) BY MOUTH DAILY    Hypothyroidism due to acquired atrophy of thyroid       lidocaine (viscous) 2 % solution    XYLOCAINE     Apply topically as needed        lidocaine visc 2% 2.5mL/5mL & maalox/mylanta w/ simeth 2.5mL/5mL & diphenhydrAMINE 5mg/5mL Susp suspension    [facility]    240 mL    Swish and swallow 10 mLs in mouth every 6 hours as needed for mouth sores    Mucositis due to  chemotherapy, Breast cancer metastasized to axillary lymph node, right (H)       LORazepam 0.5 MG tablet    ATIVAN    30 tablet    Take 1 tablet (0.5 mg) by mouth every 4 hours as needed (Anxiety, Nausea/Vomiting or Sleep)    Secondary malignant neoplasm of liver (H), Carcinoma of breast metastatic to liver, unspecified laterality (H), Bone metastasis (H), Breast cancer metastasized to axillary lymph node, right (H)       MAGIC MOUTHWASH (ENTER INGREDIENTS IN COMMENTS)      SWISH AND SWALLOW 10 MLS IN MOUTH EVERY 6 HOURS AS NEEDED FOR MOUTH SORES        metoclopramide 10 MG tablet    REGLAN    120 tablet    Take 1 tablet (10 mg) by mouth 2 times daily as needed    Bilateral malignant neoplasm of breast in female, estrogen receptor positive, unspecified site of breast (H)       oxybutynin 10 MG 24 hr tablet    DITROPAN XL    90 tablet    Take 1 tablet (10 mg) by mouth daily (Needs follow-up appointment for this medication)    Mixed incontinence urge and stress (male)(female)       oxyCODONE IR 5 MG tablet    ROXICODONE    30 tablet    Take 1 tablet (5 mg) by mouth every 4 hours as needed for moderate to severe pain    Secondary malignant neoplasm of liver (H)       * predniSONE 20 MG tablet    DELTASONE    10 tablet    Take 1 tablet (20 mg) by mouth 2 times daily For Yellow or Red zone on Asthma Action Plan.    Moderate persistent asthma, uncomplicated       * predniSONE 20 MG tablet    DELTASONE    5 tablet    Take 1 tablet (20 mg) by mouth daily    Reactive airway disease, unspecified asthma severity, uncomplicated       prochlorperazine 10 MG tablet    COMPAZINE    30 tablet    Take 1 tablet (10 mg) by mouth every 6 hours as needed (Nausea/Vomiting)    Secondary malignant neoplasm of liver (H), Carcinoma of breast metastatic to liver, unspecified laterality (H), Bone metastasis (H), Breast cancer metastasized to axillary lymph node, right (H)       QVAR 80 MCG/ACT Inhaler   Generic drug:  beclomethasone     26.1 g     INHALE 1 PUFFS INTO THE EACH NOSTRIL 2 TIMES DAILY    Chronic rhinitis       ROBITUSSIN COUGH/COLD CF PO      Take by mouth as needed    Breast cancer metastasized to axillary lymph node, right (H)       STOOL SOFTENER PO      Take 100 mg by mouth daily        TYLENOL PO      Take 650 mg by mouth every 4 hours as needed for mild pain or fever        zolpidem 10 MG tablet    AMBIEN    30 tablet    Take 1 tablet 30 minutes prior to bedtime    Insomnia, unspecified       * Notice:  This list has 6 medication(s) that are the same as other medications prescribed for you. Read the directions carefully, and ask your doctor or other care provider to review them with you.

## 2017-11-11 NOTE — PROGRESS NOTES
[facility] PHYSICIANS  HEMATOLOGY ONCOLOGY    ONCOLOGY FOLLOWUP NOTE      DATE OF SERVICE:  11/10/2017.      DIAGNOSIS:  Metastatic breast cancer.     Etta Cervantes has a complex history.  Initially, was diagnosed with bilateral breast cancer.  Right breast ultrasound-guided biopsy showed infiltrating ductal carcinoma, ER/WA positive, HER-2 non-amplified.  Right axilla showed metastatic ductal carcinoma and PET/CT scan 07/2015 showed right posterior cervical chain node which was enlarged.  There was extensive right axillary adenopathy.  There was a 1.2 cm lesion in the left breast and hypermetabolic adenopathy in left superior mediastinum, right paratracheal and precarinal/subcarinal adenopathy.  There was metastasis in the liver as well.      Initially treated with Taxotere and Cytoxan, completed 4 doses, was switched to Arimidex after that and later on, she was switched to Faslodex and Ibrance.  Lately, she has been on oral Xeloda.      SUBJECTIVE:  Patient was seen as a followup today.  She is on Xeloda.  She has expected side effects from the Xeloda.  She has peeling of the skin and there are fissures on both of her thumbs, palmar surface.  She does not have diarrhea.  Her mucositis appeared better.     REVIEW OF SYSTEMS:  A complete review of systems was performed and found to be negative other than pertinent positives mentioned in history of present illness.      Past medical, social histories reviewed.     Meds- Reviewed.     PHYSICAL EXAMINATION:     VITAL SIGNS:  Her blood pressure was 109/52, pulse 86, respirations 18, temperature 99.2.   CONSTITUTIONAL: Sitting comfortably.   HEENT: Pupils are equal. Oropharynx is clear.   NECK: No cervical or supraclavicular lymphadenopathy.   RESPIRATORY: Clear bilaterally.   CARD/VASC: S1, S2, regular.   GI: Soft, nontender, nondistended, no hepatosplenomegaly.   MUSKULOSKELETAL: Warm, well perfused.   NEUROLOGIC: Alert, awake.   INTEGUMENT:She also had  peeling of the skin of her hand and 2 small fissures on each thumbs, palmar surface.   LYMPHATICS: No edema.   PSYCH: She appeared quite anxious.    LABORATORY DATA AND IMAGING REVIEWED DURING THIS VISIT:  Recent Labs   Lab Test  11/09/17   0845  10/17/17   0754   07/13/17   0233   05/25/17   1500   NA  143  138   < >  140   < >  139   POTASSIUM  3.8  3.7   < >  3.7   < >  3.2*   CHLORIDE  113*  105   < >  108   < >  106   CO2  23  27   < >  26   < >  24   ANIONGAP  7  6   < >  6   < >  9   BUN  13  13   < >  9   < >  13   CR  0.60  0.70   < >  0.54   < >  0.66   GLC  102*  91   < >  90   < >  127*   BEN  8.7  9.4   < >  8.0*   < >  8.5   MAG   --    --    --   2.1   --   2.3   PHOS   --    --    --   2.8   --    --     < > = values in this interval not displayed.     Recent Labs   Lab Test  11/09/17   0845  10/17/17   0754  09/25/17   1425   WBC  4.4  4.2  4.0   HGB  11.9  12.1  12.1   PLT  156  158  140*   MCV  108*  105*  104*   NEUTROPHIL  58.2  53.2  51.7     Recent Labs   Lab Test  11/09/17   0845  10/17/17   0754  09/25/17   1425   01/06/15   1600   BILITOTAL  1.5*  1.6*  1.1   < >  1.0   ALKPHOS  85  96  91   < >  102   ALT  19  19  21   < >  19   AST  21  19  21   < >  16   ALBUMIN  3.3*  3.5  3.3*   < >  4.1   LDH   --    --    --    --   207    < > = values in this interval not displayed.      ECOG PERFORMANCE STATUS:  1.      ASSESSMENT:  This is a 57-year-old lady who is undergoing palliative intent chemotherapy for metastatic breast cancer.  She has been on Xeloda.  On her last visit with Dr. Cassidy, 10/18/2017, the dose was increased to 2500 mg twice daily due to continued slow elevation of CA 27-29.      Labs were reviewed with the patient.  Her CA 27-29 has a slight uptrend.  It is 59; previously it was 49.      It appears that the hand-foot syndrome has some worsening as well after increasing the dose of Xeloda.  She has for fissures on both of her thumbs as well.      Given slow continued up  trend elevation of CA 27-29, I think it would be reasonable to perform a CT scan of the chest, abdomen and pelvis and decide about further treatment with Xeloda.      PLAN:    1.  CT scan chest, abdomen and pelvis on Monday.   2.  Dr. Cassidy to review the scan on Tuesday and decide about continuing Xeloda.   3.  Schedule a followup with Dr. Cassidy to review results.         MARIAMA GILMORE MD             D: 11/10/2017 15:40   T: 11/10/2017 20:03   MT: TD      Name:     LISSETH STEPHENSON   MRN:      [id number]        Account:      [id number]   :      1960           Visit Date:   11/10/2017      Document: [id number]       cc: Brodie Cassidy MD

## 2017-11-14 NOTE — TELEPHONE ENCOUNTER
Chief Complaint   Patient presents with     Refill Request     zolpidem (AMBIEN) 10 MG tablet      Refill request received via fax by pharmacy    [facility] [address] [place] [address] [facility] tel:[phone]    Pending Prescriptions:                       Disp   Refills    zolpidem (AMBIEN) 10 MG tablet            30 tab*0            Sig: Take 1 tablet 30 minutes prior to bedtime         Last Written Prescription Date:  07/06/2016  Last Fill Quantity: 30 per 30 days,   # refills: 3  Last Office Visit with prescribing provider: 09/23/2016  Future Office visit: 12/01/2017   Next 5 appointments (look out 90 days)     Nov 16, 2017  2:15 PM CST   Return Visit with Brodie Cassidy MD   [facility] ([facility])    [facility] [address]  [facility] [address]   [phone]                   Routing refill request to provider for review/approval because:  Drug not on the [facility] or [facility] refill protocol or controlled substance

## 2017-11-15 NOTE — PATIENT INSTRUCTIONS
Do not do your lymphedema therapy appt tomorrow. You will need to have an echo this morning and possibly be referred to thoracic surgery.  Dr. Cassidy will talk with Dr. Mcgowan and we will let you know the recommendations. We will call you with the echo results and the follow up plan. Copy of appointments, and after visit summary (AVS) given to patient.  If you have any questions during business hours (M-F 8 AM- 4PM), please call Funmi Ray RN, BSN, OCN Oncology Hematology /Breast Cancer Navigator at [facility] [phone].   For questions after business hours, or on holidays/weekends, please call our after hours Nurse Triage line [phone]. Thank you.

## 2017-11-15 NOTE — NURSING NOTE
"Oncology Rooming Note    November 15, 2017 8:19 AM   Etta Cervantes is a 57 year old female who presents for:    Chief Complaint   Patient presents with     Oncology Clinic Visit     1 week recheck Breast CA, discuss treatment      Initial Vitals: /51 (BP Location: Right arm, Patient Position: Sitting, Cuff Size: Adult Regular)  Pulse 81  Temp 99.3  F (37.4  C) (Tympanic)  Resp 12  Ht 1.638 m (5' 4.49\")  Wt 89.6 kg (197 lb 8 oz)  LMP 02/06/2013  SpO2 99%  Breastfeeding? No  BMI 33.39 kg/m2 Estimated body mass index is 33.39 kg/(m^2) as calculated from the following:    Height as of this encounter: 1.638 m (5' 4.49\").    Weight as of this encounter: 89.6 kg (197 lb 8 oz). Body surface area is 2.02 meters squared.  Moderate Pain (5) Comment: bilateral ribs, toes & legs   Patient's last menstrual period was 02/06/2013.  Allergies reviewed: Yes  Medications reviewed: Yes    Medications: Medication refills not needed today.  Pharmacy name entered into [facility]:    [id number] [place] [facility] - [address] [facility] - [place], [place] - [address] [facility]  [id number] [address] OF [facility]    Clinical concerns 1 week recheck Breast CA, discuss treatment     1. Leg & toe cramps last night.   2. Bilateral rib pain 3/10.   7  minutes for nursing intake (face to face time)     Josephine Virgen [facility]              "

## 2017-11-15 NOTE — LETTER
11/15/2017         RE: Etta Cervantes  [address]  [place] [address]        Dear Colleague,    Thank you for referring your patient, Etta Crevantes, to the [facility]. Please see a copy of my visit note below.    Per Dr. Cassidy, Dr. Mcgowan does not feel the pericardial effusion is related to the stent removal. Please proceed with thoracic surgery referral.    Hematology/ Oncology Follow-up Visit:  Nov 15, 2017    Reason for Visit:   Chief Complaint   Patient presents with     Oncology Clinic Visit     1 week recheck Breast CA, discuss treatment        Oncologic History:  Breast cancer (H)  Etta Cervantes presented with increasing lump in the right axilla that s lump has been growing without any pain or associated symptoms. Subsequently she was evaluated by primary care physician. Diagnostic digital mammogram was done on 01/13/2015 showing enlarged lymph nodes in the right axilla. There was some skin thickening in the right breast anteriorly and laterally. There are no parenchymal changes in the right breast. The left breast shows a 1.7 cm spiculated mass at approximately 2 to 3 o'clock position, 15.2 cm away from the nipple. A right breast ultrasound was subsequently done and ultrasound guided biopsy was done. Needle biopsy of the left breast did show infiltrating ductal carcinoma grade I of III with no angiolymphatic invasion seen. No associated ductal carcinoma in situ. Estrogen receptor, progresterone receptor were positive. HER-2/sadie receptor came back negative.. Another biopsy from the right axilla did show metastatic ductal carcinoma. PET scan was done on January 26, 2015 showing few small right posterior cervical chain nodes the largest is 1.2 cm. There is extensive bulky right axillary adenopathy demonstrating hypermetabolic FDG uptake with SUV of 10.6. There is skin thickening involving the right breast which demonstrated low-grade FDG uptake. A 1.2 cm lesion on the lateral aspect  of the left breast demonstrated minimally increased FDG uptake with SUV of 2. Scattered hypermetabolic mediastinal lymph nodes located in the left superior anterior mediastinum, right paratracheal and precarinal U, subcranial adenopathy with javon metastases. This focus hypermetabolic FDG uptake identified definitive Amanda posterior aspect off intertrochanteric region of the proximal left femur consistent with bone metastases. There is also 1.4 cm hypermetabolic FDG uptake identified in the anterior medial segment of the left hepatic lobe consistent with liver metastases. Additional 2.1 cm low-attenuation lesion anterior aspect of the right lobe which needs to be determined. The patient was started on chemotherapy with Taxotere and Cytoxan on February 9, 2015.  She concluded 4 cycles of Taxotere and Cytoxan. She started on Arimidex 1 mg orally daily. Currently she is on Faslodex and ibrance. Subsequently the patient went on to receive Afinitor. The patient also started treatment with Xeloda.       Interval History:  Patient is here today for follow-up. She had a CT scan of the chest done this week showing mild to moderate pericardial effusion and slight increase in the liver metastases. Since scan was done because of slightly increased in CA-27-29. The patient has been asymptomatic. She denies any shortness of breath or chest pain. She denies any palpitation. She denies any dizziness. She has been on Xeloda without significant side effects. She denies any bony aches or pains.    Review Of Systems:  Constitutional: Negative for fever, chills, and night sweats.  Skin: negative.  Eyes: negative.  Ears/Nose/Throat: negative.  Respiratory: No shortness of breath, dyspnea on exertion, cough, or hemoptysis.  Cardiovascular: negative.  Gastrointestinal: negative.  Genitourinary: negative.  Musculoskeletal: negative.  Neurologic: negative.  Psychiatric: negative.  Hematologic/Lymphatic/Immunologic: negative.  Endocrine:  negative.    All other ROS negative unless mentioned in interval history.    Past medical, social, surgical, and family histories reviewed.    Allergies:  Allergies as of 11/15/2017 - Manuel as Reviewed 11/15/2017   Allergen Reaction Noted     Animal dander Cough 01/23/2015     Cats  07/15/2010     Dust mites Cough 01/23/2015     Pollen extract  01/23/2015     Seasonal allergies  07/15/2010     Levaquin [levofloxacin] Rash 07/17/2017       Current Medications:  Current Outpatient Prescriptions   Medication Sig Dispense Refill     zolpidem (AMBIEN) 10 MG tablet Take 1 tablet 30 minutes prior to bedtime 30 tablet 0     capecitabine (XELODA) 500 MG tablet CHEMO Take 5 cap in AM and 5 cap in PM on Days 1 through 14, then off for 7 days. Take within 30 mins after meal. 140 tablet 0     DULERA 200-5 MCG/ACT oral inhaler INHALE 2 PUFFS INTO THE LUNGS TWICE DAILY 3 Inhaler 3     predniSONE (DELTASONE) 20 MG tablet Take 1 tablet (20 mg) by mouth daily 5 tablet 0     QVAR 80 MCG/ACT Inhaler INHALE 1 PUFFS INTO THE EACH NOSTRIL 2 TIMES DAILY 26.1 g 3     Phenylephrine-DM-GG (ROBITUSSIN COUGH/COLD CF PO) Take by mouth as needed       lidocaine, viscous, (XYLOCAINE) 2 % solution Apply topically as needed       levothyroxine (SYNTHROID/LEVOTHROID) 25 MCG tablet TAKE 1 TABLET (25 MCG) BY MOUTH DAILY 90 tablet 2     atorvastatin (LIPITOR) 10 MG tablet Take 1 tablet (10 mg) by mouth daily 90 tablet 3     metoclopramide (REGLAN) 10 MG tablet Take 1 tablet (10 mg) by mouth 2 times daily as needed 120 tablet 1     oxyCODONE (ROXICODONE) 5 MG IR tablet Take 1 tablet (5 mg) by mouth every 4 hours as needed for moderate to severe pain 30 tablet 0     fexofenadine (ALLEGRA ALLERGY) 180 MG tablet Take 180 mg by mouth daily as needed for allergies       calcium carb-cholecalciferol (KP CALCIUM 600+D3) 600-500 MG-UNIT CAPS Take 2 tablets by mouth daily       albuterol (2.5 MG/3ML) 0.083% neb solution Take 1 vial (2.5 mg) by nebulization every 4  "hours as needed for shortness of breath / dyspnea 30 vial 3     predniSONE (DELTASONE) 20 MG tablet Take 1 tablet (20 mg) by mouth 2 times daily For Yellow or Red zone on Asthma Action Plan. 10 tablet 1     LORazepam (ATIVAN) 0.5 MG tablet Take 1 tablet (0.5 mg) by mouth every 4 hours as needed (Anxiety, Nausea/Vomiting or Sleep) 30 tablet 2     prochlorperazine (COMPAZINE) 10 MG tablet Take 1 tablet (10 mg) by mouth every 6 hours as needed (Nausea/Vomiting) 30 tablet 2     Docusate Calcium (STOOL SOFTENER PO) Take 100 mg by mouth daily        CRANBERRY PO Take 1 capsule by mouth daily        azelastine (ASTELIN) 0.1 % nasal spray Spray 1-2 sprays into both nostrils 2 times daily as needed for rhinitis 3 Bottle 3     albuterol (VENTOLIN HFA) 108 (90 BASE) MCG/ACT inhaler Inhale 2 puffs into the lungs every 4 hours as needed 1 Inhaler 2     Acetaminophen (TYLENOL PO) Take 650 mg by mouth every 4 hours as needed for mild pain or fever       oxybutynin (DITROPAN XL) 10 MG 24 hr tablet Take 1 tablet (10 mg) by mouth daily (Needs follow-up appointment for this medication) (Patient not taking: Reported on 11/15/2017) 90 tablet 0     magic mouthwash suspension (diphenhydrAMINE, lidocaine, aluminum-magnesium & simethicone) Swish and swallow 10 mLs in mouth every 6 hours as needed for mouth sores (Patient not taking: Reported on 11/15/2017) 240 mL 10        Physical Exam:  /51 (BP Location: Right arm, Patient Position: Sitting, Cuff Size: Adult Regular)  Pulse 81  Temp 99.3  F (37.4  C) (Tympanic)  Resp 12  Ht 1.638 m (5' 4.49\")  Wt 89.6 kg (197 lb 8 oz)  LMP 02/06/2013  SpO2 99%  Breastfeeding? No  BMI 33.39 kg/m2  Wt Readings from Last 12 Encounters:   11/15/17 89.6 kg (197 lb 8 oz)   11/10/17 90.9 kg (200 lb 8 oz)   11/08/17 91.1 kg (200 lb 14.4 oz)   10/22/17 88.7 kg (195 lb 8.8 oz)   10/18/17 89.7 kg (197 lb 12.8 oz)   09/28/17 89.2 kg (196 lb 10.4 oz)   09/13/17 88.1 kg (194 lb 4.8 oz)   09/13/17 88.7 kg " "(195 lb 8 oz)   09/07/17 87.5 kg (192 lb 14.4 oz)   09/05/17 88.8 kg (195 lb 11.2 oz)   08/17/17 86.7 kg (191 lb 1.6 oz)   08/16/17 86.6 kg (191 lb)     ECOG performance status:1  GENERAL APPEARANCE: Healthy, alert and in no acute distress.  HEENT: Sclerae anicteric. PERRLA. Oropharynx without ulcers, lesions, or thrush.  NECK: Supple. No asymmetry or masses.  LYMPHATICS: No palpable cervical, supraclavicular, axillary, or inguinal lymphadenopathy.  RESP: Lungs clear to auscultation bilaterally without rales, rhonchi or wheezes.  BREAST: No adenopathy in the right axilla is not palpable. There is lymphedema of the right breast. Left breast to dominant masses or axillary adenopathy. \"CARDIOVASCULAR: Regular rate and rhythm. Normal S1, S2; no S3 or S4. No murmur, gallop, or rub.  ABDOMEN: Soft, nontender. Bowel sounds normal. No palpable organomegaly or masses.  MUSCULOSKELETAL: Extremities without gross deformities noted. No edema of bilateral lower extremities.  SKIN: No suspicious lesions or rashes.  NEURO: Alert and oriented x 3. Cranial nerves II-XII grossly intact.  PSYCHIATRIC: Mentation and affect appear normal.    Laboratory/Imaging Studies:  Infusion Therapy Visit on 11/09/2017   Component Date Value Ref Range Status     WBC 11/09/2017 4.4  4.0 - 11.0 10e9/L Final     RBC Count 11/09/2017 3.30* 3.8 - 5.2 10e12/L Final     Hemoglobin 11/09/2017 11.9  11.7 - 15.7 g/dL Final     Hematocrit 11/09/2017 35.5  35.0 - 47.0 % Final     MCV 11/09/2017 108* 78 - 100 fl Final     MCH 11/09/2017 36.1* 26.5 - 33.0 pg Final     MCHC 11/09/2017 33.5  31.5 - 36.5 g/dL Final     RDW 11/09/2017 17.4* 10.0 - 15.0 % Final     Platelet Count 11/09/2017 156  150 - 450 10e9/L Final     Diff Method 11/09/2017 Automated Method   Final     % Neutrophils 11/09/2017 58.2  % Final     % Lymphocytes 11/09/2017 26.5  % Final     % Monocytes 11/09/2017 12.2  % Final     % Eosinophils 11/09/2017 2.7  % Final     % Basophils 11/09/2017 0.2  % " Final     % Immature Granulocytes 11/09/2017 0.2  % Final     Absolute Neutrophil 11/09/2017 2.6  1.6 - 8.3 10e9/L Final     Absolute Lymphocytes 11/09/2017 1.2  0.8 - 5.3 10e9/L Final     Absolute Monocytes 11/09/2017 0.5  0.0 - 1.3 10e9/L Final     Absolute Eosinophils 11/09/2017 0.1  0.0 - 0.7 10e9/L Final     Absolute Basophils 11/09/2017 0.0  0.0 - 0.2 10e9/L Final     Abs Immature Granulocytes 11/09/2017 0.0  0 - 0.4 10e9/L Final     Sodium 11/09/2017 143  133 - 144 mmol/L Final     Potassium 11/09/2017 3.8  3.4 - 5.3 mmol/L Final     Chloride 11/09/2017 113* 94 - 109 mmol/L Final     Carbon Dioxide 11/09/2017 23  20 - 32 mmol/L Final     Anion Gap 11/09/2017 7  3 - 14 mmol/L Final     Glucose 11/09/2017 102* 70 - 99 mg/dL Final     Urea Nitrogen 11/09/2017 13  7 - 30 mg/dL Final     Creatinine 11/09/2017 0.60  0.52 - 1.04 mg/dL Final     GFR Estimate 11/09/2017 >90  >60 mL/min/1.7m2 Final    Non  GFR Calc     GFR Estimate If Black 11/09/2017 >90  >60 mL/min/1.7m2 Final    African American GFR Calc     Calcium 11/09/2017 8.7  8.5 - 10.1 mg/dL Final     Bilirubin Total 11/09/2017 1.5* 0.2 - 1.3 mg/dL Final     Albumin 11/09/2017 3.3* 3.4 - 5.0 g/dL Final     Protein Total 11/09/2017 6.5* 6.8 - 8.8 g/dL Final     Alkaline Phosphatase 11/09/2017 85  40 - 150 U/L Final     ALT 11/09/2017 19  0 - 50 U/L Final     AST 11/09/2017 21  0 - 45 U/L Final     CA 27-29 11/09/2017 59* 0 - 39 U/mL Final    Assay Method:  Chemiluminescence using Siemens Centaur XP        Recent Results (from the past 744 hour(s))   MR Brain w/o & w Contrast    Narrative    MR BRAIN W/O & W CONTRAST  11/6/2017 3:16 PM     HISTORY:  Disorientation. History of breast and lung cancer.    TECHNIQUE: Multiplanar, multisequence MRI of the brain without and  with 8ml gadavist IV contrast material.    COMPARISON: None.    FINDINGS:  There are T2 hyperintensities in the white matter both  hemispheres. These are probably due to small  vessel ischemic change in  this age patient..  There is no evidence of hemorrhage, mass, acute  infarct, or anomaly. There are no gadolinium enhancing lesions. No  metastatic lesions are seen. There is a 3 cm polyp or retention cysts  in the right maxillary sinus..  The arteries at the base of the brain  and the dural venous sinuses appear patent.      Impression    IMPRESSION:   1. No enhancing parenchymal lesions are identified. No brain  metastasis are seen.  2. Nonspecific, nonenhancing white matter hyperintensities. In this  age group, they are probably due to small vessel ischemic change and  part of the normal aging process.     LAURA AGUIRRE MD   X-ray Chest 2 vws*    Narrative    PA and lateral chest    HISTORY: Abnormal chest CT    COMPARISON STUDY: 9/28/2017    FINDINGS: Left subclavian Port-A-Cath tip in the high SVC. Cardiac  silhouette is large. Tiny right apical pneumothorax versus bulla with  linear atelectasis in the right upper lung zone.      Impression    IMPRESSION: Tiny right apical pneumothorax versus apical bullae with  linear bands of atelectasis or scarring.    KAVON MILLS MD   CT Chest/Abdomen/Pelvis w Contrast   Result Value    Radiologist flags New mild-moderate pericardial effusion (Urgent)    Narrative    CT CHEST/ABDOMEN/PELVIS W CONTRAST 11/13/2017 2:54 PM    HISTORY: Breast cancer. Follow-up.    CONTRAST:  97 mL Isovue 370.    TECHNIQUE: CT of the chest, abdomen, and pelvis is performed with IV  contrast.    Routine evaluated structures in the chest include the lungs,  mediastinal structures, pleura, and chest wall.    Routine assessed structures in the abdomen include the liver, spleen,  pancreas, adrenal glands, and kidneys. Also assessed are the  retroperitoneum, gastrointestinal tract, and the abdominal wall.    Intrapelvic anatomy is also assessed.    Radiation dose for this scan is reduced using automated exposure  control, adjustment of the mA and/or kV according to patient  size, or  iterative reconstruction technique.    COMPARISON: 9/11/2017.    FINDINGS:    Chest: There is a new 8.9 cm cystic area in the right lung apex. This  may relate to a loculated pneumothorax. A subpleural cyst or bulla are  also in the differential. There is some adjacent atelectasis. There is  less skin thickening and edema in the right breast. A soft tissue  conglomerate in the right axilla is stable. It measures 4.2 x 2.8 cm.  Other right axillary lymph nodes which measure up to 0.7 cm in short  axis dimension are stable. A multinodular thyroid gland is stable.  There is a new mild-moderate pericardial effusion.    Abdomen: There is a ring-enhancing low density lesion in the inferior  right lobe of the liver on image #72 which measures 1.5 cm compared to  0.8 cm on the prior study.  An ill-defined area of low density in the  left lobe on coronal image 31 measures 3.9 cm compared to 3.0 cm on  the prior study. Other liver lesions are more stable. A subcentimeter  vascular lesion in the spleen is stable. Gastrohepatic ligament and  portal adenopathy are stable. A portal lymph node on image #62  measures 1.8 cm. A borderline in size retroperitoneal lymph node on  image #65 is stable at 0.9 cm in short axis dimension. A previously  seen borderline size aortocaval lymph node is smaller. It measures 0.5  cm compared to 0.9 cm on the prior study.    Pelvis: There is trace free pelvic fluid.      Impression    IMPRESSION:  1.  Hepatic metastases are slightly worse.  2. There is a new mild-moderate pericardial effusion.  3. Other neoplastic disease is stable.    [Access Center: New mild-moderate pericardial effusion]    This report will be copied to the [facility] to ensure a  provider acknowledges the finding. Access Center is available Monday  through Friday 8am-3:30 pm.       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 It appears that there is disease progression based on imaging as well as increase in CA 27-29. I recommended to switch chemotherapy to Ribociclib (Kisqali) was tested in randomized, double-blind, placebo-controlled, international clinical trial (MONALEESA-2), in post-menopausal women with HR-positive, HER2-negative advanced or metastatic breast cancer who received no prior therapy for advanced disease. A total of 668 patients were randomized to receive either ribociclib plus letrozole (n=334) or placebo plus letrozole (n=334). Ribociclib 600 mg or placebo was administered orally once daily for 21 consecutive days, followed by 7 days off, with letrozole 2.5 mg administered orally once daily for 28 days. Treatment continued until disease progression or unacceptable toxicity. A pre-planned interim efficacy analysis demonstrated an improvement in PFS with hazard ratio of 0.556 (95% CI: 0.429, 0.720; p<0.0001). The estimated median PFS had not been reached in the ribociclib-containing arm and was 14.7 months in the placebo-containing arm. Objective response rate (TANG) in patients with measurable disease was 52.7% (95% CI: 46.6, 58.9) in the ribociclib plus letrozole arm and 37.1% (95% CI: 31.1, 43.2) in the placebo plus letrozole arm.  The most common adverse reactions observed in 20% or more of patients taking ribociclib were neutropenia, nausea, fatigue, diarrhea, leukopenia, alopecia, vomiting, constipation, headache, and back pain. The most common grade 3 or 4 ARs (reported in >2%) were neutropenia, leukopenia, abnormal liver function tests, lymphopenia, and  vomiting. Ribociclib has been shown to prolong the QT interval. Monitoring of QT interval by EKGs will be done. Patient will be receiving starting dose of ribociclib is 600 mg orally (three 200 mg tablets) taken once daily with or without food for 21 consecutive days followed by 7 days off treatment.     (C79.51) Bone metastasis (H)  Patient currently on Denosumab 120 mg subcutaneously monthly. Patient also calcium and vitamin D supplements.    (I31.3) Pericardial effusion  We are going to arrange for an echocardiogram. We will refer the patient for thoracic surgery .  Plan: Echocardiogram, THORACIC SURGERY REFERRAL    (D70.1,  T45.1X5A) Chemotherapy-induced neutropenia (H)  Absolute neutrophil count today is 2.6. We will continue to monitor.    (J43.9) Emphysematous bleb of lung (H)  Most recent chest x-ray shows no evidence of abnormalities.    (E03.9) Hypothyroidism, unspecified type  Patient currently on Synthroid 25  g daily.    (E78.5) Hyperlipidemia LDL goal <130  Patient currently on Lipitor 10 mg orally daily.    (J45.40) Moderate persistent asthma without complication  He should've asthma has been well controlled on the current regimen.    (K12.31) Mucositis due to chemotherapy  Patient is using Magic mouthwash if needed    The patient is ready to learn, no apparent learning barriers were identified.  Diagnosis and treatment plans were explained to the patient. The patient expressed understanding of the content. The patient asked appropriate questions. The patient questions were answered to her satisfaction.    Chart documentation with Dragon Voice recognition Software. Although reviewed after completion, some words and grammatical errors may remain.    Per Dr. Cassidy, Echo results show a stable amount of fluid but they are unable to determine what the pericardial mass is; fluid or tumor.  A cardiac MRI has been ordered. Also, patient does not need to start IV chemotherapy (Taxotere), but Dr. Cassidy  recommends patient stop Xeloda and start Kisquali. Order for oral chemo change has not been placed yet, and is pending until patient is made aware of recommended change.    Message left on patient's voicemail requesting a call back to review results and recommendations.  Direct line provided.    Patient returned call.  Reviewed results and recommendation.  Patient is in agreement with starting Kisquali and continuing on Femara. Also, with having the cardiac MRI.  Denies questions or concerns at this time.  Will call back if any arise.  Patient will await call from  with date/time of MRI.    Again, thank you for allowing me to participate in the care of your patient.        Sincerely,        Brodie Cassidy MD

## 2017-11-15 NOTE — MR AVS SNAPSHOT
After Visit Summary   11/15/2017    Etta Cervantes    MRN: [id number]           Patient Information     Date Of Birth          1960        Visit Information        Provider Department      11/15/2017 8:15 AM Brodie Cassidy MD [facility] ONCOLOGY      Today's Diagnoses     Carcinoma of breast metastatic to liver, unspecified laterality (H)    -  1    Bone metastasis (H)        Pericardial effusion           Follow-ups after your visit        Additional Services     THORACIC SURGERY REFERRAL       Your provider has referred you to:  [facility]: Thoracic Surgery Clinic [facility] [phone]   [email or web address]    To perform pericardial window for pericardial effusion    Please be aware that coverage of these services is subject to the terms and limitations of your health insurance plan.  Call member services at your health plan with any benefit or coverage questions.      Please bring the following to your appointment:    >>   Any x-rays, CTs or MRIs which have been performed.  Contact the facility where they were done to arrange for  prior to your scheduled appointment.    >>   List of current medications   >>   This referral request   >>   Any documents/labs given to you for this referral                  Your next 10 appointments already scheduled     Nov 15, 2017 10:45 AM CST   Ech Complete with [id number]   [facility] Echocardiography ([facility])    [address] [address] [phone]           1. Please bring or wear a comfortable two-piece outfit. 2. You may eat, drink and take your normal medicines. 3. For any questions that cannot be answered, please contact the ordering physician            Nov 16, 2017  1:00 PM CST   Lymphedema Treatment with Brianne Lundberg, PT   [facility] Lymphedema ([facility])    [address] [address] [phone]             Nov 21, 2017  2:30 PM CST   Lymphedema Treatment with Brianne Lundberg, PT   [facility] Lymphedema ([facility])    [address]  [facility] [address]   [phone]            Nov 28, 2017  8:00 AM CST   Lymphedema Treatment with Brianne Lundberg, PT   [facility] Lymphedema ([facility])    [address]  [facility] [address]   [phone]            Nov 30, 2017  8:00 AM CST   Lymphedema Treatment with Brianne Lundberg, PT   [facility] Lymphedema ([facility])    [address] [address]   [phone]            Dec 01, 2017  3:30 PM CST   Return Sleep Patient with Rajesh Ahuja MD   [facility] ([facility])    [address]  [facility] [address]   [phone]              Future tests that were ordered for you today     Open Future Orders        Priority Expected Expires Ordered    Echocardiogram STAT 11/15/2017 11/15/2018 11/15/2017            Who to contact     If you have questions or need follow up information about today's clinic visit or your schedule please contact [facility] directly at [phone].  Normal or non-critical lab and imaging results will be communicated to you by Nextthart, letter or phone within 4 business days after the clinic has received the results. If you do not hear from us within 7 days, please contact the clinic through MyChart or phone. If you have a critical or abnormal lab result, we will notify you by phone as soon as possible.  Submit refill requests through Spredfast or call your pharmacy and they will forward the refill request to us. Please allow 3 business days for your refill to be completed.          Additional Information About Your Visit        Spredfast Information     Spredfast gives you secure access to your electronic health record. If you see a primary care provider, you can also send messages to your  "care team and make appointments. If you have questions, please call your primary care clinic.  If you do not have a primary care provider, please call [phone] and they will assist you.        Care EveryWhere ID     This is your Care EveryWhere ID. This could be used by other organizations to access your [facility] medical records  [id number]        Your Vitals Were     Pulse Temperature Respirations Height Last Period Pulse Oximetry    81 99.3  F (37.4  C) (Tympanic) 12 1.638 m (5' 4.49\") 02/06/2013 99%    Breastfeeding? BMI (Body Mass Index)                No 33.39 kg/m2           Blood Pressure from Last 3 Encounters:   11/15/17 133/51   11/10/17 109/52   11/08/17 108/73    Weight from Last 3 Encounters:   11/15/17 89.6 kg (197 lb 8 oz)   11/10/17 90.9 kg (200 lb 8 oz)   11/08/17 91.1 kg (200 lb 14.4 oz)              We Performed the Following     THORACIC SURGERY REFERRAL        Primary Care Provider Office Phone # Fax #    Johana Fairbanks [phone] [address] [facility] [address]        Equal Access to Services     ANIYAH MIX AH: Hadii ahn ham hadasho Soomaali, waaxda luqadaha, qaybta kaalmada adeegyada, hari guardado. So [facility] [phone].    ATENCIÓN: Si habla español, tiene a parekh disposición servicios gratuitos de asistencia lingüística. [facility] [phone].    We comply with applicable federal civil rights laws and [place] laws. We do not discriminate on the basis of race, color, national origin, age, disability, sex, sexual orientation, or gender identity.            Thank you!     Thank you for choosing [facility]  for your care. Our goal is always to provide you with excellent care. Hearing back from our patients is one way we can continue to improve our services. Please take a few minutes to complete the written survey that you may receive in the mail after your visit with us. Thank you!             Your Updated Medication List - " Protect others around you: Learn how to safely use, store and throw away your medicines at www.disposemymeds.org.          This list is accurate as of: 11/15/17  9:01 AM.  Always use your most recent med list.                   Brand Name Dispense Instructions for use Diagnosis    * albuterol 108 (90 BASE) MCG/ACT Inhaler    VENTOLIN HFA    1 Inhaler    Inhale 2 puffs into the lungs every 4 hours as needed    Moderate persistent asthma, uncomplicated       * albuterol (2.5 MG/3ML) 0.083% neb solution     30 vial    Take 1 vial (2.5 mg) by nebulization every 4 hours as needed for shortness of breath / dyspnea    Moderate persistent asthma, uncomplicated       ALLEGRA ALLERGY 180 MG tablet   Generic drug:  fexofenadine      Take 180 mg by mouth daily as needed for allergies        atorvastatin 10 MG tablet    LIPITOR    90 tablet    Take 1 tablet (10 mg) by mouth daily    Hyperlipidemia LDL goal <100       azelastine 0.1 % spray    ASTELIN    3 Bottle    Spray 1-2 sprays into both nostrils 2 times daily as needed for rhinitis    Chronic rhinitis       capecitabine 500 MG tablet CHEMO    XELODA    140 tablet    Take 5 cap in AM and 5 cap in PM on Days 1 through 14, then off for 7 days. Take within 30 mins after meal.    Secondary malignant neoplasm of liver (H), Carcinoma of breast metastatic to liver, unspecified laterality (H), Bone metastasis (H), Breast cancer metastasized to axillary lymph node, right (H)       CRANBERRY PO      Take 1 capsule by mouth daily        DULERA 200-5 MCG/ACT oral inhaler   Generic drug:  mometasone-formoterol     3 Inhaler    INHALE 2 PUFFS INTO THE LUNGS TWICE DAILY    Moderate persistent asthma without complication       KP CALCIUM 600+D3 600-500 MG-UNIT Caps   Generic drug:  calcium carb-cholecalciferol      Take 2 tablets by mouth daily        levothyroxine 25 MCG tablet    SYNTHROID/LEVOTHROID    90 tablet    TAKE 1 TABLET (25 MCG) BY MOUTH DAILY    Hypothyroidism due to acquired  atrophy of thyroid       lidocaine (viscous) 2 % solution    XYLOCAINE     Apply topically as needed        lidocaine visc 2% 2.5mL/5mL & maalox/mylanta w/ simeth 2.5mL/5mL & diphenhydrAMINE 5mg/5mL Susp suspension    [facility]    240 mL    Swish and swallow 10 mLs in mouth every 6 hours as needed for mouth sores    Mucositis due to chemotherapy, Breast cancer metastasized to axillary lymph node, right (H)       LORazepam 0.5 MG tablet    ATIVAN    30 tablet    Take 1 tablet (0.5 mg) by mouth every 4 hours as needed (Anxiety, Nausea/Vomiting or Sleep)    Secondary malignant neoplasm of liver (H), Carcinoma of breast metastatic to liver, unspecified laterality (H), Bone metastasis (H), Breast cancer metastasized to axillary lymph node, right (H)       metoclopramide 10 MG tablet    REGLAN    120 tablet    Take 1 tablet (10 mg) by mouth 2 times daily as needed    Bilateral malignant neoplasm of breast in female, estrogen receptor positive, unspecified site of breast (H)       oxybutynin 10 MG 24 hr tablet    DITROPAN XL    90 tablet    Take 1 tablet (10 mg) by mouth daily (Needs follow-up appointment for this medication)    Mixed incontinence urge and stress (male)(female)       oxyCODONE IR 5 MG tablet    ROXICODONE    30 tablet    Take 1 tablet (5 mg) by mouth every 4 hours as needed for moderate to severe pain    Secondary malignant neoplasm of liver (H)       * predniSONE 20 MG tablet    DELTASONE    10 tablet    Take 1 tablet (20 mg) by mouth 2 times daily For Yellow or Red zone on Asthma Action Plan.    Moderate persistent asthma, uncomplicated       * predniSONE 20 MG tablet    DELTASONE    5 tablet    Take 1 tablet (20 mg) by mouth daily    Reactive airway disease, unspecified asthma severity, uncomplicated       prochlorperazine 10 MG tablet    COMPAZINE    30 tablet    Take 1 tablet (10 mg) by mouth every 6 hours as needed (Nausea/Vomiting)    Secondary malignant neoplasm of liver (H),  Carcinoma of breast metastatic to liver, unspecified laterality (H), Bone metastasis (H), Breast cancer metastasized to axillary lymph node, right (H)       QVAR 80 MCG/ACT Inhaler   Generic drug:  beclomethasone     26.1 g    INHALE 1 PUFFS INTO THE EACH NOSTRIL 2 TIMES DAILY    Chronic rhinitis       ROBITUSSIN COUGH/COLD CF PO      Take by mouth as needed    Breast cancer metastasized to axillary lymph node, right (H)       STOOL SOFTENER PO      Take 100 mg by mouth daily        TYLENOL PO      Take 650 mg by mouth every 4 hours as needed for mild pain or fever        zolpidem 10 MG tablet    AMBIEN    30 tablet    Take 1 tablet 30 minutes prior to bedtime        * Notice:  This list has 4 medication(s) that are the same as other medications prescribed for you. Read the directions carefully, and ask your doctor or other care provider to review them with you.

## 2017-11-15 NOTE — PROGRESS NOTES
Per Dr. Cassidy, Dr. Mcgowan does not feel the pericardial effusion is related to the stent removal. Please proceed with thoracic surgery referral.

## 2017-11-16 NOTE — PROGRESS NOTES
Hematology/ Oncology Follow-up Visit:  Nov 15, 2017    Reason for Visit:   Chief Complaint   Patient presents with     Oncology Clinic Visit     1 week recheck Breast CA, discuss treatment        Oncologic History:  Breast cancer (H)  Etta Cervantes presented with increasing lump in the right axilla that s lump has been growing without any pain or associated symptoms. Subsequently she was evaluated by primary care physician. Diagnostic digital mammogram was done on 01/13/2015 showing enlarged lymph nodes in the right axilla. There was some skin thickening in the right breast anteriorly and laterally. There are no parenchymal changes in the right breast. The left breast shows a 1.7 cm spiculated mass at approximately 2 to 3 o'clock position, 15.2 cm away from the nipple. A right breast ultrasound was subsequently done and ultrasound guided biopsy was done. Needle biopsy of the left breast did show infiltrating ductal carcinoma grade I of III with no angiolymphatic invasion seen. No associated ductal carcinoma in situ. Estrogen receptor, progresterone receptor were positive. HER-2/sadie receptor came back negative.. Another biopsy from the right axilla did show metastatic ductal carcinoma. PET scan was done on January 26, 2015 showing few small right posterior cervical chain nodes the largest is 1.2 cm. There is extensive bulky right axillary adenopathy demonstrating hypermetabolic FDG uptake with SUV of 10.6. There is skin thickening involving the right breast which demonstrated low-grade FDG uptake. A 1.2 cm lesion on the lateral aspect of the left breast demonstrated minimally increased FDG uptake with SUV of 2. Scattered hypermetabolic mediastinal lymph nodes located in the left superior anterior mediastinum, right paratracheal and precarinal U, subcranial adenopathy with javon metastases. This focus hypermetabolic FDG uptake identified definitive Amanda posterior aspect off intertrochanteric region of the proximal  left femur consistent with bone metastases. There is also 1.4 cm hypermetabolic FDG uptake identified in the anterior medial segment of the left hepatic lobe consistent with liver metastases. Additional 2.1 cm low-attenuation lesion anterior aspect of the right lobe which needs to be determined. The patient was started on chemotherapy with Taxotere and Cytoxan on February 9, 2015.  She concluded 4 cycles of Taxotere and Cytoxan. She started on Arimidex 1 mg orally daily. Currently she is on Faslodex and ibrance. Subsequently the patient went on to receive Afinitor. The patient also started treatment with Xeloda.       Interval History:  Patient is here today for follow-up. She had a CT scan of the chest done this week showing mild to moderate pericardial effusion and slight increase in the liver metastases. Since scan was done because of slightly increased in CA-27-29. The patient has been asymptomatic. She denies any shortness of breath or chest pain. She denies any palpitation. She denies any dizziness. She has been on Xeloda without significant side effects. She denies any bony aches or pains.    Review Of Systems:  Constitutional: Negative for fever, chills, and night sweats.  Skin: negative.  Eyes: negative.  Ears/Nose/Throat: negative.  Respiratory: No shortness of breath, dyspnea on exertion, cough, or hemoptysis.  Cardiovascular: negative.  Gastrointestinal: negative.  Genitourinary: negative.  Musculoskeletal: negative.  Neurologic: negative.  Psychiatric: negative.  Hematologic/Lymphatic/Immunologic: negative.  Endocrine: negative.    All other ROS negative unless mentioned in interval history.    Past medical, social, surgical, and family histories reviewed.    Allergies:  Allergies as of 11/15/2017 - Manuel as Reviewed 11/15/2017   Allergen Reaction Noted     Animal dander Cough 01/23/2015     Cats  07/15/2010     Dust mites Cough 01/23/2015     Pollen extract  01/23/2015     Seasonal allergies  07/15/2010      Levaquin [levofloxacin] Rash 07/17/2017       Current Medications:  Current Outpatient Prescriptions   Medication Sig Dispense Refill     zolpidem (AMBIEN) 10 MG tablet Take 1 tablet 30 minutes prior to bedtime 30 tablet 0     capecitabine (XELODA) 500 MG tablet CHEMO Take 5 cap in AM and 5 cap in PM on Days 1 through 14, then off for 7 days. Take within 30 mins after meal. 140 tablet 0     DULERA 200-5 MCG/ACT oral inhaler INHALE 2 PUFFS INTO THE LUNGS TWICE DAILY 3 Inhaler 3     predniSONE (DELTASONE) 20 MG tablet Take 1 tablet (20 mg) by mouth daily 5 tablet 0     QVAR 80 MCG/ACT Inhaler INHALE 1 PUFFS INTO THE EACH NOSTRIL 2 TIMES DAILY 26.1 g 3     Phenylephrine-DM-GG (ROBITUSSIN COUGH/COLD CF PO) Take by mouth as needed       lidocaine, viscous, (XYLOCAINE) 2 % solution Apply topically as needed       levothyroxine (SYNTHROID/LEVOTHROID) 25 MCG tablet TAKE 1 TABLET (25 MCG) BY MOUTH DAILY 90 tablet 2     atorvastatin (LIPITOR) 10 MG tablet Take 1 tablet (10 mg) by mouth daily 90 tablet 3     metoclopramide (REGLAN) 10 MG tablet Take 1 tablet (10 mg) by mouth 2 times daily as needed 120 tablet 1     oxyCODONE (ROXICODONE) 5 MG IR tablet Take 1 tablet (5 mg) by mouth every 4 hours as needed for moderate to severe pain 30 tablet 0     fexofenadine (ALLEGRA ALLERGY) 180 MG tablet Take 180 mg by mouth daily as needed for allergies       calcium carb-cholecalciferol (KP CALCIUM 600+D3) 600-500 MG-UNIT CAPS Take 2 tablets by mouth daily       albuterol (2.5 MG/3ML) 0.083% neb solution Take 1 vial (2.5 mg) by nebulization every 4 hours as needed for shortness of breath / dyspnea 30 vial 3     predniSONE (DELTASONE) 20 MG tablet Take 1 tablet (20 mg) by mouth 2 times daily For Yellow or Red zone on Asthma Action Plan. 10 tablet 1     LORazepam (ATIVAN) 0.5 MG tablet Take 1 tablet (0.5 mg) by mouth every 4 hours as needed (Anxiety, Nausea/Vomiting or Sleep) 30 tablet 2     prochlorperazine (COMPAZINE) 10 MG tablet  "Take 1 tablet (10 mg) by mouth every 6 hours as needed (Nausea/Vomiting) 30 tablet 2     Docusate Calcium (STOOL SOFTENER PO) Take 100 mg by mouth daily        CRANBERRY PO Take 1 capsule by mouth daily        azelastine (ASTELIN) 0.1 % nasal spray Spray 1-2 sprays into both nostrils 2 times daily as needed for rhinitis 3 Bottle 3     albuterol (VENTOLIN HFA) 108 (90 BASE) MCG/ACT inhaler Inhale 2 puffs into the lungs every 4 hours as needed 1 Inhaler 2     Acetaminophen (TYLENOL PO) Take 650 mg by mouth every 4 hours as needed for mild pain or fever       oxybutynin (DITROPAN XL) 10 MG 24 hr tablet Take 1 tablet (10 mg) by mouth daily (Needs follow-up appointment for this medication) (Patient not taking: Reported on 11/15/2017) 90 tablet 0     magic mouthwash suspension (diphenhydrAMINE, lidocaine, aluminum-magnesium & simethicone) Swish and swallow 10 mLs in mouth every 6 hours as needed for mouth sores (Patient not taking: Reported on 11/15/2017) 240 mL 10        Physical Exam:  /51 (BP Location: Right arm, Patient Position: Sitting, Cuff Size: Adult Regular)  Pulse 81  Temp 99.3  F (37.4  C) (Tympanic)  Resp 12  Ht 1.638 m (5' 4.49\")  Wt 89.6 kg (197 lb 8 oz)  LMP 02/06/2013  SpO2 99%  Breastfeeding? No  BMI 33.39 kg/m2  Wt Readings from Last 12 Encounters:   11/15/17 89.6 kg (197 lb 8 oz)   11/10/17 90.9 kg (200 lb 8 oz)   11/08/17 91.1 kg (200 lb 14.4 oz)   10/22/17 88.7 kg (195 lb 8.8 oz)   10/18/17 89.7 kg (197 lb 12.8 oz)   09/28/17 89.2 kg (196 lb 10.4 oz)   09/13/17 88.1 kg (194 lb 4.8 oz)   09/13/17 88.7 kg (195 lb 8 oz)   09/07/17 87.5 kg (192 lb 14.4 oz)   09/05/17 88.8 kg (195 lb 11.2 oz)   08/17/17 86.7 kg (191 lb 1.6 oz)   08/16/17 86.6 kg (191 lb)     ECOG performance status:1  GENERAL APPEARANCE: Healthy, alert and in no acute distress.  HEENT: Sclerae anicteric. PERRLA. Oropharynx without ulcers, lesions, or thrush.  NECK: Supple. No asymmetry or masses.  LYMPHATICS: No palpable " "cervical, supraclavicular, axillary, or inguinal lymphadenopathy.  RESP: Lungs clear to auscultation bilaterally without rales, rhonchi or wheezes.  BREAST: No adenopathy in the right axilla is not palpable. There is lymphedema of the right breast. Left breast to dominant masses or axillary adenopathy. \"CARDIOVASCULAR: Regular rate and rhythm. Normal S1, S2; no S3 or S4. No murmur, gallop, or rub.  ABDOMEN: Soft, nontender. Bowel sounds normal. No palpable organomegaly or masses.  MUSCULOSKELETAL: Extremities without gross deformities noted. No edema of bilateral lower extremities.  SKIN: No suspicious lesions or rashes.  NEURO: Alert and oriented x 3. Cranial nerves II-XII grossly intact.  PSYCHIATRIC: Mentation and affect appear normal.    Laboratory/Imaging Studies:  Infusion Therapy Visit on 11/09/2017   Component Date Value Ref Range Status     WBC 11/09/2017 4.4  4.0 - 11.0 10e9/L Final     RBC Count 11/09/2017 3.30* 3.8 - 5.2 10e12/L Final     Hemoglobin 11/09/2017 11.9  11.7 - 15.7 g/dL Final     Hematocrit 11/09/2017 35.5  35.0 - 47.0 % Final     MCV 11/09/2017 108* 78 - 100 fl Final     MCH 11/09/2017 36.1* 26.5 - 33.0 pg Final     MCHC 11/09/2017 33.5  31.5 - 36.5 g/dL Final     RDW 11/09/2017 17.4* 10.0 - 15.0 % Final     Platelet Count 11/09/2017 156  150 - 450 10e9/L Final     Diff Method 11/09/2017 Automated Method   Final     % Neutrophils 11/09/2017 58.2  % Final     % Lymphocytes 11/09/2017 26.5  % Final     % Monocytes 11/09/2017 12.2  % Final     % Eosinophils 11/09/2017 2.7  % Final     % Basophils 11/09/2017 0.2  % Final     % Immature Granulocytes 11/09/2017 0.2  % Final     Absolute Neutrophil 11/09/2017 2.6  1.6 - 8.3 10e9/L Final     Absolute Lymphocytes 11/09/2017 1.2  0.8 - 5.3 10e9/L Final     Absolute Monocytes 11/09/2017 0.5  0.0 - 1.3 10e9/L Final     Absolute Eosinophils 11/09/2017 0.1  0.0 - 0.7 10e9/L Final     Absolute Basophils 11/09/2017 0.0  0.0 - 0.2 10e9/L Final     Abs " Immature Granulocytes 11/09/2017 0.0  0 - 0.4 10e9/L Final     Sodium 11/09/2017 143  133 - 144 mmol/L Final     Potassium 11/09/2017 3.8  3.4 - 5.3 mmol/L Final     Chloride 11/09/2017 113* 94 - 109 mmol/L Final     Carbon Dioxide 11/09/2017 23  20 - 32 mmol/L Final     Anion Gap 11/09/2017 7  3 - 14 mmol/L Final     Glucose 11/09/2017 102* 70 - 99 mg/dL Final     Urea Nitrogen 11/09/2017 13  7 - 30 mg/dL Final     Creatinine 11/09/2017 0.60  0.52 - 1.04 mg/dL Final     GFR Estimate 11/09/2017 >90  >60 mL/min/1.7m2 Final    Non  GFR Calc     GFR Estimate If Black 11/09/2017 >90  >60 mL/min/1.7m2 Final    African American GFR Calc     Calcium 11/09/2017 8.7  8.5 - 10.1 mg/dL Final     Bilirubin Total 11/09/2017 1.5* 0.2 - 1.3 mg/dL Final     Albumin 11/09/2017 3.3* 3.4 - 5.0 g/dL Final     Protein Total 11/09/2017 6.5* 6.8 - 8.8 g/dL Final     Alkaline Phosphatase 11/09/2017 85  40 - 150 U/L Final     ALT 11/09/2017 19  0 - 50 U/L Final     AST 11/09/2017 21  0 - 45 U/L Final     CA 27-29 11/09/2017 59* 0 - 39 U/mL Final    Assay Method:  Chemiluminescence using Siemens Centaur XP        Recent Results (from the past 744 hour(s))   MR Brain w/o & w Contrast    Narrative    MR BRAIN W/O & W CONTRAST  11/6/2017 3:16 PM     HISTORY:  Disorientation. History of breast and lung cancer.    TECHNIQUE: Multiplanar, multisequence MRI of the brain without and  with 8ml gadavist IV contrast material.    COMPARISON: None.    FINDINGS:  There are T2 hyperintensities in the white matter both  hemispheres. These are probably due to small vessel ischemic change in  this age patient..  There is no evidence of hemorrhage, mass, acute  infarct, or anomaly. There are no gadolinium enhancing lesions. No  metastatic lesions are seen. There is a 3 cm polyp or retention cysts  in the right maxillary sinus..  The arteries at the base of the brain  and the dural venous sinuses appear patent.      Impression    IMPRESSION:    1. No enhancing parenchymal lesions are identified. No brain  metastasis are seen.  2. Nonspecific, nonenhancing white matter hyperintensities. In this  age group, they are probably due to small vessel ischemic change and  part of the normal aging process.     LAURA AGUIRRE MD   X-ray Chest 2 vws*    Narrative    PA and lateral chest    HISTORY: Abnormal chest CT    COMPARISON STUDY: 9/28/2017    FINDINGS: Left subclavian Port-A-Cath tip in the high SVC. Cardiac  silhouette is large. Tiny right apical pneumothorax versus bulla with  linear atelectasis in the right upper lung zone.      Impression    IMPRESSION: Tiny right apical pneumothorax versus apical bullae with  linear bands of atelectasis or scarring.    KAVON MILLS MD   CT Chest/Abdomen/Pelvis w Contrast   Result Value    Radiologist flags New mild-moderate pericardial effusion (Urgent)    Narrative    CT CHEST/ABDOMEN/PELVIS W CONTRAST 11/13/2017 2:54 PM    HISTORY: Breast cancer. Follow-up.    CONTRAST:  97 mL Isovue 370.    TECHNIQUE: CT of the chest, abdomen, and pelvis is performed with IV  contrast.    Routine evaluated structures in the chest include the lungs,  mediastinal structures, pleura, and chest wall.    Routine assessed structures in the abdomen include the liver, spleen,  pancreas, adrenal glands, and kidneys. Also assessed are the  retroperitoneum, gastrointestinal tract, and the abdominal wall.    Intrapelvic anatomy is also assessed.    Radiation dose for this scan is reduced using automated exposure  control, adjustment of the mA and/or kV according to patient size, or  iterative reconstruction technique.    COMPARISON: 9/11/2017.    FINDINGS:    Chest: There is a new 8.9 cm cystic area in the right lung apex. This  may relate to a loculated pneumothorax. A subpleural cyst or bulla are  also in the differential. There is some adjacent atelectasis. There is  less skin thickening and edema in the right breast. A soft tissue  conglomerate  in the right axilla is stable. It measures 4.2 x 2.8 cm.  Other right axillary lymph nodes which measure up to 0.7 cm in short  axis dimension are stable. A multinodular thyroid gland is stable.  There is a new mild-moderate pericardial effusion.    Abdomen: There is a ring-enhancing low density lesion in the inferior  right lobe of the liver on image #72 which measures 1.5 cm compared to  0.8 cm on the prior study.  An ill-defined area of low density in the  left lobe on coronal image 31 measures 3.9 cm compared to 3.0 cm on  the prior study. Other liver lesions are more stable. A subcentimeter  vascular lesion in the spleen is stable. Gastrohepatic ligament and  portal adenopathy are stable. A portal lymph node on image #62  measures 1.8 cm. A borderline in size retroperitoneal lymph node on  image #65 is stable at 0.9 cm in short axis dimension. A previously  seen borderline size aortocaval lymph node is smaller. It measures 0.5  cm compared to 0.9 cm on the prior study.    Pelvis: There is trace free pelvic fluid.      Impression    IMPRESSION:  1.  Hepatic metastases are slightly worse.  2. There is a new mild-moderate pericardial effusion.  3. Other neoplastic disease is stable.    [Access Center: New mild-moderate pericardial effusion]    This report will be copied to the [facility] to ensure a  provider acknowledges the finding. [facility] Center is available Monday  through Friday 8am-3:30 pm.       LETI CHASE MD       Assessment and plan:  (C50.919,  C78.7) Carcinoma of breast metastatic to liver, unspecified laterality (H)  (primary encounter diagnosis)  (C50.911,  Z17.0,  C50.912) Bilateral malignant neoplasm of breast in female, estrogen receptor positive, unspecified site of breast (H)  (C50.911,  C77.3) 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)  (C78.7) Secondary malignant neoplasm of liver (H)  I reviewed with the patient the results of the CT scan. It appears that  there is disease progression based on imaging as well as increase in CA 27-29. I recommended to switch chemotherapy to Ribociclib (Kisqali) was tested in randomized, double-blind, placebo-controlled, international clinical trial (MONALEESA-2), in post-menopausal women with HR-positive, HER2-negative advanced or metastatic breast cancer who received no prior therapy for advanced disease. A total of 668 patients were randomized to receive either ribociclib plus letrozole (n=334) or placebo plus letrozole (n=334). Ribociclib 600 mg or placebo was administered orally once daily for 21 consecutive days, followed by 7 days off, with letrozole 2.5 mg administered orally once daily for 28 days. Treatment continued until disease progression or unacceptable toxicity. A pre-planned interim efficacy analysis demonstrated an improvement in PFS with hazard ratio of 0.556 (95% CI: 0.429, 0.720; p<0.0001). The estimated median PFS had not been reached in the ribociclib-containing arm and was 14.7 months in the placebo-containing arm. Objective response rate (TANG) in patients with measurable disease was 52.7% (95% CI: 46.6, 58.9) in the ribociclib plus letrozole arm and 37.1% (95% CI: 31.1, 43.2) in the placebo plus letrozole arm.  The most common adverse reactions observed in 20% or more of patients taking ribociclib were neutropenia, nausea, fatigue, diarrhea, leukopenia, alopecia, vomiting, constipation, headache, and back pain. The most common grade 3 or 4 ARs (reported in >2%) were neutropenia, leukopenia, abnormal liver function tests, lymphopenia, and vomiting. Ribociclib has been shown to prolong the QT interval. Monitoring of QT interval by EKGs will be done. Patient will be receiving starting dose of ribociclib is 600 mg orally (three 200 mg tablets) taken once daily with or without food for 21 consecutive days followed by 7 days off treatment.     (C79.51) Bone metastasis (H)  Patient currently on Denosumab 120 mg  subcutaneously monthly. Patient also calcium and vitamin D supplements.    (I31.3) Pericardial effusion  We are going to arrange for an echocardiogram. We will refer the patient for thoracic surgery .  Plan: Echocardiogram, THORACIC SURGERY REFERRAL    (D70.1,  T45.1X5A) Chemotherapy-induced neutropenia (H)  Absolute neutrophil count today is 2.6. We will continue to monitor.    (J43.9) Emphysematous bleb of lung (H)  Most recent chest x-ray shows no evidence of abnormalities.    (E03.9) Hypothyroidism, unspecified type  Patient currently on Synthroid 25  g daily.    (E78.5) Hyperlipidemia LDL goal <130  Patient currently on Lipitor 10 mg orally daily.    (J45.40) Moderate persistent asthma without complication  He should've asthma has been well controlled on the current regimen.    (K12.31) Mucositis due to chemotherapy  Patient is using Magic mouthwash if needed    The patient is ready to learn, no apparent learning barriers were identified.  Diagnosis and treatment plans were explained to the patient. The patient expressed understanding of the content. The patient asked appropriate questions. The patient questions were answered to her satisfaction.    Chart documentation with Dragon Voice recognition Software. Although reviewed after completion, some words and grammatical errors may remain.

## 2017-11-16 NOTE — NURSING NOTE
"Oncology Rooming Note    November 16, 2017 3:46 PM   Etta Cervantes is a 57 year old female who presents for:    Chief Complaint   Patient presents with     Oncology Clinic Visit     new patient visit for consultation related to pericardial effusion carcinoma of breast metastatic to liver , unspecified laterality (h) bone metastasis (h)      Initial Vitals: /73  Pulse 78  Temp 98.4  F (36.9  C) (Oral)  Resp 17  Ht 1.638 m (5' 4.49\")  Wt 89.8 kg (198 lb)  LMP 02/06/2013  SpO2 99%  Breastfeeding? No  BMI 33.47 kg/m2 Estimated body mass index is 33.47 kg/(m^2) as calculated from the following:    Height as of this encounter: 1.638 m (5' 4.49\").    Weight as of this encounter: 89.8 kg (198 lb). Body surface area is 2.02 meters squared.  No Pain (0) Comment: Data Unavailable   Patient's last menstrual period was 02/06/2013.  Allergies reviewed: Yes  Medications reviewed: No    Medications: Medication refills not needed today.  Pharmacy name entered into [facility]:    [id number] [place] [facility] - [address] [facility] - [place], [place] - [address] [facility]  [id number] - [address] [facility]    Clinical concerns: patient stated medications haven't changed during medication reconciliation dr. le was notified.    5 minutes for nursing intake (face to face time)     Lynnette Hernandez CMA              "

## 2017-11-16 NOTE — PROGRESS NOTES
Patient returned call.  Reviewed results and recommendation.  Patient is in agreement with starting Kisquali and continuing on Femara. Also, with having the cardiac MRI.  Denies questions or concerns at this time.  Will call back if any arise.  Patient will await call from  with date/time of MRI.

## 2017-11-16 NOTE — MR AVS SNAPSHOT
After Visit Summary   11/16/2017    Etta Cervantes    MRN: [id number]           Patient Information     Date Of Birth          1960        Visit Information        Provider Department      11/16/2017 4:00 PM Edel Guevara MD [facility]        Today's Diagnoses     Pericardial effusion        Carcinoma of breast metastatic to liver, unspecified laterality (H)        Bone metastasis (H)        Bilateral malignant neoplasm of breast in female, estrogen receptor positive, unspecified site of breast (H)        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)        Secondary malignant neoplasm of liver (H)        Chemotherapy-induced neutropenia (H)        Emphysematous bleb of lung (H)        Hypothyroidism, unspecified type        Hyperlipidemia LDL goal <130        Moderate persistent asthma without complication        Mucositis due to chemotherapy           Follow-ups after your visit        Your next 10 appointments already scheduled     Nov 21, 2017  2:30 PM CST   Lymphedema Treatment with Brianne Lundberg PT   [facility] ([facility])    [address] [address]   [phone]            Nov 28, 2017  8:00 AM CST   Lymphedema Treatment with Brianne Lundberg PT   [facility] ([facility])    [address] [address]   [phone]            Nov 30, 2017  8:00 AM CST   Lymphedema Treatment with Brianne Lundberg PT   [facility] ([facility])    [address] [address]   [phone]            Nov 30, 2017  2:30 PM CST   MR MYOCARDIUM W CONTRAST with UUMR4, UU CV MR NURSE   [facility] MRI ([facility])    [address] [address] [phone]           Take your medicines as usual, unless your doctor tells you not to. Bring a list of  your current medicines to your exam (including vitamins, minerals and over-the-counter drugs).  You will be given intravenous contrast for this exam. To prepare:   The day before your exam, drink extra fluids at least six 8-ounce glasses (unless your doctor tells you to restrict your fluids).   Have a blood test (creatinine test) within 30 days of your exam. Go to your clinic or Diagnostic Imaging Department for this test.  The MRI machine uses a strong magnet. Please wear clothes without metal (snaps, zippers). A sweatsuit works well, or we may give you a hospital gown.  Please remove any body piercings and hair extensions before you arrive. You will also remove watches, jewelry, hairpins, wallets, dentures, partial dental plates and hearing aids. You may wear contact lenses, and you may be able to wear your rings. We have a safe place to keep your personal items, but it is safer to leave them at home.   **IMPORTANT** THE INSTRUCTIONS BELOW ARE ONLY FOR THOSE PATIENTS WHO HAVE BEEN TOLD THEY WILL RECEIVE SEDATION OR GENERAL ANESTHESIA DURING THEIR MRI PROCEDURE:  IF YOU WILL RECEIVE SEDATION (take medicine to help you relax during your exam):   You must get the medicine from your doctor before you arrive. Bring the medicine to the exam. Do not take it at home.   Arrive one hour early. Bring someone who can take you home after the test. Your medicine will make you sleepy. After the exam, you may not drive, take a bus or take a taxi by yourself.   No eating 8 hours before your exam. You may have clear liquids up until 4 hours before your exam. (Clear liquids include water, clear tea, black coffee and fruit juice without pulp.)  IF YOU WILL RECEIVE ANESTHESIA (be asleep for your exam):   Arrive 1 1/2 hours early. Bring someone who can take you home after the test. You may not drive, take a bus or take a taxi by yourself.   No eating 8 hours before your exam. You may have clear liquids up until 4 hours before your  exam. (Clear liquids include water, clear tea, black coffee and fruit juice without pulp.)  Please call the Imaging Department at your exam site with any questions.            Dec 01, 2017  3:30 PM CST   Return Sleep Patient with Rajesh Ahuja MD   [facility] [place] [place] ([facility] [place])    [address]  [facility] [address]   [phone]              Future tests that were ordered for you today     Open Future Orders        Priority Expected Expires Ordered    MRI Cardiac w/contrast Routine 11/16/2017 11/16/2018 11/16/2017    Echocardiogram STAT 11/15/2017 11/15/2018 11/15/2017            Who to contact     If you have questions or need follow up information about today's clinic visit or your schedule please contact [facility] directly at [phone].  Normal or non-critical lab and imaging results will be communicated to you by MyChart, letter or phone within 4 business days after the clinic has received the results. If you do not hear from us within 7 days, please contact the clinic through [employer] or phone. If you have a critical or abnormal lab result, we will notify you by phone as soon as possible.  Submit refill requests through [employer] or call your pharmacy and they will forward the refill request to us. Please allow 3 business days for your refill to be completed.          Additional Information About Your Visit        MyChart Information     [employer] gives you secure access to your electronic health record. If you see a primary care provider, you can also send messages to your care team and make appointments. If you have questions, please call your primary care clinic.  If you do not have a primary care provider, please call [phone] and they will assist you.        Care EveryWhere ID     This is your Care EveryWhere ID. This could be used by other organizations to access your [place] medical records  [id number]        Your Vitals  "Were     Pulse Temperature Respirations Height Last Period Pulse Oximetry    78 98.4  F (36.9  C) (Oral) 17 1.638 m (5' 4.49\") 02/06/2013 99%    Breastfeeding? BMI (Body Mass Index)                No 33.47 kg/m2           Blood Pressure from Last 3 Encounters:   11/16/17 112/73   11/15/17 133/51   11/10/17 109/52    Weight from Last 3 Encounters:   11/16/17 89.8 kg (198 lb)   11/15/17 89.6 kg (197 lb 8 oz)   11/10/17 90.9 kg (200 lb 8 oz)              Today, you had the following     No orders found for display       Primary Care Provider Office Phone # Fax #    Johana Fairbanks [phone] [address] [address]        Equal Access to Services     [facility]: Hadii han ham hadasho Soomaali, waaxda luqadaha, qaybta kaalmada adeegyada, hari rodriguez . So [facility] [phone].    ATENCIÓN: Si habla español, tiene a parekh disposición servicios gratuitos de asistencia lingüística. Llame al [phone].    We comply with applicable federal civil rights laws and [place] laws. We do not discriminate on the basis of race, color, national origin, age, disability, sex, sexual orientation, or gender identity.            Thank you!     Thank you for choosing [facility]  for your care. Our goal is always to provide you with excellent care. Hearing back from our patients is one way we can continue to improve our services. Please take a few minutes to complete the written survey that you may receive in the mail after your visit with us. Thank you!             Your Updated Medication List - Protect others around you: Learn how to safely use, store and throw away your medicines at www.disposemymeds.org.          This list is accurate as of: 11/16/17  5:53 PM.  Always use your most recent med list.                   Brand Name Dispense Instructions for use Diagnosis    * albuterol 108 (90 BASE) MCG/ACT Inhaler    VENTOLIN HFA    1 Inhaler    Inhale 2 " puffs into the lungs every 4 hours as needed    Moderate persistent asthma, uncomplicated       * albuterol (2.5 MG/3ML) 0.083% neb solution     30 vial    Take 1 vial (2.5 mg) by nebulization every 4 hours as needed for shortness of breath / dyspnea    Moderate persistent asthma, uncomplicated       ALLEGRA ALLERGY 180 MG tablet   Generic drug:  fexofenadine      Take 180 mg by mouth daily as needed for allergies        atorvastatin 10 MG tablet    LIPITOR    90 tablet    Take 1 tablet (10 mg) by mouth daily    Hyperlipidemia LDL goal <100       azelastine 0.1 % spray    ASTELIN    3 Bottle    Spray 1-2 sprays into both nostrils 2 times daily as needed for rhinitis    Chronic rhinitis       capecitabine 500 MG tablet CHEMO    XELODA    140 tablet    Take 5 cap in AM and 5 cap in PM on Days 1 through 14, then off for 7 days. Take within 30 mins after meal.    Secondary malignant neoplasm of liver (H), Carcinoma of breast metastatic to liver, unspecified laterality (H), Bone metastasis (H), Breast cancer metastasized to axillary lymph node, right (H)       CRANBERRY PO      Take 1 capsule by mouth daily        diphenhydrAMINE 25 MG tablet    BENADRYL    1 tablet    Take 1 tablet (25 mg) by mouth every 8 hours as needed for itching or allergies    Carcinoma of breast metastatic to liver, unspecified laterality (H), Bone metastasis (H), Pericardial effusion, Bilateral malignant neoplasm of breast in female, estrogen receptor positive, unspecified site of breast (H), 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), Secondary malignant neoplasm of liver (H), Chemotherapy-induced neutropenia (H), Emphysematous bleb of lung (H), Hypothyroidism, unspecified type, Hyperlipidemia LDL goal <130, Moderate persistent asthma without complication, Mucositis due to chemotherapy       DULERA 200-5 MCG/ACT oral inhaler   Generic drug:  mometasone-formoterol     3 Inhaler    INHALE 2 PUFFS INTO THE LUNGS TWICE DAILY     Moderate persistent asthma without complication       KP CALCIUM 600+D3 600-500 MG-UNIT Caps   Generic drug:  calcium carb-cholecalciferol      Take 2 tablets by mouth daily        levothyroxine 25 MCG tablet    SYNTHROID/LEVOTHROID    90 tablet    TAKE 1 TABLET (25 MCG) BY MOUTH DAILY    Hypothyroidism due to acquired atrophy of thyroid       lidocaine (viscous) 2 % solution    XYLOCAINE     Apply topically as needed        lidocaine visc 2% 2.5mL/5mL & maalox/mylanta w/ simeth 2.5mL/5mL & diphenhydrAMINE 5mg/5mL Susp suspension    [facility]    240 mL    Swish and swallow 10 mLs in mouth every 6 hours as needed for mouth sores    Mucositis due to chemotherapy, Breast cancer metastasized to axillary lymph node, right (H)       LORazepam 0.5 MG tablet    ATIVAN    30 tablet    Take 1 tablet (0.5 mg) by mouth every 4 hours as needed (Anxiety, Nausea/Vomiting or Sleep)    Secondary malignant neoplasm of liver (H), Carcinoma of breast metastatic to liver, unspecified laterality (H), Bone metastasis (H), Breast cancer metastasized to axillary lymph node, right (H)       metoclopramide 10 MG tablet    REGLAN    120 tablet    Take 1 tablet (10 mg) by mouth 2 times daily as needed    Bilateral malignant neoplasm of breast in female, estrogen receptor positive, unspecified site of breast (H)       oxybutynin 10 MG 24 hr tablet    DITROPAN XL    90 tablet    Take 1 tablet (10 mg) by mouth daily (Needs follow-up appointment for this medication)    Mixed incontinence urge and stress (male)(female)       oxyCODONE IR 5 MG tablet    ROXICODONE    30 tablet    Take 1 tablet (5 mg) by mouth every 4 hours as needed for moderate to severe pain    Secondary malignant neoplasm of liver (H)       * predniSONE 20 MG tablet    DELTASONE    10 tablet    Take 1 tablet (20 mg) by mouth 2 times daily For Yellow or Red zone on Asthma Action Plan.    Moderate persistent asthma, uncomplicated       * predniSONE 20 MG tablet     DELTASONE    5 tablet    Take 1 tablet (20 mg) by mouth daily    Reactive airway disease, unspecified asthma severity, uncomplicated       * predniSONE 20 MG tablet    DELTASONE    3 tablet    Take 1 tablet (20 mg) by mouth 2 times daily for 3 doses    Carcinoma of breast metastatic to liver, unspecified laterality (H), Bone metastasis (H), Pericardial effusion, Bilateral malignant neoplasm of breast in female, estrogen receptor positive, unspecified site of breast (H), 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), Secondary malignant neoplasm of liver (H), Chemotherapy-induced neutropenia (H), Emphysematous bleb of lung (H), Hypothyroidism, unspecified type, Hyperlipidemia LDL goal <130, Moderate persistent asthma without complication, Mucositis due to chemotherapy       prochlorperazine 10 MG tablet    COMPAZINE    30 tablet    Take 1 tablet (10 mg) by mouth every 6 hours as needed (Nausea/Vomiting)    Secondary malignant neoplasm of liver (H), Carcinoma of breast metastatic to liver, unspecified laterality (H), Bone metastasis (H), Breast cancer metastasized to axillary lymph node, right (H)       QVAR 80 MCG/ACT Inhaler   Generic drug:  beclomethasone     26.1 g    INHALE 1 PUFFS INTO THE EACH NOSTRIL 2 TIMES DAILY    Chronic rhinitis       ROBITUSSIN COUGH/COLD CF PO      Take by mouth as needed    Breast cancer metastasized to axillary lymph node, right (H)       STOOL SOFTENER PO      Take 100 mg by mouth daily        TYLENOL PO      Take 650 mg by mouth every 4 hours as needed for mild pain or fever        zolpidem 10 MG tablet    AMBIEN    30 tablet    Take 1 tablet 30 minutes prior to bedtime        * Notice:  This list has 5 medication(s) that are the same as other medications prescribed for you. Read the directions carefully, and ask your doctor or other care provider to review them with you.

## 2017-11-16 NOTE — ASSESSMENT & PLAN NOTE
Etta Cervantes presented with increasing lump in the right axilla that s lump has been growing without any pain or associated symptoms. Subsequently she was evaluated by primary care physician. Diagnostic digital mammogram was done on 01/13/2015 showing enlarged lymph nodes in the right axilla. There was some skin thickening in the right breast anteriorly and laterally. There are no parenchymal changes in the right breast. The left breast shows a 1.7 cm spiculated mass at approximately 2 to 3 o'clock position, 15.2 cm away from the nipple. A right breast ultrasound was subsequently done and ultrasound guided biopsy was done. Needle biopsy of the left breast did show infiltrating ductal carcinoma grade I of III with no angiolymphatic invasion seen. No associated ductal carcinoma in situ. Estrogen receptor, progresterone receptor were positive. HER-2/sadie receptor came back negative.. Another biopsy from the right axilla did show metastatic ductal carcinoma. PET scan was done on January 26, 2015 showing few small right posterior cervical chain nodes the largest is 1.2 cm. There is extensive bulky right axillary adenopathy demonstrating hypermetabolic FDG uptake with SUV of 10.6. There is skin thickening involving the right breast which demonstrated low-grade FDG uptake. A 1.2 cm lesion on the lateral aspect of the left breast demonstrated minimally increased FDG uptake with SUV of 2. Scattered hypermetabolic mediastinal lymph nodes located in the left superior anterior mediastinum, right paratracheal and precarinal U, subcranial adenopathy with javon metastases. This focus hypermetabolic FDG uptake identified definitive Amanda posterior aspect off intertrochanteric region of the proximal left femur consistent with bone metastases. There is also 1.4 cm hypermetabolic FDG uptake identified in the anterior medial segment of the left hepatic lobe consistent with liver metastases. Additional 2.1 cm low-attenuation lesion  anterior aspect of the right lobe which needs to be determined. The patient was started on chemotherapy with Taxotere and Cytoxan on February 9, 2015.  She concluded 4 cycles of Taxotere and Cytoxan. She started on Arimidex 1 mg orally daily. Currently she is on Faslodex and ibrance. Subsequently the patient went on to receive Afinitor. The patient also started treatment with Xeloda.

## 2017-11-16 NOTE — PROGRESS NOTES
Per Dr. Cassidy, Echo results show a stable amount of fluid but they are unable to determine what the pericardial mass is; fluid or tumor.  A cardiac MRI has been ordered. Also, patient does not need to start IV chemotherapy (Taxotere), but Dr. Cassidy recommends patient stop Xeloda and start Kisquali. Order for oral chemo change has not been placed yet, and is pending until patient is made aware of recommended change.    Message left on patient's voicemail requesting a call back to review results and recommendations.  Direct line provided.

## 2017-11-16 NOTE — PROGRESS NOTES
THORACIC SURGERY - NEW PATIENT OFFICE VISIT      Dear Dr. Fairbanks,    I saw Ms. Cervantes at Dr. Cassidy s request in consultation for the evaluation and treatment of a pericardial effusion.     HPI  Ms. Etta Cervantes is a 57 year old year-old female with metastatic breast cancer and history of spontaneous pneumothorax now with pericardial effusion. This was found on CT scan on 11/13/2017. She is asymptomatic. She denies recent illness.     Previsit Tests   Echocardiogram 11/15/2017: LV ejection fraction of 63%. RV funciton normal. Small to moderate pericardial effusion. Echo-dense structure measuring 1.7 x 1.7 cm seen in the pericardial space attached to the right atrioventricular junction concerning for organized pericardial effusion vs tumor. No evidence of tamponade.  CT chest/abdomen/pelvis 11/13/2017: new mild to moderate pericardial effusion    PMH  Past Medical History:   Diagnosis Date     ASCUS favor benign 1/2015    Neg HPV     Cancer (H)      Cataract 2010    surgery both eyes     Emphysematous bleb of lung (H)      Fibroids      Hypercholesterolemia      Hypothyroid      Incontinence of urine     mixed     Menorrhagia      Obesity      Pneumothorax      PONV (postoperative nausea and vomiting)      Sleep apnea     uses a cpap     Uncomplicated asthma         PSH  Past Surgical History:   Procedure Laterality Date     BIOPSY  Jan 2015, 2016    Breast cancer, thyroid     BRONCHOSCOPY, INSERT BRONCHIAL VALVE N/A 7/20/2017    Procedure: BRONCHOSCOPY, INSERT BRONCHIAL VALVE;  Flexible Bronchoscopy, Endobronchial Valve Insertion X5. 4 Valves into right upper lobe and 1 valve into Right middle lobe;  Surgeon: Carlos Mcgowan MD;  Location: [facility] OR     BRONCHOSCOPY, REMOVE BRONCHIAL VALVE N/A 9/7/2017    Procedure: BRONCHOSCOPY, REMOVE BRONCHIAL VALVE;  Flexible Bronchoscopy, Removal Of Endobronchial Valves x 2;  Surgeon: Carlos Mcgowan MD;  Location: [facility] OR     BRONCHOSCOPY, REMOVE BRONCHIAL VALVE N/A  9/28/2017    Procedure: BRONCHOSCOPY, REMOVE BRONCHIAL VALVE;  Flexible Bronchoscopy, Removal Of Intra-Bronchial Valves x 3;  Surgeon: Carlos Mcgowan MD;  Location: [facility] OR     CATARACT IOL, RT/LT  2010     COLONOSCOPY  2010     DILATION AND CURETTAGE, HYSTEROSCOPY, ABLATE ENDOMETRIUM NOVASURE, COMBINED  3/2/2011    COMBINED DILATION AND CURETTAGE, HYSTEROSCOPY, ABLATE ENDOMETRIUM NOVASURE performed by LAURA VARGAS at [facility]     GENITOURINARY SURGERY  2005    Uretha sling     INSERT PORT VASCULAR ACCESS N/A 2/12/2015    Procedure: INSERT PORT VASCULAR ACCESS;  Surgeon: Noé Schaefer MD;  Location: [facility] OR     SURGICAL HISTORY OF -   2005    bladder sling     SURGICAL HISTORY OF -       Cystectomy-lower lip     TUBAL LIGATION  1987        ETOH nonconsumer  TOB nonsmoker    Physical examination  BMI 33.5  Noncontributory    From a personal perspective, she is here today with her .    IMPRESSION N (I31.3) Pericardial effusion  (C50.919,  C78.7) Carcinoma of breast metastatic to liver, unspecified laterality (H)  (C79.51) Bone metastasis (H)  (C50.911,  Z17.0,  C50.912) Bilateral malignant neoplasm of breast in female, estrogen receptor positive, unspecified site of breast (H)  (C50.911,  C77.3) 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)  (C78.7) Secondary malignant neoplasm of liver (H)  (D70.1,  T45.1X5A) Chemotherapy-induced neutropenia (H)  (J43.9) Emphysematous bleb of lung (H)     57 year old year-old female with metastatic breast cancer and pericardial effusion without tamponade.    PLAN  I spent a total of 30 minutes with Ms. Cervantes and her , more than 50% of which were spent in counseling, coordination of care, and face-to-face time. I reviewed the plan as follows:  -- Follow result of previously scheduled cardiac MRI  -- Will discuss imaging at nodule conference this week   -- Follow up PRN, would not intervene unless patient became symptomatic from this  effusion  All questions were answered and Etta Cervantes and present family were in agreement with the plan.  I appreciate the opportunity to participate in the care of your patient and will keep you updated.  Sincerely,

## 2017-11-16 NOTE — LETTER
11/16/2017       RE: Etta Cervantes  [address] [place]  [place] [address]     Dear Colleague,    Thank you for referring your patient, Etta Cervantes, to the [facility]. Please see a copy of my visit note below.    THORACIC SURGERY - NEW PATIENT OFFICE VISIT      Dear Dr. Fairbanks,    I saw Ms. Cervantes at Dr. Cassidy s request in consultation for the evaluation and treatment of a pericardial effusion.     HPI  Ms. Etta Cervantes is a 57 year old year-old female with metastatic breast cancer and history of spontaneous pneumothorax now with pericardial effusion. This was found on CT scan on 11/13/2017. She is asymptomatic. She denies recent illness.     Previsit Tests   Echocardiogram 11/15/2017: LV ejection fraction of 63%. RV funciton normal. Small to moderate pericardial effusion. Echo-dense structure measuring 1.7 x 1.7 cm seen in the pericardial space attached to the right atrioventricular junction concerning for organized pericardial effusion vs tumor. No evidence of tamponade.  CT chest/abdomen/pelvis 11/13/2017: new mild to moderate pericardial effusion    PMH  Past Medical History:   Diagnosis Date     ASCUS favor benign 1/2015    Neg HPV     Cancer (H)      Cataract 2010    surgery both eyes     Emphysematous bleb of lung (H)      Fibroids      Hypercholesterolemia      Hypothyroid      Incontinence of urine     mixed     Menorrhagia      Obesity      Pneumothorax      PONV (postoperative nausea and vomiting)      Sleep apnea     uses a cpap     Uncomplicated asthma         PSH  Past Surgical History:   Procedure Laterality Date     BIOPSY  Jan 2015, 2016    Breast cancer, thyroid     BRONCHOSCOPY, INSERT BRONCHIAL VALVE N/A 7/20/2017    Procedure: BRONCHOSCOPY, INSERT BRONCHIAL VALVE;  Flexible Bronchoscopy, Endobronchial Valve Insertion X5. 4 Valves into right upper lobe and 1 valve into Right middle lobe;  Surgeon: Carlos Mcgowan MD;  Location: [facility]     BRONCHOSCOPY, REMOVE  BRONCHIAL VALVE N/A 9/7/2017    Procedure: BRONCHOSCOPY, REMOVE BRONCHIAL VALVE;  Flexible Bronchoscopy, Removal Of Endobronchial Valves x 2;  Surgeon: Carlos Mcgowan MD;  Location: [facility] OR     BRONCHOSCOPY, REMOVE BRONCHIAL VALVE N/A 9/28/2017    Procedure: BRONCHOSCOPY, REMOVE BRONCHIAL VALVE;  Flexible Bronchoscopy, Removal Of Intra-Bronchial Valves x 3;  Surgeon: Carlos Mcgowan MD;  Location: [facility]     CATARACT IOL, RT/LT  2010     COLONOSCOPY  2010     DILATION AND CURETTAGE, HYSTEROSCOPY, ABLATE ENDOMETRIUM NOVASURE, COMBINED  3/2/2011    COMBINED DILATION AND CURETTAGE, HYSTEROSCOPY, ABLATE ENDOMETRIUM NOVASURE performed by LAURA VARGAS at [facility]     GENITOURINARY SURGERY  2005    Uretha sling     INSERT PORT VASCULAR ACCESS N/A 2/12/2015    Procedure: INSERT PORT VASCULAR ACCESS;  Surgeon: Noé Schaefer MD;  Location: [facility]     SURGICAL HISTORY OF -   2005    bladder sling     SURGICAL HISTORY OF -       Cystectomy-lower lip     TUBAL LIGATION  1987        ETOH nonconsumer  TOB nonsmoker    Physical examination  BMI 33.5  Noncontributory    From a personal perspective, she is here today with her .    IMPRESSION N (I31.3) Pericardial effusion  (C50.919,  C78.7) Carcinoma of breast metastatic to liver, unspecified laterality (H)  (C79.51) Bone metastasis (H)  (C50.911,  Z17.0,  C50.912) Bilateral malignant neoplasm of breast in female, estrogen receptor positive, unspecified site of breast (H)  (C50.911,  C77.3) 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)  (C78.7) Secondary malignant neoplasm of liver (H)  (D70.1,  T45.1X5A) Chemotherapy-induced neutropenia (H)  (J43.9) Emphysematous bleb of lung (H)     57 year old year-old female with metastatic breast cancer and pericardial effusion without tamponade.    PLAN  I spent a total of 30 minutes with Ms. Cervantes and her , more than 50% of which were spent in counseling, coordination of care, and  face-to-face time. I reviewed the plan as follows:  -- Follow result of previously scheduled cardiac MRI  -- Will discuss imaging at nodule conference this week   -- Follow up PRN, would not intervene unless patient became symptomatic from this effusion  All questions were answered and Etta Cervantes and present family were in agreement with the plan.  I appreciate the opportunity to participate in the care of your patient and will keep you updated.  Sincerely,        Edel Guevara MD

## 2017-11-17 NOTE — TELEPHONE ENCOUNTER
"Oncology Distress Screening Follow-up  Clinical Social Work  [facility]    Identified Concern and Score From Distress Screening: On 2017, pt scored a 10/10 on the Oncology Distress Screening Question \"How concerned are you about knowing what resources are available to help you?\"    Date of Distress Screenin2017    Data: Pt is a 57 year old year-old female with metastatic breast cancer.     Intervention: On 2017, SW followed up with pt (P: [phone]) to discuss concern. SW left a voice message introducing SW self/role. SW provided Oncology SW contact information and encouraged pt to call back for any support/resources needed.     Education Provided: Self-Care Education and Oncology SW Contact Information    Follow-up Required: None needed at this time.     MAISHA Lombardo, LGSW  Phone: [phone]  Pager: [phone]  "

## 2017-11-20 NOTE — TELEPHONE ENCOUNTER
PA Initiation    Medication: Kisqali  Insurance Company: [employer] - Phone [phone] Fax [phone]  Pharmacy Filling the Rx: [facility] - [place], [place] - [facility] [address]  Filling Pharmacy Phone: [phone]  Filling Pharmacy Fax: [phone]  Start Date: 11/20/2017

## 2017-11-21 NOTE — TELEPHONE ENCOUNTER
Prior Authorization Approval    Authorization Effective Date: 11/20/2017  Authorization Expiration Date: 11/20/2018  Medication: Kisqali  Approved Dose/Quantity:   Reference #: [id number]   Insurance Company: [employer] - Phone [phone] Fax [phone]  Expected CoPay:       CoPay Card Available:      Foundation Assistance Needed:    Which Pharmacy is filling the prescription (Not needed for infusion/clinic administered): [facility] - [place], [place] - [facility] [address]  Pharmacy Notified:    Patient Notified:

## 2017-11-28 NOTE — PATIENT INSTRUCTIONS
If you notice your blood pressure dropping, you feel more lightheaded especially when standing up, or if you're very short of breath with walking, then please let me know. If any swelling in the legs as well, that could be a sign that the heart is being squished and fluid is backing up.    We will check another echo in [place] one week after the chemo starts and then after the next 3 week cycle.

## 2017-11-28 NOTE — LETTER
11/28/2017    Johana Fairbanks MD  [address] [place] [address]    RE: Etta Cervantes       Dear Colleague,    I had the pleasure of seeing Etta Cervantes in the [facility].    Cardiology Consultation      Etta Cervantes MRN# [id number]   YOB: 1960 Age: 57 year old   Date of Visit 11/28/2017     Reason for consult:  metastatic breast cancer            Assessment and Plan:     1. Breast cancer, metastatic to liver and epicardium with likely malignant pericardial effusion without evidence of tamponade    I reviewed the images of the epicardial mass.  It is not restrictive or obstructive and is extrinsic to the heart.  Have recommended that she have follow-up echocardiogram in two weeks to watch for extension of the pericardial effusion.  Have discussed warning signs and symptoms of pericardial tamponade.    We will also get further echocardiogram after her second cycle of chemotherapy to see if improvement in the pericardial mass and pericardial effusion.    Would be happy to review her ECGs for QT prolongation on her Kisqali.          This note was transcribed using electronic voice recognition software, typographical errors may be present.                Chief Complaint:   Hyperlipidemia           History of Present Illness:   This patient is a very pleasant 57 year old female with metastatic breast cancer to liver and likely epicardium approximately 2.4 cm in diameter.  This was seen on echocardiogram and cardiac MRI 11/24/2017.  I reviewed the images of the echocardiogram.  There is mild to moderate pericardial effusion without evidence of tamponade.  On physical exam, no pulsus paradoxus and no elevated jugular venous pressure.  The presumptive epicardial metastases is not compressive or restrictive.  It does not have any hemodynamic significance at this point.  She is scheduled to start Kisqali in approximately one week and will have her QT monitored.  At this  "point, no epistasis or exertional chest discomfort/dyspnea on exertion.             Physical Exam:     Vitals: /70  Pulse 82  Ht 1.638 m (5' 4.5\")  Wt 91.1 kg (200 lb 14.4 oz)  LMP 02/06/2013  SpO2 97%  BMI 33.95 kg/m2  Constitutional:  cooperative, alert and oriented, well developed, well nourished, in no acute distress        Skin:  warm and dry to the touch, no apparent skin lesions or masses noted        Head:  normocephalic, no masses or lesions        Eyes:  pupils equal and round, conjunctivae and lids unremarkable, sclera white, no xanthalasma, EOMS intact, no nystagmus        ENT:  no pallor or cyanosis, dentition good        Neck:  JVP normal        Chest:  normal breath sounds, clear to auscultation, normal A-P diameter, normal symmetry, normal respiratory excursion, no use of accessory muscles        Cardiac: regular rhythm;no murmurs, gallops or rubs detected                  Abdomen:      benign    Vascular: pulses full and equal                                      Extremities and Back:  no deformities, clubbing, cyanosis, erythema observed        Neurological:  no gross motor deficits;affect appropriate                    Past Medical History:   I have reviewed this patient's past medical history  Past Medical History:   Diagnosis Date     ASCUS favor benign 1/2015    Neg HPV     Cancer (H)      Cataract 2010    surgery both eyes     Emphysematous bleb of lung (H)      Fibroids      Hypercholesterolemia      Hypothyroid      Incontinence of urine     mixed     Menorrhagia      Obesity      Pneumothorax      PONV (postoperative nausea and vomiting)      Sleep apnea     uses a cpap     Uncomplicated asthma              Past Surgical History:   I have reviewed this patient's past surgical history  Past Surgical History:   Procedure Laterality Date     BIOPSY  Jan 2015, 2016    Breast cancer, thyroid     BRONCHOSCOPY, INSERT BRONCHIAL VALVE N/A 7/20/2017    Procedure: BRONCHOSCOPY, INSERT " BRONCHIAL VALVE;  Flexible Bronchoscopy, Endobronchial Valve Insertion X5. 4 Valves into right upper lobe and 1 valve into Right middle lobe;  Surgeon: Carlos Mcgowan MD;  Location: [facility]     BRONCHOSCOPY, REMOVE BRONCHIAL VALVE N/A 9/7/2017    Procedure: BRONCHOSCOPY, REMOVE BRONCHIAL VALVE;  Flexible Bronchoscopy, Removal Of Endobronchial Valves x 2;  Surgeon: Carlos Mcgowan MD;  Location: [facility]     BRONCHOSCOPY, REMOVE BRONCHIAL VALVE N/A 9/28/2017    Procedure: BRONCHOSCOPY, REMOVE BRONCHIAL VALVE;  Flexible Bronchoscopy, Removal Of Intra-Bronchial Valves x 3;  Surgeon: Carlos Mcgowan MD;  Location: [facility]     CATARACT IOL, RT/LT  2010     COLONOSCOPY  2010     DILATION AND CURETTAGE, HYSTEROSCOPY, ABLATE ENDOMETRIUM NOVASURE, COMBINED  3/2/2011    COMBINED DILATION AND CURETTAGE, HYSTEROSCOPY, ABLATE ENDOMETRIUM NOVASURE performed by LAURA VARGAS at [facility]     GENITOURINARY SURGERY  2005    Uretha sling     INSERT PORT VASCULAR ACCESS N/A 2/12/2015    Procedure: INSERT PORT VASCULAR ACCESS;  Surgeon: Noé Schaefer MD;  Location: [facility]     SURGICAL HISTORY OF -   2005    bladder sling     SURGICAL HISTORY OF -       Cystectomy-lower lip     TUBAL LIGATION  1987               Social History:   I have reviewed this patient's social history  Social History   Substance Use Topics     Smoking status: Never Smoker     Smokeless tobacco: Never Used     Alcohol use No             Family History:   I have reviewed this patient's family history  Family History   Problem Relation Age of Onset     Depression Mother      Eye Disorder Mother      Gynecology Mother      Alzheimer Disease Mother      CANCER Father      Other Cancer Father      HEART DISEASE Maternal Grandfather      Allergies Paternal Grandfather      Allergies Son              Allergies:     Allergies   Allergen Reactions     Animal Dander Cough     Itchy eyes     Cats      Dust Mites Cough     Itchy eyes     Pollen  Extract      Other reaction(s): Cough  Itchy eyes     Seasonal Allergies      Levaquin [Levofloxacin] Rash     States she has violent dreams from taking this             Medications:   I have reviewed this patient's current medications  Current Outpatient Prescriptions   Medication Sig Dispense Refill     ribociclib (KISQALI 600 DOSE) 200 MG tablet Take 3 tablets (600 mg) by mouth daily for 21 days given on days 1-21 every 28 days. 63 tablet 3     letrozole (FEMARA) 2.5 MG tablet Take 1 tablet (2.5 mg) by mouth daily 30 tablet 3     diphenhydrAMINE (BENADRYL) 25 MG tablet Take 1 tablet (25 mg) by mouth every 8 hours as needed for itching or allergies 1 tablet 0     zolpidem (AMBIEN) 10 MG tablet Take 1 tablet 30 minutes prior to bedtime 30 tablet 0     oxybutynin (DITROPAN XL) 10 MG 24 hr tablet Take 1 tablet (10 mg) by mouth daily (Needs follow-up appointment for this medication) 90 tablet 0     DULERA 200-5 MCG/ACT oral inhaler INHALE 2 PUFFS INTO THE LUNGS TWICE DAILY 3 Inhaler 3     predniSONE (DELTASONE) 20 MG tablet Take 1 tablet (20 mg) by mouth daily 5 tablet 0     QVAR 80 MCG/ACT Inhaler INHALE 1 PUFFS INTO THE EACH NOSTRIL 2 TIMES DAILY 26.1 g 3     Phenylephrine-DM-GG (ROBITUSSIN COUGH/COLD CF PO) Take by mouth as needed       lidocaine, viscous, (XYLOCAINE) 2 % solution Apply topically as needed       levothyroxine (SYNTHROID/LEVOTHROID) 25 MCG tablet TAKE 1 TABLET (25 MCG) BY MOUTH DAILY 90 tablet 2     atorvastatin (LIPITOR) 10 MG tablet Take 1 tablet (10 mg) by mouth daily 90 tablet 3     metoclopramide (REGLAN) 10 MG tablet Take 1 tablet (10 mg) by mouth 2 times daily as needed 120 tablet 1     oxyCODONE (ROXICODONE) 5 MG IR tablet Take 1 tablet (5 mg) by mouth every 4 hours as needed for moderate to severe pain 30 tablet 0     fexofenadine (ALLEGRA ALLERGY) 180 MG tablet Take 180 mg by mouth daily as needed for allergies       calcium carb-cholecalciferol (KP CALCIUM 600+D3) 600-500 MG-UNIT CAPS  Take 2 tablets by mouth daily       albuterol (2.5 MG/3ML) 0.083% neb solution Take 1 vial (2.5 mg) by nebulization every 4 hours as needed for shortness of breath / dyspnea 30 vial 3     predniSONE (DELTASONE) 20 MG tablet Take 1 tablet (20 mg) by mouth 2 times daily For Yellow or Red zone on Asthma Action Plan. 10 tablet 1     magic mouthwash suspension (diphenhydrAMINE, lidocaine, aluminum-magnesium & simethicone) Swish and swallow 10 mLs in mouth every 6 hours as needed for mouth sores 240 mL 10     Docusate Calcium (STOOL SOFTENER PO) Take 100 mg by mouth daily        CRANBERRY PO Take 1 capsule by mouth daily        azelastine (ASTELIN) 0.1 % nasal spray Spray 1-2 sprays into both nostrils 2 times daily as needed for rhinitis 3 Bottle 3     albuterol (VENTOLIN HFA) 108 (90 BASE) MCG/ACT inhaler Inhale 2 puffs into the lungs every 4 hours as needed 1 Inhaler 2     Acetaminophen (TYLENOL PO) Take 650 mg by mouth every 4 hours as needed for mild pain or fever                 Review of Systems:     Review of Systems:  Skin:  Negative     Eyes:  Positive for glasses  ENT:  Negative    Respiratory:  Negative    Cardiovascular:  Negative    Gastroenterology: Negative    Genitourinary:  Negative    Musculoskeletal:  Negative    Neurologic:  Positive for numbness or tingling of feet (due to chemo)  Psychiatric:  Negative    Heme/Lymph/Imm:  Negative    Endocrine:  Negative                       Data:   All laboratory data reviewed  Lab Results   Component Value Date    CHOL 151 06/28/2017     Lab Results   Component Value Date    HDL 64 06/28/2017     Lab Results   Component Value Date    LDL 64 06/28/2017     Lab Results   Component Value Date    TRIG 117 06/28/2017     Lab Results   Component Value Date    CHOLHDLRATIO 3.6 03/18/2015     TSH   Date Value Ref Range Status   08/17/2017 1.07 0.40 - 4.00 mU/L Final     Last Basic Metabolic Panel:  Lab Results   Component Value Date     11/09/2017      Lab Results    Component Value Date    POTASSIUM 3.8 11/09/2017     Lab Results   Component Value Date    CHLORIDE 113 11/09/2017     Lab Results   Component Value Date    BEN 8.7 11/09/2017     Lab Results   Component Value Date    CO2 23 11/09/2017     Lab Results   Component Value Date    BUN 13 11/09/2017     Lab Results   Component Value Date    CR 0.60 11/09/2017     Lab Results   Component Value Date     11/09/2017     Lab Results   Component Value Date    WBC 4.4 11/09/2017     Lab Results   Component Value Date    RBC 3.30 11/09/2017     Lab Results   Component Value Date    HGB 11.9 11/09/2017     Lab Results   Component Value Date    HCT 35.5 11/09/2017     Lab Results   Component Value Date     11/09/2017     Lab Results   Component Value Date    MCH 36.1 11/09/2017     Lab Results   Component Value Date    MCHC 33.5 11/09/2017     Lab Results   Component Value Date    RDW 17.4 11/09/2017     Lab Results   Component Value Date     11/09/2017     Thank you for allowing me to participate in the care of your patient.    Sincerely,     Iggy Perez MD

## 2017-11-28 NOTE — PROGRESS NOTES
Outpatient Physical Therapy Progress Note     Patient: Etta Cervantes  : 1960    Beginning/End Dates of Reporting Period:  10/24/2017 to 2017    Referring Provider: Dr. Khanh MD    Therapy Diagnosis: R-breast lymphedema      Client Self Report: i've had a lump under my arm, but with compression it goes away    Objective Measurements:  Objective Measure: R-breast girth  Details: plateau from last session on 17; from initial evaluation to  R-breast girth has decreased by 5.8cm       Outcome Measures (most recent score):   LLIS = 29 on date of initial evaluation; pt will again complete LLIS at time of discharge       Goals:  Goal Identifier stg   Goal Description pt to have at least daytime tolerance to Komprex2 at R-breast for decrease edema and fibrosis   Target Date 17   Date Met  17   Progress:     Goal Identifier stg   Goal Description pt to be independent with self-MLD of R-breast for decreased edema and fibrosis for increased comfort   Target Date 11/15/17   Date Met  17   Progress:     Goal Identifier ltg   Goal Description pt to have at least 5cm reduction in R-breast girth for increased comfort   Target Date 17   Date Met  17 (5.8cm reduction since initial evaluation)   Progress:     Goal Identifier ltg   Goal Description pt to be independent with R-breast lymphedema management via compression and self-MLD (as appropriate)    Target Date 17   Date Met      Progress:       Progress Toward Goals:   Patient has made great reductions in R-breast girth as well as decreased fibrosis with compression, Komprex2 compression padding and MLD (performed both in clinic and at home on own). Overall patient reports significant improvement in comfort. Will continue with 2x/week treatments as reductions are being achieved with goal of fitting for permanent compression garment/padding and to ensure pt is fully independent with home program for lonterm  management.        Plan:  Continue therapy per current plan of care.    Discharge:  No

## 2017-11-28 NOTE — MR AVS SNAPSHOT
After Visit Summary   11/28/2017    Etta Cervantes    MRN: [id number]           Patient Information     Date Of Birth          1960        Visit Information        Provider Department      11/28/2017 2:45 PM Iggy Perez MD [facility]        Today's Diagnoses     Hyperlipidemia LDL goal <130    -  1    Personal history of malignant neoplasm of breast        Pericardial effusion        Pericardial tumor          Care Instructions    If you notice your blood pressure dropping, you feel more lightheaded especially when standing up, or if you're very short of breath with walking, then please let me know. If any swelling in the legs as well, that could be a sign that the heart is being squished and fluid is backing up.    We will check another echo in [place] one week after the chemo starts and then after the next 3 week cycle.          Follow-ups after your visit        Your next 10 appointments already scheduled     Nov 30, 2017  8:00 AM CST   Lymphedema Treatment with Brianne Lundberg, RICARDO   [facility] ([facility])    [address] [address]   [phone]            Nov 30, 2017  8:40 AM CST   LAB with [facility] ([facility])    [address] [address]   [phone]           Please do not eat 10-12 hours before your appointment if you are coming in fasting for labs on lipids, cholesterol, or glucose (sugar). This does not apply to pregnant women. Water, hot tea and black coffee (with nothing added) are okay. Do not drink other fluids, diet soda or chew gum.            Dec 01, 2017  3:30 PM CST   Return Sleep Patient with Rajesh Ahuja MD   [facility] ([facility])    [address]  [facility] [address]   [phone]            Dec 05, 2017 11:00 AM CST   Lymphedema Treatment with  Brianne Lundberg, PT   [facility]hedema ([facility])    [address]  [facility] [address]   [phone]            Dec 07, 2017  8:00 AM CST   Lymphedema Treatment with Brianne Lundberg, PT   [facility] ([facility])    [address] [address]   [phone]              Future tests that were ordered for you today     Open Future Orders        Priority Expected Expires Ordered    Echocardiogram Limited Routine 1/2/2018 11/28/2018 11/28/2017    Echocardiogram Limited Routine 12/12/2017 11/28/2018 11/28/2017            Who to contact     If you have questions or need follow up information about today's clinic visit or your schedule please contact [facility] directly at [phone].  Normal or non-critical lab and imaging results will be communicated to you by MyChart, letter or phone within 4 business days after the clinic has received the results. If you do not hear from us within 7 days, please contact the clinic through [employer] or phone. If you have a critical or abnormal lab result, we will notify you by phone as soon as possible.  Submit refill requests through [employer] or call your pharmacy and they will forward the refill request to us. Please allow 3 business days for your refill to be completed.          Additional Information About Your Visit        MyChart Information     [employer] gives you secure access to your electronic health record. If you see a primary care provider, you can also send messages to your care team and make appointments. If you have questions, please call your primary care clinic.  If you do not have a primary care provider, please call [phone] and they will assist you.        Care EveryWhere ID     This is your Care EveryWhere ID. This could be used by other organizations to access your [facility] medical records  [id number]        Your Vitals Were     Pulse  "Height Last Period Pulse Oximetry BMI (Body Mass Index)       82 1.638 m (5' 4.5\") 02/06/2013 97% 33.95 kg/m2        Blood Pressure from Last 3 Encounters:   11/28/17 101/70   11/16/17 112/73   11/15/17 133/51    Weight from Last 3 Encounters:   11/28/17 91.1 kg (200 lb 14.4 oz)   11/16/17 89.8 kg (198 lb)   11/15/17 89.6 kg (197 lb 8 oz)                 Today's Medication Changes          These changes are accurate as of: 11/28/17  3:22 PM.  If you have any questions, ask your nurse or doctor.               Stop taking these medicines if you haven't already. Please contact your care team if you have questions.     capecitabine 500 MG tablet CHEMO   Commonly known as:  XELODA   Stopped by:  Iggy Perez MD                    Primary Care Provider Office Phone # Fax #    Johana ARIC [place] [phone] [address] [facility] [address]        Equal Access to Services     [facility]: Hadii han ku hadasho Soomaali, waaxda luqadaha, qaybta kaalmada adeegyarajesh, hari rodriguez . So [facility] [phone].    ATENCIÓN: Si habla español, tiene a parekh disposición servicios gratuitos de asistencia lingüística. Llame al [phone].    We comply with applicable federal civil rights laws and [place] laws. We do not discriminate on the basis of race, color, national origin, age, disability, sex, sexual orientation, or gender identity.            Thank you!     Thank you for choosing [facility]  for your care. Our goal is always to provide you with excellent care. Hearing back from our patients is one way we can continue to improve our services. Please take a few minutes to complete the written survey that you may receive in the mail after your visit with us. Thank you!             Your Updated Medication List - Protect others around you: Learn how to safely use, store and throw away your medicines at www.disposemymeds.org.        "   This list is accurate as of: 11/28/17  3:22 PM.  Always use your most recent med list.                   Brand Name Dispense Instructions for use Diagnosis    * albuterol 108 (90 BASE) MCG/ACT Inhaler    VENTOLIN HFA    1 Inhaler    Inhale 2 puffs into the lungs every 4 hours as needed    Moderate persistent asthma, uncomplicated       * albuterol (2.5 MG/3ML) 0.083% neb solution     30 vial    Take 1 vial (2.5 mg) by nebulization every 4 hours as needed for shortness of breath / dyspnea    Moderate persistent asthma, uncomplicated       ALLEGRA ALLERGY 180 MG tablet   Generic drug:  fexofenadine      Take 180 mg by mouth daily as needed for allergies        atorvastatin 10 MG tablet    LIPITOR    90 tablet    Take 1 tablet (10 mg) by mouth daily    Hyperlipidemia LDL goal <100       azelastine 0.1 % spray    ASTELIN    3 Bottle    Spray 1-2 sprays into both nostrils 2 times daily as needed for rhinitis    Chronic rhinitis       CRANBERRY PO      Take 1 capsule by mouth daily        diphenhydrAMINE 25 MG tablet    BENADRYL    1 tablet    Take 1 tablet (25 mg) by mouth every 8 hours as needed for itching or allergies    Carcinoma of breast metastatic to liver, unspecified laterality (H), Bone metastasis (H), Pericardial effusion, Bilateral malignant neoplasm of breast in female, estrogen receptor positive, unspecified site of breast (H), 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), Secondary malignant neoplasm of liver (H), Chemotherapy-induced neutropenia (H), Emphysematous bleb of lung (H), Hypothyroidism, unspecified type, Hyperlipidemia LDL goal <130, Moderate persistent asthma without complication, Mucositis due to chemotherapy       DULERA 200-5 MCG/ACT oral inhaler   Generic drug:  mometasone-formoterol     3 Inhaler    INHALE 2 PUFFS INTO THE LUNGS TWICE DAILY    Moderate persistent asthma without complication       KP CALCIUM 600+D3 600-500 MG-UNIT Caps   Generic drug:  calcium  carb-cholecalciferol      Take 2 tablets by mouth daily        letrozole 2.5 MG tablet    FEMARA    30 tablet    Take 1 tablet (2.5 mg) by mouth daily    Carcinoma of breast metastatic to liver, unspecified laterality (H), Bone metastasis (H), Pericardial effusion, Bilateral malignant neoplasm of breast in female, estrogen receptor positive, unspecified site of breast (H), 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), Secondary malignant neoplasm of liver (H), Chemotherapy-induced neutropenia (H), Emphysematous bleb of lung (H), Hypothyroidism, unspecified type, Hyperlipidemia LDL goal <130, Moderate persistent asthma without complication, Mucositis due to chemotherapy       levothyroxine 25 MCG tablet    SYNTHROID/LEVOTHROID    90 tablet    TAKE 1 TABLET (25 MCG) BY MOUTH DAILY    Hypothyroidism due to acquired atrophy of thyroid       lidocaine (viscous) 2 % solution    XYLOCAINE     Apply topically as needed        lidocaine visc 2% 2.5mL/5mL & maalox/mylanta w/ simeth 2.5mL/5mL & diphenhydrAMINE 5mg/5mL Susp suspension    [facility]    240 mL    Swish and swallow 10 mLs in mouth every 6 hours as needed for mouth sores    Mucositis due to chemotherapy, Breast cancer metastasized to axillary lymph node, right (H)       metoclopramide 10 MG tablet    REGLAN    120 tablet    Take 1 tablet (10 mg) by mouth 2 times daily as needed    Bilateral malignant neoplasm of breast in female, estrogen receptor positive, unspecified site of breast (H)       oxybutynin 10 MG 24 hr tablet    DITROPAN XL    90 tablet    Take 1 tablet (10 mg) by mouth daily (Needs follow-up appointment for this medication)    Mixed incontinence urge and stress (male)(female)       oxyCODONE IR 5 MG tablet    ROXICODONE    30 tablet    Take 1 tablet (5 mg) by mouth every 4 hours as needed for moderate to severe pain    Secondary malignant neoplasm of liver (H)       * predniSONE 20 MG tablet    DELTASONE    10 tablet     Take 1 tablet (20 mg) by mouth 2 times daily For Yellow or Red zone on Asthma Action Plan.    Moderate persistent asthma, uncomplicated       * predniSONE 20 MG tablet    DELTASONE    5 tablet    Take 1 tablet (20 mg) by mouth daily    Reactive airway disease, unspecified asthma severity, uncomplicated       QVAR 80 MCG/ACT Inhaler   Generic drug:  beclomethasone     26.1 g    INHALE 1 PUFFS INTO THE EACH NOSTRIL 2 TIMES DAILY    Chronic rhinitis       ribociclib 200 MG tablet    KISQALI 600 DOSE    63 tablet    Take 3 tablets (600 mg) by mouth daily for 21 days given on days 1-21 every 28 days.    Carcinoma of breast metastatic to liver, unspecified laterality (H), Bone metastasis (H), Pericardial effusion, Bilateral malignant neoplasm of breast in female, estrogen receptor positive, unspecified site of breast (H), 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), Secondary malignant neoplasm of liver (H), Chemotherapy-induced neutropenia (H), Emphysematous bleb of lung (H), Hypothyroidism, unspecified type, Hyperlipidemia LDL goal <130, Moderate persistent asthma without complication, Mucositis due to chemotherapy       ROBITUSSIN COUGH/COLD CF PO      Take by mouth as needed    Breast cancer metastasized to axillary lymph node, right (H)       STOOL SOFTENER PO      Take 100 mg by mouth daily        TYLENOL PO      Take 650 mg by mouth every 4 hours as needed for mild pain or fever        zolpidem 10 MG tablet    AMBIEN    30 tablet    Take 1 tablet 30 minutes prior to bedtime        * Notice:  This list has 4 medication(s) that are the same as other medications prescribed for you. Read the directions carefully, and ask your doctor or other care provider to review them with you.

## 2017-11-28 NOTE — ADDENDUM NOTE
Encounter addended by: Brianne Lundberg, PT on: 11/28/2017  2:35 PM<BR>     Actions taken: Flowsheet accepted, Sign clinical note

## 2017-11-30 NOTE — TELEPHONE ENCOUNTER
Per Dr. Cassidy, liver enzymes are very elevated and patient needs to come to clinic today for Liver US and to be seen by him in clinic for results.  Pt is to NOT start taking Kisquali at this time.  Reviewed results and Dr. Cassidy recommendations with patient. She is in agreement with this plan, denies questions or concerns at this time.  Call transferred to scheduling to assist with US set up and Dr. Cassidy f/u.

## 2017-11-30 NOTE — PATIENT INSTRUCTIONS
Please HOLD all Tylenol (Acetaminophen), Femara, Kisquali, and Lipitor.  You will need to have a lab draw today (CA 27.29) and a lab draw on Tuesday (Liver Enzymes).  We will call you with the results next week and the plan of care.      Copy of appointments, and after visit summary (AVS) given to patient.  If you have any questions during business hours (M-F 8 AM- 4PM), please call Funmi Ray RN, BSN, OCN Oncology Hematology /Breast Cancer Navigator at [facility] [phone].       For questions after business hours, or on holidays/weekends, please call our after hours Nurse Triage line [phone]. Thank you.

## 2017-11-30 NOTE — LETTER
11/30/2017         RE: Etta Cervantes  [address] [place]  [place] [address]        Dear Colleague,    Thank you for referring your patient, Etta Cervantes, to the [facility], [place], operated by [facility]. Please see a copy of my visit note below.    Hematology/ Oncology Follow-up Visit:  Nov 30, 2017    Reason for Visit:   Chief Complaint   Patient presents with     Oncology Clinic Visit     2 week recheck Carcinoma of breast metastatic to liver, unspecified laterality, review US & Labs       Oncologic History:  Breast cancer (H)  Etta Cervantes presented with increasing lump in the right axilla that s lump has been growing without any pain or associated symptoms. Subsequently she was evaluated by primary care physician. Diagnostic digital mammogram was done on 01/13/2015 showing enlarged lymph nodes in the right axilla. There was some skin thickening in the right breast anteriorly and laterally. There are no parenchymal changes in the right breast. The left breast shows a 1.7 cm spiculated mass at approximately 2 to 3 o'clock position, 15.2 cm away from the nipple. A right breast ultrasound was subsequently done and ultrasound guided biopsy was done. Needle biopsy of the left breast did show infiltrating ductal carcinoma grade I of III with no angiolymphatic invasion seen. No associated ductal carcinoma in situ. Estrogen receptor, progresterone receptor were positive. HER-2/sadie receptor came back negative.. Another biopsy from the right axilla did show metastatic ductal carcinoma. PET scan was done on January 26, 2015 showing few small right posterior cervical chain nodes the largest is 1.2 cm. There is extensive bulky right axillary adenopathy demonstrating hypermetabolic FDG uptake with SUV of 10.6. There is skin thickening involving the right breast which demonstrated low-grade FDG uptake. A 1.2 cm lesion on the lateral aspect of the left breast demonstrated minimally increased FDG uptake with SUV of 2. Scattered  hypermetabolic mediastinal lymph nodes located in the left superior anterior mediastinum, right paratracheal and precarinal U, subcranial adenopathy with javon metastases. This focus hypermetabolic FDG uptake identified definitive Amanda posterior aspect off intertrochanteric region of the proximal left femur consistent with bone metastases. There is also 1.4 cm hypermetabolic FDG uptake identified in the anterior medial segment of the left hepatic lobe consistent with liver metastases. Additional 2.1 cm low-attenuation lesion anterior aspect of the right lobe which needs to be determined. The patient was started on chemotherapy with Taxotere and Cytoxan on February 9, 2015.  She concluded 4 cycles of Taxotere and Cytoxan. She started on Arimidex 1 mg orally daily. Currently she is on Faslodex and ibrance. Subsequently the patient went on to receive Afinitor. The patient also started treatment with Xeloda.       Interval History:  Patient is here today because of markedly elevated liver enzymes seen on her blood work today. Patient denies any nausea or vomiting. Patient denies any fever or chills. Just in me she's been taking Tylenol about 6-8 tablets a day of extra strength Tylenol. She is also currently on Lipitor. She started on letrozole last week. Patient denies any diarrhea. She denies any chest pain or shortness of breath or cough. She was seen last week with cardiology. MRI showed a new mass in the pericardial area. With moderate pericardial effusion. Patient currently asymptomatic    Review Of Systems:  Constitutional: Negative for fever, chills, and night sweats.  Skin: negative.  Eyes: negative.  Ears/Nose/Throat: negative.  Respiratory: No shortness of breath, dyspnea on exertion, cough, or hemoptysis.  Cardiovascular: negative.  Gastrointestinal: negative.  Genitourinary: negative.  Musculoskeletal: negative.  Neurologic: negative.  Psychiatric: negative.  Hematologic/Lymphatic/Immunologic:  negative.  Endocrine: negative.    All other ROS negative unless mentioned in interval history.    Past medical, social, surgical, and family histories reviewed.    Allergies:  Allergies as of 11/30/2017 - Manuel as Reviewed 11/30/2017   Allergen Reaction Noted     Animal dander Cough 01/23/2015     Cats  07/15/2010     Dust mites Cough 01/23/2015     Pollen extract  01/23/2015     Seasonal allergies  07/15/2010     Levaquin [levofloxacin] Rash 07/17/2017       Current Medications:  Current Outpatient Prescriptions   Medication Sig Dispense Refill     letrozole (FEMARA) 2.5 MG tablet Take 1 tablet (2.5 mg) by mouth daily 30 tablet 3     ribociclib (KISQALI 600 DOSE) 200 MG tablet Take 3 tablets (600 mg) by mouth daily for 21 days given on days 1-21 every 28 days. (Patient not taking: Reported on 11/30/2017) 63 tablet 3     diphenhydrAMINE (BENADRYL) 25 MG tablet Take 1 tablet (25 mg) by mouth every 8 hours as needed for itching or allergies 1 tablet 0     zolpidem (AMBIEN) 10 MG tablet Take 1 tablet 30 minutes prior to bedtime 30 tablet 0     DULERA 200-5 MCG/ACT oral inhaler INHALE 2 PUFFS INTO THE LUNGS TWICE DAILY 3 Inhaler 3     predniSONE (DELTASONE) 20 MG tablet Take 1 tablet (20 mg) by mouth daily 5 tablet 0     QVAR 80 MCG/ACT Inhaler INHALE 1 PUFFS INTO THE EACH NOSTRIL 2 TIMES DAILY 26.1 g 3     Phenylephrine-DM-GG (ROBITUSSIN COUGH/COLD CF PO) Take by mouth as needed       lidocaine, viscous, (XYLOCAINE) 2 % solution Apply topically as needed       levothyroxine (SYNTHROID/LEVOTHROID) 25 MCG tablet TAKE 1 TABLET (25 MCG) BY MOUTH DAILY 90 tablet 2     atorvastatin (LIPITOR) 10 MG tablet Take 1 tablet (10 mg) by mouth daily 90 tablet 3     metoclopramide (REGLAN) 10 MG tablet Take 1 tablet (10 mg) by mouth 2 times daily as needed 120 tablet 1     oxyCODONE (ROXICODONE) 5 MG IR tablet Take 1 tablet (5 mg) by mouth every 4 hours as needed for moderate to severe pain 30 tablet 0     fexofenadine (ALLEGRA  "ALLERGY) 180 MG tablet Take 180 mg by mouth daily as needed for allergies       calcium carb-cholecalciferol (KP CALCIUM 600+D3) 600-500 MG-UNIT CAPS Take 2 tablets by mouth daily       albuterol (2.5 MG/3ML) 0.083% neb solution Take 1 vial (2.5 mg) by nebulization every 4 hours as needed for shortness of breath / dyspnea 30 vial 3     predniSONE (DELTASONE) 20 MG tablet Take 1 tablet (20 mg) by mouth 2 times daily For Yellow or Red zone on Asthma Action Plan. 10 tablet 1     magic mouthwash suspension (diphenhydrAMINE, lidocaine, aluminum-magnesium & simethicone) Swish and swallow 10 mLs in mouth every 6 hours as needed for mouth sores 240 mL 10     Docusate Calcium (STOOL SOFTENER PO) Take 100 mg by mouth daily        CRANBERRY PO Take 1 capsule by mouth daily        azelastine (ASTELIN) 0.1 % nasal spray Spray 1-2 sprays into both nostrils 2 times daily as needed for rhinitis 3 Bottle 3     albuterol (VENTOLIN HFA) 108 (90 BASE) MCG/ACT inhaler Inhale 2 puffs into the lungs every 4 hours as needed 1 Inhaler 2     Acetaminophen (TYLENOL PO) Take 650 mg by mouth every 4 hours as needed for mild pain or fever          Physical Exam:  /68 (BP Location: Right arm, Patient Position: Sitting, Cuff Size: Adult Large)  Pulse 90  Temp 99.9  F (37.7  C) (Oral)  Resp 16  Ht 1.638 m (5' 4.49\")  Wt 90.9 kg (200 lb 8 oz)  LMP 02/06/2013  SpO2 98%  Breastfeeding? No  BMI 33.9 kg/m2  Wt Readings from Last 12 Encounters:   11/30/17 90.9 kg (200 lb 8 oz)   11/28/17 91.1 kg (200 lb 14.4 oz)   11/16/17 89.8 kg (198 lb)   11/15/17 89.6 kg (197 lb 8 oz)   11/10/17 90.9 kg (200 lb 8 oz)   11/08/17 91.1 kg (200 lb 14.4 oz)   10/22/17 88.7 kg (195 lb 8.8 oz)   10/18/17 89.7 kg (197 lb 12.8 oz)   09/28/17 89.2 kg (196 lb 10.4 oz)   09/13/17 88.1 kg (194 lb 4.8 oz)   09/13/17 88.7 kg (195 lb 8 oz)   09/07/17 87.5 kg (192 lb 14.4 oz)     ECOG performance status: 1  GENERAL APPEARANCE: Healthy, alert and in no acute " distress.  HEENT: Sclerae anicteric. PERRLA. Oropharynx without ulcers, lesions, or thrush.  NECK: Supple. No asymmetry or masses.  LYMPHATICS: No palpable cervical, supraclavicular, axillary, or inguinal lymphadenopathy.  RESP: Lungs clear to auscultation bilaterally without rales, rhonchi or wheezes.  CARDIOVASCULAR: Regular rate and rhythm. Normal S1, S2; no S3 or S4. No murmur, gallop, or rub.  ABDOMEN: Soft, nontender. Bowel sounds normal. No palpable organomegaly or masses.  MUSCULOSKELETAL: Extremities without gross deformities noted. No edema of bilateral lower extremities.  SKIN: No suspicious lesions or rashes.  NEURO: Alert and oriented x 3. Cranial nerves II-XII grossly intact.  PSYCHIATRIC: Mentation and affect appear normal.    Laboratory/Imaging Studies:  No visits with results within 2 Week(s) from this visit.  Latest known visit with results is:    Infusion Therapy Visit on 11/09/2017   Component Date Value Ref Range Status     WBC 11/09/2017 4.4  4.0 - 11.0 10e9/L Final     RBC Count 11/09/2017 3.30* 3.8 - 5.2 10e12/L Final     Hemoglobin 11/09/2017 11.9  11.7 - 15.7 g/dL Final     Hematocrit 11/09/2017 35.5  35.0 - 47.0 % Final     MCV 11/09/2017 108* 78 - 100 fl Final     MCH 11/09/2017 36.1* 26.5 - 33.0 pg Final     MCHC 11/09/2017 33.5  31.5 - 36.5 g/dL Final     RDW 11/09/2017 17.4* 10.0 - 15.0 % Final     Platelet Count 11/09/2017 156  150 - 450 10e9/L Final     Diff Method 11/09/2017 Automated Method   Final     % Neutrophils 11/09/2017 58.2  % Final     % Lymphocytes 11/09/2017 26.5  % Final     % Monocytes 11/09/2017 12.2  % Final     % Eosinophils 11/09/2017 2.7  % Final     % Basophils 11/09/2017 0.2  % Final     % Immature Granulocytes 11/09/2017 0.2  % Final     Absolute Neutrophil 11/09/2017 2.6  1.6 - 8.3 10e9/L Final     Absolute Lymphocytes 11/09/2017 1.2  0.8 - 5.3 10e9/L Final     Absolute Monocytes 11/09/2017 0.5  0.0 - 1.3 10e9/L Final     Absolute Eosinophils 11/09/2017 0.1   0.0 - 0.7 10e9/L Final     Absolute Basophils 11/09/2017 0.0  0.0 - 0.2 10e9/L Final     Abs Immature Granulocytes 11/09/2017 0.0  0 - 0.4 10e9/L Final     Sodium 11/09/2017 143  133 - 144 mmol/L Final     Potassium 11/09/2017 3.8  3.4 - 5.3 mmol/L Final     Chloride 11/09/2017 113* 94 - 109 mmol/L Final     Carbon Dioxide 11/09/2017 23  20 - 32 mmol/L Final     Anion Gap 11/09/2017 7  3 - 14 mmol/L Final     Glucose 11/09/2017 102* 70 - 99 mg/dL Final     Urea Nitrogen 11/09/2017 13  7 - 30 mg/dL Final     Creatinine 11/09/2017 0.60  0.52 - 1.04 mg/dL Final     GFR Estimate 11/09/2017 >90  >60 mL/min/1.7m2 Final    Non  GFR Calc     GFR Estimate If Black 11/09/2017 >90  >60 mL/min/1.7m2 Final    African American GFR Calc     Calcium 11/09/2017 8.7  8.5 - 10.1 mg/dL Final     Bilirubin Total 11/09/2017 1.5* 0.2 - 1.3 mg/dL Final     Albumin 11/09/2017 3.3* 3.4 - 5.0 g/dL Final     Protein Total 11/09/2017 6.5* 6.8 - 8.8 g/dL Final     Alkaline Phosphatase 11/09/2017 85  40 - 150 U/L Final     ALT 11/09/2017 19  0 - 50 U/L Final     AST 11/09/2017 21  0 - 45 U/L Final     CA 27-29 11/09/2017 59* 0 - 39 U/mL Final    Assay Method:  Chemiluminescence using Siemens Centaur XP        Recent Results (from the past 744 hour(s))   MR Brain w/o & w Contrast    Narrative    MR BRAIN W/O & W CONTRAST  11/6/2017 3:16 PM     HISTORY:  Disorientation. History of breast and lung cancer.    TECHNIQUE: Multiplanar, multisequence MRI of the brain without and  with 8ml gadavist IV contrast material.    COMPARISON: None.    FINDINGS:  There are T2 hyperintensities in the white matter both  hemispheres. These are probably due to small vessel ischemic change in  this age patient..  There is no evidence of hemorrhage, mass, acute  infarct, or anomaly. There are no gadolinium enhancing lesions. No  metastatic lesions are seen. There is a 3 cm polyp or retention cysts  in the right maxillary sinus..  The arteries at the base  of the brain  and the dural venous sinuses appear patent.      Impression    IMPRESSION:   1. No enhancing parenchymal lesions are identified. No brain  metastasis are seen.  2. Nonspecific, nonenhancing white matter hyperintensities. In this  age group, they are probably due to small vessel ischemic change and  part of the normal aging process.     LAURA AGUIRRE MD   X-ray Chest 2 vws*    Narrative    PA and lateral chest    HISTORY: Abnormal chest CT    COMPARISON STUDY: 9/28/2017    FINDINGS: Left subclavian Port-A-Cath tip in the high SVC. Cardiac  silhouette is large. Tiny right apical pneumothorax versus bulla with  linear atelectasis in the right upper lung zone.      Impression    IMPRESSION: Tiny right apical pneumothorax versus apical bullae with  linear bands of atelectasis or scarring.    KAVON MILLS MD   CT Chest/Abdomen/Pelvis w Contrast   Result Value    Radiologist flags New mild-moderate pericardial effusion (Urgent)    Narrative    CT CHEST/ABDOMEN/PELVIS W CONTRAST 11/13/2017 2:54 PM    HISTORY: Breast cancer. Follow-up.    CONTRAST:  97 mL Isovue 370.    TECHNIQUE: CT of the chest, abdomen, and pelvis is performed with IV  contrast.    Routine evaluated structures in the chest include the lungs,  mediastinal structures, pleura, and chest wall.    Routine assessed structures in the abdomen include the liver, spleen,  pancreas, adrenal glands, and kidneys. Also assessed are the  retroperitoneum, gastrointestinal tract, and the abdominal wall.    Intrapelvic anatomy is also assessed.    Radiation dose for this scan is reduced using automated exposure  control, adjustment of the mA and/or kV according to patient size, or  iterative reconstruction technique.    COMPARISON: 9/11/2017.    FINDINGS:    Chest: There is a new 8.9 cm cystic area in the right lung apex. This  may relate to a loculated pneumothorax. A subpleural cyst or bulla are  also in the differential. There is some adjacent atelectasis.  There is  less skin thickening and edema in the right breast. A soft tissue  conglomerate in the right axilla is stable. It measures 4.2 x 2.8 cm.  Other right axillary lymph nodes which measure up to 0.7 cm in short  axis dimension are stable. A multinodular thyroid gland is stable.  There is a new mild-moderate pericardial effusion.    Abdomen: There is a ring-enhancing low density lesion in the inferior  right lobe of the liver on image #72 which measures 1.5 cm compared to  0.8 cm on the prior study.  An ill-defined area of low density in the  left lobe on coronal image 31 measures 3.9 cm compared to 3.0 cm on  the prior study. Other liver lesions are more stable. A subcentimeter  vascular lesion in the spleen is stable. Gastrohepatic ligament and  portal adenopathy are stable. A portal lymph node on image #62  measures 1.8 cm. A borderline in size retroperitoneal lymph node on  image #65 is stable at 0.9 cm in short axis dimension. A previously  seen borderline size aortocaval lymph node is smaller. It measures 0.5  cm compared to 0.9 cm on the prior study.    Pelvis: There is trace free pelvic fluid.      Impression    IMPRESSION:  1.  Hepatic metastases are slightly worse.  2. There is a new mild-moderate pericardial effusion.  3. Other neoplastic disease is stable.    [Access Center: New mild-moderate pericardial effusion]    This report will be copied to the [facility] to ensure a  provider acknowledges the finding. Access Center is available Monday  through Friday 8am-3:30 pm.       LETI CHASE MD   US Abdomen Limited    Narrative    ULTRASOUND  ABDOMEN LIMITED   11/30/2017 12:53 PM     HISTORY:  Breast cancer; elevated liver enzymes. Carcinoma of breast  metastatic to liver, unspecified laterality (H). Bone metastasis (H);  Secondary malignant neoplasm of liver (H). Elevated liver enzymes.    COMPARISON: CT abdomen and pelvis 11/13/2017.    FINDINGS:    Gallbladder: Negative sonographic  Osullivan sign. Faint gallbladder  sludge is suggested layering in the gallbladder lumen. No gallstones.    Bile ducts:  Common duct is 0.7 cm and may be normal for age. No  intrahepatic biliary ductal dilatation.    Liver: Hepatic metastasis again identified by ultrasound. One of the  largest is 2.8 x 2.1 x 3 cm at the left liver. The other lesions are  better visualized on the comparison CT.    Pancreas:  Partially obscured but grossly unremarkable.     Right kidney:  Normal.     Aorta and IVC:  Not specifically assessed.       Impression    IMPRESSION:    1. Hepatic metastasis again identified.  2. Gallbladder sludge. No gallstones. Negative sonographic Osullivan  sign.    CHICO FONG MD       Assessment and plan:  (C50.911,  Z17.0,  C50.912) Bilateral malignant neoplasm of breast in female, estrogen receptor positive, unspecified site of breast (H)  (C50.911,  C77.3) 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)  (C50.919,  C78.7) Carcinoma of breast metastatic to liver, unspecified laterality (H)  (primary encounter diagnosis)  Patient shows sign of disease progression. Elevated liver enzymes was investigated today was on ultrasound. There is multiple liver metastases probably comparable to previous scan. I suspect the elevated liver enzymes probably related to overuse of Tylenol. I asked the patient to stop Tylenol and we will repeat liver enzymes again next week. I would recommend to restart active treatment for breast cancer next week when liver enzymes stabilized.    Plan: Ca27.29  breast tumor marker    (C79.51) Bone metastasis (H)  Patient will continue on xjeva according to the treatment plan. She'll continue calcium and vitamin D supplements.    (E78.5) Hyperlipidemia LDL goal <130  Lipitor 10 mg orally will be held.    (E03.9) Hypothyroidism, unspecified type  Patient will continue on levothyroxine 25  g daily.    (J45.40) Moderate persistent asthma without complication  Patient currently  on albuterol inhaler,  QVAR inhaler. The patient has last been stable.     (R74.8) Elevated liver enzymes  I asked the patient to stop Tylenol, Lipitor and Femara. We will repeat liver enzymes again next week.    The patient is ready to learn, no apparent learning barriers were identified.  Diagnosis and treatment plans were explained to the patient. The patient expressed understanding of the content. The patient asked appropriate questions. The patient questions were answered to her satisfaction.    Chart documentation with Dragon Voice recognition Software. Although reviewed after completion, some words and grammatical errors may remain.    Again, thank you for allowing me to participate in the care of your patient.        Sincerely,        Brodie Cassidy MD

## 2017-11-30 NOTE — NURSING NOTE
"Oncology Rooming Note    November 30, 2017 1:01 PM   Etta Cervantes is a 57 year old female who presents for:    No chief complaint on file.    Initial Vitals: /68 (BP Location: Right arm, Patient Position: Sitting, Cuff Size: Adult Large)  Pulse 90  Temp 99.9  F (37.7  C) (Oral)  Resp 16  Ht 1.638 m (5' 4.49\")  Wt 90.9 kg (200 lb 8 oz)  LMP 02/06/2013  SpO2 98%  Breastfeeding? No  BMI 33.9 kg/m2 Estimated body mass index is 33.9 kg/(m^2) as calculated from the following:    Height as of this encounter: 1.638 m (5' 4.49\").    Weight as of this encounter: 90.9 kg (200 lb 8 oz). Body surface area is 2.03 meters squared.  Severe Pain (6) Comment: Data Unavailable   Patient's last menstrual period was 02/06/2013.  Allergies reviewed: Yes  Medications reviewed: Yes    Medications: Medication refills not needed today.  Pharmacy name entered into [facility]:    [id number] [place] [facility] - [place], [place] - [address] [place] [address] [facility] [place], [place] - [address] [place], [place] [address]PRINCESS     Clinical concerns:  2 week recheck Carcinoma of breast metastatic to liver, unspecified laterality, review US & Labs    1. Insomnia.   2. Fatigue.     7 minutes for nursing intake (face to face time)     Josephine Virgen, TYLER                "

## 2017-11-30 NOTE — MR AVS SNAPSHOT
After Visit Summary   11/30/2017    Etat Cervantes    MRN: [id number]           Patient Information     Date Of Birth          1960        Visit Information        Provider Department      11/30/2017 1:00 PM Brodie Cassidy MD [facility]        Today's Diagnoses     Carcinoma of breast metastatic to liver, unspecified laterality (H)    -  1    Bone metastasis (H)          Care Instructions    We would like to see you back in clinic with Dr. Cassidy as previously.  Your prescription has been sent to:   [id number] - [place], [place] - [address], located within [facility]  [address] [facility] [address]  Phone: [phone] Fax: [phone]    [facility] - [place], [place] - [address]  [address] [facility] [address]  Phone: [phone] Fax: [phone]   When you are in need of a refill, please call your pharmacy and they will send us a request.  Copy of appointments, and after visit summary (AVS) given to patient.  If you have any questions during business hours (M-F 8 AM- 4PM), please call Funmi Ray RN, BSN, OCN Oncology Hematology /Breast Cancer Navigator at [facility] [phone].   For questions after business hours, or on holidays/weekends, please call our after hours Nurse Triage line [phone]. Thank you.    CA 27.29 today  Hold tylenol, femara, Kisquali, lipitor  Recheck liver enzymes next Tuesday  We will call her next week with plan          Follow-ups after your visit        Your next 10 appointments already scheduled     Dec 01, 2017  3:30 PM CST   Return Sleep Patient with Rajesh Ahuja MD   [facility] ([facility])    [address]  [facility] [id number] [phone]            Dec 04, 2017 11:30 AM CST   LAB with [facility]   [facility] ([facility])    [address] [place] [address]    [phone]           Please do not eat 10-12 hours before your appointment if you are coming in fasting for labs on lipids, cholesterol, or glucose (sugar). This does not apply to pregnant women. Water, hot tea and black coffee (with nothing added) are okay. Do not drink other fluids, diet soda or chew gum.            Dec 05, 2017 11:00 AM CST   Lymphedema Treatment with Brianne Lundberg, PT   [facility] ([facility])    [address] [facility] [address]   [phone]            Dec 07, 2017  8:00 AM CST   Lymphedema Treatment with Brianne Lundberg, PT   [facility] ([facility])    [address] [facility] [address]   [phone]              Future tests that were ordered for you today     Open Future Orders        Priority Expected Expires Ordered    US Abdomen Limited STAT 11/30/2017 11/30/2018 11/30/2017    MR Abdomen w/o & w Contrast STAT 11/30/2017 11/30/2018 11/30/2017            Who to contact     If you have questions or need follow up information about today's clinic visit or your schedule please contact [facility] directly at [phone].  Normal or non-critical lab and imaging results will be communicated to you by [employer], letter or phone within 4 business days after the clinic has received the results. If you do not hear from us within 7 days, please contact the clinic through [employer] or phone. If you have a critical or abnormal lab result, we will notify you by phone as soon as possible.  Submit refill requests through [employer] or call your pharmacy and they will forward the refill request to us. Please allow 3 business days for your refill to be completed.          Additional Information About Your Visit        [employer] Information     [employer] gives you secure access to your electronic health record. If you see a primary care provider, you can also send messages to your care team and make appointments. If  "you have questions, please call your primary care clinic.  If you do not have a primary care provider, please call [phone] and they will assist you.        Care EveryWhere ID     This is your Care EveryWhere ID. This could be used by other organizations to access your [place] medical records  [id number]        Your Vitals Were     Pulse Temperature Respirations Height Last Period Pulse Oximetry    90 99.9  F (37.7  C) (Oral) 16 1.638 m (5' 4.49\") 02/06/2013 98%    Breastfeeding? BMI (Body Mass Index)                No 33.9 kg/m2           Blood Pressure from Last 3 Encounters:   11/30/17 128/68   11/28/17 101/70   11/16/17 112/73    Weight from Last 3 Encounters:   11/30/17 90.9 kg (200 lb 8 oz)   11/28/17 91.1 kg (200 lb 14.4 oz)   11/16/17 89.8 kg (198 lb)              We Performed the Following     Ca27.29  breast tumor marker        Primary Care Provider Office Phone # Fax #    Johana Fairbanks [phone] [address] [facility] [address]        Equal Access to Services     [facility] : Hadii han ku hadasho Sofabienali, waaxda luqadaha, qaybta kaalmada adelynnyada, hari guardado. So [facility] [phone].    ATENCIÓN: Si habla español, tiene a parekh disposición servicios gratuitos de asistencia lingüística. Llame al [phone].    We comply with applicable federal civil rights laws and [place] laws. We do not discriminate on the basis of race, color, national origin, age, disability, sex, sexual orientation, or gender identity.            Thank you!     Thank you for choosing [facility]  for your care. Our goal is always to provide you with excellent care. Hearing back from our patients is one way we can continue to improve our services. Please take a few minutes to complete the written survey that you may receive in the mail after your visit with us. Thank you!             Your Updated Medication List - Protect others around you: Learn how to " safely use, store and throw away your medicines at www.disposemymeds.org.          This list is accurate as of: 11/30/17  1:51 PM.  Always use your most recent med list.                   Brand Name Dispense Instructions for use Diagnosis    * albuterol 108 (90 BASE) MCG/ACT Inhaler    VENTOLIN HFA    1 Inhaler    Inhale 2 puffs into the lungs every 4 hours as needed    Moderate persistent asthma, uncomplicated       * albuterol (2.5 MG/3ML) 0.083% neb solution     30 vial    Take 1 vial (2.5 mg) by nebulization every 4 hours as needed for shortness of breath / dyspnea    Moderate persistent asthma, uncomplicated       ALLEGRA ALLERGY 180 MG tablet   Generic drug:  fexofenadine      Take 180 mg by mouth daily as needed for allergies        atorvastatin 10 MG tablet    LIPITOR    90 tablet    Take 1 tablet (10 mg) by mouth daily    Hyperlipidemia LDL goal <100       azelastine 0.1 % spray    ASTELIN    3 Bottle    Spray 1-2 sprays into both nostrils 2 times daily as needed for rhinitis    Chronic rhinitis       CRANBERRY PO      Take 1 capsule by mouth daily        diphenhydrAMINE 25 MG tablet    BENADRYL    1 tablet    Take 1 tablet (25 mg) by mouth every 8 hours as needed for itching or allergies    Carcinoma of breast metastatic to liver, unspecified laterality (H), Bone metastasis (H), Pericardial effusion, Bilateral malignant neoplasm of breast in female, estrogen receptor positive, unspecified site of breast (H), 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), Secondary malignant neoplasm of liver (H), Chemotherapy-induced neutropenia (H), Emphysematous bleb of lung (H), Hypothyroidism, unspecified type, Hyperlipidemia LDL goal <130, Moderate persistent asthma without complication, Mucositis due to chemotherapy       DULERA 200-5 MCG/ACT oral inhaler   Generic drug:  mometasone-formoterol     3 Inhaler    INHALE 2 PUFFS INTO THE LUNGS TWICE DAILY    Moderate persistent asthma without  complication       KP CALCIUM 600+D3 600-500 MG-UNIT Caps   Generic drug:  calcium carb-cholecalciferol      Take 2 tablets by mouth daily        letrozole 2.5 MG tablet    FEMARA    30 tablet    Take 1 tablet (2.5 mg) by mouth daily    Carcinoma of breast metastatic to liver, unspecified laterality (H), Bone metastasis (H), Pericardial effusion, Bilateral malignant neoplasm of breast in female, estrogen receptor positive, unspecified site of breast (H), 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), Secondary malignant neoplasm of liver (H), Chemotherapy-induced neutropenia (H), Emphysematous bleb of lung (H), Hypothyroidism, unspecified type, Hyperlipidemia LDL goal <130, Moderate persistent asthma without complication, Mucositis due to chemotherapy       levothyroxine 25 MCG tablet    SYNTHROID/LEVOTHROID    90 tablet    TAKE 1 TABLET (25 MCG) BY MOUTH DAILY    Hypothyroidism due to acquired atrophy of thyroid       lidocaine (viscous) 2 % solution    XYLOCAINE     Apply topically as needed        lidocaine visc 2% 2.5mL/5mL & maalox/mylanta w/ simeth 2.5mL/5mL & diphenhydrAMINE 5mg/5mL Susp suspension    [facility]    240 mL    Swish and swallow 10 mLs in mouth every 6 hours as needed for mouth sores    Mucositis due to chemotherapy, Breast cancer metastasized to axillary lymph node, right (H)       metoclopramide 10 MG tablet    REGLAN    120 tablet    Take 1 tablet (10 mg) by mouth 2 times daily as needed    Bilateral malignant neoplasm of breast in female, estrogen receptor positive, unspecified site of breast (H)       oxyCODONE IR 5 MG tablet    ROXICODONE    30 tablet    Take 1 tablet (5 mg) by mouth every 4 hours as needed for moderate to severe pain    Secondary malignant neoplasm of liver (H)       * predniSONE 20 MG tablet    DELTASONE    10 tablet    Take 1 tablet (20 mg) by mouth 2 times daily For Yellow or Red zone on Asthma Action Plan.    Moderate persistent asthma,  uncomplicated       * predniSONE 20 MG tablet    DELTASONE    5 tablet    Take 1 tablet (20 mg) by mouth daily    Reactive airway disease, unspecified asthma severity, uncomplicated       QVAR 80 MCG/ACT Inhaler   Generic drug:  beclomethasone     26.1 g    INHALE 1 PUFFS INTO THE EACH NOSTRIL 2 TIMES DAILY    Chronic rhinitis       ribociclib 200 MG tablet    KISQALI 600 DOSE    63 tablet    Take 3 tablets (600 mg) by mouth daily for 21 days given on days 1-21 every 28 days.    Carcinoma of breast metastatic to liver, unspecified laterality (H), Bone metastasis (H), Pericardial effusion, Bilateral malignant neoplasm of breast in female, estrogen receptor positive, unspecified site of breast (H), 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), Secondary malignant neoplasm of liver (H), Chemotherapy-induced neutropenia (H), Emphysematous bleb of lung (H), Hypothyroidism, unspecified type, Hyperlipidemia LDL goal <130, Moderate persistent asthma without complication, Mucositis due to chemotherapy       ROBITUSSIN COUGH/COLD CF PO      Take by mouth as needed    Breast cancer metastasized to axillary lymph node, right (H)       STOOL SOFTENER PO      Take 100 mg by mouth daily        TYLENOL PO      Take 650 mg by mouth every 4 hours as needed for mild pain or fever        zolpidem 10 MG tablet    AMBIEN    30 tablet    Take 1 tablet 30 minutes prior to bedtime        * Notice:  This list has 4 medication(s) that are the same as other medications prescribed for you. Read the directions carefully, and ask your doctor or other care provider to review them with you.

## 2017-12-01 NOTE — PATIENT INSTRUCTIONS

## 2017-12-01 NOTE — PROGRESS NOTES
Hematology/ Oncology Follow-up Visit:  Nov 30, 2017    Reason for Visit:   Chief Complaint   Patient presents with     Oncology Clinic Visit     2 week recheck Carcinoma of breast metastatic to liver, unspecified laterality, review US & Labs       Oncologic History:  Breast cancer (H)  Etta Cervantes presented with increasing lump in the right axilla that s lump has been growing without any pain or associated symptoms. Subsequently she was evaluated by primary care physician. Diagnostic digital mammogram was done on 01/13/2015 showing enlarged lymph nodes in the right axilla. There was some skin thickening in the right breast anteriorly and laterally. There are no parenchymal changes in the right breast. The left breast shows a 1.7 cm spiculated mass at approximately 2 to 3 o'clock position, 15.2 cm away from the nipple. A right breast ultrasound was subsequently done and ultrasound guided biopsy was done. Needle biopsy of the left breast did show infiltrating ductal carcinoma grade I of III with no angiolymphatic invasion seen. No associated ductal carcinoma in situ. Estrogen receptor, progresterone receptor were positive. HER-2/sadie receptor came back negative.. Another biopsy from the right axilla did show metastatic ductal carcinoma. PET scan was done on January 26, 2015 showing few small right posterior cervical chain nodes the largest is 1.2 cm. There is extensive bulky right axillary adenopathy demonstrating hypermetabolic FDG uptake with SUV of 10.6. There is skin thickening involving the right breast which demonstrated low-grade FDG uptake. A 1.2 cm lesion on the lateral aspect of the left breast demonstrated minimally increased FDG uptake with SUV of 2. Scattered hypermetabolic mediastinal lymph nodes located in the left superior anterior mediastinum, right paratracheal and precarinal U, subcranial adenopathy with javon metastases. This focus hypermetabolic FDG uptake identified definitive Amanda soto  aspect off intertrochanteric region of the proximal left femur consistent with bone metastases. There is also 1.4 cm hypermetabolic FDG uptake identified in the anterior medial segment of the left hepatic lobe consistent with liver metastases. Additional 2.1 cm low-attenuation lesion anterior aspect of the right lobe which needs to be determined. The patient was started on chemotherapy with Taxotere and Cytoxan on February 9, 2015.  She concluded 4 cycles of Taxotere and Cytoxan. She started on Arimidex 1 mg orally daily. Currently she is on Faslodex and ibrance. Subsequently the patient went on to receive Afinitor. The patient also started treatment with Xeloda.       Interval History:  Patient is here today because of markedly elevated liver enzymes seen on her blood work today. Patient denies any nausea or vomiting. Patient denies any fever or chills. Just in me she's been taking Tylenol about 6-8 tablets a day of extra strength Tylenol. She is also currently on Lipitor. She started on letrozole last week. Patient denies any diarrhea. She denies any chest pain or shortness of breath or cough. She was seen last week with cardiology. MRI showed a new mass in the pericardial area. With moderate pericardial effusion. Patient currently asymptomatic    Review Of Systems:  Constitutional: Negative for fever, chills, and night sweats.  Skin: negative.  Eyes: negative.  Ears/Nose/Throat: negative.  Respiratory: No shortness of breath, dyspnea on exertion, cough, or hemoptysis.  Cardiovascular: negative.  Gastrointestinal: negative.  Genitourinary: negative.  Musculoskeletal: negative.  Neurologic: negative.  Psychiatric: negative.  Hematologic/Lymphatic/Immunologic: negative.  Endocrine: negative.    All other ROS negative unless mentioned in interval history.    Past medical, social, surgical, and family histories reviewed.    Allergies:  Allergies as of 11/30/2017 - Manuel as Reviewed 11/30/2017   Allergen Reaction Noted      Animal dander Cough 01/23/2015     Cats  07/15/2010     Dust mites Cough 01/23/2015     Pollen extract  01/23/2015     Seasonal allergies  07/15/2010     Levaquin [levofloxacin] Rash 07/17/2017       Current Medications:  Current Outpatient Prescriptions   Medication Sig Dispense Refill     letrozole (FEMARA) 2.5 MG tablet Take 1 tablet (2.5 mg) by mouth daily 30 tablet 3     ribociclib (KISQALI 600 DOSE) 200 MG tablet Take 3 tablets (600 mg) by mouth daily for 21 days given on days 1-21 every 28 days. (Patient not taking: Reported on 11/30/2017) 63 tablet 3     diphenhydrAMINE (BENADRYL) 25 MG tablet Take 1 tablet (25 mg) by mouth every 8 hours as needed for itching or allergies 1 tablet 0     zolpidem (AMBIEN) 10 MG tablet Take 1 tablet 30 minutes prior to bedtime 30 tablet 0     DULERA 200-5 MCG/ACT oral inhaler INHALE 2 PUFFS INTO THE LUNGS TWICE DAILY 3 Inhaler 3     predniSONE (DELTASONE) 20 MG tablet Take 1 tablet (20 mg) by mouth daily 5 tablet 0     QVAR 80 MCG/ACT Inhaler INHALE 1 PUFFS INTO THE EACH NOSTRIL 2 TIMES DAILY 26.1 g 3     Phenylephrine-DM-GG (ROBITUSSIN COUGH/COLD CF PO) Take by mouth as needed       lidocaine, viscous, (XYLOCAINE) 2 % solution Apply topically as needed       levothyroxine (SYNTHROID/LEVOTHROID) 25 MCG tablet TAKE 1 TABLET (25 MCG) BY MOUTH DAILY 90 tablet 2     atorvastatin (LIPITOR) 10 MG tablet Take 1 tablet (10 mg) by mouth daily 90 tablet 3     metoclopramide (REGLAN) 10 MG tablet Take 1 tablet (10 mg) by mouth 2 times daily as needed 120 tablet 1     oxyCODONE (ROXICODONE) 5 MG IR tablet Take 1 tablet (5 mg) by mouth every 4 hours as needed for moderate to severe pain 30 tablet 0     fexofenadine (ALLEGRA ALLERGY) 180 MG tablet Take 180 mg by mouth daily as needed for allergies       calcium carb-cholecalciferol (KP CALCIUM 600+D3) 600-500 MG-UNIT CAPS Take 2 tablets by mouth daily       albuterol (2.5 MG/3ML) 0.083% neb solution Take 1 vial (2.5 mg) by nebulization  "every 4 hours as needed for shortness of breath / dyspnea 30 vial 3     predniSONE (DELTASONE) 20 MG tablet Take 1 tablet (20 mg) by mouth 2 times daily For Yellow or Red zone on Asthma Action Plan. 10 tablet 1     magic mouthwash suspension (diphenhydrAMINE, lidocaine, aluminum-magnesium & simethicone) Swish and swallow 10 mLs in mouth every 6 hours as needed for mouth sores 240 mL 10     Docusate Calcium (STOOL SOFTENER PO) Take 100 mg by mouth daily        CRANBERRY PO Take 1 capsule by mouth daily        azelastine (ASTELIN) 0.1 % nasal spray Spray 1-2 sprays into both nostrils 2 times daily as needed for rhinitis 3 Bottle 3     albuterol (VENTOLIN HFA) 108 (90 BASE) MCG/ACT inhaler Inhale 2 puffs into the lungs every 4 hours as needed 1 Inhaler 2     Acetaminophen (TYLENOL PO) Take 650 mg by mouth every 4 hours as needed for mild pain or fever          Physical Exam:  /68 (BP Location: Right arm, Patient Position: Sitting, Cuff Size: Adult Large)  Pulse 90  Temp 99.9  F (37.7  C) (Oral)  Resp 16  Ht 1.638 m (5' 4.49\")  Wt 90.9 kg (200 lb 8 oz)  LMP 02/06/2013  SpO2 98%  Breastfeeding? No  BMI 33.9 kg/m2  Wt Readings from Last 12 Encounters:   11/30/17 90.9 kg (200 lb 8 oz)   11/28/17 91.1 kg (200 lb 14.4 oz)   11/16/17 89.8 kg (198 lb)   11/15/17 89.6 kg (197 lb 8 oz)   11/10/17 90.9 kg (200 lb 8 oz)   11/08/17 91.1 kg (200 lb 14.4 oz)   10/22/17 88.7 kg (195 lb 8.8 oz)   10/18/17 89.7 kg (197 lb 12.8 oz)   09/28/17 89.2 kg (196 lb 10.4 oz)   09/13/17 88.1 kg (194 lb 4.8 oz)   09/13/17 88.7 kg (195 lb 8 oz)   09/07/17 87.5 kg (192 lb 14.4 oz)     ECOG performance status: 1  GENERAL APPEARANCE: Healthy, alert and in no acute distress.  HEENT: Sclerae anicteric. PERRLA. Oropharynx without ulcers, lesions, or thrush.  NECK: Supple. No asymmetry or masses.  LYMPHATICS: No palpable cervical, supraclavicular, axillary, or inguinal lymphadenopathy.  RESP: Lungs clear to auscultation bilaterally without " rales, rhonchi or wheezes.  CARDIOVASCULAR: Regular rate and rhythm. Normal S1, S2; no S3 or S4. No murmur, gallop, or rub.  ABDOMEN: Soft, nontender. Bowel sounds normal. No palpable organomegaly or masses.  MUSCULOSKELETAL: Extremities without gross deformities noted. No edema of bilateral lower extremities.  SKIN: No suspicious lesions or rashes.  NEURO: Alert and oriented x 3. Cranial nerves II-XII grossly intact.  PSYCHIATRIC: Mentation and affect appear normal.    Laboratory/Imaging Studies:  No visits with results within 2 Week(s) from this visit.  Latest known visit with results is:    Infusion Therapy Visit on 11/09/2017   Component Date Value Ref Range Status     WBC 11/09/2017 4.4  4.0 - 11.0 10e9/L Final     RBC Count 11/09/2017 3.30* 3.8 - 5.2 10e12/L Final     Hemoglobin 11/09/2017 11.9  11.7 - 15.7 g/dL Final     Hematocrit 11/09/2017 35.5  35.0 - 47.0 % Final     MCV 11/09/2017 108* 78 - 100 fl Final     MCH 11/09/2017 36.1* 26.5 - 33.0 pg Final     MCHC 11/09/2017 33.5  31.5 - 36.5 g/dL Final     RDW 11/09/2017 17.4* 10.0 - 15.0 % Final     Platelet Count 11/09/2017 156  150 - 450 10e9/L Final     Diff Method 11/09/2017 Automated Method   Final     % Neutrophils 11/09/2017 58.2  % Final     % Lymphocytes 11/09/2017 26.5  % Final     % Monocytes 11/09/2017 12.2  % Final     % Eosinophils 11/09/2017 2.7  % Final     % Basophils 11/09/2017 0.2  % Final     % Immature Granulocytes 11/09/2017 0.2  % Final     Absolute Neutrophil 11/09/2017 2.6  1.6 - 8.3 10e9/L Final     Absolute Lymphocytes 11/09/2017 1.2  0.8 - 5.3 10e9/L Final     Absolute Monocytes 11/09/2017 0.5  0.0 - 1.3 10e9/L Final     Absolute Eosinophils 11/09/2017 0.1  0.0 - 0.7 10e9/L Final     Absolute Basophils 11/09/2017 0.0  0.0 - 0.2 10e9/L Final     Abs Immature Granulocytes 11/09/2017 0.0  0 - 0.4 10e9/L Final     Sodium 11/09/2017 143  133 - 144 mmol/L Final     Potassium 11/09/2017 3.8  3.4 - 5.3 mmol/L Final     Chloride 11/09/2017  113* 94 - 109 mmol/L Final     Carbon Dioxide 11/09/2017 23  20 - 32 mmol/L Final     Anion Gap 11/09/2017 7  3 - 14 mmol/L Final     Glucose 11/09/2017 102* 70 - 99 mg/dL Final     Urea Nitrogen 11/09/2017 13  7 - 30 mg/dL Final     Creatinine 11/09/2017 0.60  0.52 - 1.04 mg/dL Final     GFR Estimate 11/09/2017 >90  >60 mL/min/1.7m2 Final    Non  GFR Calc     GFR Estimate If Black 11/09/2017 >90  >60 mL/min/1.7m2 Final    African American GFR Calc     Calcium 11/09/2017 8.7  8.5 - 10.1 mg/dL Final     Bilirubin Total 11/09/2017 1.5* 0.2 - 1.3 mg/dL Final     Albumin 11/09/2017 3.3* 3.4 - 5.0 g/dL Final     Protein Total 11/09/2017 6.5* 6.8 - 8.8 g/dL Final     Alkaline Phosphatase 11/09/2017 85  40 - 150 U/L Final     ALT 11/09/2017 19  0 - 50 U/L Final     AST 11/09/2017 21  0 - 45 U/L Final     CA 27-29 11/09/2017 59* 0 - 39 U/mL Final    Assay Method:  Chemiluminescence using Siemens Centaur XP        Recent Results (from the past 744 hour(s))   MR Brain w/o & w Contrast    Narrative    MR BRAIN W/O & W CONTRAST  11/6/2017 3:16 PM     HISTORY:  Disorientation. History of breast and lung cancer.    TECHNIQUE: Multiplanar, multisequence MRI of the brain without and  with 8ml gadavist IV contrast material.    COMPARISON: None.    FINDINGS:  There are T2 hyperintensities in the white matter both  hemispheres. These are probably due to small vessel ischemic change in  this age patient..  There is no evidence of hemorrhage, mass, acute  infarct, or anomaly. There are no gadolinium enhancing lesions. No  metastatic lesions are seen. There is a 3 cm polyp or retention cysts  in the right maxillary sinus..  The arteries at the base of the brain  and the dural venous sinuses appear patent.      Impression    IMPRESSION:   1. No enhancing parenchymal lesions are identified. No brain  metastasis are seen.  2. Nonspecific, nonenhancing white matter hyperintensities. In this  age group, they are probably due  to small vessel ischemic change and  part of the normal aging process.     LAURA AGUIRRE MD   X-ray Chest 2 vws*    Narrative    PA and lateral chest    HISTORY: Abnormal chest CT    COMPARISON STUDY: 9/28/2017    FINDINGS: Left subclavian Port-A-Cath tip in the high SVC. Cardiac  silhouette is large. Tiny right apical pneumothorax versus bulla with  linear atelectasis in the right upper lung zone.      Impression    IMPRESSION: Tiny right apical pneumothorax versus apical bullae with  linear bands of atelectasis or scarring.    KAVON MILLS MD   CT Chest/Abdomen/Pelvis w Contrast   Result Value    Radiologist flags New mild-moderate pericardial effusion (Urgent)    Narrative    CT CHEST/ABDOMEN/PELVIS W CONTRAST 11/13/2017 2:54 PM    HISTORY: Breast cancer. Follow-up.    CONTRAST:  97 mL Isovue 370.    TECHNIQUE: CT of the chest, abdomen, and pelvis is performed with IV  contrast.    Routine evaluated structures in the chest include the lungs,  mediastinal structures, pleura, and chest wall.    Routine assessed structures in the abdomen include the liver, spleen,  pancreas, adrenal glands, and kidneys. Also assessed are the  retroperitoneum, gastrointestinal tract, and the abdominal wall.    Intrapelvic anatomy is also assessed.    Radiation dose for this scan is reduced using automated exposure  control, adjustment of the mA and/or kV according to patient size, or  iterative reconstruction technique.    COMPARISON: 9/11/2017.    FINDINGS:    Chest: There is a new 8.9 cm cystic area in the right lung apex. This  may relate to a loculated pneumothorax. A subpleural cyst or bulla are  also in the differential. There is some adjacent atelectasis. There is  less skin thickening and edema in the right breast. A soft tissue  conglomerate in the right axilla is stable. It measures 4.2 x 2.8 cm.  Other right axillary lymph nodes which measure up to 0.7 cm in short  axis dimension are stable. A multinodular thyroid gland is  stable.  There is a new mild-moderate pericardial effusion.    Abdomen: There is a ring-enhancing low density lesion in the inferior  right lobe of the liver on image #72 which measures 1.5 cm compared to  0.8 cm on the prior study.  An ill-defined area of low density in the  left lobe on coronal image 31 measures 3.9 cm compared to 3.0 cm on  the prior study. Other liver lesions are more stable. A subcentimeter  vascular lesion in the spleen is stable. Gastrohepatic ligament and  portal adenopathy are stable. A portal lymph node on image #62  measures 1.8 cm. A borderline in size retroperitoneal lymph node on  image #65 is stable at 0.9 cm in short axis dimension. A previously  seen borderline size aortocaval lymph node is smaller. It measures 0.5  cm compared to 0.9 cm on the prior study.    Pelvis: There is trace free pelvic fluid.      Impression    IMPRESSION:  1.  Hepatic metastases are slightly worse.  2. There is a new mild-moderate pericardial effusion.  3. Other neoplastic disease is stable.    [Access Center: New mild-moderate pericardial effusion]    This report will be copied to the [facility] to ensure a  provider acknowledges the finding. Access Center is available Monday  through Friday 8am-3:30 pm.       LETI CHASE MD   US Abdomen Limited    Narrative    ULTRASOUND  ABDOMEN LIMITED   11/30/2017 12:53 PM     HISTORY:  Breast cancer; elevated liver enzymes. Carcinoma of breast  metastatic to liver, unspecified laterality (H). Bone metastasis (H);  Secondary malignant neoplasm of liver (H). Elevated liver enzymes.    COMPARISON: CT abdomen and pelvis 11/13/2017.    FINDINGS:    Gallbladder: Negative sonographic Osullivan sign. Faint gallbladder  sludge is suggested layering in the gallbladder lumen. No gallstones.    Bile ducts:  Common duct is 0.7 cm and may be normal for age. No  intrahepatic biliary ductal dilatation.    Liver: Hepatic metastasis again identified by ultrasound. One of  the  largest is 2.8 x 2.1 x 3 cm at the left liver. The other lesions are  better visualized on the comparison CT.    Pancreas:  Partially obscured but grossly unremarkable.     Right kidney:  Normal.     Aorta and IVC:  Not specifically assessed.       Impression    IMPRESSION:    1. Hepatic metastasis again identified.  2. Gallbladder sludge. No gallstones. Negative sonographic Osullivan  sign.    CHICO FONG MD       Assessment and plan:  (C50.911,  Z17.0,  C50.912) Bilateral malignant neoplasm of breast in female, estrogen receptor positive, unspecified site of breast (H)  (C50.911,  C77.3) 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)  (C50.919,  C78.7) Carcinoma of breast metastatic to liver, unspecified laterality (H)  (primary encounter diagnosis)  Patient shows sign of disease progression. Elevated liver enzymes was investigated today was on ultrasound. There is multiple liver metastases probably comparable to previous scan. I suspect the elevated liver enzymes probably related to overuse of Tylenol. I asked the patient to stop Tylenol and we will repeat liver enzymes again next week. I would recommend to restart active treatment for breast cancer next week when liver enzymes stabilized.    Plan: Ca27.29  breast tumor marker    (C79.51) Bone metastasis (H)  Patient will continue on xjeva according to the treatment plan. She'll continue calcium and vitamin D supplements.    (E78.5) Hyperlipidemia LDL goal <130  Lipitor 10 mg orally will be held.    (E03.9) Hypothyroidism, unspecified type  Patient will continue on levothyroxine 25  g daily.    (J45.40) Moderate persistent asthma without complication  Patient currently on albuterol inhaler,  QVAR inhaler. The patient has last been stable.     (R74.8) Elevated liver enzymes  I asked the patient to stop Tylenol, Lipitor and Femara. We will repeat liver enzymes again next week.    The patient is ready to learn, no apparent learning barriers were  identified.  Diagnosis and treatment plans were explained to the patient. The patient expressed understanding of the content. The patient asked appropriate questions. The patient questions were answered to her satisfaction.    Chart documentation with Dragon Voice recognition Software. Although reviewed after completion, some words and grammatical errors may remain.

## 2017-12-01 NOTE — MR AVS SNAPSHOT
After Visit Summary   12/1/2017    Etta Cervantes    MRN: [id number]           Patient Information     Date Of Birth          1960        Visit Information        Provider Department      12/1/2017 3:30 PM Rajesh Ahuja MD [facility] [place]        Today's Diagnoses     Insomnia due to medical condition    -  1    DIAMOND (obstructive sleep apnea)          Care Instructions      Your BMI is Body mass index is 33.9 kg/(m^2).  Weight management is a personal decision.  If you are interested in exploring weight loss strategies, the following discussion covers the approaches that may be successful. Body mass index (BMI) is one way to tell whether you are at a healthy weight, overweight, or obese. It measures your weight in relation to your height.  A BMI of 18.5 to 24.9 is in the healthy range. A person with a BMI of 25 to 29.9 is considered overweight, and someone with a BMI of 30 or greater is considered obese. More than two-thirds of American adults are considered overweight or obese.  Being overweight or obese increases the risk for further weight gain. Excess weight may lead to heart disease and diabetes.  Creating and following plans for healthy eating and physical activity may help you improve your health.  Weight control is part of healthy lifestyle and includes exercise, emotional health, and healthy eating habits. Careful eating habits lifelong are the mainstay of weight control. Though there are significant health benefits from weight loss, long-term weight loss with diet alone may be very difficult to achieve- studies show long-term success with dietary management in less than 10% of people. Attaining a healthy weight may be especially difficult to achieve in those with severe obesity. In some cases, medications, devices and surgical management might be considered.  What can you do?  If you are overweight or obese and are interested in methods for weight loss, you should  discuss this with your provider.     Consider reducing daily calorie intake by 500 calories.     Keep a food journal.     Avoiding skipping meals, consider cutting portions instead.    Diet combined with exercise helps maintain muscle while optimizing fat loss. Strength training is particularly important for building and maintaining muscle mass. Exercise helps reduce stress, increase energy, and improves fitness. Increasing exercise without diet control, however, may not burn enough calories to loose weight.       Start walking three days a week 10-20 minutes at a time    Work towards walking thirty minutes five days a week     Eventually, increase the speed of your walking for 1-2 minutes at time    In addition, we recommend that you review healthy lifestyles and methods for weight loss available through the National Institutes of Health patient information sites:  http://win.niddk.nih.gov/publications/index.htm    And look into health and wellness programs that may be available through your health insurance provider, employer, local community center, or josef club.    Weight management plan: Patient was referred to their PCP to discuss a diet and exercise plan.      Your Body mass index is 33.9 kg/(m^2).  Weight management is a personal decision.  If you are interested in exploring weight loss strategies, the following discussion covers the approaches that may be successful. Body mass index (BMI) is one way to tell whether you are at a healthy weight, overweight, or obese. It measures your weight in relation to your height.  A BMI of 18.5 to 24.9 is in the healthy range. A person with a BMI of 25 to 29.9 is considered overweight, and someone with a BMI of 30 or greater is considered obese. More than two-thirds of American adults are considered overweight or obese.  Being overweight or obese increases the risk for further weight gain. Excess weight may lead to heart disease and diabetes.  Creating and following  plans for healthy eating and physical activity may help you improve your health.  Weight control is part of healthy lifestyle and includes exercise, emotional health, and healthy eating habits. Careful eating habits lifelong are the mainstay of weight control. Though there are significant health benefits from weight loss, long-term weight loss with diet alone may be very difficult to achieve- studies show long-term success with dietary management in less than 10% of people. Attaining a healthy weight may be especially difficult to achieve in those with severe obesity. In some cases, medications, devices and surgical management might be considered.  What can you do?  If you are overweight or obese and are interested in methods for weight loss, you should discuss this with your provider.     Consider reducing daily calorie intake by 500 calories.     Keep a food journal.     Avoiding skipping meals, consider cutting portions instead.    Diet combined with exercise helps maintain muscle while optimizing fat loss. Strength training is particularly important for building and maintaining muscle mass. Exercise helps reduce stress, increase energy, and improves fitness. Increasing exercise without diet control, however, may not burn enough calories to loose weight.       Start walking three days a week 10-20 minutes at a time    Work towards walking thirty minutes five days a week     Eventually, increase the speed of your walking for 1-2 minutes at time    In addition, we recommend that you review healthy lifestyles and methods for weight loss available through the National Institutes of Health patient information sites:  http://win.niddk.nih.gov/publications/index.htm    And look into health and wellness programs that may be available through your health insurance provider, employer, local community center, or josef club.    Weight management plan: Patient was referred to their PCP to discuss a diet and exercise  plan.            Follow-ups after your visit        Follow-up notes from your care team     Return in 4 weeks (on 12/29/2017), or if symptoms worsen or fail to improve, for PAP follow up.      Your next 10 appointments already scheduled     Dec 05, 2017  8:30 AM CST   LAB with [facility]   [facility] ([facility])    [address] [facility] [address] [address]   [phone]           Please do not eat 10-12 hours before your appointment if you are coming in fasting for labs on lipids, cholesterol, or glucose (sugar). This does not apply to pregnant women. Water, hot tea and black coffee (with nothing added) are okay. Do not drink other fluids, diet soda or chew gum.            Dec 05, 2017 11:00 AM CST   Lymphedema Treatment with Brianne Lundberg PT   [facility] ([facility])    [address] [id number] [phone]            Dec 07, 2017  8:00 AM CST   Lymphedema Treatment with Brianne Lundberg PT   [facility] ([facility])    [address] [address] [phone]              Who to contact     If you have questions or need follow up information about today's clinic visit or your schedule please contact [facility] directly at [phone].  Normal or non-critical lab and imaging results will be communicated to you by MyChart, letter or phone within 4 business days after the clinic has received the results. If you do not hear from us within 7 days, please contact the clinic through [employer] or phone. If you have a critical or abnormal lab result, we will notify you by phone as soon as possible.  Submit refill requests through [employer] or call your pharmacy and they will forward the refill request to us. Please allow 3 business days for your refill to be completed.          Additional Information About Your Visit        [employer] Information     [employer] gives you secure access to  "your electronic health record. If you see a primary care provider, you can also send messages to your care team and make appointments. If you have questions, please call your primary care clinic.  If you do not have a primary care provider, please call [phone] and they will assist you.        Care EveryWhere ID     This is your Care EveryWhere ID. This could be used by other organizations to access your [facility] medical records  [id number]        Your Vitals Were     Pulse Height Last Period Pulse Oximetry BMI (Body Mass Index)       92 1.638 m (5' 4.49\") 02/06/2013 97% 33.9 kg/m2        Blood Pressure from Last 3 Encounters:   12/01/17 116/69   11/30/17 128/68   11/28/17 101/70    Weight from Last 3 Encounters:   12/01/17 90.9 kg (200 lb 8 oz)   11/30/17 90.9 kg (200 lb 8 oz)   11/28/17 91.1 kg (200 lb 14.4 oz)              We Performed the Following     Sleep Comprehensive DME          Today's Medication Changes          These changes are accurate as of: 12/1/17 11:59 PM.  If you have any questions, ask your nurse or doctor.               These medicines have changed or have updated prescriptions.        Dose/Directions    * zolpidem 10 MG tablet   Commonly known as:  AMBIEN   Indication:  Trouble Sleeping   This may have changed:  Another medication with the same name was added. Make sure you understand how and when to take each.        Take 1 tablet 30 minutes prior to bedtime   Quantity:  30 tablet   Refills:  0       * zolpidem 12.5 MG CR tablet   Commonly known as:  AMBIEN CR   This may have changed:  You were already taking a medication with the same name, and this prescription was added. Make sure you understand how and when to take each.   Used for:  Insomnia due to medical condition        Take 1 tablet by mouth at bed time.   Quantity:  30 tablet   Refills:  5       * Notice:  This list has 2 medication(s) that are the same as other medications prescribed for you. Read the directions carefully, " and ask your doctor or other care provider to review them with you.         Where to get your medicines      Some of these will need a paper prescription and others can be bought over the counter.  Ask your nurse if you have questions.     Bring a paper prescription for each of these medications     zolpidem 12.5 MG CR tablet                Primary Care Provider Office Phone # Fax #    Johana Fairbanks [phone] [address] [facility] [address]        Equal Access to Services     JACKIE MIX : Hadii aad ku hadasho Soomaali, waaxda luqadaha, qaybta kaalmada adeegyada, waxay idiin hayaan adeeg kharash la'aan . So [facility] [phone].    ATENCIÓN: Si habla español, tiene a parekh disposición servicios gratuitos de asistencia lingüística. [facility] [phone].    We comply with applicable federal civil rights laws and [place] laws. We do not discriminate on the basis of race, color, national origin, age, disability, sex, sexual orientation, or gender identity.            Thank you!     Thank you for choosing [facility]  for your care. Our goal is always to provide you with excellent care. Hearing back from our patients is one way we can continue to improve our services. Please take a few minutes to complete the written survey that you may receive in the mail after your visit with us. Thank you!             Your Updated Medication List - Protect others around you: Learn how to safely use, store and throw away your medicines at www.disposemymeds.org.          This list is accurate as of: 12/1/17 11:59 PM.  Always use your most recent med list.                   Brand Name Dispense Instructions for use Diagnosis    * albuterol 108 (90 BASE) MCG/ACT Inhaler    VENTOLIN HFA    1 Inhaler    Inhale 2 puffs into the lungs every 4 hours as needed    Moderate persistent asthma, uncomplicated       * albuterol (2.5 MG/3ML) 0.083% neb solution     30 vial    Take 1 vial (2.5 mg) by  nebulization every 4 hours as needed for shortness of breath / dyspnea    Moderate persistent asthma, uncomplicated       ALLEGRA ALLERGY 180 MG tablet   Generic drug:  fexofenadine      Take 180 mg by mouth daily as needed for allergies        atorvastatin 10 MG tablet    LIPITOR    90 tablet    Take 1 tablet (10 mg) by mouth daily    Hyperlipidemia LDL goal <100       azelastine 0.1 % spray    ASTELIN    3 Bottle    Spray 1-2 sprays into both nostrils 2 times daily as needed for rhinitis    Chronic rhinitis       CRANBERRY PO      Take 1 capsule by mouth daily        diphenhydrAMINE 25 MG tablet    BENADRYL    1 tablet    Take 1 tablet (25 mg) by mouth every 8 hours as needed for itching or allergies    Carcinoma of breast metastatic to liver, unspecified laterality (H), Bone metastasis (H), Pericardial effusion, Bilateral malignant neoplasm of breast in female, estrogen receptor positive, unspecified site of breast (H), 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), Secondary malignant neoplasm of liver (H), Chemotherapy-induced neutropenia (H), Emphysematous bleb of lung (H), Hypothyroidism, unspecified type, Hyperlipidemia LDL goal <130, Moderate persistent asthma without complication, Mucositis due to chemotherapy       DULERA 200-5 MCG/ACT oral inhaler   Generic drug:  mometasone-formoterol     3 Inhaler    INHALE 2 PUFFS INTO THE LUNGS TWICE DAILY    Moderate persistent asthma without complication       KP CALCIUM 600+D3 600-500 MG-UNIT Caps   Generic drug:  calcium carb-cholecalciferol      Take 2 tablets by mouth daily        letrozole 2.5 MG tablet    FEMARA    30 tablet    Take 1 tablet (2.5 mg) by mouth daily    Carcinoma of breast metastatic to liver, unspecified laterality (H), Bone metastasis (H), Pericardial effusion, Bilateral malignant neoplasm of breast in female, estrogen receptor positive, unspecified site of breast (H), Carcinoma of right breast 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  node (H), Secondary malignant neoplasm of liver (H), Chemotherapy-induced neutropenia (H), Emphysematous bleb of lung (H), Hypothyroidism, unspecified type, Hyperlipidemia LDL goal <130, Moderate persistent asthma without complication, Mucositis due to chemotherapy       levothyroxine 25 MCG tablet    SYNTHROID/LEVOTHROID    90 tablet    TAKE 1 TABLET (25 MCG) BY MOUTH DAILY    Hypothyroidism due to acquired atrophy of thyroid       lidocaine (viscous) 2 % solution    XYLOCAINE     Apply topically as needed        lidocaine visc 2% 2.5mL/5mL & maalox/mylanta w/ simeth 2.5mL/5mL & diphenhydrAMINE 5mg/5mL Susp suspension    [facility]    240 mL    Swish and swallow 10 mLs in mouth every 6 hours as needed for mouth sores    Mucositis due to chemotherapy, Breast cancer metastasized to axillary lymph node, right (H)       metoclopramide 10 MG tablet    REGLAN    120 tablet    Take 1 tablet (10 mg) by mouth 2 times daily as needed    Bilateral malignant neoplasm of breast in female, estrogen receptor positive, unspecified site of breast (H)       oxyCODONE IR 5 MG tablet    ROXICODONE    30 tablet    Take 1 tablet (5 mg) by mouth every 4 hours as needed for moderate to severe pain    Secondary malignant neoplasm of liver (H)       * predniSONE 20 MG tablet    DELTASONE    10 tablet    Take 1 tablet (20 mg) by mouth 2 times daily For Yellow or Red zone on Asthma Action Plan.    Moderate persistent asthma, uncomplicated       * predniSONE 20 MG tablet    DELTASONE    5 tablet    Take 1 tablet (20 mg) by mouth daily    Reactive airway disease, unspecified asthma severity, uncomplicated       QVAR 80 MCG/ACT Inhaler   Generic drug:  beclomethasone     26.1 g    INHALE 1 PUFFS INTO THE EACH NOSTRIL 2 TIMES DAILY    Chronic rhinitis       ribociclib 200 MG tablet    KISQALI 600 DOSE    63 tablet    Take 3 tablets (600 mg) by mouth daily for 21 days given on days 1-21 every 28 days.    Carcinoma of breast  metastatic to liver, unspecified laterality (H), Bone metastasis (H), Pericardial effusion, Bilateral malignant neoplasm of breast in female, estrogen receptor positive, unspecified site of breast (H), 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), Secondary malignant neoplasm of liver (H), Chemotherapy-induced neutropenia (H), Emphysematous bleb of lung (H), Hypothyroidism, unspecified type, Hyperlipidemia LDL goal <130, Moderate persistent asthma without complication, Mucositis due to chemotherapy       ROBITUSSIN COUGH/COLD CF PO      Take by mouth as needed    Breast cancer metastasized to axillary lymph node, right (H)       STOOL SOFTENER PO      Take 100 mg by mouth daily        TYLENOL PO      Take 650 mg by mouth every 4 hours as needed for mild pain or fever        * zolpidem 10 MG tablet    AMBIEN    30 tablet    Take 1 tablet 30 minutes prior to bedtime        * zolpidem 12.5 MG CR tablet    AMBIEN CR    30 tablet    Take 1 tablet by mouth at bed time.    Insomnia due to medical condition       * Notice:  This list has 6 medication(s) that are the same as other medications prescribed for you. Read the directions carefully, and ask your doctor or other care provider to review them with you.

## 2017-12-01 NOTE — NURSING NOTE
"Chief Complaint   Patient presents with     CPAP Follow Up     Yearly follow up for C-Pap supply order. She also is having some insomnia again. ? change medicatoin or increase dose.       Initial /69  Pulse 92  Ht 1.638 m (5' 4.49\")  Wt 90.9 kg (200 lb 8 oz)  LMP 02/06/2013  SpO2 97%  BMI 33.9 kg/m2 Estimated body mass index is 33.9 kg/(m^2) as calculated from the following:    Height as of this encounter: 1.638 m (5' 4.49\").    Weight as of this encounter: 90.9 kg (200 lb 8 oz).  Medication Reconciliation: complete    "

## 2017-12-03 NOTE — PROGRESS NOTES
Obstructive Sleep Apnea - PAP Follow-Up Visit:    Chief Complaint   Patient presents with     CPAP Follow Up     Yearly follow up for C-Pap supply order. She also is having some insomnia again. ? change medicatoin or increase dose.       Etta Cervantes comes in today for annual follow-up of severe DIAMOND (12/8/2011 with AHI 44.4, theodore desat 65%, CPAP 9 cm H2O) treated with CPAP and chronic insomnia.  Significant PMHx of bilateral metastatic breast Cx dx'd 1/2015.  9/23/2016 - Annual follow-up.  She feels she is sleeping well with her CPAP and Zolpidem 5-10mg PO QHS.  She will take her Zolpidem around 9:30pm and asleep by ~10pm, no prolonged awakenings, up at 6am naturally.  CPAP download from 6/25/2016 - 9/22/2016 on auto-titrate 9-11 cm H2O.  Average daily usage of 5:22, used >= 4 hours on 74% of nights.  Pressure median 10.1 cm H2O, 95th%ile 10.8 cm H2O.  AHI 1.8.  A/P to continue CPAP auto-titrate 9-11 cm H2O, Zolpidem 5-10mg PO QHS.  Today - Returns for annual follow-up.  She has struggled with CPAP usage recently, seems attributed to needing new supplies, and significant health changes.  Unfortunate progression with evidence of liver metastases of breast cancer.  In ~5/2017, had start of struggle with CPAP and presumed linked to development of unilateral pneumothorax on 7/2017, presumed link to large bleb noted previously, required 5x pulmonary stents.  She is motivated to restart her CPAP since she feels better when able to use it, but when she retried, she felt the pressures were not correct.  CPAP download from 10/31/2017 - 11/29/2017 on auto-titrate 9-11 cm H2O.  Used on 19/30 days, average usage on days used of 6:38.  Pressure median 9.9 cm H2O, 95th%ile of 10.8 cm H2O.  AHI 0.9.    Problem List:  Patient Active Problem List    Diagnosis Date Noted     Secondary malignant neoplasm of liver (H) 04/22/2015     Priority: High     Carcinoma of breast metastatic to liver (H) 02/04/2015     Priority: High      Diagnosis updated by automated process. Provider to review and confirm.       Bone metastasis (H) 02/04/2015     Priority: High     Breast cancer metastasized to axillary lymph node (H) 02/04/2015     Priority: High     Elevated liver enzymes 12/01/2017     Priority: Medium     Pneumothorax 07/12/2017     Priority: Medium     Acute pyelonephritis 07/12/2017     Priority: Medium     Hyperlipidemia LDL goal <130 05/18/2017     Priority: Medium     Chemotherapy-induced neutropenia (H) 10/12/2016     Priority: Medium     Thyroid nodule 05/14/2015     Priority: Medium     Emphysematous bleb of lung (H) 05/14/2015     Priority: Medium     Mucositis due to chemotherapy 02/19/2015     Priority: Medium     Drug-related hair loss 02/11/2015     Priority: Medium     Breast cancer (H) 01/23/2015     Priority: Medium     ASCUS favor benign 01/06/2015     Priority: Medium     1/6/15: Pap - ASCUS, Neg HPV. Plan pap/hpv in 3 years.        DIAMOND (obstructive sleep apnea) 01/24/2012     Priority: Medium     AHI 44       Insomnia 01/24/2012     Priority: Medium     Problem list name updated by automated process. Provider to review       Moderate persistent asthma 03/28/2011     Priority: Medium     Menorrhagia 02/24/2011     Priority: Medium     CARDIOVASCULAR SCREENING; LDL GOAL LESS THAN 160 10/31/2010     Priority: Medium     Hypothyroid      Priority: Medium     Fibroids      Priority: Medium     Health Care Home 05/28/2013     Priority: Low     EMERGENCY CARE PLAN  May 28, 2013: No current Care Coordination follow up planned. Please refer if Care Coordination services are needed.    Presenting Problem Signs and Symptoms Treatment Plan   Questions or concerns   during clinic hours   I will call my clinic directly:  [facility]  [id number] [place] [address] [id number] [phone].    Questions or concerns outside clinic hours   I will call the 24 hour nurse line at   [phone] or [phone].   Need to schedule an  "appointment   I will call the 24 hour scheduling team at [phone] or my clinic directly at [phone].    Same day treatment     I will call my clinic first, nurse line if after hours, urgent care and express care if needed.   Clinic care coordination services (regular clinic hours)     I will call a clinic care coordinator directly:     Mannie Blevins RN  Mon, Tues, Fri - [phone]  Wed, Thurs - [phone]    Barbara Burk :    [phone]    Or call my clinic at [phone] and ask to speak with care coordination.   Crisis Services: Behavioral or Mental Health  [facility] 24 Hour Phone Line  [phone]    [facility] 24 Hour Crisis Services  [phone]    [facility] (Behavioral Health Providers) Network [phone]    [facility]   [phone]       Emergency treatment -- Immediately    CAll 911               /69  Pulse 92  Ht 1.638 m (5' 4.49\")  Wt 90.9 kg (200 lb 8 oz)  LMP 02/06/2013  SpO2 97%  BMI 33.9 kg/m2    Impression/Plan:    1.) Severe DIAMOND (12/8/2011 with AHI 44.4, theodore desat 65%, CPAP 9 cm H2O)   - Suspect change in pressure needs given interim pneumothorax requiring multiple pulmonary stents and multiple intubations.  AHI < 1 on setting of auto-titrate 9-11 cm H2O.   - Given her feeling of pressure being too low and 95th%ile approaching upper limit, will change to auto-titrate 9-13 cm H2O.      2.) Insomnia   - Primarily early AM awakenings, likely multifactorial with psychophysiological component, significant medical illness (metastatic breast cancer).   - Trial of changing to zolpidem CR 12.5mg PO QHS    Etta Ceravntes will follow up in about 1 month(s).     Twenty-five minutes spent with patient, all of which were spent face-to-face counseling, consulting, coordinating plan of care.      Rajesh Ahuja MD, MD    CC:  Johana Fairbanks  "

## 2017-12-05 NOTE — PROGRESS NOTES
12/5/17-JL- SPOKE WITH CHELSEY RE: CPAP SUPPLIES.  SHE IS GOING TO GO [place] [facility] ON 12[place]/17 TO  SUPPLIES.   KRISTA ARRIETA HAS BEEN NOTIFIED.

## 2017-12-06 NOTE — PROGRESS NOTES
Dr. Cassidy reviewed lab results; liver enzymes are decreasing and bilirubin is increasing. Patient will need to have a CT scan of abdomen with contrast and to see him in clinic to review results and plan of care.  Continue to hold Kisquali.    Spoke with patient.  Reviewed results and recommendations.  She is in agreement with this plan.  Call transferred to scheduling to assist with setting up scan/appt.    Order in Epic.

## 2017-12-06 NOTE — PROGRESS NOTES
CT scan done today.  Per Dr. Cassidy, scan shows biliary duct blockage and patient will need to have an ERCP done as soon as possible.     Order placed.  Scheduling is working on getting this appt set up. Discussed this with patient.  She will wait for call from our .  Denies questions at this time.  Will call back if any arise.

## 2017-12-07 NOTE — ANESTHESIA PREPROCEDURE EVALUATION
Anesthesia Evaluation     . Pt has had prior anesthetic. Type: General    History of anesthetic complications   - PONV        ROS/MED HX    ENT/Pulmonary:     (+)sleep apnea, Moderate Persistent asthma Treatment: Inhaler daily,  COPD, uses CPAP , . Other pulmonary disease ruptured bleb with pneumothorax requiring chest tube 7/17/2017.  Treated with bronchial valves at that time.    Neurologic:  - neg neurologic ROS     Cardiovascular: Comment: Pericardial effusion.  11/27 cardiac MRI significant for pericardial mass suspicious for tumor metastisis in AV groove of R. Heart 2.2x2.5x2.7 cm     (+) Dyslipidemia, ----. : . . . :. . Previous cardiac testing Echodate:11/15/2017results:  Left ventricular systolic function is normal. LVEF 63% by biplane Global peak  LV longitudinal strain is averaged at -19.3%. This is within reported normal  limits (normal <-18%).  The right ventricular systolic function is normal.  Small to moderate pericardial effusion.  There is an echo dense structure (1.7 cm x 1.7 cm) seen in the pericardial  space attached to right atrioventricular junction- differential includes dense  organized pericardial effusion vs a ?tumor.  No significant respiratory variation in mitral inflow velocities.  The IVC is normal in size and reactivity with respiration, suggesting normal  central venous pressure.  There are no echocardiographic indications of cardiac tamponade.date: results:ECG reviewed date: results: date: results:          METS/Exercise Tolerance:     Hematologic:         Musculoskeletal:         GI/Hepatic: Comment: Metastasis to the liver    (+) liver disease (Secondary malignant neoplasm of liver (H)),       Renal/Genitourinary:         Endo: Comment: Steroid use as part of asthma action plan    (+) thyroid problem (Thyroid nodule) hypothyroidism, Chronic steroid usage for Other Obesity, .      Psychiatric:  - neg psychiatric ROS       Infectious Disease:         Malignancy:   (+) Malignancy  History of Breast  Breast CA Active status post Chemo.         Other:                                    Anesthesia Plan      History & Physical Review  History and physical reviewed and following examination; no interval change.    ASA Status:  3 .        Plan for General and ETT with Propofol induction. Maintenance will be Balanced.    PONV prophylaxis:  Ondansetron (or other 5HT-3) and Dexamethasone or Solumedrol       Postoperative Care  Postoperative pain management:  Multi-modal analgesia.      Consents        ANESTHESIA PREOP EVALUATION    Procedure: Procedure(s):  Endoscopic Retrograde Cholangiopancreatogram  - Wound Class:     HPI: Etta Cervantes is a 57 year old female with Asthma on daily inhaler, DIAMOND with CPAP, PONV, hypothyroidism, and metastatic breast cancer presenting for above procedure.  Patient presented to the ER today, denies SOB or CP, but is endorsing new cough in setting of sick home contact.  Patient has a pericardial effusion noted on 11/13 CT.  Cardiac MRI on 11/27 significant for pericardial mass suspicious for metastatic tumor and measuring 2.2x2.5x2.7 cm.  Patient remains hemodynamically stable in the ED.    PMHx/PSHx/ROS:  Past Medical History:   Diagnosis Date     ASCUS favor benign 1/2015    Neg HPV     Cancer (H)      Cataract 2010    surgery both eyes     Emphysematous bleb of lung (H)      Fibroids      Hypercholesterolemia      Hypothyroid      Incontinence of urine     mixed     Menorrhagia      Obesity      Pneumothorax      PONV (postoperative nausea and vomiting)      Sleep apnea     uses a cpap     Uncomplicated asthma        Past Surgical History:   Procedure Laterality Date     BIOPSY  Jan 2015, 2016    Breast cancer, thyroid     BRONCHOSCOPY, INSERT BRONCHIAL VALVE N/A 7/20/2017    Procedure: BRONCHOSCOPY, INSERT BRONCHIAL VALVE;  Flexible Bronchoscopy, Endobronchial Valve Insertion X5. 4 Valves into right upper lobe and 1 valve into Right middle lobe;  Surgeon: Juan M  Carlos Cortés MD;  Location: [facility] OR     BRONCHOSCOPY, REMOVE BRONCHIAL VALVE N/A 9/7/2017    Procedure: BRONCHOSCOPY, REMOVE BRONCHIAL VALVE;  Flexible Bronchoscopy, Removal Of Endobronchial Valves x 2;  Surgeon: Carlos Mcgowan MD;  Location: [facility] OR     BRONCHOSCOPY, REMOVE BRONCHIAL VALVE N/A 9/28/2017    Procedure: BRONCHOSCOPY, REMOVE BRONCHIAL VALVE;  Flexible Bronchoscopy, Removal Of Intra-Bronchial Valves x 3;  Surgeon: Carlos Mcgowan MD;  Location:  OR     CATARACT IOL, RT/LT  2010     COLONOSCOPY  2010     DILATION AND CURETTAGE, HYSTEROSCOPY, ABLATE ENDOMETRIUM NOVASURE, COMBINED  3/2/2011    COMBINED DILATION AND CURETTAGE, HYSTEROSCOPY, ABLATE ENDOMETRIUM NOVASURE performed by LAURA VARGAS at [facility]     GENITOURINARY SURGERY  2005    Uretha sling     INSERT PORT VASCULAR ACCESS N/A 2/12/2015    Procedure: INSERT PORT VASCULAR ACCESS;  Surgeon: Noé Schaefer MD;  Location: [facility]     SURGICAL HISTORY OF -   2005    bladder sling     SURGICAL HISTORY OF -       Cystectomy-lower lip     TUBAL LIGATION  1987         Past Anes Hx: No personal or family h/o anesthesia problems    Soc Hx:   Social History   Substance Use Topics     Smoking status: Never Smoker     Smokeless tobacco: Never Used     Alcohol use No       Allergies:   Allergies   Allergen Reactions     Animal Dander Cough     Itchy eyes     Cats      Dust Mites Cough     Itchy eyes     Pollen Extract      Other reaction(s): Cough  Itchy eyes     Seasonal Allergies      Levaquin [Levofloxacin] Rash     States she has violent dreams from taking this       Meds:     (Not in a hospital admission)    Current Outpatient Prescriptions   Medication Sig Dispense Refill     order for DME Equipment being ordered: Venu post-lumpectomy compression pad x2 1 each 0     zolpidem (AMBIEN CR) 12.5 MG CR tablet Take 1 tablet by mouth at bed time. 30 tablet 5     ribociclib (KISQALI 600 DOSE) 200 MG tablet Take 3 tablets (600 mg) by  mouth daily for 21 days given on days 1-21 every 28 days. (Patient not taking: Reported on 12/7/2017) 63 tablet 3     letrozole (FEMARA) 2.5 MG tablet Take 1 tablet (2.5 mg) by mouth daily 30 tablet 3     diphenhydrAMINE (BENADRYL) 25 MG tablet Take 1 tablet (25 mg) by mouth every 8 hours as needed for itching or allergies (Patient not taking: Reported on 12/7/2017) 1 tablet 0     zolpidem (AMBIEN) 10 MG tablet Take 1 tablet 30 minutes prior to bedtime (Patient not taking: Reported on 12/7/2017) 30 tablet 0     DULERA 200-5 MCG/ACT oral inhaler INHALE 2 PUFFS INTO THE LUNGS TWICE DAILY 3 Inhaler 3     QVAR 80 MCG/ACT Inhaler INHALE 1 PUFFS INTO THE EACH NOSTRIL 2 TIMES DAILY 26.1 g 3     Phenylephrine-DM-GG (ROBITUSSIN COUGH/COLD CF PO) Take by mouth as needed       lidocaine, viscous, (XYLOCAINE) 2 % solution Apply topically as needed       levothyroxine (SYNTHROID/LEVOTHROID) 25 MCG tablet TAKE 1 TABLET (25 MCG) BY MOUTH DAILY 90 tablet 2     atorvastatin (LIPITOR) 10 MG tablet Take 1 tablet (10 mg) by mouth daily (Patient not taking: Reported on 12/7/2017) 90 tablet 3     metoclopramide (REGLAN) 10 MG tablet Take 1 tablet (10 mg) by mouth 2 times daily as needed 120 tablet 1     oxyCODONE (ROXICODONE) 5 MG IR tablet Take 1 tablet (5 mg) by mouth every 4 hours as needed for moderate to severe pain 30 tablet 0     fexofenadine (ALLEGRA ALLERGY) 180 MG tablet Take 180 mg by mouth daily as needed for allergies       calcium carb-cholecalciferol (KP CALCIUM 600+D3) 600-500 MG-UNIT CAPS Take 2 tablets by mouth daily       albuterol (2.5 MG/3ML) 0.083% neb solution Take 1 vial (2.5 mg) by nebulization every 4 hours as needed for shortness of breath / dyspnea 30 vial 3     predniSONE (DELTASONE) 20 MG tablet Take 1 tablet (20 mg) by mouth 2 times daily For Yellow or Red zone on Asthma Action Plan. (Patient not taking: Reported on 12/7/2017) 10 tablet 1     magic mouthwash suspension (diphenhydrAMINE, lidocaine,  "aluminum-magnesium & simethicone) Swish and swallow 10 mLs in mouth every 6 hours as needed for mouth sores (Patient not taking: Reported on 12/7/2017) 240 mL 10     Docusate Calcium (STOOL SOFTENER PO) Take 100 mg by mouth daily        CRANBERRY PO Take 1 capsule by mouth daily        azelastine (ASTELIN) 0.1 % nasal spray Spray 1-2 sprays into both nostrils 2 times daily as needed for rhinitis 3 Bottle 3     albuterol (VENTOLIN HFA) 108 (90 BASE) MCG/ACT inhaler Inhale 2 puffs into the lungs every 4 hours as needed 1 Inhaler 2       Physical Exam:  Vitals: /80  Pulse 70  Temp 37.9  C (100.2  F) (Oral)  Resp 18  Ht 1.626 m (5' 4\")  Wt 86.6 kg (191 lb)  LMP 02/06/2013  SpO2 98%  BMI 32.79 kg/m2  BMI= Body mass index is 32.79 kg/(m^2).      Labs:  UPT: No results found for: HCGQUANT      BMP:  Recent Labs   Lab Test  12/07/17   1339   NA  140   POTASSIUM  3.5   CHLORIDE  109   CO2  23   BUN  12   CR  0.71   GLC  114*   BEN  8.8     CBC:   Recent Labs   Lab Test  12/07/17   1339   WBC  5.7   RBC  3.41*   HGB  11.8   HCT  36.2   MCV  106*   MCH  34.6*   MCHC  32.6   RDW  15.4*   PLT  239     Coags:  Recent Labs   Lab Test  07/11/17   2100   INR  1.00       Assessment/Plan:    Etta Cervantes is a 57 year old female with history concerning for increasing pericardial effusion in setting of pericardial mass concerning for tumor metastasis.  While patient is hemodynamically stable at the moment, she remains a high risk for general anesthesia.  As such, we recommend a cardiology consult to further evaluate and we would defer to their recommendations regarding surgical optimization, specifically whether further studies are warranted and does the effusion need to be drained prior to surgery.  Cardiac preload will need to be maintained throughout the procedure, patient may benefit from an arterial line at the time of surgery.  Surgery should be delayed pending cardiology recommendations.  Dr. Jenkins, the anesthesia " officer of the day for 12/7/2017, is familiar with the case and is available tonight should there be any questions prior to her scheduled surgery tomorrow.    - ASA 3  - GETA with standard ASA monitors, IV induction, balanced anesthetic  - Pre-Op   - Versed 0-2 mg  - Pre-induction:   - PIVx1   - Consider arterial line   - Maintain preload   - Antibiotics per surgeon  - Induction:   - Consider C-Mac with D blade and glydescope stylet    - Propofol, Succinylcholine, Fentanyl, Lidocaine  - Maintenance:   - Sevoflurane   - Analgesia: Fentanyl   - Fluids: LR 1 mL/kg/hr maint  - Emergence:   - PONV prophylaxis: philip Contreras Jr., MD  Anesthesia Resident - CA2  Pager: [phone]  12/7/2017  4:39 PM

## 2017-12-07 NOTE — ED PROVIDER NOTES
History     Chief Complaint   Patient presents with     Abdominal Pain     HPI  Etta Cervantes is a 57 year old female with a history of breast cancer (metastic to liver and bones) who presents to the ED with abdominal pain. Per review of her chart, she a CT of her chest, abdomen, and pelvis with contrast yesterday which showed progression of hepatic metastatic disease, moderate intrahepatic biliary ductal dilation, increased pericardial effusion since 11/13/17, new finding of small nodularity at the omentum, multifocal stable indeterminate pulmonary nodules, and progression of adenopathy at the right axilla and upper abdominal retroperitoneum. She had an US on 11/30/17 that was negative for gallstones. She was also seen in Oncology clinic earlier today and was noted to have a fever of 100.7   F at clinic and sent here for further evaluation. Patient states her abdominal pain was worse on Monday and Tuesday, 2-3 days ago, but improved Wednesday. She also states her abdominal distention has improved. She reports she has been having bright yellow urine since Monday and also noted to have jaundice but that has improved. She also has complaints of a cough that started today and rhinorrhea. She has had a poor appetite and hasn't been eating or drinking much. She states she is scheduled to have an ERCP in the near future.     CT CHEST/ABDOMEN/PELVIS WITH CONTRAST December 6, 2017 1:52 PM     IMPRESSION:  1. Progression of hepatic metastatic disease with enlargement of  multifocal masses. New finding of moderate intrahepatic biliary ductal  dilatation worrisome for developing biliary obstruction. This may  relate to progression of adenopathy at the upper abdomen at the shreyas  hepatis and/or an increasing area of nodular enhancement at the  pancreatic head worrisome for metastatic disease.  2. Moderate to large pericardial effusion significantly increased  since 11/13/2017. Some mild enhancement of the pericardium also  noted.  Recommend further workup.  3. New finding of small nodularity at the omentum at the midline and  slightly towards the right that could represent carcinomatosis.  4. Multifocal stable indeterminate pulmonary nodules may represent  pulmonary metastatic disease.  5. Stable sclerosis at the right fifth rib and T8 that could represent  stable sclerotic metastasis.  6. Progression of adenopathy at the right axilla, and upper abdominal  retroperitoneum.    PAST MEDICAL HISTORY  Past Medical History:   Diagnosis Date     ASCUS favor benign 1/2015    Neg HPV     Cancer (H)      Cataract 2010    surgery both eyes     Emphysematous bleb of lung (H)      Fibroids      Hypercholesterolemia      Hypothyroid      Incontinence of urine     mixed     Menorrhagia      Obesity      Pneumothorax      PONV (postoperative nausea and vomiting)      Sleep apnea     uses a cpap     Uncomplicated asthma      PAST SURGICAL HISTORY  Past Surgical History:   Procedure Laterality Date     BIOPSY  Jan 2015, 2016    Breast cancer, thyroid     BRONCHOSCOPY, INSERT BRONCHIAL VALVE N/A 7/20/2017    Procedure: BRONCHOSCOPY, INSERT BRONCHIAL VALVE;  Flexible Bronchoscopy, Endobronchial Valve Insertion X5. 4 Valves into right upper lobe and 1 valve into Right middle lobe;  Surgeon: Carlos Mcgowan MD;  Location: [facility]     BRONCHOSCOPY, REMOVE BRONCHIAL VALVE N/A 9/7/2017    Procedure: BRONCHOSCOPY, REMOVE BRONCHIAL VALVE;  Flexible Bronchoscopy, Removal Of Endobronchial Valves x 2;  Surgeon: Carlos Mcgowan MD;  Location: [facility]     BRONCHOSCOPY, REMOVE BRONCHIAL VALVE N/A 9/28/2017    Procedure: BRONCHOSCOPY, REMOVE BRONCHIAL VALVE;  Flexible Bronchoscopy, Removal Of Intra-Bronchial Valves x 3;  Surgeon: Calros Mcgowan MD;  Location: [facility] OR     CATARACT IOL, RT/LT  2010     COLONOSCOPY  2010     DILATION AND CURETTAGE, HYSTEROSCOPY, ABLATE ENDOMETRIUM NOVASURE, COMBINED  3/2/2011    COMBINED DILATION AND CURETTAGE,  HYSTEROSCOPY, ABLATE ENDOMETRIUM NOVASURE performed by LAURA VARGAS at [facility]     ENDOSCOPIC RETROGRADE CHOLANGIOPANCREATOGRAM N/A 12/8/2017    Procedure: COMBINED ENDOSCOPIC RETROGRADE CHOLANGIOPANCREATOGRAPHY, PLACE TUBE/STENT;  Endoscopic Retrograde Cholangiopancreatogram with biliary sphincterotomy and stent placement;  Surgeon: Guru Coleen Mora MD;  Location:  OR     GENITOURINARY SURGERY  2005    Uretha sling     INSERT PORT VASCULAR ACCESS N/A 2/12/2015    Procedure: INSERT PORT VASCULAR ACCESS;  Surgeon: Noé Schaefer MD;  Location: [facility]     SURGICAL HISTORY OF -   2005    bladder sling     SURGICAL HISTORY OF -       Cystectomy-lower lip     TUBAL LIGATION  1987     FAMILY HISTORY  Family History   Problem Relation Age of Onset     Depression Mother      Eye Disorder Mother      Gynecology Mother      Alzheimer Disease Mother      CANCER Father      Other Cancer Father      HEART DISEASE Maternal Grandfather      Allergies Paternal Grandfather      Allergies Son      SOCIAL HISTORY  Social History   Substance Use Topics     Smoking status: Never Smoker     Smokeless tobacco: Never Used     Alcohol use No     MEDICATIONS  No current facility-administered medications for this encounter.      Current Outpatient Prescriptions   Medication     diclofenac (VOLTAREN) 1 % GEL topical gel     Lidocaine (LIDOCARE) 4 % Patch     magnesium hydroxide (MILK OF MAGNESIA) 400 MG/5ML suspension     polyethylene glycol (MIRALAX/GLYCOLAX) Packet     senna-docusate (SENOKOT-S;PERICOLACE) 8.6-50 MG per tablet     loratadine (CLARITIN) 10 MG tablet     zolpidem (AMBIEN CR) 12.5 MG CR tablet     DULERA 200-5 MCG/ACT oral inhaler     QVAR 80 MCG/ACT Inhaler     Phenylephrine-DM-GG (ROBITUSSIN COUGH/COLD CF PO)     levothyroxine (SYNTHROID/LEVOTHROID) 25 MCG tablet     calcium carb-cholecalciferol (KP CALCIUM 600+D3) 600-500 MG-UNIT CAPS     Docusate Calcium (STOOL SOFTENER PO)     CRANBERRY  "PO     azelastine (ASTELIN) 0.1 % nasal spray     oxyCODONE IR (ROXICODONE) 5 MG tablet     zolpidem (AMBIEN) 10 MG tablet     lidocaine, viscous, (XYLOCAINE) 2 % solution     morphine (MS CONTIN) 15 MG 12 hr tablet     prochlorperazine (COMPAZINE) 10 MG tablet     dexamethasone (DECADRON) 4 MG tablet     order for DME     letrozole (FEMARA) 2.5 MG tablet     diphenhydrAMINE (BENADRYL) 25 MG tablet     metoclopramide (REGLAN) 10 MG tablet     fexofenadine (ALLEGRA ALLERGY) 180 MG tablet     albuterol (2.5 MG/3ML) 0.083% neb solution     predniSONE (DELTASONE) 20 MG tablet     magic mouthwash suspension (diphenhydrAMINE, lidocaine, aluminum-magnesium & simethicone)     albuterol (VENTOLIN HFA) 108 (90 BASE) MCG/ACT inhaler     ALLERGIES  Allergies   Allergen Reactions     Animal Dander Cough     Itchy eyes     Cats      Dust Mites Cough     Itchy eyes     Pollen Extract      Other reaction(s): Cough  Itchy eyes     Seasonal Allergies      Levaquin [Levofloxacin] Rash     States she has violent dreams from taking this         I have reviewed the Medications, Allergies, Past Medical and Surgical History, and Social History in the Epic system.    Review of Systems  ROS: 14 point ROS neg other than the symptoms noted above in the HPI.    Physical Exam   BP: 130/80  Pulse: 70  Heart Rate: 98  Temp: 100.2  F (37.9  C)  Resp: 18  Height: 162.6 cm (5' 4\")  Weight: 86.6 kg (191 lb)  SpO2: 98 %      Physical Exam Exam:  Constitutional: healthy, alert and no distress  Head: Normocephalic. No masses, lesions, tenderness or abnormalities  Neck: Neck supple. No adenopathy. Thyroid symmetric, normal size,, Carotids without bruits.  ENT: ENT exam normal, no neck nodes or sinus tenderness  Cardiovascular: negative, PMI normal. No lifts, heaves, or thrills. RRR. No murmurs, clicks gallops or rub  Respiratory: negative, Percussion normal. Good diaphragmatic excursion. Lungs clear  Gastrointestinal: Abdomen epigastric tender otherwise " BS normal. No masses, organomegaly  : Deferred  Musculoskeletal: extremities normal- no gross deformities noted, gait normal and normal muscle tone  Skin: no suspicious lesions or rashes  Neurologic: Gait normal. Reflexes normal and symmetric. Sensation grossly WNL.  Psychiatric: mentation appears normal and affect normal/bright  Hematologic/Lymphatic/Immunologic: Normal cervical lymph nodes        ED Course     ED Course     Procedures   1:39 PM  The patient was seen and examined by Dr. Meyer in Room 10.                  Labs Ordered and Resulted from Time of ED Arrival Up to the Time of Departure from the ED   CBC WITH PLATELETS DIFFERENTIAL - Abnormal; Notable for the following:        Result Value    RBC Count 3.41 (*)      (*)     MCH 34.6 (*)     RDW 15.4 (*)     Absolute Lymphocytes 0.7 (*)     All other components within normal limits   BASIC METABOLIC PANEL - Abnormal; Notable for the following:     Glucose 114 (*)     All other components within normal limits   HEPATIC PANEL - Abnormal; Notable for the following:     Bilirubin Direct 6.1 (*)     Bilirubin Total 8.1 (*)     Albumin 2.9 (*)     Alkaline Phosphatase 719 (*)      (*)      (*)     All other components within normal limits   ABO/RH TYPE AND SCREEN   INFLUENZA A/B ANTIGEN            Assessments & Plan (with Medical Decision Making)   MDM  This is a 57-year-old female with biliary issues and was seen at oncology office today and noted to have abdominal pain as well as some abdominal findings on CT, recent.  Patient also noted to have fever and sent here for further evaluation and admission to the hospital.  Patient upon physical exam Epigastric Tenderness but No Rebound Tenderness.  Lab Work Did Not Show Any Evidence of Acute Changes from Her Prior Lab Work.  Patient to Be Admitted to the Hospital in Stable Condition.  Diagnosis of Biliary Obstruction    I have reviewed the nursing notes.    I have reviewed the findings,  diagnosis, plan and need for follow up with the patient.    Discharge Medication List as of 12/12/2017  4:57 PM      START taking these medications    Details   diclofenac (VOLTAREN) 1 % GEL topical gel Place 2 g onto the skin 4 times dailyDisp-100 g, [id number]      Lidocaine (LIDOCARE) 4 % Patch Place 1-2 patches onto the skin every 24 hoursDisp-30 patch, [id number]      ciprofloxacin (CIPRO) 500 MG tablet Take 1 tablet (500 mg) by mouth every 12 hours for 3 days, Disp-6 tablet, R-0, E-Prescribe      magnesium hydroxide (MILK OF MAGNESIA) 400 MG/5ML suspension Take 30 mLs by mouth daily as needed for constipation, Disp-105 mL, OTC      polyethylene glycol (MIRALAX/GLYCOLAX) Packet Take 17 g by mouth daily, Disp-7 packet, OTC      senna-docusate (SENOKOT-S;PERICOLACE) 8.6-50 MG per tablet Take 1-2 tablets by mouth 2 times daily as needed for constipation, Disp-100 tablet, OTC      metroNIDAZOLE (FLAGYL) 500 MG tablet Take 1 tablet (500 mg) by mouth every 6 hours for 3 days, Disp-12 tablet, R-0, E-Prescribe             Final diagnoses:   Biliary obstruction     IShar, am serving as a trained medical scribe to document services personally performed by Yosef Meyer MD, based on the provider's statements to me.      Yosef KAUR MD, was physically present and have reviewed and verified the accuracy of this note documented by Shar Rosa.     12/7/2017   [facility], [place], EMERGENCY DEPARTMENT     Yosef Meyer MD  12/23/17 [id number]

## 2017-12-07 NOTE — PHARMACY-ADMISSION MEDICATION HISTORY
Admission medication history interview status for the 12/7/2017 admission is complete. See Epic admission navigator for allergy information, pharmacy, prior to admission medications and immunization status.     Medication history interview sources:  patient    Changes made to PTA medication list (reason)  Added: loratadine  Deleted: zolpidem, lidocaine topical  Changed: robitussin dose    Additional medication history information (including reliability of information, actions taken by pharmacist):  -patient is a moderate historian  -patient usually takes fexofenadine in the spring and summer and loratadine in the winter  -patient stopped tylenol, atorvastatin, and letrozole 1 week ago  -has not yet started ribociclib as planned  -patient has switched to CR zolpidem in place of IR      Prior to Admission medications    Medication Sig Last Dose Taking? Auth Provider   loratadine (CLARITIN) 10 MG tablet Take 10 mg by mouth daily as needed for allergies 12/6/2017 at Unknown time Yes Unknown, Entered By History   zolpidem (AMBIEN CR) 12.5 MG CR tablet Take 1 tablet by mouth at bed time. 12/6/2017 at pm Yes Rajesh Ahuja MD   DULERA 200-5 MCG/ACT oral inhaler INHALE 2 PUFFS INTO THE LUNGS TWICE DAILY 12/6/2017 at pm Yes Johana Fairbanks MD   QVAR 80 MCG/ACT Inhaler INHALE 1 PUFFS INTO THE EACH NOSTRIL 2 TIMES DAILY 12/6/2017 at pm Yes Johana Fairbanks MD   Phenylephrine-DM-GG (ROBITUSSIN COUGH/COLD CF PO) Take 10 mLs by mouth as needed  12/7/2017 at am Yes Reported, Patient   levothyroxine (SYNTHROID/LEVOTHROID) 25 MCG tablet TAKE 1 TABLET (25 MCG) BY MOUTH DAILY 12/6/2017 at am Yes Johana Fairbanks MD   oxyCODONE (ROXICODONE) 5 MG IR tablet Take 1 tablet (5 mg) by mouth every 4 hours as needed for moderate to severe pain Past Week at Unknown time Yes Brodie Cassidy MD   calcium carb-cholecalciferol ( CALCIUM 600+D3) 600-500 MG-UNIT CAPS Take 2 tablets by mouth daily 12/7/2017 at am Yes Reported,  Patient   Docusate Calcium (STOOL SOFTENER PO) Take 100 mg by mouth daily  12/6/2017 at am Yes Reported, Patient   CRANBERRY PO Take 1 capsule by mouth daily  12/6/2017 at am Yes Reported, Patient   azelastine (ASTELIN) 0.1 % nasal spray Spray 1-2 sprays into both nostrils 2 times daily as needed for rhinitis 12/7/2017 at am Yes Johana Fairbanks MD   order for DME Equipment being ordered: [employer] post-lumpectomy compression pad x2   Brodie Cassidy MD   ribociclib (KISQALI 600 DOSE) 200 MG tablet Take 3 tablets (600 mg) by mouth daily for 21 days given on days 1-21 every 28 days.  Patient not taking: Reported on 12/7/2017 Not Started  Brodie Cassidy MD   letrozole (FEMARA) 2.5 MG tablet Take 1 tablet (2.5 mg) by mouth daily 11/30/2017  Brodie Cassidy MD   diphenhydrAMINE (BENADRYL) 25 MG tablet Take 1 tablet (25 mg) by mouth every 8 hours as needed for itching or allergies  Patient not taking: Reported on 12/7/2017   Brodie Cassidy MD   atorvastatin (LIPITOR) 10 MG tablet Take 1 tablet (10 mg) by mouth daily  Patient not taking: Reported on 12/7/2017 Not Taking at Unknown time  Johana Fairbanks MD   metoclopramide (REGLAN) 10 MG tablet Take 1 tablet (10 mg) by mouth 2 times daily as needed  Patient not taking: Reported on 12/7/2017 Not Taking at Unknown time  Brodie Cassidy MD   fexofenadine (ALLEGRA ALLERGY) 180 MG tablet Take 180 mg by mouth daily as needed for allergies More than a month at Unknown time  Unknown, Entered By History   albuterol (2.5 MG/3ML) 0.083% neb solution Take 1 vial (2.5 mg) by nebulization every 4 hours as needed for shortness of breath / dyspnea More than a month at Unknown time  Johana Fairbanks MD   predniSONE (DELTASONE) 20 MG tablet Take 1 tablet (20 mg) by mouth 2 times daily For Yellow or Red zone on Asthma Action Plan.  Patient not taking: Reported on 12/7/2017 Not Taking  Johana Fairbanks MD   magic mouthwash suspension (diphenhydrAMINE, lidocaine,  aluminum-magnesium & simethicone) Swish and swallow 10 mLs in mouth every 6 hours as needed for mouth sores  Patient not taking: Reported on 12/7/2017 Not Taking at Unknown time  Brodie Cassidy MD   albuterol (VENTOLIN HFA) 108 (90 BASE) MCG/ACT inhaler Inhale 2 puffs into the lungs every 4 hours as needed More than a month at Unknown time  Johana Fairbanks MD         Medication history completed by: Funmi Muhammad, PurviD

## 2017-12-07 NOTE — IP AVS SNAPSHOT
MRN:[id number]                      After Visit Summary   12/7/2017    Etta Cervantes    MRN: [id number]           Thank you!     Thank you for choosing [facility] for your care. Our goal is always to provide you with excellent care. Hearing back from our patients is one way we can continue to improve our services. Please take a few minutes to complete the written survey that you may receive in the mail after you visit with us. Thank you!        Patient Information     Date Of Birth          1960        Designated Caregiver       Most Recent Value    Caregiver    Will someone help with your care after discharge? no      About your hospital stay     You were admitted on:  December 7, 2017 You last received care in the:  [address] BMT [facility] [place]    You were discharged on:  December 12, 2017        Reason for your hospital stay       You were hospitalized for a bile duct obstruction causing an infection, as well as to have fluid removed from the sac around your heart.                  Who to Call     For medical emergencies, please call 911.  For non-urgent questions about your medical care, please call your primary care provider or clinic, [phone]  For questions related to your surgery, please call your surgery clinic        Attending Provider     Provider Yosef Long MD Emergency Medicine    KhalilKevin amaya DO Internal Medicine-Hematology & Oncology       Primary Care Provider Office Phone # Fax #    Johana Fairbanks [phone]       When to contact your care team       Please call the [facility] [phone] for fever (temp >100.5), chills, uncontrolled nausea, vomiting, constipation, or diarrhea, unrelieved pain, bleeding not relieved with pressure, dizziness, chest pain, shortness of breath, loss of consciousness, and any new or concerning symptoms.                  After Care Instructions      Activity       Your activity upon discharge: activity as tolerated            Diet       Follow this diet upon discharge: Regular                  Your next 10 appointments already scheduled     Dec 14, 2017  9:00 AM CST   Lymphedema Treatment with Brianne Lundberg, PT   [facility] Lymphedema ([facility])    [address]  [facility] [address]   [phone]              Future tests that were ordered for you     Echocardiogram Limited       Administration of IV contrast will be tailored to this examination per the appropriate written protocol listed in the Echocardiography department Protocol Book, or by the supervising Cardiologist. This may result in an order change.    Use of contrast is at the discretion of the supervising Cardiologist.            CBC with platelets differential       Last Lab Result: Hemoglobin (g/dL)       Date                     Value                 12/12/2017               11.6 (L)         ----------            Comprehensive metabolic panel                 Additional Information     If you use hormonal birth control (such as the pill, patch, ring or implants): You'll need a second form of birth control for 7 days (condoms, a diaphragm or contraceptive foam). While in the hospital, you received a medicine called Bridion. Your normal birth control will not work as well for a week after taking this medicine.          Pending Results     Date and Time Order Name Status Description    12/12/2017 1219 Urine Culture Aerobic Bacterial In process     12/11/2017 1626 Blood culture Preliminary     12/8/2017 0142 Fluid Culture Aerobic Bacterial Preliminary     12/8/2017 0142 Cytology non gyn In process     12/7/2017 1418 Blood culture Preliminary     12/7/2017 1418 Blood culture Preliminary             Statement of Approval     Ordered          12/12/17 1500  I have reviewed and agree with all the recommendations and orders detailed in this document.  EFFECTIVE NOW    "  Approved and electronically signed by:  Trish Kilpatrick PA-C             Admission Information     Date & Time Provider Department Dept. Phone    12/7/2017 Kevin Khalil, DO [address] BMT [facility] [place] [phone]      Your Vitals Were     Blood Pressure Pulse Temperature Respirations Height Weight    123/68 (BP Location: Right arm) 95 98.4  F (36.9  C) (Oral) 16 1.626 m (5' 4\") 90.6 kg (199 lb 11.2 oz)    Last Period Pulse Oximetry BMI (Body Mass Index)             02/06/2013 96% 34.28 kg/m2         MyChart Information     [employer] gives you secure access to your electronic health record. If you see a primary care provider, you can also send messages to your care team and make appointments. If you have questions, please call your primary care clinic.  If you do not have a primary care provider, please call [phone] and they will assist you.        Care EveryWhere ID     This is your Care EveryWhere ID. This could be used by other organizations to access your [place] medical records  [id number]        Equal Access to Services     ANIYAH MIX : Hadii han Rodriguez, wamary jauregui, qagerardo elliott, hari guardado. [facility] [phone].    ATENCIÓN: Si habla español, tiene a parekh disposición servicios gratuitos de asistencia lingüística. [place] al [phone].    We comply with applicable federal civil rights laws and [place] laws. We do not discriminate on the basis of race, color, national origin, age, disability, sex, sexual orientation, or gender identity.               Review of your medicines      START taking        Dose / Directions    ciprofloxacin 500 MG tablet   Commonly known as:  CIPRO   Indication:  Infection Within the Abdomen   Used for:  Carcinoma of breast metastatic to liver, unspecified laterality (H)        Dose:  500 mg   Take 1 tablet (500 mg) by mouth every 12 hours for 3 days   Quantity:  6 tablet   Refills:  0       diclofenac 1 " % Gel topical gel   Commonly known as:  VOLTAREN   Used for:  Carcinoma of breast metastatic to liver, unspecified laterality (H)        Dose:  2 g   Place 2 g onto the skin 4 times daily   Quantity:  100 g   Refills:  0       Lidocaine 4 % Patch   Commonly known as:  LIDOCARE   Used for:  Carcinoma of breast metastatic to liver, unspecified laterality (H)        Dose:  1-2 patch   Place 1-2 patches onto the skin every 24 hours   Quantity:  30 patch   Refills:  0       magnesium hydroxide 400 MG/5ML suspension   Commonly known as:  MILK OF MAGNESIA   Used for:  Carcinoma of breast metastatic to liver, unspecified laterality (H)        Dose:  30 mL   Take 30 mLs by mouth daily as needed for constipation   Quantity:  105 mL   Refills:  0       metroNIDAZOLE 500 MG tablet   Commonly known as:  FLAGYL   Indication:  Infection Within the Abdomen   Used for:  Carcinoma of breast metastatic to liver, unspecified laterality (H)        Dose:  500 mg   Take 1 tablet (500 mg) by mouth every 6 hours for 3 days   Quantity:  12 tablet   Refills:  0       polyethylene glycol Packet   Commonly known as:  MIRALAX/GLYCOLAX   Used for:  Carcinoma of breast metastatic to liver, unspecified laterality (H)        Dose:  17 g   Start taking on:  12/13/2017   Take 17 g by mouth daily   Quantity:  7 packet   Refills:  0       senna-docusate 8.6-50 MG per tablet   Commonly known as:  SENOKOT-S;PERICOLACE   Used for:  Carcinoma of breast metastatic to liver, unspecified laterality (H)        Dose:  1-2 tablet   Take 1-2 tablets by mouth 2 times daily as needed for constipation   Quantity:  100 tablet   Refills:  0         CONTINUE these medicines which have NOT CHANGED        Dose / Directions    * albuterol 108 (90 BASE) MCG/ACT Inhaler   Commonly known as:  VENTOLIN HFA   Used for:  Moderate persistent asthma, uncomplicated        Dose:  2 puff   Inhale 2 puffs into the lungs every 4 hours as needed   Quantity:  1 Inhaler   Refills:  2        * albuterol (2.5 MG/3ML) 0.083% neb solution   Used for:  Moderate persistent asthma, uncomplicated        Dose:  1 vial   Take 1 vial (2.5 mg) by nebulization every 4 hours as needed for shortness of breath / dyspnea   Quantity:  30 vial   Refills:  3       ALLEGRA ALLERGY 180 MG tablet   Generic drug:  fexofenadine        Dose:  180 mg   Take 180 mg by mouth daily as needed for allergies   Refills:  0       azelastine 0.1 % spray   Commonly known as:  ASTELIN   Used for:  Chronic rhinitis        Dose:  1-2 spray   Spray 1-2 sprays into both nostrils 2 times daily as needed for rhinitis   Quantity:  3 Bottle   Refills:  3       CRANBERRY PO        Dose:  1 capsule   Take 1 capsule by mouth daily   Refills:  0       diphenhydrAMINE 25 MG tablet   Commonly known as:  BENADRYL   Used for:  Carcinoma of breast metastatic to liver, unspecified laterality (H), Bone metastasis (H), Pericardial effusion, Bilateral malignant neoplasm of breast in female, estrogen receptor positive, unspecified site of breast (H), 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), Secondary malignant neoplasm of liver (H), Chemotherapy-induced neutropenia (H), Emphysematous bleb of lung (H), Hypothyroidism, unspecified type, Hyperlipidemia LDL goal <130, Moderate persistent asthma without complication, Mucositis due to chemotherapy        Dose:  25 mg   Take 1 tablet (25 mg) by mouth every 8 hours as needed for itching or allergies   Quantity:  1 tablet   Refills:  0       DULERA 200-5 MCG/ACT oral inhaler   Used for:  Moderate persistent asthma without complication   Generic drug:  mometasone-formoterol        INHALE 2 PUFFS INTO THE LUNGS TWICE DAILY   Quantity:  3 Inhaler   Refills:  3       KP CALCIUM 600+D3 600-500 MG-UNIT Caps   Generic drug:  calcium carb-cholecalciferol        Dose:  2 tablet   Take 2 tablets by mouth daily   Refills:  0       letrozole 2.5 MG tablet   Commonly known as:  FEMARA   Used for:   Carcinoma of breast metastatic to liver, unspecified laterality (H), Bone metastasis (H), Pericardial effusion, Bilateral malignant neoplasm of breast in female, estrogen receptor positive, unspecified site of breast (H), 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), Secondary malignant neoplasm of liver (H), Chemotherapy-induced neutropenia (H), Emphysematous bleb of lung (H), Hypothyroidism, unspecified type, Hyperlipidemia LDL goal <130, Moderate persistent asthma without complication, Mucositis due to chemotherapy        Dose:  2.5 mg   Take 1 tablet (2.5 mg) by mouth daily   Quantity:  30 tablet   Refills:  3       levothyroxine 25 MCG tablet   Commonly known as:  SYNTHROID/LEVOTHROID   Used for:  Hypothyroidism due to acquired atrophy of thyroid        TAKE 1 TABLET (25 MCG) BY MOUTH DAILY   Quantity:  90 tablet   Refills:  2       lidocaine visc 2% 2.5mL/5mL & maalox/mylanta w/ simeth 2.5mL/5mL & diphenhydrAMINE 5mg/5mL Susp suspension   Commonly known as:  [facility]IC [facility]   Used for:  Mucositis due to chemotherapy, Breast cancer metastasized to axillary lymph node, right (H)        Dose:  10 mL   Swish and swallow 10 mLs in mouth every 6 hours as needed for mouth sores   Quantity:  240 mL   Refills:  10       loratadine 10 MG tablet   Commonly known as:  CLARITIN        Dose:  10 mg   Take 10 mg by mouth daily as needed for allergies   Refills:  0       metoclopramide 10 MG tablet   Commonly known as:  REGLAN   Used for:  Bilateral malignant neoplasm of breast in female, estrogen receptor positive, unspecified site of breast (H)        Dose:  10 mg   Take 1 tablet (10 mg) by mouth 2 times daily as needed   Quantity:  120 tablet   Refills:  1       order for DME   Used for:  Lymphedema, Lymphedema of breast        Equipment being ordered: JoviPak post-lumpectomy compression pad x2   Quantity:  1 each   Refills:  0       oxyCODONE IR 5 MG tablet   Commonly known as:  ROXICODONE    Used for:  Secondary malignant neoplasm of liver (H)        Dose:  5 mg   Take 1 tablet (5 mg) by mouth every 4 hours as needed for moderate to severe pain   Quantity:  30 tablet   Refills:  0       predniSONE 20 MG tablet   Commonly known as:  DELTASONE   Used for:  Moderate persistent asthma, uncomplicated        Dose:  20 mg   Take 1 tablet (20 mg) by mouth 2 times daily For Yellow or Red zone on Asthma Action Plan.   Quantity:  10 tablet   Refills:  1       QVAR 80 MCG/ACT Inhaler   Used for:  Chronic rhinitis   Generic drug:  beclomethasone        INHALE 1 PUFFS INTO THE EACH NOSTRIL 2 TIMES DAILY   Quantity:  26.1 g   Refills:  3       ROBITUSSIN COUGH/COLD CF PO   Used for:  Breast cancer metastasized to axillary lymph node, right (H)        Dose:  10 mL   Take 10 mLs by mouth as needed   Refills:  0       STOOL SOFTENER PO        Dose:  100 mg   Take 100 mg by mouth daily   Refills:  0       zolpidem 12.5 MG CR tablet   Commonly known as:  AMBIEN CR   Used for:  Insomnia due to medical condition        Take 1 tablet by mouth at bed time.   Quantity:  30 tablet   Refills:  5       * Notice:  This list has 2 medication(s) that are the same as other medications prescribed for you. Read the directions carefully, and ask your doctor or other care provider to review them with you.      STOP taking     atorvastatin 10 MG tablet   Commonly known as:  LIPITOR           ribociclib 200 MG tablet   Commonly known as:  KISQALI 600 DOSE                Where to get your medicines      These medications were sent to [facility] [place], [place] - [address]  [address] [address]     Phone:  [phone]     ciprofloxacin 500 MG tablet    diclofenac 1 % Gel topical gel    Lidocaine 4 % Patch    metroNIDAZOLE 500 MG tablet         Some of these will need a paper prescription and others can be bought over the counter. Ask your nurse if you have questions.     You don't need a  prescription for these medications     magnesium hydroxide 400 MG/5ML suspension    polyethylene glycol Packet    senna-docusate 8.6-50 MG per tablet               ANTIBIOTIC INSTRUCTION     You've Been Prescribed an Antibiotic - Now What?  Your healthcare team thinks that you or your loved one might have an infection. Some infections can be treated with antibiotics, which are powerful, life-saving drugs. Like all medications, antibiotics have side effects and should only be used when necessary. There are some important things you should know about your antibiotic treatment.      Your healthcare team may run tests before you start taking an antibiotic.    Your team may take samples (e.g., from your blood, urine or other areas) to run tests to look for bacteria. These test can be important to determine if you need an antibiotic at all and, if you do, which antibiotic will work best.      Within a few days, your healthcare team might change or even stop your antibiotic.    Your team may start you on an antibiotic while they are working to find out what is making you sick.    Your team might change your antibiotic because test results show that a different antibiotic would be better to treat your infection.    In some cases, once your team has more information, they learn that you do not need an antibiotic at all. They may find out that you don't have an infection, or that the antibiotic you're taking won't work against your infection. For example, an infection caused by a virus can't be treated with antibiotics. Staying on an antibiotic when you don't need it is more likely to be harmful than helpful.      You may experience side effects from your antibiotic.    Like all medications, antibiotics have side effects. Some of these can be serious.    Let you healthcare team know if you have any known allergies when you are admitted to the hospital.    One significant side effect of nearly all antibiotics is the risk of  severe and sometimes deadly diarrhea caused by Clostridium difficile (C. Difficile). This occurs when a person takes antibiotics because some good germs are destroyed. Antibiotic use allows C. diificile to take over, putting patients at high risk for this serious infection.    As a patient or caregiver, it is important to understand your or your loved one's antibiotic treatment. It is especially important for caregivers to speak up when patients can't speak for themselves. Here are some important questions to ask your healthcare team.    What infection is this antibiotic treating and how do you know I have that infection?    What side effects might occur from this antibiotic?    How long will I need to take this antibiotic?    Is it safe to take this antibiotic with other medications or supplements (e.g., vitamins) that I am taking?     Are there any special directions I need to know about taking this antibiotic? For example, should I take it with food?    How will I be monitored to know whether my infection is responding to the antibiotic?    What tests may help to make sure the right antibiotic is prescribed for me?      Information provided by:  www.cdc.gov/getsmart  U.S. Department of Health and Human Services  Centers for disease Control and Prevention  National Center for Emerging and Zoonotic Infectious Diseases  Division of Healthcare Quality Promotion         Protect others around you: Learn how to safely use, store and throw away your medicines at www.disposemymeds.org.             Medication List: This is a list of all your medications and when to take them. Check marks below indicate your daily home schedule. Keep this list as a reference.      Medications           Morning Afternoon Evening Bedtime As Needed    * albuterol 108 (90 BASE) MCG/ACT Inhaler   Commonly known as:  VENTOLIN HFA   Inhale 2 puffs into the lungs every 4 hours as needed                                * albuterol (2.5 MG/3ML) 0.083%  neb solution   Take 1 vial (2.5 mg) by nebulization every 4 hours as needed for shortness of breath / dyspnea                                ALLEGRA ALLERGY 180 MG tablet   Take 180 mg by mouth daily as needed for allergies   Generic drug:  fexofenadine                                azelastine 0.1 % spray   Commonly known as:  ASTELIN   Spray 1-2 sprays into both nostrils 2 times daily as needed for rhinitis                                ciprofloxacin 500 MG tablet   Commonly known as:  CIPRO   Take 1 tablet (500 mg) by mouth every 12 hours for 3 days   Last time this was given:  500 mg on 12/12/2017 12:47 PM                                CRANBERRY PO   Take 1 capsule by mouth daily                                diclofenac 1 % Gel topical gel   Commonly known as:  VOLTAREN   Place 2 g onto the skin 4 times daily   Last time this was given:  2 g on 12/12/2017  7:58 AM                                diphenhydrAMINE 25 MG tablet   Commonly known as:  BENADRYL   Take 1 tablet (25 mg) by mouth every 8 hours as needed for itching or allergies                                DULERA 200-5 MCG/ACT oral inhaler   INHALE 2 PUFFS INTO THE LUNGS TWICE DAILY   Last time this was given:  2 puffs on 12/12/2017  7:54 AM   Generic drug:  mometasone-formoterol                                KP CALCIUM 600+D3 600-500 MG-UNIT Caps   Take 2 tablets by mouth daily   Generic drug:  calcium carb-cholecalciferol                                letrozole 2.5 MG tablet   Commonly known as:  FEMARA   Take 1 tablet (2.5 mg) by mouth daily                                levothyroxine 25 MCG tablet   Commonly known as:  SYNTHROID/LEVOTHROID   TAKE 1 TABLET (25 MCG) BY MOUTH DAILY   Last time this was given:  25 mcg on 12/12/2017  7:55 AM                                Lidocaine 4 % Patch   Commonly known as:  LIDOCARE   Place 1-2 patches onto the skin every 24 hours   Last time this was given:  1 patch on 12/11/2017  9:48 PM                                 lidocaine visc 2% 2.5mL/5mL & maalox/mylanta w/ simeth 2.5mL/5mL & diphenhydrAMINE 5mg/5mL Susp suspension   Commonly known as:  MAGIC Mouthwash [facility]   Swish and swallow 10 mLs in mouth every 6 hours as needed for mouth sores                                loratadine 10 MG tablet   Commonly known as:  CLARITIN   Take 10 mg by mouth daily as needed for allergies                                magnesium hydroxide 400 MG/5ML suspension   Commonly known as:  MILK OF MAGNESIA   Take 30 mLs by mouth daily as needed for constipation                                metoclopramide 10 MG tablet   Commonly known as:  REGLAN   Take 1 tablet (10 mg) by mouth 2 times daily as needed                                metroNIDAZOLE 500 MG tablet   Commonly known as:  FLAGYL   Take 1 tablet (500 mg) by mouth every 6 hours for 3 days   Last time this was given:  500 mg on 12/12/2017 12:47 PM                                order for DME   Equipment being ordered: Venu post-lumpectomy compression pad x2                                oxyCODONE IR 5 MG tablet   Commonly known as:  ROXICODONE   Take 1 tablet (5 mg) by mouth every 4 hours as needed for moderate to severe pain   Last time this was given:  10 mg on 12/12/2017  4:20 PM                                polyethylene glycol Packet   Commonly known as:  MIRALAX/GLYCOLAX   Take 17 g by mouth daily   Start taking on:  12/13/2017   Last time this was given:  17 g on 12/12/2017  7:53 AM                                predniSONE 20 MG tablet   Commonly known as:  DELTASONE   Take 1 tablet (20 mg) by mouth 2 times daily For Yellow or Red zone on Asthma Action Plan.                                QVAR 80 MCG/ACT Inhaler   INHALE 1 PUFFS INTO THE EACH NOSTRIL 2 TIMES DAILY   Last time this was given:  1 puff on 12/12/2017  7:54 AM   Generic drug:  beclomethasone                                ROBITUSSIN COUGH/COLD CF PO   Take 10 mLs by mouth as needed                                 senna-docusate 8.6-50 MG per tablet   Commonly known as:  SENOKOT-S;PERICOLACE   Take 1-2 tablets by mouth 2 times daily as needed for constipation   Last time this was given:  1 tablet on 12/11/2017  8:12 PM                                STOOL SOFTENER PO   Take 100 mg by mouth daily   Last time this was given:  100 mg on 12/12/2017  7:52 AM                                zolpidem 12.5 MG CR tablet   Commonly known as:  AMBIEN CR   Take 1 tablet by mouth at bed time.   Last time this was given:  12.5 mg on 12/11/2017  9:07 PM                                * Notice:  This list has 2 medication(s) that are the same as other medications prescribed for you. Read the directions carefully, and ask your doctor or other care provider to review them with you.

## 2017-12-07 NOTE — MR AVS SNAPSHOT
After Visit Summary   12/7/2017    Etta Cervantes    MRN: [id number]           Patient Information     Date Of Birth          1960        Visit Information        Provider Department      12/7/2017 10:30 AM Brodie Cassidy MD [facility] ONCOLOGY      Today's Diagnoses     Carcinoma of breast metastatic to liver, unspecified laterality (H)    -  1    Bone metastasis (H)        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)          Care Instructions    We are sending you th [facility] to be admitted and treated for the fever and blockage. We would like to see you back in clinic with Dr. Cassidy with ERCP results, post hospital discharge.   Copy of after visit summary (AVS) given to patient.  If you have any questions during business hours (M-F 8 AM- 4PM), please call Funmi Ray RN, BSN, OCN Oncology Hematology /Breast Cancer Navigator at [facility] [phone].   For questions after business hours, or on holidays/weekends, please call our after hours Nurse Triage line [phone]. Thank you.            Follow-ups after your visit        Your next 10 appointments already scheduled     Dec 12, 2017  8:30 AM CST   Lymphedema Treatment with Brianne Lundberg PT   [facility] ([facility])    [address] [id number] [phone]            Dec 14, 2017  9:00 AM CST   Lymphedema Treatment with Brianne Lundberg PT   [facility] ([facility])    [address] [id number] [phone]              Who to contact     If you have questions or need follow up information about today's clinic visit or your schedule please contact [facility] directly at [phone].  Normal or non-critical lab and imaging results will be communicated to you by MyChart, letter or phone within 4 business days after the clinic has received the  "results. If you do not hear from us within 7 days, please contact the clinic through [employer] or phone. If you have a critical or abnormal lab result, we will notify you by phone as soon as possible.  Submit refill requests through [employer] or call your pharmacy and they will forward the refill request to us. Please allow 3 business days for your refill to be completed.          Additional Information About Your Visit        [employer] Information     [employer] gives you secure access to your electronic health record. If you see a primary care provider, you can also send messages to your care team and make appointments. If you have questions, please call your primary care clinic.  If you do not have a primary care provider, please call [phone] and they will assist you.        Care EveryWhere ID     This is your Care EveryWhere ID. This could be used by other organizations to access your [facility] medical records  [id number]        Your Vitals Were     Pulse Temperature Respirations Height Last Period Pulse Oximetry    120 100.7  F (38.2  C) (Tympanic) 20 1.638 m (5' 4.5\") 02/06/2013 97%    Breastfeeding? BMI (Body Mass Index)                No 33.75 kg/m2           Blood Pressure from Last 3 Encounters:   12/07/17 108/75   12/01/17 116/69   11/30/17 128/68    Weight from Last 3 Encounters:   12/07/17 90.6 kg (199 lb 11.2 oz)   12/01/17 90.9 kg (200 lb 8 oz)   11/30/17 90.9 kg (200 lb 8 oz)              Today, you had the following     No orders found for display       Primary Care Provider Office Phone # Fax #    Johana Fairbanks [phone] [id number]  [place] [address]        Equal Access to Services     [facility] DOMINGUEZ AH: Hadii han Rodriguez, wamanuelada lurajanadaha, qaybta kaalmada lexi, hari guardado. So [facility] [phone].    ATENCIÓN: Si habla español, tiene a parekh disposición servicios gratuitos de asistencia lingüística. Llame al [phone].    We " comply with applicable federal civil rights laws and [place] laws. We do not discriminate on the basis of race, color, national origin, age, disability, sex, sexual orientation, or gender identity.            Thank you!     Thank you for choosing [facility]  for your care. Our goal is always to provide you with excellent care. Hearing back from our patients is one way we can continue to improve our services. Please take a few minutes to complete the written survey that you may receive in the mail after your visit with us. Thank you!             Your Updated Medication List - Protect others around you: Learn how to safely use, store and throw away your medicines at www.disposemymeds.org.          This list is accurate as of: 12/7/17 11:02 AM.  Always use your most recent med list.                   Brand Name Dispense Instructions for use Diagnosis    * albuterol 108 (90 BASE) MCG/ACT Inhaler    VENTOLIN HFA    1 Inhaler    Inhale 2 puffs into the lungs every 4 hours as needed    Moderate persistent asthma, uncomplicated       * albuterol (2.5 MG/3ML) 0.083% neb solution     30 vial    Take 1 vial (2.5 mg) by nebulization every 4 hours as needed for shortness of breath / dyspnea    Moderate persistent asthma, uncomplicated       ALLEGRA ALLERGY 180 MG tablet   Generic drug:  fexofenadine      Take 180 mg by mouth daily as needed for allergies        atorvastatin 10 MG tablet    LIPITOR    90 tablet    Take 1 tablet (10 mg) by mouth daily    Hyperlipidemia LDL goal <100       azelastine 0.1 % spray    ASTELIN    3 Bottle    Spray 1-2 sprays into both nostrils 2 times daily as needed for rhinitis    Chronic rhinitis       CRANBERRY PO      Take 1 capsule by mouth daily        diphenhydrAMINE 25 MG tablet    BENADRYL    1 tablet    Take 1 tablet (25 mg) by mouth every 8 hours as needed for itching or allergies    Carcinoma of breast metastatic to liver, unspecified laterality (H), Bone metastasis (H),  Pericardial effusion, Bilateral malignant neoplasm of breast in female, estrogen receptor positive, unspecified site of breast (H), 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), Secondary malignant neoplasm of liver (H), Chemotherapy-induced neutropenia (H), Emphysematous bleb of lung (H), Hypothyroidism, unspecified type, Hyperlipidemia LDL goal <130, Moderate persistent asthma without complication, Mucositis due to chemotherapy       DULERA 200-5 MCG/ACT oral inhaler   Generic drug:  mometasone-formoterol     3 Inhaler    INHALE 2 PUFFS INTO THE LUNGS TWICE DAILY    Moderate persistent asthma without complication       KP CALCIUM 600+D3 600-500 MG-UNIT Caps   Generic drug:  calcium carb-cholecalciferol      Take 2 tablets by mouth daily        letrozole 2.5 MG tablet    FEMARA    30 tablet    Take 1 tablet (2.5 mg) by mouth daily    Carcinoma of breast metastatic to liver, unspecified laterality (H), Bone metastasis (H), Pericardial effusion, Bilateral malignant neoplasm of breast in female, estrogen receptor positive, unspecified site of breast (H), 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), Secondary malignant neoplasm of liver (H), Chemotherapy-induced neutropenia (H), Emphysematous bleb of lung (H), Hypothyroidism, unspecified type, Hyperlipidemia LDL goal <130, Moderate persistent asthma without complication, Mucositis due to chemotherapy       levothyroxine 25 MCG tablet    SYNTHROID/LEVOTHROID    90 tablet    TAKE 1 TABLET (25 MCG) BY MOUTH DAILY    Hypothyroidism due to acquired atrophy of thyroid       lidocaine (viscous) 2 % solution    XYLOCAINE     Apply topically as needed        lidocaine visc 2% 2.5mL/5mL & maalox/mylanta w/ simeth 2.5mL/5mL & diphenhydrAMINE 5mg/5mL Susp suspension    [facility]    240 mL    Swish and swallow 10 mLs in mouth every 6 hours as needed for mouth sores    Mucositis due to chemotherapy, Breast cancer metastasized to  axillary lymph node, right (H)       metoclopramide 10 MG tablet    REGLAN    120 tablet    Take 1 tablet (10 mg) by mouth 2 times daily as needed    Bilateral malignant neoplasm of breast in female, estrogen receptor positive, unspecified site of breast (H)       order for DME     1 each    Equipment being ordered: [employer] post-lumpectomy compression pad x2    Lymphedema, Lymphedema of breast       oxyCODONE IR 5 MG tablet    ROXICODONE    30 tablet    Take 1 tablet (5 mg) by mouth every 4 hours as needed for moderate to severe pain    Secondary malignant neoplasm of liver (H)       predniSONE 20 MG tablet    DELTASONE    10 tablet    Take 1 tablet (20 mg) by mouth 2 times daily For Yellow or Red zone on Asthma Action Plan.    Moderate persistent asthma, uncomplicated       QVAR 80 MCG/ACT Inhaler   Generic drug:  beclomethasone     26.1 g    INHALE 1 PUFFS INTO THE EACH NOSTRIL 2 TIMES DAILY    Chronic rhinitis       ribociclib 200 MG tablet    KISQALI 600 DOSE    63 tablet    Take 3 tablets (600 mg) by mouth daily for 21 days given on days 1-21 every 28 days.    Carcinoma of breast metastatic to liver, unspecified laterality (H), Bone metastasis (H), Pericardial effusion, Bilateral malignant neoplasm of breast in female, estrogen receptor positive, unspecified site of breast (H), 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), Secondary malignant neoplasm of liver (H), Chemotherapy-induced neutropenia (H), Emphysematous bleb of lung (H), Hypothyroidism, unspecified type, Hyperlipidemia LDL goal <130, Moderate persistent asthma without complication, Mucositis due to chemotherapy       ROBITUSSIN COUGH/COLD CF PO      Take by mouth as needed    Breast cancer metastasized to axillary lymph node, right (H)       STOOL SOFTENER PO      Take 100 mg by mouth daily        * zolpidem 10 MG tablet    AMBIEN    30 tablet    Take 1 tablet 30 minutes prior to bedtime        * zolpidem 12.5 MG CR tablet     HARMONYIEN CR    30 tablet    Take 1 tablet by mouth at bed time.    Insomnia due to medical condition       * Notice:  This list has 4 medication(s) that are the same as other medications prescribed for you. Read the directions carefully, and ask your doctor or other care provider to review them with you.

## 2017-12-07 NOTE — H&P
[facility], [place]    History and Physical  Hematology / Oncology     Date of Admission:  12/7/2017    Assessment & Plan   Etta Cervantes is a 57 year old woman with metastatic breast cancer to liver, bones, heart who was seen in oncology clinic this AM and found to have transaminitis as well as low-grade fever (tmax 100.7), abdominal bloating, RUQ discomfort, and general malaise. CT abdomen shows biliary dilation concerning for obstruction. She is admitted for workup and management.     Biliary obstruction, concern for cholangitis.    LFTs elevated. WBC count nml. Low-grade fevers and mild RUQ discomfort without significant pain. Patient also notes general aches and malaise for the past 2-3 weeks. Not septic-appearing.   -Obtain surveillance BC.   -Start empiric IV zosyn.   -Appreciate GI consult. Patient is planned for ERCP tomorrow. Will request Anesthesiology consult d/t pericardial effusion (see below). NPO at [facility].   -Daily CBC, CMP.   -Oxycodone prn pain. Prn antiemetics.     Pericardial effusion, epicardial mass.    Presumed malignant. Small to moderate effusion noted on CT 11/13 and confirmed on echo 11/15, at which time there were no echocardiographic or clinical signs of tamponade, restriction, obstruction. Also note epicardial mass. Patient was evaluated by cardiologist 11/28; plan was to monitor serial echos while on chemotherapy and watch for signs/symptoms tamponade. CT chest done yesterday demonstrates increased volume of pericardial effusion, now moderate to large, and superior pericardial wall enhancement. Patient does have new dry cough. Otherwise, denies significant SOB, dyspnea with or without exertion, chest pain/pressure, leg swelling. She is HD stable.   -Per GI request, have consulted Anesthesiology and Cardiology to eval safety for sedation for ERCP. Will obtain EKG and repeat echo.     Cough.   Patient reports new dry cough over the past few days with occasional  slight wheezing (in setting of asthma at baseline). She notes that her  had a similar cough recently and she thinks other people such as coworkers have been sick. Of note, she declines yearly flu shot. No e/o acute airspace disease or pleural effusion on CT chest.   -Obtain rapid flu and resp viral PCR.   -Robitussin DM prn.     Metastatic breast cancer.   E/o disease progression on most recent CT C/A/P and tumor marker rising. Dr. Cassidy instructed her to hold Femara, tylenol, statin. Goal to restart active chemotherapy next week once LFTs return to nml. Has f/u planned Monday.     Asthma.   Continue home inhalers, nebs prn.     FEN:   -Hold off on IVF unless febrile or unstable d/t pericardial effusion   -RDAT then NPO at [facility]     Prophy/Misc:   -VTE: mechanical  -Bowel regimen     Yani Agustin DNP, APRN, CNP  Hematology/Oncology  Pager: [phone]    Code Status   Full Code    Primary Care Physician   Primary hematologist/oncologist: Dr. Cassidy    Chief Complaint   Elevated liver numbers and fever     History of Present Illness   History obtained from chart review and discussed with patient.    Etta Cervantes is a 57 year old woman with metastatic breast cancer to liver, bones, heart who was seen in oncology clinic this AM and found to have transaminitis as well as low-grade fever (tmax 100.7), abdominal bloating, RUQ discomfort, and general malaise. CT abdomen shows biliary dilation concerning for obstruction. She is admitted for workup and management.     Etta reports general malaise for the past 2-3 weeks; sx include fatigue, general body aches, and waning appetite. Monday she also developed abdominal bloating that resolved by Tuesday. She actually felt pretty good Tuesday and doesn't feel too bad today. She was surprised to find out she had an elevated temp in the clinic because she hasn't felt feverish and her  hasn't told her she feels warm. She has some abdominal discomfort in RUQ mainly  just with palpation, no significant pain. Mild nausea, no vomiting. She has been having bright yellow urine since Monday and has noticed her eyes and skin around fingertips seem yellow. She also c/o cough that started a couple days ago and seems worse today with occasional, brief wheezes. No sputum production, rhinorrhea, sinus pressure. Denies fever, chills, HA, dizziness, chest pain/pressure, SOB/dyspnea, diarrhea, constipation, leg swelling.     See Dr. Cassidy's recent clinic note for more detailed oncologic and treatment history.     Past Medical History    I have reviewed this patient's medical history and updated it with pertinent information if needed.   Past Medical History:   Diagnosis Date     ASCUS favor benign 1/2015    Neg HPV     Cancer (H)      Cataract 2010    surgery both eyes     Emphysematous bleb of lung (H)      Fibroids      Hypercholesterolemia      Hypothyroid      Incontinence of urine     mixed     Menorrhagia      Obesity      Pneumothorax      PONV (postoperative nausea and vomiting)      Sleep apnea     uses a cpap     Uncomplicated asthma        Past Surgical History   I have reviewed this patient's surgical history and updated it with pertinent information if needed.  Past Surgical History:   Procedure Laterality Date     BIOPSY  Jan 2015, 2016    Breast cancer, thyroid     BRONCHOSCOPY, INSERT BRONCHIAL VALVE N/A 7/20/2017    Procedure: BRONCHOSCOPY, INSERT BRONCHIAL VALVE;  Flexible Bronchoscopy, Endobronchial Valve Insertion X5. 4 Valves into right upper lobe and 1 valve into Right middle lobe;  Surgeon: Carlos Mcgowan MD;  Location: [facility] OR     BRONCHOSCOPY, REMOVE BRONCHIAL VALVE N/A 9/7/2017    Procedure: BRONCHOSCOPY, REMOVE BRONCHIAL VALVE;  Flexible Bronchoscopy, Removal Of Endobronchial Valves x 2;  Surgeon: Carlos Mcgowan MD;  Location: [facility] OR     BRONCHOSCOPY, REMOVE BRONCHIAL VALVE N/A 9/28/2017    Procedure: BRONCHOSCOPY, REMOVE BRONCHIAL VALVE;  Flexible  Bronchoscopy, Removal Of Intra-Bronchial Valves x 3;  Surgeon: Carlos Mcgowan MD;  Location: [facility] OR     CATARACT IOL, RT/LT  2010     COLONOSCOPY  2010     DILATION AND CURETTAGE, HYSTEROSCOPY, ABLATE ENDOMETRIUM NOVASURE, COMBINED  3/2/2011    COMBINED DILATION AND CURETTAGE, HYSTEROSCOPY, ABLATE ENDOMETRIUM NOVASURE performed by LAURA VARGAS at [facility] OR     GENITOURINARY SURGERY  2005    Uretha sling     INSERT PORT VASCULAR ACCESS N/A 2/12/2015    Procedure: INSERT PORT VASCULAR ACCESS;  Surgeon: Noé Schaefer MD;  Location: [facility] OR     SURGICAL HISTORY OF -   2005    bladder sling     SURGICAL HISTORY OF -       Cystectomy-lower lip     TUBAL LIGATION  1987       Prior to Admission Medications   Prior to Admission Medications   Prescriptions Last Dose Informant Patient Reported? Taking?   CRANBERRY PO 12/6/2017 at am Self Yes Yes   Sig: Take 1 capsule by mouth daily    DULERA 200-5 MCG/ACT oral inhaler 12/6/2017 at pm  No Yes   Sig: INHALE 2 PUFFS INTO THE LUNGS TWICE DAILY   Docusate Calcium (STOOL SOFTENER PO) 12/6/2017 at am Self Yes Yes   Sig: Take 100 mg by mouth daily    Phenylephrine-DM-GG (ROBITUSSIN COUGH/COLD CF PO) 12/7/2017 at am Self Yes Yes   Sig: Take 10 mLs by mouth as needed    QVAR 80 MCG/ACT Inhaler 12/6/2017 at pm  No Yes   Sig: INHALE 1 PUFFS INTO THE EACH NOSTRIL 2 TIMES DAILY   albuterol (2.5 MG/3ML) 0.083% neb solution More than a month at Unknown time Self No No   Sig: Take 1 vial (2.5 mg) by nebulization every 4 hours as needed for shortness of breath / dyspnea   albuterol (VENTOLIN HFA) 108 (90 BASE) MCG/ACT inhaler More than a month at Unknown time Self No No   Sig: Inhale 2 puffs into the lungs every 4 hours as needed   atorvastatin (LIPITOR) 10 MG tablet Not Taking at Unknown time  No No   Sig: Take 1 tablet (10 mg) by mouth daily   Patient not taking: Reported on 12/7/2017   azelastine (ASTELIN) 0.1 % nasal spray 12/7/2017 at am Self No Yes   Sig: Spray 1-2  sprays into both nostrils 2 times daily as needed for rhinitis   calcium carb-cholecalciferol ( CALCIUM 600+D3) 600-500 MG-UNIT CAPS 12/7/2017 at am Self Yes Yes   Sig: Take 2 tablets by mouth daily   diphenhydrAMINE (BENADRYL) 25 MG tablet   No No   Sig: Take 1 tablet (25 mg) by mouth every 8 hours as needed for itching or allergies   Patient not taking: Reported on 12/7/2017   fexofenadine (ALLEGRA ALLERGY) 180 MG tablet More than a month at Unknown time  Yes No   Sig: Take 180 mg by mouth daily as needed for allergies   letrozole (FEMARA) 2.5 MG tablet 11/30/2017  No No   Sig: Take 1 tablet (2.5 mg) by mouth daily   levothyroxine (SYNTHROID/LEVOTHROID) 25 MCG tablet 12/6/2017 at am  No Yes   Sig: TAKE 1 TABLET (25 MCG) BY MOUTH DAILY   loratadine (CLARITIN) 10 MG tablet 12/6/2017 at Unknown time Self Yes Yes   Sig: Take 10 mg by mouth daily as needed for allergies   magic mouthwash suspension (diphenhydrAMINE, lidocaine, aluminum-magnesium & simethicone) Not Taking at Unknown time Self No No   Sig: Swish and swallow 10 mLs in mouth every 6 hours as needed for mouth sores   Patient not taking: Reported on 12/7/2017   metoclopramide (REGLAN) 10 MG tablet Not Taking at Unknown time  No No   Sig: Take 1 tablet (10 mg) by mouth 2 times daily as needed   Patient not taking: Reported on 12/7/2017   order for DME   No No   Sig: Equipment being ordered: [employer] post-lumpectomy compression pad x2   oxyCODONE (ROXICODONE) 5 MG IR tablet Past Week at Unknown time  No Yes   Sig: Take 1 tablet (5 mg) by mouth every 4 hours as needed for moderate to severe pain   predniSONE (DELTASONE) 20 MG tablet Not Taking Self No No   Sig: Take 1 tablet (20 mg) by mouth 2 times daily For Yellow or Red zone on Asthma Action Plan.   Patient not taking: Reported on 12/7/2017   ribociclib (KISQALI 600 DOSE) 200 MG tablet Not Started  No No   Sig: Take 3 tablets (600 mg) by mouth daily for 21 days given on days 1-21 every 28 days.   Patient  "not taking: Reported on 12/7/2017   zolpidem (AMBIEN CR) 12.5 MG CR tablet 12/6/2017 at pm  No Yes   Sig: Take 1 tablet by mouth at bed time.      Facility-Administered Medications: None       (Not in a hospital admission)  Allergies   Allergies   Allergen Reactions     Animal Dander Cough     Itchy eyes     Cats      Dust Mites Cough     Itchy eyes     Pollen Extract      Other reaction(s): Cough  Itchy eyes     Seasonal Allergies      Levaquin [Levofloxacin] Rash     States she has violent dreams from taking this       Social History   I have reviewed this patient's social history and updated it with pertinent information if needed. Etta Cervantes  reports that she has never smoked. She has never used smokeless tobacco. She reports that she does not drink alcohol or use illicit drugs.    Family History   I have reviewed this patient's family history and updated it with pertinent information if needed.   Family History   Problem Relation Age of Onset     Depression Mother      Eye Disorder Mother      Gynecology Mother      Alzheimer Disease Mother      CANCER Father      Other Cancer Father      HEART DISEASE Maternal Grandfather      Allergies Paternal Grandfather      Allergies Son        Review of Systems   Negative other than as stated above in HPI.  Physical Exam   Temp: 100.2  F (37.9  C) Temp src: Oral BP: 130/80 Pulse: 70   Resp: 18 SpO2: 98 % O2 Device: None (Room air)    Vital Signs with Ranges  Temp:  [100.2  F (37.9  C)-100.7  F (38.2  C)] 100.2  F (37.9  C)  Pulse:  [] 70  Resp:  [18-20] 18  BP: (108-130)/(75-80) 130/80  SpO2:  [97 %-98 %] 98 %  191 lbs 0 oz    /80  Pulse 70  Temp 100.2  F (37.9  C) (Oral)  Resp 18  Ht 1.626 m (5' 4\")  Wt 86.6 kg (191 lb)  LMP 02/06/2013  SpO2 98%  BMI 32.79 kg/m2  Vitals:    12/07/17 1316   Weight: 86.6 kg (191 lb)       Constitutional: Pleasant woman seen resting comfortably in bed in NAD. Alert and interactive.   HEENT: NCAT. PERRL, EOMI. +Mild " scleral icterus. Oral mucosa pink, dry, no lesions or thrush.  Hematologic / Lymphatic: No overt bleeding. No cervical or clavicular adenopathy.  Respiratory: Non-labored breathing on RA, good air exchange, lungs clear to auscultation bilaterally. +Dry, non-productive cough noted.   Cardiovascular: Regular rate and rhythm. No murmur or rub.   GI: Normoactive bowel sounds. Abdomen soft, non-distended, mildly TTP in RUQ.   Skin: Warm and dry, +mild jaundice. No concerning lesions or rash on exposed surfaces.  Musculoskeletal: Extremities grossly normal, non-tender, no edema. Strong peripheral pulses. Good strength and ROM in bed.   Neurologic: A&O x 3, CNs 2-12 grossly intact, speech normal, sensation to light touch grossly WNL. No focal deficits.   Neuropsychiatric: Mentation and affect appear normal/appropriate.  Vascular Access: Left chest PAC site is [facility].       Data   CBC  Recent Labs  Lab 12/07/17  1339   WBC 5.7   RBC 3.41*   HGB 11.8   HCT 36.2   *   MCH 34.6*   MCHC 32.6   RDW 15.4*        CMP  Recent Labs  Lab 12/07/17  1339 12/05/17  0835     --    POTASSIUM 3.5  --    CHLORIDE 109  --    CO2 23  --    ANIONGAP 8  --    *  --    BUN 12  --    CR 0.71  --    GFRESTIMATED 84  --    GFRESTBLACK >90  --    BEN 8.8  --    PROTTOTAL 7.2 7.5   ALBUMIN 2.9* 3.1*   BILITOTAL 8.1* 5.8*   ALKPHOS 719* 677*   * 167*   * 185*     INRNo lab results found in last 7 days.    Results for orders placed or performed during the hospital encounter of 12/06/17   CT Chest/Abdomen/Pelvis w Contrast    Narrative    CT CHEST/ABDOMEN/PELVIS WITH CONTRAST December 6, 2017 1:52 PM    HISTORY: Breast cancer with liver metastasis. Elevated liver enzymes,  elevated bilirubin. Bilateral malignant neoplasm of breast in female,  estrogen receptor positive, unspecified site of breast (H).     TECHNIQUE: CT scan obtained of the chest, abdomen, and pelvis with  oral and IV contrast. 97 ml isovue 370  injected. Radiation dose for  this scan was reduced using automated exposure control, adjustment of  the mA and/or kV according to patient size, or iterative  reconstruction technique.    COMPARISON:  CT chest, abdomen and pelvis 11/13/2017.    FINDINGS:  Chest: Left chest port venous catheter. Increased volume of moderate  to large pericardial effusion. Transverse thickness of this fluid is  now 2.4 cm, previously 0.6 cm at a comparable level on the prior study  series 2 image 36. There is some mild wall enhancement of the superior  portion of the pericardium, for instance around series 2 image 20 in  the region of the cardiac base. Small volume right pleural fluid newly  identified. No acute thoracic aortic abnormality. No large central  pulmonary embolism. The exam was not tailored for assessment of middle  and small branch vessel pulmonary emboli. The distal left  paraesophageal lymph node is 0.8 x 0.5 cm, previously 0.3 x 0.3 cm  series 2 image 48. The remainder of the thoracic lymph nodes remain  small. Right axillary adenopathy without significant change in size  measuring 4.8 x 3.4 cm, previously 4.2 x 2.8 cm image 16. There are  other mildly more prominent lymph nodes as well. No left axillary  adenopathy. Stable thyroid nodules that are small in size. Right  breast skin thickening again noted. No convincing new bony disease  identified. Some ill-defined sclerosis at the right T8 pedicle on  series 2 image 33, and right posteromedial fifth rib on image 18  appears stable. Stable appearing cavitary lesion at the right upper  lung measuring 8.8 cm series 3 image 13. Stable triangular area of  consolidation at the right hilar region at the right midchest series 3  image 27. There are several tiny bilateral stable pulmonary nodules.  For example, one of these is 0.4 cm medial right lower lobe series 3  image 30. There are multiple other examples bilaterally.    Abdomen/pelvis: No convincing new bony disease at  the abdomen or  pelvis levels. Multifocal hepatic metastasis. Anterior central hepatic  mass is 2.7 x 2.6 cm, previously 2.2 x 2.1 cm series 2 image 58.  Lateral segment left hepatic mass is approximately 2.8 x 2.8 cm,  previously 2.2 x 2.4 cm image 53. A caudate lobe mass is approximately  2.9 x 2.2 cm, previously 2 x 2.2 cm image 60. There are other hepatic  masses that are slightly larger, or are stable. Notably, there is new  moderate intrahepatic biliary ductal dilatation. This appears to lead  to the pancreatic head where there is complete decompression of the  common bile duct coronal series 4 image 23. There is a small area of  nodular enhancement newly identified in this region that is  approximately 0.8 cm series 2 image 66. Adenopathy also noted at the  shreyas hepatis. Portal caval enlarged lymph node is 2.5 x 1.3 cm,  previously 1.7 x 1.1 cm image 59. There are other prominent lymph  nodes that appear more prominent in the interval as well, for instance  image 56. Some increase ill-defined soft tissue at the shreyas hepatis  region on image 56 also appears more prominent measuring 2.4 x 1.4 cm  series 2 image 56, previously 1.7 x 1 cm.    The spleen demonstrates a stable small subcentimeter enhancing focus  series 2 image 56. Ill-defined tiny hypodensities in the spleen also  appears stable image 54 and 56. The adrenals, remainder of the  pancreas, and kidneys do not show any new abnormalities. Tiny cyst at  the left kidney is stable image 67.    There are several retroperitoneal lymph nodes noted. Upper abdominal  aortocaval lymph node is 1.5 x 1.3 cm, previously 0.9 x 1.3 cm series  2 image 64. A few other mildly prominent retroperitoneal lymph nodes  again noted. Small to moderate free pelvic fluid. No acute bowel  abnormality is seen. New nodularity at the anterior omentum with one  region measuring 1.2 cm abutting a small bowel loop series 2 image 77.  Other adjacent nodules noted as well.       Impression    IMPRESSION:  1. Progression of hepatic metastatic disease with enlargement of  multifocal masses. New finding of moderate intrahepatic biliary ductal  dilatation worrisome for developing biliary obstruction. This may  relate to progression of adenopathy at the upper abdomen at the shreyas  hepatis and/or an increasing area of nodular enhancement at the  pancreatic head worrisome for metastatic disease.  2. Moderate to large pericardial effusion significantly increased  since 11/13/2017. Some mild enhancement of the pericardium also noted.  Recommend further workup.  3. New finding of small nodularity at the omentum at the midline and  slightly towards the right that could represent carcinomatosis.  4. Multifocal stable indeterminate pulmonary nodules may represent  pulmonary metastatic disease.  5. Stable sclerosis at the right fifth rib and T8 that could represent  stable sclerotic metastasis.  6. Progression of adenopathy at the right axilla, and upper abdominal  retroperitoneum.    I communicated these results to Dr. Cassidy on 12/6/2017 at 1450  hours.    CHICO FONG MD

## 2017-12-07 NOTE — ED NOTES
Bed: IN06  Expected date: 12/7/17  Expected time: 12:04 PM  Means of arrival: Car  Comments:  Etta Cervantes ([id number]); Breast Cancer with liver mets; now with bile duct obstruction.  Presented with jaundice and fever.

## 2017-12-07 NOTE — ED NOTES
"Triage Assessment & Note:    /80  Pulse 70  Temp 100.2  F (37.9  C) (Oral)  Resp 18  Ht 1.626 m (5' 4\")  Wt 86.6 kg (191 lb)  LMP 02/06/2013  SpO2 98%  BMI 32.79 kg/m2    Patient presents with: c/o abdominal pain. Pt coming from [facility] diagnosed with a \"Bile duct stone\"    Home Treatments/Remedies: None    Febrile / Afebrile? Afebrile    Duration of C/o:  1 week     Landen Cobos  December 7, 2017      "

## 2017-12-07 NOTE — LETTER
12/7/2017         RE: Etta Cervantes  [address]  [place] [address]        Dear Colleague,    Thank you for referring your patient, Etta Cervantes, to the [facility]. Please see a copy of my visit note below.    Report called to MELINA Tobin ED charge nurse.  They will contact Dr. Esquivel and/or his team once patient has arrived at ED.    Hematology/ Oncology Follow-up Visit:  Dec 7, 2017    Reason for Visit:   Chief Complaint   Patient presents with     Oncology Clinic Visit     Follow up Breast CA. Review CT results.        Oncologic History:  Breast cancer (H)  Etta Cervantes presented with increasing lump in the right axilla that s lump has been growing without any pain or associated symptoms. Subsequently she was evaluated by primary care physician. Diagnostic digital mammogram was done on 01/13/2015 showing enlarged lymph nodes in the right axilla. There was some skin thickening in the right breast anteriorly and laterally. There are no parenchymal changes in the right breast. The left breast shows a 1.7 cm spiculated mass at approximately 2 to 3 o'clock position, 15.2 cm away from the nipple. A right breast ultrasound was subsequently done and ultrasound guided biopsy was done. Needle biopsy of the left breast did show infiltrating ductal carcinoma grade I of III with no angiolymphatic invasion seen. No associated ductal carcinoma in situ. Estrogen receptor, progresterone receptor were positive. HER-2/sadie receptor came back negative.. Another biopsy from the right axilla did show metastatic ductal carcinoma. PET scan was done on January 26, 2015 showing few small right posterior cervical chain nodes the largest is 1.2 cm. There is extensive bulky right axillary adenopathy demonstrating hypermetabolic FDG uptake with SUV of 10.6. There is skin thickening involving the right breast which demonstrated low-grade FDG uptake. A 1.2 cm lesion on the lateral aspect of the left breast  demonstrated minimally increased FDG uptake with SUV of 2. Scattered hypermetabolic mediastinal lymph nodes located in the left superior anterior mediastinum, right paratracheal and precarinal U, subcranial adenopathy with javon metastases. This focus hypermetabolic FDG uptake identified definitive Amanda posterior aspect off intertrochanteric region of the proximal left femur consistent with bone metastases. There is also 1.4 cm hypermetabolic FDG uptake identified in the anterior medial segment of the left hepatic lobe consistent with liver metastases. Additional 2.1 cm low-attenuation lesion anterior aspect of the right lobe which needs to be determined. The patient was started on chemotherapy with Taxotere and Cytoxan on February 9, 2015.  She concluded 4 cycles of Taxotere and Cytoxan. She started on Arimidex 1 mg orally daily. Currently she is on Faslodex and ibrance. Subsequently the patient went on to receive Afinitor. The patient also started treatment with Xeloda.       Interval History:  Patient is here today for follow-up. She had a CT scan done yesterday showing biliary obstruction related to progressing disease. There is new lesions seen in the liver. There is increased pericardial effusion as well. Patient had a fever this morning. She's been coughing as well. Jaundice has worsened. Patient also has have nausea and vomiting.    Review Of Systems:  Constitutional: Negative for fever, chills, and night sweats. Fever  Skin: Jaundice  Eyes: negative.  Ears/Nose/Throat: negative.  Respiratory: Increasing shortness of breath, cough  Cardiovascular: negative.  Gastrointestinal: Right upper quadrant pain and nausea  Genitourinary: negative.  Musculoskeletal: negative.  Neurologic: negative.  Psychiatric: negative.  Hematologic/Lymphatic/Immunologic: negative.  Endocrine: negative.    All other ROS negative unless mentioned in interval history.    Past medical, social, surgical, and family histories  "reviewed.    Allergies:  Allergies as of 12/07/2017 - Review Complete 12/07/2017   Allergen Reaction Noted     Animal dander Cough 01/23/2015     Cats  07/15/2010     Dust mites Cough 01/23/2015     Pollen extract  01/23/2015     Seasonal allergies  07/15/2010     Levaquin [levofloxacin] Rash 07/17/2017       Current Medications:  No current outpatient prescriptions on file.        Physical Exam:  /75 (BP Location: Right arm, Patient Position: Sitting, Cuff Size: Adult Regular)  Pulse 120  Temp 100.7  F (38.2  C) (Tympanic)  Resp 20  Ht 1.638 m (5' 4.5\")  Wt 90.6 kg (199 lb 11.2 oz)  LMP 02/06/2013  SpO2 97%  Breastfeeding? No  BMI 33.75 kg/m2  Wt Readings from Last 12 Encounters:   12/08/17 90.1 kg (198 lb 11.2 oz)   12/07/17 90.6 kg (199 lb 11.2 oz)   12/01/17 90.9 kg (200 lb 8 oz)   11/30/17 90.9 kg (200 lb 8 oz)   11/28/17 91.1 kg (200 lb 14.4 oz)   11/16/17 89.8 kg (198 lb)   11/15/17 89.6 kg (197 lb 8 oz)   11/10/17 90.9 kg (200 lb 8 oz)   11/08/17 91.1 kg (200 lb 14.4 oz)   10/22/17 88.7 kg (195 lb 8.8 oz)   10/18/17 89.7 kg (197 lb 12.8 oz)   09/28/17 89.2 kg (196 lb 10.4 oz)     ECOG performance status: 1  GENERAL APPEARANCE: Healthy, alert and in no acute distress. Jaundice  HEENT: . PERRLA. Oropharynx without ulcers, lesions, or thrush.  NECK: Supple. No asymmetry or masses.  LYMPHATICS: No palpable cervical, supraclavicular, axillary, or inguinal lymphadenopathy.  RESP: Lungs clear to auscultation bilaterally without rales, rhonchi or wheezes.  BREAST: Right axillary adenopathy .\"CARDIOVASCULAR: Regular rate and rhythm. Normal S1, S2; no S3 or S4. No murmur, gallop, or rub.  ABDOMEN: Tender hepatomegaly Bowel sounds normal. MUSCULOSKELETAL: Extremities without gross deformities noted. No edema of bilateral lower extremities.  SKIN: No suspicious lesions or rashes.  NEURO: Alert and oriented x 3. Cranial nerves II-XII grossly intact.  PSYCHIATRIC: Mentation and affect appear " normal.    Laboratory/Imaging Studies:  Orders Only on 12/05/2017   Component Date Value Ref Range Status     Bilirubin Direct 12/05/2017 4.0* 0.0 - 0.2 mg/dL Final     Bilirubin Total 12/05/2017 5.8* 0.2 - 1.3 mg/dL Final     Albumin 12/05/2017 3.1* 3.4 - 5.0 g/dL Final     Protein Total 12/05/2017 7.5  6.8 - 8.8 g/dL Final     Alkaline Phosphatase 12/05/2017 677* 40 - 150 U/L Final     ALT 12/05/2017 185* 0 - 50 U/L Final     AST 12/05/2017 167* 0 - 45 U/L Final   Oncology Visit on 11/30/2017   Component Date Value Ref Range Status     CA 27-29 11/30/2017 85* 0 - 39 U/mL Final    Assay Method:  Chemiluminescence using Siemens Centaur XP        Recent Results (from the past 744 hour(s))   X-ray Chest 2 vws*    Narrative    PA and lateral chest    HISTORY: Abnormal chest CT    COMPARISON STUDY: 9/28/2017    FINDINGS: Left subclavian Port-A-Cath tip in the high SVC. Cardiac  silhouette is large. Tiny right apical pneumothorax versus bulla with  linear atelectasis in the right upper lung zone.      Impression    IMPRESSION: Tiny right apical pneumothorax versus apical bullae with  linear bands of atelectasis or scarring.    KAVON MILLS MD   CT Chest/Abdomen/Pelvis w Contrast   Result Value    Radiologist flags New mild-moderate pericardial effusion (Urgent)    Narrative    CT CHEST/ABDOMEN/PELVIS W CONTRAST 11/13/2017 2:54 PM    HISTORY: Breast cancer. Follow-up.    CONTRAST:  97 mL Isovue 370.    TECHNIQUE: CT of the chest, abdomen, and pelvis is performed with IV  contrast.    Routine evaluated structures in the chest include the lungs,  mediastinal structures, pleura, and chest wall.    Routine assessed structures in the abdomen include the liver, spleen,  pancreas, adrenal glands, and kidneys. Also assessed are the  retroperitoneum, gastrointestinal tract, and the abdominal wall.    Intrapelvic anatomy is also assessed.    Radiation dose for this scan is reduced using automated exposure  control, adjustment of  the mA and/or kV according to patient size, or  iterative reconstruction technique.    COMPARISON: 9/11/2017.    FINDINGS:    Chest: There is a new 8.9 cm cystic area in the right lung apex. This  may relate to a loculated pneumothorax. A subpleural cyst or bulla are  also in the differential. There is some adjacent atelectasis. There is  less skin thickening and edema in the right breast. A soft tissue  conglomerate in the right axilla is stable. It measures 4.2 x 2.8 cm.  Other right axillary lymph nodes which measure up to 0.7 cm in short  axis dimension are stable. A multinodular thyroid gland is stable.  There is a new mild-moderate pericardial effusion.    Abdomen: There is a ring-enhancing low density lesion in the inferior  right lobe of the liver on image #72 which measures 1.5 cm compared to  0.8 cm on the prior study.  An ill-defined area of low density in the  left lobe on coronal image 31 measures 3.9 cm compared to 3.0 cm on  the prior study. Other liver lesions are more stable. A subcentimeter  vascular lesion in the spleen is stable. Gastrohepatic ligament and  portal adenopathy are stable. A portal lymph node on image #62  measures 1.8 cm. A borderline in size retroperitoneal lymph node on  image #65 is stable at 0.9 cm in short axis dimension. A previously  seen borderline size aortocaval lymph node is smaller. It measures 0.5  cm compared to 0.9 cm on the prior study.    Pelvis: There is trace free pelvic fluid.      Impression    IMPRESSION:  1.  Hepatic metastases are slightly worse.  2. There is a new mild-moderate pericardial effusion.  3. Other neoplastic disease is stable.    [Access Center: New mild-moderate pericardial effusion]    This report will be copied to the [facility] to ensure a  provider acknowledges the finding. Access Center is available Monday  through Friday 8am-3:30 pm.       LETI CHASE MD   US Abdomen Limited    Narrative    ULTRASOUND  ABDOMEN LIMITED    11/30/2017 12:53 PM     HISTORY:  Breast cancer; elevated liver enzymes. Carcinoma of breast  metastatic to liver, unspecified laterality (H). Bone metastasis (H);  Secondary malignant neoplasm of liver (H). Elevated liver enzymes.    COMPARISON: CT abdomen and pelvis 11/13/2017.    FINDINGS:    Gallbladder: Negative sonographic Osullivan sign. Faint gallbladder  sludge is suggested layering in the gallbladder lumen. No gallstones.    Bile ducts:  Common duct is 0.7 cm and may be normal for age. No  intrahepatic biliary ductal dilatation.    Liver: Hepatic metastasis again identified by ultrasound. One of the  largest is 2.8 x 2.1 x 3 cm at the left liver. The other lesions are  better visualized on the comparison CT.    Pancreas:  Partially obscured but grossly unremarkable.     Right kidney:  Normal.     Aorta and IVC:  Not specifically assessed.       Impression    IMPRESSION:    1. Hepatic metastasis again identified.  2. Gallbladder sludge. No gallstones. Negative sonographic Osullivan  sign.    CHICO FONG MD   CT Chest/Abdomen/Pelvis w Contrast    Narrative    CT CHEST/ABDOMEN/PELVIS WITH CONTRAST December 6, 2017 1:52 PM    HISTORY: Breast cancer with liver metastasis. Elevated liver enzymes,  elevated bilirubin. Bilateral malignant neoplasm of breast in female,  estrogen receptor positive, unspecified site of breast (H).     TECHNIQUE: CT scan obtained of the chest, abdomen, and pelvis with  oral and IV contrast. 97 ml isovue 370 injected. Radiation dose for  this scan was reduced using automated exposure control, adjustment of  the mA and/or kV according to patient size, or iterative  reconstruction technique.    COMPARISON:  CT chest, abdomen and pelvis 11/13/2017.    FINDINGS:  Chest: Left chest port venous catheter. Increased volume of moderate  to large pericardial effusion. Transverse thickness of this fluid is  now 2.4 cm, previously 0.6 cm at a comparable level on the prior study  series 2 image 36. There  is some mild wall enhancement of the superior  portion of the pericardium, for instance around series 2 image 20 in  the region of the cardiac base. Small volume right pleural fluid newly  identified. No acute thoracic aortic abnormality. No large central  pulmonary embolism. The exam was not tailored for assessment of middle  and small branch vessel pulmonary emboli. The distal left  paraesophageal lymph node is 0.8 x 0.5 cm, previously 0.3 x 0.3 cm  series 2 image 48. The remainder of the thoracic lymph nodes remain  small. Right axillary adenopathy without significant change in size  measuring 4.8 x 3.4 cm, previously 4.2 x 2.8 cm image 16. There are  other mildly more prominent lymph nodes as well. No left axillary  adenopathy. Stable thyroid nodules that are small in size. Right  breast skin thickening again noted. No convincing new bony disease  identified. Some ill-defined sclerosis at the right T8 pedicle on  series 2 image 33, and right posteromedial fifth rib on image 18  appears stable. Stable appearing cavitary lesion at the right upper  lung measuring 8.8 cm series 3 image 13. Stable triangular area of  consolidation at the right hilar region at the right midchest series 3  image 27. There are several tiny bilateral stable pulmonary nodules.  For example, one of these is 0.4 cm medial right lower lobe series 3  image 30. There are multiple other examples bilaterally.    Abdomen/pelvis: No convincing new bony disease at the abdomen or  pelvis levels. Multifocal hepatic metastasis. Anterior central hepatic  mass is 2.7 x 2.6 cm, previously 2.2 x 2.1 cm series 2 image 58.  Lateral segment left hepatic mass is approximately 2.8 x 2.8 cm,  previously 2.2 x 2.4 cm image 53. A caudate lobe mass is approximately  2.9 x 2.2 cm, previously 2 x 2.2 cm image 60. There are other hepatic  masses that are slightly larger, or are stable. Notably, there is new  moderate intrahepatic biliary ductal dilatation. This  appears to lead  to the pancreatic head where there is complete decompression of the  common bile duct coronal series 4 image 23. There is a small area of  nodular enhancement newly identified in this region that is  approximately 0.8 cm series 2 image 66. Adenopathy also noted at the  shreyas hepatis. Portal caval enlarged lymph node is 2.5 x 1.3 cm,  previously 1.7 x 1.1 cm image 59. There are other prominent lymph  nodes that appear more prominent in the interval as well, for instance  image 56. Some increase ill-defined soft tissue at the shreyas hepatis  region on image 56 also appears more prominent measuring 2.4 x 1.4 cm  series 2 image 56, previously 1.7 x 1 cm.    The spleen demonstrates a stable small subcentimeter enhancing focus  series 2 image 56. Ill-defined tiny hypodensities in the spleen also  appears stable image 54 and 56. The adrenals, remainder of the  pancreas, and kidneys do not show any new abnormalities. Tiny cyst at  the left kidney is stable image 67.    There are several retroperitoneal lymph nodes noted. Upper abdominal  aortocaval lymph node is 1.5 x 1.3 cm, previously 0.9 x 1.3 cm series  2 image 64. A few other mildly prominent retroperitoneal lymph nodes  again noted. Small to moderate free pelvic fluid. No acute bowel  abnormality is seen. New nodularity at the anterior omentum with one  region measuring 1.2 cm abutting a small bowel loop series 2 image 77.  Other adjacent nodules noted as well.      Impression    IMPRESSION:  1. Progression of hepatic metastatic disease with enlargement of  multifocal masses. New finding of moderate intrahepatic biliary ductal  dilatation worrisome for developing biliary obstruction. This may  relate to progression of adenopathy at the upper abdomen at the shreyas  hepatis and/or an increasing area of nodular enhancement at the  pancreatic head worrisome for metastatic disease.  2. Moderate to large pericardial effusion significantly increased  since  11/13/2017. Some mild enhancement of the pericardium also noted.  Recommend further workup.  3. New finding of small nodularity at the omentum at the midline and  slightly towards the right that could represent carcinomatosis.  4. Multifocal stable indeterminate pulmonary nodules may represent  pulmonary metastatic disease.  5. Stable sclerosis at the right fifth rib and T8 that could represent  stable sclerotic metastasis.  6. Progression of adenopathy at the right axilla, and upper abdominal  retroperitoneum.    I communicated these results to Dr. Cassidy on 12/6/2017 at 1450  hours.    CHICO FONG MD       Assessment and plan:    (C50.919,  C78.7) Carcinoma of breast metastatic to liver, unspecified laterality (H)  (primary encounter diagnosis)  (C79.51) Bone metastasis (H)  (C50.911,  C77.3) 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)  (R74.8) Elevated liver enzymes  I reviewed with the patient most recent imaging studies. I made arrangements with ERCP team to arrange for urgent ERCP. I reviewed with the patient today the options for treatment of her cancer. However, the presence of fever and biliary obstruction is an indication for admission for antibiotics and for ERCP. We will arrange for the patient to be admitted in [facility]. Patient will be also evaluated for pericardial effusion for possible pericardial window. I will see the patient after her discharge to start her on chemotherapy. We talked about different options including Doxil or weekly Taxol. However I would meet with the patient after discharge to discuss with her further.    (E03.9) Hypothyroidism, unspecified type  Patient will continue on Synthroid    (E78.5) Hyperlipidemia LDL goal <130  Laboratories currently on hold because of elevated liver enzymes.    The patient is ready to learn, no apparent learning barriers were identified.  Diagnosis and treatment plans were explained to the patient. The patient  expressed understanding of the content. The patient asked appropriate questions. The patient questions were answered to her satisfaction.    Time spent 40 minutes with the 50% of the time in counseling coordination of care including discussion of imaging studies, management plan and symptom management.    Chart documentation with Dragon Voice recognition Software. Although reviewed after completion, some words and grammatical errors may remain.    Again, thank you for allowing me to participate in the care of your patient.        Sincerely,        Brodie Cassidy MD

## 2017-12-07 NOTE — PATIENT INSTRUCTIONS
We are sending you th [facility] to be admitted and treated for the fever and blockage. We would like to see you back in clinic with Dr. Cassidy with ERCP results, post hospital discharge. You will possibly start Taxol at that time.  Copy of after visit summary (AVS) given to patient.  If you have any questions during business hours (M-F 8 AM- 4PM), please call Funmi Ray RN, BSN, OCN Oncology Hematology /Breast Cancer Navigator at [facility] [phone].   For questions after business hours, or on holidays/weekends, please call our after hours Nurse Triage line [phone]. Thank you.

## 2017-12-07 NOTE — CONSULTS
"  GASTROENTEROLOGY CONSULTATION      Date of Admission:  12/7/2017  Reason for Admission: Jaundice, abd pain  Date of Consult  12/7/2017   Requesting Physician:  Yosef Meyer MD           ASSESSMENT AND RECOMMENDATIONS:   Assessment:  57 year old female with a history of metastatic breast cancer with mets to liver who is admitted with fever, elevated LFT (Tbili 8.1)  and abdominal pain. CT scan abd/pelv showing progressive of metastatic disease with new intrahepatic biliary obstruction, likely related to progression of adenopathy in shreyas hepatis. With fevers, concern for cholangitis - will recommend starting antibiotics and plan for ERCP tomorrow. Patient also with significantly increased pericardial effusion, follows with cardiology. Would recommend anesthesia consult prior to procedure and/or repeat echocardiogram.    Of note, patient with developing cough over the past couple of days. No body aches or SOB. Would recommend checking for influenza (patient opts out of getting flu shot every year).     Recommendations:  ERCP tomorrow  Anesthesia and/or cardiology consult (pericardial effusion)  OK for diet today, NPO at midnight  Trend LFT  Check INR  Start Zosyn  Flu swab  Analgesia/Antiemetics per primary team  Discussed with primary oncology team    Gastroenterology follow up recommendations: TBD    Thank you for involving us in this patient's care. Please do not hesitate to contact the GI service with any questions or concerns.     Pt seen and care plan discussed with Dr. Esquivel, GI staff physician.    Marianela Kim PA-C  Advanced Endoscopy/Pancreaticobiliary [facility]  [facility]  Pager *[phone]  -------------------------------------------------------------------------------------------------------------------       Reason for Consultation:   \"biliary obstruction\"           History of Present Illness:   Patient seen and examined at 1530. History is obtained from the patient and " her  in the ED.    Etta Cervantes is a 57 year old female with a PMH significant for metastatic breast cancer with recent CT scan showing progression of her disease. Presented to oncology clinic today with elevated LFT and RUQ abdominal pain. Reports that abd pain is minimal, worse when pushed on. Also noted to have low grade fever. She notes yellowing of her eyes over the past few days and bright yellow urine. She denies nausea or vomiting. Reports increasing cough over the past couple of days, no body aches or shortness of breath. Outpatient oncology provider wanted to set up patient for ERCP however with new fevers and worsening LFT patient was admitted for IV antibiotics and consideration of inpatient ERCP.    The patient is hemodynamically stable despite low grade temp to Tm 100.7. WBC normal. She has been started on Zosyn in the ED.              Past Medical History:   Reviewed and edited as appropriate  Past Medical History:   Diagnosis Date     ASCUS favor benign 1/2015    Neg HPV     Cancer (H)      Cataract 2010    surgery both eyes     Emphysematous bleb of lung (H)      Fibroids      Hypercholesterolemia      Hypothyroid      Incontinence of urine     mixed     Menorrhagia      Obesity      Pneumothorax      PONV (postoperative nausea and vomiting)      Sleep apnea     uses a cpap     Uncomplicated asthma             Past Surgical History:   Reviewed and edited as appropriate   Past Surgical History:   Procedure Laterality Date     BIOPSY  Jan 2015, 2016    Breast cancer, thyroid     BRONCHOSCOPY, INSERT BRONCHIAL VALVE N/A 7/20/2017    Procedure: BRONCHOSCOPY, INSERT BRONCHIAL VALVE;  Flexible Bronchoscopy, Endobronchial Valve Insertion X5. 4 Valves into right upper lobe and 1 valve into Right middle lobe;  Surgeon: Carlos Mcgowan MD;  Location: [facility] OR     BRONCHOSCOPY, REMOVE BRONCHIAL VALVE N/A 9/7/2017    Procedure: BRONCHOSCOPY, REMOVE BRONCHIAL VALVE;  Flexible Bronchoscopy, Removal Of  Endobronchial Valves x 2;  Surgeon: Carlos Mcgowan MD;  Location: [facility] OR     BRONCHOSCOPY, REMOVE BRONCHIAL VALVE N/A 9/28/2017    Procedure: BRONCHOSCOPY, REMOVE BRONCHIAL VALVE;  Flexible Bronchoscopy, Removal Of Intra-Bronchial Valves x 3;  Surgeon: Carlos Mcgowan MD;  Location: [facility] OR     CATARACT IOL, RT/LT  2010     COLONOSCOPY  2010     DILATION AND CURETTAGE, HYSTEROSCOPY, ABLATE ENDOMETRIUM NOVASURE, COMBINED  3/2/2011    COMBINED DILATION AND CURETTAGE, HYSTEROSCOPY, ABLATE ENDOMETRIUM NOVASURE performed by LAURA VARGAS at [facility]     GENITOURINARY SURGERY  2005    Uretha sling     INSERT PORT VASCULAR ACCESS N/A 2/12/2015    Procedure: INSERT PORT VASCULAR ACCESS;  Surgeon: Noé Schaefer MD;  Location: [facility]     SURGICAL HISTORY OF -   2005    bladder sling     SURGICAL HISTORY OF -       Cystectomy-lower lip     TUBAL LIGATION  1987              Social History:   The patient lives in [place] with her     Alcohol: denies  Tobacco: denies  Illicit drugs: denies           Family History:   Reviewed and edited as appropriate  Family History   Problem Relation Age of Onset     Depression Mother      Eye Disorder Mother      Gynecology Mother      Alzheimer Disease Mother      CANCER Father      Other Cancer Father      HEART DISEASE Maternal Grandfather      Allergies Paternal Grandfather      Allergies Son              Allergies:   Reviewed and edited as appropriate     Allergies   Allergen Reactions     Animal Dander Cough     Itchy eyes     Cats      Dust Mites Cough     Itchy eyes     Pollen Extract      Other reaction(s): Cough  Itchy eyes     Seasonal Allergies      Levaquin [Levofloxacin] Rash     States she has violent dreams from taking this            Medications:     Current Facility-Administered Medications   Medication     guaiFENesin-dextromethorphan (ROBITUSSIN DM) 100-10 MG/5ML syrup 5 mL     piperacillin-tazobactam (ZOSYN) 3.375 in 15 mL NS Premix  Syringe     Current Outpatient Prescriptions   Medication Sig     order for DME Equipment being ordered: Venu post-lumpectomy compression pad x2     zolpidem (AMBIEN CR) 12.5 MG CR tablet Take 1 tablet by mouth at bed time.     ribociclib (KISQALI 600 DOSE) 200 MG tablet Take 3 tablets (600 mg) by mouth daily for 21 days given on days 1-21 every 28 days. (Patient not taking: Reported on 12/7/2017)     letrozole (FEMARA) 2.5 MG tablet Take 1 tablet (2.5 mg) by mouth daily     diphenhydrAMINE (BENADRYL) 25 MG tablet Take 1 tablet (25 mg) by mouth every 8 hours as needed for itching or allergies (Patient not taking: Reported on 12/7/2017)     zolpidem (AMBIEN) 10 MG tablet Take 1 tablet 30 minutes prior to bedtime (Patient not taking: Reported on 12/7/2017)     DULERA 200-5 MCG/ACT oral inhaler INHALE 2 PUFFS INTO THE LUNGS TWICE DAILY     QVAR 80 MCG/ACT Inhaler INHALE 1 PUFFS INTO THE EACH NOSTRIL 2 TIMES DAILY     Phenylephrine-DM-GG (ROBITUSSIN COUGH/COLD CF PO) Take by mouth as needed     lidocaine, viscous, (XYLOCAINE) 2 % solution Apply topically as needed     levothyroxine (SYNTHROID/LEVOTHROID) 25 MCG tablet TAKE 1 TABLET (25 MCG) BY MOUTH DAILY     atorvastatin (LIPITOR) 10 MG tablet Take 1 tablet (10 mg) by mouth daily (Patient not taking: Reported on 12/7/2017)     metoclopramide (REGLAN) 10 MG tablet Take 1 tablet (10 mg) by mouth 2 times daily as needed     oxyCODONE (ROXICODONE) 5 MG IR tablet Take 1 tablet (5 mg) by mouth every 4 hours as needed for moderate to severe pain     fexofenadine (ALLEGRA ALLERGY) 180 MG tablet Take 180 mg by mouth daily as needed for allergies     calcium carb-cholecalciferol (KP CALCIUM 600+D3) 600-500 MG-UNIT CAPS Take 2 tablets by mouth daily     albuterol (2.5 MG/3ML) 0.083% neb solution Take 1 vial (2.5 mg) by nebulization every 4 hours as needed for shortness of breath / dyspnea     predniSONE (DELTASONE) 20 MG tablet Take 1 tablet (20 mg) by mouth 2 times daily For  Yellow or Red zone on Asthma Action Plan. (Patient not taking: Reported on 12/7/2017)     magic mouthwash suspension (diphenhydrAMINE, lidocaine, aluminum-magnesium & simethicone) Swish and swallow 10 mLs in mouth every 6 hours as needed for mouth sores (Patient not taking: Reported on 12/7/2017)     Docusate Calcium (STOOL SOFTENER PO) Take 100 mg by mouth daily      CRANBERRY PO Take 1 capsule by mouth daily      azelastine (ASTELIN) 0.1 % nasal spray Spray 1-2 sprays into both nostrils 2 times daily as needed for rhinitis     albuterol (VENTOLIN HFA) 108 (90 BASE) MCG/ACT inhaler Inhale 2 puffs into the lungs every 4 hours as needed             Review of Systems:   A complete review of systems was performed and is negative except as noted in the HPI      Skin: positive for jaundice  Eyes: positive for scleral icteris  Ears/Nose/Throat: negative  Respiratory: Reports non-productive cough, no SOB of sputum production  Cardiovascular: +history of pericardial effusion follows with cardiology, last echo ~3 weeks ago  Gastrointestinal: positive for abdominal pain and jaundice  Genitourinary: positive for change in urine color  Musculoskeletal: negative  Neurologic: negative  Psychiatric: negative  Hematologic/Lymphatic/Immunologic: positive for fever, no chills or sweats  Endocrine: negative         Physical Exam:   Temp: 100.2  F (37.9  C) Temp src: Oral BP: 130/80 Pulse: 70   Resp: 18 SpO2: 98 % O2 Device: None (Room air)    Wt:   Wt Readings from Last 2 Encounters:   12/07/17 86.6 kg (191 lb)   12/07/17 90.6 kg (199 lb 11.2 oz)        General: WDWN, pleasant female in NAD.  Answers appropriately.    HEENT: Head is AT/NC. Sclera +icteric. No conjunctival injection.  Oropharynx is clear, moist and w/o exudate or lesions.  Neck: No masses or thyromegaly.  Lungs: Clear to auscultation bilaterally.  No wheezes, rhonchi or crackles.    Heart: Regular rate and rhythm.  No murmurs, gallops or rubs.  Normal S1 and  S2.  Abdomen: Soft, minimal tenderness in RUQ, non-distended.  BS +.  No hepatosplenomegaly. No rebound or peritoneal signs  Extremities: No pedal edema.  Heme/Lymph: No cervical or supraclavicular adenopathy.   Skin: + jaundice. No rash  Neurologic: Grossly non-focal.  CN 2-12 grossly intact.           Data:   Labs and imaging below were independently reviewed and interpreted    LAB WORK:    BMP  Recent Labs  Lab 12/07/17  1339      POTASSIUM 3.5   CHLORIDE 109   BEN 8.8   CO2 23   BUN 12   CR 0.71   *     CBC  Recent Labs  Lab 12/07/17  1339   WBC 5.7   RBC 3.41*   HGB 11.8   HCT 36.2   *   MCH 34.6*   MCHC 32.6   RDW 15.4*        LFTs  Recent Labs  Lab 12/05/17  0835   ALKPHOS 677*   *   *   BILITOTAL 5.8*   PROTTOTAL 7.5   ALBUMIN 3.1*      IMAGING:  CT CHEST/ABDOMEN/PELVIS WITH CONTRAST December 6, 2017 1:52 PM     HISTORY: Breast cancer with liver metastasis. Elevated liver enzymes,  elevated bilirubin. Bilateral malignant neoplasm of breast in female,  estrogen receptor positive, unspecified site of breast (H).      TECHNIQUE: CT scan obtained of the chest, abdomen, and pelvis with  oral and IV contrast. 97 ml isovue 370 injected. Radiation dose for  this scan was reduced using automated exposure control, adjustment of  the mA and/or kV according to patient size, or iterative  reconstruction technique.     COMPARISON:  CT chest, abdomen and pelvis 11/13/2017.     FINDINGS:  Chest: Left chest port venous catheter. Increased volume of moderate  to large pericardial effusion. Transverse thickness of this fluid is  now 2.4 cm, previously 0.6 cm at a comparable level on the prior study  series 2 image 36. There is some mild wall enhancement of the superior  portion of the pericardium, for instance around series 2 image 20 in  the region of the cardiac base. Small volume right pleural fluid newly  identified. No acute thoracic aortic abnormality. No large central  pulmonary  embolism. The exam was not tailored for assessment of middle  and small branch vessel pulmonary emboli. The distal left  paraesophageal lymph node is 0.8 x 0.5 cm, previously 0.3 x 0.3 cm  series 2 image 48. The remainder of the thoracic lymph nodes remain  small. Right axillary adenopathy without significant change in size  measuring 4.8 x 3.4 cm, previously 4.2 x 2.8 cm image 16. There are  other mildly more prominent lymph nodes as well. No left axillary  adenopathy. Stable thyroid nodules that are small in size. Right  breast skin thickening again noted. No convincing new bony disease  identified. Some ill-defined sclerosis at the right T8 pedicle on  series 2 image 33, and right posteromedial fifth rib on image 18  appears stable. Stable appearing cavitary lesion at the right upper  lung measuring 8.8 cm series 3 image 13. Stable triangular area of  consolidation at the right hilar region at the right midchest series 3  image 27. There are several tiny bilateral stable pulmonary nodules.  For example, one of these is 0.4 cm medial right lower lobe series 3  image 30. There are multiple other examples bilaterally.     Abdomen/pelvis: No convincing new bony disease at the abdomen or  pelvis levels. Multifocal hepatic metastasis. Anterior central hepatic  mass is 2.7 x 2.6 cm, previously 2.2 x 2.1 cm series 2 image 58.  Lateral segment left hepatic mass is approximately 2.8 x 2.8 cm,  previously 2.2 x 2.4 cm image 53. A caudate lobe mass is approximately  2.9 x 2.2 cm, previously 2 x 2.2 cm image 60. There are other hepatic  masses that are slightly larger, or are stable. Notably, there is new  moderate intrahepatic biliary ductal dilatation. This appears to lead  to the pancreatic head where there is complete decompression of the  common bile duct coronal series 4 image 23. There is a small area of  nodular enhancement newly identified in this region that is  approximately 0.8 cm series 2 image 66. Adenopathy  also noted at the  shreyas hepatis. Portal caval enlarged lymph node is 2.5 x 1.3 cm,  previously 1.7 x 1.1 cm image 59. There are other prominent lymph  nodes that appear more prominent in the interval as well, for instance  image 56. Some increase ill-defined soft tissue at the shreyas hepatis  region on image 56 also appears more prominent measuring 2.4 x 1.4 cm  series 2 image 56, previously 1.7 x 1 cm.     The spleen demonstrates a stable small subcentimeter enhancing focus  series 2 image 56. Ill-defined tiny hypodensities in the spleen also  appears stable image 54 and 56. The adrenals, remainder of the  pancreas, and kidneys do not show any new abnormalities. Tiny cyst at  the left kidney is stable image 67.     There are several retroperitoneal lymph nodes noted. Upper abdominal  aortocaval lymph node is 1.5 x 1.3 cm, previously 0.9 x 1.3 cm series  2 image 64. A few other mildly prominent retroperitoneal lymph nodes  again noted. Small to moderate free pelvic fluid. No acute bowel  abnormality is seen. New nodularity at the anterior omentum with one  region measuring 1.2 cm abutting a small bowel loop series 2 image 77.  Other adjacent nodules noted as well.         IMPRESSION:  1. Progression of hepatic metastatic disease with enlargement of  multifocal masses. New finding of moderate intrahepatic biliary ductal  dilatation worrisome for developing biliary obstruction. This may  relate to progression of adenopathy at the upper abdomen at the shreyas  hepatis and/or an increasing area of nodular enhancement at the  pancreatic head worrisome for metastatic disease.  2. Moderate to large pericardial effusion significantly increased  since 11/13/2017. Some mild enhancement of the pericardium also noted.  Recommend further workup.  3. New finding of small nodularity at the omentum at the midline and  slightly towards the right that could represent carcinomatosis.  4. Multifocal stable indeterminate pulmonary nodules  may represent  pulmonary metastatic disease.  5. Stable sclerosis at the right fifth rib and T8 that could represent  stable sclerotic metastasis.  6. Progression of adenopathy at the right axilla, and upper abdominal  retroperitoneum.        =======================================================================

## 2017-12-07 NOTE — CONSULTS
Cardiology Inpatient Consultation  December 7, 2017    Reason for Consult:  A cardiology consult was requested by [facility]. Yani Agustin from the oncology service to provide clinical guidance regarding pericardial effusion.    HPI:   Etta Cervantes is a 57 year old female with metastatic breast cancer who was seen in oncology clinic today with bloating, abdominal discomfort, and malaise.  They are concerned about a biliary obstruction vs cholangitis that will need ERCP.  Cardiology has been consulted regarding possible need of pericardiocentesis prior to ERCP    Patient has had chronic effusion.  Echo Nov 15 showed Small to moderate pericardial effusion without respiratory mitral inflow velocities. She had cardiac MRI at outside hospital Dec 5 that noted moderate to large effusion also without evidence of tamponade.  These scans have also shown this mass near the right atrial/ventricular junction that is concerning for tumor.  Her oncology team is aware of possible cardiac metastasis.  She did see cardiology as [facility] who did state to monitor with serial imaging since she was otherwise not symptomatic.    Patient currently feels ok.  Denies chest pain, dyspnea, palpitations, lightheadedness, or dizziness.  Does have abdominal bloating, pain, anorexia, and nausea.  No lower extremity edema either    I initially spoke to oncology team to order repeat echocardiogram and EKG and the echo tech had met me in the patients room.  Final report on echo pending, but on my read the effusion is large, measures up to 2.7cm, with some evidence of RA and RV collapse.      At the time of interview, the patient denies chest pain, dyspnea at rest or with exertion, orthopnea, PND, palpitations, lightheadedness, or syncope.     Review of Systems:    Complete review of systems was performed and negative except per HPI.    PMH:  Past Medical History:   Diagnosis Date     ASCUS favor benign 1/2015    Neg HPV      Cancer (H)      Cataract 2010    surgery both eyes     Emphysematous bleb of lung (H)      Fibroids      Hypercholesterolemia      Hypothyroid      Incontinence of urine     mixed     Menorrhagia      Obesity      Pneumothorax      PONV (postoperative nausea and vomiting)      Sleep apnea     uses a cpap     Uncomplicated asthma      Active Problems:  Patient Active Problem List    Diagnosis Date Noted     Secondary malignant neoplasm of liver (H) 04/22/2015     Priority: High     Carcinoma of breast metastatic to liver (H) 02/04/2015     Priority: High     Diagnosis updated by automated process. Provider to review and confirm.       Bone metastasis (H) 02/04/2015     Priority: High     Breast cancer metastasized to axillary lymph node (H) 02/04/2015     Priority: High     Elevated liver enzymes 12/01/2017     Priority: Medium     Pneumothorax 07/12/2017     Priority: Medium     Acute pyelonephritis 07/12/2017     Priority: Medium     Hyperlipidemia LDL goal <130 05/18/2017     Priority: Medium     Chemotherapy-induced neutropenia (H) 10/12/2016     Priority: Medium     Thyroid nodule 05/14/2015     Priority: Medium     Emphysematous bleb of lung (H) 05/14/2015     Priority: Medium     Mucositis due to chemotherapy 02/19/2015     Priority: Medium     Drug-related hair loss 02/11/2015     Priority: Medium     Breast cancer (H) 01/23/2015     Priority: Medium     ASCUS favor benign 01/06/2015     Priority: Medium     1/6/15: Pap - ASCUS, Neg HPV. Plan pap/hpv in 3 years.        DIAMOND (obstructive sleep apnea) 01/24/2012     Priority: Medium     AHI 44       Insomnia 01/24/2012     Priority: Medium     Problem list name updated by automated process. Provider to review       Moderate persistent asthma 03/28/2011     Priority: Medium     Menorrhagia 02/24/2011     Priority: Medium     CARDIOVASCULAR SCREENING; LDL GOAL LESS THAN 160 10/31/2010     Priority: Medium     Hypothyroid      Priority: Medium     Fibroids       Priority: Medium     Health Care Home 05/28/2013     Priority: Low     EMERGENCY CARE PLAN  May 28, 2013: No current Care Coordination follow up planned. Please refer if Care Coordination services are needed.    Presenting Problem Signs and Symptoms Treatment Plan   Questions or concerns   during clinic hours   I will call my clinic directly:  [address] [place] [address]  [phone].    Questions or concerns outside clinic hours   I will call the 24 hour nurse line at   [phone] or [facility].   Need to schedule an appointment   I will call the 24 hour scheduling team at [phone] or my clinic directly at [phone].    Same day treatment     I will call my clinic first, nurse line if after hours, urgent care and express care if needed.   Clinic care coordination services (regular clinic hours)     I will call a clinic care coordinator directly:     Mannie Blevins RN  Mon, Tues, Fri - [phone]  Wed, Thurs - [phone]    Barbara Burk :    [phone]    Or call my clinic at [phone] and ask to speak with care coordination.   Crisis Services: Behavioral or Mental Health  [facility] 24 Hour Phone Line  [phone]    [facility] 24 Hour Crisis Services  [phone]    [facility] (Behavioral Health Providers) Network [phone]    [facility]   [phone]       Emergency treatment -- Immediately    CAll 911          Social History:  Social History   Substance Use Topics     Smoking status: Never Smoker     Smokeless tobacco: Never Used     Alcohol use No     Family History:  Family History   Problem Relation Age of Onset     Depression Mother      Eye Disorder Mother      Gynecology Mother      Alzheimer Disease Mother      CANCER Father      Other Cancer Father      HEART DISEASE Maternal Grandfather      Allergies Paternal Grandfather      Allergies Son      Medications:    piperacillin-tazobactam  3.375 g Intravenous Q6H         Physical Exam:  Temp:  [100.2  F (37.9  C)-100.7  F (38.2  C)] 100.2  F (37.9  C)  Pulse:  [] 71  Resp:  [18-20] 18  BP: (108-132)/(75-80) 128/76  SpO2:  [97 %-99 %] 98 %     No intake or output data in the 24 hours ending 12/07/17 1753  GEN: pleasant, no acute distress  HEENT: no icterus  CV: RRR, diminished heart sounds, JVP not detected.   CHEST: clear to ausculation bilaterally, no rales or wheezing  ABD: soft, non-tender, normal active bowel sounds  EXTR: pulses 2+ and equal. No clubbing, cyanosis or edema.   NEURO: alert oriented, speech fluent/appropriate, motor grossly nonfocal    Diagnostics:  All laboratory and imaging data in the past 24 hours reviewed    Assessment and Recommendation:  Etta Cervantes is a 57 year old female with metastatic breast cancer who was seen in oncology clinic today with bloating, abdominal discomfort, and malaise.  They are concerned about a biliary obstruction vs cholangitis that will need ERCP.  Cardiology has been consulted regarding possible need of pericardiocentesis prior to ERCP.    Repeat echo shows large effusion with some evidence of RA and RV collapse.  Final report pending.  Echo evidence of tamponade without clinical change in hemodynamics. Blood pressures have been fine in the ER without tachycardia. This should be drained prior to her ERCP and I ordered pericardiocentesis with pericardial labs (you may cancel labs if these are not needed).  It is not emergent since clinically she is ok, will be done tomorrow.  We will consent her in the AM.    Avoid diuretics and can use IV fluids as needed overnight.  I see repeat EKG has been ordered.    I have discussed the above with Dr. Crawford.    Thank you for consulting the cardiovascular services at the [facility]. Please do not hesitate to call us with any questions.     Henry Hodge MD PGY4  Cardiology Fellow  [phone]

## 2017-12-07 NOTE — IP AVS SNAPSHOT
[address] [address]    Phone:  [phone]    Fax:  [phone]                                       After Visit Summary   12/7/2017    Etta Cervantes    MRN: [id number]           After Visit Summary Signature Page     I have received my discharge instructions, and my questions have been answered. I have discussed any challenges I see with this plan with the nurse or doctor.    ..........................................................................................................................................  Patient/Patient Representative Signature      ..........................................................................................................................................  Patient Representative Print Name and Relationship to Patient    ..................................................               ................................................  Date                                            Time    ..........................................................................................................................................  Reviewed by Signature/Title    ...................................................              ..............................................  Date                                                            Time

## 2017-12-07 NOTE — PROGRESS NOTES
Report called to MELINA Tobin ED charge nurse.  They will contact Dr. Esquivel and/or his team once patient has arrived at ED.

## 2017-12-07 NOTE — NURSING NOTE
"Oncology Rooming Note    December 7, 2017 10:43 AM   Etta Cervantes is a 57 year old female who presents for:    Chief Complaint   Patient presents with     Oncology Clinic Visit     Follow up Breast CA. Review CT results.      Initial Vitals: /75 (BP Location: Right arm, Patient Position: Sitting, Cuff Size: Adult Regular)  Pulse 120  Temp 100.7  F (38.2  C) (Tympanic)  Resp 20  Ht 1.638 m (5' 4.5\")  Wt 90.6 kg (199 lb 11.2 oz)  LMP 02/06/2013  SpO2 97%  Breastfeeding? No  BMI 33.75 kg/m2 Estimated body mass index is 33.75 kg/(m^2) as calculated from the following:    Height as of this encounter: 1.638 m (5' 4.5\").    Weight as of this encounter: 90.6 kg (199 lb 11.2 oz). Body surface area is 2.03 meters squared.  No Pain (0) Comment: Data Unavailable   Patient's last menstrual period was 02/06/2013.  Allergies reviewed: Yes  Medications reviewed: Yes    Medications: Medication refills not needed today.  Pharmacy name entered into X[facility]:    [id number] - [place], [place] - [address] [place] [address] [facility] - [place], [place] - [address] [place] [address]    Clinical concerns: Follow up Breast CA. Review CT scan results. C/o When urinating bright yellow, Monday and Tuesday felt very bloated subsided today. Also very tired and sleepy.     7 minutes for nursing intake (face to face time)     Claudia Rodriguez CMA            "

## 2017-12-08 NOTE — ANESTHESIA POSTPROCEDURE EVALUATION
Patient: Etta Cervantes    Procedure(s):  Endoscopic Retrograde Cholangiopancreatogram with biliary sphincterotomy and stent placement - Wound Class: II-Clean Contaminated    Diagnosis:Malignant Obstruction Of Pancreas   Diagnosis Additional Information: No value filed.    Anesthesia Type:  General, ETT    Note:  Anesthesia Post Evaluation    Patient location during evaluation: PACU  Patient participation: Able to fully participate in evaluation  Level of consciousness: awake and alert  Pain management: adequate  Airway patency: patent  Cardiovascular status: acceptable and hemodynamically stable  Respiratory status: acceptable  Hydration status: acceptable  PONV: none     Anesthetic complications: None          Last vitals:  Vitals:    12/07/17 2232 12/08/17 0601 12/08/17 1245   BP: 127/61 112/62 117/63   Pulse: 114 113    Resp: 16 16 16   Temp: 38.3  C (101  F) 37.3  C (99.2  F) 37.1  C (98.8  F)   SpO2: 96% 93% 94%         Electronically Signed By: Souleymane Hays MD  December 8, 2017  3:32 PM

## 2017-12-08 NOTE — ASSESSMENT & PLAN NOTE
Etta Cervantes presented with increasing lump in the right axilla that s lump has been growing without any pain or associated symptoms. Subsequently she was evaluated by primary care physician. Diagnostic digital mammogram was done on 01/13/2015 showing enlarged lymph nodes in the right axilla. There was some skin thickening in the right breast anteriorly and laterally. There are no parenchymal changes in the right breast. The left breast shows a 1.7 cm spiculated mass at approximately 2 to 3 o'clock position, 15.2 cm away from the nipple. A right breast ultrasound was subsequently done and ultrasound guided biopsy was done. Needle biopsy of the left breast did show infiltrating ductal carcinoma grade I of III with no angiolymphatic invasion seen. No associated ductal carcinoma in situ. Estrogen receptor, progresterone receptor were positive. HER-2/asdie receptor came back negative.. Another biopsy from the right axilla did show metastatic ductal carcinoma. PET scan was done on January 26, 2015 showing few small right posterior cervical chain nodes the largest is 1.2 cm. There is extensive bulky right axillary adenopathy demonstrating hypermetabolic FDG uptake with SUV of 10.6. There is skin thickening involving the right breast which demonstrated low-grade FDG uptake. A 1.2 cm lesion on the lateral aspect of the left breast demonstrated minimally increased FDG uptake with SUV of 2. Scattered hypermetabolic mediastinal lymph nodes located in the left superior anterior mediastinum, right paratracheal and precarinal U, subcranial adenopathy with javon metastases. This focus hypermetabolic FDG uptake identified definitive Amanda posterior aspect off intertrochanteric region of the proximal left femur consistent with bone metastases. There is also 1.4 cm hypermetabolic FDG uptake identified in the anterior medial segment of the left hepatic lobe consistent with liver metastases. Additional 2.1 cm low-attenuation lesion  anterior aspect of the right lobe which needs to be determined. The patient was started on chemotherapy with Taxotere and Cytoxan on February 9, 2015.  She concluded 4 cycles of Taxotere and Cytoxan. She started on Arimidex 1 mg orally daily. Currently she is on Faslodex and ibrance. Subsequently the patient went on to receive Afinitor. The patient also started treatment with Xeloda.

## 2017-12-08 NOTE — OR NURSING
Dr. Lyle paged to clarify Chest tube care.  OK for chest tube to be to water seal and not wall suction per Dr. Lyle.

## 2017-12-08 NOTE — PROCEDURES
"PROCEDURES PERFORMED:   Pericardiocentesis     PHYSICIANS:  Attending Physician: Yuniel Wells MD  General Fellow: Inderjit Lyle MD     INDICATION:  57 year old female with metastatic breast cancer with pericardial effusion with early signs of tamponade     PROCEDURE/ SUMMARY  Pericardiocentesis was performed via apical approach with echo and fluorscopic guidance using an 18 G needle. A 0.035\" wire was advanced into the pericardial sac and a 8.3 Fr pigtail catheter placed over guidewire and secured in place. 1200 ml bloody fluid drained, specimen sent to lab for analysis.       MEDICATIONS:  Procedure performed for 88 mins from 0952 to 1120. Sedation was Versed 3 and Fentanyl 100. Heart rate, BP, respiration, oxygen saturation and patient responses were monitored throughout the procedure with the assistance of the RN under my supervision.     Fluoroscopy time: 0.3 min     PLAN:   - Keep pericardial drain to water seal  - Notify consult team if >250 ml drainage overnight  - Trend cbc, monitor closely clinically   - Follow up fluid studies  - Further recommendations re: pericardial fluid and drain management per cardiology consult team    Inderjit Lyle MD  Cardiology Fellow    Staff Cardiologist: I supervised the cardiology fellow and was involved in all key parts of the procedure.  I agree with the documentation above.    Yuniel Wells MD    "

## 2017-12-08 NOTE — PROGRESS NOTES
[facility], [place]    Hematology / Oncology Progress Note    Date of Admission: 12/7/2017  Hospital Day #: 1      Assessment & Plan   Etta Cervantes is a 57 year old woman with metastatic breast cancer to liver, bones, heart who was seen in oncology clinic on 12/7 AM and found to have transaminitis (previously known but worse) as well as low-grade fever (100.2, 100.7), abdominal bloating, RUQ discomfort, and general malaise. CT abdomen shows biliary dilation concerning for obstruction. She was admitted for further workup and management. In consultation with GI and Cardiology teams, decision was made to pursue pericardiocentesis to address large pericardial effusion prior to ERCP.      Biliary obstruction, concern for cholangitis.    LFTs elevated. WBC count nml. Low-grade fevers initially, then increased to tmax 102 overnight. Patient c/o mild RUQ discomfort without significant pain. Patient also notes general aches and malaise for the past 2-3 weeks. Not septic-appearing.   -Surveillance BC in process, neg to date.  -Continue empiric IV zosyn.   -GI consulted. Patient is planned for ERCP today following pericardiocentesis.  -Daily CBC, CMP.   -Oxycodone prn pain. Prn antiemetics.      Pericardial effusion, epicardial mass.    Presumed malignant. Small to moderate effusion noted on CT 11/13 and confirmed on echo 11/15, at which time there were no echocardiographic or clinical signs of tamponade, restriction, obstruction. Also note epicardial mass. Patient was evaluated by cardiologist 11/28; plan was to monitor serial echos while on chemotherapy and watch for signs/symptoms tamponade. CT chest done 12/6 demonstrates increased volume of pericardial effusion, now moderate to large, and superior pericardial wall enhancement. Patient does have new dry cough. Otherwise, denies significant SOB, dyspnea with or without exertion, PND, chest pain/pressure, leg swelling. She has been HD stable.    -Consulted Cardiology to [facility] safety for sedation for ERCP. Obtained EKG (sinus tach) and repeat echo that demonstrated hyperkinesis with EF 65-70% and large pericardial effusion with developing echo e/o tamponade. Decision was made to pursue pericardiocentesis prior to ERCP. Patient likely will need pericardial drain placed afterward so will transfer to [facility] telemetry floor for monitoring.   -Patient underwent pericardiocentesis 12/8 AM with removal of 1200ml bloody fluid, sent for analysis. She had pigtail catheter drain placed.    Per Cards recs:   -keep pericardial drain to water seal (see RN clarification of previous Card recs)   -notify consult team if >250ml drainage overnight   -monitor CBC closely (will order q12h)    Cough.   Patient reports new dry cough over the past few days with occasional slight wheezing (in setting of asthma at baseline). She notes that her  had a similar cough recently and she thinks other people such as coworkers have been sick. Of note, she declines yearly flu shot. No e/o acute airspace disease or pleural effusion on CT chest.   -Obtain rapid flu (neg) and resp viral PCR (pending).   -Robitussin DM prn.      Metastatic breast cancer.   E/o disease progression on most recent CT C/A/P and tumor marker rising. Dr. Cassidy instructed her to hold Femara, tylenol, statin. Goal to restart active chemotherapy next week once LFTs return to nml. Has f/u tentatively planned Monday.      Asthma.   Continue home inhalers, nebs prn.      FEN:   -Hold off on IVF unless febrile or unstable d/t pericardial effusion   -NPO for procedures, then ADAT     Prophy/Misc:   -VTE: mechanical  -Bowel regimen     Consults:   -GI  -Cardiology    Code status: FULL - discussed with patient on admission  Disposition: Anticipate d/c home within 1-2 days pending status of pericardial drain, infection control.     Yani Agustin DNP, APRN, CNP  Hematology/Oncology  Pager: [phone]    Interval History    Per review of overnight notes: Slept well, ongoing dry cough, febrile to 102 and received celebrex, o/w stable.     Unable to obtain ROS as patient has bee MARY all day.     Physical Exam   Temp: 99.2  F (37.3  C) Temp src: Oral BP: 112/62 Pulse: 113   Resp: 16 SpO2: 93 % O2 Device: None (Room air)    Vitals:    12/07/17 1316 12/07/17 1906 12/08/17 0852   Weight: 86.6 kg (191 lb) 90.7 kg (200 lb) 90.1 kg (198 lb 11.2 oz)     Vital Signs with Ranges  Temp:  [99.2  F (37.3  C)-102  F (38.9  C)] 99.2  F (37.3  C)  Pulse:  [] 113  Resp:  [16-18] 16  BP: (110-132)/(61-80) 112/62  SpO2:  [93 %-99 %] 93 %  I/O last 3 completed shifts:  In: 320 [P.O.:300; I.V.:20]  Out: 100 [Urine:100]    Unable to complete physical exam as of yet as patient has been MARY in OR all day. Will ask hospitalist to evaluate on her return to the floor.     Admission exam last evening:   Constitutional: Pleasant woman seen resting comfortably in bed in NAD. Alert and interactive.   HEENT: NCAT. PERRL, EOMI. +Mild scleral icterus. Oral mucosa pink, dry, no lesions or thrush.  Hematologic / Lymphatic: No overt bleeding. No cervical or clavicular adenopathy.  Respiratory: Non-labored breathing on RA, good air exchange, lungs clear to auscultation bilaterally. +Dry, non-productive cough noted.   Cardiovascular: Regular rate and rhythm. No murmur or rub.   GI: Normoactive bowel sounds. Abdomen soft, non-distended, mildly TTP in RUQ.   Skin: Warm and dry, +mild jaundice. No concerning lesions or rash on exposed surfaces.  Musculoskeletal: Extremities grossly normal, non-tender, no edema. Strong peripheral pulses. Good strength and ROM in bed.   Neurologic: A&O x 3, CNs 2-12 grossly intact, speech normal, sensation to light touch grossly WNL. No focal deficits.   Neuropsychiatric: Mentation and affect appear normal/appropriate.  Vascular Access: Left chest PAC site is CDI.     Medications   Current Facility-Administered Medications   Medication      [Auto Hold] potassium chloride SA (K-DUR/KLOR-CON M) CR tablet 20-40 mEq     [Auto Hold] potassium chloride (KLOR-CON) Packet 20-40 mEq     [Auto Hold] potassium chloride 10 mEq in 100 mL sterile water intermittent infusion (premix)     [Auto Hold] potassium chloride 10 mEq in 100 mL intermittent infusion with 10 mg lidocaine     [Auto Hold] magnesium sulfate 4 g in 100 mL sterile water (premade)     [Auto Hold] potassium phosphate 15 mmol in D5W 250 mL intermittent infusion     [Auto Hold] potassium phosphate 20 mmol in D5W 500 mL intermittent infusion     [Auto Hold] potassium phosphate 20 mmol in D5W 250 mL intermittent infusion     [Auto Hold] potassium phosphate 25 mmol in D5W 500 mL intermittent infusion     lidocaine 1 % 1 mL     lidocaine (LMX4) kit     sodium chloride (PF) 0.9% PF flush 3 mL     sodium chloride (PF) 0.9% PF flush 3 mL     May continue current IV fluids if patient has IV fluids infusing.     sodium chloride (PF) 0.9% PF flush 3 mL     indomethacin (INDOCIN) 50 MG Suppository 100 mg     scopolamine (TRANSDERM-SCOP) Patch in Place    And     [START ON 12/11/2017] scopolamine (TRANSDERM-SCOP) patch REMOVAL     iopamidol (ISOVUE-250) solution     simethicone 133mg/2mL oral suspension     sterile water (bottle) irrigation     [Auto Hold] albuterol neb solution 2.5 mg     [Auto Hold] albuterol (PROAIR HFA/PROVENTIL HFA/VENTOLIN HFA) Inhaler 2 puff     [Auto Hold] azelastine (ASTELIN) 0.1 % spray 1-2 spray     [Auto Hold] docusate sodium (COLACE) capsule 100 mg     [Auto Hold] fexofenadine (ALLEGRA) tablet 180 mg     [Auto Hold] levothyroxine (SYNTHROID/LEVOTHROID) tablet 25 mcg     [Auto Hold] metoclopramide (REGLAN) tablet 10 mg     [Auto Hold] oxyCODONE IR (ROXICODONE) tablet 5 mg     [Auto Hold] guaiFENesin-dextromethorphan (ROBITUSSIN DM) 100-10 MG/5ML syrup 5 mL     [Auto Hold] piperacillin-tazobactam (ZOSYN) 3.375 in 15 mL NS Premix Syringe     [Auto Hold] prochlorperazine (COMPAZINE)  Suppository 25 mg     [Auto Hold] ondansetron (ZOFRAN) injection 8 mg    Or     [Auto Hold] ondansetron (ZOFRAN-ODT) ODT tab 8 mg    Or     [Auto Hold] ondansetron (ZOFRAN) tablet 8 mg     [Auto Hold] senna-docusate (SENOKOT-S;PERICOLACE) 8.6-50 MG per tablet 1 tablet    Or     [Auto Hold] senna-docusate (SENOKOT-S;PERICOLACE) 8.6-50 MG per tablet 2 tablet     [Auto Hold] loratadine (CLARITIN) tablet 10 mg     [Auto Hold] zolpidem (AMBIEN CR) CR tablet 12.5 mg     [Auto Hold] naloxone (NARCAN) injection 0.1-0.4 mg     [Auto Hold] beclomethasone (QVAR) 80 MCG/ACT Inhaler 1 puff     [Auto Hold] mometasone-formoterol (DULERA) 200-5 MCG/ACT oral inhaler 2 puff     [Auto Hold] heparin lock flush 10 UNIT/ML injection 3 mL     Facility-Administered Medications Ordered in Other Encounters   Medication     propofol (DIPRIVAN) infusion     fentaNYL (PF) (SUBLIMAZE) injection     lidocaine injection 2% (MDV)     ondansetron (ZOFRAN) injection     dexamethasone (DECADRON) injection     propofol (DIPRIVAN) injection 10 mg/mL vial     rocuronium (ZEMURON) injection     0.9% sodium chloride infusion       Data   CBC  Recent Labs  Lab 12/08/17  0551 12/07/17  1339   WBC 5.7 5.7   RBC 3.30* 3.41*   HGB 11.4* 11.8   HCT 36.0 36.2   * 106*   MCH 34.5* 34.6*   MCHC 31.7 32.6   RDW 15.6* 15.4*    239     CMP  Recent Labs  Lab 12/08/17  0551 12/07/17  1339 12/05/17  0835    140  --    POTASSIUM 3.3* 3.5  --    CHLORIDE 108 109  --    CO2 22 23  --    ANIONGAP 10 8  --    * 114*  --    BUN 11 12  --    CR 0.61 0.71  --    GFRESTIMATED >90 84  --    GFRESTBLACK >90 >90  --    BEN 8.7 8.8  --    PROTTOTAL 6.7* 7.2 7.5   ALBUMIN 2.6* 2.9* 3.1*   BILITOTAL 8.2* 8.1* 5.8*   ALKPHOS 672* 719* 677*   * 149* 167*   * 161* 185*     INR  Recent Labs  Lab 12/07/17  2241   INR 1.11       Results for orders placed or performed during the hospital encounter of 12/06/17   CT Chest/Abdomen/Pelvis w Contrast     Narrative    CT CHEST/ABDOMEN/PELVIS WITH CONTRAST December 6, 2017 1:52 PM    HISTORY: Breast cancer with liver metastasis. Elevated liver enzymes,  elevated bilirubin. Bilateral malignant neoplasm of breast in female,  estrogen receptor positive, unspecified site of breast (H).     TECHNIQUE: CT scan obtained of the chest, abdomen, and pelvis with  oral and IV contrast. 97 ml isovue 370 injected. Radiation dose for  this scan was reduced using automated exposure control, adjustment of  the mA and/or kV according to patient size, or iterative  reconstruction technique.    COMPARISON:  CT chest, abdomen and pelvis 11/13/2017.    FINDINGS:  Chest: Left chest port venous catheter. Increased volume of moderate  to large pericardial effusion. Transverse thickness of this fluid is  now 2.4 cm, previously 0.6 cm at a comparable level on the prior study  series 2 image 36. There is some mild wall enhancement of the superior  portion of the pericardium, for instance around series 2 image 20 in  the region of the cardiac base. Small volume right pleural fluid newly  identified. No acute thoracic aortic abnormality. No large central  pulmonary embolism. The exam was not tailored for assessment of middle  and small branch vessel pulmonary emboli. The distal left  paraesophageal lymph node is 0.8 x 0.5 cm, previously 0.3 x 0.3 cm  series 2 image 48. The remainder of the thoracic lymph nodes remain  small. Right axillary adenopathy without significant change in size  measuring 4.8 x 3.4 cm, previously 4.2 x 2.8 cm image 16. There are  other mildly more prominent lymph nodes as well. No left axillary  adenopathy. Stable thyroid nodules that are small in size. Right  breast skin thickening again noted. No convincing new bony disease  identified. Some ill-defined sclerosis at the right T8 pedicle on  series 2 image 33, and right posteromedial fifth rib on image 18  appears stable. Stable appearing cavitary lesion at the right  upper  lung measuring 8.8 cm series 3 image 13. Stable triangular area of  consolidation at the right hilar region at the right midchest series 3  image 27. There are several tiny bilateral stable pulmonary nodules.  For example, one of these is 0.4 cm medial right lower lobe series 3  image 30. There are multiple other examples bilaterally.    Abdomen/pelvis: No convincing new bony disease at the abdomen or  pelvis levels. Multifocal hepatic metastasis. Anterior central hepatic  mass is 2.7 x 2.6 cm, previously 2.2 x 2.1 cm series 2 image 58.  Lateral segment left hepatic mass is approximately 2.8 x 2.8 cm,  previously 2.2 x 2.4 cm image 53. A caudate lobe mass is approximately  2.9 x 2.2 cm, previously 2 x 2.2 cm image 60. There are other hepatic  masses that are slightly larger, or are stable. Notably, there is new  moderate intrahepatic biliary ductal dilatation. This appears to lead  to the pancreatic head where there is complete decompression of the  common bile duct coronal series 4 image 23. There is a small area of  nodular enhancement newly identified in this region that is  approximately 0.8 cm series 2 image 66. Adenopathy also noted at the  shreyas hepatis. Portal caval enlarged lymph node is 2.5 x 1.3 cm,  previously 1.7 x 1.1 cm image 59. There are other prominent lymph  nodes that appear more prominent in the interval as well, for instance  image 56. Some increase ill-defined soft tissue at the shreyas hepatis  region on image 56 also appears more prominent measuring 2.4 x 1.4 cm  series 2 image 56, previously 1.7 x 1 cm.    The spleen demonstrates a stable small subcentimeter enhancing focus  series 2 image 56. Ill-defined tiny hypodensities in the spleen also  appears stable image 54 and 56. The adrenals, remainder of the  pancreas, and kidneys do not show any new abnormalities. Tiny cyst at  the left kidney is stable image 67.    There are several retroperitoneal lymph nodes noted. Upper  abdominal  aortocaval lymph node is 1.5 x 1.3 cm, previously 0.9 x 1.3 cm series  2 image 64. A few other mildly prominent retroperitoneal lymph nodes  again noted. Small to moderate free pelvic fluid. No acute bowel  abnormality is seen. New nodularity at the anterior omentum with one  region measuring 1.2 cm abutting a small bowel loop series 2 image 77.  Other adjacent nodules noted as well.      Impression    IMPRESSION:  1. Progression of hepatic metastatic disease with enlargement of  multifocal masses. New finding of moderate intrahepatic biliary ductal  dilatation worrisome for developing biliary obstruction. This may  relate to progression of adenopathy at the upper abdomen at the shreyas  hepatis and/or an increasing area of nodular enhancement at the  pancreatic head worrisome for metastatic disease.  2. Moderate to large pericardial effusion significantly increased  since 11/13/2017. Some mild enhancement of the pericardium also noted.  Recommend further workup.  3. New finding of small nodularity at the omentum at the midline and  slightly towards the right that could represent carcinomatosis.  4. Multifocal stable indeterminate pulmonary nodules may represent  pulmonary metastatic disease.  5. Stable sclerosis at the right fifth rib and T8 that could represent  stable sclerotic metastasis.  6. Progression of adenopathy at the right axilla, and upper abdominal  retroperitoneum.    I communicated these results to Dr. Cassidy on 12/6/2017 at 1450  hours.    CHICO FONG MD

## 2017-12-08 NOTE — OR NURSING
Pt having midsternal chest pressure after her cardiac paracentesis. Informed Dr. Hays and Dr. Hodge. Order for 50 of fentanyl received from Dr. Hays. EKG ordered by Dr. Hodge. As long as EKG is ok, pt may proceed with ERCP per cardiology team.

## 2017-12-08 NOTE — ANESTHESIA CARE TRANSFER NOTE
Patient: Etta Cervantes    Procedure(s):  Endoscopic Retrograde Cholangiopancreatogram with biliary sphincterotomy and stent placement - Wound Class: II-Clean Contaminated    Diagnosis: Malignant Obstruction Of Pancreas   Diagnosis Additional Information: No value filed.    Anesthesia Type:   General, ETT     Note:  Airway :Face Mask  Patient transferred to:PACU  Comments: Tolerated anesthesia wellHandoff Report: Identifed the Patient, Identified the Reponsible Provider, Reviewed the pertinent medical history, Discussed the surgical course, Reviewed Intra-OP anesthesia mangement and issues during anesthesia, Set expectations for post-procedure period and Allowed opportunity for questions and acknowledgement of understanding      Vitals: (Last set prior to Anesthesia Care Transfer)    CRNA VITALS  12/8/2017 1427 - 12/8/2017 1515      12/8/2017             Pulse: 105    SpO2: 97 %                Electronically Signed By: ERNESTO Lindre CRNA  December 8, 2017  3:15 PM

## 2017-12-08 NOTE — PLAN OF CARE
Problem: Patient Care Overview  Goal: Plan of Care/Patient Progress Review  Outcome: No Change  Nursing Focus: Admission  D: Arrived at 1800 from ED via Wheelchair. Patient accompanied by spouse. Admitted for Abd pain with need for ERCP. Complains of cough and abd discomfort.      I: Admission process began.  Patient oriented to room, enviroment, call light.  Md. notified of patients arrival on unit.     A: HRmax in 120s. Tmax 102.0f. No tylenol d/t liver fxn. 1x dose Celebrex ordered for fevers. (Most recent check 100.2f. Will hold dose and continue to monitor.) Sepsis triggered; LA 0.9. Patient stable at this time.  Continues with nonproductive cough. On Echo, found to have larger pericardial effusion. Denies chest pain and SOB. Per cardiac note, plan for possible pericardiocentesis prior to scheduled ERCP in am. Pericardiocentesis is not urgent as pt is asymptomatic. EKG shows Sinus Tach. Planned ERCP in am d/t dilation of biliary duct with concern for obstruction on CT. Started on empiric zosyn d/t questionable cholangitis. NPO at midnight. Port heparin locked. Sclera jaundice. Up ad ashanti. brought home CPAP. Home inhalers labeled and in bin.    P: Implement plan of care when available. Continue to monitor patient. Nursing interventions as appropriate. Notify md with changes in pt status.

## 2017-12-08 NOTE — OR NURSING
Dr Guru Morgan covington after 7c RN stated plan was for patient to have pericardiocentesis done before her ERCP. He stated he had talked with cardiology and the plan was for them to do the pericardiocentesis before the ERCP. Her transfer to  placed on hold at this time

## 2017-12-08 NOTE — PROGRESS NOTES
Hematology/ Oncology Follow-up Visit:  Dec 7, 2017    Reason for Visit:   Chief Complaint   Patient presents with     Oncology Clinic Visit     Follow up Breast CA. Review CT results.        Oncologic History:  Breast cancer (H)  Etta Cervantes presented with increasing lump in the right axilla that s lump has been growing without any pain or associated symptoms. Subsequently she was evaluated by primary care physician. Diagnostic digital mammogram was done on 01/13/2015 showing enlarged lymph nodes in the right axilla. There was some skin thickening in the right breast anteriorly and laterally. There are no parenchymal changes in the right breast. The left breast shows a 1.7 cm spiculated mass at approximately 2 to 3 o'clock position, 15.2 cm away from the nipple. A right breast ultrasound was subsequently done and ultrasound guided biopsy was done. Needle biopsy of the left breast did show infiltrating ductal carcinoma grade I of III with no angiolymphatic invasion seen. No associated ductal carcinoma in situ. Estrogen receptor, progresterone receptor were positive. HER-2/sadie receptor came back negative.. Another biopsy from the right axilla did show metastatic ductal carcinoma. PET scan was done on January 26, 2015 showing few small right posterior cervical chain nodes the largest is 1.2 cm. There is extensive bulky right axillary adenopathy demonstrating hypermetabolic FDG uptake with SUV of 10.6. There is skin thickening involving the right breast which demonstrated low-grade FDG uptake. A 1.2 cm lesion on the lateral aspect of the left breast demonstrated minimally increased FDG uptake with SUV of 2. Scattered hypermetabolic mediastinal lymph nodes located in the left superior anterior mediastinum, right paratracheal and precarinal U, subcranial adenopathy with javon metastases. This focus hypermetabolic FDG uptake identified definitive Amanda posterior aspect off intertrochanteric region of the proximal left  femur consistent with bone metastases. There is also 1.4 cm hypermetabolic FDG uptake identified in the anterior medial segment of the left hepatic lobe consistent with liver metastases. Additional 2.1 cm low-attenuation lesion anterior aspect of the right lobe which needs to be determined. The patient was started on chemotherapy with Taxotere and Cytoxan on February 9, 2015.  She concluded 4 cycles of Taxotere and Cytoxan. She started on Arimidex 1 mg orally daily. Currently she is on Faslodex and ibrance. Subsequently the patient went on to receive Afinitor. The patient also started treatment with Xeloda.       Interval History:  Patient is here today for follow-up. She had a CT scan done yesterday showing biliary obstruction related to progressing disease. There is new lesions seen in the liver. There is increased pericardial effusion as well. Patient had a fever this morning. She's been coughing as well. Jaundice has worsened. Patient also has have nausea and vomiting.    Review Of Systems:  Constitutional: Negative for fever, chills, and night sweats. Fever  Skin: Jaundice  Eyes: negative.  Ears/Nose/Throat: negative.  Respiratory: Increasing shortness of breath, cough  Cardiovascular: negative.  Gastrointestinal: Right upper quadrant pain and nausea  Genitourinary: negative.  Musculoskeletal: negative.  Neurologic: negative.  Psychiatric: negative.  Hematologic/Lymphatic/Immunologic: negative.  Endocrine: negative.    All other ROS negative unless mentioned in interval history.    Past medical, social, surgical, and family histories reviewed.    Allergies:  Allergies as of 12/07/2017 - Review Complete 12/07/2017   Allergen Reaction Noted     Animal dander Cough 01/23/2015     Cats  07/15/2010     Dust mites Cough 01/23/2015     Pollen extract  01/23/2015     Seasonal allergies  07/15/2010     Levaquin [levofloxacin] Rash 07/17/2017       Current Medications:  No current outpatient prescriptions on file.     "    Physical Exam:  /75 (BP Location: Right arm, Patient Position: Sitting, Cuff Size: Adult Regular)  Pulse 120  Temp 100.7  F (38.2  C) (Tympanic)  Resp 20  Ht 1.638 m (5' 4.5\")  Wt 90.6 kg (199 lb 11.2 oz)  LMP 02/06/2013  SpO2 97%  Breastfeeding? No  BMI 33.75 kg/m2  Wt Readings from Last 12 Encounters:   12/08/17 90.1 kg (198 lb 11.2 oz)   12/07/17 90.6 kg (199 lb 11.2 oz)   12/01/17 90.9 kg (200 lb 8 oz)   11/30/17 90.9 kg (200 lb 8 oz)   11/28/17 91.1 kg (200 lb 14.4 oz)   11/16/17 89.8 kg (198 lb)   11/15/17 89.6 kg (197 lb 8 oz)   11/10/17 90.9 kg (200 lb 8 oz)   11/08/17 91.1 kg (200 lb 14.4 oz)   10/22/17 88.7 kg (195 lb 8.8 oz)   10/18/17 89.7 kg (197 lb 12.8 oz)   09/28/17 89.2 kg (196 lb 10.4 oz)     ECOG performance status: 1  GENERAL APPEARANCE: Healthy, alert and in no acute distress. Jaundice  HEENT: . PERRLA. Oropharynx without ulcers, lesions, or thrush.  NECK: Supple. No asymmetry or masses.  LYMPHATICS: No palpable cervical, supraclavicular, axillary, or inguinal lymphadenopathy.  RESP: Lungs clear to auscultation bilaterally without rales, rhonchi or wheezes.  BREAST: Right axillary adenopathy .\"CARDIOVASCULAR: Regular rate and rhythm. Normal S1, S2; no S3 or S4. No murmur, gallop, or rub.  ABDOMEN: Tender hepatomegaly Bowel sounds normal. MUSCULOSKELETAL: Extremities without gross deformities noted. No edema of bilateral lower extremities.  SKIN: No suspicious lesions or rashes.  NEURO: Alert and oriented x 3. Cranial nerves II-XII grossly intact.  PSYCHIATRIC: Mentation and affect appear normal.    Laboratory/Imaging Studies:  Orders Only on 12/05/2017   Component Date Value Ref Range Status     Bilirubin Direct 12/05/2017 4.0* 0.0 - 0.2 mg/dL Final     Bilirubin Total 12/05/2017 5.8* 0.2 - 1.3 mg/dL Final     Albumin 12/05/2017 3.1* 3.4 - 5.0 g/dL Final     Protein Total 12/05/2017 7.5  6.8 - 8.8 g/dL Final     Alkaline Phosphatase 12/05/2017 677* 40 - 150 U/L Final     ALT " 12/05/2017 185* 0 - 50 U/L Final     AST 12/05/2017 167* 0 - 45 U/L Final   Oncology Visit on 11/30/2017   Component Date Value Ref Range Status     CA 27-29 11/30/2017 85* 0 - 39 U/mL Final    Assay Method:  Chemiluminescence using Siemens Centaur XP        Recent Results (from the past 744 hour(s))   X-ray Chest 2 vws*    Narrative    PA and lateral chest    HISTORY: Abnormal chest CT    COMPARISON STUDY: 9/28/2017    FINDINGS: Left subclavian Port-A-Cath tip in the high SVC. Cardiac  silhouette is large. Tiny right apical pneumothorax versus bulla with  linear atelectasis in the right upper lung zone.      Impression    IMPRESSION: Tiny right apical pneumothorax versus apical bullae with  linear bands of atelectasis or scarring.    KAVON MILLS MD   CT Chest/Abdomen/Pelvis w Contrast   Result Value    Radiologist flags New mild-moderate pericardial effusion (Urgent)    Narrative    CT CHEST/ABDOMEN/PELVIS W CONTRAST 11/13/2017 2:54 PM    HISTORY: Breast cancer. Follow-up.    CONTRAST:  97 mL Isovue 370.    TECHNIQUE: CT of the chest, abdomen, and pelvis is performed with IV  contrast.    Routine evaluated structures in the chest include the lungs,  mediastinal structures, pleura, and chest wall.    Routine assessed structures in the abdomen include the liver, spleen,  pancreas, adrenal glands, and kidneys. Also assessed are the  retroperitoneum, gastrointestinal tract, and the abdominal wall.    Intrapelvic anatomy is also assessed.    Radiation dose for this scan is reduced using automated exposure  control, adjustment of the mA and/or kV according to patient size, or  iterative reconstruction technique.    COMPARISON: 9/11/2017.    FINDINGS:    Chest: There is a new 8.9 cm cystic area in the right lung apex. This  may relate to a loculated pneumothorax. A subpleural cyst or bulla are  also in the differential. There is some adjacent atelectasis. There is  less skin thickening and edema in the right breast. A  soft tissue  conglomerate in the right axilla is stable. It measures 4.2 x 2.8 cm.  Other right axillary lymph nodes which measure up to 0.7 cm in short  axis dimension are stable. A multinodular thyroid gland is stable.  There is a new mild-moderate pericardial effusion.    Abdomen: There is a ring-enhancing low density lesion in the inferior  right lobe of the liver on image #72 which measures 1.5 cm compared to  0.8 cm on the prior study.  An ill-defined area of low density in the  left lobe on coronal image 31 measures 3.9 cm compared to 3.0 cm on  the prior study. Other liver lesions are more stable. A subcentimeter  vascular lesion in the spleen is stable. Gastrohepatic ligament and  portal adenopathy are stable. A portal lymph node on image #62  measures 1.8 cm. A borderline in size retroperitoneal lymph node on  image #65 is stable at 0.9 cm in short axis dimension. A previously  seen borderline size aortocaval lymph node is smaller. It measures 0.5  cm compared to 0.9 cm on the prior study.    Pelvis: There is trace free pelvic fluid.      Impression    IMPRESSION:  1.  Hepatic metastases are slightly worse.  2. There is a new mild-moderate pericardial effusion.  3. Other neoplastic disease is stable.    [Access Center: New mild-moderate pericardial effusion]    This report will be copied to the [facility] to ensure a  provider acknowledges the finding. Access Center is available Monday  through Friday 8am-3:30 pm.       LETI CHASE MD   US Abdomen Limited    Narrative    ULTRASOUND  ABDOMEN LIMITED   11/30/2017 12:53 PM     HISTORY:  Breast cancer; elevated liver enzymes. Carcinoma of breast  metastatic to liver, unspecified laterality (H). Bone metastasis (H);  Secondary malignant neoplasm of liver (H). Elevated liver enzymes.    COMPARISON: CT abdomen and pelvis 11/13/2017.    FINDINGS:    Gallbladder: Negative sonographic Osullivan sign. Faint gallbladder  sludge is suggested layering in the  gallbladder lumen. No gallstones.    Bile ducts:  Common duct is 0.7 cm and may be normal for age. No  intrahepatic biliary ductal dilatation.    Liver: Hepatic metastasis again identified by ultrasound. One of the  largest is 2.8 x 2.1 x 3 cm at the left liver. The other lesions are  better visualized on the comparison CT.    Pancreas:  Partially obscured but grossly unremarkable.     Right kidney:  Normal.     Aorta and IVC:  Not specifically assessed.       Impression    IMPRESSION:    1. Hepatic metastasis again identified.  2. Gallbladder sludge. No gallstones. Negative sonographic Osullivan  sign.    CHICO FONG MD   CT Chest/Abdomen/Pelvis w Contrast    Narrative    CT CHEST/ABDOMEN/PELVIS WITH CONTRAST December 6, 2017 1:52 PM    HISTORY: Breast cancer with liver metastasis. Elevated liver enzymes,  elevated bilirubin. Bilateral malignant neoplasm of breast in female,  estrogen receptor positive, unspecified site of breast (H).     TECHNIQUE: CT scan obtained of the chest, abdomen, and pelvis with  oral and IV contrast. 97 ml isovue 370 injected. Radiation dose for  this scan was reduced using automated exposure control, adjustment of  the mA and/or kV according to patient size, or iterative  reconstruction technique.    COMPARISON:  CT chest, abdomen and pelvis 11/13/2017.    FINDINGS:  Chest: Left chest port venous catheter. Increased volume of moderate  to large pericardial effusion. Transverse thickness of this fluid is  now 2.4 cm, previously 0.6 cm at a comparable level on the prior study  series 2 image 36. There is some mild wall enhancement of the superior  portion of the pericardium, for instance around series 2 image 20 in  the region of the cardiac base. Small volume right pleural fluid newly  identified. No acute thoracic aortic abnormality. No large central  pulmonary embolism. The exam was not tailored for assessment of middle  and small branch vessel pulmonary emboli. The distal  left  paraesophageal lymph node is 0.8 x 0.5 cm, previously 0.3 x 0.3 cm  series 2 image 48. The remainder of the thoracic lymph nodes remain  small. Right axillary adenopathy without significant change in size  measuring 4.8 x 3.4 cm, previously 4.2 x 2.8 cm image 16. There are  other mildly more prominent lymph nodes as well. No left axillary  adenopathy. Stable thyroid nodules that are small in size. Right  breast skin thickening again noted. No convincing new bony disease  identified. Some ill-defined sclerosis at the right T8 pedicle on  series 2 image 33, and right posteromedial fifth rib on image 18  appears stable. Stable appearing cavitary lesion at the right upper  lung measuring 8.8 cm series 3 image 13. Stable triangular area of  consolidation at the right hilar region at the right midchest series 3  image 27. There are several tiny bilateral stable pulmonary nodules.  For example, one of these is 0.4 cm medial right lower lobe series 3  image 30. There are multiple other examples bilaterally.    Abdomen/pelvis: No convincing new bony disease at the abdomen or  pelvis levels. Multifocal hepatic metastasis. Anterior central hepatic  mass is 2.7 x 2.6 cm, previously 2.2 x 2.1 cm series 2 image 58.  Lateral segment left hepatic mass is approximately 2.8 x 2.8 cm,  previously 2.2 x 2.4 cm image 53. A caudate lobe mass is approximately  2.9 x 2.2 cm, previously 2 x 2.2 cm image 60. There are other hepatic  masses that are slightly larger, or are stable. Notably, there is new  moderate intrahepatic biliary ductal dilatation. This appears to lead  to the pancreatic head where there is complete decompression of the  common bile duct coronal series 4 image 23. There is a small area of  nodular enhancement newly identified in this region that is  approximately 0.8 cm series 2 image 66. Adenopathy also noted at the  shreyas hepatis. Portal caval enlarged lymph node is 2.5 x 1.3 cm,  previously 1.7 x 1.1 cm image 59.  There are other prominent lymph  nodes that appear more prominent in the interval as well, for instance  image 56. Some increase ill-defined soft tissue at the shreyas hepatis  region on image 56 also appears more prominent measuring 2.4 x 1.4 cm  series 2 image 56, previously 1.7 x 1 cm.    The spleen demonstrates a stable small subcentimeter enhancing focus  series 2 image 56. Ill-defined tiny hypodensities in the spleen also  appears stable image 54 and 56. The adrenals, remainder of the  pancreas, and kidneys do not show any new abnormalities. Tiny cyst at  the left kidney is stable image 67.    There are several retroperitoneal lymph nodes noted. Upper abdominal  aortocaval lymph node is 1.5 x 1.3 cm, previously 0.9 x 1.3 cm series  2 image 64. A few other mildly prominent retroperitoneal lymph nodes  again noted. Small to moderate free pelvic fluid. No acute bowel  abnormality is seen. New nodularity at the anterior omentum with one  region measuring 1.2 cm abutting a small bowel loop series 2 image 77.  Other adjacent nodules noted as well.      Impression    IMPRESSION:  1. Progression of hepatic metastatic disease with enlargement of  multifocal masses. New finding of moderate intrahepatic biliary ductal  dilatation worrisome for developing biliary obstruction. This may  relate to progression of adenopathy at the upper abdomen at the shreyas  hepatis and/or an increasing area of nodular enhancement at the  pancreatic head worrisome for metastatic disease.  2. Moderate to large pericardial effusion significantly increased  since 11/13/2017. Some mild enhancement of the pericardium also noted.  Recommend further workup.  3. New finding of small nodularity at the omentum at the midline and  slightly towards the right that could represent carcinomatosis.  4. Multifocal stable indeterminate pulmonary nodules may represent  pulmonary metastatic disease.  5. Stable sclerosis at the right fifth rib and T8 that could  represent  stable sclerotic metastasis.  6. Progression of adenopathy at the right axilla, and upper abdominal  retroperitoneum.    I communicated these results to Dr. Cassidy on 12/6/2017 at 1450  hours.    CHICO FONG MD       Assessment and plan:    (C50.919,  C78.7) Carcinoma of breast metastatic to liver, unspecified laterality (H)  (primary encounter diagnosis)  (C79.51) Bone metastasis (H)  (C50.911,  C77.3) 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)  (R74.8) Elevated liver enzymes  I reviewed with the patient most recent imaging studies. I made arrangements with ERCP team to arrange for urgent ERCP. I reviewed with the patient today the options for treatment of her cancer. However, the presence of fever and biliary obstruction is an indication for admission for antibiotics and for ERCP. We will arrange for the patient to be admitted in [facility]. Patient will be also evaluated for pericardial effusion for possible pericardial window. I will see the patient after her discharge to start her on chemotherapy. We talked about different options including Doxil or weekly Taxol. However I would meet with the patient after discharge to discuss with her further.    (E03.9) Hypothyroidism, unspecified type  Patient will continue on Synthroid    (E78.5) Hyperlipidemia LDL goal <130  Laboratories currently on hold because of elevated liver enzymes.    The patient is ready to learn, no apparent learning barriers were identified.  Diagnosis and treatment plans were explained to the patient. The patient expressed understanding of the content. The patient asked appropriate questions. The patient questions were answered to her satisfaction.    Time spent 40 minutes with the 50% of the time in counseling coordination of care including discussion of imaging studies, management plan and symptom management.    Chart documentation with Dragon Voice recognition Software. Although reviewed after  completion, some words and grammatical errors may remain.

## 2017-12-08 NOTE — PLAN OF CARE
Problem: Patient Care Overview  Goal: Plan of Care/Patient Progress Review  Outcome: No Change    VSS. Pt slept well. Received cough syrup this morning. Pre op scrub done. NPO since midnight. Plan for ERCP and pericardial centesis around 8am. Continue POC.

## 2017-12-08 NOTE — PLAN OF CARE
Problem: Infection, Risk/Actual (Adult)  Goal: Identify Related Risk Factors and Signs and Symptoms  Related risk factors and signs and symptoms are identified upon initiation of Human Response Clinical Practice Guideline (CPG).   Outcome: No Change  Etta to heart cath lab this am to drain pericardial fluid the to go to OR for ERCP.  MD did explain procedures to pt  HR tachy and LS were coarse.  K+ 3.3 today and started IV supplements.  Received 20meq IV before she left for cath lab.  Report called to cath lab. OR and Kathe on 6B.  Still waiting for results of RVP panel so remains in droplet and contact isolation.  Up indepently in room.   Urine cloudy and light tan in color.

## 2017-12-09 NOTE — ADDENDUM NOTE
Addendum  created 12/09/17 1407 by Brianne Velásquez APRN CRNA    Anesthesia Event edited, Procedure Event Log accessed

## 2017-12-09 NOTE — PLAN OF CARE
Problem: Patient Care Overview  Goal: Plan of Care/Patient Progress Review  Outcome: No Change  End Of Shift Progress Note:    Temp: 98.4  F (36.9  C) Oral  BP: 127/60  Heart Rate: 90  Resp: 16  SpO2: 99 %  6LPM via NC    Neuro: alert and oriented, follows commands, bilateral equal strength, steady on feet  Cardiac: remains on tele, sinus rhythm, BP WNL  Respiratory: sating well on 6LPM, placed on home BiPAP at bedtime with O2, lung sounds clear-diminished  GI/: voiding well, bowel sounds active throughout, no BM this shift  Diet/appetite: tolerating clear liquids  Activity: up with SBA  Pain: reported some pain at CT site, PRN oxycodone given  Skin: no concerns  LDA's: L chest port-NS running, no concerns; L pericardial drain-to water seal, no concerns    Plan: Continue with plan of care. Notify primary team with changes.  To do: recheck K, continue to monitor pericardial drain output      Bridget Waldron RN  12/08/17  10:50 PM

## 2017-12-09 NOTE — PLAN OF CARE
Problem: Patient Care Overview  Goal: Plan of Care/Patient Progress Review  Outcome: No Change  End Of Shift Progress Note:    Temp: 99  F (37.2  C) Oral  BP: 111/44  Heart Rate: 81  Resp: 16  SpO2: 93 % 3LPM NC    Neuro: alert and oriented, calls appropriately, follows commands, steady on feet, bilateral equal strength  Cardiac: remains on tele, sinus rhythm, BP WNL, denied chest pain, pericardial drain remains in place  Respiratory: sating well on 3LPM NC, used CPAP occasionally at night, lung sounds clear-diminished  GI/: bowel sounds active throughout, no BM this shift, voiding adequately  Diet/appetite: tolerating clear liquids  Activity: up with SBA  Pain: reported pain at pericardial drain site, PRN oxycodone given with effectiveness in management of pain  Skin: no concerns  LDA's: L chest port-saline locked; L pericardial drain-to water seal, moderate output    Plan: Continue with plan of care. Notify primary team with changes.  To do: continue to monitor pericardial drain output, replace ARIC Waldron RN  12/09/17  6:07 AM

## 2017-12-09 NOTE — PROGRESS NOTES
[facility], [place]    Hematology / Oncology Progress Note    Date of Admission: 12/7/2017  Hospital Day #: 2      Assessment & Plan   Etta Cervantes is a 57 year old woman with metastatic breast cancer to liver, bones, heart who was seen in oncology clinic on 12/7 AM and found to have transaminitis (previously known but worse) as well as low-grade fever (100.2, 100.7), abdominal bloating, RUQ discomfort, and general malaise. CT abdomen shows biliary dilation concerning for obstruction. She was admitted for further workup and management. In consultation with GI and Cardiology teams, decision was made to pursue pericardiocentesis to address large pericardial effusion prior to ERCP.      # Biliary obstruction, concern for cholangitis.    On admission: LFTs elevated. WBC count nml. Low-grade fevers initially, then increased to tmax 102. Patient c/o mild RUQ discomfort without significant pain. Patient also noted general aches and malaise for the past 2-3 weeks. Not septic-appearing.   -GI consulted and patient had ERCP on 12/8 with metal stent placement and drainage of dark colored bile. Today bili and LFTs improving. WBC count remains wnl. Afebrile.   -Surveillance BC in process, neg to date.  -D/c empiric zosyn, switch to cipro + flagyl.   -Daily CBC, CMP.   -Oxycodone prn pain. Prn antiemetics.      # Pericardial effusion, epicardial mass.    Presumed malignant. Small to moderate effusion noted on CT 11/13 and confirmed on echo 11/15, at which time there were no echocardiographic or clinical signs of tamponade, restriction, obstruction. Also note epicardial mass. Patient was evaluated by cardiologist 11/28; plan was to monitor serial echos while on chemotherapy and watch for signs/symptoms tamponade. CT chest done 12/6 demonstrates increased volume of pericardial effusion, now moderate to large, and superior pericardial wall enhancement. Patient does have new dry cough. Otherwise, denies  significant SOB, dyspnea with or without exertion, PND, chest pain/pressure, leg swelling. She has been HD stable.   -Consulted Cardiology to [facility] for sedation for ERCP. Obtained EKG (sinus tach) and repeat echo that demonstrated hyperkinesis with EF 65-70% and large pericardial effusion with developing echo e/o tamponade. Decision was made to pursue pericardiocentesis prior to ERCP.  -Patient underwent pericardiocentesis 12/8 AM with removal of 1200ml bloody fluid, sent for analysis. She had pigtail catheter drain placed.    Per Cards recs:   -keep pericardial drain to water seal   -notify consult team if >250ml drainage overnight   -monitor CBC closely (will order q12h)  -Chest tube still with moderate amount output. 163 yesterday, 140 today. Continue drain. When <50 in a day, clamp and get repeat limited echo. If no reaccumulation, can pull with limited echo afterwards.  -Continue to monitor on tele while drain in place.     # Mild hypoxia.   Suspect secondary to anesthesia/sedation for procedures. Possibly also related to pericardial effusion. Will work on weaning O2 today as able.     # Cough. +Human rhinovirus.   Patient reports new dry cough over the past few days with occasional slight wheezing (in setting of asthma at baseline). She notes that her  had a similar cough recently and she thinks other people such as coworkers have been sick. Of note, she declines yearly flu shot. No e/o acute airspace disease or pleural effusion on CT chest.   -Rapid flu (neg) and resp viral PCR is + for human rhinovirus. Continue supportive care.   -Robitussin DM prn.      # Metastatic breast cancer.   E/o disease progression on most recent CT C/A/P and tumor marker rising. Dr. Cassidy instructed her to hold Femara, tylenol, statin. Goal to restart active chemotherapy next week once LFTs return to nml. Has f/u tentatively planned Monday - will send message to Dr. Cassidy with updates since may not be out of  hospital by then.       Asthma.   Continue home inhalers, nebs prn.      FEN:   -Hold off on IVF unless febrile or unstable d/t pericardial effusion   -ADAT - fulls this AM then regular if tolerating     Prophy/Misc:   -VTE: mechanical  -Bowel regimen     Consults:   -GI  -Cardiology    Code status: FULL - discussed with patient on admission  Disposition: Anticipate d/c home within 1-2 days pending status of pericardial drain, infection control.     Yani Agustin DNP, APRN, CNP  Hematology/Oncology  Pager: [phone]    Interval History   Etta reports doing ok. Having much less abdominal pain. Has been SR on tele. Denies central chest pain/pressure like she had yesterday. Does still have pain at pericardial drain site on left lateral chest. Mild nausea, no vomiting this AM. No BM since Wednesday, taking laxatives. Cough improving. Denies fever, chills, HA, dizziness, extremity swelling. No other immediate concerns.     Physical Exam   Temp: 99.1  F (37.3  C) Temp src: Oral BP: 113/62 Pulse: 81 Heart Rate: 76 Resp: 16 SpO2: 91 % O2 Device: None (Room air) Oxygen Delivery: 4 LPM  Vitals:    12/07/17 1906 12/08/17 0852 12/09/17 0235   Weight: 90.7 kg (200 lb) 90.1 kg (198 lb 11.2 oz) 90.2 kg (198 lb 14.4 oz)     Vital Signs with Ranges  Temp:  [98.4  F (36.9  C)-99.1  F (37.3  C)] 99.1  F (37.3  C)  Pulse:  [81-90] 81  Heart Rate:  [76-91] 76  Resp:  [14-19] 16  BP: (109-132)/(44-78) 113/62  SpO2:  [91 %-99 %] 91 %  I/O last 3 completed shifts:  In: 470 [I.V.:470]  Out: 1253 [Urine:950; Chest Tube:303]    Constitutional: Pleasant woman seen resting in bed in NAD. Alert and interactive.   HEENT: NCAT. PERRL, EOMI. +Mild scleral icterus- improving. Oral mucosa pink, moist, no lesions or thrush.  Respiratory: Non-labored breathing on 4L NC, good air exchange, lungs clear to auscultation bilaterally. +Dry, non-productive cough noted.   Cardiovascular: Regular rate and rhythm. No murmur or rub. Pericardial drain in  place with some light pink drainage. Tender at site.   GI: Hypoactive bowel sounds. Abdomen soft, non-distended, non-tender to palpation.   Skin: Warm and dry, +mild jaundice- improving. No concerning lesions or rash on exposed surfaces.  Musculoskeletal: Extremities grossly normal, non-tender, no edema. Strong peripheral pulses. Good strength and ROM in bed.   Neurologic: A&O, speech normal, sensation to light touch grossly WNL. No focal deficits.   Neuropsychiatric: Mentation and affect appear normal/appropriate.  Vascular Access: Left chest PAC site is CDI.     Medications   Current Facility-Administered Medications   Medication     oxyCODONE IR (ROXICODONE) tablet 5-10 mg     potassium chloride SA (K-DUR/KLOR-CON M) CR tablet 20-40 mEq     potassium chloride (KLOR-CON) Packet 20-40 mEq     potassium chloride 10 mEq in 100 mL sterile water intermittent infusion (premix)     potassium chloride 10 mEq in 100 mL intermittent infusion with 10 mg lidocaine     potassium chloride 20 mEq in 50 mL intermittent infusion     ciprofloxacin (CIPRO) tablet 500 mg     metroNIDAZOLE (FLAGYL) tablet 500 mg     magnesium sulfate 4 g in 100 mL sterile water (premade)     potassium phosphate 15 mmol in D5W 250 mL intermittent infusion     potassium phosphate 20 mmol in D5W 500 mL intermittent infusion     potassium phosphate 20 mmol in D5W 250 mL intermittent infusion     potassium phosphate 25 mmol in D5W 500 mL intermittent infusion     sodium chloride (PF) 0.9% PF flush 3 mL     albuterol neb solution 2.5 mg     albuterol (PROAIR HFA/PROVENTIL HFA/VENTOLIN HFA) Inhaler 2 puff     azelastine (ASTELIN) 0.1 % spray 1-2 spray     docusate sodium (COLACE) capsule 100 mg     fexofenadine (ALLEGRA) tablet 180 mg     levothyroxine (SYNTHROID/LEVOTHROID) tablet 25 mcg     metoclopramide (REGLAN) tablet 10 mg     guaiFENesin-dextromethorphan (ROBITUSSIN DM) 100-10 MG/5ML syrup 5 mL     prochlorperazine (COMPAZINE) Suppository 25 mg      ondansetron (ZOFRAN) injection 8 mg    Or     ondansetron (ZOFRAN-ODT) ODT tab 8 mg    Or     ondansetron (ZOFRAN) tablet 8 mg     senna-docusate (SENOKOT-S;PERICOLACE) 8.6-50 MG per tablet 1 tablet    Or     senna-docusate (SENOKOT-S;PERICOLACE) 8.6-50 MG per tablet 2 tablet     loratadine (CLARITIN) tablet 10 mg     zolpidem (AMBIEN CR) CR tablet 12.5 mg     naloxone (NARCAN) injection 0.1-0.4 mg     beclomethasone (QVAR) 80 MCG/ACT Inhaler 1 puff     mometasone-formoterol (DULERA) 200-5 MCG/ACT oral inhaler 2 puff     heparin lock flush 10 UNIT/ML injection 3 mL       Data   CBC    Recent Labs  Lab 12/09/17  0540 12/08/17  1956 12/08/17  0551 12/07/17  1339   WBC 6.6 9.3 5.7 5.7   RBC 3.19* 3.42* 3.30* 3.41*   HGB 10.9* 11.9 11.4* 11.8   HCT 34.9* 37.5 36.0 36.2   * 110* 109* 106*   MCH 34.2* 34.8* 34.5* 34.6*   MCHC 31.2* 31.7 31.7 32.6   RDW 15.4* 15.7* 15.6* 15.4*    214 211 239     CMP    Recent Labs  Lab 12/09/17  0540 12/08/17  2356 12/08/17  0551 12/07/17  1339 12/05/17  0835     --  140 140  --    POTASSIUM 3.7 4.0 3.3* 3.5  --    CHLORIDE 108  --  108 109  --    CO2 23  --  22 23  --    ANIONGAP 9  --  10 8  --    *  --  110* 114*  --    BUN 12  --  11 12  --    CR 0.55  --  0.61 0.71  --    GFRESTIMATED >90  --  >90 84  --    GFRESTBLACK >90  --  >90 >90  --    BEN 7.8*  --  8.7 8.8  --    PROTTOTAL 6.1*  --  6.7* 7.2 7.5   ALBUMIN 2.3*  --  2.6* 2.9* 3.1*   BILITOTAL 3.2*  --  8.2* 8.1* 5.8*   ALKPHOS 520*  --  672* 719* 677*   AST 87*  --  149* 149* 167*   *  --  144* 161* 185*     INR    Recent Labs  Lab 12/07/17  2241   INR 1.11

## 2017-12-09 NOTE — PROGRESS NOTES
GASTROENTEROLOGY PROGRESS NOTE    ASSESSMENT:  58 yo F with a history of metastatic breast cancer with mets to liver who was admitted with fever, elevated LFTs (T bili 8), and abdominal pain. CT showed progressive of metastatic disease with new intrahepatic biliary obstruction, likely related to progression of adenopathy in shreyas hepatis; given fever, concern for cholangitis.     S/p ERCP yesterday with obvious biliary stricture, black bile s/p uncovered metal stenting. Abdominal pain improved after procedure, tolerating diet, bilirubin trend down from 8 to 3.     RECOMMENDATIONS:  - Avoid ASA, NSAIDs and anti-coagulation for 3 days after procedure  - Trend CBC and LFTs daily.   - Advance diet as tolerates.   - Continue antibiotics for 7 days.   - No repeat ERCP is indicated unless she has worsening or recurrent jaundice or cholangitis.   - GI will sign off at this time, please page if any questions.     The patient was seen and discussed and plan agreed upon with GI staff. Dr. Frankel      Gastroenterology Inpatient Sign Off Note    Inpatient GI consults service will sign off. No further recommendations at this time. If primary team has addition questions, please page consult fellow listed in [place].    Current GI Consult Staff: Dr. Frankel    Follow up recommendations:   No outpatient GI clinic follow-up indicated. Follow-up with primary care provider at timing determined by discharge physician.    If outpatient GI follow-up is felt indicated by primary care, they may coordinate this by placing new GI referral and contacting; General GI clinic - ([phone]); Advanced Endoscopy clinic (Esophageal and Pancreas Biliary Clinics) - ([phone]); IBD clinic - ([phone]); Hepatology Clinic (Liver) - ([phone])     Barak Mary  GI Fellow  Pager   ______________________________________________________________  S: no acute event overnight. Abdominal pain is better, tolerating diet. Bilirubin trending  "down    O:  Blood pressure 118/59, pulse 81, temperature 100.6  F (38.1  C), temperature source Oral, resp. rate 17, height 1.626 m (5' 4\"), weight 90.2 kg (198 lb 14.4 oz), last menstrual period 02/06/2013, SpO2 94 %, not currently breastfeeding.    Gen: no acute distress  HEENT: atraumatic, no sclera icterus   CV: distanced heart sounds, RRR, pigtail catheter drain in place  Lungs: clear to auscultation bilaterally.   Abd: mild right upper quadrant tenderness, soft, non distended, normal active BS.   Skin: no jaundice   MS: no skeletal pain   Neuro: no asterixis, A&Ox3, no focal neurological deficit  Psych: normal mood    LABS:  BMP    Recent Labs  Lab 12/09/17  0540 12/08/17  2356 12/08/17  0551 12/07/17  1339     --  140 140   POTASSIUM 3.7 4.0 3.3* 3.5   CHLORIDE 108  --  108 109   BEN 7.8*  --  8.7 8.8   CO2 23  --  22 23   BUN 12  --  11 12   CR 0.55  --  0.61 0.71   *  --  110* 114*     CBC    Recent Labs  Lab 12/09/17  0540 12/08/17  1956 12/08/17  0551 12/07/17  1339   WBC 6.6 9.3 5.7 5.7   RBC 3.19* 3.42* 3.30* 3.41*   HGB 10.9* 11.9 11.4* 11.8   HCT 34.9* 37.5 36.0 36.2   * 110* 109* 106*   MCH 34.2* 34.8* 34.5* 34.6*   MCHC 31.2* 31.7 31.7 32.6   RDW 15.4* 15.7* 15.6* 15.4*    214 211 239     INR    Recent Labs  Lab 12/07/17  2241   INR 1.11     LFTs    Recent Labs  Lab 12/09/17  0540 12/08/17  0551 12/07/17  1339 12/05/17  0835   ALKPHOS 520* 672* 719* 677*   AST 87* 149* 149* 167*   * 144* 161* 185*   BILITOTAL 3.2* 8.2* 8.1* 5.8*   PROTTOTAL 6.1* 6.7* 7.2 7.5   ALBUMIN 2.3* 2.6* 2.9* 3.1*      PANC    Recent Labs  Lab 12/08/17  0551   LIPASE 860*       ----- Service Performed and Documented by Resident or Fellow ------    I performed a history and physical examination of the above patient and discussed the management with Dr. Loo on 12/9/2017. I reviewed the note and there are no changes to the past medical, family or social history.  A complete 10 point review of " systems was obtained. Please see the HPI for pertinent positives and negatives. All other systems were reviewed and were found to be negative. I agree with the documented findings and plan of care as outlined with the following addition    -S/p ERCP on 12/8  with obvious biliary stricture, black bile s/p 10 mm by 6 cm  uncovered metal stenting. Abdominal pain improved after procedure, tolerating diet, bilirubin trend down from 8 to 3.     Dr Guru Mora   of Medicine  Gastroenterology, Pancreas-Biliary diseases  [phone]

## 2017-12-09 NOTE — PLAN OF CARE
"Problem: Patient Care Overview  Goal: Plan of Care/Patient Progress Review  Outcome: Improving  /59 (BP Location: Right arm)  Pulse 81  Temp 100.6  F (38.1  C) (Oral)  Resp 17  Ht 1.626 m (5' 4\")  Wt 90.2 kg (198 lb 14.4 oz)  LMP 02/06/2013  SpO2 94%  BMI 34.14 kg/m2    Neuro: A&Ox4.     Cardiac: SR. VSS.   Respiratory: Sating 92-95% on RA  GI/: Adequate urine output. No BM.   Diet/appetite: Diet advanced to regular, tolerating well   Activity:  Standby assist.   Pain: Oxy 5mg given q2h for abdominal pain and pain at chest tube site.   Skin: Intact, no new deficits noted.  LDA's: Right chest port, saline locked.     Plan: Continue with POC. Notify primary team with changes. 20meq K+ given- recheck in AM   Transfer orders in place to medical with tele         "

## 2017-12-09 NOTE — PROGRESS NOTES
Brief Note    Chest tube still with moderate amount output. 163 yesterday, 140 today.  Continue drain.  When <50 in a day, clamp, get repeat limited echo.  If no reaccumulation, can pull with limited echo afterwards.    Discussed with Dr. Ty Hodge MD PGY4  Cardiology Fellow  [phone]

## 2017-12-10 NOTE — PROGRESS NOTES
[facility], [place]    Hematology / Oncology Progress Note    Date of Admission: 12/7/2017  Hospital Day #: 3      Assessment & Plan   Etta Cervantes is a 57 year old woman with metastatic breast cancer to liver, bones, heart who was seen in oncology clinic on 12/7 AM and found to have transaminitis (previously known but worse) as well as low-grade fever (100.2, 100.7), abdominal bloating, RUQ discomfort, and general malaise. CT abdomen shows biliary dilation concerning for obstruction. She was admitted for further workup and management. In consultation with GI and Cardiology teams, decision was made to pursue pericardiocentesis to address large pericardial effusion prior to ERCP.     Plan today:  -continue to monitor pericardial drain output. If remains >200ml overnight, would discuss with Cards in AM and may need to consider T Surg consult for pericardial window.   -lidocaine, voltaren gel to painful incision site. Continue oxycodone prn.   -continue cipro + flagyl       # Biliary obstruction, concern for cholangitis.    On admission: LFTs elevated. WBC count nml. Low-grade fevers initially, then increased to tmax 102. Patient c/o mild RUQ discomfort without significant pain. Patient also noted general aches and malaise for the past 2-3 weeks. Not septic-appearing.   -GI consulted and patient had ERCP on 12/8 with metal stent placement and drainage of dark colored bile. Today bili and LFTs continue to improve. WBC count remains wnl. Febrile to 100.6 last night.   -Surveillance BC in process, neg to date.  -D/c'ed empiric zosyn, switched to cipro + flagyl - continue.   -Daily CBC, CMP.   -Oxycodone prn pain. Prn antiemetics.      # Pericardial effusion, epicardial mass.    Presumed malignant. Small to moderate effusion noted on CT 11/13 and confirmed on echo 11/15, at which time there were no echocardiographic or clinical signs of tamponade, restriction, obstruction. Also note epicardial  mass. Patient was evaluated by cardiologist 11/28; plan was to monitor serial echos while on chemotherapy and watch for signs/symptoms tamponade. CT chest done 12/6 demonstrates increased volume of pericardial effusion, now moderate to large, and superior pericardial wall enhancement. Patient does have new dry cough. Otherwise, denies significant SOB, dyspnea with or without exertion, PND, chest pain/pressure, leg swelling. She has been HD stable.   -Consulted Cardiology to [facility] for sedation for ERCP. Obtained EKG (sinus tach) and repeat echo that demonstrated hyperkinesis with EF 65-70% and large pericardial effusion with developing echo e/o tamponade. Decision was made to pursue pericardiocentesis prior to ERCP.  -Patient underwent pericardiocentesis 12/8 AM with removal of 1200ml bloody fluid, sent for analysis. Cytology pending. She had pigtail catheter drain placed.    Per Cards recs:    -keep pericardial drain to water seal   -notify consult team if >250ml drainage overnight   -monitor CBC closely  -Chest tube still with moderate amount output -- 258ml total in past 24 hours. Continue drain. When <50 in a day, clamp and get repeat limited echo. If no reaccumulation, can pull with limited echo afterwards.  -Continue to monitor on tele while drain in place.     # Mild hypoxia.   Suspect secondary to anesthesia/sedation for procedures. Possibly also related to pericardial effusion. Continue to wean O2.      # Cough. +Human rhinovirus.   Patient reported new dry cough over the past few days PTA with occasional slight wheezing (in setting of asthma at baseline). She notes that her  had a similar cough recently and she thinks other people such as coworkers have been sick. Of note, she declines yearly flu shot. No e/o acute airspace disease or pleural effusion on CT chest.   -Rapid flu (neg) and resp viral PCR is + for human rhinovirus. Continue supportive care.   -Robitussin DM prn.      # Metastatic  breast cancer.   E/o disease progression on most recent CT C/A/P and tumor marker rising. Dr. Cassidy instructed her to hold Femara, tylenol, statin. Goal to restart active chemotherapy next week once LFTs return to nml. Had f/u tentatively planned Monday but will still be admitted so sent message to Dr. Cassidy with updates. Will need close clinic f/u on discharge.      Asthma.   Continue home inhalers, nebs prn.      FEN:   -Hold off on IVF unless febrile or unstable d/t pericardial effusion   -RDAT      Prophy/Misc:   -VTE: mechanical  -Bowel regimen     Code status: FULL - discussed with patient on admission  Disposition: Anticipate d/c home within 1-2 days pending status of pericardial drain and resolution of fevers.      Yani Agustin DNP, APRN, CNP  Hematology/Oncology  Pager: [phone]    Interval History   Etta reports doing ok, tired this AM. Had a lot of pain at pericardial drain/incision site yesterday, improving as of this AM. No central chest pain/pressure and abdominal pain has been much improved since ERCP/stenting. Has been NSR on tele. Denies nausea/vomiting. No BM since Wednesday, taking laxatives. Cough improving, throat sore today. Denies fever, chills, HA, dizziness, extremity swelling. No other immediate concerns.     Physical Exam   Temp: 98.5  F (36.9  C) Temp src: Oral BP: 127/63   Heart Rate: 89 Resp: 16 SpO2: 97 % O2 Device: None (Room air) Oxygen Delivery: 3 LPM  Vitals:    12/08/17 0852 12/09/17 0235 12/10/17 0500   Weight: 90.1 kg (198 lb 11.2 oz) 90.2 kg (198 lb 14.4 oz) 99.2 kg (218 lb 11.1 oz)     Vital Signs with Ranges  Temp:  [98.5  F (36.9  C)-100.6  F (38.1  C)] 98.5  F (36.9  C)  Heart Rate:  [87-94] 89  Resp:  [16-18] 16  BP: (109-127)/(57-65) 127/63  SpO2:  [93 %-98 %] 97 %  I/O last 3 completed shifts:  In: 910 [P.O.:860; I.V.:50]  Out: 1157 [Urine:1025; Chest Tube:132]    Constitutional: Pleasant woman seen resting in bed in [facility]. Tired but wakes to voice and is  appropriately interactive.   HEENT: NCAT. PERRL, EOMI. +Mild scleral icterus- improving. Oral mucosa pink, moist, no lesions or thrush.  Respiratory: Non-labored breathing on 2L NC, good air exchange, lungs clear to auscultation bilaterally. No cough noted this AM.   Cardiovascular: Regular rate and rhythm. No murmur or rub. Pericardial drain in place with sanguinous output. Tender at site/left lateral chest and torso.   GI: Hypoactive bowel sounds. Abdomen soft, non-distended, non-tender to palpation other than mildly in RUQ.   Skin: Warm and dry, +mild jaundice- improving. No concerning lesions or rash on exposed surfaces.  Musculoskeletal: Extremities grossly normal, non-tender, no edema. Strong peripheral pulses. Good strength and ROM in bed.   Neurologic: A&O, speech normal, sensation to light touch grossly WNL. No focal deficits.   Neuropsychiatric: Mentation and affect appear normal/appropriate.  Vascular Access: Left chest PAC site is CDI.     Medications   Current Facility-Administered Medications   Medication     polyethylene glycol (MIRALAX/GLYCOLAX) Packet 17 g     lidocaine 1 % 1 mL     lidocaine (LMX4) kit     sodium chloride (PF) 0.9% PF flush 10-20 mL     heparin lock flush 10 UNIT/ML injection 5-10 mL     heparin lock flush 10 UNIT/ML injection 5-10 mL     heparin 100 UNIT/ML injection 5 mL     sodium chloride (PF) 0.9% PF flush 10-20 mL     lidocaine (LIDODERM) patch REMOVAL     lidocaine (LIDODERM) patch in PLACE     diclofenac (VOLTAREN) 1 % topical gel 2 g     bisacodyl (DULCOLAX) Suppository 10 mg     Lidocaine (LIDOCARE) 4 % Patch 1-2 patch     benzocaine-menthol (CEPACOL) 15-3.6 MG lozenge 1 lozenge     magnesium hydroxide (MILK OF MAGNESIA) suspension 30 mL     oxyCODONE IR (ROXICODONE) tablet 5-10 mg     potassium chloride SA (K-DUR/KLOR-CON M) CR tablet 20-40 mEq     potassium chloride (KLOR-CON) Packet 20-40 mEq     potassium chloride 10 mEq in 100 mL sterile water intermittent infusion  (premix)     potassium chloride 10 mEq in 100 mL intermittent infusion with 10 mg lidocaine     potassium chloride 20 mEq in 50 mL intermittent infusion     ciprofloxacin (CIPRO) tablet 500 mg     metroNIDAZOLE (FLAGYL) tablet 500 mg     magnesium sulfate 4 g in 100 mL sterile water (premade)     potassium phosphate 15 mmol in D5W 250 mL intermittent infusion     potassium phosphate 20 mmol in D5W 500 mL intermittent infusion     potassium phosphate 20 mmol in D5W 250 mL intermittent infusion     potassium phosphate 25 mmol in D5W 500 mL intermittent infusion     sodium chloride (PF) 0.9% PF flush 3 mL     albuterol neb solution 2.5 mg     albuterol (PROAIR HFA/PROVENTIL HFA/VENTOLIN HFA) Inhaler 2 puff     azelastine (ASTELIN) 0.1 % spray 1-2 spray     docusate sodium (COLACE) capsule 100 mg     fexofenadine (ALLEGRA) tablet 180 mg     levothyroxine (SYNTHROID/LEVOTHROID) tablet 25 mcg     metoclopramide (REGLAN) tablet 10 mg     guaiFENesin-dextromethorphan (ROBITUSSIN DM) 100-10 MG/5ML syrup 5 mL     prochlorperazine (COMPAZINE) Suppository 25 mg     ondansetron (ZOFRAN) injection 8 mg    Or     ondansetron (ZOFRAN-ODT) ODT tab 8 mg    Or     ondansetron (ZOFRAN) tablet 8 mg     senna-docusate (SENOKOT-S;PERICOLACE) 8.6-50 MG per tablet 1 tablet    Or     senna-docusate (SENOKOT-S;PERICOLACE) 8.6-50 MG per tablet 2 tablet     loratadine (CLARITIN) tablet 10 mg     zolpidem (AMBIEN CR) CR tablet 12.5 mg     naloxone (NARCAN) injection 0.1-0.4 mg     beclomethasone (QVAR) 80 MCG/ACT Inhaler 1 puff     mometasone-formoterol (DULERA) 200-5 MCG/ACT oral inhaler 2 puff     heparin lock flush 10 UNIT/ML injection 3 mL       Data   CBC    Recent Labs  Lab 12/10/17  0543 12/09/17 1946 12/09/17  0540 12/08/17 1956   WBC 8.1 8.3 6.6 9.3   RBC 3.21* 3.31* 3.19* 3.42*   HGB 11.0* 11.4* 10.9* 11.9   HCT 35.4 35.7 34.9* 37.5   * 108* 109* 110*   MCH 34.3* 34.4* 34.2* 34.8*   MCHC 31.1* 31.9 31.2* 31.7   RDW 15.4* 15.2*  15.4* 15.7*    238 217 214     CMP    Recent Labs  Lab 12/10/17  0543 12/09/17  0540 12/08/17  2356 12/08/17  0551 12/07/17  1339    139  --  140 140   POTASSIUM 3.7 3.7 4.0 3.3* 3.5   CHLORIDE 107 108  --  108 109   CO2 27 23  --  22 23   ANIONGAP 5 9  --  10 8   * 126*  --  110* 114*   BUN 9 12  --  11 12   CR 0.50* 0.55  --  0.61 0.71   GFRESTIMATED >90 >90  --  >90 84   GFRESTBLACK >90 >90  --  >90 >90   BEN 7.7* 7.8*  --  8.7 8.8   PROTTOTAL 6.0* 6.1*  --  6.7* 7.2   ALBUMIN 2.2* 2.3*  --  2.6* 2.9*   BILITOTAL 2.0* 3.2*  --  8.2* 8.1*   ALKPHOS 390* 520*  --  672* 719*   AST 54* 87*  --  149* 149*   ALT 74* 102*  --  144* 161*     INR    Recent Labs  Lab 12/07/17  2241   INR 1.11

## 2017-12-10 NOTE — PLAN OF CARE
Transfer  Transferred to: 5C  Via: Wheelchair with transport   Reason for transfer:Pt no longer appropriate for 6B- improved patient condition  Family: At bedside and transferred with patient   Belongings: Packed and sent with pt  Chart: Delivered with pt to next unit  Medications: Sent with pt to next unit   Report given to: 5C Charge RN   Pt status: Stable

## 2017-12-10 NOTE — PLAN OF CARE
Problem: Patient Care Overview  Goal: Plan of Care/Patient Progress Review  Outcome: Improving  AVSS. O2 sats wdl on nc. Pain maintained at tolerable level with prn oxycodone x2. robitussin admin x 1. Telemetry maintained. NSR. A+Ox4. L pericardial CT tube dressing CDI. Tube patent. Output marked. Scheduled meds admin as ordered. Sticky note placed for MD to place VAD care orders. Port flushed without issue, + blood return noted. Hep locked btwn use. Up with minimal assist. Void spontaneous. Icteric urine volumes wdl. No stool overnight. Pt request miralax order. Rested btwn cares. Continue POC.

## 2017-12-11 NOTE — PROGRESS NOTES
Brief Cardiology Progress Note:     Chest tube without output for 12 hours from yesterday. Clamped tube today, plan to check a limited TTE this afternoon (ordered). If negative, will watch overnight and pull chest tube tomorrow in the AM.     VANDANA Painter C   General Cardiology   Pager: [phone]

## 2017-12-11 NOTE — PROGRESS NOTES
[facility], [place]  Hematology / Oncology Progress Note     Assessment & Plan   Etta Cervantes is a 57 year old woman with metastatic breast cancer to liver, bones, heart who was seen in oncology clinic on 12/7 AM and found to have transaminitis (previously known but worse) as well as low-grade fever (100.2, 100.7), abdominal bloating, RUQ discomfort, and general malaise. CT abdomen shows biliary dilation concerning for obstruction. She was admitted for further workup and management. In consultation with GI and Cardiology teams, decision was made to pursue pericardiocentesis to address large pericardial effusion prior to ERCP.      Changes today:  -Clamp pericardial drain ~1100, check echo ~1700 tonight. If no fluid reaccumulation, plan to pull drain tomorrow AM.    #Biliary obstruction, concern for cholangitis.    On admission: LFTs elevated. WBC count nml. Low-grade fevers initially, then increased to tmax 102. Patient c/o mild RUQ discomfort without significant pain. Patient also noted general aches and malaise for the past 2-3 weeks. Not septic-appearing.   -GI consulted and patient had ERCP on 12/8 with metal stent placement and drainage of dark colored bile. WBC count remains wnl. Afebrile >24h.  -Surveillance BC in process, neg to date.  -D/c'ed empiric zosyn, switched to cipro + flagyl (x12/9)- continue.   -Daily CBC, CMP. LFTs trending down  -Oxycodone prn pain. Prn antiemetics.       #Pericardial effusion, epicardial mass.    Presumed malignant. Small to moderate effusion noted on CT 11/13 and confirmed on echo 11/15, at which time there were no echocardiographic or clinical signs of tamponade, restriction, obstruction. Also note epicardial mass. Patient was evaluated by cardiologist 11/28; plan was to monitor serial echos while on chemotherapy and watch for signs/symptoms tamponade. CT chest done 12/6 demonstrates increased volume of pericardial effusion, now moderate to large,  and superior pericardial wall enhancement. Patient does have new dry cough. Otherwise, denies significant SOB, dyspnea with or without exertion, PND, chest pain/pressure, leg swelling. She has been HD stable.   -Consulted Cardiology to [facility] for sedation for ERCP. Obtained EKG (sinus tach) and repeat echo that demonstrated hyperkinesis with EF 65-70% and large pericardial effusion with developing echo e/o tamponade. Decision was made to pursue pericardiocentesis prior to ERCP.  -Patient underwent pericardiocentesis 12/8 AM with removal of 1200ml bloody fluid, sent for analysis. Cytology pending. She had pigtail catheter drain placed.                          Per Cards recs:                          -Clamp drain today. Limited echo ~6 hours after clamping. If no reaccumulation, can pull drain   and repeat echo thereafter.  -Continue to monitor on tele while drain in place.      #Mild hypoxia.   Suspect secondary to anesthesia/sedation for procedures. Possibly also related to pericardial effusion. Continue to wean O2.       #Cough. +Human rhinovirus.   Patient reported new dry cough over the past few days PTA with occasional slight wheezing (in setting of asthma at baseline). She notes that her  had a similar cough recently and she thinks other people such as coworkers have been sick. Of note, she declines yearly flu shot. No e/o acute airspace disease or pleural effusion on CT chest.   -Rapid flu (neg) and resp viral PCR is + for human rhinovirus. Continue supportive care.   -Robitussin DM prn.       #Metastatic breast cancer.   E/o disease progression on most recent CT C/A/P and tumor marker rising. Dr. Cassidy instructed her to hold Femara, tylenol, statin. Goal to restart active chemotherapy next week once LFTs return to nml. Had f/u tentatively planned Monday but will still be admitted so sent message to Dr. Cassidy with updates. Will need close clinic f/u on discharge.       #Asthma.   Continue home  inhalers, nebs prn.       FEN:   -Hold off on IVF unless febrile or unstable d/t pericardial effusion   -Lyte replacement PRN per protocol  -RDAT       Prophy/Misc:   -VTE: mechanical  -Bowel regimen      Code status: FULL - discussed with patient on admission  Disposition: Anticipate d/c home within 1-2 days pending status of pericardial drain.    Patient and plan of care discussed with staff attending, Dr. Khalil.    Trish Kilpatrick PA-C  Hematology/Oncology  [phone]    Interval History   Etta reports feeling pretty good this morning. Continues with quite a bit of fatigue. Pain controlled with oxycodone, mostly in area near drain as well as when taking a deep breath. Shortness of breath improved, does better with oxygen overnight vs home CPAP. No significant nausea or vomiting. Had a BM overnight. Urinating normally.    Physical Exam   Temp: 98.2  F (36.8  C) Temp src: Oral BP: 113/72 Pulse: 84 Heart Rate: 86 Resp: 18 SpO2: 92 % O2 Device: Nasal cannula Oxygen Delivery: 2.5 LPM  Vitals:    12/09/17 0235 12/10/17 0500 12/11/17 0825   Weight: 90.2 kg (198 lb 14.4 oz) 99.2 kg (218 lb 11.1 oz) 90.4 kg (199 lb 4.7 oz)     Vital Signs with Ranges  Temp:  [97.5  F (36.4  C)-99.1  F (37.3  C)] 98.2  F (36.8  C)  Pulse:  [84-85] 84  Heart Rate:  [84-97] 86  Resp:  [14-18] 18  BP: (108-126)/(63-72) 113/72  SpO2:  [92 %-97 %] 92 %  I/O last 3 completed shifts:  In: 800 [P.O.:800]  Out: 948 [Urine:900; Chest Tube:48]    Constitutional: Awake, alert, cooperative, no apparent distress, and appears stated age. Seen lying in bed, appears comfortable.  Eyes: Lids and lashes normal, pupils equal, round and reactive to light, extra ocular muscles intact, sclera clear, conjunctiva normal.  ENT: Normocephalic, without obvious abnormality, atraumatic.  Respiratory: Oxygen present via NC. No increased work of breathing, good air exchange, clear to auscultation bilaterally.  Cardiovascular: Pericardial drain in place with  serosanguinous output. Regular rate and rhythm. No murmur or rub auscultated.  GI: Normal bowel sounds, soft, non-distended, non-tender.  Musculoskeletal: No LE edema bilaterally.  Neurologic: Awake, alert, oriented to name, place and time. No focal deficits.  Neuropsychiatric: Calm, normal eye contact, alert, normal affect, memory for past and recent events intact and thought process normal.    Medications        polyethylene glycol  17 g Oral Daily     heparin lock flush  5-10 mL Intracatheter Q24H     heparin  5 mL Intracatheter Q28 Days     lidocaine   Transdermal Q24h     lidocaine   Transdermal Q8H     diclofenac  2 g Transdermal 4x Daily     Lidocaine  1-2 patch Transdermal Q24H     ciprofloxacin  500 mg Oral Q12H SARWAT     metroNIDAZOLE  500 mg Oral Q6H SARWAT     sodium chloride (PF)  3 mL Intracatheter Q8H     docusate sodium  100 mg Oral Daily     levothyroxine  25 mcg Oral Daily     beclomethasone  1 puff Inhalation BID     mometasone-formoterol  2 puff Inhalation BID       Data   Results for orders placed or performed during the hospital encounter of 12/07/17 (from the past 24 hour(s))   CBC with platelets differential   Result Value Ref Range    WBC 7.0 4.0 - 11.0 10e9/L    RBC Count 3.31 (L) 3.8 - 5.2 10e12/L    Hemoglobin 11.3 (L) 11.7 - 15.7 g/dL    Hematocrit 35.2 35.0 - 47.0 %     (H) 78 - 100 fl    MCH 34.1 (H) 26.5 - 33.0 pg    MCHC 32.1 31.5 - 36.5 g/dL    RDW 14.9 10.0 - 15.0 %    Platelet Count 213 150 - 450 10e9/L    Diff Method Automated Method     % Neutrophils 72.9 %    % Lymphocytes 12.1 %    % Monocytes 11.0 %    % Eosinophils 3.3 %    % Basophils 0.1 %    % Immature Granulocytes 0.6 %    Nucleated RBCs 0 0 /100    Absolute Neutrophil 5.1 1.6 - 8.3 10e9/L    Absolute Lymphocytes 0.9 0.8 - 5.3 10e9/L    Absolute Monocytes 0.8 0.0 - 1.3 10e9/L    Absolute Eosinophils 0.2 0.0 - 0.7 10e9/L    Absolute Basophils 0.0 0.0 - 0.2 10e9/L    Abs Immature Granulocytes 0.0 0 - 0.4 10e9/L     Absolute Nucleated RBC 0.0    Comprehensive metabolic panel   Result Value Ref Range    Sodium 140 133 - 144 mmol/L    Potassium 3.4 3.4 - 5.3 mmol/L    Chloride 106 94 - 109 mmol/L    Carbon Dioxide 28 20 - 32 mmol/L    Anion Gap 6 3 - 14 mmol/L    Glucose 109 (H) 70 - 99 mg/dL    Urea Nitrogen 6 (L) 7 - 30 mg/dL    Creatinine 0.43 (L) 0.52 - 1.04 mg/dL    GFR Estimate >90 >60 mL/min/1.7m2    GFR Estimate If Black >90 >60 mL/min/1.7m2    Calcium 7.5 (L) 8.5 - 10.1 mg/dL    Bilirubin Total 1.8 (H) 0.2 - 1.3 mg/dL    Albumin 2.2 (L) 3.4 - 5.0 g/dL    Protein Total 6.2 (L) 6.8 - 8.8 g/dL    Alkaline Phosphatase 348 (H) 40 - 150 U/L    ALT 60 (H) 0 - 50 U/L    AST 45 0 - 45 U/L

## 2017-12-11 NOTE — PROGRESS NOTES
Post ERCP (12/8/2017) with Dr. Mora: Follow-up    Post procedure recommendations: - Check amylase and liver enzymes (AST, ALT, alkaline phosphatase, bilirubin) in the morning. - No repeat ERCP is indicated unless she has worsening jaundice or cholangitis     Patient currently admitted.    Orders placed: none at this time    Lucian WAGNER, RN Coordinator  Dr. Daniel, Dr. Esquivel & Dr. Mora  Advanced Endoscopy  [phone]

## 2017-12-11 NOTE — PROGRESS NOTES
CLINICAL NUTRITION SERVICES    NEW FINDINGS   -Diet: Regular, advanced on 12/9.   -Intakes: fair. Eating 50%. Pt stated her appetite is down, but doing okay.    Interventions  Medical food supplements -ordered supplements with meals and gave pt the supplement menu.    Demario Ferguson RD, LD  5C/BMT Dietitian  Pager: [phone]

## 2017-12-11 NOTE — PLAN OF CARE
Problem: Patient Care Overview  Goal: Plan of Care/Patient Progress Review  Outcome: No Change  Patient transferred here from unit 6B this afternoon. Afebrile, O2 sats at 92-93% at 2L NC, on tele with NSR rhythm. Other VSS. Having dry cough given Robitussin x2 also c/o pain on the drain site, given Oxycodone x2. LS dim on the bases.  at bedside and is supportive. She is SBA due to wires/drain. Low appetite. Continue with plan of care.

## 2017-12-11 NOTE — PLAN OF CARE
Problem: Infection, Risk/Actual (Adult)  Goal: Infection Prevention/Resolution  Patient will demonstrate the desired outcomes by discharge/transition of care.   Outcome: No Change   12/11/17 0546   Infection, Risk/Actual (Adult)   Infection Prevention/Resolution making progress toward outcome       Comments: Patient vitals signs are stable; remains afebrile.  Overnight patient was utilizing her CPAP and oxygen sats remained in the upper 80's to low 90's; replaced CPAP with nasal cannula and O2 sat went to 97%.  Patient does have an infrequent non productive cough which she is utilizing guaifenesin and Cepacol lozenges to treat with good relief.  Recommended that patient continue to wear NC vs. CPAP as it improves her oxygen delivery and patient agreed.  Patient reports pain in upper left flank area where drain in place.  Minimal to no drainage overnight.  Patient up to bedside commode overnight; voiding adequate amounts; urine much less concentrated than previously.  Patient is up ad ashanti in room; may need help with disconnecting telemetry and arranging drainage container.  Patient remains pleasant and calm; no acute distress or complaints noted.  Continue to monitor closely and notify care team of any status changes.  Continue current plan of care.

## 2017-12-12 NOTE — PLAN OF CARE
Problem: Patient Care Overview  Goal: Plan of Care/Patient Progress Review  Outcome: Declining   12/11/17 1843   OTHER   Plan Of Care Reviewed With patient   Plan of Care Review   Progress no change     Tmax 100.7 at 1551. OVSS.  Pericardial drain was clamped at 1115, Echo done around 1500.  Per provider orders leaving tube clamped. Blood cultures drawn, UA sent. Pt will be going to xray to have two views taken. Pt had oxycodone x1 for incisional pain and 325 mg tylenol x1 for temp spike. Dressing change done.  at bedside.     Problem: Infection, Risk/Actual (Adult)  Goal: Identify Related Risk Factors and Signs and Symptoms  Related risk factors and signs and symptoms are identified upon initiation of Human Response Clinical Practice Guideline (CPG).   Outcome: Declining   12/11/17 1843   Infection, Risk/Actual   Related Risk Factors (Infection, Risk/Actual) surgery/procedure;chronic illness/condition   Signs and Symptoms (Infection, Risk/Actual) body temperature changes

## 2017-12-12 NOTE — DISCHARGE SUMMARY
[facility], [place]  Discharge Summary  Hematology / Oncology    Date of Admission:  12/7/2017  Date of Discharge:  12/12/2017  Discharging Provider: Trish Kilpatrick PA-C/Kevin Khalil DO    Discharge Diagnoses    Biliary obstruction  Carcinoma of breast metastatic to liver, unspecified laterality (H)  Pericardial effusion  URI with + human rhinovirus    History of Present Illness   Etta Cervantes is a 57 year old woman with metastatic breast cancer to liver, bones, heart who was seen in oncology clinic on 12/7 AM and found to have transaminitis (previously known but worse) as well as low-grade fever (100.2, 100.7), abdominal bloating, RUQ discomfort, and general malaise. CT abdomen shows biliary dilation concerning for obstruction. She was admitted for further workup and management. In consultation with GI and Cardiology teams, decision was made to pursue pericardiocentesis to address large pericardial effusion prior to ERCP. ~1200ml was drained from pericardium on 12/8. Pigtail drain was placed. On 12/11, drainage had significantly slowed, so drain was clamped and echo showed trivial pericardial effusion. On 12/12, drain was removed in IR by cards team. ERCP performed after pericardium drained. Had metal stent placed and drainage of dark-colored bile. Patient was afebrile starting 12/10, aside from 1 isolated fever overnight on 12/12 up to 100.7F. She was feeling well aside from pain at pericardial drain site which was controlled with oxycodone. Fever overnight prior to discharge was felt to possibly be due to atelectasis, viral URI with + human rhinovirus. Rest of infectious workup was negative, LFTs stable, and no abdominal pain. She was discharged with 3 more days of Cipro/Flagyl to complete 7 days of therapy for cholangitis. She will follow up in clinic at end of this week or early next week with labs prior (staff message sent to scheduling and labs ordered). Repeat limited echo next  week.    Hospital Course   Etta Cervantes was admitted on 12/7/2017.  The following problems were addressed during her hospitalization:      #Biliary obstruction, concern for cholangitis.    On admission: LFTs elevated. WBC count nml. Low-grade fevers initially, then increased to tmax 102. Patient c/o mild RUQ discomfort without significant pain. Patient also noted general aches and malaise for the past 2-3 weeks. Not septic-appearing.   -GI consulted and patient had ERCP on 12/8 with metal stent placement and drainage of dark colored bile. WBC count remains wnl. Afebrile >48h aside from isolated fever of 100.7F night prior to discharge.  -Surveillance BC in process, neg to date.  -D/c'ed empiric zosyn, switched to cipro + flagyl (x12/9)- continue, will complete 7 days oral therapy.   -Daily CBC, CMP. LFTs trending down.  -Oxycodone prn pain. Prn antiemetics.       #Pericardial effusion, epicardial mass.    Presumed malignant. Small to moderate effusion noted on CT 11/13 and confirmed on echo 11/15, at which time there were no echocardiographic or clinical signs of tamponade, restriction, obstruction. Also note epicardial mass. Patient was evaluated by cardiologist 11/28; plan was to monitor serial echos while on chemotherapy and watch for signs/symptoms tamponade. CT chest done 12/6 demonstrates increased volume of pericardial effusion, now moderate to large, and superior pericardial wall enhancement. Patient does have new dry cough. Otherwise, denies significant SOB, dyspnea with or without exertion, PND, chest pain/pressure, leg swelling. She has been HD stable.   -Consulted Cardiology to [facility] safety for sedation for ERCP. Obtained EKG (sinus tach) and repeat echo that demonstrated hyperkinesis with EF 65-70% and large pericardial effusion with developing echo e/o tamponade. Decision was made to pursue pericardiocentesis prior to ERCP.  -Patient underwent pericardiocentesis 12/8 AM with removal of 1200ml bloody  fluid, sent for analysis. Cytology pending. She had pigtail catheter drain placed on 12/8, clamped on 12/11, and removed on 12/12. Repeated limited echo as outpatient next week.      #Mild hypoxia.   Suspect secondary to anesthesia/sedation for procedures. Possibly also related to pericardial effusion. Weaned off O2 on day of discharge.      #Cough. +Human rhinovirus.   Patient reported new dry cough over the past few days PTA with occasional slight wheezing (in setting of asthma at baseline). She notes that her  had a similar cough recently and she thinks other people such as coworkers have been sick. Of note, she declines yearly flu shot. No e/o acute airspace disease or pleural effusion on CT chest.   -Rapid flu (neg) and resp viral PCR is + for human rhinovirus. Continue supportive care.   -Robitussin DM prn.       #Metastatic breast cancer.   E/o disease progression on most recent CT C/A/P and tumor marker rising. Dr. Cassidy instructed her to hold Femara, tylenol, statin. Goal to restart active chemotherapy next week once LFTs return to nml. Had f/u tentatively planned Monday 12/11 but will still be admitted so sent message to Dr. Cassidy with updates. Will need close clinic f/u on discharge. Message sent to scheduling as above.      #Asthma.   Continue home inhalers, nebs prn.     #Anemia.  -Secondary to underlying malignancy. No transfusions needed while inpatient.     Patient and plan of care discussed with staff attending, Dr. Khalil.     Trish Kilpatrick PA-C  Hematology/Oncology  [phone]    Significant Results and Procedures   Pericardiocentesis with drain placement 12/8  ERCP 12/8  Pericardial drain removal 12/12    Pending Results   These results will be followed up by myself, outpatient team.  Unresulted Labs Ordered in the Past 30 Days of this Admission     Date and Time Order Name Status Description    12/12/2017 1219 Urine Culture Aerobic Bacterial In process     12/11/2017 1626 Blood  culture Preliminary     12/8/2017 0142 Fluid Culture Aerobic Bacterial Preliminary     12/8/2017 0142 Cytology non gyn In process     12/7/2017 1418 Blood culture Preliminary     12/7/2017 1418 Blood culture Preliminary           Code Status   Full Code    Primary Care Physician   Johana Fairbanks    Discharge Disposition   Discharged to home  Condition at discharge: Stable    Consultations This Hospital Stay   GI PANCREATICOBILIARY ADULT IP CONSULT  ANESTHESIOLOGY IP CONSULT  CARDIOLOGY GENERAL ADULT IP CONSULT  MEDICATION HISTORY IP PHARMACY CONSULT  PHYSICAL THERAPY ADULT IP CONSULT    Discharge Orders     Reason for your hospital stay   You were hospitalized for a bile duct obstruction causing an infection, as well as to have fluid removed from the sac around your heart.     Activity   Your activity upon discharge: activity as tolerated     When to contact your care team   Please call the [facility] [place] [phone] for fever (temp >100.5), chills, uncontrolled nausea, vomiting, constipation, or diarrhea, unrelieved pain, bleeding not relieved with pressure, dizziness, chest pain, shortness of breath, loss of consciousness, and any new or concerning symptoms.     Full Code     Diet   Follow this diet upon discharge: Regular       Discharge Medications   Current Discharge Medication List      START taking these medications    Details   diclofenac (VOLTAREN) 1 % GEL topical gel Place 2 g onto the skin 4 times daily  Qty: 100 g, Refills: 0    Associated Diagnoses: Carcinoma of breast metastatic to liver, unspecified laterality (H)      Lidocaine (LIDOCARE) 4 % Patch Place 1-2 patches onto the skin every 24 hours  Qty: 30 patch, Refills: 0    Associated Diagnoses: Carcinoma of breast metastatic to liver, unspecified laterality (H)      ciprofloxacin (CIPRO) 500 MG tablet Take 1 tablet (500 mg) by mouth every 12 hours for 3 days  Qty: 6 tablet, Refills: 0    Associated  Diagnoses: Carcinoma of breast metastatic to liver, unspecified laterality (H); Biliary obstruction      magnesium hydroxide (MILK OF MAGNESIA) 400 MG/5ML suspension Take 30 mLs by mouth daily as needed for constipation  Qty: 105 mL    Associated Diagnoses: Carcinoma of breast metastatic to liver, unspecified laterality (H)      polyethylene glycol (MIRALAX/GLYCOLAX) Packet Take 17 g by mouth daily  Qty: 7 packet    Associated Diagnoses: Carcinoma of breast metastatic to liver, unspecified laterality (H)      senna-docusate (SENOKOT-S;PERICOLACE) 8.6-50 MG per tablet Take 1-2 tablets by mouth 2 times daily as needed for constipation  Qty: 100 tablet    Associated Diagnoses: Carcinoma of breast metastatic to liver, unspecified laterality (H)      metroNIDAZOLE (FLAGYL) 500 MG tablet Take 1 tablet (500 mg) by mouth every 6 hours for 3 days  Qty: 12 tablet, Refills: 0    Associated Diagnoses: Carcinoma of breast metastatic to liver, unspecified laterality (H); Biliary obstruction         CONTINUE these medications which have NOT CHANGED    Details   loratadine (CLARITIN) 10 MG tablet Take 10 mg by mouth daily as needed for allergies      zolpidem (AMBIEN CR) 12.5 MG CR tablet Take 1 tablet by mouth at bed time.  Qty: 30 tablet, Refills: 5    Associated Diagnoses: Insomnia due to medical condition      DULERA 200-5 MCG/ACT oral inhaler INHALE 2 PUFFS INTO THE LUNGS TWICE DAILY  Qty: 3 Inhaler, Refills: 3    Associated Diagnoses: Moderate persistent asthma without complication      QVAR 80 MCG/ACT Inhaler INHALE 1 PUFFS INTO THE EACH NOSTRIL 2 TIMES DAILY  Qty: 26.1 g, Refills: 3    Associated Diagnoses: Chronic rhinitis      Phenylephrine-DM-GG (ROBITUSSIN COUGH/COLD CF PO) Take 10 mLs by mouth as needed     Associated Diagnoses: Breast cancer metastasized to axillary lymph node, right (H)      levothyroxine (SYNTHROID/LEVOTHROID) 25 MCG tablet TAKE 1 TABLET (25 MCG) BY MOUTH DAILY  Qty: 90 tablet, Refills: 2     Associated Diagnoses: Hypothyroidism due to acquired atrophy of thyroid      oxyCODONE (ROXICODONE) 5 MG IR tablet Take 1 tablet (5 mg) by mouth every 4 hours as needed for moderate to severe pain  Qty: 30 tablet, Refills: 0    Associated Diagnoses: Secondary malignant neoplasm of liver (H)      calcium carb-cholecalciferol ( CALCIUM 600+D3) 600-500 MG-UNIT CAPS Take 2 tablets by mouth daily      Docusate Calcium (STOOL SOFTENER PO) Take 100 mg by mouth daily       CRANBERRY PO Take 1 capsule by mouth daily       azelastine (ASTELIN) 0.1 % nasal spray Spray 1-2 sprays into both nostrils 2 times daily as needed for rhinitis  Qty: 3 Bottle, Refills: 3    Associated Diagnoses: Chronic rhinitis      order for DME Equipment being ordered: AnyiviKiran post-lumpectomy compression pad x2  Qty: 1 each, Refills: 0    Associated Diagnoses: Lymphedema; Lymphedema of breast      letrozole (FEMARA) 2.5 MG tablet Take 1 tablet (2.5 mg) by mouth daily  Qty: 30 tablet, Refills: 3    Associated Diagnoses: Carcinoma of breast metastatic to liver, unspecified laterality (H); Bone metastasis (H); Pericardial effusion; Bilateral malignant neoplasm of breast in female, estrogen receptor positive, unspecified site of breast (H); 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); Secondary malignant neoplasm of liver (H); Chemotherapy-induced neutropenia (H); Emphysematous bleb of lung (H); Hypothyroidism, unspecified type; Hyperlipidemia LDL goal <130; Moderate persistent asthma without complication; Mucositis due to chemotherapy      diphenhydrAMINE (BENADRYL) 25 MG tablet Take 1 tablet (25 mg) by mouth every 8 hours as needed for itching or allergies  Qty: 1 tablet, Refills: 0    Associated Diagnoses: Carcinoma of breast metastatic to liver, unspecified laterality (H); Bone metastasis (H); Pericardial effusion; Bilateral malignant neoplasm of breast in female, estrogen receptor positive, unspecified site of breast (H);  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); Secondary malignant neoplasm of liver (H); Chemotherapy-induced neutropenia (H); Emphysematous bleb of lung (H); Hypothyroidism, unspecified type; Hyperlipidemia LDL goal <130; Moderate persistent asthma without complication; Mucositis due to chemotherapy      metoclopramide (REGLAN) 10 MG tablet Take 1 tablet (10 mg) by mouth 2 times daily as needed  Qty: 120 tablet, Refills: 1    Associated Diagnoses: Bilateral malignant neoplasm of breast in female, estrogen receptor positive, unspecified site of breast (H)      fexofenadine (ALLEGRA ALLERGY) 180 MG tablet Take 180 mg by mouth daily as needed for allergies      albuterol (2.5 MG/3ML) 0.083% neb solution Take 1 vial (2.5 mg) by nebulization every 4 hours as needed for shortness of breath / dyspnea  Qty: 30 vial, Refills: 3    Associated Diagnoses: Moderate persistent asthma, uncomplicated      predniSONE (DELTASONE) 20 MG tablet Take 1 tablet (20 mg) by mouth 2 times daily For Yellow or Red zone on Asthma Action Plan.  Qty: 10 tablet, Refills: 1    Associated Diagnoses: Moderate persistent asthma, uncomplicated      magic mouthwash suspension (diphenhydrAMINE, lidocaine, aluminum-magnesium & simethicone) Swish and swallow 10 mLs in mouth every 6 hours as needed for mouth sores  Qty: 240 mL, Refills: 10    Associated Diagnoses: Mucositis due to chemotherapy; Breast cancer metastasized to axillary lymph node, right (H)      albuterol (VENTOLIN HFA) 108 (90 BASE) MCG/ACT inhaler Inhale 2 puffs into the lungs every 4 hours as needed  Qty: 1 Inhaler, Refills: 2    Associated Diagnoses: Moderate persistent asthma, uncomplicated         STOP taking these medications       ribociclib (KISQALI 600 DOSE) 200 MG tablet Comments:   Reason for Stopping:         atorvastatin (LIPITOR) 10 MG tablet Comments:   Reason for Stopping:             Allergies   Allergies   Allergen Reactions     Animal Dander Cough      Itchy eyes     Cats      Dust Mites Cough     Itchy eyes     Pollen Extract      Other reaction(s): Cough  Itchy eyes     Seasonal Allergies      Levaquin [Levofloxacin] Rash     States she has violent dreams from taking this     Data   Most Recent 3 CBC's:  Recent Labs   Lab Test  12/12/17   0345  12/11/17   0232  12/10/17   0543   WBC  6.2  7.0  8.1   HGB  11.6*  11.3*  11.0*   MCV  107*  106*  110*   PLT  237  213  208      Most Recent 3 BMP's:  Recent Labs   Lab Test  12/12/17   1124  12/12/17   0345  12/11/17   0232  12/10/17   0543   NA   --   140  140  139   POTASSIUM  3.5  3.1*  3.4  3.7   CHLORIDE   --   104  106  107   CO2   --   27  28  27   BUN   --   8  6*  9   CR   --   0.55  0.43*  0.50*   ANIONGAP   --   9  6  5   BEN   --   8.0*  7.5*  7.7*   GLC   --   122*  109*  104*     Most Recent 2 LFT's:  Recent Labs   Lab Test  12/12/17   0345  12/11/17   0232   AST  42  45   ALT  45  60*   ALKPHOS  302*  348*   BILITOTAL  1.8*  1.8*     Most Recent INR's and Anticoagulation Dosing History:  Anticoagulation Dose History     Recent Dosing and Labs Latest Ref Rng & Units 7/11/2017 12/7/2017    INR 0.86 - 1.14 1.00 1.11        Most Recent 6 Bacteria Isolates From Any Culture (See EPIC Reports for Culture Details):  Recent Labs   Lab Test  12/11/17   1657  12/08/17   1200  12/07/17   1445  10/22/17   1448  07/11/17   2142  07/11/17   2132   CULT  No growth after 11 hours  Culture negative monitoring continues  No growth after 5 days  No growth after 5 days  50,000 to 100,000 colonies/mL  Escherichia coli  *  No growth after 6 days  >100,000 colonies/mL Escherichia coli*     Results for orders placed or performed during the hospital encounter of 12/07/17   XR Surgery MUNA G/T 5 Min Fluoro w Stills    Narrative    This exam was marked as non-reportable because it will not be read by a   radiologist or a [place] non-radiologist provider.             XR Chest 2 Views    Narrative    XR CHEST 2 VW  12/11/2017 7:11  PM    History: Fever    Comparison: CT 12/6/2017 chest x-ray 11/8/2017    Findings:   PA and lateral views of the chest are obtained. There is a left chest  Port-A-Cath with the tip in stable position projecting over the high  SVC. Pericardial drain projects over the heart. The trachea is in the  midline. Cardiac mediastinal silhouette is stable and mildly enlarged.  There is pneumopericardium and/or pneumomediastinum. Lung volumes are  decreased. Bilateral perihilar and left greater than right basilar  opacities of atelectasis or consolidation. No pneumothorax. Small left  greater than right pleural effusions. Lucency in the paramediastinal  right lung seems to correspond to largest cystic lesion seen on prior  CT. Metallic biliary stent.      Impression    IMPRESSION:  1.  Mildly enlarged cardiac mediastinal silhouette.  Pneumopericardium/pneumomediastinum presumably relates to the presence  of the pericardial drainage catheter.  2.  Small left greater than right pleural effusions.  3.  Perihilar and left greater than right basilar opacities could  represent consolidation, especially given provided history.  Alternatively, findings could represent atelectasis.    I have personally reviewed the examination and initial interpretation  and I agree with the findings.    RONNIE CLAIRE MD

## 2017-12-12 NOTE — PROGRESS NOTES
Had provided pt care x 4hrs this kay. 1022-1907. Temp 100.8- Pt had cxr done. Ua/uc sent and bld cx. Pt given tylenol and pt only wanted 325mg due to the MD thinking I shouldn't have it cause it might be causing my liver obstruction. Pain of the lateral left rib area that pt received oxycodone for and topical analgeisa for discomfort that pt states makes it ok.     F/u with md if tylenol can be resumed on pt.

## 2017-12-12 NOTE — PROGRESS NOTES
"Cardiology Progress Note  December 12, 2017    This patient was discussed with Dr. Alegre and the note below reflects our joint plan.   Assessment and Recommendation:   Etta Cervantes is a 57 year old female with metastatic breast cancer who was seen in oncology clinic today with bloating, abdominal discomfort, and malaise.  They are concerned about a biliary obstruction vs cholangitis that will need ERCP. Cardiology was consulted regarding possible need of pericardiocentesis prior to ERCP.     Interval: Clamped chest tube yesterday after the output was <50 cc's over 12 hours. Repeat TTE hours later with only trivial pericardial effusion. Overnight patient was hemodynamically stable without concerning s/s of tamponade. Plan was to pull the chest tube this AM attempted at bedside however there was quite a bit of resistance against pulling and the patient was quite uncomfortable with this measure.      Plan: Discussed with IR, plan is to remove in cath lab with jimena        Thank you for consulting the cardiovascular services at the [facility]. Please do not hesitate to call us with any questions.     Chato Bourne PA-C  [facility] Cardiology Consult Team  Pager [phone] or [phone]    Interval History: No palpitations, CP or other cardiac symptoms.     Review of systems: 4 point ROS including Respiratory, CV, GI and , other than that noted in the HPI,  is negative     Objective:   Vitals: /82 (BP Location: Right arm)  Pulse 95  Temp 98.6  F (37  C) (Oral)  Resp 16  Ht 1.626 m (5' 4\")  Wt 90.6 kg (199 lb 11.2 oz)  LMP 02/06/2013  SpO2 92%  BMI 34.28 kg/m2     Intake/Output Summary (Last 24 hours) at 12/12/17 1338  Last data filed at 12/12/17 1100   Gross per 24 hour   Intake              820 ml   Output             1470 ml   Net             -650 ml     Gen: AxO, NAD   Lungs: CTAB   CV: S1S2,Reg, no M/R/G noted. No JVD.   Abd: Soft   Ext: No pedal edema  Labs:  No results found " for: TROPI, TROPONIN, TROPR, TROPN  Diagnostic studies:     Echo 12/11/17:  Trivial pericardial effusion is present.  The inferior vena cava was normal in size with preserved respiratory  variability. Estimated mean right atrial pressure is 3 mmHg.     Meds per EPIC EMR:    ciprofloxacin  500 mg Oral Q12H SARWAT     metroNIDAZOLE  500 mg Oral Q6H SARWAT     polyethylene glycol  17 g Oral Daily     heparin lock flush  5-10 mL Intracatheter Q24H     heparin  5 mL Intracatheter Q28 Days     lidocaine   Transdermal Q24h     lidocaine   Transdermal Q8H     diclofenac  2 g Transdermal 4x Daily     Lidocaine  1-2 patch Transdermal Q24H     sodium chloride (PF)  3 mL Intracatheter Q8H     docusate sodium  100 mg Oral Daily     levothyroxine  25 mcg Oral Daily     beclomethasone  1 puff Inhalation BID     mometasone-formoterol  2 puff Inhalation BID

## 2017-12-12 NOTE — PROGRESS NOTES
12/12/17 1325   Quick Adds   Type of Visit Initial PT Evaluation   Living Environment   Lives With spouse   Living Arrangements apartment   Home Accessibility bed and bath on same level;tub/shower is not walk in   Number of Stairs to Enter Home 0   Number of Stairs Within Home 0   Transportation Available car   Self-Care   Dominant Hand right   Usual Activity Tolerance good   Current Activity Tolerance fair   Regular Exercise no   Equipment Currently Used at Home none   Activity/Exercise/Self-Care Comment Ws working 4-6 hrs per day 5 days a week as able   Functional Level Prior   Ambulation 0-->independent   Transferring 0-->independent   Toileting 0-->independent   Bathing 0-->independent   Dressing 0-->independent   Eating 0-->independent   Communication 0-->understands/communicates without difficulty   Swallowing 0-->swallows foods/liquids without difficulty   Cognition 0 - no cognition issues reported   Fall history within last six months no   Which of the above functional risks had a recent onset or change? ambulation   General Information   Onset of Illness/Injury or Date of Surgery - Date 12/07/17   Referring Physician Trish Kilpatrick, BETH   Patient/Family Goals Statement Return home   Pertinent History of Current Problem (include personal factors and/or comorbidities that impact the POC) Etta Cervantes is a 57 year old female with a history of breast cancer (metastic to liver and bones) who presents to the ED with abdominal pain. Per review of her chart, she a CT of her chest, abdomen, and pelvis with contrast yesterday which showed progression of hepatic metastatic disease, moderate intrahepatic biliary ductal dilation, increased pericardial effusion since 11/13/17, new finding of small nodularity at the omentum, multifocal stable indeterminate pulmonary nodules, and progression of adenopathy at the right axilla and upper abdominal retroperitoneum. Pt had pericardial drain placed and is scheduled to be  "removed today.   Precautions/Limitations abdominal precautions   Cognitive Status Examination   Orientation orientation to person, place and time   Level of Consciousness alert   Follows Commands and Answers Questions 100% of the time   Personal Safety and Judgment intact   Memory intact   Pain Assessment   Patient Currently in Pain No   Integumentary/Edema   Integumentary/Edema no deficits were identifed   Posture    Posture Not impaired   Range of Motion (ROM)   ROM Comment LE ROM within normal limits   Strength   Strength Comments LE strength is 5/5; endurance is low   Bed Mobility   Bed Mobility Comments Slow but independent   Transfer Skills   Transfer Comments Slow but independent and with low endurance.   Gait   Gait Comments Ambulates without an assistive device; fatigues quickly; slow pace with normal gait pattern.   Balance   Balance Comments Normal   General Therapy Interventions   Planned Therapy Interventions risk factor education;home program guidelines;progressive activity/exercise   Clinical Impression   Criteria for Skilled Therapeutic Intervention evaluation only   PT Diagnosis Difficulty walking   Influenced by the following impairments Metastatic disease   Functional limitations due to impairments Ambulation   Clinical Presentation Evolving/Changing   Clinical Presentation Rationale Condition is improving allowing for more functional movement.   Clinical Decision Making (Complexity) Low complexity   Therapy Frequency` other (see comments)   Predicted Duration of Therapy Intervention (days/wks) (Evaluation and one treatment)   Anticipated Discharge Disposition Home with Assist   Risk & Benefits of therapy have been explained Yes   Patient, Family & other staff in agreement with plan of care Yes   [facility] AM-PAC TM \"6 Clicks\"   2016, Trustees of [facility], under license to [employer].  All rights reserved.   6 Clicks Short Forms Basic Mobility Inpatient Short Form   [place] " "[place]  \"6 Clicks\" V.2 Basic Mobility Inpatient Short Form   1. Turning from your back to your side while in a flat bed without using bedrails? 4 - None   2. Moving from lying on your back to sitting on the side of a flat bed without using bedrails? 4 - None   3. Moving to and from a bed to a chair (including a wheelchair)? 4 - None   4. Standing up from a chair using your arms (e.g., wheelchair, or bedside chair)? 4 - None   5. To walk in hospital room? 4 - None   6. Climbing 3-5 steps with a railing? 4 - None   Basic Mobility Raw Score (Score out of 24.Lower scores equate to lower levels of function) 24   Total Evaluation Time   Total Evaluation Time (Minutes) 5     "

## 2017-12-12 NOTE — PLAN OF CARE
Problem: Patient Care Overview  Goal: Plan of Care/Patient Progress Review  Physical Therapy Discharge Summary    Reason for therapy discharge:    All goals and outcomes met, no further needs identified.    Progress towards therapy goal(s). See goals on Care Plan in [facility] electronic health record for goal details.  Goals met    Therapy recommendation(s):    Continue home exercise program.

## 2017-12-12 NOTE — PLAN OF CARE
Problem: Patient Care Overview  Goal: Plan of Care/Patient Progress Review  Discharge Planner PT      PT 5C    Patient plan for discharge: Home with assist of spouse.  Current status: PT order acknowledged. PT evaluation completed and treatment initiated. Explained PT role and POC and pt expressed understanding. Pt completed supine and seated LE exercises. Legs fatigue rapidly. Pt demonstrated slow but independent transfers supine <> sit, sit to stand. Standing balance is normal. Pt ambulated without assistive device ~ 50' with slow pace, normal gait pattern and balance. Legs fatigued during activity. Pt instructed in home exercise program for walking.  will assist her with walking. Pt feels safe returning home and is ready to discharge anytime. No further PT is indicated unless she remains in the hospital for several days. PT order will be completed and PT will sign off at this time.  Barriers to return to prior living situation: none; pt is aware of low endurance at this time and the need to gradually increase her activity.  Recommendations for discharge: Home with assist. Discussed option of HH PT but pt declined at this time. Instructed pt to discuss this with her MD after discharge if she does not progree at home.  Rationale for recommendations: Pt is independent functionally but may need additional resources to progress at home if she has functional weakness develop.       Entered by: Haroldo Marquis 12/12/2017 2:05 PM

## 2017-12-12 NOTE — PLAN OF CARE
Problem: Infection, Risk/Actual (Adult)  Goal: Infection Prevention/Resolution  Patient will demonstrate the desired outcomes by discharge/transition of care.   Outcome: Improving   12/12/17 1321   Infection, Risk/Actual (Adult)   Infection Prevention/Resolution making progress toward outcome     Carin vital signs have been stable this shift.  Cardiology team came to unit to pull her drain today but they were not able to do so here on the unit as there was resistance present.  Plan is for her now to go down to IR this afternoon to have drain removed.  Primary team dc'd telemetry and oxygen saturation monitoring and changed her antibiotic coverage to flagyl and cipro.  She potentially maybe able to be discharged this afternoon and she called her  to let him know.  Much will depend on how she feels once the drain has been removed.  She has received oxycodone twice this shift for tenderness present at the site.  Voltaren applied once.  K+ level recheck returned at 3.5   To continue his plan of care.

## 2017-12-12 NOTE — PLAN OF CARE
Problem: Patient Care Overview  Goal: Plan of Care/Patient Progress Review  VSS. Pt wearing own CPAP at night, maintaining sats in mid 90s. Received oxycodone x1 for flank pain with relief. Potassium replacement per sliding scale, received 40 mEq po at 0600, will need 20 mEq with morning meds and a recheck four hours after second dose. Will continue to monitor.

## 2017-12-13 NOTE — TELEPHONE ENCOUNTER
ED/UC/IP follow up phone call:    DOS:  12/12/17  Carcinoma of Breast mMetastatic to liver,  unspecified laterality (H), biliary obstruction    RN please call to follow up.    Number of ED visits in past 12 months = 3/2

## 2017-12-13 NOTE — PROGRESS NOTES
"Status Check:    Pt is a 57 year old female, recently in hospital with Biliary obstruction, pericardial effusion, and URI.  Call placed for status check.    Primary care provider is: Johana Fairbanks    Diagnosis:   Encounter Diagnoses   Name Primary?     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 LIUS Cassidy    How are you doing/feeling?: \"Better. Anxious to know the rest of the plan\".  Reports pain is better though is in need of a refill of Oxycodone and would like to be able to resume utilizing Tylenol for pain.  .  Any further issues?: none at this time.  Next Follow up/Recommendations: Patient scheduled to see Dr. Cassidy tomorrow 12.14.17 at 0830.  Will be scheduled for repeat ECHO per hospital DC instructions; per Dr. Cassidy ok for Oxycodone refill and limited Tylenol use.      Patient instructed to call with any questions or concerns.  Patient states understanding and is in agreement with this plan.    Approximately 8 minutes spent on telephone with patient reviewing assessment and symptom(s) and providing symptom management.    Funmi Ray RN, BSN, OCN  Oncology Hematology   Breast Cancer Navigator  [facility]  [email or web address]  [phone]    "

## 2017-12-14 NOTE — PATIENT INSTRUCTIONS
We are discontinuing the Femara and Kisquali and starting you on Abraxane. Dr. Cassidy would like this to start next week. We would like to see you back in clinic with Dr. Cassidy next week with chemotherapy to be scheduled per treatment plan.      Your prescription (MS Contin) has been given to you to hand carry to the pharmacy of your choice.  When you are in need of a refill, please call your pharmacy and they will send us a request.      Copy of appointments, and after visit summary (AVS) given to patient.  If you have any questions during business hours (M-F 8 AM- 4PM), please call Funmi Ray RN, BSN, OCN Oncology Hematology /Breast Cancer Navigator at [facility] [phone].       For questions after business hours, or on holidays/weekends, please call our after hours Nurse Triage line [phone]. Thank you.

## 2017-12-14 NOTE — PROGRESS NOTES
Hematology/ Oncology Follow-up Visit:  Dec 14, 2017    Reason for Visit:   Chief Complaint   Patient presents with     Oncology Clinic Visit     Recheck Breast CA, discuss treatment        Oncologic History:  Breast cancer (H)  Etta Cervantes presented with increasing lump in the right axilla that s lump has been growing without any pain or associated symptoms. Subsequently she was evaluated by primary care physician. Diagnostic digital mammogram was done on 01/13/2015 showing enlarged lymph nodes in the right axilla. There was some skin thickening in the right breast anteriorly and laterally. There are no parenchymal changes in the right breast. The left breast shows a 1.7 cm spiculated mass at approximately 2 to 3 o'clock position, 15.2 cm away from the nipple. A right breast ultrasound was subsequently done and ultrasound guided biopsy was done. Needle biopsy of the left breast did show infiltrating ductal carcinoma grade I of III with no angiolymphatic invasion seen. No associated ductal carcinoma in situ. Estrogen receptor, progresterone receptor were positive. HER-2/sadie receptor came back negative.. Another biopsy from the right axilla did show metastatic ductal carcinoma. PET scan was done on January 26, 2015 showing few small right posterior cervical chain nodes the largest is 1.2 cm. There is extensive bulky right axillary adenopathy demonstrating hypermetabolic FDG uptake with SUV of 10.6. There is skin thickening involving the right breast which demonstrated low-grade FDG uptake. A 1.2 cm lesion on the lateral aspect of the left breast demonstrated minimally increased FDG uptake with SUV of 2. Scattered hypermetabolic mediastinal lymph nodes located in the left superior anterior mediastinum, right paratracheal and precarinal U, subcranial adenopathy with javon metastases. This focus hypermetabolic FDG uptake identified definitive Amanda posterior aspect off intertrochanteric region of the proximal left  femur consistent with bone metastases. There is also 1.4 cm hypermetabolic FDG uptake identified in the anterior medial segment of the left hepatic lobe consistent with liver metastases. Additional 2.1 cm low-attenuation lesion anterior aspect of the right lobe which needs to be determined. The patient was started on chemotherapy with Taxotere and Cytoxan on February 9, 2015.  She concluded 4 cycles of Taxotere and Cytoxan. She started on Arimidex 1 mg orally daily. Currently she is on Faslodex and ibrance. Subsequently the patient went on to receive Afinitor. The patient also started treatment with Xeloda.       Interval History:  Patient is here today for follow-up following her hospitalization. She has a biliary obstruction. The patient had ERCP with relief of obstruction. She also had drainage of a pericardial effusion. Cytology came back positive for malignant cells. Patient is here today with no complaints of shortness of breath or cough or wheezing. The liver function and bilirubin has normalized. She continues on antibiotics. She is also having lower back pain. She denies any nausea or vomiting. She denies any constipation.    Review Of Systems:  Constitutional: Negative for fever, chills, and night sweats.  Skin: negative.  Eyes: negative.  Ears/Nose/Throat: negative.  Respiratory: No shortness of breath, dyspnea on exertion, cough, or hemoptysis.  Cardiovascular: negative.  Gastrointestinal: negative.  Genitourinary: negative.  Musculoskeletal: Back pain.  Neurologic: negative.  Psychiatric: negative.  Hematologic/Lymphatic/Immunologic: negative.  Endocrine: negative.    All other ROS negative unless mentioned in interval history.    Past medical, social, surgical, and family histories reviewed.    Allergies:  Allergies as of 12/14/2017 - Manuel as Reviewed 12/14/2017   Allergen Reaction Noted     Animal dander Cough 01/23/2015     Cats  07/15/2010     Dust mites Cough 01/23/2015     Pollen extract   01/23/2015     Seasonal allergies  07/15/2010     Levaquin [levofloxacin] Rash 07/17/2017       Current Medications:  Current Outpatient Prescriptions   Medication Sig Dispense Refill     zolpidem (AMBIEN) 10 MG tablet TAKE 1 TABLET 30 MIN PRIOR TO BEDTIME  0     lidocaine, viscous, (XYLOCAINE) 2 % solution        morphine (MS CONTIN) 15 MG 12 hr tablet Take 1 tablet (15 mg) by mouth every 12 hours 60 tablet 0     dexamethasone (DECADRON) 4 MG tablet Take 20 mg (5 tablets) the evening prior to and the morning of Abraxane infusion for 1st cycle only. 10 tablet 0     oxyCODONE IR (ROXICODONE) 5 MG tablet Take 1 tablet (5 mg) by mouth every 4 hours as needed for moderate to severe pain 30 tablet 0     diclofenac (VOLTAREN) 1 % GEL topical gel Place 2 g onto the skin 4 times daily 100 g 0     Lidocaine (LIDOCARE) 4 % Patch Place 1-2 patches onto the skin every 24 hours 30 patch 0     ciprofloxacin (CIPRO) 500 MG tablet Take 1 tablet (500 mg) by mouth every 12 hours for 3 days 6 tablet 0     magnesium hydroxide (MILK OF MAGNESIA) 400 MG/5ML suspension Take 30 mLs by mouth daily as needed for constipation 105 mL      polyethylene glycol (MIRALAX/GLYCOLAX) Packet Take 17 g by mouth daily 7 packet      senna-docusate (SENOKOT-S;PERICOLACE) 8.6-50 MG per tablet Take 1-2 tablets by mouth 2 times daily as needed for constipation 100 tablet      metroNIDAZOLE (FLAGYL) 500 MG tablet Take 1 tablet (500 mg) by mouth every 6 hours for 3 days 12 tablet 0     loratadine (CLARITIN) 10 MG tablet Take 10 mg by mouth daily as needed for allergies       order for DME Equipment being ordered: Venu post-lumpectomy compression pad x2 1 each 0     zolpidem (AMBIEN CR) 12.5 MG CR tablet Take 1 tablet by mouth at bed time. 30 tablet 5     letrozole (FEMARA) 2.5 MG tablet Take 1 tablet (2.5 mg) by mouth daily 30 tablet 3     diphenhydrAMINE (BENADRYL) 25 MG tablet Take 1 tablet (25 mg) by mouth every 8 hours as needed for itching or allergies  "1 tablet 0     DULERA 200-5 MCG/ACT oral inhaler INHALE 2 PUFFS INTO THE LUNGS TWICE DAILY 3 Inhaler 3     QVAR 80 MCG/ACT Inhaler INHALE 1 PUFFS INTO THE EACH NOSTRIL 2 TIMES DAILY 26.1 g 3     Phenylephrine-DM-GG (ROBITUSSIN COUGH/COLD CF PO) Take 10 mLs by mouth as needed        levothyroxine (SYNTHROID/LEVOTHROID) 25 MCG tablet TAKE 1 TABLET (25 MCG) BY MOUTH DAILY 90 tablet 2     metoclopramide (REGLAN) 10 MG tablet Take 1 tablet (10 mg) by mouth 2 times daily as needed 120 tablet 1     fexofenadine (ALLEGRA ALLERGY) 180 MG tablet Take 180 mg by mouth daily as needed for allergies       calcium carb-cholecalciferol (KP CALCIUM 600+D3) 600-500 MG-UNIT CAPS Take 2 tablets by mouth daily       albuterol (2.5 MG/3ML) 0.083% neb solution Take 1 vial (2.5 mg) by nebulization every 4 hours as needed for shortness of breath / dyspnea 30 vial 3     predniSONE (DELTASONE) 20 MG tablet Take 1 tablet (20 mg) by mouth 2 times daily For Yellow or Red zone on Asthma Action Plan. 10 tablet 1     magic mouthwash suspension (diphenhydrAMINE, lidocaine, aluminum-magnesium & simethicone) Swish and swallow 10 mLs in mouth every 6 hours as needed for mouth sores 240 mL 10     Docusate Calcium (STOOL SOFTENER PO) Take 100 mg by mouth daily        CRANBERRY PO Take 1 capsule by mouth daily        azelastine (ASTELIN) 0.1 % nasal spray Spray 1-2 sprays into both nostrils 2 times daily as needed for rhinitis 3 Bottle 3     albuterol (VENTOLIN HFA) 108 (90 BASE) MCG/ACT inhaler Inhale 2 puffs into the lungs every 4 hours as needed 1 Inhaler 2     prochlorperazine (COMPAZINE) 10 MG tablet Take 1 tablet (10 mg) by mouth every 6 hours as needed (Nausea/Vomiting) 30 tablet 2        Physical Exam:  /61 (BP Location: Right arm, Patient Position: Sitting, Cuff Size: Adult Regular)  Pulse 108  Temp 99.3  F (37.4  C) (Tympanic)  Resp 16  Ht 1.626 m (5' 4.02\")  Wt 89.1 kg (196 lb 8 oz)  LMP 02/06/2013  SpO2 97%  Breastfeeding? No  " BMI 33.71 kg/m2  Wt Readings from Last 12 Encounters:   12/14/17 89.1 kg (196 lb 8 oz)   12/12/17 90.6 kg (199 lb 11.2 oz)   12/07/17 90.6 kg (199 lb 11.2 oz)   12/01/17 90.9 kg (200 lb 8 oz)   11/30/17 90.9 kg (200 lb 8 oz)   11/28/17 91.1 kg (200 lb 14.4 oz)   11/16/17 89.8 kg (198 lb)   11/15/17 89.6 kg (197 lb 8 oz)   11/10/17 90.9 kg (200 lb 8 oz)   11/08/17 91.1 kg (200 lb 14.4 oz)   10/22/17 88.7 kg (195 lb 8.8 oz)   10/18/17 89.7 kg (197 lb 12.8 oz)     ECOG performance status: 1  GENERAL APPEARANCE: Healthy, alert and in no acute distress.  HEENT: Sclerae anicteric. PERRLA. Oropharynx without ulcers, lesions, or thrush.  NECK: Supple. No asymmetry or masses.  LYMPHATICS: No palpable cervical, supraclavicular, axillary, or inguinal lymphadenopathy.  RESP: Lungs clear to auscultation bilaterally without rales, rhonchi or wheezes.  CARDIOVASCULAR: Regular rate and rhythm. Normal S1, S2; no S3 or S4. No murmur, gallop, or rub.  ABDOMEN: Soft, nontender. Bowel sounds normal. No palpable organomegaly or masses.  MUSCULOSKELETAL: Extremities without gross deformities noted. No edema of bilateral lower extremities.  SKIN: No suspicious lesions or rashes.  NEURO: Alert and oriented x 3. Cranial nerves II-XII grossly intact.  PSYCHIATRIC: Mentation and affect appear normal.    Laboratory/Imaging Studies:  Orders Only on 12/05/2017   Component Date Value Ref Range Status     Bilirubin Direct 12/05/2017 4.0* 0.0 - 0.2 mg/dL Final     Bilirubin Total 12/05/2017 5.8* 0.2 - 1.3 mg/dL Final     Albumin 12/05/2017 3.1* 3.4 - 5.0 g/dL Final     Protein Total 12/05/2017 7.5  6.8 - 8.8 g/dL Final     Alkaline Phosphatase 12/05/2017 677* 40 - 150 U/L Final     ALT 12/05/2017 185* 0 - 50 U/L Final     AST 12/05/2017 167* 0 - 45 U/L Final        Recent Results (from the past 744 hour(s))   CT Chest/Abdomen/Pelvis w Contrast   Result Value    Radiologist flags New mild-moderate pericardial effusion (Urgent)    Narrative    CT  CHEST/ABDOMEN/PELVIS W CONTRAST 11/13/2017 2:54 PM    HISTORY: Breast cancer. Follow-up.    CONTRAST:  97 mL Isovue 370.    TECHNIQUE: CT of the chest, abdomen, and pelvis is performed with IV  contrast.    Routine evaluated structures in the chest include the lungs,  mediastinal structures, pleura, and chest wall.    Routine assessed structures in the abdomen include the liver, spleen,  pancreas, adrenal glands, and kidneys. Also assessed are the  retroperitoneum, gastrointestinal tract, and the abdominal wall.    Intrapelvic anatomy is also assessed.    Radiation dose for this scan is reduced using automated exposure  control, adjustment of the mA and/or kV according to patient size, or  iterative reconstruction technique.    COMPARISON: 9/11/2017.    FINDINGS:    Chest: There is a new 8.9 cm cystic area in the right lung apex. This  may relate to a loculated pneumothorax. A subpleural cyst or bulla are  also in the differential. There is some adjacent atelectasis. There is  less skin thickening and edema in the right breast. A soft tissue  conglomerate in the right axilla is stable. It measures 4.2 x 2.8 cm.  Other right axillary lymph nodes which measure up to 0.7 cm in short  axis dimension are stable. A multinodular thyroid gland is stable.  There is a new mild-moderate pericardial effusion.    Abdomen: There is a ring-enhancing low density lesion in the inferior  right lobe of the liver on image #72 which measures 1.5 cm compared to  0.8 cm on the prior study.  An ill-defined area of low density in the  left lobe on coronal image 31 measures 3.9 cm compared to 3.0 cm on  the prior study. Other liver lesions are more stable. A subcentimeter  vascular lesion in the spleen is stable. Gastrohepatic ligament and  portal adenopathy are stable. A portal lymph node on image #62  measures 1.8 cm. A borderline in size retroperitoneal lymph node on  image #65 is stable at 0.9 cm in short axis dimension. A  previously  seen borderline size aortocaval lymph node is smaller. It measures 0.5  cm compared to 0.9 cm on the prior study.    Pelvis: There is trace free pelvic fluid.      Impression    IMPRESSION:  1.  Hepatic metastases are slightly worse.  2. There is a new mild-moderate pericardial effusion.  3. Other neoplastic disease is stable.    [Access Center: New mild-moderate pericardial effusion]    This report will be copied to the [facility] to ensure a  provider acknowledges the finding. Access Center is available Monday  through Friday 8am-3:30 pm.       LETI CHASE MD   US Abdomen Limited    Narrative    ULTRASOUND  ABDOMEN LIMITED   11/30/2017 12:53 PM     HISTORY:  Breast cancer; elevated liver enzymes. Carcinoma of breast  metastatic to liver, unspecified laterality (H). Bone metastasis (H);  Secondary malignant neoplasm of liver (H). Elevated liver enzymes.    COMPARISON: CT abdomen and pelvis 11/13/2017.    FINDINGS:    Gallbladder: Negative sonographic Osullivan sign. Faint gallbladder  sludge is suggested layering in the gallbladder lumen. No gallstones.    Bile ducts:  Common duct is 0.7 cm and may be normal for age. No  intrahepatic biliary ductal dilatation.    Liver: Hepatic metastasis again identified by ultrasound. One of the  largest is 2.8 x 2.1 x 3 cm at the left liver. The other lesions are  better visualized on the comparison CT.    Pancreas:  Partially obscured but grossly unremarkable.     Right kidney:  Normal.     Aorta and IVC:  Not specifically assessed.       Impression    IMPRESSION:    1. Hepatic metastasis again identified.  2. Gallbladder sludge. No gallstones. Negative sonographic Osullivan  sign.    CHICO FONG MD   CT Chest/Abdomen/Pelvis w Contrast    Narrative    CT CHEST/ABDOMEN/PELVIS WITH CONTRAST December 6, 2017 1:52 PM    HISTORY: Breast cancer with liver metastasis. Elevated liver enzymes,  elevated bilirubin. Bilateral malignant neoplasm of breast in  female,  estrogen receptor positive, unspecified site of breast (H).     TECHNIQUE: CT scan obtained of the chest, abdomen, and pelvis with  oral and IV contrast. 97 ml isovue 370 injected. Radiation dose for  this scan was reduced using automated exposure control, adjustment of  the mA and/or kV according to patient size, or iterative  reconstruction technique.    COMPARISON:  CT chest, abdomen and pelvis 11/13/2017.    FINDINGS:  Chest: Left chest port venous catheter. Increased volume of moderate  to large pericardial effusion. Transverse thickness of this fluid is  now 2.4 cm, previously 0.6 cm at a comparable level on the prior study  series 2 image 36. There is some mild wall enhancement of the superior  portion of the pericardium, for instance around series 2 image 20 in  the region of the cardiac base. Small volume right pleural fluid newly  identified. No acute thoracic aortic abnormality. No large central  pulmonary embolism. The exam was not tailored for assessment of middle  and small branch vessel pulmonary emboli. The distal left  paraesophageal lymph node is 0.8 x 0.5 cm, previously 0.3 x 0.3 cm  series 2 image 48. The remainder of the thoracic lymph nodes remain  small. Right axillary adenopathy without significant change in size  measuring 4.8 x 3.4 cm, previously 4.2 x 2.8 cm image 16. There are  other mildly more prominent lymph nodes as well. No left axillary  adenopathy. Stable thyroid nodules that are small in size. Right  breast skin thickening again noted. No convincing new bony disease  identified. Some ill-defined sclerosis at the right T8 pedicle on  series 2 image 33, and right posteromedial fifth rib on image 18  appears stable. Stable appearing cavitary lesion at the right upper  lung measuring 8.8 cm series 3 image 13. Stable triangular area of  consolidation at the right hilar region at the right midchest series 3  image 27. There are several tiny bilateral stable pulmonary  nodules.  For example, one of these is 0.4 cm medial right lower lobe series 3  image 30. There are multiple other examples bilaterally.    Abdomen/pelvis: No convincing new bony disease at the abdomen or  pelvis levels. Multifocal hepatic metastasis. Anterior central hepatic  mass is 2.7 x 2.6 cm, previously 2.2 x 2.1 cm series 2 image 58.  Lateral segment left hepatic mass is approximately 2.8 x 2.8 cm,  previously 2.2 x 2.4 cm image 53. A caudate lobe mass is approximately  2.9 x 2.2 cm, previously 2 x 2.2 cm image 60. There are other hepatic  masses that are slightly larger, or are stable. Notably, there is new  moderate intrahepatic biliary ductal dilatation. This appears to lead  to the pancreatic head where there is complete decompression of the  common bile duct coronal series 4 image 23. There is a small area of  nodular enhancement newly identified in this region that is  approximately 0.8 cm series 2 image 66. Adenopathy also noted at the  shreyas hepatis. Portal caval enlarged lymph node is 2.5 x 1.3 cm,  previously 1.7 x 1.1 cm image 59. There are other prominent lymph  nodes that appear more prominent in the interval as well, for instance  image 56. Some increase ill-defined soft tissue at the shreyas hepatis  region on image 56 also appears more prominent measuring 2.4 x 1.4 cm  series 2 image 56, previously 1.7 x 1 cm.    The spleen demonstrates a stable small subcentimeter enhancing focus  series 2 image 56. Ill-defined tiny hypodensities in the spleen also  appears stable image 54 and 56. The adrenals, remainder of the  pancreas, and kidneys do not show any new abnormalities. Tiny cyst at  the left kidney is stable image 67.    There are several retroperitoneal lymph nodes noted. Upper abdominal  aortocaval lymph node is 1.5 x 1.3 cm, previously 0.9 x 1.3 cm series  2 image 64. A few other mildly prominent retroperitoneal lymph nodes  again noted. Small to moderate free pelvic fluid. No acute  bowel  abnormality is seen. New nodularity at the anterior omentum with one  region measuring 1.2 cm abutting a small bowel loop series 2 image 77.  Other adjacent nodules noted as well.      Impression    IMPRESSION:  1. Progression of hepatic metastatic disease with enlargement of  multifocal masses. New finding of moderate intrahepatic biliary ductal  dilatation worrisome for developing biliary obstruction. This may  relate to progression of adenopathy at the upper abdomen at the shreyas  hepatis and/or an increasing area of nodular enhancement at the  pancreatic head worrisome for metastatic disease.  2. Moderate to large pericardial effusion significantly increased  since 11/13/2017. Some mild enhancement of the pericardium also noted.  Recommend further workup.  3. New finding of small nodularity at the omentum at the midline and  slightly towards the right that could represent carcinomatosis.  4. Multifocal stable indeterminate pulmonary nodules may represent  pulmonary metastatic disease.  5. Stable sclerosis at the right fifth rib and T8 that could represent  stable sclerotic metastasis.  6. Progression of adenopathy at the right axilla, and upper abdominal  retroperitoneum.    I communicated these results to Dr. Cassidy on 12/6/2017 at 1450  hours.    CHICO FONG MD   XR Surgery MUNA G/T 5 Min Fluoro w Stills    Narrative    This exam was marked as non-reportable because it will not be read by a   radiologist or a [place] non-radiologist provider.             XR Chest 2 Views    Narrative    XR CHEST 2 VW  12/11/2017 7:11 PM    History: Fever    Comparison: CT 12/6/2017 chest x-ray 11/8/2017    Findings:   PA and lateral views of the chest are obtained. There is a left chest  Port-A-Cath with the tip in stable position projecting over the high  SVC. Pericardial drain projects over the heart. The trachea is in the  midline. Cardiac mediastinal silhouette is stable and mildly enlarged.  There is  pneumopericardium and/or pneumomediastinum. Lung volumes are  decreased. Bilateral perihilar and left greater than right basilar  opacities of atelectasis or consolidation. No pneumothorax. Small left  greater than right pleural effusions. Lucency in the paramediastinal  right lung seems to correspond to largest cystic lesion seen on prior  CT. Metallic biliary stent.      Impression    IMPRESSION:  1.  Mildly enlarged cardiac mediastinal silhouette.  Pneumopericardium/pneumomediastinum presumably relates to the presence  of the pericardial drainage catheter.  2.  Small left greater than right pleural effusions.  3.  Perihilar and left greater than right basilar opacities could  represent consolidation, especially given provided history.  Alternatively, findings could represent atelectasis.    I have personally reviewed the examination and initial interpretation  and I agree with the findings.    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 We would recommend starting the patient on chemotherapy with Abraxane 100 mg/m  intravenously weekly. I discussed the rationale behind using Abraxane in detail as well as major side effects including, but not limited to immediate/short term side effects of acute infusion reaction/anaphylaxis, ECG abnormalities and need for baseline ECG, fluid retention, life-threatening infection, arthralgias/myalgias, nausea/vomiting, diarrhea, mucositis, alopecia, anemia, neutropenia, thrombocytopenia, weakness and fatigue as well as long-term side effect of dose and duration dependent  peripheral neuropathy. The patient was instructed to discontinue and refrain from administration of non-steroidal anti-inflammatory medications, aspirin, and blood thinners. Patient instructed to call with signs or symptoms of infection including, but not limited to temperature greater than or equal to 100.5 degrees Fahrenheit, chills, severe sore throat, ear or sinus pain, mouth sores, cough, painful urination, and/or non-healing wound. The patient was given written information handout about Abraxane, agrees to proceed with treatment, and denies any further questions at this time.    (C79.51) Bone metastasis (H)  She will continue on Denosumab 120 mg every 3 months. Patient will continue on calcium and vitamin D supplements.    (C80.1,  I31.8) Malignant pericardial effusion (H)  We will arrange for echocardiogram to monitor pericardial effusion throughout the treatment.    (E03.9) Hypothyroidism, unspecified type  Patient continues on Synthroid 25  g daily.    (K12.31) Mucositis due to chemotherapy  Patient will continue on Magic mouthwash.    (E78.5) Hyperlipidemia LDL goal <130  Lipitor continues on hold    (R74.8) Elevated liver enzymes  Liver enzymes has been normalized.    (J45.40) Moderate persistent asthma without complication  Patient has met is currently well controlled.    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 The patient will continue oxycodone for breakthrough pain.    (K83.1) Biliary obstruction  Bilirubin and liver enzymes have been normalized. We will continue to monitor.    The patient is ready to learn, no apparent learning barriers were identified.  Diagnosis and treatment plans were explained to the patient. The patient expressed understanding of the content. The patient asked appropriate questions. The patient questions were answered to her satisfaction.    Chart documentation with Dragon Voice recognition Software. Although reviewed after completion, some  words and grammatical errors may remain.

## 2017-12-14 NOTE — LETTER
"    12/14/2017         RE: Etta Cervantes  [address] [place]  [place] [address]        Dear Colleague,    Thank you for referring your patient, Etta Cervantes, to the [facility]. Please see a copy of my visit note below.    Chemotherapy Education    Patient is a 57 year old female here today for chemotherapy education, accompanied by .  Pt has a cancer diagnosis of   Encounter Diagnoses   Name Primary?     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  Their Oncologist is Dr. LUIS Cassidy, and PCP is Johana Fairbanks.  Reviewed the following with the patient and their support person:  Treatment goal: palliative  Treatment regimen & duration: Abraxane days 1, 8, and 15 every 28 days; and rationale for strict adherence to this schedule, including specific medication names including pre-treatment medications and at home scheduled or as needed medications, delivery methods,.  Potential side effects: Side effects and management; including skin changes/hand-foot syndrome, anemia, neutropenia, thrombocytopenia, diarrhea/constipation, hair loss syndrome, memory changes/ \"chemobrain\", mouth sores, taste changes, neuropathy, fatigue, myelosuppression, sexuality/infertility, and risk of extravasation or infiltration.  Infection prevention, and monitoring of lab values, what lab tests and what changes of these values meant, along with the possibility of hydration or blood product transfusion, or the need to defer or hold treatment.    Chemotherapy information, including ways it is excreted from the body and cleaning and containment of vomitus or " "other bodily fluid, use of the bathroom, sexual health and intimacy, what to do if needing to miss a treatment, when to call a provider and the need for staff to wear protective equipment.  Importance of Central line care (port) or IV site care.  Written information: Written information including the \"Getting Ready for Chemotherapy: What to Expect, Before, During, and After your Treatment\" booklet, specific drug information guides printed from [employer] network disks, NCI booklets \"Eating Hints: Before, During, and After Cancer Treatment\" and \"Chemotherapy and You\".  Also, a folder with information on when to contact the provider, various programs offered at [facility], and our business card with contact information given; Oncology Clinic, RN Case Manager, and the after hours Nurse Advise Line.~ THIS INFORMATION WAS DECLINED as pt has been on different chemotherapy therapies over the last few years.  General orientation to the Medical Oncology department, Infusion Services department, Huc/scheduling, bathrooms and usual flow of the treatment day provided as well as introduction to the Infusion nurses.  No barriers to learning identified. Patient and family verbalized understanding of all written and verbal information. All questions answered to patients satisfaction.   Other concerns: Reviewed take home medications with patient; she decline prescription for lorazepam stating she has a prescription for that at home and does not want a 2nd one. Compazine sent to [facility] as well as dexamethasone.  Dexamethasone 20 mg the evening prior to and the morning of Abraxane infusion for 1st infusion only.  Reviewed short and long term disability forms and coping skills.  Declines an appt with onc psych Fritz Daniel at this time.  Pt instructed to call with further questions or concerns.  Patient states understanding and is in agreement with this plan.  Copy of appointments, and after visit summary (AVS) given to patient. " Patient discharged ambulatory.    Face to Face time with patient: 35 min    Funmi Ray RN, BSN, OCN  Oncology Hematology   Breast Cancer Navigator  [facility]  [email or web address]  Phone [phone]      Hematology/ Oncology Follow-up Visit:  Dec 14, 2017    Reason for Visit:   Chief Complaint   Patient presents with     Oncology Clinic Visit     Recheck Breast CA, discuss treatment        Oncologic History:  Breast cancer (H)  Etta Cervantes presented with increasing lump in the right axilla that s lump has been growing without any pain or associated symptoms. Subsequently she was evaluated by primary care physician. Diagnostic digital mammogram was done on 01/13/2015 showing enlarged lymph nodes in the right axilla. There was some skin thickening in the right breast anteriorly and laterally. There are no parenchymal changes in the right breast. The left breast shows a 1.7 cm spiculated mass at approximately 2 to 3 o'clock position, 15.2 cm away from the nipple. A right breast ultrasound was subsequently done and ultrasound guided biopsy was done. Needle biopsy of the left breast did show infiltrating ductal carcinoma grade I of III with no angiolymphatic invasion seen. No associated ductal carcinoma in situ. Estrogen receptor, progresterone receptor were positive. HER-2/sadie receptor came back negative.. Another biopsy from the right axilla did show metastatic ductal carcinoma. PET scan was done on January 26, 2015 showing few small right posterior cervical chain nodes the largest is 1.2 cm. There is extensive bulky right axillary adenopathy demonstrating hypermetabolic FDG uptake with SUV of 10.6. There is skin thickening involving the right breast which demonstrated low-grade FDG uptake. A 1.2 cm lesion on the lateral aspect of the left breast demonstrated minimally increased FDG uptake with SUV of 2. Scattered hypermetabolic mediastinal lymph nodes located in the  left superior anterior mediastinum, right paratracheal and precarinal U, subcranial adenopathy with javon metastases. This focus hypermetabolic FDG uptake identified definitive Amanda posterior aspect off intertrochanteric region of the proximal left femur consistent with bone metastases. There is also 1.4 cm hypermetabolic FDG uptake identified in the anterior medial segment of the left hepatic lobe consistent with liver metastases. Additional 2.1 cm low-attenuation lesion anterior aspect of the right lobe which needs to be determined. The patient was started on chemotherapy with Taxotere and Cytoxan on February 9, 2015.  She concluded 4 cycles of Taxotere and Cytoxan. She started on Arimidex 1 mg orally daily. Currently she is on Faslodex and ibrance. Subsequently the patient went on to receive Afinitor. The patient also started treatment with Xeloda.       Interval History:  Patient is here today for follow-up following her hospitalization. She has a biliary obstruction. The patient had ERCP with relief of obstruction. She also had drainage of a pericardial effusion. Cytology came back positive for malignant cells. Patient is here today with no complaints of shortness of breath or cough or wheezing. The liver function and bilirubin has normalized. She continues on antibiotics. She is also having lower back pain. She denies any nausea or vomiting. She denies any constipation.    Review Of Systems:  Constitutional: Negative for fever, chills, and night sweats.  Skin: negative.  Eyes: negative.  Ears/Nose/Throat: negative.  Respiratory: No shortness of breath, dyspnea on exertion, cough, or hemoptysis.  Cardiovascular: negative.  Gastrointestinal: negative.  Genitourinary: negative.  Musculoskeletal: Back pain.  Neurologic: negative.  Psychiatric: negative.  Hematologic/Lymphatic/Immunologic: negative.  Endocrine: negative.    All other ROS negative unless mentioned in interval history.    Past medical, social,  surgical, and family histories reviewed.    Allergies:  Allergies as of 12/14/2017 - Manuel as Reviewed 12/14/2017   Allergen Reaction Noted     Animal dander Cough 01/23/2015     Cats  07/15/2010     Dust mites Cough 01/23/2015     Pollen extract  01/23/2015     Seasonal allergies  07/15/2010     Levaquin [levofloxacin] Rash 07/17/2017       Current Medications:  Current Outpatient Prescriptions   Medication Sig Dispense Refill     zolpidem (AMBIEN) 10 MG tablet TAKE 1 TABLET 30 MIN PRIOR TO BEDTIME  0     lidocaine, viscous, (XYLOCAINE) 2 % solution        morphine (MS CONTIN) 15 MG 12 hr tablet Take 1 tablet (15 mg) by mouth every 12 hours 60 tablet 0     dexamethasone (DECADRON) 4 MG tablet Take 20 mg (5 tablets) the evening prior to and the morning of Abraxane infusion for 1st cycle only. 10 tablet 0     oxyCODONE IR (ROXICODONE) 5 MG tablet Take 1 tablet (5 mg) by mouth every 4 hours as needed for moderate to severe pain 30 tablet 0     diclofenac (VOLTAREN) 1 % GEL topical gel Place 2 g onto the skin 4 times daily 100 g 0     Lidocaine (LIDOCARE) 4 % Patch Place 1-2 patches onto the skin every 24 hours 30 patch 0     ciprofloxacin (CIPRO) 500 MG tablet Take 1 tablet (500 mg) by mouth every 12 hours for 3 days 6 tablet 0     magnesium hydroxide (MILK OF MAGNESIA) 400 MG/5ML suspension Take 30 mLs by mouth daily as needed for constipation 105 mL      polyethylene glycol (MIRALAX/GLYCOLAX) Packet Take 17 g by mouth daily 7 packet      senna-docusate (SENOKOT-S;PERICOLACE) 8.6-50 MG per tablet Take 1-2 tablets by mouth 2 times daily as needed for constipation 100 tablet      metroNIDAZOLE (FLAGYL) 500 MG tablet Take 1 tablet (500 mg) by mouth every 6 hours for 3 days 12 tablet 0     loratadine (CLARITIN) 10 MG tablet Take 10 mg by mouth daily as needed for allergies       order for DME Equipment being ordered: Venu post-lumpectomy compression pad x2 1 each 0     zolpidem (AMBIEN CR) 12.5 MG CR tablet Take 1  tablet by mouth at bed time. 30 tablet 5     letrozole (FEMARA) 2.5 MG tablet Take 1 tablet (2.5 mg) by mouth daily 30 tablet 3     diphenhydrAMINE (BENADRYL) 25 MG tablet Take 1 tablet (25 mg) by mouth every 8 hours as needed for itching or allergies 1 tablet 0     DULERA 200-5 MCG/ACT oral inhaler INHALE 2 PUFFS INTO THE LUNGS TWICE DAILY 3 Inhaler 3     QVAR 80 MCG/ACT Inhaler INHALE 1 PUFFS INTO THE EACH NOSTRIL 2 TIMES DAILY 26.1 g 3     Phenylephrine-DM-GG (ROBITUSSIN COUGH/COLD CF PO) Take 10 mLs by mouth as needed        levothyroxine (SYNTHROID/LEVOTHROID) 25 MCG tablet TAKE 1 TABLET (25 MCG) BY MOUTH DAILY 90 tablet 2     metoclopramide (REGLAN) 10 MG tablet Take 1 tablet (10 mg) by mouth 2 times daily as needed 120 tablet 1     fexofenadine (ALLEGRA ALLERGY) 180 MG tablet Take 180 mg by mouth daily as needed for allergies       calcium carb-cholecalciferol (KP CALCIUM 600+D3) 600-500 MG-UNIT CAPS Take 2 tablets by mouth daily       albuterol (2.5 MG/3ML) 0.083% neb solution Take 1 vial (2.5 mg) by nebulization every 4 hours as needed for shortness of breath / dyspnea 30 vial 3     predniSONE (DELTASONE) 20 MG tablet Take 1 tablet (20 mg) by mouth 2 times daily For Yellow or Red zone on Asthma Action Plan. 10 tablet 1     magic mouthwash suspension (diphenhydrAMINE, lidocaine, aluminum-magnesium & simethicone) Swish and swallow 10 mLs in mouth every 6 hours as needed for mouth sores 240 mL 10     Docusate Calcium (STOOL SOFTENER PO) Take 100 mg by mouth daily        CRANBERRY PO Take 1 capsule by mouth daily        azelastine (ASTELIN) 0.1 % nasal spray Spray 1-2 sprays into both nostrils 2 times daily as needed for rhinitis 3 Bottle 3     albuterol (VENTOLIN HFA) 108 (90 BASE) MCG/ACT inhaler Inhale 2 puffs into the lungs every 4 hours as needed 1 Inhaler 2     prochlorperazine (COMPAZINE) 10 MG tablet Take 1 tablet (10 mg) by mouth every 6 hours as needed (Nausea/Vomiting) 30 tablet 2        Physical  "Exam:  /61 (BP Location: Right arm, Patient Position: Sitting, Cuff Size: Adult Regular)  Pulse 108  Temp 99.3  F (37.4  C) (Tympanic)  Resp 16  Ht 1.626 m (5' 4.02\")  Wt 89.1 kg (196 lb 8 oz)  LMP 02/06/2013  SpO2 97%  Breastfeeding? No  BMI 33.71 kg/m2  Wt Readings from Last 12 Encounters:   12/14/17 89.1 kg (196 lb 8 oz)   12/12/17 90.6 kg (199 lb 11.2 oz)   12/07/17 90.6 kg (199 lb 11.2 oz)   12/01/17 90.9 kg (200 lb 8 oz)   11/30/17 90.9 kg (200 lb 8 oz)   11/28/17 91.1 kg (200 lb 14.4 oz)   11/16/17 89.8 kg (198 lb)   11/15/17 89.6 kg (197 lb 8 oz)   11/10/17 90.9 kg (200 lb 8 oz)   11/08/17 91.1 kg (200 lb 14.4 oz)   10/22/17 88.7 kg (195 lb 8.8 oz)   10/18/17 89.7 kg (197 lb 12.8 oz)     ECOG performance status: 1  GENERAL APPEARANCE: Healthy, alert and in no acute distress.  HEENT: Sclerae anicteric. PERRLA. Oropharynx without ulcers, lesions, or thrush.  NECK: Supple. No asymmetry or masses.  LYMPHATICS: No palpable cervical, supraclavicular, axillary, or inguinal lymphadenopathy.  RESP: Lungs clear to auscultation bilaterally without rales, rhonchi or wheezes.  CARDIOVASCULAR: Regular rate and rhythm. Normal S1, S2; no S3 or S4. No murmur, gallop, or rub.  ABDOMEN: Soft, nontender. Bowel sounds normal. No palpable organomegaly or masses.  MUSCULOSKELETAL: Extremities without gross deformities noted. No edema of bilateral lower extremities.  SKIN: No suspicious lesions or rashes.  NEURO: Alert and oriented x 3. Cranial nerves II-XII grossly intact.  PSYCHIATRIC: Mentation and affect appear normal.    Laboratory/Imaging Studies:  Orders Only on 12/05/2017   Component Date Value Ref Range Status     Bilirubin Direct 12/05/2017 4.0* 0.0 - 0.2 mg/dL Final     Bilirubin Total 12/05/2017 5.8* 0.2 - 1.3 mg/dL Final     Albumin 12/05/2017 3.1* 3.4 - 5.0 g/dL Final     Protein Total 12/05/2017 7.5  6.8 - 8.8 g/dL Final     Alkaline Phosphatase 12/05/2017 677* 40 - 150 U/L Final     ALT 12/05/2017 " 185* 0 - 50 U/L Final     AST 12/05/2017 167* 0 - 45 U/L Final        Recent Results (from the past 744 hour(s))   CT Chest/Abdomen/Pelvis w Contrast   Result Value    Radiologist flags New mild-moderate pericardial effusion (Urgent)    Narrative    CT CHEST/ABDOMEN/PELVIS W CONTRAST 11/13/2017 2:54 PM    HISTORY: Breast cancer. Follow-up.    CONTRAST:  97 mL Isovue 370.    TECHNIQUE: CT of the chest, abdomen, and pelvis is performed with IV  contrast.    Routine evaluated structures in the chest include the lungs,  mediastinal structures, pleura, and chest wall.    Routine assessed structures in the abdomen include the liver, spleen,  pancreas, adrenal glands, and kidneys. Also assessed are the  retroperitoneum, gastrointestinal tract, and the abdominal wall.    Intrapelvic anatomy is also assessed.    Radiation dose for this scan is reduced using automated exposure  control, adjustment of the mA and/or kV according to patient size, or  iterative reconstruction technique.    COMPARISON: 9/11/2017.    FINDINGS:    Chest: There is a new 8.9 cm cystic area in the right lung apex. This  may relate to a loculated pneumothorax. A subpleural cyst or bulla are  also in the differential. There is some adjacent atelectasis. There is  less skin thickening and edema in the right breast. A soft tissue  conglomerate in the right axilla is stable. It measures 4.2 x 2.8 cm.  Other right axillary lymph nodes which measure up to 0.7 cm in short  axis dimension are stable. A multinodular thyroid gland is stable.  There is a new mild-moderate pericardial effusion.    Abdomen: There is a ring-enhancing low density lesion in the inferior  right lobe of the liver on image #72 which measures 1.5 cm compared to  0.8 cm on the prior study.  An ill-defined area of low density in the  left lobe on coronal image 31 measures 3.9 cm compared to 3.0 cm on  the prior study. Other liver lesions are more stable. A subcentimeter  vascular lesion in  the spleen is stable. Gastrohepatic ligament and  portal adenopathy are stable. A portal lymph node on image #62  measures 1.8 cm. A borderline in size retroperitoneal lymph node on  image #65 is stable at 0.9 cm in short axis dimension. A previously  seen borderline size aortocaval lymph node is smaller. It measures 0.5  cm compared to 0.9 cm on the prior study.    Pelvis: There is trace free pelvic fluid.      Impression    IMPRESSION:  1.  Hepatic metastases are slightly worse.  2. There is a new mild-moderate pericardial effusion.  3. Other neoplastic disease is stable.    [Access Center: New mild-moderate pericardial effusion]    This report will be copied to the [facility] [facility] to ensure a  provider acknowledges the finding. Access Center is available Monday  through Friday 8am-3:30 pm.       LETI CHASE MD   US Abdomen Limited    Narrative    ULTRASOUND  ABDOMEN LIMITED   11/30/2017 12:53 PM     HISTORY:  Breast cancer; elevated liver enzymes. Carcinoma of breast  metastatic to liver, unspecified laterality (H). Bone metastasis (H);  Secondary malignant neoplasm of liver (H). Elevated liver enzymes.    COMPARISON: CT abdomen and pelvis 11/13/2017.    FINDINGS:    Gallbladder: Negative sonographic Osullivan sign. Faint gallbladder  sludge is suggested layering in the gallbladder lumen. No gallstones.    Bile ducts:  Common duct is 0.7 cm and may be normal for age. No  intrahepatic biliary ductal dilatation.    Liver: Hepatic metastasis again identified by ultrasound. One of the  largest is 2.8 x 2.1 x 3 cm at the left liver. The other lesions are  better visualized on the comparison CT.    Pancreas:  Partially obscured but grossly unremarkable.     Right kidney:  Normal.     Aorta and IVC:  Not specifically assessed.       Impression    IMPRESSION:    1. Hepatic metastasis again identified.  2. Gallbladder sludge. No gallstones. Negative sonographic Osullivan  sign.    CHICO FONG MD   CT  Chest/Abdomen/Pelvis w Contrast    Narrative    CT CHEST/ABDOMEN/PELVIS WITH CONTRAST December 6, 2017 1:52 PM    HISTORY: Breast cancer with liver metastasis. Elevated liver enzymes,  elevated bilirubin. Bilateral malignant neoplasm of breast in female,  estrogen receptor positive, unspecified site of breast (H).     TECHNIQUE: CT scan obtained of the chest, abdomen, and pelvis with  oral and IV contrast. 97 ml isovue 370 injected. Radiation dose for  this scan was reduced using automated exposure control, adjustment of  the mA and/or kV according to patient size, or iterative  reconstruction technique.    COMPARISON:  CT chest, abdomen and pelvis 11/13/2017.    FINDINGS:  Chest: Left chest port venous catheter. Increased volume of moderate  to large pericardial effusion. Transverse thickness of this fluid is  now 2.4 cm, previously 0.6 cm at a comparable level on the prior study  series 2 image 36. There is some mild wall enhancement of the superior  portion of the pericardium, for instance around series 2 image 20 in  the region of the cardiac base. Small volume right pleural fluid newly  identified. No acute thoracic aortic abnormality. No large central  pulmonary embolism. The exam was not tailored for assessment of middle  and small branch vessel pulmonary emboli. The distal left  paraesophageal lymph node is 0.8 x 0.5 cm, previously 0.3 x 0.3 cm  series 2 image 48. The remainder of the thoracic lymph nodes remain  small. Right axillary adenopathy without significant change in size  measuring 4.8 x 3.4 cm, previously 4.2 x 2.8 cm image 16. There are  other mildly more prominent lymph nodes as well. No left axillary  adenopathy. Stable thyroid nodules that are small in size. Right  breast skin thickening again noted. No convincing new bony disease  identified. Some ill-defined sclerosis at the right T8 pedicle on  series 2 image 33, and right posteromedial fifth rib on image 18  appears stable. Stable appearing  cavitary lesion at the right upper  lung measuring 8.8 cm series 3 image 13. Stable triangular area of  consolidation at the right hilar region at the right midchest series 3  image 27. There are several tiny bilateral stable pulmonary nodules.  For example, one of these is 0.4 cm medial right lower lobe series 3  image 30. There are multiple other examples bilaterally.    Abdomen/pelvis: No convincing new bony disease at the abdomen or  pelvis levels. Multifocal hepatic metastasis. Anterior central hepatic  mass is 2.7 x 2.6 cm, previously 2.2 x 2.1 cm series 2 image 58.  Lateral segment left hepatic mass is approximately 2.8 x 2.8 cm,  previously 2.2 x 2.4 cm image 53. A caudate lobe mass is approximately  2.9 x 2.2 cm, previously 2 x 2.2 cm image 60. There are other hepatic  masses that are slightly larger, or are stable. Notably, there is new  moderate intrahepatic biliary ductal dilatation. This appears to lead  to the pancreatic head where there is complete decompression of the  common bile duct coronal series 4 image 23. There is a small area of  nodular enhancement newly identified in this region that is  approximately 0.8 cm series 2 image 66. Adenopathy also noted at the  shreyas hepatis. Portal caval enlarged lymph node is 2.5 x 1.3 cm,  previously 1.7 x 1.1 cm image 59. There are other prominent lymph  nodes that appear more prominent in the interval as well, for instance  image 56. Some increase ill-defined soft tissue at the shreyas hepatis  region on image 56 also appears more prominent measuring 2.4 x 1.4 cm  series 2 image 56, previously 1.7 x 1 cm.    The spleen demonstrates a stable small subcentimeter enhancing focus  series 2 image 56. Ill-defined tiny hypodensities in the spleen also  appears stable image 54 and 56. The adrenals, remainder of the  pancreas, and kidneys do not show any new abnormalities. Tiny cyst at  the left kidney is stable image 67.    There are several retroperitoneal lymph  nodes noted. Upper abdominal  aortocaval lymph node is 1.5 x 1.3 cm, previously 0.9 x 1.3 cm series  2 image 64. A few other mildly prominent retroperitoneal lymph nodes  again noted. Small to moderate free pelvic fluid. No acute bowel  abnormality is seen. New nodularity at the anterior omentum with one  region measuring 1.2 cm abutting a small bowel loop series 2 image 77.  Other adjacent nodules noted as well.      Impression    IMPRESSION:  1. Progression of hepatic metastatic disease with enlargement of  multifocal masses. New finding of moderate intrahepatic biliary ductal  dilatation worrisome for developing biliary obstruction. This may  relate to progression of adenopathy at the upper abdomen at the shreyas  hepatis and/or an increasing area of nodular enhancement at the  pancreatic head worrisome for metastatic disease.  2. Moderate to large pericardial effusion significantly increased  since 11/13/2017. Some mild enhancement of the pericardium also noted.  Recommend further workup.  3. New finding of small nodularity at the omentum at the midline and  slightly towards the right that could represent carcinomatosis.  4. Multifocal stable indeterminate pulmonary nodules may represent  pulmonary metastatic disease.  5. Stable sclerosis at the right fifth rib and T8 that could represent  stable sclerotic metastasis.  6. Progression of adenopathy at the right axilla, and upper abdominal  retroperitoneum.    I communicated these results to Dr. Cassidy on 12/6/2017 at 1450  hours.    CHICO FONG MD   XR Surgery MUNA G/T 5 Min Fluoro w Stills    Narrative    This exam was marked as non-reportable because it will not be read by a   radiologist or a [place] non-radiologist provider.             XR Chest 2 Views    Narrative    XR CHEST 2 VW  12/11/2017 7:11 PM    History: Fever    Comparison: CT 12/6/2017 chest x-ray 11/8/2017    Findings:   PA and lateral views of the chest are obtained. There is a left  chest  Port-A-Cath with the tip in stable position projecting over the high  SVC. Pericardial drain projects over the heart. The trachea is in the  midline. Cardiac mediastinal silhouette is stable and mildly enlarged.  There is pneumopericardium and/or pneumomediastinum. Lung volumes are  decreased. Bilateral perihilar and left greater than right basilar  opacities of atelectasis or consolidation. No pneumothorax. Small left  greater than right pleural effusions. Lucency in the paramediastinal  right lung seems to correspond to largest cystic lesion seen on prior  CT. Metallic biliary stent.      Impression    IMPRESSION:  1.  Mildly enlarged cardiac mediastinal silhouette.  Pneumopericardium/pneumomediastinum presumably relates to the presence  of the pericardial drainage catheter.  2.  Small left greater than right pleural effusions.  3.  Perihilar and left greater than right basilar opacities could  represent consolidation, especially given provided history.  Alternatively, findings could represent atelectasis.    I have personally reviewed the examination and initial interpretation  and I agree with the findings.    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 We would recommend starting the patient on chemotherapy with Abraxane 100 mg/m  intravenously weekly. I discussed the rationale behind using Abraxane in detail as well as major side effects including, but not limited to immediate/short term side effects of acute infusion reaction/anaphylaxis, ECG abnormalities and need for baseline ECG, fluid retention,  life-threatening infection, arthralgias/myalgias, nausea/vomiting, diarrhea, mucositis, alopecia, anemia, neutropenia, thrombocytopenia, weakness and fatigue as well as long-term side effect of dose and duration dependent peripheral neuropathy. The patient was instructed to discontinue and refrain from administration of non-steroidal anti-inflammatory medications, aspirin, and blood thinners. Patient instructed to call with signs or symptoms of infection including, but not limited to temperature greater than or equal to 100.5 degrees Fahrenheit, chills, severe sore throat, ear or sinus pain, mouth sores, cough, painful urination, and/or non-healing wound. The patient was given written information handout about Abraxane, agrees to proceed with treatment, and denies any further questions at this time.    (C79.51) Bone metastasis (H)  She will continue on Denosumab 120 mg every 3 months. Patient will continue on calcium and vitamin D supplements.    (C80.1,  I31.8) Malignant pericardial effusion (H)  We will arrange for echocardiogram to monitor pericardial effusion throughout the treatment.    (E03.9) Hypothyroidism, unspecified type  Patient continues on Synthroid 25  g daily.    (K12.31) Mucositis due to chemotherapy  Patient will continue on Magic mouthwash.    (E78.5) Hyperlipidemia LDL goal <130  Lipitor continues on hold    (R74.8) Elevated liver enzymes  Liver enzymes has been normalized.    (J45.40) Moderate persistent asthma without complication  Patient has met is currently well controlled.    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 The patient will continue oxycodone for breakthrough pain.    (K83.1) Biliary obstruction  Bilirubin and liver enzymes have been normalized. We will continue to monitor.    The patient is ready to learn, no apparent learning barriers were identified.  Diagnosis and treatment plans were explained to the patient. The patient expressed  understanding of the content. The patient asked appropriate questions. The patient questions were answered to her satisfaction.    Chart documentation with Dragon Voice recognition Software. Although reviewed after completion, some words and grammatical errors may remain.    Again, thank you for allowing me to participate in the care of your patient.        Sincerely,        Brodie Cassidy MD

## 2017-12-14 NOTE — MR AVS SNAPSHOT
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 Dr. Cassidy would like this to start next week. We would like to see you back in clinic with Dr. Cassidy next week with chemotherapy to be scheduled per treatment plan.      Your prescription (MS Contin) has been given to you to hand carry to the pharmacy of your choice.  When you are in need of a refill, please call your pharmacy and they will send us a request.      Copy of appointments, and after visit summary (AVS) given to patient.  If you have any questions during business hours (M-F 8 AM- 4PM), please call Funmi Ray RN, BSN, OCN Oncology Hematology /Breast Cancer Navigator at [facility] [phone].       For questions after business hours, or on holidays/weekends, please call our after hours Nurse Triage line [phone]. Thank you.            Follow-ups after your visit        Your next 10 appointments already scheduled     Dec 20, 2017  1:00 PM CST   Ech Complete with [id number] Echocardiography ([facility])    [address] [facility] [address] [phone]           1. Please bring or wear a comfortable two-piece outfit. 2. You may eat, drink and take your normal medicines. 3. For  any questions that cannot be answered, please contact the ordering physician            Dec 21, 2017  8:00 AM CST   Level 1 with ROOM 3 [facility] ([facility] [place])    [facility]  [address]  [facility] [place] [address]   [phone]            Dec 21, 2017  9:00 AM CST   Return Visit with Brodie Cassidy MD   [facility] ([facility] [place])    [facility]  [address]  [facility] [place] [address]   [phone]            Dec 27, 2017  9:00 AM CST   Level 1 with ROOM 3 [facility] ([facility] [place])    [facility]  [address]  [facility] [place] [address]   [phone]              Future tests that were ordered for you today     Open Future Orders        Priority Expected Expires Ordered    Echocardiogram Routine  12/13/2018 12/13/2017            Who to contact     If you have questions or need follow up information about today's clinic visit or your schedule please contact [facility] directly at [phone].  Normal or non-critical lab and imaging results will be communicated to you by MyChart, letter or phone within 4 business days after the clinic has received the results. If you do not hear from us within 7 days, please contact the clinic through YouApp[employer]art or phone. If you have a critical or abnormal lab result, we will notify you by phone as soon as possible.  Submit refill requests through [employer] or call your pharmacy and they will forward the refill request to us. Please allow 3 business days for your refill to be completed.          Additional Information About Your Visit        MyChart Information     [employer] gives you secure access to your electronic health record. If you see a primary care provider, you can also send messages to your care team and make appointments. If you have questions, please call your primary care clinic.  If you do  "not have a primary care provider, please call [phone] and they will assist you.        Care EveryWhere ID     This is your Care EveryWhere ID. This could be used by other organizations to access your [place] medical records  [id number]        Your Vitals Were     Pulse Temperature Respirations Height Last Period Pulse Oximetry    108 99.3  F (37.4  C) (Tympanic) 16 1.626 m (5' 4.02\") 02/06/2013 97%    Breastfeeding? BMI (Body Mass Index)                No 33.71 kg/m2           Blood Pressure from Last 3 Encounters:   12/14/17 125/61   12/12/17 123/68   12/07/17 108/75    Weight from Last 3 Encounters:   12/14/17 89.1 kg (196 lb 8 oz)   12/12/17 90.6 kg (199 lb 11.2 oz)   12/07/17 90.6 kg (199 lb 11.2 oz)              Today, you had the following     No orders found for display         Today's Medication Changes          These changes are accurate as of: 12/14/17 10:32 AM.  If you have any questions, ask your nurse or doctor.               Start taking these medicines.        Dose/Directions    dexamethasone 4 MG tablet   Commonly known as:  DECADRON   Used for:  Carcinoma of breast metastatic to liver, unspecified laterality (H), Bilateral malignant neoplasm of breast in female, estrogen receptor positive, unspecified site of breast (H), Bone metastasis (H), 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), Secondary malignant neoplasm of liver (H)   Started by:  Brodie Cassidy MD        Take 20 mg (5 tablets) the evening prior to and the morning of Abraxane infusion for 1st cycle only.   Quantity:  10 tablet   Refills:  0       morphine 15 MG 12 hr tablet   Commonly known as:  MS CONTIN   Used for:  Carcinoma of breast metastatic to liver, unspecified laterality (H), Bilateral malignant neoplasm of breast in female, estrogen receptor positive, unspecified site of breast (H), Bone metastasis (H), 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), Secondary malignant neoplasm " of liver (H)   Started by:  Brodie Cassidy MD        Dose:  15 mg   Take 1 tablet (15 mg) by mouth every 12 hours   Quantity:  60 tablet   Refills:  0       prochlorperazine 10 MG tablet   Commonly known as:  COMPAZINE   Used for:  Secondary malignant neoplasm of liver (H), Carcinoma of breast metastatic to liver, unspecified laterality (H), Bone metastasis (H), 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), Bilateral malignant neoplasm of breast in female, estrogen receptor positive, unspecified site of breast (H)   Started by:  Brodie Cassidy MD        Dose:  10 mg   Take 1 tablet (10 mg) by mouth every 6 hours as needed (Nausea/Vomiting)   Quantity:  30 tablet   Refills:  2            Where to get your medicines      These medications were sent to [place] [address] [place] [facility] [address] [facility]  [address] [facility] [address]     Phone:  [phone]     dexamethasone 4 MG tablet    prochlorperazine 10 MG tablet         Some of these will need a paper prescription and others can be bought over the counter.  Ask your nurse if you have questions.     Bring a paper prescription for each of these medications     morphine 15 MG 12 hr tablet                Primary Care Provider Office Phone # Fax #    Johana Fairbanks [phone] [address]  [facility] [address]        Equal Access to Services     JACKIE MIX AH: Hadii han ham hadasho Soomaali, waaxda luqadaha, qaybta kaalmada adeegyada, hari guardado. So [facility] [phone].    ATENCIÓN: Si habla español, tiene a parekh disposición servicios gratuitos de asistencia lingüística. Llame al [phone].    We comply with applicable federal civil rights laws and [place] laws. We do not discriminate on the basis of race, color, national origin, age, disability, sex, sexual orientation, or gender identity.            Thank you!     Thank you for choosing [facility]   for your care. Our goal is always to provide you with excellent care. Hearing back from our patients is one way we can continue to improve our services. Please take a few minutes to complete the written survey that you may receive in the mail after your visit with us. Thank you!             Your Updated Medication List - Protect others around you: Learn how to safely use, store and throw away your medicines at www.disposemymeds.org.          This list is accurate as of: 12/14/17 10:32 AM.  Always use your most recent med list.                   Brand Name Dispense Instructions for use Diagnosis    * albuterol 108 (90 BASE) MCG/ACT Inhaler    VENTOLIN HFA    1 Inhaler    Inhale 2 puffs into the lungs every 4 hours as needed    Moderate persistent asthma, uncomplicated       * albuterol (2.5 MG/3ML) 0.083% neb solution     30 vial    Take 1 vial (2.5 mg) by nebulization every 4 hours as needed for shortness of breath / dyspnea    Moderate persistent asthma, uncomplicated       ALLEGRA ALLERGY 180 MG tablet   Generic drug:  fexofenadine      Take 180 mg by mouth daily as needed for allergies        azelastine 0.1 % spray    ASTELIN    3 Bottle    Spray 1-2 sprays into both nostrils 2 times daily as needed for rhinitis    Chronic rhinitis       ciprofloxacin 500 MG tablet    CIPRO    6 tablet    Take 1 tablet (500 mg) by mouth every 12 hours for 3 days    Carcinoma of breast metastatic to liver, unspecified laterality (H), Biliary obstruction       CRANBERRY PO      Take 1 capsule by mouth daily        dexamethasone 4 MG tablet    DECADRON    10 tablet    Take 20 mg (5 tablets) the evening prior to and the morning of Abraxane infusion for 1st cycle only.    Carcinoma of breast metastatic to liver, unspecified laterality (H), Bilateral malignant neoplasm of breast in female, estrogen receptor positive, unspecified site of breast (H), Bone metastasis (H), 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),  Secondary malignant neoplasm of liver (H)       diclofenac 1 % Gel topical gel    VOLTAREN    100 g    Place 2 g onto the skin 4 times daily    Carcinoma of breast metastatic to liver, unspecified laterality (H)       diphenhydrAMINE 25 MG tablet    BENADRYL    1 tablet    Take 1 tablet (25 mg) by mouth every 8 hours as needed for itching or allergies    Carcinoma of breast metastatic to liver, unspecified laterality (H), Bone metastasis (H), Pericardial effusion, Bilateral malignant neoplasm of breast in female, estrogen receptor positive, unspecified site of breast (H), 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), Secondary malignant neoplasm of liver (H), Chemotherapy-induced neutropenia (H), Emphysematous bleb of lung (H), Hypothyroidism, unspecified type, Hyperlipidemia LDL goal <130, Moderate persistent asthma without complication, Mucositis due to chemotherapy       DULERA 200-5 MCG/ACT oral inhaler   Generic drug:  mometasone-formoterol     3 Inhaler    INHALE 2 PUFFS INTO THE LUNGS TWICE DAILY    Moderate persistent asthma without complication       KP CALCIUM 600+D3 600-500 MG-UNIT Caps   Generic drug:  calcium carb-cholecalciferol      Take 2 tablets by mouth daily        letrozole 2.5 MG tablet    FEMARA    30 tablet    Take 1 tablet (2.5 mg) by mouth daily    Carcinoma of breast metastatic to liver, unspecified laterality (H), Bone metastasis (H), Pericardial effusion, Bilateral malignant neoplasm of breast in female, estrogen receptor positive, unspecified site of breast (H), 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), Secondary malignant neoplasm of liver (H), Chemotherapy-induced neutropenia (H), Emphysematous bleb of lung (H), Hypothyroidism, unspecified type, Hyperlipidemia LDL goal <130, Moderate persistent asthma without complication, Mucositis due to chemotherapy       levothyroxine 25 MCG tablet    SYNTHROID/LEVOTHROID    90 tablet    TAKE 1 TABLET (25  MCG) BY MOUTH DAILY    Hypothyroidism due to acquired atrophy of thyroid       lidocaine (viscous) 2 % solution    XYLOCAINE      Carcinoma of breast metastatic to liver, unspecified laterality (H), Bilateral malignant neoplasm of breast in female, estrogen receptor positive, unspecified site of breast (H), Bone metastasis (H), 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), Secondary malignant neoplasm of liver (H)       Lidocaine 4 % Patch    LIDOCARE    30 patch    Place 1-2 patches onto the skin every 24 hours    Carcinoma of breast metastatic to liver, unspecified laterality (H)       lidocaine visc 2% 2.5mL/5mL & maalox/mylanta w/ simeth 2.5mL/5mL & diphenhydrAMINE 5mg/5mL Susp suspension    [facility]    240 mL    Swish and swallow 10 mLs in mouth every 6 hours as needed for mouth sores    Mucositis due to chemotherapy, Breast cancer metastasized to axillary lymph node, right (H)       loratadine 10 MG tablet    CLARITIN     Take 10 mg by mouth daily as needed for allergies        magnesium hydroxide 400 MG/5ML suspension    MILK OF MAGNESIA    105 mL    Take 30 mLs by mouth daily as needed for constipation    Carcinoma of breast metastatic to liver, unspecified laterality (H)       metoclopramide 10 MG tablet    REGLAN    120 tablet    Take 1 tablet (10 mg) by mouth 2 times daily as needed    Bilateral malignant neoplasm of breast in female, estrogen receptor positive, unspecified site of breast (H)       metroNIDAZOLE 500 MG tablet    FLAGYL    12 tablet    Take 1 tablet (500 mg) by mouth every 6 hours for 3 days    Carcinoma of breast metastatic to liver, unspecified laterality (H), Biliary obstruction       morphine 15 MG 12 hr tablet    MS CONTIN    60 tablet    Take 1 tablet (15 mg) by mouth every 12 hours    Carcinoma of breast metastatic to liver, unspecified laterality (H), Bilateral malignant neoplasm of breast in female, estrogen receptor positive, unspecified site of  breast (H), Bone metastasis (H), 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), Secondary malignant neoplasm of liver (H)       order for DME     1 each    Equipment being ordered: Venu post-lumpectomy compression pad x2    Lymphedema, Lymphedema of breast       oxyCODONE IR 5 MG tablet    ROXICODONE    30 tablet    Take 1 tablet (5 mg) by mouth every 4 hours as needed for moderate to severe pain    Secondary malignant neoplasm of liver (H), Carcinoma of breast metastatic to liver, unspecified laterality (H), 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), Bone metastasis (H), Pericardial effusion       polyethylene glycol Packet    MIRALAX/GLYCOLAX    7 packet    Take 17 g by mouth daily    Carcinoma of breast metastatic to liver, unspecified laterality (H)       predniSONE 20 MG tablet    DELTASONE    10 tablet    Take 1 tablet (20 mg) by mouth 2 times daily For Yellow or Red zone on Asthma Action Plan.    Moderate persistent asthma, uncomplicated       prochlorperazine 10 MG tablet    COMPAZINE    30 tablet    Take 1 tablet (10 mg) by mouth every 6 hours as needed (Nausea/Vomiting)    Secondary malignant neoplasm of liver (H), Carcinoma of breast metastatic to liver, unspecified laterality (H), Bone metastasis (H), 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), Bilateral malignant neoplasm of breast in female, estrogen receptor positive, unspecified site of breast (H)       QVAR 80 MCG/ACT Inhaler   Generic drug:  beclomethasone     26.1 g    INHALE 1 PUFFS INTO THE EACH NOSTRIL 2 TIMES DAILY    Chronic rhinitis       ROBITUSSIN COUGH/COLD CF PO      Take 10 mLs by mouth as needed    Breast cancer metastasized to axillary lymph node, right (H)       senna-docusate 8.6-50 MG per tablet    SENOKOT-S;PERICOLACE    100 tablet    Take 1-2 tablets by mouth 2 times daily as needed for constipation    Carcinoma of breast metastatic to liver, unspecified laterality  (H)       STOOL SOFTENER PO      Take 100 mg by mouth daily        * zolpidem 10 MG tablet    AMBIEN     TAKE 1 TABLET 30 MIN PRIOR TO BEDTIME    Carcinoma of breast metastatic to liver, unspecified laterality (H), Bilateral malignant neoplasm of breast in female, estrogen receptor positive, unspecified site of breast (H), Bone metastasis (H), 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    Insomnia due to medical condition       * Notice:  This list has 4 medication(s) that are the same as other medications prescribed for you. Read the directions carefully, and ask your doctor or other care provider to review them with you.

## 2017-12-14 NOTE — PROGRESS NOTES
"Chemotherapy Education    Patient is a 57 year old female here today for chemotherapy education, accompanied by .  Pt has a cancer diagnosis of   Encounter Diagnoses   Name Primary?     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  Their Oncologist is Dr. LUIS Cassidy, and PCP is Johana Fairbanks  Reviewed the following with the patient and their support person:  Treatment goal: palliative  Treatment regimen & duration: Abraxane days 1, 8, and 15 every 28 days; and rationale for strict adherence to this schedule, including specific medication names including pre-treatment medications and at home scheduled or as needed medications, delivery methods,.  Potential side effects: Side effects and management; including skin changes/hand-foot syndrome, anemia, neutropenia, thrombocytopenia, diarrhea/constipation, hair loss syndrome, memory changes/ \"chemobrain\", mouth sores, taste changes, neuropathy, fatigue, myelosuppression, sexuality/infertility, and risk of extravasation or infiltration.  Infection prevention, and monitoring of lab values, what lab tests and what changes of these values meant, along with the possibility of hydration or blood product transfusion, or the need to defer or hold treatment.    Chemotherapy information, including ways it is excreted from the body and cleaning and containment of vomitus or other bodily fluid, use of the bathroom, sexual health and intimacy, what to do if needing to miss a treatment, when to call a provider and the need for staff to wear protective equipment.  Importance of Central line care (port) or IV site " "care.  Written information: Written information including the \"Getting Ready for Chemotherapy: What to Expect, Before, During, and After your Treatment\" booklet, specific drug information guides printed from [employer], [employer] booklets \"Eating Hints: Before, During, and After Cancer Treatment\" and \"Chemotherapy and You\".  Also, a folder with information on when to contact the provider, various programs offered at [facility], and our business card with contact information given; Oncology Clinic, RN Case Manager, and the after hours Nurse Advise Line.~ THIS INFORMATION WAS DECLINED as pt has been on different chemotherapy therapies over the last few years.  General orientation to the Medical Oncology department, Infusion Services department, Huc/scheduling, bathrooms and usual flow of the treatment day provided as well as introduction to the Infusion nurses.  No barriers to learning identified. Patient and family verbalized understanding of all written and verbal information. All questions answered to patients satisfaction.   Other concerns: Reviewed take home medications with patient; she decline prescription for lorazepam stating she has a prescription for that at home and does not want a 2nd one. [facility]azine sent to [facility] as well as dexamethasone.  Dexamethasone 20 mg the evening prior to and the morning of Abraxane infusion for 1st infusion only.  Reviewed short and long term disability forms and coping skills.  Declines an appt with onc psych Fritz Daniel at this time.  Pt instructed to call with further questions or concerns.  Patient states understanding and is in agreement with this plan.  Copy of appointments, and after visit summary (AVS) given to patient. Patient discharged ambulatory.    Face to Face time with patient: 35 min    Funmi Ray RN, BSN, OCN  Oncology Hematology   Breast Cancer Navigator  [facility]  [email or web address]  Phone [phone] " [phone]

## 2017-12-21 NOTE — MR AVS SNAPSHOT
After Visit Summary   12/21/2017    Etta Cervantes    MRN: [id number]           Patient Information     Date Of Birth          1960        Visit Information        Provider Department      12/21/2017 8:00 AM ROOM 3 [facility]        Today's Diagnoses     Secondary malignant neoplasm of liver (H)    -  1    Carcinoma of breast metastatic to liver, unspecified laterality (H)        Bone metastasis (H)        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)           Follow-ups after your visit        Your next 10 appointments already scheduled     Dec 27, 2017  8:30 AM CST   Level 1 with ROOM 3 [facility] ([facility])    [facility]  [address]  [place] [address]   [phone]            Dec 27, 2017  9:30 AM CST   Return Visit with Brodie Cassidy MD   [facility] ([facility])    [facility]  [address]  [place] [address]   [phone]            Jan 03, 2018  9:00 AM CST   Level 1 with ROOM 7 [facility] ([facility])    [facility]  [address]  [place] [address]   [phone]              Future tests that were ordered for you today     Open Future Orders        Priority Expected Expires Ordered    Echocardiogram Limited Routine  12/13/2018 12/13/2017            Who to contact     If you have questions or need follow up information about today's clinic visit or your schedule please contact [facility] directly at [phone].  Normal or non-critical lab and imaging results will be communicated to you by MyChart, letter or phone within 4 business days after the clinic has received the results. If you do not hear from us within 7 days, please contact the clinic through MyChart or phone. If you have a critical or abnormal lab result, we will notify you by  phone as soon as possible.  Submit refill requests through [employer] or call your pharmacy and they will forward the refill request to us. Please allow 3 business days for your refill to be completed.          Additional Information About Your Visit        [employer] Information     [employer] gives you secure access to your electronic health record. If you see a primary care provider, you can also send messages to your care team and make appointments. If you have questions, please call your primary care clinic.  If you do not have a primary care provider, please call [phone] and they will assist you.        Care EveryWhere ID     This is your Care EveryWhere ID. This could be used by other organizations to access your [place] medical records  [id number]        Your Vitals Were     Pulse Last Period                96 02/06/2013           Blood Pressure from Last 3 Encounters:   12/21/17 144/59   12/21/17 138/59   12/14/17 125/61    Weight from Last 3 Encounters:   12/21/17 89.9 kg (198 lb 3.2 oz)   12/14/17 89.1 kg (196 lb 8 oz)   12/12/17 90.6 kg (199 lb 11.2 oz)              We Performed the Following     CA 27.29 Breast tumor marker     CBC with platelets differential     CEA     Comprehensive metabolic panel        Primary Care Provider Office Phone # Fax #    Johana Fairbanks [phone] [id number] [facility] [address]        Equal Access to Services     ANIYAH MIX : Hadii aad ku hadasho Soomaali, waaxda luqadaha, qaybta kaalmada adekatieda, hari guardado. So [facility] [phone].    ATENCIÓN: Si habla español, tiene a parekh disposición servicios gratuitos de asistencia lingüística. [place] al [phone].    We comply with applicable federal civil rights laws and [place] laws. We do not discriminate on the basis of race, color, national origin, age, disability, sex, sexual orientation, or gender identity.            Thank you!     Thank you for choosing  [facility]  for your care. Our goal is always to provide you with excellent care. Hearing back from our patients is one way we can continue to improve our services. Please take a few minutes to complete the written survey that you may receive in the mail after your visit with us. Thank you!             Your Updated Medication List - Protect others around you: Learn how to safely use, store and throw away your medicines at www.disposemymeds.org.          This list is accurate as of: 12/21/17 11:12 AM.  Always use your most recent med list.                   Brand Name Dispense Instructions for use Diagnosis    * albuterol 108 (90 BASE) MCG/ACT Inhaler    VENTOLIN HFA    1 Inhaler    Inhale 2 puffs into the lungs every 4 hours as needed    Moderate persistent asthma, uncomplicated       * albuterol (2.5 MG/3ML) 0.083% neb solution     30 vial    Take 1 vial (2.5 mg) by nebulization every 4 hours as needed for shortness of breath / dyspnea    Moderate persistent asthma, uncomplicated       ALLEGRA ALLERGY 180 MG tablet   Generic drug:  fexofenadine      Take 180 mg by mouth daily as needed for allergies        azelastine 0.1 % spray    ASTELIN    3 Bottle    Spray 1-2 sprays into both nostrils 2 times daily as needed for rhinitis    Chronic rhinitis       CRANBERRY PO      Take 1 capsule by mouth daily        dexamethasone 4 MG tablet    DECADRON    10 tablet    Take 20 mg (5 tablets) the evening prior to and the morning of Abraxane infusion for 1st cycle only.    Carcinoma of breast metastatic to liver, unspecified laterality (H), Bilateral malignant neoplasm of breast in female, estrogen receptor positive, unspecified site of breast (H), Bone metastasis (H), 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), Secondary malignant neoplasm of liver (H)       diclofenac 1 % Gel topical gel    VOLTAREN    100 g    Place 2 g onto the skin 4 times daily    Carcinoma of breast metastatic to liver,  unspecified laterality (H)       diphenhydrAMINE 25 MG tablet    BENADRYL    1 tablet    Take 1 tablet (25 mg) by mouth every 8 hours as needed for itching or allergies    Carcinoma of breast metastatic to liver, unspecified laterality (H), Bone metastasis (H), Pericardial effusion, Bilateral malignant neoplasm of breast in female, estrogen receptor positive, unspecified site of breast (H), 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), Secondary malignant neoplasm of liver (H), Chemotherapy-induced neutropenia (H), Emphysematous bleb of lung (H), Hypothyroidism, unspecified type, Hyperlipidemia LDL goal <130, Moderate persistent asthma without complication, Mucositis due to chemotherapy       DULERA 200-5 MCG/ACT oral inhaler   Generic drug:  mometasone-formoterol     3 Inhaler    INHALE 2 PUFFS INTO THE LUNGS TWICE DAILY    Moderate persistent asthma without complication       KP CALCIUM 600+D3 600-500 MG-UNIT Caps   Generic drug:  calcium carb-cholecalciferol      Take 2 tablets by mouth daily        letrozole 2.5 MG tablet    FEMARA    30 tablet    Take 1 tablet (2.5 mg) by mouth daily    Carcinoma of breast metastatic to liver, unspecified laterality (H), Bone metastasis (H), Pericardial effusion, Bilateral malignant neoplasm of breast in female, estrogen receptor positive, unspecified site of breast (H), 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), Secondary malignant neoplasm of liver (H), Chemotherapy-induced neutropenia (H), Emphysematous bleb of lung (H), Hypothyroidism, unspecified type, Hyperlipidemia LDL goal <130, Moderate persistent asthma without complication, Mucositis due to chemotherapy       levothyroxine 25 MCG tablet    SYNTHROID/LEVOTHROID    90 tablet    TAKE 1 TABLET (25 MCG) BY MOUTH DAILY    Hypothyroidism due to acquired atrophy of thyroid       lidocaine (viscous) 2 % solution    XYLOCAINE      Carcinoma of breast metastatic to liver, unspecified  laterality (H), Bilateral malignant neoplasm of breast in female, estrogen receptor positive, unspecified site of breast (H), Bone metastasis (H), 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), Secondary malignant neoplasm of liver (H)       Lidocaine 4 % Patch    LIDOCARE    30 patch    Place 1-2 patches onto the skin every 24 hours    Carcinoma of breast metastatic to liver, unspecified laterality (H)       lidocaine visc 2% 2.5mL/5mL & maalox/mylanta w/ simeth 2.5mL/5mL & diphenhydrAMINE 5mg/5mL Susp suspension    [facility]    240 mL    Swish and swallow 10 mLs in mouth every 6 hours as needed for mouth sores    Mucositis due to chemotherapy, Breast cancer metastasized to axillary lymph node, right (H)       loratadine 10 MG tablet    CLARITIN     Take 10 mg by mouth daily as needed for allergies        magnesium hydroxide 400 MG/5ML suspension    MILK OF MAGNESIA    105 mL    Take 30 mLs by mouth daily as needed for constipation    Carcinoma of breast metastatic to liver, unspecified laterality (H)       metoclopramide 10 MG tablet    REGLAN    120 tablet    Take 1 tablet (10 mg) by mouth 2 times daily as needed    Bilateral malignant neoplasm of breast in female, estrogen receptor positive, unspecified site of breast (H)       morphine 15 MG 12 hr tablet    MS CONTIN    60 tablet    Take 1 tablet (15 mg) by mouth every 12 hours    Carcinoma of breast metastatic to liver, unspecified laterality (H), Bilateral malignant neoplasm of breast in female, estrogen receptor positive, unspecified site of breast (H), Bone metastasis (H), 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), Secondary malignant neoplasm of liver (H)       order for DME     1 each    Equipment being ordered: Venu post-lumpectomy compression pad x2    Lymphedema, Lymphedema of breast       oxyCODONE IR 5 MG tablet    ROXICODONE    30 tablet    Take 1 tablet (5 mg) by mouth every 4 hours as  needed for moderate to severe pain    Secondary malignant neoplasm of liver (H), Carcinoma of breast metastatic to liver, unspecified laterality (H), 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), Bone metastasis (H), Pericardial effusion       polyethylene glycol Packet    MIRALAX/GLYCOLAX    7 packet    Take 17 g by mouth daily    Carcinoma of breast metastatic to liver, unspecified laterality (H)       predniSONE 20 MG tablet    DELTASONE    10 tablet    Take 1 tablet (20 mg) by mouth 2 times daily For Yellow or Red zone on Asthma Action Plan.    Moderate persistent asthma, uncomplicated       prochlorperazine 10 MG tablet    COMPAZINE    30 tablet    Take 1 tablet (10 mg) by mouth every 6 hours as needed (Nausea/Vomiting)    Secondary malignant neoplasm of liver (H), Carcinoma of breast metastatic to liver, unspecified laterality (H), Bone metastasis (H), 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), Bilateral malignant neoplasm of breast in female, estrogen receptor positive, unspecified site of breast (H)       QVAR 80 MCG/ACT Inhaler   Generic drug:  beclomethasone     26.1 g    INHALE 1 PUFFS INTO THE EACH NOSTRIL 2 TIMES DAILY    Chronic rhinitis       ROBITUSSIN COUGH/COLD CF PO      Take 10 mLs by mouth as needed    Breast cancer metastasized to axillary lymph node, right (H)       senna-docusate 8.6-50 MG per tablet    SENOKOT-S;PERICOLACE    100 tablet    Take 1-2 tablets by mouth 2 times daily as needed for constipation    Carcinoma of breast metastatic to liver, unspecified laterality (H)       STOOL SOFTENER PO      Take 100 mg by mouth daily        * zolpidem 10 MG tablet    AMBIEN     TAKE 1 TABLET 30 MIN PRIOR TO BEDTIME    Carcinoma of breast metastatic to liver, unspecified laterality (H), Bilateral malignant neoplasm of breast in female, estrogen receptor positive, unspecified site of breast (H), Bone metastasis (H), Carcinoma of right breast metastatic to  axillary lymph node (H), Secondary malignant neoplasm of liver (H)       * zolpidem 12.5 MG CR tablet    AMBIEN CR    30 tablet    Take 1 tablet by mouth at bed time.    Insomnia due to medical condition       * Notice:  This list has 4 medication(s) that are the same as other medications prescribed for you. Read the directions carefully, and ask your doctor or other care provider to review them with you.

## 2017-12-21 NOTE — LETTER
12/21/2017         RE: Etta Cervantes  [address]  [place] [address]        Dear Colleague,    Thank you for referring your patient, Etta Cervantes, to the [facility]. Please see a copy of my visit note below.    Hematology/ Oncology Follow-up Visit:  Dec 21, 2017    Reason for Visit:   Chief Complaint   Patient presents with     Oncology Clinic Visit     F/u breast cancer; chemotherapy to start today       Oncologic History:  Breast cancer (H)  Etta Cervantes presented with increasing lump in the right axilla that s lump has been growing without any pain or associated symptoms. Subsequently she was evaluated by primary care physician. Diagnostic digital mammogram was done on 01/13/2015 showing enlarged lymph nodes in the right axilla. There was some skin thickening in the right breast anteriorly and laterally. There are no parenchymal changes in the right breast. The left breast shows a 1.7 cm spiculated mass at approximately 2 to 3 o'clock position, 15.2 cm away from the nipple. A right breast ultrasound was subsequently done and ultrasound guided biopsy was done. Needle biopsy of the left breast did show infiltrating ductal carcinoma grade I of III with no angiolymphatic invasion seen. No associated ductal carcinoma in situ. Estrogen receptor, progresterone receptor were positive. HER-2/sadie receptor came back negative.. Another biopsy from the right axilla did show metastatic ductal carcinoma. PET scan was done on January 26, 2015 showing few small right posterior cervical chain nodes the largest is 1.2 cm. There is extensive bulky right axillary adenopathy demonstrating hypermetabolic FDG uptake with SUV of 10.6. There is skin thickening involving the right breast which demonstrated low-grade FDG uptake. A 1.2 cm lesion on the lateral aspect of the left breast demonstrated minimally increased FDG uptake with SUV of 2. Scattered hypermetabolic mediastinal lymph nodes located in the left  superior anterior mediastinum, right paratracheal and precarinal U, subcranial adenopathy with javon metastases. This focus hypermetabolic FDG uptake identified definitive Amanda posterior aspect off intertrochanteric region of the proximal left femur consistent with bone metastases. There is also 1.4 cm hypermetabolic FDG uptake identified in the anterior medial segment of the left hepatic lobe consistent with liver metastases. Additional 2.1 cm low-attenuation lesion anterior aspect of the right lobe which needs to be determined. The patient was started on chemotherapy with Taxotere and Cytoxan on February 9, 2015.  She concluded 4 cycles of Taxotere and Cytoxan. She started on Arimidex 1 mg orally daily. Currently she is on Faslodex and ibrance. Subsequently the patient went on to receive Afinitor. The patient also started treatment with Xeloda.       Interval History:  Patient is here today to review treatment planning details.  Echocardiogram done yesterday shows no significant pericardial effusion.  Patient continues to have back pain.  Patient has mild shortness of breath especially with exertion.  There is no cough.  There is no nausea or vomiting.  Liver enzymes has normalized.    Review Of Systems:  Constitutional: Negative for fever, chills, and night sweats.  Skin: negative.  Eyes: negative.  Ears/Nose/Throat: negative.  Respiratory: dyspnea on exertion, there is no cough, or hemoptysis.  Cardiovascular: negative.  Gastrointestinal: negative.  Genitourinary: negative.  Musculoskeletal: negative.  Back pain  Neurologic: negative.  Psychiatric: negative.  Hematologic/Lymphatic/Immunologic: negative.  Endocrine: negative.    All other ROS negative unless mentioned in interval history.    Past medical, social, surgical, and family histories reviewed.    Allergies:  Allergies as of 12/21/2017 - Manuel as Reviewed 12/21/2017   Allergen Reaction Noted     Animal dander Cough 01/23/2015     Cats  07/15/2010      Dust mites Cough 01/23/2015     Pollen extract  01/23/2015     Seasonal allergies  07/15/2010     Levaquin [levofloxacin] Rash 07/17/2017       Current Medications:  Current Outpatient Prescriptions   Medication Sig Dispense Refill     zolpidem (AMBIEN) 10 MG tablet TAKE 1 TABLET 30 MIN PRIOR TO BEDTIME  0     lidocaine, viscous, (XYLOCAINE) 2 % solution        morphine (MS CONTIN) 15 MG 12 hr tablet Take 1 tablet (15 mg) by mouth every 12 hours 60 tablet 0     prochlorperazine (COMPAZINE) 10 MG tablet Take 1 tablet (10 mg) by mouth every 6 hours as needed (Nausea/Vomiting) 30 tablet 2     dexamethasone (DECADRON) 4 MG tablet Take 20 mg (5 tablets) the evening prior to and the morning of Abraxane infusion for 1st cycle only. 10 tablet 0     oxyCODONE IR (ROXICODONE) 5 MG tablet Take 1 tablet (5 mg) by mouth every 4 hours as needed for moderate to severe pain 30 tablet 0     diclofenac (VOLTAREN) 1 % GEL topical gel Place 2 g onto the skin 4 times daily 100 g 0     Lidocaine (LIDOCARE) 4 % Patch Place 1-2 patches onto the skin every 24 hours 30 patch 0     magnesium hydroxide (MILK OF MAGNESIA) 400 MG/5ML suspension Take 30 mLs by mouth daily as needed for constipation 105 mL      polyethylene glycol (MIRALAX/GLYCOLAX) Packet Take 17 g by mouth daily 7 packet      senna-docusate (SENOKOT-S;PERICOLACE) 8.6-50 MG per tablet Take 1-2 tablets by mouth 2 times daily as needed for constipation 100 tablet      loratadine (CLARITIN) 10 MG tablet Take 10 mg by mouth daily as needed for allergies       order for DME Equipment being ordered: Venu post-lumpectomy compression pad x2 1 each 0     zolpidem (AMBIEN CR) 12.5 MG CR tablet Take 1 tablet by mouth at bed time. 30 tablet 5     letrozole (FEMARA) 2.5 MG tablet Take 1 tablet (2.5 mg) by mouth daily 30 tablet 3     diphenhydrAMINE (BENADRYL) 25 MG tablet Take 1 tablet (25 mg) by mouth every 8 hours as needed for itching or allergies 1 tablet 0     DULERA 200-5 MCG/ACT  "oral inhaler INHALE 2 PUFFS INTO THE LUNGS TWICE DAILY 3 Inhaler 3     QVAR 80 MCG/ACT Inhaler INHALE 1 PUFFS INTO THE EACH NOSTRIL 2 TIMES DAILY 26.1 g 3     Phenylephrine-DM-GG (ROBITUSSIN COUGH/COLD CF PO) Take 10 mLs by mouth as needed        levothyroxine (SYNTHROID/LEVOTHROID) 25 MCG tablet TAKE 1 TABLET (25 MCG) BY MOUTH DAILY 90 tablet 2     metoclopramide (REGLAN) 10 MG tablet Take 1 tablet (10 mg) by mouth 2 times daily as needed 120 tablet 1     fexofenadine (ALLEGRA ALLERGY) 180 MG tablet Take 180 mg by mouth daily as needed for allergies       calcium carb-cholecalciferol (KP CALCIUM 600+D3) 600-500 MG-UNIT CAPS Take 2 tablets by mouth daily       albuterol (2.5 MG/3ML) 0.083% neb solution Take 1 vial (2.5 mg) by nebulization every 4 hours as needed for shortness of breath / dyspnea 30 vial 3     predniSONE (DELTASONE) 20 MG tablet Take 1 tablet (20 mg) by mouth 2 times daily For Yellow or Red zone on Asthma Action Plan. 10 tablet 1     magic mouthwash suspension (diphenhydrAMINE, lidocaine, aluminum-magnesium & simethicone) Swish and swallow 10 mLs in mouth every 6 hours as needed for mouth sores 240 mL 10     Docusate Calcium (STOOL SOFTENER PO) Take 100 mg by mouth daily        CRANBERRY PO Take 1 capsule by mouth daily        azelastine (ASTELIN) 0.1 % nasal spray Spray 1-2 sprays into both nostrils 2 times daily as needed for rhinitis 3 Bottle 3     albuterol (VENTOLIN HFA) 108 (90 BASE) MCG/ACT inhaler Inhale 2 puffs into the lungs every 4 hours as needed 1 Inhaler 2        Physical Exam:  /59 (BP Location: Right arm, Patient Position: Chair, Cuff Size: Adult Regular)  Pulse 90  Temp 98.8  F (37.1  C) (Tympanic)  Resp 20  Ht 1.626 m (5' 4\")  Wt 89.9 kg (198 lb 3.2 oz)  LMP 02/06/2013  BMI 34.02 kg/m2  Wt Readings from Last 12 Encounters:   12/21/17 89.9 kg (198 lb 3.2 oz)   12/14/17 89.1 kg (196 lb 8 oz)   12/12/17 90.6 kg (199 lb 11.2 oz)   12/07/17 90.6 kg (199 lb 11.2 oz) "   12/01/17 90.9 kg (200 lb 8 oz)   11/30/17 90.9 kg (200 lb 8 oz)   11/28/17 91.1 kg (200 lb 14.4 oz)   11/16/17 89.8 kg (198 lb)   11/15/17 89.6 kg (197 lb 8 oz)   11/10/17 90.9 kg (200 lb 8 oz)   11/08/17 91.1 kg (200 lb 14.4 oz)   10/22/17 88.7 kg (195 lb 8.8 oz)     ECOG performance status: 1  GENERAL APPEARANCE: Healthy, alert and in no acute distress.  HEENT: Sclerae anicteric. PERRLA. Oropharynx without ulcers, lesions, or thrush.  NECK: Supple. No asymmetry or masses.  LYMPHATICS: No palpable cervical, supraclavicular, axillary, or inguinal lymphadenopathy.  RESP: Decreased air entry on the left lung   CARDIOVASCULAR: Regular rate and rhythm. Normal S1, S2; no S3 or S4. No murmur, gallop, or rub.  ABDOMEN: Soft, nontender. Bowel sounds normal. No palpable organomegaly or masses.  MUSCULOSKELETAL: Extremities without gross deformities noted. No edema of bilateral lower extremities.  SKIN: No suspicious lesions or rashes.  NEURO: Alert and oriented x 3. Cranial nerves II-XII grossly intact.  PSYCHIATRIC: Mentation and affect appear normal.    Laboratory/Imaging Studies:  Infusion Therapy Visit on 12/21/2017   Component Date Value Ref Range Status     WBC 12/21/2017 7.9  4.0 - 11.0 10e9/L Final     RBC Count 12/21/2017 3.92  3.8 - 5.2 10e12/L Final     Hemoglobin 12/21/2017 13.0  11.7 - 15.7 g/dL Final     Hematocrit 12/21/2017 39.4  35.0 - 47.0 % Final     MCV 12/21/2017 101* 78 - 100 fl Final     MCH 12/21/2017 33.2* 26.5 - 33.0 pg Final     MCHC 12/21/2017 33.0  31.5 - 36.5 g/dL Final     RDW 12/21/2017 13.9  10.0 - 15.0 % Final     Platelet Count 12/21/2017 291  150 - 450 10e9/L Final     Diff Method 12/21/2017 Automated Method   Final     % Neutrophils 12/21/2017 81.8  % Final     % Lymphocytes 12/21/2017 9.6  % Final     % Monocytes 12/21/2017 8.0  % Final     % Eosinophils 12/21/2017 0.1  % Final     % Basophils 12/21/2017 0.1  % Final     % Immature Granulocytes 12/21/2017 0.4  % Final     Absolute  Neutrophil 12/21/2017 6.5  1.6 - 8.3 10e9/L Final     Absolute Lymphocytes 12/21/2017 0.8  0.8 - 5.3 10e9/L Final     Absolute Monocytes 12/21/2017 0.6  0.0 - 1.3 10e9/L Final     Absolute Eosinophils 12/21/2017 0.0  0.0 - 0.7 10e9/L Final     Absolute Basophils 12/21/2017 0.0  0.0 - 0.2 10e9/L Final     Abs Immature Granulocytes 12/21/2017 0.0  0 - 0.4 10e9/L Final     Sodium 12/21/2017 138  133 - 144 mmol/L Final     Potassium 12/21/2017 3.9  3.4 - 5.3 mmol/L Final     Chloride 12/21/2017 106  94 - 109 mmol/L Final     Carbon Dioxide 12/21/2017 26  20 - 32 mmol/L Final     Anion Gap 12/21/2017 6  3 - 14 mmol/L Final     Glucose 12/21/2017 171* 70 - 99 mg/dL Final     Urea Nitrogen 12/21/2017 9  7 - 30 mg/dL Final     Creatinine 12/21/2017 0.46* 0.52 - 1.04 mg/dL Final     GFR Estimate 12/21/2017 >90  >60 mL/min/1.7m2 Final    Non  GFR Calc     GFR Estimate If Black 12/21/2017 >90  >60 mL/min/1.7m2 Final    African American GFR Calc     Calcium 12/21/2017 8.4* 8.5 - 10.1 mg/dL Final     Bilirubin Total 12/21/2017 0.8  0.2 - 1.3 mg/dL Final     Albumin 12/21/2017 2.6* 3.4 - 5.0 g/dL Final     Protein Total 12/21/2017 7.0  6.8 - 8.8 g/dL Final     Alkaline Phosphatase 12/21/2017 180* 40 - 150 U/L Final     ALT 12/21/2017 16  0 - 50 U/L Final     AST 12/21/2017 48* 0 - 45 U/L Final        Recent Results (from the past 744 hour(s))   US Abdomen Limited    Narrative    ULTRASOUND  ABDOMEN LIMITED   11/30/2017 12:53 PM     HISTORY:  Breast cancer; elevated liver enzymes. Carcinoma of breast  metastatic to liver, unspecified laterality (H). Bone metastasis (H);  Secondary malignant neoplasm of liver (H). Elevated liver enzymes.    COMPARISON: CT abdomen and pelvis 11/13/2017.    FINDINGS:    Gallbladder: Negative sonographic Osullivan sign. Faint gallbladder  sludge is suggested layering in the gallbladder lumen. No gallstones.    Bile ducts:  Common duct is 0.7 cm and may be normal for age. No  intrahepatic  biliary ductal dilatation.    Liver: Hepatic metastasis again identified by ultrasound. One of the  largest is 2.8 x 2.1 x 3 cm at the left liver. The other lesions are  better visualized on the comparison CT.    Pancreas:  Partially obscured but grossly unremarkable.     Right kidney:  Normal.     Aorta and IVC:  Not specifically assessed.       Impression    IMPRESSION:    1. Hepatic metastasis again identified.  2. Gallbladder sludge. No gallstones. Negative sonographic Osullivan  sign.    CHICO FONG MD   CT Chest/Abdomen/Pelvis w Contrast    Narrative    CT CHEST/ABDOMEN/PELVIS WITH CONTRAST December 6, 2017 1:52 PM    HISTORY: Breast cancer with liver metastasis. Elevated liver enzymes,  elevated bilirubin. Bilateral malignant neoplasm of breast in female,  estrogen receptor positive, unspecified site of breast (H).     TECHNIQUE: CT scan obtained of the chest, abdomen, and pelvis with  oral and IV contrast. 97 ml isovue 370 injected. Radiation dose for  this scan was reduced using automated exposure control, adjustment of  the mA and/or kV according to patient size, or iterative  reconstruction technique.    COMPARISON:  CT chest, abdomen and pelvis 11/13/2017.    FINDINGS:  Chest: Left chest port venous catheter. Increased volume of moderate  to large pericardial effusion. Transverse thickness of this fluid is  now 2.4 cm, previously 0.6 cm at a comparable level on the prior study  series 2 image 36. There is some mild wall enhancement of the superior  portion of the pericardium, for instance around series 2 image 20 in  the region of the cardiac base. Small volume right pleural fluid newly  identified. No acute thoracic aortic abnormality. No large central  pulmonary embolism. The exam was not tailored for assessment of middle  and small branch vessel pulmonary emboli. The distal left  paraesophageal lymph node is 0.8 x 0.5 cm, previously 0.3 x 0.3 cm  series 2 image 48. The remainder of the thoracic lymph  nodes remain  small. Right axillary adenopathy without significant change in size  measuring 4.8 x 3.4 cm, previously 4.2 x 2.8 cm image 16. There are  other mildly more prominent lymph nodes as well. No left axillary  adenopathy. Stable thyroid nodules that are small in size. Right  breast skin thickening again noted. No convincing new bony disease  identified. Some ill-defined sclerosis at the right T8 pedicle on  series 2 image 33, and right posteromedial fifth rib on image 18  appears stable. Stable appearing cavitary lesion at the right upper  lung measuring 8.8 cm series 3 image 13. Stable triangular area of  consolidation at the right hilar region at the right midchest series 3  image 27. There are several tiny bilateral stable pulmonary nodules.  For example, one of these is 0.4 cm medial right lower lobe series 3  image 30. There are multiple other examples bilaterally.    Abdomen/pelvis: No convincing new bony disease at the abdomen or  pelvis levels. Multifocal hepatic metastasis. Anterior central hepatic  mass is 2.7 x 2.6 cm, previously 2.2 x 2.1 cm series 2 image 58.  Lateral segment left hepatic mass is approximately 2.8 x 2.8 cm,  previously 2.2 x 2.4 cm image 53. A caudate lobe mass is approximately  2.9 x 2.2 cm, previously 2 x 2.2 cm image 60. There are other hepatic  masses that are slightly larger, or are stable. Notably, there is new  moderate intrahepatic biliary ductal dilatation. This appears to lead  to the pancreatic head where there is complete decompression of the  common bile duct coronal series 4 image 23. There is a small area of  nodular enhancement newly identified in this region that is  approximately 0.8 cm series 2 image 66. Adenopathy also noted at the  shreyas hepatis. Portal caval enlarged lymph node is 2.5 x 1.3 cm,  previously 1.7 x 1.1 cm image 59. There are other prominent lymph  nodes that appear more prominent in the interval as well, for instance  image 56. Some increase  ill-defined soft tissue at the shreyas hepatis  region on image 56 also appears more prominent measuring 2.4 x 1.4 cm  series 2 image 56, previously 1.7 x 1 cm.    The spleen demonstrates a stable small subcentimeter enhancing focus  series 2 image 56. Ill-defined tiny hypodensities in the spleen also  appears stable image 54 and 56. The adrenals, remainder of the  pancreas, and kidneys do not show any new abnormalities. Tiny cyst at  the left kidney is stable image 67.    There are several retroperitoneal lymph nodes noted. Upper abdominal  aortocaval lymph node is 1.5 x 1.3 cm, previously 0.9 x 1.3 cm series  2 image 64. A few other mildly prominent retroperitoneal lymph nodes  again noted. Small to moderate free pelvic fluid. No acute bowel  abnormality is seen. New nodularity at the anterior omentum with one  region measuring 1.2 cm abutting a small bowel loop series 2 image 77.  Other adjacent nodules noted as well.      Impression    IMPRESSION:  1. Progression of hepatic metastatic disease with enlargement of  multifocal masses. New finding of moderate intrahepatic biliary ductal  dilatation worrisome for developing biliary obstruction. This may  relate to progression of adenopathy at the upper abdomen at the shreyas  hepatis and/or an increasing area of nodular enhancement at the  pancreatic head worrisome for metastatic disease.  2. Moderate to large pericardial effusion significantly increased  since 11/13/2017. Some mild enhancement of the pericardium also noted.  Recommend further workup.  3. New finding of small nodularity at the omentum at the midline and  slightly towards the right that could represent carcinomatosis.  4. Multifocal stable indeterminate pulmonary nodules may represent  pulmonary metastatic disease.  5. Stable sclerosis at the right fifth rib and T8 that could represent  stable sclerotic metastasis.  6. Progression of adenopathy at the right axilla, and upper  abdominal  retroperitoneum.    I communicated these results to Dr. Cassidy on 12/6/2017 at 1450  hours.    CHICO FONG MD   XR Surgery [facility]NA G/T 5 Min Fluoro w Stills    Narrative    This exam was marked as non-reportable because it will not be read by a   radiologist or a [facility] non-radiologist provider.             XR Chest 2 Views    Narrative    XR CHEST 2 VW  12/11/2017 7:11 PM    History: Fever    Comparison: CT 12/6/2017 chest x-ray 11/8/2017    Findings:   PA and lateral views of the chest are obtained. There is a left chest  Port-A-Cath with the tip in stable position projecting over the high  SVC. Pericardial drain projects over the heart. The trachea is in the  midline. Cardiac mediastinal silhouette is stable and mildly enlarged.  There is pneumopericardium and/or pneumomediastinum. Lung volumes are  decreased. Bilateral perihilar and left greater than right basilar  opacities of atelectasis or consolidation. No pneumothorax. Small left  greater than right pleural effusions. Lucency in the paramediastinal  right lung seems to correspond to largest cystic lesion seen on prior  CT. Metallic biliary stent.      Impression    IMPRESSION:  1.  Mildly enlarged cardiac mediastinal silhouette.  Pneumopericardium/pneumomediastinum presumably relates to the presence  of the pericardial drainage catheter.  2.  Small left greater than right pleural effusions.  3.  Perihilar and left greater than right basilar opacities could  represent consolidation, especially given provided history.  Alternatively, findings could represent atelectasis.    I have personally reviewed the examination and initial interpretation  and I agree with the findings.    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  She will be receiving Abraxane 100 mg/m  weekly.   I discussed the rationale behind using Abraxane in detail as well as major side effects including, but not limited to immediate/short term side effects of acute infusion reaction/anaphylaxis, ECG abnormalities and need for baseline ECG, fluid retention, life-threatening infection, arthralgias/myalgias, nausea/vomiting, diarrhea, mucositis, alopecia, anemia, neutropenia, thrombocytopenia, weakness and fatigue as well as long-term side effect of dose and duration dependent peripheral neuropathy. The patient was instructed to discontinue and refrain from administration of non-steroidal anti-inflammatory medications, aspirin, and blood thinners. Patient instructed to call with signs or symptoms of infection including, but not limited to temperature greater than or equal to 100.5 degrees Fahrenheit, chills, severe sore throat, ear or sinus pain, mouth sores, cough, painful urination, and/or non-healing wound. The patient was given written information handout about Abraxane, agrees to proceed with treatment, and denies any further questions at this time.    (C79.51) Bone metastasis (H)  Patient will continue on Xgeva according to the treatment plan.  Patient will continue calcium and vitamin D supplements.    (E03.9) Hypothyroidism, unspecified type  Patient currently on Synthroid 25 mcg daily.    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  She is using oxycodone for breakthrough pain.    (K12.31) Mucositis due to chemotherapy  Patient will continue with  Magic mouthwash if needed.    (J90) Pleural effusion   we will continue to monitor the patient's symptoms.  And of her thoracocentesis if needed    The patient is ready to learn, no apparent learning barriers were identified.  Diagnosis and treatment plans were explained to the patient. The patient expressed understanding of the content. The patient asked appropriate questions. The patient questions were answered to her satisfaction.    Time spent 40 minutes more than 50% of the time in counseling and coordination of care including discussion of management plan, potential side effects and follow-up plan.  Of chemotherapy    Chart documentation with Dragon Voice recognition Software. Although reviewed after completion, some words and grammatical errors may remain.    Again, thank you for allowing me to participate in the care of your patient.        Sincerely,        Brodie Cassidy MD

## 2017-12-21 NOTE — ADDENDUM NOTE
Encounter addended by: Brianne Lundberg, PT on: 12/21/2017 12:46 PM<BR>     Actions taken: Flowsheet accepted, Sign clinical note

## 2017-12-21 NOTE — PATIENT INSTRUCTIONS
We would like to see you back in clinic with Dr. Cassidy next week for status check. Copy of appointments, and after visit summary (AVS) given to patient.  If you have any questions during business hours (M-F 8 AM- 4PM), please call Funmi Ray RN, BSN, OCN Oncology Hematology /Breast Cancer Navigator at [facility] [phone].   For questions after business hours, or on holidays/weekends, please call our after hours Nurse Triage line [phone]. Thank you.

## 2017-12-21 NOTE — PROGRESS NOTES
Outpatient Physical Therapy Progress Note     Patient: Etta Cervantes  : 1960    Beginning/End Dates of Reporting Period:  2017 to 2017    Referring Provider: Dr. Khanh MD    Therapy Diagnosis: R-breast lymphedema     Client Self Report: I'm just not feeling great today    Objective Measurements:  Objective Measure: R-breast girth  Details: -0.9cm from 17    Objective Measure: skin  Details: R-breast still firm and fibrotic at lateral and inferior aspects     Outcome Measures (most recent score):   LLIS = 29 on date of initial evaluation; pt will again complete LLIS at time of discharge      Goals:  Goal Identifier stg   Goal Description pt to have at least daytime tolerance to Komprex2 at R-breast for decrease edema and fibrosis   Target Date 17   Date Met  17   Progress:     Goal Identifier stg   Goal Description pt to be independent with self-MLD of R-breast for decreased edema and fibrosis for increased comfort   Target Date 11/15/17   Date Met  17   Progress:     Goal Identifier ltg   Goal Description pt to have at least 5cm reduction in R-breast girth for increased comfort   Target Date 17   Date Met  17 (5.8cm reduction since initial evaluation)   Progress:     Goal Identifier ltg   Goal Description pt to be independent with R-breast lymphedema management via compression and self-MLD (as appropriate)    Target Date 18   Date Met      Progress:     Progress Toward Goals:   Fairly good progress has been made toward goals with reductions in R-breast girth and decreased fibrosis, but ongoing firmness and discomfort, through improved, continues.  Treatment over past 2 weeks has been missed due to medical complications and hospitalization.      Plan:  Continue therapy per current plan of care.    Discharge:  No

## 2017-12-21 NOTE — NURSING NOTE
"Oncology Rooming Note    December 21, 2017 8:52 AM   Etta Cervantes is a 57 year old female who presents for:    Chief Complaint   Patient presents with     Oncology Clinic Visit     F/u breast cancer; chemotherapy to start today     Initial Vitals: /59 (BP Location: Right arm, Patient Position: Chair, Cuff Size: Adult Regular)  Pulse 90  Temp 98.8  F (37.1  C) (Tympanic)  Resp 20  Ht 1.626 m (5' 4\")  Wt 89.9 kg (198 lb 3.2 oz)  LMP 02/06/2013  BMI 34.02 kg/m2 Estimated body mass index is 34.02 kg/(m^2) as calculated from the following:    Height as of this encounter: 1.626 m (5' 4\").    Weight as of this encounter: 89.9 kg (198 lb 3.2 oz). Body surface area is 2.01 meters squared.  No Pain (1) Comment: rib and back pain~ well controlled with pain management regimen   Patient's last menstrual period was 02/06/2013.  Allergies reviewed: Yes  Medications reviewed: Yes    Medications: Medication refills not needed today.  Pharmacy name entered into [employer]:    [id number] [place] [facility] - [place], [place] - [address] [place] [address] [facility] [place], [place] - [address] [place] [address]    Clinical concerns: Patient presents today in f/u of breast cancer.  Chemotherapy is scheduled to follow.  Has pain in ribs/back ranging from 1-7.  Patient reports pain is well controlled with current pain management regimen. No other c/o at this time.  Dr. LUIS Cassidy was notified.    8 minutes for nursing intake (face to face time)     Funmi Ray RN              "

## 2017-12-21 NOTE — PROGRESS NOTES
Hematology/ Oncology Follow-up Visit:  Dec 21, 2017    Reason for Visit:   Chief Complaint   Patient presents with     Oncology Clinic Visit     F/u breast cancer; chemotherapy to start today       Oncologic History:  Breast cancer (H)  Etta Cervantes presented with increasing lump in the right axilla that s lump has been growing without any pain or associated symptoms. Subsequently she was evaluated by primary care physician. Diagnostic digital mammogram was done on 01/13/2015 showing enlarged lymph nodes in the right axilla. There was some skin thickening in the right breast anteriorly and laterally. There are no parenchymal changes in the right breast. The left breast shows a 1.7 cm spiculated mass at approximately 2 to 3 o'clock position, 15.2 cm away from the nipple. A right breast ultrasound was subsequently done and ultrasound guided biopsy was done. Needle biopsy of the left breast did show infiltrating ductal carcinoma grade I of III with no angiolymphatic invasion seen. No associated ductal carcinoma in situ. Estrogen receptor, progresterone receptor were positive. HER-2/sadie receptor came back negative.. Another biopsy from the right axilla did show metastatic ductal carcinoma. PET scan was done on January 26, 2015 showing few small right posterior cervical chain nodes the largest is 1.2 cm. There is extensive bulky right axillary adenopathy demonstrating hypermetabolic FDG uptake with SUV of 10.6. There is skin thickening involving the right breast which demonstrated low-grade FDG uptake. A 1.2 cm lesion on the lateral aspect of the left breast demonstrated minimally increased FDG uptake with SUV of 2. Scattered hypermetabolic mediastinal lymph nodes located in the left superior anterior mediastinum, right paratracheal and precarinal U, subcranial adenopathy with javon metastases. This focus hypermetabolic FDG uptake identified definitive Amanda posterior aspect off intertrochanteric region of the proximal  left femur consistent with bone metastases. There is also 1.4 cm hypermetabolic FDG uptake identified in the anterior medial segment of the left hepatic lobe consistent with liver metastases. Additional 2.1 cm low-attenuation lesion anterior aspect of the right lobe which needs to be determined. The patient was started on chemotherapy with Taxotere and Cytoxan on February 9, 2015.  She concluded 4 cycles of Taxotere and Cytoxan. She started on Arimidex 1 mg orally daily. Currently she is on Faslodex and ibrance. Subsequently the patient went on to receive Afinitor. The patient also started treatment with Xeloda.       Interval History:  Patient is here today to review treatment planning details.  Echocardiogram done yesterday shows no significant pericardial effusion.  Patient continues to have back pain.  Patient has mild shortness of breath especially with exertion.  There is no cough.  There is no nausea or vomiting.  Liver enzymes has normalized.    Review Of Systems:  Constitutional: Negative for fever, chills, and night sweats.  Skin: negative.  Eyes: negative.  Ears/Nose/Throat: negative.  Respiratory: dyspnea on exertion, there is no cough, or hemoptysis.  Cardiovascular: negative.  Gastrointestinal: negative.  Genitourinary: negative.  Musculoskeletal: negative.  Back pain  Neurologic: negative.  Psychiatric: negative.  Hematologic/Lymphatic/Immunologic: negative.  Endocrine: negative.    All other ROS negative unless mentioned in interval history.    Past medical, social, surgical, and family histories reviewed.    Allergies:  Allergies as of 12/21/2017 - Manuel as Reviewed 12/21/2017   Allergen Reaction Noted     Animal dander Cough 01/23/2015     Cats  07/15/2010     Dust mites Cough 01/23/2015     Pollen extract  01/23/2015     Seasonal allergies  07/15/2010     Levaquin [levofloxacin] Rash 07/17/2017       Current Medications:  Current Outpatient Prescriptions   Medication Sig Dispense Refill      zolpidem (AMBIEN) 10 MG tablet TAKE 1 TABLET 30 MIN PRIOR TO BEDTIME  0     lidocaine, viscous, (XYLOCAINE) 2 % solution        morphine (MS CONTIN) 15 MG 12 hr tablet Take 1 tablet (15 mg) by mouth every 12 hours 60 tablet 0     prochlorperazine (COMPAZINE) 10 MG tablet Take 1 tablet (10 mg) by mouth every 6 hours as needed (Nausea/Vomiting) 30 tablet 2     dexamethasone (DECADRON) 4 MG tablet Take 20 mg (5 tablets) the evening prior to and the morning of Abraxane infusion for 1st cycle only. 10 tablet 0     oxyCODONE IR (ROXICODONE) 5 MG tablet Take 1 tablet (5 mg) by mouth every 4 hours as needed for moderate to severe pain 30 tablet 0     diclofenac (VOLTAREN) 1 % GEL topical gel Place 2 g onto the skin 4 times daily 100 g 0     Lidocaine (LIDOCARE) 4 % Patch Place 1-2 patches onto the skin every 24 hours 30 patch 0     magnesium hydroxide (MILK OF MAGNESIA) 400 MG/5ML suspension Take 30 mLs by mouth daily as needed for constipation 105 mL      polyethylene glycol (MIRALAX/GLYCOLAX) Packet Take 17 g by mouth daily 7 packet      senna-docusate (SENOKOT-S;PERICOLACE) 8.6-50 MG per tablet Take 1-2 tablets by mouth 2 times daily as needed for constipation 100 tablet      loratadine (CLARITIN) 10 MG tablet Take 10 mg by mouth daily as needed for allergies       order for DME Equipment being ordered: Venu post-lumpectomy compression pad x2 1 each 0     zolpidem (AMBIEN CR) 12.5 MG CR tablet Take 1 tablet by mouth at bed time. 30 tablet 5     letrozole (FEMARA) 2.5 MG tablet Take 1 tablet (2.5 mg) by mouth daily 30 tablet 3     diphenhydrAMINE (BENADRYL) 25 MG tablet Take 1 tablet (25 mg) by mouth every 8 hours as needed for itching or allergies 1 tablet 0     DULERA 200-5 MCG/ACT oral inhaler INHALE 2 PUFFS INTO THE LUNGS TWICE DAILY 3 Inhaler 3     QVAR 80 MCG/ACT Inhaler INHALE 1 PUFFS INTO THE EACH NOSTRIL 2 TIMES DAILY 26.1 g 3     Phenylephrine-DM-GG (ROBITUSSIN COUGH/COLD CF PO) Take 10 mLs by mouth as needed  "       levothyroxine (SYNTHROID/LEVOTHROID) 25 MCG tablet TAKE 1 TABLET (25 MCG) BY MOUTH DAILY 90 tablet 2     metoclopramide (REGLAN) 10 MG tablet Take 1 tablet (10 mg) by mouth 2 times daily as needed 120 tablet 1     fexofenadine (ALLEGRA ALLERGY) 180 MG tablet Take 180 mg by mouth daily as needed for allergies       calcium carb-cholecalciferol (KP CALCIUM 600+D3) 600-500 MG-UNIT CAPS Take 2 tablets by mouth daily       albuterol (2.5 MG/3ML) 0.083% neb solution Take 1 vial (2.5 mg) by nebulization every 4 hours as needed for shortness of breath / dyspnea 30 vial 3     predniSONE (DELTASONE) 20 MG tablet Take 1 tablet (20 mg) by mouth 2 times daily For Yellow or Red zone on Asthma Action Plan. 10 tablet 1     magic mouthwash suspension (diphenhydrAMINE, lidocaine, aluminum-magnesium & simethicone) Swish and swallow 10 mLs in mouth every 6 hours as needed for mouth sores 240 mL 10     Docusate Calcium (STOOL SOFTENER PO) Take 100 mg by mouth daily        CRANBERRY PO Take 1 capsule by mouth daily        azelastine (ASTELIN) 0.1 % nasal spray Spray 1-2 sprays into both nostrils 2 times daily as needed for rhinitis 3 Bottle 3     albuterol (VENTOLIN HFA) 108 (90 BASE) MCG/ACT inhaler Inhale 2 puffs into the lungs every 4 hours as needed 1 Inhaler 2        Physical Exam:  /59 (BP Location: Right arm, Patient Position: Chair, Cuff Size: Adult Regular)  Pulse 90  Temp 98.8  F (37.1  C) (Tympanic)  Resp 20  Ht 1.626 m (5' 4\")  Wt 89.9 kg (198 lb 3.2 oz)  LMP 02/06/2013  BMI 34.02 kg/m2  Wt Readings from Last 12 Encounters:   12/21/17 89.9 kg (198 lb 3.2 oz)   12/14/17 89.1 kg (196 lb 8 oz)   12/12/17 90.6 kg (199 lb 11.2 oz)   12/07/17 90.6 kg (199 lb 11.2 oz)   12/01/17 90.9 kg (200 lb 8 oz)   11/30/17 90.9 kg (200 lb 8 oz)   11/28/17 91.1 kg (200 lb 14.4 oz)   11/16/17 89.8 kg (198 lb)   11/15/17 89.6 kg (197 lb 8 oz)   11/10/17 90.9 kg (200 lb 8 oz)   11/08/17 91.1 kg (200 lb 14.4 oz)   10/22/17 88.7 " kg (195 lb 8.8 oz)     ECOG performance status: 1  GENERAL APPEARANCE: Healthy, alert and in no acute distress.  HEENT: Sclerae anicteric. PERRLA. Oropharynx without ulcers, lesions, or thrush.  NECK: Supple. No asymmetry or masses.  LYMPHATICS: No palpable cervical, supraclavicular, axillary, or inguinal lymphadenopathy.  RESP: Decreased air entry on the left lung   CARDIOVASCULAR: Regular rate and rhythm. Normal S1, S2; no S3 or S4. No murmur, gallop, or rub.  ABDOMEN: Soft, nontender. Bowel sounds normal. No palpable organomegaly or masses.  MUSCULOSKELETAL: Extremities without gross deformities noted. No edema of bilateral lower extremities.  SKIN: No suspicious lesions or rashes.  NEURO: Alert and oriented x 3. Cranial nerves II-XII grossly intact.  PSYCHIATRIC: Mentation and affect appear normal.    Laboratory/Imaging Studies:  Infusion Therapy Visit on 12/21/2017   Component Date Value Ref Range Status     WBC 12/21/2017 7.9  4.0 - 11.0 10e9/L Final     RBC Count 12/21/2017 3.92  3.8 - 5.2 10e12/L Final     Hemoglobin 12/21/2017 13.0  11.7 - 15.7 g/dL Final     Hematocrit 12/21/2017 39.4  35.0 - 47.0 % Final     MCV 12/21/2017 101* 78 - 100 fl Final     MCH 12/21/2017 33.2* 26.5 - 33.0 pg Final     MCHC 12/21/2017 33.0  31.5 - 36.5 g/dL Final     RDW 12/21/2017 13.9  10.0 - 15.0 % Final     Platelet Count 12/21/2017 291  150 - 450 10e9/L Final     Diff Method 12/21/2017 Automated Method   Final     % Neutrophils 12/21/2017 81.8  % Final     % Lymphocytes 12/21/2017 9.6  % Final     % Monocytes 12/21/2017 8.0  % Final     % Eosinophils 12/21/2017 0.1  % Final     % Basophils 12/21/2017 0.1  % Final     % Immature Granulocytes 12/21/2017 0.4  % Final     Absolute Neutrophil 12/21/2017 6.5  1.6 - 8.3 10e9/L Final     Absolute Lymphocytes 12/21/2017 0.8  0.8 - 5.3 10e9/L Final     Absolute Monocytes 12/21/2017 0.6  0.0 - 1.3 10e9/L Final     Absolute Eosinophils 12/21/2017 0.0  0.0 - 0.7 10e9/L Final     Absolute  Basophils 12/21/2017 0.0  0.0 - 0.2 10e9/L Final     Abs Immature Granulocytes 12/21/2017 0.0  0 - 0.4 10e9/L Final     Sodium 12/21/2017 138  133 - 144 mmol/L Final     Potassium 12/21/2017 3.9  3.4 - 5.3 mmol/L Final     Chloride 12/21/2017 106  94 - 109 mmol/L Final     Carbon Dioxide 12/21/2017 26  20 - 32 mmol/L Final     Anion Gap 12/21/2017 6  3 - 14 mmol/L Final     Glucose 12/21/2017 171* 70 - 99 mg/dL Final     Urea Nitrogen 12/21/2017 9  7 - 30 mg/dL Final     Creatinine 12/21/2017 0.46* 0.52 - 1.04 mg/dL Final     GFR Estimate 12/21/2017 >90  >60 mL/min/1.7m2 Final    Non  GFR Calc     GFR Estimate If Black 12/21/2017 >90  >60 mL/min/1.7m2 Final    African American GFR Calc     Calcium 12/21/2017 8.4* 8.5 - 10.1 mg/dL Final     Bilirubin Total 12/21/2017 0.8  0.2 - 1.3 mg/dL Final     Albumin 12/21/2017 2.6* 3.4 - 5.0 g/dL Final     Protein Total 12/21/2017 7.0  6.8 - 8.8 g/dL Final     Alkaline Phosphatase 12/21/2017 180* 40 - 150 U/L Final     ALT 12/21/2017 16  0 - 50 U/L Final     AST 12/21/2017 48* 0 - 45 U/L Final        Recent Results (from the past 744 hour(s))   US Abdomen Limited    Narrative    ULTRASOUND  ABDOMEN LIMITED   11/30/2017 12:53 PM     HISTORY:  Breast cancer; elevated liver enzymes. Carcinoma of breast  metastatic to liver, unspecified laterality (H). Bone metastasis (H);  Secondary malignant neoplasm of liver (H). Elevated liver enzymes.    COMPARISON: CT abdomen and pelvis 11/13/2017.    FINDINGS:    Gallbladder: Negative sonographic Osullivan sign. Faint gallbladder  sludge is suggested layering in the gallbladder lumen. No gallstones.    Bile ducts:  Common duct is 0.7 cm and may be normal for age. No  intrahepatic biliary ductal dilatation.    Liver: Hepatic metastasis again identified by ultrasound. One of the  largest is 2.8 x 2.1 x 3 cm at the left liver. The other lesions are  better visualized on the comparison CT.    Pancreas:  Partially obscured but  grossly unremarkable.     Right kidney:  Normal.     Aorta and IVC:  Not specifically assessed.       Impression    IMPRESSION:    1. Hepatic metastasis again identified.  2. Gallbladder sludge. No gallstones. Negative sonographic Osullivan  sign.    CHICO FONG MD   CT Chest/Abdomen/Pelvis w Contrast    Narrative    CT CHEST/ABDOMEN/PELVIS WITH CONTRAST December 6, 2017 1:52 PM    HISTORY: Breast cancer with liver metastasis. Elevated liver enzymes,  elevated bilirubin. Bilateral malignant neoplasm of breast in female,  estrogen receptor positive, unspecified site of breast (H).     TECHNIQUE: CT scan obtained of the chest, abdomen, and pelvis with  oral and IV contrast. 97 ml isovue 370 injected. Radiation dose for  this scan was reduced using automated exposure control, adjustment of  the mA and/or kV according to patient size, or iterative  reconstruction technique.    COMPARISON:  CT chest, abdomen and pelvis 11/13/2017.    FINDINGS:  Chest: Left chest port venous catheter. Increased volume of moderate  to large pericardial effusion. Transverse thickness of this fluid is  now 2.4 cm, previously 0.6 cm at a comparable level on the prior study  series 2 image 36. There is some mild wall enhancement of the superior  portion of the pericardium, for instance around series 2 image 20 in  the region of the cardiac base. Small volume right pleural fluid newly  identified. No acute thoracic aortic abnormality. No large central  pulmonary embolism. The exam was not tailored for assessment of middle  and small branch vessel pulmonary emboli. The distal left  paraesophageal lymph node is 0.8 x 0.5 cm, previously 0.3 x 0.3 cm  series 2 image 48. The remainder of the thoracic lymph nodes remain  small. Right axillary adenopathy without significant change in size  measuring 4.8 x 3.4 cm, previously 4.2 x 2.8 cm image 16. There are  other mildly more prominent lymph nodes as well. No left axillary  adenopathy. Stable thyroid  nodules that are small in size. Right  breast skin thickening again noted. No convincing new bony disease  identified. Some ill-defined sclerosis at the right T8 pedicle on  series 2 image 33, and right posteromedial fifth rib on image 18  appears stable. Stable appearing cavitary lesion at the right upper  lung measuring 8.8 cm series 3 image 13. Stable triangular area of  consolidation at the right hilar region at the right midchest series 3  image 27. There are several tiny bilateral stable pulmonary nodules.  For example, one of these is 0.4 cm medial right lower lobe series 3  image 30. There are multiple other examples bilaterally.    Abdomen/pelvis: No convincing new bony disease at the abdomen or  pelvis levels. Multifocal hepatic metastasis. Anterior central hepatic  mass is 2.7 x 2.6 cm, previously 2.2 x 2.1 cm series 2 image 58.  Lateral segment left hepatic mass is approximately 2.8 x 2.8 cm,  previously 2.2 x 2.4 cm image 53. A caudate lobe mass is approximately  2.9 x 2.2 cm, previously 2 x 2.2 cm image 60. There are other hepatic  masses that are slightly larger, or are stable. Notably, there is new  moderate intrahepatic biliary ductal dilatation. This appears to lead  to the pancreatic head where there is complete decompression of the  common bile duct coronal series 4 image 23. There is a small area of  nodular enhancement newly identified in this region that is  approximately 0.8 cm series 2 image 66. Adenopathy also noted at the  shreyas hepatis. Portal caval enlarged lymph node is 2.5 x 1.3 cm,  previously 1.7 x 1.1 cm image 59. There are other prominent lymph  nodes that appear more prominent in the interval as well, for instance  image 56. Some increase ill-defined soft tissue at the shreyas hepatis  region on image 56 also appears more prominent measuring 2.4 x 1.4 cm  series 2 image 56, previously 1.7 x 1 cm.    The spleen demonstrates a stable small subcentimeter enhancing focus  series 2 image  56. Ill-defined tiny hypodensities in the spleen also  appears stable image 54 and 56. The adrenals, remainder of the  pancreas, and kidneys do not show any new abnormalities. Tiny cyst at  the left kidney is stable image 67.    There are several retroperitoneal lymph nodes noted. Upper abdominal  aortocaval lymph node is 1.5 x 1.3 cm, previously 0.9 x 1.3 cm series  2 image 64. A few other mildly prominent retroperitoneal lymph nodes  again noted. Small to moderate free pelvic fluid. No acute bowel  abnormality is seen. New nodularity at the anterior omentum with one  region measuring 1.2 cm abutting a small bowel loop series 2 image 77.  Other adjacent nodules noted as well.      Impression    IMPRESSION:  1. Progression of hepatic metastatic disease with enlargement of  multifocal masses. New finding of moderate intrahepatic biliary ductal  dilatation worrisome for developing biliary obstruction. This may  relate to progression of adenopathy at the upper abdomen at the shreyas  hepatis and/or an increasing area of nodular enhancement at the  pancreatic head worrisome for metastatic disease.  2. Moderate to large pericardial effusion significantly increased  since 11/13/2017. Some mild enhancement of the pericardium also noted.  Recommend further workup.  3. New finding of small nodularity at the omentum at the midline and  slightly towards the right that could represent carcinomatosis.  4. Multifocal stable indeterminate pulmonary nodules may represent  pulmonary metastatic disease.  5. Stable sclerosis at the right fifth rib and T8 that could represent  stable sclerotic metastasis.  6. Progression of adenopathy at the right axilla, and upper abdominal  retroperitoneum.    I communicated these results to Dr. Cassidy on 12/6/2017 at 1450  hours.    CHICO FONG MD   XR Surgery [facility] G/T 5 Min Fluoro w Stills    Narrative    This exam was marked as non-reportable because it will not be read by a   radiologist or a  [place] non-radiologist provider.             XR Chest 2 Views    Narrative    XR CHEST 2 VW  12/11/2017 7:11 PM    History: Fever    Comparison: CT 12/6/2017 chest x-ray 11/8/2017    Findings:   PA and lateral views of the chest are obtained. There is a left chest  Port-A-Cath with the tip in stable position projecting over the high  SVC. Pericardial drain projects over the heart. The trachea is in the  midline. Cardiac mediastinal silhouette is stable and mildly enlarged.  There is pneumopericardium and/or pneumomediastinum. Lung volumes are  decreased. Bilateral perihilar and left greater than right basilar  opacities of atelectasis or consolidation. No pneumothorax. Small left  greater than right pleural effusions. Lucency in the paramediastinal  right lung seems to correspond to largest cystic lesion seen on prior  CT. Metallic biliary stent.      Impression    IMPRESSION:  1.  Mildly enlarged cardiac mediastinal silhouette.  Pneumopericardium/pneumomediastinum presumably relates to the presence  of the pericardial drainage catheter.  2.  Small left greater than right pleural effusions.  3.  Perihilar and left greater than right basilar opacities could  represent consolidation, especially given provided history.  Alternatively, findings could represent atelectasis.    I have personally reviewed the examination and initial interpretation  and I agree with the findings.    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  She will  be receiving Abraxane 100 mg/m  weekly.   I discussed the rationale behind using Abraxane in detail as well as major side effects including, but not limited to immediate/short term side effects of acute infusion reaction/anaphylaxis, ECG abnormalities and need for baseline ECG, fluid retention, life-threatening infection, arthralgias/myalgias, nausea/vomiting, diarrhea, mucositis, alopecia, anemia, neutropenia, thrombocytopenia, weakness and fatigue as well as long-term side effect of dose and duration dependent peripheral neuropathy. The patient was instructed to discontinue and refrain from administration of non-steroidal anti-inflammatory medications, aspirin, and blood thinners. Patient instructed to call with signs or symptoms of infection including, but not limited to temperature greater than or equal to 100.5 degrees Fahrenheit, chills, severe sore throat, ear or sinus pain, mouth sores, cough, painful urination, and/or non-healing wound. The patient was given written information handout about Abraxane, agrees to proceed with treatment, and denies any further questions at this time.    (C79.51) Bone metastasis (H)  Patient will continue on Xgeva according to the treatment plan.  Patient will continue calcium and vitamin D supplements.    (E03.9) Hypothyroidism, unspecified type  Patient currently on Synthroid 25 mcg daily.    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  She is using oxycodone for breakthrough pain.    (K12.31) Mucositis due to chemotherapy  Patient will continue with Magic mouthwash if needed.    (J90) Pleural effusion   we will continue to monitor the patient's symptoms.  And of her thoracocentesis if needed    The patient is ready to learn, no apparent learning barriers were identified.  Diagnosis and treatment plans were explained to  the patient. The patient expressed understanding of the content. The patient asked appropriate questions. The patient questions were answered to her satisfaction.    Time spent 40 minutes more than 50% of the time in counseling and coordination of care including discussion of management plan, potential side effects and follow-up plan.  Of chemotherapy    Chart documentation with Dragon Voice recognition Software. Although reviewed after completion, some words and grammatical errors may remain.

## 2017-12-21 NOTE — MR AVS SNAPSHOT
After Visit Summary   12/21/2017    Etta Cervantes    MRN: [id number]           Patient Information     Date Of Birth          1960        Visit Information        Provider Department      12/21/2017 9:00 AM Brodie Cassidy MD [facility] ONCOLOGY      Today's Diagnoses     Bilateral malignant neoplasm of breast in female, estrogen receptor positive, unspecified site of breast (H)    -  1    Carcinoma of breast metastatic to liver, unspecified laterality (H)        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)        Bone metastasis (H)        Secondary malignant neoplasm of liver (H)        Hypothyroidism, unspecified type        Moderate persistent asthma without complication        Drug-related hair loss        Cancer associated pain        Mucositis due to chemotherapy        Pleural effusion          Care Instructions    We would like to see you back in clinic with Dr. Cassidy next week for status check. Copy of appointments, and after visit summary (AVS) given to patient.  If you have any questions during business hours (M-F 8 AM- 4PM), please call Funmi Ray RN, BSN, OCN Oncology Hematology /Breast Cancer Navigator at [facility] [phone].   For questions after business hours, or on holidays/weekends, please call our after hours Nurse Triage line [phone]. Thank you.            Follow-ups after your visit        Follow-up notes from your care team     Return in about 1 week (around 12/28/2017) for Schedule for chemotherapy as per treatment plan.      Your next 10 appointments already scheduled     Dec 27, 2017  8:30 AM CST   Level 1 with ROOM 3 [facility] ([facility])    [facility]  [address] [address]   [phone]            Dec 27, 2017  9:30 AM CST   Return Visit with Brodie Cassidy MD   [facility] ([place]  [facility])    [facility]  [address]  [facility] [address]   [phone]            Jan 03, 2018  9:00 AM CST   Level 1 with ROOM 7 [facility] Infusion ([facility])    [facility]  [address]  [facility] [address]   [phone]              Future tests that were ordered for you today     Open Future Orders        Priority Expected Expires Ordered    Echocardiogram Limited Routine  12/13/2018 12/13/2017            Who to contact     If you have questions or need follow up information about today's clinic visit or your schedule please contact [facility] directly at [phone].  Normal or non-critical lab and imaging results will be communicated to you by FancyBox[employer], letter or phone within 4 business days after the clinic has received the results. If you do not hear from us within 7 days, please contact the clinic through [employer] or phone. If you have a critical or abnormal lab result, we will notify you by phone as soon as possible.  Submit refill requests through Yesmail or call your pharmacy and they will forward the refill request to us. Please allow 3 business days for your refill to be completed.          Additional Information About Your Visit        Yesmail Information     Yesmail gives you secure access to your electronic health record. If you see a primary care provider, you can also send messages to your care team and make appointments. If you have questions, please call your primary care clinic.  If you do not have a primary care provider, please call [phone] and they will assist you.        Care EveryWhere ID     This is your Care EveryWhere ID. This could be used by other organizations to access your [place] medical records  [id number]        Your Vitals Were     Pulse Temperature Respirations Height Last Period BMI (Body Mass Index)    90 98.8  F (37.1  C) (Tympanic) 20 1.626 m (5'  "4\") 02/06/2013 34.02 kg/m2       Blood Pressure from Last 3 Encounters:   12/21/17 144/59   12/14/17 125/61   12/12/17 123/68    Weight from Last 3 Encounters:   12/21/17 89.9 kg (198 lb 3.2 oz)   12/14/17 89.1 kg (196 lb 8 oz)   12/12/17 90.6 kg (199 lb 11.2 oz)              Today, you had the following     No orders found for display       Primary Care Provider Office Phone # Fax #    Johana Fairbanks [phone] [id number] [facility] [id number]        Equal Access to Services     [facility] Farm[facility]ASHANK : Rupali Rodriguez, nish jauregui, antolin elliott, hari guardado. So [facility] [phone].    ATENCIÓN: Si habla español, tiene a parekh disposición servicios gratuitos de asistencia lingüística. Llame al [phone].    We comply with applicable federal civil rights laws and [place] laws. We do not discriminate on the basis of race, color, national origin, age, disability, sex, sexual orientation, or gender identity.            Thank you!     Thank you for choosing [facility]  for your care. Our goal is always to provide you with excellent care. Hearing back from our patients is one way we can continue to improve our services. Please take a few minutes to complete the written survey that you may receive in the mail after your visit with us. Thank you!             Your Updated Medication List - Protect others around you: Learn how to safely use, store and throw away your medicines at www.disposemymeds.org.          This list is accurate as of: 12/21/17 10:57 AM.  Always use your most recent med list.                   Brand Name Dispense Instructions for use Diagnosis    * albuterol 108 (90 BASE) MCG/ACT Inhaler    VENTOLIN HFA    1 Inhaler    Inhale 2 puffs into the lungs every 4 hours as needed    Moderate persistent asthma, uncomplicated       * albuterol (2.5 MG/3ML) 0.083% neb solution     30 vial    Take 1 vial (2.5 mg) by " nebulization every 4 hours as needed for shortness of breath / dyspnea    Moderate persistent asthma, uncomplicated       ALLEGRA ALLERGY 180 MG tablet   Generic drug:  fexofenadine      Take 180 mg by mouth daily as needed for allergies        azelastine 0.1 % spray    ASTELIN    3 Bottle    Spray 1-2 sprays into both nostrils 2 times daily as needed for rhinitis    Chronic rhinitis       CRANBERRY PO      Take 1 capsule by mouth daily        dexamethasone 4 MG tablet    DECADRON    10 tablet    Take 20 mg (5 tablets) the evening prior to and the morning of Abraxane infusion for 1st cycle only.    Carcinoma of breast metastatic to liver, unspecified laterality (H), Bilateral malignant neoplasm of breast in female, estrogen receptor positive, unspecified site of breast (H), Bone metastasis (H), 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), Secondary malignant neoplasm of liver (H)       diclofenac 1 % Gel topical gel    VOLTAREN    100 g    Place 2 g onto the skin 4 times daily    Carcinoma of breast metastatic to liver, unspecified laterality (H)       diphenhydrAMINE 25 MG tablet    BENADRYL    1 tablet    Take 1 tablet (25 mg) by mouth every 8 hours as needed for itching or allergies    Carcinoma of breast metastatic to liver, unspecified laterality (H), Bone metastasis (H), Pericardial effusion, Bilateral malignant neoplasm of breast in female, estrogen receptor positive, unspecified site of breast (H), 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), Secondary malignant neoplasm of liver (H), Chemotherapy-induced neutropenia (H), Emphysematous bleb of lung (H), Hypothyroidism, unspecified type, Hyperlipidemia LDL goal <130, Moderate persistent asthma without complication, Mucositis due to chemotherapy       DULERA 200-5 MCG/ACT oral inhaler   Generic drug:  mometasone-formoterol     3 Inhaler    INHALE 2 PUFFS INTO THE LUNGS TWICE DAILY    Moderate persistent asthma without  complication       KP CALCIUM 600+D3 600-500 MG-UNIT Caps   Generic drug:  calcium carb-cholecalciferol      Take 2 tablets by mouth daily        letrozole 2.5 MG tablet    FEMARA    30 tablet    Take 1 tablet (2.5 mg) by mouth daily    Carcinoma of breast metastatic to liver, unspecified laterality (H), Bone metastasis (H), Pericardial effusion, Bilateral malignant neoplasm of breast in female, estrogen receptor positive, unspecified site of breast (H), 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), Secondary malignant neoplasm of liver (H), Chemotherapy-induced neutropenia (H), Emphysematous bleb of lung (H), Hypothyroidism, unspecified type, Hyperlipidemia LDL goal <130, Moderate persistent asthma without complication, Mucositis due to chemotherapy       levothyroxine 25 MCG tablet    SYNTHROID/LEVOTHROID    90 tablet    TAKE 1 TABLET (25 MCG) BY MOUTH DAILY    Hypothyroidism due to acquired atrophy of thyroid       lidocaine (viscous) 2 % solution    XYLOCAINE      Carcinoma of breast metastatic to liver, unspecified laterality (H), Bilateral malignant neoplasm of breast in female, estrogen receptor positive, unspecified site of breast (H), Bone metastasis (H), 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), Secondary malignant neoplasm of liver (H)       Lidocaine 4 % Patch    LIDOCARE    30 patch    Place 1-2 patches onto the skin every 24 hours    Carcinoma of breast metastatic to liver, unspecified laterality (H)       lidocaine visc 2% 2.5mL/5mL & maalox/mylanta w/ simeth 2.5mL/5mL & diphenhydrAMINE 5mg/5mL Susp suspension    [facility]    240 mL    Swish and swallow 10 mLs in mouth every 6 hours as needed for mouth sores    Mucositis due to chemotherapy, Breast cancer metastasized to axillary lymph node, right (H)       loratadine 10 MG tablet    CLARITIN     Take 10 mg by mouth daily as needed for allergies        magnesium hydroxide 400 MG/5ML suspension     MILK OF MAGNESIA    105 mL    Take 30 mLs by mouth daily as needed for constipation    Carcinoma of breast metastatic to liver, unspecified laterality (H)       metoclopramide 10 MG tablet    REGLAN    120 tablet    Take 1 tablet (10 mg) by mouth 2 times daily as needed    Bilateral malignant neoplasm of breast in female, estrogen receptor positive, unspecified site of breast (H)       morphine 15 MG 12 hr tablet    MS CONTIN    60 tablet    Take 1 tablet (15 mg) by mouth every 12 hours    Carcinoma of breast metastatic to liver, unspecified laterality (H), Bilateral malignant neoplasm of breast in female, estrogen receptor positive, unspecified site of breast (H), Bone metastasis (H), 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), Secondary malignant neoplasm of liver (H)       order for DME     1 each    Equipment being ordered: JoviPak post-lumpectomy compression pad x2    Lymphedema, Lymphedema of breast       oxyCODONE IR 5 MG tablet    ROXICODONE    30 tablet    Take 1 tablet (5 mg) by mouth every 4 hours as needed for moderate to severe pain    Secondary malignant neoplasm of liver (H), Carcinoma of breast metastatic to liver, unspecified laterality (H), 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), Bone metastasis (H), Pericardial effusion       polyethylene glycol Packet    MIRALAX/GLYCOLAX    7 packet    Take 17 g by mouth daily    Carcinoma of breast metastatic to liver, unspecified laterality (H)       predniSONE 20 MG tablet    DELTASONE    10 tablet    Take 1 tablet (20 mg) by mouth 2 times daily For Yellow or Red zone on Asthma Action Plan.    Moderate persistent asthma, uncomplicated       prochlorperazine 10 MG tablet    COMPAZINE    30 tablet    Take 1 tablet (10 mg) by mouth every 6 hours as needed (Nausea/Vomiting)    Secondary malignant neoplasm of liver (H), Carcinoma of breast metastatic to liver, unspecified laterality (H), Bone metastasis (H),  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), Bilateral malignant neoplasm of breast in female, estrogen receptor positive, unspecified site of breast (H)       QVAR 80 MCG/ACT Inhaler   Generic drug:  beclomethasone     26.1 g    INHALE 1 PUFFS INTO THE EACH NOSTRIL 2 TIMES DAILY    Chronic rhinitis       ROBITUSSIN COUGH/COLD CF PO      Take 10 mLs by mouth as needed    Breast cancer metastasized to axillary lymph node, right (H)       senna-docusate 8.6-50 MG per tablet    SENOKOT-S;PERICOLACE    100 tablet    Take 1-2 tablets by mouth 2 times daily as needed for constipation    Carcinoma of breast metastatic to liver, unspecified laterality (H)       STOOL SOFTENER PO      Take 100 mg by mouth daily        * zolpidem 10 MG tablet    AMBIEN     TAKE 1 TABLET 30 MIN PRIOR TO BEDTIME    Carcinoma of breast metastatic to liver, unspecified laterality (H), Bilateral malignant neoplasm of breast in female, estrogen receptor positive, unspecified site of breast (H), Bone metastasis (H), 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    Insomnia due to medical condition       * Notice:  This list has 4 medication(s) that are the same as other medications prescribed for you. Read the directions carefully, and ask your doctor or other care provider to review them with you.

## 2017-12-21 NOTE — PROGRESS NOTES
Infusion Nursing Note:  Etta Cervantes presents today for Abraxane.    Patient seen by provider today: Yes:    present during visit today: Not Applicable.    Note: N/A.    Intravenous Access:  Implanted Port.    Treatment Conditions:  Results reviewed, labs MET treatment parameters, ok to proceed with treatment.      Post Infusion Assessment:  Patient tolerated infusion without incident.  Blood return noted pre and post infusion.  Site patent and intact, free from redness, edema or discomfort.  No evidence of extravasations.  Access discontinued per protocol.    Discharge Plan:   Patient discharged in stable condition accompanied by: .    Yoanna Wilde RN

## 2017-12-26 NOTE — TELEPHONE ENCOUNTER
Called to compete a status check after starting Abraxane on 12/21/17, LM for pt to call me back with direct number. Pt has a f/u appt tomorrow 12/27.

## 2017-12-27 PROBLEM — B37.0 THRUSH: Status: ACTIVE | Noted: 2017-01-01

## 2017-12-27 NOTE — PATIENT INSTRUCTIONS
We would like you to continue chemotherapy as planned to see you back in 3 weeks for a follow up appointment.  When you are in need of a refill, please call your pharmacy and they will send us a request.  Copy of appointments, and after visit summary (AVS) given to patient.  If you have any questions during business hours (M-F 8 AM- 4PM), please call Funmi Ray RN, BSN, OCN Oncology Hematology /Breast Cancer Navigator at [facility] [phone].   For questions after business hours, or on holidays/weekends, please call our after hours Nurse Triage line [phone]. Thank you.

## 2017-12-27 NOTE — NURSING NOTE
"Oncology Rooming Note    December 27, 2017 9:06 AM   Etta Cervantes is a 57 year old female who presents for:    Chief Complaint   Patient presents with     Chemotherapy     One week follow up breast CA. Review lab results.      Initial Vitals: /74 (BP Location: Right arm, Patient Position: Sitting, Cuff Size: Adult Regular)  Pulse 113  Temp 99.9  F (37.7  C) (Tympanic)  Resp 18  Ht 1.632 m (5' 4.25\")  Wt 84 kg (185 lb 3.2 oz)  LMP 02/06/2013  SpO2 96%  Breastfeeding? No  BMI 31.54 kg/m2 Estimated body mass index is 31.54 kg/(m^2) as calculated from the following:    Height as of this encounter: 1.632 m (5' 4.25\").    Weight as of this encounter: 84 kg (185 lb 3.2 oz). Body surface area is 1.95 meters squared.  Mild Pain (2) Comment: Data Unavailable   Patient's last menstrual period was 02/06/2013.  Allergies reviewed: Yes  Medications reviewed: Yes    Medications: Medication refills not needed today.  Pharmacy name entered into Open[employer]:    [id number] [place] [facility] - [place], [place] - [address] [place] [address] [facility] [place], [place] - [address] [place] [address] AVE     Clinical concerns: One week follow up breast CA. Review lab results. C/o of increased fatigue patient is sleeping more. Temperature was above 100 degrees on [address] than subsided.     7 minutes for nursing intake (face to face time)     Claudia Rodriguez, TYLER            "

## 2017-12-27 NOTE — MR AVS SNAPSHOT
After Visit Summary   12/27/2017    Etta Cervantes    MRN: [id number]           Patient Information     Date Of Birth          1960        Visit Information        Provider Department      12/27/2017 9:30 AM Brodie Cassidy MD [facility] ONCOLOGY      Today's Diagnoses     Carcinoma of breast metastatic to liver, unspecified laterality (H)    -  1      Care Instructions    We would like you to continue chemotherapy as planned to see you back in 3 weeks for a follow up appointment.  When you are in need of a refill, please call your pharmacy and they will send us a request.  Copy of appointments, and after visit summary (AVS) given to patient.  If you have any questions during business hours (M-F 8 AM- 4PM), please call Funmi Ray RN, BSN, OCN Oncology Hematology /Breast Cancer Navigator at [facility] [phone].   For questions after business hours, or on holidays/weekends, please call our after hours Nurse Triage line [phone]. Thank you.            Follow-ups after your visit        Follow-up notes from your care team     Return in about 3 weeks (around 1/17/2018) for Schedule for chemotherapy as per treatment plan.      Your next 10 appointments already scheduled     Jan 03, 2018  9:00 AM CST   Level 1 with ROOM 7 [facility] ([facility])    [facility]  [address]  [place] [address]   [phone]            Jan 17, 2018  8:30 AM CST   Level 1 with ROOM 4 [facility] ([facility])    [facility]  [address]  [place] [address]   [phone]            Jan 17, 2018  9:45 AM CST   Return Visit with Brodie Cassidy MD   [facility] [place] ([facility])    [facility]  [address]  [place] [address]   [phone]            Jan 24, 2018   9:30 AM CST   Level 1 with ROOM 9 [facility] ([facility])    [facility]  [address] [place] [address]  [facility] [id number] [phone]            Jan 31, 2018  9:30 AM CST   Level 1 with ROOM 3 [facility] ([facility])    [facility]  [address]  [facility] [address] [phone]              Future tests that were ordered for you today     Open Future Orders        Priority Expected Expires Ordered    XR Chest w Fluoro 2 Views STAT 12/27/2017 12/27/2018 12/27/2017            Who to contact     If you have questions or need follow up information about today's clinic visit or your schedule please contact [facility] directly at [phone].  Normal or non-critical lab and imaging results will be communicated to you by [employer], letter or phone within 4 business days after the clinic has received the results. If you do not hear from us within 7 days, please contact the clinic through [employer] or phone. If you have a critical or abnormal lab result, we will notify you by phone as soon as possible.  Submit refill requests through [employer] or call your pharmacy and they will forward the refill request to us. Please allow 3 business days for your refill to be completed.          Additional Information About Your Visit        [facility] Information     [employer] gives you secure access to your electronic health record. If you see a primary care provider, you can also send messages to your care team and make appointments. If you have questions, please call your primary care clinic.  If you do not have a primary care provider, please call [phone] and they will assist you.        Care EveryWhere ID     This is your Care EveryWhere ID. This could be used by other organizations to access your [place] medical records  [id number]        Your Vitals Were     Pulse Temperature Respirations Height Last  "Period Pulse Oximetry    113 99.9  F (37.7  C) (Tympanic) 18 1.632 m (5' 4.25\") 02/06/2013 96%    Breastfeeding? BMI (Body Mass Index)                No 31.54 kg/m2           Blood Pressure from Last 3 Encounters:   12/27/17 110/74   12/21/17 144/59   12/21/17 138/59    Weight from Last 3 Encounters:   12/27/17 84 kg (185 lb 3.2 oz)   12/21/17 89.9 kg (198 lb 3.2 oz)   12/14/17 89.1 kg (196 lb 8 oz)               Primary Care Provider Office Phone # Fax #    Johana Fairbanks [phone] [address]  [place] [address]        Equal Access to Services     JACKIE MIX : Rupali gaitan Soisaura, waaxda luqadaha, qaybta kaalmada adeegyada, hari rodriguez . So [facility] [phone].    ATENCIÓN: Si habla español, tiene a parekh disposición servicios gratuitos de asistencia lingüística. Llame al [phone].    We comply with applicable federal civil rights laws and [place] laws. We do not discriminate on the basis of race, color, national origin, age, disability, sex, sexual orientation, or gender identity.            Thank you!     Thank you for choosing [facility]  for your care. Our goal is always to provide you with excellent care. Hearing back from our patients is one way we can continue to improve our services. Please take a few minutes to complete the written survey that you may receive in the mail after your visit with us. Thank you!             Your Updated Medication List - Protect others around you: Learn how to safely use, store and throw away your medicines at www.disposemymeds.org.          This list is accurate as of: 12/27/17 11:50 AM.  Always use your most recent med list.                   Brand Name Dispense Instructions for use Diagnosis    * albuterol 108 (90 BASE) MCG/ACT Inhaler    VENTOLIN HFA    1 Inhaler    Inhale 2 puffs into the lungs every 4 hours as needed    Moderate persistent asthma, uncomplicated       * albuterol (2.5 " MG/3ML) 0.083% neb solution     30 vial    Take 1 vial (2.5 mg) by nebulization every 4 hours as needed for shortness of breath / dyspnea    Moderate persistent asthma, uncomplicated       ALLEGRA ALLERGY 180 MG tablet   Generic drug:  fexofenadine      Take 180 mg by mouth daily as needed for allergies        azelastine 0.1 % spray    ASTELIN    3 Bottle    Spray 1-2 sprays into both nostrils 2 times daily as needed for rhinitis    Chronic rhinitis       CRANBERRY PO      Take 1 capsule by mouth daily        dexamethasone 4 MG tablet    DECADRON    10 tablet    Take 20 mg (5 tablets) the evening prior to and the morning of Abraxane infusion for 1st cycle only.    Carcinoma of breast metastatic to liver, unspecified laterality (H), Bilateral malignant neoplasm of breast in female, estrogen receptor positive, unspecified site of breast (H), Bone metastasis (H), 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), Secondary malignant neoplasm of liver (H)       diclofenac 1 % Gel topical gel    VOLTAREN    100 g    Place 2 g onto the skin 4 times daily    Carcinoma of breast metastatic to liver, unspecified laterality (H)       diphenhydrAMINE 25 MG tablet    BENADRYL    1 tablet    Take 1 tablet (25 mg) by mouth every 8 hours as needed for itching or allergies    Carcinoma of breast metastatic to liver, unspecified laterality (H), Bone metastasis (H), Pericardial effusion, Bilateral malignant neoplasm of breast in female, estrogen receptor positive, unspecified site of breast (H), 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), Secondary malignant neoplasm of liver (H), Chemotherapy-induced neutropenia (H), Emphysematous bleb of lung (H), Hypothyroidism, unspecified type, Hyperlipidemia LDL goal <130, Moderate persistent asthma without complication, Mucositis due to chemotherapy       DULERA 200-5 MCG/ACT oral inhaler   Generic drug:  mometasone-formoterol     3 Inhaler    INHALE 2 PUFFS  INTO THE LUNGS TWICE DAILY    Moderate persistent asthma without complication       KP CALCIUM 600+D3 600-500 MG-UNIT Caps   Generic drug:  calcium carb-cholecalciferol      Take 2 tablets by mouth daily        letrozole 2.5 MG tablet    FEMARA    30 tablet    Take 1 tablet (2.5 mg) by mouth daily    Carcinoma of breast metastatic to liver, unspecified laterality (H), Bone metastasis (H), Pericardial effusion, Bilateral malignant neoplasm of breast in female, estrogen receptor positive, unspecified site of breast (H), 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), Secondary malignant neoplasm of liver (H), Chemotherapy-induced neutropenia (H), Emphysematous bleb of lung (H), Hypothyroidism, unspecified type, Hyperlipidemia LDL goal <130, Moderate persistent asthma without complication, Mucositis due to chemotherapy       levothyroxine 25 MCG tablet    SYNTHROID/LEVOTHROID    90 tablet    TAKE 1 TABLET (25 MCG) BY MOUTH DAILY    Hypothyroidism due to acquired atrophy of thyroid       lidocaine (viscous) 2 % solution    XYLOCAINE      Carcinoma of breast metastatic to liver, unspecified laterality (H), Bilateral malignant neoplasm of breast in female, estrogen receptor positive, unspecified site of breast (H), Bone metastasis (H), 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), Secondary malignant neoplasm of liver (H)       Lidocaine 4 % Patch    LIDOCARE    30 patch    Place 1-2 patches onto the skin every 24 hours    Carcinoma of breast metastatic to liver, unspecified laterality (H)       lidocaine visc 2% 2.5mL/5mL & maalox/mylanta w/ simeth 2.5mL/5mL & diphenhydrAMINE 5mg/5mL Susp suspension    [facility] Mouthwash [facility]    240 mL    Swish and swallow 10 mLs in mouth every 6 hours as needed for mouth sores    Mucositis due to chemotherapy, Breast cancer metastasized to axillary lymph node, right (H)       loratadine 10 MG tablet    CLARITIN     Take 10 mg by mouth daily as needed  for allergies        magnesium hydroxide 400 MG/5ML suspension    MILK OF MAGNESIA    105 mL    Take 30 mLs by mouth daily as needed for constipation    Carcinoma of breast metastatic to liver, unspecified laterality (H)       metoclopramide 10 MG tablet    REGLAN    120 tablet    Take 1 tablet (10 mg) by mouth 2 times daily as needed    Bilateral malignant neoplasm of breast in female, estrogen receptor positive, unspecified site of breast (H)       morphine 15 MG 12 hr tablet    MS CONTIN    60 tablet    Take 1 tablet (15 mg) by mouth every 12 hours    Carcinoma of breast metastatic to liver, unspecified laterality (H), Bilateral malignant neoplasm of breast in female, estrogen receptor positive, unspecified site of breast (H), Bone metastasis (H), 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), Secondary malignant neoplasm of liver (H)       order for DME     1 each    Equipment being ordered: Venu post-lumpectomy compression pad x2    Lymphedema, Lymphedema of breast       oxyCODONE IR 5 MG tablet    ROXICODONE    60 tablet    Take 1 tablet (5 mg) by mouth every 4 hours as needed for moderate to severe pain    Secondary malignant neoplasm of liver (H), Carcinoma of breast metastatic to liver, unspecified laterality (H), 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), Bone metastasis (H), Pericardial effusion       polyethylene glycol Packet    MIRALAX/GLYCOLAX    7 packet    Take 17 g by mouth daily    Carcinoma of breast metastatic to liver, unspecified laterality (H)       predniSONE 20 MG tablet    DELTASONE    10 tablet    Take 1 tablet (20 mg) by mouth 2 times daily For Yellow or Red zone on Asthma Action Plan.    Moderate persistent asthma, uncomplicated       prochlorperazine 10 MG tablet    COMPAZINE    30 tablet    Take 1 tablet (10 mg) by mouth every 6 hours as needed (Nausea/Vomiting)    Secondary malignant neoplasm of liver (H), Carcinoma of breast metastatic to  liver, unspecified laterality (H), Bone metastasis (H), 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), Bilateral malignant neoplasm of breast in female, estrogen receptor positive, unspecified site of breast (H)       QVAR 80 MCG/ACT Inhaler   Generic drug:  beclomethasone     26.1 g    INHALE 1 PUFFS INTO THE EACH NOSTRIL 2 TIMES DAILY    Chronic rhinitis       ROBITUSSIN COUGH/COLD CF PO      Take 10 mLs by mouth as needed    Breast cancer metastasized to axillary lymph node, right (H)       senna-docusate 8.6-50 MG per tablet    SENOKOT-S;PERICOLACE    100 tablet    Take 1-2 tablets by mouth 2 times daily as needed for constipation    Carcinoma of breast metastatic to liver, unspecified laterality (H)       STOOL SOFTENER PO      Take 100 mg by mouth daily        * zolpidem 10 MG tablet    AMBIEN     TAKE 1 TABLET 30 MIN PRIOR TO BEDTIME    Carcinoma of breast metastatic to liver, unspecified laterality (H), Bilateral malignant neoplasm of breast in female, estrogen receptor positive, unspecified site of breast (H), Bone metastasis (H), 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    Insomnia due to medical condition       * Notice:  This list has 4 medication(s) that are the same as other medications prescribed for you. Read the directions carefully, and ask your doctor or other care provider to review them with you.

## 2017-12-27 NOTE — LETTER
12/27/2017         RE: Etta Cervantes  [address] [place]  [place] [address]        Dear Colleague,    Thank you for referring your patient, Etta Cervantes, to the [facility]. Please see a copy of my visit note below.    Hematology/ Oncology Follow-up Visit:  Dec 27, 2017    Reason for Visit:   Chief Complaint   Patient presents with     Chemotherapy     One week follow up breast CA. Review lab results.        Oncologic History:  Breast cancer (H)  Etta Cervantes presented with increasing lump in the right axilla that s lump has been growing without any pain or associated symptoms. Subsequently she was evaluated by primary care physician. Diagnostic digital mammogram was done on 01/13/2015 showing enlarged lymph nodes in the right axilla. There was some skin thickening in the right breast anteriorly and laterally. There are no parenchymal changes in the right breast. The left breast shows a 1.7 cm spiculated mass at approximately 2 to 3 o'clock position, 15.2 cm away from the nipple. A right breast ultrasound was subsequently done and ultrasound guided biopsy was done. Needle biopsy of the left breast did show infiltrating ductal carcinoma grade I of III with no angiolymphatic invasion seen. No associated ductal carcinoma in situ. Estrogen receptor, progresterone receptor were positive. HER-2/sadie receptor came back negative.. Another biopsy from the right axilla did show metastatic ductal carcinoma. PET scan was done on January 26, 2015 showing few small right posterior cervical chain nodes the largest is 1.2 cm. There is extensive bulky right axillary adenopathy demonstrating hypermetabolic FDG uptake with SUV of 10.6. There is skin thickening involving the right breast which demonstrated low-grade FDG uptake. A 1.2 cm lesion on the lateral aspect of the left breast demonstrated minimally increased FDG uptake with SUV of 2. Scattered hypermetabolic mediastinal lymph nodes located in the left  superior anterior mediastinum, right paratracheal and precarinal U, subcranial adenopathy with javon metastases. This focus hypermetabolic FDG uptake identified definitive Amanda posterior aspect off intertrochanteric region of the proximal left femur consistent with bone metastases. There is also 1.4 cm hypermetabolic FDG uptake identified in the anterior medial segment of the left hepatic lobe consistent with liver metastases. Additional 2.1 cm low-attenuation lesion anterior aspect of the right lobe which needs to be determined. The patient was started on chemotherapy with Taxotere and Cytoxan on February 9, 2015.  She concluded 4 cycles of Taxotere and Cytoxan. She started on Arimidex 1 mg orally daily. Currently she is on Faslodex and ibrance. Subsequently the patient went on to receive Afinitor. The patient also started treatment with Xeloda.       Interval History:  Patient is here today for follow-up following her first cycle of chemotherapy with Abraxane.  She tolerated chemotherapy well without significant side effects.  She denies any nausea or vomiting .  She denies any fever or chills.  She has been having diarrhea for the last 3 days.  Diarrhea is watery.  Her pain is currently well controlled.  There is no shortness of breath or chest pain.    Review Of Systems:  Constitutional: Negative for fever, chills, and night sweats.  Skin: negative.  Eyes: negative.  Ears/Nose/Throat: negative.  Respiratory: No shortness of breath, dyspnea on exertion, cough, or hemoptysis.  Cardiovascular: negative.  Gastrointestinal: Diarrhea as mentioned above  Genitourinary: negative.  Musculoskeletal: negative.  Neurologic: negative.  Psychiatric: negative.  Hematologic/Lymphatic/Immunologic: negative.  Endocrine: negative.    All other ROS negative unless mentioned in interval history.    Past medical, social, surgical, and family histories reviewed.    Allergies:  Allergies as of 12/27/2017 - Manuel as Reviewed  12/27/2017   Allergen Reaction Noted     Animal dander Cough 01/23/2015     Cats  07/15/2010     Dust mites Cough 01/23/2015     Pollen extract  01/23/2015     Seasonal allergies  07/15/2010     Levaquin [levofloxacin] Rash 07/17/2017       Current Medications:  Current Outpatient Prescriptions   Medication Sig Dispense Refill     metroNIDAZOLE (FLAGYL) 500 MG tablet Take 1 tablet (500 mg) by mouth 3 times daily 20 tablet 0     fluconazole (DIFLUCAN) 100 MG tablet Take 1 tablet (100 mg) by mouth daily 7 tablet 0     oxyCODONE IR (ROXICODONE) 5 MG tablet Take 1 tablet (5 mg) by mouth every 4 hours as needed for moderate to severe pain 60 tablet 0     morphine (MS CONTIN) 15 MG 12 hr tablet Take 1 tablet (15 mg) by mouth every 12 hours 60 tablet 0     prochlorperazine (COMPAZINE) 10 MG tablet Take 1 tablet (10 mg) by mouth every 6 hours as needed (Nausea/Vomiting) 30 tablet 2     diclofenac (VOLTAREN) 1 % GEL topical gel Place 2 g onto the skin 4 times daily 100 g 0     Lidocaine (LIDOCARE) 4 % Patch Place 1-2 patches onto the skin every 24 hours 30 patch 0     zolpidem (AMBIEN CR) 12.5 MG CR tablet Take 1 tablet by mouth at bed time. 30 tablet 5     DULERA 200-5 MCG/ACT oral inhaler INHALE 2 PUFFS INTO THE LUNGS TWICE DAILY 3 Inhaler 3     QVAR 80 MCG/ACT Inhaler INHALE 1 PUFFS INTO THE EACH NOSTRIL 2 TIMES DAILY 26.1 g 3     Phenylephrine-DM-GG (ROBITUSSIN COUGH/COLD CF PO) Take 10 mLs by mouth as needed        levothyroxine (SYNTHROID/LEVOTHROID) 25 MCG tablet TAKE 1 TABLET (25 MCG) BY MOUTH DAILY 90 tablet 2     calcium carb-cholecalciferol (KP CALCIUM 600+D3) 600-500 MG-UNIT CAPS Take 2 tablets by mouth daily       CRANBERRY PO Take 1 capsule by mouth daily        zolpidem (AMBIEN) 10 MG tablet TAKE 1 TABLET 30 MIN PRIOR TO BEDTIME  0     lidocaine, viscous, (XYLOCAINE) 2 % solution        dexamethasone (DECADRON) 4 MG tablet Take 20 mg (5 tablets) the evening prior to and the morning of Abraxane infusion for 1st  cycle only. (Patient not taking: Reported on 12/27/2017) 10 tablet 0     magnesium hydroxide (MILK OF MAGNESIA) 400 MG/5ML suspension Take 30 mLs by mouth daily as needed for constipation (Patient not taking: Reported on 12/27/2017) 105 mL      polyethylene glycol (MIRALAX/GLYCOLAX) Packet Take 17 g by mouth daily (Patient not taking: Reported on 12/27/2017) 7 packet      senna-docusate (SENOKOT-S;PERICOLACE) 8.6-50 MG per tablet Take 1-2 tablets by mouth 2 times daily as needed for constipation (Patient not taking: Reported on 12/27/2017) 100 tablet      loratadine (CLARITIN) 10 MG tablet Take 10 mg by mouth daily as needed for allergies       order for DME Equipment being ordered: Venu post-lumpectomy compression pad x2 (Patient not taking: Reported on 12/27/2017) 1 each 0     letrozole (FEMARA) 2.5 MG tablet Take 1 tablet (2.5 mg) by mouth daily 30 tablet 3     diphenhydrAMINE (BENADRYL) 25 MG tablet Take 1 tablet (25 mg) by mouth every 8 hours as needed for itching or allergies (Patient not taking: Reported on 12/27/2017) 1 tablet 0     metoclopramide (REGLAN) 10 MG tablet Take 1 tablet (10 mg) by mouth 2 times daily as needed 120 tablet 1     fexofenadine (ALLEGRA ALLERGY) 180 MG tablet Take 180 mg by mouth daily as needed for allergies       albuterol (2.5 MG/3ML) 0.083% neb solution Take 1 vial (2.5 mg) by nebulization every 4 hours as needed for shortness of breath / dyspnea (Patient not taking: Reported on 12/27/2017) 30 vial 3     predniSONE (DELTASONE) 20 MG tablet Take 1 tablet (20 mg) by mouth 2 times daily For Yellow or Red zone on Asthma Action Plan. (Patient not taking: Reported on 12/27/2017) 10 tablet 1     magic mouthwash suspension (diphenhydrAMINE, lidocaine, aluminum-magnesium & simethicone) Swish and swallow 10 mLs in mouth every 6 hours as needed for mouth sores (Patient not taking: Reported on 12/27/2017) 240 mL 10     Docusate Calcium (STOOL SOFTENER PO) Take 100 mg by mouth daily         "azelastine (ASTELIN) 0.1 % nasal spray Spray 1-2 sprays into both nostrils 2 times daily as needed for rhinitis (Patient not taking: Reported on 12/27/2017) 3 Bottle 3     albuterol (VENTOLIN HFA) 108 (90 BASE) MCG/ACT inhaler Inhale 2 puffs into the lungs every 4 hours as needed (Patient not taking: Reported on 12/27/2017) 1 Inhaler 2        Physical Exam:  /74 (BP Location: Right arm, Patient Position: Sitting, Cuff Size: Adult Regular)  Pulse 113  Temp 99.9  F (37.7  C) (Tympanic)  Resp 18  Ht 1.632 m (5' 4.25\")  Wt 84 kg (185 lb 3.2 oz)  LMP 02/06/2013  SpO2 96%  Breastfeeding? No  BMI 31.54 kg/m2  Wt Readings from Last 12 Encounters:   12/27/17 84 kg (185 lb 3.2 oz)   12/21/17 89.9 kg (198 lb 3.2 oz)   12/14/17 89.1 kg (196 lb 8 oz)   12/12/17 90.6 kg (199 lb 11.2 oz)   12/07/17 90.6 kg (199 lb 11.2 oz)   12/01/17 90.9 kg (200 lb 8 oz)   11/30/17 90.9 kg (200 lb 8 oz)   11/28/17 91.1 kg (200 lb 14.4 oz)   11/16/17 89.8 kg (198 lb)   11/15/17 89.6 kg (197 lb 8 oz)   11/10/17 90.9 kg (200 lb 8 oz)   11/08/17 91.1 kg (200 lb 14.4 oz)     ECOG performance status: 1  GENERAL APPEARANCE: Healthy, alert and in no acute distress.  HEENT: Sclerae anicteric. PERRLA. Oropharynx without ulcers, lesions, or thrush.  NECK: Supple. No asymmetry or masses.  LYMPHATICS: No palpable cervical, supraclavicular, axillary, or inguinal lymphadenopathy.  RESP: Lungs clear to auscultation bilaterally without rales, rhonchi or wheezes.  CARDIOVASCULAR: Regular rate and rhythm. Normal S1, S2; no S3 or S4. No murmur, gallop, or rub.  ABDOMEN: Soft, nontender. Bowel sounds normal. No palpable organomegaly or masses.  MUSCULOSKELETAL: Extremities without gross deformities noted. No edema of bilateral lower extremities.  SKIN: No suspicious lesions or rashes.  NEURO: Alert and oriented x 3. Cranial nerves II-XII grossly intact.  PSYCHIATRIC: Mentation and affect appear normal.    Laboratory/Imaging Studies:  Infusion Therapy " Visit on 12/27/2017   Component Date Value Ref Range Status     Calcium 12/27/2017 8.6  8.5 - 10.1 mg/dL Final     Albumin 12/27/2017 2.9* 3.4 - 5.0 g/dL Final     WBC 12/27/2017 3.8* 4.0 - 11.0 10e9/L Final     RBC Count 12/27/2017 4.04  3.8 - 5.2 10e12/L Final     Hemoglobin 12/27/2017 13.2  11.7 - 15.7 g/dL Final     Hematocrit 12/27/2017 40.4  35.0 - 47.0 % Final     MCV 12/27/2017 100  78 - 100 fl Final     MCH 12/27/2017 32.7  26.5 - 33.0 pg Final     MCHC 12/27/2017 32.7  31.5 - 36.5 g/dL Final     RDW 12/27/2017 14.1  10.0 - 15.0 % Final     Platelet Count 12/27/2017 297  150 - 450 10e9/L Final     Diff Method 12/27/2017 Automated Method   Final     % Neutrophils 12/27/2017 64.3  % Final     % Lymphocytes 12/27/2017 18.1  % Final     % Monocytes 12/27/2017 9.9  % Final     % Eosinophils 12/27/2017 6.1  % Final     % Basophils 12/27/2017 0.8  % Final     % Immature Granulocytes 12/27/2017 0.8  % Final     Absolute Neutrophil 12/27/2017 2.4  1.6 - 8.3 10e9/L Final     Absolute Lymphocytes 12/27/2017 0.7* 0.8 - 5.3 10e9/L Final     Absolute Monocytes 12/27/2017 0.4  0.0 - 1.3 10e9/L Final     Absolute Eosinophils 12/27/2017 0.2  0.0 - 0.7 10e9/L Final     Absolute Basophils 12/27/2017 0.0  0.0 - 0.2 10e9/L Final     Abs Immature Granulocytes 12/27/2017 0.0  0 - 0.4 10e9/L Final   Infusion Therapy Visit on 12/21/2017   Component Date Value Ref Range Status     WBC 12/21/2017 7.9  4.0 - 11.0 10e9/L Final     RBC Count 12/21/2017 3.92  3.8 - 5.2 10e12/L Final     Hemoglobin 12/21/2017 13.0  11.7 - 15.7 g/dL Final     Hematocrit 12/21/2017 39.4  35.0 - 47.0 % Final     MCV 12/21/2017 101* 78 - 100 fl Final     MCH 12/21/2017 33.2* 26.5 - 33.0 pg Final     MCHC 12/21/2017 33.0  31.5 - 36.5 g/dL Final     RDW 12/21/2017 13.9  10.0 - 15.0 % Final     Platelet Count 12/21/2017 291  150 - 450 10e9/L Final     Diff Method 12/21/2017 Automated Method   Final     % Neutrophils 12/21/2017 81.8  % Final     % Lymphocytes  12/21/2017 9.6  % Final     % Monocytes 12/21/2017 8.0  % Final     % Eosinophils 12/21/2017 0.1  % Final     % Basophils 12/21/2017 0.1  % Final     % Immature Granulocytes 12/21/2017 0.4  % Final     Absolute Neutrophil 12/21/2017 6.5  1.6 - 8.3 10e9/L Final     Absolute Lymphocytes 12/21/2017 0.8  0.8 - 5.3 10e9/L Final     Absolute Monocytes 12/21/2017 0.6  0.0 - 1.3 10e9/L Final     Absolute Eosinophils 12/21/2017 0.0  0.0 - 0.7 10e9/L Final     Absolute Basophils 12/21/2017 0.0  0.0 - 0.2 10e9/L Final     Abs Immature Granulocytes 12/21/2017 0.0  0 - 0.4 10e9/L Final     Sodium 12/21/2017 138  133 - 144 mmol/L Final     Potassium 12/21/2017 3.9  3.4 - 5.3 mmol/L Final     Chloride 12/21/2017 106  94 - 109 mmol/L Final     Carbon Dioxide 12/21/2017 26  20 - 32 mmol/L Final     Anion Gap 12/21/2017 6  3 - 14 mmol/L Final     Glucose 12/21/2017 171* 70 - 99 mg/dL Final     Urea Nitrogen 12/21/2017 9  7 - 30 mg/dL Final     Creatinine 12/21/2017 0.46* 0.52 - 1.04 mg/dL Final     GFR Estimate 12/21/2017 >90  >60 mL/min/1.7m2 Final    Non  GFR Calc     GFR Estimate If Black 12/21/2017 >90  >60 mL/min/1.7m2 Final    African American GFR Calc     Calcium 12/21/2017 8.4* 8.5 - 10.1 mg/dL Final     Bilirubin Total 12/21/2017 0.8  0.2 - 1.3 mg/dL Final     Albumin 12/21/2017 2.6* 3.4 - 5.0 g/dL Final     Protein Total 12/21/2017 7.0  6.8 - 8.8 g/dL Final     Alkaline Phosphatase 12/21/2017 180* 40 - 150 U/L Final     ALT 12/21/2017 16  0 - 50 U/L Final     AST 12/21/2017 48* 0 - 45 U/L Final     CA 27-29 12/21/2017 132* 0 - 39 U/mL Final    Assay Method:  Chemiluminescence using Siemens Centaur XP     CEA 12/21/2017 10.8* 0 - 2.5 ug/L Final    Comment: Smoking may cause CEA results to be elevated.  Assay Method:  Chemiluminescence using Siemens Centaur XP          Recent Results (from the past 744 hour(s))   US Abdomen Limited    Narrative    ULTRASOUND  ABDOMEN LIMITED   11/30/2017 12:53 PM     HISTORY:   Breast cancer; elevated liver enzymes. Carcinoma of breast  metastatic to liver, unspecified laterality (H). Bone metastasis (H);  Secondary malignant neoplasm of liver (H). Elevated liver enzymes.    COMPARISON: CT abdomen and pelvis 11/13/2017.    FINDINGS:    Gallbladder: Negative sonographic Osullivan sign. Faint gallbladder  sludge is suggested layering in the gallbladder lumen. No gallstones.    Bile ducts:  Common duct is 0.7 cm and may be normal for age. No  intrahepatic biliary ductal dilatation.    Liver: Hepatic metastasis again identified by ultrasound. One of the  largest is 2.8 x 2.1 x 3 cm at the left liver. The other lesions are  better visualized on the comparison CT.    Pancreas:  Partially obscured but grossly unremarkable.     Right kidney:  Normal.     Aorta and IVC:  Not specifically assessed.       Impression    IMPRESSION:    1. Hepatic metastasis again identified.  2. Gallbladder sludge. No gallstones. Negative sonographic Osullivan  sign.    CHICO FONG MD   CT Chest/Abdomen/Pelvis w Contrast    Narrative    CT CHEST/ABDOMEN/PELVIS WITH CONTRAST December 6, 2017 1:52 PM    HISTORY: Breast cancer with liver metastasis. Elevated liver enzymes,  elevated bilirubin. Bilateral malignant neoplasm of breast in female,  estrogen receptor positive, unspecified site of breast (H).     TECHNIQUE: CT scan obtained of the chest, abdomen, and pelvis with  oral and IV contrast. 97 ml isovue 370 injected. Radiation dose for  this scan was reduced using automated exposure control, adjustment of  the mA and/or kV according to patient size, or iterative  reconstruction technique.    COMPARISON:  CT chest, abdomen and pelvis 11/13/2017.    FINDINGS:  Chest: Left chest port venous catheter. Increased volume of moderate  to large pericardial effusion. Transverse thickness of this fluid is  now 2.4 cm, previously 0.6 cm at a comparable level on the prior study  series 2 image 36. There is some mild wall enhancement of  the superior  portion of the pericardium, for instance around series 2 image 20 in  the region of the cardiac base. Small volume right pleural fluid newly  identified. No acute thoracic aortic abnormality. No large central  pulmonary embolism. The exam was not tailored for assessment of middle  and small branch vessel pulmonary emboli. The distal left  paraesophageal lymph node is 0.8 x 0.5 cm, previously 0.3 x 0.3 cm  series 2 image 48. The remainder of the thoracic lymph nodes remain  small. Right axillary adenopathy without significant change in size  measuring 4.8 x 3.4 cm, previously 4.2 x 2.8 cm image 16. There are  other mildly more prominent lymph nodes as well. No left axillary  adenopathy. Stable thyroid nodules that are small in size. Right  breast skin thickening again noted. No convincing new bony disease  identified. Some ill-defined sclerosis at the right T8 pedicle on  series 2 image 33, and right posteromedial fifth rib on image 18  appears stable. Stable appearing cavitary lesion at the right upper  lung measuring 8.8 cm series 3 image 13. Stable triangular area of  consolidation at the right hilar region at the right midchest series 3  image 27. There are several tiny bilateral stable pulmonary nodules.  For example, one of these is 0.4 cm medial right lower lobe series 3  image 30. There are multiple other examples bilaterally.    Abdomen/pelvis: No convincing new bony disease at the abdomen or  pelvis levels. Multifocal hepatic metastasis. Anterior central hepatic  mass is 2.7 x 2.6 cm, previously 2.2 x 2.1 cm series 2 image 58.  Lateral segment left hepatic mass is approximately 2.8 x 2.8 cm,  previously 2.2 x 2.4 cm image 53. A caudate lobe mass is approximately  2.9 x 2.2 cm, previously 2 x 2.2 cm image 60. There are other hepatic  masses that are slightly larger, or are stable. Notably, there is new  moderate intrahepatic biliary ductal dilatation. This appears to lead  to the pancreatic  head where there is complete decompression of the  common bile duct coronal series 4 image 23. There is a small area of  nodular enhancement newly identified in this region that is  approximately 0.8 cm series 2 image 66. Adenopathy also noted at the  shreyas hepatis. Portal caval enlarged lymph node is 2.5 x 1.3 cm,  previously 1.7 x 1.1 cm image 59. There are other prominent lymph  nodes that appear more prominent in the interval as well, for instance  image 56. Some increase ill-defined soft tissue at the shreyas hepatis  region on image 56 also appears more prominent measuring 2.4 x 1.4 cm  series 2 image 56, previously 1.7 x 1 cm.    The spleen demonstrates a stable small subcentimeter enhancing focus  series 2 image 56. Ill-defined tiny hypodensities in the spleen also  appears stable image 54 and 56. The adrenals, remainder of the  pancreas, and kidneys do not show any new abnormalities. Tiny cyst at  the left kidney is stable image 67.    There are several retroperitoneal lymph nodes noted. Upper abdominal  aortocaval lymph node is 1.5 x 1.3 cm, previously 0.9 x 1.3 cm series  2 image 64. A few other mildly prominent retroperitoneal lymph nodes  again noted. Small to moderate free pelvic fluid. No acute bowel  abnormality is seen. New nodularity at the anterior omentum with one  region measuring 1.2 cm abutting a small bowel loop series 2 image 77.  Other adjacent nodules noted as well.      Impression    IMPRESSION:  1. Progression of hepatic metastatic disease with enlargement of  multifocal masses. New finding of moderate intrahepatic biliary ductal  dilatation worrisome for developing biliary obstruction. This may  relate to progression of adenopathy at the upper abdomen at the shreyas  hepatis and/or an increasing area of nodular enhancement at the  pancreatic head worrisome for metastatic disease.  2. Moderate to large pericardial effusion significantly increased  since 11/13/2017. Some mild enhancement of  the pericardium also noted.  Recommend further workup.  3. New finding of small nodularity at the omentum at the midline and  slightly towards the right that could represent carcinomatosis.  4. Multifocal stable indeterminate pulmonary nodules may represent  pulmonary metastatic disease.  5. Stable sclerosis at the right fifth rib and T8 that could represent  stable sclerotic metastasis.  6. Progression of adenopathy at the right axilla, and upper abdominal  retroperitoneum.    I communicated these results to Dr. Cassidy on 12/6/2017 at 1450  hours.    CHICO FONG MD   XR Surgery MUNA G/T 5 Min Fluoro w Stills    Narrative    This exam was marked as non-reportable because it will not be read by a   radiologist or a [place] non-radiologist provider.             XR Chest 2 Views    Narrative    XR CHEST 2 VW  12/11/2017 7:11 PM    History: Fever    Comparison: CT 12/6/2017 chest x-ray 11/8/2017    Findings:   PA and lateral views of the chest are obtained. There is a left chest  Port-A-Cath with the tip in stable position projecting over the high  SVC. Pericardial drain projects over the heart. The trachea is in the  midline. Cardiac mediastinal silhouette is stable and mildly enlarged.  There is pneumopericardium and/or pneumomediastinum. Lung volumes are  decreased. Bilateral perihilar and left greater than right basilar  opacities of atelectasis or consolidation. No pneumothorax. Small left  greater than right pleural effusions. Lucency in the paramediastinal  right lung seems to correspond to largest cystic lesion seen on prior  CT. Metallic biliary stent.      Impression    IMPRESSION:  1.  Mildly enlarged cardiac mediastinal silhouette.  Pneumopericardium/pneumomediastinum presumably relates to the presence  of the pericardial drainage catheter.  2.  Small left greater than right pleural effusions.  3.  Perihilar and left greater than right basilar opacities could  represent consolidation, especially given  provided history.  Alternatively, findings could represent atelectasis.    I have personally reviewed the examination and initial interpretation  and I agree with the findings.    RONNIE CLAIRE MD       Assessment and plan:    (C50.919,  C78.7) Carcinoma of breast metastatic to liver, unspecified laterality (H)  (primary encounter diagnosis)  (C50.911,  Z17.0,  C50.912) Bilateral malignant neoplasm of breast in female, estrogen receptor positive, unspecified site of breast (H)  (C50.911,  C77.3) 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)  (C78.7) Secondary malignant neoplasm of liver (H)  Patient tolerated first treatment with Abraxane without significant side effects.  We will continue on the current treatment plan.  I will see the patient again in 3 weeks or sooner if there are new development concerns.    (C79.51) Bone metastasis (H)  Patient will continue on Denosumab  according to the treatment plan.    (E03.9) Hypothyroidism, unspecified type  The patient currently on Synthroid 25 mcg daily.    (J45.40) Moderate persistent asthma without complication  Patient asthma is currently well controlled on the current regimen.    (K12.31) Mucositis due to chemotherapy  Patient will continue Magic mouthwash as needed.    (G47.01) Insomnia due to medical condition  Patient currently on Ambien 12.5 mg orally daily.    (G89.3) Cancer associated pain  Patient pain is currently well controlled with MS Contin 15 mg twice daily.  Oxycodone as needed for breakthrough pain.    (A04.72) C. difficile colitis  Stool for C. difficile came back positive.  The patient will be started on Flagyl.    (B37.0) Thrush  Patient will be starting fluconazole 100 mg orally daily for 7 days.    The patient is ready to learn, no apparent learning barriers were identified.  Diagnosis and treatment plans were explained to the patient. The patient expressed understanding of the content. The patient asked appropriate questions.  The patient questions were answered to her satisfaction.    Chart documentation with Dragon Voice recognition Software. Although reviewed after completion, some words and grammatical errors may remain.   Please inform the patient.  C. difficile came back positive.  We will start the patient on Flagyl.    Again, thank you for allowing me to participate in the care of your patient.        Sincerely,        Brodie Cassidy MD

## 2017-12-27 NOTE — MR AVS SNAPSHOT
After Visit Summary   12/27/2017    Etta Cervantes    MRN: [id number]           Patient Information     Date Of Birth          1960        Visit Information        Provider Department      12/27/2017 8:30 AM ROOM 3 [facility]        Today's Diagnoses     Bone metastasis (H)    -  1    Breast cancer metastasized to axillary lymph node, right (H)        Secondary malignant neoplasm of liver (H)        Carcinoma of breast metastatic to liver, unspecified laterality (H)        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)           Follow-ups after your visit        Your next 10 appointments already scheduled     Dec 29, 2017  9:00 AM CST   (Arrive by 8:45 AM)   XR CHEST WITH FLUORO 2 VIEWS with WYXR3, [place] RAD   [facility] ([facility])    [address] [facility] [address]   [phone]           Please bring a list of your current medicines to your exam. (Include vitamins, minerals and over-thecounter medicines.) Leave your valuables at home.  Tell your doctor if there is a chance you may be pregnant.  You do not need to do anything special for this exam.            Jan 03, 2018  9:00 AM CST   Level 1 with ROOM 7 [facility] ([facility])    [facility]  [address]  [facility] [address]   [phone]            Jan 17, 2018  8:30 AM CST   Level 1 with ROOM 4 [facility] ([facility])    [facility]  [address]  [facility] [address]   [phone]            Jan 17, 2018  9:45 AM CST   Return Visit with Brodie Cassidy MD   [facility] ([facility])    [facility]  [address]  [facility] [address]   [phone]            Jan 24, 2018  9:30 AM CST   Level 1 with ROOM 9 [facility] ([facility])    [facility]  [facility]  [address]  [facility] [address]   [phone]            Jan 31, 2018  9:30 AM CST   Level 1 with ROOM 3 [facility] ([facility])    [facility]  [address]  [facility] [address]   [phone]              Future tests that were ordered for you today     Open Future Orders        Priority Expected Expires Ordered    XR Chest w Fluoro 2 Views STAT 12/27/2017 12/27/2018 12/27/2017            Who to contact     If you have questions or need follow up information about today's clinic visit or your schedule please contact [facility] directly at [phone].  Normal or non-critical lab and imaging results will be communicated to you by Fluid-1hart, letter or phone within 4 business days after the clinic has received the results. If you do not hear from us within 7 days, please contact the clinic through Picreelt or phone. If you have a critical or abnormal lab result, we will notify you by phone as soon as possible.  Submit refill requests through Humagade or call your pharmacy and they will forward the refill request to us. Please allow 3 business days for your refill to be completed.          Additional Information About Your Visit        Humagade Information     Humagade gives you secure access to your electronic health record. If you see a primary care provider, you can also send messages to your care team and make appointments. If you have questions, please call your primary care clinic.  If you do not have a primary care provider, please call [phone] and they will assist you.        Care EveryWhere ID     This is your Care EveryWhere ID. This could be used by other organizations to access your [facility] medical records  [id number]        Your Vitals Were     Last Period                   02/06/2013            Blood Pressure from Last 3 Encounters:   12/27/17 110/74   12/21/17 144/59   12/21/17 138/59     Weight from Last 3 Encounters:   12/27/17 84 kg (185 lb 3.2 oz)   12/21/17 89.9 kg (198 lb 3.2 oz)   12/14/17 89.1 kg (196 lb 8 oz)              We Performed the Following     Albumin level     Calcium     CBC with platelets differential        Primary Care Provider Office Phone # Fax #    Johana Fairbanks [phone] [address] [facility] [address]        Equal Access to Services     JACKIE MIX : Hadii aad ku hadasho Soomaali, waaxda luqadaha, qaybta kaalmada adeegyada, waxay idiin hayaan adeeg kharash la'monique . So [facility] [phone].    ATENCIÓN: Si habla español, tiene a parekh disposición servicios gratuitos de asistencia lingüística. [facility] [phone].    We comply with applicable federal civil rights laws and [place] laws. We do not discriminate on the basis of race, color, national origin, age, disability, sex, sexual orientation, or gender identity.            Thank you!     Thank you for choosing [facility]  for your care. Our goal is always to provide you with excellent care. Hearing back from our patients is one way we can continue to improve our services. Please take a few minutes to complete the written survey that you may receive in the mail after your visit with us. Thank you!             Your Updated Medication List - Protect others around you: Learn how to safely use, store and throw away your medicines at www.disposemymeds.org.          This list is accurate as of: 12/27/17 12:32 PM.  Always use your most recent med list.                   Brand Name Dispense Instructions for use Diagnosis    * albuterol 108 (90 BASE) MCG/ACT Inhaler    VENTOLIN HFA    1 Inhaler    Inhale 2 puffs into the lungs every 4 hours as needed    Moderate persistent asthma, uncomplicated       * albuterol (2.5 MG/3ML) 0.083% neb solution     30 vial    Take 1 vial (2.5 mg) by nebulization every 4 hours as needed for shortness of breath / dyspnea    Moderate persistent asthma,  uncomplicated       ALLEGRA ALLERGY 180 MG tablet   Generic drug:  fexofenadine      Take 180 mg by mouth daily as needed for allergies        azelastine 0.1 % spray    ASTELIN    3 Bottle    Spray 1-2 sprays into both nostrils 2 times daily as needed for rhinitis    Chronic rhinitis       CRANBERRY PO      Take 1 capsule by mouth daily        dexamethasone 4 MG tablet    DECADRON    10 tablet    Take 20 mg (5 tablets) the evening prior to and the morning of Abraxane infusion for 1st cycle only.    Carcinoma of breast metastatic to liver, unspecified laterality (H), Bilateral malignant neoplasm of breast in female, estrogen receptor positive, unspecified site of breast (H), Bone metastasis (H), 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), Secondary malignant neoplasm of liver (H)       diclofenac 1 % Gel topical gel    VOLTAREN    100 g    Place 2 g onto the skin 4 times daily    Carcinoma of breast metastatic to liver, unspecified laterality (H)       diphenhydrAMINE 25 MG tablet    BENADRYL    1 tablet    Take 1 tablet (25 mg) by mouth every 8 hours as needed for itching or allergies    Carcinoma of breast metastatic to liver, unspecified laterality (H), Bone metastasis (H), Pericardial effusion, Bilateral malignant neoplasm of breast in female, estrogen receptor positive, unspecified site of breast (H), 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), Secondary malignant neoplasm of liver (H), Chemotherapy-induced neutropenia (H), Emphysematous bleb of lung (H), Hypothyroidism, unspecified type, Hyperlipidemia LDL goal <130, Moderate persistent asthma without complication, Mucositis due to chemotherapy       DULERA 200-5 MCG/ACT oral inhaler   Generic drug:  mometasone-formoterol     3 Inhaler    INHALE 2 PUFFS INTO THE LUNGS TWICE DAILY    Moderate persistent asthma without complication       KP CALCIUM 600+D3 600-500 MG-UNIT Caps   Generic drug:  calcium carb-cholecalciferol       Take 2 tablets by mouth daily        letrozole 2.5 MG tablet    FEMARA    30 tablet    Take 1 tablet (2.5 mg) by mouth daily    Carcinoma of breast metastatic to liver, unspecified laterality (H), Bone metastasis (H), Pericardial effusion, Bilateral malignant neoplasm of breast in female, estrogen receptor positive, unspecified site of breast (H), 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), Secondary malignant neoplasm of liver (H), Chemotherapy-induced neutropenia (H), Emphysematous bleb of lung (H), Hypothyroidism, unspecified type, Hyperlipidemia LDL goal <130, Moderate persistent asthma without complication, Mucositis due to chemotherapy       levothyroxine 25 MCG tablet    SYNTHROID/LEVOTHROID    90 tablet    TAKE 1 TABLET (25 MCG) BY MOUTH DAILY    Hypothyroidism due to acquired atrophy of thyroid       lidocaine (viscous) 2 % solution    XYLOCAINE      Carcinoma of breast metastatic to liver, unspecified laterality (H), Bilateral malignant neoplasm of breast in female, estrogen receptor positive, unspecified site of breast (H), Bone metastasis (H), 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), Secondary malignant neoplasm of liver (H)       Lidocaine 4 % Patch    LIDOCARE    30 patch    Place 1-2 patches onto the skin every 24 hours    Carcinoma of breast metastatic to liver, unspecified laterality (H)       lidocaine visc 2% 2.5mL/5mL & maalox/mylanta w/ simeth 2.5mL/5mL & diphenhydrAMINE 5mg/5mL Susp suspension    [facility] Mouthwash [facility]    240 mL    Swish and swallow 10 mLs in mouth every 6 hours as needed for mouth sores    Mucositis due to chemotherapy, Breast cancer metastasized to axillary lymph node, right (H)       loratadine 10 MG tablet    CLARITIN     Take 10 mg by mouth daily as needed for allergies        magnesium hydroxide 400 MG/5ML suspension    MILK OF MAGNESIA    105 mL    Take 30 mLs by mouth daily as needed for constipation    Carcinoma of  breast metastatic to liver, unspecified laterality (H)       metoclopramide 10 MG tablet    REGLAN    120 tablet    Take 1 tablet (10 mg) by mouth 2 times daily as needed    Bilateral malignant neoplasm of breast in female, estrogen receptor positive, unspecified site of breast (H)       morphine 15 MG 12 hr tablet    MS CONTIN    60 tablet    Take 1 tablet (15 mg) by mouth every 12 hours    Carcinoma of breast metastatic to liver, unspecified laterality (H), Bilateral malignant neoplasm of breast in female, estrogen receptor positive, unspecified site of breast (H), Bone metastasis (H), 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), Secondary malignant neoplasm of liver (H)       order for DME     1 each    Equipment being ordered: JoviPak post-lumpectomy compression pad x2    Lymphedema, Lymphedema of breast       oxyCODONE IR 5 MG tablet    ROXICODONE    60 tablet    Take 1 tablet (5 mg) by mouth every 4 hours as needed for moderate to severe pain    Secondary malignant neoplasm of liver (H), Carcinoma of breast metastatic to liver, unspecified laterality (H), 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), Bone metastasis (H), Pericardial effusion       polyethylene glycol Packet    MIRALAX/GLYCOLAX    7 packet    Take 17 g by mouth daily    Carcinoma of breast metastatic to liver, unspecified laterality (H)       predniSONE 20 MG tablet    DELTASONE    10 tablet    Take 1 tablet (20 mg) by mouth 2 times daily For Yellow or Red zone on Asthma Action Plan.    Moderate persistent asthma, uncomplicated       prochlorperazine 10 MG tablet    COMPAZINE    30 tablet    Take 1 tablet (10 mg) by mouth every 6 hours as needed (Nausea/Vomiting)    Secondary malignant neoplasm of liver (H), Carcinoma of breast metastatic to liver, unspecified laterality (H), Bone metastasis (H), 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), Bilateral malignant neoplasm of breast in  female, estrogen receptor positive, unspecified site of breast (H)       QVAR 80 MCG/ACT Inhaler   Generic drug:  beclomethasone     26.1 g    INHALE 1 PUFFS INTO THE EACH NOSTRIL 2 TIMES DAILY    Chronic rhinitis       ROBITUSSIN COUGH/COLD CF PO      Take 10 mLs by mouth as needed    Breast cancer metastasized to axillary lymph node, right (H)       senna-docusate 8.6-50 MG per tablet    SENOKOT-S;PERICOLACE    100 tablet    Take 1-2 tablets by mouth 2 times daily as needed for constipation    Carcinoma of breast metastatic to liver, unspecified laterality (H)       STOOL SOFTENER PO      Take 100 mg by mouth daily        * zolpidem 10 MG tablet    AMBIEN     TAKE 1 TABLET 30 MIN PRIOR TO BEDTIME    Carcinoma of breast metastatic to liver, unspecified laterality (H), Bilateral malignant neoplasm of breast in female, estrogen receptor positive, unspecified site of breast (H), Bone metastasis (H), 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    Insomnia due to medical condition       * Notice:  This list has 4 medication(s) that are the same as other medications prescribed for you. Read the directions carefully, and ask your doctor or other care provider to review them with you.

## 2017-12-27 NOTE — PROGRESS NOTES
Called and reviewed results with the patient per Dr. Cassidy. Also let pt know Diflucan was sent to the pharmacy as well for thrush. Patient had no further questions or concerns at this time and also verbalized understanding. Direct line provided if any further questions/concerns arise.

## 2017-12-27 NOTE — PROGRESS NOTES
Hematology/ Oncology Follow-up Visit:  Dec 27, 2017    Reason for Visit:   Chief Complaint   Patient presents with     Chemotherapy     One week follow up breast CA. Review lab results.        Oncologic History:  Breast cancer (H)  Etta Cervantes presented with increasing lump in the right axilla that s lump has been growing without any pain or associated symptoms. Subsequently she was evaluated by primary care physician. Diagnostic digital mammogram was done on 01/13/2015 showing enlarged lymph nodes in the right axilla. There was some skin thickening in the right breast anteriorly and laterally. There are no parenchymal changes in the right breast. The left breast shows a 1.7 cm spiculated mass at approximately 2 to 3 o'clock position, 15.2 cm away from the nipple. A right breast ultrasound was subsequently done and ultrasound guided biopsy was done. Needle biopsy of the left breast did show infiltrating ductal carcinoma grade I of III with no angiolymphatic invasion seen. No associated ductal carcinoma in situ. Estrogen receptor, progresterone receptor were positive. HER-2/sadie receptor came back negative.. Another biopsy from the right axilla did show metastatic ductal carcinoma. PET scan was done on January 26, 2015 showing few small right posterior cervical chain nodes the largest is 1.2 cm. There is extensive bulky right axillary adenopathy demonstrating hypermetabolic FDG uptake with SUV of 10.6. There is skin thickening involving the right breast which demonstrated low-grade FDG uptake. A 1.2 cm lesion on the lateral aspect of the left breast demonstrated minimally increased FDG uptake with SUV of 2. Scattered hypermetabolic mediastinal lymph nodes located in the left superior anterior mediastinum, right paratracheal and precarinal U, subcranial adenopathy with javon metastases. This focus hypermetabolic FDG uptake identified definitive Amanda posterior aspect off intertrochanteric region of the proximal left  femur consistent with bone metastases. There is also 1.4 cm hypermetabolic FDG uptake identified in the anterior medial segment of the left hepatic lobe consistent with liver metastases. Additional 2.1 cm low-attenuation lesion anterior aspect of the right lobe which needs to be determined. The patient was started on chemotherapy with Taxotere and Cytoxan on February 9, 2015.  She concluded 4 cycles of Taxotere and Cytoxan. She started on Arimidex 1 mg orally daily. Currently she is on Faslodex and ibrance. Subsequently the patient went on to receive Afinitor. The patient also started treatment with Xeloda.       Interval History:  Patient is here today for follow-up following her first cycle of chemotherapy with Abraxane.  She tolerated chemotherapy well without significant side effects.  She denies any nausea or vomiting .  She denies any fever or chills.  She has been having diarrhea for the last 3 days.  Diarrhea is watery.  Her pain is currently well controlled.  There is no shortness of breath or chest pain.    Review Of Systems:  Constitutional: Negative for fever, chills, and night sweats.  Skin: negative.  Eyes: negative.  Ears/Nose/Throat: negative.  Respiratory: No shortness of breath, dyspnea on exertion, cough, or hemoptysis.  Cardiovascular: negative.  Gastrointestinal: Diarrhea as mentioned above  Genitourinary: negative.  Musculoskeletal: negative.  Neurologic: negative.  Psychiatric: negative.  Hematologic/Lymphatic/Immunologic: negative.  Endocrine: negative.    All other ROS negative unless mentioned in interval history.    Past medical, social, surgical, and family histories reviewed.    Allergies:  Allergies as of 12/27/2017 - Manuel as Reviewed 12/27/2017   Allergen Reaction Noted     Animal dander Cough 01/23/2015     Cats  07/15/2010     Dust mites Cough 01/23/2015     Pollen extract  01/23/2015     Seasonal allergies  07/15/2010     Levaquin [levofloxacin] Rash 07/17/2017       Current  Medications:  Current Outpatient Prescriptions   Medication Sig Dispense Refill     metroNIDAZOLE (FLAGYL) 500 MG tablet Take 1 tablet (500 mg) by mouth 3 times daily 20 tablet 0     fluconazole (DIFLUCAN) 100 MG tablet Take 1 tablet (100 mg) by mouth daily 7 tablet 0     oxyCODONE IR (ROXICODONE) 5 MG tablet Take 1 tablet (5 mg) by mouth every 4 hours as needed for moderate to severe pain 60 tablet 0     morphine (MS CONTIN) 15 MG 12 hr tablet Take 1 tablet (15 mg) by mouth every 12 hours 60 tablet 0     prochlorperazine (COMPAZINE) 10 MG tablet Take 1 tablet (10 mg) by mouth every 6 hours as needed (Nausea/Vomiting) 30 tablet 2     diclofenac (VOLTAREN) 1 % GEL topical gel Place 2 g onto the skin 4 times daily 100 g 0     Lidocaine (LIDOCARE) 4 % Patch Place 1-2 patches onto the skin every 24 hours 30 patch 0     zolpidem (AMBIEN CR) 12.5 MG CR tablet Take 1 tablet by mouth at bed time. 30 tablet 5     DULERA 200-5 MCG/ACT oral inhaler INHALE 2 PUFFS INTO THE LUNGS TWICE DAILY 3 Inhaler 3     QVAR 80 MCG/ACT Inhaler INHALE 1 PUFFS INTO THE EACH NOSTRIL 2 TIMES DAILY 26.1 g 3     Phenylephrine-DM-GG (ROBITUSSIN COUGH/COLD CF PO) Take 10 mLs by mouth as needed        levothyroxine (SYNTHROID/LEVOTHROID) 25 MCG tablet TAKE 1 TABLET (25 MCG) BY MOUTH DAILY 90 tablet 2     calcium carb-cholecalciferol (KP CALCIUM 600+D3) 600-500 MG-UNIT CAPS Take 2 tablets by mouth daily       CRANBERRY PO Take 1 capsule by mouth daily        zolpidem (AMBIEN) 10 MG tablet TAKE 1 TABLET 30 MIN PRIOR TO BEDTIME  0     lidocaine, viscous, (XYLOCAINE) 2 % solution        dexamethasone (DECADRON) 4 MG tablet Take 20 mg (5 tablets) the evening prior to and the morning of Abraxane infusion for 1st cycle only. (Patient not taking: Reported on 12/27/2017) 10 tablet 0     magnesium hydroxide (MILK OF MAGNESIA) 400 MG/5ML suspension Take 30 mLs by mouth daily as needed for constipation (Patient not taking: Reported on 12/27/2017) 105 mL       polyethylene glycol (MIRALAX/GLYCOLAX) Packet Take 17 g by mouth daily (Patient not taking: Reported on 12/27/2017) 7 packet      senna-docusate (SENOKOT-S;PERICOLACE) 8.6-50 MG per tablet Take 1-2 tablets by mouth 2 times daily as needed for constipation (Patient not taking: Reported on 12/27/2017) 100 tablet      loratadine (CLARITIN) 10 MG tablet Take 10 mg by mouth daily as needed for allergies       order for DME Equipment being ordered: Venu post-lumpectomy compression pad x2 (Patient not taking: Reported on 12/27/2017) 1 each 0     letrozole (FEMARA) 2.5 MG tablet Take 1 tablet (2.5 mg) by mouth daily 30 tablet 3     diphenhydrAMINE (BENADRYL) 25 MG tablet Take 1 tablet (25 mg) by mouth every 8 hours as needed for itching or allergies (Patient not taking: Reported on 12/27/2017) 1 tablet 0     metoclopramide (REGLAN) 10 MG tablet Take 1 tablet (10 mg) by mouth 2 times daily as needed 120 tablet 1     fexofenadine (ALLEGRA ALLERGY) 180 MG tablet Take 180 mg by mouth daily as needed for allergies       albuterol (2.5 MG/3ML) 0.083% neb solution Take 1 vial (2.5 mg) by nebulization every 4 hours as needed for shortness of breath / dyspnea (Patient not taking: Reported on 12/27/2017) 30 vial 3     predniSONE (DELTASONE) 20 MG tablet Take 1 tablet (20 mg) by mouth 2 times daily For Yellow or Red zone on Asthma Action Plan. (Patient not taking: Reported on 12/27/2017) 10 tablet 1     magic mouthwash suspension (diphenhydrAMINE, lidocaine, aluminum-magnesium & simethicone) Swish and swallow 10 mLs in mouth every 6 hours as needed for mouth sores (Patient not taking: Reported on 12/27/2017) 240 mL 10     Docusate Calcium (STOOL SOFTENER PO) Take 100 mg by mouth daily        azelastine (ASTELIN) 0.1 % nasal spray Spray 1-2 sprays into both nostrils 2 times daily as needed for rhinitis (Patient not taking: Reported on 12/27/2017) 3 Bottle 3     albuterol (VENTOLIN HFA) 108 (90 BASE) MCG/ACT inhaler Inhale 2 puffs  "into the lungs every 4 hours as needed (Patient not taking: Reported on 12/27/2017) 1 Inhaler 2        Physical Exam:  /74 (BP Location: Right arm, Patient Position: Sitting, Cuff Size: Adult Regular)  Pulse 113  Temp 99.9  F (37.7  C) (Tympanic)  Resp 18  Ht 1.632 m (5' 4.25\")  Wt 84 kg (185 lb 3.2 oz)  LMP 02/06/2013  SpO2 96%  Breastfeeding? No  BMI 31.54 kg/m2  Wt Readings from Last 12 Encounters:   12/27/17 84 kg (185 lb 3.2 oz)   12/21/17 89.9 kg (198 lb 3.2 oz)   12/14/17 89.1 kg (196 lb 8 oz)   12/12/17 90.6 kg (199 lb 11.2 oz)   12/07/17 90.6 kg (199 lb 11.2 oz)   12/01/17 90.9 kg (200 lb 8 oz)   11/30/17 90.9 kg (200 lb 8 oz)   11/28/17 91.1 kg (200 lb 14.4 oz)   11/16/17 89.8 kg (198 lb)   11/15/17 89.6 kg (197 lb 8 oz)   11/10/17 90.9 kg (200 lb 8 oz)   11/08/17 91.1 kg (200 lb 14.4 oz)     ECOG performance status: 1  GENERAL APPEARANCE: Healthy, alert and in no acute distress.  HEENT: Sclerae anicteric. PERRLA. Oropharynx without ulcers, lesions, or thrush.  NECK: Supple. No asymmetry or masses.  LYMPHATICS: No palpable cervical, supraclavicular, axillary, or inguinal lymphadenopathy.  RESP: Lungs clear to auscultation bilaterally without rales, rhonchi or wheezes.  CARDIOVASCULAR: Regular rate and rhythm. Normal S1, S2; no S3 or S4. No murmur, gallop, or rub.  ABDOMEN: Soft, nontender. Bowel sounds normal. No palpable organomegaly or masses.  MUSCULOSKELETAL: Extremities without gross deformities noted. No edema of bilateral lower extremities.  SKIN: No suspicious lesions or rashes.  NEURO: Alert and oriented x 3. Cranial nerves II-XII grossly intact.  PSYCHIATRIC: Mentation and affect appear normal.    Laboratory/Imaging Studies:  Infusion Therapy Visit on 12/27/2017   Component Date Value Ref Range Status     Calcium 12/27/2017 8.6  8.5 - 10.1 mg/dL Final     Albumin 12/27/2017 2.9* 3.4 - 5.0 g/dL Final     WBC 12/27/2017 3.8* 4.0 - 11.0 10e9/L Final     RBC Count 12/27/2017 4.04  3.8 " - 5.2 10e12/L Final     Hemoglobin 12/27/2017 13.2  11.7 - 15.7 g/dL Final     Hematocrit 12/27/2017 40.4  35.0 - 47.0 % Final     MCV 12/27/2017 100  78 - 100 fl Final     MCH 12/27/2017 32.7  26.5 - 33.0 pg Final     MCHC 12/27/2017 32.7  31.5 - 36.5 g/dL Final     RDW 12/27/2017 14.1  10.0 - 15.0 % Final     Platelet Count 12/27/2017 297  150 - 450 10e9/L Final     Diff Method 12/27/2017 Automated Method   Final     % Neutrophils 12/27/2017 64.3  % Final     % Lymphocytes 12/27/2017 18.1  % Final     % Monocytes 12/27/2017 9.9  % Final     % Eosinophils 12/27/2017 6.1  % Final     % Basophils 12/27/2017 0.8  % Final     % Immature Granulocytes 12/27/2017 0.8  % Final     Absolute Neutrophil 12/27/2017 2.4  1.6 - 8.3 10e9/L Final     Absolute Lymphocytes 12/27/2017 0.7* 0.8 - 5.3 10e9/L Final     Absolute Monocytes 12/27/2017 0.4  0.0 - 1.3 10e9/L Final     Absolute Eosinophils 12/27/2017 0.2  0.0 - 0.7 10e9/L Final     Absolute Basophils 12/27/2017 0.0  0.0 - 0.2 10e9/L Final     Abs Immature Granulocytes 12/27/2017 0.0  0 - 0.4 10e9/L Final   Infusion Therapy Visit on 12/21/2017   Component Date Value Ref Range Status     WBC 12/21/2017 7.9  4.0 - 11.0 10e9/L Final     RBC Count 12/21/2017 3.92  3.8 - 5.2 10e12/L Final     Hemoglobin 12/21/2017 13.0  11.7 - 15.7 g/dL Final     Hematocrit 12/21/2017 39.4  35.0 - 47.0 % Final     MCV 12/21/2017 101* 78 - 100 fl Final     MCH 12/21/2017 33.2* 26.5 - 33.0 pg Final     MCHC 12/21/2017 33.0  31.5 - 36.5 g/dL Final     RDW 12/21/2017 13.9  10.0 - 15.0 % Final     Platelet Count 12/21/2017 291  150 - 450 10e9/L Final     Diff Method 12/21/2017 Automated Method   Final     % Neutrophils 12/21/2017 81.8  % Final     % Lymphocytes 12/21/2017 9.6  % Final     % Monocytes 12/21/2017 8.0  % Final     % Eosinophils 12/21/2017 0.1  % Final     % Basophils 12/21/2017 0.1  % Final     % Immature Granulocytes 12/21/2017 0.4  % Final     Absolute Neutrophil 12/21/2017 6.5  1.6 -  8.3 10e9/L Final     Absolute Lymphocytes 12/21/2017 0.8  0.8 - 5.3 10e9/L Final     Absolute Monocytes 12/21/2017 0.6  0.0 - 1.3 10e9/L Final     Absolute Eosinophils 12/21/2017 0.0  0.0 - 0.7 10e9/L Final     Absolute Basophils 12/21/2017 0.0  0.0 - 0.2 10e9/L Final     Abs Immature Granulocytes 12/21/2017 0.0  0 - 0.4 10e9/L Final     Sodium 12/21/2017 138  133 - 144 mmol/L Final     Potassium 12/21/2017 3.9  3.4 - 5.3 mmol/L Final     Chloride 12/21/2017 106  94 - 109 mmol/L Final     Carbon Dioxide 12/21/2017 26  20 - 32 mmol/L Final     Anion Gap 12/21/2017 6  3 - 14 mmol/L Final     Glucose 12/21/2017 171* 70 - 99 mg/dL Final     Urea Nitrogen 12/21/2017 9  7 - 30 mg/dL Final     Creatinine 12/21/2017 0.46* 0.52 - 1.04 mg/dL Final     GFR Estimate 12/21/2017 >90  >60 mL/min/1.7m2 Final    Non  GFR Calc     GFR Estimate If Black 12/21/2017 >90  >60 mL/min/1.7m2 Final    African American GFR Calc     Calcium 12/21/2017 8.4* 8.5 - 10.1 mg/dL Final     Bilirubin Total 12/21/2017 0.8  0.2 - 1.3 mg/dL Final     Albumin 12/21/2017 2.6* 3.4 - 5.0 g/dL Final     Protein Total 12/21/2017 7.0  6.8 - 8.8 g/dL Final     Alkaline Phosphatase 12/21/2017 180* 40 - 150 U/L Final     ALT 12/21/2017 16  0 - 50 U/L Final     AST 12/21/2017 48* 0 - 45 U/L Final     CA 27-29 12/21/2017 132* 0 - 39 U/mL Final    Assay Method:  Chemiluminescence using Siemens Centaur XP     CEA 12/21/2017 10.8* 0 - 2.5 ug/L Final    Comment: Smoking may cause CEA results to be elevated.  Assay Method:  Chemiluminescence using Siemens Centaur XP          Recent Results (from the past 744 hour(s))   US Abdomen Limited    Narrative    ULTRASOUND  ABDOMEN LIMITED   11/30/2017 12:53 PM     HISTORY:  Breast cancer; elevated liver enzymes. Carcinoma of breast  metastatic to liver, unspecified laterality (H). Bone metastasis (H);  Secondary malignant neoplasm of liver (H). Elevated liver enzymes.    COMPARISON: CT abdomen and pelvis  11/13/2017.    FINDINGS:    Gallbladder: Negative sonographic Osullivan sign. Faint gallbladder  sludge is suggested layering in the gallbladder lumen. No gallstones.    Bile ducts:  Common duct is 0.7 cm and may be normal for age. No  intrahepatic biliary ductal dilatation.    Liver: Hepatic metastasis again identified by ultrasound. One of the  largest is 2.8 x 2.1 x 3 cm at the left liver. The other lesions are  better visualized on the comparison CT.    Pancreas:  Partially obscured but grossly unremarkable.     Right kidney:  Normal.     Aorta and IVC:  Not specifically assessed.       Impression    IMPRESSION:    1. Hepatic metastasis again identified.  2. Gallbladder sludge. No gallstones. Negative sonographic Osullivan  sign.    CHICO FONG MD   CT Chest/Abdomen/Pelvis w Contrast    Narrative    CT CHEST/ABDOMEN/PELVIS WITH CONTRAST December 6, 2017 1:52 PM    HISTORY: Breast cancer with liver metastasis. Elevated liver enzymes,  elevated bilirubin. Bilateral malignant neoplasm of breast in female,  estrogen receptor positive, unspecified site of breast (H).     TECHNIQUE: CT scan obtained of the chest, abdomen, and pelvis with  oral and IV contrast. 97 ml isovue 370 injected. Radiation dose for  this scan was reduced using automated exposure control, adjustment of  the mA and/or kV according to patient size, or iterative  reconstruction technique.    COMPARISON:  CT chest, abdomen and pelvis 11/13/2017.    FINDINGS:  Chest: Left chest port venous catheter. Increased volume of moderate  to large pericardial effusion. Transverse thickness of this fluid is  now 2.4 cm, previously 0.6 cm at a comparable level on the prior study  series 2 image 36. There is some mild wall enhancement of the superior  portion of the pericardium, for instance around series 2 image 20 in  the region of the cardiac base. Small volume right pleural fluid newly  identified. No acute thoracic aortic abnormality. No large central  pulmonary  embolism. The exam was not tailored for assessment of middle  and small branch vessel pulmonary emboli. The distal left  paraesophageal lymph node is 0.8 x 0.5 cm, previously 0.3 x 0.3 cm  series 2 image 48. The remainder of the thoracic lymph nodes remain  small. Right axillary adenopathy without significant change in size  measuring 4.8 x 3.4 cm, previously 4.2 x 2.8 cm image 16. There are  other mildly more prominent lymph nodes as well. No left axillary  adenopathy. Stable thyroid nodules that are small in size. Right  breast skin thickening again noted. No convincing new bony disease  identified. Some ill-defined sclerosis at the right T8 pedicle on  series 2 image 33, and right posteromedial fifth rib on image 18  appears stable. Stable appearing cavitary lesion at the right upper  lung measuring 8.8 cm series 3 image 13. Stable triangular area of  consolidation at the right hilar region at the right midchest series 3  image 27. There are several tiny bilateral stable pulmonary nodules.  For example, one of these is 0.4 cm medial right lower lobe series 3  image 30. There are multiple other examples bilaterally.    Abdomen/pelvis: No convincing new bony disease at the abdomen or  pelvis levels. Multifocal hepatic metastasis. Anterior central hepatic  mass is 2.7 x 2.6 cm, previously 2.2 x 2.1 cm series 2 image 58.  Lateral segment left hepatic mass is approximately 2.8 x 2.8 cm,  previously 2.2 x 2.4 cm image 53. A caudate lobe mass is approximately  2.9 x 2.2 cm, previously 2 x 2.2 cm image 60. There are other hepatic  masses that are slightly larger, or are stable. Notably, there is new  moderate intrahepatic biliary ductal dilatation. This appears to lead  to the pancreatic head where there is complete decompression of the  common bile duct coronal series 4 image 23. There is a small area of  nodular enhancement newly identified in this region that is  approximately 0.8 cm series 2 image 66. Adenopathy also  noted at the  shreyas hepatis. Portal caval enlarged lymph node is 2.5 x 1.3 cm,  previously 1.7 x 1.1 cm image 59. There are other prominent lymph  nodes that appear more prominent in the interval as well, for instance  image 56. Some increase ill-defined soft tissue at the shreyas hepatis  region on image 56 also appears more prominent measuring 2.4 x 1.4 cm  series 2 image 56, previously 1.7 x 1 cm.    The spleen demonstrates a stable small subcentimeter enhancing focus  series 2 image 56. Ill-defined tiny hypodensities in the spleen also  appears stable image 54 and 56. The adrenals, remainder of the  pancreas, and kidneys do not show any new abnormalities. Tiny cyst at  the left kidney is stable image 67.    There are several retroperitoneal lymph nodes noted. Upper abdominal  aortocaval lymph node is 1.5 x 1.3 cm, previously 0.9 x 1.3 cm series  2 image 64. A few other mildly prominent retroperitoneal lymph nodes  again noted. Small to moderate free pelvic fluid. No acute bowel  abnormality is seen. New nodularity at the anterior omentum with one  region measuring 1.2 cm abutting a small bowel loop series 2 image 77.  Other adjacent nodules noted as well.      Impression    IMPRESSION:  1. Progression of hepatic metastatic disease with enlargement of  multifocal masses. New finding of moderate intrahepatic biliary ductal  dilatation worrisome for developing biliary obstruction. This may  relate to progression of adenopathy at the upper abdomen at the shreyas  hepatis and/or an increasing area of nodular enhancement at the  pancreatic head worrisome for metastatic disease.  2. Moderate to large pericardial effusion significantly increased  since 11/13/2017. Some mild enhancement of the pericardium also noted.  Recommend further workup.  3. New finding of small nodularity at the omentum at the midline and  slightly towards the right that could represent carcinomatosis.  4. Multifocal stable indeterminate pulmonary  nodules may represent  pulmonary metastatic disease.  5. Stable sclerosis at the right fifth rib and T8 that could represent  stable sclerotic metastasis.  6. Progression of adenopathy at the right axilla, and upper abdominal  retroperitoneum.    I communicated these results to Dr. Cassidy on 12/6/2017 at 1450  hours.    CHICO FONG MD   XR Surgery [facility] G/T 5 Min Fluoro w Stills    Narrative    This exam was marked as non-reportable because it will not be read by a   radiologist or a [place] non-radiologist provider.             XR Chest 2 Views    Narrative    XR CHEST 2 VW  12/11/2017 7:11 PM    History: Fever    Comparison: CT 12/6/2017 chest x-ray 11/8/2017    Findings:   PA and lateral views of the chest are obtained. There is a left chest  Port-A-Cath with the tip in stable position projecting over the high  SVC. Pericardial drain projects over the heart. The trachea is in the  midline. Cardiac mediastinal silhouette is stable and mildly enlarged.  There is pneumopericardium and/or pneumomediastinum. Lung volumes are  decreased. Bilateral perihilar and left greater than right basilar  opacities of atelectasis or consolidation. No pneumothorax. Small left  greater than right pleural effusions. Lucency in the paramediastinal  right lung seems to correspond to largest cystic lesion seen on prior  CT. Metallic biliary stent.      Impression    IMPRESSION:  1.  Mildly enlarged cardiac mediastinal silhouette.  Pneumopericardium/pneumomediastinum presumably relates to the presence  of the pericardial drainage catheter.  2.  Small left greater than right pleural effusions.  3.  Perihilar and left greater than right basilar opacities could  represent consolidation, especially given provided history.  Alternatively, findings could represent atelectasis.    I have personally reviewed the examination and initial interpretation  and I agree with the findings.    RONNIE CLAIRE MD       Assessment and plan:    (C50.024,   C78.7) Carcinoma of breast metastatic to liver, unspecified laterality (H)  (primary encounter diagnosis)  (C50.911,  Z17.0,  C50.912) Bilateral malignant neoplasm of breast in female, estrogen receptor positive, unspecified site of breast (H)  (C50.911,  C77.3) 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)  (C78.7) Secondary malignant neoplasm of liver (H)  Patient tolerated first treatment with Abraxane without significant side effects.  We will continue on the current treatment plan.  I will see the patient again in 3 weeks or sooner if there are new development concerns.    (C79.51) Bone metastasis (H)  Patient will continue on Denosumab  according to the treatment plan.    (E03.9) Hypothyroidism, unspecified type  The patient currently on Synthroid 25 mcg daily.    (J45.40) Moderate persistent asthma without complication  Patient asthma is currently well controlled on the current regimen.    (K12.31) Mucositis due to chemotherapy  Patient will continue Magic mouthwash as needed.    (G47.01) Insomnia due to medical condition  Patient currently on Ambien 12.5 mg orally daily.    (G89.3) Cancer associated pain  Patient pain is currently well controlled with MS Contin 15 mg twice daily.  Oxycodone as needed for breakthrough pain.    (A04.72) C. difficile colitis  Stool for C. difficile came back positive.  The patient will be started on Flagyl.    (B37.0) Thrush  Patient will be starting fluconazole 100 mg orally daily for 7 days.    The patient is ready to learn, no apparent learning barriers were identified.  Diagnosis and treatment plans were explained to the patient. The patient expressed understanding of the content. The patient asked appropriate questions. The patient questions were answered to her satisfaction.    Chart documentation with Dragon Voice recognition Software. Although reviewed after completion, some words and grammatical errors may remain.

## 2017-12-29 NOTE — PROGRESS NOTES
Reviewed results with patient and patient's . Surgery will be contacting patient with date and time they are able to look at her port. Denies questions or concerns at this time.  Advised to call me back if surgery has not contacted her by end of day today.  Direct line provided.

## 2017-12-29 NOTE — TELEPHONE ENCOUNTER
Call placed to patient to discuss who put the port in and when?  2015 with [facility].  Already checked and can get her Chemo in vein until port is exchanged and functioning.  Appt scheduled consult with Dr De La Paz.  April Segovia RN  [facility] [place]

## 2017-12-29 NOTE — TELEPHONE ENCOUNTER
Reason for Call:  Other appointment    Detailed comments: Funmi calling from oncology stating pt has fibrinous cap and port position change. Would like to get her in for an appt? Please call pt to schedule appt. She has chemo again on Wed. 1/3    Phone Number Patient can be reached at: Home number on file [phone] (home)    Best Time: any     Can we leave a detailed message on this number? YES    Call taken on 12/29/2017 at 10:35 AM by Laurie Gross

## 2018-01-01 ENCOUNTER — APPOINTMENT (OUTPATIENT)
Dept: OCCUPATIONAL THERAPY | Facility: CLINIC | Age: 58
DRG: 372 | End: 2018-01-01
Payer: COMMERCIAL

## 2018-01-01 ENCOUNTER — NURSE TRIAGE (OUTPATIENT)
Dept: NURSING | Facility: CLINIC | Age: 58
End: 2018-01-01

## 2018-01-01 ENCOUNTER — HOSPITAL ENCOUNTER (INPATIENT)
Facility: CLINIC | Age: 58
LOS: 10 days | Discharge: HOME HEALTH | DRG: 372 | End: 2018-05-03
Attending: PHYSICIAN ASSISTANT
Payer: COMMERCIAL

## 2018-01-01 ENCOUNTER — ANTICOAGULATION THERAPY VISIT (OUTPATIENT)
Dept: ANTICOAGULATION | Facility: CLINIC | Age: 58
End: 2018-01-01
Payer: COMMERCIAL

## 2018-01-01 ENCOUNTER — ONCOLOGY VISIT (OUTPATIENT)
Dept: ONCOLOGY | Facility: CLINIC | Age: 58
End: 2018-01-01
Attending: INTERNAL MEDICINE
Payer: COMMERCIAL

## 2018-01-01 ENCOUNTER — TELEPHONE (OUTPATIENT)
Dept: FAMILY MEDICINE | Facility: CLINIC | Age: 58
End: 2018-01-01

## 2018-01-01 ENCOUNTER — HOSPITAL ENCOUNTER (INPATIENT)
Facility: CLINIC | Age: 58
LOS: 3 days | DRG: 597 | End: 2018-05-17
Attending: FAMILY MEDICINE
Payer: COMMERCIAL

## 2018-01-01 ENCOUNTER — TELEPHONE (OUTPATIENT)
Dept: ANTICOAGULATION | Facility: CLINIC | Age: 58
End: 2018-01-01

## 2018-01-01 ENCOUNTER — HOSPITAL ENCOUNTER (OUTPATIENT)
Dept: LAB | Facility: CLINIC | Age: 58
Discharge: HOME | End: 2018-01-08
Attending: INTERNAL MEDICINE | Admitting: INTERNAL MEDICINE
Payer: COMMERCIAL

## 2018-01-01 ENCOUNTER — OFFICE VISIT (OUTPATIENT)
Dept: SURGERY | Facility: CLINIC | Age: 58
End: 2018-01-01
Payer: COMMERCIAL

## 2018-01-01 ENCOUNTER — INFUSION THERAPY VISIT (OUTPATIENT)
Dept: INFUSION THERAPY | Facility: CLINIC | Age: 58
End: 2018-01-01
Attending: INTERNAL MEDICINE
Payer: COMMERCIAL

## 2018-01-01 ENCOUNTER — APPOINTMENT (OUTPATIENT)
Dept: GENERAL RADIOLOGY | Facility: CLINIC | Age: 58
End: 2018-01-01
Attending: SURGERY
Payer: COMMERCIAL

## 2018-01-01 ENCOUNTER — DOCUMENTATION ONLY (OUTPATIENT)
Dept: ONCOLOGY | Facility: CLINIC | Age: 58
End: 2018-01-01

## 2018-01-01 ENCOUNTER — ANTICOAGULATION THERAPY VISIT (OUTPATIENT)
Dept: ANTICOAGULATION | Facility: CLINIC | Age: 58
End: 2018-01-01

## 2018-01-01 ENCOUNTER — ANESTHESIA (OUTPATIENT)
Dept: SURGERY | Facility: CLINIC | Age: 58
End: 2018-01-01
Payer: COMMERCIAL

## 2018-01-01 ENCOUNTER — OFFICE VISIT (OUTPATIENT)
Dept: INFECTIOUS DISEASES | Facility: CLINIC | Age: 58
End: 2018-01-01
Attending: STUDENT IN AN ORGANIZED HEALTH CARE EDUCATION/TRAINING PROGRAM
Payer: COMMERCIAL

## 2018-01-01 ENCOUNTER — APPOINTMENT (OUTPATIENT)
Dept: CARDIOLOGY | Facility: CLINIC | Age: 58
DRG: 597 | End: 2018-01-01
Payer: COMMERCIAL

## 2018-01-01 ENCOUNTER — APPOINTMENT (OUTPATIENT)
Dept: GENERAL RADIOLOGY | Facility: CLINIC | Age: 58
DRG: 372 | End: 2018-01-01
Attending: PHYSICIAN ASSISTANT
Payer: COMMERCIAL

## 2018-01-01 ENCOUNTER — TELEPHONE (OUTPATIENT)
Dept: ONCOLOGY | Facility: CLINIC | Age: 58
End: 2018-01-01

## 2018-01-01 ENCOUNTER — HOSPITAL ENCOUNTER (OUTPATIENT)
Facility: CLINIC | Age: 58
Discharge: HOME | End: 2018-01-24
Attending: INTERNAL MEDICINE | Admitting: INTERNAL MEDICINE
Payer: COMMERCIAL

## 2018-01-01 ENCOUNTER — HOSPITAL ENCOUNTER (OUTPATIENT)
Dept: CT IMAGING | Facility: CLINIC | Age: 58
Discharge: HOME | End: 2018-03-19
Attending: INTERNAL MEDICINE | Admitting: INTERNAL MEDICINE
Payer: COMMERCIAL

## 2018-01-01 ENCOUNTER — HOSPITAL ENCOUNTER (OUTPATIENT)
Dept: LAB | Facility: CLINIC | Age: 58
Discharge: HOME | End: 2018-01-03
Attending: INTERNAL MEDICINE | Admitting: INTERNAL MEDICINE
Payer: COMMERCIAL

## 2018-01-01 ENCOUNTER — HOSPITAL ENCOUNTER (OUTPATIENT)
Facility: CLINIC | Age: 58
Discharge: HOME | End: 2018-05-11
Attending: INTERNAL MEDICINE | Admitting: INTERNAL MEDICINE
Payer: COMMERCIAL

## 2018-01-01 ENCOUNTER — HOSPITAL ENCOUNTER (OUTPATIENT)
Facility: CLINIC | Age: 58
Discharge: HOME | End: 2018-03-01
Attending: INTERNAL MEDICINE | Admitting: INTERNAL MEDICINE
Payer: COMMERCIAL

## 2018-01-01 ENCOUNTER — APPOINTMENT (OUTPATIENT)
Dept: GENERAL RADIOLOGY | Facility: CLINIC | Age: 58
DRG: 597 | End: 2018-01-01
Attending: FAMILY MEDICINE
Payer: COMMERCIAL

## 2018-01-01 ENCOUNTER — HOSPITAL ENCOUNTER (OUTPATIENT)
Dept: LAB | Facility: CLINIC | Age: 58
Discharge: HOME | End: 2018-01-29
Attending: INTERNAL MEDICINE | Admitting: INTERNAL MEDICINE
Payer: COMMERCIAL

## 2018-01-01 ENCOUNTER — TRANSFERRED RECORDS (OUTPATIENT)
Dept: HEALTH INFORMATION MANAGEMENT | Facility: CLINIC | Age: 58
End: 2018-01-01

## 2018-01-01 ENCOUNTER — HOSPITAL ENCOUNTER (OUTPATIENT)
Facility: CLINIC | Age: 58
Discharge: HOME | End: 2018-02-21
Attending: INTERNAL MEDICINE | Admitting: INTERNAL MEDICINE
Payer: COMMERCIAL

## 2018-01-01 ENCOUNTER — APPOINTMENT (OUTPATIENT)
Dept: ULTRASOUND IMAGING | Facility: CLINIC | Age: 58
DRG: 372 | End: 2018-01-01
Attending: FAMILY MEDICINE
Payer: COMMERCIAL

## 2018-01-01 ENCOUNTER — PATIENT OUTREACH (OUTPATIENT)
Dept: CARE COORDINATION | Facility: CLINIC | Age: 58
End: 2018-01-01

## 2018-01-01 ENCOUNTER — HOSPITAL ENCOUNTER (OUTPATIENT)
Dept: ULTRASOUND IMAGING | Facility: CLINIC | Age: 58
Discharge: HOME | End: 2018-03-27
Attending: INTERNAL MEDICINE | Admitting: INTERNAL MEDICINE
Payer: COMMERCIAL

## 2018-01-01 ENCOUNTER — INFUSION THERAPY VISIT (OUTPATIENT)
Dept: SPIRITUAL SERVICES | Facility: CLINIC | Age: 58
End: 2018-01-01

## 2018-01-01 ENCOUNTER — APPOINTMENT (OUTPATIENT)
Dept: ULTRASOUND IMAGING | Facility: CLINIC | Age: 58
DRG: 597 | End: 2018-01-01
Attending: FAMILY MEDICINE
Payer: COMMERCIAL

## 2018-01-01 ENCOUNTER — HEALTH MAINTENANCE LETTER (OUTPATIENT)
Age: 58
End: 2018-01-01

## 2018-01-01 ENCOUNTER — HOSPITAL ENCOUNTER (OUTPATIENT)
Facility: CLINIC | Age: 58
Discharge: HOME | End: 2018-04-20
Attending: INTERNAL MEDICINE | Admitting: INTERNAL MEDICINE
Payer: COMMERCIAL

## 2018-01-01 ENCOUNTER — HOSPITAL ENCOUNTER (EMERGENCY)
Facility: CLINIC | Age: 58
Discharge: HOME | End: 2018-01-07
Attending: EMERGENCY MEDICINE | Admitting: EMERGENCY MEDICINE
Payer: COMMERCIAL

## 2018-01-01 ENCOUNTER — APPOINTMENT (OUTPATIENT)
Dept: PHYSICAL THERAPY | Facility: CLINIC | Age: 58
DRG: 372 | End: 2018-01-01
Payer: COMMERCIAL

## 2018-01-01 ENCOUNTER — APPOINTMENT (OUTPATIENT)
Dept: GENERAL RADIOLOGY | Facility: CLINIC | Age: 58
DRG: 597 | End: 2018-01-01
Attending: PHYSICIAN ASSISTANT
Payer: COMMERCIAL

## 2018-01-01 ENCOUNTER — HOSPITAL ENCOUNTER (OUTPATIENT)
Dept: LAB | Facility: CLINIC | Age: 58
Discharge: HOME | End: 2018-01-25
Attending: INTERNAL MEDICINE | Admitting: INTERNAL MEDICINE
Payer: COMMERCIAL

## 2018-01-01 ENCOUNTER — APPOINTMENT (OUTPATIENT)
Dept: GENERAL RADIOLOGY | Facility: CLINIC | Age: 58
DRG: 597 | End: 2018-01-01
Attending: RADIOLOGY
Payer: COMMERCIAL

## 2018-01-01 ENCOUNTER — APPOINTMENT (OUTPATIENT)
Dept: CARDIOLOGY | Facility: CLINIC | Age: 58
DRG: 372 | End: 2018-01-01
Payer: COMMERCIAL

## 2018-01-01 ENCOUNTER — ONCOLOGY VISIT (OUTPATIENT)
Dept: ONCOLOGY | Facility: CLINIC | Age: 58
End: 2018-01-01
Attending: FAMILY MEDICINE
Payer: COMMERCIAL

## 2018-01-01 ENCOUNTER — ANESTHESIA EVENT (OUTPATIENT)
Dept: SURGERY | Facility: CLINIC | Age: 58
End: 2018-01-01
Payer: COMMERCIAL

## 2018-01-01 ENCOUNTER — HOSPITAL ENCOUNTER (OUTPATIENT)
Dept: ULTRASOUND IMAGING | Facility: CLINIC | Age: 58
Discharge: HOME | End: 2018-01-17
Attending: INTERNAL MEDICINE | Admitting: INTERNAL MEDICINE
Payer: COMMERCIAL

## 2018-01-01 ENCOUNTER — HOSPITAL ENCOUNTER (OUTPATIENT)
Facility: CLINIC | Age: 58
Discharge: HOME | End: 2018-03-30
Attending: INTERNAL MEDICINE | Admitting: INTERNAL MEDICINE
Payer: COMMERCIAL

## 2018-01-01 ENCOUNTER — HOSPITAL ENCOUNTER (OUTPATIENT)
Facility: CLINIC | Age: 58
Discharge: HOME | End: 2018-05-09
Attending: INTERNAL MEDICINE | Admitting: INTERNAL MEDICINE
Payer: COMMERCIAL

## 2018-01-01 ENCOUNTER — APPOINTMENT (OUTPATIENT)
Dept: CT IMAGING | Facility: CLINIC | Age: 58
DRG: 597 | End: 2018-01-01
Attending: FAMILY MEDICINE
Payer: COMMERCIAL

## 2018-01-01 ENCOUNTER — MYC MEDICAL ADVICE (OUTPATIENT)
Dept: ONCOLOGY | Facility: CLINIC | Age: 58
End: 2018-01-01

## 2018-01-01 ENCOUNTER — APPOINTMENT (OUTPATIENT)
Dept: GENERAL RADIOLOGY | Facility: CLINIC | Age: 58
End: 2018-01-01
Attending: EMERGENCY MEDICINE
Payer: COMMERCIAL

## 2018-01-01 ENCOUNTER — HOSPITAL ENCOUNTER (OUTPATIENT)
Facility: CLINIC | Age: 58
Discharge: HOME | End: 2018-01-10
Attending: SURGERY | Admitting: SURGERY
Payer: COMMERCIAL

## 2018-01-01 VITALS
BODY MASS INDEX: 30.04 KG/M2 | OXYGEN SATURATION: 95 % | HEART RATE: 107 BPM | WEIGHT: 176.4 LBS | RESPIRATION RATE: 18 BRPM | SYSTOLIC BLOOD PRESSURE: 115 MMHG | DIASTOLIC BLOOD PRESSURE: 68 MMHG

## 2018-01-01 VITALS
WEIGHT: 184.5 LBS | OXYGEN SATURATION: 97 % | HEART RATE: 99 BPM | SYSTOLIC BLOOD PRESSURE: 99 MMHG | BODY MASS INDEX: 31.5 KG/M2 | DIASTOLIC BLOOD PRESSURE: 67 MMHG | TEMPERATURE: 99.9 F | HEIGHT: 64 IN

## 2018-01-01 VITALS
OXYGEN SATURATION: 99 % | RESPIRATION RATE: 10 BRPM | WEIGHT: 185 LBS | HEART RATE: 96 BPM | SYSTOLIC BLOOD PRESSURE: 109 MMHG | HEIGHT: 64 IN | DIASTOLIC BLOOD PRESSURE: 45 MMHG | TEMPERATURE: 99.3 F | BODY MASS INDEX: 31.58 KG/M2

## 2018-01-01 VITALS
TEMPERATURE: 98.7 F | BODY MASS INDEX: 31.48 KG/M2 | HEIGHT: 64 IN | DIASTOLIC BLOOD PRESSURE: 67 MMHG | HEART RATE: 117 BPM | WEIGHT: 184.4 LBS | RESPIRATION RATE: 16 BRPM | SYSTOLIC BLOOD PRESSURE: 124 MMHG | OXYGEN SATURATION: 96 %

## 2018-01-01 VITALS
BODY MASS INDEX: 31.34 KG/M2 | WEIGHT: 184 LBS | DIASTOLIC BLOOD PRESSURE: 46 MMHG | SYSTOLIC BLOOD PRESSURE: 110 MMHG | TEMPERATURE: 98.1 F | HEART RATE: 82 BPM

## 2018-01-01 VITALS
DIASTOLIC BLOOD PRESSURE: 68 MMHG | SYSTOLIC BLOOD PRESSURE: 117 MMHG | TEMPERATURE: 96.1 F | BODY MASS INDEX: 31.55 KG/M2 | HEART RATE: 96 BPM | OXYGEN SATURATION: 92 % | WEIGHT: 189.6 LBS

## 2018-01-01 VITALS
BODY MASS INDEX: 31.95 KG/M2 | TEMPERATURE: 99.1 F | DIASTOLIC BLOOD PRESSURE: 57 MMHG | HEART RATE: 97 BPM | SYSTOLIC BLOOD PRESSURE: 111 MMHG | WEIGHT: 187.6 LBS

## 2018-01-01 VITALS — SYSTOLIC BLOOD PRESSURE: 98 MMHG | DIASTOLIC BLOOD PRESSURE: 48 MMHG

## 2018-01-01 VITALS
HEART RATE: 129 BPM | DIASTOLIC BLOOD PRESSURE: 58 MMHG | RESPIRATION RATE: 6 BRPM | HEIGHT: 66 IN | WEIGHT: 197.97 LBS | TEMPERATURE: 97.7 F | BODY MASS INDEX: 31.82 KG/M2 | SYSTOLIC BLOOD PRESSURE: 94 MMHG | OXYGEN SATURATION: 98 %

## 2018-01-01 VITALS
BODY MASS INDEX: 31.65 KG/M2 | DIASTOLIC BLOOD PRESSURE: 85 MMHG | HEIGHT: 65 IN | WEIGHT: 190 LBS | TEMPERATURE: 98.9 F | OXYGEN SATURATION: 94 % | HEART RATE: 146 BPM | SYSTOLIC BLOOD PRESSURE: 151 MMHG | RESPIRATION RATE: 28 BRPM

## 2018-01-01 VITALS
RESPIRATION RATE: 18 BRPM | OXYGEN SATURATION: 94 % | TEMPERATURE: 98.6 F | SYSTOLIC BLOOD PRESSURE: 114 MMHG | DIASTOLIC BLOOD PRESSURE: 77 MMHG

## 2018-01-01 VITALS
SYSTOLIC BLOOD PRESSURE: 106 MMHG | TEMPERATURE: 99.4 F | HEART RATE: 100 BPM | HEIGHT: 64 IN | WEIGHT: 185.8 LBS | BODY MASS INDEX: 31.72 KG/M2 | DIASTOLIC BLOOD PRESSURE: 64 MMHG | OXYGEN SATURATION: 97 % | RESPIRATION RATE: 20 BRPM

## 2018-01-01 VITALS
BODY MASS INDEX: 31.34 KG/M2 | WEIGHT: 184 LBS | SYSTOLIC BLOOD PRESSURE: 101 MMHG | HEART RATE: 96 BPM | TEMPERATURE: 98.2 F | DIASTOLIC BLOOD PRESSURE: 58 MMHG

## 2018-01-01 VITALS
HEIGHT: 64 IN | RESPIRATION RATE: 15 BRPM | WEIGHT: 185 LBS | DIASTOLIC BLOOD PRESSURE: 54 MMHG | HEART RATE: 110 BPM | TEMPERATURE: 98.2 F | BODY MASS INDEX: 31.58 KG/M2 | OXYGEN SATURATION: 95 % | SYSTOLIC BLOOD PRESSURE: 101 MMHG

## 2018-01-01 VITALS
DIASTOLIC BLOOD PRESSURE: 61 MMHG | TEMPERATURE: 99.1 F | SYSTOLIC BLOOD PRESSURE: 109 MMHG | HEIGHT: 64 IN | OXYGEN SATURATION: 96 % | BODY MASS INDEX: 30.15 KG/M2 | HEART RATE: 141 BPM | WEIGHT: 176.6 LBS | RESPIRATION RATE: 18 BRPM

## 2018-01-01 VITALS
TEMPERATURE: 98.8 F | DIASTOLIC BLOOD PRESSURE: 40 MMHG | HEART RATE: 78 BPM | BODY MASS INDEX: 32.46 KG/M2 | WEIGHT: 190.6 LBS | SYSTOLIC BLOOD PRESSURE: 116 MMHG

## 2018-01-01 VITALS
SYSTOLIC BLOOD PRESSURE: 151 MMHG | BODY MASS INDEX: 31.65 KG/M2 | HEIGHT: 65 IN | HEART RATE: 146 BPM | RESPIRATION RATE: 28 BRPM | WEIGHT: 190 LBS | TEMPERATURE: 98.9 F | DIASTOLIC BLOOD PRESSURE: 85 MMHG | OXYGEN SATURATION: 94 %

## 2018-01-01 VITALS
TEMPERATURE: 99.3 F | BODY MASS INDEX: 32.69 KG/M2 | WEIGHT: 196.21 LBS | HEART RATE: 122 BPM | HEIGHT: 65 IN | DIASTOLIC BLOOD PRESSURE: 64 MMHG | OXYGEN SATURATION: 93 % | RESPIRATION RATE: 18 BRPM | SYSTOLIC BLOOD PRESSURE: 113 MMHG

## 2018-01-01 VITALS
SYSTOLIC BLOOD PRESSURE: 110 MMHG | HEIGHT: 64 IN | HEART RATE: 106 BPM | DIASTOLIC BLOOD PRESSURE: 70 MMHG | OXYGEN SATURATION: 99 % | BODY MASS INDEX: 32.13 KG/M2 | WEIGHT: 188.2 LBS | TEMPERATURE: 99.4 F

## 2018-01-01 VITALS
HEART RATE: 115 BPM | RESPIRATION RATE: 18 BRPM | OXYGEN SATURATION: 95 % | HEIGHT: 64 IN | DIASTOLIC BLOOD PRESSURE: 73 MMHG | SYSTOLIC BLOOD PRESSURE: 106 MMHG | TEMPERATURE: 99.6 F | WEIGHT: 188.2 LBS | BODY MASS INDEX: 32.13 KG/M2

## 2018-01-01 VITALS
TEMPERATURE: 99.6 F | HEART RATE: 93 BPM | SYSTOLIC BLOOD PRESSURE: 110 MMHG | RESPIRATION RATE: 16 BRPM | DIASTOLIC BLOOD PRESSURE: 52 MMHG

## 2018-01-01 VITALS
HEIGHT: 64 IN | BODY MASS INDEX: 31.58 KG/M2 | TEMPERATURE: 98.6 F | RESPIRATION RATE: 18 BRPM | SYSTOLIC BLOOD PRESSURE: 140 MMHG | DIASTOLIC BLOOD PRESSURE: 63 MMHG | WEIGHT: 185 LBS

## 2018-01-01 VITALS
RESPIRATION RATE: 17 BRPM | BODY MASS INDEX: 31.23 KG/M2 | DIASTOLIC BLOOD PRESSURE: 67 MMHG | OXYGEN SATURATION: 95 % | WEIGHT: 183.4 LBS | TEMPERATURE: 99.5 F | HEART RATE: 115 BPM | SYSTOLIC BLOOD PRESSURE: 115 MMHG

## 2018-01-01 VITALS — HEART RATE: 91 BPM | DIASTOLIC BLOOD PRESSURE: 53 MMHG | SYSTOLIC BLOOD PRESSURE: 112 MMHG

## 2018-01-01 VITALS — SYSTOLIC BLOOD PRESSURE: 109 MMHG | HEART RATE: 95 BPM | DIASTOLIC BLOOD PRESSURE: 66 MMHG | TEMPERATURE: 99.1 F

## 2018-01-01 DIAGNOSIS — R30.0 DYSURIA: Primary | ICD-10-CM

## 2018-01-01 DIAGNOSIS — C77.3 CARCINOMA OF RIGHT BREAST METASTATIC TO AXILLARY LYMPH NODE (H): ICD-10-CM

## 2018-01-01 DIAGNOSIS — C78.7 CARCINOMA OF BREAST METASTATIC TO LIVER, UNSPECIFIED LATERALITY (H): ICD-10-CM

## 2018-01-01 DIAGNOSIS — I82.621 ACUTE DEEP VEIN THROMBOSIS (DVT) OF RIGHT UPPER EXTREMITY, UNSPECIFIED VEIN (H): ICD-10-CM

## 2018-01-01 DIAGNOSIS — C78.7 SECONDARY MALIGNANT NEOPLASM OF LIVER (H): ICD-10-CM

## 2018-01-01 DIAGNOSIS — J31.0 OTHER CHRONIC RHINITIS: ICD-10-CM

## 2018-01-01 DIAGNOSIS — C50.919 CARCINOMA OF BREAST METASTATIC TO LIVER, UNSPECIFIED LATERALITY (H): Primary | ICD-10-CM

## 2018-01-01 DIAGNOSIS — Z79.01 LONG-TERM (CURRENT) USE OF ANTICOAGULANTS: ICD-10-CM

## 2018-01-01 DIAGNOSIS — D70.1 CHEMOTHERAPY-INDUCED NEUTROPENIA (H): ICD-10-CM

## 2018-01-01 DIAGNOSIS — G89.18 POST-OP PAIN: ICD-10-CM

## 2018-01-01 DIAGNOSIS — J45.40 MODERATE PERSISTENT ASTHMA WITHOUT COMPLICATION: ICD-10-CM

## 2018-01-01 DIAGNOSIS — C50.919 CARCINOMA OF BREAST METASTATIC TO LIVER, UNSPECIFIED LATERALITY (H): ICD-10-CM

## 2018-01-01 DIAGNOSIS — R50.9 FEVER, UNSPECIFIED FEVER CAUSE: ICD-10-CM

## 2018-01-01 DIAGNOSIS — J45.40 MODERATE PERSISTENT ASTHMA WITHOUT COMPLICATION: Primary | ICD-10-CM

## 2018-01-01 DIAGNOSIS — E03.9 HYPOTHYROIDISM, UNSPECIFIED TYPE: ICD-10-CM

## 2018-01-01 DIAGNOSIS — T45.1X5A CHEMOTHERAPY-INDUCED NEUTROPENIA (H): ICD-10-CM

## 2018-01-01 DIAGNOSIS — G89.3 CANCER ASSOCIATED PAIN: ICD-10-CM

## 2018-01-01 DIAGNOSIS — C77.3 BREAST CANCER METASTASIZED TO AXILLARY LYMPH NODE, RIGHT (H): ICD-10-CM

## 2018-01-01 DIAGNOSIS — C78.7 CARCINOMA OF BREAST METASTATIC TO LIVER, UNSPECIFIED LATERALITY (H): Primary | ICD-10-CM

## 2018-01-01 DIAGNOSIS — K12.31 MUCOSITIS DUE TO CHEMOTHERAPY: ICD-10-CM

## 2018-01-01 DIAGNOSIS — C50.911 CARCINOMA OF RIGHT BREAST METASTATIC TO AXILLARY LYMPH NODE (H): ICD-10-CM

## 2018-01-01 DIAGNOSIS — C79.51 BONE METASTASIS: ICD-10-CM

## 2018-01-01 DIAGNOSIS — G47.01 INSOMNIA DUE TO MEDICAL CONDITION: ICD-10-CM

## 2018-01-01 DIAGNOSIS — C78.7 SECONDARY MALIGNANT NEOPLASM OF LIVER (H): Primary | ICD-10-CM

## 2018-01-01 DIAGNOSIS — C50.911 BILATERAL MALIGNANT NEOPLASM OF BREAST IN FEMALE, ESTROGEN RECEPTOR POSITIVE, UNSPECIFIED SITE OF BREAST (H): Chronic | ICD-10-CM

## 2018-01-01 DIAGNOSIS — I82.621 ACUTE DEEP VEIN THROMBOSIS (DVT) OF RIGHT UPPER EXTREMITY, UNSPECIFIED VEIN (H): Primary | ICD-10-CM

## 2018-01-01 DIAGNOSIS — C50.911 BREAST CANCER METASTASIZED TO AXILLARY LYMPH NODE, RIGHT (H): ICD-10-CM

## 2018-01-01 DIAGNOSIS — E78.5 HYPERLIPIDEMIA LDL GOAL <130: ICD-10-CM

## 2018-01-01 DIAGNOSIS — R19.7 NAUSEA VOMITING AND DIARRHEA: ICD-10-CM

## 2018-01-01 DIAGNOSIS — A49.8 CLOSTRIDIUM DIFFICILE INFECTION: ICD-10-CM

## 2018-01-01 DIAGNOSIS — C78.7 CARCINOMA OF BREAST METASTATIC TO LIVER (H): Primary | ICD-10-CM

## 2018-01-01 DIAGNOSIS — I82.409 DVT (DEEP VENOUS THROMBOSIS) (H): ICD-10-CM

## 2018-01-01 DIAGNOSIS — C77.3 CARCINOMA OF RIGHT BREAST METASTATIC TO AXILLARY LYMPH NODE (H): Primary | ICD-10-CM

## 2018-01-01 DIAGNOSIS — B37.0 THRUSH: ICD-10-CM

## 2018-01-01 DIAGNOSIS — A04.72 C. DIFFICILE COLITIS: ICD-10-CM

## 2018-01-01 DIAGNOSIS — A04.72 CLOSTRIDIUM DIFFICILE DIARRHEA: Primary | ICD-10-CM

## 2018-01-01 DIAGNOSIS — R19.7 DIARRHEA: ICD-10-CM

## 2018-01-01 DIAGNOSIS — C50.912 BILATERAL MALIGNANT NEOPLASM OF BREAST IN FEMALE, ESTROGEN RECEPTOR POSITIVE, UNSPECIFIED SITE OF BREAST (H): Chronic | ICD-10-CM

## 2018-01-01 DIAGNOSIS — J90 PLEURAL EFFUSION: ICD-10-CM

## 2018-01-01 DIAGNOSIS — N76.0 VAGINITIS AND VULVOVAGINITIS: ICD-10-CM

## 2018-01-01 DIAGNOSIS — C50.911 CARCINOMA OF RIGHT BREAST METASTATIC TO AXILLARY LYMPH NODE (H): Primary | ICD-10-CM

## 2018-01-01 DIAGNOSIS — C50.911 BILATERAL MALIGNANT NEOPLASM OF BREAST IN FEMALE, ESTROGEN RECEPTOR POSITIVE, UNSPECIFIED SITE OF BREAST (H): Primary | Chronic | ICD-10-CM

## 2018-01-01 DIAGNOSIS — A04.72 C. DIFFICILE COLITIS: Primary | ICD-10-CM

## 2018-01-01 DIAGNOSIS — C50.919 CARCINOMA OF BREAST METASTATIC TO LIVER (H): Primary | ICD-10-CM

## 2018-01-01 DIAGNOSIS — Z17.0 BILATERAL MALIGNANT NEOPLASM OF BREAST IN FEMALE, ESTROGEN RECEPTOR POSITIVE, UNSPECIFIED SITE OF BREAST (H): Chronic | ICD-10-CM

## 2018-01-01 DIAGNOSIS — G89.18 POST-OP PAIN: Primary | ICD-10-CM

## 2018-01-01 DIAGNOSIS — C50.912 BILATERAL MALIGNANT NEOPLASM OF BREAST IN FEMALE, ESTROGEN RECEPTOR POSITIVE, UNSPECIFIED SITE OF BREAST (H): Primary | Chronic | ICD-10-CM

## 2018-01-01 DIAGNOSIS — L03.113 CELLULITIS OF RIGHT UPPER EXTREMITY: ICD-10-CM

## 2018-01-01 DIAGNOSIS — C79.51 BONE METASTASIS: Primary | ICD-10-CM

## 2018-01-01 DIAGNOSIS — N39.0 URINARY TRACT INFECTION: Primary | ICD-10-CM

## 2018-01-01 DIAGNOSIS — J31.0 OTHER CHRONIC RHINITIS: Primary | ICD-10-CM

## 2018-01-01 DIAGNOSIS — Z17.0 BILATERAL MALIGNANT NEOPLASM OF BREAST IN FEMALE, ESTROGEN RECEPTOR POSITIVE, UNSPECIFIED SITE OF BREAST (H): Primary | Chronic | ICD-10-CM

## 2018-01-01 DIAGNOSIS — Z45.2: Primary | ICD-10-CM

## 2018-01-01 DIAGNOSIS — A41.9 SEPSIS, DUE TO UNSPECIFIED ORGANISM: ICD-10-CM

## 2018-01-01 DIAGNOSIS — R11.2 NAUSEA VOMITING AND DIARRHEA: ICD-10-CM

## 2018-01-01 DIAGNOSIS — M62.81 GENERALIZED MUSCLE WEAKNESS: ICD-10-CM

## 2018-01-01 DIAGNOSIS — E86.0 DEHYDRATION: ICD-10-CM

## 2018-01-01 DIAGNOSIS — E03.4 HYPOTHYROIDISM DUE TO ACQUIRED ATROPHY OF THYROID: ICD-10-CM

## 2018-01-01 DIAGNOSIS — J96.01 ACUTE RESPIRATORY FAILURE WITH HYPOXIA (H): ICD-10-CM

## 2018-01-01 LAB
ALBUMIN SERPL-MCNC: 1.8 G/DL (ref 3.4–5)
ALBUMIN SERPL-MCNC: 1.9 G/DL (ref 3.4–5)
ALBUMIN SERPL-MCNC: 1.9 G/DL (ref 3.4–5)
ALBUMIN SERPL-MCNC: 2.1 G/DL (ref 3.4–5)
ALBUMIN SERPL-MCNC: 2.2 G/DL (ref 3.4–5)
ALBUMIN SERPL-MCNC: 2.4 G/DL (ref 3.4–5)
ALBUMIN SERPL-MCNC: 2.4 G/DL (ref 3.4–5)
ALBUMIN SERPL-MCNC: 2.6 G/DL (ref 3.4–5)
ALBUMIN SERPL-MCNC: 2.6 G/DL (ref 3.4–5)
ALBUMIN SERPL-MCNC: 2.7 G/DL (ref 3.4–5)
ALBUMIN SERPL-MCNC: 2.8 G/DL (ref 3.4–5)
ALBUMIN SERPL-MCNC: 2.9 G/DL (ref 3.4–5)
ALBUMIN SERPL-MCNC: 2.9 G/DL (ref 3.4–5)
ALBUMIN SERPL-MCNC: 3 G/DL (ref 3.4–5)
ALBUMIN UR-MCNC: 100 MG/DL
ALBUMIN UR-MCNC: 100 MG/DL
ALBUMIN UR-MCNC: 30 MG/DL
ALBUMIN UR-MCNC: NEGATIVE MG/DL
ALP SERPL-CCNC: 102 U/L (ref 40–150)
ALP SERPL-CCNC: 119 U/L (ref 40–150)
ALP SERPL-CCNC: 123 U/L (ref 40–150)
ALP SERPL-CCNC: 135 U/L (ref 40–150)
ALP SERPL-CCNC: 169 U/L (ref 40–150)
ALP SERPL-CCNC: 313 U/L (ref 40–150)
ALP SERPL-CCNC: 342 U/L (ref 40–150)
ALP SERPL-CCNC: 362 U/L (ref 40–150)
ALP SERPL-CCNC: 386 U/L (ref 40–150)
ALP SERPL-CCNC: 395 U/L (ref 40–150)
ALP SERPL-CCNC: 401 U/L (ref 40–150)
ALP SERPL-CCNC: 402 U/L (ref 40–150)
ALP SERPL-CCNC: 408 U/L (ref 40–150)
ALP SERPL-CCNC: 414 U/L (ref 40–150)
ALP SERPL-CCNC: 465 U/L (ref 40–150)
ALP SERPL-CCNC: 473 U/L (ref 40–150)
ALP SERPL-CCNC: 501 U/L (ref 40–150)
ALP SERPL-CCNC: 779 U/L (ref 40–150)
ALP SERPL-CCNC: 803 U/L (ref 40–150)
ALP SERPL-CCNC: 87 U/L (ref 40–150)
ALP SERPL-CCNC: 874 U/L (ref 40–150)
ALP SERPL-CCNC: 899 U/L (ref 40–150)
ALP SERPL-CCNC: 91 U/L (ref 40–150)
ALP SERPL-CCNC: 972 U/L (ref 40–150)
ALP SERPL-CCNC: 984 U/L (ref 40–150)
ALT SERPL W P-5'-P-CCNC: 10 U/L (ref 0–50)
ALT SERPL W P-5'-P-CCNC: 11 U/L (ref 0–50)
ALT SERPL W P-5'-P-CCNC: 16 U/L (ref 0–50)
ALT SERPL W P-5'-P-CCNC: 21 U/L (ref 0–50)
ALT SERPL W P-5'-P-CCNC: 22 U/L (ref 0–50)
ALT SERPL W P-5'-P-CCNC: 26 U/L (ref 0–50)
ALT SERPL W P-5'-P-CCNC: 27 U/L (ref 0–50)
ALT SERPL W P-5'-P-CCNC: 34 U/L (ref 0–50)
ALT SERPL W P-5'-P-CCNC: 36 U/L (ref 0–50)
ALT SERPL W P-5'-P-CCNC: 36 U/L (ref 0–50)
ALT SERPL W P-5'-P-CCNC: 37 U/L (ref 0–50)
ALT SERPL W P-5'-P-CCNC: 40 U/L (ref 0–50)
ALT SERPL W P-5'-P-CCNC: 42 U/L (ref 0–50)
ALT SERPL W P-5'-P-CCNC: 46 U/L (ref 0–50)
ALT SERPL W P-5'-P-CCNC: 49 U/L (ref 0–50)
ALT SERPL W P-5'-P-CCNC: 50 U/L (ref 0–50)
ALT SERPL W P-5'-P-CCNC: 52 U/L (ref 0–50)
ALT SERPL W P-5'-P-CCNC: 54 U/L (ref 0–50)
ALT SERPL W P-5'-P-CCNC: 58 U/L (ref 0–50)
ALT SERPL W P-5'-P-CCNC: 63 U/L (ref 0–50)
ALT SERPL W P-5'-P-CCNC: 70 U/L (ref 0–50)
ALT SERPL W P-5'-P-CCNC: 70 U/L (ref 0–50)
ALT SERPL W P-5'-P-CCNC: 74 U/L (ref 0–50)
AMMONIA PLAS-SCNC: 35 UMOL/L (ref 10–50)
AMMONIA PLAS-SCNC: 43 UMOL/L (ref 10–50)
AMMONIA PLAS-SCNC: <10 UMOL/L (ref 10–50)
ANION GAP SERPL CALCULATED.3IONS-SCNC: 10 MMOL/L (ref 3–14)
ANION GAP SERPL CALCULATED.3IONS-SCNC: 10 MMOL/L (ref 3–14)
ANION GAP SERPL CALCULATED.3IONS-SCNC: 11 MMOL/L (ref 3–14)
ANION GAP SERPL CALCULATED.3IONS-SCNC: 4 MMOL/L (ref 3–14)
ANION GAP SERPL CALCULATED.3IONS-SCNC: 5 MMOL/L (ref 3–14)
ANION GAP SERPL CALCULATED.3IONS-SCNC: 6 MMOL/L (ref 3–14)
ANION GAP SERPL CALCULATED.3IONS-SCNC: 7 MMOL/L (ref 3–14)
ANION GAP SERPL CALCULATED.3IONS-SCNC: 8 MMOL/L (ref 3–14)
ANION GAP SERPL CALCULATED.3IONS-SCNC: 9 MMOL/L (ref 3–14)
ANISOCYTOSIS BLD QL SMEAR: ABNORMAL
ANISOCYTOSIS BLD QL SMEAR: ABNORMAL
ANISOCYTOSIS BLD QL SMEAR: SLIGHT
APPEARANCE UR: ABNORMAL
APPEARANCE UR: CLEAR
AST SERPL W P-5'-P-CCNC: 132 U/L (ref 0–45)
AST SERPL W P-5'-P-CCNC: 185 U/L (ref 0–45)
AST SERPL W P-5'-P-CCNC: 187 U/L (ref 0–45)
AST SERPL W P-5'-P-CCNC: 187 U/L (ref 0–45)
AST SERPL W P-5'-P-CCNC: 194 U/L (ref 0–45)
AST SERPL W P-5'-P-CCNC: 201 U/L (ref 0–45)
AST SERPL W P-5'-P-CCNC: 202 U/L (ref 0–45)
AST SERPL W P-5'-P-CCNC: 219 U/L (ref 0–45)
AST SERPL W P-5'-P-CCNC: 226 U/L (ref 0–45)
AST SERPL W P-5'-P-CCNC: 23 U/L (ref 0–45)
AST SERPL W P-5'-P-CCNC: 232 U/L (ref 0–45)
AST SERPL W P-5'-P-CCNC: 235 U/L (ref 0–45)
AST SERPL W P-5'-P-CCNC: 257 U/L (ref 0–45)
AST SERPL W P-5'-P-CCNC: 26 U/L (ref 0–45)
AST SERPL W P-5'-P-CCNC: 269 U/L (ref 0–45)
AST SERPL W P-5'-P-CCNC: 274 U/L (ref 0–45)
AST SERPL W P-5'-P-CCNC: 276 U/L (ref 0–45)
AST SERPL W P-5'-P-CCNC: 276 U/L (ref 0–45)
AST SERPL W P-5'-P-CCNC: 293 U/L (ref 0–45)
AST SERPL W P-5'-P-CCNC: 30 U/L (ref 0–45)
AST SERPL W P-5'-P-CCNC: 312 U/L (ref 0–45)
AST SERPL W P-5'-P-CCNC: 32 U/L (ref 0–45)
AST SERPL W P-5'-P-CCNC: 42 U/L (ref 0–45)
AST SERPL W P-5'-P-CCNC: 66 U/L (ref 0–45)
AST SERPL W P-5'-P-CCNC: 83 U/L (ref 0–45)
BACTERIA #/AREA URNS HPF: ABNORMAL /HPF
BACTERIA SPEC CULT: ABNORMAL
BACTERIA SPEC CULT: NO GROWTH
BACTERIA SPEC CULT: NORMAL
BASE DEFICIT BLDV-SCNC: 5.4 MMOL/L
BASE DEFICIT BLDV-SCNC: 5.8 MMOL/L
BASE DEFICIT BLDV-SCNC: 6.7 MMOL/L
BASE EXCESS BLDV CALC-SCNC: 1.3 MMOL/L
BASOPHILS # BLD AUTO: 0 10E9/L (ref 0–0.2)
BASOPHILS # BLD AUTO: 0.1 10E9/L (ref 0–0.2)
BASOPHILS NFR BLD AUTO: 0 %
BASOPHILS NFR BLD AUTO: 0.1 %
BASOPHILS NFR BLD AUTO: 0.2 %
BASOPHILS NFR BLD AUTO: 0.3 %
BASOPHILS NFR BLD AUTO: 0.4 %
BASOPHILS NFR BLD AUTO: 0.5 %
BASOPHILS NFR BLD AUTO: 0.5 %
BASOPHILS NFR BLD AUTO: 0.6 %
BASOPHILS NFR BLD AUTO: 0.6 %
BASOPHILS NFR BLD AUTO: 0.7 %
BASOPHILS NFR BLD AUTO: 0.9 %
BASOPHILS NFR BLD AUTO: 0.9 %
BASOPHILS NFR BLD AUTO: 1.6 %
BILIRUB DIRECT SERPL-MCNC: 0.4 MG/DL (ref 0–0.2)
BILIRUB SERPL-MCNC: 0.6 MG/DL (ref 0.2–1.3)
BILIRUB SERPL-MCNC: 0.7 MG/DL (ref 0.2–1.3)
BILIRUB SERPL-MCNC: 0.8 MG/DL (ref 0.2–1.3)
BILIRUB SERPL-MCNC: 0.9 MG/DL (ref 0.2–1.3)
BILIRUB SERPL-MCNC: 1.1 MG/DL (ref 0.2–1.3)
BILIRUB SERPL-MCNC: 1.1 MG/DL (ref 0.2–1.3)
BILIRUB SERPL-MCNC: 1.4 MG/DL (ref 0.2–1.3)
BILIRUB SERPL-MCNC: 1.4 MG/DL (ref 0.2–1.3)
BILIRUB SERPL-MCNC: 1.5 MG/DL (ref 0.2–1.3)
BILIRUB SERPL-MCNC: 1.6 MG/DL (ref 0.2–1.3)
BILIRUB SERPL-MCNC: 1.7 MG/DL (ref 0.2–1.3)
BILIRUB SERPL-MCNC: 1.9 MG/DL (ref 0.2–1.3)
BILIRUB SERPL-MCNC: 2.6 MG/DL (ref 0.2–1.3)
BILIRUB SERPL-MCNC: 2.7 MG/DL (ref 0.2–1.3)
BILIRUB SERPL-MCNC: 3.9 MG/DL (ref 0.2–1.3)
BILIRUB SERPL-MCNC: 4.1 MG/DL (ref 0.2–1.3)
BILIRUB SERPL-MCNC: 4.9 MG/DL (ref 0.2–1.3)
BILIRUB UR QL STRIP: NEGATIVE
BUN SERPL-MCNC: 10 MG/DL (ref 7–30)
BUN SERPL-MCNC: 11 MG/DL (ref 7–30)
BUN SERPL-MCNC: 12 MG/DL (ref 7–30)
BUN SERPL-MCNC: 13 MG/DL (ref 7–30)
BUN SERPL-MCNC: 14 MG/DL (ref 7–30)
BUN SERPL-MCNC: 15 MG/DL (ref 7–30)
BUN SERPL-MCNC: 17 MG/DL (ref 7–30)
BUN SERPL-MCNC: 20 MG/DL (ref 7–30)
BUN SERPL-MCNC: 25 MG/DL (ref 7–30)
BUN SERPL-MCNC: 33 MG/DL (ref 7–30)
BUN SERPL-MCNC: 35 MG/DL (ref 7–30)
BUN SERPL-MCNC: 36 MG/DL (ref 7–30)
BUN SERPL-MCNC: 45 MG/DL (ref 7–30)
BUN SERPL-MCNC: 6 MG/DL (ref 7–30)
BUN SERPL-MCNC: 7 MG/DL (ref 7–30)
BUN SERPL-MCNC: 8 MG/DL (ref 7–30)
BUN SERPL-MCNC: 9 MG/DL (ref 7–30)
BUN SERPL-MCNC: 9 MG/DL (ref 7–30)
C COLI+JEJUNI+LARI FUSA STL QL NAA+PROBE: NOT DETECTED
C DIFF TOX B STL QL: ABNORMAL
C DIFF TOX B STL QL: NEGATIVE
C DIFF TOX B STL QL: NEGATIVE
C DIFF TOX B STL QL: POSITIVE
CALCIUM SERPL-MCNC: 7 MG/DL (ref 8.5–10.1)
CALCIUM SERPL-MCNC: 7.1 MG/DL (ref 8.5–10.1)
CALCIUM SERPL-MCNC: 7.4 MG/DL (ref 8.5–10.1)
CALCIUM SERPL-MCNC: 7.5 MG/DL (ref 8.5–10.1)
CALCIUM SERPL-MCNC: 7.8 MG/DL (ref 8.5–10.1)
CALCIUM SERPL-MCNC: 8 MG/DL (ref 8.5–10.1)
CALCIUM SERPL-MCNC: 8.1 MG/DL (ref 8.5–10.1)
CALCIUM SERPL-MCNC: 8.1 MG/DL (ref 8.5–10.1)
CALCIUM SERPL-MCNC: 8.3 MG/DL (ref 8.5–10.1)
CALCIUM SERPL-MCNC: 8.4 MG/DL (ref 8.5–10.1)
CALCIUM SERPL-MCNC: 8.4 MG/DL (ref 8.5–10.1)
CALCIUM SERPL-MCNC: 8.5 MG/DL (ref 8.5–10.1)
CALCIUM SERPL-MCNC: 8.5 MG/DL (ref 8.5–10.1)
CALCIUM SERPL-MCNC: 8.6 MG/DL (ref 8.5–10.1)
CALCIUM SERPL-MCNC: 8.7 MG/DL (ref 8.5–10.1)
CALCIUM SERPL-MCNC: 8.7 MG/DL (ref 8.5–10.1)
CALCIUM SERPL-MCNC: 8.8 MG/DL (ref 8.5–10.1)
CALCIUM SERPL-MCNC: 9.1 MG/DL (ref 8.5–10.1)
CALCIUM SERPL-MCNC: 9.2 MG/DL (ref 8.5–10.1)
CALCIUM SERPL-MCNC: 9.2 MG/DL (ref 8.5–10.1)
CALCIUM SERPL-MCNC: 9.4 MG/DL (ref 8.5–10.1)
CALCIUM SERPL-MCNC: 9.6 MG/DL (ref 8.5–10.1)
CALCIUM SERPL-MCNC: 9.8 MG/DL (ref 8.5–10.1)
CANCER AG27-29 SERPL-ACNC: 100 U/ML (ref 0–39)
CANCER AG27-29 SERPL-ACNC: 103 U/ML (ref 0–39)
CANCER AG27-29 SERPL-ACNC: 139 U/ML (ref 0–39)
CANCER AG27-29 SERPL-ACNC: 240 U/ML (ref 0–39)
CANCER AG27-29 SERPL-ACNC: 536 U/ML (ref 0–39)
CANCER AG27-29 SERPL-ACNC: 793 U/ML (ref 0–39)
CANCER AG27-29 SERPL-ACNC: 845 U/ML (ref 0–39)
CEA SERPL-MCNC: 18.2 UG/L (ref 0–2.5)
CEA SERPL-MCNC: 28 UG/L (ref 0–2.5)
CEA SERPL-MCNC: 6.7 UG/L (ref 0–2.5)
CEA SERPL-MCNC: 72 UG/L (ref 0–2.5)
CEA SERPL-MCNC: 74.6 UG/L (ref 0–2.5)
CEA SERPL-MCNC: 8.7 UG/L (ref 0–2.5)
CHLORIDE SERPL-SCNC: 101 MMOL/L (ref 94–109)
CHLORIDE SERPL-SCNC: 101 MMOL/L (ref 94–109)
CHLORIDE SERPL-SCNC: 102 MMOL/L (ref 94–109)
CHLORIDE SERPL-SCNC: 105 MMOL/L (ref 94–109)
CHLORIDE SERPL-SCNC: 106 MMOL/L (ref 94–109)
CHLORIDE SERPL-SCNC: 107 MMOL/L (ref 94–109)
CHLORIDE SERPL-SCNC: 108 MMOL/L (ref 94–109)
CHLORIDE SERPL-SCNC: 109 MMOL/L (ref 94–109)
CHLORIDE SERPL-SCNC: 110 MMOL/L (ref 94–109)
CO2 SERPL-SCNC: 22 MMOL/L (ref 20–32)
CO2 SERPL-SCNC: 23 MMOL/L (ref 20–32)
CO2 SERPL-SCNC: 24 MMOL/L (ref 20–32)
CO2 SERPL-SCNC: 25 MMOL/L (ref 20–32)
CO2 SERPL-SCNC: 26 MMOL/L (ref 20–32)
CO2 SERPL-SCNC: 27 MMOL/L (ref 20–32)
CO2 SERPL-SCNC: 27 MMOL/L (ref 20–32)
CO2 SERPL-SCNC: 29 MMOL/L (ref 20–32)
COLOR UR AUTO: ABNORMAL
COLOR UR AUTO: YELLOW
COPATH REPORT: NORMAL
COPATH REPORT: NORMAL
CREAT SERPL-MCNC: 0.42 MG/DL (ref 0.52–1.04)
CREAT SERPL-MCNC: 0.47 MG/DL (ref 0.52–1.04)
CREAT SERPL-MCNC: 0.5 MG/DL (ref 0.52–1.04)
CREAT SERPL-MCNC: 0.51 MG/DL (ref 0.52–1.04)
CREAT SERPL-MCNC: 0.52 MG/DL (ref 0.52–1.04)
CREAT SERPL-MCNC: 0.52 MG/DL (ref 0.52–1.04)
CREAT SERPL-MCNC: 0.53 MG/DL (ref 0.52–1.04)
CREAT SERPL-MCNC: 0.54 MG/DL (ref 0.52–1.04)
CREAT SERPL-MCNC: 0.56 MG/DL (ref 0.52–1.04)
CREAT SERPL-MCNC: 0.57 MG/DL (ref 0.52–1.04)
CREAT SERPL-MCNC: 0.6 MG/DL (ref 0.52–1.04)
CREAT SERPL-MCNC: 0.64 MG/DL (ref 0.52–1.04)
CREAT SERPL-MCNC: 0.67 MG/DL (ref 0.52–1.04)
CREAT SERPL-MCNC: 0.67 MG/DL (ref 0.52–1.04)
CREAT SERPL-MCNC: 0.68 MG/DL (ref 0.52–1.04)
CREAT SERPL-MCNC: 0.69 MG/DL (ref 0.52–1.04)
CREAT SERPL-MCNC: 0.69 MG/DL (ref 0.52–1.04)
CREAT SERPL-MCNC: 0.7 MG/DL (ref 0.52–1.04)
CREAT SERPL-MCNC: 0.71 MG/DL (ref 0.52–1.04)
CREAT SERPL-MCNC: 0.72 MG/DL (ref 0.52–1.04)
CREAT SERPL-MCNC: 0.72 MG/DL (ref 0.52–1.04)
CREAT SERPL-MCNC: 0.73 MG/DL (ref 0.52–1.04)
CREAT SERPL-MCNC: 0.77 MG/DL (ref 0.52–1.04)
CREAT SERPL-MCNC: 0.95 MG/DL (ref 0.52–1.04)
DIFFERENTIAL METHOD BLD: ABNORMAL
DIFFERENTIAL METHOD BLD: NORMAL
EC STX1 GENE STL QL NAA+PROBE: NOT DETECTED
EC STX2 GENE STL QL NAA+PROBE: NOT DETECTED
ENTERIC PATHOGEN COMMENT: NORMAL
EOSINOPHIL # BLD AUTO: 0 10E9/L (ref 0–0.7)
EOSINOPHIL # BLD AUTO: 0.1 10E9/L (ref 0–0.7)
EOSINOPHIL # BLD AUTO: 0.2 10E9/L (ref 0–0.7)
EOSINOPHIL # BLD AUTO: 0.2 10E9/L (ref 0–0.7)
EOSINOPHIL # BLD AUTO: 0.3 10E9/L (ref 0–0.7)
EOSINOPHIL # BLD AUTO: 0.3 10E9/L (ref 0–0.7)
EOSINOPHIL # BLD AUTO: 0.6 10E9/L (ref 0–0.7)
EOSINOPHIL NFR BLD AUTO: 0 %
EOSINOPHIL NFR BLD AUTO: 0 %
EOSINOPHIL NFR BLD AUTO: 0.2 %
EOSINOPHIL NFR BLD AUTO: 0.2 %
EOSINOPHIL NFR BLD AUTO: 0.3 %
EOSINOPHIL NFR BLD AUTO: 0.4 %
EOSINOPHIL NFR BLD AUTO: 0.5 %
EOSINOPHIL NFR BLD AUTO: 0.6 %
EOSINOPHIL NFR BLD AUTO: 0.7 %
EOSINOPHIL NFR BLD AUTO: 1.1 %
EOSINOPHIL NFR BLD AUTO: 1.4 %
EOSINOPHIL NFR BLD AUTO: 1.4 %
EOSINOPHIL NFR BLD AUTO: 1.6 %
EOSINOPHIL NFR BLD AUTO: 1.9 %
EOSINOPHIL NFR BLD AUTO: 12.9 %
EOSINOPHIL NFR BLD AUTO: 2.3 %
EOSINOPHIL NFR BLD AUTO: 2.7 %
EOSINOPHIL NFR BLD AUTO: 2.8 %
EOSINOPHIL NFR BLD AUTO: 2.9 %
EOSINOPHIL NFR BLD AUTO: 7.1 %
EOSINOPHIL NFR BLD AUTO: 7.7 %
ERYTHROCYTE [DISTWIDTH] IN BLOOD BY AUTOMATED COUNT: 14.5 % (ref 10–15)
ERYTHROCYTE [DISTWIDTH] IN BLOOD BY AUTOMATED COUNT: 14.7 % (ref 10–15)
ERYTHROCYTE [DISTWIDTH] IN BLOOD BY AUTOMATED COUNT: 14.8 % (ref 10–15)
ERYTHROCYTE [DISTWIDTH] IN BLOOD BY AUTOMATED COUNT: 14.9 % (ref 10–15)
ERYTHROCYTE [DISTWIDTH] IN BLOOD BY AUTOMATED COUNT: 15 % (ref 10–15)
ERYTHROCYTE [DISTWIDTH] IN BLOOD BY AUTOMATED COUNT: 15.1 % (ref 10–15)
ERYTHROCYTE [DISTWIDTH] IN BLOOD BY AUTOMATED COUNT: 15.2 % (ref 10–15)
ERYTHROCYTE [DISTWIDTH] IN BLOOD BY AUTOMATED COUNT: 15.3 % (ref 10–15)
ERYTHROCYTE [DISTWIDTH] IN BLOOD BY AUTOMATED COUNT: 15.5 % (ref 10–15)
ERYTHROCYTE [DISTWIDTH] IN BLOOD BY AUTOMATED COUNT: 15.7 % (ref 10–15)
ERYTHROCYTE [DISTWIDTH] IN BLOOD BY AUTOMATED COUNT: 16.1 % (ref 10–15)
ERYTHROCYTE [DISTWIDTH] IN BLOOD BY AUTOMATED COUNT: 16.2 % (ref 10–15)
ERYTHROCYTE [DISTWIDTH] IN BLOOD BY AUTOMATED COUNT: 16.9 % (ref 10–15)
ERYTHROCYTE [DISTWIDTH] IN BLOOD BY AUTOMATED COUNT: 17 % (ref 10–15)
ERYTHROCYTE [DISTWIDTH] IN BLOOD BY AUTOMATED COUNT: 17 % (ref 10–15)
ERYTHROCYTE [DISTWIDTH] IN BLOOD BY AUTOMATED COUNT: 17.1 % (ref 10–15)
ERYTHROCYTE [DISTWIDTH] IN BLOOD BY AUTOMATED COUNT: 17.2 % (ref 10–15)
ERYTHROCYTE [DISTWIDTH] IN BLOOD BY AUTOMATED COUNT: 17.3 % (ref 10–15)
ERYTHROCYTE [DISTWIDTH] IN BLOOD BY AUTOMATED COUNT: 17.3 % (ref 10–15)
ERYTHROCYTE [DISTWIDTH] IN BLOOD BY AUTOMATED COUNT: 17.4 % (ref 10–15)
ERYTHROCYTE [DISTWIDTH] IN BLOOD BY AUTOMATED COUNT: 17.6 % (ref 10–15)
ERYTHROCYTE [DISTWIDTH] IN BLOOD BY AUTOMATED COUNT: 17.7 % (ref 10–15)
ERYTHROCYTE [DISTWIDTH] IN BLOOD BY AUTOMATED COUNT: 18.8 % (ref 10–15)
ERYTHROCYTE [DISTWIDTH] IN BLOOD BY AUTOMATED COUNT: 19.6 % (ref 10–15)
ERYTHROCYTE [DISTWIDTH] IN BLOOD BY AUTOMATED COUNT: 20.2 % (ref 10–15)
ERYTHROCYTE [DISTWIDTH] IN BLOOD BY AUTOMATED COUNT: 24.8 % (ref 10–15)
ERYTHROCYTE [DISTWIDTH] IN BLOOD BY AUTOMATED COUNT: 25 % (ref 10–15)
ERYTHROCYTE [DISTWIDTH] IN BLOOD BY AUTOMATED COUNT: 25.1 % (ref 10–15)
ERYTHROCYTE [DISTWIDTH] IN BLOOD BY AUTOMATED COUNT: 25.2 % (ref 10–15)
ERYTHROCYTE [DISTWIDTH] IN BLOOD BY AUTOMATED COUNT: 25.2 % (ref 10–15)
ERYTHROCYTE [DISTWIDTH] IN BLOOD BY AUTOMATED COUNT: 25.7 % (ref 10–15)
GFR SERPL CREATININE-BSD FRML MDRD: 60 ML/MIN/1.7M2
GFR SERPL CREATININE-BSD FRML MDRD: 77 ML/MIN/1.7M2
GFR SERPL CREATININE-BSD FRML MDRD: 81 ML/MIN/1.7M2
GFR SERPL CREATININE-BSD FRML MDRD: 82 ML/MIN/1.7M2
GFR SERPL CREATININE-BSD FRML MDRD: 83 ML/MIN/1.7M2
GFR SERPL CREATININE-BSD FRML MDRD: 84 ML/MIN/1.7M2
GFR SERPL CREATININE-BSD FRML MDRD: 85 ML/MIN/1.7M2
GFR SERPL CREATININE-BSD FRML MDRD: 87 ML/MIN/1.7M2
GFR SERPL CREATININE-BSD FRML MDRD: 87 ML/MIN/1.7M2
GFR SERPL CREATININE-BSD FRML MDRD: 88 ML/MIN/1.7M2
GFR SERPL CREATININE-BSD FRML MDRD: 89 ML/MIN/1.7M2
GFR SERPL CREATININE-BSD FRML MDRD: 90 ML/MIN/1.7M2
GFR SERPL CREATININE-BSD FRML MDRD: >90 ML/MIN/1.7M2
GLUCOSE SERPL-MCNC: 100 MG/DL (ref 70–99)
GLUCOSE SERPL-MCNC: 101 MG/DL (ref 70–99)
GLUCOSE SERPL-MCNC: 101 MG/DL (ref 70–99)
GLUCOSE SERPL-MCNC: 102 MG/DL (ref 70–99)
GLUCOSE SERPL-MCNC: 106 MG/DL (ref 70–99)
GLUCOSE SERPL-MCNC: 109 MG/DL (ref 70–99)
GLUCOSE SERPL-MCNC: 115 MG/DL (ref 70–99)
GLUCOSE SERPL-MCNC: 115 MG/DL (ref 70–99)
GLUCOSE SERPL-MCNC: 118 MG/DL (ref 70–99)
GLUCOSE SERPL-MCNC: 119 MG/DL (ref 70–99)
GLUCOSE SERPL-MCNC: 133 MG/DL (ref 70–99)
GLUCOSE SERPL-MCNC: 137 MG/DL (ref 70–99)
GLUCOSE SERPL-MCNC: 80 MG/DL (ref 70–99)
GLUCOSE SERPL-MCNC: 85 MG/DL (ref 70–99)
GLUCOSE SERPL-MCNC: 86 MG/DL (ref 70–99)
GLUCOSE SERPL-MCNC: 87 MG/DL (ref 70–99)
GLUCOSE SERPL-MCNC: 87 MG/DL (ref 70–99)
GLUCOSE SERPL-MCNC: 88 MG/DL (ref 70–99)
GLUCOSE SERPL-MCNC: 91 MG/DL (ref 70–99)
GLUCOSE SERPL-MCNC: 91 MG/DL (ref 70–99)
GLUCOSE SERPL-MCNC: 92 MG/DL (ref 70–99)
GLUCOSE SERPL-MCNC: 92 MG/DL (ref 70–99)
GLUCOSE SERPL-MCNC: 93 MG/DL (ref 70–99)
GLUCOSE SERPL-MCNC: 97 MG/DL (ref 70–99)
GLUCOSE UR STRIP-MCNC: 50 MG/DL
GLUCOSE UR STRIP-MCNC: 50 MG/DL
GLUCOSE UR STRIP-MCNC: NEGATIVE MG/DL
GLUCOSE UR STRIP-MCNC: NEGATIVE MG/DL
HCO3 BLDV-SCNC: 23 MMOL/L (ref 21–28)
HCO3 BLDV-SCNC: 23 MMOL/L (ref 21–28)
HCO3 BLDV-SCNC: 24 MMOL/L (ref 21–28)
HCO3 BLDV-SCNC: 28 MMOL/L (ref 21–28)
HCT VFR BLD AUTO: 30.1 % (ref 35–47)
HCT VFR BLD AUTO: 30.1 % (ref 35–47)
HCT VFR BLD AUTO: 30.8 % (ref 35–47)
HCT VFR BLD AUTO: 31.4 % (ref 35–47)
HCT VFR BLD AUTO: 31.6 % (ref 35–47)
HCT VFR BLD AUTO: 31.6 % (ref 35–47)
HCT VFR BLD AUTO: 31.9 % (ref 35–47)
HCT VFR BLD AUTO: 31.9 % (ref 35–47)
HCT VFR BLD AUTO: 32 % (ref 35–47)
HCT VFR BLD AUTO: 32 % (ref 35–47)
HCT VFR BLD AUTO: 32.3 % (ref 35–47)
HCT VFR BLD AUTO: 32.8 % (ref 35–47)
HCT VFR BLD AUTO: 33.1 % (ref 35–47)
HCT VFR BLD AUTO: 33.5 % (ref 35–47)
HCT VFR BLD AUTO: 34 % (ref 35–47)
HCT VFR BLD AUTO: 34.3 % (ref 35–47)
HCT VFR BLD AUTO: 34.5 % (ref 35–47)
HCT VFR BLD AUTO: 34.9 % (ref 35–47)
HCT VFR BLD AUTO: 35.2 % (ref 35–47)
HCT VFR BLD AUTO: 35.5 % (ref 35–47)
HCT VFR BLD AUTO: 35.7 % (ref 35–47)
HCT VFR BLD AUTO: 35.8 % (ref 35–47)
HCT VFR BLD AUTO: 35.8 % (ref 35–47)
HCT VFR BLD AUTO: 36 % (ref 35–47)
HCT VFR BLD AUTO: 36.2 % (ref 35–47)
HCT VFR BLD AUTO: 36.6 % (ref 35–47)
HCT VFR BLD AUTO: 37.1 % (ref 35–47)
HCT VFR BLD AUTO: 37.9 % (ref 35–47)
HCT VFR BLD AUTO: 38 % (ref 35–47)
HCT VFR BLD AUTO: 38.7 % (ref 35–47)
HCT VFR BLD AUTO: 39 % (ref 35–47)
HGB BLD-MCNC: 10 G/DL (ref 11.7–15.7)
HGB BLD-MCNC: 10.1 G/DL (ref 11.7–15.7)
HGB BLD-MCNC: 10.2 G/DL (ref 11.7–15.7)
HGB BLD-MCNC: 10.3 G/DL (ref 11.7–15.7)
HGB BLD-MCNC: 10.3 G/DL (ref 11.7–15.7)
HGB BLD-MCNC: 10.4 G/DL (ref 11.7–15.7)
HGB BLD-MCNC: 10.5 G/DL (ref 11.7–15.7)
HGB BLD-MCNC: 10.6 G/DL (ref 11.7–15.7)
HGB BLD-MCNC: 10.7 G/DL (ref 11.7–15.7)
HGB BLD-MCNC: 10.9 G/DL (ref 11.7–15.7)
HGB BLD-MCNC: 11.1 G/DL (ref 11.7–15.7)
HGB BLD-MCNC: 11.2 G/DL (ref 11.7–15.7)
HGB BLD-MCNC: 11.2 G/DL (ref 11.7–15.7)
HGB BLD-MCNC: 11.4 G/DL (ref 11.7–15.7)
HGB BLD-MCNC: 11.6 G/DL (ref 11.7–15.7)
HGB BLD-MCNC: 11.6 G/DL (ref 11.7–15.7)
HGB BLD-MCNC: 11.8 G/DL (ref 11.7–15.7)
HGB BLD-MCNC: 11.8 G/DL (ref 11.7–15.7)
HGB BLD-MCNC: 11.9 G/DL (ref 11.7–15.7)
HGB BLD-MCNC: 12.1 G/DL (ref 11.7–15.7)
HGB BLD-MCNC: 12.3 G/DL (ref 11.7–15.7)
HGB BLD-MCNC: 12.4 G/DL (ref 11.7–15.7)
HGB BLD-MCNC: 12.6 G/DL (ref 11.7–15.7)
HGB BLD-MCNC: 12.7 G/DL (ref 11.7–15.7)
HGB BLD-MCNC: 9.7 G/DL (ref 11.7–15.7)
HGB BLD-MCNC: 9.8 G/DL (ref 11.7–15.7)
HGB BLD-MCNC: 9.9 G/DL (ref 11.7–15.7)
HGB UR QL STRIP: ABNORMAL
HGB UR QL STRIP: NEGATIVE
HYALINE CASTS #/AREA URNS LPF: 6 /LPF (ref 0–2)
IMM GRANULOCYTES # BLD: 0 10E9/L (ref 0–0.4)
IMM GRANULOCYTES # BLD: 0.1 10E9/L (ref 0–0.4)
IMM GRANULOCYTES # BLD: 0.2 10E9/L (ref 0–0.4)
IMM GRANULOCYTES NFR BLD: 0.2 %
IMM GRANULOCYTES NFR BLD: 0.2 %
IMM GRANULOCYTES NFR BLD: 0.4 %
IMM GRANULOCYTES NFR BLD: 0.5 %
IMM GRANULOCYTES NFR BLD: 0.6 %
IMM GRANULOCYTES NFR BLD: 0.6 %
IMM GRANULOCYTES NFR BLD: 0.7 %
IMM GRANULOCYTES NFR BLD: 0.8 %
IMM GRANULOCYTES NFR BLD: 0.9 %
IMM GRANULOCYTES NFR BLD: 1.1 %
IMM GRANULOCYTES NFR BLD: 1.2 %
IMM GRANULOCYTES NFR BLD: 1.4 %
IMM GRANULOCYTES NFR BLD: 1.5 %
IMM GRANULOCYTES NFR BLD: 2.3 %
IMM GRANULOCYTES NFR BLD: 3 %
IMM GRANULOCYTES NFR BLD: 4.8 %
INR POINT OF CARE: 2 (ref 0.86–1.14)
INR POINT OF CARE: 3.4 (ref 0.86–1.14)
INR POINT OF CARE: 4.5 (ref 0.86–1.14)
INR POINT OF CARE: 6.7 (ref 0.86–1.14)
INR POINT OF CARE: >8 (ref 0.86–1.14)
INR PPP: 1.61 (ref 0.86–1.14)
INR PPP: 1.66 (ref 0.86–1.14)
INR PPP: 1.72 (ref 0.86–1.14)
INR PPP: 1.96 (ref 0.86–1.14)
INR PPP: 2.23 (ref 0.86–1.14)
INR PPP: 2.29 (ref 0.86–1.14)
INR PPP: 2.48 (ref 0.86–1.14)
INR PPP: 2.6 (ref 0.86–1.14)
INR PPP: 2.61 (ref 0.86–1.14)
INR PPP: 2.72 (ref 0.86–1.14)
INR PPP: 3.04 (ref 0.86–1.14)
INR PPP: 3.11 (ref 0.86–1.14)
INR PPP: 3.16 (ref 0.86–1.14)
INR PPP: 3.27 (ref 0.86–1.14)
INR PPP: 3.39 (ref 0.86–1.14)
INR PPP: 3.43 (ref 0.86–1.14)
INR PPP: 3.47 (ref 0.86–1.14)
INR PPP: 3.82 (ref 0.86–1.14)
INR PPP: 4.39 (ref 0.86–1.14)
INR PPP: 5.05 (ref 0.86–1.14)
INR PPP: 5.19 (ref 0.86–1.14)
INR PPP: 5.25 (ref 0.86–1.14)
INR PPP: 5.3
INR PPP: 5.43 (ref 0.86–1.14)
INR PPP: 6
INR PPP: 6.01 (ref 0.86–1.14)
INR PPP: NORMAL (ref 0.86–1.14)
KETONES UR STRIP-MCNC: NEGATIVE MG/DL
LAB SCANNED RESULT: NORMAL
LAB SCANNED RESULT: NORMAL
LACTATE BLD-SCNC: 0.8 MMOL/L (ref 0.7–2)
LACTATE BLD-SCNC: 1.6 MMOL/L (ref 0.7–2)
LACTATE BLD-SCNC: 1.6 MMOL/L (ref 0.7–2)
LACTATE BLD-SCNC: 2.4 MMOL/L (ref 0.7–2)
LACTATE BLD-SCNC: 3.6 MMOL/L (ref 0.7–2)
LEUKOCYTE ESTERASE UR QL STRIP: ABNORMAL
LEUKOCYTE ESTERASE UR QL STRIP: ABNORMAL
LEUKOCYTE ESTERASE UR QL STRIP: NEGATIVE
LEUKOCYTE ESTERASE UR QL STRIP: NEGATIVE
LIPASE SERPL-CCNC: 407 U/L (ref 73–393)
LYMPHOCYTES # BLD AUTO: 0.4 10E9/L (ref 0.8–5.3)
LYMPHOCYTES # BLD AUTO: 0.5 10E9/L (ref 0.8–5.3)
LYMPHOCYTES # BLD AUTO: 0.5 10E9/L (ref 0.8–5.3)
LYMPHOCYTES # BLD AUTO: 0.6 10E9/L (ref 0.8–5.3)
LYMPHOCYTES # BLD AUTO: 0.7 10E9/L (ref 0.8–5.3)
LYMPHOCYTES # BLD AUTO: 0.9 10E9/L (ref 0.8–5.3)
LYMPHOCYTES # BLD AUTO: 1 10E9/L (ref 0.8–5.3)
LYMPHOCYTES # BLD AUTO: 1.1 10E9/L (ref 0.8–5.3)
LYMPHOCYTES # BLD AUTO: 1.2 10E9/L (ref 0.8–5.3)
LYMPHOCYTES # BLD AUTO: 1.4 10E9/L (ref 0.8–5.3)
LYMPHOCYTES NFR BLD AUTO: 10.1 %
LYMPHOCYTES NFR BLD AUTO: 10.4 %
LYMPHOCYTES NFR BLD AUTO: 11.9 %
LYMPHOCYTES NFR BLD AUTO: 12.5 %
LYMPHOCYTES NFR BLD AUTO: 13.2 %
LYMPHOCYTES NFR BLD AUTO: 13.8 %
LYMPHOCYTES NFR BLD AUTO: 15.3 %
LYMPHOCYTES NFR BLD AUTO: 15.6 %
LYMPHOCYTES NFR BLD AUTO: 16 %
LYMPHOCYTES NFR BLD AUTO: 17.7 %
LYMPHOCYTES NFR BLD AUTO: 17.8 %
LYMPHOCYTES NFR BLD AUTO: 21.3 %
LYMPHOCYTES NFR BLD AUTO: 23.8 %
LYMPHOCYTES NFR BLD AUTO: 23.8 %
LYMPHOCYTES NFR BLD AUTO: 24.5 %
LYMPHOCYTES NFR BLD AUTO: 4 %
LYMPHOCYTES NFR BLD AUTO: 5.8 %
LYMPHOCYTES NFR BLD AUTO: 6 %
LYMPHOCYTES NFR BLD AUTO: 6.6 %
LYMPHOCYTES NFR BLD AUTO: 7.6 %
LYMPHOCYTES NFR BLD AUTO: 8 %
Lab: ABNORMAL
Lab: NORMAL
MAGNESIUM SERPL-MCNC: 1.7 MG/DL (ref 1.6–2.3)
MAGNESIUM SERPL-MCNC: 1.8 MG/DL (ref 1.6–2.3)
MCH RBC QN AUTO: 26.9 PG (ref 26.5–33)
MCH RBC QN AUTO: 27 PG (ref 26.5–33)
MCH RBC QN AUTO: 27.2 PG (ref 26.5–33)
MCH RBC QN AUTO: 27.4 PG (ref 26.5–33)
MCH RBC QN AUTO: 27.5 PG (ref 26.5–33)
MCH RBC QN AUTO: 27.7 PG (ref 26.5–33)
MCH RBC QN AUTO: 27.7 PG (ref 26.5–33)
MCH RBC QN AUTO: 27.8 PG (ref 26.5–33)
MCH RBC QN AUTO: 28.1 PG (ref 26.5–33)
MCH RBC QN AUTO: 28.2 PG (ref 26.5–33)
MCH RBC QN AUTO: 28.3 PG (ref 26.5–33)
MCH RBC QN AUTO: 28.3 PG (ref 26.5–33)
MCH RBC QN AUTO: 28.6 PG (ref 26.5–33)
MCH RBC QN AUTO: 28.6 PG (ref 26.5–33)
MCH RBC QN AUTO: 28.7 PG (ref 26.5–33)
MCH RBC QN AUTO: 28.8 PG (ref 26.5–33)
MCH RBC QN AUTO: 28.9 PG (ref 26.5–33)
MCH RBC QN AUTO: 29 PG (ref 26.5–33)
MCH RBC QN AUTO: 29.2 PG (ref 26.5–33)
MCH RBC QN AUTO: 29.5 PG (ref 26.5–33)
MCH RBC QN AUTO: 29.9 PG (ref 26.5–33)
MCH RBC QN AUTO: 30.5 PG (ref 26.5–33)
MCH RBC QN AUTO: 31.1 PG (ref 26.5–33)
MCH RBC QN AUTO: 31.3 PG (ref 26.5–33)
MCH RBC QN AUTO: 31.9 PG (ref 26.5–33)
MCH RBC QN AUTO: 32.2 PG (ref 26.5–33)
MCH RBC QN AUTO: 32.4 PG (ref 26.5–33)
MCHC RBC AUTO-ENTMCNC: 30.8 G/DL (ref 31.5–36.5)
MCHC RBC AUTO-ENTMCNC: 31.3 G/DL (ref 31.5–36.5)
MCHC RBC AUTO-ENTMCNC: 31.6 G/DL (ref 31.5–36.5)
MCHC RBC AUTO-ENTMCNC: 31.8 G/DL (ref 31.5–36.5)
MCHC RBC AUTO-ENTMCNC: 32 G/DL (ref 31.5–36.5)
MCHC RBC AUTO-ENTMCNC: 32.1 G/DL (ref 31.5–36.5)
MCHC RBC AUTO-ENTMCNC: 32.1 G/DL (ref 31.5–36.5)
MCHC RBC AUTO-ENTMCNC: 32.2 G/DL (ref 31.5–36.5)
MCHC RBC AUTO-ENTMCNC: 32.3 G/DL (ref 31.5–36.5)
MCHC RBC AUTO-ENTMCNC: 32.4 G/DL (ref 31.5–36.5)
MCHC RBC AUTO-ENTMCNC: 32.5 G/DL (ref 31.5–36.5)
MCHC RBC AUTO-ENTMCNC: 32.6 G/DL (ref 31.5–36.5)
MCHC RBC AUTO-ENTMCNC: 32.7 G/DL (ref 31.5–36.5)
MCHC RBC AUTO-ENTMCNC: 32.8 G/DL (ref 31.5–36.5)
MCHC RBC AUTO-ENTMCNC: 32.9 G/DL (ref 31.5–36.5)
MCHC RBC AUTO-ENTMCNC: 33 G/DL (ref 31.5–36.5)
MCHC RBC AUTO-ENTMCNC: 33.1 G/DL (ref 31.5–36.5)
MCHC RBC AUTO-ENTMCNC: 33.1 G/DL (ref 31.5–36.5)
MCHC RBC AUTO-ENTMCNC: 33.4 G/DL (ref 31.5–36.5)
MCV RBC AUTO: 84 FL (ref 78–100)
MCV RBC AUTO: 85 FL (ref 78–100)
MCV RBC AUTO: 86 FL (ref 78–100)
MCV RBC AUTO: 87 FL (ref 78–100)
MCV RBC AUTO: 87 FL (ref 78–100)
MCV RBC AUTO: 88 FL (ref 78–100)
MCV RBC AUTO: 89 FL (ref 78–100)
MCV RBC AUTO: 90 FL (ref 78–100)
MCV RBC AUTO: 91 FL (ref 78–100)
MCV RBC AUTO: 91 FL (ref 78–100)
MCV RBC AUTO: 92 FL (ref 78–100)
MCV RBC AUTO: 94 FL (ref 78–100)
MCV RBC AUTO: 96 FL (ref 78–100)
MCV RBC AUTO: 98 FL (ref 78–100)
MCV RBC AUTO: 98 FL (ref 78–100)
MCV RBC AUTO: 99 FL (ref 78–100)
MCV RBC AUTO: 99 FL (ref 78–100)
METAMYELOCYTES # BLD: 0.3 10E9/L
METAMYELOCYTES NFR BLD MANUAL: 2 %
MONOCYTES # BLD AUTO: 0.2 10E9/L (ref 0–1.3)
MONOCYTES # BLD AUTO: 0.3 10E9/L (ref 0–1.3)
MONOCYTES # BLD AUTO: 0.3 10E9/L (ref 0–1.3)
MONOCYTES # BLD AUTO: 0.4 10E9/L (ref 0–1.3)
MONOCYTES # BLD AUTO: 0.4 10E9/L (ref 0–1.3)
MONOCYTES # BLD AUTO: 0.5 10E9/L (ref 0–1.3)
MONOCYTES # BLD AUTO: 0.7 10E9/L (ref 0–1.3)
MONOCYTES # BLD AUTO: 0.9 10E9/L (ref 0–1.3)
MONOCYTES # BLD AUTO: 1 10E9/L (ref 0–1.3)
MONOCYTES # BLD AUTO: 1 10E9/L (ref 0–1.3)
MONOCYTES # BLD AUTO: 1.3 10E9/L (ref 0–1.3)
MONOCYTES # BLD AUTO: 1.3 10E9/L (ref 0–1.3)
MONOCYTES # BLD AUTO: 1.8 10E9/L (ref 0–1.3)
MONOCYTES # BLD AUTO: 2.2 10E9/L (ref 0–1.3)
MONOCYTES NFR BLD AUTO: 1.4 %
MONOCYTES NFR BLD AUTO: 10 %
MONOCYTES NFR BLD AUTO: 10.1 %
MONOCYTES NFR BLD AUTO: 10.3 %
MONOCYTES NFR BLD AUTO: 10.9 %
MONOCYTES NFR BLD AUTO: 12 %
MONOCYTES NFR BLD AUTO: 12 %
MONOCYTES NFR BLD AUTO: 12.5 %
MONOCYTES NFR BLD AUTO: 13 %
MONOCYTES NFR BLD AUTO: 14.6 %
MONOCYTES NFR BLD AUTO: 16 %
MONOCYTES NFR BLD AUTO: 17.9 %
MONOCYTES NFR BLD AUTO: 18.2 %
MONOCYTES NFR BLD AUTO: 2.8 %
MONOCYTES NFR BLD AUTO: 25 %
MONOCYTES NFR BLD AUTO: 3.1 %
MONOCYTES NFR BLD AUTO: 3.1 %
MONOCYTES NFR BLD AUTO: 3.4 %
MONOCYTES NFR BLD AUTO: 3.9 %
MONOCYTES NFR BLD AUTO: 5.8 %
MONOCYTES NFR BLD AUTO: 9.5 %
MRSA DNA SPEC QL NAA+PROBE: NEGATIVE
MUCOUS THREADS #/AREA URNS LPF: PRESENT /LPF
MYELOCYTES # BLD: 0.4 10E9/L
MYELOCYTES NFR BLD MANUAL: 3 %
NEUTROPHILS # BLD AUTO: 10.4 10E9/L (ref 1.6–8.3)
NEUTROPHILS # BLD AUTO: 11.5 10E9/L (ref 1.6–8.3)
NEUTROPHILS # BLD AUTO: 2 10E9/L (ref 1.6–8.3)
NEUTROPHILS # BLD AUTO: 2.4 10E9/L (ref 1.6–8.3)
NEUTROPHILS # BLD AUTO: 2.6 10E9/L (ref 1.6–8.3)
NEUTROPHILS # BLD AUTO: 2.8 10E9/L (ref 1.6–8.3)
NEUTROPHILS # BLD AUTO: 2.9 10E9/L (ref 1.6–8.3)
NEUTROPHILS # BLD AUTO: 3.4 10E9/L (ref 1.6–8.3)
NEUTROPHILS # BLD AUTO: 4.3 10E9/L (ref 1.6–8.3)
NEUTROPHILS # BLD AUTO: 4.5 10E9/L (ref 1.6–8.3)
NEUTROPHILS # BLD AUTO: 4.7 10E9/L (ref 1.6–8.3)
NEUTROPHILS # BLD AUTO: 4.8 10E9/L (ref 1.6–8.3)
NEUTROPHILS # BLD AUTO: 4.9 10E9/L (ref 1.6–8.3)
NEUTROPHILS # BLD AUTO: 5.3 10E9/L (ref 1.6–8.3)
NEUTROPHILS # BLD AUTO: 5.4 10E9/L (ref 1.6–8.3)
NEUTROPHILS # BLD AUTO: 5.7 10E9/L (ref 1.6–8.3)
NEUTROPHILS # BLD AUTO: 6.1 10E9/L (ref 1.6–8.3)
NEUTROPHILS # BLD AUTO: 6.4 10E9/L (ref 1.6–8.3)
NEUTROPHILS # BLD AUTO: 7.4 10E9/L (ref 1.6–8.3)
NEUTROPHILS # BLD AUTO: 8.2 10E9/L (ref 1.6–8.3)
NEUTROPHILS # BLD AUTO: 8.3 10E9/L (ref 1.6–8.3)
NEUTROPHILS NFR BLD AUTO: 52.4 %
NEUTROPHILS NFR BLD AUTO: 54.6 %
NEUTROPHILS NFR BLD AUTO: 54.8 %
NEUTROPHILS NFR BLD AUTO: 60.8 %
NEUTROPHILS NFR BLD AUTO: 61.5 %
NEUTROPHILS NFR BLD AUTO: 63.3 %
NEUTROPHILS NFR BLD AUTO: 66.6 %
NEUTROPHILS NFR BLD AUTO: 66.7 %
NEUTROPHILS NFR BLD AUTO: 68.3 %
NEUTROPHILS NFR BLD AUTO: 69.3 %
NEUTROPHILS NFR BLD AUTO: 71.3 %
NEUTROPHILS NFR BLD AUTO: 73.8 %
NEUTROPHILS NFR BLD AUTO: 75.2 %
NEUTROPHILS NFR BLD AUTO: 76 %
NEUTROPHILS NFR BLD AUTO: 77 %
NEUTROPHILS NFR BLD AUTO: 77 %
NEUTROPHILS NFR BLD AUTO: 82.3 %
NEUTROPHILS NFR BLD AUTO: 87.8 %
NEUTROPHILS NFR BLD AUTO: 88.6 %
NEUTROPHILS NFR BLD AUTO: 91 %
NEUTROPHILS NFR BLD AUTO: 93.7 %
NITRATE UR QL: NEGATIVE
NITRATE UR QL: POSITIVE
NOROV GI+II ORF1-ORF2 JNC STL QL NAA+PR: NOT DETECTED
O2/TOTAL GAS SETTING VFR VENT: 50 %
O2/TOTAL GAS SETTING VFR VENT: ABNORMAL %
PCO2 BLDV: 52 MM HG (ref 40–50)
PCO2 BLDV: 58 MM HG (ref 40–50)
PCO2 BLDV: 60 MM HG (ref 40–50)
PCO2 BLDV: 75 MM HG (ref 40–50)
PH BLDV: 7.11 PH (ref 7.32–7.43)
PH BLDV: 7.19 PH (ref 7.32–7.43)
PH BLDV: 7.21 PH (ref 7.32–7.43)
PH BLDV: 7.34 PH (ref 7.32–7.43)
PH UR STRIP: 5 PH (ref 5–7)
PH UR STRIP: 6 PH (ref 5–7)
PHOSPHATE SERPL-MCNC: 3.6 MG/DL (ref 2.5–4.5)
PLATELET # BLD AUTO: 107 10E9/L (ref 150–450)
PLATELET # BLD AUTO: 115 10E9/L (ref 150–450)
PLATELET # BLD AUTO: 132 10E9/L (ref 150–450)
PLATELET # BLD AUTO: 137 10E9/L (ref 150–450)
PLATELET # BLD AUTO: 192 10E9/L (ref 150–450)
PLATELET # BLD AUTO: 192 10E9/L (ref 150–450)
PLATELET # BLD AUTO: 20 10E9/L (ref 150–450)
PLATELET # BLD AUTO: 207 10E9/L (ref 150–450)
PLATELET # BLD AUTO: 216 10E9/L (ref 150–450)
PLATELET # BLD AUTO: 254 10E9/L (ref 150–450)
PLATELET # BLD AUTO: 257 10E9/L (ref 150–450)
PLATELET # BLD AUTO: 267 10E9/L (ref 150–450)
PLATELET # BLD AUTO: 280 10E9/L (ref 150–450)
PLATELET # BLD AUTO: 294 10E9/L (ref 150–450)
PLATELET # BLD AUTO: 294 10E9/L (ref 150–450)
PLATELET # BLD AUTO: 298 10E9/L (ref 150–450)
PLATELET # BLD AUTO: 301 10E9/L (ref 150–450)
PLATELET # BLD AUTO: 303 10E9/L (ref 150–450)
PLATELET # BLD AUTO: 311 10E9/L (ref 150–450)
PLATELET # BLD AUTO: 312 10E9/L (ref 150–450)
PLATELET # BLD AUTO: 322 10E9/L (ref 150–450)
PLATELET # BLD AUTO: 329 10E9/L (ref 150–450)
PLATELET # BLD AUTO: 35 10E9/L (ref 150–450)
PLATELET # BLD AUTO: 56 10E9/L (ref 150–450)
PLATELET # BLD AUTO: 57 10E9/L (ref 150–450)
PLATELET # BLD AUTO: 63 10E9/L (ref 150–450)
PLATELET # BLD AUTO: 73 10E9/L (ref 150–450)
PLATELET # BLD AUTO: 85 10E9/L (ref 150–450)
PLATELET # BLD AUTO: 85 10E9/L (ref 150–450)
PLATELET # BLD AUTO: 98 10E9/L (ref 150–450)
PLATELET # BLD AUTO: 99 10E9/L (ref 150–450)
PLATELET # BLD EST: ABNORMAL 10*3/UL
PO2 BLDV: 38 MM HG (ref 25–47)
PO2 BLDV: 48 MM HG (ref 25–47)
PO2 BLDV: 49 MM HG (ref 25–47)
PO2 BLDV: 50 MM HG (ref 25–47)
POLYCHROMASIA BLD QL SMEAR: SLIGHT
POTASSIUM SERPL-SCNC: 3 MMOL/L (ref 3.4–5.3)
POTASSIUM SERPL-SCNC: 3.4 MMOL/L (ref 3.4–5.3)
POTASSIUM SERPL-SCNC: 3.4 MMOL/L (ref 3.4–5.3)
POTASSIUM SERPL-SCNC: 3.5 MMOL/L (ref 3.4–5.3)
POTASSIUM SERPL-SCNC: 3.5 MMOL/L (ref 3.4–5.3)
POTASSIUM SERPL-SCNC: 3.6 MMOL/L (ref 3.4–5.3)
POTASSIUM SERPL-SCNC: 3.6 MMOL/L (ref 3.4–5.3)
POTASSIUM SERPL-SCNC: 3.7 MMOL/L (ref 3.4–5.3)
POTASSIUM SERPL-SCNC: 3.9 MMOL/L (ref 3.4–5.3)
POTASSIUM SERPL-SCNC: 4 MMOL/L (ref 3.4–5.3)
POTASSIUM SERPL-SCNC: 4.1 MMOL/L (ref 3.4–5.3)
POTASSIUM SERPL-SCNC: 4.1 MMOL/L (ref 3.4–5.3)
POTASSIUM SERPL-SCNC: 4.2 MMOL/L (ref 3.4–5.3)
POTASSIUM SERPL-SCNC: 4.3 MMOL/L (ref 3.4–5.3)
POTASSIUM SERPL-SCNC: 4.4 MMOL/L (ref 3.4–5.3)
POTASSIUM SERPL-SCNC: 4.7 MMOL/L (ref 3.4–5.3)
POTASSIUM SERPL-SCNC: 5.2 MMOL/L (ref 3.4–5.3)
POTASSIUM SERPL-SCNC: 5.3 MMOL/L (ref 3.4–5.3)
POTASSIUM SERPL-SCNC: 5.4 MMOL/L (ref 3.4–5.3)
POTASSIUM SERPL-SCNC: 6.3 MMOL/L (ref 3.4–5.3)
PROCALCITONIN SERPL-MCNC: 3.24 NG/ML
PROCALCITONIN SERPL-MCNC: 3.63 NG/ML
PROT SERPL-MCNC: 5.1 G/DL (ref 6.8–8.8)
PROT SERPL-MCNC: 5.5 G/DL (ref 6.8–8.8)
PROT SERPL-MCNC: 5.6 G/DL (ref 6.8–8.8)
PROT SERPL-MCNC: 5.7 G/DL (ref 6.8–8.8)
PROT SERPL-MCNC: 5.8 G/DL (ref 6.8–8.8)
PROT SERPL-MCNC: 5.8 G/DL (ref 6.8–8.8)
PROT SERPL-MCNC: 6 G/DL (ref 6.8–8.8)
PROT SERPL-MCNC: 6.1 G/DL (ref 6.8–8.8)
PROT SERPL-MCNC: 6.1 G/DL (ref 6.8–8.8)
PROT SERPL-MCNC: 6.2 G/DL (ref 6.8–8.8)
PROT SERPL-MCNC: 6.4 G/DL (ref 6.8–8.8)
PROT SERPL-MCNC: 6.4 G/DL (ref 6.8–8.8)
PROT SERPL-MCNC: 6.5 G/DL (ref 6.8–8.8)
PROT SERPL-MCNC: 6.7 G/DL (ref 6.8–8.8)
PROT SERPL-MCNC: 6.8 G/DL (ref 6.8–8.8)
PROT SERPL-MCNC: 6.9 G/DL (ref 6.8–8.8)
PROT SERPL-MCNC: 6.9 G/DL (ref 6.8–8.8)
PROT SERPL-MCNC: 7.1 G/DL (ref 6.8–8.8)
PROT SERPL-MCNC: 8.1 G/DL (ref 6.8–8.8)
RBC # BLD AUTO: 3.44 10E12/L (ref 3.8–5.2)
RBC # BLD AUTO: 3.5 10E12/L (ref 3.8–5.2)
RBC # BLD AUTO: 3.53 10E12/L (ref 3.8–5.2)
RBC # BLD AUTO: 3.56 10E12/L (ref 3.8–5.2)
RBC # BLD AUTO: 3.64 10E12/L (ref 3.8–5.2)
RBC # BLD AUTO: 3.71 10E12/L (ref 3.8–5.2)
RBC # BLD AUTO: 3.71 10E12/L (ref 3.8–5.2)
RBC # BLD AUTO: 3.72 10E12/L (ref 3.8–5.2)
RBC # BLD AUTO: 3.72 10E12/L (ref 3.8–5.2)
RBC # BLD AUTO: 3.74 10E12/L (ref 3.8–5.2)
RBC # BLD AUTO: 3.74 10E12/L (ref 3.8–5.2)
RBC # BLD AUTO: 3.75 10E12/L (ref 3.8–5.2)
RBC # BLD AUTO: 3.76 10E12/L (ref 3.8–5.2)
RBC # BLD AUTO: 3.81 10E12/L (ref 3.8–5.2)
RBC # BLD AUTO: 3.82 10E12/L (ref 3.8–5.2)
RBC # BLD AUTO: 3.85 10E12/L (ref 3.8–5.2)
RBC # BLD AUTO: 3.9 10E12/L (ref 3.8–5.2)
RBC # BLD AUTO: 3.9 10E12/L (ref 3.8–5.2)
RBC # BLD AUTO: 3.92 10E12/L (ref 3.8–5.2)
RBC # BLD AUTO: 3.93 10E12/L (ref 3.8–5.2)
RBC # BLD AUTO: 3.93 10E12/L (ref 3.8–5.2)
RBC # BLD AUTO: 3.95 10E12/L (ref 3.8–5.2)
RBC # BLD AUTO: 3.96 10E12/L (ref 3.8–5.2)
RBC # BLD AUTO: 3.96 10E12/L (ref 3.8–5.2)
RBC # BLD AUTO: 4.09 10E12/L (ref 3.8–5.2)
RBC # BLD AUTO: 4.16 10E12/L (ref 3.8–5.2)
RBC # BLD AUTO: 4.18 10E12/L (ref 3.8–5.2)
RBC # BLD AUTO: 4.22 10E12/L (ref 3.8–5.2)
RBC # BLD AUTO: 4.54 10E12/L (ref 3.8–5.2)
RBC #/AREA URNS AUTO: 19 /HPF (ref 0–2)
RBC #/AREA URNS AUTO: 31 /HPF (ref 0–2)
RBC #/AREA URNS AUTO: 75 /HPF (ref 0–2)
RBC #/AREA URNS AUTO: <1 /HPF (ref 0–2)
RBC MORPH BLD: ABNORMAL
RVA NSP5 STL QL NAA+PROBE: NOT DETECTED
SALMONELLA SP RPOD STL QL NAA+PROBE: NOT DETECTED
SHIGELLA SP+EIEC IPAH STL QL NAA+PROBE: NOT DETECTED
SODIUM SERPL-SCNC: 134 MMOL/L (ref 133–144)
SODIUM SERPL-SCNC: 135 MMOL/L (ref 133–144)
SODIUM SERPL-SCNC: 137 MMOL/L (ref 133–144)
SODIUM SERPL-SCNC: 138 MMOL/L (ref 133–144)
SODIUM SERPL-SCNC: 139 MMOL/L (ref 133–144)
SODIUM SERPL-SCNC: 140 MMOL/L (ref 133–144)
SODIUM SERPL-SCNC: 141 MMOL/L (ref 133–144)
SODIUM SERPL-SCNC: 142 MMOL/L (ref 133–144)
SOURCE: ABNORMAL
SP GR UR STRIP: 1.01 (ref 1–1.03)
SP GR UR STRIP: 1.01 (ref 1–1.03)
SP GR UR STRIP: 1.02 (ref 1–1.03)
SP GR UR STRIP: 1.02 (ref 1–1.03)
SPECIMEN SOURCE: ABNORMAL
SPECIMEN SOURCE: NORMAL
SQUAMOUS #/AREA URNS AUTO: 2 /HPF (ref 0–1)
SQUAMOUS #/AREA URNS AUTO: 2 /HPF (ref 0–1)
SQUAMOUS #/AREA URNS AUTO: 5 /HPF (ref 0–1)
SQUAMOUS #/AREA URNS AUTO: <1 /HPF (ref 0–1)
TRANS CELLS #/AREA URNS HPF: 1 /HPF (ref 0–1)
TROPONIN I SERPL-MCNC: 0.08 UG/L (ref 0–0.04)
TROPONIN I SERPL-MCNC: <0.015 UG/L (ref 0–0.04)
TSH SERPL DL<=0.005 MIU/L-ACNC: 1.07 MU/L (ref 0.4–4)
UROBILINOGEN UR STRIP-MCNC: 0 MG/DL (ref 0–2)
UROBILINOGEN UR STRIP-MCNC: NORMAL MG/DL (ref 0–2)
V CHOL+PARA RFBL+TRKH+TNAA STL QL NAA+PR: NOT DETECTED
WBC # BLD AUTO: 11 10E9/L (ref 4–11)
WBC # BLD AUTO: 11.1 10E9/L (ref 4–11)
WBC # BLD AUTO: 14.9 10E9/L (ref 4–11)
WBC # BLD AUTO: 3.8 10E9/L (ref 4–11)
WBC # BLD AUTO: 4.1 10E9/L (ref 4–11)
WBC # BLD AUTO: 4.2 10E9/L (ref 4–11)
WBC # BLD AUTO: 4.4 10E9/L (ref 4–11)
WBC # BLD AUTO: 4.7 10E9/L (ref 4–11)
WBC # BLD AUTO: 4.7 10E9/L (ref 4–11)
WBC # BLD AUTO: 5 10E9/L (ref 4–11)
WBC # BLD AUTO: 5.1 10E9/L (ref 4–11)
WBC # BLD AUTO: 5.1 10E9/L (ref 4–11)
WBC # BLD AUTO: 5.5 10E9/L (ref 4–11)
WBC # BLD AUTO: 6 10E9/L (ref 4–11)
WBC # BLD AUTO: 6 10E9/L (ref 4–11)
WBC # BLD AUTO: 6.5 10E9/L (ref 4–11)
WBC # BLD AUTO: 6.8 10E9/L (ref 4–11)
WBC # BLD AUTO: 6.9 10E9/L (ref 4–11)
WBC # BLD AUTO: 7 10E9/L (ref 4–11)
WBC # BLD AUTO: 7.8 10E9/L (ref 4–11)
WBC # BLD AUTO: 7.9 10E9/L (ref 4–11)
WBC # BLD AUTO: 8 10E9/L (ref 4–11)
WBC # BLD AUTO: 8.2 10E9/L (ref 4–11)
WBC # BLD AUTO: 8.6 10E9/L (ref 4–11)
WBC # BLD AUTO: 8.6 10E9/L (ref 4–11)
WBC # BLD AUTO: 8.7 10E9/L (ref 4–11)
WBC # BLD AUTO: 9.3 10E9/L (ref 4–11)
WBC # BLD AUTO: 9.3 10E9/L (ref 4–11)
WBC # BLD AUTO: 9.4 10E9/L (ref 4–11)
WBC # BLD AUTO: 9.4 10E9/L (ref 4–11)
WBC # BLD AUTO: 9.5 10E9/L (ref 4–11)
WBC #/AREA URNS AUTO: 14 /HPF (ref 0–5)
WBC #/AREA URNS AUTO: 4 /HPF (ref 0–2)
WBC #/AREA URNS AUTO: 83 /HPF (ref 0–5)
WBC #/AREA URNS AUTO: >182 /HPF (ref 0–5)
WBC CLUMPS #/AREA URNS HPF: PRESENT /HPF
Y ENTERO RECN STL QL NAA+PROBE: NOT DETECTED

## 2018-01-01 PROCEDURE — 85610 PROTHROMBIN TIME: CPT | Performed by: FAMILY MEDICINE

## 2018-01-01 PROCEDURE — 80053 COMPREHEN METABOLIC PANEL: CPT | Performed by: INTERNAL MEDICINE

## 2018-01-01 PROCEDURE — 99207 ZZC NO CHARGE NURSE ONLY: CPT

## 2018-01-01 PROCEDURE — 87493 C DIFF AMPLIFIED PROBE: CPT | Performed by: INTERNAL MEDICINE

## 2018-01-01 PROCEDURE — 25000125 ZZHC RX 250: Performed by: INTERNAL MEDICINE

## 2018-01-01 PROCEDURE — 80053 COMPREHEN METABOLIC PANEL: CPT | Performed by: FAMILY MEDICINE

## 2018-01-01 PROCEDURE — 85027 COMPLETE CBC AUTOMATED: CPT | Performed by: FAMILY MEDICINE

## 2018-01-01 PROCEDURE — 82378 CARCINOEMBRYONIC ANTIGEN: CPT | Performed by: INTERNAL MEDICINE

## 2018-01-01 PROCEDURE — 97535 SELF CARE MNGMENT TRAINING: CPT | Mod: GO

## 2018-01-01 PROCEDURE — 85025 COMPLETE CBC W/AUTO DIFF WBC: CPT | Performed by: FAMILY MEDICINE

## 2018-01-01 PROCEDURE — 99285 EMERGENCY DEPT VISIT HI MDM: CPT | Mod: 25 | Performed by: FAMILY MEDICINE

## 2018-01-01 PROCEDURE — 84484 ASSAY OF TROPONIN QUANT: CPT | Performed by: FAMILY MEDICINE

## 2018-01-01 PROCEDURE — 93005 ELECTROCARDIOGRAM TRACING: CPT | Performed by: FAMILY MEDICINE

## 2018-01-01 PROCEDURE — 96375 TX/PRO/DX INJ NEW DRUG ADDON: CPT

## 2018-01-01 PROCEDURE — 94640 AIRWAY INHALATION TREATMENT: CPT

## 2018-01-01 PROCEDURE — 36415 COLL VENOUS BLD VENIPUNCTURE: CPT | Performed by: PHYSICIAN ASSISTANT

## 2018-01-01 PROCEDURE — 93010 ELECTROCARDIOGRAM REPORT: CPT | Mod: Z6 | Performed by: EMERGENCY MEDICINE

## 2018-01-01 PROCEDURE — 40000929 ZZHCL STATISTIC FOUNDATION ONE GENE PANEL: Performed by: INTERNAL MEDICINE

## 2018-01-01 PROCEDURE — G0463 HOSPITAL OUTPT CLINIC VISIT: HCPCS | Mod: 25

## 2018-01-01 PROCEDURE — 96367 TX/PROPH/DG ADDL SEQ IV INF: CPT

## 2018-01-01 PROCEDURE — 85025 COMPLETE CBC W/AUTO DIFF WBC: CPT | Performed by: INTERNAL MEDICINE

## 2018-01-01 PROCEDURE — 25000128 H RX IP 250 OP 636: Performed by: FAMILY MEDICINE

## 2018-01-01 PROCEDURE — 25000128 H RX IP 250 OP 636: Performed by: INTERNAL MEDICINE

## 2018-01-01 PROCEDURE — 25500064 ZZH RX 255 OP 636

## 2018-01-01 PROCEDURE — 99207 ZZC NO CHARGE NURSE ONLY: CPT | Performed by: REGISTERED NURSE

## 2018-01-01 PROCEDURE — 83605 ASSAY OF LACTIC ACID: CPT | Performed by: PHYSICIAN ASSISTANT

## 2018-01-01 PROCEDURE — 36415 COLL VENOUS BLD VENIPUNCTURE: CPT

## 2018-01-01 PROCEDURE — 88360 TUMOR IMMUNOHISTOCHEM/MANUAL: CPT | Mod: 26 | Performed by: RADIOLOGY

## 2018-01-01 PROCEDURE — 85025 COMPLETE CBC W/AUTO DIFF WBC: CPT

## 2018-01-01 PROCEDURE — 25000132 ZZH RX MED GY IP 250 OP 250 PS 637: Performed by: NURSE PRACTITIONER

## 2018-01-01 PROCEDURE — 99223 1ST HOSP IP/OBS HIGH 75: CPT | Performed by: NURSE PRACTITIONER

## 2018-01-01 PROCEDURE — 36415 COLL VENOUS BLD VENIPUNCTURE: CPT | Performed by: FAMILY MEDICINE

## 2018-01-01 PROCEDURE — 82803 BLOOD GASES ANY COMBINATION: CPT | Performed by: PHYSICIAN ASSISTANT

## 2018-01-01 PROCEDURE — 25000125 ZZHC RX 250: Performed by: NURSE ANESTHETIST, CERTIFIED REGISTERED

## 2018-01-01 PROCEDURE — 25000128 H RX IP 250 OP 636: Performed by: PHYSICIAN ASSISTANT

## 2018-01-01 PROCEDURE — 99213 OFFICE O/P EST LOW 20 MIN: CPT | Performed by: SURGERY

## 2018-01-01 PROCEDURE — 25000132 ZZH RX MED GY IP 250 OP 250 PS 637

## 2018-01-01 PROCEDURE — 37000009 ZZH ANESTHESIA TECHNICAL FEE, EACH ADDTL 15 MIN: Performed by: SURGERY

## 2018-01-01 PROCEDURE — 25000125 ZZHC RX 250: Performed by: NURSE PRACTITIONER

## 2018-01-01 PROCEDURE — 20000003 ZZH R&B ICU

## 2018-01-01 PROCEDURE — 85610 PROTHROMBIN TIME: CPT | Performed by: INTERNAL MEDICINE

## 2018-01-01 PROCEDURE — 80053 COMPREHEN METABOLIC PANEL: CPT

## 2018-01-01 PROCEDURE — 86300 IMMUNOASSAY TUMOR CA 15-3: CPT | Performed by: INTERNAL MEDICINE

## 2018-01-01 PROCEDURE — 00000158 ZZHCL STATISTIC H-FISH PROCESS B/S: Performed by: RADIOLOGY

## 2018-01-01 PROCEDURE — G0463 HOSPITAL OUTPT CLINIC VISIT: HCPCS

## 2018-01-01 PROCEDURE — 88305 TISSUE EXAM BY PATHOLOGIST: CPT | Mod: 26 | Performed by: RADIOLOGY

## 2018-01-01 PROCEDURE — 93306 TTE W/DOPPLER COMPLETE: CPT | Mod: 26 | Performed by: INTERNAL MEDICINE

## 2018-01-01 PROCEDURE — 12000000 ZZH R&B MED SURG/OB

## 2018-01-01 PROCEDURE — 25000128 H RX IP 250 OP 636

## 2018-01-01 PROCEDURE — 25000132 ZZH RX MED GY IP 250 OP 250 PS 637: Performed by: FAMILY MEDICINE

## 2018-01-01 PROCEDURE — 25000125 ZZHC RX 250: Performed by: FAMILY MEDICINE

## 2018-01-01 PROCEDURE — 85610 PROTHROMBIN TIME: CPT

## 2018-01-01 PROCEDURE — 36591 DRAW BLOOD OFF VENOUS DEVICE: CPT

## 2018-01-01 PROCEDURE — 99207 ZZC CDG-CODE INCORRECT PER BILLING BASED ON TIME: CPT | Performed by: FAMILY MEDICINE

## 2018-01-01 PROCEDURE — 85027 COMPLETE CBC AUTOMATED: CPT | Performed by: INTERNAL MEDICINE

## 2018-01-01 PROCEDURE — 25000125 ZZHC RX 250: Performed by: PHYSICIAN ASSISTANT

## 2018-01-01 PROCEDURE — 93306 TTE W/DOPPLER COMPLETE: CPT

## 2018-01-01 PROCEDURE — 36582 REPLACE TUNNELED CV CATH: CPT | Performed by: SURGERY

## 2018-01-01 PROCEDURE — 82140 ASSAY OF AMMONIA: CPT | Performed by: INTERNAL MEDICINE

## 2018-01-01 PROCEDURE — 87040 BLOOD CULTURE FOR BACTERIA: CPT | Performed by: PHYSICIAN ASSISTANT

## 2018-01-01 PROCEDURE — 25000132 ZZH RX MED GY IP 250 OP 250 PS 637: Performed by: PHYSICIAN ASSISTANT

## 2018-01-01 PROCEDURE — 40000275 ZZH STATISTIC RCP TIME EA 10 MIN

## 2018-01-01 PROCEDURE — 83605 ASSAY OF LACTIC ACID: CPT

## 2018-01-01 PROCEDURE — 99233 SBSQ HOSP IP/OBS HIGH 50: CPT | Performed by: INTERNAL MEDICINE

## 2018-01-01 PROCEDURE — 81001 URINALYSIS AUTO W/SCOPE: CPT | Performed by: FAMILY MEDICINE

## 2018-01-01 PROCEDURE — 87186 SC STD MICRODIL/AGAR DIL: CPT | Performed by: PHYSICIAN ASSISTANT

## 2018-01-01 PROCEDURE — 83735 ASSAY OF MAGNESIUM: CPT | Performed by: INTERNAL MEDICINE

## 2018-01-01 PROCEDURE — 84100 ASSAY OF PHOSPHORUS: CPT | Performed by: FAMILY MEDICINE

## 2018-01-01 PROCEDURE — 99358 PROLONG SERVICE W/O CONTACT: CPT | Performed by: NURSE PRACTITIONER

## 2018-01-01 PROCEDURE — 71260 CT THORAX DX C+: CPT

## 2018-01-01 PROCEDURE — 82140 ASSAY OF AMMONIA: CPT | Performed by: FAMILY MEDICINE

## 2018-01-01 PROCEDURE — 84443 ASSAY THYROID STIM HORMONE: CPT

## 2018-01-01 PROCEDURE — 40000193 ZZH STATISTIC PT WARD VISIT: Performed by: PHYSICAL THERAPIST

## 2018-01-01 PROCEDURE — 96417 CHEMO IV INFUS EACH ADDL SEQ: CPT

## 2018-01-01 PROCEDURE — 93005 ELECTROCARDIOGRAM TRACING: CPT

## 2018-01-01 PROCEDURE — 85025 COMPLETE CBC W/AUTO DIFF WBC: CPT | Performed by: PHYSICIAN ASSISTANT

## 2018-01-01 PROCEDURE — 99215 OFFICE O/P EST HI 40 MIN: CPT | Performed by: INTERNAL MEDICINE

## 2018-01-01 PROCEDURE — 93971 EXTREMITY STUDY: CPT | Mod: RT

## 2018-01-01 PROCEDURE — 36416 COLLJ CAPILLARY BLOOD SPEC: CPT

## 2018-01-01 PROCEDURE — 88360 TUMOR IMMUNOHISTOCHEM/MANUAL: CPT | Performed by: RADIOLOGY

## 2018-01-01 PROCEDURE — 85610 PROTHROMBIN TIME: CPT | Mod: QW

## 2018-01-01 PROCEDURE — C1788 PORT, INDWELLING, IMP: HCPCS | Performed by: SURGERY

## 2018-01-01 PROCEDURE — 80048 BASIC METABOLIC PNL TOTAL CA: CPT | Performed by: FAMILY MEDICINE

## 2018-01-01 PROCEDURE — 96365 THER/PROPH/DIAG IV INF INIT: CPT

## 2018-01-01 PROCEDURE — 71046 X-RAY EXAM CHEST 2 VIEWS: CPT

## 2018-01-01 PROCEDURE — 84145 PROCALCITONIN (PCT): CPT

## 2018-01-01 PROCEDURE — 40000305 ZZH STATISTIC PRE PROC ASSESS I: Performed by: SURGERY

## 2018-01-01 PROCEDURE — G0463 HOSPITAL OUTPT CLINIC VISIT: HCPCS | Mod: ZF

## 2018-01-01 PROCEDURE — 96413 CHEMO IV INFUSION 1 HR: CPT

## 2018-01-01 PROCEDURE — 27210794 ZZH OR GENERAL SUPPLY STERILE: Performed by: SURGERY

## 2018-01-01 PROCEDURE — 12000007 ZZH R&B INTERMEDIATE

## 2018-01-01 PROCEDURE — 0W993ZZ DRAINAGE OF RIGHT PLEURAL CAVITY, PERCUTANEOUS APPROACH: ICD-10-PCS | Performed by: RADIOLOGY

## 2018-01-01 PROCEDURE — 85610 PROTHROMBIN TIME: CPT | Performed by: PHYSICIAN ASSISTANT

## 2018-01-01 PROCEDURE — 99233 SBSQ HOSP IP/OBS HIGH 50: CPT | Performed by: FAMILY MEDICINE

## 2018-01-01 PROCEDURE — 88342 IMHCHEM/IMCYTCHM 1ST ANTB: CPT | Performed by: RADIOLOGY

## 2018-01-01 PROCEDURE — 96375 TX/PRO/DX INJ NEW DRUG ADDON: CPT | Performed by: FAMILY MEDICINE

## 2018-01-01 PROCEDURE — 25000128 H RX IP 250 OP 636: Performed by: RADIOLOGY

## 2018-01-01 PROCEDURE — 25000125 ZZHC RX 250: Performed by: RADIOLOGY

## 2018-01-01 PROCEDURE — 37000008 ZZH ANESTHESIA TECHNICAL FEE, 1ST 30 MIN: Performed by: SURGERY

## 2018-01-01 PROCEDURE — 99232 SBSQ HOSP IP/OBS MODERATE 35: CPT | Performed by: FAMILY MEDICINE

## 2018-01-01 PROCEDURE — 99284 EMERGENCY DEPT VISIT MOD MDM: CPT | Mod: 25 | Performed by: EMERGENCY MEDICINE

## 2018-01-01 PROCEDURE — 40000133 ZZH STATISTIC OT WARD VISIT

## 2018-01-01 PROCEDURE — 96361 HYDRATE IV INFUSION ADD-ON: CPT

## 2018-01-01 PROCEDURE — 36415 COLL VENOUS BLD VENIPUNCTURE: CPT | Performed by: INTERNAL MEDICINE

## 2018-01-01 PROCEDURE — 40000277 XR SURGERY CARM FLUORO LESS THAN 5 MIN W STILLS

## 2018-01-01 PROCEDURE — 97165 OT EVAL LOW COMPLEX 30 MIN: CPT | Mod: GO

## 2018-01-01 PROCEDURE — 82803 BLOOD GASES ANY COMBINATION: CPT

## 2018-01-01 PROCEDURE — 80076 HEPATIC FUNCTION PANEL: CPT | Performed by: FAMILY MEDICINE

## 2018-01-01 PROCEDURE — 82803 BLOOD GASES ANY COMBINATION: CPT | Performed by: FAMILY MEDICINE

## 2018-01-01 PROCEDURE — 87506 IADNA-DNA/RNA PROBE TQ 6-11: CPT | Performed by: INTERNAL MEDICINE

## 2018-01-01 PROCEDURE — 87086 URINE CULTURE/COLONY COUNT: CPT | Performed by: PHYSICIAN ASSISTANT

## 2018-01-01 PROCEDURE — 36000054 ZZH SURGERY LEVEL 2 W FLUORO 1ST 30 MIN: Performed by: SURGERY

## 2018-01-01 PROCEDURE — 00000159 ZZHCL STATISTIC H-SEND OUTS PREP: Performed by: RADIOLOGY

## 2018-01-01 PROCEDURE — 99223 1ST HOSP IP/OBS HIGH 75: CPT | Mod: AI | Performed by: PHYSICIAN ASSISTANT

## 2018-01-01 PROCEDURE — 99223 1ST HOSP IP/OBS HIGH 75: CPT | Mod: AI

## 2018-01-01 PROCEDURE — 87086 URINE CULTURE/COLONY COUNT: CPT | Performed by: EMERGENCY MEDICINE

## 2018-01-01 PROCEDURE — 97162 PT EVAL MOD COMPLEX 30 MIN: CPT | Mod: GP | Performed by: PHYSICAL THERAPIST

## 2018-01-01 PROCEDURE — 25000128 H RX IP 250 OP 636: Performed by: SURGERY

## 2018-01-01 PROCEDURE — 83690 ASSAY OF LIPASE: CPT | Performed by: PHYSICIAN ASSISTANT

## 2018-01-01 PROCEDURE — 93010 ELECTROCARDIOGRAM REPORT: CPT | Mod: Z6 | Performed by: FAMILY MEDICINE

## 2018-01-01 PROCEDURE — 85027 COMPLETE CBC AUTOMATED: CPT

## 2018-01-01 PROCEDURE — 82140 ASSAY OF AMMONIA: CPT

## 2018-01-01 PROCEDURE — 80053 COMPREHEN METABOLIC PANEL: CPT | Performed by: PHYSICIAN ASSISTANT

## 2018-01-01 PROCEDURE — 99291 CRITICAL CARE FIRST HOUR: CPT

## 2018-01-01 PROCEDURE — 88377 M/PHMTRC ALYS ISHQUANT/SEMIQ: CPT | Performed by: INTERNAL MEDICINE

## 2018-01-01 PROCEDURE — 83735 ASSAY OF MAGNESIUM: CPT | Performed by: FAMILY MEDICINE

## 2018-01-01 PROCEDURE — 71045 X-RAY EXAM CHEST 1 VIEW: CPT

## 2018-01-01 PROCEDURE — 85027 COMPLETE CBC AUTOMATED: CPT | Performed by: PHYSICIAN ASSISTANT

## 2018-01-01 PROCEDURE — 25000128 H RX IP 250 OP 636: Performed by: NURSE ANESTHETIST, CERTIFIED REGISTERED

## 2018-01-01 PROCEDURE — 87088 URINE BACTERIA CULTURE: CPT | Performed by: PHYSICIAN ASSISTANT

## 2018-01-01 PROCEDURE — 96372 THER/PROPH/DIAG INJ SC/IM: CPT | Mod: XU

## 2018-01-01 PROCEDURE — 83605 ASSAY OF LACTIC ACID: CPT | Performed by: FAMILY MEDICINE

## 2018-01-01 PROCEDURE — 85025 COMPLETE CBC W/AUTO DIFF WBC: CPT | Performed by: EMERGENCY MEDICINE

## 2018-01-01 PROCEDURE — 87493 C DIFF AMPLIFIED PROBE: CPT

## 2018-01-01 PROCEDURE — 76700 US EXAM ABDOM COMPLETE: CPT

## 2018-01-01 PROCEDURE — 83605 ASSAY OF LACTIC ACID: CPT | Performed by: EMERGENCY MEDICINE

## 2018-01-01 PROCEDURE — 99215 OFFICE O/P EST HI 40 MIN: CPT | Performed by: FAMILY MEDICINE

## 2018-01-01 PROCEDURE — 86300 IMMUNOASSAY TUMOR CA 15-3: CPT | Performed by: FAMILY MEDICINE

## 2018-01-01 PROCEDURE — 87040 BLOOD CULTURE FOR BACTERIA: CPT

## 2018-01-01 PROCEDURE — 99239 HOSP IP/OBS DSCHRG MGMT >30: CPT | Performed by: FAMILY MEDICINE

## 2018-01-01 PROCEDURE — 87641 MR-STAPH DNA AMP PROBE: CPT | Performed by: PHYSICIAN ASSISTANT

## 2018-01-01 PROCEDURE — 99207 ZZC NO CHARGE NURSE ONLY: CPT | Performed by: FAMILY MEDICINE

## 2018-01-01 PROCEDURE — 87040 BLOOD CULTURE FOR BACTERIA: CPT | Performed by: EMERGENCY MEDICINE

## 2018-01-01 PROCEDURE — 94640 AIRWAY INHALATION TREATMENT: CPT | Mod: 76

## 2018-01-01 PROCEDURE — 25500064 ZZH RX 255 OP 636: Performed by: INTERNAL MEDICINE

## 2018-01-01 PROCEDURE — 71000027 ZZH RECOVERY PHASE 2 EACH 15 MINS: Performed by: SURGERY

## 2018-01-01 PROCEDURE — 96361 HYDRATE IV INFUSION ADD-ON: CPT | Performed by: FAMILY MEDICINE

## 2018-01-01 PROCEDURE — 27210190 US THORACENTESIS

## 2018-01-01 PROCEDURE — 99285 EMERGENCY DEPT VISIT HI MDM: CPT | Mod: 25

## 2018-01-01 PROCEDURE — 96372 THER/PROPH/DIAG INJ SC/IM: CPT | Performed by: FAMILY MEDICINE

## 2018-01-01 PROCEDURE — 94660 CPAP INITIATION&MGMT: CPT

## 2018-01-01 PROCEDURE — 81001 URINALYSIS AUTO W/SCOPE: CPT | Performed by: EMERGENCY MEDICINE

## 2018-01-01 PROCEDURE — 36000052 ZZH SURGERY LEVEL 2 EA 15 ADDTL MIN: Performed by: SURGERY

## 2018-01-01 PROCEDURE — 96360 HYDRATION IV INFUSION INIT: CPT

## 2018-01-01 PROCEDURE — 96365 THER/PROPH/DIAG IV INF INIT: CPT | Performed by: FAMILY MEDICINE

## 2018-01-01 PROCEDURE — 96415 CHEMO IV INFUSION ADDL HR: CPT

## 2018-01-01 PROCEDURE — 87086 URINE CULTURE/COLONY COUNT: CPT | Performed by: FAMILY MEDICINE

## 2018-01-01 PROCEDURE — 40000986 XR CHEST PORT 1 VW

## 2018-01-01 PROCEDURE — 80053 COMPREHEN METABOLIC PANEL: CPT | Performed by: EMERGENCY MEDICINE

## 2018-01-01 PROCEDURE — 40000270 ZZH STATISTIC OXYGEN  O2DAILY TECH TIME

## 2018-01-01 PROCEDURE — 96376 TX/PRO/DX INJ SAME DRUG ADON: CPT | Performed by: FAMILY MEDICINE

## 2018-01-01 PROCEDURE — 84484 ASSAY OF TROPONIN QUANT: CPT | Performed by: PHYSICIAN ASSISTANT

## 2018-01-01 PROCEDURE — 88305 TISSUE EXAM BY PATHOLOGIST: CPT | Performed by: RADIOLOGY

## 2018-01-01 PROCEDURE — 87040 BLOOD CULTURE FOR BACTERIA: CPT | Performed by: FAMILY MEDICINE

## 2018-01-01 PROCEDURE — 99285 EMERGENCY DEPT VISIT HI MDM: CPT | Mod: 25 | Performed by: EMERGENCY MEDICINE

## 2018-01-01 PROCEDURE — 99284 EMERGENCY DEPT VISIT MOD MDM: CPT | Mod: Z6 | Performed by: EMERGENCY MEDICINE

## 2018-01-01 PROCEDURE — 87106 FUNGI IDENTIFICATION YEAST: CPT | Performed by: FAMILY MEDICINE

## 2018-01-01 PROCEDURE — 87640 STAPH A DNA AMP PROBE: CPT | Performed by: PHYSICIAN ASSISTANT

## 2018-01-01 PROCEDURE — 81001 URINALYSIS AUTO W/SCOPE: CPT | Performed by: PHYSICIAN ASSISTANT

## 2018-01-01 PROCEDURE — 99238 HOSP IP/OBS DSCHRG MGMT 30/<: CPT | Performed by: FAMILY MEDICINE

## 2018-01-01 PROCEDURE — 76942 ECHO GUIDE FOR BIOPSY: CPT

## 2018-01-01 DEVICE — CATH PORT MRI POWERPORT 8FR SL 1808000: Type: IMPLANTABLE DEVICE | Site: CHEST | Status: FUNCTIONAL

## 2018-01-01 RX ORDER — LORAZEPAM 0.5 MG/1
0.5 TABLET ORAL EVERY 4 HOURS PRN
Qty: 30 TABLET | Refills: 2 | Status: SHIPPED | OUTPATIENT
Start: 2018-01-01 | End: 2018-01-01

## 2018-01-01 RX ORDER — ALBUTEROL SULFATE 0.83 MG/ML
2.5 SOLUTION RESPIRATORY (INHALATION)
Status: CANCELLED | OUTPATIENT
Start: 2018-01-01

## 2018-01-01 RX ORDER — DIPHENHYDRAMINE HYDROCHLORIDE 50 MG/ML
50 INJECTION INTRAMUSCULAR; INTRAVENOUS
Status: CANCELLED
Start: 2018-01-01

## 2018-01-01 RX ORDER — ALBUTEROL SULFATE 90 UG/1
1-2 AEROSOL, METERED RESPIRATORY (INHALATION)
Status: CANCELLED
Start: 2018-01-01

## 2018-01-01 RX ORDER — DIPHENHYDRAMINE HCL 25 MG
25 CAPSULE ORAL EVERY 8 HOURS PRN
Status: DISCONTINUED | OUTPATIENT
Start: 2018-01-01 | End: 2018-01-01 | Stop reason: HOSPADM

## 2018-01-01 RX ORDER — LEVOTHYROXINE SODIUM 25 UG/1
25 TABLET ORAL DAILY
Status: DISCONTINUED | OUTPATIENT
Start: 2018-01-01 | End: 2018-01-01 | Stop reason: HOSPADM

## 2018-01-01 RX ORDER — METHYLPREDNISOLONE SODIUM SUCCINATE 125 MG/2ML
125 INJECTION, POWDER, LYOPHILIZED, FOR SOLUTION INTRAMUSCULAR; INTRAVENOUS
Status: CANCELLED
Start: 2018-01-01

## 2018-01-01 RX ORDER — LORAZEPAM 2 MG/ML
0.5 INJECTION INTRAMUSCULAR EVERY 4 HOURS PRN
Status: CANCELLED
Start: 2018-01-01

## 2018-01-01 RX ORDER — MEPERIDINE HYDROCHLORIDE 25 MG/ML
25 INJECTION INTRAMUSCULAR; INTRAVENOUS; SUBCUTANEOUS EVERY 30 MIN PRN
Status: CANCELLED | OUTPATIENT
Start: 2018-01-01

## 2018-01-01 RX ORDER — VANCOMYCIN HYDROCHLORIDE 50 MG/ML
125 KIT ORAL 4 TIMES DAILY
Status: DISCONTINUED | OUTPATIENT
Start: 2018-01-01 | End: 2018-01-01 | Stop reason: HOSPADM

## 2018-01-01 RX ORDER — OXYBUTYNIN CHLORIDE 10 MG/1
10 TABLET, EXTENDED RELEASE ORAL DAILY
COMMUNITY

## 2018-01-01 RX ORDER — EPINEPHRINE 0.3 MG/.3ML
0.3 INJECTION SUBCUTANEOUS EVERY 5 MIN PRN
Status: CANCELLED | OUTPATIENT
Start: 2018-01-01

## 2018-01-01 RX ORDER — HEPARIN SODIUM (PORCINE) LOCK FLUSH IV SOLN 100 UNIT/ML 100 UNIT/ML
SOLUTION INTRAVENOUS
Status: DISCONTINUED
Start: 2018-01-01 | End: 2018-01-01 | Stop reason: HOSPADM

## 2018-01-01 RX ORDER — HEPARIN SODIUM (PORCINE) LOCK FLUSH IV SOLN 100 UNIT/ML 100 UNIT/ML
5 SOLUTION INTRAVENOUS
Status: CANCELLED | OUTPATIENT
Start: 2018-01-01

## 2018-01-01 RX ORDER — PACLITAXEL 100 MG/20ML
200 INJECTION, POWDER, LYOPHILIZED, FOR SUSPENSION INTRAVENOUS ONCE
Status: COMPLETED | OUTPATIENT
Start: 2018-01-01 | End: 2018-01-01

## 2018-01-01 RX ORDER — VANCOMYCIN HYDROCHLORIDE 125 MG/1
125 CAPSULE ORAL 4 TIMES DAILY
Qty: 100 CAPSULE | Refills: 0 | Status: SHIPPED | OUTPATIENT
Start: 2018-01-01 | End: 2018-01-01

## 2018-01-01 RX ORDER — VANCOMYCIN HYDROCHLORIDE 50 MG/ML
125 KIT ORAL 4 TIMES DAILY
Status: DISCONTINUED | OUTPATIENT
Start: 2018-01-01 | End: 2018-01-01

## 2018-01-01 RX ORDER — GINSENG 100 MG
200 CAPSULE ORAL
Status: DISCONTINUED | OUTPATIENT
Start: 2018-01-01 | End: 2018-01-01 | Stop reason: HOSPADM

## 2018-01-01 RX ORDER — VANCOMYCIN HYDROCHLORIDE 125 MG/1
125 CAPSULE ORAL 4 TIMES DAILY
Qty: 60 CAPSULE | Refills: 0 | Status: SHIPPED | OUTPATIENT
Start: 2018-01-01 | End: 2018-01-01

## 2018-01-01 RX ORDER — METRONIDAZOLE 7.5 MG/G
1 GEL VAGINAL 2 TIMES DAILY
Status: DISCONTINUED | OUTPATIENT
Start: 2018-01-01 | End: 2018-01-01

## 2018-01-01 RX ORDER — BUDESONIDE AND FORMOTEROL FUMARATE DIHYDRATE 160; 4.5 UG/1; UG/1
AEROSOL RESPIRATORY (INHALATION)
Qty: 10.2 INHALER | Refills: 1 | Status: SHIPPED | OUTPATIENT
Start: 2018-01-01 | End: 2018-01-01

## 2018-01-01 RX ORDER — EPINEPHRINE 1 MG/ML
0.3 INJECTION, SOLUTION, CONCENTRATE INTRAVENOUS EVERY 5 MIN PRN
Status: CANCELLED | OUTPATIENT
Start: 2018-01-01

## 2018-01-01 RX ORDER — PROCHLORPERAZINE MALEATE 10 MG
10 TABLET ORAL EVERY 6 HOURS PRN
Status: DISCONTINUED | OUTPATIENT
Start: 2018-01-01 | End: 2018-01-01 | Stop reason: HOSPADM

## 2018-01-01 RX ORDER — NALOXONE HYDROCHLORIDE 0.4 MG/ML
.1-.4 INJECTION, SOLUTION INTRAMUSCULAR; INTRAVENOUS; SUBCUTANEOUS
Status: DISCONTINUED | OUTPATIENT
Start: 2018-01-01 | End: 2018-01-01 | Stop reason: HOSPADM

## 2018-01-01 RX ORDER — HEPARIN SODIUM (PORCINE) LOCK FLUSH IV SOLN 100 UNIT/ML 100 UNIT/ML
SOLUTION INTRAVENOUS
Status: COMPLETED
Start: 2018-01-01 | End: 2018-01-01

## 2018-01-01 RX ORDER — SODIUM CHLORIDE 9 MG/ML
1000 INJECTION, SOLUTION INTRAVENOUS CONTINUOUS PRN
Status: CANCELLED
Start: 2018-01-01

## 2018-01-01 RX ORDER — DRONABINOL 2.5 MG/1
2.5 CAPSULE ORAL 2 TIMES DAILY
Status: DISCONTINUED | OUTPATIENT
Start: 2018-01-01 | End: 2018-01-01

## 2018-01-01 RX ORDER — VANCOMYCIN HYDROCHLORIDE 125 MG/1
125 CAPSULE ORAL 4 TIMES DAILY
Qty: 40 CAPSULE | Refills: 0 | Status: SHIPPED | OUTPATIENT
Start: 2018-01-01 | End: 2018-01-01

## 2018-01-01 RX ORDER — HEPARIN SODIUM (PORCINE) LOCK FLUSH IV SOLN 100 UNIT/ML 100 UNIT/ML
5 SOLUTION INTRAVENOUS
Status: DISCONTINUED | OUTPATIENT
Start: 2018-01-01 | End: 2018-01-01 | Stop reason: HOSPADM

## 2018-01-01 RX ORDER — ADENOSINE 3 MG/ML
INJECTION, SOLUTION INTRAVENOUS
Status: DISCONTINUED
Start: 2018-01-01 | End: 2018-01-01 | Stop reason: HOSPADM

## 2018-01-01 RX ORDER — IOPAMIDOL 755 MG/ML
92 INJECTION, SOLUTION INTRAVASCULAR ONCE
Status: COMPLETED | OUTPATIENT
Start: 2018-01-01 | End: 2018-01-01

## 2018-01-01 RX ORDER — ZOLPIDEM TARTRATE 12.5 MG/1
12.5 TABLET, FILM COATED, EXTENDED RELEASE ORAL
Status: DISCONTINUED | OUTPATIENT
Start: 2018-01-01 | End: 2018-01-01 | Stop reason: RX

## 2018-01-01 RX ORDER — ONDANSETRON 4 MG/1
4 TABLET, ORALLY DISINTEGRATING ORAL EVERY 6 HOURS PRN
Status: DISCONTINUED | OUTPATIENT
Start: 2018-01-01 | End: 2018-01-01 | Stop reason: HOSPADM

## 2018-01-01 RX ORDER — BUDESONIDE AND FORMOTEROL FUMARATE DIHYDRATE 160; 4.5 UG/1; UG/1
AEROSOL RESPIRATORY (INHALATION)
Qty: 10.2 INHALER | Refills: 0 | Status: CANCELLED | OUTPATIENT
Start: 2018-01-01

## 2018-01-01 RX ORDER — CEFTRIAXONE SODIUM 1 G/50ML
1 INJECTION, SOLUTION INTRAVENOUS EVERY 24 HOURS
Status: DISCONTINUED | OUTPATIENT
Start: 2018-01-01 | End: 2018-01-01

## 2018-01-01 RX ORDER — CEFAZOLIN SODIUM 2 G/100ML
2 INJECTION, SOLUTION INTRAVENOUS
Status: COMPLETED | OUTPATIENT
Start: 2018-01-01 | End: 2018-01-01

## 2018-01-01 RX ORDER — WARFARIN SODIUM 1 MG/1
TABLET ORAL
Qty: 90 TABLET | Refills: 0 | Status: SHIPPED | OUTPATIENT
Start: 2018-01-01 | End: 2018-01-01

## 2018-01-01 RX ORDER — IPRATROPIUM BROMIDE AND ALBUTEROL SULFATE 2.5; .5 MG/3ML; MG/3ML
3 SOLUTION RESPIRATORY (INHALATION) ONCE
Status: DISCONTINUED | OUTPATIENT
Start: 2018-01-01 | End: 2018-01-01

## 2018-01-01 RX ORDER — WARFARIN SODIUM 1 MG/1
TABLET ORAL
Qty: 90 TABLET | Refills: 0 | Status: ON HOLD | COMMUNITY
Start: 2018-01-01 | End: 2018-01-01

## 2018-01-01 RX ORDER — ONDANSETRON 2 MG/ML
4 INJECTION INTRAMUSCULAR; INTRAVENOUS EVERY 30 MIN PRN
Status: DISCONTINUED | OUTPATIENT
Start: 2018-01-01 | End: 2018-01-01 | Stop reason: HOSPADM

## 2018-01-01 RX ORDER — ZOLPIDEM TARTRATE 5 MG/1
5 TABLET ORAL
Status: DISCONTINUED | OUTPATIENT
Start: 2018-01-01 | End: 2018-01-01

## 2018-01-01 RX ORDER — SODIUM CHLORIDE 9 MG/ML
INJECTION, SOLUTION INTRAVENOUS CONTINUOUS
Status: DISCONTINUED | OUTPATIENT
Start: 2018-01-01 | End: 2018-01-01

## 2018-01-01 RX ORDER — WARFARIN SODIUM 2.5 MG/1
TABLET ORAL
Qty: 30 TABLET | Refills: 1 | Status: ON HOLD | COMMUNITY
Start: 2018-01-01 | End: 2018-01-01

## 2018-01-01 RX ORDER — PACLITAXEL 100 MG/20ML
100 INJECTION, POWDER, LYOPHILIZED, FOR SUSPENSION INTRAVENOUS ONCE
Status: CANCELLED
Start: 2018-01-01

## 2018-01-01 RX ORDER — MORPHINE SULFATE 2 MG/ML
2-4 INJECTION, SOLUTION INTRAMUSCULAR; INTRAVENOUS
Status: DISCONTINUED | OUTPATIENT
Start: 2018-01-01 | End: 2018-01-01

## 2018-01-01 RX ORDER — HYDROCODONE BITARTRATE AND ACETAMINOPHEN 5; 325 MG/1; MG/1
1-2 TABLET ORAL EVERY 4 HOURS PRN
Qty: 20 TABLET | Refills: 0 | Status: SHIPPED | OUTPATIENT
Start: 2018-01-01 | End: 2018-01-01

## 2018-01-01 RX ORDER — OXYBUTYNIN CHLORIDE 5 MG/1
10 TABLET, EXTENDED RELEASE ORAL DAILY
Status: DISCONTINUED | OUTPATIENT
Start: 2018-01-01 | End: 2018-01-01 | Stop reason: HOSPADM

## 2018-01-01 RX ORDER — METHYLPHENIDATE HYDROCHLORIDE 10 MG/1
10 TABLET ORAL 2 TIMES DAILY
Status: DISCONTINUED | OUTPATIENT
Start: 2018-01-01 | End: 2018-01-01 | Stop reason: HOSPADM

## 2018-01-01 RX ORDER — ALBUTEROL SULFATE 0.83 MG/ML
2.5 SOLUTION RESPIRATORY (INHALATION) EVERY 6 HOURS PRN
Status: DISCONTINUED | OUTPATIENT
Start: 2018-01-01 | End: 2018-01-01 | Stop reason: HOSPADM

## 2018-01-01 RX ORDER — PROPOFOL 10 MG/ML
INJECTION, EMULSION INTRAVENOUS CONTINUOUS PRN
Status: DISCONTINUED | OUTPATIENT
Start: 2018-01-01 | End: 2018-01-01

## 2018-01-01 RX ORDER — PHYTONADIONE 1 MG/.5ML
1 INJECTION, EMULSION INTRAMUSCULAR; INTRAVENOUS; SUBCUTANEOUS ONCE
Status: DISCONTINUED | OUTPATIENT
Start: 2018-01-01 | End: 2018-01-01 | Stop reason: CLARIF

## 2018-01-01 RX ORDER — HEPARIN SODIUM,PORCINE 10 UNIT/ML
5-10 VIAL (ML) INTRAVENOUS
Status: DISCONTINUED | OUTPATIENT
Start: 2018-01-01 | End: 2018-01-01 | Stop reason: HOSPADM

## 2018-01-01 RX ORDER — LORATADINE 10 MG/1
10 TABLET ORAL DAILY PRN
Status: DISCONTINUED | OUTPATIENT
Start: 2018-01-01 | End: 2018-01-01 | Stop reason: HOSPADM

## 2018-01-01 RX ORDER — FLUTICASONE PROPIONATE 50 MCG
1-2 SPRAY, SUSPENSION (ML) NASAL DAILY
Status: DISCONTINUED | OUTPATIENT
Start: 2018-01-01 | End: 2018-01-01

## 2018-01-01 RX ORDER — FENTANYL CITRATE 50 UG/ML
25-50 INJECTION, SOLUTION INTRAMUSCULAR; INTRAVENOUS
Status: DISCONTINUED | OUTPATIENT
Start: 2018-01-01 | End: 2018-01-01 | Stop reason: HOSPADM

## 2018-01-01 RX ORDER — LORAZEPAM 2 MG/ML
.5-1 INJECTION INTRAMUSCULAR EVERY 4 HOURS PRN
Status: DISCONTINUED | OUTPATIENT
Start: 2018-01-01 | End: 2018-01-01 | Stop reason: HOSPADM

## 2018-01-01 RX ORDER — LEVOTHYROXINE SODIUM 25 UG/1
TABLET ORAL
Qty: 90 TABLET | Refills: 1 | Status: SHIPPED | OUTPATIENT
Start: 2018-01-01

## 2018-01-01 RX ORDER — IPRATROPIUM BROMIDE AND ALBUTEROL SULFATE 2.5; .5 MG/3ML; MG/3ML
3 SOLUTION RESPIRATORY (INHALATION) ONCE
Status: COMPLETED | OUTPATIENT
Start: 2018-01-01 | End: 2018-01-01

## 2018-01-01 RX ORDER — SODIUM CHLORIDE, SODIUM LACTATE, POTASSIUM CHLORIDE, CALCIUM CHLORIDE 600; 310; 30; 20 MG/100ML; MG/100ML; MG/100ML; MG/100ML
INJECTION, SOLUTION INTRAVENOUS CONTINUOUS
Status: DISCONTINUED | OUTPATIENT
Start: 2018-01-01 | End: 2018-01-01 | Stop reason: HOSPADM

## 2018-01-01 RX ORDER — LIDOCAINE 40 MG/G
CREAM TOPICAL
Status: DISCONTINUED | OUTPATIENT
Start: 2018-01-01 | End: 2018-01-01

## 2018-01-01 RX ORDER — BUDESONIDE AND FORMOTEROL FUMARATE DIHYDRATE 160; 4.5 UG/1; UG/1
AEROSOL RESPIRATORY (INHALATION)
Qty: 3 INHALER | Refills: 1 | Status: SHIPPED | OUTPATIENT
Start: 2018-01-01 | End: 2018-01-01

## 2018-01-01 RX ORDER — SODIUM CHLORIDE AND POTASSIUM CHLORIDE 150; 900 MG/100ML; MG/100ML
INJECTION, SOLUTION INTRAVENOUS CONTINUOUS
Status: DISCONTINUED | OUTPATIENT
Start: 2018-01-01 | End: 2018-01-01

## 2018-01-01 RX ORDER — IOPAMIDOL 755 MG/ML
81 INJECTION, SOLUTION INTRAVASCULAR ONCE
Status: COMPLETED | OUTPATIENT
Start: 2018-01-01 | End: 2018-01-01

## 2018-01-01 RX ORDER — DIPHENHYDRAMINE HYDROCHLORIDE 50 MG/ML
INJECTION INTRAMUSCULAR; INTRAVENOUS
Status: DISCONTINUED
Start: 2018-01-01 | End: 2018-01-01 | Stop reason: WASHOUT

## 2018-01-01 RX ORDER — CEFAZOLIN SODIUM 1 G/50ML
2000 SOLUTION INTRAVENOUS ONCE
Status: COMPLETED | OUTPATIENT
Start: 2018-01-01 | End: 2018-01-01

## 2018-01-01 RX ORDER — ZOLPIDEM TARTRATE 5 MG/1
10 TABLET ORAL
Status: DISCONTINUED | OUTPATIENT
Start: 2018-01-01 | End: 2018-01-01

## 2018-01-01 RX ORDER — HEPARIN SODIUM 1000 [USP'U]/ML
INJECTION, SOLUTION INTRAVENOUS; SUBCUTANEOUS PRN
Status: DISCONTINUED | OUTPATIENT
Start: 2018-01-01 | End: 2018-01-01 | Stop reason: HOSPADM

## 2018-01-01 RX ORDER — CEFTRIAXONE SODIUM 1 G/50ML
1 INJECTION, SOLUTION INTRAVENOUS ONCE
Status: COMPLETED | OUTPATIENT
Start: 2018-01-01 | End: 2018-01-01

## 2018-01-01 RX ORDER — ZOLPIDEM TARTRATE 5 MG/1
10 TABLET ORAL AT BEDTIME
Status: DISCONTINUED | OUTPATIENT
Start: 2018-01-01 | End: 2018-01-01

## 2018-01-01 RX ORDER — METOCLOPRAMIDE 10 MG/1
10 TABLET ORAL EVERY 6 HOURS PRN
Status: DISCONTINUED | OUTPATIENT
Start: 2018-01-01 | End: 2018-01-01 | Stop reason: HOSPADM

## 2018-01-01 RX ORDER — ZOLPIDEM TARTRATE 12.5 MG/1
12.5 TABLET, FILM COATED, EXTENDED RELEASE ORAL AT BEDTIME
Status: DISCONTINUED | OUTPATIENT
Start: 2018-01-01 | End: 2018-01-01 | Stop reason: CLARIF

## 2018-01-01 RX ORDER — ALBUTEROL SULFATE 90 UG/1
2 AEROSOL, METERED RESPIRATORY (INHALATION) EVERY 4 HOURS PRN
Status: DISCONTINUED | OUTPATIENT
Start: 2018-01-01 | End: 2018-01-01 | Stop reason: HOSPADM

## 2018-01-01 RX ORDER — METHYLPHENIDATE HYDROCHLORIDE 5 MG/1
5 TABLET ORAL 2 TIMES DAILY
Status: DISCONTINUED | OUTPATIENT
Start: 2018-01-01 | End: 2018-01-01

## 2018-01-01 RX ORDER — PACLITAXEL 100 MG/20ML
100 INJECTION, POWDER, LYOPHILIZED, FOR SUSPENSION INTRAVENOUS ONCE
Status: DISCONTINUED | OUTPATIENT
Start: 2018-01-01 | End: 2018-01-01 | Stop reason: CLARIF

## 2018-01-01 RX ORDER — GUAIFENESIN/DEXTROMETHORPHAN 100-10MG/5
10 SYRUP ORAL EVERY 4 HOURS PRN
Status: DISCONTINUED | OUTPATIENT
Start: 2018-01-01 | End: 2018-01-01 | Stop reason: HOSPADM

## 2018-01-01 RX ORDER — VANCOMYCIN HYDROCHLORIDE 50 MG/ML
125 KIT ORAL 4 TIMES DAILY
Status: DISCONTINUED | OUTPATIENT
Start: 2018-01-01 | End: 2018-01-01 | Stop reason: CLARIF

## 2018-01-01 RX ORDER — ADENOSINE 3 MG/ML
6 INJECTION, SOLUTION INTRAVENOUS ONCE
Status: COMPLETED | OUTPATIENT
Start: 2018-01-01 | End: 2018-01-01

## 2018-01-01 RX ORDER — ADENOSINE 3 MG/ML
12 INJECTION, SOLUTION INTRAVENOUS ONCE
Status: COMPLETED | OUTPATIENT
Start: 2018-01-01 | End: 2018-01-01

## 2018-01-01 RX ORDER — HYDROCODONE BITARTRATE AND ACETAMINOPHEN 5; 325 MG/1; MG/1
1-2 TABLET ORAL EVERY 4 HOURS PRN
Qty: 60 TABLET | Refills: 0 | Status: SHIPPED | OUTPATIENT
Start: 2018-01-01 | End: 2018-01-01

## 2018-01-01 RX ORDER — METHYLPHENIDATE HYDROCHLORIDE 10 MG/1
10 TABLET ORAL 2 TIMES DAILY
Qty: 60 TABLET | Refills: 0 | Status: SHIPPED | OUTPATIENT
Start: 2018-01-01

## 2018-01-01 RX ORDER — PROCHLORPERAZINE MALEATE 10 MG
10 TABLET ORAL EVERY 6 HOURS PRN
Qty: 30 TABLET | Refills: 2 | Status: SHIPPED | OUTPATIENT
Start: 2018-01-01 | End: 2018-01-01

## 2018-01-01 RX ORDER — OXYCODONE HYDROCHLORIDE 5 MG/1
5-10 TABLET ORAL
Status: DISCONTINUED | OUTPATIENT
Start: 2018-01-01 | End: 2018-01-01 | Stop reason: DRUGHIGH

## 2018-01-01 RX ORDER — CEFAZOLIN SODIUM 1 G/50ML
1750 SOLUTION INTRAVENOUS EVERY 12 HOURS
Status: DISCONTINUED | OUTPATIENT
Start: 2018-01-01 | End: 2018-01-01

## 2018-01-01 RX ORDER — ALBUTEROL SULFATE 0.83 MG/ML
1 SOLUTION RESPIRATORY (INHALATION) EVERY 4 HOURS PRN
Status: DISCONTINUED | OUTPATIENT
Start: 2018-01-01 | End: 2018-01-01 | Stop reason: HOSPADM

## 2018-01-01 RX ORDER — HEPARIN SODIUM,PORCINE 10 UNIT/ML
5-10 VIAL (ML) INTRAVENOUS EVERY 24 HOURS
Status: DISCONTINUED | OUTPATIENT
Start: 2018-01-01 | End: 2018-01-01 | Stop reason: HOSPADM

## 2018-01-01 RX ORDER — LIDOCAINE 40 MG/G
CREAM TOPICAL
Status: DISCONTINUED | OUTPATIENT
Start: 2018-01-01 | End: 2018-01-01 | Stop reason: HOSPADM

## 2018-01-01 RX ORDER — MORPHINE SULFATE 15 MG/1
15 TABLET, FILM COATED, EXTENDED RELEASE ORAL EVERY 12 HOURS
Qty: 60 TABLET | Refills: 0 | Status: ON HOLD | OUTPATIENT
Start: 2018-01-01 | End: 2018-01-01

## 2018-01-01 RX ORDER — GLYCOPYRROLATE 0.2 MG/ML
.2-.4 INJECTION, SOLUTION INTRAMUSCULAR; INTRAVENOUS EVERY 4 HOURS PRN
Status: DISCONTINUED | OUTPATIENT
Start: 2018-01-01 | End: 2018-01-01 | Stop reason: HOSPADM

## 2018-01-01 RX ORDER — HYDROCODONE BITARTRATE AND ACETAMINOPHEN 5; 325 MG/1; MG/1
1-2 TABLET ORAL EVERY 4 HOURS PRN
Status: DISCONTINUED | OUTPATIENT
Start: 2018-01-01 | End: 2018-01-01 | Stop reason: HOSPADM

## 2018-01-01 RX ORDER — LORAZEPAM 2 MG/ML
.5-1 INJECTION INTRAMUSCULAR EVERY 4 HOURS PRN
Status: DISCONTINUED | OUTPATIENT
Start: 2018-01-01 | End: 2018-01-01

## 2018-01-01 RX ORDER — BUDESONIDE AND FORMOTEROL FUMARATE DIHYDRATE 160; 4.5 UG/1; UG/1
2 AEROSOL RESPIRATORY (INHALATION) 2 TIMES DAILY
Qty: 1 INHALER | Refills: 1 | Status: SHIPPED | OUTPATIENT
Start: 2018-01-01 | End: 2018-01-01

## 2018-01-01 RX ORDER — FENTANYL CITRATE 50 UG/ML
INJECTION, SOLUTION INTRAMUSCULAR; INTRAVENOUS PRN
Status: DISCONTINUED | OUTPATIENT
Start: 2018-01-01 | End: 2018-01-01

## 2018-01-01 RX ORDER — OXYCODONE HYDROCHLORIDE 5 MG/1
5 TABLET ORAL EVERY 4 HOURS PRN
Status: DISCONTINUED | OUTPATIENT
Start: 2018-01-01 | End: 2018-01-01 | Stop reason: HOSPADM

## 2018-01-01 RX ORDER — MIRTAZAPINE 7.5 MG/1
7.5 TABLET, FILM COATED ORAL AT BEDTIME
Status: DISCONTINUED | OUTPATIENT
Start: 2018-01-01 | End: 2018-01-01 | Stop reason: HOSPADM

## 2018-01-01 RX ORDER — BUDESONIDE AND FORMOTEROL FUMARATE DIHYDRATE 160; 4.5 UG/1; UG/1
2 AEROSOL RESPIRATORY (INHALATION)
Status: DISCONTINUED | OUTPATIENT
Start: 2018-01-01 | End: 2018-01-01 | Stop reason: CLARIF

## 2018-01-01 RX ORDER — ZOLPIDEM TARTRATE 5 MG/1
5-10 TABLET ORAL
Status: DISCONTINUED | OUTPATIENT
Start: 2018-01-01 | End: 2018-01-01

## 2018-01-01 RX ORDER — MORPHINE SULFATE 2 MG/ML
2-4 INJECTION, SOLUTION INTRAMUSCULAR; INTRAVENOUS EVERY 30 MIN PRN
Status: DISCONTINUED | OUTPATIENT
Start: 2018-01-01 | End: 2018-01-01 | Stop reason: HOSPADM

## 2018-01-01 RX ORDER — GLYCOPYRROLATE 0.2 MG/ML
.2-.4 INJECTION, SOLUTION INTRAMUSCULAR; INTRAVENOUS EVERY 4 HOURS PRN
Status: DISCONTINUED | OUTPATIENT
Start: 2018-01-01 | End: 2018-01-01

## 2018-01-01 RX ORDER — ONDANSETRON 2 MG/ML
4 INJECTION INTRAMUSCULAR; INTRAVENOUS EVERY 6 HOURS PRN
Status: DISCONTINUED | OUTPATIENT
Start: 2018-01-01 | End: 2018-01-01 | Stop reason: HOSPADM

## 2018-01-01 RX ORDER — WARFARIN SODIUM 5 MG/1
5 TABLET ORAL DAILY
Qty: 30 TABLET | Refills: 1 | Status: SHIPPED | OUTPATIENT
Start: 2018-01-01 | End: 2018-01-01

## 2018-01-01 RX ORDER — BUDESONIDE AND FORMOTEROL FUMARATE DIHYDRATE 160; 4.5 UG/1; UG/1
AEROSOL RESPIRATORY (INHALATION)
Qty: 10.2 INHALER | Refills: 0 | Status: SHIPPED | OUTPATIENT
Start: 2018-01-01

## 2018-01-01 RX ORDER — METOCLOPRAMIDE HYDROCHLORIDE 5 MG/ML
10 INJECTION INTRAMUSCULAR; INTRAVENOUS EVERY 6 HOURS PRN
Status: DISCONTINUED | OUTPATIENT
Start: 2018-01-01 | End: 2018-01-01 | Stop reason: HOSPADM

## 2018-01-01 RX ORDER — METHYLPREDNISOLONE SODIUM SUCCINATE 125 MG/2ML
INJECTION, POWDER, LYOPHILIZED, FOR SOLUTION INTRAMUSCULAR; INTRAVENOUS
Status: DISCONTINUED
Start: 2018-01-01 | End: 2018-01-01 | Stop reason: WASHOUT

## 2018-01-01 RX ORDER — MORPHINE SULFATE 15 MG/1
15 TABLET, FILM COATED, EXTENDED RELEASE ORAL EVERY 12 HOURS
Status: DISCONTINUED | OUTPATIENT
Start: 2018-01-01 | End: 2018-01-01

## 2018-01-01 RX ORDER — PROCHLORPERAZINE 25 MG
25 SUPPOSITORY, RECTAL RECTAL EVERY 12 HOURS PRN
Status: DISCONTINUED | OUTPATIENT
Start: 2018-01-01 | End: 2018-01-01 | Stop reason: HOSPADM

## 2018-01-01 RX ORDER — LIDOCAINE 4 G/G
1-2 PATCH TOPICAL EVERY 24 HOURS
Status: DISCONTINUED | OUTPATIENT
Start: 2018-01-01 | End: 2018-01-01

## 2018-01-01 RX ORDER — FEXOFENADINE HCL 180 MG/1
180 TABLET ORAL DAILY PRN
Status: DISCONTINUED | OUTPATIENT
Start: 2018-01-01 | End: 2018-01-01 | Stop reason: HOSPADM

## 2018-01-01 RX ORDER — WARFARIN SODIUM 2.5 MG/1
TABLET ORAL
Qty: 30 TABLET | Refills: 1 | Status: SHIPPED | OUTPATIENT
Start: 2018-01-01 | End: 2018-01-01

## 2018-01-01 RX ORDER — FEXOFENADINE HCL 180 MG/1
180 TABLET ORAL DAILY PRN
Status: DISCONTINUED | OUTPATIENT
Start: 2018-01-01 | End: 2018-01-01

## 2018-01-01 RX ORDER — CEFTRIAXONE SODIUM 1 G/50ML
1 INJECTION, SOLUTION INTRAVENOUS EVERY 24 HOURS
Status: COMPLETED | OUTPATIENT
Start: 2018-01-01 | End: 2018-01-01

## 2018-01-01 RX ORDER — METRONIDAZOLE 7.5 MG/G
GEL VAGINAL 2 TIMES DAILY
Status: DISCONTINUED | OUTPATIENT
Start: 2018-01-01 | End: 2018-01-01 | Stop reason: HOSPADM

## 2018-01-01 RX ORDER — FLUTICASONE PROPIONATE 50 MCG
1-2 SPRAY, SUSPENSION (ML) NASAL DAILY
Qty: 1 BOTTLE | Refills: 11 | Status: ON HOLD | OUTPATIENT
Start: 2018-01-01 | End: 2018-01-01

## 2018-01-01 RX ORDER — HYDROCODONE BITARTRATE AND ACETAMINOPHEN 5; 325 MG/1; MG/1
1-2 TABLET ORAL EVERY 4 HOURS PRN
Status: DISCONTINUED | OUTPATIENT
Start: 2018-01-01 | End: 2018-01-01

## 2018-01-01 RX ORDER — ONDANSETRON 4 MG/1
4 TABLET, ORALLY DISINTEGRATING ORAL EVERY 30 MIN PRN
Status: DISCONTINUED | OUTPATIENT
Start: 2018-01-01 | End: 2018-01-01 | Stop reason: HOSPADM

## 2018-01-01 RX ORDER — ONDANSETRON 2 MG/ML
4 INJECTION INTRAMUSCULAR; INTRAVENOUS EVERY 30 MIN PRN
Status: DISCONTINUED | OUTPATIENT
Start: 2018-01-01 | End: 2018-01-01 | Stop reason: DRUGHIGH

## 2018-01-01 RX ORDER — VANCOMYCIN HYDROCHLORIDE 50 MG/ML
125 KIT ORAL 4 TIMES DAILY
Qty: 280 ML | Refills: 0 | Status: SHIPPED | OUTPATIENT
Start: 2018-01-01 | End: 2018-05-31

## 2018-01-01 RX ORDER — NALOXONE HYDROCHLORIDE 0.4 MG/ML
.1-.4 INJECTION, SOLUTION INTRAMUSCULAR; INTRAVENOUS; SUBCUTANEOUS
Status: DISCONTINUED | OUTPATIENT
Start: 2018-01-01 | End: 2018-01-01

## 2018-01-01 RX ORDER — HYDROCODONE BITARTRATE AND ACETAMINOPHEN 5; 325 MG/1; MG/1
1-2 TABLET ORAL EVERY 4 HOURS PRN
Qty: 60 TABLET | Refills: 0 | Status: SHIPPED | OUTPATIENT
Start: 2018-01-01

## 2018-01-01 RX ORDER — LEVOTHYROXINE SODIUM 25 UG/1
25 TABLET ORAL DAILY
Status: DISCONTINUED | OUTPATIENT
Start: 2018-01-01 | End: 2018-01-01

## 2018-01-01 RX ORDER — SODIUM CHLORIDE 9 MG/ML
1000 INJECTION, SOLUTION INTRAVENOUS CONTINUOUS
Status: DISCONTINUED | OUTPATIENT
Start: 2018-01-01 | End: 2018-01-01

## 2018-01-01 RX ORDER — HYDROCODONE BITARTRATE AND ACETAMINOPHEN 5; 325 MG/1; MG/1
1-2 TABLET ORAL EVERY 4 HOURS PRN
Qty: 20 TABLET | Refills: 0 | Status: ON HOLD | OUTPATIENT
Start: 2018-01-01 | End: 2018-01-01

## 2018-01-01 RX ORDER — METOCLOPRAMIDE 10 MG/1
10 TABLET ORAL 2 TIMES DAILY PRN
Status: DISCONTINUED | OUTPATIENT
Start: 2018-01-01 | End: 2018-01-01

## 2018-01-01 RX ORDER — HEPARIN SODIUM (PORCINE) LOCK FLUSH IV SOLN 100 UNIT/ML 100 UNIT/ML
500 SOLUTION INTRAVENOUS ONCE
Status: COMPLETED | OUTPATIENT
Start: 2018-01-01 | End: 2018-01-01

## 2018-01-01 RX ORDER — ZOLPIDEM TARTRATE 6.25 MG/1
6.25-12.5 TABLET, FILM COATED, EXTENDED RELEASE ORAL
Status: DISCONTINUED | OUTPATIENT
Start: 2018-01-01 | End: 2018-01-01 | Stop reason: CLARIF

## 2018-01-01 RX ORDER — METRONIDAZOLE 7.5 MG/G
1 GEL VAGINAL 2 TIMES DAILY
Qty: 50 G | Refills: 0 | Status: SHIPPED | OUTPATIENT
Start: 2018-01-01 | End: 2018-01-01

## 2018-01-01 RX ADMIN — GUAIFENESIN AND DEXTROMETHORPHAN 10 ML: 100; 10 SYRUP ORAL at 21:23

## 2018-01-01 RX ADMIN — VANCOMYCIN 125 MG: KIT at 11:41

## 2018-01-01 RX ADMIN — VANCOMYCIN 125 MG: KIT at 21:45

## 2018-01-01 RX ADMIN — LEVOTHYROXINE SODIUM 25 MCG: 25 TABLET ORAL at 07:07

## 2018-01-01 RX ADMIN — OXYBUTYNIN CHLORIDE 10 MG: 5 TABLET, EXTENDED RELEASE ORAL at 08:43

## 2018-01-01 RX ADMIN — HEPARIN 5 ML: 100 SYRINGE at 06:01

## 2018-01-01 RX ADMIN — SODIUM CHLORIDE 500 ML: 9 INJECTION, SOLUTION INTRAVENOUS at 06:19

## 2018-01-01 RX ADMIN — LEVOTHYROXINE SODIUM 25 MCG: 25 TABLET ORAL at 06:08

## 2018-01-01 RX ADMIN — MIRTAZAPINE 7.5 MG: 7.5 TABLET ORAL at 22:01

## 2018-01-01 RX ADMIN — SODIUM CHLORIDE 250 ML: 9 INJECTION, SOLUTION INTRAVENOUS at 10:54

## 2018-01-01 RX ADMIN — SODIUM CHLORIDE, PRESERVATIVE FREE 5 ML: 5 INJECTION INTRAVENOUS at 11:20

## 2018-01-01 RX ADMIN — SODIUM CHLORIDE: 9 INJECTION, SOLUTION INTRAVENOUS at 07:46

## 2018-01-01 RX ADMIN — CALCIUM CARBONATE-VITAMIN D TAB 500 MG-200 UNIT 2 TABLET: 500-200 TAB at 08:04

## 2018-01-01 RX ADMIN — IPRATROPIUM BROMIDE AND ALBUTEROL SULFATE 3 ML: .5; 3 SOLUTION RESPIRATORY (INHALATION) at 10:52

## 2018-01-01 RX ADMIN — ALBUTEROL SULFATE 2.5 MG: 2.5 SOLUTION RESPIRATORY (INHALATION) at 23:00

## 2018-01-01 RX ADMIN — MORPHINE SULFATE 4 MG: 2 INJECTION, SOLUTION INTRAMUSCULAR; INTRAVENOUS at 13:29

## 2018-01-01 RX ADMIN — LEVOTHYROXINE SODIUM 25 MCG: 25 TABLET ORAL at 09:50

## 2018-01-01 RX ADMIN — FLUTICASONE FUROATE AND VILANTEROL TRIFENATATE 1 PUFF: 200; 25 POWDER RESPIRATORY (INHALATION) at 09:58

## 2018-01-01 RX ADMIN — GEMCITABINE 1330 MG: 38 INJECTION, SOLUTION INTRAVENOUS at 11:37

## 2018-01-01 RX ADMIN — HUMAN ALBUMIN MICROSPHERES AND PERFLUTREN 2 ML: 10; .22 INJECTION, SOLUTION INTRAVENOUS at 11:30

## 2018-01-01 RX ADMIN — TAZOBACTAM SODIUM AND PIPERACILLIN SODIUM 4.5 G: 500; 4 INJECTION, SOLUTION INTRAVENOUS at 05:31

## 2018-01-01 RX ADMIN — LEVOTHYROXINE SODIUM 25 MCG: 25 TABLET ORAL at 06:29

## 2018-01-01 RX ADMIN — METHYLPHENIDATE HYDROCHLORIDE 10 MG: 10 TABLET ORAL at 12:18

## 2018-01-01 RX ADMIN — METRONIDAZOLE: 7.5 GEL VAGINAL at 08:08

## 2018-01-01 RX ADMIN — VANCOMYCIN 125 MG: KIT at 16:41

## 2018-01-01 RX ADMIN — HYDROCODONE BITARTRATE AND ACETAMINOPHEN 1 TABLET: 5; 325 TABLET ORAL at 08:43

## 2018-01-01 RX ADMIN — SODIUM CHLORIDE, POTASSIUM CHLORIDE, SODIUM LACTATE AND CALCIUM CHLORIDE: 600; 310; 30; 20 INJECTION, SOLUTION INTRAVENOUS at 09:21

## 2018-01-01 RX ADMIN — CALCIUM CARBONATE-VITAMIN D TAB 500 MG-200 UNIT 2 TABLET: 500-200 TAB at 08:20

## 2018-01-01 RX ADMIN — CEFTRIAXONE SODIUM 1 G: 1 INJECTION, SOLUTION INTRAVENOUS at 16:36

## 2018-01-01 RX ADMIN — MORPHINE SULFATE 2 MG: 2 INJECTION, SOLUTION INTRAMUSCULAR; INTRAVENOUS at 09:54

## 2018-01-01 RX ADMIN — SODIUM CHLORIDE: 9 INJECTION, SOLUTION INTRAVENOUS at 19:51

## 2018-01-01 RX ADMIN — VANCOMYCIN 125 MG: KIT at 17:32

## 2018-01-01 RX ADMIN — VANCOMYCIN 125 MG: KIT at 23:16

## 2018-01-01 RX ADMIN — ADENOSINE 6 MG: 3 INJECTION, SOLUTION INTRAVENOUS at 15:44

## 2018-01-01 RX ADMIN — FLUTICASONE FUROATE AND VILANTEROL TRIFENATATE 1 PUFF: 200; 25 POWDER RESPIRATORY (INHALATION) at 07:42

## 2018-01-01 RX ADMIN — CALCIUM CARBONATE-VITAMIN D TAB 500 MG-200 UNIT 2 TABLET: 500-200 TAB at 09:50

## 2018-01-01 RX ADMIN — HEPARIN 5 ML: 100 SYRINGE at 12:58

## 2018-01-01 RX ADMIN — SODIUM CHLORIDE 1000 ML: 9 INJECTION, SOLUTION INTRAVENOUS at 11:28

## 2018-01-01 RX ADMIN — MORPHINE SULFATE 4 MG: 2 INJECTION, SOLUTION INTRAMUSCULAR; INTRAVENOUS at 01:00

## 2018-01-01 RX ADMIN — SODIUM CHLORIDE: 9 INJECTION, SOLUTION INTRAVENOUS at 08:36

## 2018-01-01 RX ADMIN — ADENOSINE 12 MG: 3 INJECTION, SOLUTION INTRAVENOUS at 15:38

## 2018-01-01 RX ADMIN — OXYBUTYNIN CHLORIDE 10 MG: 5 TABLET, EXTENDED RELEASE ORAL at 08:56

## 2018-01-01 RX ADMIN — VANCOMYCIN 125 MG: KIT at 11:57

## 2018-01-01 RX ADMIN — MIRTAZAPINE 7.5 MG: 7.5 TABLET ORAL at 22:46

## 2018-01-01 RX ADMIN — DICLOFENAC SODIUM 2 G: 10 GEL TOPICAL at 23:58

## 2018-01-01 RX ADMIN — HYDROCODONE BITARTRATE AND ACETAMINOPHEN 1 TABLET: 5; 325 TABLET ORAL at 13:56

## 2018-01-01 RX ADMIN — DEXTROSE MONOHYDRATE 250 ML: 50 INJECTION, SOLUTION INTRAVENOUS at 09:54

## 2018-01-01 RX ADMIN — ALBUTEROL SULFATE 2.5 MG: 2.5 SOLUTION RESPIRATORY (INHALATION) at 09:44

## 2018-01-01 RX ADMIN — DICLOFENAC SODIUM 2 G: 10 GEL TOPICAL at 21:56

## 2018-01-01 RX ADMIN — FAMOTIDINE 20 MG: 20 INJECTION, SOLUTION INTRAVENOUS at 11:08

## 2018-01-01 RX ADMIN — SODIUM CHLORIDE, PRESERVATIVE FREE 5 ML: 5 INJECTION INTRAVENOUS at 10:55

## 2018-01-01 RX ADMIN — VANCOMYCIN 125 MG: KIT at 12:34

## 2018-01-01 RX ADMIN — DRONABINOL 2.5 MG: 2.5 CAPSULE ORAL at 10:44

## 2018-01-01 RX ADMIN — ADENOSINE 6 MG: 3 INJECTION, SOLUTION INTRAVENOUS at 15:52

## 2018-01-01 RX ADMIN — METRONIDAZOLE: 7.5 GEL VAGINAL at 19:45

## 2018-01-01 RX ADMIN — MIDAZOLAM 2 MG: 1 INJECTION INTRAMUSCULAR; INTRAVENOUS at 10:00

## 2018-01-01 RX ADMIN — PHYTONADIONE 1 MG: 2 INJECTION, EMULSION INTRAMUSCULAR; INTRAVENOUS; SUBCUTANEOUS at 14:28

## 2018-01-01 RX ADMIN — SODIUM CHLORIDE 250 ML: 9 INJECTION, SOLUTION INTRAVENOUS at 09:36

## 2018-01-01 RX ADMIN — DRONABINOL 2.5 MG: 2.5 CAPSULE ORAL at 11:10

## 2018-01-01 RX ADMIN — DEXAMETHASONE SODIUM PHOSPHATE: 10 INJECTION, SOLUTION INTRAMUSCULAR; INTRAVENOUS at 10:55

## 2018-01-01 RX ADMIN — PACLITAXEL 200 MG: 100 INJECTION, POWDER, LYOPHILIZED, FOR SUSPENSION INTRAVENOUS at 11:15

## 2018-01-01 RX ADMIN — POTASSIUM CHLORIDE AND SODIUM CHLORIDE: 900; 150 INJECTION, SOLUTION INTRAVENOUS at 21:01

## 2018-01-01 RX ADMIN — SODIUM CHLORIDE 250 ML: 9 INJECTION, SOLUTION INTRAVENOUS at 09:12

## 2018-01-01 RX ADMIN — ZOLPIDEM TARTRATE 5 MG: 5 TABLET, FILM COATED ORAL at 21:15

## 2018-01-01 RX ADMIN — CEFTRIAXONE SODIUM 1 G: 1 INJECTION, SOLUTION INTRAVENOUS at 16:09

## 2018-01-01 RX ADMIN — GUAIFENESIN AND DEXTROMETHORPHAN 10 ML: 100; 10 SYRUP ORAL at 09:26

## 2018-01-01 RX ADMIN — METHYLPHENIDATE HYDROCHLORIDE 10 MG: 10 TABLET ORAL at 08:56

## 2018-01-01 RX ADMIN — MIRTAZAPINE 7.5 MG: 7.5 TABLET ORAL at 21:16

## 2018-01-01 RX ADMIN — PACLITAXEL 200 MG: 100 INJECTION, POWDER, LYOPHILIZED, FOR SUSPENSION INTRAVENOUS at 09:35

## 2018-01-01 RX ADMIN — ENOXAPARIN SODIUM 90 MG: 100 INJECTION SUBCUTANEOUS at 03:59

## 2018-01-01 RX ADMIN — MORPHINE SULFATE 2 MG: 2 INJECTION, SOLUTION INTRAMUSCULAR; INTRAVENOUS at 17:06

## 2018-01-01 RX ADMIN — FAMOTIDINE 20 MG: 10 INJECTION, SOLUTION INTRAVENOUS at 10:31

## 2018-01-01 RX ADMIN — METHYLPHENIDATE HYDROCHLORIDE 10 MG: 10 TABLET ORAL at 08:14

## 2018-01-01 RX ADMIN — DEXAMETHASONE SODIUM PHOSPHATE 12 MG: 10 INJECTION, SOLUTION INTRAMUSCULAR; INTRAVENOUS at 09:08

## 2018-01-01 RX ADMIN — VANCOMYCIN 125 MG: KIT at 08:19

## 2018-01-01 RX ADMIN — OXYBUTYNIN CHLORIDE 10 MG: 5 TABLET, EXTENDED RELEASE ORAL at 07:56

## 2018-01-01 RX ADMIN — SODIUM CHLORIDE: 9 INJECTION, SOLUTION INTRAVENOUS at 21:59

## 2018-01-01 RX ADMIN — VANCOMYCIN HYDROCHLORIDE 125 MG: KIT at 18:31

## 2018-01-01 RX ADMIN — FENTANYL CITRATE 100 MCG: 50 INJECTION, SOLUTION INTRAMUSCULAR; INTRAVENOUS at 09:57

## 2018-01-01 RX ADMIN — OXYBUTYNIN CHLORIDE 10 MG: 5 TABLET, EXTENDED RELEASE ORAL at 08:04

## 2018-01-01 RX ADMIN — FLUTICASONE FUROATE AND VILANTEROL TRIFENATATE 1 PUFF: 200; 25 POWDER RESPIRATORY (INHALATION) at 08:05

## 2018-01-01 RX ADMIN — VANCOMYCIN 125 MG: KIT at 08:36

## 2018-01-01 RX ADMIN — VANCOMYCIN 125 MG: KIT at 08:55

## 2018-01-01 RX ADMIN — METHYLPHENIDATE HYDROCHLORIDE 5 MG: 5 TABLET ORAL at 08:55

## 2018-01-01 RX ADMIN — SODIUM CHLORIDE 250 ML: 9 INJECTION, SOLUTION INTRAVENOUS at 09:08

## 2018-01-01 RX ADMIN — DRONABINOL 2.5 MG: 2.5 CAPSULE ORAL at 16:35

## 2018-01-01 RX ADMIN — ENOXAPARIN SODIUM 90 MG: 100 INJECTION SUBCUTANEOUS at 15:42

## 2018-01-01 RX ADMIN — PACLITAXEL 200 MG: 100 INJECTION, POWDER, LYOPHILIZED, FOR SUSPENSION INTRAVENOUS at 11:29

## 2018-01-01 RX ADMIN — MIRTAZAPINE 7.5 MG: 7.5 TABLET ORAL at 21:45

## 2018-01-01 RX ADMIN — FAMOTIDINE 20 MG: 10 INJECTION, SOLUTION INTRAVENOUS at 09:37

## 2018-01-01 RX ADMIN — VANCOMYCIN HYDROCHLORIDE 2000 MG: 10 INJECTION, POWDER, LYOPHILIZED, FOR SOLUTION INTRAVENOUS at 19:50

## 2018-01-01 RX ADMIN — FAMOTIDINE 20 MG: 20 INJECTION, SOLUTION INTRAVENOUS at 09:22

## 2018-01-01 RX ADMIN — SODIUM CHLORIDE 1000 ML: 9 INJECTION, SOLUTION INTRAVENOUS at 13:58

## 2018-01-01 RX ADMIN — MORPHINE SULFATE 2 MG: 2 INJECTION, SOLUTION INTRAMUSCULAR; INTRAVENOUS at 02:41

## 2018-01-01 RX ADMIN — HYDROCODONE BITARTRATE AND ACETAMINOPHEN 1 TABLET: 5; 325 TABLET ORAL at 10:40

## 2018-01-01 RX ADMIN — VANCOMYCIN 125 MG: KIT at 08:10

## 2018-01-01 RX ADMIN — DRONABINOL 2.5 MG: 2.5 CAPSULE ORAL at 16:12

## 2018-01-01 RX ADMIN — DEXTROSE MONOHYDRATE 100 MG: 50 INJECTION, SOLUTION INTRAVENOUS at 10:29

## 2018-01-01 RX ADMIN — IOPAMIDOL 81 ML: 755 INJECTION, SOLUTION INTRAVENOUS at 12:05

## 2018-01-01 RX ADMIN — VANCOMYCIN 125 MG: KIT at 21:16

## 2018-01-01 RX ADMIN — SODIUM CHLORIDE 250 ML: 9 INJECTION, SOLUTION INTRAVENOUS at 10:39

## 2018-01-01 RX ADMIN — MORPHINE SULFATE 15 MG: 15 TABLET, EXTENDED RELEASE ORAL at 09:50

## 2018-01-01 RX ADMIN — MORPHINE SULFATE 2 MG: 2 INJECTION, SOLUTION INTRAMUSCULAR; INTRAVENOUS at 21:56

## 2018-01-01 RX ADMIN — METHYLPHENIDATE HYDROCHLORIDE 10 MG: 10 TABLET ORAL at 08:45

## 2018-01-01 RX ADMIN — LEVOTHYROXINE SODIUM 25 MCG: 25 TABLET ORAL at 05:55

## 2018-01-01 RX ADMIN — SODIUM CHLORIDE 250 ML: 9 INJECTION, SOLUTION INTRAVENOUS at 10:50

## 2018-01-01 RX ADMIN — VANCOMYCIN 125 MG: KIT at 22:01

## 2018-01-01 RX ADMIN — OXYBUTYNIN CHLORIDE 10 MG: 5 TABLET, EXTENDED RELEASE ORAL at 08:22

## 2018-01-01 RX ADMIN — MIRTAZAPINE 7.5 MG: 7.5 TABLET ORAL at 22:40

## 2018-01-01 RX ADMIN — SODIUM CHLORIDE 104 ML: 9 INJECTION, SOLUTION INTRAVENOUS at 12:05

## 2018-01-01 RX ADMIN — SODIUM CHLORIDE 1000 ML: 9 INJECTION, SOLUTION INTRAVENOUS at 00:35

## 2018-01-01 RX ADMIN — VANCOMYCIN 125 MG: KIT at 13:15

## 2018-01-01 RX ADMIN — HYDROCODONE BITARTRATE AND ACETAMINOPHEN 2 TABLET: 5; 325 TABLET ORAL at 20:55

## 2018-01-01 RX ADMIN — FAMOTIDINE 20 MG: 20 INJECTION, SOLUTION INTRAVENOUS at 10:51

## 2018-01-01 RX ADMIN — DRONABINOL 2.5 MG: 2.5 CAPSULE ORAL at 11:05

## 2018-01-01 RX ADMIN — HEPARIN 5 ML: 100 SYRINGE at 12:40

## 2018-01-01 RX ADMIN — VANCOMYCIN 125 MG: KIT at 18:02

## 2018-01-01 RX ADMIN — LEVOTHYROXINE SODIUM 25 MCG: 25 TABLET ORAL at 06:45

## 2018-01-01 RX ADMIN — SODIUM CHLORIDE, POTASSIUM CHLORIDE, SODIUM LACTATE AND CALCIUM CHLORIDE 1000 ML: 600; 310; 30; 20 INJECTION, SOLUTION INTRAVENOUS at 10:31

## 2018-01-01 RX ADMIN — VANCOMYCIN 125 MG: KIT at 21:36

## 2018-01-01 RX ADMIN — DEXAMETHASONE SODIUM PHOSPHATE 12 MG: 10 INJECTION, SOLUTION INTRAMUSCULAR; INTRAVENOUS at 09:12

## 2018-01-01 RX ADMIN — VANCOMYCIN 125 MG: KIT at 13:10

## 2018-01-01 RX ADMIN — LEVOTHYROXINE SODIUM 25 MCG: 25 TABLET ORAL at 05:35

## 2018-01-01 RX ADMIN — ENOXAPARIN SODIUM 90 MG: 100 INJECTION SUBCUTANEOUS at 22:29

## 2018-01-01 RX ADMIN — OXYBUTYNIN CHLORIDE 10 MG: 5 TABLET, EXTENDED RELEASE ORAL at 07:39

## 2018-01-01 RX ADMIN — FAMOTIDINE 20 MG: 20 INJECTION, SOLUTION INTRAVENOUS at 10:12

## 2018-01-01 RX ADMIN — VANCOMYCIN HYDROCHLORIDE 125 MG: KIT at 13:02

## 2018-01-01 RX ADMIN — LIDOCAINE HYDROCHLORIDE 8 ML: 10 INJECTION, SOLUTION EPIDURAL; INFILTRATION; INTRACAUDAL; PERINEURAL at 15:08

## 2018-01-01 RX ADMIN — DRONABINOL 2.5 MG: 2.5 CAPSULE ORAL at 18:02

## 2018-01-01 RX ADMIN — GUAIFENESIN AND DEXTROMETHORPHAN 10 ML: 100; 10 SYRUP ORAL at 04:04

## 2018-01-01 RX ADMIN — LEVOTHYROXINE SODIUM 25 MCG: 25 TABLET ORAL at 06:51

## 2018-01-01 RX ADMIN — DEXAMETHASONE SODIUM PHOSPHATE: 10 INJECTION, SOLUTION INTRAMUSCULAR; INTRAVENOUS at 11:10

## 2018-01-01 RX ADMIN — VANCOMYCIN HYDROCHLORIDE 125 MG: KIT at 08:18

## 2018-01-01 RX ADMIN — VANCOMYCIN 125 MG: KIT at 00:13

## 2018-01-01 RX ADMIN — HYDROCODONE BITARTRATE AND ACETAMINOPHEN 1 TABLET: 5; 325 TABLET ORAL at 08:20

## 2018-01-01 RX ADMIN — DEXAMETHASONE SODIUM PHOSPHATE 12 MG: 10 INJECTION, SOLUTION INTRAMUSCULAR; INTRAVENOUS at 08:48

## 2018-01-01 RX ADMIN — GUAIFENESIN AND DEXTROMETHORPHAN 10 ML: 100; 10 SYRUP ORAL at 21:39

## 2018-01-01 RX ADMIN — MIRTAZAPINE 7.5 MG: 7.5 TABLET ORAL at 21:15

## 2018-01-01 RX ADMIN — METHYLPHENIDATE HYDROCHLORIDE 10 MG: 10 TABLET ORAL at 12:16

## 2018-01-01 RX ADMIN — OXYBUTYNIN CHLORIDE 10 MG: 5 TABLET, EXTENDED RELEASE ORAL at 08:55

## 2018-01-01 RX ADMIN — HEPARIN, PORCINE (PF) 10 UNIT/ML INTRAVENOUS SYRINGE 5 ML: at 09:36

## 2018-01-01 RX ADMIN — DICLOFENAC SODIUM 2 G: 10 GEL TOPICAL at 08:53

## 2018-01-01 RX ADMIN — POTASSIUM CHLORIDE AND SODIUM CHLORIDE: 900; 150 INJECTION, SOLUTION INTRAVENOUS at 16:50

## 2018-01-01 RX ADMIN — HEPARIN, PORCINE (PF) 10 UNIT/ML INTRAVENOUS SYRINGE 5 ML: at 18:00

## 2018-01-01 RX ADMIN — METRONIDAZOLE: 7.5 GEL VAGINAL at 07:30

## 2018-01-01 RX ADMIN — DRONABINOL 2.5 MG: 2.5 CAPSULE ORAL at 11:41

## 2018-01-01 RX ADMIN — METRONIDAZOLE: 7.5 GEL VAGINAL at 07:43

## 2018-01-01 RX ADMIN — SODIUM CHLORIDE 250 ML: 9 INJECTION, SOLUTION INTRAVENOUS at 08:48

## 2018-01-01 RX ADMIN — CEFTRIAXONE SODIUM 1 G: 1 INJECTION, SOLUTION INTRAVENOUS at 16:12

## 2018-01-01 RX ADMIN — METHYLPHENIDATE HYDROCHLORIDE 10 MG: 10 TABLET ORAL at 08:18

## 2018-01-01 RX ADMIN — SODIUM CHLORIDE 1000 ML: 9 INJECTION, SOLUTION INTRAVENOUS at 16:46

## 2018-01-01 RX ADMIN — METHYLPHENIDATE HYDROCHLORIDE 10 MG: 10 TABLET ORAL at 12:37

## 2018-01-01 RX ADMIN — VANCOMYCIN HYDROCHLORIDE 125 MG: KIT at 11:29

## 2018-01-01 RX ADMIN — FAMOTIDINE 20 MG: 10 INJECTION, SOLUTION INTRAVENOUS at 09:12

## 2018-01-01 RX ADMIN — HEPARIN, PORCINE (PF) 10 UNIT/ML INTRAVENOUS SYRINGE 5 ML: at 19:07

## 2018-01-01 RX ADMIN — VANCOMYCIN HYDROCHLORIDE 125 MG: KIT at 17:55

## 2018-01-01 RX ADMIN — CALCIUM CARBONATE-VITAMIN D TAB 500 MG-200 UNIT 2 TABLET: 500-200 TAB at 08:22

## 2018-01-01 RX ADMIN — LEVOTHYROXINE SODIUM 25 MCG: 25 TABLET ORAL at 06:19

## 2018-01-01 RX ADMIN — GUAIFENESIN AND DEXTROMETHORPHAN 10 ML: 100; 10 SYRUP ORAL at 08:03

## 2018-01-01 RX ADMIN — ONDANSETRON 4 MG: 2 INJECTION INTRAMUSCULAR; INTRAVENOUS at 13:32

## 2018-01-01 RX ADMIN — DICLOFENAC SODIUM 2 G: 10 GEL TOPICAL at 21:00

## 2018-01-01 RX ADMIN — VANCOMYCIN 125 MG: KIT at 16:43

## 2018-01-01 RX ADMIN — GEMCITABINE 1970 MG: 38 INJECTION, SOLUTION INTRAVENOUS at 11:44

## 2018-01-01 RX ADMIN — GUAIFENESIN AND DEXTROMETHORPHAN 10 ML: 100; 10 SYRUP ORAL at 22:07

## 2018-01-01 RX ADMIN — FLUTICASONE FUROATE AND VILANTEROL TRIFENATATE 1 PUFF: 200; 25 POWDER RESPIRATORY (INHALATION) at 07:31

## 2018-01-01 RX ADMIN — METRONIDAZOLE: 7.5 GEL VAGINAL at 21:04

## 2018-01-01 RX ADMIN — VANCOMYCIN HYDROCHLORIDE 125 MG: KIT at 00:02

## 2018-01-01 RX ADMIN — OXYBUTYNIN CHLORIDE 10 MG: 5 TABLET, EXTENDED RELEASE ORAL at 09:50

## 2018-01-01 RX ADMIN — METHYLPHENIDATE HYDROCHLORIDE 10 MG: 10 TABLET ORAL at 07:40

## 2018-01-01 RX ADMIN — CARBOPLATIN 640 MG: 10 INJECTION, SOLUTION INTRAVENOUS at 12:14

## 2018-01-01 RX ADMIN — DEXAMETHASONE SODIUM PHOSPHATE 12 MG: 10 INJECTION, SOLUTION INTRAMUSCULAR; INTRAVENOUS at 09:21

## 2018-01-01 RX ADMIN — CEFTRIAXONE SODIUM 1 G: 1 INJECTION, SOLUTION INTRAVENOUS at 16:01

## 2018-01-01 RX ADMIN — HEPARIN 5 ML: 100 SYRINGE at 11:12

## 2018-01-01 RX ADMIN — METHYLPHENIDATE HYDROCHLORIDE 10 MG: 10 TABLET ORAL at 12:50

## 2018-01-01 RX ADMIN — METRONIDAZOLE: 7.5 GEL VAGINAL at 08:53

## 2018-01-01 RX ADMIN — SODIUM CHLORIDE: 9 INJECTION, SOLUTION INTRAVENOUS at 13:58

## 2018-01-01 RX ADMIN — DEXAMETHASONE SODIUM PHOSPHATE 12 MG: 10 INJECTION, SOLUTION INTRAMUSCULAR; INTRAVENOUS at 09:54

## 2018-01-01 RX ADMIN — MORPHINE SULFATE 15 MG: 15 TABLET, EXTENDED RELEASE ORAL at 21:19

## 2018-01-01 RX ADMIN — PACLITAXEL 200 MG: 100 INJECTION, POWDER, LYOPHILIZED, FOR SUSPENSION INTRAVENOUS at 10:01

## 2018-01-01 RX ADMIN — LIDOCAINE HYDROCHLORIDE 8 ML: 10 INJECTION, SOLUTION EPIDURAL; INFILTRATION; INTRACAUDAL; PERINEURAL at 10:01

## 2018-01-01 RX ADMIN — ZOLPIDEM TARTRATE 10 MG: 5 TABLET, FILM COATED ORAL at 21:29

## 2018-01-01 RX ADMIN — DICLOFENAC SODIUM 2 G: 10 GEL TOPICAL at 11:23

## 2018-01-01 RX ADMIN — HYDROCODONE BITARTRATE AND ACETAMINOPHEN 1 TABLET: 5; 325 TABLET ORAL at 12:18

## 2018-01-01 RX ADMIN — VANCOMYCIN HYDROCHLORIDE 125 MG: KIT at 21:47

## 2018-01-01 RX ADMIN — SODIUM CHLORIDE 250 ML: 9 INJECTION, SOLUTION INTRAVENOUS at 10:55

## 2018-01-01 RX ADMIN — HEPARIN, PORCINE (PF) 10 UNIT/ML INTRAVENOUS SYRINGE 5 ML: at 08:01

## 2018-01-01 RX ADMIN — DRONABINOL 2.5 MG: 2.5 CAPSULE ORAL at 11:57

## 2018-01-01 RX ADMIN — OXYBUTYNIN CHLORIDE 10 MG: 5 TABLET, EXTENDED RELEASE ORAL at 08:18

## 2018-01-01 RX ADMIN — PACLITAXEL 200 MG: 100 INJECTION, POWDER, LYOPHILIZED, FOR SUSPENSION INTRAVENOUS at 10:15

## 2018-01-01 RX ADMIN — HYDROCODONE BITARTRATE AND ACETAMINOPHEN 2 TABLET: 5; 325 TABLET ORAL at 00:55

## 2018-01-01 RX ADMIN — VANCOMYCIN 125 MG: KIT at 08:42

## 2018-01-01 RX ADMIN — POTASSIUM CHLORIDE AND SODIUM CHLORIDE: 900; 150 INJECTION, SOLUTION INTRAVENOUS at 09:53

## 2018-01-01 RX ADMIN — VANCOMYCIN 125 MG: KIT at 12:18

## 2018-01-01 RX ADMIN — SODIUM CHLORIDE, PRESERVATIVE FREE 500 UNITS: 5 INJECTION INTRAVENOUS at 10:59

## 2018-01-01 RX ADMIN — CALCIUM CARBONATE-VITAMIN D TAB 500 MG-200 UNIT 2 TABLET: 500-200 TAB at 07:39

## 2018-01-01 RX ADMIN — CALCIUM CARBONATE-VITAMIN D TAB 500 MG-200 UNIT 2 TABLET: 500-200 TAB at 08:18

## 2018-01-01 RX ADMIN — METHYLPHENIDATE HYDROCHLORIDE 10 MG: 10 TABLET ORAL at 13:40

## 2018-01-01 RX ADMIN — HYDROCODONE BITARTRATE AND ACETAMINOPHEN 1 TABLET: 5; 325 TABLET ORAL at 18:30

## 2018-01-01 RX ADMIN — PROPOFOL 100 MCG/KG/MIN: 10 INJECTION, EMULSION INTRAVENOUS at 10:02

## 2018-01-01 RX ADMIN — CEFTRIAXONE SODIUM 1 G: 1 INJECTION, SOLUTION INTRAVENOUS at 03:59

## 2018-01-01 RX ADMIN — SODIUM CHLORIDE, PRESERVATIVE FREE 500 UNITS: 5 INJECTION INTRAVENOUS at 12:17

## 2018-01-01 RX ADMIN — LEVOTHYROXINE SODIUM 25 MCG: 25 TABLET ORAL at 06:20

## 2018-01-01 RX ADMIN — LEVOTHYROXINE SODIUM 25 MCG: 25 TABLET ORAL at 06:25

## 2018-01-01 RX ADMIN — VANCOMYCIN HYDROCHLORIDE 125 MG: KIT at 07:39

## 2018-01-01 RX ADMIN — HUMAN ALBUMIN MICROSPHERES AND PERFLUTREN 2 ML: 10; .22 INJECTION, SOLUTION INTRAVENOUS at 11:14

## 2018-01-01 RX ADMIN — SODIUM CHLORIDE, POTASSIUM CHLORIDE, SODIUM LACTATE AND CALCIUM CHLORIDE 1000 ML: 600; 310; 30; 20 INJECTION, SOLUTION INTRAVENOUS at 11:32

## 2018-01-01 RX ADMIN — VANCOMYCIN 125 MG: KIT at 12:28

## 2018-01-01 RX ADMIN — CEFAZOLIN SODIUM 2 G: 2 INJECTION, SOLUTION INTRAVENOUS at 09:57

## 2018-01-01 RX ADMIN — FLUTICASONE FUROATE AND VILANTEROL TRIFENATATE 1 PUFF: 200; 25 POWDER RESPIRATORY (INHALATION) at 08:49

## 2018-01-01 RX ADMIN — SODIUM CHLORIDE, PRESERVATIVE FREE 500 UNITS: 5 INJECTION INTRAVENOUS at 09:44

## 2018-01-01 RX ADMIN — CEFTRIAXONE SODIUM 1 G: 1 INJECTION, SOLUTION INTRAVENOUS at 16:35

## 2018-01-01 RX ADMIN — DRONABINOL 2.5 MG: 2.5 CAPSULE ORAL at 16:26

## 2018-01-01 RX ADMIN — SODIUM CHLORIDE 500 ML: 9 INJECTION, SOLUTION INTRAVENOUS at 06:50

## 2018-01-01 RX ADMIN — SODIUM CHLORIDE, PRESERVATIVE FREE 5 ML: 5 INJECTION INTRAVENOUS at 10:18

## 2018-01-01 RX ADMIN — POTASSIUM CHLORIDE AND SODIUM CHLORIDE: 900; 150 INJECTION, SOLUTION INTRAVENOUS at 16:36

## 2018-01-01 RX ADMIN — METRONIDAZOLE: 7.5 GEL VAGINAL at 20:50

## 2018-01-01 RX ADMIN — TAZOBACTAM SODIUM AND PIPERACILLIN SODIUM 4.5 G: 500; 4 INJECTION, SOLUTION INTRAVENOUS at 16:28

## 2018-01-01 RX ADMIN — CARBOPLATIN 530 MG: 10 INJECTION, SOLUTION INTRAVENOUS at 12:21

## 2018-01-01 RX ADMIN — ZOLPIDEM TARTRATE 5 MG: 5 TABLET, FILM COATED ORAL at 22:17

## 2018-01-01 RX ADMIN — LIDOCAINE HYDROCHLORIDE: 10 INJECTION, SOLUTION EPIDURAL; INFILTRATION; INTRACAUDAL; PERINEURAL at 09:21

## 2018-01-01 RX ADMIN — FAMOTIDINE 20 MG: 10 INJECTION, SOLUTION INTRAVENOUS at 09:18

## 2018-01-01 RX ADMIN — SODIUM CHLORIDE 1000 ML: 9 INJECTION, SOLUTION INTRAVENOUS at 15:16

## 2018-01-01 RX ADMIN — FLUTICASONE FUROATE AND VILANTEROL TRIFENATATE 1 PUFF: 200; 25 POWDER RESPIRATORY (INHALATION) at 08:25

## 2018-01-01 RX ADMIN — PACLITAXEL 200 MG: 100 INJECTION, POWDER, LYOPHILIZED, FOR SUSPENSION INTRAVENOUS at 10:32

## 2018-01-01 RX ADMIN — NOREPINEPHRINE BITARTRATE 0.03 MCG/KG/MIN: 1 INJECTION INTRAVENOUS at 07:28

## 2018-01-01 RX ADMIN — METRONIDAZOLE: 7.5 GEL VAGINAL at 18:51

## 2018-01-01 RX ADMIN — GLYCOPYRROLATE 0.2 MG: 0.2 INJECTION, SOLUTION INTRAMUSCULAR; INTRAVENOUS at 11:22

## 2018-01-01 RX ADMIN — IOPAMIDOL 92 ML: 755 INJECTION, SOLUTION INTRAVENOUS at 09:33

## 2018-01-01 RX ADMIN — MORPHINE SULFATE 2 MG: 2 INJECTION, SOLUTION INTRAMUSCULAR; INTRAVENOUS at 11:36

## 2018-01-01 RX ADMIN — VANCOMYCIN HYDROCHLORIDE 2000 MG: 10 INJECTION, POWDER, LYOPHILIZED, FOR SOLUTION INTRAVENOUS at 17:17

## 2018-01-01 RX ADMIN — VANCOMYCIN 125 MG: KIT at 12:16

## 2018-01-01 RX ADMIN — VANCOMYCIN HYDROCHLORIDE 125 MG: KIT at 21:00

## 2018-01-01 RX ADMIN — OXYBUTYNIN CHLORIDE 10 MG: 5 TABLET, EXTENDED RELEASE ORAL at 08:10

## 2018-01-01 RX ADMIN — DEXAMETHASONE SODIUM PHOSPHATE 12 MG: 10 INJECTION, SOLUTION INTRAMUSCULAR; INTRAVENOUS at 10:40

## 2018-01-01 RX ADMIN — POTASSIUM CHLORIDE AND SODIUM CHLORIDE: 900; 150 INJECTION, SOLUTION INTRAVENOUS at 06:53

## 2018-01-01 RX ADMIN — FAMOTIDINE 20 MG: 20 INJECTION, SOLUTION INTRAVENOUS at 09:03

## 2018-01-01 RX ADMIN — SODIUM CHLORIDE, PRESERVATIVE FREE 5 ML: 5 INJECTION INTRAVENOUS at 10:51

## 2018-01-01 RX ADMIN — VANCOMYCIN 125 MG: KIT at 09:49

## 2018-01-01 RX ADMIN — FAMOTIDINE 20 MG: 10 INJECTION, SOLUTION INTRAVENOUS at 10:59

## 2018-01-01 RX ADMIN — ONDANSETRON 4 MG: 4 TABLET, ORALLY DISINTEGRATING ORAL at 13:55

## 2018-01-01 RX ADMIN — MORPHINE SULFATE 2 MG: 2 INJECTION, SOLUTION INTRAMUSCULAR; INTRAVENOUS at 20:37

## 2018-01-01 RX ADMIN — SODIUM CHLORIDE, PRESERVATIVE FREE 500 UNITS: 5 INJECTION INTRAVENOUS at 12:33

## 2018-01-01 RX ADMIN — FLUTICASONE FUROATE AND VILANTEROL TRIFENATATE 1 PUFF: 200; 25 POWDER RESPIRATORY (INHALATION) at 20:45

## 2018-01-01 RX ADMIN — MIRTAZAPINE 7.5 MG: 7.5 TABLET ORAL at 21:47

## 2018-01-01 RX ADMIN — MORPHINE SULFATE 15 MG: 15 TABLET, EXTENDED RELEASE ORAL at 20:45

## 2018-01-01 RX ADMIN — CALCIUM CARBONATE-VITAMIN D TAB 500 MG-200 UNIT 2 TABLET: 500-200 TAB at 08:56

## 2018-01-01 RX ADMIN — DEXAMETHASONE SODIUM PHOSPHATE 12 MG: 10 INJECTION, SOLUTION INTRAMUSCULAR; INTRAVENOUS at 09:39

## 2018-01-01 RX ADMIN — CALCIUM CARBONATE-VITAMIN D TAB 500 MG-200 UNIT 2 TABLET: 500-200 TAB at 08:43

## 2018-01-01 RX ADMIN — POTASSIUM CHLORIDE AND SODIUM CHLORIDE: 900; 150 INJECTION, SOLUTION INTRAVENOUS at 06:41

## 2018-01-01 RX ADMIN — VANCOMYCIN HYDROCHLORIDE 1750 MG: 100 INJECTION, POWDER, LYOPHILIZED, FOR SOLUTION INTRAVENOUS at 06:20

## 2018-01-01 RX ADMIN — SODIUM CHLORIDE, PRESERVATIVE FREE 5 ML: 5 INJECTION INTRAVENOUS at 10:23

## 2018-01-01 RX ADMIN — SODIUM CHLORIDE, PRESERVATIVE FREE 500 UNITS: 5 INJECTION INTRAVENOUS at 13:10

## 2018-01-01 RX ADMIN — SODIUM CHLORIDE: 9 INJECTION, SOLUTION INTRAVENOUS at 05:31

## 2018-01-01 RX ADMIN — POTASSIUM CHLORIDE AND SODIUM CHLORIDE: 900; 150 INJECTION, SOLUTION INTRAVENOUS at 13:33

## 2018-01-01 RX ADMIN — CALCIUM CARBONATE-VITAMIN D TAB 500 MG-200 UNIT 2 TABLET: 500-200 TAB at 08:55

## 2018-01-01 RX ADMIN — ZOLPIDEM TARTRATE 10 MG: 5 TABLET, FILM COATED ORAL at 23:14

## 2018-01-01 RX ADMIN — TAZOBACTAM SODIUM AND PIPERACILLIN SODIUM 4.5 G: 500; 4 INJECTION, SOLUTION INTRAVENOUS at 00:01

## 2018-01-01 RX ADMIN — VANCOMYCIN 125 MG: KIT at 22:46

## 2018-01-01 RX ADMIN — PACLITAXEL 200 MG: 100 INJECTION, POWDER, LYOPHILIZED, FOR SUSPENSION INTRAVENOUS at 09:29

## 2018-01-01 RX ADMIN — VANCOMYCIN HYDROCHLORIDE 125 MG: KIT at 12:17

## 2018-01-01 RX ADMIN — METHYLPHENIDATE HYDROCHLORIDE 10 MG: 10 TABLET ORAL at 07:56

## 2018-01-01 RX ADMIN — VANCOMYCIN 125 MG: KIT at 18:16

## 2018-01-01 RX ADMIN — DEXAMETHASONE SODIUM PHOSPHATE 12 MG: 10 INJECTION, SOLUTION INTRAMUSCULAR; INTRAVENOUS at 10:59

## 2018-01-01 RX ADMIN — SODIUM CHLORIDE: 9 INJECTION, SOLUTION INTRAVENOUS at 20:42

## 2018-01-01 RX ADMIN — SODIUM CHLORIDE, PRESERVATIVE FREE 5 ML: 5 INJECTION INTRAVENOUS at 10:26

## 2018-01-01 RX ADMIN — CALCIUM CARBONATE-VITAMIN D TAB 500 MG-200 UNIT 2 TABLET: 500-200 TAB at 07:56

## 2018-01-01 RX ADMIN — FLUTICASONE FUROATE AND VILANTEROL TRIFENATATE 1 PUFF: 200; 25 POWDER RESPIRATORY (INHALATION) at 08:20

## 2018-01-01 RX ADMIN — METHYLPHENIDATE HYDROCHLORIDE 10 MG: 10 TABLET ORAL at 13:02

## 2018-01-01 RX ADMIN — METHYLPHENIDATE HYDROCHLORIDE 10 MG: 10 TABLET ORAL at 14:08

## 2018-01-01 RX ADMIN — FLUTICASONE FUROATE AND VILANTEROL TRIFENATATE 1 PUFF: 200; 25 POWDER RESPIRATORY (INHALATION) at 08:57

## 2018-01-01 RX ADMIN — VANCOMYCIN 125 MG: KIT at 16:39

## 2018-01-01 RX ADMIN — SODIUM CHLORIDE, PRESERVATIVE FREE 5 ML: 5 INJECTION INTRAVENOUS at 15:00

## 2018-01-01 RX ADMIN — VANCOMYCIN 125 MG: KIT at 08:56

## 2018-01-01 RX ADMIN — ALBUTEROL SULFATE 2 PUFF: 90 AEROSOL, METERED RESPIRATORY (INHALATION) at 15:22

## 2018-01-01 RX ADMIN — SODIUM CHLORIDE 250 ML: 9 INJECTION, SOLUTION INTRAVENOUS at 09:17

## 2018-01-01 RX ADMIN — LORAZEPAM 1 MG: 2 INJECTION INTRAMUSCULAR; INTRAVENOUS at 02:15

## 2018-01-01 RX ADMIN — METRONIDAZOLE: 7.5 GEL VAGINAL at 08:21

## 2018-01-01 RX ADMIN — POTASSIUM CHLORIDE AND SODIUM CHLORIDE: 900; 150 INJECTION, SOLUTION INTRAVENOUS at 00:12

## 2018-01-01 RX ADMIN — VANCOMYCIN 125 MG: KIT at 18:08

## 2018-01-01 RX ADMIN — DRONABINOL 2.5 MG: 2.5 CAPSULE ORAL at 16:39

## 2018-01-01 RX ADMIN — GUAIFENESIN AND DEXTROMETHORPHAN 10 ML: 100; 10 SYRUP ORAL at 19:54

## 2018-01-01 RX ADMIN — DENOSUMAB 120 MG: 120 INJECTION SUBCUTANEOUS at 10:24

## 2018-01-01 RX ADMIN — SODIUM CHLORIDE 63 ML: 9 INJECTION, SOLUTION INTRAVENOUS at 09:32

## 2018-01-01 RX ADMIN — SODIUM CHLORIDE, PRESERVATIVE FREE 5 ML: 5 INJECTION INTRAVENOUS at 12:01

## 2018-01-01 RX ADMIN — MORPHINE SULFATE 15 MG: 15 TABLET, EXTENDED RELEASE ORAL at 08:22

## 2018-01-01 RX ADMIN — HYDROCODONE BITARTRATE AND ACETAMINOPHEN 1 TABLET: 5; 325 TABLET ORAL at 21:13

## 2018-01-01 RX ADMIN — FLUTICASONE FUROATE AND VILANTEROL TRIFENATATE 1 PUFF: 200; 25 POWDER RESPIRATORY (INHALATION) at 08:58

## 2018-01-01 RX ADMIN — VANCOMYCIN 125 MG: KIT at 08:03

## 2018-01-01 RX ADMIN — METRONIDAZOLE: 7.5 GEL VAGINAL at 20:16

## 2018-01-01 RX ADMIN — FLUTICASONE FUROATE AND VILANTEROL TRIFENATATE 1 PUFF: 200; 25 POWDER RESPIRATORY (INHALATION) at 08:18

## 2018-01-01 RX ADMIN — METHYLPHENIDATE HYDROCHLORIDE 10 MG: 10 TABLET ORAL at 08:10

## 2018-01-01 RX ADMIN — PHYTONADIONE 5 MG: 10 INJECTION, EMULSION INTRAMUSCULAR; INTRAVENOUS; SUBCUTANEOUS at 09:14

## 2018-01-01 RX ADMIN — METHYLPHENIDATE HYDROCHLORIDE 10 MG: 10 TABLET ORAL at 12:33

## 2018-01-01 ASSESSMENT — PAIN SCALES - GENERAL
PAINLEVEL: NO PAIN (0)
PAINLEVEL: MILD PAIN (3)
PAINLEVEL: NO PAIN (0)
PAINLEVEL: MODERATE PAIN (5)
PAINLEVEL: SEVERE PAIN (7)
PAINLEVEL: SEVERE PAIN (7)

## 2018-01-01 ASSESSMENT — ENCOUNTER SYMPTOMS
NAUSEA: 1
SHORTNESS OF BREATH: 0
BREAST PAIN: 1
DYSURIA: 0
NAIL CHANGES: 0
MUSCLE WEAKNESS: 0
ABDOMINAL PAIN: 1
WEIGHT GAIN: 0
ARTHRALGIAS: 0
FEVER: 0
FATIGUE: 1
COLOR CHANGE: 0
ABDOMINAL PAIN: 1
HALLUCINATIONS: 0
ABDOMINAL PAIN: 0
VOMITING: 0
WHEEZING: 1
APPETITE CHANGE: 1
FATIGUE: 1
SPUTUM PRODUCTION: 0
MYALGIAS: 0
ACTIVITY CHANGE: 1
COUGH DISTURBING SLEEP: 1
SHORTNESS OF BREATH: 1
SKIN CHANGES: 0
VOMITING: 1
JAUNDICE: 0
DIZZINESS: 1
CHILLS: 1
EYE DISCHARGE: 0
DYSPNEA ON EXERTION: 0
NAUSEA: 1
BOWEL INCONTINENCE: 1
SEIZURES: 0
CONSTIPATION: 0
POOR WOUND HEALING: 0
PALPITATIONS: 0
POLYPHAGIA: 0
DECREASED APPETITE: 1
POLYDIPSIA: 0
WEIGHT LOSS: 1
STIFFNESS: 0
SNORES LOUDLY: 1
INCREASED ENERGY: 0
FREQUENCY: 0
JOINT SWELLING: 0
CONSTIPATION: 0
CHILLS: 1
SHORTNESS OF BREATH: 0
BLOOD IN STOOL: 0
EYE REDNESS: 0
STRIDOR: 0
FEVER: 0
BACK PAIN: 1
HEADACHES: 0
BLOATING: 0
NUMBNESS: 0
HEARTBURN: 0
ALTERED TEMPERATURE REGULATION: 1
HEMATURIA: 0
POSTURAL DYSPNEA: 0
COUGH: 1
FEVER: 1
SPEECH DIFFICULTY: 0
DIARRHEA: 1
NECK PAIN: 1
RECTAL PAIN: 0
BLOOD IN STOOL: 0
NIGHT SWEATS: 0
COUGH: 1
WOUND: 0
COUGH: 1
MUSCLE CRAMPS: 0
EYE PAIN: 0
WEAKNESS: 1
WHEEZING: 1
PHOTOPHOBIA: 0
HEMOPTYSIS: 0
BREAST MASS: 0
LIGHT-HEADEDNESS: 1
DIARRHEA: 1

## 2018-01-01 ASSESSMENT — COPD QUESTIONNAIRES: COPD: 1

## 2018-01-01 ASSESSMENT — ACTIVITIES OF DAILY LIVING (ADL)
DRESS: 2 - ASSISTIVE PERSON
TRANSFERRING: 2 - ASSISTIVE PERSON
COMMUNICATION: 0 - UNDERSTANDS/COMMUNICATES WITHOUT DIFFICULTY
TOILETING: 2 - ASSISTIVE PERSON
SWALLOWING: 0 - SWALLOWS FOODS/LIQUIDS WITHOUT DIFFICULTY
EATING: 0 - INDEPENDENT
BATHING: 2 - ASSISTIVE PERSON
AMBULATION: 0 - INDEPENDENT

## 2018-01-03 NOTE — PROGRESS NOTES
Infusion Nursing Note:  Etta Cervantes presents today for C1D15 Abraxane.    Patient seen by provider today: No   present during visit today: Not Applicable.    Note: Port not working, pt has appt with surgeon on 1/5/18.  PIV labs drawn and Abraxane given PIV.    Intravenous Access:  Labs drawn without difficulty.  Peripheral IV placed.    Treatment Conditions:  Lab Results   Component Value Date    HGB 12.7 01/03/2018     Lab Results   Component Value Date    WBC 3.8 01/03/2018      Lab Results   Component Value Date    ANEU 2.0 01/03/2018     Lab Results   Component Value Date     01/03/2018      Results reviewed, labs MET treatment parameters, ok to proceed with treatment.        Post Infusion Assessment:  Patient tolerated infusion without incident.  Blood return noted pre and post infusion.  Site patent and intact, free from redness, edema or discomfort.  No evidence of extravasations.  Access discontinued per protocol.    Discharge Plan:   Patient discharged in stable condition accompanied by: brother.  Departure Mode: Ambulatory.  Pt to return on 1/17/18 at  8:30 am for C2D1 Abraxane     Pratima Felipe RN

## 2018-01-03 NOTE — PROGRESS NOTES
"SPIRITUAL HEALTH SERVICES Progress Note  [facility]    Introductory visit with Etta Cervantes to offer ongoing emotional/spiritual support during cancer care.      Illness Narrative: Etta stated \"this all started Jan 6, 2015\" and has been battling cancer since that time (breast with mts to liver, bone)      Distress: She stated 2017 had been a tough year so she is grateful to be \"back on chemo\", which she says seems to work better      Coping: Her brother, Rajesh, was with her and is visiting from [place].  The introductory discussion revolved around the availability she has to spiritual care and then about the cold weather, even reaching down into [place] this morning.      Meaning-Making: Etta lifted up her grand children as some of the greatest sources of attila for her.  She stated \"you don't think you can love anyone more than your children - until you meet your grandchildren.\"      Plan: I will be available for f/up visits with Etta during future infusion visits or on request.    Manuel Cordoba M.A., [facility]  Staff   [facility]  Office [phone]  Cell [phone]  Pager [phone]    "

## 2018-01-03 NOTE — TELEPHONE ENCOUNTER
Dulera not covered by patient's insurance.  Preferred alternatives are Advair, Breo Ellipta & symbicort.  Are any of these acceptable?  Please advise.    ANA M JORDAN

## 2018-01-03 NOTE — MR AVS SNAPSHOT
After Visit Summary   1/3/2018    Etta Cervantes    MRN: [id number]           Patient Information     Date Of Birth          1960        Visit Information        Provider Department      1/3/2018 9:00 AM ROOM 7 [facility]        Today's Diagnoses     Secondary malignant neoplasm of liver (H)    -  1    Carcinoma of breast metastatic to liver, unspecified laterality (H)        Bone metastasis (H)        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)           Follow-ups after your visit        Your next 10 appointments already scheduled     Jan 05, 2018  7:30 AM CST   New Visit with Remi De La Paz MD   [facility]    [address] [facility] [address] [address]   [phone]            Jan 17, 2018  8:30 AM CST   Level 1 with ROOM 4 [facility] ([facility])    [facility] [employer] [facility] [employer]  [address]  [facility] [address]   [phone]            Jan 17, 2018  9:45 AM CST   Return Visit with Brodie Cassidy MD   [facility] ([facility])    [facility] [employer] [facility] [employer]  [address]  [facility] [address]   [phone]            Jan 24, 2018  9:30 AM CST   Level 1 with ROOM 9 [facility] ([facility])    [facility] [employer]  [address]  [place] [address]   [phone]            Jan 31, 2018  9:30 AM CST   Level 1 with ROOM 3 [facility] ([facility])    [facility] [employer]  [address]  [facility] [address]   [phone]              Who to contact     If you have questions or need follow up information about today's clinic visit or your schedule please contact [facility] directly at [phone].  Normal or non-critical lab and imaging results will be communicated to you by MyChart, letter or phone within  4 business days after the clinic has received the results. If you do not hear from us within 7 days, please contact the clinic through [employer] or phone. If you have a critical or abnormal lab result, we will notify you by phone as soon as possible.  Submit refill requests through [employer] or call your pharmacy and they will forward the refill request to us. Please allow 3 business days for your refill to be completed.          Additional Information About Your Visit        [employer] Information     [employer] gives you secure access to your electronic health record. If you see a primary care provider, you can also send messages to your care team and make appointments. If you have questions, please call your primary care clinic.  If you do not have a primary care provider, please call [phone] and they will assist you.        Care EveryWhere ID     This is your Care EveryWhere ID. This could be used by other organizations to access your [facility] medical records  [id number]        Your Vitals Were     Pulse Temperature Last Period BMI (Body Mass Index)          78 98.8  F (37.1  C) (Oral) 02/06/2013 32.46 kg/m2         Blood Pressure from Last 3 Encounters:   01/03/18 116/40   12/27/17 110/74   12/21/17 144/59    Weight from Last 3 Encounters:   01/03/18 86.5 kg (190 lb 9.6 oz)   12/27/17 84 kg (185 lb 3.2 oz)   12/21/17 89.9 kg (198 lb 3.2 oz)              We Performed the Following     CBC with platelets differential        Primary Care Provider Office Phone # Fax #    Johana Fairbanks [phone] [address] [facility] [address]        Equal Access to Services     [facility]: Hadii aad ku hadasho Soomaali, waaxda luqadaha, qaybta kaalmada hari elliott. So [facility] [phone].    ATENCIÓN: Si habla español, tiene a parekh disposición servicios gratuitos de asistencia lingüística. Llame al [phone].    We comply with applicable federal civil rights  laws and [place] laws. We do not discriminate on the basis of race, color, national origin, age, disability, sex, sexual orientation, or gender identity.            Thank you!     Thank you for choosing [facility]  for your care. Our goal is always to provide you with excellent care. Hearing back from our patients is one way we can continue to improve our services. Please take a few minutes to complete the written survey that you may receive in the mail after your visit with us. Thank you!             Your Updated Medication List - Protect others around you: Learn how to safely use, store and throw away your medicines at www.disposemymeds.org.          This list is accurate as of: 1/3/18  3:48 PM.  Always use your most recent med list.                   Brand Name Dispense Instructions for use Diagnosis    * albuterol 108 (90 BASE) MCG/ACT Inhaler    VENTOLIN HFA    1 Inhaler    Inhale 2 puffs into the lungs every 4 hours as needed    Moderate persistent asthma, uncomplicated       * albuterol (2.5 MG/3ML) 0.083% neb solution     30 vial    Take 1 vial (2.5 mg) by nebulization every 4 hours as needed for shortness of breath / dyspnea    Moderate persistent asthma, uncomplicated       ALLEGRA ALLERGY 180 MG tablet   Generic drug:  fexofenadine      Take 180 mg by mouth daily as needed for allergies        azelastine 0.1 % spray    ASTELIN    3 Bottle    Spray 1-2 sprays into both nostrils 2 times daily as needed for rhinitis    Chronic rhinitis       CRANBERRY PO      Take 1 capsule by mouth daily        dexamethasone 4 MG tablet    DECADRON    10 tablet    Take 20 mg (5 tablets) the evening prior to and the morning of Abraxane infusion for 1st cycle only.    Carcinoma of breast metastatic to liver, unspecified laterality (H), Bilateral malignant neoplasm of breast in female, estrogen receptor positive, unspecified site of breast (H), Bone metastasis (H), Carcinoma of right breast metastatic to axillary  lymph node (H), Secondary malignant neoplasm of liver (H)       diclofenac 1 % Gel topical gel    VOLTAREN    100 g    Place 2 g onto the skin 4 times daily    Carcinoma of breast metastatic to liver, unspecified laterality (H)       diphenhydrAMINE 25 MG tablet    BENADRYL    1 tablet    Take 1 tablet (25 mg) by mouth every 8 hours as needed for itching or allergies    Carcinoma of breast metastatic to liver, unspecified laterality (H), Bone metastasis (H), Pericardial effusion, Bilateral malignant neoplasm of breast in female, estrogen receptor positive, unspecified site of breast (H), 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), Secondary malignant neoplasm of liver (H), Chemotherapy-induced neutropenia (H), Emphysematous bleb of lung (H), Hypothyroidism, unspecified type, Hyperlipidemia LDL goal <130, Moderate persistent asthma without complication, Mucositis due to chemotherapy       DULERA 200-5 MCG/ACT oral inhaler   Generic drug:  mometasone-formoterol     3 Inhaler    INHALE 2 PUFFS INTO THE LUNGS TWICE DAILY    Moderate persistent asthma without complication       fluconazole 100 MG tablet    DIFLUCAN    7 tablet    Take 1 tablet (100 mg) by mouth daily    Carcinoma of breast metastatic to liver, unspecified laterality (H)       KP CALCIUM 600+D3 600-500 MG-UNIT Caps   Generic drug:  calcium carb-cholecalciferol      Take 2 tablets by mouth daily        letrozole 2.5 MG tablet    FEMARA    30 tablet    Take 1 tablet (2.5 mg) by mouth daily    Carcinoma of breast metastatic to liver, unspecified laterality (H), Bone metastasis (H), Pericardial effusion, Bilateral malignant neoplasm of breast in female, estrogen receptor positive, unspecified site of breast (H), 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), Secondary malignant neoplasm of liver (H), Chemotherapy-induced neutropenia (H), Emphysematous bleb of lung (H), Hypothyroidism, unspecified type, Hyperlipidemia LDL  goal <130, Moderate persistent asthma without complication, Mucositis due to chemotherapy       levothyroxine 25 MCG tablet    SYNTHROID/LEVOTHROID    90 tablet    TAKE 1 TABLET (25 MCG) BY MOUTH DAILY    Hypothyroidism due to acquired atrophy of thyroid       lidocaine (viscous) 2 % solution    XYLOCAINE      Carcinoma of breast metastatic to liver, unspecified laterality (H), Bilateral malignant neoplasm of breast in female, estrogen receptor positive, unspecified site of breast (H), Bone metastasis (H), 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), Secondary malignant neoplasm of liver (H)       Lidocaine 4 % Patch    LIDOCARE    30 patch    Place 1-2 patches onto the skin every 24 hours    Carcinoma of breast metastatic to liver, unspecified laterality (H)       lidocaine visc 2% 2.5mL/5mL & maalox/mylanta w/ simeth 2.5mL/5mL & diphenhydrAMINE 5mg/5mL Susp suspension    [facility] Mouthwash [facility]    240 mL    Swish and swallow 10 mLs in mouth every 6 hours as needed for mouth sores    Mucositis due to chemotherapy, Breast cancer metastasized to axillary lymph node, right (H)       loratadine 10 MG tablet    CLARITIN     Take 10 mg by mouth daily as needed for allergies        magnesium hydroxide 400 MG/5ML suspension    MILK OF MAGNESIA    105 mL    Take 30 mLs by mouth daily as needed for constipation    Carcinoma of breast metastatic to liver, unspecified laterality (H)       metoclopramide 10 MG tablet    REGLAN    120 tablet    Take 1 tablet (10 mg) by mouth 2 times daily as needed    Bilateral malignant neoplasm of breast in female, estrogen receptor positive, unspecified site of breast (H)       metroNIDAZOLE 500 MG tablet    FLAGYL    20 tablet    Take 1 tablet (500 mg) by mouth 3 times daily    Carcinoma of breast metastatic to liver, unspecified laterality (H)       morphine 15 MG 12 hr tablet    MS CONTIN    60 tablet    Take 1 tablet (15 mg) by mouth every 12 hours    Carcinoma of  breast metastatic to liver, unspecified laterality (H), Bilateral malignant neoplasm of breast in female, estrogen receptor positive, unspecified site of breast (H), Bone metastasis (H), 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), Secondary malignant neoplasm of liver (H)       order for DME     1 each    Equipment being ordered: AnyiviKiran post-lumpectomy compression pad x2    Lymphedema, Lymphedema of breast       oxyCODONE IR 5 MG tablet    ROXICODONE    60 tablet    Take 1 tablet (5 mg) by mouth every 4 hours as needed for moderate to severe pain    Secondary malignant neoplasm of liver (H), Carcinoma of breast metastatic to liver, unspecified laterality (H), 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), Bone metastasis (H), Pericardial effusion       polyethylene glycol Packet    MIRALAX/GLYCOLAX    7 packet    Take 17 g by mouth daily    Carcinoma of breast metastatic to liver, unspecified laterality (H)       predniSONE 20 MG tablet    DELTASONE    10 tablet    Take 1 tablet (20 mg) by mouth 2 times daily For Yellow or Red zone on Asthma Action Plan.    Moderate persistent asthma, uncomplicated       prochlorperazine 10 MG tablet    COMPAZINE    30 tablet    Take 1 tablet (10 mg) by mouth every 6 hours as needed (Nausea/Vomiting)    Secondary malignant neoplasm of liver (H), Carcinoma of breast metastatic to liver, unspecified laterality (H), Bone metastasis (H), 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), Bilateral malignant neoplasm of breast in female, estrogen receptor positive, unspecified site of breast (H)       QVAR 80 MCG/ACT Inhaler   Generic drug:  beclomethasone     26.1 g    INHALE 1 PUFFS INTO THE EACH NOSTRIL 2 TIMES DAILY    Chronic rhinitis       ROBITUSSIN COUGH/COLD CF PO      Take 10 mLs by mouth as needed    Breast cancer metastasized to axillary lymph node, right (H)       senna-docusate 8.6-50 MG per tablet     SENOKOT-S;PERICOLACE    100 tablet    Take 1-2 tablets by mouth 2 times daily as needed for constipation    Carcinoma of breast metastatic to liver, unspecified laterality (H)       STOOL SOFTENER PO      Take 100 mg by mouth daily        * zolpidem 10 MG tablet    AMBIEN     TAKE 1 TABLET 30 MIN PRIOR TO BEDTIME    Carcinoma of breast metastatic to liver, unspecified laterality (H), Bilateral malignant neoplasm of breast in female, estrogen receptor positive, unspecified site of breast (H), Bone metastasis (H), 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    Insomnia due to medical condition       * Notice:  This list has 4 medication(s) that are the same as other medications prescribed for you. Read the directions carefully, and ask your doctor or other care provider to review them with you.

## 2018-01-05 NOTE — NURSING NOTE
"Initial /63 (BP Location: Right arm, Patient Position: Sitting, Cuff Size: Adult Regular)  Temp 98.6  F (37  C) (Oral)  Resp 18  Ht 1.632 m (5' 4.25\")  Wt 83.9 kg (185 lb)  LMP 02/06/2013  BMI 31.51 kg/m2 Estimated body mass index is 31.51 kg/(m^2) as calculated from the following:    Height as of this encounter: 1.632 m (5' 4.25\").    Weight as of this encounter: 83.9 kg (185 lb). .    Brianne Vyas MA    "

## 2018-01-05 NOTE — LETTER
1/5/2018         RE: Etta Cervantes  [address] [place]  [place] [address]        Dear Colleague,    Thank you for referring your patient, Etta Cervantes, to the [facility]. Please see a copy of my visit note below.    57-year-old female has a Port-A-Cath in her left subclavian vein which is malfunctioning. The port cannot draw up blood at all and there is resistance during infusion. Further, studies have shown the catheter tip has flipped into the right brachiocephalic vein. The tip also appears to be up against the vessel wall.    Patient Active Problem List   Diagnosis     Hypothyroid     Fibroids     CARDIOVASCULAR SCREENING; LDL GOAL LESS THAN 160     Menorrhagia     Moderate persistent asthma     DIAMOND (obstructive sleep apnea)     Insomnia     Health Care Home     ASCUS favor benign     Breast cancer (H)     Carcinoma of breast metastatic to liver (H)     Bone metastasis (H)     Breast cancer metastasized to axillary lymph node (H)     Drug-related hair loss     Mucositis due to chemotherapy     Secondary malignant neoplasm of liver (H)     Thyroid nodule     Emphysematous bleb of lung (H)     Chemotherapy-induced neutropenia (H)     Hyperlipidemia LDL goal <130     Pneumothorax     Acute pyelonephritis     Elevated liver enzymes     Biliary obstruction     Cancer associated pain     Pleural effusion     Thrush     C. difficile colitis       Past Medical History:   Diagnosis Date     ASCUS favor benign 1/2015    Neg HPV     Cancer (H)      Cataract 2010    surgery both eyes     Emphysematous bleb of lung (H)      Fibroids      Hypercholesterolemia      Hypothyroid      Incontinence of urine     mixed     Menorrhagia      Obesity      Pneumothorax      PONV (postoperative nausea and vomiting)      Sleep apnea     uses a cpap     Uncomplicated asthma        Past Surgical History:   Procedure Laterality Date     BIOPSY  Jan 2015, 2016    Breast cancer, thyroid     BRONCHOSCOPY, INSERT  BRONCHIAL VALVE N/A 7/20/2017    Procedure: BRONCHOSCOPY, INSERT BRONCHIAL VALVE;  Flexible Bronchoscopy, Endobronchial Valve Insertion X5. 4 Valves into right upper lobe and 1 valve into Right middle lobe;  Surgeon: Carlos Mcgowan MD;  Location:  OR     BRONCHOSCOPY, REMOVE BRONCHIAL VALVE N/A 9/7/2017    Procedure: BRONCHOSCOPY, REMOVE BRONCHIAL VALVE;  Flexible Bronchoscopy, Removal Of Endobronchial Valves x 2;  Surgeon: Carlos Mcgowan MD;  Location:  OR     BRONCHOSCOPY, REMOVE BRONCHIAL VALVE N/A 9/28/2017    Procedure: BRONCHOSCOPY, REMOVE BRONCHIAL VALVE;  Flexible Bronchoscopy, Removal Of Intra-Bronchial Valves x 3;  Surgeon: Carlos Mcgowan MD;  Location:  OR     CATARACT IOL, RT/LT  2010     COLONOSCOPY  2010     DILATION AND CURETTAGE, HYSTEROSCOPY, ABLATE ENDOMETRIUM NOVASURE, COMBINED  3/2/2011    COMBINED DILATION AND CURETTAGE, HYSTEROSCOPY, ABLATE ENDOMETRIUM NOVASURE performed by LAURA VARGAS at [facility]     ENDOSCOPIC RETROGRADE CHOLANGIOPANCREATOGRAM N/A 12/8/2017    Procedure: COMBINED ENDOSCOPIC RETROGRADE CHOLANGIOPANCREATOGRAPHY, PLACE TUBE/STENT;  Endoscopic Retrograde Cholangiopancreatogram with biliary sphincterotomy and stent placement;  Surgeon: Guru Coleen Mora MD;  Location:  OR     GENITOURINARY SURGERY  2005    Uretha sling     INSERT PORT VASCULAR ACCESS N/A 2/12/2015    Procedure: INSERT PORT VASCULAR ACCESS;  Surgeon: Noé Schaefer MD;  Location: [facility]     SURGICAL HISTORY OF -   2005    bladder sling     SURGICAL HISTORY OF -       Cystectomy-lower lip     TUBAL LIGATION  1987       Family History   Problem Relation Age of Onset     Depression Mother      Eye Disorder Mother      Gynecology Mother      Alzheimer Disease Mother      CANCER Father      Other Cancer Father      HEART DISEASE Maternal Grandfather      Allergies Paternal Grandfather      Allergies Son        Social History   Substance Use Topics      Smoking status: Never Smoker     Smokeless tobacco: Never Used     Alcohol use No        History   Drug Use No       Current Outpatient Prescriptions   Medication Sig Dispense Refill     metroNIDAZOLE (FLAGYL) 500 MG tablet Take 1 tablet (500 mg) by mouth 3 times daily 20 tablet 0     fluconazole (DIFLUCAN) 100 MG tablet Take 1 tablet (100 mg) by mouth daily 7 tablet 0     oxyCODONE IR (ROXICODONE) 5 MG tablet Take 1 tablet (5 mg) by mouth every 4 hours as needed for moderate to severe pain 60 tablet 0     zolpidem (AMBIEN) 10 MG tablet TAKE 1 TABLET 30 MIN PRIOR TO BEDTIME  0     lidocaine, viscous, (XYLOCAINE) 2 % solution        morphine (MS CONTIN) 15 MG 12 hr tablet Take 1 tablet (15 mg) by mouth every 12 hours 60 tablet 0     prochlorperazine (COMPAZINE) 10 MG tablet Take 1 tablet (10 mg) by mouth every 6 hours as needed (Nausea/Vomiting) 30 tablet 2     dexamethasone (DECADRON) 4 MG tablet Take 20 mg (5 tablets) the evening prior to and the morning of Abraxane infusion for 1st cycle only. (Patient not taking: Reported on 12/27/2017) 10 tablet 0     diclofenac (VOLTAREN) 1 % GEL topical gel Place 2 g onto the skin 4 times daily 100 g 0     Lidocaine (LIDOCARE) 4 % Patch Place 1-2 patches onto the skin every 24 hours 30 patch 0     magnesium hydroxide (MILK OF MAGNESIA) 400 MG/5ML suspension Take 30 mLs by mouth daily as needed for constipation (Patient not taking: Reported on 12/27/2017) 105 mL      polyethylene glycol (MIRALAX/GLYCOLAX) Packet Take 17 g by mouth daily (Patient not taking: Reported on 12/27/2017) 7 packet      senna-docusate (SENOKOT-S;PERICOLACE) 8.6-50 MG per tablet Take 1-2 tablets by mouth 2 times daily as needed for constipation (Patient not taking: Reported on 12/27/2017) 100 tablet      loratadine (CLARITIN) 10 MG tablet Take 10 mg by mouth daily as needed for allergies       order for DME Equipment being ordered: Venu post-lumpectomy compression pad x2 (Patient not taking: Reported  on 12/27/2017) 1 each 0     zolpidem (AMBIEN CR) 12.5 MG CR tablet Take 1 tablet by mouth at bed time. 30 tablet 5     letrozole (FEMARA) 2.5 MG tablet Take 1 tablet (2.5 mg) by mouth daily 30 tablet 3     diphenhydrAMINE (BENADRYL) 25 MG tablet Take 1 tablet (25 mg) by mouth every 8 hours as needed for itching or allergies (Patient not taking: Reported on 12/27/2017) 1 tablet 0     DULERA 200-5 MCG/ACT oral inhaler INHALE 2 PUFFS INTO THE LUNGS TWICE DAILY 3 Inhaler 3     QVAR 80 MCG/ACT Inhaler INHALE 1 PUFFS INTO THE EACH NOSTRIL 2 TIMES DAILY 26.1 g 3     Phenylephrine-DM-GG (ROBITUSSIN COUGH/COLD CF PO) Take 10 mLs by mouth as needed        levothyroxine (SYNTHROID/LEVOTHROID) 25 MCG tablet TAKE 1 TABLET (25 MCG) BY MOUTH DAILY 90 tablet 2     metoclopramide (REGLAN) 10 MG tablet Take 1 tablet (10 mg) by mouth 2 times daily as needed 120 tablet 1     fexofenadine (ALLEGRA ALLERGY) 180 MG tablet Take 180 mg by mouth daily as needed for allergies       calcium carb-cholecalciferol (KP CALCIUM 600+D3) 600-500 MG-UNIT CAPS Take 2 tablets by mouth daily       albuterol (2.5 MG/3ML) 0.083% neb solution Take 1 vial (2.5 mg) by nebulization every 4 hours as needed for shortness of breath / dyspnea (Patient not taking: Reported on 12/27/2017) 30 vial 3     predniSONE (DELTASONE) 20 MG tablet Take 1 tablet (20 mg) by mouth 2 times daily For Yellow or Red zone on Asthma Action Plan. (Patient not taking: Reported on 12/27/2017) 10 tablet 1     magic mouthwash suspension (diphenhydrAMINE, lidocaine, aluminum-magnesium & simethicone) Swish and swallow 10 mLs in mouth every 6 hours as needed for mouth sores (Patient not taking: Reported on 12/27/2017) 240 mL 10     Docusate Calcium (STOOL SOFTENER PO) Take 100 mg by mouth daily        CRANBERRY PO Take 1 capsule by mouth daily        azelastine (ASTELIN) 0.1 % nasal spray Spray 1-2 sprays into both nostrils 2 times daily as needed for rhinitis (Patient not taking: Reported on  12/27/2017) 3 Bottle 3     albuterol (VENTOLIN HFA) 108 (90 BASE) MCG/ACT inhaler Inhale 2 puffs into the lungs every 4 hours as needed (Patient not taking: Reported on 12/27/2017) 1 Inhaler 2       Allergies   Allergen Reactions     Animal Dander Cough     Itchy eyes     Cats      Dust Mites Cough     Itchy eyes     Pollen Extract      Other reaction(s): Cough  Itchy eyes     Seasonal Allergies      Levaquin [Levofloxacin] Rash     States she has violent dreams from taking this       Results for orders placed or performed during the hospital encounter of 12/29/17   XR Chest w Fluoro 2 Views     Value    Radiologist flags Fibrinous cap and catheter position change, as (Urgent)    Narrative    XR CHEST WITH FLUORO 2 VIEWS 12/29/2017 9:20 AM    HISTORY: Normal blood return on PowerPort catheter.    COMPARISON: Chest x-ray dated 12/11/2017.    POWERPORT CATHETER INJECTION: Risks and benefits are discussed with  the patient. The power port catheter is cannulated by the nursing  staff. 15 mL of Isovue 250 are injected. Six fluoroscopic sequences  are obtained. Fluoroscopy time is 1.4 minutes.    FINDINGS: The PowerPort catheter is intact under fluoroscopy. However,  the tip of the catheter is now directed upward compared to the prior  chest x-ray and it is positioned in the right brachiocephalic vein.  Normal blood return can be obtained. With contrast injection a small  fibrinous cap is present.   Injected contrast flows around the cap and  away from the tip and opacifies the right brachiocephalic vein.      Impression    IMPRESSION: There is a fibrinous on the tip of the catheter which  inhibits blood return. The catheter tip has also changed positions and  is now flipped up into the right brachiocephalic vein.    [[facility]: Fibrinous cap and catheter position change, as  described above]  CBC  Recent Labs   Lab Test  01/03/18   0920   WBC  3.8*   RBC  3.92   HGB  12.7   HCT  38.7   MCV  99   MCH  32.4   MCHC   "32.8   RDW  14.5   PLT  257       BMP  Recent Labs   Lab Test  12/27/17   0849  12/21/17   0800   NA   --   138   POTASSIUM   --   3.9   BEN  8.6  8.4*   CHLORIDE   --   106   CO2   --   26   BUN   --   9   CR   --   0.46*   GLC   --   171*       LFTs  Recent Labs   Lab Test  12/27/17   0849  12/21/17   0800   PROTTOTAL   --   7.0   ALBUMIN  2.9*  2.6*   BILITOTAL   --   0.8   ALKPHOS   --   180*   AST   --   48*   ALT   --   16       This report will be copied to the [facility] to ensure a  provider acknowledges the finding. [facility] is available Monday  through Friday 8am-3:30 pm.     MD BRYAN ALLEN  Constitutional - Denies fevers, weight loss, malaise, lethargy  Neuro - Denies tremors or seizures  Pulmon - Denies SOB, dyspnea, hemoptysis, chronic cough or use of an inhaler  CV - Denies CP, SOB, lower extremity edema, difficulty w/ stairs, has never used NTG  GI - Denies hematemesis, BRBPR, melena, chronic diarrhea or epigastric pain   - Denies hematuria, difficulty voiding, h/o STDs  Hematology - Denies blood clotting disorders, chronic anemias  Dermatology - No melanomas or skin cancers  Rheumatology - No h/o RA  Pysch - Denies depression, bipolar d/o or schizophrenia    Exam:  Patient Vitals for the past 24 hrs:   BP Temp Temp src Resp Height Weight   01/05/18 0720 140/63 98.6  F (37  C) Oral 18 1.632 m (5' 4.25\") 83.9 kg (185 lb)       General - Alert and Oriented X4, NAD, well nourished  HEENT - Normocephalic, atraumatic, PERRLA, Nose midline, Throat without lesions  Neck - supple, no LAD, Thyroid normal, Carotids without bruits  Lungs - Clear to auscultation bilaterally with good inspiratory effort, no tactile fremitus  CV - Heart RRR, no lift's, thrills, murmurs, rubs, or gallops. Carotid, radial, and femoral pulses 2+ bilaterally  Abdomen - Soft, non-tender, +BS, no hepatosplenomegaly, no palpable masses  Neuro - Full ROM, Strength 5/5 and major muscle groups, sensation " intact  Extremities - No cyanosis, clubbing or edema    Assessment and plan: 57-year-old female with nonfunctioning Port-A-Cath. Patient is a good candidate for Port-A-Cath replacement. I will attempt to place a wire over the previous port and replace the current one with a new port and catheter. Also discussed possibility of putting this on the other side or putting it in a jugular vein if necessary. Patient understood and wished to proceed. PATIENT IS CLEARED FOR SURGERY.    Remi De La Paz MD     Again, thank you for allowing me to participate in the care of your patient.        Sincerely,        Remi De La Paz MD

## 2018-01-05 NOTE — TELEPHONE ENCOUNTER
Pharmacy LEFT MESSAGE:  They are wondering if we have an alternative yet in place of Dulera for Etta.  Hoping to get this before weekend.  Please review and advise. Thank you..Sandra Parekh

## 2018-01-05 NOTE — MR AVS SNAPSHOT
After Visit Summary   1/5/2018    Etta Cervantes    MRN: [id number]           Patient Information     Date Of Birth          1960        Visit Information        Provider Department      1/5/2018 7:30 AM Remi De La Paz MD [facility]        Today's Diagnoses     Encounter for fitting of portacath    -  1      Care Instructions    Per Dr. De La Paz's instructions          Follow-ups after your visit        Your next 10 appointments already scheduled     Jan 17, 2018  8:30 AM CST   Level 1 with ROOM 4 [facility] ([facility])    [facility]  [address]  [facility] [address]   [phone]            Jan 17, 2018  9:45 AM CST   Return Visit with Brodie Cassidy MD   [facility] ([facility])    [facility]  [address]  [facility] [address]   [phone]            Jan 24, 2018  9:30 AM CST   Level 1 with ROOM 9 [facility] ([facility])    [facility]  [address]  [facility] [address]   [phone]            Jan 31, 2018  9:30 AM CST   Level 1 with ROOM 3 [facility] ([facility])    [facility]  [address]  [facility] [address]   [phone]              Who to contact     If you have questions or need follow up information about today's clinic visit or your schedule please contact [facility] directly at [phone].  Normal or non-critical lab and imaging results will be communicated to you by MyChart, letter or phone within 4 business days after the clinic has received the results. If you do not hear from us within 7 days, please contact the clinic through MyChart or phone. If you have a critical or abnormal lab result, we will notify you by phone as soon as possible.  Submit refill requests through [employer] or call  "your pharmacy and they will forward the refill request to us. Please allow 3 business days for your refill to be completed.          Additional Information About Your Visit        MyChart Information     [employer] gives you secure access to your electronic health record. If you see a primary care provider, you can also send messages to your care team and make appointments. If you have questions, please call your primary care clinic.  If you do not have a primary care provider, please call [phone] and they will assist you.        Care EveryWhere ID     This is your Care EveryWhere ID. This could be used by other organizations to access your [place] medical records  [id number]        Your Vitals Were     Temperature Respirations Height Last Period BMI (Body Mass Index)       98.6  F (37  C) (Oral) 18 1.632 m (5' 4.25\") 02/06/2013 31.51 kg/m2        Blood Pressure from Last 3 Encounters:   01/05/18 140/63   01/03/18 116/40   12/27/17 110/74    Weight from Last 3 Encounters:   01/05/18 83.9 kg (185 lb)   01/03/18 86.5 kg (190 lb 9.6 oz)   12/27/17 84 kg (185 lb 3.2 oz)              Today, you had the following     No orders found for display       Primary Care Provider Office Phone # Fax #    Johana Fairbanks [phone] [address] [facility] [address]        Equal Access to Services     [facility]: Hadii aad ku hadasho Soomaali, waaxda luqadaha, qaybta kaalmada adelynnyada, hari rodriguez . So [facility] [phone].    ATENCIÓN: Si habla español, tiene a parekh disposición servicios gratuitos de asistencia lingüística. Llame al [phone].    We comply with applicable federal civil rights laws and [place] laws. We do not discriminate on the basis of race, color, national origin, age, disability, sex, sexual orientation, or gender identity.            Thank you!     Thank you for choosing [facility]  for your care. Our goal is always to provide you " with excellent care. Hearing back from our patients is one way we can continue to improve our services. Please take a few minutes to complete the written survey that you may receive in the mail after your visit with us. Thank you!             Your Updated Medication List - Protect others around you: Learn how to safely use, store and throw away your medicines at www.disposemymeds.org.          This list is accurate as of: 1/5/18  7:44 AM.  Always use your most recent med list.                   Brand Name Dispense Instructions for use Diagnosis    * albuterol 108 (90 BASE) MCG/ACT Inhaler    VENTOLIN HFA    1 Inhaler    Inhale 2 puffs into the lungs every 4 hours as needed    Moderate persistent asthma, uncomplicated       * albuterol (2.5 MG/3ML) 0.083% neb solution     30 vial    Take 1 vial (2.5 mg) by nebulization every 4 hours as needed for shortness of breath / dyspnea    Moderate persistent asthma, uncomplicated       ALLEGRA ALLERGY 180 MG tablet   Generic drug:  fexofenadine      Take 180 mg by mouth daily as needed for allergies        azelastine 0.1 % spray    ASTELIN    3 Bottle    Spray 1-2 sprays into both nostrils 2 times daily as needed for rhinitis    Chronic rhinitis       CRANBERRY PO      Take 1 capsule by mouth daily        dexamethasone 4 MG tablet    DECADRON    10 tablet    Take 20 mg (5 tablets) the evening prior to and the morning of Abraxane infusion for 1st cycle only.    Carcinoma of breast metastatic to liver, unspecified laterality (H), Bilateral malignant neoplasm of breast in female, estrogen receptor positive, unspecified site of breast (H), Bone metastasis (H), 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), Secondary malignant neoplasm of liver (H)       diclofenac 1 % Gel topical gel    VOLTAREN    100 g    Place 2 g onto the skin 4 times daily    Carcinoma of breast metastatic to liver, unspecified laterality (H)       diphenhydrAMINE 25 MG tablet    BENADRYL     1 tablet    Take 1 tablet (25 mg) by mouth every 8 hours as needed for itching or allergies    Carcinoma of breast metastatic to liver, unspecified laterality (H), Bone metastasis (H), Pericardial effusion, Bilateral malignant neoplasm of breast in female, estrogen receptor positive, unspecified site of breast (H), 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), Secondary malignant neoplasm of liver (H), Chemotherapy-induced neutropenia (H), Emphysematous bleb of lung (H), Hypothyroidism, unspecified type, Hyperlipidemia LDL goal <130, Moderate persistent asthma without complication, Mucositis due to chemotherapy       DULERA 200-5 MCG/ACT oral inhaler   Generic drug:  mometasone-formoterol     3 Inhaler    INHALE 2 PUFFS INTO THE LUNGS TWICE DAILY    Moderate persistent asthma without complication       fluconazole 100 MG tablet    DIFLUCAN    7 tablet    Take 1 tablet (100 mg) by mouth daily    Carcinoma of breast metastatic to liver, unspecified laterality (H)       KP CALCIUM 600+D3 600-500 MG-UNIT Caps   Generic drug:  calcium carb-cholecalciferol      Take 2 tablets by mouth daily        letrozole 2.5 MG tablet    FEMARA    30 tablet    Take 1 tablet (2.5 mg) by mouth daily    Carcinoma of breast metastatic to liver, unspecified laterality (H), Bone metastasis (H), Pericardial effusion, Bilateral malignant neoplasm of breast in female, estrogen receptor positive, unspecified site of breast (H), 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), Secondary malignant neoplasm of liver (H), Chemotherapy-induced neutropenia (H), Emphysematous bleb of lung (H), Hypothyroidism, unspecified type, Hyperlipidemia LDL goal <130, Moderate persistent asthma without complication, Mucositis due to chemotherapy       levothyroxine 25 MCG tablet    SYNTHROID/LEVOTHROID    90 tablet    TAKE 1 TABLET (25 MCG) BY MOUTH DAILY    Hypothyroidism due to acquired atrophy of thyroid       lidocaine  (viscous) 2 % solution    XYLOCAINE      Carcinoma of breast metastatic to liver, unspecified laterality (H), Bilateral malignant neoplasm of breast in female, estrogen receptor positive, unspecified site of breast (H), Bone metastasis (H), 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), Secondary malignant neoplasm of liver (H)       Lidocaine 4 % Patch    LIDOCARE    30 patch    Place 1-2 patches onto the skin every 24 hours    Carcinoma of breast metastatic to liver, unspecified laterality (H)       lidocaine visc 2% 2.5mL/5mL & maalox/mylanta w/ simeth 2.5mL/5mL & diphenhydrAMINE 5mg/5mL Susp suspension    [facility]    240 mL    Swish and swallow 10 mLs in mouth every 6 hours as needed for mouth sores    Mucositis due to chemotherapy, Breast cancer metastasized to axillary lymph node, right (H)       loratadine 10 MG tablet    CLARITIN     Take 10 mg by mouth daily as needed for allergies        magnesium hydroxide 400 MG/5ML suspension    MILK OF MAGNESIA    105 mL    Take 30 mLs by mouth daily as needed for constipation    Carcinoma of breast metastatic to liver, unspecified laterality (H)       metoclopramide 10 MG tablet    REGLAN    120 tablet    Take 1 tablet (10 mg) by mouth 2 times daily as needed    Bilateral malignant neoplasm of breast in female, estrogen receptor positive, unspecified site of breast (H)       metroNIDAZOLE 500 MG tablet    FLAGYL    20 tablet    Take 1 tablet (500 mg) by mouth 3 times daily    Carcinoma of breast metastatic to liver, unspecified laterality (H)       morphine 15 MG 12 hr tablet    MS CONTIN    60 tablet    Take 1 tablet (15 mg) by mouth every 12 hours    Carcinoma of breast metastatic to liver, unspecified laterality (H), Bilateral malignant neoplasm of breast in female, estrogen receptor positive, unspecified site of breast (H), Bone metastasis (H), 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), Secondary malignant  neoplasm of liver (H)       order for DME     1 each    Equipment being ordered: [employer]iviPak[employer]na post-lumpectomy compression pad x2    Lymphedema, Lymphedema of breast       oxyCODONE IR 5 MG tablet    ROXICODONE    60 tablet    Take 1 tablet (5 mg) by mouth every 4 hours as needed for moderate to severe pain    Secondary malignant neoplasm of liver (H), Carcinoma of breast metastatic to liver, unspecified laterality (H), 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), Bone metastasis (H), Pericardial effusion       polyethylene glycol Packet    MIRALAX/GLYCOLAX    7 packet    Take 17 g by mouth daily    Carcinoma of breast metastatic to liver, unspecified laterality (H)       predniSONE 20 MG tablet    DELTASONE    10 tablet    Take 1 tablet (20 mg) by mouth 2 times daily For Yellow or Red zone on Asthma Action Plan.    Moderate persistent asthma, uncomplicated       prochlorperazine 10 MG tablet    COMPAZINE    30 tablet    Take 1 tablet (10 mg) by mouth every 6 hours as needed (Nausea/Vomiting)    Secondary malignant neoplasm of liver (H), Carcinoma of breast metastatic to liver, unspecified laterality (H), Bone metastasis (H), 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), Bilateral malignant neoplasm of breast in female, estrogen receptor positive, unspecified site of breast (H)       QVAR 80 MCG/ACT Inhaler   Generic drug:  beclomethasone     26.1 g    INHALE 1 PUFFS INTO THE EACH NOSTRIL 2 TIMES DAILY    Chronic rhinitis       ROBITUSSIN COUGH/COLD CF PO      Take 10 mLs by mouth as needed    Breast cancer metastasized to axillary lymph node, right (H)       senna-docusate 8.6-50 MG per tablet    SENOKOT-S;PERICOLACE    100 tablet    Take 1-2 tablets by mouth 2 times daily as needed for constipation    Carcinoma of breast metastatic to liver, unspecified laterality (H)       STOOL SOFTENER PO      Take 100 mg by mouth daily        * zolpidem 10 MG tablet    AMBIEN     TAKE 1 TABLET  30 MIN PRIOR TO BEDTIME    Carcinoma of breast metastatic to liver, unspecified laterality (H), Bilateral malignant neoplasm of breast in female, estrogen receptor positive, unspecified site of breast (H), Bone metastasis (H), 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    Insomnia due to medical condition       * Notice:  This list has 4 medication(s) that are the same as other medications prescribed for you. Read the directions carefully, and ask your doctor or other care provider to review them with you.

## 2018-01-05 NOTE — LETTER
SURGERYPLANNING/SCHEDULING WORKSHEET                              [facility] [place]  [address] [facility] [address]  [phone]                          Etta Cervantes                :  1960  MRN:  [id number]  Phone: [phone] (home)     Same Day Surgery   Surgeon: Remi De La Paz MD  Diagnosis:   portacath malfunction  Allergies:  Animal dander; Cats; Dust mites; Pollen extract; Seasonal allergies; and Levaquin [levofloxacin]   A preoperative evaluation and physical will be done by this office.   ====================================================  Surgical Procedure:  General Surgery:portactah replacement  Length of Procedure:  1 hour  Type of anesthesia:  Local with MAC  The proposed surgical procedure is considered LOW risk.  Date of Procedure:__1/10/17____    Time: ___9:45am_______________       Special Equipment: None  Informed Consent Obtained and Signed:  NO  ====================================================  Instructions to Same Day Surgery Staff  Pneumoboots  Preop Antibiotic:  Ancef 2 gm IV  pre-op within one hour prior to incision For > 80 kg  Preop Pain Meds:  None  Preop Orders:  Routine Standing Orders.  ====================================================  Instructions to the patient:  Preop physical exam scheduled (within 30 days or 7 days prior) with:  Dr. Smith_______________  Clinic:  ____________________                                         Date______________Time_________________________  Come to the hospital at: ___[facility] will call with arrival time_________  HOME PREPARATION:   Shower with Hibiclens the night before or the morning of surgery, gently cleaning skin from neck to feet  Bathe and brush teeth the morning of surgery.  Take medications with a sip of water the morning of surgery:   Check with  if taking insulin.  May have  a light meal, toast and clear liquids, up to 8 hrs before surgery  May  have clear liquids (liquids one can read through) up to 4 hrs before surgery  NOTHING after 4 hrs before surgery  Stop aspirin 7-10 days before surgery  Stop NSAIDS (Ibuproven, Naproxen, etc) 5 days before surgery  Stop Plavix 7-10 days before surgery  Need to arrange for a     Remi De La Paz MD    1/5/2018  This form was electronically signed at chart closure                                                                        Chart Copy

## 2018-01-05 NOTE — PROGRESS NOTES
57-year-old female has a Port-A-Cath in her left subclavian vein which is malfunctioning. The port cannot draw up blood at all and there is resistance during infusion. Further, studies have shown the catheter tip has flipped into the right brachiocephalic vein. The tip also appears to be up against the vessel wall.    Patient Active Problem List   Diagnosis     Hypothyroid     Fibroids     CARDIOVASCULAR SCREENING; LDL GOAL LESS THAN 160     Menorrhagia     Moderate persistent asthma     DIAMOND (obstructive sleep apnea)     Insomnia     Health Care Home     ASCUS favor benign     Breast cancer (H)     Carcinoma of breast metastatic to liver (H)     Bone metastasis (H)     Breast cancer metastasized to axillary lymph node (H)     Drug-related hair loss     Mucositis due to chemotherapy     Secondary malignant neoplasm of liver (H)     Thyroid nodule     Emphysematous bleb of lung (H)     Chemotherapy-induced neutropenia (H)     Hyperlipidemia LDL goal <130     Pneumothorax     Acute pyelonephritis     Elevated liver enzymes     Biliary obstruction     Cancer associated pain     Pleural effusion     Thrush     C. difficile colitis       Past Medical History:   Diagnosis Date     ASCUS favor benign 1/2015    Neg HPV     Cancer (H)      Cataract 2010    surgery both eyes     Emphysematous bleb of lung (H)      Fibroids      Hypercholesterolemia      Hypothyroid      Incontinence of urine     mixed     Menorrhagia      Obesity      Pneumothorax      PONV (postoperative nausea and vomiting)      Sleep apnea     uses a cpap     Uncomplicated asthma        Past Surgical History:   Procedure Laterality Date     BIOPSY  Jan 2015, 2016    Breast cancer, thyroid     BRONCHOSCOPY, INSERT BRONCHIAL VALVE N/A 7/20/2017    Procedure: BRONCHOSCOPY, INSERT BRONCHIAL VALVE;  Flexible Bronchoscopy, Endobronchial Valve Insertion X5. 4 Valves into right upper lobe and 1 valve into Right middle lobe;  Surgeon: Carlos Mcgowan MD;   Location: [facility]     BRONCHOSCOPY, REMOVE BRONCHIAL VALVE N/A 9/7/2017    Procedure: BRONCHOSCOPY, REMOVE BRONCHIAL VALVE;  Flexible Bronchoscopy, Removal Of Endobronchial Valves x 2;  Surgeon: Carlos Mcgowan MD;  Location: [facility] OR     BRONCHOSCOPY, REMOVE BRONCHIAL VALVE N/A 9/28/2017    Procedure: BRONCHOSCOPY, REMOVE BRONCHIAL VALVE;  Flexible Bronchoscopy, Removal Of Intra-Bronchial Valves x 3;  Surgeon: Carlos Mcgowan MD;  Location: [facility] OR     CATARACT IOL, RT/LT  2010     COLONOSCOPY  2010     DILATION AND CURETTAGE, HYSTEROSCOPY, ABLATE ENDOMETRIUM NOVASURE, COMBINED  3/2/2011    COMBINED DILATION AND CURETTAGE, HYSTEROSCOPY, ABLATE ENDOMETRIUM NOVASURE performed by LAURA VARGAS at [facility]     ENDOSCOPIC RETROGRADE CHOLANGIOPANCREATOGRAM N/A 12/8/2017    Procedure: COMBINED ENDOSCOPIC RETROGRADE CHOLANGIOPANCREATOGRAPHY, PLACE TUBE/STENT;  Endoscopic Retrograde Cholangiopancreatogram with biliary sphincterotomy and stent placement;  Surgeon: Guru Coleen Mora MD;  Location:  OR     GENITOURINARY SURGERY  2005    Uretha sling     INSERT PORT VASCULAR ACCESS N/A 2/12/2015    Procedure: INSERT PORT VASCULAR ACCESS;  Surgeon: Noé Schaefer MD;  Location: [facility]     SURGICAL HISTORY OF -   2005    bladder sling     SURGICAL HISTORY OF -       Cystectomy-lower lip     TUBAL LIGATION  1987       Family History   Problem Relation Age of Onset     Depression Mother      Eye Disorder Mother      Gynecology Mother      Alzheimer Disease Mother      CANCER Father      Other Cancer Father      HEART DISEASE Maternal Grandfather      Allergies Paternal Grandfather      Allergies Son        Social History   Substance Use Topics     Smoking status: Never Smoker     Smokeless tobacco: Never Used     Alcohol use No        History   Drug Use No       Current Outpatient Prescriptions   Medication Sig Dispense Refill     metroNIDAZOLE (FLAGYL) 500 MG tablet Take 1 tablet (500 mg) by  mouth 3 times daily 20 tablet 0     fluconazole (DIFLUCAN) 100 MG tablet Take 1 tablet (100 mg) by mouth daily 7 tablet 0     oxyCODONE IR (ROXICODONE) 5 MG tablet Take 1 tablet (5 mg) by mouth every 4 hours as needed for moderate to severe pain 60 tablet 0     zolpidem (AMBIEN) 10 MG tablet TAKE 1 TABLET 30 MIN PRIOR TO BEDTIME  0     lidocaine, viscous, (XYLOCAINE) 2 % solution        morphine (MS CONTIN) 15 MG 12 hr tablet Take 1 tablet (15 mg) by mouth every 12 hours 60 tablet 0     prochlorperazine (COMPAZINE) 10 MG tablet Take 1 tablet (10 mg) by mouth every 6 hours as needed (Nausea/Vomiting) 30 tablet 2     dexamethasone (DECADRON) 4 MG tablet Take 20 mg (5 tablets) the evening prior to and the morning of Abraxane infusion for 1st cycle only. (Patient not taking: Reported on 12/27/2017) 10 tablet 0     diclofenac (VOLTAREN) 1 % GEL topical gel Place 2 g onto the skin 4 times daily 100 g 0     Lidocaine (LIDOCARE) 4 % Patch Place 1-2 patches onto the skin every 24 hours 30 patch 0     magnesium hydroxide (MILK OF MAGNESIA) 400 MG/5ML suspension Take 30 mLs by mouth daily as needed for constipation (Patient not taking: Reported on 12/27/2017) 105 mL      polyethylene glycol (MIRALAX/GLYCOLAX) Packet Take 17 g by mouth daily (Patient not taking: Reported on 12/27/2017) 7 packet      senna-docusate (SENOKOT-S;PERICOLACE) 8.6-50 MG per tablet Take 1-2 tablets by mouth 2 times daily as needed for constipation (Patient not taking: Reported on 12/27/2017) 100 tablet      loratadine (CLARITIN) 10 MG tablet Take 10 mg by mouth daily as needed for allergies       order for DME Equipment being ordered: Venu post-lumpectomy compression pad x2 (Patient not taking: Reported on 12/27/2017) 1 each 0     zolpidem (AMBIEN CR) 12.5 MG CR tablet Take 1 tablet by mouth at bed time. 30 tablet 5     letrozole (FEMARA) 2.5 MG tablet Take 1 tablet (2.5 mg) by mouth daily 30 tablet 3     diphenhydrAMINE (BENADRYL) 25 MG tablet Take  1 tablet (25 mg) by mouth every 8 hours as needed for itching or allergies (Patient not taking: Reported on 12/27/2017) 1 tablet 0     DULERA 200-5 MCG/ACT oral inhaler INHALE 2 PUFFS INTO THE LUNGS TWICE DAILY 3 Inhaler 3     QVAR 80 MCG/ACT Inhaler INHALE 1 PUFFS INTO THE EACH NOSTRIL 2 TIMES DAILY 26.1 g 3     Phenylephrine-DM-GG (ROBITUSSIN COUGH/COLD CF PO) Take 10 mLs by mouth as needed        levothyroxine (SYNTHROID/LEVOTHROID) 25 MCG tablet TAKE 1 TABLET (25 MCG) BY MOUTH DAILY 90 tablet 2     metoclopramide (REGLAN) 10 MG tablet Take 1 tablet (10 mg) by mouth 2 times daily as needed 120 tablet 1     fexofenadine (ALLEGRA ALLERGY) 180 MG tablet Take 180 mg by mouth daily as needed for allergies       calcium carb-cholecalciferol ( CALCIUM 600+D3) 600-500 MG-UNIT CAPS Take 2 tablets by mouth daily       albuterol (2.5 MG/3ML) 0.083% neb solution Take 1 vial (2.5 mg) by nebulization every 4 hours as needed for shortness of breath / dyspnea (Patient not taking: Reported on 12/27/2017) 30 vial 3     predniSONE (DELTASONE) 20 MG tablet Take 1 tablet (20 mg) by mouth 2 times daily For Yellow or Red zone on Asthma Action Plan. (Patient not taking: Reported on 12/27/2017) 10 tablet 1     magic mouthwash suspension (diphenhydrAMINE, lidocaine, aluminum-magnesium & simethicone) Swish and swallow 10 mLs in mouth every 6 hours as needed for mouth sores (Patient not taking: Reported on 12/27/2017) 240 mL 10     Docusate Calcium (STOOL SOFTENER PO) Take 100 mg by mouth daily        CRANBERRY PO Take 1 capsule by mouth daily        azelastine (ASTELIN) 0.1 % nasal spray Spray 1-2 sprays into both nostrils 2 times daily as needed for rhinitis (Patient not taking: Reported on 12/27/2017) 3 Bottle 3     albuterol (VENTOLIN HFA) 108 (90 BASE) MCG/ACT inhaler Inhale 2 puffs into the lungs every 4 hours as needed (Patient not taking: Reported on 12/27/2017) 1 Inhaler 2       Allergies   Allergen Reactions     Animal Dander  Cough     Itchy eyes     Cats      Dust Mites Cough     Itchy eyes     Pollen Extract      Other reaction(s): Cough  Itchy eyes     Seasonal Allergies      Levaquin [Levofloxacin] Rash     States she has violent dreams from taking this       Results for orders placed or performed during the hospital encounter of 12/29/17   XR Chest w Fluoro 2 Views     Value    Radiologist flags Fibrinous cap and catheter position change, as (Urgent)    Narrative    XR CHEST WITH FLUORO 2 VIEWS 12/29/2017 9:20 AM    HISTORY: Normal blood return on PowerPort catheter.    COMPARISON: Chest x-ray dated 12/11/2017.    POWERPORT CATHETER INJECTION: Risks and benefits are discussed with  the patient. The power port catheter is cannulated by the nursing  staff. 15 mL of Isovue 250 are injected. Six fluoroscopic sequences  are obtained. Fluoroscopy time is 1.4 minutes.    FINDINGS: The PowerPort catheter is intact under fluoroscopy. However,  the tip of the catheter is now directed upward compared to the prior  chest x-ray and it is positioned in the right brachiocephalic vein.  Normal blood return can be obtained. With contrast injection a small  fibrinous cap is present.   Injected contrast flows around the cap and  away from the tip and opacifies the right brachiocephalic vein.      Impression    IMPRESSION: There is a fibrinous on the tip of the catheter which  inhibits blood return. The catheter tip has also changed positions and  is now flipped up into the right brachiocephalic vein.    [Access Center: Fibrinous cap and catheter position change, as  described above]  CBC  Recent Labs   Lab Test  01/03/18   0920   WBC  3.8*   RBC  3.92   HGB  12.7   HCT  38.7   MCV  99   MCH  32.4   MCHC  32.8   RDW  14.5   PLT  257       BMP  Recent Labs   Lab Test  12/27/17   0849  12/21/17   0800   NA   --   138   POTASSIUM   --   3.9   BEN  8.6  8.4*   CHLORIDE   --   106   CO2   --   26   BUN   --   9   CR   --   0.46*   GLC   --   171*  "      LFTs  Recent Labs   Lab Test  12/27/17   0849  12/21/17   0800   PROTTOTAL   --   7.0   ALBUMIN  2.9*  2.6*   BILITOTAL   --   0.8   ALKPHOS   --   180*   AST   --   48*   ALT   --   16       This report will be copied to the [facility] to ensure a  provider acknowledges the finding. [facility] Center is available Monday  through Friday 8am-3:30 pm.     LETI CHASE MD     ROS  Constitutional - Denies fevers, weight loss, malaise, lethargy  Neuro - Denies tremors or seizures  Pulmon - Denies SOB, dyspnea, hemoptysis, chronic cough or use of an inhaler  CV - Denies CP, SOB, lower extremity edema, difficulty w/ stairs, has never used NTG  GI - Denies hematemesis, BRBPR, melena, chronic diarrhea or epigastric pain   - Denies hematuria, difficulty voiding, h/o STDs  Hematology - Denies blood clotting disorders, chronic anemias  Dermatology - No melanomas or skin cancers  Rheumatology - No h/o RA  Pysch - Denies depression, bipolar d/o or schizophrenia    Exam:  Patient Vitals for the past 24 hrs:   BP Temp Temp src Resp Height Weight   01/05/18 0720 140/63 98.6  F (37  C) Oral 18 1.632 m (5' 4.25\") 83.9 kg (185 lb)       General - Alert and Oriented X4, NAD, well nourished  HEENT - Normocephalic, atraumatic, PERRLA, Nose midline, Throat without lesions  Neck - supple, no LAD, Thyroid normal, Carotids without bruits  Lungs - Clear to auscultation bilaterally with good inspiratory effort, no tactile fremitus  CV - Heart RRR, no lift's, thrills, murmurs, rubs, or gallops. Carotid, radial, and femoral pulses 2+ bilaterally  Abdomen - Soft, non-tender, +BS, no hepatosplenomegaly, no palpable masses  Neuro - Full ROM, Strength 5/5 and major muscle groups, sensation intact  Extremities - No cyanosis, clubbing or edema    Assessment and plan: 57-year-old female with nonfunctioning Port-A-Cath. Patient is a good candidate for Port-A-Cath replacement. I will attempt to place a wire over the previous port and " replace the current one with a new port and catheter. Also discussed possibility of putting this on the other side or putting it in a jugular vein if necessary. Patient understood and wished to proceed. PATIENT IS CLEARED FOR SURGERY.    Remi De La Paz MD

## 2018-01-07 NOTE — ED AVS SNAPSHOT
[facility] Emergency Department    [address] [address]    Phone:  [phone]    Fax:  [phone]                                       Etta Cervantes   MRN: [id number]    Department:  [facility] Emergency Department   Date of Visit:  1/7/2018           After Visit Summary Signature Page     I have received my discharge instructions, and my questions have been answered. I have discussed any challenges I see with this plan with the nurse or doctor.    ..........................................................................................................................................  Patient/Patient Representative Signature      ..........................................................................................................................................  Patient Representative Print Name and Relationship to Patient    ..................................................               ................................................  Date                                            Time    ..........................................................................................................................................  Reviewed by Signature/Title    ...................................................              ..............................................  Date                                                            Time

## 2018-01-07 NOTE — ED PROVIDER NOTES
History     Chief Complaint   Patient presents with     Fever     pt developed a fever of 100.9 tonight.  Last dose of Chemotherapy was on Wednesday.  Now has also developed swelling in the right side of neck     HPI  Etta Cervantes is a 57 year old female who has past medical history significant for breast cancer, with metastasis to the liver, in addition to bones, presenting to the emergency department with complaints of fever.  Patient developed elevated temperature of 100.9  this evening.  There is also been concerns regarding swelling which has been present in the right neck location, and right supraclavicular area.   has noticed this over the past couple days.  Based on the fever, they have been instructed to call, and come in for further evaluation.  Patient has had chronic cough, which is dry in nature, unchanged compared to prior.  She reports that this is secondary to her known asthma.  She denies headache, vision changes, sore throat, earache, chest pain, shortness of breath, abdominal pain, or change in bladder.  Has recently completed course of metronidazole for treatment of C. difficile infection.  She also is on chemotherapy, with her last chemotherapy January 3, 2018.  Patient has had issues with her port, as it is currently pointed towards the right brachiocephalic vein.  I reviewed x-ray imaging, and have reviewed recent visit with general surgery, who plans change of the port on Wednesday.  Patient denies any other infectious contacts.  Denies significant recent travel.  No lower extremity swelling, rashes.  Does not receive flu shot.      Problem List:    Patient Active Problem List    Diagnosis Date Noted     Secondary malignant neoplasm of liver (H) 04/22/2015     Priority: High     Carcinoma of breast metastatic to liver (H) 02/04/2015     Priority: High     Diagnosis updated by automated process. Provider to review and confirm.       Bone metastasis (H) 02/04/2015     Priority: High      Breast cancer metastasized to axillary lymph node (H) 02/04/2015     Priority: High     Thrush 12/27/2017     Priority: Medium     C. difficile colitis 12/27/2017     Priority: Medium     Pleural effusion 12/21/2017     Priority: Medium     Cancer associated pain 12/14/2017     Priority: Medium     Biliary obstruction 12/07/2017     Priority: Medium     Elevated liver enzymes 12/01/2017     Priority: Medium     Pneumothorax 07/12/2017     Priority: Medium     Acute pyelonephritis 07/12/2017     Priority: Medium     Hyperlipidemia LDL goal <130 05/18/2017     Priority: Medium     Chemotherapy-induced neutropenia (H) 10/12/2016     Priority: Medium     Thyroid nodule 05/14/2015     Priority: Medium     Emphysematous bleb of lung (H) 05/14/2015     Priority: Medium     Mucositis due to chemotherapy 02/19/2015     Priority: Medium     Drug-related hair loss 02/11/2015     Priority: Medium     Breast cancer (H) 01/23/2015     Priority: Medium     ASCUS favor benign 01/06/2015     Priority: Medium     1/6/15: Pap - ASCUS, Neg HPV. Plan pap/hpv in 3 years.        DIAMOND (obstructive sleep apnea) 01/24/2012     Priority: Medium     AHI 44       Insomnia 01/24/2012     Priority: Medium     Problem list name updated by automated process. Provider to review       Moderate persistent asthma 03/28/2011     Priority: Medium     Menorrhagia 02/24/2011     Priority: Medium     CARDIOVASCULAR SCREENING; LDL GOAL LESS THAN 160 10/31/2010     Priority: Medium     Hypothyroid      Priority: Medium     Fibroids      Priority: Medium     Health Care Home 05/28/2013     Priority: Low     EMERGENCY CARE PLAN  May 28, 2013: No current Care Coordination follow up planned. Please refer if Care Coordination services are needed.    Presenting Problem Signs and Symptoms Treatment Plan   Questions or concerns   during clinic hours   I will call my clinic directly:  [facility]  [address], [place] [id number] [phone].     Questions or concerns outside clinic hours   I will call the 24 hour nurse line at   [phone] or [phone].   Need to schedule an appointment   I will call the 24 hour scheduling team at [phone] or my clinic directly at [phone].    Same day treatment     I will call my clinic first, nurse line if after hours, urgent care and express care if needed.   Clinic care coordination services (regular clinic hours)     I will call a clinic care coordinator directly:     Mannie Blevins RN  Mon, Tues, Fri - [phone]  Wed, Thurs - [phone]    Barbara Burk, :    [phone]    Or call my clinic at [phone] and ask to speak with care coordination.   Crisis Services: Behavioral or Mental Health  [facility] 24 Hour Phone Line  [phone]    [facility] 24 Hour Crisis Services  [phone]    [facility] [phone]    [facility]   [phone]       Emergency treatment -- Immediately    [facility]All 911             Past Medical History:    Past Medical History:   Diagnosis Date     ASCUS favor benign 1/2015     Cancer (H)      Cataract 2010     Emphysematous bleb of lung (H)      Fibroids      Hypercholesterolemia      Hypothyroid      Incontinence of urine      Menorrhagia      Obesity      Pneumothorax      PONV (postoperative nausea and vomiting)      Sleep apnea      Uncomplicated asthma        Past Surgical History:    Past Surgical History:   Procedure Laterality Date     BIOPSY  Jan 2015, 2016    Breast cancer, thyroid     BRONCHOSCOPY, INSERT BRONCHIAL VALVE N/A 7/20/2017    Procedure: BRONCHOSCOPY, INSERT BRONCHIAL VALVE;  Flexible Bronchoscopy, Endobronchial Valve Insertion X5. 4 Valves into right upper lobe and 1 valve into Right middle lobe;  Surgeon: Carlos Mcgowan MD;  Location: [facility] OR     BRONCHOSCOPY, REMOVE BRONCHIAL VALVE N/A 9/7/2017    Procedure: BRONCHOSCOPY, REMOVE BRONCHIAL VALVE;  Flexible Bronchoscopy, Removal Of  Endobronchial Valves x 2;  Surgeon: Carlos Mcgowan MD;  Location:  OR     BRONCHOSCOPY, REMOVE BRONCHIAL VALVE N/A 9/28/2017    Procedure: BRONCHOSCOPY, REMOVE BRONCHIAL VALVE;  Flexible Bronchoscopy, Removal Of Intra-Bronchial Valves x 3;  Surgeon: Carlos Mcgowan MD;  Location:  OR     CATARACT IOL, RT/LT  2010     COLONOSCOPY  2010     DILATION AND CURETTAGE, HYSTEROSCOPY, ABLATE ENDOMETRIUM NOVASURE, COMBINED  3/2/2011    COMBINED DILATION AND CURETTAGE, HYSTEROSCOPY, ABLATE ENDOMETRIUM NOVASURE performed by LAURA VARGAS at [facility]     ENDOSCOPIC RETROGRADE CHOLANGIOPANCREATOGRAM N/A 12/8/2017    Procedure: COMBINED ENDOSCOPIC RETROGRADE CHOLANGIOPANCREATOGRAPHY, PLACE TUBE/STENT;  Endoscopic Retrograde Cholangiopancreatogram with biliary sphincterotomy and stent placement;  Surgeon: Guru Coleen Mora MD;  Location:  OR     GENITOURINARY SURGERY  2005    Uretha sling     INSERT PORT VASCULAR ACCESS N/A 2/12/2015    Procedure: INSERT PORT VASCULAR ACCESS;  Surgeon: Noé Schaefer MD;  Location: [facility]     SURGICAL HISTORY OF -   2005    bladder sling     SURGICAL HISTORY OF -       Cystectomy-lower lip     TUBAL LIGATION  1987       Family History:    Family History   Problem Relation Age of Onset     Depression Mother      Eye Disorder Mother      Gynecology Mother      Alzheimer Disease Mother      CANCER Father      Other Cancer Father      HEART DISEASE Maternal Grandfather      Allergies Paternal Grandfather      Allergies Son        Social History:  Marital Status:   [2]  Social History   Substance Use Topics     Smoking status: Never Smoker     Smokeless tobacco: Never Used     Alcohol use No        Medications:      budesonide-formoterol (SYMBICORT) 160-4.5 MCG/ACT Inhaler   metroNIDAZOLE (FLAGYL) 500 MG tablet   fluconazole (DIFLUCAN) 100 MG tablet   oxyCODONE IR (ROXICODONE) 5 MG tablet   zolpidem (AMBIEN) 10 MG tablet   lidocaine, viscous,  (XYLOCAINE) 2 % solution   morphine (MS CONTIN) 15 MG 12 hr tablet   prochlorperazine (COMPAZINE) 10 MG tablet   dexamethasone (DECADRON) 4 MG tablet   diclofenac (VOLTAREN) 1 % GEL topical gel   Lidocaine (LIDOCARE) 4 % Patch   magnesium hydroxide (MILK OF MAGNESIA) 400 MG/5ML suspension   polyethylene glycol (MIRALAX/GLYCOLAX) Packet   senna-docusate (SENOKOT-S;PERICOLACE) 8.6-50 MG per tablet   loratadine (CLARITIN) 10 MG tablet   order for DME   zolpidem (AMBIEN CR) 12.5 MG CR tablet   letrozole (FEMARA) 2.5 MG tablet   diphenhydrAMINE (BENADRYL) 25 MG tablet   DULERA 200-5 MCG/ACT oral inhaler   QVAR 80 MCG/ACT Inhaler   Phenylephrine-DM-GG (ROBITUSSIN COUGH/COLD CF PO)   levothyroxine (SYNTHROID/LEVOTHROID) 25 MCG tablet   metoclopramide (REGLAN) 10 MG tablet   fexofenadine (ALLEGRA ALLERGY) 180 MG tablet   calcium carb-cholecalciferol (KP CALCIUM 600+D3) 600-500 MG-UNIT CAPS   albuterol (2.5 MG/3ML) 0.083% neb solution   predniSONE (DELTASONE) 20 MG tablet   magic mouthwash suspension (diphenhydrAMINE, lidocaine, aluminum-magnesium & simethicone)   Docusate Calcium (STOOL SOFTENER PO)   CRANBERRY PO   azelastine (ASTELIN) 0.1 % nasal spray   albuterol (VENTOLIN HFA) 108 (90 BASE) MCG/ACT inhaler         Review of Systems   Constitutional: Positive for fever.   Respiratory: Positive for cough. Negative for shortness of breath.    Cardiovascular: Negative for chest pain.   Gastrointestinal: Negative for abdominal pain.   All other systems reviewed and are negative.      Physical Exam   BP: 127/73  Heart Rate: 107  Temp: 98.6  F (37  C)  Resp: 18  SpO2: 92 %      Physical Exam  /77  Temp 98.6  F (37  C) (Oral)  Resp 18  LMP 02/06/2013  SpO2 94%  General: alert, interactive, in no apparent distress  Head: atraumatic  Nose: no rhinorrhea or epistaxis  Ears: no external auditory canal discharge or bleeding.    Eyes: Sclera nonicteric. Conjunctiva noninjected. PERRL, EOMI  Mouth: no tonsillar erythema,  edema, or exudate  Neck: supple, no significant palpable lymphadenopathy.  There does appear to be slight amounts of swelling in the right supraclavicular area.  Lungs: CTAB.  Port in left upper chest wall.  CV: RRR to tachycardic, S1/S2; peripheral pulses palpable and symmetric  Abdomen: soft, nt, nd, no guarding or rebound. Positive bowel sounds  Extremities: no cyanosis or edema  Skin: no rash or diaphoresis  Neuro:  strength 5/5 in UE and LEs bilaterally, sensation intact to light touch in UE and LEs bilaterally;       ED Course     ED Course     Procedures               Critical Care time:  none               Labs Ordered and Resulted from Time of ED Arrival Up to the Time of Departure from the ED   CBC WITH PLATELETS DIFFERENTIAL - Abnormal; Notable for the following:        Result Value    RBC Count 3.64 (*)     Hemoglobin 11.6 (*)     All other components within normal limits   COMPREHENSIVE METABOLIC PANEL - Abnormal; Notable for the following:     Glucose 115 (*)     Creatinine 0.42 (*)     Calcium 8.3 (*)     Albumin 2.7 (*)     Protein Total 6.5 (*)     All other components within normal limits   ROUTINE UA WITH MICROSCOPIC - Abnormal; Notable for the following:     WBC Urine 4 (*)     Squamous Epithelial /HPF Urine 2 (*)     Mucous Urine Present (*)     All other components within normal limits   LACTIC ACID WHOLE BLOOD   PERIPHERAL IV CATHETER   URINE CULTURE AEROBIC BACTERIAL     Results for orders placed or performed during the hospital encounter of 01/07/18 (from the past 24 hour(s))   UA with Microscopic   Result Value Ref Range    Color Urine Yellow     Appearance Urine Slightly Cloudy     Glucose Urine Negative NEG^Negative mg/dL    Bilirubin Urine Negative NEG^Negative    Ketones Urine Negative NEG^Negative mg/dL    Specific Gravity Urine 1.006 1.003 - 1.035    Blood Urine Negative NEG^Negative    pH Urine 6.0 5.0 - 7.0 pH    Protein Albumin Urine Negative NEG^Negative mg/dL    Urobilinogen  mg/dL Normal 0.0 - 2.0 mg/dL    Nitrite Urine Negative NEG^Negative    Leukocyte Esterase Urine Negative NEG^Negative    Source Midstream Urine     WBC Urine 4 (H) 0 - 2 /HPF    RBC Urine <1 0 - 2 /HPF    Squamous Epithelial /HPF Urine 2 (H) 0 - 1 /HPF    Mucous Urine Present (A) NEG^Negative /LPF   Urine Culture Aerobic Bacterial   Result Value Ref Range    Specimen Description Midstream Urine     Special Requests Specimen received in preservative     Culture Micro PENDING    CBC with platelets differential   Result Value Ref Range    WBC 5.5 4.0 - 11.0 10e9/L    RBC Count 3.64 (L) 3.8 - 5.2 10e12/L    Hemoglobin 11.6 (L) 11.7 - 15.7 g/dL    Hematocrit 35.8 35.0 - 47.0 %    MCV 98 78 - 100 fl    MCH 31.9 26.5 - 33.0 pg    MCHC 32.4 31.5 - 36.5 g/dL    RDW 14.9 10.0 - 15.0 %    Platelet Count 267 150 - 450 10e9/L    Diff Method Automated Method     % Neutrophils 77.0 %    % Lymphocytes 17.7 %    % Monocytes 3.1 %    % Eosinophils 1.1 %    % Basophils 0.4 %    % Immature Granulocytes 0.7 %    Absolute Neutrophil 4.3 1.6 - 8.3 10e9/L    Absolute Lymphocytes 1.0 0.8 - 5.3 10e9/L    Absolute Monocytes 0.2 0.0 - 1.3 10e9/L    Absolute Eosinophils 0.1 0.0 - 0.7 10e9/L    Absolute Basophils 0.0 0.0 - 0.2 10e9/L    Abs Immature Granulocytes 0.0 0 - 0.4 10e9/L   Comprehensive metabolic panel   Result Value Ref Range    Sodium 140 133 - 144 mmol/L    Potassium 3.4 3.4 - 5.3 mmol/L    Chloride 106 94 - 109 mmol/L    Carbon Dioxide 25 20 - 32 mmol/L    Anion Gap 9 3 - 14 mmol/L    Glucose 115 (H) 70 - 99 mg/dL    Urea Nitrogen 8 7 - 30 mg/dL    Creatinine 0.42 (L) 0.52 - 1.04 mg/dL    GFR Estimate >90 >60 mL/min/1.7m2    GFR Estimate If Black >90 >60 mL/min/1.7m2    Calcium 8.3 (L) 8.5 - 10.1 mg/dL    Bilirubin Total 0.7 0.2 - 1.3 mg/dL    Albumin 2.7 (L) 3.4 - 5.0 g/dL    Protein Total 6.5 (L) 6.8 - 8.8 g/dL    Alkaline Phosphatase 87 40 - 150 U/L    ALT 11 0 - 50 U/L    AST 26 0 - 45 U/L   Lactic acid whole blood   Result  Value Ref Range    Lactic Acid 0.8 0.7 - 2.0 mmol/L   Blood culture   Result Value Ref Range    Specimen Description Blood Right Arm     Special Requests Aerobic and anaerobic bottles received     Culture Micro PENDING    Blood culture   Result Value Ref Range    Specimen Description Blood Right Hand     Special Requests Aerobic and anaerobic bottles received     Culture Micro PENDING    XR Chest 2 Views    Narrative    CHEST 2 VIEWS   1/7/2018 3:09 AM     HISTORY: Fever. History of breast cancer with bone and liver  metastases.    COMPARISON: 12/11/2017.    FINDINGS: Hypoinflated lungs. A few minimal opacities at the left lung  base likely represent atelectasis or scarring. The lungs are otherwise  clear. Normal-sized cardiac silhouette. Left anterior chest wall port  with catheter entering the left subclavian vein and distal tip near  the confluence of the brachiocephalic veins.      Impression    IMPRESSION: No convincing evidence of active cardiopulmonary disease.    NIESHA MAJOR MD          Assessments & Plan (with Medical Decision Making)  57 year old female with history of metastatic breast cancer, presenting to the emergency department with fever of 100.9  oral at home.  She arrives afebrile, slightly tachycardic, and otherwise normal vitals upon arrival to the emergency department.  Patient with no significant recent change of her chronic cough.  No other reported infectious type symptoms.  Denies bladder symptoms.  She is well-appearing.  She is also complaining of right-sided neck type swelling.  I do not appreciate significant lymphadenopathy in this location.  May have slight musculoskeletal strain, with surrounding edema.    Multiple laboratory tests are performed given patient's chemotherapy status, with last chemotherapy on January 3.  I did review medical records.  Patient also with planned port replacement in 4 days.  Patient with no infectious contacts.  Her laboratory workup is performed and  is normal.  Normal white blood cell count.  Urinalysis without signs of infection.    At this point, exact cause of patient's symptoms are unknown.  She is afebrile currently.  Blood cultures pending.  No indication for starting antibiotics at this point.  Encouraged to follow-up as planned, and return if severe worsening of symptoms.     I have reviewed the nursing notes.    I have reviewed the findings, diagnosis, plan and need for follow up with the patient.       Discharge Medication List as of 1/7/2018  4:07 AM          Final diagnoses:   Fever, unspecified fever cause   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)       1/7/2018   [facility] EMERGENCY DEPARTMENT     Milton Irizarry MD  01/07/18 0617

## 2018-01-07 NOTE — DISCHARGE INSTRUCTIONS
Continue home medications.          Febrile Illness, Uncertain Cause (Adult)  You have a fever, but the cause is not certain. A fever is a natural reaction of the body to an illness such as infection due to a virus or bacteria. In most cases, the temperature itself is not harmful. It actually helps the body fight infections. A fever does not need to be treated unless you feel very uncomfortable.  Sometimes a fever can be an early sign of a more serious infection, so make sure to follow up if your condition worsens.  Home care  Unless given other instructions by your healthcare provider, follow these guidelines when caring for yourself at home.  General care    If your symptoms are severe, rest at home for the first 2 to 3 days. When you resume activity, don't let yourself get too tired.    Do not smoke. Also avoid being exposed to secondhand smoke.    Your appetite may be poor, so a light diet is fine. Avoid dehydration by drinking 6 to 8 glasses of fluids per day (such as water, soft drinks, sports drinks, juices, tea, or soup). Extra fluids will help loosen secretions in the nose and lungs.  Medicines    You can take acetaminophen or ibuprofen for pain, unless you were given a different fever-reducing/pain medicine to use. (Note: If you have chronic liver or kidney disease or have ever had a stomach ulcer or gastrointestinal bleeding, talk with your healthcare provider before using these medicines. Also talk to your provider if you are taking medicine to prevent blood clots.) Aspirin should never be given to anyone younger than 18 years of age who is ill with a viral infection or fever. It may cause severe liver or brain damage.    If you were given antibiotics, take them until they are used up, or your healthcare provider tells you to stop. It is important to finish the antibiotics even though you feel better. This is to make sure the infection has cleared. Be aware that antibiotics are not usually given for a  fever with an unknown cause.    Over-the-counter medicines will not shorten the duration of the illness. However, they may be helpful for the following symptoms: cough, sore throat, or nasal and sinus congestion. Ask your pharmacist for product suggestions. (Note: Do not use decongestants if you have high blood pressure.)  Follow-up care  Follow up with your healthcare provider, or as advised.    If a culture was done, you will be notified if your treatment needs to be changed. You can call as directed for the results.    If X-rays, a CT, or an ultrasound were done, a specialist will review them. You will be notified of any findings that may affect your care.  Call 911  Contact emergency services right away if any of these occur:    Trouble breathing or swallowing, or wheezing    Chest pain    Confusion    Extreme drowsiness or trouble awakening    Fainting or loss of consciousness    Rapid heart rate    Low blood pressure    Vomiting blood, or large amounts of blood in stool    Seizure  When to seek medical advice  Call your healthcare provider right away if any of these occur:    Cough with lots of colored sputum (mucus) or blood in your sputum    Severe headache    Face, neck, throat, or ear pain    Feeling drowsy    Abdominal pain    Repeated vomiting or diarrhea    Joint pain or a new rash    Burning when urinating    Fever of 100.4 F (38 C) or higher, that does not get better after taking fever-reducing medicine    Feeling weak or dizzy  Date Last Reviewed: 7/30/2015 2000-2017 [employer]. [address] [place] [id number]. All rights reserved. This information is not intended as a substitute for professional medical care. Always follow your healthcare professional's instructions.

## 2018-01-07 NOTE — ED AVS SNAPSHOT
[facility] Emergency Department    [address] [address]    Phone:  [phone]    Fax:  [phone]                                       Etta Cervantes   MRN: [id number]    Department:  [facility] Emergency Department   Date of Visit:  1/7/2018           Patient Information     Date Of Birth          1960        Your diagnoses for this visit were:     Fever, unspecified fever cause     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)        You were seen by Milton Irizarry MD.        Discharge Instructions       Continue home medications.          Febrile Illness, Uncertain Cause (Adult)  You have a fever, but the cause is not certain. A fever is a natural reaction of the body to an illness such as infection due to a virus or bacteria. In most cases, the temperature itself is not harmful. It actually helps the body fight infections. A fever does not need to be treated unless you feel very uncomfortable.  Sometimes a fever can be an early sign of a more serious infection, so make sure to follow up if your condition worsens.  Home care  Unless given other instructions by your healthcare provider, follow these guidelines when caring for yourself at home.  General care    If your symptoms are severe, rest at home for the first 2 to 3 days. When you resume activity, don't let yourself get too tired.    Do not smoke. Also avoid being exposed to secondhand smoke.    Your appetite may be poor, so a light diet is fine. Avoid dehydration by drinking 6 to 8 glasses of fluids per day (such as water, soft drinks, sports drinks, juices, tea, or soup). Extra fluids will help loosen secretions in the nose and lungs.  Medicines    You can take acetaminophen or ibuprofen for pain, unless you were given a different fever-reducing/pain medicine to use. (Note: If you have chronic liver or kidney disease or have ever had a stomach ulcer or gastrointestinal bleeding, talk with your  healthcare provider before using these medicines. Also talk to your provider if you are taking medicine to prevent blood clots.) Aspirin should never be given to anyone younger than 18 years of age who is ill with a viral infection or fever. It may cause severe liver or brain damage.    If you were given antibiotics, take them until they are used up, or your healthcare provider tells you to stop. It is important to finish the antibiotics even though you feel better. This is to make sure the infection has cleared. Be aware that antibiotics are not usually given for a fever with an unknown cause.    Over-the-counter medicines will not shorten the duration of the illness. However, they may be helpful for the following symptoms: cough, sore throat, or nasal and sinus congestion. Ask your pharmacist for product suggestions. (Note: Do not use decongestants if you have high blood pressure.)  Follow-up care  Follow up with your healthcare provider, or as advised.    If a culture was done, you will be notified if your treatment needs to be changed. You can call as directed for the results.    If X-rays, a CT, or an ultrasound were done, a specialist will review them. You will be notified of any findings that may affect your care.  Call 911  Contact emergency services right away if any of these occur:    Trouble breathing or swallowing, or wheezing    Chest pain    Confusion    Extreme drowsiness or trouble awakening    Fainting or loss of consciousness    Rapid heart rate    Low blood pressure    Vomiting blood, or large amounts of blood in stool    Seizure  When to seek medical advice  Call your healthcare provider right away if any of these occur:    Cough with lots of colored sputum (mucus) or blood in your sputum    Severe headache    Face, neck, throat, or ear pain    Feeling drowsy    Abdominal pain    Repeated vomiting or diarrhea    Joint pain or a new rash    Burning when urinating    Fever of 100.4 F (38 C) or  higher, that does not get better after taking fever-reducing medicine    Feeling weak or dizzy  Date Last Reviewed: 7/30/2015 2000-2017 [employer]. [address] [place] [address], [place] [address]. All rights reserved. This information is not intended as a substitute for professional medical care. Always follow your healthcare professional's instructions.          Future Appointments        Provider Department Dept Phone Center    1/17/2018 8:30 AM [place] chemo therapy [facility] [phone] [facility]    1/17/2018 9:45 AM Brodie Cassidy MD [facility] [phone] [facility]    1/24/2018 9:30 AM [place] CHEMO [facility]AP[facility] [phone] [facility]    1/31/2018 9:30 AM [place] chemo therapy [facility] [phone] [facility]      24 Hour Appointment Hotline       To make an appointment at any [facility], call [phone] ([phone]). If you don't have a family doctor or clinic, we will help you find one. [facility] clinics are conveniently located to serve the needs of you and your family.             Review of your medicines      Our records show that you are taking the medicines listed below. If these are incorrect, please call your family doctor or clinic.        Dose / Directions Last dose taken    * albuterol 108 (90 BASE) MCG/ACT Inhaler   Commonly known as:  VENTOLIN HFA   Dose:  2 puff   Quantity:  1 Inhaler        Inhale 2 puffs into the lungs every 4 hours as needed   Refills:  2        * albuterol (2.5 MG/3ML) 0.083% neb solution   Dose:  1 vial   Quantity:  30 vial        Take 1 vial (2.5 mg) by nebulization every 4 hours as needed for shortness of breath / dyspnea   Refills:  3        ALLEGRA ALLERGY 180 MG tablet   Dose:  180 mg   Generic drug:  fexofenadine        Take 180 mg by mouth daily as needed for allergies   Refills:  0        azelastine 0.1 % spray   Commonly known as:  ASTELIN   Dose:  1-2 spray   Quantity:  3  Bottle        Spray 1-2 sprays into both nostrils 2 times daily as needed for rhinitis   Refills:  3        budesonide-formoterol 160-4.5 MCG/ACT Inhaler   Commonly known as:  SYMBICORT   Dose:  2 puff   Quantity:  1 Inhaler        Inhale 2 puffs into the lungs 2 times daily   Refills:  1        CRANBERRY PO   Dose:  1 capsule        Take 1 capsule by mouth daily   Refills:  0        dexamethasone 4 MG tablet   Commonly known as:  DECADRON   Quantity:  10 tablet        Take 20 mg (5 tablets) the evening prior to and the morning of Abraxane infusion for 1st cycle only.   Refills:  0        diclofenac 1 % Gel topical gel   Commonly known as:  VOLTAREN   Dose:  2 g   Quantity:  100 g        Place 2 g onto the skin 4 times daily   Refills:  0        diphenhydrAMINE 25 MG tablet   Commonly known as:  BENADRYL   Dose:  25 mg   Quantity:  1 tablet        Take 1 tablet (25 mg) by mouth every 8 hours as needed for itching or allergies   Refills:  0        DULERA 200-5 MCG/ACT oral inhaler   Quantity:  3 Inhaler   Generic drug:  mometasone-formoterol        INHALE 2 PUFFS INTO THE LUNGS TWICE DAILY   Refills:  3        fluconazole 100 MG tablet   Commonly known as:  DIFLUCAN   Dose:  100 mg   Quantity:  7 tablet        Take 1 tablet (100 mg) by mouth daily   Refills:  0        KP CALCIUM 600+D3 600-500 MG-UNIT Caps   Dose:  2 tablet   Generic drug:  calcium carb-cholecalciferol        Take 2 tablets by mouth daily   Refills:  0        letrozole 2.5 MG tablet   Commonly known as:  FEMARA   Dose:  2.5 mg   Quantity:  30 tablet        Take 1 tablet (2.5 mg) by mouth daily   Refills:  3        levothyroxine 25 MCG tablet   Commonly known as:  SYNTHROID/LEVOTHROID   Quantity:  90 tablet        TAKE 1 TABLET (25 MCG) BY MOUTH DAILY   Refills:  2        lidocaine (viscous) 2 % solution   Commonly known as:  XYLOCAINE        Refills:  0        Lidocaine 4 % Patch   Commonly known as:  LIDOCARE   Dose:  1-2 patch   Quantity:  30 patch         Place 1-2 patches onto the skin every 24 hours   Refills:  0        lidocaine visc 2% 2.5mL/5mL & maalox/mylanta w/ simeth 2.5mL/5mL & diphenhydrAMINE 5mg/5mL Susp suspension   Commonly known as:  [facility]IC Mouthwash [facility]   Dose:  10 mL   Quantity:  240 mL        Swish and swallow 10 mLs in mouth every 6 hours as needed for mouth sores   Refills:  10        loratadine 10 MG tablet   Commonly known as:  CLARITIN   Dose:  10 mg        Take 10 mg by mouth daily as needed for allergies   Refills:  0        magnesium hydroxide 400 MG/5ML suspension   Commonly known as:  MILK OF MAGNESIA   Dose:  30 mL   Quantity:  105 mL        Take 30 mLs by mouth daily as needed for constipation   Refills:  0        metoclopramide 10 MG tablet   Commonly known as:  REGLAN   Dose:  10 mg   Quantity:  120 tablet        Take 1 tablet (10 mg) by mouth 2 times daily as needed   Refills:  1        metroNIDAZOLE 500 MG tablet   Commonly known as:  FLAGYL   Dose:  500 mg   Quantity:  20 tablet        Take 1 tablet (500 mg) by mouth 3 times daily   Refills:  0        morphine 15 MG 12 hr tablet   Commonly known as:  MS CONTIN   Dose:  15 mg   Quantity:  60 tablet        Take 1 tablet (15 mg) by mouth every 12 hours   Refills:  0        order for DME   Quantity:  1 each        Equipment being ordered: JoviPak post-lumpectomy compression pad x2   Refills:  0        oxyCODONE IR 5 MG tablet   Commonly known as:  ROXICODONE   Dose:  5 mg   Quantity:  60 tablet        Take 1 tablet (5 mg) by mouth every 4 hours as needed for moderate to severe pain   Refills:  0        polyethylene glycol Packet   Commonly known as:  MIRALAX/GLYCOLAX   Dose:  17 g   Quantity:  7 packet        Take 17 g by mouth daily   Refills:  0        predniSONE 20 MG tablet   Commonly known as:  DELTASONE   Dose:  20 mg   Quantity:  10 tablet        Take 1 tablet (20 mg) by mouth 2 times daily For Yellow or Red zone on Asthma Action Plan.   Refills:  1         prochlorperazine 10 MG tablet   Commonly known as:  COMPAZINE   Dose:  10 mg   Quantity:  30 tablet        Take 1 tablet (10 mg) by mouth every 6 hours as needed (Nausea/Vomiting)   Refills:  2        QVAR 80 MCG/ACT Inhaler   Quantity:  26.1 g   Generic drug:  beclomethasone        INHALE 1 PUFFS INTO THE EACH NOSTRIL 2 TIMES DAILY   Refills:  3        ROBITUSSIN COUGH/COLD CF PO   Dose:  10 mL        Take 10 mLs by mouth as needed   Refills:  0        senna-docusate 8.6-50 MG per tablet   Commonly known as:  SENOKOT-S;PERICOLACE   Dose:  1-2 tablet   Quantity:  100 tablet        Take 1-2 tablets by mouth 2 times daily as needed for constipation   Refills:  0        STOOL SOFTENER PO   Dose:  100 mg        Take 100 mg by mouth daily   Refills:  0        * zolpidem 10 MG tablet   Commonly known as:  AMBIEN        TAKE 1 TABLET 30 MIN PRIOR TO BEDTIME   Refills:  0        * zolpidem 12.5 MG CR tablet   Commonly known as:  AMBIEN CR   Quantity:  30 tablet        Take 1 tablet by mouth at bed time.   Refills:  5        * Notice:  This list has 4 medication(s) that are the same as other medications prescribed for you. Read the directions carefully, and ask your doctor or other care provider to review them with you.            Procedures and tests performed during your visit     Procedure/Test Number of Times Performed    Blood culture 2    CBC with platelets differential 1    Comprehensive metabolic panel 1    Lactic acid whole blood 1    Peripheral IV catheter 1    UA with Microscopic 1    Urine Culture Aerobic Bacterial 1    XR Chest 2 Views 1      Orders Needing Specimen Collection     None      Pending Results     Date and Time Order Name Status Description    1/7/2018 0117 XR Chest 2 Views In process     1/7/2018 0117 Blood culture In process     1/7/2018 0117 Blood culture In process     1/7/2018 0117 Urine Culture Aerobic Bacterial In process             Pending Culture Results     Date and Time Order Name Status  Description    1/7/2018 0117 Blood culture In process     1/7/2018 0117 Blood culture In process     1/7/2018 0117 Urine Culture Aerobic Bacterial In process             Pending Results Instructions     If you had any lab results that were not finalized at the time of your Discharge, you can call the ED Lab Result RN at [phone]. You will be contacted by this team for any positive Lab results or changes in treatment. The nurses are available 7 days a week from 10A to 6:30P.  You can leave a message 24 hours per day and they will return your call.        Test Results From Your Hospital Stay        1/7/2018  2:19 AM      Component Results     Component Value Ref Range & Units Status    WBC 5.5 4.0 - 11.0 10e9/L Final    RBC Count 3.64 (L) 3.8 - 5.2 10e12/L Final    Hemoglobin 11.6 (L) 11.7 - 15.7 g/dL Final    Hematocrit 35.8 35.0 - 47.0 % Final    MCV 98 78 - 100 fl Final    MCH 31.9 26.5 - 33.0 pg Final    MCHC 32.4 31.5 - 36.5 g/dL Final    RDW 14.9 10.0 - 15.0 % Final    Platelet Count 267 150 - 450 10e9/L Final    Diff Method Automated Method  Final    % Neutrophils 77.0 % Final    % Lymphocytes 17.7 % Final    % Monocytes 3.1 % Final    % Eosinophils 1.1 % Final    % Basophils 0.4 % Final    % Immature Granulocytes 0.7 % Final    Absolute Neutrophil 4.3 1.6 - 8.3 10e9/L Final    Absolute Lymphocytes 1.0 0.8 - 5.3 10e9/L Final    Absolute Monocytes 0.2 0.0 - 1.3 10e9/L Final    Absolute Eosinophils 0.1 0.0 - 0.7 10e9/L Final    Absolute Basophils 0.0 0.0 - 0.2 10e9/L Final    Abs Immature Granulocytes 0.0 0 - 0.4 10e9/L Final         1/7/2018  2:35 AM      Component Results     Component Value Ref Range & Units Status    Sodium 140 133 - 144 mmol/L Final    Potassium 3.4 3.4 - 5.3 mmol/L Final    Chloride 106 94 - 109 mmol/L Final    Carbon Dioxide 25 20 - 32 mmol/L Final    Anion Gap 9 3 - 14 mmol/L Final    Glucose 115 (H) 70 - 99 mg/dL Final    Urea Nitrogen 8 7 - 30 mg/dL Final    Creatinine 0.42 (L) 0.52  - 1.04 mg/dL Final    GFR Estimate >90 >60 mL/min/1.7m2 Final    Non  GFR Calc    GFR Estimate If Black >90 >60 mL/min/1.7m2 Final    African American GFR Calc    Calcium 8.3 (L) 8.5 - 10.1 mg/dL Final    Bilirubin Total 0.7 0.2 - 1.3 mg/dL Final    Albumin 2.7 (L) 3.4 - 5.0 g/dL Final    Protein Total 6.5 (L) 6.8 - 8.8 g/dL Final    Alkaline Phosphatase 87 40 - 150 U/L Final    ALT 11 0 - 50 U/L Final    AST 26 0 - 45 U/L Final         1/7/2018  2:25 AM      Component Results     Component Value Ref Range & Units Status    Lactic Acid 0.8 0.7 - 2.0 mmol/L Final         1/7/2018  1:54 AM      Component Results     Component Value Ref Range & Units Status    Color Urine Yellow  Final    Appearance Urine Slightly Cloudy  Final    Glucose Urine Negative NEG^Negative mg/dL Final    Bilirubin Urine Negative NEG^Negative Final    Ketones Urine Negative NEG^Negative mg/dL Final    Specific Gravity Urine 1.006 1.003 - 1.035 Final    Blood Urine Negative NEG^Negative Final    pH Urine 6.0 5.0 - 7.0 pH Final    Protein Albumin Urine Negative NEG^Negative mg/dL Final    Urobilinogen mg/dL Normal 0.0 - 2.0 mg/dL Final    Nitrite Urine Negative NEG^Negative Final    Leukocyte Esterase Urine Negative NEG^Negative Final    Source Midstream Urine  Final    WBC Urine 4 (H) 0 - 2 /HPF Final    RBC Urine <1 0 - 2 /HPF Final    Squamous Epithelial /HPF Urine 2 (H) 0 - 1 /HPF Final    Mucous Urine Present (A) NEG^Negative /LPF Final         1/7/2018  1:45 AM         1/7/2018  2:10 AM         1/7/2018  2:14 AM         1/7/2018  3:01 AM      Result not yet available     Exam Ended                Thank you for choosing [facility]       Thank you for choosing [facility] for your care. Our goal is always to provide you with excellent care. Hearing back from our patients is one way we can continue to improve our services. Please take a few minutes to complete the written survey that you may receive in the mail after you visit with  us. Thank you!        [facility] Information     [facility] gives you secure access to your electronic health record. If you see a primary care provider, you can also send messages to your care team and make appointments. If you have questions, please call your primary care clinic.  If you do not have a primary care provider, please call [phone] and they will assist you.        Care EveryWhere ID     This is your Care EveryWhere ID. This could be used by other organizations to access your [facility] medical records  [id number]        Equal Access to Services     JACKIE MIX : Rupali Rodriguez, wamary jauregui, antolin kaalkathrine elliott, hari guardado. So [facility] [phone].    ATENCIÓN: Si habla español, tiene a parekh disposición servicios gratuitos de asistencia lingüística. Llame al [phone].    We comply with applicable federal civil rights laws and [place] laws. We do not discriminate on the basis of race, color, national origin, age, disability, sex, sexual orientation, or gender identity.            After Visit Summary       This is your record. Keep this with you and show to your community pharmacist(s) and doctor(s) at your next visit.

## 2018-01-08 NOTE — ANESTHESIA PREPROCEDURE EVALUATION
Anesthesia Evaluation     . Pt has had prior anesthetic. Type: MAC and General    History of anesthetic complications   - PONV        ROS/MED HX    ENT/Pulmonary:     (+)sleep apnea, asthma Treatment: Nebulizer daily,  COPD, uses CPAP , . Other pulmonary disease Pneumothorax; Pleural effusion.    Neurologic:  - neg neurologic ROS     Cardiovascular:     (+) Dyslipidemia, ----. : . . . :. . Previous cardiac testing Echodate:12/20/17results:Interpretation Summary     The visual ejection fraction is estimated at 55-60%.  Trivial pericardial effusion  There are no echocardiographic indications of cardiac tamponade.  GLS -14.6%.  The study was technically difficult.  _____________________________________________________________________________  __        Left Ventricle  The left ventricle is normal in size. There is normal left ventricular wall  thickness. The transmitral spectral Doppler flow pattern is normal for age.  The visual ejection fraction is estimated at 55-60%. No regional wall motion  abnormalities noted.     Right Ventricle  The right ventricle is normal in structure, function and size.     Atria  Normal left atrial size. Right atrial size is normal. There is no color  Doppler evidence of an atrial shunt.     Mitral Valve  The mitral valve is normal in structure and function.        Tricuspid Valve  The tricuspid valve is normal in structure and function. Right ventricle  systolic pressure estimate normal. There is trace tricuspid regurgitation.     Aortic Valve  The aortic valve is normal in structure and function.     Vessels  Normal size aorta. The IVC is normal in size and reactivity with respiration,  suggesting normal central venous pressure.     Pericardium  Trivial pericardial effusion. There are no echocardiographic indications of  cardiac tamponade. Large left pleural effusion.  date: results:ECG reviewed date:12/8/17 results: Sinus rhythm  Cannot rule out Anterior infarct (cited on or before  07-DEC-2017)  Abnormal ECG  When compared with ECG of 07-DEC-2017 20:13, (unconfirmed)  Questionable change in QRS duration  Questionable change in initial forces of Anteroseptal leads   date: results:          METS/Exercise Tolerance:     Hematologic:         Musculoskeletal:         GI/Hepatic:     (+) liver disease,       Renal/Genitourinary:     (+) Other Renal/ Genitourinary, Pyelonephritis      Endo:     (+) thyroid problem hypothyroidism, Obesity, .      Psychiatric:         Infectious Disease:         Malignancy:   (+) Malignancy History of Breast and Other  Breast CA status post Chemo. Breast CA with mets to liver, lung, pancreas        Other:                     Physical Exam  Normal systems: cardiovascular, pulmonary and dental    Airway   Mallampati: II  TM distance: >3 FB  Neck ROM: full    Dental     Cardiovascular       Pulmonary                     Anesthesia Plan      History & Physical Review  History and physical reviewed and following examination; no interval change.    ASA Status:  3 .    NPO Status:  > 8 hours    Plan for MAC with Intravenous and Propofol induction. Reason for MAC:  Other - see comments  PONV prophylaxis:  Ondansetron (or other 5HT-3) and Dexamethasone or Solumedrol       Postoperative Care  Postoperative pain management:  IV analgesics and Oral pain medications.      Consents  Anesthetic plan, risks, benefits and alternatives discussed with:  Patient..                          .  Anesthesia Evaluation     . Pt has had prior anesthetic. Type: MAC    History of anesthetic complications PONV    ROS/MED HX    ENT/Pulmonary:     (+)sleep apnea, allergic rhinitis, Moderate Persistent asthma Treatment: Inhaler prn, Inhaled steroids and Inhaler daily,  uses CPAP , . .    Neurologic:  - neg neurologic ROS     Cardiovascular:     (+) Dyslipidemia, . : . . . :. .       METS/Exercise Tolerance:     Hematologic:  - neg hematologic  ROS       Musculoskeletal:  - neg musculoskeletal ROS        GI/Hepatic:  - neg GI/hepatic ROS       Renal/Genitourinary:  - ROS Renal section negative       Endo: Comment: Steroids started with chemo on Tuesday.    (+) thyroid problem hypothyroidism, Obesity, .        Psychiatric:  - neg psychiatric ROS       Infectious Disease:  - neg infectious disease ROS       Malignancy:   (+) Malignancy History of Breast  Breast CA Active status post Chemo.         Other:    - neg other ROS         Physical Exam  Normal systems: cardiovascular, pulmonary and dental    Airway   Mallampati: II  TM distance: >3 FB  Neck ROM: full    Dental     Cardiovascular       Pulmonary                     Anesthesia Plan    ASA Score: 3 .    Plan for MAC - with Intravenous and Propofol induction.Maintenance will be Balanced.   Routine analgesia and antiemetics to be used for post-operative care. Anesthetic plan, risks, benefits and alternatives discussed with: patient or representative.                 History & Physical Review  History and physical reviewed and following examination; no interval change.                        .

## 2018-01-08 NOTE — TELEPHONE ENCOUNTER
"Status Check:    Pt is a 57 year old female, recently in ED Saturday 01.06.18 for elevated temp of 100.9F and painful swelling in neck and shoulders. Completed C1D15 Abraxane 01.03.18. Completed course of antibiotics 01.03.18 for C Diff.  Call placed for status check.    Primary care provider is: Johana Fairbanks    Diagnosis:   Encounter Diagnoses   Name Primary?     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) Yes     Carcinoma of breast metastatic to liver, unspecified laterality (H)      Bone metastasis (H)      Oncology provider is:  Dr. LUIS Cassidy    How are you doing/feeling?: \"Not very good.  When I got to the ER my temperature was back to normal and the tests were fine.  No infection or anything\".  Patient reports the rest of the weekend her temperature was \"fine\".  Today it is 100.6 F and she is experiencing explosive diarrhea.  Continues with wheezy dry cough that patient reports is her norm.    Any further issues?: none  Next Follow up/Recommendations: Advised patient to come to clinic for repeat CBC and stool sample for C Diff per RN Supportive Guidelines. Patient is in agreement with this plan    Patient instructed to call with any questions or concerns.  Patient states understanding and is in agreement with this plan.    Approximately 8 minutes spent on telephone with patient reviewing assessment and symptom(s) and providing symptom management.    Funmi Ray RN, BSN, OCN  Oncology Hematology   Breast Cancer Navigator  [facility]  [email or web address]  [phone]    "

## 2018-01-09 NOTE — TELEPHONE ENCOUNTER
Per April, for Dr. De La Paz, yes patient is still able to have port a cath procedure tomorrow.    Pt updated with this information.

## 2018-01-09 NOTE — TELEPHONE ENCOUNTER
Dr. Solo reviewed labs and stool results. Stool positive for C Diff. Vancomycin  mg 4 times daily x 10 days and referral to infectious disease.    Spoke with patient and updated on the above.  She will have her   the antibiotic this morning.  Our 's will call patient with date and time of infectious disease appt.     Patient is scheduled to have port-a-cath exchanged tomorrow.  Call placed to [facility] Surgery department to verify if OK to proceed with procedure.

## 2018-01-10 NOTE — BRIEF OP NOTE
PreOp Dx: malfunctioning portacath    PostOp Dx: Same    Procedure: s/p portacath replacement    Surgeon: NILSON De La Paz    Anesthesia: MAC Bobby CRNA    Findings: cath tip in SVc on fuloro    IVF: 850ml    UOP: n/a    EBL: 5ml      Remi De La Paz MD

## 2018-01-10 NOTE — IP AVS SNAPSHOT
MRN:[id number]                      After Visit Summary   1/10/2018    Etta Cervantes    MRN: [id number]           Thank you!     Thank you for choosing [facility] for your care. Our goal is always to provide you with excellent care. Hearing back from our patients is one way we can continue to improve our services. Please take a few minutes to complete the written survey that you may receive in the mail after you visit with us. Thank you!        Patient Information     Date Of Birth          1960        About your hospital stay     You were admitted on:  January 10, 2018 You last received care in the:  [facility] PreOP/Phase II    You were discharged on:  January 10, 2018       Who to Call     For medical emergencies, please call 911.  For non-urgent questions about your medical care, please call your primary care provider or clinic, [phone]  For questions related to your surgery, please call your surgery clinic        Attending Provider     Provider Specialty    Remi De La Pza MD Surgery       Primary Care Provider Office Phone # Fax #    Johana Fairbanks [phone]      Your next 10 appointments already scheduled     Jan 17, 2018  8:30 AM CST   Level 1 with ROOM 4 [facility] ([facility])    [facility]  [address]  [facility] [address]   [phone]            Jan 17, 2018  9:45 AM CST   Return Visit with Brodie Cassidy MD   [facility] ([facility])    [facility]  [address]  [place] [address]   [phone]            Jan 24, 2018  9:30 AM CST   Level 1 with ROOM 9 [facility] ([facility])    [facility]  [address]  [place] [address]   [phone]            Jan 31, 2018  9:30 AM CST   Level 1 with ROOM 3 [facility] ([facility])     [facility]  [address]  [facility] [address]   [phone]            Feb 06, 2018  4:00 PM CST   (Arrive by 3:45 PM)   New Patient Visit with Bailey Santana MD   [facility] and Infectious Diseases ([facility])    [address]  [address]  [facility] [address] [phone]              Further instructions from your care team       Wash incisions daily with soap and water. Some mild redness or swelling is expected. If draining, cover with dry gauze.    OK to use Portacath.    Okay to use ice pack over wound as necessary for comfort.    Use pain medication as necessary but avoid constipating side effects. Ibuprofen okay but avoid Tylenol as your pain medication already contains this.    Diet as tolerated. No restrictions.    Follow up with infusion nurses or Dr De La Paz in 1-2 weeks.                          Same Day Surgery Discharge Instructions  Special Precautions After Surgery - Adult    1. It is not unusual to feel lightheaded or faint, up to 24 hours after surgery or while taking pain medication.  If you have these symptoms; sit for a few minutes before standing and have someone assist you when getting up.  2. You should rest and relax for the next 24 hours and must have someone stay with you for at least 24 hours after your discharge.  3. DO NOT DRIVE any vehicle or operate mechanical equipment for 24 hours following the end of your surgery.  DO NOT DRIVE while taking narcotic pain medications that have been prescribed by your physician.  If you had a limb operated on, you must be able to use it fully to drive.  4. DO NOT drink alcoholic beverages for 24 hours following surgery or while taking prescription pain medication.  5. Drink clear liquids (apple juice, ginger ale, broth, 7-Up, etc.).  Progress to your regular diet as you feel able.  6. Any questions call your physician and do not make important decisions for  "24 hours.    ACTIVITY  ? Rest today.  No activity or diet restrictions.  ? Resume activity as tolerated.  ? See printed discharge sheet.     INCISIONAL CARE  ? May  shower after 24 hours.  ? Apply ice 1/2 hour on and 1/2 hour off while awake.  ? Be alert for signs of infection:  redness, swelling, heat, drainage of pus, and/or elevated temperature.  Contact your doctor if these occur.        Call for an appointment to return to the clinic in 10-14 days.    Medications:  ? Hydrocodone (Norco, Vicodin):  Next dose: as needed.  ? Follow the instructions on the bottle.     Additional discharge instructions: None  __________________________________________________________________________________________________________________________________  IMPORTANT NUMBERS:    [facility] Main Number:  [phone], [phone]  Pharmacy:  [phone]  Same Day Surgery:  [phone], Monday - Friday until 8:30 p.m.  Emergency Room:  [phone]      Surgery Specialty Clinic:  [phone]                         Pending Results     Date and Time Order Name Status Description    1/10/2018 1054 XR Chest Port 1 View In process     1/10/2018 0502 XR Surgery MUNA Fluoro L/T 5 Min w Stills In process             Admission Information     Date & Time Provider Department Dept. Phone    1/10/2018 Remi De La Paz MD [facility] PreOP/Phase [phone]      Your Vitals Were     Blood Pressure Pulse Temperature Respirations Height Weight    110/52 (Cuff Size: Adult Regular) 110 98.2  F (36.8  C) (Oral) 16 1.632 m (5' 4.25\") 83.9 kg (185 lb)    Last Period Pulse Oximetry BMI (Body Mass Index)             02/06/2013 94% 31.51 kg/m2         [employer] Information     [employer] gives you secure access to your electronic health record. If you see a primary care provider, you can also send messages to your care team and make appointments. If you have questions, please call your primary care clinic.  If you do not have a primary care provider, please " call [phone] and they will assist you.        Care EveryWhere ID     This is your Care EveryWhere ID. This could be used by other organizations to access your [facility] medical records  [id number]        Equal Access to Services     JACKIE MIX : Hadii aad ku hadkasieraúl Michael, wamanuelada luqadaha, qaybta kaalmada lexi, hari melviin hayaayvette hale mihaelaobdulia guardado. So [facility] [phone].    ATENCIÓN: Si habla español, tiene a parekh disposición servicios gratuitos de asistencia lingüística. Llame al [phone].    We comply with applicable federal civil rights laws and [place] laws. We do not discriminate on the basis of race, color, national origin, age, disability, sex, sexual orientation, or gender identity.               Review of your medicines      START taking        Dose / Directions    HYDROcodone-acetaminophen 5-325 MG per tablet   Commonly known as:  NORCO   Used for:  Post-op pain        Dose:  1-2 tablet   Take 1-2 tablets by mouth every 4 hours as needed for moderate to severe pain   Quantity:  20 tablet   Refills:  0         CONTINUE these medicines which have NOT CHANGED        Dose / Directions    * albuterol 108 (90 BASE) MCG/ACT Inhaler   Commonly known as:  VENTOLIN HFA   Used for:  Moderate persistent asthma, uncomplicated        Dose:  2 puff   Inhale 2 puffs into the lungs every 4 hours as needed   Quantity:  1 Inhaler   Refills:  2       * albuterol (2.5 MG/3ML) 0.083% neb solution   Used for:  Moderate persistent asthma, uncomplicated        Dose:  1 vial   Take 1 vial (2.5 mg) by nebulization every 4 hours as needed for shortness of breath / dyspnea   Quantity:  30 vial   Refills:  3       ALLEGRA ALLERGY 180 MG tablet   Generic drug:  fexofenadine        Dose:  180 mg   Take 180 mg by mouth daily as needed for allergies   Refills:  0       azelastine 0.1 % spray   Commonly known as:  ASTELIN   Used for:  Chronic rhinitis        Dose:  1-2 spray   Spray 1-2 sprays into both nostrils 2  times daily as needed for rhinitis   Quantity:  3 Bottle   Refills:  3       budesonide-formoterol 160-4.5 MCG/ACT Inhaler   Commonly known as:  SYMBICORT   Used for:  Moderate persistent asthma without complication        Dose:  2 puff   Inhale 2 puffs into the lungs 2 times daily   Quantity:  1 Inhaler   Refills:  1       CRANBERRY PO        Dose:  1 capsule   Take 1 capsule by mouth daily   Refills:  0       dexamethasone 4 MG tablet   Commonly known as:  DECADRON   Used for:  Carcinoma of breast metastatic to liver, unspecified laterality (H), Bilateral malignant neoplasm of breast in female, estrogen receptor positive, unspecified site of breast (H), Bone metastasis (H), 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), Secondary malignant neoplasm of liver (H)        Take 20 mg (5 tablets) the evening prior to and the morning of Abraxane infusion for 1st cycle only.   Quantity:  10 tablet   Refills:  0       diclofenac 1 % Gel topical gel   Commonly known as:  VOLTAREN   Used for:  Carcinoma of breast metastatic to liver, unspecified laterality (H)        Dose:  2 g   Place 2 g onto the skin 4 times daily   Quantity:  100 g   Refills:  0       diphenhydrAMINE 25 MG tablet   Commonly known as:  BENADRYL   Used for:  Carcinoma of breast metastatic to liver, unspecified laterality (H), Bone metastasis (H), Pericardial effusion, Bilateral malignant neoplasm of breast in female, estrogen receptor positive, unspecified site of breast (H), 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), Secondary malignant neoplasm of liver (H), Chemotherapy-induced neutropenia (H), Emphysematous bleb of lung (H), Hypothyroidism, unspecified type, Hyperlipidemia LDL goal <130, Moderate persistent asthma without complication, Mucositis due to chemotherapy        Dose:  25 mg   Take 1 tablet (25 mg) by mouth every 8 hours as needed for itching or allergies   Quantity:  1 tablet   Refills:  0       DULERA  200-5 MCG/ACT oral inhaler   Used for:  Moderate persistent asthma without complication   Generic drug:  mometasone-formoterol        INHALE 2 PUFFS INTO THE LUNGS TWICE DAILY   Quantity:  3 Inhaler   Refills:  3       fluconazole 100 MG tablet   Commonly known as:  DIFLUCAN   Used for:  Carcinoma of breast metastatic to liver, unspecified laterality (H)        Dose:  100 mg   Take 1 tablet (100 mg) by mouth daily   Quantity:  7 tablet   Refills:  0       KP CALCIUM 600+D3 600-500 MG-UNIT Caps   Generic drug:  calcium carb-cholecalciferol        Dose:  2 tablet   Take 2 tablets by mouth daily   Refills:  0       levothyroxine 25 MCG tablet   Commonly known as:  SYNTHROID/LEVOTHROID   Used for:  Hypothyroidism due to acquired atrophy of thyroid        TAKE 1 TABLET (25 MCG) BY MOUTH DAILY   Quantity:  90 tablet   Refills:  2       lidocaine (viscous) 2 % solution   Commonly known as:  XYLOCAINE   Used for:  Carcinoma of breast metastatic to liver, unspecified laterality (H), Bilateral malignant neoplasm of breast in female, estrogen receptor positive, unspecified site of breast (H), Bone metastasis (H), 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), Secondary malignant neoplasm of liver (H)        Refills:  0       Lidocaine 4 % Patch   Commonly known as:  LIDOCARE   Used for:  Carcinoma of breast metastatic to liver, unspecified laterality (H)        Dose:  1-2 patch   Place 1-2 patches onto the skin every 24 hours   Quantity:  30 patch   Refills:  0       lidocaine visc 2% 2.5mL/5mL & maalox/mylanta w/ simeth 2.5mL/5mL & diphenhydrAMINE 5mg/5mL Susp suspension   Commonly known as:  [facility]   Used for:  Mucositis due to chemotherapy, Breast cancer metastasized to axillary lymph node, right (H)        Dose:  10 mL   Swish and swallow 10 mLs in mouth every 6 hours as needed for mouth sores   Quantity:  240 mL   Refills:  10       loratadine 10 MG tablet   Commonly known as:  CLARITIN         Dose:  10 mg   Take 10 mg by mouth daily as needed for allergies   Refills:  0       magnesium hydroxide 400 MG/5ML suspension   Commonly known as:  MILK OF MAGNESIA   Used for:  Carcinoma of breast metastatic to liver, unspecified laterality (H)        Dose:  30 mL   Take 30 mLs by mouth daily as needed for constipation   Quantity:  105 mL   Refills:  0       metoclopramide 10 MG tablet   Commonly known as:  REGLAN   Used for:  Bilateral malignant neoplasm of breast in female, estrogen receptor positive, unspecified site of breast (H)        Dose:  10 mg   Take 1 tablet (10 mg) by mouth 2 times daily as needed   Quantity:  120 tablet   Refills:  1       metroNIDAZOLE 500 MG tablet   Commonly known as:  FLAGYL   Used for:  Carcinoma of breast metastatic to liver, unspecified laterality (H)        Dose:  500 mg   Take 1 tablet (500 mg) by mouth 3 times daily   Quantity:  20 tablet   Refills:  0       order for DME   Used for:  Lymphedema, Lymphedema of breast        Equipment being ordered: JoviPak post-lumpectomy compression pad x2   Quantity:  1 each   Refills:  0       oxyCODONE IR 5 MG tablet   Commonly known as:  ROXICODONE   Used for:  Secondary malignant neoplasm of liver (H), Carcinoma of breast metastatic to liver, unspecified laterality (H), 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), Bone metastasis (H), Pericardial effusion        Dose:  5 mg   Take 1 tablet (5 mg) by mouth every 4 hours as needed for moderate to severe pain   Quantity:  60 tablet   Refills:  0       polyethylene glycol Packet   Commonly known as:  MIRALAX/GLYCOLAX   Used for:  Carcinoma of breast metastatic to liver, unspecified laterality (H)        Dose:  17 g   Take 17 g by mouth daily   Quantity:  7 packet   Refills:  0       prochlorperazine 10 MG tablet   Commonly known as:  COMPAZINE   Used for:  Secondary malignant neoplasm of liver (H), Carcinoma of breast metastatic to liver, unspecified laterality  (H), Bone metastasis (H), 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), Bilateral malignant neoplasm of breast in female, estrogen receptor positive, unspecified site of breast (H)        Dose:  10 mg   Take 1 tablet (10 mg) by mouth every 6 hours as needed (Nausea/Vomiting)   Quantity:  30 tablet   Refills:  2       QVAR 80 MCG/ACT Inhaler   Used for:  Chronic rhinitis   Generic drug:  beclomethasone        INHALE 1 PUFFS INTO THE EACH NOSTRIL 2 TIMES DAILY   Quantity:  26.1 g   Refills:  3       ROBITUSSIN COUGH/COLD CF PO   Used for:  Breast cancer metastasized to axillary lymph node, right (H)        Dose:  10 mL   Take 10 mLs by mouth as needed   Refills:  0       senna-docusate 8.6-50 MG per tablet   Commonly known as:  SENOKOT-S;PERICOLACE   Used for:  Carcinoma of breast metastatic to liver, unspecified laterality (H)        Dose:  1-2 tablet   Take 1-2 tablets by mouth 2 times daily as needed for constipation   Quantity:  100 tablet   Refills:  0       STOOL SOFTENER PO        Dose:  100 mg   Take 100 mg by mouth daily   Refills:  0       vancomycin 125 MG capsule   Commonly known as:  VANCOCIN   Used for:  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), Carcinoma of breast metastatic to liver, unspecified laterality (H), Bone metastasis (H), Clostridium difficile infection        Dose:  125 mg   Take 1 capsule (125 mg) by mouth 4 times daily for 10 days   Quantity:  40 capsule   Refills:  0       zolpidem 12.5 MG CR tablet   Commonly known as:  AMBIEN CR   Used for:  Insomnia due to medical condition        Take 1 tablet by mouth at bed time.   Quantity:  30 tablet   Refills:  5       * Notice:  This list has 2 medication(s) that are the same as other medications prescribed for you. Read the directions carefully, and ask your doctor or other care provider to review them with you.         Where to get your medicines      Some of these will need a paper prescription and  others can be bought over the counter. Ask your nurse if you have questions.     Bring a paper prescription for each of these medications     HYDROcodone-acetaminophen 5-325 MG per tablet                Protect others around you: Learn how to safely use, store and throw away your medicines at www.disposemymeds.org.             Medication List: This is a list of all your medications and when to take them. Check marks below indicate your daily home schedule. Keep this list as a reference.      Medications           Morning Afternoon Evening Bedtime As Needed    * albuterol 108 (90 BASE) MCG/ACT Inhaler   Commonly known as:  VENTOLIN HFA   Inhale 2 puffs into the lungs every 4 hours as needed                                * albuterol (2.5 MG/3ML) 0.083% neb solution   Take 1 vial (2.5 mg) by nebulization every 4 hours as needed for shortness of breath / dyspnea   Last time this was given:  2.5 mg on 1/10/2018  9:44 AM                                ALLEGRA ALLERGY 180 MG tablet   Take 180 mg by mouth daily as needed for allergies   Generic drug:  fexofenadine                                azelastine 0.1 % spray   Commonly known as:  ASTELIN   Spray 1-2 sprays into both nostrils 2 times daily as needed for rhinitis                                budesonide-formoterol 160-4.5 MCG/ACT Inhaler   Commonly known as:  SYMBICORT   Inhale 2 puffs into the lungs 2 times daily                                CRANBERRY PO   Take 1 capsule by mouth daily                                dexamethasone 4 MG tablet   Commonly known as:  DECADRON   Take 20 mg (5 tablets) the evening prior to and the morning of Abraxane infusion for 1st cycle only.                                diclofenac 1 % Gel topical gel   Commonly known as:  VOLTAREN   Place 2 g onto the skin 4 times daily                                diphenhydrAMINE 25 MG tablet   Commonly known as:  BENADRYL   Take 1 tablet (25 mg) by mouth every 8 hours as needed for itching  or allergies                                DULERA 200-5 MCG/ACT oral inhaler   INHALE 2 PUFFS INTO THE LUNGS TWICE DAILY   Generic drug:  mometasone-formoterol                                fluconazole 100 MG tablet   Commonly known as:  DIFLUCAN   Take 1 tablet (100 mg) by mouth daily                                HYDROcodone-acetaminophen 5-325 MG per tablet   Commonly known as:  NORCO   Take 1-2 tablets by mouth every 4 hours as needed for moderate to severe pain                                KP CALCIUM 600+D3 600-500 MG-UNIT Caps   Take 2 tablets by mouth daily   Generic drug:  calcium carb-cholecalciferol                                levothyroxine 25 MCG tablet   Commonly known as:  SYNTHROID/LEVOTHROID   TAKE 1 TABLET (25 MCG) BY MOUTH DAILY                                lidocaine (viscous) 2 % solution   Commonly known as:  XYLOCAINE                                Lidocaine 4 % Patch   Commonly known as:  LIDOCARE   Place 1-2 patches onto the skin every 24 hours                                lidocaine visc 2% 2.5mL/5mL & maalox/mylanta w/ simeth 2.5mL/5mL & diphenhydrAMINE 5mg/5mL Susp suspension   [facility] known as:  [facility]   Swish and swallow 10 mLs in mouth every 6 hours as needed for mouth sores                                loratadine 10 MG tablet   Commonly known as:  CLARITIN   Take 10 mg by mouth daily as needed for allergies                                magnesium hydroxide 400 MG/5ML suspension   Commonly known as:  MILK OF MAGNESIA   Take 30 mLs by mouth daily as needed for constipation                                metoclopramide 10 MG tablet   Commonly known as:  REGLAN   Take 1 tablet (10 mg) by mouth 2 times daily as needed                                metroNIDAZOLE 500 MG tablet   Commonly known as:  FLAGYL   Take 1 tablet (500 mg) by mouth 3 times daily                                order for DME   Equipment being ordered: Venu post-lumpectomy compression  pad x2                                oxyCODONE IR 5 MG tablet   Commonly known as:  ROXICODONE   Take 1 tablet (5 mg) by mouth every 4 hours as needed for moderate to severe pain                                polyethylene glycol Packet   Commonly known as:  MIRALAX/GLYCOLAX   Take 17 g by mouth daily                                prochlorperazine 10 MG tablet   Commonly known as:  COMPAZINE   Take 1 tablet (10 mg) by mouth every 6 hours as needed (Nausea/Vomiting)                                QVAR 80 MCG/ACT Inhaler   INHALE 1 PUFFS INTO THE EACH NOSTRIL 2 TIMES DAILY   Generic drug:  beclomethasone                                ROBITUSSIN COUGH/COLD CF PO   Take 10 mLs by mouth as needed                                senna-docusate 8.6-50 MG per tablet   Commonly known as:  SENOKOT-S;PERICOLACE   Take 1-2 tablets by mouth 2 times daily as needed for constipation                                STOOL SOFTENER PO   Take 100 mg by mouth daily                                vancomycin 125 MG capsule   Commonly known as:  VANCOCIN   Take 1 capsule (125 mg) by mouth 4 times daily for 10 days                                zolpidem 12.5 MG CR tablet   Commonly known as:  AMBIEN CR   Take 1 tablet by mouth at bed time.                                * Notice:  This list has 2 medication(s) that are the same as other medications prescribed for you. Read the directions carefully, and ask your doctor or other care provider to review them with you.

## 2018-01-10 NOTE — IP AVS SNAPSHOT
[facility] PreOP/Phase II    [address] [address]    Phone:  [phone]    Fax:  [phone]                                       After Visit Summary   1/10/2018    Etta Cervantes    MRN: [id number]           After Visit Summary Signature Page     I have received my discharge instructions, and my questions have been answered. I have discussed any challenges I see with this plan with the nurse or doctor.    ..........................................................................................................................................  Patient/Patient Representative Signature      ..........................................................................................................................................  Patient Representative Print Name and Relationship to Patient    ..................................................               ................................................  Date                                            Time    ..........................................................................................................................................  Reviewed by Signature/Title    ...................................................              ..............................................  Date                                                            Time

## 2018-01-10 NOTE — ANESTHESIA POSTPROCEDURE EVALUATION
Patient: Etta Cervantes    Procedure(s):  Portacath replacement - Wound Class: I-Clean    Diagnosis:portacath malfunction  Diagnosis Additional Information: No value filed.    Anesthesia Type:  MAC    Note:  Anesthesia Post Evaluation    Patient location during evaluation: Phase 2 and Bedside  Patient participation: Able to fully participate in evaluation  Level of consciousness: awake and alert  Pain management: adequate  Airway patency: patent  Cardiovascular status: acceptable  Respiratory status: acceptable  Hydration status: acceptable  PONV: none     Anesthetic complications: None          Last vitals:  Vitals:    01/10/18 0846   BP: 122/69   Resp: 18   Temp: 37.6  C (99.6  F)   SpO2: 96%         Electronically Signed By: Josr Rosales CRNA, APRN CRNA  January 10, 2018  10:53 AM

## 2018-01-10 NOTE — DISCHARGE INSTRUCTIONS
Wash incisions daily with soap and water. Some mild redness or swelling is expected. If draining, cover with dry gauze.    OK to use Portacath.    Okay to use ice pack over wound as necessary for comfort.    Use pain medication as necessary but avoid constipating side effects. Ibuprofen okay but avoid Tylenol as your pain medication already contains this.    Diet as tolerated. No restrictions.    Follow up with infusion nurses or Dr De La Paz in 1-2 weeks.                          Same Day Surgery Discharge Instructions  Special Precautions After Surgery - Adult    1. It is not unusual to feel lightheaded or faint, up to 24 hours after surgery or while taking pain medication.  If you have these symptoms; sit for a few minutes before standing and have someone assist you when getting up.  2. You should rest and relax for the next 24 hours and must have someone stay with you for at least 24 hours after your discharge.  3. DO NOT DRIVE any vehicle or operate mechanical equipment for 24 hours following the end of your surgery.  DO NOT DRIVE while taking narcotic pain medications that have been prescribed by your physician.  If you had a limb operated on, you must be able to use it fully to drive.  4. DO NOT drink alcoholic beverages for 24 hours following surgery or while taking prescription pain medication.  5. Drink clear liquids (apple juice, ginger ale, broth, 7-Up, etc.).  Progress to your regular diet as you feel able.  6. Any questions call your physician and do not make important decisions for 24 hours.    ACTIVITY  ? Rest today.  No activity or diet restrictions.  ? Resume activity as tolerated.  ? See printed discharge sheet.     INCISIONAL CARE  ? May  shower after 24 hours.  ? Apply ice 1/2 hour on and 1/2 hour off while awake.  ? Be alert for signs of infection:  redness, swelling, heat, drainage of pus, and/or elevated temperature.  Contact your doctor if these occur.        Call for an appointment to return to  the clinic in 10-14 days.    Medications:  ? Hydrocodone (Norco, Vicodin):  Next dose: as needed.  ? Follow the instructions on the bottle.     Additional discharge instructions: None  __________________________________________________________________________________________________________________________________  IMPORTANT NUMBERS:    [facility] Main Number:  [phone], [phone]  Pharmacy:  [phone]  Same Day Surgery:  [phone], Monday - Friday until 8:30 p.m.  Emergency Room:  [phone]      [facility]:  [phone]

## 2018-01-10 NOTE — ANESTHESIA CARE TRANSFER NOTE
Patient: Etta Cervantes    Procedure(s):  Portacath replacement - Wound Class: I-Clean    Diagnosis: portacath malfunction  Diagnosis Additional Information: No value filed.    Anesthesia Type:   MAC     Note:  Airway :Nasal Cannula  Patient transferred to:Phase II  Handoff Report: Identifed the Patient, Identified the Reponsible Provider, Reviewed the pertinent medical history, Discussed the surgical course, Reviewed Intra-OP anesthesia mangement and issues during anesthesia, Set expectations for post-procedure period and Allowed opportunity for questions and acknowledgement of understanding      Vitals: (Last set prior to Anesthesia Care Transfer)    CRNA VITALS  1/10/2018 1018 - 1/10/2018 1052      1/10/2018             Pulse: 115    SpO2: 98 %                Electronically Signed By: Josr Rosales CRNA, APRN CRNA  January 10, 2018  10:52 AM

## 2018-01-11 NOTE — OP NOTE
PREOPERATIVE DIAGNOSIS:  Malfunctioning Port-A-Cath.      POSTOPERATIVE DIAGNOSIS:  Malfunctioning Port-A-Cath.      PROCEDURE:  Port-A-Cath replacement.      SURGEON:  Remi De La Paz MD      ANESTHESIOLOGIST:  Josr Rosales CRNA      ANESTHESIA:  MAC.      FINDINGS:  Clogged catheter tip with successful replacement of new Port-A-Cath.      INDICATIONS:  The patient Etta Cervantes is a 57-year-old female who was seen in Clinic with a Port-A-Cath that would not fill or flush due to a fibrous cap at the end.  Risks, benefits, alternatives and complications of the above procedure were discussed with the patient including the possibility of infection, bleeding and/or pneumothorax.  The patient understood and wished to proceed.      DESCRIPTION OF PROCEDURE:  The patient was taken to the operating room on 01/10/2018 where she was placed the in supine position, and MAC anesthesia was initiated.  The anterior chest and neck were cleaned and draped in a sterile manner, and 1% lidocaine with epinephrine was used to anesthetize the skin of the left anterior chest.      A 4 cm transverse incision was made, and subcutaneous tissues were dissected to the port.  The old port was dissected free and removed from the body  it from the catheter.  The catheter was opened up, and a wire was threaded into the catheter; however, the wire could not penetrate the distal tip.  As a result we could not perform a simple change over the wire.      It was necessary to remove the catheter and wire in their entirety.  The patient was placed into Trendelenburg position, and a needle was inserted into the left subclavian vein with flashback of dark- red blood.  A wire was guided smoothly over the needle, and there was no ectopy.  On fluoroscopy the wire was noted to be in the superior vena cava.  A port was sutured into the prior pocket using 2 interrupted 0 Prolene sutures, and a subcutaneous dilator and introducer sheath were placed over  the wire.  The wire and dilator were removed leaving only the introducer sheath in place.  A catheter was threaded smoothly into the introducer sheath, was broken free and removed from the body.  On fluoroscopy the catheter tip was noted to be in the right ventricle.  It was pulled until it was in the right atrial superior vena cava junction, cut to the appropriate length and attached to the port.  The port shelly back dark-red blood and flushed with ease.        A final flush solution consisting of 1,000 units of heparin per cc was injected into the port.  Final fluoroscopy showed the catheter tip in the superior vena cava with a smooth trajectory back to the port.  The pocket was closed using interrupted 3-0 Vicryl deep dermal sutures and a 4-0 Vicryl running subcuticular stitch.  The wound was dressed with Dermabond.  The patient tolerated the procedure well and was transferred back to Same Day Surgery for recovery.      PLAN:     1.  Following a stable recovery the patient will be discharged home.   2.  Regular diet.   3.  Activity as tolerated.   4.  No disability.   5.  Prescription written for Norco.   6.  The patient is advised that she can use the port immediately.   7.  She is to wash the incision every day with soap and water.     8.  The patient can follow up with me or the Infusion Nurses as necessary.         ILAN PETER             D: 01/10/2018 10:55   T: 01/10/2018 22:59   MT: [id number]      Name:     LISSETH STEPHENSON   MRN:      [id number]        Account:        [id number]   :      1960           Procedure Date: 01/10/2018      Document: [id number]       cc: Johana Fairbanks MD

## 2018-01-17 PROBLEM — I82.621 ACUTE DEEP VEIN THROMBOSIS (DVT) OF RIGHT UPPER EXTREMITY, UNSPECIFIED VEIN (H): Status: ACTIVE | Noted: 2018-01-01

## 2018-01-17 NOTE — PROGRESS NOTES
Hematology/ Oncology Follow-up Visit:  Jan 17, 2018    Reason for Visit:   Chief Complaint   Patient presents with     Oncology Clinic Visit     3 week recheck Breast CA, review labs        Oncologic History:  Breast cancer (H)  Etta Cervantes presented with increasing lump in the right axilla that s lump has been growing without any pain or associated symptoms. Subsequently she was evaluated by primary care physician. Diagnostic digital mammogram was done on 01/13/2015 showing enlarged lymph nodes in the right axilla. There was some skin thickening in the right breast anteriorly and laterally. There are no parenchymal changes in the right breast. The left breast shows a 1.7 cm spiculated mass at approximately 2 to 3 o'clock position, 15.2 cm away from the nipple. A right breast ultrasound was subsequently done and ultrasound guided biopsy was done. Needle biopsy of the left breast did show infiltrating ductal carcinoma grade I of III with no angiolymphatic invasion seen. No associated ductal carcinoma in situ. Estrogen receptor, progresterone receptor were positive. HER-2/sadie receptor came back negative.. Another biopsy from the right axilla did show metastatic ductal carcinoma. PET scan was done on January 26, 2015 showing few small right posterior cervical chain nodes the largest is 1.2 cm. There is extensive bulky right axillary adenopathy demonstrating hypermetabolic FDG uptake with SUV of 10.6. There is skin thickening involving the right breast which demonstrated low-grade FDG uptake. A 1.2 cm lesion on the lateral aspect of the left breast demonstrated minimally increased FDG uptake with SUV of 2. Scattered hypermetabolic mediastinal lymph nodes located in the left superior anterior mediastinum, right paratracheal and precarinal U, subcranial adenopathy with javon metastases. This focus hypermetabolic FDG uptake identified definitive Amanda posterior aspect off intertrochanteric region of the proximal left  femur consistent with bone metastases. There is also 1.4 cm hypermetabolic FDG uptake identified in the anterior medial segment of the left hepatic lobe consistent with liver metastases. Additional 2.1 cm low-attenuation lesion anterior aspect of the right lobe which needs to be determined. The patient was started on chemotherapy with Taxotere and Cytoxan on February 9, 2015.  She concluded 4 cycles of Taxotere and Cytoxan. She started on Arimidex 1 mg orally daily. Currently she is on Faslodex and ibrance. Subsequently the patient went on to receive Afinitor. The patient also started treatment with Xeloda.     Interval History:  Patient is here today for follow-up.  She concluded her first cycle of chemotherapy with Abraxane.  She tolerated chemotherapy well without significant side effects.  She continues to have diarrhea and was tested positive for C. difficile infection.  She is currently on oral vancomycin.  Her pain is well controlled.  Her appetite is poor and she continues to lose weight.  She denies any shortness of breath or cough or wheezing.    Review Of Systems:  Constitutional: Negative for fever, chills, and night sweats.  Skin: negative.  Eyes: negative.  Ears/Nose/Throat: negative.  Respiratory: No shortness of breath, dyspnea on exertion, cough, or hemoptysis.  Cardiovascular: negative.  Gastrointestinal: Diarrhea as mentioned above  Genitourinary: negative.  Musculoskeletal: negative.  Neurologic: negative.  Psychiatric: negative.  Hematologic/Lymphatic/Immunologic: negative.  Endocrine: negative.    All other ROS negative unless mentioned in interval history.    Past medical, social, surgical, and family histories reviewed.    Allergies:  Allergies as of 01/17/2018 - Manuel as Reviewed 01/17/2018   Allergen Reaction Noted     Animal dander Cough 01/23/2015     Cats  07/15/2010     Dust mites Cough 01/23/2015     Pollen extract  01/23/2015     Seasonal allergies  07/15/2010     Levaquin  [levofloxacin] Rash 07/17/2017       Current Medications:  Current Outpatient Prescriptions   Medication Sig Dispense Refill     amoxicillin-clavulanate (AUGMENTIN) 875-125 MG per tablet Take 1 tablet by mouth 2 times daily 20 tablet 0     HYDROcodone-acetaminophen (NORCO) 5-325 MG per tablet Take 1-2 tablets by mouth every 4 hours as needed for moderate to severe pain 20 tablet 0     vancomycin (VANCOCIN) 125 MG capsule Take 1 capsule (125 mg) by mouth 4 times daily for 10 days 40 capsule 0     budesonide-formoterol (SYMBICORT) 160-4.5 MCG/ACT Inhaler Inhale 2 puffs into the lungs 2 times daily 1 Inhaler 1     oxyCODONE IR (ROXICODONE) 5 MG tablet Take 1 tablet (5 mg) by mouth every 4 hours as needed for moderate to severe pain 60 tablet 0     lidocaine, viscous, (XYLOCAINE) 2 % solution        prochlorperazine (COMPAZINE) 10 MG tablet Take 1 tablet (10 mg) by mouth every 6 hours as needed (Nausea/Vomiting) 30 tablet 2     dexamethasone (DECADRON) 4 MG tablet Take 20 mg (5 tablets) the evening prior to and the morning of Abraxane infusion for 1st cycle only. 10 tablet 0     diclofenac (VOLTAREN) 1 % GEL topical gel Place 2 g onto the skin 4 times daily 100 g 0     Lidocaine (LIDOCARE) 4 % Patch Place 1-2 patches onto the skin every 24 hours 30 patch 0     magnesium hydroxide (MILK OF MAGNESIA) 400 MG/5ML suspension Take 30 mLs by mouth daily as needed for constipation 105 mL      polyethylene glycol (MIRALAX/GLYCOLAX) Packet Take 17 g by mouth daily 7 packet      senna-docusate (SENOKOT-S;PERICOLACE) 8.6-50 MG per tablet Take 1-2 tablets by mouth 2 times daily as needed for constipation 100 tablet      loratadine (CLARITIN) 10 MG tablet Take 10 mg by mouth daily as needed for allergies       order for DME Equipment being ordered: Venu post-lumpectomy compression pad x2 1 each 0     zolpidem (AMBIEN CR) 12.5 MG CR tablet Take 1 tablet by mouth at bed time. 30 tablet 5     diphenhydrAMINE (BENADRYL) 25 MG tablet  "Take 1 tablet (25 mg) by mouth every 8 hours as needed for itching or allergies 1 tablet 0     DULERA 200-5 MCG/ACT oral inhaler INHALE 2 PUFFS INTO THE LUNGS TWICE DAILY 3 Inhaler 3     QVAR 80 MCG/ACT Inhaler INHALE 1 PUFFS INTO THE EACH NOSTRIL 2 TIMES DAILY 26.1 g 3     Phenylephrine-DM-GG (ROBITUSSIN COUGH/COLD CF PO) Take 10 mLs by mouth as needed        levothyroxine (SYNTHROID/LEVOTHROID) 25 MCG tablet TAKE 1 TABLET (25 MCG) BY MOUTH DAILY 90 tablet 2     metoclopramide (REGLAN) 10 MG tablet Take 1 tablet (10 mg) by mouth 2 times daily as needed 120 tablet 1     fexofenadine (ALLEGRA ALLERGY) 180 MG tablet Take 180 mg by mouth daily as needed for allergies       calcium carb-cholecalciferol ( CALCIUM 600+D3) 600-500 MG-UNIT CAPS Take 2 tablets by mouth daily       albuterol (2.5 MG/3ML) 0.083% neb solution Take 1 vial (2.5 mg) by nebulization every 4 hours as needed for shortness of breath / dyspnea 30 vial 3     magic mouthwash suspension (diphenhydrAMINE, lidocaine, aluminum-magnesium & simethicone) Swish and swallow 10 mLs in mouth every 6 hours as needed for mouth sores 240 mL 10     Docusate Calcium (STOOL SOFTENER PO) Take 100 mg by mouth daily        CRANBERRY PO Take 1 capsule by mouth daily        azelastine (ASTELIN) 0.1 % nasal spray Spray 1-2 sprays into both nostrils 2 times daily as needed for rhinitis 3 Bottle 3     albuterol (VENTOLIN HFA) 108 (90 BASE) MCG/ACT inhaler Inhale 2 puffs into the lungs every 4 hours as needed 1 Inhaler 2        Physical Exam:  /70 (BP Location: Right arm, Patient Position: Sitting, Cuff Size: Adult Regular)  Pulse 106  Temp 99.4  F (37.4  C) (Tympanic)  Ht 1.632 m (5' 4.25\")  Wt 85.4 kg (188 lb 3.2 oz)  LMP 02/06/2013  SpO2 99%  Breastfeeding? No  BMI 32.05 kg/m2  Wt Readings from Last 12 Encounters:   01/17/18 85.4 kg (188 lb 3.2 oz)   01/10/18 83.9 kg (185 lb)   01/05/18 83.9 kg (185 lb)   01/03/18 86.5 kg (190 lb 9.6 oz)   12/27/17 84 kg (185 " lb 3.2 oz)   12/21/17 89.9 kg (198 lb 3.2 oz)   12/14/17 89.1 kg (196 lb 8 oz)   12/12/17 90.6 kg (199 lb 11.2 oz)   12/07/17 90.6 kg (199 lb 11.2 oz)   12/01/17 90.9 kg (200 lb 8 oz)   11/30/17 90.9 kg (200 lb 8 oz)   11/28/17 91.1 kg (200 lb 14.4 oz)     ECOG performance status: 1  GENERAL APPEARANCE: Healthy, alert and in no acute distress.  HEENT: Sclerae anicteric. PERRLA. Oropharynx without ulcers, lesions, or thrush.  NECK: Supple. No asymmetry or masses.  LYMPHATICS: No palpable cervical, supraclavicular, axillary, or inguinal lymphadenopathy.  RESP: Lungs clear to auscultation bilaterally without rales, rhonchi or wheezes.  CARDIOVASCULAR: Regular rate and rhythm. Normal S1, S2; no S3 or S4. No murmur, gallop, or rub.  ABDOMEN: Soft, nontender. Bowel sounds normal. No palpable organomegaly or masses.  MUSCULOSKELETAL: Extremities without gross deformities noted. No edema of bilateral lower extremities.  SKIN: No suspicious lesions or rashes.  NEURO: Alert and oriented x 3. Cranial nerves II-XII grossly intact.  PSYCHIATRIC: Mentation and affect appear normal.    Laboratory/Imaging Studies:  Infusion Therapy Visit on 01/17/2018   Component Date Value Ref Range Status     WBC 01/17/2018 7.8  4.0 - 11.0 10e9/L Final     RBC Count 01/17/2018 3.71* 3.8 - 5.2 10e12/L Final     Hemoglobin 01/17/2018 11.6* 11.7 - 15.7 g/dL Final     Hematocrit 01/17/2018 36.2  35.0 - 47.0 % Final     MCV 01/17/2018 98  78 - 100 fl Final     MCH 01/17/2018 31.3  26.5 - 33.0 pg Final     MCHC 01/17/2018 32.0  31.5 - 36.5 g/dL Final     RDW 01/17/2018 14.8  10.0 - 15.0 % Final     Platelet Count 01/17/2018 311  150 - 450 10e9/L Final     Diff Method 01/17/2018 Automated Method   Final     % Neutrophils 01/17/2018 63.3  % Final     % Lymphocytes 01/17/2018 17.8  % Final     % Monocytes 01/17/2018 12.5  % Final     % Eosinophils 01/17/2018 2.8  % Final     % Basophils 01/17/2018 0.6  % Final     % Immature Granulocytes 01/17/2018 3.0  %  Final     Absolute Neutrophil 01/17/2018 4.9  1.6 - 8.3 10e9/L Final     Absolute Lymphocytes 01/17/2018 1.4  0.8 - 5.3 10e9/L Final     Absolute Monocytes 01/17/2018 1.0  0.0 - 1.3 10e9/L Final     Absolute Eosinophils 01/17/2018 0.2  0.0 - 0.7 10e9/L Final     Absolute Basophils 01/17/2018 0.1  0.0 - 0.2 10e9/L Final     Abs Immature Granulocytes 01/17/2018 0.2  0 - 0.4 10e9/L Final     Sodium 01/17/2018 141  133 - 144 mmol/L Final     Potassium 01/17/2018 3.6  3.4 - 5.3 mmol/L Final     Chloride 01/17/2018 107  94 - 109 mmol/L Final     Carbon Dioxide 01/17/2018 26  20 - 32 mmol/L Final     Anion Gap 01/17/2018 8  3 - 14 mmol/L Final     Glucose 01/17/2018 115* 70 - 99 mg/dL Final     Urea Nitrogen 01/17/2018 7  7 - 30 mg/dL Final     Creatinine 01/17/2018 0.51* 0.52 - 1.04 mg/dL Final     GFR Estimate 01/17/2018 >90  >60 mL/min/1.7m2 Final    Non  GFR Calc     GFR Estimate If Black 01/17/2018 >90  >60 mL/min/1.7m2 Final    African American GFR Calc     Calcium 01/17/2018 8.3* 8.5 - 10.1 mg/dL Final     Bilirubin Total 01/17/2018 0.6  0.2 - 1.3 mg/dL Final     Albumin 01/17/2018 2.6* 3.4 - 5.0 g/dL Final     Protein Total 01/17/2018 6.7* 6.8 - 8.8 g/dL Final     Alkaline Phosphatase 01/17/2018 91  40 - 150 U/L Final     ALT 01/17/2018 10  0 - 50 U/L Final     AST 01/17/2018 23  0 - 45 U/L Final        Recent Results (from the past 744 hour(s))   XR Chest w Fluoro 2 Views   Result Value    Radiologist flags Fibrinous cap and catheter position change, as (Urgent)    Narrative    XR CHEST WITH FLUORO 2 VIEWS 12/29/2017 9:20 AM    HISTORY: Normal blood return on PowerPort catheter.    COMPARISON: Chest x-ray dated 12/11/2017.    POWERPORT CATHETER INJECTION: Risks and benefits are discussed with  the patient. The power port catheter is cannulated by the nursing  staff. 15 mL of Isovue 250 are injected. Six fluoroscopic sequences  are obtained. Fluoroscopy time is 1.4 minutes.    FINDINGS: The  PowerPort catheter is intact under fluoroscopy. However,  the tip of the catheter is now directed upward compared to the prior  chest x-ray and it is positioned in the right brachiocephalic vein.  Normal blood return can be obtained. With contrast injection a small  fibrinous cap is present.   Injected contrast flows around the cap and  away from the tip and opacifies the right brachiocephalic vein.      Impression    IMPRESSION: There is a fibrinous on the tip of the catheter which  inhibits blood return. The catheter tip has also changed positions and  is now flipped up into the right brachiocephalic vein.    [Access Center: Fibrinous cap and catheter position change, as  described above]    This report will be copied to the [facility] to ensure a  provider acknowledges the finding. Access Center is available Monday  through Friday 8am-3:30 pm.     LETI CHASE MD   XR Chest 2 Views    Narrative    CHEST 2 VIEWS   1/7/2018 3:09 AM     HISTORY: Fever. History of breast cancer with bone and liver  metastases.    COMPARISON: 12/11/2017.    FINDINGS: Hypoinflated lungs. A few minimal opacities at the left lung  base likely represent atelectasis or scarring. The lungs are otherwise  clear. Normal-sized cardiac silhouette. Left anterior chest wall port  with catheter entering the left subclavian vein and distal tip near  the confluence of the brachiocephalic veins.      Impression    IMPRESSION: No convincing evidence of active cardiopulmonary disease.    NIESHA MAJOR MD   XR Surgery MUNA Fluoro L/T 5 Min w Stills    Narrative    SURGERY C-ARM FLUORO LESS THAN 5 MIN W STILLS 1/10/2018 10:48 AM     COMPARISON: 2/12/2015.    HISTORY: Port-A-Cath placement.    NUMBER OF IMAGES ACQUIRED: 3.    VIEWS: 2.    FLUOROSCOPY TIME: 0.45 minute(s)      Impression    IMPRESSION: The fluoroscopic images demonstrate placement of a left  Port-A-Cath, with tip in the mid SVC.    JIM DAVE MD   XR Chest Port 1 View     Narrative    XR CHEST PORT 1 VW 1/10/2018 11:18 AM    COMPARISON: 1/7/2018    HISTORY: Port-A-Cath placement, concern for pneumothorax.      Impression    IMPRESSION: Left subclavian implanted central venous port tip in the  high right atrium. No pneumothorax seen on either side.    DENISSE GIL MD       Assessment and plan:  (C50.919,  C78.7) Carcinoma of breast metastatic to liver, unspecified laterality (H)  (primary encounter diagnosis)  (C50.911,  Z17.0,  C50.912) Bilateral malignant neoplasm of breast in female, estrogen receptor positive, unspecified site of breast (H)  We will continue with the current regimen.  Chemotherapy will be held this week.  I will see the patient again in 1 week or sooner if there are any developments or concerns.    Deep venous thrombosis of the right upper extremity.  Patient tolerated first cycle of chemotherapy well.  Plan: US Extremity Upper Venous rt.  The result of the lateral sounds showed deep venous thrombosis.  The patient started on Lovenox 1 mg/kg twice daily.    (C79.51) Bone metastasis (H)  Patient will continue on Xgeva according to the treatment plan.    (E03.9) Hypothyroidism, unspecified type  Patient currently on Synthroid.    (J45.40) Moderate persistent asthma without complication  Asthma is currently well controlled.    (G89.3) Cancer associated pain  The patient pain is currently well-controlled.    (K12.31) Mucositis due to chemotherapy  Patient will continue on Magic mouthwash.    (J90) Pleural effusion  Patient currently asymptomatic.    Pericardial effusion.  Patient is currently symptomatic.    The patient is ready to learn, no apparent learning barriers were identified.  Diagnosis and treatment plans were explained to the patient. The patient expressed understanding of the content. The patient asked appropriate questions. The patient questions were answered to her satisfaction.    Chart documentation with Dragon Voice recognition Software. Although  reviewed after completion, some words and grammatical errors may remain.

## 2018-01-17 NOTE — MR AVS SNAPSHOT
After Visit Summary   1/17/2018    Etta Cervantes    MRN: [id number]           Patient Information     Date Of Birth          1960        Visit Information        Provider Department      1/17/2018 8:30 AM ROOM 4 [facility]        Today's Diagnoses     Secondary malignant neoplasm of liver (H)    -  1    Carcinoma of breast metastatic to liver, unspecified laterality (H)        Bone metastasis (H)        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)           Follow-ups after your visit        Your next 10 appointments already scheduled     Jan 24, 2018  9:30 AM CST   Level 1 with ROOM 9 [facility] ([facility])    [facility]  [address]  [facility] [address]   [phone]            Jan 24, 2018 10:15 AM CST   Return Visit with Brodie Cassidy MD   [facility] ([facility])    [facility]  [address]  [facility] [address]   [phone]            Jan 31, 2018  8:30 AM CST   Level 1 with ROOM 5 [facility] ([facility])    [facility]  [address]  [facility] [address]   [phone]            Feb 06, 2018  4:00 PM CST   (Arrive by 3:45 PM)   New Patient Visit with Bailey Santana MD   [facility] ([facility] [place])    [address] [place] [address]  [address]  [facility] [address] [phone]            Feb 07, 2018  8:00 AM CST   Level 1 with ROOM 9 [facility] ([facility])    [facility]  [address]  [facility] [address]   [phone]              Future tests that were ordered for you today     Open Future Orders        Priority Expected Expires Ordered    US Extremity Upper Venous rt STAT 1/17/2018 1/17/2019 1/17/2018            Who to contact      If you have questions or need follow up information about today's clinic visit or your schedule please contact [facility] directly at [phone].  Normal or non-critical lab and imaging results will be communicated to you by [employer], letter or phone within 4 business days after the clinic has received the results. If you do not hear from us within 7 days, please contact the clinic through [email or web address] or phone. If you have a critical or abnormal lab result, we will notify you by phone as soon as possible.  Submit refill requests through [employer] or call your pharmacy and they will forward the refill request to us. Please allow 3 business days for your refill to be completed.          Additional Information About Your Visit        [employer] Information     [employer] gives you secure access to your electronic health record. If you see a primary care provider, you can also send messages to your care team and make appointments. If you have questions, please call your primary care clinic.  If you do not have a primary care provider, please call [phone] and they will assist you.        Care EveryWhere ID     This is your Care EveryWhere ID. This could be used by other organizations to access your [place] medical records  [id number]        Your Vitals Were     Last Period                   02/06/2013            Blood Pressure from Last 3 Encounters:   01/17/18 110/70   01/10/18 101/54   01/07/18 114/77    Weight from Last 3 Encounters:   01/17/18 85.4 kg (188 lb 3.2 oz)   01/10/18 83.9 kg (185 lb)   01/05/18 83.9 kg (185 lb)              We Performed the Following     CA 27.29 Breast tumor marker     CBC with platelets differential     CEA     Comprehensive metabolic panel          Today's Medication Changes          These changes are accurate as of: 1/17/18 11:41 AM.  If you have any questions, ask your nurse or doctor.               Start taking these medicines.        Dose/Directions     amoxicillin-clavulanate 875-125 MG per tablet   Commonly known as:  AUGMENTIN   Used for:  Carcinoma of breast metastatic to liver, unspecified laterality (H)   Started by:  Brodie Cassidy MD        Dose:  1 tablet   Take 1 tablet by mouth 2 times daily   Quantity:  20 tablet   Refills:  0            Where to get your medicines      These medications were sent to [place] [facility] [facility] - [place], [place] - [address] [facility]  [address], [facility] [address]     Phone:  [phone]     amoxicillin-clavulanate 875-125 MG per tablet                Primary Care Provider Office Phone # Fax #    Johana Fairbanks [phone] [address] [facility] [address]        Equal Access to Services     [facility]: Hadii han ham hadasho Soomaali, waaxda luqadaha, qaybta kaalmada adeegyada, waxay melviin haymonique rodriguez . So [facility] [phone].    ATENCIÓN: Si habla español, tiene a parekh disposición servicios gratuitos de asistencia lingüística. [facility] [phone].    We comply with applicable federal civil rights laws and [place] laws. We do not discriminate on the basis of race, color, national origin, age, disability, sex, sexual orientation, or gender identity.            Thank you!     Thank you for choosing [facility]  for your care. Our goal is always to provide you with excellent care. Hearing back from our patients is one way we can continue to improve our services. Please take a few minutes to complete the written survey that you may receive in the mail after your visit with us. Thank you!             Your Updated Medication List - Protect others around you: Learn how to safely use, store and throw away your medicines at www.disposemymeds.org.          This list is accurate as of: 1/17/18 11:41 AM.  Always use your most recent med list.                   Brand Name Dispense Instructions for use Diagnosis    * albuterol 108 (90 BASE) MCG/ACT Inhaler    VENTOLIN  HFA    1 Inhaler    Inhale 2 puffs into the lungs every 4 hours as needed    Moderate persistent asthma, uncomplicated       * albuterol (2.5 MG/3ML) 0.083% neb solution     30 vial    Take 1 vial (2.5 mg) by nebulization every 4 hours as needed for shortness of breath / dyspnea    Moderate persistent asthma, uncomplicated       ALLEGRA ALLERGY 180 MG tablet   Generic drug:  fexofenadine      Take 180 mg by mouth daily as needed for allergies        amoxicillin-clavulanate 875-125 MG per tablet    AUGMENTIN    20 tablet    Take 1 tablet by mouth 2 times daily    Carcinoma of breast metastatic to liver, unspecified laterality (H)       azelastine 0.1 % spray    ASTELIN    3 Bottle    Spray 1-2 sprays into both nostrils 2 times daily as needed for rhinitis    Chronic rhinitis       budesonide-formoterol 160-4.5 MCG/ACT Inhaler    SYMBICORT    1 Inhaler    Inhale 2 puffs into the lungs 2 times daily    Moderate persistent asthma without complication       CRANBERRY PO      Take 1 capsule by mouth daily        dexamethasone 4 MG tablet    DECADRON    10 tablet    Take 20 mg (5 tablets) the evening prior to and the morning of Abraxane infusion for 1st cycle only.    Carcinoma of breast metastatic to liver, unspecified laterality (H), Bilateral malignant neoplasm of breast in female, estrogen receptor positive, unspecified site of breast (H), Bone metastasis (H), 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), Secondary malignant neoplasm of liver (H)       diclofenac 1 % Gel topical gel    VOLTAREN    100 g    Place 2 g onto the skin 4 times daily    Carcinoma of breast metastatic to liver, unspecified laterality (H)       diphenhydrAMINE 25 MG tablet    BENADRYL    1 tablet    Take 1 tablet (25 mg) by mouth every 8 hours as needed for itching or allergies    Carcinoma of breast metastatic to liver, unspecified laterality (H), Bone metastasis (H), Pericardial effusion, Bilateral malignant neoplasm of  breast in female, estrogen receptor positive, unspecified site of breast (H), 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), Secondary malignant neoplasm of liver (H), Chemotherapy-induced neutropenia (H), Emphysematous bleb of lung (H), Hypothyroidism, unspecified type, Hyperlipidemia LDL goal <130, Moderate persistent asthma without complication, Mucositis due to chemotherapy       DULERA 200-5 MCG/ACT oral inhaler   Generic drug:  mometasone-formoterol     3 Inhaler    INHALE 2 PUFFS INTO THE LUNGS TWICE DAILY    Moderate persistent asthma without complication       HYDROcodone-acetaminophen 5-325 MG per tablet    NORCO    20 tablet    Take 1-2 tablets by mouth every 4 hours as needed for moderate to severe pain    Post-op pain       KP CALCIUM 600+D3 600-500 MG-UNIT Caps   Generic drug:  calcium carb-cholecalciferol      Take 2 tablets by mouth daily        levothyroxine 25 MCG tablet    SYNTHROID/LEVOTHROID    90 tablet    TAKE 1 TABLET (25 MCG) BY MOUTH DAILY    Hypothyroidism due to acquired atrophy of thyroid       lidocaine (viscous) 2 % solution    XYLOCAINE      Carcinoma of breast metastatic to liver, unspecified laterality (H), Bilateral malignant neoplasm of breast in female, estrogen receptor positive, unspecified site of breast (H), Bone metastasis (H), 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), Secondary malignant neoplasm of liver (H)       Lidocaine 4 % Patch    LIDOCARE    30 patch    Place 1-2 patches onto the skin every 24 hours    Carcinoma of breast metastatic to liver, unspecified laterality (H)       lidocaine visc 2% 2.5mL/5mL & maalox/mylanta w/ simeth 2.5mL/5mL & diphenhydrAMINE 5mg/5mL Susp suspension    [facility]    240 mL    Swish and swallow 10 mLs in mouth every 6 hours as needed for mouth sores    Mucositis due to chemotherapy, Breast cancer metastasized to axillary lymph node, right (H)       loratadine 10 MG tablet     CLARITIN     Take 10 mg by mouth daily as needed for allergies        magnesium hydroxide 400 MG/5ML suspension    MILK OF MAGNESIA    105 mL    Take 30 mLs by mouth daily as needed for constipation    Carcinoma of breast metastatic to liver, unspecified laterality (H)       metoclopramide 10 MG tablet    REGLAN    120 tablet    Take 1 tablet (10 mg) by mouth 2 times daily as needed    Bilateral malignant neoplasm of breast in female, estrogen receptor positive, unspecified site of breast (H)       order for DME     1 each    Equipment being ordered: JoviPak post-lumpectomy compression pad x2    Lymphedema, Lymphedema of breast       oxyCODONE IR 5 MG tablet    ROXICODONE    60 tablet    Take 1 tablet (5 mg) by mouth every 4 hours as needed for moderate to severe pain    Secondary malignant neoplasm of liver (H), Carcinoma of breast metastatic to liver, unspecified laterality (H), 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), Bone metastasis (H), Pericardial effusion       polyethylene glycol Packet    MIRALAX/GLYCOLAX    7 packet    Take 17 g by mouth daily    Carcinoma of breast metastatic to liver, unspecified laterality (H)       prochlorperazine 10 MG tablet    COMPAZINE    30 tablet    Take 1 tablet (10 mg) by mouth every 6 hours as needed (Nausea/Vomiting)    Secondary malignant neoplasm of liver (H), Carcinoma of breast metastatic to liver, unspecified laterality (H), Bone metastasis (H), 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), Bilateral malignant neoplasm of breast in female, estrogen receptor positive, unspecified site of breast (H)       QVAR 80 MCG/ACT Inhaler   Generic drug:  beclomethasone     26.1 g    INHALE 1 PUFFS INTO THE EACH NOSTRIL 2 TIMES DAILY    Chronic rhinitis       ROBITUSSIN COUGH/COLD CF PO      Take 10 mLs by mouth as needed    Breast cancer metastasized to axillary lymph node, right (H)       senna-docusate 8.6-50 MG per tablet     SENOKOT-S;PERICOLACE    100 tablet    Take 1-2 tablets by mouth 2 times daily as needed for constipation    Carcinoma of breast metastatic to liver, unspecified laterality (H)       STOOL SOFTENER PO      Take 100 mg by mouth daily        vancomycin 125 MG capsule    VANCOCIN    40 capsule    Take 1 capsule (125 mg) by mouth 4 times daily for 10 days    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), Carcinoma of breast metastatic to liver, unspecified laterality (H), Bone metastasis (H), Clostridium difficile infection       zolpidem 12.5 MG CR tablet    AMBIEN CR    30 tablet    Take 1 tablet by mouth at bed time.    Insomnia due to medical condition       * Notice:  This list has 2 medication(s) that are the same as other medications prescribed for you. Read the directions carefully, and ask your doctor or other care provider to review them with you.

## 2018-01-17 NOTE — LETTER
1/17/2018         RE: Etta Cervantes  [address] [place]  [place] [address]        Dear Colleague,    Thank you for referring your patient, Etta Cervantes, to the [facility]. Please see a copy of my visit note below.    Hematology/ Oncology Follow-up Visit:  Jan 17, 2018    Reason for Visit:   Chief Complaint   Patient presents with     Oncology Clinic Visit     3 week recheck Breast CA, review labs        Oncologic History:  Breast cancer (H)  Etta Cervantes presented with increasing lump in the right axilla that s lump has been growing without any pain or associated symptoms. Subsequently she was evaluated by primary care physician. Diagnostic digital mammogram was done on 01/13/2015 showing enlarged lymph nodes in the right axilla. There was some skin thickening in the right breast anteriorly and laterally. There are no parenchymal changes in the right breast. The left breast shows a 1.7 cm spiculated mass at approximately 2 to 3 o'clock position, 15.2 cm away from the nipple. A right breast ultrasound was subsequently done and ultrasound guided biopsy was done. Needle biopsy of the left breast did show infiltrating ductal carcinoma grade I of III with no angiolymphatic invasion seen. No associated ductal carcinoma in situ. Estrogen receptor, progresterone receptor were positive. HER-2/sadie receptor came back negative.. Another biopsy from the right axilla did show metastatic ductal carcinoma. PET scan was done on January 26, 2015 showing few small right posterior cervical chain nodes the largest is 1.2 cm. There is extensive bulky right axillary adenopathy demonstrating hypermetabolic FDG uptake with SUV of 10.6. There is skin thickening involving the right breast which demonstrated low-grade FDG uptake. A 1.2 cm lesion on the lateral aspect of the left breast demonstrated minimally increased FDG uptake with SUV of 2. Scattered hypermetabolic mediastinal lymph nodes located in the left superior  anterior mediastinum, right paratracheal and precarinal U, subcranial adenopathy with javon metastases. This focus hypermetabolic FDG uptake identified definitive Amanda posterior aspect off intertrochanteric region of the proximal left femur consistent with bone metastases. There is also 1.4 cm hypermetabolic FDG uptake identified in the anterior medial segment of the left hepatic lobe consistent with liver metastases. Additional 2.1 cm low-attenuation lesion anterior aspect of the right lobe which needs to be determined. The patient was started on chemotherapy with Taxotere and Cytoxan on February 9, 2015.  She concluded 4 cycles of Taxotere and Cytoxan. She started on Arimidex 1 mg orally daily. Currently she is on Faslodex and ibrance. Subsequently the patient went on to receive Afinitor. The patient also started treatment with Xeloda.     Interval History:  Patient is here today for follow-up.  She concluded her first cycle of chemotherapy with Abraxane.  She tolerated chemotherapy well without significant side effects.  She continues to have diarrhea and was tested positive for C. difficile infection.  She is currently on oral vancomycin.  Her pain is well controlled.  Her appetite is poor and she continues to lose weight.  She denies any shortness of breath or cough or wheezing.    Review Of Systems:  Constitutional: Negative for fever, chills, and night sweats.  Skin: negative.  Eyes: negative.  Ears/Nose/Throat: negative.  Respiratory: No shortness of breath, dyspnea on exertion, cough, or hemoptysis.  Cardiovascular: negative.  Gastrointestinal: Diarrhea as mentioned above  Genitourinary: negative.  Musculoskeletal: negative.  Neurologic: negative.  Psychiatric: negative.  Hematologic/Lymphatic/Immunologic: negative.  Endocrine: negative.    All other ROS negative unless mentioned in interval history.    Past medical, social, surgical, and family histories reviewed.    Allergies:  Allergies as of  01/17/2018 - Manuel as Reviewed 01/17/2018   Allergen Reaction Noted     Animal dander Cough 01/23/2015     Cats  07/15/2010     Dust mites Cough 01/23/2015     Pollen extract  01/23/2015     Seasonal allergies  07/15/2010     Levaquin [levofloxacin] Rash 07/17/2017       Current Medications:  Current Outpatient Prescriptions   Medication Sig Dispense Refill     amoxicillin-clavulanate (AUGMENTIN) 875-125 MG per tablet Take 1 tablet by mouth 2 times daily 20 tablet 0     HYDROcodone-acetaminophen (NORCO) 5-325 MG per tablet Take 1-2 tablets by mouth every 4 hours as needed for moderate to severe pain 20 tablet 0     vancomycin (VANCOCIN) 125 MG capsule Take 1 capsule (125 mg) by mouth 4 times daily for 10 days 40 capsule 0     budesonide-formoterol (SYMBICORT) 160-4.5 MCG/ACT Inhaler Inhale 2 puffs into the lungs 2 times daily 1 Inhaler 1     oxyCODONE IR (ROXICODONE) 5 MG tablet Take 1 tablet (5 mg) by mouth every 4 hours as needed for moderate to severe pain 60 tablet 0     lidocaine, viscous, (XYLOCAINE) 2 % solution        prochlorperazine (COMPAZINE) 10 MG tablet Take 1 tablet (10 mg) by mouth every 6 hours as needed (Nausea/Vomiting) 30 tablet 2     dexamethasone (DECADRON) 4 MG tablet Take 20 mg (5 tablets) the evening prior to and the morning of Abraxane infusion for 1st cycle only. 10 tablet 0     diclofenac (VOLTAREN) 1 % GEL topical gel Place 2 g onto the skin 4 times daily 100 g 0     Lidocaine (LIDOCARE) 4 % Patch Place 1-2 patches onto the skin every 24 hours 30 patch 0     magnesium hydroxide (MILK OF MAGNESIA) 400 MG/5ML suspension Take 30 mLs by mouth daily as needed for constipation 105 mL      polyethylene glycol (MIRALAX/GLYCOLAX) Packet Take 17 g by mouth daily 7 packet      senna-docusate (SENOKOT-S;PERICOLACE) 8.6-50 MG per tablet Take 1-2 tablets by mouth 2 times daily as needed for constipation 100 tablet      loratadine (CLARITIN) 10 MG tablet Take 10 mg by mouth daily as needed for allergies  "      order for DME Equipment being ordered: Venu post-lumpectomy compression pad x2 1 each 0     zolpidem (AMBIEN CR) 12.5 MG CR tablet Take 1 tablet by mouth at bed time. 30 tablet 5     diphenhydrAMINE (BENADRYL) 25 MG tablet Take 1 tablet (25 mg) by mouth every 8 hours as needed for itching or allergies 1 tablet 0     DULERA 200-5 MCG/ACT oral inhaler INHALE 2 PUFFS INTO THE LUNGS TWICE DAILY 3 Inhaler 3     QVAR 80 MCG/ACT Inhaler INHALE 1 PUFFS INTO THE EACH NOSTRIL 2 TIMES DAILY 26.1 g 3     Phenylephrine-DM-GG (ROBITUSSIN COUGH/COLD CF PO) Take 10 mLs by mouth as needed        levothyroxine (SYNTHROID/LEVOTHROID) 25 MCG tablet TAKE 1 TABLET (25 MCG) BY MOUTH DAILY 90 tablet 2     metoclopramide (REGLAN) 10 MG tablet Take 1 tablet (10 mg) by mouth 2 times daily as needed 120 tablet 1     fexofenadine (ALLEGRA ALLERGY) 180 MG tablet Take 180 mg by mouth daily as needed for allergies       calcium carb-cholecalciferol ( CALCIUM 600+D3) 600-500 MG-UNIT CAPS Take 2 tablets by mouth daily       albuterol (2.5 MG/3ML) 0.083% neb solution Take 1 vial (2.5 mg) by nebulization every 4 hours as needed for shortness of breath / dyspnea 30 vial 3     magic mouthwash suspension (diphenhydrAMINE, lidocaine, aluminum-magnesium & simethicone) Swish and swallow 10 mLs in mouth every 6 hours as needed for mouth sores 240 mL 10     Docusate Calcium (STOOL SOFTENER PO) Take 100 mg by mouth daily        CRANBERRY PO Take 1 capsule by mouth daily        azelastine (ASTELIN) 0.1 % nasal spray Spray 1-2 sprays into both nostrils 2 times daily as needed for rhinitis 3 Bottle 3     albuterol (VENTOLIN HFA) 108 (90 BASE) MCG/ACT inhaler Inhale 2 puffs into the lungs every 4 hours as needed 1 Inhaler 2        Physical Exam:  /70 (BP Location: Right arm, Patient Position: Sitting, Cuff Size: Adult Regular)  Pulse 106  Temp 99.4  F (37.4  C) (Tympanic)  Ht 1.632 m (5' 4.25\")  Wt 85.4 kg (188 lb 3.2 oz)  LMP 02/06/2013  " SpO2 99%  Breastfeeding? No  BMI 32.05 kg/m2  Wt Readings from Last 12 Encounters:   01/17/18 85.4 kg (188 lb 3.2 oz)   01/10/18 83.9 kg (185 lb)   01/05/18 83.9 kg (185 lb)   01/03/18 86.5 kg (190 lb 9.6 oz)   12/27/17 84 kg (185 lb 3.2 oz)   12/21/17 89.9 kg (198 lb 3.2 oz)   12/14/17 89.1 kg (196 lb 8 oz)   12/12/17 90.6 kg (199 lb 11.2 oz)   12/07/17 90.6 kg (199 lb 11.2 oz)   12/01/17 90.9 kg (200 lb 8 oz)   11/30/17 90.9 kg (200 lb 8 oz)   11/28/17 91.1 kg (200 lb 14.4 oz)     ECOG performance status: 1  GENERAL APPEARANCE: Healthy, alert and in no acute distress.  HEENT: Sclerae anicteric. PERRLA. Oropharynx without ulcers, lesions, or thrush.  NECK: Supple. No asymmetry or masses.  LYMPHATICS: No palpable cervical, supraclavicular, axillary, or inguinal lymphadenopathy.  RESP: Lungs clear to auscultation bilaterally without rales, rhonchi or wheezes.  CARDIOVASCULAR: Regular rate and rhythm. Normal S1, S2; no S3 or S4. No murmur, gallop, or rub.  ABDOMEN: Soft, nontender. Bowel sounds normal. No palpable organomegaly or masses.  MUSCULOSKELETAL: Extremities without gross deformities noted. No edema of bilateral lower extremities.  SKIN: No suspicious lesions or rashes.  NEURO: Alert and oriented x 3. Cranial nerves II-XII grossly intact.  PSYCHIATRIC: Mentation and affect appear normal.    Laboratory/Imaging Studies:  Infusion Therapy Visit on 01/17/2018   Component Date Value Ref Range Status     WBC 01/17/2018 7.8  4.0 - 11.0 10e9/L Final     RBC Count 01/17/2018 3.71* 3.8 - 5.2 10e12/L Final     Hemoglobin 01/17/2018 11.6* 11.7 - 15.7 g/dL Final     Hematocrit 01/17/2018 36.2  35.0 - 47.0 % Final     MCV 01/17/2018 98  78 - 100 fl Final     MCH 01/17/2018 31.3  26.5 - 33.0 pg Final     MCHC 01/17/2018 32.0  31.5 - 36.5 g/dL Final     RDW 01/17/2018 14.8  10.0 - 15.0 % Final     Platelet Count 01/17/2018 311  150 - 450 10e9/L Final     Diff Method 01/17/2018 Automated Method   Final     % Neutrophils  01/17/2018 63.3  % Final     % Lymphocytes 01/17/2018 17.8  % Final     % Monocytes 01/17/2018 12.5  % Final     % Eosinophils 01/17/2018 2.8  % Final     % Basophils 01/17/2018 0.6  % Final     % Immature Granulocytes 01/17/2018 3.0  % Final     Absolute Neutrophil 01/17/2018 4.9  1.6 - 8.3 10e9/L Final     Absolute Lymphocytes 01/17/2018 1.4  0.8 - 5.3 10e9/L Final     Absolute Monocytes 01/17/2018 1.0  0.0 - 1.3 10e9/L Final     Absolute Eosinophils 01/17/2018 0.2  0.0 - 0.7 10e9/L Final     Absolute Basophils 01/17/2018 0.1  0.0 - 0.2 10e9/L Final     Abs Immature Granulocytes 01/17/2018 0.2  0 - 0.4 10e9/L Final     Sodium 01/17/2018 141  133 - 144 mmol/L Final     Potassium 01/17/2018 3.6  3.4 - 5.3 mmol/L Final     Chloride 01/17/2018 107  94 - 109 mmol/L Final     Carbon Dioxide 01/17/2018 26  20 - 32 mmol/L Final     Anion Gap 01/17/2018 8  3 - 14 mmol/L Final     Glucose 01/17/2018 115* 70 - 99 mg/dL Final     Urea Nitrogen 01/17/2018 7  7 - 30 mg/dL Final     Creatinine 01/17/2018 0.51* 0.52 - 1.04 mg/dL Final     GFR Estimate 01/17/2018 >90  >60 mL/min/1.7m2 Final    Non  GFR Calc     GFR Estimate If Black 01/17/2018 >90  >60 mL/min/1.7m2 Final    African American GFR Calc     Calcium 01/17/2018 8.3* 8.5 - 10.1 mg/dL Final     Bilirubin Total 01/17/2018 0.6  0.2 - 1.3 mg/dL Final     Albumin 01/17/2018 2.6* 3.4 - 5.0 g/dL Final     Protein Total 01/17/2018 6.7* 6.8 - 8.8 g/dL Final     Alkaline Phosphatase 01/17/2018 91  40 - 150 U/L Final     ALT 01/17/2018 10  0 - 50 U/L Final     AST 01/17/2018 23  0 - 45 U/L Final        Recent Results (from the past 744 hour(s))   XR Chest w Fluoro 2 Views   Result Value    Radiologist flags Fibrinous cap and catheter position change, as (Urgent)    Narrative    XR CHEST WITH FLUORO 2 VIEWS 12/29/2017 9:20 AM    HISTORY: Normal blood return on PowerPort catheter.    COMPARISON: Chest x-ray dated 12/11/2017.    POWERPORT CATHETER INJECTION: Risks and  benefits are discussed with  the patient. The power port catheter is cannulated by the nursing  staff. 15 mL of Isovue 250 are injected. Six fluoroscopic sequences  are obtained. Fluoroscopy time is 1.4 minutes.    FINDINGS: The PowerPort catheter is intact under fluoroscopy. However,  the tip of the catheter is now directed upward compared to the prior  chest x-ray and it is positioned in the right brachiocephalic vein.  Normal blood return can be obtained. With contrast injection a small  fibrinous cap is present.   Injected contrast flows around the cap and  away from the tip and opacifies the right brachiocephalic vein.      Impression    IMPRESSION: There is a fibrinous on the tip of the catheter which  inhibits blood return. The catheter tip has also changed positions and  is now flipped up into the right brachiocephalic vein.    [Access Center: Fibrinous cap and catheter position change, as  described above]    This report will be copied to the [facility] to ensure a  provider acknowledges the finding. [facility] Center is available Monday  through Friday 8am-3:30 pm.     LETI CHASE MD   XR Chest 2 Views    Narrative    CHEST 2 VIEWS   1/7/2018 3:09 AM     HISTORY: Fever. History of breast cancer with bone and liver  metastases.    COMPARISON: 12/11/2017.    FINDINGS: Hypoinflated lungs. A few minimal opacities at the left lung  base likely represent atelectasis or scarring. The lungs are otherwise  clear. Normal-sized cardiac silhouette. Left anterior chest wall port  with catheter entering the left subclavian vein and distal tip near  the confluence of the brachiocephalic veins.      Impression    IMPRESSION: No convincing evidence of active cardiopulmonary disease.    NIESHA MAJOR MD   XR Surgery MUNA Fluoro L/T 5 Min w Stills    Narrative    SURGERY C-ARM FLUORO LESS THAN 5 MIN W STILLS 1/10/2018 10:48 AM     COMPARISON: 2/12/2015.    HISTORY: Port-A-Cath placement.    NUMBER OF IMAGES  ACQUIRED: 3.    VIEWS: 2.    FLUOROSCOPY TIME: 0.45 minute(s)      Impression    IMPRESSION: The fluoroscopic images demonstrate placement of a left  Port-A-Cath, with tip in the mid SVC.    JIM DAVE MD   XR Chest Port 1 View    Narrative    XR CHEST PORT 1 VW 1/10/2018 11:18 AM    COMPARISON: 1/7/2018    HISTORY: Port-A-Cath placement, concern for pneumothorax.      Impression    IMPRESSION: Left subclavian implanted central venous port tip in the  high right atrium. No pneumothorax seen on either side.    DENISSE GIL MD       Assessment and plan:  (C50.919,  C78.7) Carcinoma of breast metastatic to liver, unspecified laterality (H)  (primary encounter diagnosis)  (C50.911,  Z17.0,  C50.912) Bilateral malignant neoplasm of breast in female, estrogen receptor positive, unspecified site of breast (H)  We will continue with the current regimen.  Chemotherapy will be held this week.  I will see the patient again in 1 week or sooner if there are any developments or concerns.    Deep venous thrombosis of the right upper extremity.  Patient tolerated first cycle of chemotherapy well.  Plan: US Extremity Upper Venous rt.  The result of the lateral sounds showed deep venous thrombosis.  The patient started on Lovenox 1 mg/kg twice daily.    (C79.51) Bone metastasis (H)  Patient will continue on Xgeva according to the treatment plan.    (E03.9) Hypothyroidism, unspecified type  Patient currently on Synthroid.    (J45.40) Moderate persistent asthma without complication  Asthma is currently well controlled.    (G89.3) Cancer associated pain  The patient pain is currently well-controlled.    (K12.31) Mucositis due to chemotherapy  Patient will continue on Magic mouthwash.    (J90) Pleural effusion  Patient currently asymptomatic.    Pericardial effusion.  Patient is currently symptomatic.    The patient is ready to learn, no apparent learning barriers were identified.  Diagnosis and treatment plans were explained to  the patient. The patient expressed understanding of the content. The patient asked appropriate questions. The patient questions were answered to her satisfaction.    Chart documentation with Dragon Voice recognition Software. Although reviewed after completion, some words and grammatical errors may remain.    Lovenox education provided to patient and spouse.  Patient is able to self administer safely; including able to demonstrate ability to waste drug to administer correct dose.     Again, thank you for allowing me to participate in the care of your patient.        Sincerely,        Brodie Cassidy MD

## 2018-01-17 NOTE — MR AVS SNAPSHOT
After Visit Summary   1/17/2018    Etta Cervantes    MRN: [id number]           Patient Information     Date Of Birth          1960        Visit Information        Provider Department      1/17/2018 9:45 AM Brodie Cassidy MD [facility] ONCOLOGY      Today's Diagnoses     Carcinoma of breast metastatic to liver, unspecified laterality (H)    -  1    Cellulitis of right upper extremity          Care Instructions    Dr. Cassidy would like you to have a doppler ultrasound of your right upper extremity to check for DVT. We will be rescheduling your chemotherapy to next week. We would like to see you back in clinic with Dr. Cassidy next week with chemotherapy to follow.      Your prescription (augmentin) [facility]:   [id number] [place] [facility] [address]  [address] [facility] [address] [facility] [address]  Phone: [phone] Fax: [phone]  When you are in need of a refill, please call your pharmacy and they will send us a request.      Copy of appointments, and after visit summary (AVS) given to patient.  If you have any questions during business hours (M-F 8 AM- 4PM), please call Funmi Ray RN, BSN, OCN Oncology Hematology /Breast Cancer Navigator at [facility] [phone].   For questions after business hours, or on holidays/weekends, please call our after hours Nurse Triage line [phone]. Thank you.            Follow-ups after your visit        Follow-up notes from your care team     Return in about 1 week (around 1/24/2018).      Your next 10 appointments already scheduled     Jan 24, 2018  9:30 AM CST   Level 1 with ROOM 9 [facility]fusion ([facility])    [facility]  [address]  [facility] [address] [phone]            Jan 24, 2018 10:15 AM CST   Return Visit with Brodie Cassidy MD   [facility] ([facility])    [facility]tr [facility]  [address]  [facility] [address]   [phone]            Jan 31, 2018  8:30 AM CST   Level 1 with ROOM 5 [facility]fusion ([facility])    [facility]  [address]  [facility] [address]   [phone]            Feb 06, 2018  4:00 PM CST   (Arrive by 3:45 PM)   New Patient Visit with Bailey Santana MD   [facility] and Infectious Diseases ([facility])    [address] [place] [address]  [address]  [facility] [address]   [phone]            Feb 07, 2018  8:00 AM CST   Level 1 with ROOM 9 [facility] ([facility])    [facility]  [address]  [facility] [address]   [phone]              Future tests that were ordered for you today     Open Future Orders        Priority Expected Expires Ordered    US Extremity Upper Venous rt STAT 1/17/2018 1/17/2019 1/17/2018            Who to contact     If you have questions or need follow up information about today's clinic visit or your schedule please contact [facility] directly at [phone].  Normal or non-critical lab and imaging results will be communicated to you by [email or web address], letter or phone within 4 business days after the clinic has received the results. If you do not hear from us within 7 days, please contact the clinic through [email or web address] or phone. If you have a critical or abnormal lab result, we will notify you by phone as soon as possible.  Submit refill requests through [employer] or call your pharmacy and they will forward the refill request to us. Please allow 3 business days for your refill to be completed.          Additional Information About Your Visit        [employer] Information     [employer] gives you secure access to your electronic health record. If you see a primary care provider, you can also send messages to your care team and make  "appointments. If you have questions, please call your primary care clinic.  If you do not have a primary care provider, please call [phone] and they will assist you.        Care EveryWhere ID     This is your Care EveryWhere ID. This could be used by other organizations to access your [place] medical records  [id number]        Your Vitals Were     Pulse Temperature Height Last Period Pulse Oximetry Breastfeeding?    106 99.4  F (37.4  C) (Tympanic) 1.632 m (5' 4.25\") 02/06/2013 99% No    BMI (Body Mass Index)                   32.05 kg/m2            Blood Pressure from Last 3 Encounters:   01/17/18 110/70   01/10/18 101/54   01/07/18 114/77    Weight from Last 3 Encounters:   01/17/18 85.4 kg (188 lb 3.2 oz)   01/10/18 83.9 kg (185 lb)   01/05/18 83.9 kg (185 lb)                 Today's Medication Changes          These changes are accurate as of: 1/17/18 10:41 AM.  If you have any questions, ask your nurse or doctor.               Start taking these medicines.        Dose/Directions    amoxicillin-clavulanate 875-125 MG per tablet   Commonly known as:  AUGMENTIN   Used for:  Carcinoma of breast metastatic to liver, unspecified laterality (H)   Started by:  Brodie Cassidy MD        Dose:  1 tablet   Take 1 tablet by mouth 2 times daily   Quantity:  20 tablet   Refills:  0            Where to get your medicines      These medications were sent to [place] [id number] [place] [facility] [address]  [address] [facility] [address]     Phone:  [phone]     amoxicillin-clavulanate 875-125 MG per tablet                Primary Care Provider Office Phone # Fax #    Johana Fairbanks [phone] [address]  [facility] [address]        Equal Access to Services     [facility] DOMINGUEZ AH: Rupali deleono Soisaura, waaxda luqadaha, qaybta kaalmada adeegyada, hari guardado. So [facility] [phone].    ATENCIÓN: Si giuliana gao " disposición servicios gratuitos de asistencia lingüística. [place] bond [phone].    We comply with applicable federal civil rights laws and [place] laws. We do not discriminate on the basis of race, color, national origin, age, disability, sex, sexual orientation, or gender identity.            Thank you!     Thank you for choosing [facility]  for your care. Our goal is always to provide you with excellent care. Hearing back from our patients is one way we can continue to improve our services. Please take a few minutes to complete the written survey that you may receive in the mail after your visit with us. Thank you!             Your Updated Medication List - Protect others around you: Learn how to safely use, store and throw away your medicines at www.disposemymeds.org.          This list is accurate as of: 1/17/18 10:41 AM.  Always use your most recent med list.                   Brand Name Dispense Instructions for use Diagnosis    * albuterol 108 (90 BASE) MCG/ACT Inhaler    VENTOLIN HFA    1 Inhaler    Inhale 2 puffs into the lungs every 4 hours as needed    Moderate persistent asthma, uncomplicated       * albuterol (2.5 MG/3ML) 0.083% neb solution     30 vial    Take 1 vial (2.5 mg) by nebulization every 4 hours as needed for shortness of breath / dyspnea    Moderate persistent asthma, uncomplicated       ALLEGRA ALLERGY 180 MG tablet   Generic drug:  fexofenadine      Take 180 mg by mouth daily as needed for allergies        amoxicillin-clavulanate 875-125 MG per tablet    AUGMENTIN    20 tablet    Take 1 tablet by mouth 2 times daily    Carcinoma of breast metastatic to liver, unspecified laterality (H)       azelastine 0.1 % spray    ASTELIN    3 Bottle    Spray 1-2 sprays into both nostrils 2 times daily as needed for rhinitis    Chronic rhinitis       budesonide-formoterol 160-4.5 MCG/ACT Inhaler    SYMBICORT    1 Inhaler    Inhale 2 puffs into the lungs 2 times daily    Moderate persistent  asthma without complication       CRANBERRY PO      Take 1 capsule by mouth daily        dexamethasone 4 MG tablet    DECADRON    10 tablet    Take 20 mg (5 tablets) the evening prior to and the morning of Abraxane infusion for 1st cycle only.    Carcinoma of breast metastatic to liver, unspecified laterality (H), Bilateral malignant neoplasm of breast in female, estrogen receptor positive, unspecified site of breast (H), Bone metastasis (H), 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), Secondary malignant neoplasm of liver (H)       diclofenac 1 % Gel topical gel    VOLTAREN    100 g    Place 2 g onto the skin 4 times daily    Carcinoma of breast metastatic to liver, unspecified laterality (H)       diphenhydrAMINE 25 MG tablet    BENADRYL    1 tablet    Take 1 tablet (25 mg) by mouth every 8 hours as needed for itching or allergies    Carcinoma of breast metastatic to liver, unspecified laterality (H), Bone metastasis (H), Pericardial effusion, Bilateral malignant neoplasm of breast in female, estrogen receptor positive, unspecified site of breast (H), 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), Secondary malignant neoplasm of liver (H), Chemotherapy-induced neutropenia (H), Emphysematous bleb of lung (H), Hypothyroidism, unspecified type, Hyperlipidemia LDL goal <130, Moderate persistent asthma without complication, Mucositis due to chemotherapy       DULERA 200-5 MCG/ACT oral inhaler   Generic drug:  mometasone-formoterol     3 Inhaler    INHALE 2 PUFFS INTO THE LUNGS TWICE DAILY    Moderate persistent asthma without complication       HYDROcodone-acetaminophen 5-325 MG per tablet    NORCO    20 tablet    Take 1-2 tablets by mouth every 4 hours as needed for moderate to severe pain    Post-op pain       KP CALCIUM 600+D3 600-500 MG-UNIT Caps   Generic drug:  calcium carb-cholecalciferol      Take 2 tablets by mouth daily        levothyroxine 25 MCG tablet     SYNTHROID/LEVOTHROID    90 tablet    TAKE 1 TABLET (25 MCG) BY MOUTH DAILY    Hypothyroidism due to acquired atrophy of thyroid       lidocaine (viscous) 2 % solution    XYLOCAINE      Carcinoma of breast metastatic to liver, unspecified laterality (H), Bilateral malignant neoplasm of breast in female, estrogen receptor positive, unspecified site of breast (H), Bone metastasis (H), 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), Secondary malignant neoplasm of liver (H)       Lidocaine 4 % Patch    LIDOCARE    30 patch    Place 1-2 patches onto the skin every 24 hours    Carcinoma of breast metastatic to liver, unspecified laterality (H)       lidocaine visc 2% 2.5mL/5mL & maalox/mylanta w/ simeth 2.5mL/5mL & diphenhydrAMINE 5mg/5mL Susp suspension    [facility] Mouthwash [facility]    240 mL    Swish and swallow 10 mLs in mouth every 6 hours as needed for mouth sores    Mucositis due to chemotherapy, Breast cancer metastasized to axillary lymph node, right (H)       loratadine 10 MG tablet    CLARITIN     Take 10 mg by mouth daily as needed for allergies        magnesium hydroxide 400 MG/5ML suspension    MILK OF MAGNESIA    105 mL    Take 30 mLs by mouth daily as needed for constipation    Carcinoma of breast metastatic to liver, unspecified laterality (H)       metoclopramide 10 MG tablet    REGLAN    120 tablet    Take 1 tablet (10 mg) by mouth 2 times daily as needed    Bilateral malignant neoplasm of breast in female, estrogen receptor positive, unspecified site of breast (H)       order for DME     1 each    Equipment being ordered: Venu post-lumpectomy compression pad x2    Lymphedema, Lymphedema of breast       oxyCODONE IR 5 MG tablet    ROXICODONE    60 tablet    Take 1 tablet (5 mg) by mouth every 4 hours as needed for moderate to severe pain    Secondary malignant neoplasm of liver (H), Carcinoma of breast metastatic to liver, unspecified laterality (H), Carcinoma of right breast metastatic  to axillary lymph node (H), Bone metastasis (H), Pericardial effusion       polyethylene glycol Packet    MIRALAX/GLYCOLAX    7 packet    Take 17 g by mouth daily    Carcinoma of breast metastatic to liver, unspecified laterality (H)       prochlorperazine 10 MG tablet    COMPAZINE    30 tablet    Take 1 tablet (10 mg) by mouth every 6 hours as needed (Nausea/Vomiting)    Secondary malignant neoplasm of liver (H), Carcinoma of breast metastatic to liver, unspecified laterality (H), Bone metastasis (H), 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), Bilateral malignant neoplasm of breast in female, estrogen receptor positive, unspecified site of breast (H)       QVAR 80 MCG/ACT Inhaler   Generic drug:  beclomethasone     26.1 g    INHALE 1 PUFFS INTO THE EACH NOSTRIL 2 TIMES DAILY    Chronic rhinitis       ROBITUSSIN COUGH/COLD CF PO      Take 10 mLs by mouth as needed    Breast cancer metastasized to axillary lymph node, right (H)       senna-docusate 8.6-50 MG per tablet    SENOKOT-S;PERICOLACE    100 tablet    Take 1-2 tablets by mouth 2 times daily as needed for constipation    Carcinoma of breast metastatic to liver, unspecified laterality (H)       STOOL SOFTENER PO      Take 100 mg by mouth daily        vancomycin 125 MG capsule    VANCOCIN    40 capsule    Take 1 capsule (125 mg) by mouth 4 times daily for 10 days    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), Carcinoma of breast metastatic to liver, unspecified laterality (H), Bone metastasis (H), Clostridium difficile infection       zolpidem 12.5 MG CR tablet    AMBIEN CR    30 tablet    Take 1 tablet by mouth at bed time.    Insomnia due to medical condition       * Notice:  This list has 2 medication(s) that are the same as other medications prescribed for you. Read the directions carefully, and ask your doctor or other care provider to review them with you.

## 2018-01-17 NOTE — PROGRESS NOTES
Lovenox education provided to patient and spouse.  Patient is able to self administer safely; including able to demonstrate ability to waste drug to administer correct dose.

## 2018-01-17 NOTE — NURSING NOTE
"Oncology Rooming Note    January 17, 2018 9:50 AM   Etta Cervantes is a 57 year old female who presents for:    Chief Complaint   Patient presents with     Oncology Clinic Visit     3 week recheck Breast CA, review labs      Initial Vitals: /70 (BP Location: Right arm, Patient Position: Sitting, Cuff Size: Adult Regular)  Pulse 106  Temp 99.4  F (37.4  C) (Tympanic)  Ht 1.632 m (5' 4.25\")  Wt 85.4 kg (188 lb 3.2 oz)  LMP 02/06/2013  SpO2 99%  Breastfeeding? No  BMI 32.05 kg/m2 Estimated body mass index is 32.05 kg/(m^2) as calculated from the following:    Height as of this encounter: 1.632 m (5' 4.25\").    Weight as of this encounter: 85.4 kg (188 lb 3.2 oz). Body surface area is 1.97 meters squared.  Moderate Pain (5) Comment: Right  warm to the touch   Patient's last menstrual period was 02/06/2013.  Allergies reviewed: Yes  Medications reviewed: Yes    Medications: Medication refills not needed today.  Pharmacy name entered into [employer]:    [id number] [place] [facility] - [place], [place] - [address] [place] [address] [facility] [place], [place] - [address] [place] [address]    Clinical concerns: 3 week recheck Breast CA, review labs.     1. Port was replaced.   2. Right arm redness, pain, tenderness, feels warm to the touch & swelling  since IV placement.     7 minutes for nursing intake (face to face time)     Josephine Virgen CMA              "

## 2018-01-17 NOTE — PATIENT INSTRUCTIONS
Dr. Cassidy would like you to have a doppler ultrasound of your right upper extremity to check for DVT. We will be rescheduling your chemotherapy to next week. We would like to see you back in clinic with Dr. Cassidy next week with chemotherapy to follow.      Your prescription (augmentin) [facility] sent to:   [facility] [place] [facility] [address]  [address] [facility] [facility] [address]  Phone: [phone] Fax: [phone]  When you are in need of a refill, please call your pharmacy and they will send us a request.      Copy of appointments, and after visit summary (AVS) given to patient.  If you have any questions during business hours (M-F 8 AM- 4PM), please call Funmi Ray RN, BSN, OCN Oncology Hematology /Breast Cancer Navigator at [facility] [phone].   For questions after business hours, or on holidays/weekends, please call our after hours Nurse Triage line [phone]. Thank you.      Update:  Dopper US showed DVT.  Per Dr. Cassidy, antibiotic is not needed as there is not an infection.  Pt will need to start on Lovenox therapy.  Pt updated with this information. Teach completed.

## 2018-01-17 NOTE — ADDENDUM NOTE
Encounter addended by: Brianne Lundberg, PT on: 1/17/2018  2:05 PM<BR>     Actions taken: Sign clinical note, Episode resolved

## 2018-01-17 NOTE — PROGRESS NOTES
Outpatient Physical Therapy Discharge Note     Patient: Etta Cervantes  : 1960    Beginning/End Dates of Reporting Period:  2017 to 2018    Referring Provider: Dr. Khanh MD    Therapy Diagnosis: R-breast lymphedema     Client Self Report: I'm just not feeling great today    Objective Measurements:  Objective Measure: R-breast girth  Details: -0.9cm from 17    Objective Measure: skin  Details: R-breast still firm and fibrotic at lateral and inferior aspects     Outcome Measures (most recent score):   LLIS = 29 on date of initial evaluation; pt didn't return to clinic for final appt for discharge so unable to again assess final LLIS       Goals:  Goal Identifier stg   Goal Description pt to have at least daytime tolerance to Komprex2 at R-breast for decrease edema and fibrosis   Target Date 17   Date Met  17   Progress:     Goal Identifier stg   Goal Description pt to be independent with self-MLD of R-breast for decreased edema and fibrosis for increased comfort   Target Date 11/15/17   Date Met  17   Progress:     Goal Identifier ltg   Goal Description pt to have at least 5cm reduction in R-breast girth for increased comfort   Target Date 17   Date Met  17 (5.8cm reduction since initial evaluation)   Progress:     Goal Identifier ltg   Goal Description pt to be independent with R-breast lymphedema management via compression and self-MLD (as appropriate)    Target Date 18   Date Met      Progress:     Progress Toward Goals:   Progress limited due to not assess this reporting period as patient not seen since 17; pt with medial complications and hospitalizations.      Plan:  Discharge from therapy.    Discharge:    Reason for Discharge: Patient has failed to schedule further appointments.    Equipment Issued: none    Discharge Plan: Patient to continue home program.

## 2018-01-17 NOTE — PROGRESS NOTES
Infusion Nursing Note:  Etta Cervantes presents today for C2D1 Abraxane (which was held)   Patient seen by provider today: Yes: Dr. Cassidy   present during visit today: Not Applicable.    Note: Pt has swelling in right upper arm. Dr. Cassidy ordered for chemo to be held and pt to go for ultrasound.    Intravenous Access:  Implanted Port.    Treatment Conditions:  Lab Results   Component Value Date    HGB 11.6 01/17/2018     Lab Results   Component Value Date    WBC 7.8 01/17/2018      Lab Results   Component Value Date    ANEU 4.9 01/17/2018     Lab Results   Component Value Date     01/17/2018      Lab Results   Component Value Date     01/17/2018                   Lab Results   Component Value Date    POTASSIUM 3.6 01/17/2018           Lab Results   Component Value Date    MAG 2.1 07/13/2017            Lab Results   Component Value Date    CR 0.51 01/17/2018                   Lab Results   Component Value Date    BEN 8.3 01/17/2018                Lab Results   Component Value Date    BILITOTAL 0.6 01/17/2018           Lab Results   Component Value Date    ALBUMIN 2.6 01/17/2018                    Lab Results   Component Value Date    ALT 10 01/17/2018           Lab Results   Component Value Date    AST 23 01/17/2018             Post Infusion Assessment:  Access discontinued per protocol.    Discharge Plan:   Patient discharged in stable condition accompanied by: .  Departure Mode: Ambulatory.    Dionne Zayas RN

## 2018-01-24 NOTE — MR AVS SNAPSHOT
After Visit Summary   1/24/2018    Etta Cervantes    MRN: [id number]           Patient Information     Date Of Birth          1960        Visit Information        Provider Department      1/24/2018 9:30 AM ROOM 9 [facility]        Today's Diagnoses     Secondary malignant neoplasm of liver (H)    -  1    Carcinoma of breast metastatic to liver, unspecified laterality (H)        Bone metastasis (H)        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)           Follow-ups after your visit        Your next 10 appointments already scheduled     Jan 31, 2018  8:30 AM CST   Level 1 with ROOM 5 [facility] ([facility])    [facility]  [address]  [facility] [address]   [phone]            Feb 06, 2018  4:00 PM CST   (Arrive by 3:45 PM)   New Patient Visit with Bailey Santana MD   [facility] ([facility])    [address] [place] [address]   [phone]            Feb 07, 2018  8:00 AM CST   Level 1 with ROOM 9 [facility] ([facility])    [facility]  [address]  [facility] [address]   [phone]            Feb 21, 2018  8:30 AM CST   Level 1 with ROOM 2 [facility] ([facility])    [facility]  [address]  [facility] [address]   [phone]            Feb 21, 2018  9:00 AM CST   Return Visit with Brodie Cassidy MD   [facility] ([facility])    [facility]  [address]  [facility] [address]   [phone]            Feb 28, 2018  8:30 AM CST   Level 1 with ROOM 9 [facility] ([facility])    [facility]  [address]  [facility] [address]   [phone]             Mar 07, 2018  8:00 AM CST   Level 1 with ROOM 9 [facility] ([facility])    [facility]  [address]  [facility] [address] [phone]              Future tests that were ordered for you today     Open Future Orders        Priority Expected Expires Ordered    Clostridium difficile Toxin B PCR Routine  2/23/2018 1/24/2018            Who to contact     If you have questions or need follow up information about today's clinic visit or your schedule please contact [facility] directly at [phone].  Normal or non-critical lab and imaging results will be communicated to you by kaufDAhart, letter or phone within 4 business days after the clinic has received the results. If you do not hear from us within 7 days, please contact the clinic through [email or web address] or phone. If you have a critical or abnormal lab result, we will notify you by phone as soon as possible.  Submit refill requests through anfix or call your pharmacy and they will forward the refill request to us. Please allow 3 business days for your refill to be completed.          Additional Information About Your Visit        MyChart Information     anfix gives you secure access to your electronic health record. If you see a primary care provider, you can also send messages to your care team and make appointments. If you have questions, please call your primary care clinic.  If you do not have a primary care provider, please call [phone] and they will assist you.        Care EveryWhere ID     This is your Care EveryWhere ID. This could be used by other organizations to access your [facility] medical records  [id number]        Your Vitals Were     Last Period                   02/06/2013            Blood Pressure from Last 3 Encounters:   01/24/18 115/67   01/24/18 98/48   01/17/18 110/70    Weight from Last 3 Encounters:   01/24/18 83.2 kg (183 lb 6.4 oz)   01/17/18 85.4 kg  (188 lb 3.2 oz)   01/10/18 83.9 kg (185 lb)              We Performed the Following     CA 27.29 Breast tumor marker     CBC with platelets differential     CEA     Comprehensive metabolic panel        Primary Care Provider Office Phone # Fax #    Johana Fairbanks [phone] [address] [place] [address]        Equal Access to Services     [facility]: Hadii aad ku hadasho Soomaali, waaxda luqadaha, qaybta kaalmada adeegyada, waxay idiin hayaan adeeg khkaelynsh lamarcyruz . So [facility] [phone].    ATENCIÓN: Si habla español, tiene a parekh disposición servicios gratuitos de asistencia lingüística. Llame al [phone].    We comply with applicable federal civil rights laws and [place] laws. We do not discriminate on the basis of race, color, national origin, age, disability, sex, sexual orientation, or gender identity.            Thank you!     Thank you for choosing [facility]  for your care. Our goal is always to provide you with excellent care. Hearing back from our patients is one way we can continue to improve our services. Please take a few minutes to complete the written survey that you may receive in the mail after your visit with us. Thank you!             Your Updated Medication List - Protect others around you: Learn how to safely use, store and throw away your medicines at www.disposemymeds.org.          This list is accurate as of 1/24/18  4:25 PM.  Always use your most recent med list.                   Brand Name Dispense Instructions for use Diagnosis    * albuterol 108 (90 BASE) MCG/ACT Inhaler    VENTOLIN HFA    1 Inhaler    Inhale 2 puffs into the lungs every 4 hours as needed    Moderate persistent asthma, uncomplicated       * albuterol (2.5 MG/3ML) 0.083% neb solution     30 vial    Take 1 vial (2.5 mg) by nebulization every 4 hours as needed for shortness of breath / dyspnea    Moderate persistent asthma, uncomplicated       ALLEGRA ALLERGY 180 MG tablet    Generic drug:  fexofenadine      Take 180 mg by mouth daily as needed for allergies        azelastine 0.1 % spray    ASTELIN    3 Bottle    Spray 1-2 sprays into both nostrils 2 times daily as needed for rhinitis    Chronic rhinitis       budesonide-formoterol 160-4.5 MCG/ACT Inhaler    SYMBICORT    1 Inhaler    Inhale 2 puffs into the lungs 2 times daily    Moderate persistent asthma without complication       CRANBERRY PO      Take 1 capsule by mouth daily        dexamethasone 4 MG tablet    DECADRON    10 tablet    Take 20 mg (5 tablets) the evening prior to and the morning of Abraxane infusion for 1st cycle only.    Carcinoma of breast metastatic to liver, unspecified laterality (H), Bilateral malignant neoplasm of breast in female, estrogen receptor positive, unspecified site of breast (H), Bone metastasis (H), 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), Secondary malignant neoplasm of liver (H)       diclofenac 1 % Gel topical gel    VOLTAREN    100 g    Place 2 g onto the skin 4 times daily    Carcinoma of breast metastatic to liver, unspecified laterality (H)       diphenhydrAMINE 25 MG tablet    BENADRYL    1 tablet    Take 1 tablet (25 mg) by mouth every 8 hours as needed for itching or allergies    Carcinoma of breast metastatic to liver, unspecified laterality (H), Bone metastasis (H), Pericardial effusion, Bilateral malignant neoplasm of breast in female, estrogen receptor positive, unspecified site of breast (H), 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), Secondary malignant neoplasm of liver (H), Chemotherapy-induced neutropenia (H), Emphysematous bleb of lung (H), Hypothyroidism, unspecified type, Hyperlipidemia LDL goal <130, Moderate persistent asthma without complication, Mucositis due to chemotherapy       DULERA 200-5 MCG/ACT oral inhaler   Generic drug:  mometasone-formoterol     3 Inhaler    INHALE 2 PUFFS INTO THE LUNGS TWICE DAILY    Moderate  persistent asthma without complication       enoxaparin 100 MG/ML injection    LOVENOX    30 Syringe    Inject 0.85 mLs (85 mg) Subcutaneous every 12 hours    DVT (deep venous thrombosis) (H)       HYDROcodone-acetaminophen 5-325 MG per tablet    NORCO    20 tablet    Take 1-2 tablets by mouth every 4 hours as needed for moderate to severe pain    Post-op pain       KP CALCIUM 600+D3 600-500 MG-UNIT Caps   Generic drug:  calcium carb-cholecalciferol      Take 2 tablets by mouth daily        levothyroxine 25 MCG tablet    SYNTHROID/LEVOTHROID    90 tablet    TAKE 1 TABLET (25 MCG) BY MOUTH DAILY    Hypothyroidism due to acquired atrophy of thyroid       lidocaine (viscous) 2 % solution    XYLOCAINE      Carcinoma of breast metastatic to liver, unspecified laterality (H), Bilateral malignant neoplasm of breast in female, estrogen receptor positive, unspecified site of breast (H), Bone metastasis (H), 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), Secondary malignant neoplasm of liver (H)       Lidocaine 4 % Patch    LIDOCARE    30 patch    Place 1-2 patches onto the skin every 24 hours    Carcinoma of breast metastatic to liver, unspecified laterality (H)       lidocaine visc 2% 2.5mL/5mL & maalox/mylanta w/ simeth 2.5mL/5mL & diphenhydrAMINE 5mg/5mL Susp suspension    [facility] Mouthwash [facility] [place]    240 mL    Swish and swallow 10 mLs in mouth every 6 hours as needed for mouth sores    Mucositis due to chemotherapy, Breast cancer metastasized to axillary lymph node, right (H)       loratadine 10 MG tablet    CLARITIN     Take 10 mg by mouth daily as needed for allergies        magnesium hydroxide 400 MG/5ML suspension    MILK OF MAGNESIA    105 mL    Take 30 mLs by mouth daily as needed for constipation    Carcinoma of breast metastatic to liver, unspecified laterality (H)       metoclopramide 10 MG tablet    REGLAN    120 tablet    Take 1 tablet (10 mg) by mouth 2 times daily as needed    Bilateral  malignant neoplasm of breast in female, estrogen receptor positive, unspecified site of breast (H)       order for DME     1 each    Equipment being ordered: Venu post-lumpectomy compression pad x2    Lymphedema, Lymphedema of breast       oxyCODONE IR 5 MG tablet    ROXICODONE    60 tablet    Take 1 tablet (5 mg) by mouth every 4 hours as needed for moderate to severe pain    Secondary malignant neoplasm of liver (H), Carcinoma of breast metastatic to liver, unspecified laterality (H), 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), Bone metastasis (H), Pericardial effusion       polyethylene glycol Packet    MIRALAX/GLYCOLAX    7 packet    Take 17 g by mouth daily    Carcinoma of breast metastatic to liver, unspecified laterality (H)       prochlorperazine 10 MG tablet    COMPAZINE    30 tablet    Take 1 tablet (10 mg) by mouth every 6 hours as needed (Nausea/Vomiting)    Secondary malignant neoplasm of liver (H), Carcinoma of breast metastatic to liver, unspecified laterality (H), Bone metastasis (H), 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), Bilateral malignant neoplasm of breast in female, estrogen receptor positive, unspecified site of breast (H)       QVAR 80 MCG/ACT Inhaler   Generic drug:  beclomethasone     26.1 g    INHALE 1 PUFFS INTO THE EACH NOSTRIL 2 TIMES DAILY    Chronic rhinitis       ROBITUSSIN COUGH/COLD CF PO      Take 10 mLs by mouth as needed    Breast cancer metastasized to axillary lymph node, right (H)       senna-docusate 8.6-50 MG per tablet    SENOKOT-S;PERICOLACE    100 tablet    Take 1-2 tablets by mouth 2 times daily as needed for constipation    Carcinoma of breast metastatic to liver, unspecified laterality (H)       STOOL SOFTENER PO      Take 100 mg by mouth daily        zolpidem 12.5 MG CR tablet    AMBIEN CR    30 tablet    Take 1 tablet by mouth at bed time.    Insomnia due to medical condition       * Notice:  This list has 2  medication(s) that are the same as other medications prescribed for you. Read the directions carefully, and ask your doctor or other care provider to review them with you.

## 2018-01-24 NOTE — MR AVS SNAPSHOT
After Visit Summary   1/24/2018    Etta Cervantes    MRN: [id number]           Patient Information     Date Of Birth          1960        Visit Information        Provider Department      1/24/2018 10:15 AM Brodie Cassidy MD [facility] ONCOLOGY      Care Instructions    We would like to see you back in clinic with Dr. Cassidy in 4 weeks with chemotherapy to be scheduled per treatment plan.  When you are in need of a refill, please call your pharmacy and they will send us a request.  Copy of appointments, and after visit summary (AVS) given to patient.  If you have any questions during business hours (M-F 8 AM- 4PM), please call Funmi Ray RN, BSN, OCN Oncology Hematology /Breast Cancer Navigator at [facility] [phone].   For questions after business hours, or on holidays/weekends, please call our after hours Nurse Triage line [phone]. Thank you.            Follow-ups after your visit        Follow-up notes from your care team     Return in about 4 weeks (around 2/21/2018) for Schedule for chemotherapy as per treatment plan.      Your next 10 appointments already scheduled     Jan 31, 2018  8:30 AM CST   Level 1 with ROOM 5 [facility] ([facility])    [facility]  [address]  [facility] [address]   [phone]            Feb 06, 2018  4:00 PM CST   (Arrive by 3:45 PM)   New Patient Visit with Bailey Santana MD   [facility] and Infectious Diseases ([facility])    [address] [place] [address]  [address]  [facility] [address]   [phone]            Feb 07, 2018  8:00 AM CST   Level 1 with ROOM 9 [facility] ([facility])    [facility]  [address]  [facility] [address]   [phone]            Feb 21, 2018  8:30 AM CST   Level 1 with ROOM 2 [facility]  Cancer Infusion ([facility])    [facility] [place] [place]  [address]  [facility] [address]   [phone]            Feb 21, 2018  9:00 AM CST   Return Visit with Brodie Cassidy MD   [facility] ([facility])    [facility] [place] [place]  [address]  [facility] [address]   [phone]            Feb 28, 2018  8:30 AM CST   Level 1 with ROOM 9 [facility] Infusion ([facility])    [facility] [place] [place]  [address]  [facility] [address]   [phone]            Mar 07, 2018  8:00 AM CST   Level 1 with ROOM 9 [facility]fusion ([facility])    [facility] [place] [place]  [address]  [facility] [address]   [phone]              Who to contact     If you have questions or need follow up information about today's clinic visit or your schedule please contact [facility] directly at [phone].  Normal or non-critical lab and imaging results will be communicated to you by Oris4hart, letter or phone within 4 business days after the clinic has received the results. If you do not hear from us within 7 days, please contact the clinic through [employer] or phone. If you have a critical or abnormal lab result, we will notify you by phone as soon as possible.  Submit refill requests through [employer] or call your pharmacy and they will forward the refill request to us. Please allow 3 business days for your refill to be completed.          Additional Information About Your Visit        MyChart Information     [employer] gives you secure access to your electronic health record. If you see a primary care provider, you can also send messages to your care team and make appointments. If you have questions, please call your primary care clinic.  If you do not have a primary care provider, please call [phone] and they will assist you.        Care  EveryWhere ID     This is your Care EveryWhere ID. This could be used by other organizations to access your [facility] medical records  [id number]        Your Vitals Were     Pulse Temperature Respirations Last Period Pulse Oximetry BMI (Body Mass Index)    115 99.5  F (37.5  C) (Oral) 17 02/06/2013 95% 31.23 kg/m2       Blood Pressure from Last 3 Encounters:   01/24/18 115/67   01/17/18 110/70   01/10/18 101/54    Weight from Last 3 Encounters:   01/24/18 83.2 kg (183 lb 6.4 oz)   01/17/18 85.4 kg (188 lb 3.2 oz)   01/10/18 83.9 kg (185 lb)              Today, you had the following     No orders found for display       Primary Care Provider Office Phone # Fax #    Johana Fairbanks [phone] [address] [facility] [address]        Equal Access to Services     [facility] : Hadii han ku hadasho Soomaali, waaxda luqadaha, qaybta kaalmada adeegyada, hari rodriguez . So [facility] [phone].    ATENCIÓN: Si breezyla espnancy, tiene a parekh disposición servicios gratuitos de asistencia lingüística. Llame al [phone].    We comply with applicable federal civil rights laws and [place] laws. We do not discriminate on the basis of race, color, national origin, age, disability, sex, sexual orientation, or gender identity.            Thank you!     Thank you for choosing [facility]  for your care. Our goal is always to provide you with excellent care. Hearing back from our patients is one way we can continue to improve our services. Please take a few minutes to complete the written survey that you may receive in the mail after your visit with us. Thank you!             Your Updated Medication List - Protect others around you: Learn how to safely use, store and throw away your medicines at www.disposemymeds.org.          This list is accurate as of 1/24/18 10:20 AM.  Always use your most recent med list.                   Brand Name Dispense Instructions for use  Diagnosis    * albuterol 108 (90 BASE) MCG/ACT Inhaler    VENTOLIN HFA    1 Inhaler    Inhale 2 puffs into the lungs every 4 hours as needed    Moderate persistent asthma, uncomplicated       * albuterol (2.5 MG/3ML) 0.083% neb solution     30 vial    Take 1 vial (2.5 mg) by nebulization every 4 hours as needed for shortness of breath / dyspnea    Moderate persistent asthma, uncomplicated       ALLEGRA ALLERGY 180 MG tablet   Generic drug:  fexofenadine      Take 180 mg by mouth daily as needed for allergies        azelastine 0.1 % spray    ASTELIN    3 Bottle    Spray 1-2 sprays into both nostrils 2 times daily as needed for rhinitis    Chronic rhinitis       budesonide-formoterol 160-4.5 MCG/ACT Inhaler    SYMBICORT    1 Inhaler    Inhale 2 puffs into the lungs 2 times daily    Moderate persistent asthma without complication       CRANBERRY PO      Take 1 capsule by mouth daily        dexamethasone 4 MG tablet    DECADRON    10 tablet    Take 20 mg (5 tablets) the evening prior to and the morning of Abraxane infusion for 1st cycle only.    Carcinoma of breast metastatic to liver, unspecified laterality (H), Bilateral malignant neoplasm of breast in female, estrogen receptor positive, unspecified site of breast (H), Bone metastasis (H), 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), Secondary malignant neoplasm of liver (H)       diclofenac 1 % Gel topical gel    VOLTAREN    100 g    Place 2 g onto the skin 4 times daily    Carcinoma of breast metastatic to liver, unspecified laterality (H)       diphenhydrAMINE 25 MG tablet    BENADRYL    1 tablet    Take 1 tablet (25 mg) by mouth every 8 hours as needed for itching or allergies    Carcinoma of breast metastatic to liver, unspecified laterality (H), Bone metastasis (H), Pericardial effusion, Bilateral malignant neoplasm of breast in female, estrogen receptor positive, unspecified site of breast (H), Carcinoma of right breast metastatic to  axillary lymph node (H), Secondary malignant neoplasm of liver (H), Chemotherapy-induced neutropenia (H), Emphysematous bleb of lung (H), Hypothyroidism, unspecified type, Hyperlipidemia LDL goal <130, Moderate persistent asthma without complication, Mucositis due to chemotherapy       DULERA 200-5 MCG/ACT oral inhaler   Generic drug:  mometasone-formoterol     3 Inhaler    INHALE 2 PUFFS INTO THE LUNGS TWICE DAILY    Moderate persistent asthma without complication       enoxaparin 100 MG/ML injection    LOVENOX    30 Syringe    Inject 0.85 mLs (85 mg) Subcutaneous every 12 hours    DVT (deep venous thrombosis) (H)       HYDROcodone-acetaminophen 5-325 MG per tablet    NORCO    20 tablet    Take 1-2 tablets by mouth every 4 hours as needed for moderate to severe pain    Post-op pain       KP CALCIUM 600+D3 600-500 MG-UNIT Caps   Generic drug:  calcium carb-cholecalciferol      Take 2 tablets by mouth daily        levothyroxine 25 MCG tablet    SYNTHROID/LEVOTHROID    90 tablet    TAKE 1 TABLET (25 MCG) BY MOUTH DAILY    Hypothyroidism due to acquired atrophy of thyroid       lidocaine (viscous) 2 % solution    XYLOCAINE      Carcinoma of breast metastatic to liver, unspecified laterality (H), Bilateral malignant neoplasm of breast in female, estrogen receptor positive, unspecified site of breast (H), Bone metastasis (H), 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), Secondary malignant neoplasm of liver (H)       Lidocaine 4 % Patch    LIDOCARE    30 patch    Place 1-2 patches onto the skin every 24 hours    Carcinoma of breast metastatic to liver, unspecified laterality (H)       lidocaine visc 2% 2.5mL/5mL & maalox/mylanta w/ simeth 2.5mL/5mL & diphenhydrAMINE 5mg/5mL Susp suspension    [facility]    240 mL    Swish and swallow 10 mLs in mouth every 6 hours as needed for mouth sores    Mucositis due to chemotherapy, Breast cancer metastasized to axillary lymph node, right (H)        loratadine 10 MG tablet    CLARITIN     Take 10 mg by mouth daily as needed for allergies        magnesium hydroxide 400 MG/5ML suspension    MILK OF MAGNESIA    105 mL    Take 30 mLs by mouth daily as needed for constipation    Carcinoma of breast metastatic to liver, unspecified laterality (H)       metoclopramide 10 MG tablet    REGLAN    120 tablet    Take 1 tablet (10 mg) by mouth 2 times daily as needed    Bilateral malignant neoplasm of breast in female, estrogen receptor positive, unspecified site of breast (H)       order for DME     1 each    Equipment being ordered: JoviPak post-lumpectomy compression pad x2    Lymphedema, Lymphedema of breast       oxyCODONE IR 5 MG tablet    ROXICODONE    60 tablet    Take 1 tablet (5 mg) by mouth every 4 hours as needed for moderate to severe pain    Secondary malignant neoplasm of liver (H), Carcinoma of breast metastatic to liver, unspecified laterality (H), 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), Bone metastasis (H), Pericardial effusion       polyethylene glycol Packet    MIRALAX/GLYCOLAX    7 packet    Take 17 g by mouth daily    Carcinoma of breast metastatic to liver, unspecified laterality (H)       prochlorperazine 10 MG tablet    COMPAZINE    30 tablet    Take 1 tablet (10 mg) by mouth every 6 hours as needed (Nausea/Vomiting)    Secondary malignant neoplasm of liver (H), Carcinoma of breast metastatic to liver, unspecified laterality (H), Bone metastasis (H), 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), Bilateral malignant neoplasm of breast in female, estrogen receptor positive, unspecified site of breast (H)       QVAR 80 MCG/ACT Inhaler   Generic drug:  beclomethasone     26.1 g    INHALE 1 PUFFS INTO THE EACH NOSTRIL 2 TIMES DAILY    Chronic rhinitis       ROBITUSSIN COUGH/COLD CF PO      Take 10 mLs by mouth as needed    Breast cancer metastasized to axillary lymph node, right (H)       senna-docusate 8.6-50  MG per tablet    SENOKOT-S;PERICOLACE    100 tablet    Take 1-2 tablets by mouth 2 times daily as needed for constipation    Carcinoma of breast metastatic to liver, unspecified laterality (H)       STOOL SOFTENER PO      Take 100 mg by mouth daily        zolpidem 12.5 MG CR tablet    AMBIEN CR    30 tablet    Take 1 tablet by mouth at bed time.    Insomnia due to medical condition       * Notice:  This list has 2 medication(s) that are the same as other medications prescribed for you. Read the directions carefully, and ask your doctor or other care provider to review them with you.

## 2018-01-24 NOTE — LETTER
1/24/2018         RE: Etta Cervantes  [address]  [place] [address]        Dear Colleague,    Thank you for referring your patient, Etta Cervantes, to the [facility]. Please see a copy of my visit note below.    Hematology/ Oncology Follow-up Visit:  Jan 24, 2018    Reason for Visit:   Chief Complaint   Patient presents with     Oncology Clinic Visit     f/u breast cancer, chemotherapy today        Oncologic History:  Breast cancer (H)  Etta Cervantes presented with increasing lump in the right axilla that s lump has been growing without any pain or associated symptoms. Subsequently she was evaluated by primary care physician. Diagnostic digital mammogram was done on 01/13/2015 showing enlarged lymph nodes in the right axilla. There was some skin thickening in the right breast anteriorly and laterally. There are no parenchymal changes in the right breast. The left breast shows a 1.7 cm spiculated mass at approximately 2 to 3 o'clock position, 15.2 cm away from the nipple. A right breast ultrasound was subsequently done and ultrasound guided biopsy was done. Needle biopsy of the left breast did show infiltrating ductal carcinoma grade I of III with no angiolymphatic invasion seen. No associated ductal carcinoma in situ. Estrogen receptor, progresterone receptor were positive. HER-2/sadie receptor came back negative.. Another biopsy from the right axilla did show metastatic ductal carcinoma. PET scan was done on January 26, 2015 showing few small right posterior cervical chain nodes the largest is 1.2 cm. There is extensive bulky right axillary adenopathy demonstrating hypermetabolic FDG uptake with SUV of 10.6. There is skin thickening involving the right breast which demonstrated low-grade FDG uptake. A 1.2 cm lesion on the lateral aspect of the left breast demonstrated minimally increased FDG uptake with SUV of 2. Scattered hypermetabolic mediastinal lymph nodes located in the left superior  anterior mediastinum, right paratracheal and precarinal U, subcranial adenopathy with javon metastases. This focus hypermetabolic FDG uptake identified definitive Amanda posterior aspect off intertrochanteric region of the proximal left femur consistent with bone metastases. There is also 1.4 cm hypermetabolic FDG uptake identified in the anterior medial segment of the left hepatic lobe consistent with liver metastases. Additional 2.1 cm low-attenuation lesion anterior aspect of the right lobe which needs to be determined. The patient was started on chemotherapy with Taxotere and Cytoxan on February 9, 2015.  She concluded 4 cycles of Taxotere and Cytoxan. She started on Arimidex 1 mg orally daily. Currently she is on Faslodex and ibrance. Subsequently the patient went on to receive Afinitor. The patient also started treatment with Xeloda.       Interval History:  Patient is here today for follow-up.  Last week she was diagnosed with deep venous thrombosis of the right upper extremity.  She was started on Lovenox.  She has been tolerating Lovenox well.  Swelling of the right upper extremity has significantly improved.  Patient denies any shortness of breath or cough or wheezing.  She denies any bone aches or pains.  Her pain is currently well controlled.  She denies any nausea or vomiting.  She is currently on Abraxane.  She tolerated chemotherapy without significant side effects.  His diarrhea has improved since she has been on vancomycin.  Repeat stool test came back negative for C. difficile.    Review Of Systems:  Constitutional: Negative for fever, chills, and night sweats.  Fatigue  Skin: negative.  Eyes: negative.  Ears/Nose/Throat: negative.  Respiratory: No shortness of breath, dyspnea on exertion, cough, or hemoptysis.  Cardiovascular: negative.  Gastrointestinal: negative.  Genitourinary: negative.  Musculoskeletal: negative.  Neurologic: negative.  Psychiatric:  negative.  Hematologic/Lymphatic/Immunologic: negative.  Endocrine: negative.    All other ROS negative unless mentioned in interval history.    Past medical, social, surgical, and family histories reviewed.    Allergies:  Allergies as of 01/24/2018 - Manuel as Reviewed 01/24/2018   Allergen Reaction Noted     Animal dander Cough 01/23/2015     Cats  07/15/2010     Dust mites Cough 01/23/2015     Pollen extract  01/23/2015     Seasonal allergies  07/15/2010     Levaquin [levofloxacin] Rash 07/17/2017       Current Medications:  Current Outpatient Prescriptions   Medication Sig Dispense Refill     enoxaparin (LOVENOX) 100 MG/ML injection Inject 0.85 mLs (85 mg) Subcutaneous every 12 hours 30 Syringe 0     HYDROcodone-acetaminophen (NORCO) 5-325 MG per tablet Take 1-2 tablets by mouth every 4 hours as needed for moderate to severe pain 20 tablet 0     budesonide-formoterol (SYMBICORT) 160-4.5 MCG/ACT Inhaler Inhale 2 puffs into the lungs 2 times daily 1 Inhaler 1     prochlorperazine (COMPAZINE) 10 MG tablet Take 1 tablet (10 mg) by mouth every 6 hours as needed (Nausea/Vomiting) 30 tablet 2     diclofenac (VOLTAREN) 1 % GEL topical gel Place 2 g onto the skin 4 times daily 100 g 0     magnesium hydroxide (MILK OF MAGNESIA) 400 MG/5ML suspension Take 30 mLs by mouth daily as needed for constipation 105 mL      loratadine (CLARITIN) 10 MG tablet Take 10 mg by mouth daily as needed for allergies       zolpidem (AMBIEN CR) 12.5 MG CR tablet Take 1 tablet by mouth at bed time. 30 tablet 5     diphenhydrAMINE (BENADRYL) 25 MG tablet Take 1 tablet (25 mg) by mouth every 8 hours as needed for itching or allergies 1 tablet 0     DULERA 200-5 MCG/ACT oral inhaler INHALE 2 PUFFS INTO THE LUNGS TWICE DAILY 3 Inhaler 3     QVAR 80 MCG/ACT Inhaler INHALE 1 PUFFS INTO THE EACH NOSTRIL 2 TIMES DAILY 26.1 g 3     levothyroxine (SYNTHROID/LEVOTHROID) 25 MCG tablet TAKE 1 TABLET (25 MCG) BY MOUTH DAILY 90 tablet 2     metoclopramide  (REGLAN) 10 MG tablet Take 1 tablet (10 mg) by mouth 2 times daily as needed 120 tablet 1     fexofenadine (ALLEGRA ALLERGY) 180 MG tablet Take 180 mg by mouth daily as needed for allergies       calcium carb-cholecalciferol (KP CALCIUM 600+D3) 600-500 MG-UNIT CAPS Take 2 tablets by mouth daily       albuterol (2.5 MG/3ML) 0.083% neb solution Take 1 vial (2.5 mg) by nebulization every 4 hours as needed for shortness of breath / dyspnea 30 vial 3     magic mouthwash suspension (diphenhydrAMINE, lidocaine, aluminum-magnesium & simethicone) Swish and swallow 10 mLs in mouth every 6 hours as needed for mouth sores 240 mL 10     CRANBERRY PO Take 1 capsule by mouth daily        azelastine (ASTELIN) 0.1 % nasal spray Spray 1-2 sprays into both nostrils 2 times daily as needed for rhinitis 3 Bottle 3     albuterol (VENTOLIN HFA) 108 (90 BASE) MCG/ACT inhaler Inhale 2 puffs into the lungs every 4 hours as needed 1 Inhaler 2     oxyCODONE IR (ROXICODONE) 5 MG tablet Take 1 tablet (5 mg) by mouth every 4 hours as needed for moderate to severe pain (Patient not taking: Reported on 1/24/2018) 60 tablet 0     lidocaine, viscous, (XYLOCAINE) 2 % solution        dexamethasone (DECADRON) 4 MG tablet Take 20 mg (5 tablets) the evening prior to and the morning of Abraxane infusion for 1st cycle only. (Patient not taking: Reported on 1/24/2018) 10 tablet 0     Lidocaine (LIDOCARE) 4 % Patch Place 1-2 patches onto the skin every 24 hours (Patient not taking: Reported on 1/24/2018) 30 patch 0     polyethylene glycol (MIRALAX/GLYCOLAX) Packet Take 17 g by mouth daily (Patient not taking: Reported on 1/24/2018) 7 packet      senna-docusate (SENOKOT-S;PERICOLACE) 8.6-50 MG per tablet Take 1-2 tablets by mouth 2 times daily as needed for constipation (Patient not taking: Reported on 1/24/2018) 100 tablet      order for DME Equipment being ordered: Venu post-lumpectomy compression pad x2 1 each 0     Phenylephrine-DM-GG (ROBITUSSIN  COUGH/COLD CF PO) Take 10 mLs by mouth as needed        Docusate Calcium (STOOL SOFTENER PO) Take 100 mg by mouth daily           Physical Exam:  /67 (BP Location: Right arm, Patient Position: Sitting, Cuff Size: Adult Regular)  Pulse 115  Temp 99.5  F (37.5  C) (Oral)  Resp 17  Wt 83.2 kg (183 lb 6.4 oz)  LMP 02/06/2013  SpO2 95%  BMI 31.23 kg/m2  Wt Readings from Last 12 Encounters:   01/24/18 83.2 kg (183 lb 6.4 oz)   01/17/18 85.4 kg (188 lb 3.2 oz)   01/10/18 83.9 kg (185 lb)   01/05/18 83.9 kg (185 lb)   01/03/18 86.5 kg (190 lb 9.6 oz)   12/27/17 84 kg (185 lb 3.2 oz)   12/21/17 89.9 kg (198 lb 3.2 oz)   12/14/17 89.1 kg (196 lb 8 oz)   12/12/17 90.6 kg (199 lb 11.2 oz)   12/07/17 90.6 kg (199 lb 11.2 oz)   12/01/17 90.9 kg (200 lb 8 oz)   11/30/17 90.9 kg (200 lb 8 oz)     ECOG performance status: 1  GENERAL APPEARANCE: Healthy, alert and in no acute distress.  HEENT: Sclerae anicteric. PERRLA. Oropharynx without ulcers, lesions, or thrush.  NECK: Supple. No asymmetry or masses.  LYMPHATICS: No palpable cervical, supraclavicular, axillary, or inguinal lymphadenopathy.  RESP: Lungs clear to auscultation bilaterally without rales, rhonchi or wheezes.  CARDIOVASCULAR: Regular rate and rhythm. Normal S1, S2; no S3 or S4. No murmur, gallop, or rub.  ABDOMEN: Soft, nontender. Bowel sounds normal. No palpable organomegaly or masses.  MUSCULOSKELETAL: Extremities without gross deformities noted. No edema of bilateral lower extremities.  SKIN: No suspicious lesions or rashes.  NEURO: Alert and oriented x 3. Cranial nerves II-XII grossly intact.  PSYCHIATRIC: Mentation and affect appear normal.    Laboratory/Imaging Studies:  Infusion Therapy Visit on 01/24/2018   Component Date Value Ref Range Status     WBC 01/24/2018 8.6  4.0 - 11.0 10e9/L Final     RBC Count 01/24/2018 3.96  3.8 - 5.2 10e12/L Final     Hemoglobin 01/24/2018 12.3  11.7 - 15.7 g/dL Final     Hematocrit 01/24/2018 37.9  35.0 - 47.0 %  Final     MCV 01/24/2018 96  78 - 100 fl Final     MCH 01/24/2018 31.1  26.5 - 33.0 pg Final     MCHC 01/24/2018 32.5  31.5 - 36.5 g/dL Final     RDW 01/24/2018 15.0  10.0 - 15.0 % Final     Platelet Count 01/24/2018 294  150 - 450 10e9/L Final     Diff Method 01/24/2018 Automated Method   Final     % Neutrophils 01/24/2018 73.8  % Final     % Lymphocytes 01/24/2018 12.5  % Final     % Monocytes 01/24/2018 10.9  % Final     % Eosinophils 01/24/2018 1.6  % Final     % Basophils 01/24/2018 0.3  % Final     % Immature Granulocytes 01/24/2018 0.9  % Final     Absolute Neutrophil 01/24/2018 6.4  1.6 - 8.3 10e9/L Final     Absolute Lymphocytes 01/24/2018 1.1  0.8 - 5.3 10e9/L Final     Absolute Monocytes 01/24/2018 0.9  0.0 - 1.3 10e9/L Final     Absolute Eosinophils 01/24/2018 0.1  0.0 - 0.7 10e9/L Final     Absolute Basophils 01/24/2018 0.0  0.0 - 0.2 10e9/L Final     Abs Immature Granulocytes 01/24/2018 0.1  0 - 0.4 10e9/L Final     Sodium 01/24/2018 139  133 - 144 mmol/L Final     Potassium 01/24/2018 3.7  3.4 - 5.3 mmol/L Final     Chloride 01/24/2018 108  94 - 109 mmol/L Final     Carbon Dioxide 01/24/2018 24  20 - 32 mmol/L Final     Anion Gap 01/24/2018 7  3 - 14 mmol/L Final     Glucose 01/24/2018 118* 70 - 99 mg/dL Final     Urea Nitrogen 01/24/2018 8  7 - 30 mg/dL Final     Creatinine 01/24/2018 0.47* 0.52 - 1.04 mg/dL Final     GFR Estimate 01/24/2018 >90  >60 mL/min/1.7m2 Final    Non  GFR Calc     GFR Estimate If Black 01/24/2018 >90  >60 mL/min/1.7m2 Final    African American GFR Calc     Calcium 01/24/2018 8.5  8.5 - 10.1 mg/dL Final     Bilirubin Total 01/24/2018 0.7  0.2 - 1.3 mg/dL Final     Albumin 01/24/2018 2.7* 3.4 - 5.0 g/dL Final     Protein Total 01/24/2018 7.1  6.8 - 8.8 g/dL Final     Alkaline Phosphatase 01/24/2018 102  40 - 150 U/L Final     ALT 01/24/2018 16  0 - 50 U/L Final     AST 01/24/2018 30  0 - 45 U/L Final     CA 27-29 01/24/2018 103* 0 - 39 U/mL Final    Assay  Method:  Chemiluminescence using Siemens Centaur XP     CEA 01/24/2018 8.7* 0 - 2.5 ug/L Final    Comment: Smoking may cause CEA results to be elevated.  Assay Method:  Chemiluminescence using Siemens Centaur XP     Infusion Therapy Visit on 01/17/2018   Component Date Value Ref Range Status     WBC 01/17/2018 7.8  4.0 - 11.0 10e9/L Final     RBC Count 01/17/2018 3.71* 3.8 - 5.2 10e12/L Final     Hemoglobin 01/17/2018 11.6* 11.7 - 15.7 g/dL Final     Hematocrit 01/17/2018 36.2  35.0 - 47.0 % Final     MCV 01/17/2018 98  78 - 100 fl Final     MCH 01/17/2018 31.3  26.5 - 33.0 pg Final     MCHC 01/17/2018 32.0  31.5 - 36.5 g/dL Final     RDW 01/17/2018 14.8  10.0 - 15.0 % Final     Platelet Count 01/17/2018 311  150 - 450 10e9/L Final     Diff Method 01/17/2018 Automated Method   Final     % Neutrophils 01/17/2018 63.3  % Final     % Lymphocytes 01/17/2018 17.8  % Final     % Monocytes 01/17/2018 12.5  % Final     % Eosinophils 01/17/2018 2.8  % Final     % Basophils 01/17/2018 0.6  % Final     % Immature Granulocytes 01/17/2018 3.0  % Final     Absolute Neutrophil 01/17/2018 4.9  1.6 - 8.3 10e9/L Final     Absolute Lymphocytes 01/17/2018 1.4  0.8 - 5.3 10e9/L Final     Absolute Monocytes 01/17/2018 1.0  0.0 - 1.3 10e9/L Final     Absolute Eosinophils 01/17/2018 0.2  0.0 - 0.7 10e9/L Final     Absolute Basophils 01/17/2018 0.1  0.0 - 0.2 10e9/L Final     Abs Immature Granulocytes 01/17/2018 0.2  0 - 0.4 10e9/L Final     Sodium 01/17/2018 141  133 - 144 mmol/L Final     Potassium 01/17/2018 3.6  3.4 - 5.3 mmol/L Final     Chloride 01/17/2018 107  94 - 109 mmol/L Final     Carbon Dioxide 01/17/2018 26  20 - 32 mmol/L Final     Anion Gap 01/17/2018 8  3 - 14 mmol/L Final     Glucose 01/17/2018 115* 70 - 99 mg/dL Final     Urea Nitrogen 01/17/2018 7  7 - 30 mg/dL Final     Creatinine 01/17/2018 0.51* 0.52 - 1.04 mg/dL Final     GFR Estimate 01/17/2018 >90  >60 mL/min/1.7m2 Final    Non  GFR Calc     GFR  Estimate If Black 01/17/2018 >90  >60 mL/min/1.7m2 Final    African American GFR Calc     Calcium 01/17/2018 8.3* 8.5 - 10.1 mg/dL Final     Bilirubin Total 01/17/2018 0.6  0.2 - 1.3 mg/dL Final     Albumin 01/17/2018 2.6* 3.4 - 5.0 g/dL Final     Protein Total 01/17/2018 6.7* 6.8 - 8.8 g/dL Final     Alkaline Phosphatase 01/17/2018 91  40 - 150 U/L Final     ALT 01/17/2018 10  0 - 50 U/L Final     AST 01/17/2018 23  0 - 45 U/L Final     CA 27-29 01/17/2018 100* 0 - 39 U/mL Final    Assay Method:  Chemiluminescence using Siemens Centaur XP     CEA 01/17/2018 6.7* 0 - 2.5 ug/L Final    Comment: Smoking may cause CEA results to be elevated.  Assay Method:  Chemiluminescence using Siemens Centaur XP          Recent Results (from the past 744 hour(s))   XR Chest w Fluoro 2 Views   Result Value    Radiologist flags Fibrinous cap and catheter position change, as (Urgent)    Narrative    XR CHEST WITH FLUORO 2 VIEWS 12/29/2017 9:20 AM    HISTORY: Normal blood return on PowerPort catheter.    COMPARISON: Chest x-ray dated 12/11/2017.    POWERPORT CATHETER INJECTION: Risks and benefits are discussed with  the patient. The power port catheter is cannulated by the nursing  staff. 15 mL of Isovue 250 are injected. Six fluoroscopic sequences  are obtained. Fluoroscopy time is 1.4 minutes.    FINDINGS: The PowerPort catheter is intact under fluoroscopy. However,  the tip of the catheter is now directed upward compared to the prior  chest x-ray and it is positioned in the right brachiocephalic vein.  Normal blood return can be obtained. With contrast injection a small  fibrinous cap is present.   Injected contrast flows around the cap and  away from the tip and opacifies the right brachiocephalic vein.      Impression    IMPRESSION: There is a fibrinous on the tip of the catheter which  inhibits blood return. The catheter tip has also changed positions and  is now flipped up into the right brachiocephalic vein.    [Access Center:  Fibrinous cap and catheter position change, as  described above]    This report will be copied to the [facility] to ensure a  provider acknowledges the finding. Access Center is available Monday  through Friday 8am-3:30 pm.     LETI CHASE MD   XR Chest 2 Views    Narrative    CHEST 2 VIEWS   1/7/2018 3:09 AM     HISTORY: Fever. History of breast cancer with bone and liver  metastases.    COMPARISON: 12/11/2017.    FINDINGS: Hypoinflated lungs. A few minimal opacities at the left lung  base likely represent atelectasis or scarring. The lungs are otherwise  clear. Normal-sized cardiac silhouette. Left anterior chest wall port  with catheter entering the left subclavian vein and distal tip near  the confluence of the brachiocephalic veins.      Impression    IMPRESSION: No convincing evidence of active cardiopulmonary disease.    NIESHA MAJOR MD   XR Surgery MUNA Fluoro L/T 5 Min w Stills    Narrative    SURGERY C-ARM FLUORO LESS THAN 5 MIN W STILLS 1/10/2018 10:48 AM     COMPARISON: 2/12/2015.    HISTORY: Port-A-Cath placement.    NUMBER OF IMAGES ACQUIRED: 3.    VIEWS: 2.    FLUOROSCOPY TIME: 0.45 minute(s)      Impression    IMPRESSION: The fluoroscopic images demonstrate placement of a left  Port-A-Cath, with tip in the mid SVC.    JIM DAVE MD   XR Chest Port 1 View    Narrative    XR CHEST PORT 1 VW 1/10/2018 11:18 AM    COMPARISON: 1/7/2018    HISTORY: Port-A-Cath placement, concern for pneumothorax.      Impression    IMPRESSION: Left subclavian implanted central venous port tip in the  high right atrium. No pneumothorax seen on either side.    DENISSE GIL MD   US Extremity Upper Venous rt    Narrative    ULTRASOUND VENOUS RIGHT UPPER EXTREMITY  1/17/2018 11:51 AM     HISTORY:  Rule out DVT. Carcinoma of breast metastatic to liver,  unspecified laterality (H). Cellulitis of right upper extremity.    COMPARISON: None.    TECHNIQUE: Ultrasound gray scale, Color Doppler flow, and  spectral  Doppler waveform analysis performed.    FINDINGS: The right internal jugular vein, subclavian vein, axillary  vein, and central portion of the brachial and basilic veins are  noncompressible and filled with echogenic material. The right  cephalic, radial, and ulnar veins are normally compressible. The left  internal jugular vein is patent.      Impression    IMPRESSION: Occlusive DVT involving the right internal jugular vein,  subclavian vein, axillary vein, and the central portions of the  brachial and basilic veins.    Preliminary report was given to the ordering clinician by the  sonographer at the time of dictation.    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  She has been tolerating treatment well.  We will proceed with the current treatment plan.  I will see the patient again in 1 month or sooner if there are new developments or concerns.    (C79.51) Bone metastasis (H)  Patient currently on Xgeva every 3 months.    (E03.9) Hypothyroidism, unspecified type  Patient continues on Synthroid 25 mcg daily.    (G47.01) Insomnia due to medical condition  Patient currently on Ambien.    (J45.40) Moderate persistent asthma without complication  Patient has been has been well controlled she is using her inhaler regularly.    (K12.31) Mucositis due to chemotherapy  Patient using Magic mouthwash if needed.    (D70.1,  T45.1X5A) Chemotherapy-induced neutropenia (H)  We will continue to monitor patient absolute neutrophil count.    (G89.3) Cancer associated  pain  Patient pain is currently well controlled on the current regimen.    (I82.621) Acute deep vein thrombosis (DVT) of right upper extremity, unspecified vein (H)  I would recommend patient to switch to warfarin next week.  We will referred the patient to anticoagulation clinic for warfarin management.    (A04.72) C. difficile colitis  Patient continues on oral vancomycin.    The patient is ready to learn, no apparent learning barriers were identified.  Diagnosis and treatment plans were explained to the patient. The patient expressed understanding of the content. The patient asked appropriate questions. The patient questions were answered to her satisfaction.    Chart documentation with Dragon Voice recognition Software. Although reviewed after completion, some words and grammatical errors may remain.    Again, thank you for allowing me to participate in the care of your patient.        Sincerely,        Brodie Cassidy MD

## 2018-01-24 NOTE — PATIENT INSTRUCTIONS
We would like to see you back in clinic with Dr. Cassidy in 4 weeks with chemotherapy to be scheduled per treatment plan.  When you are in need of a refill, please call your pharmacy and they will send us a request.  Copy of appointments, and after visit summary (AVS) given to patient.  If you have any questions during business hours (M-F 8 AM- 4PM), please call Funmi Ray RN, BSN, OCN Oncology Hematology /Breast Cancer Navigator at [facility] [phone].   For questions after business hours, or on holidays/weekends, please call our after hours Nurse Triage line [phone]. Thank you.

## 2018-01-24 NOTE — PROGRESS NOTES
Infusion Nursing Note:  Etta Cervantes presents today for Abraxane.    Patient seen by provider today: yes, Dr. Cassidy   present during visit today: Not Applicable.    Note: N/A.    Intravenous Access:  Labs drawn without difficulty.  Implanted Port.    Treatment Conditions:  Lab Results   Component Value Date    HGB 12.3 01/24/2018     Lab Results   Component Value Date    WBC 8.6 01/24/2018      Lab Results   Component Value Date    ANEU 6.4 01/24/2018     Lab Results   Component Value Date     01/24/2018      Lab Results   Component Value Date     01/24/2018                   Lab Results   Component Value Date    POTASSIUM 3.7 01/24/2018           Lab Results   Component Value Date    MAG 2.1 07/13/2017            Lab Results   Component Value Date    CR 0.47 01/24/2018                   Lab Results   Component Value Date    BEN 8.5 01/24/2018                Lab Results   Component Value Date    BILITOTAL 0.7 01/24/2018           Lab Results   Component Value Date    ALBUMIN 2.7 01/24/2018                    Lab Results   Component Value Date    ALT 16 01/24/2018           Lab Results   Component Value Date    AST 30 01/24/2018       Results reviewed, labs MET treatment parameters, ok to proceed with treatment.      Post Infusion Assessment:  Patient tolerated infusion without incident.  Blood return noted pre and post infusion.  Access discontinued per protocol.  Pt will repeat C diff stool test, the container and instructions sent home with her today.    Discharge Plan:   Patient discharged in stable condition accompanied by: .  Departure Mode: Ambulatory.  Next chemo is scheduled for 1/31.    Kathy Cavazos RN

## 2018-01-24 NOTE — NURSING NOTE
"Oncology Rooming Note    January 24, 2018 9:55 AM   Etta Cervantes is a 57 year old female who presents for:    Chief Complaint   Patient presents with     Oncology Clinic Visit     f/u breast cancer, chemotherapy today      Initial Vitals: /67 (BP Location: Right arm, Patient Position: Sitting, Cuff Size: Adult Regular)  Pulse 115  Temp 99.5  F (37.5  C) (Oral)  Resp 17  Wt 83.2 kg (183 lb 6.4 oz)  LMP 02/06/2013  SpO2 95%  BMI 31.23 kg/m2 Estimated body mass index is 31.23 kg/(m^2) as calculated from the following:    Height as of 1/17/18: 1.632 m (5' 4.25\").    Weight as of this encounter: 83.2 kg (183 lb 6.4 oz). Body surface area is 1.94 meters squared.  No Pain (0) Comment: Data Unavailable   Patient's last menstrual period was 02/06/2013.  Allergies reviewed: Yes  Medications reviewed: Yes    Medications: Medication refills not needed today.  Pharmacy name entered into [facility]:    [id number] [place] [facility] - [place], [place] - [address] [place] [address] [facility] [place], [place] - [address] [place] [address] AVE     Clinical concerns: 1 week f/u Breast Cancer, recent DVT     6 minutes for nursing intake (face to face time)     Kathe Bonilla RN              "

## 2018-01-26 PROBLEM — Z79.01 LONG-TERM (CURRENT) USE OF ANTICOAGULANTS: Status: ACTIVE | Noted: 2018-01-01

## 2018-01-26 NOTE — PROGRESS NOTES
Hematology/ Oncology Follow-up Visit:  Jan 24, 2018    Reason for Visit:   Chief Complaint   Patient presents with     Oncology Clinic Visit     f/u breast cancer, chemotherapy today        Oncologic History:  Breast cancer (H)  Etta Cervantes presented with increasing lump in the right axilla that s lump has been growing without any pain or associated symptoms. Subsequently she was evaluated by primary care physician. Diagnostic digital mammogram was done on 01/13/2015 showing enlarged lymph nodes in the right axilla. There was some skin thickening in the right breast anteriorly and laterally. There are no parenchymal changes in the right breast. The left breast shows a 1.7 cm spiculated mass at approximately 2 to 3 o'clock position, 15.2 cm away from the nipple. A right breast ultrasound was subsequently done and ultrasound guided biopsy was done. Needle biopsy of the left breast did show infiltrating ductal carcinoma grade I of III with no angiolymphatic invasion seen. No associated ductal carcinoma in situ. Estrogen receptor, progresterone receptor were positive. HER-2/sadie receptor came back negative.. Another biopsy from the right axilla did show metastatic ductal carcinoma. PET scan was done on January 26, 2015 showing few small right posterior cervical chain nodes the largest is 1.2 cm. There is extensive bulky right axillary adenopathy demonstrating hypermetabolic FDG uptake with SUV of 10.6. There is skin thickening involving the right breast which demonstrated low-grade FDG uptake. A 1.2 cm lesion on the lateral aspect of the left breast demonstrated minimally increased FDG uptake with SUV of 2. Scattered hypermetabolic mediastinal lymph nodes located in the left superior anterior mediastinum, right paratracheal and precarinal U, subcranial adenopathy with javon metastases. This focus hypermetabolic FDG uptake identified definitive Amanda posterior aspect off intertrochanteric region of the proximal left  femur consistent with bone metastases. There is also 1.4 cm hypermetabolic FDG uptake identified in the anterior medial segment of the left hepatic lobe consistent with liver metastases. Additional 2.1 cm low-attenuation lesion anterior aspect of the right lobe which needs to be determined. The patient was started on chemotherapy with Taxotere and Cytoxan on February 9, 2015.  She concluded 4 cycles of Taxotere and Cytoxan. She started on Arimidex 1 mg orally daily. Currently she is on Faslodex and ibrance. Subsequently the patient went on to receive Afinitor. The patient also started treatment with Xeloda.       Interval History:  Patient is here today for follow-up.  Last week she was diagnosed with deep venous thrombosis of the right upper extremity.  She was started on Lovenox.  She has been tolerating Lovenox well.  Swelling of the right upper extremity has significantly improved.  Patient denies any shortness of breath or cough or wheezing.  She denies any bone aches or pains.  Her pain is currently well controlled.  She denies any nausea or vomiting.  She is currently on Abraxane.  She tolerated chemotherapy without significant side effects.  His diarrhea has improved since she has been on vancomycin.  Repeat stool test came back negative for C. difficile.    Review Of Systems:  Constitutional: Negative for fever, chills, and night sweats.  Fatigue  Skin: negative.  Eyes: negative.  Ears/Nose/Throat: negative.  Respiratory: No shortness of breath, dyspnea on exertion, cough, or hemoptysis.  Cardiovascular: negative.  Gastrointestinal: negative.  Genitourinary: negative.  Musculoskeletal: negative.  Neurologic: negative.  Psychiatric: negative.  Hematologic/Lymphatic/Immunologic: negative.  Endocrine: negative.    All other ROS negative unless mentioned in interval history.    Past medical, social, surgical, and family histories reviewed.    Allergies:  Allergies as of 01/24/2018 - Manuel as Reviewed 01/24/2018    Allergen Reaction Noted     Animal dander Cough 01/23/2015     Cats  07/15/2010     Dust mites Cough 01/23/2015     Pollen extract  01/23/2015     Seasonal allergies  07/15/2010     Levaquin [levofloxacin] Rash 07/17/2017       Current Medications:  Current Outpatient Prescriptions   Medication Sig Dispense Refill     enoxaparin (LOVENOX) 100 MG/ML injection Inject 0.85 mLs (85 mg) Subcutaneous every 12 hours 30 Syringe 0     HYDROcodone-acetaminophen (NORCO) 5-325 MG per tablet Take 1-2 tablets by mouth every 4 hours as needed for moderate to severe pain 20 tablet 0     budesonide-formoterol (SYMBICORT) 160-4.5 MCG/ACT Inhaler Inhale 2 puffs into the lungs 2 times daily 1 Inhaler 1     prochlorperazine (COMPAZINE) 10 MG tablet Take 1 tablet (10 mg) by mouth every 6 hours as needed (Nausea/Vomiting) 30 tablet 2     diclofenac (VOLTAREN) 1 % GEL topical gel Place 2 g onto the skin 4 times daily 100 g 0     magnesium hydroxide (MILK OF MAGNESIA) 400 MG/5ML suspension Take 30 mLs by mouth daily as needed for constipation 105 mL      loratadine (CLARITIN) 10 MG tablet Take 10 mg by mouth daily as needed for allergies       zolpidem (AMBIEN CR) 12.5 MG CR tablet Take 1 tablet by mouth at bed time. 30 tablet 5     diphenhydrAMINE (BENADRYL) 25 MG tablet Take 1 tablet (25 mg) by mouth every 8 hours as needed for itching or allergies 1 tablet 0     DULERA 200-5 MCG/ACT oral inhaler INHALE 2 PUFFS INTO THE LUNGS TWICE DAILY 3 Inhaler 3     QVAR 80 MCG/ACT Inhaler INHALE 1 PUFFS INTO THE EACH NOSTRIL 2 TIMES DAILY 26.1 g 3     levothyroxine (SYNTHROID/LEVOTHROID) 25 MCG tablet TAKE 1 TABLET (25 MCG) BY MOUTH DAILY 90 tablet 2     metoclopramide (REGLAN) 10 MG tablet Take 1 tablet (10 mg) by mouth 2 times daily as needed 120 tablet 1     fexofenadine (ALLEGRA ALLERGY) 180 MG tablet Take 180 mg by mouth daily as needed for allergies       calcium carb-cholecalciferol (KP CALCIUM 600+D3) 600-500 MG-UNIT CAPS Take 2 tablets by  mouth daily       albuterol (2.5 MG/3ML) 0.083% neb solution Take 1 vial (2.5 mg) by nebulization every 4 hours as needed for shortness of breath / dyspnea 30 vial 3     magic mouthwash suspension (diphenhydrAMINE, lidocaine, aluminum-magnesium & simethicone) Swish and swallow 10 mLs in mouth every 6 hours as needed for mouth sores 240 mL 10     CRANBERRY PO Take 1 capsule by mouth daily        azelastine (ASTELIN) 0.1 % nasal spray Spray 1-2 sprays into both nostrils 2 times daily as needed for rhinitis 3 Bottle 3     albuterol (VENTOLIN HFA) 108 (90 BASE) MCG/ACT inhaler Inhale 2 puffs into the lungs every 4 hours as needed 1 Inhaler 2     oxyCODONE IR (ROXICODONE) 5 MG tablet Take 1 tablet (5 mg) by mouth every 4 hours as needed for moderate to severe pain (Patient not taking: Reported on 1/24/2018) 60 tablet 0     lidocaine, viscous, (XYLOCAINE) 2 % solution        dexamethasone (DECADRON) 4 MG tablet Take 20 mg (5 tablets) the evening prior to and the morning of Abraxane infusion for 1st cycle only. (Patient not taking: Reported on 1/24/2018) 10 tablet 0     Lidocaine (LIDOCARE) 4 % Patch Place 1-2 patches onto the skin every 24 hours (Patient not taking: Reported on 1/24/2018) 30 patch 0     polyethylene glycol (MIRALAX/GLYCOLAX) Packet Take 17 g by mouth daily (Patient not taking: Reported on 1/24/2018) 7 packet      senna-docusate (SENOKOT-S;PERICOLACE) 8.6-50 MG per tablet Take 1-2 tablets by mouth 2 times daily as needed for constipation (Patient not taking: Reported on 1/24/2018) 100 tablet      order for DME Equipment being ordered: JoviPak post-lumpectomy compression pad x2 1 each 0     Phenylephrine-DM-GG (ROBITUSSIN COUGH/COLD CF PO) Take 10 mLs by mouth as needed        Docusate Calcium (STOOL SOFTENER PO) Take 100 mg by mouth daily           Physical Exam:  /67 (BP Location: Right arm, Patient Position: Sitting, Cuff Size: Adult Regular)  Pulse 115  Temp 99.5  F (37.5  C) (Oral)  Resp 17   Wt 83.2 kg (183 lb 6.4 oz)  LMP 02/06/2013  SpO2 95%  BMI 31.23 kg/m2  Wt Readings from Last 12 Encounters:   01/24/18 83.2 kg (183 lb 6.4 oz)   01/17/18 85.4 kg (188 lb 3.2 oz)   01/10/18 83.9 kg (185 lb)   01/05/18 83.9 kg (185 lb)   01/03/18 86.5 kg (190 lb 9.6 oz)   12/27/17 84 kg (185 lb 3.2 oz)   12/21/17 89.9 kg (198 lb 3.2 oz)   12/14/17 89.1 kg (196 lb 8 oz)   12/12/17 90.6 kg (199 lb 11.2 oz)   12/07/17 90.6 kg (199 lb 11.2 oz)   12/01/17 90.9 kg (200 lb 8 oz)   11/30/17 90.9 kg (200 lb 8 oz)     ECOG performance status: 1  GENERAL APPEARANCE: Healthy, alert and in no acute distress.  HEENT: Sclerae anicteric. PERRLA. Oropharynx without ulcers, lesions, or thrush.  NECK: Supple. No asymmetry or masses.  LYMPHATICS: No palpable cervical, supraclavicular, axillary, or inguinal lymphadenopathy.  RESP: Lungs clear to auscultation bilaterally without rales, rhonchi or wheezes.  CARDIOVASCULAR: Regular rate and rhythm. Normal S1, S2; no S3 or S4. No murmur, gallop, or rub.  ABDOMEN: Soft, nontender. Bowel sounds normal. No palpable organomegaly or masses.  MUSCULOSKELETAL: Extremities without gross deformities noted. No edema of bilateral lower extremities.  SKIN: No suspicious lesions or rashes.  NEURO: Alert and oriented x 3. Cranial nerves II-XII grossly intact.  PSYCHIATRIC: Mentation and affect appear normal.    Laboratory/Imaging Studies:  Infusion Therapy Visit on 01/24/2018   Component Date Value Ref Range Status     WBC 01/24/2018 8.6  4.0 - 11.0 10e9/L Final     RBC Count 01/24/2018 3.96  3.8 - 5.2 10e12/L Final     Hemoglobin 01/24/2018 12.3  11.7 - 15.7 g/dL Final     Hematocrit 01/24/2018 37.9  35.0 - 47.0 % Final     MCV 01/24/2018 96  78 - 100 fl Final     MCH 01/24/2018 31.1  26.5 - 33.0 pg Final     MCHC 01/24/2018 32.5  31.5 - 36.5 g/dL Final     RDW 01/24/2018 15.0  10.0 - 15.0 % Final     Platelet Count 01/24/2018 294  150 - 450 10e9/L Final     Diff Method 01/24/2018 Automated Method    Final     % Neutrophils 01/24/2018 73.8  % Final     % Lymphocytes 01/24/2018 12.5  % Final     % Monocytes 01/24/2018 10.9  % Final     % Eosinophils 01/24/2018 1.6  % Final     % Basophils 01/24/2018 0.3  % Final     % Immature Granulocytes 01/24/2018 0.9  % Final     Absolute Neutrophil 01/24/2018 6.4  1.6 - 8.3 10e9/L Final     Absolute Lymphocytes 01/24/2018 1.1  0.8 - 5.3 10e9/L Final     Absolute Monocytes 01/24/2018 0.9  0.0 - 1.3 10e9/L Final     Absolute Eosinophils 01/24/2018 0.1  0.0 - 0.7 10e9/L Final     Absolute Basophils 01/24/2018 0.0  0.0 - 0.2 10e9/L Final     Abs Immature Granulocytes 01/24/2018 0.1  0 - 0.4 10e9/L Final     Sodium 01/24/2018 139  133 - 144 mmol/L Final     Potassium 01/24/2018 3.7  3.4 - 5.3 mmol/L Final     Chloride 01/24/2018 108  94 - 109 mmol/L Final     Carbon Dioxide 01/24/2018 24  20 - 32 mmol/L Final     Anion Gap 01/24/2018 7  3 - 14 mmol/L Final     Glucose 01/24/2018 118* 70 - 99 mg/dL Final     Urea Nitrogen 01/24/2018 8  7 - 30 mg/dL Final     Creatinine 01/24/2018 0.47* 0.52 - 1.04 mg/dL Final     GFR Estimate 01/24/2018 >90  >60 mL/min/1.7m2 Final    Non  GFR Calc     GFR Estimate If Black 01/24/2018 >90  >60 mL/min/1.7m2 Final    African American GFR Calc     Calcium 01/24/2018 8.5  8.5 - 10.1 mg/dL Final     Bilirubin Total 01/24/2018 0.7  0.2 - 1.3 mg/dL Final     Albumin 01/24/2018 2.7* 3.4 - 5.0 g/dL Final     Protein Total 01/24/2018 7.1  6.8 - 8.8 g/dL Final     Alkaline Phosphatase 01/24/2018 102  40 - 150 U/L Final     ALT 01/24/2018 16  0 - 50 U/L Final     AST 01/24/2018 30  0 - 45 U/L Final     CA 27-29 01/24/2018 103* 0 - 39 U/mL Final    Assay Method:  Chemiluminescence using Siemens Centaur XP     CEA 01/24/2018 8.7* 0 - 2.5 ug/L Final    Comment: Smoking may cause CEA results to be elevated.  Assay Method:  Chemiluminescence using Siemens Centaur XP     Infusion Therapy Visit on 01/17/2018   Component Date Value Ref Range Status      WBC 01/17/2018 7.8  4.0 - 11.0 10e9/L Final     RBC Count 01/17/2018 3.71* 3.8 - 5.2 10e12/L Final     Hemoglobin 01/17/2018 11.6* 11.7 - 15.7 g/dL Final     Hematocrit 01/17/2018 36.2  35.0 - 47.0 % Final     MCV 01/17/2018 98  78 - 100 fl Final     MCH 01/17/2018 31.3  26.5 - 33.0 pg Final     MCHC 01/17/2018 32.0  31.5 - 36.5 g/dL Final     RDW 01/17/2018 14.8  10.0 - 15.0 % Final     Platelet Count 01/17/2018 311  150 - 450 10e9/L Final     Diff Method 01/17/2018 Automated Method   Final     % Neutrophils 01/17/2018 63.3  % Final     % Lymphocytes 01/17/2018 17.8  % Final     % Monocytes 01/17/2018 12.5  % Final     % Eosinophils 01/17/2018 2.8  % Final     % Basophils 01/17/2018 0.6  % Final     % Immature Granulocytes 01/17/2018 3.0  % Final     Absolute Neutrophil 01/17/2018 4.9  1.6 - 8.3 10e9/L Final     Absolute Lymphocytes 01/17/2018 1.4  0.8 - 5.3 10e9/L Final     Absolute Monocytes 01/17/2018 1.0  0.0 - 1.3 10e9/L Final     Absolute Eosinophils 01/17/2018 0.2  0.0 - 0.7 10e9/L Final     Absolute Basophils 01/17/2018 0.1  0.0 - 0.2 10e9/L Final     Abs Immature Granulocytes 01/17/2018 0.2  0 - 0.4 10e9/L Final     Sodium 01/17/2018 141  133 - 144 mmol/L Final     Potassium 01/17/2018 3.6  3.4 - 5.3 mmol/L Final     Chloride 01/17/2018 107  94 - 109 mmol/L Final     Carbon Dioxide 01/17/2018 26  20 - 32 mmol/L Final     Anion Gap 01/17/2018 8  3 - 14 mmol/L Final     Glucose 01/17/2018 115* 70 - 99 mg/dL Final     Urea Nitrogen 01/17/2018 7  7 - 30 mg/dL Final     Creatinine 01/17/2018 0.51* 0.52 - 1.04 mg/dL Final     GFR Estimate 01/17/2018 >90  >60 mL/min/1.7m2 Final    Non  GFR Calc     GFR Estimate If Black 01/17/2018 >90  >60 mL/min/1.7m2 Final    African American GFR Calc     Calcium 01/17/2018 8.3* 8.5 - 10.1 mg/dL Final     Bilirubin Total 01/17/2018 0.6  0.2 - 1.3 mg/dL Final     Albumin 01/17/2018 2.6* 3.4 - 5.0 g/dL Final     Protein Total 01/17/2018 6.7* 6.8 - 8.8 g/dL Final      Alkaline Phosphatase 01/17/2018 91  40 - 150 U/L Final     ALT 01/17/2018 10  0 - 50 U/L Final     AST 01/17/2018 23  0 - 45 U/L Final     CA 27-29 01/17/2018 100* 0 - 39 U/mL Final    Assay Method:  Chemiluminescence using Siemens Centaur XP     CEA 01/17/2018 6.7* 0 - 2.5 ug/L Final    Comment: Smoking may cause CEA results to be elevated.  Assay Method:  Chemiluminescence using Siemens Centaur XP          Recent Results (from the past 744 hour(s))   XR Chest w Fluoro 2 Views   Result Value    Radiologist flags Fibrinous cap and catheter position change, as (Urgent)    Narrative    XR CHEST WITH FLUORO 2 VIEWS 12/29/2017 9:20 AM    HISTORY: Normal blood return on PowerPort catheter.    COMPARISON: Chest x-ray dated 12/11/2017.    POWERPORT CATHETER INJECTION: Risks and benefits are discussed with  the patient. The power port catheter is cannulated by the nursing  staff. 15 mL of Isovue 250 are injected. Six fluoroscopic sequences  are obtained. Fluoroscopy time is 1.4 minutes.    FINDINGS: The PowerPort catheter is intact under fluoroscopy. However,  the tip of the catheter is now directed upward compared to the prior  chest x-ray and it is positioned in the right brachiocephalic vein.  Normal blood return can be obtained. With contrast injection a small  fibrinous cap is present.   Injected contrast flows around the cap and  away from the tip and opacifies the right brachiocephalic vein.      Impression    IMPRESSION: There is a fibrinous on the tip of the catheter which  inhibits blood return. The catheter tip has also changed positions and  is now flipped up into the right brachiocephalic vein.    [Access Center: Fibrinous cap and catheter position change, as  described above]    This report will be copied to the [facility] to ensure a  provider acknowledges the finding. Access Center is available Monday  through Friday 8am-3:30 pm.     LETI CHASE MD   XR Chest 2 Views    Narrative    CHEST 2  VIEWS   1/7/2018 3:09 AM     HISTORY: Fever. History of breast cancer with bone and liver  metastases.    COMPARISON: 12/11/2017.    FINDINGS: Hypoinflated lungs. A few minimal opacities at the left lung  base likely represent atelectasis or scarring. The lungs are otherwise  clear. Normal-sized cardiac silhouette. Left anterior chest wall port  with catheter entering the left subclavian vein and distal tip near  the confluence of the brachiocephalic veins.      Impression    IMPRESSION: No convincing evidence of active cardiopulmonary disease.    NIESHA MAJOR MD   XR Surgery MUNA Fluoro L/T 5 Min w Stills    Narrative    SURGERY C-ARM FLUORO LESS THAN 5 MIN W STILLS 1/10/2018 10:48 AM     COMPARISON: 2/12/2015.    HISTORY: Port-A-Cath placement.    NUMBER OF IMAGES ACQUIRED: 3.    VIEWS: 2.    FLUOROSCOPY TIME: 0.45 minute(s)      Impression    IMPRESSION: The fluoroscopic images demonstrate placement of a left  Port-A-Cath, with tip in the mid SVC.    JIM DAVE MD   XR Chest Port 1 View    Narrative    XR CHEST PORT 1 VW 1/10/2018 11:18 AM    COMPARISON: 1/7/2018    HISTORY: Port-A-Cath placement, concern for pneumothorax.      Impression    IMPRESSION: Left subclavian implanted central venous port tip in the  high right atrium. No pneumothorax seen on either side.    DENISSE GIL MD   US Extremity Upper Venous rt    Narrative    ULTRASOUND VENOUS RIGHT UPPER EXTREMITY  1/17/2018 11:51 AM     HISTORY:  Rule out DVT. Carcinoma of breast metastatic to liver,  unspecified laterality (H). Cellulitis of right upper extremity.    COMPARISON: None.    TECHNIQUE: Ultrasound gray scale, Color Doppler flow, and spectral  Doppler waveform analysis performed.    FINDINGS: The right internal jugular vein, subclavian vein, axillary  vein, and central portion of the brachial and basilic veins are  noncompressible and filled with echogenic material. The right  cephalic, radial, and ulnar veins are normally  compressible. The left  internal jugular vein is patent.      Impression    IMPRESSION: Occlusive DVT involving the right internal jugular vein,  subclavian vein, axillary vein, and the central portions of the  brachial and basilic veins.    Preliminary report was given to the ordering clinician by the  sonographer at the time of dictation.    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  She has been tolerating treatment well.  We will proceed with the current treatment plan.  I will see the patient again in 1 month or sooner if there are new developments or concerns.    (C79.51) Bone metastasis (H)  Patient currently on Xgeva every 3 months.    (E03.9) Hypothyroidism, unspecified type  Patient continues on Synthroid 25 mcg daily.    (G47.01) Insomnia due to medical condition  Patient currently on Ambien.    (J45.40) Moderate persistent asthma without complication  Patient has been has been well controlled she is using her inhaler regularly.    (K12.31) Mucositis due to chemotherapy  Patient using Magic mouthwash if needed.    (D70.1,  T45.1X5A) Chemotherapy-induced neutropenia (H)  We will continue to monitor patient absolute neutrophil count.    (G89.3) Cancer associated pain  Patient pain is currently well controlled on the current regimen.    (I82.621) Acute deep vein thrombosis (DVT) of right upper extremity, unspecified vein (H)  I would recommend patient to switch to warfarin next week.  We will referred the patient to anticoagulation clinic for warfarin  management.    (A04.72) C. difficile colitis  Patient continues on oral vancomycin.    The patient is ready to learn, no apparent learning barriers were identified.  Diagnosis and treatment plans were explained to the patient. The patient expressed understanding of the content. The patient asked appropriate questions. The patient questions were answered to her satisfaction.    Chart documentation with Dragon Voice recognition Software. Although reviewed after completion, some words and grammatical errors may remain.

## 2018-01-26 NOTE — PROGRESS NOTES
ANTICOAGULATION FOLLOW-UP CLINIC VISIT    Patient Name:  Etta Cervantes  Date:  1/26/2018  Contact Type:  Telephone/ writer spoke with RIANA Choudhary at Dr. Cassidy office and also the patient    SUBJECTIVE:     Patient Findings     Positives Initiation of therapy (1-26-18), Antibiotic use or infection (was on vanco until 1-19 for c diff)    Comments Writer received a call from RIANA Choudhary with Dr. Cassidy's office. They forgot to send [facility] an INR referral for this patient. She is being transitioned from lovenox to warfarin due to recurrent DVT and cancer. Pt has enough lovenox through Wed and writer just sent warfarin prescription today. 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 Pt is aware of this. There were no openings for teaching at [facility] or [facility] on Tues.            OBJECTIVE    INR   Date Value Ref Range Status   12/07/2017 1.11 0.86 - 1.14 Final       ASSESSMENT / PLAN  No question data found.  Anticoagulation Summary as of 1/26/2018     INR goal 2.0-3.0   Today's INR No new INR was available at the time of this encounter.   Maintenance plan No maintenance plan   Full instructions 1/26: 5 mg; 1/27: 5 mg; 1/28: 5 mg   Plan last modified Juliette Schmidt RN (1/26/2018)   Next INR check 1/29/2018   Target end date     Indications   Acute deep vein thrombosis (DVT) of right upper extremity  unspecified vein (H) [I82.621]  Long-term (current) use of anticoagulants [Z79.01] [Z79.01]         Anticoagulation Episode Summary     INR check location     Preferred lab     Send INR reminders to [facility] [place]    Comments * weekly abraxane infusions for metastatic breast cx (this can increase or decrease INR per chemo reference list). Pt prefers [facility] or Critical access hospital. INR referral has undetermined listed for therapy length.      Anticoagulation Care Providers     Provider Role Specialty Phone number     Brodie Cassidy MD Referring Hematology & Oncology [phone]    Johana Fairbanks MD [facility] [phone]            See the Encounter Report to view Anticoagulation Flowsheet and Dosing Calendar (Go to Encounters tab in chart review, and find the Anticoagulation Therapy Visit)        Juliette Schmidt RN

## 2018-01-26 NOTE — PROGRESS NOTES
Reviewed results and recommendations with patient.  Denies questions or concerns at this time.  Will call back if any arise.  Direct line provided.

## 2018-01-29 NOTE — TELEPHONE ENCOUNTER
Per Dr. Cassidy, if stool sample comes back positive for C-diff, patient is to resume previous dose of vancomycin (125 mg by mouth 4 times daily x 10 days).

## 2018-01-29 NOTE — TELEPHONE ENCOUNTER
Patient calling to report that the diarrhea started again over the weekend. Denies fever nor chills, pain nor discomfort.  Patient has had C-Diff twice since 12.27.17 and just finished a course of oral vancomycin.  Has an appt with infectious disease next week.      Advised pt to come to clinic to provide stool sample to be rechecked for C-diff.

## 2018-01-29 NOTE — MR AVS SNAPSHOT
Etta Cervantes   1/29/2018 2:30 PM   Anticoagulation Therapy Visit    Description:  57 year old female   Provider:  [facility] [facility]   Department:  [facility]           INR as of 1/29/2018     Today's INR 2.0      Anticoagulation Summary as of 1/29/2018     INR goal 2.0-3.0   Today's INR 2.0   Full instructions 1/29: 2.5 mg; 1/30: 2.5 mg   Next INR check 1/31/2018    Indications   Acute deep vein thrombosis (DVT) of right upper extremity  unspecified vein (H) [I82.621]  Long-term (current) use of anticoagulants [Z79.01] [Z79.01]         Description     Take jantoven 2.5mg Monday and 2.5mg Tuesday.  Continue enoxaparin Inject 0.85 mLs (85 mg) Subcutaneous every 12 hours  Recheck INR Wed., 1/31/18 with other labs.      Contact Numbers     Please call [phone] with any problems or questions regarding your therapy.    If you need to cancel and/or reschedule your appointment please call one of the following numbers:  [facility] - [phone]  [facility] [phone]  [place] - [phone]  [facility] [phone]  [place] - [phone]            January 2018 Details    Sun Mon Tue Wed Thu Fri Sat      1               2               3               4               5               6                 7               8               9               10               11               12               13                 14               15               16               17               18               19               20                 21               22               23               24               25               26               27                 28               29      2.5 mg   See details      30      2.5 mg   See details      31      See details          Date Details   01/29 This INR check   Take 2.5 mg (5 mg tablets x 0.5)   enoxaparin Inject 0.85 mLs (85 mg) Subcutaneous every 12 hours      01/30 Take 2.5 mg (5 mg tablets x 0.5)   enoxaparin Inject 0.85 mLs (85 mg) Subcutaneous every 12 hours       01/31 enoxaparin Inject 0.85 mLs (85 mg) Subcutaneous every 12 hours       Date of next INR:  1/31/2018         How to take your warfarin dose     To take:  2.5 mg Take 0.5 of a 5 mg tablet.

## 2018-01-30 NOTE — TELEPHONE ENCOUNTER
Clinic Action Needed:Yes-Call pt with C Diff results   Reason for Call: Pt calling to see if Dr Cassidy would be willing to start treatment for C Diff even though test results are not back yet. She is having so much diarrhea she is unable to make it to the bathroom. I called Dr Bishop cell and he said yes, to start her on her previous treatment of Vancomycin 125 mg QID for 10 days. I sent that order to the [facility] in [facility] per pts request and she will start it tonight. Advised Etta to watch for dehydration or other symptoms of concern as discussed.   Routed to: Dr Cassidy care team     Lana Blackwell RN, [facility]

## 2018-01-30 NOTE — PROGRESS NOTES
"Per Dr. Cassidy, patient may keep her chemotherapy appt if the diarrhea has stopped.  Updated patient, she reports \"the stools have really slowed down.  She will call tomorrow to reschedule if the diarrhea continues."

## 2018-01-31 NOTE — MR AVS SNAPSHOT
Etta Cervantes   1/31/2018   Anticoagulation Therapy Visit    Description:  57 year old female   Provider:  Jewels Harrison, RN   Department:  [facility]           INR as of 1/31/2018     Today's INR 2.61      Anticoagulation Summary as of 1/31/2018     INR goal 2.0-3.0   Today's INR 2.61   Full instructions 1/31: 2.5 mg; 2/1: 2.5 mg   Next INR check 2/2/2018    Indications   Acute deep vein thrombosis (DVT) of right upper extremity  unspecified vein (H) [I82.621]  Long-term (current) use of anticoagulants [Z79.01] [Z79.01]         Description     Discontinue injections.  Take 2.5mg Wed & Thurs.  Recheck INR Friday 2/2/18 at [facility].      Your next Anticoagulation Clinic appointment(s)     Feb 02, 2018  1:00 PM CST   Anticoagulation Visit with [facility]   [facility]    [address] [address]   [phone]              January 2018 Details    Sun Mon Tue Wed Thu Fri Sat      1               2               3               4               5               6                 7               8               9               10               11               12               13                 14               15               16               17               18               19               20                 21               22               23               24               25               26               27                 28               29               30               31      2.5 mg   See details          Date Details   01/31 This INR check               How to take your warfarin dose     To take:  2.5 mg Take 0.5 of a 5 mg tablet.           February 2018 Details    Sun Mon Tue Wed Thu Fri Sat         1      2.5 mg         2            3                 4               5               6               7               8               9               10                 11               12               13               14                15               16               17                 18               19               20               21               22               23               24                 25               26               27               28                   Date Details   No additional details    Date of next INR:  2/2/2018         How to take your warfarin dose     To take:  2.5 mg Take 0.5 of a 5 mg tablet.

## 2018-01-31 NOTE — PROGRESS NOTES
ANTICOAGULATION FOLLOW-UP CLINIC VISIT    Patient Name:  Etta Cervantes  Date:  1/31/2018  Contact Type:  Telephone/ discussed with pt    SUBJECTIVE:     Patient Findings     Positives Change in medications (continues to bridge with Lovenox. Abraxane infusion today.), Initiation of therapy (1/26/18), Antibiotic use or infection (1/29/18 vancomycin Take 1 capsule (125 mg) by mouth 4 times daily for 10 days (C-diff))    Comments vanco restarted for pt's + C-diff.           OBJECTIVE    INR   Date Value Ref Range Status   01/31/2018 2.61 (H) 0.86 - 1.14 Final       ASSESSMENT / PLAN  INR assessment THER    Recheck INR In: 2 DAYS    INR Location Clinic lab draw     Anticoagulation Summary as of 1/31/2018     INR goal 2.0-3.0   Today's INR 2.61   Maintenance plan No maintenance plan   Full instructions 1/31: 2.5 mg; 2/1: 2.5 mg   Plan last modified Juliette Schmidt RN (1/26/2018)   Next INR check 2/2/2018   Target end date     Indications   Acute deep vein thrombosis (DVT) of right upper extremity  unspecified vein (H) [I82.621]  Long-term (current) use of anticoagulants [Z79.01] [Z79.01]         Anticoagulation Episode Summary     INR check location     Preferred lab     Send INR reminders to [facility] PH[facility] POOL    Comments * weekly abraxane infusions for metastatic breast cx (this can increase or decrease INR per chemo reference list). Pt prefers [facility]. INR referral has undetermined listed for therapy length. OK to send instructions via [employer]      Anticoagulation Care Providers     Provider Role Specialty Phone number    Brodie Cassidy MD Referring Hematology & Oncology [phone]    Johana Fairbanks MD Responsible Family Practice [phone]            See the Encounter Report to view Anticoagulation Flowsheet and Dosing Calendar (Go to Encounters tab in chart review, and find the Anticoagulation Therapy Visit)    Jewels Harrison RN

## 2018-01-31 NOTE — PROGRESS NOTES
Infusion Nursing Note:  Etta Cervantes presents today for Pac labs and Abraxane.    Patient seen by provider today: No   present during visit today: Not Applicable.    Note: Labs drawn via her port per [facility] protocol. Labs WNL's. Premeds and chemotherapy infused via her port per [facility] protocol without complications. Port flushed per [facility] protocol. Pt. To return on 2/7/18 for labs and chemotherapy.  Intravenous Access:  Labs drawn without difficulty.  Implanted Port.    Treatment Conditions:  Lab Results   Component Value Date    HGB 11.9 01/31/2018     Lab Results   Component Value Date    WBC 4.7 01/31/2018      Lab Results   Component Value Date    ANEU 2.9 01/31/2018     Lab Results   Component Value Date     01/31/2018      Lab Results   Component Value Date     01/24/2018                   Lab Results   Component Value Date    POTASSIUM 3.7 01/24/2018           Lab Results   Component Value Date    MAG 2.1 07/13/2017            Lab Results   Component Value Date    CR 0.47 01/24/2018                   Lab Results   Component Value Date    BEN 8.5 01/24/2018                Lab Results   Component Value Date    BILITOTAL 0.7 01/24/2018           Lab Results   Component Value Date    ALBUMIN 2.7 01/24/2018                    Lab Results   Component Value Date    ALT 16 01/24/2018           Lab Results   Component Value Date    AST 30 01/24/2018       Results reviewed, labs MET treatment parameters, ok to proceed with treatment.      Post Infusion Assessment:  Patient tolerated infusion without incident.  Blood return noted pre and post infusion.  Site patent and intact, free from redness, edema or discomfort.  No evidence of extravasations.  Access discontinued per protocol.    Discharge Plan:   Patient and/or family verbalized understanding of discharge instructions and all questions answered.  Patient discharged in stable condition accompanied by: daughter.  Departure Mode:  Ambulatory.    Madison Katz RN

## 2018-01-31 NOTE — PROGRESS NOTES
Infusion Nursing Note:                    Post Infusion Assessment:  Patient tolerated infusion without incident.  Blood return noted pre and post infusion.  Site patent and intact, free from redness, edema or discomfort.  No evidence of extravasations.  Access discontinued per protocol.    Discharge Plan:   Patient discharged in stable condition accompanied by: daughter-in-law.    Yoanna Wilde RN

## 2018-01-31 NOTE — MR AVS SNAPSHOT
After Visit Summary   1/31/2018    Etta Cervantes    MRN: [id number]           Patient Information     Date Of Birth          1960        Visit Information        Provider Department      1/31/2018 8:30 AM ROOM 5 [facility]        Today's Diagnoses     Secondary malignant neoplasm of liver (H)    -  1    Carcinoma of breast metastatic to liver, unspecified laterality (H)        Bone metastasis (H)        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)        Acute deep vein thrombosis (DVT) of right upper extremity, unspecified vein (H)           Follow-ups after your visit        Your next 10 appointments already scheduled     Feb 02, 2018  1:00 PM CST   Anticoagulation Visit with [facility]   [facility]    [address] [facility] [address]   [phone]            Feb 06, 2018  4:00 PM CST   (Arrive by 3:45 PM)   New Patient Visit with Bailey Santana MD   [facility] ([facility])    [address] [place] [address]  [address]  [facility] [address] [phone]            Feb 07, 2018  8:00 AM CST   Level 1 with ROOM 9 [facility] ([facility])    [facility]  [address]  [facility] [address]   [phone]            Feb 21, 2018  8:30 AM CST   Level 1 with ROOM 2 [facility] ([facility])    [facility]  [address]  [facility] [address]   [phone]            Feb 21, 2018  9:00 AM CST   Return Visit with Brodie Cassidy MD   [facility] ([facility])    [facility]  [address]  [facility] [address]   [phone]            Feb 28, 2018  8:30 AM CST   Level 1 with ROOM 9 [facility] ([facility])    [facility] [place]  [place]  [address] [place] [address]  [facility] [address]   [phone]            Mar 07, 2018  8:00 AM CST   Level 1 with ROOM 9 [facility] ([facility])    [facility]  [address] [place] [address]  [facility] [address]   [phone]              Who to contact     If you have questions or need follow up information about today's clinic visit or your schedule please contact [facility] directly at [phone].  Normal or non-critical lab and imaging results will be communicated to you by [employer], letter or phone within 4 business days after the clinic has received the results. If you do not hear from us within 7 days, please contact the clinic through [employer] or phone. If you have a critical or abnormal lab result, we will notify you by phone as soon as possible.  Submit refill requests through [employer] or call your pharmacy and they will forward the refill request to us. Please allow 3 business days for your refill to be completed.          Additional Information About Your Visit        [employer] Information     [employer] gives you secure access to your electronic health record. If you see a primary care provider, you can also send messages to your care team and make appointments. If you have questions, please call your primary care clinic.  If you do not have a primary care provider, please call [phone] and they will assist you.        Care EveryWhere ID     This is your Care EveryWhere ID. This could be used by other organizations to access your [place] medical records  [id number]        Your Vitals Were     Pulse Temperature Last Period             95 99.1  F (37.3  C) (Oral) 02/06/2013          Blood Pressure from Last 3 Encounters:   01/31/18 109/66   01/24/18 115/67   01/24/18 98/48    Weight from Last 3 Encounters:   01/24/18 83.2 kg (183 lb 6.4 oz)   01/17/18 85.4 kg (188 lb 3.2 oz)   01/10/18 83.9 kg (185 lb)              We Performed  the Following     CBC with platelets differential     INR        Primary Care Provider Office Phone # Fax #    Johana Fairbanks [phone] [address] [place] [address]        Equal Access to Services     JACKIE DOMINGUEZ : Hadii aad ku adrienneo Soomaali, waaxda luqadaha, qaybta kaalmada adeegyada, hari nessmonique debby. So [facility] [phone].    ATENCIÓN: Si habla español, tiene a parekh disposición servicios gratuitos de asistencia lingüística. Llame al [phone].    We comply with applicable federal civil rights laws and [place] laws. We do not discriminate on the basis of race, color, national origin, age, disability, sex, sexual orientation, or gender identity.            Thank you!     Thank you for choosing [facility]  for your care. Our goal is always to provide you with excellent care. Hearing back from our patients is one way we can continue to improve our services. Please take a few minutes to complete the written survey that you may receive in the mail after your visit with us. Thank you!             Your Updated Medication List - Protect others around you: Learn how to safely use, store and throw away your medicines at www.disposemymeds.org.          This list is accurate as of 1/31/18 11:59 PM.  Always use your most recent med list.                   Brand Name Dispense Instructions for use Diagnosis    * albuterol 108 (90 BASE) MCG/ACT Inhaler    VENTOLIN HFA    1 Inhaler    Inhale 2 puffs into the lungs every 4 hours as needed    Moderate persistent asthma, uncomplicated       * albuterol (2.5 MG/3ML) 0.083% neb solution     30 vial    Take 1 vial (2.5 mg) by nebulization every 4 hours as needed for shortness of breath / dyspnea    Moderate persistent asthma, uncomplicated       ALLEGRA ALLERGY 180 MG tablet   Generic drug:  fexofenadine      Take 180 mg by mouth daily as needed for allergies        azelastine 0.1 % spray    ASTELIN    3  Bottle    Spray 1-2 sprays into both nostrils 2 times daily as needed for rhinitis    Chronic rhinitis       budesonide-formoterol 160-4.5 MCG/ACT Inhaler    SYMBICORT    1 Inhaler    Inhale 2 puffs into the lungs 2 times daily    Moderate persistent asthma without complication       CRANBERRY PO      Take 1 capsule by mouth daily        dexamethasone 4 MG tablet    DECADRON    10 tablet    Take 20 mg (5 tablets) the evening prior to and the morning of Abraxane infusion for 1st cycle only.    Carcinoma of breast metastatic to liver, unspecified laterality (H), Bilateral malignant neoplasm of breast in female, estrogen receptor positive, unspecified site of breast (H), Bone metastasis (H), 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), Secondary malignant neoplasm of liver (H)       diclofenac 1 % Gel topical gel    VOLTAREN    100 g    Place 2 g onto the skin 4 times daily    Carcinoma of breast metastatic to liver, unspecified laterality (H)       diphenhydrAMINE 25 MG tablet    BENADRYL    1 tablet    Take 1 tablet (25 mg) by mouth every 8 hours as needed for itching or allergies    Carcinoma of breast metastatic to liver, unspecified laterality (H), Bone metastasis (H), Pericardial effusion, Bilateral malignant neoplasm of breast in female, estrogen receptor positive, unspecified site of breast (H), 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), Secondary malignant neoplasm of liver (H), Chemotherapy-induced neutropenia (H), Emphysematous bleb of lung (H), Hypothyroidism, unspecified type, Hyperlipidemia LDL goal <130, Moderate persistent asthma without complication, Mucositis due to chemotherapy       DULERA 200-5 MCG/ACT oral inhaler   Generic drug:  mometasone-formoterol     3 Inhaler    INHALE 2 PUFFS INTO THE LUNGS TWICE DAILY    Moderate persistent asthma without complication       HYDROcodone-acetaminophen 5-325 MG per tablet    NORCO    20 tablet    Take 1-2 tablets by  mouth every 4 hours as needed for moderate to severe pain    Post-op pain       KP CALCIUM 600+D3 600-500 MG-UNIT Caps   Generic drug:  calcium carb-cholecalciferol      Take 2 tablets by mouth daily        levothyroxine 25 MCG tablet    SYNTHROID/LEVOTHROID    90 tablet    TAKE 1 TABLET (25 MCG) BY MOUTH DAILY    Hypothyroidism due to acquired atrophy of thyroid       lidocaine (viscous) 2 % solution    XYLOCAINE      Carcinoma of breast metastatic to liver, unspecified laterality (H), Bilateral malignant neoplasm of breast in female, estrogen receptor positive, unspecified site of breast (H), Bone metastasis (H), 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), Secondary malignant neoplasm of liver (H)       Lidocaine 4 % Patch    LIDOCARE    30 patch    Place 1-2 patches onto the skin every 24 hours    Carcinoma of breast metastatic to liver, unspecified laterality (H)       lidocaine visc 2% 2.5mL/5mL & maalox/mylanta w/ simeth 2.5mL/5mL & diphenhydrAMINE 5mg/5mL Susp suspension    [facility]    240 mL    Swish and swallow 10 mLs in mouth every 6 hours as needed for mouth sores    Mucositis due to chemotherapy, Breast cancer metastasized to axillary lymph node, right (H)       loratadine 10 MG tablet    CLARITIN     Take 10 mg by mouth daily as needed for allergies        magnesium hydroxide 400 MG/5ML suspension    MILK OF MAGNESIA    105 mL    Take 30 mLs by mouth daily as needed for constipation    Carcinoma of breast metastatic to liver, unspecified laterality (H)       metoclopramide 10 MG tablet    REGLAN    120 tablet    Take 1 tablet (10 mg) by mouth 2 times daily as needed    Bilateral malignant neoplasm of breast in female, estrogen receptor positive, unspecified site of breast (H)       order for DME     1 each    Equipment being ordered: Venu post-lumpectomy compression pad x2    Lymphedema, Lymphedema of breast       oxyCODONE IR 5 MG tablet    ROXICODONE    60  tablet    Take 1 tablet (5 mg) by mouth every 4 hours as needed for moderate to severe pain    Secondary malignant neoplasm of liver (H), Carcinoma of breast metastatic to liver, unspecified laterality (H), 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), Bone metastasis (H), Pericardial effusion       polyethylene glycol Packet    MIRALAX/GLYCOLAX    7 packet    Take 17 g by mouth daily    Carcinoma of breast metastatic to liver, unspecified laterality (H)       prochlorperazine 10 MG tablet    COMPAZINE    30 tablet    Take 1 tablet (10 mg) by mouth every 6 hours as needed (Nausea/Vomiting)    Secondary malignant neoplasm of liver (H), Carcinoma of breast metastatic to liver, unspecified laterality (H), Bone metastasis (H), 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), Bilateral malignant neoplasm of breast in female, estrogen receptor positive, unspecified site of breast (H)       QVAR 80 MCG/ACT Inhaler   Generic drug:  beclomethasone     26.1 g    INHALE 1 PUFFS INTO THE EACH NOSTRIL 2 TIMES DAILY    Chronic rhinitis       ROBITUSSIN COUGH/COLD CF PO      Take 10 mLs by mouth as needed    Breast cancer metastasized to axillary lymph node, right (H)       senna-docusate 8.6-50 MG per tablet    SENOKOT-S;PERICOLACE    100 tablet    Take 1-2 tablets by mouth 2 times daily as needed for constipation    Carcinoma of breast metastatic to liver, unspecified laterality (H)       STOOL SOFTENER PO      Take 100 mg by mouth daily        vancomycin 125 MG capsule    VANCOCIN    40 capsule    Take 1 capsule (125 mg) by mouth 4 times daily for 10 days    C. difficile colitis       warfarin 5 MG tablet    COUMADIN    30 tablet    Take 1 tablet (5 mg) by mouth daily    Long-term (current) use of anticoagulants, Acute deep vein thrombosis (DVT) of right upper extremity, unspecified vein (H)       zolpidem 12.5 MG CR tablet    AMBIEN CR    30 tablet    Take 1 tablet by mouth at bed time.     Insomnia due to medical condition       * Notice:  This list has 2 medication(s) that are the same as other medications prescribed for you. Read the directions carefully, and ask your doctor or other care provider to review them with you.

## 2018-02-02 NOTE — PROGRESS NOTES
ANTICOAGULATION FOLLOW-UP CLINIC VISIT    Patient Name:  Etta Cervantes  Date:  2/2/2018  Contact Type:  Face to Face    SUBJECTIVE:     Patient Findings     Positives Change in diet/appetite, Initiation of therapy, Inflammation (diarrhea/c-diff), Antibiotic use or infection (vancomycin 1/29 for 10 days), Activity level change    Comments INR increased significantly although daily dose decreased.  Pt had chemo infusion Wed 1/31,reports diarrhea is getting better, but does not feel good at all, weak, no appetite.  Pt denies s/s of bleeding, pt educated regarding s/s to watch for with understanding voiced.           OBJECTIVE    INR Protime   Date Value Ref Range Status   02/02/2018 6.7 (A) 0.86 - 1.14 Final       ASSESSMENT / PLAN  INR assessment SUPRA    Recheck INR In: 3 DAYS    INR Location Clinic      Anticoagulation Summary as of 2/2/2018     INR goal 2.0-3.0   Today's INR 6.7!   Maintenance plan No maintenance plan   Full instructions 2/2: Hold; 2/3: Hold; 2/4: 2.5 mg   Plan last modified Juliette Schmidt RN (1/26/2018)   Next INR check 2/5/2018   Target end date     Indications   Acute deep vein thrombosis (DVT) of right upper extremity  unspecified vein (H) [I82.621]  Long-term (current) use of anticoagulants [Z79.01] [Z79.01]         Anticoagulation Episode Summary     INR check location     Preferred lab     Send INR reminders to [facility] CLINIC POOL    Comments * weekly abraxane infusions for metastatic breast cx (this can increase or decrease INR per chemo reference list). Pt prefers [facility]. INR referral has undetermined listed for therapy length. OK to send instructions via [employer]      Anticoagulation Care Providers     Provider Role Specialty Phone number    Brodie Cassidy MD Referring Hematology & Oncology [phone]    Johana Fairbanks MD Responsible Family Practice [phone]            See the Encounter Report to view Anticoagulation Flowsheet and Dosing Calendar (Go to  Encounters tab in chart review, and find the Anticoagulation Therapy Visit)        Joselin Boothe RN

## 2018-02-02 NOTE — MR AVS SNAPSHOT
Etta Cervantes   2/2/2018 1:00 PM   Anticoagulation Therapy Visit    Description:  57 year old female   Provider:  [place] [facility]   Department:  [facility]           INR as of 2/2/2018     Today's INR 6.7!      Anticoagulation Summary as of 2/2/2018     INR goal 2.0-3.0   Today's INR 6.7!   Full instructions 2/2: Hold; 2/3: Hold; 2/4: 2.5 mg   Next INR check 2/5/2018    Indications   Acute deep vein thrombosis (DVT) of right upper extremity  unspecified vein (H) [I82.621]  Long-term (current) use of anticoagulants [Z79.01] [Z79.01]         Your next Anticoagulation Clinic appointment(s)     Feb 05, 2018  9:15 AM CST   Anticoagulation Visit with [place] [facility]   [facility]    [address] [id number] [phone]              Contact Numbers     Please call [phone] with any problems or questions regarding your therapy.    If you need to cancel and/or reschedule your appointment please call one of the following numbers:  [facility] [phone]  [place] - [phone]  [facility] [phone]  [facility] [phone]  [place] - [phone]            February 2018 Details    Sun Mon Tue Wed Thu Fri Sat         1               2      Hold   See details      3      Hold           4      2.5 mg         5            6               7               8               9               10                 11               12               13               14               15               16               17                 18               19               20               21               22               23               24                 25               26               27               28                   Date Details   02/02 This INR check       Date of next INR:  2/5/2018         How to take your warfarin dose     To take:  2.5 mg Take 0.5 of a 5 mg tablet.    Hold Do not take your warfarin dose. See the Details table to the right for  additional instructions.

## 2018-02-05 NOTE — MR AVS SNAPSHOT
Etta Cervantes   2/5/2018 9:15 AM   Anticoagulation Therapy Visit    Description:  57 year old female   Provider:  [place] [facility]   Department:  [facility]           INR as of 2/5/2018     Today's INR 5.3!      Anticoagulation Summary as of 2/5/2018     INR goal 2.0-3.0   Today's INR 5.3!   Full instructions 2/5: Hold; 2/6: Hold   Next INR check 2/7/2018    Indications   Acute deep vein thrombosis (DVT) of right upper extremity  unspecified vein (H) [I82.621]  Long-term (current) use of anticoagulants [Z79.01] [Z79.01]         Description     No warfarin Monday or Tuesday.  Recheck INR in infusion therapy on Wed.   Use caution to avoid any injury. Seek medical attention should you sustain a fall or experienced any bleeding or bruising.      Contact Numbers     Please call [phone] with any problems or questions regarding your therapy.    If you need to cancel and/or reschedule your appointment please call one of the following numbers:  [facility] [phone]  [place] - [phone]  [place] - [phone]  [place] - [phone]  [place] - [phone]            February 2018 Details    Sun Mon Tue Wed Thu Fri Sat         1               2               3                 4               5      Hold   See details      6      Hold         7            8               9               10                 11               12               13               14               15               16               17                 18               19               20               21               22               23               24                 25               26               27               28                   Date Details   02/05 This INR check       Date of next INR:  2/7/2018         How to take your warfarin dose     Hold Do not take your warfarin dose. See the Details table to the right for additional instructions.

## 2018-02-05 NOTE — PROGRESS NOTES
ANTICOAGULATION FOLLOW-UP CLINIC VISIT    Patient Name:  Etta Cervantes  Date:  2/5/2018  Contact Type:  Face to Face    SUBJECTIVE:     Patient Findings     Positives Change in medications (Will have abraxane infusion again on Wed 2/7/18), Initiation of therapy (1/26/18), Antibiotic use or infection (on vancomycin), Intentional hold of therapy (2/2 & 2/3)    Comments Appt tomorrow with ID.   No longer having diarrhea.           OBJECTIVE    INR   Date Value Ref Range Status   02/05/2018 5.3  Final       ASSESSMENT / PLAN  INR assessment SUPRA hold x2 days   Recheck INR In: 2 DAYS    INR Location Clinic      Anticoagulation Summary as of 2/5/2018     INR goal 2.0-3.0   Today's INR 5.3!   Maintenance plan No maintenance plan   Full instructions 2/5: Hold; 2/6: Hold   Plan last modified Juliette Schmidt RN (1/26/2018)   Next INR check 2/7/2018   Target end date     Indications   Acute deep vein thrombosis (DVT) of right upper extremity  unspecified vein (H) [I82.621]  Long-term (current) use of anticoagulants [Z79.01] [Z79.01]         Anticoagulation Episode Summary     INR check location     Preferred lab     Send INR reminders to [place] PHONE [employer] POOL    Comments * weekly abraxane infusions for metastatic breast cx (this can increase or decrease INR per chemo reference list). Pt prefers [facility]. INR referral has undetermined listed for therapy length. OK to send instructions via [employer]      Anticoagulation Care Providers     Provider Role Specialty Phone number    Brodie Cassidy MD Referring Hematology & Oncology [phone]    Johana Fairbanks MD [facility] [phone]            See the Encounter Report to view Anticoagulation Flowsheet and Dosing Calendar (Go to Encounters tab in chart review, and find the Anticoagulation Therapy Visit)    Jewels Harrison RN

## 2018-02-06 NOTE — NURSING NOTE
Chief Complaint   Patient presents with     Consult     C-Diff   Pt roomed, vitals, meds, and allergies reviewed with pt. Pt ready for provider.  Maicol Dominique, CMA

## 2018-02-06 NOTE — MR AVS SNAPSHOT
After Visit Summary   2/6/2018    Etta Cervantes    MRN: [id number]           Patient Information     Date Of Birth          1960        Visit Information        Provider Department      2/6/2018 4:00 PM Bailey Santana MD [facility] and Infectious Diseases        Today's Diagnoses     Clostridium difficile diarrhea    -  1    Carcinoma of breast metastatic to liver, unspecified laterality (H)           Follow-ups after your visit        Your next 10 appointments already scheduled     Feb 07, 2018  8:00 AM CST   Level 1 with ROOM 9 [facility] ([facility])    [facility]  [address]  [facility] [place] [address]   [phone]            Feb 21, 2018  8:30 AM CST   Level 1 with ROOM 2 [facility] ([facility])    [facility]  [address]  [facility] [place] [address]   [phone]            Feb 21, 2018  9:00 AM CST   Return Visit with Brodie Cassidy MD   [facility] ([facility])    [facility]  [address]  [facility] [place] [address]   [phone]            Mar 01, 2018  8:30 AM CST   Level 1 with ROOM 1 [facility] ([facility])    [facility]  [address]  [facility] [place] [address]   [phone]            Mar 07, 2018  8:00 AM CST   Level 1 with ROOM 9 [facility] ([facility])    [facility]  [address]  [facility] [place] [address]   [phone]              Who to contact     If you have questions or need follow up information about today's clinic visit or your schedule please contact [facility] AND INFECTIOUS DISEASES directly at [phone].  Normal or non-critical lab and imaging results will be communicated to you by MyChart, letter or phone within 4 business days  "after the clinic has received the results. If you do not hear from us within 7 days, please contact the clinic through [employer] or phone. If you have a critical or abnormal lab result, we will notify you by phone as soon as possible.  Submit refill requests through [employer] or call your pharmacy and they will forward the refill request to us. Please allow 3 business days for your refill to be completed.          Additional Information About Your Visit        [employer] Information     [employer] gives you secure access to your electronic health record. If you see a primary care provider, you can also send messages to your care team and make appointments. If you have questions, please call your primary care clinic.  If you do not have a primary care provider, please call [phone] and they will assist you.        Care EveryWhere ID     This is your Care EveryWhere ID. This could be used by other organizations to access your [place] medical records  [id number]        Your Vitals Were     Pulse Temperature Height Last Period Pulse Oximetry BMI (Body Mass Index)    99 99.9  F (37.7  C) (Oral) 1.632 m (5' 4.25\") 02/06/2013 97% 31.42 kg/m2       Blood Pressure from Last 3 Encounters:   02/06/18 99/67   01/31/18 109/66   01/24/18 115/67    Weight from Last 3 Encounters:   02/06/18 83.7 kg (184 lb 8 oz)   01/24/18 83.2 kg (183 lb 6.4 oz)   01/17/18 85.4 kg (188 lb 3.2 oz)              Today, you had the following     No orders found for display         Today's Medication Changes          These changes are accurate as of 2/6/18  7:14 PM.  If you have any questions, ask your nurse or doctor.               These medicines have changed or have updated prescriptions.        Dose/Directions    * vancomycin 125 MG capsule   Commonly known as:  VANCOCIN   This may have changed:  Another medication with the same name was added. Make sure you understand how and when to take each.   Used for:  C. difficile colitis   Changed by:  " Bailey Santana MD        Dose:  125 mg   Take 1 capsule (125 mg) by mouth 4 times daily for 10 days   Quantity:  40 capsule   Refills:  0       * vancomycin 125 MG capsule   Commonly known as:  VANCOCIN   This may have changed:  You were already taking a medication with the same name, and this prescription was added. Make sure you understand how and when to take each.   Used for:  Clostridium difficile diarrhea   Changed by:  Bailey Santana MD        Dose:  125 mg   Take 1 capsule (125 mg) by mouth 4 times daily   Quantity:  60 capsule   Refills:  0       * Notice:  This list has 2 medication(s) that are the same as other medications prescribed for you. Read the directions carefully, and ask your doctor or other care provider to review them with you.         Where to get your medicines      These medications were sent to [place] [address] [place] [employer] - [facility] [address]  [address] [address]     Phone:  [phone]     vancomycin 125 MG capsule                Primary Care Provider Office Phone # Fax #    Johana Fairbanks [phone] [address] [facility] [address]        Equal Access to Services     [facility]: Hadii han gaitan Soisaura, waaxda lupreeti, qaybta kaalkathrine elliott, hari rodriguez . So [facility] [phone].    ATENCIÓN: Si habla español, tiene a parekh disposición servicios gratuitos de asistencia lingüística. [facility] [phone].    We comply with applicable federal civil rights laws and [place] laws. We do not discriminate on the basis of race, color, national origin, age, disability, sex, sexual orientation, or gender identity.            Thank you!     Thank you for choosing [facility] AND INFECTIOUS DISEASES  for your care. Our goal is always to provide you with excellent care. Hearing back from our patients is one way we can continue to improve our services. Please take a few minutes  to complete the written survey that you may receive in the mail after your visit with us. Thank you!             Your Updated Medication List - Protect others around you: Learn how to safely use, store and throw away your medicines at www.disposemymeds.org.          This list is accurate as of 2/6/18  7:14 PM.  Always use your most recent med list.                   Brand Name Dispense Instructions for use Diagnosis    * albuterol 108 (90 BASE) MCG/ACT Inhaler    VENTOLIN HFA    1 Inhaler    Inhale 2 puffs into the lungs every 4 hours as needed    Moderate persistent asthma, uncomplicated       * albuterol (2.5 MG/3ML) 0.083% neb solution     30 vial    Take 1 vial (2.5 mg) by nebulization every 4 hours as needed for shortness of breath / dyspnea    Moderate persistent asthma, uncomplicated       ALLEGRA ALLERGY 180 MG tablet   Generic drug:  fexofenadine      Take 180 mg by mouth daily as needed for allergies        azelastine 0.1 % spray    ASTELIN    3 Bottle    Spray 1-2 sprays into both nostrils 2 times daily as needed for rhinitis    Chronic rhinitis       budesonide-formoterol 160-4.5 MCG/ACT Inhaler    SYMBICORT    1 Inhaler    Inhale 2 puffs into the lungs 2 times daily    Moderate persistent asthma without complication       CRANBERRY PO      Take 1 capsule by mouth daily        diclofenac 1 % Gel topical gel    VOLTAREN    100 g    Place 2 g onto the skin 4 times daily    Carcinoma of breast metastatic to liver, unspecified laterality (H)       diphenhydrAMINE 25 MG tablet    BENADRYL    1 tablet    Take 1 tablet (25 mg) by mouth every 8 hours as needed for itching or allergies    Carcinoma of breast metastatic to liver, unspecified laterality (H), Bone metastasis (H), Pericardial effusion, Bilateral malignant neoplasm of breast in female, estrogen receptor positive, unspecified site of breast (H), 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), Secondary malignant neoplasm of liver (H),  Chemotherapy-induced neutropenia (H), Emphysematous bleb of lung (H), Hypothyroidism, unspecified type, Hyperlipidemia LDL goal <130, Moderate persistent asthma without complication, Mucositis due to chemotherapy       HYDROcodone-acetaminophen 5-325 MG per tablet    NORCO    20 tablet    Take 1-2 tablets by mouth every 4 hours as needed for moderate to severe pain    Post-op pain       KP CALCIUM 600+D3 600-500 MG-UNIT Caps   Generic drug:  calcium carb-cholecalciferol      Take 2 tablets by mouth daily        levothyroxine 25 MCG tablet    SYNTHROID/LEVOTHROID    90 tablet    TAKE 1 TABLET (25 MCG) BY MOUTH DAILY    Hypothyroidism due to acquired atrophy of thyroid       Lidocaine 4 % Patch    LIDOCARE    30 patch    Place 1-2 patches onto the skin every 24 hours    Carcinoma of breast metastatic to liver, unspecified laterality (H)       lidocaine visc 2% 2.5mL/5mL & maalox/mylanta w/ simeth 2.5mL/5mL & diphenhydrAMINE 5mg/5mL Susp suspension    [facility]    240 mL    Swish and swallow 10 mLs in mouth every 6 hours as needed for mouth sores    Mucositis due to chemotherapy, Breast cancer metastasized to axillary lymph node, right (H)       loratadine 10 MG tablet    CLARITIN     Take 10 mg by mouth daily as needed for allergies        magnesium hydroxide 400 MG/5ML suspension    MILK OF MAGNESIA    105 mL    Take 30 mLs by mouth daily as needed for constipation    Carcinoma of breast metastatic to liver, unspecified laterality (H)       metoclopramide 10 MG tablet    REGLAN    120 tablet    Take 1 tablet (10 mg) by mouth 2 times daily as needed    Bilateral malignant neoplasm of breast in female, estrogen receptor positive, unspecified site of breast (H)       order for DME     1 each    Equipment being ordered: Venu post-lumpectomy compression pad x2    Lymphedema, Lymphedema of breast       oxyCODONE IR 5 MG tablet    ROXICODONE    60 tablet    Take 1 tablet (5 mg) by mouth every 4 hours as  needed for moderate to severe pain    Secondary malignant neoplasm of liver (H), Carcinoma of breast metastatic to liver, unspecified laterality (H), 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), Bone metastasis (H), Pericardial effusion       polyethylene glycol Packet    MIRALAX/GLYCOLAX    7 packet    Take 17 g by mouth daily    Carcinoma of breast metastatic to liver, unspecified laterality (H)       prochlorperazine 10 MG tablet    COMPAZINE    30 tablet    Take 1 tablet (10 mg) by mouth every 6 hours as needed (Nausea/Vomiting)    Secondary malignant neoplasm of liver (H), Carcinoma of breast metastatic to liver, unspecified laterality (H), Bone metastasis (H), 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), Bilateral malignant neoplasm of breast in female, estrogen receptor positive, unspecified site of breast (H)       QVAR 80 MCG/ACT Inhaler   Generic drug:  beclomethasone     26.1 g    INHALE 1 PUFFS INTO THE EACH NOSTRIL 2 TIMES DAILY    Chronic rhinitis       ROBITUSSIN COUGH/COLD CF PO      Take 10 mLs by mouth as needed    Breast cancer metastasized to axillary lymph node, right (H)       senna-docusate 8.6-50 MG per tablet    SENOKOT-S;PERICOLACE    100 tablet    Take 1-2 tablets by mouth 2 times daily as needed for constipation    Carcinoma of breast metastatic to liver, unspecified laterality (H)       STOOL SOFTENER PO      Take 100 mg by mouth daily        * vancomycin 125 MG capsule    VANCOCIN    40 capsule    Take 1 capsule (125 mg) by mouth 4 times daily for 10 days    C. difficile colitis       * vancomycin 125 MG capsule    VANCOCIN    60 capsule    Take 1 capsule (125 mg) by mouth 4 times daily    Clostridium difficile diarrhea       warfarin 5 MG tablet    COUMADIN    30 tablet    Take 1 tablet (5 mg) by mouth daily    Long-term (current) use of anticoagulants, Acute deep vein thrombosis (DVT) of right upper extremity, unspecified vein (H)       zolpidem  12.5 MG CR tablet    AMBIEN CR    30 tablet    Take 1 tablet by mouth at bed time.    Insomnia due to medical condition       * Notice:  This list has 4 medication(s) that are the same as other medications prescribed for you. Read the directions carefully, and ask your doctor or other care provider to review them with you.

## 2018-02-06 NOTE — PROGRESS NOTES
[facility]  Infectious Disease Consultation note  Today's Date: 02/06/2018    Assessment:  Etta Cervantes is a 57 year old with PMH of metastatic breast cancer diagnosed in Jan 2015 and been on multiple drugs for the same. Currently on Abraxane.     Recurrent C.diff: three times since 12/27. Has received very short courses of treatment. Chemo drugs may be contributing to recurrences. Would give a longer course of vanco 125 mg QID for 14 days and then taper with vanco 125 mg TID x 7 days, then BID for 7 days then QD x 7 days then stop. She will call me if it recurs and we will work out another plan but I am hoping this will take care of it.     Thank you for involving Infectious Disease in the care of this patient. Will continue to follow. Please do not hesitate to contact with any questions.     Bailey Santana  , [facility]  Pager - [phone]    - Immunization status: does not believe in getting the flu vaccine    Attestation: I have reviewed today's vital signs, medications, labs and imaging. I personally reviewed the imaging. Reviewed medical history, surgical history, and family history in Epic History tab. No updates needed to be made. Face to face time 45 mins. >50% spent coordinating care and counseling about C.diff, recurrence rates and plan going forward.     -------------------------------------------------------------------------------------------------------------------    Reason for consult / Chief complaint:   Consulted by Dr. Cassidy for recurrent C.diff    History of presenting illness:  Etta Cervantes is a 57 year old with PMH of metastatic breast cancer diagnosed in Jan 2015 and been on multiple drugs for the same. Currently on Abraxane weekly 3 weeks on, one week off, since Dec 21 through feb. Not neutropenic with this.     Developed diarrhea 12/27/17 - watery explosive diarrhea and was diagnosed with C.diff. She was treated with flagyl for 7 days. It  returned in jan and she was treated with po vanco for 10 days. Then it recurred again and tested pos a week ago and has been on po vanco again for 10 days, due to run out in a couple of days.     She states that 2 days into the medication, she is back to normal. She has had quite a few infections in the past year. UTIs since being on chemo, cholangitis from blocked bile duct from liver mets (cipro and flagyl).       Social Hx:  Social History   Substance Use Topics     Smoking status: Never Smoker     Smokeless tobacco: Never Used     Alcohol use No   Lives with  who works for [employer].       Immunizations:  Immunization History   Administered Date(s) Administered     Tdap (Adacel,Boostrix) 04/04/2008         Allergies:   Allergies   Allergen Reactions     Animal Dander Cough     Itchy eyes     Cats      Dust Mites Cough     Itchy eyes     Pollen Extract      Other reaction(s): Cough  Itchy eyes     Seasonal Allergies      Levaquin [Levofloxacin] Rash     States she has violent dreams from taking this         Medications:  Current Outpatient Prescriptions   Medication Sig Dispense Refill     vancomycin (VANCOCIN) 125 MG capsule Take 1 capsule (125 mg) by mouth 4 times daily 60 capsule 0     vancomycin (VANCOCIN) 125 MG capsule Take 1 capsule (125 mg) by mouth 4 times daily for 10 days 40 capsule 0     warfarin (COUMADIN) 5 MG tablet Take 1 tablet (5 mg) by mouth daily 30 tablet 1     HYDROcodone-acetaminophen (NORCO) 5-325 MG per tablet Take 1-2 tablets by mouth every 4 hours as needed for moderate to severe pain 20 tablet 0     budesonide-formoterol (SYMBICORT) 160-4.5 MCG/ACT Inhaler Inhale 2 puffs into the lungs 2 times daily 1 Inhaler 1     oxyCODONE IR (ROXICODONE) 5 MG tablet Take 1 tablet (5 mg) by mouth every 4 hours as needed for moderate to severe pain 60 tablet 0     diclofenac (VOLTAREN) 1 % GEL topical gel Place 2 g onto the skin 4 times daily 100 g 0     loratadine (CLARITIN) 10 MG tablet Take 10  mg by mouth daily as needed for allergies       zolpidem (AMBIEN CR) 12.5 MG CR tablet Take 1 tablet by mouth at bed time. 30 tablet 5     QVAR 80 MCG/ACT Inhaler INHALE 1 PUFFS INTO THE EACH NOSTRIL 2 TIMES DAILY 26.1 g 3     Phenylephrine-DM-GG (ROBITUSSIN COUGH/COLD CF PO) Take 10 mLs by mouth as needed        levothyroxine (SYNTHROID/LEVOTHROID) 25 MCG tablet TAKE 1 TABLET (25 MCG) BY MOUTH DAILY 90 tablet 2     calcium carb-cholecalciferol (KP CALCIUM 600+D3) 600-500 MG-UNIT CAPS Take 2 tablets by mouth daily       albuterol (2.5 MG/3ML) 0.083% neb solution Take 1 vial (2.5 mg) by nebulization every 4 hours as needed for shortness of breath / dyspnea 30 vial 3     CRANBERRY PO Take 1 capsule by mouth daily        azelastine (ASTELIN) 0.1 % nasal spray Spray 1-2 sprays into both nostrils 2 times daily as needed for rhinitis 3 Bottle 3     albuterol (VENTOLIN HFA) 108 (90 BASE) MCG/ACT inhaler Inhale 2 puffs into the lungs every 4 hours as needed 1 Inhaler 2     prochlorperazine (COMPAZINE) 10 MG tablet Take 1 tablet (10 mg) by mouth every 6 hours as needed (Nausea/Vomiting) (Patient not taking: Reported on 2/6/2018) 30 tablet 2     Lidocaine (LIDOCARE) 4 % Patch Place 1-2 patches onto the skin every 24 hours (Patient not taking: Reported on 1/24/2018) 30 patch 0     magnesium hydroxide (MILK OF MAGNESIA) 400 MG/5ML suspension Take 30 mLs by mouth daily as needed for constipation (Patient not taking: Reported on 2/6/2018) 105 mL      polyethylene glycol (MIRALAX/GLYCOLAX) Packet Take 17 g by mouth daily (Patient not taking: Reported on 1/24/2018) 7 packet      senna-docusate (SENOKOT-S;PERICOLACE) 8.6-50 MG per tablet Take 1-2 tablets by mouth 2 times daily as needed for constipation (Patient not taking: Reported on 1/24/2018) 100 tablet      order for DME Equipment being ordered: Venu post-lumpectomy compression pad x2 1 each 0     diphenhydrAMINE (BENADRYL) 25 MG tablet Take 1 tablet (25 mg) by mouth every 8  "hours as needed for itching or allergies (Patient not taking: Reported on 2/6/2018) 1 tablet 0     metoclopramide (REGLAN) 10 MG tablet Take 1 tablet (10 mg) by mouth 2 times daily as needed 120 tablet 1     fexofenadine (ALLEGRA ALLERGY) 180 MG tablet Take 180 mg by mouth daily as needed for allergies       magic mouthwash suspension (diphenhydrAMINE, lidocaine, aluminum-magnesium & simethicone) Swish and swallow 10 mLs in mouth every 6 hours as needed for mouth sores (Patient not taking: Reported on 2/6/2018) 240 mL 10     Docusate Calcium (STOOL SOFTENER PO) Take 100 mg by mouth daily            Past Medical Hx:  Past Medical History:   Diagnosis Date     ASCUS favor benign 1/2015    Neg HPV     Cancer (H)      Cataract 2010    surgery both eyes     Emphysematous bleb of lung (H)      Fibroids      Hypercholesterolemia      Hypothyroid      Incontinence of urine     mixed     Menorrhagia      Obesity      Pneumothorax      PONV (postoperative nausea and vomiting)      Sleep apnea     uses a cpap     Uncomplicated asthma          Family History:  Family History   Problem Relation Age of Onset     Depression Mother      Eye Disorder Mother      Gynecology Mother      Alzheimer Disease Mother      CANCER Father      Other Cancer Father      HEART DISEASE Maternal Grandfather      Allergies Paternal Grandfather      Allergies Son          Review of Systems:  10 systems reviewed , pertinent positives noted in my HPI      Examination:  Vital signs:   BP 99/67  Pulse 99  Temp 99.9  F (37.7  C) (Oral)  Ht 1.632 m (5' 4.25\")  Wt 83.7 kg (184 lb 8 oz)  LMP 02/06/2013  SpO2 97%  BMI 31.42 kg/m2    Constitutional: Patient in no distress  Eyes: not pale, not jaundiced  CVS: no added sounds  RS: clear  Abdomen: soft, BS+  Skin: no rash  Extremities: no pedal edema  Psych: Alert and oriented x 3      Laboratory:  Hematology:  Recent Labs   Lab Test  01/31/18   0830  01/24/18   0920  01/17/18   0835  01/08/18   1032  " 01/07/18   0150  01/03/18   0920   WBC  4.7  8.6  7.8  6.9  5.5  3.8*   ANEU  2.9  6.4  4.9  5.7  4.3  2.0   ALYM  0.7*  1.1  1.4  0.7*  1.0  0.9   DIMITRIS  0.5  0.9  1.0  0.3  0.2  0.4   AEOS  0.6  0.1  0.2  0.1  0.1  0.3   HGB  11.9  12.3  11.6*  12.4  11.6*  12.7   HCT  36.0  37.9  36.2  38.0  35.8  38.7   PLT  280  294  311  303  267  257       Chemistry:  Recent Labs   Lab Test  01/24/18   0920  01/17/18   0835  01/07/18   0150  12/27/17   0849  12/21/17   0800  12/12/17   1124  12/12/17   0345  12/11/17   0232   12/07/17   2035   07/13/17   0233   05/25/17   1500   05/28/13   0935   NA  139  141  140   --   138   --   140  140   < >   --    < >  140   < >  139   < >  142   POTASSIUM  3.7  3.6  3.4   --   3.9  3.5  3.1*  3.4   < >   --    < >  3.7   < >  3.2*   < >  4.4   CHLORIDE  108  107  106   --   106   --   104  106   < >   --    < >  108   < >  106   < >  109   CO2  24  26  25   --   26   --   27  28   < >   --    < >  26   < >  24   < >  21   ANIONGAP  7  8  9   --   6   --   9  6   < >   --    < >  6   < >  9   < >  12   BUN  8  7  8   --   9   --   8  6*   < >   --    < >  9   < >  13   < >  8   CR  0.47*  0.51*  0.42*   --   0.46*   --   0.55  0.43*   < >   --    < >  0.54   < >  0.66   < >  0.73   GFRESTIMATED  >90  >90  >90   --   >90   --   >90  >90   < >   --    < >  >90  Non  GFR Calc     < >  >90  Non  GFR Calc     < >  83   GLC  118*  115*  115*   --   171*   --   122*  109*   < >   --    < >  90   < >  127*   < >  96   A1C   --    --    --    --    --    --    --    --    --    --    --    --    --    --    --   5.8   BEN  8.5  8.3*  8.3*  8.6  8.4*   --   8.0*  7.5*   < >   --    < >  8.0*   < >  8.5   < >  9.3   PHOS   --    --    --    --    --    --    --    --    --    --    --   2.8   --    --    --    --    MAG   --    --    --    --    --    --    --    --    --    --    --   2.1   --   2.3   < >   --    LACT   --    --   0.8   --    --    --    --    --     --   0.9   --    --    < >   --    < >   --     < > = values in this interval not displayed.       Liver Function Studies:  Recent Labs   Lab Test  01/24/18   0920  01/17/18   0835  01/07/18   0150  12/27/17   0849  12/21/17   0800  12/12/17   0345  12/11/17   0232   01/06/15   1600   BILITOTAL  0.7  0.6  0.7   --   0.8  1.8*  1.8*   < >  1.0   ALKPHOS  102  91  87   --   180*  302*  348*   < >  102   ALBUMIN  2.7*  2.6*  2.7*  2.9*  2.6*  2.2*  2.2*   < >  4.1   AST  30  23  26   --   48*  42  45   < >  16   ALT  16  10  11   --   16  45  60*   < >  19   LDH   --    --    --    --    --    --    --    --   207    < > = values in this interval not displayed.     Microbiology:  C,diff positive 12/27, 1/8, 1/29/18    Imaging:  CT chest abdomen pelvis 12/6/17  1. Progression of hepatic metastatic disease with enlargement of  multifocal masses. New finding of moderate intrahepatic biliary ductal  dilatation worrisome for developing biliary obstruction. This may  relate to progression of adenopathy at the upper abdomen at the shreyas  hepatis and/or an increasing area of nodular enhancement at the  pancreatic head worrisome for metastatic disease.  2. Moderate to large pericardial effusion significantly increased  since 11/13/2017. Some mild enhancement of the pericardium also noted.  Recommend further workup.  3. New finding of small nodularity at the omentum at the midline and  slightly towards the right that could represent carcinomatosis.  4. Multifocal stable indeterminate pulmonary nodules may represent  pulmonary metastatic disease.  5. Stable sclerosis at the right fifth rib and T8 that could represent  stable sclerotic metastasis.  6. Progression of adenopathy at the right axilla, and upper abdominal  retroperitoneum.

## 2018-02-06 NOTE — LETTER
2/6/2018       RE: Etta Cervantes  [address] [place]  [place] [address]     Dear Colleague,    Thank you for referring your patient, Etta Cervantes, to the [facility] AND INFECTIOUS DISEASES at [facility]. Please see a copy of my visit note below.    [facility]  Infectious Disease Consultation note  Today's Date: 02/06/2018    Assessment:  Etta Cervantes is a 57 year old with PMH of metastatic breast cancer diagnosed in Jan 2015 and been on multiple drugs for the same. Currently on Abraxane.     Recurrent C.diff: three times since 12/27. Has received very short courses of treatment. Chemo drugs may be contributing to recurrences. Would give a longer course of vanco 125 mg QID for 14 days and then taper with vanco 125 mg TID x 7 days, then BID for 7 days then QD x 7 days then stop. She will call me if it recurs and we will work out another plan but I am hoping this will take care of it.     Thank you for involving Infectious Disease in the care of this patient. Will continue to follow. Please do not hesitate to contact with any questions.     Bailey Santana  , [facility]  Pager - [phone]    - Immunization status: does not believe in getting the flu vaccine    Attestation: I have reviewed today's vital signs, medications, labs and imaging. I personally reviewed the imaging. Reviewed medical history, surgical history, and family history in Epic History tab. No updates needed to be made. Face to face time 45 mins. >50% spent coordinating care and counseling about C.diff, recurrence rates and plan going forward.     -------------------------------------------------------------------------------------------------------------------    Reason for consult / Chief complaint:   Consulted by Dr. Cassidy for recurrent C.diff    History of presenting illness:  Etta Cervantes is a 57 year old with PMH of metastatic breast  cancer diagnosed in Jan 2015 and been on multiple drugs for the same. Currently on Abraxane weekly 3 weeks on, one week off, since Dec 21 through feb. Not neutropenic with this.     Developed diarrhea 12/27/17 - watery explosive diarrhea and was diagnosed with C.diff. She was treated with flagyl for 7 days. It returned in jan and she was treated with po vanco for 10 days. Then it recurred again and tested pos a week ago and has been on po vanco again for 10 days, due to run out in a couple of days.     She states that 2 days into the medication, she is back to normal. She has had quite a few infections in the past year. UTIs since being on chemo, cholangitis from blocked bile duct from liver mets (cipro and flagyl).       Social Hx:  Social History   Substance Use Topics     Smoking status: Never Smoker     Smokeless tobacco: Never Used     Alcohol use No   Lives with  who works for [employer].       Immunizations:  Immunization History   Administered Date(s) Administered     Tdap (Adacel,Boostrix) 04/04/2008         Allergies:   Allergies   Allergen Reactions     Animal Dander Cough     Itchy eyes     Cats      Dust Mites Cough     Itchy eyes     Pollen Extract      Other reaction(s): Cough  Itchy eyes     Seasonal Allergies      Levaquin [Levofloxacin] Rash     States she has violent dreams from taking this         Medications:  Current Outpatient Prescriptions   Medication Sig Dispense Refill     vancomycin (VANCOCIN) 125 MG capsule Take 1 capsule (125 mg) by mouth 4 times daily 60 capsule 0     vancomycin (VANCOCIN) 125 MG capsule Take 1 capsule (125 mg) by mouth 4 times daily for 10 days 40 capsule 0     warfarin (COUMADIN) 5 MG tablet Take 1 tablet (5 mg) by mouth daily 30 tablet 1     HYDROcodone-acetaminophen (NORCO) 5-325 MG per tablet Take 1-2 tablets by mouth every 4 hours as needed for moderate to severe pain 20 tablet 0     budesonide-formoterol (SYMBICORT) 160-4.5 MCG/ACT Inhaler Inhale 2 puffs into  the lungs 2 times daily 1 Inhaler 1     oxyCODONE IR (ROXICODONE) 5 MG tablet Take 1 tablet (5 mg) by mouth every 4 hours as needed for moderate to severe pain 60 tablet 0     diclofenac (VOLTAREN) 1 % GEL topical gel Place 2 g onto the skin 4 times daily 100 g 0     loratadine (CLARITIN) 10 MG tablet Take 10 mg by mouth daily as needed for allergies       zolpidem (AMBIEN CR) 12.5 MG CR tablet Take 1 tablet by mouth at bed time. 30 tablet 5     QVAR 80 MCG/ACT Inhaler INHALE 1 PUFFS INTO THE EACH NOSTRIL 2 TIMES DAILY 26.1 g 3     Phenylephrine-DM-GG (ROBITUSSIN COUGH/COLD CF PO) Take 10 mLs by mouth as needed        levothyroxine (SYNTHROID/LEVOTHROID) 25 MCG tablet TAKE 1 TABLET (25 MCG) BY MOUTH DAILY 90 tablet 2     calcium carb-cholecalciferol (KP CALCIUM 600+D3) 600-500 MG-UNIT CAPS Take 2 tablets by mouth daily       albuterol (2.5 MG/3ML) 0.083% neb solution Take 1 vial (2.5 mg) by nebulization every 4 hours as needed for shortness of breath / dyspnea 30 vial 3     CRANBERRY PO Take 1 capsule by mouth daily        azelastine (ASTELIN) 0.1 % nasal spray Spray 1-2 sprays into both nostrils 2 times daily as needed for rhinitis 3 Bottle 3     albuterol (VENTOLIN HFA) 108 (90 BASE) MCG/ACT inhaler Inhale 2 puffs into the lungs every 4 hours as needed 1 Inhaler 2     prochlorperazine (COMPAZINE) 10 MG tablet Take 1 tablet (10 mg) by mouth every 6 hours as needed (Nausea/Vomiting) (Patient not taking: Reported on 2/6/2018) 30 tablet 2     Lidocaine (LIDOCARE) 4 % Patch Place 1-2 patches onto the skin every 24 hours (Patient not taking: Reported on 1/24/2018) 30 patch 0     magnesium hydroxide (MILK OF MAGNESIA) 400 MG/5ML suspension Take 30 mLs by mouth daily as needed for constipation (Patient not taking: Reported on 2/6/2018) 105 mL      polyethylene glycol (MIRALAX/GLYCOLAX) Packet Take 17 g by mouth daily (Patient not taking: Reported on 1/24/2018) 7 packet      senna-docusate (SENOKOT-S;PERICOLACE) 8.6-50 MG  "per tablet Take 1-2 tablets by mouth 2 times daily as needed for constipation (Patient not taking: Reported on 1/24/2018) 100 tablet      order for DME Equipment being ordered: Venu post-lumpectomy compression pad x2 1 each 0     diphenhydrAMINE (BENADRYL) 25 MG tablet Take 1 tablet (25 mg) by mouth every 8 hours as needed for itching or allergies (Patient not taking: Reported on 2/6/2018) 1 tablet 0     metoclopramide (REGLAN) 10 MG tablet Take 1 tablet (10 mg) by mouth 2 times daily as needed 120 tablet 1     fexofenadine (ALLEGRA ALLERGY) 180 MG tablet Take 180 mg by mouth daily as needed for allergies       magic mouthwash suspension (diphenhydrAMINE, lidocaine, aluminum-magnesium & simethicone) Swish and swallow 10 mLs in mouth every 6 hours as needed for mouth sores (Patient not taking: Reported on 2/6/2018) 240 mL 10     Docusate Calcium (STOOL SOFTENER PO) Take 100 mg by mouth daily            Past Medical Hx:  Past Medical History:   Diagnosis Date     ASCUS favor benign 1/2015    Neg HPV     Cancer (H)      Cataract 2010    surgery both eyes     Emphysematous bleb of lung (H)      Fibroids      Hypercholesterolemia      Hypothyroid      Incontinence of urine     mixed     Menorrhagia      Obesity      Pneumothorax      PONV (postoperative nausea and vomiting)      Sleep apnea     uses a cpap     Uncomplicated asthma          Family History:  Family History   Problem Relation Age of Onset     Depression Mother      Eye Disorder Mother      Gynecology Mother      Alzheimer Disease Mother      CANCER Father      Other Cancer Father      HEART DISEASE Maternal Grandfather      Allergies Paternal Grandfather      Allergies Son          Review of Systems:  10 systems reviewed , pertinent positives noted in my HPI      Examination:  Vital signs:   BP 99/67  Pulse 99  Temp 99.9  F (37.7  C) (Oral)  Ht 1.632 m (5' 4.25\")  Wt 83.7 kg (184 lb 8 oz)  LMP 02/06/2013  SpO2 97%  BMI 31.42 " kg/m2    Constitutional: Patient in no distress  Eyes: not pale, not jaundiced  CVS: no added sounds  RS: clear  Abdomen: soft, BS+  Skin: no rash  Extremities: no pedal edema  Psych: Alert and oriented x 3      Laboratory:  Hematology:  Recent Labs   Lab Test  01/31/18   0830  01/24/18   0920  01/17/18   0835  01/08/18   1032  01/07/18   0150  01/03/18   0920   WBC  4.7  8.6  7.8  6.9  5.5  3.8*   ANEU  2.9  6.4  4.9  5.7  4.3  2.0   ALYM  0.7*  1.1  1.4  0.7*  1.0  0.9   DIMITRIS  0.5  0.9  1.0  0.3  0.2  0.4   AEOS  0.6  0.1  0.2  0.1  0.1  0.3   HGB  11.9  12.3  11.6*  12.4  11.6*  12.7   HCT  36.0  37.9  36.2  38.0  35.8  38.7   PLT  280  294  311  303  267  257       Chemistry:  Recent Labs   Lab Test  01/24/18   0920  01/17/18   0835  01/07/18   0150  12/27/17   0849  12/21/17   0800  12/12/17   1124  12/12/17   0345  12/11/17   0232   12/07/17   2035   07/13/17   0233   05/25/17   1500   05/28/13   0935   NA  139  141  140   --   138   --   140  140   < >   --    < >  140   < >  139   < >  142   POTASSIUM  3.7  3.6  3.4   --   3.9  3.5  3.1*  3.4   < >   --    < >  3.7   < >  3.2*   < >  4.4   CHLORIDE  108  107  106   --   106   --   104  106   < >   --    < >  108   < >  106   < >  109   CO2  24  26  25   --   26   --   27  28   < >   --    < >  26   < >  24   < >  21   ANIONGAP  7  8  9   --   6   --   9  6   < >   --    < >  6   < >  9   < >  12   BUN  8  7  8   --   9   --   8  6*   < >   --    < >  9   < >  13   < >  8   CR  0.47*  0.51*  0.42*   --   0.46*   --   0.55  0.43*   < >   --    < >  0.54   < >  0.66   < >  0.73   GFRESTIMATED  >90  >90  >90   --   >90   --   >90  >90   < >   --    < >  >90  Non  GFR Calc     < >  >90  Non  GFR Calc     < >  83   GLC  118*  115*  115*   --   171*   --   122*  109*   < >   --    < >  90   < >  127*   < >  96   A1C   --    --    --    --    --    --    --    --    --    --    --    --    --    --    --   5.8   BEN  8.5  8.3*  8.3*   8.6  8.4*   --   8.0*  7.5*   < >   --    < >  8.0*   < >  8.5   < >  9.3   PHOS   --    --    --    --    --    --    --    --    --    --    --   2.8   --    --    --    --    MAG   --    --    --    --    --    --    --    --    --    --    --   2.1   --   2.3   < >   --    LACT   --    --   0.8   --    --    --    --    --    --   0.9   --    --    < >   --    < >   --     < > = values in this interval not displayed.       Liver Function Studies:  Recent Labs   Lab Test  01/24/18   0920  01/17/18   0835  01/07/18   0150  12/27/17   0849  12/21/17   0800  12/12/17   0345  12/11/17   0232   01/06/15   1600   BILITOTAL  0.7  0.6  0.7   --   0.8  1.8*  1.8*   < >  1.0   ALKPHOS  102  91  87   --   180*  302*  348*   < >  102   ALBUMIN  2.7*  2.6*  2.7*  2.9*  2.6*  2.2*  2.2*   < >  4.1   AST  30  23  26   --   48*  42  45   < >  16   ALT  16  10  11   --   16  45  60*   < >  19   LDH   --    --    --    --    --    --    --    --   207    < > = values in this interval not displayed.     Microbiology:  C,diff positive 12/27, 1/8, 1/29/18    Imaging:  CT chest abdomen pelvis 12/6/17  1. Progression of hepatic metastatic disease with enlargement of  multifocal masses. New finding of moderate intrahepatic biliary ductal  dilatation worrisome for developing biliary obstruction. This may  relate to progression of adenopathy at the upper abdomen at the shreyas  hepatis and/or an increasing area of nodular enhancement at the  pancreatic head worrisome for metastatic disease.  2. Moderate to large pericardial effusion significantly increased  since 11/13/2017. Some mild enhancement of the pericardium also noted.  Recommend further workup.  3. New finding of small nodularity at the omentum at the midline and  slightly towards the right that could represent carcinomatosis.  4. Multifocal stable indeterminate pulmonary nodules may represent  pulmonary metastatic disease.  5. Stable sclerosis at the right fifth rib and T8 that  could represent  stable sclerotic metastasis.  6. Progression of adenopathy at the right axilla, and upper abdominal  retroperitoneum.       Sincerely,    Bailey Santana MD

## 2018-02-06 NOTE — LETTER
2/6/2018      RE: Etta Cervantes  [address] [place]  [place] [address]       [facility]  Infectious Disease Consultation note  Today's Date: 02/06/2018    Assessment:  Etta Cervantes is a 57 year old with PMH of metastatic breast cancer diagnosed in Jan 2015 and been on multiple drugs for the same. Currently on Abraxane.     Recurrent C.diff: three times since 12/27. Has received very short courses of treatment. Chemo drugs may be contributing to recurrences. Would give a longer course of vanco 125 mg QID for 14 days and then taper with vanco 125 mg TID x 7 days, then BID for 7 days then QD x 7 days then stop. She will call me if it recurs and we will work out another plan but I am hoping this will take care of it.     Thank you for involving Infectious Disease in the care of this patient. Will continue to follow. Please do not hesitate to contact with any questions.     Bailey Santana  , [facility]  Pager - [phone]    - Immunization status: does not believe in getting the flu vaccine    Attestation: I have reviewed today's vital signs, medications, labs and imaging. I personally reviewed the imaging. Reviewed medical history, surgical history, and family history in Epic History tab. No updates needed to be made. Face to face time 45 mins. >50% spent coordinating care and counseling about C.diff, recurrence rates and plan going forward.     -------------------------------------------------------------------------------------------------------------------    Reason for consult / Chief complaint:   Consulted by Dr. Cassidy for recurrent C.diff    History of presenting illness:  Etta Cervantes is a 57 year old with PMH of metastatic breast cancer diagnosed in Jan 2015 and been on multiple drugs for the same. Currently on Abraxane weekly 3 weeks on, one week off, since Dec 21 through feb. Not neutropenic with this.     Developed diarrhea 12/27/17 - watery  explosive diarrhea and was diagnosed with C.diff. She was treated with flagyl for 7 days. It returned in jan and she was treated with po vanco for 10 days. Then it recurred again and tested pos a week ago and has been on po vanco again for 10 days, due to run out in a couple of days.     She states that 2 days into the medication, she is back to normal. She has had quite a few infections in the past year. UTIs since being on chemo, cholangitis from blocked bile duct from liver mets (cipro and flagyl).       Social Hx:  Social History   Substance Use Topics     Smoking status: Never Smoker     Smokeless tobacco: Never Used     Alcohol use No   Lives with  who works for [employer].       Immunizations:  Immunization History   Administered Date(s) Administered     Tdap (Adacel,Boostrix) 04/04/2008         Allergies:   Allergies   Allergen Reactions     Animal Dander Cough     Itchy eyes     Cats      Dust Mites Cough     Itchy eyes     Pollen Extract      Other reaction(s): Cough  Itchy eyes     Seasonal Allergies      Levaquin [Levofloxacin] Rash     States she has violent dreams from taking this         Medications:  Current Outpatient Prescriptions   Medication Sig Dispense Refill     vancomycin (VANCOCIN) 125 MG capsule Take 1 capsule (125 mg) by mouth 4 times daily 60 capsule 0     vancomycin (VANCOCIN) 125 MG capsule Take 1 capsule (125 mg) by mouth 4 times daily for 10 days 40 capsule 0     warfarin (COUMADIN) 5 MG tablet Take 1 tablet (5 mg) by mouth daily 30 tablet 1     HYDROcodone-acetaminophen (NORCO) 5-325 MG per tablet Take 1-2 tablets by mouth every 4 hours as needed for moderate to severe pain 20 tablet 0     budesonide-formoterol (SYMBICORT) 160-4.5 MCG/ACT Inhaler Inhale 2 puffs into the lungs 2 times daily 1 Inhaler 1     oxyCODONE IR (ROXICODONE) 5 MG tablet Take 1 tablet (5 mg) by mouth every 4 hours as needed for moderate to severe pain 60 tablet 0     diclofenac (VOLTAREN) 1 % GEL topical gel  Place 2 g onto the skin 4 times daily 100 g 0     loratadine (CLARITIN) 10 MG tablet Take 10 mg by mouth daily as needed for allergies       zolpidem (AMBIEN CR) 12.5 MG CR tablet Take 1 tablet by mouth at bed time. 30 tablet 5     QVAR 80 MCG/ACT Inhaler INHALE 1 PUFFS INTO THE EACH NOSTRIL 2 TIMES DAILY 26.1 g 3     Phenylephrine-DM-GG (ROBITUSSIN COUGH/COLD CF PO) Take 10 mLs by mouth as needed        levothyroxine (SYNTHROID/LEVOTHROID) 25 MCG tablet TAKE 1 TABLET (25 MCG) BY MOUTH DAILY 90 tablet 2     calcium carb-cholecalciferol (KP CALCIUM 600+D3) 600-500 MG-UNIT CAPS Take 2 tablets by mouth daily       albuterol (2.5 MG/3ML) 0.083% neb solution Take 1 vial (2.5 mg) by nebulization every 4 hours as needed for shortness of breath / dyspnea 30 vial 3     CRANBERRY PO Take 1 capsule by mouth daily        azelastine (ASTELIN) 0.1 % nasal spray Spray 1-2 sprays into both nostrils 2 times daily as needed for rhinitis 3 Bottle 3     albuterol (VENTOLIN HFA) 108 (90 BASE) MCG/ACT inhaler Inhale 2 puffs into the lungs every 4 hours as needed 1 Inhaler 2     prochlorperazine (COMPAZINE) 10 MG tablet Take 1 tablet (10 mg) by mouth every 6 hours as needed (Nausea/Vomiting) (Patient not taking: Reported on 2/6/2018) 30 tablet 2     Lidocaine (LIDOCARE) 4 % Patch Place 1-2 patches onto the skin every 24 hours (Patient not taking: Reported on 1/24/2018) 30 patch 0     magnesium hydroxide (MILK OF MAGNESIA) 400 MG/5ML suspension Take 30 mLs by mouth daily as needed for constipation (Patient not taking: Reported on 2/6/2018) 105 mL      polyethylene glycol (MIRALAX/GLYCOLAX) Packet Take 17 g by mouth daily (Patient not taking: Reported on 1/24/2018) 7 packet      senna-docusate (SENOKOT-S;PERICOLACE) 8.6-50 MG per tablet Take 1-2 tablets by mouth 2 times daily as needed for constipation (Patient not taking: Reported on 1/24/2018) 100 tablet      order for DME Equipment being ordered: Venu post-lumpectomy compression pad  "x2 1 each 0     diphenhydrAMINE (BENADRYL) 25 MG tablet Take 1 tablet (25 mg) by mouth every 8 hours as needed for itching or allergies (Patient not taking: Reported on 2/6/2018) 1 tablet 0     metoclopramide (REGLAN) 10 MG tablet Take 1 tablet (10 mg) by mouth 2 times daily as needed 120 tablet 1     fexofenadine (ALLEGRA ALLERGY) 180 MG tablet Take 180 mg by mouth daily as needed for allergies       magic mouthwash suspension (diphenhydrAMINE, lidocaine, aluminum-magnesium & simethicone) Swish and swallow 10 mLs in mouth every 6 hours as needed for mouth sores (Patient not taking: Reported on 2/6/2018) 240 mL 10     Docusate Calcium (STOOL SOFTENER PO) Take 100 mg by mouth daily            Past Medical Hx:  Past Medical History:   Diagnosis Date     ASCUS favor benign 1/2015    Neg HPV     Cancer (H)      Cataract 2010    surgery both eyes     Emphysematous bleb of lung (H)      Fibroids      Hypercholesterolemia      Hypothyroid      Incontinence of urine     mixed     Menorrhagia      Obesity      Pneumothorax      PONV (postoperative nausea and vomiting)      Sleep apnea     uses a cpap     Uncomplicated asthma          Family History:  Family History   Problem Relation Age of Onset     Depression Mother      Eye Disorder Mother      Gynecology Mother      Alzheimer Disease Mother      CANCER Father      Other Cancer Father      HEART DISEASE Maternal Grandfather      Allergies Paternal Grandfather      Allergies Son          Review of Systems:  10 systems reviewed , pertinent positives noted in my HPI      Examination:  Vital signs:   BP 99/67  Pulse 99  Temp 99.9  F (37.7  C) (Oral)  Ht 1.632 m (5' 4.25\")  Wt 83.7 kg (184 lb 8 oz)  LMP 02/06/2013  SpO2 97%  BMI 31.42 kg/m2    Constitutional: Patient in no distress  Eyes: not pale, not jaundiced  CVS: no added sounds  RS: clear  Abdomen: soft, BS+  Skin: no rash  Extremities: no pedal edema  Psych: Alert and oriented x " 3      Laboratory:  Hematology:  Recent Labs   Lab Test  01/31/18   0830  01/24/18   0920  01/17/18   0835  01/08/18   1032  01/07/18   0150  01/03/18   0920   WBC  4.7  8.6  7.8  6.9  5.5  3.8*   ANEU  2.9  6.4  4.9  5.7  4.3  2.0   ALYM  0.7*  1.1  1.4  0.7*  1.0  0.9   DIMITRIS  0.5  0.9  1.0  0.3  0.2  0.4   AEOS  0.6  0.1  0.2  0.1  0.1  0.3   HGB  11.9  12.3  11.6*  12.4  11.6*  12.7   HCT  36.0  37.9  36.2  38.0  35.8  38.7   PLT  280  294  311  303  267  257       Chemistry:  Recent Labs   Lab Test  01/24/18   0920  01/17/18   0835  01/07/18   0150  12/27/17   0849  12/21/17   0800  12/12/17   1124  12/12/17   0345  12/11/17   0232   12/07/17   2035   07/13/17   0233   05/25/17   1500   05/28/13   0935   NA  139  141  140   --   138   --   140  140   < >   --    < >  140   < >  139   < >  142   POTASSIUM  3.7  3.6  3.4   --   3.9  3.5  3.1*  3.4   < >   --    < >  3.7   < >  3.2*   < >  4.4   CHLORIDE  108  107  106   --   106   --   104  106   < >   --    < >  108   < >  106   < >  109   CO2  24  26  25   --   26   --   27  28   < >   --    < >  26   < >  24   < >  21   ANIONGAP  7  8  9   --   6   --   9  6   < >   --    < >  6   < >  9   < >  12   BUN  8  7  8   --   9   --   8  6*   < >   --    < >  9   < >  13   < >  8   CR  0.47*  0.51*  0.42*   --   0.46*   --   0.55  0.43*   < >   --    < >  0.54   < >  0.66   < >  0.73   GFRESTIMATED  >90  >90  >90   --   >90   --   >90  >90   < >   --    < >  >90  Non  GFR Calc     < >  >90  Non  GFR Calc     < >  83   GLC  118*  115*  115*   --   171*   --   122*  109*   < >   --    < >  90   < >  127*   < >  96   A1C   --    --    --    --    --    --    --    --    --    --    --    --    --    --    --   5.8   BEN  8.5  8.3*  8.3*  8.6  8.4*   --   8.0*  7.5*   < >   --    < >  8.0*   < >  8.5   < >  9.3   PHOS   --    --    --    --    --    --    --    --    --    --    --   2.8   --    --    --    --    MAG   --    --    --    --     --    --    --    --    --    --    --   2.1   --   2.3   < >   --    LACT   --    --   0.8   --    --    --    --    --    --   0.9   --    --    < >   --    < >   --     < > = values in this interval not displayed.       Liver Function Studies:  Recent Labs   Lab Test  01/24/18   0920  01/17/18   0835  01/07/18   0150  12/27/17   0849  12/21/17   0800  12/12/17   0345  12/11/17   0232   01/06/15   1600   BILITOTAL  0.7  0.6  0.7   --   0.8  1.8*  1.8*   < >  1.0   ALKPHOS  102  91  87   --   180*  302*  348*   < >  102   ALBUMIN  2.7*  2.6*  2.7*  2.9*  2.6*  2.2*  2.2*   < >  4.1   AST  30  23  26   --   48*  42  45   < >  16   ALT  16  10  11   --   16  45  60*   < >  19   LDH   --    --    --    --    --    --    --    --   207    < > = values in this interval not displayed.     Microbiology:  C,diff positive 12/27, 1/8, 1/29/18    Imaging:  CT chest abdomen pelvis 12/6/17  1. Progression of hepatic metastatic disease with enlargement of  multifocal masses. New finding of moderate intrahepatic biliary ductal  dilatation worrisome for developing biliary obstruction. This may  relate to progression of adenopathy at the upper abdomen at the shreyas  hepatis and/or an increasing area of nodular enhancement at the  pancreatic head worrisome for metastatic disease.  2. Moderate to large pericardial effusion significantly increased  since 11/13/2017. Some mild enhancement of the pericardium also noted.  Recommend further workup.  3. New finding of small nodularity at the omentum at the midline and  slightly towards the right that could represent carcinomatosis.  4. Multifocal stable indeterminate pulmonary nodules may represent  pulmonary metastatic disease.  5. Stable sclerosis at the right fifth rib and T8 that could represent  stable sclerotic metastasis.  6. Progression of adenopathy at the right axilla, and upper abdominal  retroperitoneum.         Bailey Santana MD

## 2018-02-07 NOTE — MR AVS SNAPSHOT
Etta Cervantes   2/7/2018   Anticoagulation Therapy Visit    Description:  57 year old female   Provider:  Juliette Schmidt, RN   [facility]           INR as of 2/7/2018     Today's INR 3.43!      Anticoagulation Summary as of 2/7/2018     INR goal 2.0-3.0   Today's INR 3.43!   Full instructions 2/7: Hold; 2/8: 1.25 mg   Next INR check 2/9/2018    Indications   Acute deep vein thrombosis (DVT) of right upper extremity  unspecified vein (H) [I82.621]  Long-term (current) use of anticoagulants [Z79.01] [Z79.01]         February 2018 Details    Sun Mon Tue Wed Thu Fri Sat         1               2               3                 4               5               6               7      Hold   See details      8      1.25 mg         9            10                 11               12               13               14               15               16               17                 18               19               20               21               22               23               24                 25               26               27               28                   Date Details   02/07 This INR check       Date of next INR:  2/9/2018         How to take your warfarin dose     To take:  1.25 mg Take 0.5 of a 2.5 mg tablet.    Hold Do not take your warfarin dose. See the Details table to the right for additional instructions.

## 2018-02-07 NOTE — PROGRESS NOTES
"ADDENDUM: Telephone note from 2/9/18 (patient's INR resulted after hours, managed by inpatient pharmacy):    INR=3.27  Goal INR 2-3  Reason for therapy: DVT  Current warfarin dose: 0, 0, 2.5, 0, 0, 0, 1.25 in the past 7 days  Other concerns: Abraxane infusions may be reason for elevated INR's lately. Has followed above dosing as instructed.   Plan: Hold tonight, then take 1.25mg on 2/10 and 2/11.  Recheck: 2/12 at [employer] in the AM.  Result and plan discussed with Etta.  Ashley Schoen PharmD       ANTICOAGULATION FOLLOW-UP CLINIC VISIT    Patient Name:  Etta Cervantes  Date:  2/7/2018  Contact Type:  Telephone/ writer spoke with Etta on the phone    SUBJECTIVE:     Patient Findings     Positives Change in medications (abraxane infusion today), Initiation of therapy (1-26-18), Antibiotic use or infection (on vancomycin taper through early to mid March), Intentional hold of therapy (2-2 to 2-3 and also 2-5 to 2-6)    Comments Per ID notes yesterday, \"would give a longer course of vanco 125 mg QID for 14 days and then taper with vanco 125 mg TID x 7 days, then BID for 7 days then QD x 7 days then stop.\"    The patient's abraxane infusion is probably what caused large spike in INR, which she is still recovering from. She is also on large doses of vanco for c-diff and is a new warfarin patient so it is hard to say what is causing the most increase. Will recheck at [facility] on 2-9-18 prior to 1 pm. Writer placed standing order. New 2.5 mg strength warfarin sent to pharmacy. Patient will pick this up and start using tomorrow.           OBJECTIVE    INR   Date Value Ref Range Status   02/07/2018 3.43 (H) 0.86 - 1.14 Final       ASSESSMENT / PLAN  INR assessment SUPRA    Recheck INR In: 2 DAYS    INR Location Clinic lab     Anticoagulation Summary as of 2/7/2018     INR goal 2.0-3.0   Today's INR 3.43!   Maintenance plan No maintenance plan   Full instructions 2/7: Hold; 2/8: 1.25 mg   Plan last modified Brayan, " RIANA Segovia (2/7/2018)   Next INR check 2/9/2018   [facility] end date     Indications   Acute deep vein thrombosis (DVT) of right upper extremity  unspecified vein (H) [I82.621]  Long-term (current) use of anticoagulants [Z79.01] [Z79.01]         Anticoagulation Episode Summary     INR check location     Preferred lab     Send INR reminders to [facility] PO[facility]    Comments * weekly abraxane infusions for metastatic breast cx (this can increase or decrease INR per chemo reference list). Pt prefers [facility]. INR referral has undetermined listed for therapy length. OK to send instructions via [facility]      Anticoagulation Care Providers     Provider Role Specialty Phone number    Brodie Cassidy MD Referring Hematology & Oncology [phone]    Johana Fairbanks MD Responsible [facility] [phone]            See the Encounter Report to view Anticoagulation Flowsheet and Dosing Calendar (Go to Encounters tab in chart review, and find the Anticoagulation Therapy Visit)        Juliette Schmidt RN

## 2018-02-07 NOTE — MR AVS SNAPSHOT
After Visit Summary   2/7/2018    Etta Cervantes    MRN: [id number]           Patient Information     Date Of Birth          1960        Visit Information        Provider Department      2/7/2018 8:00 AM ROOM 9 [facility]        Today's Diagnoses     Secondary malignant neoplasm of liver (H)    -  1    Carcinoma of breast metastatic to liver, unspecified laterality (H)        Bone metastasis (H)        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)        Acute deep vein thrombosis (DVT) of right upper extremity, unspecified vein (H)           Follow-ups after your visit        Your next 10 appointments already scheduled     Feb 21, 2018  8:30 AM CST   Level 1 with ROOM 2 [facility] ([facility])    [facility]  [address]  [place] [address]   [phone]            Feb 21, 2018  9:00 AM CST   Return Visit with Brodie Cassidy MD   [facility] ([facility])    [facility]  [address]  [facility] [address]   [phone]            Mar 01, 2018  8:30 AM CST   Level 1 with ROOM 1 [facility] [place] [facility] ([facility])    [facility]  [address]  [place] [address]   [phone]            Mar 07, 2018  8:00 AM CST   Level 1 with ROOM 9 [facility] [place] [facility] ([facility])    [facility]  [address]  [facility] [address]   [phone]              Who to contact     If you have questions or need follow up information about today's clinic visit or your schedule please contact [facility] directly at [phone].  Normal or non-critical lab and imaging results will be communicated to you by MyChart, letter or phone within 4 business days after the clinic has received the results. If you do not hear from us within 7 days,  please contact the clinic through [employer] or phone. If you have a critical or abnormal lab result, we will notify you by phone as soon as possible.  Submit refill requests through [employer] or call your pharmacy and they will forward the refill request to us. Please allow 3 business days for your refill to be completed.          Additional Information About Your Visit        PressiharAsantae Information     [employer] gives you secure access to your electronic health record. If you see a primary care provider, you can also send messages to your care team and make appointments. If you have questions, please call your primary care clinic.  If you do not have a primary care provider, please call [phone] and they will assist you.        Care EveryWhere ID     This is your Care EveryWhere ID. This could be used by other organizations to access your [place] medical records  [id number]        Your Vitals Were     Pulse Temperature Last Period BMI (Body Mass Index)          82 98.1  F (36.7  C) (Oral) 02/06/2013 31.34 kg/m2         Blood Pressure from Last 3 Encounters:   02/07/18 110/46   02/06/18 99/67   01/31/18 109/66    Weight from Last 3 Encounters:   02/07/18 83.5 kg (184 lb)   02/06/18 83.7 kg (184 lb 8 oz)   01/24/18 83.2 kg (183 lb 6.4 oz)              We Performed the Following     CBC with platelets differential     INR        Primary Care Provider Office Phone # Fax #    Johana Fairbanks [phone] [address] [facility] [address]        Equal Access to Services     ANIYAH MIX : Hadii aad ku hadasho Soomaali, waaxda luqadaha, qaybta kaalmada adeegyada, hari guardado. So [facility] [phone].    ATENCIÓN: Si habla español, tiene a parekh disposición servicios gratuitos de asistencia lingüística. Llame al [phone].    We comply with applicable federal civil rights laws and [place] laws. We do not discriminate on the basis of race, color, national origin, age,  disability, sex, sexual orientation, or gender identity.            Thank you!     Thank you for choosing [facility]  for your care. Our goal is always to provide you with excellent care. Hearing back from our patients is one way we can continue to improve our services. Please take a few minutes to complete the written survey that you may receive in the mail after your visit with us. Thank you!             Your Updated Medication List - Protect others around you: Learn how to safely use, store and throw away your medicines at www.disposemymeds.org.          This list is accurate as of 2/7/18 10:31 AM.  Always use your most recent med list.                   Brand Name Dispense Instructions for use Diagnosis    * albuterol 108 (90 BASE) MCG/ACT Inhaler    VENTOLIN HFA    1 Inhaler    Inhale 2 puffs into the lungs every 4 hours as needed    Moderate persistent asthma, uncomplicated       * albuterol (2.5 MG/3ML) 0.083% neb solution     30 vial    Take 1 vial (2.5 mg) by nebulization every 4 hours as needed for shortness of breath / dyspnea    Moderate persistent asthma, uncomplicated       ALLEGRA ALLERGY 180 MG tablet   Generic drug:  fexofenadine      Take 180 mg by mouth daily as needed for allergies        azelastine 0.1 % spray    ASTELIN    3 Bottle    Spray 1-2 sprays into both nostrils 2 times daily as needed for rhinitis    Chronic rhinitis       budesonide-formoterol 160-4.5 MCG/ACT Inhaler    SYMBICORT    1 Inhaler    Inhale 2 puffs into the lungs 2 times daily    Moderate persistent asthma without complication       CRANBERRY PO      Take 1 capsule by mouth daily        diclofenac 1 % Gel topical gel    VOLTAREN    100 g    Place 2 g onto the skin 4 times daily    Carcinoma of breast metastatic to liver, unspecified laterality (H)       diphenhydrAMINE 25 MG tablet    BENADRYL    1 tablet    Take 1 tablet (25 mg) by mouth every 8 hours as needed for itching or allergies    Carcinoma of breast  metastatic to liver, unspecified laterality (H), Bone metastasis (H), Pericardial effusion, Bilateral malignant neoplasm of breast in female, estrogen receptor positive, unspecified site of breast (H), 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), Secondary malignant neoplasm of liver (H), Chemotherapy-induced neutropenia (H), Emphysematous bleb of lung (H), Hypothyroidism, unspecified type, Hyperlipidemia LDL goal <130, Moderate persistent asthma without complication, Mucositis due to chemotherapy       HYDROcodone-acetaminophen 5-325 MG per tablet    NORCO    20 tablet    Take 1-2 tablets by mouth every 4 hours as needed for moderate to severe pain    Post-op pain       KP CALCIUM 600+D3 600-500 MG-UNIT Caps   Generic drug:  calcium carb-cholecalciferol      Take 2 tablets by mouth daily        levothyroxine 25 MCG tablet    SYNTHROID/LEVOTHROID    90 tablet    TAKE 1 TABLET (25 MCG) BY MOUTH DAILY    Hypothyroidism due to acquired atrophy of thyroid       Lidocaine 4 % Patch    LIDOCARE    30 patch    Place 1-2 patches onto the skin every 24 hours    Carcinoma of breast metastatic to liver, unspecified laterality (H)       lidocaine visc 2% 2.5mL/5mL & maalox/mylanta w/ simeth 2.5mL/5mL & diphenhydrAMINE 5mg/5mL Susp suspension    [facility] Mouthwash [facility]    240 mL    Swish and swallow 10 mLs in mouth every 6 hours as needed for mouth sores    Mucositis due to chemotherapy, Breast cancer metastasized to axillary lymph node, right (H)       loratadine 10 MG tablet    CLARITIN     Take 10 mg by mouth daily as needed for allergies        magnesium hydroxide 400 MG/5ML suspension    MILK OF MAGNESIA    105 mL    Take 30 mLs by mouth daily as needed for constipation    Carcinoma of breast metastatic to liver, unspecified laterality (H)       metoclopramide 10 MG tablet    REGLAN    120 tablet    Take 1 tablet (10 mg) by mouth 2 times daily as needed    Bilateral malignant neoplasm of breast in  female, estrogen receptor positive, unspecified site of breast (H)       order for DME     1 each    Equipment being ordered: Venu post-lumpectomy compression pad x2    Lymphedema, Lymphedema of breast       oxyCODONE IR 5 MG tablet    ROXICODONE    60 tablet    Take 1 tablet (5 mg) by mouth every 4 hours as needed for moderate to severe pain    Secondary malignant neoplasm of liver (H), Carcinoma of breast metastatic to liver, unspecified laterality (H), 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), Bone metastasis (H), Pericardial effusion       polyethylene glycol Packet    MIRALAX/GLYCOLAX    7 packet    Take 17 g by mouth daily    Carcinoma of breast metastatic to liver, unspecified laterality (H)       prochlorperazine 10 MG tablet    COMPAZINE    30 tablet    Take 1 tablet (10 mg) by mouth every 6 hours as needed (Nausea/Vomiting)    Secondary malignant neoplasm of liver (H), Carcinoma of breast metastatic to liver, unspecified laterality (H), Bone metastasis (H), 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), Bilateral malignant neoplasm of breast in female, estrogen receptor positive, unspecified site of breast (H)       QVAR 80 MCG/ACT Inhaler   Generic drug:  beclomethasone     26.1 g    INHALE 1 PUFFS INTO THE EACH NOSTRIL 2 TIMES DAILY    Chronic rhinitis       ROBITUSSIN COUGH/COLD CF PO      Take 10 mLs by mouth as needed    Breast cancer metastasized to axillary lymph node, right (H)       senna-docusate 8.6-50 MG per tablet    SENOKOT-S;PERICOLACE    100 tablet    Take 1-2 tablets by mouth 2 times daily as needed for constipation    Carcinoma of breast metastatic to liver, unspecified laterality (H)       STOOL SOFTENER PO      Take 100 mg by mouth daily        * vancomycin 125 MG capsule    VANCOCIN    40 capsule    Take 1 capsule (125 mg) by mouth 4 times daily for 10 days    C. difficile colitis       * vancomycin 125 MG capsule    VANCOCIN    60 capsule     Take 1 capsule (125 mg) by mouth 4 times daily    Clostridium difficile diarrhea       warfarin 5 MG tablet    COUMADIN    30 tablet    Take 1 tablet (5 mg) by mouth daily    Long-term (current) use of anticoagulants, Acute deep vein thrombosis (DVT) of right upper extremity, unspecified vein (H)       zolpidem 12.5 MG CR tablet    AMBIEN CR    30 tablet    Take 1 tablet by mouth at bed time.    Insomnia due to medical condition       * Notice:  This list has 4 medication(s) that are the same as other medications prescribed for you. Read the directions carefully, and ask your doctor or other care provider to review them with you.

## 2018-02-07 NOTE — PROGRESS NOTES
Infusion Nursing Note:  Etta Cervantes presents today for Abraxane.    Patient seen by provider today: No   present during visit today: Not Applicable.    Note: N/A.    Intravenous Access:  Peripheral IV placed.    Treatment Conditions:  Results reviewed, labs MET treatment parameters, ok to proceed with treatment.      Post Infusion Assessment:  Patient tolerated infusion without incident.  Blood return noted pre and post infusion.  Site patent and intact, free from redness, edema or discomfort.  No evidence of extravasations.  Access discontinued per protocol.    Discharge Plan:   Patient discharged in stable condition accompanied by: .    Yoanna Wilde RN

## 2018-02-07 NOTE — PROGRESS NOTES
Patient brought in form from [facility], Medical inquiry response; verification of continued full leave of absence.      Form completed, signed by Dr. Cassidy.  Faxed to Juliette Carvalho at [phone] per patient request.  Original in envelop at , copy sent to scanning.

## 2018-02-10 NOTE — TELEPHONE ENCOUNTER
INR=3.27  Goal INR 2-3  Reason for therapy: DVT  Current warfarin dose: 0, 0, 2.5, 0, 0, 0, 1.25 in the past 7 days  Other concerns: Abraxane infusions may be reason for elevated INR's lately. Has followed above dosing as instructed.   Plan: Hold tonight, then take 1.25mg on 2/10 and 2/11.  Recheck: 2/12 at [facility] in the AM.  Result and plan discussed with Etta.  Ashley Schoen PharmD

## 2018-02-12 NOTE — MR AVS SNAPSHOT
Etta Cervantes   2/12/2018   Anticoagulation Therapy Visit    Description:  57 year old female   Provider:  Cecilia Finn, RN   Department:  [place] [facility]           INR as of 2/12/2018     Today's INR 2.48      Anticoagulation Summary as of 2/12/2018     INR goal 2.0-3.0   Today's INR 2.48   Full instructions 2/12: 1.25 mg; 2/13: 1.25 mg   Next INR check 2/14/2018    Indications   Acute deep vein thrombosis (DVT) of right upper extremity  unspecified vein (H) [I82.621]  Long-term (current) use of anticoagulants [Z79.01] [Z79.01]         Your next Anticoagulation Clinic appointment(s)     Feb 14, 2018  1:45 PM CST   Anticoagulation Visit with [facility]   [facility]    [address] [facility] [address]   [phone]              February 2018 Details    Sun Mon Tue Wed Thu Fri Sat         1               2               3                 4               5               6               7               8               9               10                 11               12      1.25 mg   See details      13      1.25 mg         14            15               16               17                 18               19               20               21               22               23               24                 25               26               27               28                   Date Details   02/12 This INR check       Date of next INR:  2/14/2018         How to take your warfarin dose     To take:  1.25 mg Take 0.5 of a 2.5 mg tablet.

## 2018-02-12 NOTE — PROGRESS NOTES
ANTICOAGULATION FOLLOW-UP CLINIC VISIT    Patient Name:  Etta Cervantes  Date:  2/12/2018  Contact Type:  Telephone/ Etta    SUBJECTIVE:     Patient Findings     Comments Patient reports she followed instructions that were given to her over the phone by Kathe, she requested the [facility] for a face to face visit this week, writer made her an appt on Wednesday.           OBJECTIVE    INR   Date Value Ref Range Status   02/12/2018 2.48 (H) 0.86 - 1.14 Final       ASSESSMENT / PLAN  No question data found.  Anticoagulation Summary as of 2/12/2018     INR goal 2.0-3.0   Today's INR 2.48   Maintenance plan No maintenance plan   Full instructions 2/12: 1.25 mg; 2/13: 1.25 mg   Plan last modified Kimberly Woods RN (2/7/2018)   Next INR check 2/14/2018   Priority INR   Target end date     Indications   Acute deep vein thrombosis (DVT) of right upper extremity  unspecified vein (H) [I82.621]  Long-term (current) use of anticoagulants [Z79.01] [Z79.01]         Anticoagulation Episode Summary     INR check location     Preferred lab     Send INR reminders to [place] PH[facility] POOL    Comments * weekly abraxane infusions for metastatic breast cx (this can increase or decrease INR per chemo reference list). Pt prefers [facility] or [facility]. INR referral has undetermined listed for therapy length. OK to send instructions via [email or web address]      Anticoagulation Care Providers     Provider Role Specialty Phone number    Brodie Cassidy MD Referring Hematology & Oncology [phone]    Johana Fairbanks MD [facility] [phone]            See the Encounter Report to view Anticoagulation Flowsheet and Dosing Calendar (Go to Encounters tab in chart review, and find the Anticoagulation Therapy Visit)    Writer instructed patient to take warfarin 1.25mg today and Tuesday. She will recheck INR on Wednesday.    Cecilia Finn RN

## 2018-02-14 NOTE — MR AVS SNAPSHOT
Etta Cervantes   2/14/2018 1:45 PM   Anticoagulation Therapy Visit    Description:  57 year old female   Provider:  [facility] ANTI COAG   Department:  [facility]           INR as of 2/14/2018     Today's INR 3.4!      Anticoagulation Summary as of 2/14/2018     INR goal 2.0-3.0   Today's INR 3.4!   Full instructions 2/14: Hold; 2/15: 1.25 mg   Next INR check 2/16/2018    Indications   Acute deep vein thrombosis (DVT) of right upper extremity  unspecified vein (H) [I82.621]  Long-term (current) use of anticoagulants [Z79.01] [Z79.01]         Description     Have your INR drawn in the lab on Friday, Feb 16th.       Contact Numbers     Please call [phone] with any problems or questions regarding your therapy.    If you need to cancel and/or reschedule your appointment please call one of the following numbers:  [facility] [phone]  [place] - [phone]  [place] - [phone]  [facility] [phone]  [place] - [phone]            February 2018 Details    Sun Mon Tue Wed Thu Fri Sat         1               2               3                 4               5               6               7               8               9               10                 11               12               13               14      Hold   See details      15      1.25 mg         16            17                 18               19               20               21               22               23               24                 25               26               27               28                   Date Details   02/14 This INR check       Date of next INR:  2/16/2018         How to take your warfarin dose     To take:  1.25 mg Take 0.5 of a 2.5 mg tablet.    Hold Do not take your warfarin dose. See the Details table to the right for additional instructions.

## 2018-02-14 NOTE — PROGRESS NOTES
ANTICOAGULATION FOLLOW-UP CLINIC VISIT    Patient Name:  Etta Cervantes  Date:  2/14/2018  Contact Type:  Face to Face    SUBJECTIVE:     Patient Findings     Positives Change in diet/appetite (Albumin level last completed 1/24/18 - 2.7 / will start eating more greens (vitamin K rich foods))    Comments Patient had 6.25mg in the previous 7 days, will adjust dose to keep weekly mg total the same by the next INR check on Friday. Patient has been avoiding greens because she thought they increased her INR. Writer explained to patient the effect of the vitamin K rich foods. Writer also explained it would be beneficial for her to incorporate some vitamin K in her diet instead of avoiding them all together. It will likely help stabilize her INRs. Patient used to eat ciara lettuce / mixed spring greens a few times per week. Writer encouraged her to start this again.     Patient has not had any issues with bleeding or increased bruising. She is aware of the increased risk and will use caution to avoid injury.     Patient also mentioned she has a small rash on her arm with the DVT. She started using a benadryl spray and it did get better. Writer encouraged patient to talk to Dr. Cassidy about the rash, especially if it does not get better.            OBJECTIVE    INR Protime   Date Value Ref Range Status   02/14/2018 3.4 (A) 0.86 - 1.14 Final       ASSESSMENT / PLAN  INR assessment SUPRA    Recheck INR In: 2 DAYS    INR Location Clinic      Anticoagulation Summary as of 2/14/2018     INR goal 2.0-3.0   Today's INR 3.4!   Maintenance plan No maintenance plan   Full instructions 2/14: Hold; 2/15: 1.25 mg   Plan last modified Kimberly Woods RN (2/7/2018)   Next INR check 2/16/2018   Priority INR   Target end date     Indications   Acute deep vein thrombosis (DVT) of right upper extremity  unspecified vein (H) [I82.621]  Long-term (current) use of anticoagulants [Z79.01] [Z79.01]         Anticoagulation Episode Summary      INR check location     Preferred lab     [facility] [place] [facility]    Comments * weekly abraxane infusions for metastatic breast cx (this can increase or decrease INR per chemo reference list). Pt prefers [facility]. Vanco taper for C-diff, OK to send instructions via [employer]      Anticoagulation Care Providers     Provider Role Specialty Phone number    Brodie Cassidy MD Referring Hematology & Oncology [phone]    Johana Fairbanks MD [facility] [phone]            See the Encounter Report to view Anticoagulation Flowsheet and Dosing Calendar (Go to Encounters tab in chart review, and find the Anticoagulation Therapy Visit)        Kimberly Woods RN

## 2018-02-16 NOTE — MR AVS SNAPSHOT
Etta Cervantes   2/16/2018   Anticoagulation Therapy Visit    Description:  57 year old female   Provider:  Joselin Boothe RN   Department:  [facility]           INR as of 2/16/2018     Today's INR 2.23      Anticoagulation Summary as of 2/16/2018     INR goal 2.0-3.0   Today's INR 2.23   Full instructions 2/16: 1.25 mg; 2/17: 1.25 mg; 2/18: 1.25 mg   Next INR check 2/19/2018    Indications   Acute deep vein thrombosis (DVT) of right upper extremity  unspecified vein (H) [I82.621]  Long-term (current) use of anticoagulants [Z79.01] [Z79.01]         February 2018 Details    Sun Mon Tue Wed Thu Fri Sat         1               2               3                 4               5               6               7               8               9               10                 11               12               13               14               15               16      1.25 mg   See details      17      1.25 mg           18      1.25 mg         19            20               21               22               23               24                 25               26               27               28                   Date Details   02/16 This INR check       Date of next INR:  2/19/2018         How to take your warfarin dose     To take:  1.25 mg Take 0.5 of a 2.5 mg tablet.

## 2018-02-16 NOTE — PROGRESS NOTES
ANTICOAGULATION FOLLOW-UP CLINIC VISIT    Patient Name:  Etta Cervantes  Date:  2/16/2018  Contact Type:  Telephone/ spoke with pt    SUBJECTIVE:     Patient Findings     Positives Change in diet/appetite (Pt had a small salad with vit K greens the past 2 days)    Comments Pt denies other changes in medications, activity or health, reports taking warfarin as directed. Writer instructed pt to continue eating small salads regularly, instructed pt to continue taking 1.25 mg warfarin daily, recheck in 3 days. If pt is supratherapeutic, will need to order small mg tabs.    Pt states she will get INR drawn at [facility] lab on Mon am 2/19.           OBJECTIVE    INR   Date Value Ref Range Status   02/16/2018 2.23 (H) 0.86 - 1.14 Final       ASSESSMENT / PLAN  INR assessment THER    Recheck INR In: 3 DAYS    INR Location Clinic lab     Anticoagulation Summary as of 2/16/2018     INR goal 2.0-3.0   Today's INR 2.23   Maintenance plan No maintenance plan   Full instructions 2/16: 1.25 mg; 2/17: 1.25 mg; 2/18: 1.25 mg   Plan last modified Kimberly Woods, RN (2/7/2018)   Next INR check 2/19/2018   Priority INR   Target end date     Indications   Acute deep vein thrombosis (DVT) of right upper extremity  unspecified vein (H) [I82.621]  Long-term (current) use of anticoagulants [Z79.01] [Z79.01]         Anticoagulation Episode Summary     INR check location     Preferred lab     Send INR reminders to [place] [facility] PO[facility]    Comments * weekly abraxane infusions for metastatic breast cx (this can increase or decrease INR per chemo reference list). Pt prefers [facility]. Vanco taper for C-diff, OK to send instructions via [employer]      Anticoagulation Care Providers     Provider Role Specialty Phone number    Brodie Cassidy MD Referring Hematology & Oncology [phone]    Johana Fairbanks MD Responsible Family Practice [phone]            See the Encounter Report to view Anticoagulation Flowsheet and  Dosing Calendar (Go to Encounters tab in chart review, and find the Anticoagulation Therapy Visit)        Joselin Boothe RN

## 2018-02-19 NOTE — MR AVS SNAPSHOT
Etta Cervantes   2/19/2018   Anticoagulation Therapy Visit    Description:  57 year old female   Provider:  Juliette Schmidt, RN   [facility]           INR as of 2/19/2018     Today's INR 2.29      Anticoagulation Summary as of 2/19/2018     INR goal 2.0-3.0   Today's INR 2.29   Full instructions 2/19: 1.25 mg; 2/20: 1.25 mg; 2/21: 1.25 mg; 2/22: 1.25 mg   Next INR check 2/23/2018    Indications   Acute deep vein thrombosis (DVT) of right upper extremity  unspecified vein (H) [I82.621]  Long-term (current) use of anticoagulants [Z79.01] [Z79.01]         February 2018 Details    Sun Mon Tue Wed Thu Fri Sat         1               2               3                 4               5               6               7               8               9               10                 11               12               13               14               15               16               17                 18               19      1.25 mg   See details      20      1.25 mg         21      1.25 mg         22      1.25 mg         23            24                 25               26               27               28                   Date Details   02/19 This INR check       Date of next INR:  2/23/2018         How to take your warfarin dose     To take:  1.25 mg Take 0.5 of a 2.5 mg tablet.

## 2018-02-19 NOTE — PROGRESS NOTES
ANTICOAGULATION FOLLOW-UP CLINIC VISIT    Patient Name:  Etta Cervantes  Date:  2/19/2018  Contact Type:  Telephone/ writer spoke with Etta on phone    SUBJECTIVE:     Patient Findings     Positives Antibiotic use or infection (still tapering down on vanco for c diff. Taking BID as of today)    Comments Pt has abraxane infusion on 2-21-18. She did not have an infusion last week. Will check INR at  lab on 2-23-18 to make sure infusion does not increase or decrease INR. If result is okay on Friday, pt can go longer between INRs. No other changes or concerns.           OBJECTIVE    INR   Date Value Ref Range Status   02/19/2018 2.29 (H) 0.86 - 1.14 Final       ASSESSMENT / PLAN  INR assessment THER    Recheck INR In: 4 DAYS    INR Location Clinic lab     Anticoagulation Summary as of 2/19/2018     INR goal 2.0-3.0   Today's INR 2.29   Maintenance plan No maintenance plan   Full instructions 2/19: 1.25 mg; 2/20: 1.25 mg; 2/21: 1.25 mg; 2/22: 1.25 mg   Plan last modified Kimberly Woods RN (2/7/2018)   Next INR check 2/23/2018   Priority INR   Target end date     Indications   Acute deep vein thrombosis (DVT) of right upper extremity  unspecified vein (H) [I82.621]  Long-term (current) use of anticoagulants [Z79.01] [Z79.01]         Anticoagulation Episode Summary     INR check location     Preferred lab     Send INR reminders to [place] [facility] POOL    Comments * weekly abraxane infusions for metastatic breast cx (this can increase or decrease INR per chemo reference list). Pt prefers [facility]. Vanco taper for C-diff, OK to send instructions via [employer]      Anticoagulation Care Providers     Provider Role Specialty Phone number    Brodie Cassidy MD Referring Hematology & Oncology [phone]    Johana Fairbanks MD Responsible Family Practice [phone]            See the Encounter Report to view Anticoagulation Flowsheet and Dosing Calendar (Go to Encounters tab in chart review, and find  the Anticoagulation Therapy Visit)        Juliette Schmidt RN

## 2018-02-21 NOTE — LETTER
2/21/2018         RE: Etta Cervantes  [address] [place]  [place] [address]        Dear Colleague,    Thank you for referring your patient, Etta Cervantes, to the [facility]. Please see a copy of my visit note below.    Hematology/ Oncology Follow-up Visit:  Feb 21, 2018    Reason for Visit:   Chief Complaint   Patient presents with     Oncology Clinic Visit     4 week recheck Breast CA, Labs & Chemo today       Oncologic History:  Breast cancer (H)  Etta Cervantes presented with increasing lump in the right axilla that s lump has been growing without any pain or associated symptoms. Subsequently she was evaluated by primary care physician. Diagnostic digital mammogram was done on 01/13/2015 showing enlarged lymph nodes in the right axilla. There was some skin thickening in the right breast anteriorly and laterally. There are no parenchymal changes in the right breast. The left breast shows a 1.7 cm spiculated mass at approximately 2 to 3 o'clock position, 15.2 cm away from the nipple. A right breast ultrasound was subsequently done and ultrasound guided biopsy was done. Needle biopsy of the left breast did show infiltrating ductal carcinoma grade I of III with no angiolymphatic invasion seen. No associated ductal carcinoma in situ. Estrogen receptor, progresterone receptor were positive. HER-2/sadie receptor came back negative.. Another biopsy from the right axilla did show metastatic ductal carcinoma. PET scan was done on January 26, 2015 showing few small right posterior cervical chain nodes the largest is 1.2 cm. There is extensive bulky right axillary adenopathy demonstrating hypermetabolic FDG uptake with SUV of 10.6. There is skin thickening involving the right breast which demonstrated low-grade FDG uptake. A 1.2 cm lesion on the lateral aspect of the left breast demonstrated minimally increased FDG uptake with SUV of 2. Scattered hypermetabolic mediastinal lymph nodes located in the left  superior anterior mediastinum, right paratracheal and precarinal U, subcranial adenopathy with javon metastases. This focus hypermetabolic FDG uptake identified definitive Amanda posterior aspect off intertrochanteric region of the proximal left femur consistent with bone metastases. There is also 1.4 cm hypermetabolic FDG uptake identified in the anterior medial segment of the left hepatic lobe consistent with liver metastases. Additional 2.1 cm low-attenuation lesion anterior aspect of the right lobe which needs to be determined. The patient was started on chemotherapy with Taxotere and Cytoxan on February 9, 2015.  She concluded 4 cycles of Taxotere and Cytoxan. She started on Arimidex 1 mg orally daily. Currently she is on Faslodex and ibrance. Subsequently the patient went on to receive Afinitor. The patient also started treatment with Xeloda.       Interval History:  Patient is here today for follow-up.  She has been tolerating chemotherapy well without significant side effects.  C. difficile has been treated with antibiotics.  Patient denies any recent diarrhea.  She denies any shortness of breath or cough or wheezing.  She denies any chest pain.  She denies any back pain.    Review Of Systems:  Constitutional: Negative for fever, chills, and night sweats.  Skin: negative.  Eyes: negative.  Ears/Nose/Throat: negative.  Respiratory: No shortness of breath, dyspnea on exertion, cough, or hemoptysis.  Cardiovascular: negative.  Gastrointestinal: negative.  Genitourinary: negative.  Musculoskeletal: negative.  Neurologic: negative.  Psychiatric: negative.  Hematologic/Lymphatic/Immunologic: negative.  Endocrine: negative.    All other ROS negative unless mentioned in interval history.    Past medical, social, surgical, and family histories reviewed.    Allergies:  Allergies as of 02/21/2018 - Manuel as Reviewed 02/21/2018   Allergen Reaction Noted     Animal dander Cough 01/23/2015     Cats  07/15/2010     Dust  mites Cough 01/23/2015     Pollen extract  01/23/2015     Seasonal allergies  07/15/2010     Levaquin [levofloxacin] Rash 07/17/2017       Current Medications:  Current Outpatient Prescriptions   Medication Sig Dispense Refill     warfarin (COUMADIN) 2.5 MG tablet As directed by Anticoagulation Clinic (maintenance dose not yet established) 30 tablet 1     vancomycin (VANCOCIN) 125 MG capsule Take 1 capsule (125 mg) by mouth 4 times daily 60 capsule 0     HYDROcodone-acetaminophen (NORCO) 5-325 MG per tablet Take 1-2 tablets by mouth every 4 hours as needed for moderate to severe pain 20 tablet 0     budesonide-formoterol (SYMBICORT) 160-4.5 MCG/ACT Inhaler Inhale 2 puffs into the lungs 2 times daily 1 Inhaler 1     oxyCODONE IR (ROXICODONE) 5 MG tablet Take 1 tablet (5 mg) by mouth every 4 hours as needed for moderate to severe pain 60 tablet 0     zolpidem (AMBIEN CR) 12.5 MG CR tablet Take 1 tablet by mouth at bed time. 30 tablet 5     QVAR 80 MCG/ACT Inhaler INHALE 1 PUFFS INTO THE EACH NOSTRIL 2 TIMES DAILY 26.1 g 3     Phenylephrine-DM-GG (ROBITUSSIN COUGH/COLD CF PO) Take 10 mLs by mouth as needed        levothyroxine (SYNTHROID/LEVOTHROID) 25 MCG tablet TAKE 1 TABLET (25 MCG) BY MOUTH DAILY 90 tablet 2     calcium carb-cholecalciferol ( CALCIUM 600+D3) 600-500 MG-UNIT CAPS Take 2 tablets by mouth daily       albuterol (2.5 MG/3ML) 0.083% neb solution Take 1 vial (2.5 mg) by nebulization every 4 hours as needed for shortness of breath / dyspnea 30 vial 3     CRANBERRY PO Take 1 capsule by mouth daily        azelastine (ASTELIN) 0.1 % nasal spray Spray 1-2 sprays into both nostrils 2 times daily as needed for rhinitis 3 Bottle 3     prochlorperazine (COMPAZINE) 10 MG tablet Take 1 tablet (10 mg) by mouth every 6 hours as needed (Nausea/Vomiting) (Patient not taking: Reported on 2/6/2018) 30 tablet 2     diclofenac (VOLTAREN) 1 % GEL topical gel Place 2 g onto the skin 4 times daily (Patient not taking:  "Reported on 2/21/2018) 100 g 0     Lidocaine (LIDOCARE) 4 % Patch Place 1-2 patches onto the skin every 24 hours (Patient not taking: Reported on 1/24/2018) 30 patch 0     loratadine (CLARITIN) 10 MG tablet Take 10 mg by mouth daily as needed for allergies       order for DME Equipment being ordered: Venu post-lumpectomy compression pad x2 1 each 0     diphenhydrAMINE (BENADRYL) 25 MG tablet Take 1 tablet (25 mg) by mouth every 8 hours as needed for itching or allergies (Patient not taking: Reported on 2/6/2018) 1 tablet 0     metoclopramide (REGLAN) 10 MG tablet Take 1 tablet (10 mg) by mouth 2 times daily as needed 120 tablet 1     fexofenadine (ALLEGRA ALLERGY) 180 MG tablet Take 180 mg by mouth daily as needed for allergies       albuterol (VENTOLIN HFA) 108 (90 BASE) MCG/ACT inhaler Inhale 2 puffs into the lungs every 4 hours as needed (Patient not taking: Reported on 2/21/2018) 1 Inhaler 2        Physical Exam:  /45 (BP Location: Right arm, Patient Position: Sitting, Cuff Size: Adult Regular)  Pulse 96  Temp 99.3  F (37.4  C) (Tympanic)  Resp 10  Ht 1.632 m (5' 4.25\")  Wt 83.9 kg (185 lb)  LMP 02/06/2013  SpO2 99%  Breastfeeding? No  BMI 31.51 kg/m2  Wt Readings from Last 12 Encounters:   02/21/18 83.9 kg (185 lb)   02/07/18 83.5 kg (184 lb)   02/06/18 83.7 kg (184 lb 8 oz)   01/24/18 83.2 kg (183 lb 6.4 oz)   01/17/18 85.4 kg (188 lb 3.2 oz)   01/10/18 83.9 kg (185 lb)   01/05/18 83.9 kg (185 lb)   01/03/18 86.5 kg (190 lb 9.6 oz)   12/27/17 84 kg (185 lb 3.2 oz)   12/21/17 89.9 kg (198 lb 3.2 oz)   12/14/17 89.1 kg (196 lb 8 oz)   12/12/17 90.6 kg (199 lb 11.2 oz)     ECOG performance status: 0  GENERAL APPEARANCE: Healthy, alert and in no acute distress.  HEENT: Sclerae anicteric. PERRLA. Oropharynx without ulcers, lesions, or thrush.  NECK: Supple. No asymmetry or masses.  LYMPHATICS: No palpable cervical, supraclavicular, axillary, or inguinal lymphadenopathy.  RESP: Lungs clear to " "auscultation bilaterally without rales, rhonchi or wheezes.  BREAST: 1 cm lymph node felt in the right axilla.  Right breast no dominant masses or axillary adenopathy.  Left breast no dominant masses or axillary adenopathy.  \"CARDIOVASCULAR: Regular rate and rhythm. Normal S1, S2; no S3 or S4. No murmur, gallop, or rub.  ABDOMEN: Soft, nontender. Bowel sounds normal. No palpable organomegaly or masses.  MUSCULOSKELETAL: Extremities without gross deformities noted. No edema of bilateral lower extremities.  SKIN: No suspicious lesions or rashes.  NEURO: Alert and oriented x 3. Cranial nerves II-XII grossly intact.  PSYCHIATRIC: Mentation and affect appear normal.    Laboratory/Imaging Studies:  Infusion Therapy Visit on 02/21/2018   Component Date Value Ref Range Status     WBC 02/21/2018 4.7  4.0 - 11.0 10e9/L Final     RBC Count 02/21/2018 3.90  3.8 - 5.2 10e12/L Final     Hemoglobin 02/21/2018 11.4* 11.7 - 15.7 g/dL Final     Hematocrit 02/21/2018 35.5  35.0 - 47.0 % Final     MCV 02/21/2018 91  78 - 100 fl Final     MCH 02/21/2018 29.2  26.5 - 33.0 pg Final     MCHC 02/21/2018 32.1  31.5 - 36.5 g/dL Final     RDW 02/21/2018 15.2* 10.0 - 15.0 % Final     Platelet Count 02/21/2018 298  150 - 450 10e9/L Final     Diff Method 02/21/2018 Automated Method   Final     % Neutrophils 02/21/2018 54.8  % Final     % Lymphocytes 02/21/2018 23.8  % Final     % Monocytes 02/21/2018 17.9  % Final     % Eosinophils 02/21/2018 2.3  % Final     % Basophils 02/21/2018 0.6  % Final     % Immature Granulocytes 02/21/2018 0.6  % Final     Absolute Neutrophil 02/21/2018 2.6  1.6 - 8.3 10e9/L Final     Absolute Lymphocytes 02/21/2018 1.1  0.8 - 5.3 10e9/L Final     Absolute Monocytes 02/21/2018 0.9  0.0 - 1.3 10e9/L Final     Absolute Eosinophils 02/21/2018 0.1  0.0 - 0.7 10e9/L Final     Absolute Basophils 02/21/2018 0.0  0.0 - 0.2 10e9/L Final     Abs Immature Granulocytes 02/21/2018 0.0  0 - 0.4 10e9/L Final     Sodium 02/21/2018 141  " 133 - 144 mmol/L Final     Potassium 02/21/2018 4.1  3.4 - 5.3 mmol/L Final     Chloride 02/21/2018 110* 94 - 109 mmol/L Final     Carbon Dioxide 02/21/2018 25  20 - 32 mmol/L Final     Anion Gap 02/21/2018 6  3 - 14 mmol/L Final     Glucose 02/21/2018 91  70 - 99 mg/dL Final     Urea Nitrogen 02/21/2018 13  7 - 30 mg/dL Final     Creatinine 02/21/2018 0.56  0.52 - 1.04 mg/dL Final     GFR Estimate 02/21/2018 >90  >60 mL/min/1.7m2 Final    Non  GFR Calc     GFR Estimate If Black 02/21/2018 >90  >60 mL/min/1.7m2 Final    African American GFR Calc     Calcium 02/21/2018 8.4* 8.5 - 10.1 mg/dL Final     Bilirubin Total 02/21/2018 0.7  0.2 - 1.3 mg/dL Final     Albumin 02/21/2018 2.9* 3.4 - 5.0 g/dL Final     Protein Total 02/21/2018 6.8  6.8 - 8.8 g/dL Final     Alkaline Phosphatase 02/21/2018 123  40 - 150 U/L Final     ALT 02/21/2018 22  0 - 50 U/L Final     AST 02/21/2018 32  0 - 45 U/L Final   Orders Only on 02/19/2018   Component Date Value Ref Range Status     INR 02/19/2018 2.29* 0.86 - 1.14 Final   Orders Only on 02/16/2018   Component Date Value Ref Range Status     INR 02/16/2018 2.23* 0.86 - 1.14 Final   Anticoagulation Therapy Visit on 02/14/2018   Component Date Value Ref Range Status     INR Protime 02/14/2018 3.4* 0.86 - 1.14 Final   Orders Only on 02/12/2018   Component Date Value Ref Range Status     INR 02/12/2018 2.48* 0.86 - 1.14 Final   Orders Only on 02/09/2018   Component Date Value Ref Range Status     INR 02/09/2018 3.27* 0.86 - 1.14 Final        .    Assessment and plan:  (C50.919,  C78.7) Carcinoma of breast metastatic to liver, unspecified laterality (H)  (primary encounter diagnosis)  (C50.911,  Z17.0,  C50.912) Bilateral malignant neoplasm of breast in female, estrogen receptor positive, unspecified site of breast (H)  (C79.51) Bone metastasis (H)  (C50.911,  C77.3) 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)  (C78.7) Secondary malignant neoplasm of liver  (H)  Patient has been tolerating chemotherapy well.  We will continue with the current plan.  I will see the patient again in 1 month or sooner if there are new developments or concerns.  We will arrange for CT scan of the chest abdomen pelvis to assess response to treatment.    (J45.40) Moderate persistent asthma without complication  Patient has met is currently well-controlled    (E03.9) Hypothyroidism, unspecified type  Patient currently on Synthroid 25 mcg daily.    (K12.31) Mucositis due to chemotherapy  Patient currently using Magic mouthwash if needed.    (G89.3) Cancer associated pain  The patient pain is currently well-controlled    (I82.621) Acute deep vein thrombosis (DVT) of right upper extremity, unspecified vein (H)  Patient continued anticoagulation was warfarin.  INR has been controlled through the anticoagulation clinic.      The patient is ready to learn, no apparent learning barriers were identified.  Diagnosis and treatment plans were explained to the patient. The patient expressed understanding of the content. The patient asked appropriate questions. The patient questions were answered to her satisfaction.    Chart documentation with Dragon Voice recognition Software. Although reviewed after completion, some words and grammatical errors may remain.    Again, thank you for allowing me to participate in the care of your patient.        Sincerely,        Brodie Cassidy MD

## 2018-02-21 NOTE — MR AVS SNAPSHOT
After Visit Summary   2/21/2018    Etta Cervantes    MRN: [id number]           Patient Information     Date Of Birth          1960        Visit Information        Provider Department      2/21/2018 9:00 AM Brodie Cassidy MD [facility] ONCOLOGY      Today's Diagnoses     Carcinoma of breast metastatic to liver, unspecified laterality (H)    -  1      Care Instructions    We would like to see you back in clinic with Dr. Cassidy in 4 week with CT scan prior and chemotherapy to be scheduled per treatment plan.  When you are in need of a refill, please call your pharmacy and they will send us a request.  Copy of appointments, and after visit summary (AVS) given to patient.  If you have any questions during business hours (M-F 8 AM- 4PM), please call Funmi Ray RN, BSN, OCN Oncology Hematology /Breast Cancer Navigator at [facility] [phone].  For questions after business hours, or on holidays/weekends, please call our after hours Nurse Triage line [phone]. Thank you.            Follow-ups after your visit        Follow-up notes from your care team     Return in about 4 weeks (around 3/21/2018) for Imaging ordered before next appointment.      Your next 10 appointments already scheduled     Feb 23, 2018 10:15 AM CST   LAB with LL LAB   [facility] ([facility])    [address] [place] [address] [phone]           Please do not eat 10-12 hours before your appointment if you are coming in fasting for labs on lipids, cholesterol, or glucose (sugar). This does not apply to pregnant women. Water, hot tea and black coffee (with nothing added) are okay. Do not drink other fluids, diet soda or chew gum.            Mar 01, 2018  8:30 AM CST   Level 1 with ROOM 1 [facility]fusion ([facility])    n Medical [facility]tr [facility]  [address]  [facility]  [address]   [phone]            Mar 07, 2018  8:00 AM CST   Level 1 with ROOM 9 [facility]fusion ([facility])    [facility]  [address]  [facility] [address]   [phone]            Mar 19, 2018  9:30 AM CDT   (Arrive by 9:15 AM)   CT CHEST/ABDOMEN/PELVIS W CONTRAST with WYCT1   [facility] CT ([facility])    [address] [place]  [facility] [address]   [phone]           Please bring any scans or X-rays taken at other hospitals, if similar tests were done. Also bring a list of your medicines, including vitamins, minerals and over-the-counter drugs. It is safest to leave personal items at home.  Be sure to tell your doctor:   If you have any allergies.   If there s any chance you are pregnant.   If you are breastfeeding.  You may have contrast for this exam. To prepare:   Do not eat or drink for 2 hours before your exam. If you need to take medicine, you may take it with small sips of water. (We may ask you to take liquid medicine as well.)   The day before your exam, drink extra fluids at least six 8-ounce glasses (unless your doctor tells you to restrict your fluids).   You will be given instructions on how to drink the contrast.  Patients over 70 or patients with diabetes or kidney problems:   If you haven t had a blood test (creatinine test) within the last 30 days, the Cardiologist/Radiologist may require you to get this test prior to your exam.  If you have diabetes:   Continue to take your metformin medication on the day of your exam  Please wear loose clothing, such as a sweat suit or jogging clothes. Avoid snaps, zippers and other metal. We may ask you to undress and put on a hospital gown.  If you have any questions, please call the Imaging Department where you will have your exam.            Mar 21, 2018  9:00 AM CDT   Level 1 with ROOM 4 [facility] ([facility])    [facility]  [facility]  [address]  [facility] [address]   [phone]            Mar 21, 2018 10:00 AM CDT   Return Visit with Brodie Cassidy MD   [facility] ([facility])    [facility]  [address]  [facility] [place] [address]   [phone]            Mar 28, 2018  9:00 AM CDT   Level 1 with ROOM 1 [facility] Infusion ([facility])    [facility]  [address]  [facility] [place] [address]   [phone]            Apr 04, 2018  8:30 AM CDT   Level 1 with ROOM 1 [facility]fusion ([facility])    [facility]  [address]  [facility] [place] [address]   [phone]              Future tests that were ordered for you today     Open Future Orders        Priority Expected Expires Ordered    CT Chest/Abdomen/Pelvis w Contrast Routine  2/22/2019 2/21/2018            Who to contact     If you have questions or need follow up information about today's clinic visit or your schedule please contact [facility] directly at [phone].  Normal or non-critical lab and imaging results will be communicated to you by [employer]hart, letter or phone within 4 business days after the clinic has received the results. If you do not hear from us within 7 days, please contact the clinic through [employer] or phone. If you have a critical or abnormal lab result, we will notify you by phone as soon as possible.  Submit refill requests through [employer] or call your pharmacy and they will forward the refill request to us. Please allow 3 business days for your refill to be completed.          Additional Information About Your Visit        AMEE Information     AMEE gives you secure access to your electronic health record. If you see a primary care provider, you can also send messages to your care team and make appointments. If you have questions, please call your primary  "care clinic.  If you do not have a primary care provider, please call [phone] and they will assist you.        Care EveryWhere ID     This is your Care EveryWhere ID. This could be used by other organizations to access your [facility] medical records  [id number]        Your Vitals Were     Pulse Temperature Respirations Height Last Period Pulse Oximetry    96 99.3  F (37.4  C) (Tympanic) 10 1.632 m (5' 4.25\") 02/06/2013 99%    Breastfeeding? BMI (Body Mass Index)                No 31.51 kg/m2           Blood Pressure from Last 3 Encounters:   02/21/18 109/45   02/07/18 110/46   02/06/18 99/67    Weight from Last 3 Encounters:   02/21/18 83.9 kg (185 lb)   02/07/18 83.5 kg (184 lb)   02/06/18 83.7 kg (184 lb 8 oz)               Primary Care Provider Office Phone # Fax #    Johana Fairbanks [phone] [address] [facility] [address]        Equal Access to Services     [facility]: Hadii aad ku hadasho Soomaali, waaxda luqadaha, qaybta kaalmada adeemily, hari rodriguez . So [facility] [phone].    ATENCIÓN: Si habla español, tiene a parekh disposición servicios gratuitos de asistencia lingüística. [facility] [phone].    We comply with applicable federal civil rights laws and [place] laws. We do not discriminate on the basis of race, color, national origin, age, disability, sex, sexual orientation, or gender identity.            Thank you!     Thank you for choosing [facility] [place]  for your care. Our goal is always to provide you with excellent care. Hearing back from our patients is one way we can continue to improve our services. Please take a few minutes to complete the written survey that you may receive in the mail after your visit with us. Thank you!             Your Updated Medication List - Protect others around you: Learn how to safely use, store and throw away your medicines at www.disposemymeds.org.          This list is accurate as of " 2/21/18 10:24 AM.  Always use your most recent med list.                   Brand Name Dispense Instructions for use Diagnosis    * albuterol 108 (90 BASE) MCG/ACT Inhaler    VENTOLIN HFA    1 Inhaler    Inhale 2 puffs into the lungs every 4 hours as needed    Moderate persistent asthma, uncomplicated       * albuterol (2.5 MG/3ML) 0.083% neb solution     30 vial    Take 1 vial (2.5 mg) by nebulization every 4 hours as needed for shortness of breath / dyspnea    Moderate persistent asthma, uncomplicated       ALLEGRA ALLERGY 180 MG tablet   Generic drug:  fexofenadine      Take 180 mg by mouth daily as needed for allergies        azelastine 0.1 % spray    ASTELIN    3 Bottle    Spray 1-2 sprays into both nostrils 2 times daily as needed for rhinitis    Chronic rhinitis       budesonide-formoterol 160-4.5 MCG/ACT Inhaler    SYMBICORT    1 Inhaler    Inhale 2 puffs into the lungs 2 times daily    Moderate persistent asthma without complication       CRANBERRY PO      Take 1 capsule by mouth daily        diclofenac 1 % Gel topical gel    VOLTAREN    100 g    Place 2 g onto the skin 4 times daily    Carcinoma of breast metastatic to liver, unspecified laterality (H)       diphenhydrAMINE 25 MG tablet    BENADRYL    1 tablet    Take 1 tablet (25 mg) by mouth every 8 hours as needed for itching or allergies    Carcinoma of breast metastatic to liver, unspecified laterality (H), Bone metastasis (H), Pericardial effusion, Bilateral malignant neoplasm of breast in female, estrogen receptor positive, unspecified site of breast (H), 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), Secondary malignant neoplasm of liver (H), Chemotherapy-induced neutropenia (H), Emphysematous bleb of lung (H), Hypothyroidism, unspecified type, Hyperlipidemia LDL goal <130, Moderate persistent asthma without complication, Mucositis due to chemotherapy       HYDROcodone-acetaminophen 5-325 MG per tablet    NORCO    20 tablet    Take  1-2 tablets by mouth every 4 hours as needed for moderate to severe pain    Post-op pain       KP CALCIUM 600+D3 600-500 MG-UNIT Caps   Generic drug:  calcium carb-cholecalciferol      Take 2 tablets by mouth daily        levothyroxine 25 MCG tablet    SYNTHROID/LEVOTHROID    90 tablet    TAKE 1 TABLET (25 MCG) BY MOUTH DAILY    Hypothyroidism due to acquired atrophy of thyroid       Lidocaine 4 % Patch    LIDOCARE    30 patch    Place 1-2 patches onto the skin every 24 hours    Carcinoma of breast metastatic to liver, unspecified laterality (H)       loratadine 10 MG tablet    CLARITIN     Take 10 mg by mouth daily as needed for allergies        metoclopramide 10 MG tablet    REGLAN    120 tablet    Take 1 tablet (10 mg) by mouth 2 times daily as needed    Bilateral malignant neoplasm of breast in female, estrogen receptor positive, unspecified site of breast (H)       order for DME     1 each    Equipment being ordered: Venu post-lumpectomy compression pad x2    Lymphedema, Lymphedema of breast       oxyCODONE IR 5 MG tablet    ROXICODONE    60 tablet    Take 1 tablet (5 mg) by mouth every 4 hours as needed for moderate to severe pain    Secondary malignant neoplasm of liver (H), Carcinoma of breast metastatic to liver, unspecified laterality (H), 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), Bone metastasis (H), Pericardial effusion       prochlorperazine 10 MG tablet    COMPAZINE    30 tablet    Take 1 tablet (10 mg) by mouth every 6 hours as needed (Nausea/Vomiting)    Secondary malignant neoplasm of liver (H), Carcinoma of breast metastatic to liver, unspecified laterality (H), Bone metastasis (H), 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), Bilateral malignant neoplasm of breast in female, estrogen receptor positive, unspecified site of breast (H)       QVAR 80 MCG/ACT Inhaler   Generic drug:  beclomethasone     26.1 g    INHALE 1 PUFFS INTO THE EACH NOSTRIL 2 TIMES  DAILY    Chronic rhinitis       ROBITUSSIN COUGH/COLD CF PO      Take 10 mLs by mouth as needed    Breast cancer metastasized to axillary lymph node, right (H)       vancomycin 125 MG capsule    VANCOCIN    60 capsule    Take 1 capsule (125 mg) by mouth 4 times daily    Clostridium difficile diarrhea       warfarin 2.5 MG tablet    COUMADIN    30 tablet    As directed by Anticoagulation Clinic (maintenance dose not yet established)    Long-term (current) use of anticoagulants, Acute deep vein thrombosis (DVT) of right upper extremity, unspecified vein (H)       zolpidem 12.5 MG CR tablet    AMBIEN CR    30 tablet    Take 1 tablet by mouth at bed time.    Insomnia due to medical condition       * Notice:  This list has 2 medication(s) that are the same as other medications prescribed for you. Read the directions carefully, and ask your doctor or other care provider to review them with you.

## 2018-02-21 NOTE — TELEPHONE ENCOUNTER
"Received fax from [facility]:    \"Effective February 2018-QVAR inhalers( 40 mcg and 80 mcg) are in the process of being discontinued by the  and will be replaced by QVAR HFA Redihaler inhalers (40 mcg and 80 mcg) As a result of this product discontinuation, this therapy will no longer be available for your patient once existing stocks are depleted. Because this is a new delivery system, the replacement product will require a new prescription. If continued therapy is required, a new prescription will be necessary. Thank you in advance for taking the time to review this information.\"    Manoj Brewer RN      "

## 2018-02-21 NOTE — PROGRESS NOTES
Infusion Nursing Note:  Etta Cervantes presents today for C3D1 Abraxane.    Patient seen by provider today: Yes   present during visit today: Not Applicable.    Note: N/A    Intravenous Access:  Implanted Port.    Treatment Conditions:  Lab Results   Component Value Date    HGB 11.4 02/21/2018     Lab Results   Component Value Date    WBC 4.7 02/21/2018      Lab Results   Component Value Date    ANEU 2.6 02/21/2018     Lab Results   Component Value Date     02/21/2018      Results reviewed, labs MET treatment parameters, ok to proceed with treatment.      Post Infusion Assessment:  Patient tolerated infusion without incident.  Blood return noted pre and post infusion.  Site patent and intact, free from redness, edema or discomfort.  No evidence of extravasations.  Access discontinued per protocol.    Discharge Plan:   Patient discharged in stable condition accompanied by: .  Departure Mode: Ambulatory.    Truman Duong RN

## 2018-02-21 NOTE — MR AVS SNAPSHOT
After Visit Summary   2/21/2018    Etta Cervantes    MRN: [id number]           Patient Information     Date Of Birth          1960        Visit Information        Provider Department      2/21/2018 8:30 AM ROOM 2 [facility]        Today's Diagnoses     Secondary malignant neoplasm of liver (H)    -  1    Carcinoma of breast metastatic to liver, unspecified laterality (H)        Bone metastasis (H)        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)           Follow-ups after your visit        Your next 10 appointments already scheduled     Feb 23, 2018 10:15 AM CST   LAB with LL LAB   [facility] ([facility])    [address] [address]   [phone]           Please do not eat 10-12 hours before your appointment if you are coming in fasting for labs on lipids, cholesterol, or glucose (sugar). This does not apply to pregnant women. Water, hot tea and black coffee (with nothing added) are okay. Do not drink other fluids, diet soda or chew gum.            Mar 01, 2018  8:30 AM CST   Level 1 with ROOM 1 [facility] ([facility])    [facility]  [address]  [facility] [address]   [phone]            Mar 07, 2018  8:00 AM CST   Level 1 with ROOM 9 [facility] ([facility])    [facility]  [address]  [facility] [address]   [phone]            Mar 19, 2018  9:30 AM CDT   (Arrive by 9:15 AM)   CT CHEST/ABDOMEN/PELVIS W CONTRAST with WYCT1   [facility] ([facility])    [address] [facility] [address]   [phone]           Please bring any scans or X-rays taken at other hospitals, if similar tests were done. Also bring a list of your medicines, including vitamins, minerals and over-the-counter drugs. It is safest to leave personal items at home.   Be sure to tell your doctor:   If you have any allergies.   If there s any chance you are pregnant.   If you are breastfeeding.  You may have contrast for this exam. To prepare:   Do not eat or drink for 2 hours before your exam. If you need to take medicine, you may take it with small sips of water. (We may ask you to take liquid medicine as well.)   The day before your exam, drink extra fluids at least six 8-ounce glasses (unless your doctor tells you to restrict your fluids).   You will be given instructions on how to drink the contrast.  Patients over 70 or patients with diabetes or kidney problems:   If you haven t had a blood test (creatinine test) within the last 30 days, the Cardiologist/Radiologist may require you to get this test prior to your exam.  If you have diabetes:   Continue to take your metformin medication on the day of your exam  Please wear loose clothing, such as a sweat suit or jogging clothes. Avoid snaps, zippers and other metal. We may ask you to undress and put on a hospital gown.  If you have any questions, please call the Imaging Department where you will have your exam.            Mar 21, 2018  9:00 AM CDT   Level 1 with ROOM 4 [facility] ([facility])    [facility]  [address]  [facility] [address]   [phone]            Mar 21, 2018 10:00 AM CDT   Return Visit with Brodie Cassidy MD   [facility] ([facility])    [facility]  [address]  [facility] [address]   [phone]            Mar 28, 2018  9:00 AM CDT   Level 1 with ROOM 1 [facility] ([facility])    [facility]  [address]  [facility] [address]   [phone]            Apr 04, 2018  8:30 AM CDT   Level 1 with ROOM 1 [facility] ([facility])    [facility]  [address]  [facility] [address]   [phone]              Future tests that were ordered for you today     Open Future Orders        Priority Expected Expires Ordered    CT Chest/Abdomen/Pelvis w Contrast Routine  2/22/2019 2/21/2018            Who to contact     If you have questions or need follow up information about today's clinic visit or your schedule please contact [facility] directly at [phone].  Normal or non-critical lab and imaging results will be communicated to you by [employer], letter or phone within 4 business days after the clinic has received the results. If you do not hear from us within 7 days, please contact the clinic through [employer] or phone. If you have a critical or abnormal lab result, we will notify you by phone as soon as possible.  Submit refill requests through [employer] or call your pharmacy and they will forward the refill request to us. Please allow 3 business days for your refill to be completed.          Additional Information About Your Visit        [employer] Information     [employer] gives you secure access to your electronic health record. If you see a primary care provider, you can also send messages to your care team and make appointments. If you have questions, please call your primary care clinic.  If you do not have a primary care provider, please call [phone] and they will assist you.        Care EveryWhere ID     This is your Care EveryWhere ID. This could be used by other organizations to access your [facility] medical records  [id number]        Your Vitals Were     Pulse Last Period                91 02/06/2013           Blood Pressure from Last 3 Encounters:   02/21/18 109/45   02/21/18 112/53   02/07/18 110/46    Weight from Last 3 Encounters:   02/21/18 83.9 kg (185 lb)   02/07/18 83.5 kg (184 lb)   02/06/18 83.7 kg (184 lb 8 oz)              We Performed the Following     CA 27.29 Breast tumor marker     CBC with platelets differential     CEA      Comprehensive metabolic panel        Primary Care Provider Office Phone # Fax #    Johana Fairbanks [phone] [address]  [place] [address]        Equal Access to Services     JACKIE MIX : Hadii aad ku adrienneo Sofabienali, waaxda luqadaha, qaybta kaalmada lexi, hari cristobalyvette debby. So [facility] [phone].    ATENCIÓN: Si habla español, tiene a parekh disposición servicios gratuitos de asistencia lingüística. Llame al [phone].    We comply with applicable federal civil rights laws and [place] laws. We do not discriminate on the basis of race, color, national origin, age, disability, sex, sexual orientation, or gender identity.            Thank you!     Thank you for choosing [facility]  for your care. Our goal is always to provide you with excellent care. Hearing back from our patients is one way we can continue to improve our services. Please take a few minutes to complete the written survey that you may receive in the mail after your visit with us. Thank you!             Your Updated Medication List - Protect others around you: Learn how to safely use, store and throw away your medicines at www.disposemymeds.org.          This list is accurate as of 2/21/18 12:11 PM.  Always use your most recent med list.                   Brand Name Dispense Instructions for use Diagnosis    * albuterol 108 (90 BASE) MCG/ACT Inhaler    VENTOLIN HFA    1 Inhaler    Inhale 2 puffs into the lungs every 4 hours as needed    Moderate persistent asthma, uncomplicated       * albuterol (2.5 MG/3ML) 0.083% neb solution     30 vial    Take 1 vial (2.5 mg) by nebulization every 4 hours as needed for shortness of breath / dyspnea    Moderate persistent asthma, uncomplicated       ALLEGRA ALLERGY 180 MG tablet   Generic drug:  fexofenadine      Take 180 mg by mouth daily as needed for allergies        azelastine 0.1 % spray    ASTELIN    3 Bottle    Spray 1-2 sprays into  both nostrils 2 times daily as needed for rhinitis    Chronic rhinitis       budesonide-formoterol 160-4.5 MCG/ACT Inhaler    SYMBICORT    1 Inhaler    Inhale 2 puffs into the lungs 2 times daily    Moderate persistent asthma without complication       CRANBERRY PO      Take 1 capsule by mouth daily        diclofenac 1 % Gel topical gel    VOLTAREN    100 g    Place 2 g onto the skin 4 times daily    Carcinoma of breast metastatic to liver, unspecified laterality (H)       diphenhydrAMINE 25 MG tablet    BENADRYL    1 tablet    Take 1 tablet (25 mg) by mouth every 8 hours as needed for itching or allergies    Carcinoma of breast metastatic to liver, unspecified laterality (H), Bone metastasis (H), Pericardial effusion, Bilateral malignant neoplasm of breast in female, estrogen receptor positive, unspecified site of breast (H), 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), Secondary malignant neoplasm of liver (H), Chemotherapy-induced neutropenia (H), Emphysematous bleb of lung (H), Hypothyroidism, unspecified type, Hyperlipidemia LDL goal <130, Moderate persistent asthma without complication, Mucositis due to chemotherapy       HYDROcodone-acetaminophen 5-325 MG per tablet    NORCO    20 tablet    Take 1-2 tablets by mouth every 4 hours as needed for moderate to severe pain    Post-op pain       KP CALCIUM 600+D3 600-500 MG-UNIT Caps   Generic drug:  calcium carb-cholecalciferol      Take 2 tablets by mouth daily        levothyroxine 25 MCG tablet    SYNTHROID/LEVOTHROID    90 tablet    TAKE 1 TABLET (25 MCG) BY MOUTH DAILY    Hypothyroidism due to acquired atrophy of thyroid       Lidocaine 4 % Patch    LIDOCARE    30 patch    Place 1-2 patches onto the skin every 24 hours    Carcinoma of breast metastatic to liver, unspecified laterality (H)       loratadine 10 MG tablet    CLARITIN     Take 10 mg by mouth daily as needed for allergies        metoclopramide 10 MG tablet    REGLAN    120 tablet     Take 1 tablet (10 mg) by mouth 2 times daily as needed    Bilateral malignant neoplasm of breast in female, estrogen receptor positive, unspecified site of breast (H)       order for DME     1 each    Equipment being ordered: JoviPak post-lumpectomy compression pad x2    Lymphedema, Lymphedema of breast       oxyCODONE IR 5 MG tablet    ROXICODONE    60 tablet    Take 1 tablet (5 mg) by mouth every 4 hours as needed for moderate to severe pain    Secondary malignant neoplasm of liver (H), Carcinoma of breast metastatic to liver, unspecified laterality (H), 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), Bone metastasis (H), Pericardial effusion       prochlorperazine 10 MG tablet    COMPAZINE    30 tablet    Take 1 tablet (10 mg) by mouth every 6 hours as needed (Nausea/Vomiting)    Secondary malignant neoplasm of liver (H), Carcinoma of breast metastatic to liver, unspecified laterality (H), Bone metastasis (H), 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), Bilateral malignant neoplasm of breast in female, estrogen receptor positive, unspecified site of breast (H)       QVAR 80 MCG/ACT Inhaler   Generic drug:  beclomethasone     26.1 g    INHALE 1 PUFFS INTO THE EACH NOSTRIL 2 TIMES DAILY    Chronic rhinitis       ROBITUSSIN COUGH/COLD CF PO      Take 10 mLs by mouth as needed    Breast cancer metastasized to axillary lymph node, right (H)       vancomycin 125 MG capsule    VANCOCIN    60 capsule    Take 1 capsule (125 mg) by mouth 4 times daily    Clostridium difficile diarrhea       warfarin 2.5 MG tablet    COUMADIN    30 tablet    As directed by Anticoagulation Clinic (maintenance dose not yet established)    Long-term (current) use of anticoagulants, Acute deep vein thrombosis (DVT) of right upper extremity, unspecified vein (H)       zolpidem 12.5 MG CR tablet    AMBIEN CR    30 tablet    Take 1 tablet by mouth at bed time.    Insomnia due to medical condition       *  Notice:  This list has 2 medication(s) that are the same as other medications prescribed for you. Read the directions carefully, and ask your doctor or other care provider to review them with you.

## 2018-02-21 NOTE — TELEPHONE ENCOUNTER
I spoke to our pharmacist. The qvar redihaler will not work intranasally. We will switch her to qnasl spray to use. I sent this in. She was on it in 2016 and was switched due to formulary change.   Yoanna Ha PA-C

## 2018-02-21 NOTE — PATIENT INSTRUCTIONS
We would like to see you back in clinic with Dr. Cassidy in 4 week with CT scan prior and chemotherapy to be scheduled per treatment plan.  When you are in need of a refill, please call your pharmacy and they will send us a request.  Copy of appointments, and after visit summary (AVS) given to patient.  If you have any questions during business hours (M-F 8 AM- 4PM), please call Funmi Ray RN, BSN, OCN Oncology Hematology /Breast Cancer Navigator at [facility] [phone].  For questions after business hours, or on holidays/weekends, please call our after hours Nurse Triage line [phone]. Thank you.

## 2018-02-21 NOTE — PROGRESS NOTES
Hematology/ Oncology Follow-up Visit:  Feb 21, 2018    Reason for Visit:   Chief Complaint   Patient presents with     Oncology Clinic Visit     4 week recheck Breast CA, Labs & Chemo today       Oncologic History:  Breast cancer (H)  Etta Cervantes presented with increasing lump in the right axilla that s lump has been growing without any pain or associated symptoms. Subsequently she was evaluated by primary care physician. Diagnostic digital mammogram was done on 01/13/2015 showing enlarged lymph nodes in the right axilla. There was some skin thickening in the right breast anteriorly and laterally. There are no parenchymal changes in the right breast. The left breast shows a 1.7 cm spiculated mass at approximately 2 to 3 o'clock position, 15.2 cm away from the nipple. A right breast ultrasound was subsequently done and ultrasound guided biopsy was done. Needle biopsy of the left breast did show infiltrating ductal carcinoma grade I of III with no angiolymphatic invasion seen. No associated ductal carcinoma in situ. Estrogen receptor, progresterone receptor were positive. HER-2/sadie receptor came back negative.. Another biopsy from the right axilla did show metastatic ductal carcinoma. PET scan was done on January 26, 2015 showing few small right posterior cervical chain nodes the largest is 1.2 cm. There is extensive bulky right axillary adenopathy demonstrating hypermetabolic FDG uptake with SUV of 10.6. There is skin thickening involving the right breast which demonstrated low-grade FDG uptake. A 1.2 cm lesion on the lateral aspect of the left breast demonstrated minimally increased FDG uptake with SUV of 2. Scattered hypermetabolic mediastinal lymph nodes located in the left superior anterior mediastinum, right paratracheal and precarinal U, subcranial adenopathy with javon metastases. This focus hypermetabolic FDG uptake identified definitive Amanda posterior aspect off intertrochanteric region of the proximal  left femur consistent with bone metastases. There is also 1.4 cm hypermetabolic FDG uptake identified in the anterior medial segment of the left hepatic lobe consistent with liver metastases. Additional 2.1 cm low-attenuation lesion anterior aspect of the right lobe which needs to be determined. The patient was started on chemotherapy with Taxotere and Cytoxan on February 9, 2015.  She concluded 4 cycles of Taxotere and Cytoxan. She started on Arimidex 1 mg orally daily. Currently she is on Faslodex and ibrance. Subsequently the patient went on to receive Afinitor. The patient also started treatment with Xeloda.       Interval History:  Patient is here today for follow-up.  She has been tolerating chemotherapy well without significant side effects.  C. difficile has been treated with antibiotics.  Patient denies any recent diarrhea.  She denies any shortness of breath or cough or wheezing.  She denies any chest pain.  She denies any back pain.    Review Of Systems:  Constitutional: Negative for fever, chills, and night sweats.  Skin: negative.  Eyes: negative.  Ears/Nose/Throat: negative.  Respiratory: No shortness of breath, dyspnea on exertion, cough, or hemoptysis.  Cardiovascular: negative.  Gastrointestinal: negative.  Genitourinary: negative.  Musculoskeletal: negative.  Neurologic: negative.  Psychiatric: negative.  Hematologic/Lymphatic/Immunologic: negative.  Endocrine: negative.    All other ROS negative unless mentioned in interval history.    Past medical, social, surgical, and family histories reviewed.    Allergies:  Allergies as of 02/21/2018 - Manuel as Reviewed 02/21/2018   Allergen Reaction Noted     Animal dander Cough 01/23/2015     Cats  07/15/2010     Dust mites Cough 01/23/2015     Pollen extract  01/23/2015     Seasonal allergies  07/15/2010     Levaquin [levofloxacin] Rash 07/17/2017       Current Medications:  Current Outpatient Prescriptions   Medication Sig Dispense Refill     warfarin  (COUMADIN) 2.5 MG tablet As directed by Anticoagulation Clinic (maintenance dose not yet established) 30 tablet 1     vancomycin (VANCOCIN) 125 MG capsule Take 1 capsule (125 mg) by mouth 4 times daily 60 capsule 0     HYDROcodone-acetaminophen (NORCO) 5-325 MG per tablet Take 1-2 tablets by mouth every 4 hours as needed for moderate to severe pain 20 tablet 0     budesonide-formoterol (SYMBICORT) 160-4.5 MCG/ACT Inhaler Inhale 2 puffs into the lungs 2 times daily 1 Inhaler 1     oxyCODONE IR (ROXICODONE) 5 MG tablet Take 1 tablet (5 mg) by mouth every 4 hours as needed for moderate to severe pain 60 tablet 0     zolpidem (AMBIEN CR) 12.5 MG CR tablet Take 1 tablet by mouth at bed time. 30 tablet 5     QVAR 80 MCG/ACT Inhaler INHALE 1 PUFFS INTO THE EACH NOSTRIL 2 TIMES DAILY 26.1 g 3     Phenylephrine-DM-GG (ROBITUSSIN COUGH/COLD CF PO) Take 10 mLs by mouth as needed        levothyroxine (SYNTHROID/LEVOTHROID) 25 MCG tablet TAKE 1 TABLET (25 MCG) BY MOUTH DAILY 90 tablet 2     calcium carb-cholecalciferol (KP CALCIUM 600+D3) 600-500 MG-UNIT CAPS Take 2 tablets by mouth daily       albuterol (2.5 MG/3ML) 0.083% neb solution Take 1 vial (2.5 mg) by nebulization every 4 hours as needed for shortness of breath / dyspnea 30 vial 3     CRANBERRY PO Take 1 capsule by mouth daily        azelastine (ASTELIN) 0.1 % nasal spray Spray 1-2 sprays into both nostrils 2 times daily as needed for rhinitis 3 Bottle 3     prochlorperazine (COMPAZINE) 10 MG tablet Take 1 tablet (10 mg) by mouth every 6 hours as needed (Nausea/Vomiting) (Patient not taking: Reported on 2/6/2018) 30 tablet 2     diclofenac (VOLTAREN) 1 % GEL topical gel Place 2 g onto the skin 4 times daily (Patient not taking: Reported on 2/21/2018) 100 g 0     Lidocaine (LIDOCARE) 4 % Patch Place 1-2 patches onto the skin every 24 hours (Patient not taking: Reported on 1/24/2018) 30 patch 0     loratadine (CLARITIN) 10 MG tablet Take 10 mg by mouth daily as needed for  "allergies       order for DME Equipment being ordered: AnyiviKiran post-lumpectomy compression pad x2 1 each 0     diphenhydrAMINE (BENADRYL) 25 MG tablet Take 1 tablet (25 mg) by mouth every 8 hours as needed for itching or allergies (Patient not taking: Reported on 2/6/2018) 1 tablet 0     metoclopramide (REGLAN) 10 MG tablet Take 1 tablet (10 mg) by mouth 2 times daily as needed 120 tablet 1     fexofenadine (ALLEGRA ALLERGY) 180 MG tablet Take 180 mg by mouth daily as needed for allergies       albuterol (VENTOLIN HFA) 108 (90 BASE) MCG/ACT inhaler Inhale 2 puffs into the lungs every 4 hours as needed (Patient not taking: Reported on 2/21/2018) 1 Inhaler 2        Physical Exam:  /45 (BP Location: Right arm, Patient Position: Sitting, Cuff Size: Adult Regular)  Pulse 96  Temp 99.3  F (37.4  C) (Tympanic)  Resp 10  Ht 1.632 m (5' 4.25\")  Wt 83.9 kg (185 lb)  LMP 02/06/2013  SpO2 99%  Breastfeeding? No  BMI 31.51 kg/m2  Wt Readings from Last 12 Encounters:   02/21/18 83.9 kg (185 lb)   02/07/18 83.5 kg (184 lb)   02/06/18 83.7 kg (184 lb 8 oz)   01/24/18 83.2 kg (183 lb 6.4 oz)   01/17/18 85.4 kg (188 lb 3.2 oz)   01/10/18 83.9 kg (185 lb)   01/05/18 83.9 kg (185 lb)   01/03/18 86.5 kg (190 lb 9.6 oz)   12/27/17 84 kg (185 lb 3.2 oz)   12/21/17 89.9 kg (198 lb 3.2 oz)   12/14/17 89.1 kg (196 lb 8 oz)   12/12/17 90.6 kg (199 lb 11.2 oz)     ECOG performance status: 0  GENERAL APPEARANCE: Healthy, alert and in no acute distress.  HEENT: Sclerae anicteric. PERRLA. Oropharynx without ulcers, lesions, or thrush.  NECK: Supple. No asymmetry or masses.  LYMPHATICS: No palpable cervical, supraclavicular, axillary, or inguinal lymphadenopathy.  RESP: Lungs clear to auscultation bilaterally without rales, rhonchi or wheezes.  BREAST: 1 cm lymph node felt in the right axilla.  Right breast no dominant masses or axillary adenopathy.  Left breast no dominant masses or axillary adenopathy.  \"CARDIOVASCULAR: Regular " rate and rhythm. Normal S1, S2; no S3 or S4. No murmur, gallop, or rub.  ABDOMEN: Soft, nontender. Bowel sounds normal. No palpable organomegaly or masses.  MUSCULOSKELETAL: Extremities without gross deformities noted. No edema of bilateral lower extremities.  SKIN: No suspicious lesions or rashes.  NEURO: Alert and oriented x 3. Cranial nerves II-XII grossly intact.  PSYCHIATRIC: Mentation and affect appear normal.    Laboratory/Imaging Studies:  Infusion Therapy Visit on 02/21/2018   Component Date Value Ref Range Status     WBC 02/21/2018 4.7  4.0 - 11.0 10e9/L Final     RBC Count 02/21/2018 3.90  3.8 - 5.2 10e12/L Final     Hemoglobin 02/21/2018 11.4* 11.7 - 15.7 g/dL Final     Hematocrit 02/21/2018 35.5  35.0 - 47.0 % Final     MCV 02/21/2018 91  78 - 100 fl Final     MCH 02/21/2018 29.2  26.5 - 33.0 pg Final     MCHC 02/21/2018 32.1  31.5 - 36.5 g/dL Final     RDW 02/21/2018 15.2* 10.0 - 15.0 % Final     Platelet Count 02/21/2018 298  150 - 450 10e9/L Final     Diff Method 02/21/2018 Automated Method   Final     % Neutrophils 02/21/2018 54.8  % Final     % Lymphocytes 02/21/2018 23.8  % Final     % Monocytes 02/21/2018 17.9  % Final     % Eosinophils 02/21/2018 2.3  % Final     % Basophils 02/21/2018 0.6  % Final     % Immature Granulocytes 02/21/2018 0.6  % Final     Absolute Neutrophil 02/21/2018 2.6  1.6 - 8.3 10e9/L Final     Absolute Lymphocytes 02/21/2018 1.1  0.8 - 5.3 10e9/L Final     Absolute Monocytes 02/21/2018 0.9  0.0 - 1.3 10e9/L Final     Absolute Eosinophils 02/21/2018 0.1  0.0 - 0.7 10e9/L Final     Absolute Basophils 02/21/2018 0.0  0.0 - 0.2 10e9/L Final     Abs Immature Granulocytes 02/21/2018 0.0  0 - 0.4 10e9/L Final     Sodium 02/21/2018 141  133 - 144 mmol/L Final     Potassium 02/21/2018 4.1  3.4 - 5.3 mmol/L Final     Chloride 02/21/2018 110* 94 - 109 mmol/L Final     Carbon Dioxide 02/21/2018 25  20 - 32 mmol/L Final     Anion Gap 02/21/2018 6  3 - 14 mmol/L Final     Glucose  02/21/2018 91  70 - 99 mg/dL Final     Urea Nitrogen 02/21/2018 13  7 - 30 mg/dL Final     Creatinine 02/21/2018 0.56  0.52 - 1.04 mg/dL Final     GFR Estimate 02/21/2018 >90  >60 mL/min/1.7m2 Final    Non  GFR Calc     GFR Estimate If Black 02/21/2018 >90  >60 mL/min/1.7m2 Final    African American GFR Calc     Calcium 02/21/2018 8.4* 8.5 - 10.1 mg/dL Final     Bilirubin Total 02/21/2018 0.7  0.2 - 1.3 mg/dL Final     Albumin 02/21/2018 2.9* 3.4 - 5.0 g/dL Final     Protein Total 02/21/2018 6.8  6.8 - 8.8 g/dL Final     Alkaline Phosphatase 02/21/2018 123  40 - 150 U/L Final     ALT 02/21/2018 22  0 - 50 U/L Final     AST 02/21/2018 32  0 - 45 U/L Final   Orders Only on 02/19/2018   Component Date Value Ref Range Status     INR 02/19/2018 2.29* 0.86 - 1.14 Final   Orders Only on 02/16/2018   Component Date Value Ref Range Status     INR 02/16/2018 2.23* 0.86 - 1.14 Final   Anticoagulation Therapy Visit on 02/14/2018   Component Date Value Ref Range Status     INR Protime 02/14/2018 3.4* 0.86 - 1.14 Final   Orders Only on 02/12/2018   Component Date Value Ref Range Status     INR 02/12/2018 2.48* 0.86 - 1.14 Final   Orders Only on 02/09/2018   Component Date Value Ref Range Status     INR 02/09/2018 3.27* 0.86 - 1.14 Final        .    Assessment and plan:  (C50.919,  C78.7) Carcinoma of breast metastatic to liver, unspecified laterality (H)  (primary encounter diagnosis)  (C50.911,  Z17.0,  C50.912) Bilateral malignant neoplasm of breast in female, estrogen receptor positive, unspecified site of breast (H)  (C79.51) Bone metastasis (H)  (C50.911,  C77.3) 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)  (C78.7) Secondary malignant neoplasm of liver (H)  Patient has been tolerating chemotherapy well.  We will continue with the current plan.  I will see the patient again in 1 month or sooner if there are new developments or concerns.  We will arrange for CT scan of the chest abdomen  pelvis to assess response to treatment.    (J45.40) Moderate persistent asthma without complication  Patient has met is currently well-controlled    (E03.9) Hypothyroidism, unspecified type  Patient currently on Synthroid 25 mcg daily.    (K12.31) Mucositis due to chemotherapy  Patient currently using Magic mouthwash if needed.    (G89.3) Cancer associated pain  The patient pain is currently well-controlled    (I82.621) Acute deep vein thrombosis (DVT) of right upper extremity, unspecified vein (H)  Patient continued anticoagulation was warfarin.  INR has been controlled through the anticoagulation clinic.      The patient is ready to learn, no apparent learning barriers were identified.  Diagnosis and treatment plans were explained to the patient. The patient expressed understanding of the content. The patient asked appropriate questions. The patient questions were answered to her satisfaction.    Chart documentation with Dragon Voice recognition Software. Although reviewed after completion, some words and grammatical errors may remain.

## 2018-02-21 NOTE — NURSING NOTE
"Oncology Rooming Note    February 21, 2018 9:16 AM   Etta Cervantes is a 57 year old female who presents for:    Chief Complaint   Patient presents with     Oncology Clinic Visit     4 week recheck Breast CA, Labs & Chemo today     Initial Vitals: /45 (BP Location: Right arm, Patient Position: Sitting, Cuff Size: Adult Regular)  Pulse 96  Temp 99.3  F (37.4  C) (Tympanic)  Resp 10  Ht 1.632 m (5' 4.25\")  Wt 83.9 kg (185 lb)  LMP 02/06/2013  SpO2 99%  Breastfeeding? No  BMI 31.51 kg/m2 Estimated body mass index is 31.51 kg/(m^2) as calculated from the following:    Height as of this encounter: 1.632 m (5' 4.25\").    Weight as of this encounter: 83.9 kg (185 lb). Body surface area is 1.95 meters squared.  No Pain (0) Comment: Data Unavailable   Patient's last menstrual period was 02/06/2013.  Allergies reviewed: Yes  Medications reviewed: Yes    Medications: Medication refills not needed today.  Pharmacy name entered into [facility]:    [id number] [place], [place] - [address] [facility] [place] [place] [facility]    Clinical concerns: 4 week recheck Breast CA, Labs & Chemo today.         7 minutes for nursing intake (face to face time)     Josephine Virgen CMA              "

## 2018-02-23 NOTE — PROGRESS NOTES
ANTICOAGULATION FOLLOW-UP CLINIC VISIT    Patient Name:  Etta Cervantes  Date:  2/23/2018  Contact Type:  Telephone/ spoke with Etta    SUBJECTIVE:     Patient Findings     Positives No Problem Findings    Comments Pt denies changes in medications, diet, activity or health, reports taking warfarin as directed.    Pt reports she will decrease Vanco from BID to 1x/day on Mon 2/26.  Since pt is in therapeutic range, will keep her on 1.25 mg daily and she will recheck INR on Th 3/1 when she goes in for chemo infusion.           OBJECTIVE    INR   Date Value Ref Range Status   02/23/2018 3.04 (H) 0.86 - 1.14 Final       ASSESSMENT / PLAN  No question data found.  Anticoagulation Summary as of 2/23/2018     INR goal 2.0-3.0   Today's INR 3.04!   Maintenance plan No maintenance plan   Full instructions 2/23: 1.25 mg; 2/24: 1.25 mg; 2/25: 1.25 mg; 2/26: 1.25 mg; 2/27: 1.25 mg; 2/28: 1.25 mg   Plan last modified Kimberly Woods RN (2/7/2018)   Next INR check 3/1/2018   Priority INR   Target end date     Indications   Acute deep vein thrombosis (DVT) of right upper extremity  unspecified vein (H) [I82.621]  Long-term (current) use of anticoagulants [Z79.01] [Z79.01]         Anticoagulation Episode Summary     INR check location     Preferred lab     Send INR reminders to [place] PHONE [facility] POOL    Comments * weekly abraxane infusions for metastatic breast cx (this can increase or decrease INR per chemo reference list). Pt prefers [facility]. Vanco taper for C-diff, OK to send instructions via [employer]      Anticoagulation Care Providers     Provider Role Specialty Phone number    Brodie Cassidy MD Referring Hematology & Oncology [phone]    Johana Fairbanks MD Responsible Family Practice [phone]            See the Encounter Report to view Anticoagulation Flowsheet and Dosing Calendar (Go to Encounters tab in chart review, and find the Anticoagulation Therapy Visit)        Joselin Boothe RN

## 2018-02-23 NOTE — MR AVS SNAPSHOT
Etta Cervantes   2/23/2018   Anticoagulation Therapy Visit    Description:  57 year old female   Provider:  Joselin Boothe RN   [facility]           INR as of 2/23/2018     Today's INR 3.04!      Anticoagulation Summary as of 2/23/2018     INR goal 2.0-3.0   Today's INR 3.04!   Full instructions 2/23: 1.25 mg; 2/24: 1.25 mg; 2/25: 1.25 mg; 2/26: 1.25 mg; 2/27: 1.25 mg; 2/28: 1.25 mg   Next INR check 3/1/2018    Indications   Acute deep vein thrombosis (DVT) of right upper extremity  unspecified vein (H) [I82.621]  Long-term (current) use of anticoagulants [Z79.01] [Z79.01]         February 2018 Details    Sun Mon Tue Wed Thu Fri Sat         1               2               3                 4               5               6               7               8               9               10                 11               12               13               14               15               16               17                 18               19               20               21               22               23      1.25 mg   See details      24      1.25 mg           25      1.25 mg         26      1.25 mg         27      1.25 mg         28      1.25 mg             Date Details   02/23 This INR check               How to take your warfarin dose     To take:  1.25 mg Take 0.5 of a 2.5 mg tablet.           March 2018 Details    Sun Mon Tue Wed Thu Fri Sat         1            2               3                 4               5               6               7               8               9               10                 11               12               13               14               15               16               17                 18               19               20               21               22               23               24                 25               26               27               28               29               30               31                Date Details   No  additional details    Date of next INR:  3/1/2018

## 2018-02-23 NOTE — PROGRESS NOTES
Long term disability form received from patient for [employer].  Form completed, faxed to number provided.  Original in envelope at  for patient , copy to scanning.

## 2018-02-23 NOTE — TELEPHONE ENCOUNTER
The pharmacy sent back a notice that the insurance will not cover the qnasl as it's not on formulary.  I don't see that she has been on flonase or nasonex in the past, has she tried these?  Yoanna Ha PA-C

## 2018-02-26 NOTE — TELEPHONE ENCOUNTER
Looks like encounter was closed, but not seeing what was done. Was the patient called and different medication ordered?  Pharmacy is calling asking what we are doing with this one.      [facility]  [phone].      Thank you..Sandra Parekh

## 2018-02-26 NOTE — TELEPHONE ENCOUNTER
"Patient said that several years ago, \"before I went to [place], I tried something for allergies and stopped it because it gave me nosebleeds but I do not remember what that was.\" She does not know if it was flonase or nasonex or something else.  Manoj Brewer, RN    "

## 2018-02-26 NOTE — TELEPHONE ENCOUNTER
"SYMBICORT 160-4.5 MCG INHALER        Last Written Prescription Date:  1/5/18  Last Fill Quantity: 1,   # refills: 1  Last Office Visit: 9/5/17 Tiki  Future Office visit:    Next 5 appointments (look out 90 days)     Mar 21, 2018 10:00 AM CDT   Return Visit with Brodie Cassidy MD   [facility] ([facility])    [facility]  [address]  [facility] [id number] [phone]                   Requested Prescriptions   Pending Prescriptions Disp Refills     SYMBICORT 160-4.5 MCG/ACT Inhaler [Pharmacy Med Name: SYMBICORT 160-4.5 MCG INHALER] 10.2 Inhaler 1     Sig: INHALE 2 PUFFS INTO THE LUNGS 2 TIMES DAILY    Inhaled Steroids Protocol Failed    2/24/2018  9:57 AM       Failed - Asthma control test 20 or greater in last 6 months    Please review ACT score.   ACT Total Scores 8/24/2016 4/28/2017 10/6/2017   ACT TOTAL SCORE - - -   ASTHMA ER VISITS - - -   ASTHMA HOSPITALIZATIONS - - -   ACT TOTAL SCORE (Goal Greater than or Equal to 20) 18 24 19   In the past 12 months, how many times did you visit the emergency room for your asthma without being admitted to the hospital? 0 0 0   In the past 12 months, how many times were you hospitalized overnight because of your asthma? 0 0 0            Passed - Patient is age 12 or older       Passed - Recent (6 mo) or future visit with authorizing provider's specialty    Patient had office visit in the last 6 months or has a visit in the next 30 days with authorizing provider.  See \"Patient Info\" tab in inbasket, or \"Choose Columns\" in Meds & Orders section of the refill encounter.              "

## 2018-02-27 NOTE — TELEPHONE ENCOUNTER
Routing refill request to provider for review/approval because:  Patient needs to be seen because:  Due for 6 month office visit for asthma.  ACT was 19 on 10-6-17, needs to be greater than 20 for protocol.    RIANA Garcia

## 2018-02-27 NOTE — TELEPHONE ENCOUNTER
I don't have any insurance information or pharmacy notes.  Encounter was already closed and I created an addendum.  Provider is requesting prior authorization for QNASAL.  See notes below.    ANA M JORDAN

## 2018-02-27 NOTE — TELEPHONE ENCOUNTER
I don't see any record of what she's been on in the past. Please start prior auth for qnasal due to nosebleeds on other nasal sprays. She has been on this beclomethasone at least 7 years.  Yoanna Ha PA-C

## 2018-02-28 NOTE — TELEPHONE ENCOUNTER
PRIOR AUTHORIZATION DENIED    Medication: QNASL-DENIED    Denial Date: 2/28/2018    Denial Rational: MEDICATION IS EXCLUDED FROM PATIENT'S PLAN.      Appeal Information:  Insurance rep states this medication is excluded from patient's plan.  When a medication is excluded the insurance does not offer any type of review or appeal.  If patient is needing this medication she will have to pay out of pocket.

## 2018-02-28 NOTE — TELEPHONE ENCOUNTER
Central Prior Authorization Team   Phone: [phone]    PA Initiation    Medication: QNASL  Insurance Company: [employer] - Phone [phone] Fax [phone]  [id number] [place] [place] [facility] [address]  Filling Pharmacy Phone: [phone]  Filling Pharmacy Fax: [phone]  Start Date: 2/28/2018    INITIATED VIA PHONE.

## 2018-03-01 NOTE — MR AVS SNAPSHOT
After Visit Summary   3/1/2018    Etta Cervantes    MRN: [id number]           Patient Information     Date Of Birth          1960        Visit Information        Provider Department      3/1/2018 8:30 AM ROOM 1 [facility]        Today's Diagnoses     Secondary malignant neoplasm of liver (H)    -  1    Carcinoma of breast metastatic to liver, unspecified laterality (H)        Bone metastasis (H)        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)        Long-term (current) use of anticoagulants        Acute deep vein thrombosis (DVT) of right upper extremity, unspecified vein (H)           Follow-ups after your visit        Your next 10 appointments already scheduled     Mar 07, 2018  8:00 AM CST   Level 1 with ROOM 9 [facility] ([facility])    [facility]  [address]  [facility] [address]   [phone]            Mar 19, 2018  9:30 AM CDT   (Arrive by 9:15 AM)   CT CHEST/ABDOMEN/PELVIS W CONTRAST with [facility]   [facility] ([facility])    [address]  [facility] [address]   [phone]           Please bring any scans or X-rays taken at other hospitals, if similar tests were done. Also bring a list of your medicines, including vitamins, minerals and over-the-counter drugs. It is safest to leave personal items at home.  Be sure to tell your doctor:   If you have any allergies.   If there s any chance you are pregnant.   If you are breastfeeding.  You may have contrast for this exam. To prepare:   Do not eat or drink for 2 hours before your exam. If you need to take medicine, you may take it with small sips of water. (We may ask you to take liquid medicine as well.)   The day before your exam, drink extra fluids at least six 8-ounce glasses (unless your doctor tells you to restrict your fluids).   You will be given instructions on how to drink the contrast.  Patients  over 70 or patients with diabetes or kidney problems:   If you haven t had a blood test (creatinine test) within the last 30 days, the Cardiologist/Radiologist may require you to get this test prior to your exam.  If you have diabetes:   Continue to take your metformin medication on the day of your exam  Please wear loose clothing, such as a sweat suit or jogging clothes. Avoid snaps, zippers and other metal. We may ask you to undress and put on a hospital gown.  If you have any questions, please call the Imaging Department where you will have your exam.            Mar 21, 2018  9:00 AM CDT   Level 1 with ROOM 4 [facility] ([facility])    [facility]  [address]  [place] [address]   [phone]            Mar 21, 2018 10:00 AM CDT   Return Visit with Brodie Cassidy MD   [facility] ([facility])    [facility]  [address]  [facility] [address]   [phone]            Mar 28, 2018  9:00 AM CDT   Level 1 with ROOM 1 [facility] ([facility])    [facility]  [address]  [facility] [address]   [phone]            Apr 04, 2018  8:30 AM CDT   Level 1 with ROOM 1 [facility] ([facility])    [facility]  [address]  [facility] [address]   [phone]              Who to contact     If you have questions or need follow up information about today's clinic visit or your schedule please contact [facility] directly at [phone].  Normal or non-critical lab and imaging results will be communicated to you by MyChart, letter or phone within 4 business days after the clinic has received the results. If you do not hear from us within 7 days, please contact the clinic through MyChart or phone. If you have a critical or abnormal lab result, we will  notify you by phone as soon as possible.  Submit refill requests through [employer] or call your pharmacy and they will forward the refill request to us. Please allow 3 business days for your refill to be completed.          Additional Information About Your Visit        [employer] Information     [employer] gives you secure access to your electronic health record. If you see a primary care provider, you can also send messages to your care team and make appointments. If you have questions, please call your primary care clinic.  If you do not have a primary care provider, please call [phone] and they will assist you.        Care EveryWhere ID     This is your Care EveryWhere ID. This could be used by other organizations to access your [facility] medical records  [id number]        Your Vitals Were     Pulse Temperature Last Period BMI (Body Mass Index)          96 98.2  F (36.8  C) 02/06/2013 31.34 kg/m2         Blood Pressure from Last 3 Encounters:   03/01/18 101/58   02/21/18 109/45   02/21/18 112/53    Weight from Last 3 Encounters:   03/01/18 83.5 kg (184 lb)   02/21/18 83.9 kg (185 lb)   02/07/18 83.5 kg (184 lb)              We Performed the Following     CBC with platelets differential     Comprehensive metabolic panel     INR        Primary Care Provider Office Phone # Fax #    Johana Fairbanks [phone] [address] [facility] [address]        Equal Access to Services     [facility]: Hadii han ham hadasho Soisaura, waaxda luqadaha, qaybta kaalmada lexi, hari rodriguez . So [facility] [phone].    ATENCIÓN: Si habla español, tiene a parekh disposición servicios gratuitos de asistencia lingüística. Llame al [phone].    We comply with applicable federal civil rights laws and [place] laws. We do not discriminate on the basis of race, color, national origin, age, disability, sex, sexual orientation, or gender identity.            Thank you!     Thank you  for choosing [facility]  for your care. Our goal is always to provide you with excellent care. Hearing back from our patients is one way we can continue to improve our services. Please take a few minutes to complete the written survey that you may receive in the mail after your visit with us. Thank you!             Your Updated Medication List - Protect others around you: Learn how to safely use, store and throw away your medicines at www.disposemymeds.org.          This list is accurate as of 3/1/18  2:09 PM.  Always use your most recent med list.                   Brand Name Dispense Instructions for use Diagnosis    * albuterol 108 (90 BASE) MCG/ACT Inhaler    VENTOLIN HFA    1 Inhaler    Inhale 2 puffs into the lungs every 4 hours as needed    Moderate persistent asthma, uncomplicated       * albuterol (2.5 MG/3ML) 0.083% neb solution     30 vial    Take 1 vial (2.5 mg) by nebulization every 4 hours as needed for shortness of breath / dyspnea    Moderate persistent asthma, uncomplicated       ALLEGRA ALLERGY 180 MG tablet   Generic drug:  fexofenadine      Take 180 mg by mouth daily as needed for allergies        azelastine 0.1 % spray    ASTELIN    3 Bottle    Spray 1-2 sprays into both nostrils 2 times daily as needed for rhinitis    Chronic rhinitis       Beclomethasone Dipropionate 80 MCG/ACT Aers Nasal Spray     8.7 g    Spray 2 sprays into both nostrils daily    Other chronic rhinitis       CRANBERRY PO      Take 1 capsule by mouth daily        diclofenac 1 % Gel topical gel    VOLTAREN    100 g    Place 2 g onto the skin 4 times daily    Carcinoma of breast metastatic to liver, unspecified laterality (H)       diphenhydrAMINE 25 MG tablet    BENADRYL    1 tablet    Take 1 tablet (25 mg) by mouth every 8 hours as needed for itching or allergies    Carcinoma of breast metastatic to liver, unspecified laterality (H), Bone metastasis (H), Pericardial effusion, Bilateral malignant neoplasm of breast  in female, estrogen receptor positive, unspecified site of breast (H), 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), Secondary malignant neoplasm of liver (H), Chemotherapy-induced neutropenia (H), Emphysematous bleb of lung (H), Hypothyroidism, unspecified type, Hyperlipidemia LDL goal <130, Moderate persistent asthma without complication, Mucositis due to chemotherapy       HYDROcodone-acetaminophen 5-325 MG per tablet    NORCO    20 tablet    Take 1-2 tablets by mouth every 4 hours as needed for moderate to severe pain    Post-op pain       KP CALCIUM 600+D3 600-500 MG-UNIT Caps   Generic drug:  calcium carb-cholecalciferol      Take 2 tablets by mouth daily        levothyroxine 25 MCG tablet    SYNTHROID/LEVOTHROID    90 tablet    TAKE 1 TABLET (25 MCG) BY MOUTH DAILY    Hypothyroidism due to acquired atrophy of thyroid       Lidocaine 4 % Patch    LIDOCARE    30 patch    Place 1-2 patches onto the skin every 24 hours    Carcinoma of breast metastatic to liver, unspecified laterality (H)       loratadine 10 MG tablet    CLARITIN     Take 10 mg by mouth daily as needed for allergies        metoclopramide 10 MG tablet    REGLAN    120 tablet    Take 1 tablet (10 mg) by mouth 2 times daily as needed    Bilateral malignant neoplasm of breast in female, estrogen receptor positive, unspecified site of breast (H)       order for DME     1 each    Equipment being ordered: [employer] post-lumpectomy compression pad x2    Lymphedema, Lymphedema of breast       oxyCODONE IR 5 MG tablet    ROXICODONE    60 tablet    Take 1 tablet (5 mg) by mouth every 4 hours as needed for moderate to severe pain    Secondary malignant neoplasm of liver (H), Carcinoma of breast metastatic to liver, unspecified laterality (H), 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), Bone metastasis (H), Pericardial effusion       prochlorperazine 10 MG tablet    COMPAZINE    30 tablet    Take 1 tablet (10 mg) by mouth  every 6 hours as needed (Nausea/Vomiting)    Secondary malignant neoplasm of liver (H), Carcinoma of breast metastatic to liver, unspecified laterality (H), Bone metastasis (H), 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), Bilateral malignant neoplasm of breast in female, estrogen receptor positive, unspecified site of breast (H)       QVAR 80 MCG/ACT Inhaler   Generic drug:  beclomethasone     26.1 g    INHALE 1 PUFFS INTO THE EACH NOSTRIL 2 TIMES DAILY    Chronic rhinitis       ROBITUSSIN COUGH/COLD CF PO      Take 10 mLs by mouth as needed    Breast cancer metastasized to axillary lymph node, right (H)       SYMBICORT 160-4.5 MCG/ACT Inhaler   Generic drug:  budesonide-formoterol     10.2 Inhaler    INHALE 2 PUFFS INTO THE LUNGS 2 TIMES DAILY    Moderate persistent asthma without complication       vancomycin 125 MG capsule    VANCOCIN    60 capsule    Take 1 capsule (125 mg) by mouth 4 times daily    Clostridium difficile diarrhea       warfarin 2.5 MG tablet    COUMADIN    30 tablet    As directed by Anticoagulation Clinic (maintenance dose not yet established)    Long-term (current) use of anticoagulants, Acute deep vein thrombosis (DVT) of right upper extremity, unspecified vein (H)       zolpidem 12.5 MG CR tablet    AMBIEN CR    30 tablet    Take 1 tablet by mouth at bed time.    Insomnia due to medical condition       * Notice:  This list has 2 medication(s) that are the same as other medications prescribed for you. Read the directions carefully, and ask your doctor or other care provider to review them with you.

## 2018-03-01 NOTE — PROGRESS NOTES
Infusion Nursing Note:  Etta Cervantes presents today for Taxol.    Patient seen by provider today: No   present during visit today: Not Applicable.    Note: N/A.    Intravenous Access:  Labs drawn without difficulty.  Implanted Port.    Treatment Conditions:  Lab Results   Component Value Date    HGB 10.9 03/01/2018     Lab Results   Component Value Date    WBC 6.5 03/01/2018      Lab Results   Component Value Date    ANEU 4.5 03/01/2018     Lab Results   Component Value Date     03/01/2018      Lab Results   Component Value Date     03/01/2018                   Lab Results   Component Value Date    POTASSIUM 3.7 03/01/2018           Lab Results   Component Value Date    MAG 2.1 07/13/2017            Lab Results   Component Value Date    CR 0.68 03/01/2018                   Lab Results   Component Value Date    BEN 8.6 03/01/2018                Lab Results   Component Value Date    BILITOTAL 0.9 03/01/2018           Lab Results   Component Value Date    ALBUMIN 2.9 03/01/2018                    Lab Results   Component Value Date    ALT 21 03/01/2018           Lab Results   Component Value Date    AST 42 03/01/2018       Results reviewed, labs MET treatment parameters, ok to proceed with treatment.      Post Infusion Assessment:  Patient tolerated infusion without incident.  Blood return noted pre and post infusion.    Discharge Plan:   Patient discharged in stable condition accompanied by: .  Departure Mode: Ambulatory.  Next chemo day scheduled for 3/7.  Kathy Cavazos RN

## 2018-03-01 NOTE — PROGRESS NOTES
ANTICOAGULATION FOLLOW-UP CLINIC VISIT    Patient Name:  Etta Cervantes  Date:  3/1/2018  Contact Type:  Telephone    SUBJECTIVE:     Patient Findings     Positives No Problem Findings           OBJECTIVE    INR   Date Value Ref Range Status   03/01/2018 2.72 (H) 0.86 - 1.14 Final       ASSESSMENT / PLAN  INR assessment THER    Recheck INR In: 6 DAYS    INR Location Outside lab      Anticoagulation Summary as of 3/1/2018     INR goal 2.0-3.0   Today's INR 2.72   Maintenance plan No maintenance plan   Full instructions 3/1: 1.25 mg; 3/2: 1.25 mg; 3/3: 1.25 mg; 3/4: 1.25 mg; 3/5: 1.25 mg; 3/6: 1.25 mg   Plan last modified Kimberly Woods RN (2/7/2018)   Next INR check 3/7/2018   Priority INR   Target end date     Indications   Acute deep vein thrombosis (DVT) of right upper extremity  unspecified vein (H) [I82.621]  Long-term (current) use of anticoagulants [Z79.01] [Z79.01]         Anticoagulation Episode Summary     INR check location     Preferred lab     Send INR reminders to [facility] PHONE [facility] POOL    Comments * weekly abraxane infusions for metastatic breast cx (this can increase or decrease INR per chemo reference list). Pt prefers [facility]. Vanco taper for C-diff, OK to send instructions [facility]      Anticoagulation Care Providers     Provider Role Specialty Phone number    Brodie Cassidy MD Referring Hematology & Oncology [phone]    Johana Fairbanks MD [facility] [phone]            See the Encounter Report to view Anticoagulation Flowsheet and Dosing Calendar (Go to Encounters tab in chart review, and find the Anticoagulation Therapy Visit)      Kathy [facility]

## 2018-03-01 NOTE — MR AVS SNAPSHOT
Etta Cervantes   3/1/2018   Anticoagulation Therapy Visit    Description:  57 year old female   Provider:  Johana Fairbanks MD   Department:  [facility]           INR as of 3/1/2018     Today's INR 2.72      Anticoagulation Summary as of 3/1/2018     INR goal 2.0-3.0   Today's INR 2.72   Full instructions 3/1: 1.25 mg; 3/2: 1.25 mg; 3/3: 1.25 mg; 3/4: 1.25 mg; 3/5: 1.25 mg; 3/6: 1.25 mg   Next INR check 3/7/2018    Indications   Acute deep vein thrombosis (DVT) of right upper extremity  unspecified vein (H) [I82.621]  Long-term (current) use of anticoagulants [Z79.01] [Z79.01]         Description     Continue 1.25 mg daily. Recheck INR on 3/7/18 during Infusion Therapy appt.      March 2018 Details    Sun Mon Tue Wed Thu Fri Sat         1      1.25 mg   See details      2      1.25 mg         3      1.25 mg           4      1.25 mg         5      1.25 mg         6      1.25 mg         7            8               9               10                 11               12               13               14               15               16               17                 18               19               20               21               22               23               24                 25               26               27               28               29               30               31                Date Details   03/01 This INR check       Date of next INR:  3/7/2018         How to take your warfarin dose     To take:  1.25 mg Take 0.5 of a 2.5 mg tablet.

## 2018-03-05 NOTE — TELEPHONE ENCOUNTER
"SYMBICORT 160-4.5 MCG INHALER        Last Written Prescription Date:  2/28/18  Last Fill Quantity: 10.2,   # refills: 1  Last Office Visit: 9/5/17 [place]  Future Office visit:    Next 5 appointments (look out 90 days)     Mar 21, 2018 10:00 AM CDT   Return Visit with Brodie Cassidy MD   [facility] ([facility])    [facility]  [address]  [facility] [address]   [phone]                   Requested Prescriptions   Pending Prescriptions Disp Refills     SYMBICORT 160-4.5 MCG/ACT Inhaler [Pharmacy Med Name: SYMBICORT 160-4.5 MCG INHALER] 10.2 Inhaler 1     Sig: INHALE 2 PUFFS INTO THE LUNGS 2 TIMES DAILY    Inhaled Steroids Protocol Failed    3/5/2018  1:09 AM       Failed - Asthma control assessment score within normal limits in last 6 months    Please review ACT score.   .  ACT Total Scores 8/24/2016 4/28/2017 10/6/2017   ACT TOTAL SCORE - - -   ASTHMA ER VISITS - - -   ASTHMA HOSPITALIZATIONS - - -   ACT TOTAL SCORE (Goal Greater than or Equal to 20) 18 24 19   In the past 12 months, how many times did you visit the emergency room for your asthma without being admitted to the hospital? 0 0 0   In the past 12 months, how many times were you hospitalized overnight because of your asthma? 0 0 0            Failed - Recent (6 mo) or future (30 days) visit within the authorizing provider's specialty    Patient had office visit in the last 6 months or has a visit in the next 30 days with authorizing provider.  See \"Patient Info\" tab in inbasket, or \"Choose Columns\" in Meds & Orders section of the refill encounter.           Passed - Patient is age 12 or older          "

## 2018-03-05 NOTE — TELEPHONE ENCOUNTER
Emmie received another call from pharmacy, qnasl is not covered.  Will trial flonase. Can use saline spray with it. If she gets bloody noses, let us know.  Yoanna Ha PA-C

## 2018-03-07 NOTE — PROGRESS NOTES
ANTICOAGULATION FOLLOW-UP CLINIC VISIT    Patient Name:  Etta Cervantes  Date:  3/7/2018  Contact Type:  Telephone/ spoke with pt    SUBJECTIVE:     Patient Findings     Positives Change in medications (completed vancomycin Sun 3/4/18, restarted oxybutynin this week (no interaction with warfarin per [facility]))    Comments Pt denies changes in diet, activity or health, reports taking warfarin as directed. Per pt she does not have chemo next week, is her off week.  Since pt is technically within her goal range, writer instructed her to continue on 1.25 mg daily and will recheck INR at next infusion on 3/21.             OBJECTIVE    INR   Date Value Ref Range Status   03/07/2018 3.11 (H) 0.86 - 1.14 Final       ASSESSMENT / PLAN  No question data found.  Anticoagulation Summary as of 3/7/2018     INR goal 2.0-3.0   Today's INR 3.11!   Maintenance plan 1.25 mg (2.5 mg x 0.5) every day   Full instructions 1.25 mg every day   Weekly total 8.75 mg   Plan last modified Joselin Boothe RN (3/7/2018)   Next INR check 3/21/2018   Priority INR   Target end date     Indications   Acute deep vein thrombosis (DVT) of right upper extremity  unspecified vein (H) [I82.621]  Long-term (current) use of anticoagulants [Z79.01] [Z79.01]         Anticoagulation Episode Summary     INR check location     Preferred lab     Send INR reminders to [facility] PH[facility] ANTICOAG POOL    Comments * weekly abraxane infusions for metastatic breast cx (this can increase or decrease INR per chemo reference list). Pt prefers [facility]. Vanco taper for C-diff, OK to send instructions via [facility]      Anticoagulation Care Providers     Provider Role Specialty Phone number    Brodie Cassidy MD Referring Hematology & Oncology [phone]    Johana Fairbanks MD Responsible Family Practice [phone]            See the Encounter Report to view Anticoagulation Flowsheet and Dosing Calendar (Go to Encounters tab in chart review, and find the  Anticoagulation Therapy Visit)        Joselin Boothe RN

## 2018-03-07 NOTE — TELEPHONE ENCOUNTER
"Pharmacy ([facility]) requesting alternative medication to qnasl 80mcg nasal spray.    It states:  This is a plan exclusion, may not get covered even with prior authorization, which Emmie has done on 2/21 and it was denied.  Emmie states \"yes excluded\".      Could you prescribe alternate?  Medication request form placed at your desk.     Closing encounter. Looks like she prescribed another medication today 3/7/18.      Thank you..Sandra Parekh"

## 2018-03-07 NOTE — MR AVS SNAPSHOT
After Visit Summary   3/7/2018    Etta Cervantes    MRN: [id number]           Patient Information     Date Of Birth          1960        Visit Information        Provider Department      3/7/2018 8:00 AM ROOM 9 [facility]        Today's Diagnoses     Secondary malignant neoplasm of liver (H)    -  1    Carcinoma of breast metastatic to liver, unspecified laterality (H)        Bone metastasis (H)        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)        Long-term (current) use of anticoagulants        Acute deep vein thrombosis (DVT) of right upper extremity, unspecified vein (H)           Follow-ups after your visit        Your next 10 appointments already scheduled     Mar 19, 2018  9:30 AM CDT   (Arrive by 9:15 AM)   CT CHEST/ABDOMEN/PELVIS W CONTRAST with [facility]CT1   [facility] ([facility])    [address] [address]   [phone]           Please bring any scans or X-rays taken at other hospitals, if similar tests were done. Also bring a list of your medicines, including vitamins, minerals and over-the-counter drugs. It is safest to leave personal items at home.  Be sure to tell your doctor:   If you have any allergies.   If there s any chance you are pregnant.   If you are breastfeeding.  You may have contrast for this exam. To prepare:   Do not eat or drink for 2 hours before your exam. If you need to take medicine, you may take it with small sips of water. (We may ask you to take liquid medicine as well.)   The day before your exam, drink extra fluids at least six 8-ounce glasses (unless your doctor tells you to restrict your fluids).   You will be given instructions on how to drink the contrast.  Patients over 70 or patients with diabetes or kidney problems:   If you haven t had a blood test (creatinine test) within the last 30 days, the Cardiologist/Radiologist may require you to get this test prior to your exam.   If you have diabetes:   Continue to take your metformin medication on the day of your exam  Please wear loose clothing, such as a sweat suit or jogging clothes. Avoid snaps, zippers and other metal. We may ask you to undress and put on a hospital gown.  If you have any questions, please call the Imaging Department where you will have your exam.            Mar 21, 2018  9:00 AM CDT   Level 1 with ROOM 4 [facility] ([facility])    [facility]  [address]  [facility] [address]   [phone]            Mar 21, 2018 10:00 AM CDT   Return Visit with Brodie Cassidy MD   [facility] ([facility])    [facility]  [address]  [facility] [address]   [phone]            Mar 28, 2018  9:00 AM CDT   Level 1 with ROOM 1 [facility] ([facility])    [facility]  [address]  [facility] [address]   [phone]            Apr 04, 2018  8:30 AM CDT   Level 1 with ROOM 1 [facility] ([facility])    [facility]  [address]  [facility] [address]   [phone]              Who to contact     If you have questions or need follow up information about today's clinic visit or your schedule please contact [facility] directly at [phone].  Normal or non-critical lab and imaging results will be communicated to you by MyChart, letter or phone within 4 business days after the clinic has received the results. If you do not hear from us within 7 days, please contact the clinic through MyChart or phone. If you have a critical or abnormal lab result, we will notify you by phone as soon as possible.  Submit refill requests through [employer] or call your pharmacy and they will forward the refill request to us. Please allow 3 business days for your refill to be completed.           Additional Information About Your Visit        MyChart Information     [facility] gives you secure access to your electronic health record. If you see a primary care provider, you can also send messages to your care team and make appointments. If you have questions, please call your primary care clinic.  If you do not have a primary care provider, please call [phone] and they will assist you.        Care EveryWhere ID     This is your Care EveryWhere ID. This could be used by other organizations to access your [place] medical records  [id number]        Your Vitals Were     Pulse Temperature Last Period BMI (Body Mass Index)          97 99.1  F (37.3  C) 02/06/2013 31.95 kg/m2         Blood Pressure from Last 3 Encounters:   03/07/18 111/57   03/01/18 101/58   02/21/18 109/45    Weight from Last 3 Encounters:   03/07/18 85.1 kg (187 lb 9.6 oz)   03/01/18 83.5 kg (184 lb)   02/21/18 83.9 kg (185 lb)              We Performed the Following     CBC with platelets differential     INR        Primary Care Provider Office Phone # Fax #    Johana Fairbanks [phone] [address] [facility] [address]        Equal Access to Services     JACKIE MIX : Hadii han deleono Soisaura, waaxda lurajanadaha, qaybta kaalmada adelynnyada, hari guardado. So [facility] [phone].    ATENCIÓN: Si habla español, tiene a parekh disposición servicios gratuitos de asistencia lingüística. Llame al [phone].    We comply with applicable federal civil rights laws and [place] laws. We do not discriminate on the basis of race, color, national origin, age, disability, sex, sexual orientation, or gender identity.            Thank you!     Thank you for choosing [facility]  for your care. Our goal is always to provide you with excellent care. Hearing back from our patients is one way we can continue to improve our services. Please take a few minutes to complete the written  survey that you may receive in the mail after your visit with us. Thank you!             Your Updated Medication List - Protect others around you: Learn how to safely use, store and throw away your medicines at www.disposemymeds.org.          This list is accurate as of 3/7/18 10:32 AM.  Always use your most recent med list.                   Brand Name Dispense Instructions for use Diagnosis    * albuterol 108 (90 BASE) MCG/ACT Inhaler    VENTOLIN HFA    1 Inhaler    Inhale 2 puffs into the lungs every 4 hours as needed    Moderate persistent asthma, uncomplicated       * albuterol (2.5 MG/3ML) 0.083% neb solution     30 vial    Take 1 vial (2.5 mg) by nebulization every 4 hours as needed for shortness of breath / dyspnea    Moderate persistent asthma, uncomplicated       ALLEGRA ALLERGY 180 MG tablet   Generic drug:  fexofenadine      Take 180 mg by mouth daily as needed for allergies        azelastine 0.1 % spray    ASTELIN    3 Bottle    Spray 1-2 sprays into both nostrils 2 times daily as needed for rhinitis    Chronic rhinitis       Beclomethasone Dipropionate 80 MCG/ACT Aers Nasal Spray     8.7 g    Spray 2 sprays into both nostrils daily    Other chronic rhinitis       CRANBERRY PO      Take 1 capsule by mouth daily        diclofenac 1 % Gel topical gel    VOLTAREN    100 g    Place 2 g onto the skin 4 times daily    Carcinoma of breast metastatic to liver, unspecified laterality (H)       diphenhydrAMINE 25 MG tablet    BENADRYL    1 tablet    Take 1 tablet (25 mg) by mouth every 8 hours as needed for itching or allergies    Carcinoma of breast metastatic to liver, unspecified laterality (H), Bone metastasis (H), Pericardial effusion, Bilateral malignant neoplasm of breast in female, estrogen receptor positive, unspecified site of breast (H), 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), Secondary malignant neoplasm of liver (H), Chemotherapy-induced neutropenia (H), Emphysematous bleb of  lung (H), Hypothyroidism, unspecified type, Hyperlipidemia LDL goal <130, Moderate persistent asthma without complication, Mucositis due to chemotherapy       HYDROcodone-acetaminophen 5-325 MG per tablet    NORCO    20 tablet    Take 1-2 tablets by mouth every 4 hours as needed for moderate to severe pain    Post-op pain       KP CALCIUM 600+D3 600-500 MG-UNIT Caps   Generic drug:  calcium carb-cholecalciferol      Take 2 tablets by mouth daily        levothyroxine 25 MCG tablet    SYNTHROID/LEVOTHROID    90 tablet    TAKE 1 TABLET (25 MCG) BY MOUTH DAILY    Hypothyroidism due to acquired atrophy of thyroid       Lidocaine 4 % Patch    LIDOCARE    30 patch    Place 1-2 patches onto the skin every 24 hours    Carcinoma of breast metastatic to liver, unspecified laterality (H)       loratadine 10 MG tablet    CLARITIN     Take 10 mg by mouth daily as needed for allergies        metoclopramide 10 MG tablet    REGLAN    120 tablet    Take 1 tablet (10 mg) by mouth 2 times daily as needed    Bilateral malignant neoplasm of breast in female, estrogen receptor positive, unspecified site of breast (H)       order for DME     1 each    Equipment being ordered: Venu post-lumpectomy compression pad x2    Lymphedema, Lymphedema of breast       oxyCODONE IR 5 MG tablet    ROXICODONE    60 tablet    Take 1 tablet (5 mg) by mouth every 4 hours as needed for moderate to severe pain    Secondary malignant neoplasm of liver (H), Carcinoma of breast metastatic to liver, unspecified laterality (H), 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), Bone metastasis (H), Pericardial effusion       prochlorperazine 10 MG tablet    COMPAZINE    30 tablet    Take 1 tablet (10 mg) by mouth every 6 hours as needed (Nausea/Vomiting)    Secondary malignant neoplasm of liver (H), Carcinoma of breast metastatic to liver, unspecified laterality (H), Bone metastasis (H), Carcinoma of right breast 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  (H), Bilateral malignant neoplasm of breast in female, estrogen receptor positive, unspecified site of breast (H)       QVAR 80 MCG/ACT Inhaler   Generic drug:  beclomethasone     26.1 g    INHALE 1 PUFFS INTO THE EACH NOSTRIL 2 TIMES DAILY    Chronic rhinitis       ROBITUSSIN COUGH/COLD CF PO      Take 10 mLs by mouth as needed    Breast cancer metastasized to axillary lymph node, right (H)       SYMBICORT 160-4.5 MCG/ACT Inhaler   Generic drug:  budesonide-formoterol     10.2 Inhaler    INHALE 2 PUFFS INTO THE LUNGS 2 TIMES DAILY    Moderate persistent asthma without complication       vancomycin 125 MG capsule    VANCOCIN    60 capsule    Take 1 capsule (125 mg) by mouth 4 times daily    Clostridium difficile diarrhea       warfarin 2.5 MG tablet    COUMADIN    30 tablet    As directed by Anticoagulation Clinic (maintenance dose not yet established)    Long-term (current) use of anticoagulants, Acute deep vein thrombosis (DVT) of right upper extremity, unspecified vein (H)       zolpidem 12.5 MG CR tablet    AMBIEN CR    30 tablet    Take 1 tablet by mouth at bed time.    Insomnia due to medical condition       * Notice:  This list has 2 medication(s) that are the same as other medications prescribed for you. Read the directions carefully, and ask your doctor or other care provider to review them with you.

## 2018-03-07 NOTE — PROGRESS NOTES
Infusion Nursing Note:  Etta Cervantes presents today for C3D15 Abraxane   Patient seen by provider today: No   present during visit today: Not Applicable.    Note: N/A.    Intravenous Access:  Labs drawn without difficulty.  Implanted Port.    Treatment Conditions:  Lab Results   Component Value Date    HGB 10.6 03/07/2018     Lab Results   Component Value Date    WBC 4.1 03/07/2018      Lab Results   Component Value Date    ANEU 2.8 03/07/2018     Lab Results   Component Value Date     03/07/2018      Results reviewed, labs MET treatment parameters, ok to proceed with treatment.      Post Infusion Assessment:  Patient tolerated infusion without incident.  Blood return noted pre and post infusion.  Site patent and intact, free from redness, edema or discomfort.  No evidence of extravasations.  Access discontinued per protocol.    Discharge Plan:   Patient discharged in stable condition accompanied by: .  Departure Mode: Ambulatory.  Pt to return on 3/21/18 at 9:00 am for C4D1 Abraxane    Pratima Felipe RN

## 2018-03-07 NOTE — MR AVS SNAPSHOT
Etta Cervantes   3/7/2018   Anticoagulation Therapy Visit    Description:  57 year old female   Provider:  Joselin Boothe RN   Department:  [facility]           INR as of 3/7/2018     Today's INR 3.11!      Anticoagulation Summary as of 3/7/2018     INR goal 2.0-3.0   Today's INR 3.11!   Full instructions 1.25 mg every day   Next INR check 3/21/2018    Indications   Acute deep vein thrombosis (DVT) of right upper extremity  unspecified vein (H) [I82.621]  Long-term (current) use of anticoagulants [Z79.01] [Z79.01]         March 2018 Details    Sun Mon Tue Wed Thu Fri Sat         1               2               3                 4               5               6               7      1.25 mg   See details      8      1.25 mg         9      1.25 mg         10      1.25 mg           11      1.25 mg         12      1.25 mg         13      1.25 mg         14      1.25 mg         15      1.25 mg         16      1.25 mg         17      1.25 mg           18      1.25 mg         19      1.25 mg         20      1.25 mg         21            22               23               24                 25               26               27               28               29               30               31                Date Details   03/07 This INR check       Date of next INR:  3/21/2018         How to take your warfarin dose     To take:  1.25 mg Take 0.5 of a 2.5 mg tablet.

## 2018-03-11 NOTE — TELEPHONE ENCOUNTER
Patient said she thinks she has c diff again. Said this has been an ongoing problem for her. She started her Vancomycin rx, as she has been directed by Dr Cassidy, but only has 2 tablets left. Patient said she will need more Vancomycin. Has watery, bronze colored stools. Has abdominal cramping. Afebrile. No visible blood in stool. Took Vancomycin 125 mg once last evening and once this morning.      9:35AM Nurse called Dr Cassidy on his cell phone. Dr Cassidy said the patient can have Vancomycin 125 mg--one capsule by mouth four times daily. Disp 100.   9:50AM Nurse sent rx to patient's [facility] in [place] [facility] [address]. Nurse called patient back and told her the rx was sent to her pharmacy. She was also told that Dr Cassidy wants her to bring a stool sample from home to the clinic lab tomorrow for c diff testing. Patient indicated that she understood.     Paola Hair RN/FNA

## 2018-03-11 NOTE — TELEPHONE ENCOUNTER
Patient said she thinks she has c diff again. Said this has been an ongoing problem for her. She started her Vancomycin rx, as she has been directed by Dr Cassidy, but only has 2 tablets left. Patient said she will need more Vancomycin. Has watery, bronze colored stools. Has abdominal cramping. Afebrile. No visible blood in stool. Took Vancomycin 125 mg once last evening and once this morning.     9:35AM Nurse called Dr Cassidy on his cell phone. Dr Cassidy said the patient can have Vancomycin 125 mg--one capsule by mouth four times daily. Disp 100.     Paola Hair RN/FNA    Reason for Disposition    [1] SEVERE diarrhea (e.g., 7 or more times / day more than normal) AND [2] present > 24 hours (1 day)    Additional Information    Negative: Shock suspected (e.g., cold/pale/clammy skin, too weak to stand, low BP, rapid pulse)    Negative: Difficult to awaken or acting confused  (e.g., disoriented, slurred speech)    Negative: Sounds like a life-threatening emergency to the triager    Negative: Vomiting also present and worse than the diarrhea    Negative: [1] Blood in stool AND [2] without diarrhea    Negative: [1] SEVERE abdominal pain (e.g., excruciating) AND [2] present > 1 hour    Negative: [1] SEVERE abdominal pain AND [2] age > 60    Negative: [1] Blood in the stool AND [2] moderate or large amount of blood    Negative: Black or tarry bowel movements  (Exception: chronic-unchanged  black-grey bowel movements AND is taking iron pills or Pepto-bismol)    Negative: [1] Drinking very little AND [2] dehydration suspected (e.g., no urine > 12 hours, very dry mouth, very lightheaded)    Negative: Patient sounds very sick or weak to the triager    Negative: [1] SEVERE diarrhea (e.g., 7 or more times / day more than normal) AND [2]  age > 60 years    Negative: [1] Constant abdominal pain AND [2] present > 2 hours    Negative: [1] Fever > 103 F (39.4 C) AND [2] not able to get the fever down using Fever Care  Advice    Protocols used: DIARRHEA-ADULT-AH

## 2018-03-12 NOTE — TELEPHONE ENCOUNTER
Telephone Triage:    Pt is a 57 year old female, on active chemotherapy with Abraxane.  Hx of recurrent C-diff, seen and treated by ID at [facility].  Slow taper Vancomycin was completed 03.05.18, with loose stools/diarrhea recurring Friday 03.10.18. Pt spoke with Nurse Triage line; Dr. Cassidy ordered vancomycin 125 mg po QID and repeat C-diff test today.    Diagnosis:   Encounter Diagnoses   Name Primary?     Bone metastasis (H)      Carcinoma of breast metastatic to liver, unspecified laterality (H) Yes     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)      C. difficile colitis      Primary care provider is: Johana Fairbanks    Oncology provider is:  Dr. LUIS Cassidy    Assessment: Spoke with patient, diarrhea continues.  Is taking the vancomycin as prescribed.  Will come to clinic today to provide stool sample.  Recommendations: Per Dr. Cassidy, if stool sample is positive, update ID provider at [facility] (Bailey Santana [phone]).    Patient instructed to call with any questions or concerns.  Patient states understanding and is in agreement with this plan.    Approximately 10 minutes spent on telephone with patient reviewing assessment and symptom(s) and providing symptom management.    Funmi Ray, RN, BSN, OCN  Oncology Hematology   Breast Cancer Navigator  [facility]  [email or web address]  [phone]

## 2018-03-14 NOTE — TELEPHONE ENCOUNTER
Stool (-) for C-diff, Dr. Cassidy feels this may be falsely positive as patient has been on Vancomycin since Sunday 03.11.18.    Inbasket message sent to Dr. Bailey Santana, Infectious Disease department, today per Dr. Cassidy.  If no response by end of day, will page provider.

## 2018-03-15 NOTE — TELEPHONE ENCOUNTER
Per Dr. Santana, pt should repeat Vancomycin taper that was outlined in the last clinic visit with them and if diarrhea returns patient is to complete a C-diff stool sample PRIOR to being started on antibiotics. Dr. Cassidy is in agreement with this plan.

## 2018-03-16 NOTE — TELEPHONE ENCOUNTER
Fax received from the [place] in regards to Long Term Disability, requesting medical records.  This request was forwarded to our Medical Records department.    [employer] message sent to patient with this information as well as our medical records department phone number.

## 2018-03-21 NOTE — LETTER
"    3/21/2018         RE: Etta Cervantes  [address] [place]  [place] [address]        Dear Colleague,    Thank you for referring your patient, Etta Cervantes, to the [facility]. Please see a copy of my visit note below.    Chemotherapy Education    Patient is a 57 year old female here today for chemotherapy education, accompanied by  Lucian.  Pt has a cancer diagnosis of   Encounter Diagnoses   Name Primary?     Post-op pain      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)      Bone metastasis (H)      Carcinoma of breast metastatic to liver, unspecified laterality (H) Yes     Secondary malignant neoplasm of liver (H)      Bilateral malignant neoplasm of breast in female, estrogen receptor positive, unspecified site of breast (H)      Hypothyroidism, unspecified type      Cancer associated pain      Acute deep vein thrombosis (DVT) of right upper extremity, unspecified vein (H)       and their main concern is \"stopping the progression\".  Their Oncologist is Dr. LUIS Cassidy, and PCP is Johana Fairbanks  Reviewed the following with the patient and their support person:  Treatment goal: palliative  Treatment regimen & duration: Doxil day 1 every 28 days until progression/toxicity and rationale for strict adherence to this schedule, including specific medication names including pre-treatment medications and at home scheduled or as needed medications, delivery methods,.  Potential side effects: Side effects and management; including skin changes/hand-foot syndrome, anemia, neutropenia, thrombocytopenia, diarrhea/constipation, hair loss syndrome, memory changes/ \"chemobrain\", mouth sores, taste changes, neuropathy, fatigue, myelosuppression, sexuality/infertility, and risk of extravasation or infiltration.  Infection prevention, and monitoring of lab values, what lab tests and what changes of these values meant, along with the possibility of hydration or blood product transfusion, " "or the need to defer or hold treatment.    Chemotherapy information, including ways it is excreted from the body and cleaning and containment of vomitus or other bodily fluid, use of the bathroom, sexual health and intimacy, what to do if needing to miss a treatment, when to call a provider and the need for staff to wear protective equipment.  Importance of Central line care (port) or IV site care.  Written information: Written information including the \"Getting Ready for Chemotherapy: What to Expect, Before, During, and After your Treatment\" booklet, specific drug information guides printed from [employer], [employer] booklets \"Eating Hints: Before, During, and After Cancer Treatment\" and \"Chemotherapy and You\".  Also, a folder with information on when to contact the provider, various programs offered at [facility], and our business card with contact information given; Oncology Clinic, RN Case Manager, and the after hours Nurse Advise Line.  General orientation to the Medical Oncology department, Infusion Services department, Huc/scheduling, bathrooms and usual flow of the treatment day provided as well as introduction to the Infusion nurses.  No barriers to learning identified. Patient and family verbalized understanding of all written and verbal information. All questions answered to patients satisfaction.   Other concerns: None  Pt instructed to call with further questions or concerns.  Patient states understanding and is in agreement with this plan.  Copy of appointments, and after visit summary (AVS) given to patient. Patient discharged ambulatory.    Face to Face time with patient: 15 min    Funmi Ray RN, BSN, OCN  Oncology Hematology   Breast Cancer Navigator  [facility]  [email or web address]  Phone [phone]      Hematology/ Oncology Follow-up Visit:  Mar 21, 2018    Reason for Visit:   Chief Complaint   Patient presents with     Oncology Clinic Visit     4 " week recheck Carcinoma of breast metastatic to liver, , review Labs & CT / chemo today       Oncologic History:  Breast cancer (H)  Etta Cervantes presented with increasing lump in the right axilla that s lump has been growing without any pain or associated symptoms. Subsequently she was evaluated by primary care physician. Diagnostic digital mammogram was done on 01/13/2015 showing enlarged lymph nodes in the right axilla. There was some skin thickening in the right breast anteriorly and laterally. There are no parenchymal changes in the right breast. The left breast shows a 1.7 cm spiculated mass at approximately 2 to 3 o'clock position, 15.2 cm away from the nipple. A right breast ultrasound was subsequently done and ultrasound guided biopsy was done. Needle biopsy of the left breast did show infiltrating ductal carcinoma grade I of III with no angiolymphatic invasion seen. No associated ductal carcinoma in situ. Estrogen receptor, progresterone receptor were positive. HER-2/sadie receptor came back negative.. Another biopsy from the right axilla did show metastatic ductal carcinoma. PET scan was done on January 26, 2015 showing few small right posterior cervical chain nodes the largest is 1.2 cm. There is extensive bulky right axillary adenopathy demonstrating hypermetabolic FDG uptake with SUV of 10.6. There is skin thickening involving the right breast which demonstrated low-grade FDG uptake. A 1.2 cm lesion on the lateral aspect of the left breast demonstrated minimally increased FDG uptake with SUV of 2. Scattered hypermetabolic mediastinal lymph nodes located in the left superior anterior mediastinum, right paratracheal and precarinal U, subcranial adenopathy with javon metastases. This focus hypermetabolic FDG uptake identified definitive Amanda posterior aspect off intertrochanteric region of the proximal left femur consistent with bone metastases. There is also 1.4 cm hypermetabolic FDG uptake identified in  the anterior medial segment of the left hepatic lobe consistent with liver metastases. Additional 2.1 cm low-attenuation lesion anterior aspect of the right lobe which needs to be determined. The patient was started on chemotherapy with Taxotere and Cytoxan on February 9, 2015.  She concluded 4 cycles of Taxotere and Cytoxan. She started on Arimidex 1 mg orally daily. Currently she is on Faslodex and ibrance. Subsequently the patient went on to receive Afinitor. The patient also started treatment with Xeloda.       Interval History:  Patient is here today for follow-up.  She denies any recent bone aches or pains.  She denies any shortness of breath or cough or wheezing.  She denies any abdominal pain or nausea vomiting or diarrhea.  She denies any mouth sores or ulcers.  Patient currently on Abraxane chemotherapy.  She has been tolerating chemotherapy well.  Her diarrhea has stopped since she started on oral vancomycin for C. difficile infection.  Stool test came back negative.  Patient is here today to discuss management plan following her CT scan.    Review Of Systems:  Constitutional: Negative for fever, chills, and night sweats.  Skin: negative.  Eyes: negative.  Ears/Nose/Throat: negative.  Respiratory: No shortness of breath, dyspnea on exertion, cough, or hemoptysis.  Cardiovascular: negative.  Gastrointestinal: negative.  Genitourinary: negative.  Musculoskeletal: negative.  Neurologic: negative.  Psychiatric: negative.  Hematologic/Lymphatic/Immunologic: negative.  Endocrine: negative.    All other ROS negative unless mentioned in interval history.    Past medical, social, surgical, and family histories reviewed.    Allergies:  Allergies as of 03/21/2018 - Manuel as Reviewed 03/21/2018   Allergen Reaction Noted     Animal dander Cough 01/23/2015     Cats  07/15/2010     Dust mites Cough 01/23/2015     Pollen extract  01/23/2015     Seasonal allergies  07/15/2010     Levaquin [levofloxacin] Rash 07/17/2017        Current Medications:  Current Outpatient Prescriptions   Medication Sig Dispense Refill     oxybutynin (DITROPAN XL) 10 MG 24 hr tablet Take 10 mg by mouth daily       HYDROcodone-acetaminophen (NORCO) 5-325 MG per tablet Take 1-2 tablets by mouth every 4 hours as needed for moderate to severe pain 20 tablet 0     SYMBICORT 160-4.5 MCG/ACT Inhaler INHALE 2 PUFFS INTO THE LUNGS 2 TIMES DAILY 10.2 Inhaler 1     warfarin (COUMADIN) 2.5 MG tablet As directed by Anticoagulation Clinic (maintenance dose not yet established) 30 tablet 1     vancomycin (VANCOCIN) 125 MG capsule Take 1 capsule (125 mg) by mouth 4 times daily 60 capsule 0     diclofenac (VOLTAREN) 1 % GEL topical gel Place 2 g onto the skin 4 times daily 100 g 0     loratadine (CLARITIN) 10 MG tablet Take 10 mg by mouth daily as needed for allergies       zolpidem (AMBIEN CR) 12.5 MG CR tablet Take 1 tablet by mouth at bed time. 30 tablet 5     QVAR 80 MCG/ACT Inhaler INHALE 1 PUFFS INTO THE EACH NOSTRIL 2 TIMES DAILY 26.1 g 3     levothyroxine (SYNTHROID/LEVOTHROID) 25 MCG tablet TAKE 1 TABLET (25 MCG) BY MOUTH DAILY 90 tablet 2     calcium carb-cholecalciferol (KP CALCIUM 600+D3) 600-500 MG-UNIT CAPS Take 2 tablets by mouth daily       CRANBERRY PO Take 1 capsule by mouth daily        azelastine (ASTELIN) 0.1 % nasal spray Spray 1-2 sprays into both nostrils 2 times daily as needed for rhinitis 3 Bottle 3     LORazepam (ATIVAN) 0.5 MG tablet Take 1 tablet (0.5 mg) by mouth every 4 hours as needed (Anxiety, Nausea/Vomiting or Sleep) 30 tablet 2     prochlorperazine (COMPAZINE) 10 MG tablet Take 1 tablet (10 mg) by mouth every 6 hours as needed (Nausea/Vomiting) 30 tablet 2     vancomycin (VANCOCIN) 125 MG capsule Take 1 capsule (125 mg) by mouth 4 times daily 100 capsule 0     SYMBICORT 160-4.5 MCG/ACT Inhaler INHALE 2 PUFFS INTO THE LUNGS 2 TIMES DAILY 3 Inhaler 1     fluticasone (FLONASE) 50 MCG/ACT spray Spray 1-2 sprays into both nostrils daily  "(Patient not taking: Reported on 3/21/2018) 1 Bottle 11     Beclomethasone Dipropionate 80 MCG/ACT AERS Nasal Spray Spray 2 sprays into both nostrils daily 8.7 g 3     oxyCODONE IR (ROXICODONE) 5 MG tablet Take 1 tablet (5 mg) by mouth every 4 hours as needed for moderate to severe pain (Patient not taking: Reported on 3/21/2018) 60 tablet 0     prochlorperazine (COMPAZINE) 10 MG tablet Take 1 tablet (10 mg) by mouth every 6 hours as needed (Nausea/Vomiting) (Patient not taking: Reported on 2/6/2018) 30 tablet 2     Lidocaine (LIDOCARE) 4 % Patch Place 1-2 patches onto the skin every 24 hours (Patient not taking: Reported on 1/24/2018) 30 patch 0     order for DME Equipment being ordered: Venu post-lumpectomy compression pad x2 1 each 0     diphenhydrAMINE (BENADRYL) 25 MG tablet Take 1 tablet (25 mg) by mouth every 8 hours as needed for itching or allergies (Patient not taking: Reported on 2/6/2018) 1 tablet 0     Phenylephrine-DM-GG (ROBITUSSIN COUGH/COLD CF PO) Take 10 mLs by mouth as needed        metoclopramide (REGLAN) 10 MG tablet Take 1 tablet (10 mg) by mouth 2 times daily as needed 120 tablet 1     fexofenadine (ALLEGRA ALLERGY) 180 MG tablet Take 180 mg by mouth daily as needed for allergies       albuterol (2.5 MG/3ML) 0.083% neb solution Take 1 vial (2.5 mg) by nebulization every 4 hours as needed for shortness of breath / dyspnea (Patient not taking: Reported on 3/21/2018) 30 vial 3     albuterol (VENTOLIN HFA) 108 (90 BASE) MCG/ACT inhaler Inhale 2 puffs into the lungs every 4 hours as needed (Patient not taking: Reported on 3/21/2018) 1 Inhaler 2        Physical Exam:  /64 (BP Location: Right arm, Patient Position: Sitting, Cuff Size: Adult Large)  Pulse 100  Temp 99.4  F (37.4  C) (Oral)  Resp 20  Ht 1.632 m (5' 4.25\")  Wt 84.3 kg (185 lb 12.8 oz)  LMP 02/06/2013  SpO2 97%  Breastfeeding? No  BMI 31.64 kg/m2  Wt Readings from Last 12 Encounters:   03/21/18 84.3 kg (185 lb 12.8 oz) " "  03/07/18 85.1 kg (187 lb 9.6 oz)   03/01/18 83.5 kg (184 lb)   02/21/18 83.9 kg (185 lb)   02/07/18 83.5 kg (184 lb)   02/06/18 83.7 kg (184 lb 8 oz)   01/24/18 83.2 kg (183 lb 6.4 oz)   01/17/18 85.4 kg (188 lb 3.2 oz)   01/10/18 83.9 kg (185 lb)   01/05/18 83.9 kg (185 lb)   01/03/18 86.5 kg (190 lb 9.6 oz)   12/27/17 84 kg (185 lb 3.2 oz)     ECOG performance status: 1  GENERAL APPEARANCE: Healthy, alert and in no acute distress.  HEENT: Sclerae anicteric. PERRLA. Oropharynx without ulcers, lesions, or thrush.  NECK: Supple. No asymmetry or masses.  LYMPHATICS: No palpable cervical, supraclavicular, axillary, or inguinal lymphadenopathy.  RESP: Lungs clear to auscultation bilaterally without rales, rhonchi or wheezes.  BREAST: Examined adenopathy of the right axilla \"CARDIOVASCULAR: Regular rate and rhythm. Normal S1, S2; no S3 or S4. No murmur, gallop, or rub.  ABDOMEN: Soft, nontender. Bowel sounds normal. No palpable organomegaly or masses.  MUSCULOSKELETAL: Extremities without gross deformities noted. No edema of bilateral lower extremities.  SKIN: No suspicious lesions or rashes.  NEURO: Alert and oriented x 3. Cranial nerves II-XII grossly intact.  PSYCHIATRIC: Mentation and affect appear normal.    Laboratory/Imaging Studies:  Infusion Therapy Visit on 03/21/2018   Component Date Value Ref Range Status     INR 03/21/2018 4.39* 0.86 - 1.14 Final    CALLED TO LAUREN DELACRUZ RP 3/21/18 1017     WBC 03/21/2018 7.0  4.0 - 11.0 10e9/L Final     RBC Count 03/21/2018 4.18  3.8 - 5.2 10e12/L Final     Hemoglobin 03/21/2018 11.8  11.7 - 15.7 g/dL Final     Hematocrit 03/21/2018 36.6  35.0 - 47.0 % Final     MCV 03/21/2018 88  78 - 100 fl Final     MCH 03/21/2018 28.2  26.5 - 33.0 pg Final     MCHC 03/21/2018 32.2  31.5 - 36.5 g/dL Final     RDW 03/21/2018 16.2* 10.0 - 15.0 % Final     Platelet Count 03/21/2018 301  150 - 450 10e9/L Final     Diff Method 03/21/2018 Automated Method   Final     % Neutrophils " 03/21/2018 66.7  % Final     % Lymphocytes 03/21/2018 15.3  % Final     % Monocytes 03/21/2018 14.6  % Final     % Eosinophils 03/21/2018 1.9  % Final     % Basophils 03/21/2018 0.4  % Final     % Immature Granulocytes 03/21/2018 1.1  % Final     Absolute Neutrophil 03/21/2018 4.7  1.6 - 8.3 10e9/L Final     Absolute Lymphocytes 03/21/2018 1.1  0.8 - 5.3 10e9/L Final     Absolute Monocytes 03/21/2018 1.0  0.0 - 1.3 10e9/L Final     Absolute Eosinophils 03/21/2018 0.1  0.0 - 0.7 10e9/L Final     Absolute Basophils 03/21/2018 0.0  0.0 - 0.2 10e9/L Final     Abs Immature Granulocytes 03/21/2018 0.1  0 - 0.4 10e9/L Final     Sodium 03/21/2018 139  133 - 144 mmol/L Final     Potassium 03/21/2018 4.1  3.4 - 5.3 mmol/L Final     Chloride 03/21/2018 108  94 - 109 mmol/L Final     Carbon Dioxide 03/21/2018 26  20 - 32 mmol/L Final     Anion Gap 03/21/2018 5  3 - 14 mmol/L Final     Glucose 03/21/2018 88  70 - 99 mg/dL Final     Urea Nitrogen 03/21/2018 9  7 - 30 mg/dL Final     Creatinine 03/21/2018 0.73  0.52 - 1.04 mg/dL Final     GFR Estimate 03/21/2018 81  >60 mL/min/1.7m2 Final    Non  GFR Calc     GFR Estimate If Black 03/21/2018 >90  >60 mL/min/1.7m2 Final    African American GFR Calc     Calcium 03/21/2018 8.3* 8.5 - 10.1 mg/dL Final     Bilirubin Total 03/21/2018 0.9  0.2 - 1.3 mg/dL Final     Albumin 03/21/2018 3.0* 3.4 - 5.0 g/dL Final     Protein Total 03/21/2018 6.9  6.8 - 8.8 g/dL Final     Alkaline Phosphatase 03/21/2018 135  40 - 150 U/L Final     ALT 03/21/2018 27  0 - 50 U/L Final     AST 03/21/2018 66* 0 - 45 U/L Final   Orders Only on 03/13/2018   Component Date Value Ref Range Status     Specimen Description 03/12/2018 Feces   Final     C Diff Toxin B PCR 03/12/2018 Negative  NEG^Negative Final    Comment: Negative: Clostridium difficile target DNA sequences NOT detected, presumed   negative for Clostridium difficile toxin B or the number of bacteria present   may be below the limit of  detection for the test.  FDA approved assay performed using [employer] Gene[employer]pert real-time PCR.  A negative result does not exclude actual disease due to Clostridium difficile   and may be due to improper collection, handling and storage of the specimen   or the number of organisms in the specimen is below the detection limit of the   assay.          Recent Results (from the past 744 hour(s))   CT Chest/Abdomen/Pelvis w Contrast    Narrative    CT CHEST/ABDOMEN/PELVIS W CONTRAST 3/19/2018 9:56 AM    HISTORY: Breast cancer. Follow-up.    CONTRAST:  92 mL Isovue 370.    TECHNIQUE: CT of the chest, abdomen, and pelvis is performed with IV  contrast.    Routine evaluated structures in the chest include the lungs,  mediastinal structures, pleura, and chest wall.    Routine assessed structures in the abdomen include the liver, spleen,  pancreas, adrenal glands, and kidneys. Also assessed are the  retroperitoneum, gastrointestinal tract, and the abdominal wall.    Intrapelvic anatomy is also assessed.    Radiation dose for this scan is reduced using automated exposure  control, adjustment of the mA and/or kV according to patient size, or  iterative reconstruction technique.    COMPARISON: [facility] 6 2017.    FINDINGS:    Chest: Bandlike parenchymal abnormality in the right hilar region is  stable. Bilateral pleural effusions are larger. To the contrary the  previously seen pericardial effusion is much smaller with only a small  amount of a pericardial fluid remaining. There is some nodular  enhancement of the pericardium seen on today's study and it most  likely relates to neoplastic involvement. Inflammatory etiology is  also in the differential. Right axillary adenopathy has worsened. The  dominant area measures 4.4 cm, compared to 3.7 cm on the prior study.  Another lymph node measures 1.3 cm compared to 1 cm on the prior  study. Other small areas of nodular enhancement at the margin of the  right breast and right axilla are  new and consistent with additional  disease. A multinodular thyroid gland is again demonstrated. There is  a focal area of blastic change involving the right aspect of the C6  vertebral body on image #1. Images didn't extend quite this high  before. New or more obvious blastic foci are seen on today's study in  the region of T3 and T8. There also is a new blastic focus involving a  right transverse process in the lower thoracic spine on image #43. A  blastic focus involving the body of the sternum on image #17 is more  evident. Blastic foci involving a posterior right rib arc on image #17  is more stable.    Abdomen: There is much more extensive neoplastic disease in the liver  now compared to the prior study. Large confluent areas of neoplastic  disease are seen in both the right and left lobes. There has been  interval placement of a biliary stent with resolution of the  previously seen biliary dilatation. New/more prominent nodules are  seen in the omentum, consistent with peritoneal carcinomatosis. There  are new nodules involving the anterior abdominal wall subcutaneous  fat. They could relate to injection granulomas from subcutaneous  injections, with neoplastic disease considered less likely. New  blastic foci are seen involving the L3 and L4 vertebral bodies.    Pelvis: There is moderately prominent mucosal enhancement of the  gallbladder. There is a small amount of free pelvic fluid, less  compared to the prior study. New blastic foci are seen involving the  midline and right paramidline sacrum. There is a new blastic focus  involving the right symphysis pubis on image #112.      Impression    IMPRESSION:  1.  There is worsening neoplastic disease in the right axilla/lateral  right breast margin, and abdomen. Osseous metastatic disease is also  worse.  2. New enhancing nodularity of the pericardium is of concern for  metastatic involvement.  3. Bilateral pleural effusions are larger.  4. There is a mucosal  enhancement involving the bladder. This could  relate to cystitis in the appropriate clinical setting.    LETI CHASE MD       Assessment and plan:  (C50.919,  C78.7) Carcinoma of breast metastatic to liver, unspecified laterality (H)  (primary encounter diagnosis)  (C50.911,  C77.3) 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)  (C78.7) Secondary malignant neoplasm of liver (H)  (C50.911,  Z17.0,  C50.912) Bilateral malignant neoplasm of breast in female,  I reviewed with the patient the imaging studies in details.  We looked at the images.  Patient shows disease progression.  At this time we discussed further management plan with chemotherapy.  I would recommend use of begin related anthracycline Doxil at 50 mg/m .  I reviewed with the patient potential side effects in great details.  Patient will be starting her first cycle within the next couple of days.  Meanwhile, I am going to arrange for ultrasound-guided biopsy from the axillary lymph node to assess for targets including HER-2/sdaie, BRCA.  Samples will be also sent for foundation 1 to identify targets for possible interventions.    (C79.51) Bone metastasis (H)  Patient will continue on Xgeva every 3 months at 120 mg subcutaneously.  The patient continue on calcium and vitamin D supplements.    (E03.9) Hypothyroidism, unspecified type  Patient will continue on Synthroid 25 mcg daily.    (G89.3) Cancer associated pain  Patient pain is currently well controlled with the current regimen.    (I82.621) Acute deep vein thrombosis (DVT) of right upper extremity, unspecified vein (H)  Patient currently on Lovenox injection.    The patient is ready to learn, no apparent learning barriers were identified.  Diagnosis and treatment plans were explained to the patient. The patient expressed understanding of the content. The patient asked appropriate questions. The patient questions were answered to her satisfaction.    Time spent 40 minutes more than  50% of the time in counseling and coordination of care including discussion of management plan, potential side effects of chemotherapy and benefits, follow-up and prognosis    Chart documentation with Dragon Voice recognition Software. Although reviewed after completion, some words and grammatical errors may remain.    Again, thank you for allowing me to participate in the care of your patient.        Sincerely,        Brodie Cassidy MD

## 2018-03-21 NOTE — PATIENT INSTRUCTIONS
Last warfarin dose: 3/22/18  3/23/18, NO warfarin  3/24/18, NO warfarin  3/25/18, NO warfarin, begin enoxaparin injection into the abdomen every 12 hours (AM and PM)  3/26/18, NO warfarin, enoxaparin injection into the abdomen AM only (no enoxaparin 24 hours prior to surgery)  3/27/18, DAY OF PROCEDURE, NO enoxaparin. Restart warfarin 1.25mg (1/2 tab) in the evening, unless instructed otherwise by the physician.  3/28/18, Restart enoxaparin injections into the abdomen every 12 hours (AM and PM), unless instructed otherwise by the physician, and 1.25mg (1/2 tab) warfarin in the evening.   3/29/18, Enoxaparin injection into the abdomen every 12 hours (AM and PM) and 1.25mg (1/2 tab) warfarin in the evening.  3/30/18, Enoxaparin injection into the abdomen every 12 hours (AM and PM) and 1.25mg (1/2 tab) warfarin in the evening.  Recheck INR with your infusion.   If you have any questions please call the Anticoagulation Clinic at [phone].  To schedule your appointment please call [phone].

## 2018-03-21 NOTE — MR AVS SNAPSHOT
Etta Cervantes   3/21/2018   Anticoagulation Therapy Visit    Description:  57 year old female   Provider:  Cecilia Finn, RN   Department:  [facility]           INR as of 3/21/2018     Today's INR 4.39!      Anticoagulation Summary as of 3/21/2018     INR goal 2.0-3.0   Today's INR 4.39!   Full instructions 3/21: Hold; 3/23: Hold; 3/24: Hold; 3/25: Hold; 3/26: Hold; Otherwise 1.25 mg every day   Next INR check 3/30/2018    Indications   Acute deep vein thrombosis (DVT) of right upper extremity  unspecified vein (H) [I82.621]  Long-term (current) use of anticoagulants [Z79.01] [Z79.01]         Instructions    Last warfarin dose: 3/22/18  3/23/18, NO warfarin  3/24/18, NO warfarin  3/25/18, NO warfarin, begin enoxaparin injection into the abdomen every 12 hours (AM and PM)  3/26/18, NO warfarin, enoxaparin injection into the abdomen AM only (no enoxaparin 24 hours prior to surgery)  3/27/18, DAY OF PROCEDURE, NO enoxaparin. Restart warfarin 1.25mg (1/2 tab) in the evening, unless instructed otherwise by the physician.  3/28/18, Restart enoxaparin injections into the abdomen every 12 hours (AM and PM), unless instructed otherwise by the physician, and 1.25mg (1/2 tab) warfarin in the evening.   3/29/18, Enoxaparin injection into the abdomen every 12 hours (AM and PM) and 1.25mg (1/2 tab) warfarin in the evening.  3/30/18, Enoxaparin injection into the abdomen every 12 hours (AM and PM) and 1.25mg (1/2 tab) warfarin in the evening.  Recheck INR with your infusion.   If you have any questions please call the Anticoagulation Clinic at [phone].  To schedule your appointment please call [phone].           Your next Anticoagulation Clinic appointment(s)     Mar 30, 2018  9:45 AM CDT   Anticoagulation Visit with [place] [facility]   [facility] ([facility])    [address] [place] [address]   [phone]              March 2018 Details    Sun Mon Tue Wed Thu Fri Sat          1               2               3                 4               5               6               7               8               9               10                 11               12               13               14               15               16               17                 18               19               20               21      Hold   See details      22      1.25 mg         23      Hold         24      Hold           25      Hold         26      Hold         27      1.25 mg         28      1.25 mg         29      1.25 mg         30            31                Date Details   03/21 This INR check       Date of next INR:  3/30/2018         How to take your warfarin dose     To take:  1.25 mg Take 0.5 of a 2.5 mg tablet.    Hold Do not take your warfarin dose. See the Details table to the right for additional instructions.

## 2018-03-21 NOTE — PROGRESS NOTES
Hematology/ Oncology Follow-up Visit:  Mar 21, 2018    Reason for Visit:   Chief Complaint   Patient presents with     Oncology Clinic Visit     4 week recheck Carcinoma of breast metastatic to liver, , review Labs & CT / chemo today       Oncologic History:  Breast cancer (H)  Etta Cervantes presented with increasing lump in the right axilla that s lump has been growing without any pain or associated symptoms. Subsequently she was evaluated by primary care physician. Diagnostic digital mammogram was done on 01/13/2015 showing enlarged lymph nodes in the right axilla. There was some skin thickening in the right breast anteriorly and laterally. There are no parenchymal changes in the right breast. The left breast shows a 1.7 cm spiculated mass at approximately 2 to 3 o'clock position, 15.2 cm away from the nipple. A right breast ultrasound was subsequently done and ultrasound guided biopsy was done. Needle biopsy of the left breast did show infiltrating ductal carcinoma grade I of III with no angiolymphatic invasion seen. No associated ductal carcinoma in situ. Estrogen receptor, progresterone receptor were positive. HER-2/sadie receptor came back negative.. Another biopsy from the right axilla did show metastatic ductal carcinoma. PET scan was done on January 26, 2015 showing few small right posterior cervical chain nodes the largest is 1.2 cm. There is extensive bulky right axillary adenopathy demonstrating hypermetabolic FDG uptake with SUV of 10.6. There is skin thickening involving the right breast which demonstrated low-grade FDG uptake. A 1.2 cm lesion on the lateral aspect of the left breast demonstrated minimally increased FDG uptake with SUV of 2. Scattered hypermetabolic mediastinal lymph nodes located in the left superior anterior mediastinum, right paratracheal and precarinal U, subcranial adenopathy with javon metastases. This focus hypermetabolic FDG uptake identified definitive Amanda posterior aspect  off intertrochanteric region of the proximal left femur consistent with bone metastases. There is also 1.4 cm hypermetabolic FDG uptake identified in the anterior medial segment of the left hepatic lobe consistent with liver metastases. Additional 2.1 cm low-attenuation lesion anterior aspect of the right lobe which needs to be determined. The patient was started on chemotherapy with Taxotere and Cytoxan on February 9, 2015.  She concluded 4 cycles of Taxotere and Cytoxan. She started on Arimidex 1 mg orally daily. Currently she is on Faslodex and ibrance. Subsequently the patient went on to receive Afinitor. The patient also started treatment with Xeloda.       Interval History:  Patient is here today for follow-up.  She denies any recent bone aches or pains.  She denies any shortness of breath or cough or wheezing.  She denies any abdominal pain or nausea vomiting or diarrhea.  She denies any mouth sores or ulcers.  Patient currently on Abraxane chemotherapy.  She has been tolerating chemotherapy well.  Her diarrhea has stopped since she started on oral vancomycin for C. difficile infection.  Stool test came back negative.  Patient is here today to discuss management plan following her CT scan.    Review Of Systems:  Constitutional: Negative for fever, chills, and night sweats.  Skin: negative.  Eyes: negative.  Ears/Nose/Throat: negative.  Respiratory: No shortness of breath, dyspnea on exertion, cough, or hemoptysis.  Cardiovascular: negative.  Gastrointestinal: negative.  Genitourinary: negative.  Musculoskeletal: negative.  Neurologic: negative.  Psychiatric: negative.  Hematologic/Lymphatic/Immunologic: negative.  Endocrine: negative.    All other ROS negative unless mentioned in interval history.    Past medical, social, surgical, and family histories reviewed.    Allergies:  Allergies as of 03/21/2018 - Manuel as Reviewed 03/21/2018   Allergen Reaction Noted     Animal dander Cough 01/23/2015     Cats   07/15/2010     Dust mites Cough 01/23/2015     Pollen extract  01/23/2015     Seasonal allergies  07/15/2010     Levaquin [levofloxacin] Rash 07/17/2017       Current Medications:  Current Outpatient Prescriptions   Medication Sig Dispense Refill     oxybutynin (DITROPAN XL) 10 MG 24 hr tablet Take 10 mg by mouth daily       HYDROcodone-acetaminophen (NORCO) 5-325 MG per tablet Take 1-2 tablets by mouth every 4 hours as needed for moderate to severe pain 20 tablet 0     SYMBICORT 160-4.5 MCG/ACT Inhaler INHALE 2 PUFFS INTO THE LUNGS 2 TIMES DAILY 10.2 Inhaler 1     warfarin (COUMADIN) 2.5 MG tablet As directed by Anticoagulation Clinic (maintenance dose not yet established) 30 tablet 1     vancomycin (VANCOCIN) 125 MG capsule Take 1 capsule (125 mg) by mouth 4 times daily 60 capsule 0     diclofenac (VOLTAREN) 1 % GEL topical gel Place 2 g onto the skin 4 times daily 100 g 0     loratadine (CLARITIN) 10 MG tablet Take 10 mg by mouth daily as needed for allergies       zolpidem (AMBIEN CR) 12.5 MG CR tablet Take 1 tablet by mouth at bed time. 30 tablet 5     QVAR 80 MCG/ACT Inhaler INHALE 1 PUFFS INTO THE EACH NOSTRIL 2 TIMES DAILY 26.1 g 3     levothyroxine (SYNTHROID/LEVOTHROID) 25 MCG tablet TAKE 1 TABLET (25 MCG) BY MOUTH DAILY 90 tablet 2     calcium carb-cholecalciferol (KP CALCIUM 600+D3) 600-500 MG-UNIT CAPS Take 2 tablets by mouth daily       CRANBERRY PO Take 1 capsule by mouth daily        azelastine (ASTELIN) 0.1 % nasal spray Spray 1-2 sprays into both nostrils 2 times daily as needed for rhinitis 3 Bottle 3     LORazepam (ATIVAN) 0.5 MG tablet Take 1 tablet (0.5 mg) by mouth every 4 hours as needed (Anxiety, Nausea/Vomiting or Sleep) 30 tablet 2     prochlorperazine (COMPAZINE) 10 MG tablet Take 1 tablet (10 mg) by mouth every 6 hours as needed (Nausea/Vomiting) 30 tablet 2     vancomycin (VANCOCIN) 125 MG capsule Take 1 capsule (125 mg) by mouth 4 times daily 100 capsule 0     SYMBICORT 160-4.5 MCG/ACT  "Inhaler INHALE 2 PUFFS INTO THE LUNGS 2 TIMES DAILY 3 Inhaler 1     fluticasone (FLONASE) 50 MCG/ACT spray Spray 1-2 sprays into both nostrils daily (Patient not taking: Reported on 3/21/2018) 1 Bottle 11     Beclomethasone Dipropionate 80 MCG/ACT AERS Nasal Spray Spray 2 sprays into both nostrils daily 8.7 g 3     oxyCODONE IR (ROXICODONE) 5 MG tablet Take 1 tablet (5 mg) by mouth every 4 hours as needed for moderate to severe pain (Patient not taking: Reported on 3/21/2018) 60 tablet 0     prochlorperazine (COMPAZINE) 10 MG tablet Take 1 tablet (10 mg) by mouth every 6 hours as needed (Nausea/Vomiting) (Patient not taking: Reported on 2/6/2018) 30 tablet 2     Lidocaine (LIDOCARE) 4 % Patch Place 1-2 patches onto the skin every 24 hours (Patient not taking: Reported on 1/24/2018) 30 patch 0     order for DME Equipment being ordered: Venu post-lumpectomy compression pad x2 1 each 0     diphenhydrAMINE (BENADRYL) 25 MG tablet Take 1 tablet (25 mg) by mouth every 8 hours as needed for itching or allergies (Patient not taking: Reported on 2/6/2018) 1 tablet 0     Phenylephrine-DM-GG (ROBITUSSIN COUGH/COLD CF PO) Take 10 mLs by mouth as needed        metoclopramide (REGLAN) 10 MG tablet Take 1 tablet (10 mg) by mouth 2 times daily as needed 120 tablet 1     fexofenadine (ALLEGRA ALLERGY) 180 MG tablet Take 180 mg by mouth daily as needed for allergies       albuterol (2.5 MG/3ML) 0.083% neb solution Take 1 vial (2.5 mg) by nebulization every 4 hours as needed for shortness of breath / dyspnea (Patient not taking: Reported on 3/21/2018) 30 vial 3     albuterol (VENTOLIN HFA) 108 (90 BASE) MCG/ACT inhaler Inhale 2 puffs into the lungs every 4 hours as needed (Patient not taking: Reported on 3/21/2018) 1 Inhaler 2        Physical Exam:  /64 (BP Location: Right arm, Patient Position: Sitting, Cuff Size: Adult Large)  Pulse 100  Temp 99.4  F (37.4  C) (Oral)  Resp 20  Ht 1.632 m (5' 4.25\")  Wt 84.3 kg (185 lb " "12.8 oz)  LMP 02/06/2013  SpO2 97%  Breastfeeding? No  BMI 31.64 kg/m2  Wt Readings from Last 12 Encounters:   03/21/18 84.3 kg (185 lb 12.8 oz)   03/07/18 85.1 kg (187 lb 9.6 oz)   03/01/18 83.5 kg (184 lb)   02/21/18 83.9 kg (185 lb)   02/07/18 83.5 kg (184 lb)   02/06/18 83.7 kg (184 lb 8 oz)   01/24/18 83.2 kg (183 lb 6.4 oz)   01/17/18 85.4 kg (188 lb 3.2 oz)   01/10/18 83.9 kg (185 lb)   01/05/18 83.9 kg (185 lb)   01/03/18 86.5 kg (190 lb 9.6 oz)   12/27/17 84 kg (185 lb 3.2 oz)     ECOG performance status: 1  GENERAL APPEARANCE: Healthy, alert and in no acute distress.  HEENT: Sclerae anicteric. PERRLA. Oropharynx without ulcers, lesions, or thrush.  NECK: Supple. No asymmetry or masses.  LYMPHATICS: No palpable cervical, supraclavicular, axillary, or inguinal lymphadenopathy.  RESP: Lungs clear to auscultation bilaterally without rales, rhonchi or wheezes.  BREAST: Examined adenopathy of the right axilla \"CARDIOVASCULAR: Regular rate and rhythm. Normal S1, S2; no S3 or S4. No murmur, gallop, or rub.  ABDOMEN: Soft, nontender. Bowel sounds normal. No palpable organomegaly or masses.  MUSCULOSKELETAL: Extremities without gross deformities noted. No edema of bilateral lower extremities.  SKIN: No suspicious lesions or rashes.  NEURO: Alert and oriented x 3. Cranial nerves II-XII grossly intact.  PSYCHIATRIC: Mentation and affect appear normal.    Laboratory/Imaging Studies:  Infusion Therapy Visit on 03/21/2018   Component Date Value Ref Range Status     INR 03/21/2018 4.39* 0.86 - 1.14 Final    CALLED TO LAUREN ORELLANA BY ALETA 3/21/18 1017     WBC 03/21/2018 7.0  4.0 - 11.0 10e9/L Final     RBC Count 03/21/2018 4.18  3.8 - 5.2 10e12/L Final     Hemoglobin 03/21/2018 11.8  11.7 - 15.7 g/dL Final     Hematocrit 03/21/2018 36.6  35.0 - 47.0 % Final     MCV 03/21/2018 88  78 - 100 fl Final     MCH 03/21/2018 28.2  26.5 - 33.0 pg Final     MCHC 03/21/2018 32.2  31.5 - 36.5 g/dL Final     RDW 03/21/2018 16.2* 10.0 - " 15.0 % Final     Platelet Count 03/21/2018 301  150 - 450 10e9/L Final     Diff Method 03/21/2018 Automated Method   Final     % Neutrophils 03/21/2018 66.7  % Final     % Lymphocytes 03/21/2018 15.3  % Final     % Monocytes 03/21/2018 14.6  % Final     % Eosinophils 03/21/2018 1.9  % Final     % Basophils 03/21/2018 0.4  % Final     % Immature Granulocytes 03/21/2018 1.1  % Final     Absolute Neutrophil 03/21/2018 4.7  1.6 - 8.3 10e9/L Final     Absolute Lymphocytes 03/21/2018 1.1  0.8 - 5.3 10e9/L Final     Absolute Monocytes 03/21/2018 1.0  0.0 - 1.3 10e9/L Final     Absolute Eosinophils 03/21/2018 0.1  0.0 - 0.7 10e9/L Final     Absolute Basophils 03/21/2018 0.0  0.0 - 0.2 10e9/L Final     Abs Immature Granulocytes 03/21/2018 0.1  0 - 0.4 10e9/L Final     Sodium 03/21/2018 139  133 - 144 mmol/L Final     Potassium 03/21/2018 4.1  3.4 - 5.3 mmol/L Final     Chloride 03/21/2018 108  94 - 109 mmol/L Final     Carbon Dioxide 03/21/2018 26  20 - 32 mmol/L Final     Anion Gap 03/21/2018 5  3 - 14 mmol/L Final     Glucose 03/21/2018 88  70 - 99 mg/dL Final     Urea Nitrogen 03/21/2018 9  7 - 30 mg/dL Final     Creatinine 03/21/2018 0.73  0.52 - 1.04 mg/dL Final     GFR Estimate 03/21/2018 81  >60 mL/min/1.7m2 Final    Non  GFR Calc     GFR Estimate If Black 03/21/2018 >90  >60 mL/min/1.7m2 Final    African American GFR Calc     Calcium 03/21/2018 8.3* 8.5 - 10.1 mg/dL Final     Bilirubin Total 03/21/2018 0.9  0.2 - 1.3 mg/dL Final     Albumin 03/21/2018 3.0* 3.4 - 5.0 g/dL Final     Protein Total 03/21/2018 6.9  6.8 - 8.8 g/dL Final     Alkaline Phosphatase 03/21/2018 135  40 - 150 U/L Final     ALT 03/21/2018 27  0 - 50 U/L Final     AST 03/21/2018 66* 0 - 45 U/L Final   Orders Only on 03/13/2018   Component Date Value Ref Range Status     Specimen Description 03/12/2018 Feces   Final     C Diff Toxin B PCR 03/12/2018 Negative  NEG^Negative Final    Comment: Negative: Clostridium difficile target DNA  sequences NOT detected, presumed   negative for Clostridium difficile toxin B or the number of bacteria present   may be below the limit of detection for the test.  FDA approved assay performed using [employer] GeneXpert real-time PCR.  A negative result does not exclude actual disease due to Clostridium difficile   and may be due to improper collection, handling and storage of the specimen   or the number of organisms in the specimen is below the detection limit of the   assay.          Recent Results (from the past 744 hour(s))   CT Chest/Abdomen/Pelvis w Contrast    Narrative    CT CHEST/ABDOMEN/PELVIS W CONTRAST 3/19/2018 9:56 AM    HISTORY: Breast cancer. Follow-up.    CONTRAST:  92 mL Isovue 370.    TECHNIQUE: CT of the chest, abdomen, and pelvis is performed with IV  contrast.    Routine evaluated structures in the chest include the lungs,  mediastinal structures, pleura, and chest wall.    Routine assessed structures in the abdomen include the liver, spleen,  pancreas, adrenal glands, and kidneys. Also assessed are the  retroperitoneum, gastrointestinal tract, and the abdominal wall.    Intrapelvic anatomy is also assessed.    Radiation dose for this scan is reduced using automated exposure  control, adjustment of the mA and/or kV according to patient size, or  iterative reconstruction technique.    COMPARISON: [facility] 6 2017.    FINDINGS:    Chest: Bandlike parenchymal abnormality in the right hilar region is  stable. Bilateral pleural effusions are larger. To the contrary the  previously seen pericardial effusion is much smaller with only a small  amount of a pericardial fluid remaining. There is some nodular  enhancement of the pericardium seen on today's study and it most  likely relates to neoplastic involvement. Inflammatory etiology is  also in the differential. Right axillary adenopathy has worsened. The  dominant area measures 4.4 cm, compared to 3.7 cm on the prior study.  Another lymph node measures 1.3  cm compared to 1 cm on the prior  study. Other small areas of nodular enhancement at the margin of the  right breast and right axilla are new and consistent with additional  disease. A multinodular thyroid gland is again demonstrated. There is  a focal area of blastic change involving the right aspect of the C6  vertebral body on image #1. Images didn't extend quite this high  before. New or more obvious blastic foci are seen on today's study in  the region of T3 and T8. There also is a new blastic focus involving a  right transverse process in the lower thoracic spine on image #43. A  blastic focus involving the body of the sternum on image #17 is more  evident. Blastic foci involving a posterior right rib arc on image #17  is more stable.    Abdomen: There is much more extensive neoplastic disease in the liver  now compared to the prior study. Large confluent areas of neoplastic  disease are seen in both the right and left lobes. There has been  interval placement of a biliary stent with resolution of the  previously seen biliary dilatation. New/more prominent nodules are  seen in the omentum, consistent with peritoneal carcinomatosis. There  are new nodules involving the anterior abdominal wall subcutaneous  fat. They could relate to injection granulomas from subcutaneous  injections, with neoplastic disease considered less likely. New  blastic foci are seen involving the L3 and L4 vertebral bodies.    Pelvis: There is moderately prominent mucosal enhancement of the  gallbladder. There is a small amount of free pelvic fluid, less  compared to the prior study. New blastic foci are seen involving the  midline and right paramidline sacrum. There is a new blastic focus  involving the right symphysis pubis on image #112.      Impression    IMPRESSION:  1.  There is worsening neoplastic disease in the right axilla/lateral  right breast margin, and abdomen. Osseous metastatic disease is also  worse.  2. New enhancing  nodularity of the pericardium is of concern for  metastatic involvement.  3. Bilateral pleural effusions are larger.  4. There is a mucosal enhancement involving the bladder. This could  relate to cystitis in the appropriate clinical setting.    LETI CHASE MD       Assessment and plan:  (C50.919,  C78.7) Carcinoma of breast metastatic to liver, unspecified laterality (H)  (primary encounter diagnosis)  (C50.911,  C77.3) 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)  (C78.7) Secondary malignant neoplasm of liver (H)  (C50.911,  Z17.0,  C50.912) Bilateral malignant neoplasm of breast in female,  I reviewed with the patient the imaging studies in details.  We looked at the images.  Patient shows disease progression.  At this time we discussed further management plan with chemotherapy.  I would recommend use of begin related anthracycline Doxil at 50 mg/m .  I reviewed with the patient potential side effects in great details.  Patient will be starting her first cycle within the next couple of days.  Meanwhile, I am going to arrange for ultrasound-guided biopsy from the axillary lymph node to assess for targets including HER-2/sadie, BRCA.  Samples will be also sent for foundation 1 to identify targets for possible interventions.    (C79.51) Bone metastasis (H)  Patient will continue on Xgeva every 3 months at 120 mg subcutaneously.  The patient continue on calcium and vitamin D supplements.    (E03.9) Hypothyroidism, unspecified type  Patient will continue on Synthroid 25 mcg daily.    (G89.3) Cancer associated pain  Patient pain is currently well controlled with the current regimen.    (I82.621) Acute deep vein thrombosis (DVT) of right upper extremity, unspecified vein (H)  Patient currently on Lovenox injection.    The patient is ready to learn, no apparent learning barriers were identified.  Diagnosis and treatment plans were explained to the patient. The patient expressed understanding of the  content. The patient asked appropriate questions. The patient questions were answered to her satisfaction.    Time spent 40 minutes more than 50% of the time in counseling and coordination of care including discussion of management plan, potential side effects of chemotherapy and benefits, follow-up and prognosis    Chart documentation with Dragon Voice recognition Software. Although reviewed after completion, some words and grammatical errors may remain.

## 2018-03-21 NOTE — MR AVS SNAPSHOT
After Visit Summary   3/21/2018    Etta Cervantes    MRN: [id number]           Patient Information     Date Of Birth          1960        Visit Information        Provider Department      3/21/2018 9:00 AM ROOM 4 [facility]        Today's Diagnoses     Carcinoma of breast metastatic to liver (H)    -  1    Long-term (current) use of anticoagulants        Acute deep vein thrombosis (DVT) of right upper extremity, unspecified vein (H)           Follow-ups after your visit        Your next 10 appointments already scheduled     Mar 23, 2018  9:30 AM CDT   Level 2 with ROOM 5 [facility] ([facility])    n [facility]  [address]  [facility] [address]   [phone]            Mar 27, 2018  9:30 AM CDT   (Arrive by 9:15 AM)   US BIOPSY CORE LYMPH NODE with [facility], [facility] ([facility])    [address]  [facility] [address]   [phone]           Bring a list of your medicines to the exam. Include vitamins, minerals and over-the-counter drugs.  Tell your doctor in advance:   If you are or may be pregnant.   If you are taking Coumadin (or any other blood thinners) 5 days prior to the exam for any special instructions.  IF YOUR DOCTOR HAS TOLD YOU THAT YOU WILL BE RECEIVING SEDATION (medicine to help you relax): (Typically sedation is only for liver exams at [facility] and Renal Biopsy exams in Pediatrics)   See your family doctor for an exam within 30 days of treatment.   Plan for an adult to drive you home and stay with you for at least 24 hours.   No eating or drinking for 4 hours before your test. You may take medicine with small sips of water.   If you have diabetes:If you take insulin, call your diabetes care team for any special instructions for this exam.  Please call the Imaging Department at your exam site with any questions.             Apr 04, 2018  1:45  PM CDT   Return Visit with Brodie Cassidy MD   [facility] ([facility])    n [facility]  [address]  [facility] [address] [phone]              Future tests that were ordered for you today     Open Future Orders        Priority Expected Expires Ordered    US Biopsy Lymph Node Core Routine 3/21/2018 3/21/2019 3/21/2018            Who to contact     If you have questions or need follow up information about today's clinic visit or your schedule please contact [facility] directly at [phone].  Normal or non-critical lab and imaging results will be communicated to you by MogiMehart, letter or phone within 4 business days after the clinic has received the results. If you do not hear from us within 7 days, please contact the clinic through MogiMehart or phone. If you have a critical or abnormal lab result, we will notify you by phone as soon as possible.  Submit refill requests through NATIONSPLAY or call your pharmacy and they will forward the refill request to us. Please allow 3 business days for your refill to be completed.          Additional Information About Your Visit        MyChart Information     NATIONSPLAY gives you secure access to your electronic health record. If you see a primary care provider, you can also send messages to your care team and make appointments. If you have questions, please call your primary care clinic.  If you do not have a primary care provider, please call [phone] and they will assist you.        Care EveryWhere ID     This is your Care EveryWhere ID. This could be used by other organizations to access your [facility] medical records  [id number]        Your Vitals Were     Last Period                   02/06/2013            Blood Pressure from Last 3 Encounters:   03/21/18 106/64   03/07/18 111/57   03/01/18 101/58    Weight from Last 3 Encounters:   03/21/18 84.3 kg (185 lb 12.8 oz)   03/07/18 85.1 kg (187 lb 9.6  oz)   03/01/18 83.5 kg (184 lb)              We Performed the Following     CA 27.29 Breast tumor marker     CBC with platelets differential     CEA     Comprehensive metabolic panel     INR          Today's Medication Changes          These changes are accurate as of 3/21/18 11:21 AM.  If you have any questions, ask your nurse or doctor.               Start taking these medicines.        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        Dose/Directions    * prochlorperazine 10 MG tablet   Commonly known as:  COMPAZINE   This may have changed:  Another medication with the same name was added. Make sure you understand how and when to take each.   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 Make sure you understand how and when to take each.   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 Read the directions carefully, and ask your doctor or other care provider to review them with you.         Where to get your medicines      These medications were sent to [place] [id number] [place] [facility] [address]  [address] [address]     Phone:  [phone]     prochlorperazine 10 MG tablet         Some of these will need a paper prescription and others can be bought over the counter.  Ask your nurse if you have questions.     Bring a paper prescription for each of these medications     HYDROcodone-acetaminophen 5-325 MG per tablet    LORazepam 0.5 MG tablet                Primary Care Provider Office Phone # Fax #    Johana Fairbanks [phone] [address] [facility] [address]        Equal Access to Services     [facility] AH: Hadii aad fatoumata hadasho Sofabienali, waaxda luqadaha, qaybta kaalmada adeegyada, hari rodriguez . So [facility] [phone].    ATENCIÓN: Si habla español, tiene a parekh disposición servicios gratuitos de asistencia lingüística. Llame al [phone].    We comply with applicable federal civil rights laws and [place] laws. We do not discriminate on the basis of race, color, national origin, age, disability, sex, sexual orientation, or gender identity.            Thank you!     Thank you for choosing [facility]  for your care. Our goal is always to provide you with excellent care. Hearing back from our patients is one way we can continue to improve our services. Please take a few minutes to  complete the written survey that you may receive in the mail after your visit with us. Thank you!             Your Updated Medication List - Protect others around you: Learn how to safely use, store and throw away your medicines at www.disposemymeds.org.          This list is accurate as of 3/21/18 11:21 AM.  Always use your most recent med list.                   Brand Name Dispense Instructions for use Diagnosis    * albuterol 108 (90 BASE) MCG/ACT Inhaler    VENTOLIN HFA    1 Inhaler    Inhale 2 puffs into the lungs every 4 hours as needed    Moderate persistent asthma, uncomplicated       * albuterol (2.5 MG/3ML) 0.083% neb solution     30 vial    Take 1 vial (2.5 mg) by nebulization every 4 hours as needed for shortness of breath / dyspnea    Moderate persistent asthma, uncomplicated       ALLEGRA ALLERGY 180 MG tablet   Generic drug:  fexofenadine      Take 180 mg by mouth daily as needed for allergies        azelastine 0.1 % spray    ASTELIN    3 Bottle    Spray 1-2 sprays into both nostrils 2 times daily as needed for rhinitis    Chronic rhinitis       Beclomethasone Dipropionate 80 MCG/ACT Aers Nasal Spray     8.7 g    Spray 2 sprays into both nostrils daily    Other chronic rhinitis       CRANBERRY PO      Take 1 capsule by mouth daily        diclofenac 1 % Gel topical gel    VOLTAREN    100 g    Place 2 g onto the skin 4 times daily    Carcinoma of breast metastatic to liver, unspecified laterality (H)       diphenhydrAMINE 25 MG tablet    BENADRYL    1 tablet    Take 1 tablet (25 mg) by mouth every 8 hours as needed for itching or allergies    Carcinoma of breast metastatic to liver, unspecified laterality (H), Bone metastasis (H), Pericardial effusion, Bilateral malignant neoplasm of breast in female, estrogen receptor positive, unspecified site of breast (H), 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), Secondary malignant neoplasm of liver (H), Chemotherapy-induced neutropenia (H),  Emphysematous bleb of lung (H), Hypothyroidism, unspecified type, Hyperlipidemia LDL goal <130, Moderate persistent asthma without complication, Mucositis due to chemotherapy       DITROPAN XL 10 MG 24 hr tablet   Generic drug:  oxybutynin      Take 10 mg by mouth daily    Post-op pain, 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), Bone metastasis (H), Carcinoma of breast metastatic to liver, unspecified laterality (H), Secondary malignant neoplasm of liver (H)       fluticasone 50 MCG/ACT spray    FLONASE    1 Bottle    Spray 1-2 sprays into both nostrils daily    Other chronic rhinitis       HYDROcodone-acetaminophen 5-325 MG per tablet    NORCO    20 tablet    Take 1-2 tablets by mouth every 4 hours as needed for moderate to severe pain    Post-op pain, 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), Bone metastasis (H), Carcinoma of breast metastatic to liver, unspecified laterality (H), Secondary malignant neoplasm of liver (H)       KP CALCIUM 600+D3 600-500 MG-UNIT Caps   Generic drug:  calcium carb-cholecalciferol      Take 2 tablets by mouth daily        levothyroxine 25 MCG tablet    SYNTHROID/LEVOTHROID    90 tablet    TAKE 1 TABLET (25 MCG) BY MOUTH DAILY    Hypothyroidism due to acquired atrophy of thyroid       Lidocaine 4 % Patch    LIDOCARE    30 patch    Place 1-2 patches onto the skin every 24 hours    Carcinoma of breast metastatic to liver, unspecified laterality (H)       loratadine 10 MG tablet    CLARITIN     Take 10 mg by mouth daily as needed for allergies        LORazepam 0.5 MG tablet    ATIVAN    30 tablet    Take 1 tablet (0.5 mg) by mouth every 4 hours as needed (Anxiety, Nausea/Vomiting or Sleep)    Secondary malignant neoplasm of liver (H), Carcinoma of breast metastatic to liver, unspecified laterality (H), Bone metastasis (H), 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)       metoclopramide 10 MG tablet    REGLAN    120 tablet     Take 1 tablet (10 mg) by mouth 2 times daily as needed    Bilateral malignant neoplasm of breast in female, estrogen receptor positive, unspecified site of breast (H)       order for DME     1 each    Equipment being ordered: Venu post-lumpectomy compression pad x2    Lymphedema, Lymphedema of breast       oxyCODONE IR 5 MG tablet    ROXICODONE    60 tablet    Take 1 tablet (5 mg) by mouth every 4 hours as needed for moderate to severe pain    Secondary malignant neoplasm of liver (H), Carcinoma of breast metastatic to liver, unspecified laterality (H), 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), Bone metastasis (H), Pericardial effusion       * prochlorperazine 10 MG tablet    COMPAZINE    30 tablet    Take 1 tablet (10 mg) by mouth every 6 hours as needed (Nausea/Vomiting)    Secondary malignant neoplasm of liver (H), Carcinoma of breast metastatic to liver, unspecified laterality (H), Bone metastasis (H), 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), Bilateral malignant neoplasm of breast in female, estrogen receptor positive, unspecified site of breast (H)       * prochlorperazine 10 MG tablet    COMPAZINE    30 tablet    Take 1 tablet (10 mg) by mouth every 6 hours as needed (Nausea/Vomiting)    Secondary malignant neoplasm of liver (H), Carcinoma of breast metastatic to liver, unspecified laterality (H), Bone metastasis (H), 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)       QVAR 80 MCG/ACT Inhaler   Generic drug:  beclomethasone     26.1 g    INHALE 1 PUFFS INTO THE EACH NOSTRIL 2 TIMES DAILY    Chronic rhinitis       ROBITUSSIN COUGH/COLD CF PO      Take 10 mLs by mouth as needed    Breast cancer metastasized to axillary lymph node, right (H)       * SYMBICORT 160-4.5 MCG/ACT Inhaler   Generic drug:  budesonide-formoterol     10.2 Inhaler    INHALE 2 PUFFS INTO THE LUNGS 2 TIMES DAILY    Moderate persistent asthma without complication       *  SYMBICORT 160-4.5 MCG/ACT Inhaler   Generic drug:  budesonide-formoterol     3 Inhaler    INHALE 2 PUFFS INTO THE LUNGS 2 TIMES DAILY    Moderate persistent asthma without complication       * vancomycin 125 MG capsule    VANCOCIN    60 capsule    Take 1 capsule (125 mg) by mouth 4 times daily    Clostridium difficile diarrhea       * vancomycin 125 MG capsule    VANCOCIN    100 capsule    Take 1 capsule (125 mg) by mouth 4 times daily    C. difficile colitis       warfarin 2.5 MG tablet    COUMADIN    30 tablet    As directed by Anticoagulation Clinic (maintenance dose not yet established)    Long-term (current) use of anticoagulants, Acute deep vein thrombosis (DVT) of right upper extremity, unspecified vein (H)       zolpidem 12.5 MG CR tablet    AMBIEN CR    30 tablet    Take 1 tablet by mouth at bed time.    Insomnia due to medical condition       * Notice:  This list has 8 medication(s) that are the same as other medications prescribed for you. Read the directions carefully, and ask your doctor or other care provider to review them with you.

## 2018-03-21 NOTE — MR AVS SNAPSHOT
After Visit Summary   3/21/2018    Etta Cervantes    MRN: [id number]           Patient Information     Date Of Birth          1960        Visit Information        Provider Department      3/21/2018 10:00 AM Brodie Cassidy MD [facility]        Today's Diagnoses     Carcinoma of breast metastatic to liver, unspecified laterality (H)    -  1    Post-op pain        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)        Bone metastasis (H)        Secondary malignant neoplasm of liver (H)          Care Instructions    Dr. Cassidy is recommending you have a biopsy of the lymph node in your right axilla.  We will send the tissue sample for additional testing. We would like to see you back in clinic with Dr. Cassidy with pathology results.     Dr. Cassidy also is changing your chemotherapy regimen from Abraxane to Doxil.  You will receive education on this today.  He would like you to start this regimen this week.    Your prescription has been: given to you to hand carry to the pharmacy of your choice.  When you are in need of a refill, please call your pharmacy and they will send us a request.      Copy of appointments, and after visit summary (AVS) given to patient.  If you have any questions during business hours (M-F 8 AM- 4PM), please call Funmi Ray RN, BSN, OCN Oncology Hematology /Breast Cancer Navigator at [facility] [phone].   For questions after business hours, or on holidays/weekends, please call our after hours Nurse Triage line [phone]. Thank you.   Your prescription [facility]:   [id number] [place] [place] [facility] [address] [facility]  [address] [facility] [address]  Phone: [phone] Fax: [phone]    [facility] - [place], [place] - [address]  [address] [facility] [address]  Phone: [phone] Fax: [phone]             Follow-ups  after your visit        Your next 10 appointments already scheduled     Mar 23, 2018  9:30 AM CDT   Level 2 with ROOM 5 [facility] ([facility])    [facility]  [address]  [facility] [address]   [phone]            Mar 27, 2018  9:30 AM CDT   (Arrive by 9:15 AM)   US BIOPSY CORE LYMPH NODE with WYUS4, WY RAD   [facility]ound ([facility])    [address] [facility] [address]   [phone]           Bring a list of your medicines to the exam. Include vitamins, minerals and over-the-counter drugs.  Tell your doctor in advance:   If you are or may be pregnant.   If you are taking Coumadin (or any other blood thinners) 5 days prior to the exam for any special instructions.  IF YOUR DOCTOR HAS TOLD YOU THAT YOU WILL BE RECEIVING SEDATION (medicine to help you relax): (Typically sedation is only for liver exams at [facility] and Renal Biopsy exams in Pediatrics)   See your family doctor for an exam within 30 days of treatment.   Plan for an adult to drive you home and stay with you for at least 24 hours.   No eating or drinking for 4 hours before your test. You may take medicine with small sips of water.   If you have diabetes:If you take insulin, call your diabetes care team for any special instructions for this exam.  Please call the Imaging Department at your exam site with any questions.             Apr 04, 2018  1:45 PM CDT   Return Visit with Brodie Cassidy MD   [facility] ([facility])    [facility]  [address]  [facility] [address]   [phone]              Future tests that were ordered for you today     Open Future Orders        Priority Expected Expires Ordered    US Biopsy Lymph Node Core Routine 3/21/2018 3/21/2019 3/21/2018            Who to contact     If you have questions or need follow up information about today's  "clinic visit or your schedule please contact [facility] directly at [phone].  Normal or non-critical lab and imaging results will be communicated to you by MyChart, letter or phone within 4 business days after the clinic has received the results. If you do not hear from us within 7 days, please contact the clinic through [employer] or phone. If you have a critical or abnormal lab result, we will notify you by phone as soon as possible.  Submit refill requests through [employer] or call your pharmacy and they will forward the refill request to us. Please allow 3 business days for your refill to be completed.          Additional Information About Your Visit        [employer] Imaging Information     [employer] gives you secure access to your electronic health record. If you see a primary care provider, you can also send messages to your care team and make appointments. If you have questions, please call your primary care clinic.  If you do not have a primary care provider, please call [phone] and they will assist you.        Care EveryWhere ID     This is your Care EveryWhere ID. This could be used by other organizations to access your [place] medical records  [id number]        Your Vitals Were     Pulse Temperature Respirations Height Last Period Pulse Oximetry    100 99.4  F (37.4  C) (Oral) 20 1.632 m (5' 4.25\") 02/06/2013 97%    Breastfeeding? BMI (Body Mass Index)                No 31.64 kg/m2           Blood Pressure from Last 3 Encounters:   03/21/18 106/64   03/07/18 111/57   03/01/18 101/58    Weight from Last 3 Encounters:   03/21/18 84.3 kg (185 lb 12.8 oz)   03/07/18 85.1 kg (187 lb 9.6 oz)   03/01/18 83.5 kg (184 lb)                 Today's Medication Changes          These changes are accurate as of 3/21/18 11:21 AM.  If you have any questions, ask your nurse or doctor.               Start taking these medicines.        Dose/Directions    LORazepam 0.5 MG tablet   Commonly known as:  ATIVAN   Used for:  " Secondary malignant neoplasm of liver (H), Carcinoma of breast metastatic to liver, unspecified laterality (H), Bone metastasis (H), 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)   Started by:  Brodie Cassidy MD        Dose:  0.5 mg   Take 1 tablet (0.5 mg) by mouth every 4 hours as needed (Anxiety, Nausea/Vomiting or Sleep)   Quantity:  30 tablet   Refills:  2         These medicines have changed or have updated prescriptions.        Dose/Directions    * prochlorperazine 10 MG tablet   Commonly known as:  COMPAZINE   This may have changed:  Another medication with the same name was added. Make sure you understand how and when to take each.   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 Make sure you understand how and when to take each.   Used for:  Secondary malignant neoplasm of liver (H), Carcinoma of breast metastatic to liver, unspecified laterality (H), Bone metastasis (H), 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)   Changed by:  Brodie Cassidy MD        Dose:  10 mg   Take 1 tablet (10 mg) by mouth every 6 hours as needed (Nausea/Vomiting)   Quantity:  30 tablet   Refills:  2       * Notice:  This list has 2 medication(s) that are the same as other medications prescribed for you. Read the directions carefully, and ask your doctor or other  care provider to review them with you.         Where to get your medicines      [id number] [place], [place] - [address]  [address], [facility] [address]     Phone:  [phone]     prochlorperazine 10 MG tablet         Some of these will need a paper prescription and others can be bought over the counter.  Ask your nurse if you have questions.     Bring a paper prescription for each of these medications     HYDROcodone-acetaminophen 5-325 MG per tablet    LORazepam 0.5 MG tablet                Primary Care Provider Office Phone # Fax #    Johana Fairbanks [phone] [address] [facility] [address]        Equal Access to Services     JACKIE MIX : Rupali Rodriguez, waaxda juventino, qaybta kaalmada lexi, ahri guardado. So [facility] [phone].    ATENCIÓN: Si habla español, tiene a parekh disposición servicios gratuitos de asistencia lingüística. [facility] [phone].    We comply with applicable federal civil rights laws and [place] laws. We do not discriminate on the basis of race, color, national origin, age, disability, sex, sexual orientation, or gender identity.            Thank you!     Thank you for choosing [facility]  for your care. Our goal is always to provide you with excellent care. Hearing back from our patients is one way we can continue to improve our services. Please take a few minutes to complete the written survey that you may receive in the mail after your visit with us. Thank you!             Your Updated Medication List - Protect others around you: Learn how to safely use, store and throw away your medicines at www.disposemymeds.org.          This list is accurate as of 3/21/18 11:21 AM.  Always use your most recent med list.                   Brand Name Dispense Instructions for use Diagnosis    * albuterol 108 (90 BASE) MCG/ACT Inhaler    VENTOLIN HFA     1 Inhaler    Inhale 2 puffs into the lungs every 4 hours as needed    Moderate persistent asthma, uncomplicated       * albuterol (2.5 MG/3ML) 0.083% neb solution     30 vial    Take 1 vial (2.5 mg) by nebulization every 4 hours as needed for shortness of breath / dyspnea    Moderate persistent asthma, uncomplicated       ALLEGRA ALLERGY 180 MG tablet   Generic drug:  fexofenadine      Take 180 mg by mouth daily as needed for allergies        azelastine 0.1 % spray    ASTELIN    3 Bottle    Spray 1-2 sprays into both nostrils 2 times daily as needed for rhinitis    Chronic rhinitis       Beclomethasone Dipropionate 80 MCG/ACT Aers Nasal Spray     8.7 g    Spray 2 sprays into both nostrils daily    Other chronic rhinitis       CRANBERRY PO      Take 1 capsule by mouth daily        diclofenac 1 % Gel topical gel    VOLTAREN    100 g    Place 2 g onto the skin 4 times daily    Carcinoma of breast metastatic to liver, unspecified laterality (H)       diphenhydrAMINE 25 MG tablet    BENADRYL    1 tablet    Take 1 tablet (25 mg) by mouth every 8 hours as needed for itching or allergies    Carcinoma of breast metastatic to liver, unspecified laterality (H), Bone metastasis (H), Pericardial effusion, Bilateral malignant neoplasm of breast in female, estrogen receptor positive, unspecified site of breast (H), 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), Secondary malignant neoplasm of liver (H), Chemotherapy-induced neutropenia (H), Emphysematous bleb of lung (H), Hypothyroidism, unspecified type, Hyperlipidemia LDL goal <130, Moderate persistent asthma without complication, Mucositis due to chemotherapy       DITROPAN XL 10 MG 24 hr tablet   Generic drug:  oxybutynin      Take 10 mg by mouth daily    Post-op pain, 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), Bone metastasis (H), Carcinoma of breast metastatic to liver, unspecified laterality (H), Secondary malignant neoplasm of liver  (H)       fluticasone 50 MCG/ACT spray    FLONASE    1 Bottle    Spray 1-2 sprays into both nostrils daily    Other chronic rhinitis       HYDROcodone-acetaminophen 5-325 MG per tablet    NORCO    20 tablet    Take 1-2 tablets by mouth every 4 hours as needed for moderate to severe pain    Post-op pain, 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), Bone metastasis (H), Carcinoma of breast metastatic to liver, unspecified laterality (H), Secondary malignant neoplasm of liver (H)       KP CALCIUM 600+D3 600-500 MG-UNIT Caps   Generic drug:  calcium carb-cholecalciferol      Take 2 tablets by mouth daily        levothyroxine 25 MCG tablet    SYNTHROID/LEVOTHROID    90 tablet    TAKE 1 TABLET (25 MCG) BY MOUTH DAILY    Hypothyroidism due to acquired atrophy of thyroid       Lidocaine 4 % Patch    LIDOCARE    30 patch    Place 1-2 patches onto the skin every 24 hours    Carcinoma of breast metastatic to liver, unspecified laterality (H)       loratadine 10 MG tablet    CLARITIN     Take 10 mg by mouth daily as needed for allergies        LORazepam 0.5 MG tablet    ATIVAN    30 tablet    Take 1 tablet (0.5 mg) by mouth every 4 hours as needed (Anxiety, Nausea/Vomiting or Sleep)    Secondary malignant neoplasm of liver (H), Carcinoma of breast metastatic to liver, unspecified laterality (H), Bone metastasis (H), 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)       metoclopramide 10 MG tablet    REGLAN    120 tablet    Take 1 tablet (10 mg) by mouth 2 times daily as needed    Bilateral malignant neoplasm of breast in female, estrogen receptor positive, unspecified site of breast (H)       order for DME     1 each    Equipment being ordered: Venu post-lumpectomy compression pad x2    Lymphedema, Lymphedema of breast       oxyCODONE IR 5 MG tablet    ROXICODONE    60 tablet    Take 1 tablet (5 mg) by mouth every 4 hours as needed for moderate to severe pain    Secondary malignant  neoplasm of liver (H), Carcinoma of breast metastatic to liver, unspecified laterality (H), 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), Bone metastasis (H), Pericardial effusion       * prochlorperazine 10 MG tablet    COMPAZINE    30 tablet    Take 1 tablet (10 mg) by mouth every 6 hours as needed (Nausea/Vomiting)    Secondary malignant neoplasm of liver (H), Carcinoma of breast metastatic to liver, unspecified laterality (H), Bone metastasis (H), 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), Bilateral malignant neoplasm of breast in female, estrogen receptor positive, unspecified site of breast (H)       * prochlorperazine 10 MG tablet    COMPAZINE    30 tablet    Take 1 tablet (10 mg) by mouth every 6 hours as needed (Nausea/Vomiting)    Secondary malignant neoplasm of liver (H), Carcinoma of breast metastatic to liver, unspecified laterality (H), Bone metastasis (H), 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)       QVAR 80 MCG/ACT Inhaler   Generic drug:  beclomethasone     26.1 g    INHALE 1 PUFFS INTO THE EACH NOSTRIL 2 TIMES DAILY    Chronic rhinitis       ROBITUSSIN COUGH/COLD CF PO      Take 10 mLs by mouth as needed    Breast cancer metastasized to axillary lymph node, right (H)       * SYMBICORT 160-4.5 MCG/ACT Inhaler   Generic drug:  budesonide-formoterol     10.2 Inhaler    INHALE 2 PUFFS INTO THE LUNGS 2 TIMES DAILY    Moderate persistent asthma without complication       * SYMBICORT 160-4.5 MCG/ACT Inhaler   Generic drug:  budesonide-formoterol     3 Inhaler    INHALE 2 PUFFS INTO THE LUNGS 2 TIMES DAILY    Moderate persistent asthma without complication       * vancomycin 125 MG capsule    VANCOCIN    60 capsule    Take 1 capsule (125 mg) by mouth 4 times daily    Clostridium difficile diarrhea       * vancomycin 125 MG capsule    VANCOCIN    100 capsule    Take 1 capsule (125 mg) by mouth 4 times daily    C. difficile colitis        warfarin 2.5 MG tablet    COUMADIN    30 tablet    As directed by Anticoagulation Clinic (maintenance dose not yet established)    Long-term (current) use of anticoagulants, Acute deep vein thrombosis (DVT) of right upper extremity, unspecified vein (H)       zolpidem 12.5 MG CR tablet    AMBIEN CR    30 tablet    Take 1 tablet by mouth at bed time.    Insomnia due to medical condition       * Notice:  This list has 8 medication(s) that are the same as other medications prescribed for you. Read the directions carefully, and ask your doctor or other care provider to review them with you.

## 2018-03-21 NOTE — PROGRESS NOTES
"Chemotherapy Education    Patient is a 57 year old female here today for chemotherapy education, accompanied by  Lucian.  Pt has a cancer diagnosis of   Encounter Diagnoses   Name Primary?     Post-op pain      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)      Bone metastasis (H)      Carcinoma of breast metastatic to liver, unspecified laterality (H) Yes     Secondary malignant neoplasm of liver (H)      Bilateral malignant neoplasm of breast in female, estrogen receptor positive, unspecified site of breast (H)      Hypothyroidism, unspecified type      Cancer associated pain      Acute deep vein thrombosis (DVT) of right upper extremity, unspecified vein (H)       and their main concern is \"stopping the progression\".  Their Oncologist is Dr. LUIS Cassidy, and PCP is Johana Fairbanks  Reviewed the following with the patient and their support person:  Treatment goal: palliative  Treatment regimen & duration: Doxil day 1 every 28 days until progression/toxicity and rationale for strict adherence to this schedule, including specific medication names including pre-treatment medications and at home scheduled or as needed medications, delivery methods,.  Potential side effects: Side effects and management; including skin changes/hand-foot syndrome, anemia, neutropenia, thrombocytopenia, diarrhea/constipation, hair loss syndrome, memory changes/ \"chemobrain\", mouth sores, taste changes, neuropathy, fatigue, myelosuppression, sexuality/infertility, and risk of extravasation or infiltration.  Infection prevention, and monitoring of lab values, what lab tests and what changes of these values meant, along with the possibility of hydration or blood product transfusion, or the need to defer or hold treatment.    Chemotherapy information, including ways it is excreted from the body and cleaning and containment of vomitus or other bodily fluid, use of the bathroom, sexual health and intimacy, what to do if " "needing to miss a treatment, when to call a provider and the need for staff to wear protective equipment.  Importance of Central line care (port) or IV site care.  Written information: Written information including the \"Getting Ready for Chemotherapy: What to Expect, Before, During, and After your Treatment\" booklet, specific drug information guides printed from [email or web address], [employer] booklets \"Eating Hints: Before, During, and After Cancer Treatment\" and \"Chemotherapy and You\".  Also, a folder with information on when to contact the provider, various programs offered at [facility], and our business card with contact information given; Oncology Clinic, RN Case Manager, and the after hours Nurse Advise Line.  General orientation to the Medical Oncology department, Infusion Services department, Huc/scheduling, bathrooms and usual flow of the treatment day provided as well as introduction to the Infusion nurses.  No barriers to learning identified. Patient and family verbalized understanding of all written and verbal information. All questions answered to patients satisfaction.   Other concerns: None  Pt instructed to call with further questions or concerns.  Patient states understanding and is in agreement with this plan.  Copy of appointments, and after visit summary (AVS) given to patient. Patient discharged ambulatory.    Face to Face time with patient: 15 min    Funmi Ray RN, BSN, OCN  Oncology Hematology   Breast Cancer Navigator  [facility]  [email or web address]  Phone [phone]    "

## 2018-03-21 NOTE — NURSING NOTE
"Oncology Rooming Note    March 21, 2018 9:49 AM   Etta Cervantes is a 57 year old female who presents for:    Chief Complaint   Patient presents with     Oncology Clinic Visit     4 week recheck Carcinoma of breast metastatic to liver, , review Labs & CT / chemo today     Initial Vitals: /64 (BP Location: Right arm, Patient Position: Sitting, Cuff Size: Adult Large)  Pulse 100  Temp 99.4  F (37.4  C) (Oral)  Resp 20  Ht 1.632 m (5' 4.25\")  Wt 84.3 kg (185 lb 12.8 oz)  LMP 02/06/2013  SpO2 97%  Breastfeeding? No  BMI 31.64 kg/m2 Estimated body mass index is 31.64 kg/(m^2) as calculated from the following:    Height as of this encounter: 1.632 m (5' 4.25\").    Weight as of this encounter: 84.3 kg (185 lb 12.8 oz). Body surface area is 1.95 meters squared.  Mild Pain (3) Comment: Right    Patient's last menstrual period was 02/06/2013.  Allergies reviewed: Yes  Medications reviewed: Yes    Medications: MEDICATION REFILLS NEEDED TODAY. Provider was notified.  Pharmacy name entered into [employer]:    [id number] - [place], [place] - [address] [place] [address] [facility] [place], [place] - [address] [place] [address] [facility]    Clinical concerns:4 week recheck Carcinoma of breast metastatic to liver, , review Labs & CT / chemo today.     1. Going on a trip to [place] and requesting Hydrocodone.     10  minutes for nursing intake (face to face time)     Josephine Virgen CMA              "

## 2018-03-21 NOTE — PROGRESS NOTES
Infusion Nursing Note:  Etta Cervantes presents today for Abraxane HELD.    Patient seen by provider today: Yes: Dr. Cassidy   present during visit today: Not Applicable.    Note: N/A.    Intravenous Access:  Implanted Port.    Treatment Conditions:  Not Applicable.      Post Infusion Assessment:  NA    Discharge Plan:   Patient discharged in stable condition accompanied by: . CEA & CA 27.29 drawn today prior to removing port needle. Pt will return Fri for Doxil & Xgeva.    Hunter Urbina RN

## 2018-03-21 NOTE — PATIENT INSTRUCTIONS
Dr. Cassidy is recommending you have a biopsy of the lymph node in your right axilla.  We will send the tissue sample for additional testing. We would like to see you back in clinic with Dr. Cassidy with pathology results.     Dr. Cassidy also is changing your chemotherapy regimen from Abraxane to Doxil.  You will receive education on this today.  He would like you to start this regimen this week.    You will need to have your Xgeva injection today instead of next week.    Your prescription has been: given to you to hand carry to the pharmacy of your choice.  When you are in need of a refill, please call your pharmacy and they will send us a request.      Your prescription (Ativan, compazine) [facility]   [id number] [place] [address] [address]  Phone: [phone] Fax: [phone]     Copy of appointments, and after visit summary (AVS) given to patient.  If you have any questions during business hours (M-F 8 AM- 4PM), please call Funmi Ray RN, BSN, OCN Oncology Hematology /Breast Cancer Navigator at [facility] [phone].   For questions after business hours, or on holidays/weekends, please call our after hours Nurse Triage line [phone]. Thank you.

## 2018-03-21 NOTE — PROGRESS NOTES
ANTICOAGULATION FOLLOW-UP CLINIC VISIT    Patient Name:  Etta Cervantes  Date:  3/21/2018  Contact Type:  Telephone/ Etta    SUBJECTIVE:     Patient Findings     Positives Dental/Other procedures (lymph node biopsy)    Comments Patient's scan showed more metastases. She will begin Doxil = Concurrent use of DOXORUBICIN and WARFARIN may result in increased risk for elevated INR and subsequent bleeding. She is having a lymph node biopsy on 3/27/18. Writer called Dr. Cassidy's office to inquired about bridge/hold instruction, RIANA Choudhary ONC was able to speak with Dr. Cassidy and obtain a verbal order. Dr. Cassidy ordered a 4 day warfarin hold and therapeutic Lovenox dosing pre and post procedure. Writer spoke to Etta via telephone and WriteReader ApS messaging.               OBJECTIVE    INR   Date Value Ref Range Status   03/21/2018 4.39 (H) 0.86 - 1.14 Final     Comment:     CALLED TO LAUREN DELACRUZ RP 3/21/18 1017       ASSESSMENT / PLAN  No question data found.  Anticoagulation Summary as of 3/21/2018     INR goal 2.0-3.0   Today's INR 4.39!   Maintenance plan 1.25 mg (2.5 mg x 0.5) every day   Full instructions 3/21: Hold; 3/23: Hold; 3/24: Hold; 3/25: Hold; 3/26: Hold; Otherwise 1.25 mg every day   Weekly total 8.75 mg   Plan last modified Joselin Boothe RN (3/7/2018)   Next INR check 3/30/2018   Priority INR   Target end date     Indications   Acute deep vein thrombosis (DVT) of right upper extremity  unspecified vein (H) [I82.621]  Long-term (current) use of anticoagulants [Z79.01] [Z79.01]         Anticoagulation Episode Summary     INR check location     Preferred lab     Send IN[facility] reminders [facility] [place] [facility]    Comments * weekly Doxil  infusions for metastatic breast ca starting 3/23/18  (may result in increased risk for elevated INR and subsequent bleeding) . Pt prefers [facility]. Vanco taper for C-diff, OK to send instructions via WriteReader ApS      Anticoagulation Care Providers     Provider Role  Specialty Phone number    Brodie Cassidy MD Referring Hematology & Oncology [phone]    Johana Fairbanks MD Responsible Family Practice [phone]            See the Encounter Report to view Anticoagulation Flowsheet and Dosing Calendar (Go to Encounters tab in chart review, and find the Anticoagulation Therapy Visit)        Cecilia Finn RN

## 2018-03-23 NOTE — PROGRESS NOTES
Infusion Nursing Note:  Etta Cervantes presents today for Doxil, Xgeva.    Patient seen by provider today: No   present during visit today: Not Applicable.    Note: First time Doxil started slowly per protocol.    Intravenous Access:  Implanted Port.    Treatment Conditions:  Results reviewed, labs MET treatment parameters, ok to proceed with treatment.      Post Infusion Assessment:  Patient tolerated infusion without incident.    Discharge Plan:   Patient discharged in stable condition accompanied by: .    Hunter Urbina RN

## 2018-03-23 NOTE — MR AVS SNAPSHOT
After Visit Summary   3/23/2018    Etta Cervantes    MRN: [id number]           Patient Information     Date Of Birth          1960        Visit Information        Provider Department      3/23/2018 9:30 AM ROOM 5 [facility] Cancer Infusion        Today's Diagnoses     Bone metastasis (H)    -  1    Breast cancer metastasized to axillary lymph node, right (H)        Secondary malignant neoplasm of liver (H)        Carcinoma of breast metastatic to liver, unspecified laterality (H)        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)           Follow-ups after your visit        Your next 10 appointments already scheduled     Mar 27, 2018  9:30 AM CDT   (Arrive by 9:15 AM)   US BIOPSY CORE LYMPH NODE with [place] RAD   [facility] ([facility])    [address] [address] [phone]           Bring a list of your medicines to the exam. Include vitamins, minerals and over-the-counter drugs.  Tell your doctor in advance:   If you are or may be pregnant.   If you are taking Coumadin (or any other blood thinners) 5 days prior to the exam for any special instructions.  IF YOUR DOCTOR HAS TOLD YOU THAT YOU WILL BE RECEIVING SEDATION (medicine to help you relax): (Typically sedation is only for liver exams at [facility] and Renal Biopsy exams in Pediatrics)   See your family doctor for an exam within 30 days of treatment.   Plan for an adult to drive you home and stay with you for at least 24 hours.   No eating or drinking for 4 hours before your test. You may take medicine with small sips of water.   If you have diabetes:If you take insulin, call your diabetes care team for any special instructions for this exam.  Please call the Imaging Department at your exam site with any questions.             Mar 30, 2018  9:45 AM CDT   Anticoagulation Visit with [place] ANTI COAG   [facility]    [address]  [facility] [address]   [phone]            Apr 04, 2018  1:45 PM CDT   Return Visit with Brodie Cassidy MD   [facility] ([facility])    [facility]  [address] [place] [address]  [facility] [address]   [phone]            Apr 19, 2018  8:30 AM CDT   Level 2 with ROOM 8 [facility] Infusion ([facility])    [facility]  [address]  [facility] [address]   [phone]              Who to contact     If you have questions or need follow up information about today's clinic visit or your schedule please contact [facility] CANC[facility]FUSION directly at [phone].  Normal or non-critical lab and imaging results will be communicated to you by [email or web address], letter or phone within 4 business days after the clinic has received the results. If you do not hear from us within 7 days, please contact the clinic through [email or web address] or phone. If you have a critical or abnormal lab result, we will notify you by phone as soon as possible.  Submit refill requests through Slicethepie or call your pharmacy and they will forward the refill request to us. Please allow 3 business days for your refill to be completed.          Additional Information About Your Visit        [email or web address] Information     Slicethepie gives you secure access to your electronic health record. If you see a primary care provider, you can also send messages to your care team and make appointments. If you have questions, please call your primary care clinic.  If you do not have a primary care provider, please call [phone] and they will assist you.        Care EveryWhere ID     This is your Care EveryWhere ID. This could be used by other organizations to access your [place] medical records  [id number]        Your Vitals Were     Pulse Temperature Respirations Last Period          93 99.6  F (37.6  C) 16 02/06/2013         Blood Pressure from Last 3  Encounters:   03/23/18 110/52   03/21/18 106/64   03/07/18 111/57    Weight from Last 3 Encounters:   03/21/18 84.3 kg (185 lb 12.8 oz)   03/07/18 85.1 kg (187 lb 9.6 oz)   03/01/18 83.5 kg (184 lb)              Today, you had the following     No orders found for display       Primary Care Provider Office Phone # Fax #    Johana Fairbanks [phone] [address] [place] [address]        Equal Access to Services     [facility]: Hadii aad ku hadasho Soomaali, waaxda luqadaha, qaybta kaalmada adeemily, hari rodriguez . So [facility] [phone].    ATENCIÓN: Si habla español, tiene a parekh disposición servicios gratuitos de asistencia lingüística. [facility] [phone].    We comply with applicable federal civil rights laws and [place] laws. We do not discriminate on the basis of race, color, national origin, age, disability, sex, sexual orientation, or gender identity.            Thank you!     Thank you for choosing [facility]  for your care. Our goal is always to provide you with excellent care. Hearing back from our patients is one way we can continue to improve our services. Please take a few minutes to complete the written survey that you may receive in the mail after your visit with us. Thank you!             Your Updated Medication List - Protect others around you: Learn how to safely use, store and throw away your medicines at www.disposemymeds.org.          This list is accurate as of 3/23/18 12:46 PM.  Always use your most recent med list.                   Brand Name Dispense Instructions for use Diagnosis    * albuterol 108 (90 BASE) MCG/ACT Inhaler    VENTOLIN HFA    1 Inhaler    Inhale 2 puffs into the lungs every 4 hours as needed    Moderate persistent asthma, uncomplicated       * albuterol (2.5 MG/3ML) 0.083% neb solution     30 vial    Take 1 vial (2.5 mg) by nebulization every 4 hours as needed for shortness of breath / dyspnea     Moderate persistent asthma, uncomplicated       ALLEGRA ALLERGY 180 MG tablet   Generic drug:  fexofenadine      Take 180 mg by mouth daily as needed for allergies        azelastine 0.1 % spray    ASTELIN    3 Bottle    Spray 1-2 sprays into both nostrils 2 times daily as needed for rhinitis    Chronic rhinitis       Beclomethasone Dipropionate 80 MCG/ACT Aers Nasal Spray     8.7 g    Spray 2 sprays into both nostrils daily    Other chronic rhinitis       CRANBERRY PO      Take 1 capsule by mouth daily        diclofenac 1 % Gel topical gel    VOLTAREN    100 g    Place 2 g onto the skin 4 times daily    Carcinoma of breast metastatic to liver, unspecified laterality (H)       diphenhydrAMINE 25 MG tablet    BENADRYL    1 tablet    Take 1 tablet (25 mg) by mouth every 8 hours as needed for itching or allergies    Carcinoma of breast metastatic to liver, unspecified laterality (H), Bone metastasis (H), Pericardial effusion, Bilateral malignant neoplasm of breast in female, estrogen receptor positive, unspecified site of breast (H), 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), Secondary malignant neoplasm of liver (H), Chemotherapy-induced neutropenia (H), Emphysematous bleb of lung (H), Hypothyroidism, unspecified type, Hyperlipidemia LDL goal <130, Moderate persistent asthma without complication, Mucositis due to chemotherapy       DITROPAN XL 10 MG 24 hr tablet   Generic drug:  oxybutynin      Take 10 mg by mouth daily    Post-op pain, 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), Bone metastasis (H), Carcinoma of breast metastatic to liver, unspecified laterality (H), Secondary malignant neoplasm of liver (H)       enoxaparin 100 MG/ML injection    LOVENOX    12 Syringe    Inject 0.9 mLs (90 mg) Subcutaneous every 12 hours    Long-term (current) use of anticoagulants, Acute deep vein thrombosis (DVT) of right upper extremity, unspecified vein (H)       fluticasone 50 MCG/ACT  spray    FLONASE    1 Bottle    Spray 1-2 sprays into both nostrils daily    Other chronic rhinitis       HYDROcodone-acetaminophen 5-325 MG per tablet    NORCO    20 tablet    Take 1-2 tablets by mouth every 4 hours as needed for moderate to severe pain    Post-op pain, 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), Bone metastasis (H), Carcinoma of breast metastatic to liver, unspecified laterality (H), Secondary malignant neoplasm of liver (H)       KP CALCIUM 600+D3 600-500 MG-UNIT Caps   Generic drug:  calcium carb-cholecalciferol      Take 2 tablets by mouth daily        levothyroxine 25 MCG tablet    SYNTHROID/LEVOTHROID    90 tablet    TAKE 1 TABLET (25 MCG) BY MOUTH DAILY    Hypothyroidism due to acquired atrophy of thyroid       Lidocaine 4 % Patch    LIDOCARE    30 patch    Place 1-2 patches onto the skin every 24 hours    Carcinoma of breast metastatic to liver, unspecified laterality (H)       loratadine 10 MG tablet    CLARITIN     Take 10 mg by mouth daily as needed for allergies        LORazepam 0.5 MG tablet    ATIVAN    30 tablet    Take 1 tablet (0.5 mg) by mouth every 4 hours as needed (Anxiety, Nausea/Vomiting or Sleep)    Secondary malignant neoplasm of liver (H), Carcinoma of breast metastatic to liver, unspecified laterality (H), Bone metastasis (H), 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)       metoclopramide 10 MG tablet    REGLAN    120 tablet    Take 1 tablet (10 mg) by mouth 2 times daily as needed    Bilateral malignant neoplasm of breast in female, estrogen receptor positive, unspecified site of breast (H)       order for DME     1 each    Equipment being ordered: Venu post-lumpectomy compression pad x2    Lymphedema, Lymphedema of breast       oxyCODONE IR 5 MG tablet    ROXICODONE    60 tablet    Take 1 tablet (5 mg) by mouth every 4 hours as needed for moderate to severe pain    Secondary malignant neoplasm of liver (H), Carcinoma of  breast metastatic to liver, unspecified laterality (H), 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), Bone metastasis (H), Pericardial effusion       * prochlorperazine 10 MG tablet    COMPAZINE    30 tablet    Take 1 tablet (10 mg) by mouth every 6 hours as needed (Nausea/Vomiting)    Secondary malignant neoplasm of liver (H), Carcinoma of breast metastatic to liver, unspecified laterality (H), Bone metastasis (H), 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), Bilateral malignant neoplasm of breast in female, estrogen receptor positive, unspecified site of breast (H)       * prochlorperazine 10 MG tablet    COMPAZINE    30 tablet    Take 1 tablet (10 mg) by mouth every 6 hours as needed (Nausea/Vomiting)    Secondary malignant neoplasm of liver (H), Carcinoma of breast metastatic to liver, unspecified laterality (H), Bone metastasis (H), 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)       QVAR 80 MCG/ACT Inhaler   Generic drug:  beclomethasone     26.1 g    INHALE 1 PUFFS INTO THE EACH NOSTRIL 2 TIMES DAILY    Chronic rhinitis       ROBITUSSIN COUGH/COLD CF PO      Take 10 mLs by mouth as needed    Breast cancer metastasized to axillary lymph node, right (H)       * SYMBICORT 160-4.5 MCG/ACT Inhaler   Generic drug:  budesonide-formoterol     10.2 Inhaler    INHALE 2 PUFFS INTO THE LUNGS 2 TIMES DAILY    Moderate persistent asthma without complication       * SYMBICORT 160-4.5 MCG/ACT Inhaler   Generic drug:  budesonide-formoterol     3 Inhaler    INHALE 2 PUFFS INTO THE LUNGS 2 TIMES DAILY    Moderate persistent asthma without complication       * vancomycin 125 MG capsule    VANCOCIN    60 capsule    Take 1 capsule (125 mg) by mouth 4 times daily    Clostridium difficile diarrhea       * vancomycin 125 MG capsule    VANCOCIN    100 capsule    Take 1 capsule (125 mg) by mouth 4 times daily    C. difficile colitis       warfarin 2.5 MG tablet    COUMADIN    30  tablet    As directed by Anticoagulation Clinic (maintenance dose not yet established)    Long-term (current) use of anticoagulants, Acute deep vein thrombosis (DVT) of right upper extremity, unspecified vein (H)       zolpidem 12.5 MG CR tablet    AMBIEN CR    30 tablet    Take 1 tablet by mouth at bed time.    Insomnia due to medical condition       * Notice:  This list has 8 medication(s) that are the same as other medications prescribed for you. Read the directions carefully, and ask your doctor or other care provider to review them with you.

## 2018-03-26 NOTE — TELEPHONE ENCOUNTER
"Status Check:    Pt is a 57 year old female, recently in clinic for C1D1 Doxil.  Call placed for status check.    Primary care provider is: Johana Fairbanks    Diagnosis:   Encounter Diagnoses   Name Primary?     Carcinoma of breast metastatic to liver, unspecified laterality (H) Yes     Secondary malignant neoplasm of liver (H)      Bone metastasis (H)      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)      Oncology provider is:  Dr. LUIS Cassidy    How are you doing/feeling?: \"really great\".  Patient reports \"the node under my arm has shrunk down some already so it doesn't hurt as much.  Denies fever nor chills, pain nor discomfort.  Is eating and drinking, voiding and stooling without issue.  Any further issues?: Is scheduled for bx tomorrow, reports she has all the information for that.  Denies questions or concerns at this time.  Next Follow up/Recommendations: Advised patient to utilize PRN medications as needed, eat nutritious meals, drink plenty of fluids, and keep scheduled appointments. If symptoms or other concerns develop after hours, encouraged patient to call our after hours number: [phone].  Patient instructed to call with any questions or concerns.  Patient states understanding and is in agreement with this plan.    Patient instructed to call with any questions or concerns.  Patient states understanding and is in agreement with this plan.    Approximately 8 minutes spent on telephone with patient reviewing assessment and symptom(s) and providing symptom management.    Funmi Ray RN, BSN, OCN  Oncology Hematology   Breast Cancer Navigator  [facility]  [email or web address]  [phone]    "

## 2018-03-27 NOTE — IP AVS SNAPSHOT
MRN:[id number]                      After Visit Summary   3/27/2018    Etta Cervantes    MRN: [id number]           Visit Information        Provider Department      3/27/2018  9:30 AM [place] RAD; [id number] [facility] Ultrasound           Review of your medicines      UNREVIEWED medicines. Ask your doctor about these medicines        Dose / Directions    * albuterol 108 (90 BASE) MCG/ACT Inhaler   Commonly known as:  VENTOLIN HFA   Used for:  Moderate persistent asthma, uncomplicated        Dose:  2 puff   Inhale 2 puffs into the lungs every 4 hours as needed   Quantity:  1 Inhaler   Refills:  2       * albuterol (2.5 MG/3ML) 0.083% neb solution   Used for:  Moderate persistent asthma, uncomplicated        Dose:  1 vial   Take 1 vial (2.5 mg) by nebulization every 4 hours as needed for shortness of breath / dyspnea   Quantity:  30 vial   Refills:  3       ALLEGRA ALLERGY 180 MG tablet   Generic drug:  fexofenadine        Dose:  180 mg   Take 180 mg by mouth daily as needed for allergies   Refills:  0       azelastine 0.1 % spray   Commonly known as:  ASTELIN   Used for:  Chronic rhinitis        Dose:  1-2 spray   Spray 1-2 sprays into both nostrils 2 times daily as needed for rhinitis   Quantity:  3 Bottle   Refills:  3       Beclomethasone Dipropionate 80 MCG/ACT Aers Nasal Spray   Used for:  Other chronic rhinitis        Dose:  2 spray   Spray 2 sprays into both nostrils daily   Quantity:  8.7 g   Refills:  3       CRANBERRY PO        Dose:  1 capsule   Take 1 capsule by mouth daily   Refills:  0       diclofenac 1 % Gel topical gel   Commonly known as:  VOLTAREN   Used for:  Carcinoma of breast metastatic to liver, unspecified laterality (H)        Dose:  2 g   Place 2 g onto the skin 4 times daily   Quantity:  100 g   Refills:  0       diphenhydrAMINE 25 MG tablet   Commonly known as:  BENADRYL   Used for:  Carcinoma of breast metastatic to liver, unspecified laterality (H), Bone metastasis (H),  Pericardial effusion, Bilateral malignant neoplasm of breast in female, estrogen receptor positive, unspecified site of breast (H), 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), Secondary malignant neoplasm of liver (H), Chemotherapy-induced neutropenia (H), Emphysematous bleb of lung (H), Hypothyroidism, unspecified type, Hyperlipidemia LDL goal <130, Moderate persistent asthma without complication, Mucositis due to chemotherapy        Dose:  25 mg   Take 1 tablet (25 mg) by mouth every 8 hours as needed for itching or allergies   Quantity:  1 tablet   Refills:  0       DITROPAN XL 10 MG 24 hr tablet   Used for:  Post-op pain, 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), Bone metastasis (H), Carcinoma of breast metastatic to liver, unspecified laterality (H), Secondary malignant neoplasm of liver (H)   Generic drug:  oxybutynin        Dose:  10 mg   Take 10 mg by mouth daily   Refills:  0       enoxaparin 100 MG/ML injection   Commonly known as:  LOVENOX   Used for:  Long-term (current) use of anticoagulants, Acute deep vein thrombosis (DVT) of right upper extremity, unspecified vein (H)        Dose:  90 mg   Inject 0.9 mLs (90 mg) Subcutaneous every 12 hours   Quantity:  12 Syringe   Refills:  0       fluticasone 50 MCG/ACT spray   Commonly known as:  FLONASE   Used for:  Other chronic rhinitis        Dose:  1-2 spray   Spray 1-2 sprays into both nostrils daily   Quantity:  1 Bottle   Refills:  11       HYDROcodone-acetaminophen 5-325 MG per tablet   Commonly known as:  NORCO   Used for:  Post-op pain, 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), Bone metastasis (H), Carcinoma of breast metastatic to liver, unspecified laterality (H), Secondary malignant neoplasm of liver (H)        Dose:  1-2 tablet   Take 1-2 tablets by mouth every 4 hours as needed for moderate to severe pain   Quantity:  20 tablet   Refills:  0       KP CALCIUM 600+D3 600-500 MG-UNIT  Caps   Generic drug:  calcium carb-cholecalciferol        Dose:  2 tablet   Take 2 tablets by mouth daily   Refills:  0       levothyroxine 25 MCG tablet   Commonly known as:  SYNTHROID/LEVOTHROID   Used for:  Hypothyroidism due to acquired atrophy of thyroid        TAKE 1 TABLET (25 MCG) BY MOUTH DAILY   Quantity:  90 tablet   Refills:  2       Lidocaine 4 % Patch   Commonly known as:  LIDOCARE   Used for:  Carcinoma of breast metastatic to liver, unspecified laterality (H)        Dose:  1-2 patch   Place 1-2 patches onto the skin every 24 hours   Quantity:  30 patch   Refills:  0       loratadine 10 MG tablet   Commonly known as:  CLARITIN        Dose:  10 mg   Take 10 mg by mouth daily as needed for allergies   Refills:  0       LORazepam 0.5 MG tablet   Commonly known as:  ATIVAN   Used for:  Secondary malignant neoplasm of liver (H), Carcinoma of breast metastatic to liver, unspecified laterality (H), Bone metastasis (H), Carcinoma of right breast metastatic to axillary lymph node (H)        Dose:  0.5 mg   Take 1 tablet (0.5 mg) by mouth every 4 hours as needed (Anxiety, Nausea/Vomiting or Sleep)   Quantity:  30 tablet   Refills:  2       metoclopramide 10 MG tablet   Commonly known as:  REGLAN   Used for:  Bilateral malignant neoplasm of breast in female, estrogen receptor positive, unspecified site of breast (H)        Dose:  10 mg   Take 1 tablet (10 mg) by mouth 2 times daily as needed   Quantity:  120 tablet   Refills:  1       oxyCODONE IR 5 MG tablet   Commonly known as:  ROXICODONE   Used for:  Secondary malignant neoplasm of liver (H), Carcinoma of breast metastatic to liver, unspecified laterality (H), Carcinoma of right breast metastatic to axillary lymph node (H), Bone metastasis (H), Pericardial effusion        Dose:  5 mg   Take 1 tablet (5 mg) by mouth every 4 hours as needed for moderate to severe pain   Quantity:  60 tablet   Refills:  0       * prochlorperazine 10 MG tablet   Commonly known  as:  COMPAZINE   Used for:  Secondary malignant neoplasm of liver (H), Carcinoma of breast metastatic to liver, unspecified laterality (H), Bone metastasis (H), Carcinoma of right breast metastatic to axillary lymph node (H), Bilateral malignant neoplasm of breast in female, estrogen receptor positive, unspecified site of breast (H)        Dose:  10 mg   Take 1 tablet (10 mg) by mouth every 6 hours as needed (Nausea/Vomiting)   Quantity:  30 tablet   Refills:  2       * prochlorperazine 10 MG tablet   Commonly known as:  COMPAZINE   Used for:  Secondary malignant neoplasm of liver (H), Carcinoma of breast metastatic to liver, unspecified laterality (H), Bone metastasis (H), Carcinoma of right breast metastatic to axillary lymph node (H)        Dose:  10 mg   Take 1 tablet (10 mg) by mouth every 6 hours as needed (Nausea/Vomiting)   Quantity:  30 tablet   Refills:  2       QVAR 80 MCG/ACT Inhaler   Used for:  Chronic rhinitis   Generic drug:  beclomethasone        INHALE 1 PUFFS INTO THE EACH NOSTRIL 2 TIMES DAILY   Quantity:  26.1 g   Refills:  3       ROBITUSSIN COUGH/COLD CF PO   Used for:  Breast cancer metastasized to axillary lymph node, right (H)        Dose:  10 mL   Take 10 mLs by mouth as needed   Refills:  0       * SYMBICORT 160-4.5 MCG/ACT Inhaler   Used for:  Moderate persistent asthma without complication   Generic drug:  budesonide-formoterol        INHALE 2 PUFFS INTO THE LUNGS 2 TIMES DAILY   Quantity:  10.2 Inhaler   Refills:  1       * SYMBICORT 160-4.5 MCG/ACT Inhaler   Used for:  Moderate persistent asthma without complication   Generic drug:  budesonide-formoterol        INHALE 2 PUFFS INTO THE LUNGS 2 TIMES DAILY   Quantity:  3 Inhaler   Refills:  1       * vancomycin 125 MG capsule   Commonly known as:  VANCOCIN   Used for:  Clostridium difficile diarrhea        Dose:  125 mg   Take 1 capsule (125 mg) by mouth 4 times daily   Quantity:  60 capsule   Refills:  0       * vancomycin 125 MG  capsule   Commonly known as:  VANCOCIN   Used for:  C. difficile colitis        Dose:  125 mg   Take 1 capsule (125 mg) by mouth 4 times daily   Quantity:  100 capsule   Refills:  0       warfarin 2.5 MG tablet   Commonly known as:  COUMADIN   Used for:  Long-term (current) use of anticoagulants, Acute deep vein thrombosis (DVT) of right upper extremity, unspecified vein (H)        As directed by Anticoagulation Clinic (maintenance dose not yet established)   Quantity:  30 tablet   Refills:  1       zolpidem 12.5 MG CR tablet   Commonly known as:  AMBIEN CR   Used for:  Insomnia due to medical condition        Take 1 tablet by mouth at bed time.   Quantity:  30 tablet   Refills:  5       * Notice:  This list has 8 medication(s) that are the same as other medications prescribed for you. Read the directions carefully, and ask your doctor or other care provider to review them with you.      CONTINUE these medicines which have NOT CHANGED        Dose / Directions    order for DME   Used for:  Lymphedema, Lymphedema of breast        Equipment being ordered: JoviPak post-lumpectomy compression pad x2   Quantity:  1 each   Refills:  0                Protect others around you: Learn how to safely use, store and throw away your medicines at www.disposemymeds.org.         Follow-ups after your visit        Your next 10 appointments already scheduled     Mar 30, 2018  9:45 AM CDT   Anticoagulation Visit with [place] ANTI COAG   [facility]    [address] [facility] [address]   [phone]            Apr 04, 2018  1:45 PM CDT   Return Visit with Brodie Cassidy MD   [facility] ([facility])    [facility] [address]  [facility] [address]   [phone]            Apr 19, 2018  8:30 AM CDT   Level 2 with ROOM 8 [facility] ([facility])    [facility] [address]  [facility] [address]   [phone]               Care Instructions        Further instructions from your care team       Lymph Node Biopsy Care Instructions      Site Care:    You may have some discomfort and some bruising at the needle site.      Activity:       You may go back to your normal routine.     No heavy lifting for 24 hours.    Diet and Medications:       You may go back to your regular diet.     Tylenol is the best choice to relieve your discomfort, the first 24 hours. If you have no bleeding on the first day, Ibuprofen (such as Advil, or Motrin), may be taken on the second day after for pain.     Do not take aspirin for 72 hours following your biopsy.    Questions:     If you have any questions about your procedure performed in Ultrasound, you may contact us at [phone].    Results:       The pathology report on your biopsy is usually available within 72 hours. The Radiologist or your personal physician will call you with the results.     You will receive information about any further follow up from your physician.    Call your doctor if you have:       More than slight bleeding or significant pain, or experience swelling of the breast.     If your physician is unavailable, please call the Nurse Advice Line at [phone], or [facility] Emergency Department at [phone].      Patient:______________________________  Time:_________  Date:_____________    Instructor:____________________________   Time:_________  Date:_____________      Additional Information About Your Visit        [employer] Information     [facility] gives you secure access to your electronic health record. If you see a primary care provider, you can also send messages to your care team and make appointments. If you have questions, please call your primary care clinic.  If you do not have a primary care provider, please call [phone] and they will assist you.        Care EveryWhere ID     This is your Care  EveryWhere ID. This could be used by other organizations to access your [facility] medical records  [id number]        Your Vitals Were     Last Period                   02/06/2013            Primary Care Provider Office Phone # Fax #    Johana Fairbanks [phone]      Equal Access to Services     JACYANIYAH DOMINGUEZ : Hadii han ku hadasho Soomaali, waaxda luqadaha, qaybta kaalmada adeegyada, waxjoslyn lorenza rachaelyvette hale mihaelaobdulia rodriguez . So [facility] [phone].    ATENCIÓN: Si habla español, tiene a parekh disposición servicios gratuitos de asistencia lingüística. [facility] [phone].    We comply with applicable federal civil rights laws and [place] laws. We do not discriminate on the basis of race, color, national origin, age, disability, sex, sexual orientation, or gender identity.            Thank you!     Thank you for choosing [facility] for your care. Our goal is always to provide you with excellent care. Hearing back from our patients is one way we can continue to improve our services. Please take a few minutes to complete the written survey that you may receive in the mail after you visit with us. Thank you!             Medication List: This is a list of all your medications and when to take them. Check marks below indicate your daily home schedule. Keep this list as a reference.      Medications           Morning Afternoon Evening Bedtime As Needed    * albuterol 108 (90 BASE) MCG/ACT Inhaler   Commonly known as:  VENTOLIN HFA   Inhale 2 puffs into the lungs every 4 hours as needed                                * albuterol (2.5 MG/3ML) 0.083% neb solution   Take 1 vial (2.5 mg) by nebulization every 4 hours as needed for shortness of breath / dyspnea                                ALLEGRA ALLERGY 180 MG tablet   Take 180 mg by mouth daily as needed for allergies   Generic drug:  fexofenadine                                azelastine 0.1 % spray   Commonly known as:  ASTELIN   Spray 1-2 sprays into  both nostrils 2 times daily as needed for rhinitis                                Beclomethasone Dipropionate 80 MCG/ACT Aers Nasal Spray   Spray 2 sprays into both nostrils daily                                CRANBERRY PO   Take 1 capsule by mouth daily                                diclofenac 1 % Gel topical gel   Commonly known as:  VOLTAREN   Place 2 g onto the skin 4 times daily                                diphenhydrAMINE 25 MG tablet   Commonly known as:  BENADRYL   Take 1 tablet (25 mg) by mouth every 8 hours as needed for itching or allergies                                DITROPAN XL 10 MG 24 hr tablet   Take 10 mg by mouth daily   Generic drug:  oxybutynin                                enoxaparin 100 MG/ML injection   Commonly known as:  LOVENOX   Inject 0.9 mLs (90 mg) Subcutaneous every 12 hours                                fluticasone 50 MCG/ACT spray   Commonly known as:  FLONASE   Spray 1-2 sprays into both nostrils daily                                HYDROcodone-acetaminophen 5-325 MG per tablet   Commonly known as:  NORCO   Take 1-2 tablets by mouth every 4 hours as needed for moderate to severe pain                                KP CALCIUM 600+D3 600-500 MG-UNIT Caps   Take 2 tablets by mouth daily   Generic drug:  calcium carb-cholecalciferol                                levothyroxine 25 MCG tablet   Commonly known as:  SYNTHROID/LEVOTHROID   TAKE 1 TABLET (25 MCG) BY MOUTH DAILY                                Lidocaine 4 % Patch   Commonly known as:  LIDOCARE   Place 1-2 patches onto the skin every 24 hours                                loratadine 10 MG tablet   Commonly known as:  CLARITIN   Take 10 mg by mouth daily as needed for allergies                                LORazepam 0.5 MG tablet   Commonly known as:  ATIVAN   Take 1 tablet (0.5 mg) by mouth every 4 hours as needed (Anxiety, Nausea/Vomiting or Sleep)                                metoclopramide 10 MG tablet    Commonly known as:  REGLAN   Take 1 tablet (10 mg) by mouth 2 times daily as needed                                order for DME   Equipment being ordered: JoviPak post-lumpectomy compression pad x2                                oxyCODONE IR 5 MG tablet   Commonly known as:  ROXICODONE   Take 1 tablet (5 mg) by mouth every 4 hours as needed for moderate to severe pain                                * prochlorperazine 10 MG tablet   Commonly known as:  COMPAZINE   Take 1 tablet (10 mg) by mouth every 6 hours as needed (Nausea/Vomiting)                                * prochlorperazine 10 MG tablet   Commonly known as:  COMPAZINE   Take 1 tablet (10 mg) by mouth every 6 hours as needed (Nausea/Vomiting)                                QVAR 80 MCG/ACT Inhaler   INHALE 1 PUFFS INTO THE EACH NOSTRIL 2 TIMES DAILY   Generic drug:  beclomethasone                                ROBITUSSIN COUGH/COLD CF PO   Take 10 mLs by mouth as needed                                * SYMBICORT 160-4.5 MCG/ACT Inhaler   INHALE 2 PUFFS INTO THE LUNGS 2 TIMES DAILY   Generic drug:  budesonide-formoterol                                * SYMBICORT 160-4.5 MCG/ACT Inhaler   INHALE 2 PUFFS INTO THE LUNGS 2 TIMES DAILY   Generic drug:  budesonide-formoterol                                * vancomycin 125 MG capsule   Commonly known as:  VANCOCIN   Take 1 capsule (125 mg) by mouth 4 times daily                                * vancomycin 125 MG capsule   Commonly known as:  VANCOCIN   Take 1 capsule (125 mg) by mouth 4 times daily                                warfarin 2.5 MG tablet   Commonly known as:  COUMADIN   As directed by Anticoagulation Clinic (maintenance dose not yet established)                                zolpidem 12.5 MG CR tablet   Commonly known as:  AMBIEN CR   Take 1 tablet by mouth at bed time.                                * Notice:  This list has 8 medication(s) that are the same as other medications prescribed for  you. Read the directions carefully, and ask your doctor or other care provider to review them with you.

## 2018-03-27 NOTE — DISCHARGE INSTRUCTIONS
Lymph Node Biopsy Care Instructions      Site Care:    You may have some discomfort and some bruising at the needle site.      Activity:       You may go back to your normal routine.     No heavy lifting for 24 hours.    Diet and Medications:       You may go back to your regular diet.     Tylenol is the best choice to relieve your discomfort, the first 24 hours. If you have no bleeding on the first day, Ibuprofen (such as Advil, or Motrin), may be taken on the second day after for pain.     Do not take aspirin for 72 hours following your biopsy.    Questions:     If you have any questions about your procedure performed in Ultrasound, you may contact us at [phone].    Results:       The pathology report on your biopsy is usually available within 72 hours. The Radiologist or your personal physician will call you with the results.     You will receive information about any further follow up from your physician.    Call your doctor if you have:       More than slight bleeding or significant pain, or experience swelling of the breast.     If your physician is unavailable, please call the Nurse Advice Line at [phone], or [facility] Emergency Department at [phone].      Patient:______________________________  Time:_________  Date:_____________    Instructor:____________________________   Time:_________  Date:_____________

## 2018-03-27 NOTE — IP AVS SNAPSHOT
[facility] Ultrasound    [address] [facility] [address]    Phone:  [phone]                                       After Visit Summary   3/27/2018    Etta Cervantes    MRN: [id number]           After Visit Summary Signature Page     I have received my discharge instructions, and my questions have been answered. I have discussed any challenges I see with this plan with the nurse or doctor.    ..........................................................................................................................................  Patient/Patient Representative Signature      ..........................................................................................................................................  Patient Representative Print Name and Relationship to Patient    ..................................................               ................................................  Date                                            Time    ..........................................................................................................................................  Reviewed by Signature/Title    ...................................................              ..............................................  Date                                                            Time

## 2018-03-27 NOTE — PROGRESS NOTES
RADIOLOGY PROCEDURE NOTE  Patient name: Etta Cervantes  MRN: [id number]  : 1960    Pre-procedure diagnosis: Metastatic breast cancer.  Post-procedure diagnosis: Same    Procedure Date/Time: 2018  11:13 AM  Procedure: US guided needle core biopsy of right axillary adenopathy.  Estimated blood loss: None  Specimen(s) collected:  Six needle cores.  The patient tolerated the procedure well with no immediate complications.    See imaging dictation for procedural details.    Provider name: Jj Lebron  Assistant(s):None

## 2018-03-29 NOTE — TELEPHONE ENCOUNTER
"budesonide-formoterol (SYMBICORT) 160-4.5 MCG/ACT Inhaler        Last Written Prescription Date:  3/7/18  Last Fill Quantity: 3,   # refills: 1  Last Office Visit: 9/5/17  Future Office visit:    Next 5 appointments (look out 90 days)     Apr 04, 2018  1:45 PM CDT   Return Visit with Brodie Cassidy MD   [facility] ([facility])    [facility]  [address]  [facility] [address] [phone]                   Requested Prescriptions   Pending Prescriptions Disp Refills     budesonide-formoterol (SYMBICORT) 160-4.5 MCG/ACT Inhaler 10.2 Inhaler 1    Inhaled Steroids Protocol Failed    3/29/2018 12:13 PM       Failed - Asthma control assessment score within normal limits in last 6 months    Please review ACT score.   ACT Total Scores 8/24/2016 4/28/2017 10/6/2017   ACT TOTAL SCORE - - -   ASTHMA ER VISITS - - -   ASTHMA HOSPITALIZATIONS - - -   ACT TOTAL SCORE (Goal Greater than or Equal to 20) 18 24 19   In the past 12 months, how many times did you visit the emergency room for your asthma without being admitted to the hospital? 0 0 0   In the past 12 months, how many times were you hospitalized overnight because of your asthma? 0 0 0            Passed - Patient is age 12 or older       Passed - Recent (6 mo) or future (30 days) visit within the authorizing provider's specialty    Patient had office visit in the last 6 months or has a visit in the next 30 days with authorizing provider or within the authorizing provider's specialty.  See \"Patient Info\" tab in inbasket, or \"Choose Columns\" in Meds & Orders section of the refill encounter.              "

## 2018-03-30 NOTE — MR AVS SNAPSHOT
After Visit Summary   3/30/2018    Etta Cervantes    MRN: [id number]           Patient Information     Date Of Birth          1960        Visit Information        Provider Department      3/30/2018 9:00 AM Brodie Cassidy MD [facility] ONCOLOGY      Today's Diagnoses     Bone metastasis (H)    -  1    Carcinoma of right breast metastatic to axillary lymph node (H)        Carcinoma of breast metastatic to liver, unspecified laterality (H)          Care Instructions    Dr. Cassidy recommends you have labs drawn today and receive 1 liter NS today.  Your follow up plan depends on your lab results.      Copy of appointments, and after visit summary (AVS) given to patient.      If you have any questions during business hours (M-F 8 AM- 4PM), please call Funmi Ray RN, BSN, OCN Oncology Hematology /Breast Cancer Navigator at [facility] [phone].       For questions after business hours, or on holidays/weekends, please call our after hours Nurse Triage line [phone]. Thank you.            Follow-ups after your visit        Your next 10 appointments already scheduled     Apr 04, 2018  1:45 PM CDT   Return Visit with Brodie Cassidy MD   [facility] ([facility])    [facility]  [address]  [facility] [place] [address]   [phone]            Apr 04, 2018  2:15 PM CDT   [place]tico[place] Visit with [place] [facility]   [facility]    [address] [facility] [address]   [phone]            Apr 19, 2018  8:30 AM CDT   Level 2 with ROOM 8 [facility] ([facility])    [facility]  [address]  [place] [address]   [phone]              Who to contact     If you have questions or need follow up information about today's clinic visit or your schedule please contact [facility]  "CANCER CLINIC directly at [phone].  Normal or non-critical lab and imaging results will be communicated to you by MyChart, letter or phone within 4 business days after the clinic has received the results. If you do not hear from us within 7 days, please contact the clinic through [facility] or phone. If you have a critical or abnormal lab result, we will notify you by phone as soon as possible.  Submit refill requests through [employer] or call your pharmacy and they will forward the refill request to us. Please allow 3 business days for your refill to be completed.          Additional Information About Your Visit        [employer] Information     [employer] gives you secure access to your electronic health record. If you see a primary care provider, you can also send messages to your care team and make appointments. If you have questions, please call your primary care clinic.  If you do not have a primary care provider, please call [phone] and they will assist you.        Care EveryWhere ID     This is your Care EveryWhere ID. This could be used by other organizations to access your [place] medical records  [id number]        Your Vitals Were     Pulse Temperature Respirations Height Last Period Pulse Oximetry    117 98.7  F (37.1  C) (Tympanic) 16 1.632 m (5' 4.25\") 02/06/2013 96%    Breastfeeding? BMI (Body Mass Index)                No 31.41 kg/m2           Blood Pressure from Last 3 Encounters:   03/30/18 124/67   03/23/18 110/52   03/21/18 106/64    Weight from Last 3 Encounters:   03/30/18 83.6 kg (184 lb 6.4 oz)   03/21/18 84.3 kg (185 lb 12.8 oz)   03/07/18 85.1 kg (187 lb 9.6 oz)              We Performed the Following     CBC with platelets differential     Comprehensive metabolic panel          Today's Medication Changes          These changes are accurate as of 3/30/18 11:17 AM.  If you have any questions, ask your nurse or doctor.               These medicines have changed or have updated " prescriptions.        Dose/Directions    * warfarin 2.5 MG tablet   Commonly known as:  COUMADIN   This may have changed:  Another medication with the same name was added. Make sure you understand how and when to take each.   Used for:  Long-term (current) use of anticoagulants, Acute deep vein thrombosis (DVT) of right upper extremity, unspecified vein (H)        As directed by Anticoagulation Clinic (maintenance dose not yet established)   Quantity:  30 tablet   Refills:  1       * warfarin 1 MG tablet   Commonly known as:  COUMADIN   This may have changed:  You were already taking a medication with the same name, and this prescription was added. Make sure you understand how and when to take each.   Used for:  Acute deep vein thrombosis (DVT) of right upper extremity, unspecified vein (H), Long-term (current) use of anticoagulants        Take 1 mg daily or as instructed by anticoagulation clinic   Quantity:  90 tablet   Refills:  0       * Notice:  This list has 2 medication(s) that are the same as other medications prescribed for you. Read the directions carefully, and ask your doctor or other care provider to review them with you.         Where to get your medicines      These medications were sent to [place] [address] [place] [facility] [place] [address] [facility]  [address] [facility] [address]     Phone:  [phone]     warfarin 1 MG tablet                Primary Care Provider Office Phone # Fax #    Johana Fairbanks [phone] [address] [facility] [address]        Equal Access to Services     ANIYAH [facility]ASH[facility] : Hadii han gaitan Soisaura, waaxda luqadaha, qaybta kaalmahari neal . So [facility] [phone].    ATENCIÓN: Si habla español, tiene a parekh disposición servicios gratuitos de asistencia lingüística. Llame al [phone].    We comply with applicable federal civil rights laws and [place] laws. We do not discriminate on the  basis of race, color, national origin, age, disability, sex, sexual orientation, or gender identity.            Thank you!     Thank you for choosing [facility]  for your care. Our goal is always to provide you with excellent care. Hearing back from our patients is one way we can continue to improve our services. Please take a few minutes to complete the written survey that you may receive in the mail after your visit with us. Thank you!             Your Updated Medication List - Protect others around you: Learn how to safely use, store and throw away your medicines at www.disposemymeds.org.          This list is accurate as of 3/30/18 11:17 AM.  Always use your most recent med list.                   Brand Name Dispense Instructions for use Diagnosis    * albuterol 108 (90 BASE) MCG/ACT Inhaler    VENTOLIN HFA    1 Inhaler    Inhale 2 puffs into the lungs every 4 hours as needed    Moderate persistent asthma, uncomplicated       * albuterol (2.5 MG/3ML) 0.083% neb solution     30 vial    Take 1 vial (2.5 mg) by nebulization every 4 hours as needed for shortness of breath / dyspnea    Moderate persistent asthma, uncomplicated       ALLEGRA ALLERGY 180 MG tablet   Generic drug:  fexofenadine      Take 180 mg by mouth daily as needed for allergies        azelastine 0.1 % spray    ASTELIN    3 Bottle    Spray 1-2 sprays into both nostrils 2 times daily as needed for rhinitis    Chronic rhinitis       Beclomethasone Dipropionate 80 MCG/ACT Aers Nasal Spray     8.7 g    Spray 2 sprays into both nostrils daily    Other chronic rhinitis       budesonide-formoterol 160-4.5 MCG/ACT Inhaler    SYMBICORT    10.2 Inhaler    INHALE 2 PUFFS INTO THE LUNGS 2 TIMES DAILY    Moderate persistent asthma without complication       CRANBERRY PO      Take 1 capsule by mouth daily        diclofenac 1 % Gel topical gel    VOLTAREN    100 g    Place 2 g onto the skin 4 times daily    Carcinoma of breast metastatic to liver,  unspecified laterality (H)       diphenhydrAMINE 25 MG tablet    BENADRYL    1 tablet    Take 1 tablet (25 mg) by mouth every 8 hours as needed for itching or allergies    Carcinoma of breast metastatic to liver, unspecified laterality (H), Bone metastasis (H), Pericardial effusion, Bilateral malignant neoplasm of breast in female, estrogen receptor positive, unspecified site of breast (H), Carcinoma of right breast metastatic to axillary lymph node (H), Secondary malignant neoplasm of liver (H), Chemotherapy-induced neutropenia (H), Emphysematous bleb of lung (H), Hypothyroidism, unspecified type, Hyperlipidemia LDL goal <130, Moderate persistent asthma without complication, Mucositis due to chemotherapy       DITROPAN XL 10 MG 24 hr tablet   Generic drug:  oxybutynin      Take 10 mg by mouth daily    Post-op pain, Carcinoma of right breast metastatic to axillary lymph node (H), Bone metastasis (H), Carcinoma of breast metastatic to liver, unspecified laterality (H), Secondary malignant neoplasm of liver (H)       enoxaparin 100 MG/ML injection    LOVENOX    12 Syringe    Inject 0.9 mLs (90 mg) Subcutaneous every 12 hours    Long-term (current) use of anticoagulants, Acute deep vein thrombosis (DVT) of right upper extremity, unspecified vein (H)       fluticasone 50 MCG/ACT spray    FLONASE    1 Bottle    Spray 1-2 sprays into both nostrils daily    Other chronic rhinitis       HYDROcodone-acetaminophen 5-325 MG per tablet    NORCO    20 tablet    Take 1-2 tablets by mouth every 4 hours as needed for moderate to severe pain    Post-op pain, Carcinoma of right breast metastatic to axillary lymph node (H), Bone metastasis (H), Carcinoma of breast metastatic to liver, unspecified laterality (H), Secondary malignant neoplasm of liver (H)       KP CALCIUM 600+D3 600-500 MG-UNIT Caps   Generic drug:  calcium carb-cholecalciferol      Take 2 tablets by mouth daily        levothyroxine 25 MCG tablet    SYNTHROID/LEVOTHROID     90 tablet    TAKE 1 TABLET (25 MCG) BY MOUTH DAILY    Hypothyroidism due to acquired atrophy of thyroid       Lidocaine 4 % Patch    LIDOCARE    30 patch    Place 1-2 patches onto the skin every 24 hours    Carcinoma of breast metastatic to liver, unspecified laterality (H)       loratadine 10 MG tablet    CLARITIN     Take 10 mg by mouth daily as needed for allergies        LORazepam 0.5 MG tablet    ATIVAN    30 tablet    Take 1 tablet (0.5 mg) by mouth every 4 hours as needed (Anxiety, Nausea/Vomiting or Sleep)    Secondary malignant neoplasm of liver (H), Carcinoma of breast metastatic to liver, unspecified laterality (H), Bone metastasis (H), Carcinoma of right breast metastatic to axillary lymph node (H)       metoclopramide 10 MG tablet    REGLAN    120 tablet    Take 1 tablet (10 mg) by mouth 2 times daily as needed    Bilateral malignant neoplasm of breast in female, estrogen receptor positive, unspecified site of breast (H)       order for DME     1 each    Equipment being ordered: AnyiviKiran post-lumpectomy compression pad x2    Lymphedema, Lymphedema of breast       oxyCODONE IR 5 MG tablet    ROXICODONE    60 tablet    Take 1 tablet (5 mg) by mouth every 4 hours as needed for moderate to severe pain    Secondary malignant neoplasm of liver (H), Carcinoma of breast metastatic to liver, unspecified laterality (H), Carcinoma of right breast metastatic to axillary lymph node (H), Bone metastasis (H), Pericardial effusion       prochlorperazine 10 MG tablet    COMPAZINE    30 tablet    Take 1 tablet (10 mg) by mouth every 6 hours as needed (Nausea/Vomiting)    Secondary malignant neoplasm of liver (H), Carcinoma of breast metastatic to liver, unspecified laterality (H), Bone metastasis (H), Carcinoma of right breast metastatic to axillary lymph node (H), Bilateral malignant neoplasm of breast in female, estrogen receptor positive, unspecified site of breast (H)       QVAR 80 MCG/ACT Inhaler   Generic drug:   beclomethasone     26.1 g    INHALE 1 PUFFS INTO THE EACH NOSTRIL 2 TIMES DAILY    Chronic rhinitis       ROBITUSSIN COUGH/COLD CF PO      Take 10 mLs by mouth as needed    Breast cancer metastasized to axillary lymph node, right (H)       vancomycin 125 MG capsule    VANCOCIN    60 capsule    Take 1 capsule (125 mg) by mouth 4 times daily    Clostridium difficile diarrhea       * warfarin 2.5 MG tablet    COUMADIN    30 tablet    As directed by Anticoagulation Clinic (maintenance dose not yet established)    Long-term (current) use of anticoagulants, Acute deep vein thrombosis (DVT) of right upper extremity, unspecified vein (H)       * warfarin 1 MG tablet    COUMADIN    90 tablet    Take 1 mg daily or as instructed by anticoagulation clinic    Acute deep vein thrombosis (DVT) of right upper extremity, unspecified vein (H), Long-term (current) use of anticoagulants       zolpidem 12.5 MG CR tablet    AMBIEN CR    30 tablet    Take 1 tablet by mouth at bed time.    Insomnia due to medical condition       * Notice:  This list has 4 medication(s) that are the same as other medications prescribed for you. Read the directions carefully, and ask your doctor or other care provider to review them with you.

## 2018-03-30 NOTE — PATIENT INSTRUCTIONS
Dr. Cassidy recommends you have labs drawn today and receive 1 liter NS today.  Your follow up plan depends on your lab results.      **Update: Dr. Cassidy reviewed your labs and they are OK. He would like to cancel your appointment with him next week and reschedule it for 2 weeks to review the send out lab results.    Copy of appointments, and after visit summary (AVS) given to patient.      If you have any questions during business hours (M-F 8 AM- 4PM), please call Funmi Ray RN, BSN, OCN Oncology Hematology /Breast Cancer Navigator at [facility] [phone].       For questions after business hours, or on holidays/weekends, please call our after hours Nurse Triage line [phone]. Thank you.

## 2018-03-30 NOTE — TELEPHONE ENCOUNTER
Prescription approved per [facility] Refill Protocol.  Consuelo Payton RN

## 2018-03-30 NOTE — MR AVS SNAPSHOT
Etta Cervantes   3/30/2018 9:45 AM   Anticoagulation Therapy Visit    Description:  57 year old female   Provider:  [place] [facility]   Department:  [place] [facility]           INR as of 3/30/2018     Today's INR 4.5!      Anticoagulation Summary as of 3/30/2018     INR goal 2.0-3.0   Today's INR 4.5!   Full instructions 3/30: Hold; 3/31: 1 mg; 4/1: 1 mg; 4/2: 1 mg; 4/3: 1 mg   Next INR check 4/4/2018    Indications   Acute deep vein thrombosis (DVT) of right upper extremity  unspecified vein (H) [I82.621]  Long-term (current) use of anticoagulants [Z79.01] [Z79.01]         Description     No more lovenox. No warfarin today. Start taking 1 mg daily on Saturday.      Your next Anticoagulation Clinic appointment(s)     Apr 04, 2018  2:15 PM CDT   Anticoagulation Visit with [place] [facility]   [facility]    [address] [facility] [address] [phone]              Contact Numbers     Please call [phone] with any problems or questions regarding your therapy.    If you need to cancel and/or reschedule your appointment please call one of the following numbers:  [facility] [phone]  [place] - [phone]  [place] - [phone]  [place] - [phone]  [place] - [phone]            March 2018 Details    Sun Mon Tue Wed Thu Fri Sat         1               2               3                 4               5               6               7               8               9               10                 11               12               13               14               15               16               17                 18               19               20               21               22               23               24                 25               26               27               28               29               30      Hold   See details      31      1 mg          Date Details   03/30 This INR check               How to take your warfarin dose      To take:  1 mg Take 1 of the 1 mg tablets.    Hold Do not take your warfarin dose. See the Details table to the right for additional instructions.                April 2018 Details    Sun Mon Tue Wed Thu Fri Sat     1      1 mg         2      1 mg         3      1 mg         4            5               6               7                 8               9               10               11               12               13               14                 15               16               17               18               19               20               21                 22               23               24               25               26               27               28                 29               30                     Date Details   No additional details    Date of next INR:  4/4/2018         How to take your warfarin dose     To take:  1 mg Take 1 of the 1 mg tablets.

## 2018-03-30 NOTE — LETTER
3/30/2018         RE: Etta Cervantes  [address] [place]  [place] [address]        Dear Colleague,    Thank you for referring your patient, Etta Cervantes, to the [facility]. Please see a copy of my visit note below.     hematology/ Oncology Follow-up Visit:  Mar 30, 2018    Reason for Visit:   Chief Complaint   Patient presents with     Oncology Clinic Visit     Follow up Breast CA. Fatigued.        Oncologic History:  Breast cancer (H)  Etta Cervantes presented with increasing lump in the right axilla that s lump has been growing without any pain or associated symptoms. Subsequently she was evaluated by primary care physician. Diagnostic digital mammogram was done on 01/13/2015 showing enlarged lymph nodes in the right axilla. There was some skin thickening in the right breast anteriorly and laterally. There are no parenchymal changes in the right breast. The left breast shows a 1.7 cm spiculated mass at approximately 2 to 3 o'clock position, 15.2 cm away from the nipple. A right breast ultrasound was subsequently done and ultrasound guided biopsy was done. Needle biopsy of the left breast did show infiltrating ductal carcinoma grade I of III with no angiolymphatic invasion seen. No associated ductal carcinoma in situ. Estrogen receptor, progresterone receptor were positive. HER-2/sadie receptor came back negative.. Another biopsy from the right axilla did show metastatic ductal carcinoma. PET scan was done on January 26, 2015 showing few small right posterior cervical chain nodes the largest is 1.2 cm. There is extensive bulky right axillary adenopathy demonstrating hypermetabolic FDG uptake with SUV of 10.6. There is skin thickening involving the right breast which demonstrated low-grade FDG uptake. A 1.2 cm lesion on the lateral aspect of the left breast demonstrated minimally increased FDG uptake with SUV of 2. Scattered hypermetabolic mediastinal lymph nodes located in the left superior  anterior mediastinum, right paratracheal and precarinal U, subcranial adenopathy with javon metastases. This focus hypermetabolic FDG uptake identified definitive Amanda posterior aspect off intertrochanteric region of the proximal left femur consistent with bone metastases. There is also 1.4 cm hypermetabolic FDG uptake identified in the anterior medial segment of the left hepatic lobe consistent with liver metastases. Additional 2.1 cm low-attenuation lesion anterior aspect of the right lobe which needs to be determined. The patient was started on chemotherapy with Taxotere and Cytoxan on February 9, 2015.  She concluded 4 cycles of Taxotere and Cytoxan. She started on Arimidex 1 mg orally daily. Currently she is on Faslodex and ibrance. Subsequently the patient went on to receive Afinitor. The patient also started treatment with Xeloda.       Interval History:  Patient returning today because of fatigue.  She has been having increasing shortness of breath and increasing fatigue and sleepiness.  Her appetite is poor and she continues to lose weight.  She has had her first cycle of chemotherapy with Doxil last week she tolerated chemotherapy without any nausea or vomiting or diarrhea.  She denies any fever or chills.  She has been also having increasing pain in the right upper quadrant    Review Of Systems:  Constitutional: Negative for fever, chills, and night sweats.  Increasing fatigue weight loss and poor appetite and increasing pain in the right upper quadrant.  Skin: negative.  Eyes: negative.  Ears/Nose/Throat: negative.  Respiratory: No shortness of breath, dyspnea on exertion, cough, or hemoptysis.  Cardiovascular: negative.  Gastrointestinal: Patient denies any nausea or vomiting.  Genitourinary: negative.  Musculoskeletal: negative.  Neurologic: negative.  Psychiatric: negative.  Hematologic/Lymphatic/Immunologic: negative.  Endocrine: negative.    All other ROS negative unless mentioned in interval  history.    Past medical, social, surgical, and family histories reviewed.    Allergies:  Allergies as of 03/30/2018 - Manuel as Reviewed 03/30/2018   Allergen Reaction Noted     Animal dander Cough 01/23/2015     Cats  07/15/2010     Dust mites Cough 01/23/2015     Pollen extract  01/23/2015     Seasonal allergies  07/15/2010     Levaquin [levofloxacin] Rash 07/17/2017       Current Medications:  Current Outpatient Prescriptions   Medication Sig Dispense Refill     budesonide-formoterol (SYMBICORT) 160-4.5 MCG/ACT Inhaler INHALE 2 PUFFS INTO THE LUNGS 2 TIMES DAILY 10.2 Inhaler 0     warfarin (COUMADIN) 1 MG tablet Take 1 mg daily or as instructed by anticoagulation clinic 90 tablet 0     oxybutynin (DITROPAN XL) 10 MG 24 hr tablet Take 10 mg by mouth daily       HYDROcodone-acetaminophen (NORCO) 5-325 MG per tablet Take 1-2 tablets by mouth every 4 hours as needed for moderate to severe pain 20 tablet 0     warfarin (COUMADIN) 2.5 MG tablet As directed by Anticoagulation Clinic (maintenance dose not yet established) 30 tablet 1     vancomycin (VANCOCIN) 125 MG capsule Take 1 capsule (125 mg) by mouth 4 times daily 60 capsule 0     prochlorperazine (COMPAZINE) 10 MG tablet Take 1 tablet (10 mg) by mouth every 6 hours as needed (Nausea/Vomiting) 30 tablet 2     diclofenac (VOLTAREN) 1 % GEL topical gel Place 2 g onto the skin 4 times daily 100 g 0     loratadine (CLARITIN) 10 MG tablet Take 10 mg by mouth daily as needed for allergies       order for DME Equipment being ordered: AnyiviKiran post-lumpectomy compression pad x2 1 each 0     zolpidem (AMBIEN CR) 12.5 MG CR tablet Take 1 tablet by mouth at bed time. 30 tablet 5     QVAR 80 MCG/ACT Inhaler INHALE 1 PUFFS INTO THE EACH NOSTRIL 2 TIMES DAILY 26.1 g 3     levothyroxine (SYNTHROID/LEVOTHROID) 25 MCG tablet TAKE 1 TABLET (25 MCG) BY MOUTH DAILY 90 tablet 2     calcium carb-cholecalciferol (KP CALCIUM 600+D3) 600-500 MG-UNIT CAPS Take 2 tablets by mouth daily        CRANBERRY PO Take 1 capsule by mouth daily        LORazepam (ATIVAN) 0.5 MG tablet Take 1 tablet (0.5 mg) by mouth every 4 hours as needed (Anxiety, Nausea/Vomiting or Sleep) (Patient not taking: Reported on 3/30/2018) 30 tablet 2     enoxaparin (LOVENOX) 100 MG/ML injection Inject 0.9 mLs (90 mg) Subcutaneous every 12 hours (Patient not taking: Reported on 3/30/2018) 12 Syringe 0     fluticasone (FLONASE) 50 MCG/ACT spray Spray 1-2 sprays into both nostrils daily (Patient not taking: Reported on 3/21/2018) 1 Bottle 11     Beclomethasone Dipropionate 80 MCG/ACT AERS Nasal Spray Spray 2 sprays into both nostrils daily (Patient not taking: Reported on 3/30/2018) 8.7 g 3     oxyCODONE IR (ROXICODONE) 5 MG tablet Take 1 tablet (5 mg) by mouth every 4 hours as needed for moderate to severe pain (Patient not taking: Reported on 3/21/2018) 60 tablet 0     Lidocaine (LIDOCARE) 4 % Patch Place 1-2 patches onto the skin every 24 hours (Patient not taking: Reported on 1/24/2018) 30 patch 0     diphenhydrAMINE (BENADRYL) 25 MG tablet Take 1 tablet (25 mg) by mouth every 8 hours as needed for itching or allergies (Patient not taking: Reported on 2/6/2018) 1 tablet 0     Phenylephrine-DM-GG (ROBITUSSIN COUGH/COLD CF PO) Take 10 mLs by mouth as needed        metoclopramide (REGLAN) 10 MG tablet Take 1 tablet (10 mg) by mouth 2 times daily as needed (Patient not taking: Reported on 3/30/2018) 120 tablet 1     fexofenadine (ALLEGRA ALLERGY) 180 MG tablet Take 180 mg by mouth daily as needed for allergies       albuterol (2.5 MG/3ML) 0.083% neb solution Take 1 vial (2.5 mg) by nebulization every 4 hours as needed for shortness of breath / dyspnea (Patient not taking: Reported on 3/21/2018) 30 vial 3     azelastine (ASTELIN) 0.1 % nasal spray Spray 1-2 sprays into both nostrils 2 times daily as needed for rhinitis (Patient not taking: Reported on 3/30/2018) 3 Bottle 3     albuterol (VENTOLIN HFA) 108 (90 BASE) MCG/ACT inhaler Inhale 2  "puffs into the lungs every 4 hours as needed (Patient not taking: Reported on 3/21/2018) 1 Inhaler 2        Physical Exam:  /67 (BP Location: Left arm, Patient Position: Sitting, Cuff Size: Adult Regular)  Pulse 117  Temp 98.7  F (37.1  C) (Tympanic)  Resp 16  Ht 1.632 m (5' 4.25\")  Wt 83.6 kg (184 lb 6.4 oz)  LMP 02/06/2013  SpO2 96%  Breastfeeding? No  BMI 31.41 kg/m2  Wt Readings from Last 12 Encounters:   03/30/18 83.6 kg (184 lb 6.4 oz)   03/21/18 84.3 kg (185 lb 12.8 oz)   03/07/18 85.1 kg (187 lb 9.6 oz)   03/01/18 83.5 kg (184 lb)   02/21/18 83.9 kg (185 lb)   02/07/18 83.5 kg (184 lb)   02/06/18 83.7 kg (184 lb 8 oz)   01/24/18 83.2 kg (183 lb 6.4 oz)   01/17/18 85.4 kg (188 lb 3.2 oz)   01/10/18 83.9 kg (185 lb)   01/05/18 83.9 kg (185 lb)   01/03/18 86.5 kg (190 lb 9.6 oz)     ECOG performance status: 1  GENERAL APPEARANCE: Healthy, alert and in no acute distress.  HEENT: Sclerae anicteric. PERRLA. Oropharynx without ulcers, lesions, or thrush.  NECK: Supple. No asymmetry or masses.  LYMPHATICS: No palpable cervical, supraclavicular, axillary, or inguinal lymphadenopathy.  RESP: Lungs clear to auscultation bilaterally without rales, rhonchi or wheezes.  BREAST: Not examined today \" \"CARDIOVASCULAR: Regular rate and rhythm. Normal S1, S2; no S3 or S4. No murmur, gallop, or rub.  ABDOMEN: Soft, nontender. Bowel sounds normal. No palpable organomegaly or masses.  MUSCULOSKELETAL: Extremities without gross deformities noted. No edema of bilateral lower extremities.  SKIN: No suspicious lesions or rashes.  NEURO: Alert and oriented x 3. Cranial nerves II-XII grossly intact.  PSYCHIATRIC: Mentation and affect appear normal.    Laboratory/Imaging Studies:  Infusion Therapy Visit on 03/21/2018   Component Date Value Ref Range Status     INR 03/21/2018 4.39* 0.86 - 1.14 Final    CALLED TO LAUREN ORELLANA BY ALETA 3/21/18 1017     WBC 03/21/2018 7.0  4.0 - 11.0 10e9/L Final     RBC Count 03/21/2018 4.18  3.8 " - 5.2 10e12/L Final     Hemoglobin 03/21/2018 11.8  11.7 - 15.7 g/dL Final     Hematocrit 03/21/2018 36.6  35.0 - 47.0 % Final     MCV 03/21/2018 88  78 - 100 fl Final     MCH 03/21/2018 28.2  26.5 - 33.0 pg Final     MCHC 03/21/2018 32.2  31.5 - 36.5 g/dL Final     RDW 03/21/2018 16.2* 10.0 - 15.0 % Final     Platelet Count 03/21/2018 301  150 - 450 10e9/L Final     Diff Method 03/21/2018 Automated Method   Final     % Neutrophils 03/21/2018 66.7  % Final     % Lymphocytes 03/21/2018 15.3  % Final     % Monocytes 03/21/2018 14.6  % Final     % Eosinophils 03/21/2018 1.9  % Final     % Basophils 03/21/2018 0.4  % Final     % Immature Granulocytes 03/21/2018 1.1  % Final     Absolute Neutrophil 03/21/2018 4.7  1.6 - 8.3 10e9/L Final     Absolute Lymphocytes 03/21/2018 1.1  0.8 - 5.3 10e9/L Final     Absolute Monocytes 03/21/2018 1.0  0.0 - 1.3 10e9/L Final     Absolute Eosinophils 03/21/2018 0.1  0.0 - 0.7 10e9/L Final     Absolute Basophils 03/21/2018 0.0  0.0 - 0.2 10e9/L Final     Abs Immature Granulocytes 03/21/2018 0.1  0 - 0.4 10e9/L Final     Sodium 03/21/2018 139  133 - 144 mmol/L Final     Potassium 03/21/2018 4.1  3.4 - 5.3 mmol/L Final     Chloride 03/21/2018 108  94 - 109 mmol/L Final     Carbon Dioxide 03/21/2018 26  20 - 32 mmol/L Final     Anion Gap 03/21/2018 5  3 - 14 mmol/L Final     Glucose 03/21/2018 88  70 - 99 mg/dL Final     Urea Nitrogen 03/21/2018 9  7 - 30 mg/dL Final     Creatinine 03/21/2018 0.73  0.52 - 1.04 mg/dL Final     GFR Estimate 03/21/2018 81  >60 mL/min/1.7m2 Final    Non  GFR Calc     GFR Estimate If Black 03/21/2018 >90  >60 mL/min/1.7m2 Final    African American GFR Calc     Calcium 03/21/2018 8.3* 8.5 - 10.1 mg/dL Final     Bilirubin Total 03/21/2018 0.9  0.2 - 1.3 mg/dL Final     Albumin 03/21/2018 3.0* 3.4 - 5.0 g/dL Final     Protein Total 03/21/2018 6.9  6.8 - 8.8 g/dL Final     Alkaline Phosphatase 03/21/2018 135  40 - 150 U/L Final     ALT 03/21/2018 27   0 - 50 U/L Final     AST 03/21/2018 66* 0 - 45 U/L Final     CEA 03/21/2018 28.0* 0 - 2.5 ug/L Final    Comment: Smoking may cause CEA results to be elevated.  Assay Method:  Chemiluminescence using Siemens Centaur XP       CA 27-29 03/21/2018 240* 0 - 39 U/mL Final    Assay Method:  Chemiluminescence using Siemens Centaur XP        Recent Results (from the past 744 hour(s))   CT Chest/Abdomen/Pelvis w Contrast    Narrative    CT CHEST/ABDOMEN/PELVIS W CONTRAST 3/19/2018 9:56 AM    HISTORY: Breast cancer. Follow-up.    CONTRAST:  92 mL Isovue 370.    TECHNIQUE: CT of the chest, abdomen, and pelvis is performed with IV  contrast.    Routine evaluated structures in the chest include the lungs,  mediastinal structures, pleura, and chest wall.    Routine assessed structures in the abdomen include the liver, spleen,  pancreas, adrenal glands, and kidneys. Also assessed are the  retroperitoneum, gastrointestinal tract, and the abdominal wall.    Intrapelvic anatomy is also assessed.    Radiation dose for this scan is reduced using automated exposure  control, adjustment of the mA and/or kV according to patient size, or  iterative reconstruction technique.    COMPARISON: [facility] 6 2017.    FINDINGS:    Chest: Bandlike parenchymal abnormality in the right hilar region is  stable. Bilateral pleural effusions are larger. To the contrary the  previously seen pericardial effusion is much smaller with only a small  amount of a pericardial fluid remaining. There is some nodular  enhancement of the pericardium seen on today's study and it most  likely relates to neoplastic involvement. Inflammatory etiology is  also in the differential. Right axillary adenopathy has worsened. The  dominant area measures 4.4 cm, compared to 3.7 cm on the prior study.  Another lymph node measures 1.3 cm compared to 1 cm on the prior  study. Other small areas of nodular enhancement at the margin of the  right breast and right axilla are new and  consistent with additional  disease. A multinodular thyroid gland is again demonstrated. There is  a focal area of blastic change involving the right aspect of the C6  vertebral body on image #1. Images didn't extend quite this high  before. New or more obvious blastic foci are seen on today's study in  the region of T3 and T8. There also is a new blastic focus involving a  right transverse process in the lower thoracic spine on image #43. A  blastic focus involving the body of the sternum on image #17 is more  evident. Blastic foci involving a posterior right rib arc on image #17  is more stable.    Abdomen: There is much more extensive neoplastic disease in the liver  now compared to the prior study. Large confluent areas of neoplastic  disease are seen in both the right and left lobes. There has been  interval placement of a biliary stent with resolution of the  previously seen biliary dilatation. New/more prominent nodules are  seen in the omentum, consistent with peritoneal carcinomatosis. There  are new nodules involving the anterior abdominal wall subcutaneous  fat. They could relate to injection granulomas from subcutaneous  injections, with neoplastic disease considered less likely. New  blastic foci are seen involving the L3 and L4 vertebral bodies.    Pelvis: There is moderately prominent mucosal enhancement of the  gallbladder. There is a small amount of free pelvic fluid, less  compared to the prior study. New blastic foci are seen involving the  midline and right paramidline sacrum. There is a new blastic focus  involving the right symphysis pubis on image #112.      Impression    IMPRESSION:  1.  There is worsening neoplastic disease in the right axilla/lateral  right breast margin, and abdomen. Osseous metastatic disease is also  worse.  2. New enhancing nodularity of the pericardium is of concern for  metastatic involvement.  3. Bilateral pleural effusions are larger.  4. There is a mucosal  enhancement involving the bladder. This could  relate to cystitis in the appropriate clinical setting.    LETI CHASE MD   US Biopsy Lymph Node Core    Narrative    HISTORY: Widespread metastatic breast cancer. Needs additional tissue  for extra labs.    COMPARISON: CT dated 3/19/2018.    ULTRASOUND-GUIDED NEEDLE CORE BIOPSY OF RIGHT AXILLARY LYMPH NODE  CONGLOMERATE: Risks and benefits of the procedure are discussed with  the patient. Sonographic guidance and 8 mL of 1% lidocaine (local  anesthetic) are utilized. Six 14-gauge core biopsy samples are  obtained. The patient tolerated the procedure well.  There is no  significant blood loss.      Impression    IMPRESSION: Technically uneventful ultrasound-guided needle core  biopsy right axillary lymph node conglomerate. Pathologic results are  pending.    LETI CHASE MD       Assessment and plan:  (C50.911,  C77.3) Carcinoma of right breast metastatic to axillary lymph node (H)  (primary encounter diagnosis)  (C79.51) Bone metastasis (H)  (C50.919,  C78.7) Carcinoma of breast metastatic to liver, unspecified laterality (H)  (C50.911,  Z17.0,  C50.912) Bilateral malignant neoplasm of breast in female, estrogen receptor positive, unspecified site of breast (H)  I reviewed with patient today the management plan.  I reviewed with her the results of recent axillary lymph node biopsy.  Samples will send to [place] [id number].  Patient just had her first cycle of Doxil last week.  We will continue to monitor the patient's symptoms and see if she will respond to Doxil.    (E03.9) Hypothyroidism, unspecified type  Patient will continue on Synthroid 25 mcg daily.    (G89.3) Cancer associated pain  Patient will continue on the current pain medication.  I would recommend patient to start on MS Contin 15 mg twice daily.      (D70.1,  T45.1X5A) Chemotherapy-induced neutropenia (H)  Absolute neutrophil count today is normal.    (I82.621) Acute deep vein thrombosis (DVT) of right upper  extremity, unspecified vein (H)  Patient will continue on anticoagulation was warfarin.      The patient is ready to learn, no apparent learning barriers were identified.  Diagnosis and treatment plans were explained to the patient. The patient expressed understanding of the content. The patient asked appropriate questions. The patient questions were answered to her satisfaction.    Chart documentation with Dragon Voice recognition Software. Although reviewed after completion, some words and grammatical errors may remain.    Again, thank you for allowing me to participate in the care of your patient.        Sincerely,        Brodie Cassidy MD

## 2018-03-30 NOTE — PROGRESS NOTES
Infusion Nursing Note:  Etta Cervantes presents today for IVF.    Patient seen by provider today: Yes: Dr. Cassidy   present during visit today: Not Applicable.    Note: N/A.    Intravenous Access:  Implanted Port.    Treatment Conditions:  Not Applicable.      Post Infusion Assessment:  Patient tolerated infusion without incident.    Discharge Plan:   Patient discharged in stable condition accompanied by: .    Hunter Urbina RN

## 2018-03-30 NOTE — NURSING NOTE
"Oncology Rooming Note    March 30, 2018 10:44 AM   Etta Cervantes is a 57 year old female who presents for:    Chief Complaint   Patient presents with     Oncology Clinic Visit     Follow up Breast CA. Fatigued.      Initial Vitals: /67 (BP Location: Left arm, Patient Position: Sitting, Cuff Size: Adult Regular)  Pulse 117  Temp 98.7  F (37.1  C) (Tympanic)  Resp 16  Ht 1.632 m (5' 4.25\")  Wt 83.6 kg (184 lb 6.4 oz)  LMP 02/06/2013  SpO2 96%  Breastfeeding? No  BMI 31.41 kg/m2 Estimated body mass index is 31.41 kg/(m^2) as calculated from the following:    Height as of this encounter: 1.632 m (5' 4.25\").    Weight as of this encounter: 83.6 kg (184 lb 6.4 oz). Body surface area is 1.95 meters squared.  Severe Pain (7) Comment: right side.    Patient's last menstrual period was 02/06/2013.  Allergies reviewed: Yes  Medications reviewed: Yes    Medications: Medication refills not needed today.  Pharmacy name entered into [employer]:    [id number] [place] [facility] - [place], [place] - [address] [place] [address] [facility] - [place], [place] - [address] [place], [place] [address]PRINCESS     Clinical concerns: Follow up Breast CA. Fatigued, difficulty waking up in the morning. Biopsy was Tuesday, having pains in abdomin right side, minimal now.     8 minutes for nursing intake (face to face time)     Claudia Rodriguez, [facility]            "

## 2018-03-30 NOTE — PROGRESS NOTES
ANTICOAGULATION FOLLOW-UP CLINIC VISIT    Patient Name:  Etta Cervantes  Date:  3/30/2018  Contact Type:  Face to Face    SUBJECTIVE:     Patient Findings     Positives Change in diet/appetite (not eating as well but trying to get protein bars and drinks in), Other complaints (feeling fatigued and in a fog), Antibiotic use or infection (on vanco taper for c diff), Intentional hold of therapy (3-23 to 3-26 for lymph node biopsy)    Comments Tapering down on vanco still and has about three weeks left. The patient is not having issues right now with loose stools. She feels that something has shifted and she might be finally finished with c-diff but will still finish the vanco taper.     She is waiting for results of biopsy on 3-27, next doxil infusion is 4-19. Pt will be driving to [place] on 4-5 for one week or so for her nephew's wedding. She is aware to get out and move around frequently to keep blood moving in lower extremities.     There are several factors at play right now: new medication (doxil), on vanco again, and not eating as well. Patient states she is going to try to stop by oncology today and discuss that she is feeling very fatigued also. Will send 1 mg warfarin tablets to pharmacy today. She will need a reduction in dosing and close monitoring. She has only had 3.75 mg in the last week (3 doses of 1.25 mg in the last 3 days) and INR 4.5 today. No bleeding signs. Will have pt hold warfarin today and start 1 mg daily but she might end up needing some 0.5 mg doses as well. Will recheck on 4-4 after visit with Dr. Cassidy. She should stop lovenox bridge due to elevated INR.           OBJECTIVE    INR Protime   Date Value Ref Range Status   03/30/2018 4.5 (A) 0.86 - 1.14 Final       ASSESSMENT / PLAN  INR assessment SUPRA    Recheck INR In: 5 DAYS    INR Location Clinic      Anticoagulation Summary as of 3/30/2018     INR goal 2.0-3.0   Today's INR 4.5!   Maintenance plan No maintenance plan   Full instructions  3/30: Hold; 3/31: 1 mg; 4/1: 1 mg; 4/2: 1 mg; 4/3: 1 mg   Weekly total 8.75 mg   Plan last modified Juliette Schmidt RN (3/30/2018)   Next INR check 4/4/2018   Priority INR   Target end date     Indications   Acute deep vein thrombosis (DVT) of right upper extremity  unspecified vein (H) [I82.621]  Long-term (current) use of anticoagulants [Z79.01] [Z79.01]         Anticoagulation Episode Summary     INR check location     Preferred lab     Send INR reminders to [facility] PHONE ANTICOAG POOL    Comments * monthly Doxil  infusions for metastatic breast ca starting 3/23/18  (may result in increased risk for elevated INR and subsequent bleeding) . Pt prefers [facility]. Vanco taper for C-diff, OK [facility]      Anticoagulation Care Providers     Provider Role Specialty Phone number    Brodie Cassidy MD Referring Hematology & Oncology [phone]    Johana Fairbanks MD [facility] [phone]            See the Encounter Report to view Anticoagulation Flowsheet and Dosing Calendar (Go to Encounters tab in chart review, and find the Anticoagulation Therapy Visit)        Juliette Schmidt RN

## 2018-03-30 NOTE — MR AVS SNAPSHOT
After Visit Summary   3/30/2018    Etta Cervantes    MRN: [id number]           Patient Information     Date Of Birth          1960        Visit Information        Provider Department      3/30/2018 11:00 AM ROOM 10 [facility]        Today's Diagnoses     Secondary malignant neoplasm of liver (H)    -  1    Carcinoma of breast metastatic to liver, unspecified laterality (H)        Bone metastasis (H)        Breast cancer metastasized to axillary lymph node, right (H)        Mucositis due to chemotherapy           Follow-ups after your visit        Your next 10 appointments already scheduled     Apr 04, 2018  1:45 PM CDT   Return Visit with Brodie Cassidy MD   [facility] ([facility])    [facility]  [address]  [place] [address]   [phone]            Apr 04, 2018  2:15 PM CDT   Anticoagulation Visit with [place] ANTI COAG   [facility]    [address] [facility] [address]   [phone]            Apr 19, 2018  8:30 AM CDT   Level 2 with ROOM 8 [facility] ([facility])    [facility]  [address]  [place] [address]   [phone]              Who to contact     If you have questions or need follow up information about today's clinic visit or your schedule please contact [facility] directly at [phone].  Normal or non-critical lab and imaging results will be communicated to you by MyChart, letter or phone within 4 business days after the clinic has received the results. If you do not hear from us within 7 days, please contact the clinic through MyChart or phone. If you have a critical or abnormal lab result, we will notify you by phone as soon as possible.  Submit refill requests through [employer] or call your pharmacy and they will forward the refill request to us. Please allow 3 business  days for your refill to be completed.          Additional Information About Your Visit        Appseehart Information     [employer] gives you secure access to your electronic health record. If you see a primary care provider, you can also send messages to your care team and make appointments. If you have questions, please call your primary care clinic.  If you do not have a primary care provider, please call [phone] and they will assist you.        Care EveryWhere ID     This is your Care EveryWhere ID. This could be used by other organizations to access your [place] medical records  [id number]        Your Vitals Were     Last Period                   02/06/2013            Blood Pressure from Last 3 Encounters:   03/30/18 124/67   03/23/18 110/52   03/21/18 106/64    Weight from Last 3 Encounters:   03/30/18 83.6 kg (184 lb 6.4 oz)   03/21/18 84.3 kg (185 lb 12.8 oz)   03/07/18 85.1 kg (187 lb 9.6 oz)              We Performed the Following     Comprehensive metabolic panel          Today's Medication Changes          These changes are accurate as of 3/30/18 11:17 AM.  If you have any questions, ask your nurse or doctor.               These medicines have changed or have updated prescriptions.        Dose/Directions    * warfarin 2.5 MG tablet   Commonly known as:  COUMADIN   This may have changed:  Another medication with the same name was added. Make sure you understand how and when to take each.   Used for:  Long-term (current) use of anticoagulants, Acute deep vein thrombosis (DVT) of right upper extremity, unspecified vein (H)        As directed by Anticoagulation Clinic (maintenance dose not yet established)   Quantity:  30 tablet   Refills:  1       * warfarin 1 MG tablet   Commonly known as:  COUMADIN   This may have changed:  You were already taking a medication with the same name, and this prescription was added. Make sure you understand how and when to take each.   Used for:  Acute deep vein thrombosis  (DVT) of right upper extremity, unspecified vein (H), Long-term (current) use of anticoagulants        Take 1 mg daily or as instructed by anticoagulation clinic   Quantity:  90 tablet   Refills:  0       * Notice:  This list has 2 medication(s) that are the same as other medications prescribed for you. Read the directions carefully, and ask your doctor or other care provider to review them with you.         Where to get your medicines      These medications were sent to [facility] [address] [facility] - [place], [place] - [address]  [address] [address]     Phone:  [phone]     warfarin 1 MG tablet                Primary Care Provider Office Phone # Fax #    Johana Fairbanks [phone] [id number]  [facility] [address]        Equal Access to Services     JACKIE MIX : Hadii aad ku hadasho Soisaura, waaxda luqadaha, qaybta kaalmada adeegyarajesh, hari rodriguez . So [facility] [phone].    ATENCIÓN: Si habla español, tiene a parekh disposición servicios gratuitos de asistencia lingüística. Llame al [phone].    We comply with applicable federal civil rights laws and [place] laws. We do not discriminate on the basis of race, color, national origin, age, disability, sex, sexual orientation, or gender identity.            Thank you!     Thank you for choosing [facility]  for your care. Our goal is always to provide you with excellent care. Hearing back from our patients is one way we can continue to improve our services. Please take a few minutes to complete the written survey that you may receive in the mail after your visit with us. Thank you!             Your Updated Medication List - Protect others around you: Learn how to safely use, store and throw away your medicines at www.disposemymeds.org.          This list is accurate as of 3/30/18 11:17 AM.  Always use your most recent med list.                   Brand Name Dispense  Instructions for use Diagnosis    * albuterol 108 (90 BASE) MCG/ACT Inhaler    VENTOLIN HFA    1 Inhaler    Inhale 2 puffs into the lungs every 4 hours as needed    Moderate persistent asthma, uncomplicated       * albuterol (2.5 MG/3ML) 0.083% neb solution     30 vial    Take 1 vial (2.5 mg) by nebulization every 4 hours as needed for shortness of breath / dyspnea    Moderate persistent asthma, uncomplicated       ALLEGRA ALLERGY 180 MG tablet   Generic drug:  fexofenadine      Take 180 mg by mouth daily as needed for allergies        azelastine 0.1 % spray    ASTELIN    3 Bottle    Spray 1-2 sprays into both nostrils 2 times daily as needed for rhinitis    Chronic rhinitis       Beclomethasone Dipropionate 80 MCG/ACT Aers Nasal Spray     8.7 g    Spray 2 sprays into both nostrils daily    Other chronic rhinitis       budesonide-formoterol 160-4.5 MCG/ACT Inhaler    SYMBICORT    10.2 Inhaler    INHALE 2 PUFFS INTO THE LUNGS 2 TIMES DAILY    Moderate persistent asthma without complication       CRANBERRY PO      Take 1 capsule by mouth daily        diclofenac 1 % Gel topical gel    VOLTAREN    100 g    Place 2 g onto the skin 4 times daily    Carcinoma of breast metastatic to liver, unspecified laterality (H)       diphenhydrAMINE 25 MG tablet    BENADRYL    1 tablet    Take 1 tablet (25 mg) by mouth every 8 hours as needed for itching or allergies    Carcinoma of breast metastatic to liver, unspecified laterality (H), Bone metastasis (H), Pericardial effusion, Bilateral malignant neoplasm of breast in female, estrogen receptor positive, unspecified site of breast (H), Carcinoma of right breast metastatic to axillary lymph node (H), Secondary malignant neoplasm of liver (H), Chemotherapy-induced neutropenia (H), Emphysematous bleb of lung (H), Hypothyroidism, unspecified type, Hyperlipidemia LDL goal <130, Moderate persistent asthma without complication, Mucositis due to chemotherapy       DITROPAN XL 10 MG 24 hr  tablet   Generic drug:  oxybutynin      Take 10 mg by mouth daily    Post-op pain, Carcinoma of right breast metastatic to axillary lymph node (H), Bone metastasis (H), Carcinoma of breast metastatic to liver, unspecified laterality (H), Secondary malignant neoplasm of liver (H)       enoxaparin 100 MG/ML injection    LOVENOX    12 Syringe    Inject 0.9 mLs (90 mg) Subcutaneous every 12 hours    Long-term (current) use of anticoagulants, Acute deep vein thrombosis (DVT) of right upper extremity, unspecified vein (H)       fluticasone 50 MCG/ACT spray    FLONASE    1 Bottle    Spray 1-2 sprays into both nostrils daily    Other chronic rhinitis       HYDROcodone-acetaminophen 5-325 MG per tablet    NORCO    20 tablet    Take 1-2 tablets by mouth every 4 hours as needed for moderate to severe pain    Post-op pain, Carcinoma of right breast metastatic to axillary lymph node (H), Bone metastasis (H), Carcinoma of breast metastatic to liver, unspecified laterality (H), Secondary malignant neoplasm of liver (H)       KP CALCIUM 600+D3 600-500 MG-UNIT Caps   Generic drug:  calcium carb-cholecalciferol      Take 2 tablets by mouth daily        levothyroxine 25 MCG tablet    SYNTHROID/LEVOTHROID    90 tablet    TAKE 1 TABLET (25 MCG) BY MOUTH DAILY    Hypothyroidism due to acquired atrophy of thyroid       Lidocaine 4 % Patch    LIDOCARE    30 patch    Place 1-2 patches onto the skin every 24 hours    Carcinoma of breast metastatic to liver, unspecified laterality (H)       loratadine 10 MG tablet    CLARITIN     Take 10 mg by mouth daily as needed for allergies        LORazepam 0.5 MG tablet    ATIVAN    30 tablet    Take 1 tablet (0.5 mg) by mouth every 4 hours as needed (Anxiety, Nausea/Vomiting or Sleep)    Secondary malignant neoplasm of liver (H), Carcinoma of breast metastatic to liver, unspecified laterality (H), Bone metastasis (H), Carcinoma of right breast metastatic to axillary lymph node (H)       metoclopramide  10 MG tablet    REGLAN    120 tablet    Take 1 tablet (10 mg) by mouth 2 times daily as needed    Bilateral malignant neoplasm of breast in female, estrogen receptor positive, unspecified site of breast (H)       order for DME     1 each    Equipment being ordered: JoviPak post-lumpectomy compression pad x2    Lymphedema, Lymphedema of breast       oxyCODONE IR 5 MG tablet    ROXICODONE    60 tablet    Take 1 tablet (5 mg) by mouth every 4 hours as needed for moderate to severe pain    Secondary malignant neoplasm of liver (H), Carcinoma of breast metastatic to liver, unspecified laterality (H), Carcinoma of right breast metastatic to axillary lymph node (H), Bone metastasis (H), Pericardial effusion       prochlorperazine 10 MG tablet    COMPAZINE    30 tablet    Take 1 tablet (10 mg) by mouth every 6 hours as needed (Nausea/Vomiting)    Secondary malignant neoplasm of liver (H), Carcinoma of breast metastatic to liver, unspecified laterality (H), Bone metastasis (H), Carcinoma of right breast metastatic to axillary lymph node (H), Bilateral malignant neoplasm of breast in female, estrogen receptor positive, unspecified site of breast (H)       QVAR 80 MCG/ACT Inhaler   Generic drug:  beclomethasone     26.1 g    INHALE 1 PUFFS INTO THE EACH NOSTRIL 2 TIMES DAILY    Chronic rhinitis       ROBITUSSIN COUGH/COLD CF PO      Take 10 mLs by mouth as needed    Breast cancer metastasized to axillary lymph node, right (H)       vancomycin 125 MG capsule    VANCOCIN    60 capsule    Take 1 capsule (125 mg) by mouth 4 times daily    Clostridium difficile diarrhea       * warfarin 2.5 MG tablet    COUMADIN    30 tablet    As directed by Anticoagulation Clinic (maintenance dose not yet established)    Long-term (current) use of anticoagulants, Acute deep vein thrombosis (DVT) of right upper extremity, unspecified vein (H)       * warfarin 1 MG tablet    COUMADIN    90 tablet    Take 1 mg daily or as instructed by  anticoagulation clinic    Acute deep vein thrombosis (DVT) of right upper extremity, unspecified vein (H), Long-term (current) use of anticoagulants       zolpidem 12.5 MG CR tablet    AMBIEN CR    30 tablet    Take 1 tablet by mouth at bed time.    Insomnia due to medical condition       * Notice:  This list has 4 medication(s) that are the same as other medications prescribed for you. Read the directions carefully, and ask your doctor or other care provider to review them with you.

## 2018-04-02 NOTE — TELEPHONE ENCOUNTER
Reason for Call:  Other prescription    Detailed comments:  from [facility] LEFT MESSAGE:  Wondering about the symbicort inhaler and 90 day refills?  Needing clarification please.  Call and assess.  Thank you..Sandra Parekh    Phone Number Patient can be reached at: Home number on file [phone] (home)      Call taken on 4/2/2018 at 2:03 PM by Sandra Parekh

## 2018-04-02 NOTE — PROGRESS NOTES
Pathology unable to order testing for BRCA or Foundation one.      Foundation one testing order submitted and requisition forms faxed [phone].      BCRA not ordered at this time.  A call was received stating this is a blood test specifically for genetics and a consent form, etc needs to be completed. It was previously pending in [facility] and has now been cancelled.     Will verify with Dr. Cassidy that he would like to change the BCRA order to blood test as his dictation indicates he wanted this ordered from the [facility] tissue.

## 2018-04-03 NOTE — PROGRESS NOTES
hematology/ Oncology Follow-up Visit:  Mar 30, 2018    Reason for Visit:   Chief Complaint   Patient presents with     Oncology Clinic Visit     Follow up Breast CA. Fatigued.        Oncologic History:  Breast cancer (H)  Etta Cervantes presented with increasing lump in the right axilla that s lump has been growing without any pain or associated symptoms. Subsequently she was evaluated by primary care physician. Diagnostic digital mammogram was done on 01/13/2015 showing enlarged lymph nodes in the right axilla. There was some skin thickening in the right breast anteriorly and laterally. There are no parenchymal changes in the right breast. The left breast shows a 1.7 cm spiculated mass at approximately 2 to 3 o'clock position, 15.2 cm away from the nipple. A right breast ultrasound was subsequently done and ultrasound guided biopsy was done. Needle biopsy of the left breast did show infiltrating ductal carcinoma grade I of III with no angiolymphatic invasion seen. No associated ductal carcinoma in situ. Estrogen receptor, progresterone receptor were positive. HER-2/sadie receptor came back negative.. Another biopsy from the right axilla did show metastatic ductal carcinoma. PET scan was done on January 26, 2015 showing few small right posterior cervical chain nodes the largest is 1.2 cm. There is extensive bulky right axillary adenopathy demonstrating hypermetabolic FDG uptake with SUV of 10.6. There is skin thickening involving the right breast which demonstrated low-grade FDG uptake. A 1.2 cm lesion on the lateral aspect of the left breast demonstrated minimally increased FDG uptake with SUV of 2. Scattered hypermetabolic mediastinal lymph nodes located in the left superior anterior mediastinum, right paratracheal and precarinal U, subcranial adenopathy with javon metastases. This focus hypermetabolic FDG uptake identified definitive Amanda posterior aspect off intertrochanteric region of the proximal left femur  consistent with bone metastases. There is also 1.4 cm hypermetabolic FDG uptake identified in the anterior medial segment of the left hepatic lobe consistent with liver metastases. Additional 2.1 cm low-attenuation lesion anterior aspect of the right lobe which needs to be determined. The patient was started on chemotherapy with Taxotere and Cytoxan on February 9, 2015.  She concluded 4 cycles of Taxotere and Cytoxan. She started on Arimidex 1 mg orally daily. Currently she is on Faslodex and ibrance. Subsequently the patient went on to receive Afinitor. The patient also started treatment with Xeloda.       Interval History:  Patient returning today because of fatigue.  She has been having increasing shortness of breath and increasing fatigue and sleepiness.  Her appetite is poor and she continues to lose weight.  She has had her first cycle of chemotherapy with Doxil last week she tolerated chemotherapy without any nausea or vomiting or diarrhea.  She denies any fever or chills.  She has been also having increasing pain in the right upper quadrant    Review Of Systems:  Constitutional: Negative for fever, chills, and night sweats.  Increasing fatigue weight loss and poor appetite and increasing pain in the right upper quadrant.  Skin: negative.  Eyes: negative.  Ears/Nose/Throat: negative.  Respiratory: No shortness of breath, dyspnea on exertion, cough, or hemoptysis.  Cardiovascular: negative.  Gastrointestinal: Patient denies any nausea or vomiting.  Genitourinary: negative.  Musculoskeletal: negative.  Neurologic: negative.  Psychiatric: negative.  Hematologic/Lymphatic/Immunologic: negative.  Endocrine: negative.    All other ROS negative unless mentioned in interval history.    Past medical, social, surgical, and family histories reviewed.    Allergies:  Allergies as of 03/30/2018 - Manuel as Reviewed 03/30/2018   Allergen Reaction Noted     Animal dander Cough 01/23/2015     Cats  07/15/2010     Dust mites  Cough 01/23/2015     Pollen extract  01/23/2015     Seasonal allergies  07/15/2010     Levaquin [levofloxacin] Rash 07/17/2017       Current Medications:  Current Outpatient Prescriptions   Medication Sig Dispense Refill     budesonide-formoterol (SYMBICORT) 160-4.5 MCG/ACT Inhaler INHALE 2 PUFFS INTO THE LUNGS 2 TIMES DAILY 10.2 Inhaler 0     warfarin (COUMADIN) 1 MG tablet Take 1 mg daily or as instructed by anticoagulation clinic 90 tablet 0     oxybutynin (DITROPAN XL) 10 MG 24 hr tablet Take 10 mg by mouth daily       HYDROcodone-acetaminophen (NORCO) 5-325 MG per tablet Take 1-2 tablets by mouth every 4 hours as needed for moderate to severe pain 20 tablet 0     warfarin (COUMADIN) 2.5 MG tablet As directed by Anticoagulation Clinic (maintenance dose not yet established) 30 tablet 1     vancomycin (VANCOCIN) 125 MG capsule Take 1 capsule (125 mg) by mouth 4 times daily 60 capsule 0     prochlorperazine (COMPAZINE) 10 MG tablet Take 1 tablet (10 mg) by mouth every 6 hours as needed (Nausea/Vomiting) 30 tablet 2     diclofenac (VOLTAREN) 1 % GEL topical gel Place 2 g onto the skin 4 times daily 100 g 0     loratadine (CLARITIN) 10 MG tablet Take 10 mg by mouth daily as needed for allergies       order for DME Equipment being ordered: Venu post-lumpectomy compression pad x2 1 each 0     zolpidem (AMBIEN CR) 12.5 MG CR tablet Take 1 tablet by mouth at bed time. 30 tablet 5     QVAR 80 MCG/ACT Inhaler INHALE 1 PUFFS INTO THE EACH NOSTRIL 2 TIMES DAILY 26.1 g 3     levothyroxine (SYNTHROID/LEVOTHROID) 25 MCG tablet TAKE 1 TABLET (25 MCG) BY MOUTH DAILY 90 tablet 2     calcium carb-cholecalciferol ( CALCIUM 600+D3) 600-500 MG-UNIT CAPS Take 2 tablets by mouth daily       CRANBERRY PO Take 1 capsule by mouth daily        LORazepam (ATIVAN) 0.5 MG tablet Take 1 tablet (0.5 mg) by mouth every 4 hours as needed (Anxiety, Nausea/Vomiting or Sleep) (Patient not taking: Reported on 3/30/2018) 30 tablet 2     enoxaparin  (LOVENOX) 100 MG/ML injection Inject 0.9 mLs (90 mg) Subcutaneous every 12 hours (Patient not taking: Reported on 3/30/2018) 12 Syringe 0     fluticasone (FLONASE) 50 MCG/ACT spray Spray 1-2 sprays into both nostrils daily (Patient not taking: Reported on 3/21/2018) 1 Bottle 11     Beclomethasone Dipropionate 80 MCG/ACT AERS Nasal Spray Spray 2 sprays into both nostrils daily (Patient not taking: Reported on 3/30/2018) 8.7 g 3     oxyCODONE IR (ROXICODONE) 5 MG tablet Take 1 tablet (5 mg) by mouth every 4 hours as needed for moderate to severe pain (Patient not taking: Reported on 3/21/2018) 60 tablet 0     Lidocaine (LIDOCARE) 4 % Patch Place 1-2 patches onto the skin every 24 hours (Patient not taking: Reported on 1/24/2018) 30 patch 0     diphenhydrAMINE (BENADRYL) 25 MG tablet Take 1 tablet (25 mg) by mouth every 8 hours as needed for itching or allergies (Patient not taking: Reported on 2/6/2018) 1 tablet 0     Phenylephrine-DM-GG (ROBITUSSIN COUGH/COLD CF PO) Take 10 mLs by mouth as needed        metoclopramide (REGLAN) 10 MG tablet Take 1 tablet (10 mg) by mouth 2 times daily as needed (Patient not taking: Reported on 3/30/2018) 120 tablet 1     fexofenadine (ALLEGRA ALLERGY) 180 MG tablet Take 180 mg by mouth daily as needed for allergies       albuterol (2.5 MG/3ML) 0.083% neb solution Take 1 vial (2.5 mg) by nebulization every 4 hours as needed for shortness of breath / dyspnea (Patient not taking: Reported on 3/21/2018) 30 vial 3     azelastine (ASTELIN) 0.1 % nasal spray Spray 1-2 sprays into both nostrils 2 times daily as needed for rhinitis (Patient not taking: Reported on 3/30/2018) 3 Bottle 3     albuterol (VENTOLIN HFA) 108 (90 BASE) MCG/ACT inhaler Inhale 2 puffs into the lungs every 4 hours as needed (Patient not taking: Reported on 3/21/2018) 1 Inhaler 2        Physical Exam:  /67 (BP Location: Left arm, Patient Position: Sitting, Cuff Size: Adult Regular)  Pulse 117  Temp 98.7  F (37.1  " C) (Tympanic)  Resp 16  Ht 1.632 m (5' 4.25\")  Wt 83.6 kg (184 lb 6.4 oz)  LMP 02/06/2013  SpO2 96%  Breastfeeding? No  BMI 31.41 kg/m2  Wt Readings from Last 12 Encounters:   03/30/18 83.6 kg (184 lb 6.4 oz)   03/21/18 84.3 kg (185 lb 12.8 oz)   03/07/18 85.1 kg (187 lb 9.6 oz)   03/01/18 83.5 kg (184 lb)   02/21/18 83.9 kg (185 lb)   02/07/18 83.5 kg (184 lb)   02/06/18 83.7 kg (184 lb 8 oz)   01/24/18 83.2 kg (183 lb 6.4 oz)   01/17/18 85.4 kg (188 lb 3.2 oz)   01/10/18 83.9 kg (185 lb)   01/05/18 83.9 kg (185 lb)   01/03/18 86.5 kg (190 lb 9.6 oz)     ECOG performance status: 1  GENERAL APPEARANCE: Healthy, alert and in no acute distress.  HEENT: Sclerae anicteric. PERRLA. Oropharynx without ulcers, lesions, or thrush.  NECK: Supple. No asymmetry or masses.  LYMPHATICS: No palpable cervical, supraclavicular, axillary, or inguinal lymphadenopathy.  RESP: Lungs clear to auscultation bilaterally without rales, rhonchi or wheezes.  BREAST: Not examined today \" \"CARDIOVASCULAR: Regular rate and rhythm. Normal S1, S2; no S3 or S4. No murmur, gallop, or rub.  ABDOMEN: Soft, nontender. Bowel sounds normal. No palpable organomegaly or masses.  MUSCULOSKELETAL: Extremities without gross deformities noted. No edema of bilateral lower extremities.  SKIN: No suspicious lesions or rashes.  NEURO: Alert and oriented x 3. Cranial nerves II-XII grossly intact.  PSYCHIATRIC: Mentation and affect appear normal.    Laboratory/Imaging Studies:  Infusion Therapy Visit on 03/21/2018   Component Date Value Ref Range Status     INR 03/21/2018 4.39* 0.86 - 1.14 Final    CALLED TO LAUREN DELACRUZ RP 3/21/18 1017     WBC 03/21/2018 7.0  4.0 - 11.0 10e9/L Final     RBC Count 03/21/2018 4.18  3.8 - 5.2 10e12/L Final     Hemoglobin 03/21/2018 11.8  11.7 - 15.7 g/dL Final     Hematocrit 03/21/2018 36.6  35.0 - 47.0 % Final     MCV 03/21/2018 88  78 - 100 fl Final     MCH 03/21/2018 28.2  26.5 - 33.0 pg Final     MCHC 03/21/2018 32.2  " 31.5 - 36.5 g/dL Final     RDW 03/21/2018 16.2* 10.0 - 15.0 % Final     Platelet Count 03/21/2018 301  150 - 450 10e9/L Final     Diff Method 03/21/2018 Automated Method   Final     % Neutrophils 03/21/2018 66.7  % Final     % Lymphocytes 03/21/2018 15.3  % Final     % Monocytes 03/21/2018 14.6  % Final     % Eosinophils 03/21/2018 1.9  % Final     % Basophils 03/21/2018 0.4  % Final     % Immature Granulocytes 03/21/2018 1.1  % Final     Absolute Neutrophil 03/21/2018 4.7  1.6 - 8.3 10e9/L Final     Absolute Lymphocytes 03/21/2018 1.1  0.8 - 5.3 10e9/L Final     Absolute Monocytes 03/21/2018 1.0  0.0 - 1.3 10e9/L Final     Absolute Eosinophils 03/21/2018 0.1  0.0 - 0.7 10e9/L Final     Absolute Basophils 03/21/2018 0.0  0.0 - 0.2 10e9/L Final     Abs Immature Granulocytes 03/21/2018 0.1  0 - 0.4 10e9/L Final     Sodium 03/21/2018 139  133 - 144 mmol/L Final     Potassium 03/21/2018 4.1  3.4 - 5.3 mmol/L Final     Chloride 03/21/2018 108  94 - 109 mmol/L Final     Carbon Dioxide 03/21/2018 26  20 - 32 mmol/L Final     Anion Gap 03/21/2018 5  3 - 14 mmol/L Final     Glucose 03/21/2018 88  70 - 99 mg/dL Final     Urea Nitrogen 03/21/2018 9  7 - 30 mg/dL Final     Creatinine 03/21/2018 0.73  0.52 - 1.04 mg/dL Final     GFR Estimate 03/21/2018 81  >60 mL/min/1.7m2 Final    Non  GFR Calc     GFR Estimate If Black 03/21/2018 >90  >60 mL/min/1.7m2 Final    African American GFR Calc     Calcium 03/21/2018 8.3* 8.5 - 10.1 mg/dL Final     Bilirubin Total 03/21/2018 0.9  0.2 - 1.3 mg/dL Final     Albumin 03/21/2018 3.0* 3.4 - 5.0 g/dL Final     Protein Total 03/21/2018 6.9  6.8 - 8.8 g/dL Final     Alkaline Phosphatase 03/21/2018 135  40 - 150 U/L Final     ALT 03/21/2018 27  0 - 50 U/L Final     AST 03/21/2018 66* 0 - 45 U/L Final     CEA 03/21/2018 28.0* 0 - 2.5 ug/L Final    Comment: Smoking may cause CEA results to be elevated.  Assay Method:  Chemiluminescence using Siemens Centaur XP       CA 27-29  03/21/2018 240* 0 - 39 U/mL Final    Assay Method:  Chemiluminescence using Siemens Centaur XP        Recent Results (from the past 744 hour(s))   CT Chest/Abdomen/Pelvis w Contrast    Narrative    CT CHEST/ABDOMEN/PELVIS W CONTRAST 3/19/2018 9:56 AM    HISTORY: Breast cancer. Follow-up.    CONTRAST:  92 mL Isovue 370.    TECHNIQUE: CT of the chest, abdomen, and pelvis is performed with IV  contrast.    Routine evaluated structures in the chest include the lungs,  mediastinal structures, pleura, and chest wall.    Routine assessed structures in the abdomen include the liver, spleen,  pancreas, adrenal glands, and kidneys. Also assessed are the  retroperitoneum, gastrointestinal tract, and the abdominal wall.    Intrapelvic anatomy is also assessed.    Radiation dose for this scan is reduced using automated exposure  control, adjustment of the mA and/or kV according to patient size, or  iterative reconstruction technique.    COMPARISON: [facility] 2017.    FINDINGS:    Chest: Bandlike parenchymal abnormality in the right hilar region is  stable. Bilateral pleural effusions are larger. To the contrary the  previously seen pericardial effusion is much smaller with only a small  amount of a pericardial fluid remaining. There is some nodular  enhancement of the pericardium seen on today's study and it most  likely relates to neoplastic involvement. Inflammatory etiology is  also in the differential. Right axillary adenopathy has worsened. The  dominant area measures 4.4 cm, compared to 3.7 cm on the prior study.  Another lymph node measures 1.3 cm compared to 1 cm on the prior  study. Other small areas of nodular enhancement at the margin of the  right breast and right axilla are new and consistent with additional  disease. A multinodular thyroid gland is again demonstrated. There is  a focal area of blastic change involving the right aspect of the C6  vertebral body on image #1. Images didn't extend quite this high  before.  New or more obvious blastic foci are seen on today's study in  the region of T3 and T8. There also is a new blastic focus involving a  right transverse process in the lower thoracic spine on image #43. A  blastic focus involving the body of the sternum on image #17 is more  evident. Blastic foci involving a posterior right rib arc on image #17  is more stable.    Abdomen: There is much more extensive neoplastic disease in the liver  now compared to the prior study. Large confluent areas of neoplastic  disease are seen in both the right and left lobes. There has been  interval placement of a biliary stent with resolution of the  previously seen biliary dilatation. New/more prominent nodules are  seen in the omentum, consistent with peritoneal carcinomatosis. There  are new nodules involving the anterior abdominal wall subcutaneous  fat. They could relate to injection granulomas from subcutaneous  injections, with neoplastic disease considered less likely. New  blastic foci are seen involving the L3 and L4 vertebral bodies.    Pelvis: There is moderately prominent mucosal enhancement of the  gallbladder. There is a small amount of free pelvic fluid, less  compared to the prior study. New blastic foci are seen involving the  midline and right paramidline sacrum. There is a new blastic focus  involving the right symphysis pubis on image #112.      Impression    IMPRESSION:  1.  There is worsening neoplastic disease in the right axilla/lateral  right breast margin, and abdomen. Osseous metastatic disease is also  worse.  2. New enhancing nodularity of the pericardium is of concern for  metastatic involvement.  3. Bilateral pleural effusions are larger.  4. There is a mucosal enhancement involving the bladder. This could  relate to cystitis in the appropriate clinical setting.    LETI CHASE MD   US Biopsy Lymph Node Core    Narrative    HISTORY: Widespread metastatic breast cancer. Needs additional tissue  for extra  labs.    COMPARISON: CT dated 3/19/2018.    ULTRASOUND-GUIDED NEEDLE CORE BIOPSY OF RIGHT AXILLARY LYMPH NODE  CONGLOMERATE: Risks and benefits of the procedure are discussed with  the patient. Sonographic guidance and 8 mL of 1% lidocaine (local  anesthetic) are utilized. Six 14-gauge core biopsy samples are  obtained. The patient tolerated the procedure well.  There is no  significant blood loss.      Impression    IMPRESSION: Technically uneventful ultrasound-guided needle core  biopsy right axillary lymph node conglomerate. Pathologic results are  pending.    LETI CHASE MD       Assessment and plan:  (C50.911,  C77.3) Carcinoma of right breast metastatic to axillary lymph node (H)  (primary encounter diagnosis)  (C79.51) Bone metastasis (H)  (C50.919,  C78.7) Carcinoma of breast metastatic to liver, unspecified laterality (H)  (C50.911,  Z17.0,  C50.912) Bilateral malignant neoplasm of breast in female, estrogen receptor positive, unspecified site of breast (H)  I reviewed with patient today the management plan.  I reviewed with her the results of recent axillary lymph node biopsy.  Samples will send to [place] [id number].  Patient just had her first cycle of Doxil last week.  We will continue to monitor the patient's symptoms and see if she will respond to Doxil.    (E03.9) Hypothyroidism, unspecified type  Patient will continue on Synthroid 25 mcg daily.    (G89.3) Cancer associated pain  Patient will continue on the current pain medication.  I would recommend patient to start on MS Contin 15 mg twice daily.      (D70.1,  T45.1X5A) Chemotherapy-induced neutropenia (H)  Absolute neutrophil count today is normal.    (I82.621) Acute deep vein thrombosis (DVT) of right upper extremity, unspecified vein (H)  Patient will continue on anticoagulation was warfarin.      The patient is ready to learn, no apparent learning barriers were identified.  Diagnosis and treatment plans were explained to the patient. The patient  expressed understanding of the content. The patient asked appropriate questions. The patient questions were answered to her satisfaction.    Chart documentation with Dragon Voice recognition Software. Although reviewed after completion, some words and grammatical errors may remain.

## 2018-04-04 NOTE — MR AVS SNAPSHOT
Etta Cervantes   4/4/2018 1:15 PM   Anticoagulation Therapy Visit    Description:  57 year old female   Provider:  [facility]   Department:  [facility]           INR as of 4/4/2018     Today's INR >8!      Anticoagulation Summary as of 4/4/2018     INR goal 2.0-3.0   Today's INR >8!   Full instructions 4/4: Hold   Next INR check 4/13/2018    Indications   Acute deep vein thrombosis (DVT) of right upper extremity  unspecified vein (H) [I82.621]  Long-term (current) use of anticoagulants [Z79.01] [Z79.01]         Your next Anticoagulation Clinic appointment(s)     Apr 13, 2018  2:15 PM CDT   [facility] with [facility]   [facility]    [address] [id number] [phone]              Contact Numbers     Please call [phone] with any problems or questions regarding your therapy.    If you need to cancel and/or reschedule your appointment please call one of the following numbers:  [facility] [phone]  [facility] [phone]  [place] - [phone]  [facility] [phone]  [place] - [phone]            April 2018 Details    Sun Mon Tue Wed Thu Fri Sat     1               2               3               4      Hold   See details      5               6               7                 8               9               10               11               12               13            14                 15               16               17               18               19               20               21                 22               23               24               25               26               27               28                 29               30                     Date Details   04/04 This INR check       Date of next INR:  4/13/2018         How to take your warfarin dose     Hold Do not take your warfarin dose. See the Details table to the right for additional instructions.

## 2018-04-04 NOTE — PROGRESS NOTES
ANTICOAGULATION FOLLOW-UP CLINIC VISIT    Patient Name:  Etta Cervantes  Date:  4/4/2018  Contact Type:  Face to Face    SUBJECTIVE:     Patient Findings     Positives Change in medications (Doxil, Xgeva -- chemo infusions), Change in diet/appetite (Low albumin level / lack of appetite), Inflammation (cancer diagnosis)    Comments 3/23 - Doxil, Xgeva infusions, patient reports being very nauseous/fatigued from these. Continues on vanco taper for c-diff (no current diarrhea issues noted).     Last albumin level drawn on 3/30/18 was 2.7    No issues with bleeding noted. Patient is aware she is at a higher risk of bleeding not knowing what her INR actually is. Writer sent patient to the lab for venipuncture, however patient's INR will not result until late tonight. [facility] will contact patient tomorrow with further dosing instructions. Patient will not take her warfarin tonight. Patient will be leaving for [facility] for a family wedding. Writer ordered an INR and printed the order so she can have it drawn at a local lab. Depending on the result will depend on what day it will be needed for patient to have it drawn. It is okay to leave a voicemail.            OBJECTIVE    INR Protime   Date Value Ref Range Status   04/04/2018 >8 0.86 - 1.14 Final       ASSESSMENT / PLAN  INR assessment SUPRA    Recheck INR In:  depends on lab INR result   INR Location Clinic      Anticoagulation Summary as of 4/4/2018     INR goal 2.0-3.0   Today's INR >8!   Maintenance plan No maintenance plan   Full instructions 4/4: Hold   Weekly total 8.75 mg   Plan last modified Juliette Schmidt RN (3/30/2018)   Next INR check 4/5/2018   Priority INR   Target end date     Indications   Acute deep vein thrombosis (DVT) of right upper extremity  unspecified vein (H) [I82.621]  Long-term (current) use of anticoagulants [Z79.01] [Z79.01]         Anticoagulation Episode Summary     INR check location     Preferred lab     Send INR reminders to [facility] PHONE  ANTICO[facility] POOL    Comments * monthly Doxil  infusions for metastatic breast ca starting 3/23/18  (may result in increased risk for elevated INR and subsequent bleeding) . Pt prefers [facility]. Vanco taper for C-diff, OK to send instructions via [employer]      Anticoagulation Care Providers     Provider Role Specialty Phone number    Brodie Cassidy MD Referring Hematology & Oncology [phone]    Johana Fairbanks MD [facility] [phone]            See the Encounter Report to view Anticoagulation Flowsheet and Dosing Calendar (Go to Encounters tab in chart review, and find the Anticoagulation Therapy Visit)        Kimberly Woods RN

## 2018-04-05 NOTE — TELEPHONE ENCOUNTER
Spoke with Marianela in lab. Patient's INR this morning was 6.01. Routed results to Dr. Fairbanks.  Consuelo Payton RN

## 2018-04-05 NOTE — MR AVS SNAPSHOT
Etta Cervantes   4/5/2018   Anticoagulation Therapy Visit    Description:  57 year old female   Provider:  Kimberly Woods, RN   Department:  [facility]           INR as of 4/5/2018     Today's INR 6.01! (4/4/2018)      Anticoagulation Summary as of 4/5/2018     INR goal 2.0-3.0   Today's INR 6.01! (4/4/2018)   Full instructions 4/5: Hold; 4/6: 0.5 mg; 4/7: 1 mg; 4/8: 1 mg   Next INR check 4/6/2018    Indications   Acute deep vein thrombosis (DVT) of right upper extremity  unspecified vein (H) [I82.621]  Long-term (current) use of anticoagulants [Z79.01] [Z79.01]         Your next Anticoagulation Clinic appointment(s)     Apr 13, 2018  2:15 PM CDT   Anticoagulation Visit with [place] [facility]   [facility]    [address] [address]   [phone]              April 2018 Details    Sun Mon Tue Wed Thu Fri Sat     1               2               3               4               5      Hold   See details      6            7                 8               9               10               11               12               13               14                 15               16               17               18               19               20               21                 22               23               24               25               26               27               28                 29               30                     Date Details   04/05 This INR check       Date of next INR:  4/6/2018         How to take your warfarin dose     To take:  0.5 mg Take 0.5 of a 1 mg tablet.    Hold Do not take your warfarin dose. See the Details table to the right for additional instructions.

## 2018-04-05 NOTE — PROGRESS NOTES
ADDENDUM: Writer has not yet heard back from the lab. No fax was received. Writer confirmed with patient it was a lab draw, not a finger poke. Will await for fax tomorrow. If we do not receive the result we may need to find a way to contact the lab in [facility].    Kimberly Woods RN on 4/9/2018 at 5:06 PM      Patient did call [facility] back, she will go into [facility] today to have her INR drawn. Writer faxed an INR order to the lab. Will await results.     Kimberly Woods RN on 4/9/2018 at 1:39 PM    -----------------------------------------------------------------------    ADDENDUM: Writer called patient back to follow up as it appears she did not have her INR drawn on Friday. She was currently busy and will call [facility] back when she has a moment to talk.  Kimberly Woods RN on 4/9/2018 at 11:03 AM    ANTICOAGULATION FOLLOW-UP CLINIC VISIT    Patient Name:  Etta Cervantes  Date:  4/5/2018  Contact Type:  Telephone    SUBJECTIVE:     Patient Findings     Positives Change in medications (doxil/xgeva), Inflammation    Comments Writer called and spoke with Vickie Ray [facility] Anticoagulation Clinical Pharmacist to receive recommendations on warfarin dosing/plan for the next few days for patient. She recently started doxil/xgeva for her chemo treatment. Patient's most recent albumin level was 2.7 on 3/30. Will encourage patient to eat foods that are higher in protein. Patient's inflammation level is likely increased due to the chemo treatment / cancer diagnosis.     Writer asked patient to have her INR drawn on Friday, however this is complicated by the fact patient is travelling to [place] at this time. She should be in [place] on Friday and it may be possible but she is not sure at this time. Patient will call her insurance company to find a location and let [facility] know when she finds out a plan. At the very latest patient should have her INR drawn on Monday at a local [place] lab. Writer sent patient with a local  print INR order she can present to a lab. Writer placed warfarin dosing through Monday as recommended by Chata Johnston in case she is unable to have the INR completed on Friday.     Patient denies any issues with bleeding or unexplained bruising. She is aware she is at a higher bleeding risk and will take extra caution to avoid injury. If she has any problems she should seek medical attention immediately since her INR is above goal range.     INRs have been increasingly difficult to manage, writer will route chart to Dr. Cassidy for review.            OBJECTIVE    INR   Date Value Ref Range Status   04/04/2018 6.01 (HH) 0.86 - 1.14 Final     Comment:     Documentation to follow  Critical Value called to and read back by  JAHAIRA CORBETT NURSE [facility]. 4/5/18 AT 0808 AS         ASSESSMENT / PLAN  No question data found.  Anticoagulation Summary as of 4/5/2018     INR goal 2.0-3.0   Today's INR 6.01! (4/4/2018)   Maintenance plan No maintenance plan   Full instructions 4/5: Hold; 4/6: 0.5 mg; 4/7: 1 mg; 4/8: 1 mg   Weekly total 8.75 mg   Plan last modified Juliette Schmidt, RN (3/30/2018)   Next INR check 4/6/2018   Priority INR   Target end date     Indications   Acute deep vein thrombosis (DVT) of right upper extremity  unspecified vein (H) [I82.621]  Long-term (current) use of anticoagulants [Z79.01] [Z79.01]         Anticoagulation Episode Summary     INR check location     Preferred lab     Send INR reminders to [place] PH[facility] POOL    Comments * monthly Doxil  infusions for metastatic breast ca starting 3/23/18  (may result in increased risk for elevated INR and subsequent bleeding) . Pt prefers [facility]. Vanco taper for C-diff, OK to send instructions via [employer] video      Anticoagulation Care Providers     Provider Role Specialty Phone number    Brodie Cassidy MD Referring Hematology & Oncology [phone]    Johana Fairbanks MD Responsible Family Practice [phone]            See the Encounter  Report to view Anticoagulation Flowsheet and Dosing Calendar (Go to Encounters tab in chart review, and find the Anticoagulation Therapy Visit)        Kimberly Woods RN

## 2018-04-06 NOTE — PROGRESS NOTES
This send out test has been cancelled per Dr. Cassidy as it is included in the [employer] one testing.

## 2018-04-09 NOTE — PROGRESS NOTES
[employer] testing is in process.  Report will be available 04.17.18 and will be faxed to our office.

## 2018-04-10 NOTE — PROGRESS NOTES
"    ANTICOAGULATION FOLLOW-UP CLINIC VISIT    Patient Name:  Etta Cervantes  Date:  4/10/2018  Contact Type:  Telephone/ spoke with Tree Hernandez at [facility] [phone], detailed vm left for Etta    SUBJECTIVE:     Patient Findings     Comments FAX came from [employer] in [place], with note stating \"the specimen volume was insufficient to obtain proper plasma/anticoag ratio for correct results\". Writer left vm for pt instructing her to return and lab redrawn.  Later in the day, another FAX was received stating \"Pt unable to void at time of collection for urine culture\". Writer called [facility] and intially they could not find pt in their records, several minutes later, person on phone stated she only had record of the first draw and acknowledged the 2nd FAX was an error.    Later a vm was received from David at [facility], stating he shelly a 2nd INR and needed a requisition. Writer was able to reach him at [phone]. David stated he was unsure what happened with pt's lab draw as he shelly a full tube and sent it to [place] for processing.  Writer FAXED a \"snow-bird\" letter to David at [phone] so he had an order.  Per David, results should be available tomorrow and [facility] should receive a FAX.     Writer left detailed vm for pt instructing her regarding dosing for today and to expect call Wed 4/11 with INR results and further dosing.           OBJECTIVE    INR   Date Value Ref Range Status   04/04/2018 6.01 (HH) 0.86 - 1.14 Final     Comment:     Documentation to follow  Critical Value called to and read back by  JAHAIRA CORBETT NURSE [facility]. 4/5/18 AT 0808 AS         ASSESSMENT / PLAN  No question data found.  Anticoagulation Summary as of 4/10/2018     INR goal 2.0-3.0   Today's INR    Maintenance plan No maintenance plan   Full instructions 4/10: 1 mg   Weekly total 8.75 mg   Plan last modified Juliette Schmidt, RN (3/30/2018)   Next INR check 4/11/2018   Priority INR   Target end date     Indications   Acute deep " vein thrombosis (DVT) of right upper extremity  unspecified vein (H) [I82.621]  Long-term (current) use of anticoagulants [Z79.01] [Z79.01]         Anticoagulation Episode Summary     INR check location     Preferred lab     Send INR reminders to [place] [facility] POOL    Comments * monthly Doxil  infusions for metastatic breast ca starting 3/23/18  (may result in increased risk for elevated INR and subsequent bleeding) . Pt prefers [facility]. Vanco taper for C-diff, OK to send instructions via [employer]      Anticoagulation Care Providers     Provider Role Specialty Phone number    Brodie Cassidy MD Referring Hematology & Oncology [phone]    Johana Fairbanks MD [facility] [phone]            See the Encounter Report to view Anticoagulation Flowsheet and Dosing Calendar (Go to Encounters tab in chart review, and find the Anticoagulation Therapy Visit)        Joselin Boothe RN

## 2018-04-11 NOTE — PROGRESS NOTES
ANTICOAGULATION FOLLOW-UP CLINIC VISIT    Patient Name:  Etta Cervantes  Date:  4/11/2018  Contact Type:  Telephone    SUBJECTIVE:     Patient Findings     Positives Unexplained INR or factor level change    Comments Writer spoke with patient about her warfarin dosing and found out she actually missed her dose on Monday. She has only had 1mg in the past 7 days and her INR still remains at 6.0. Patient denies any acute illness, including diarrhea or fever. She likely has inflammation from her cancer and chemo treatment (Doxil, Xgeva). Last albumin level 2.7 on 3/30/18. Patient denies any issues with bleeding, she is aware to use extra caution and seek medical attention if she should have any concerns.     Writer called and spoke with RIANA Archuleta with Dr. Cassidy to give their care team an update. Writer will also route patient's chart for review. Patient will be seeing Dr. Cassidy on Friday, 4/13/18.                OBJECTIVE    INR   Date Value Ref Range Status   04/10/2018 6.0  Final       ASSESSMENT / PLAN  No question data found.  Anticoagulation Summary as of 4/11/2018     INR goal 2.0-3.0   Today's INR 6.0! (4/10/2018)   Maintenance plan No maintenance plan   Full instructions 4/11: Hold; 4/12: Hold   Weekly total 8.75 mg   Plan last modified Juliette Schmidt RN (3/30/2018)   Next INR check 4/13/2018   Priority INR   Target end date     Indications   Acute deep vein thrombosis (DVT) of right upper extremity  unspecified vein (H) [I82.621]  Long-term (current) use of anticoagulants [Z79.01] [Z79.01]         Anticoagulation Episode Summary     INR check location     Preferred lab     Send INR reminders to [place] [facility] POOL    Comments * monthly Doxil  infusions for metastatic breast ca starting 3/23/18  (may result in increased risk for elevated INR and subsequent bleeding) . Pt prefers [facility]. Vanco taper for C-diff, OK to send instructions via [facility]      Anticoagulation Care Providers      Provider Role Specialty Phone number    Brodie Cassidy MD Referring Hematology & Oncology [phone]    Johana Fairbanks MD Responsible Family Practice [phone]            See the Encounter Report to view Anticoagulation Flowsheet and Dosing Calendar (Go to Encounters tab in chart review, and find the Anticoagulation Therapy Visit)        Kimberly Woods RN

## 2018-04-11 NOTE — MR AVS SNAPSHOT
Etta Cervantes   4/11/2018   Anticoagulation Therapy Visit    Description:  57 year old female   Provider:  Cecilia Finn, RN   Department:  [facility]           INR as of 4/11/2018     Today's INR 6.0! (4/10/2018)      Anticoagulation Summary as of 4/11/2018     INR goal 2.0-3.0   Today's INR 6.0! (4/10/2018)   Full instructions 4/11: Hold; 4/12: Hold   Next INR check 4/13/2018    Indications   Acute deep vein thrombosis (DVT) of right upper extremity  unspecified vein (H) [I82.621]  Long-term (current) use of anticoagulants [Z79.01] [Z79.01]         Description     Take extra caution not to injure yourself as your INR is above the goal range. If you have any concerns/troubles with bleeding please seek medical attention. Have an extra serving today of foods rich in vitamin K (dark leafy green vegetables) to help lower your INR.         Your next Anticoagulation Clinic appointment(s)     Apr 13, 2018  2:15 PM CDT   Anticoagulation Visit with [place] [facility]   [facility]    [address] [address]   [phone]              April 2018 Details    Sun Mon Tue Wed Thu Fri Sat     1               2               3               4               5               6               7                 8               9               10               11      Hold   See details      12      Hold         13            14                 15               16               17               18               19               20               21                 22               23               24               25               26               27               28                 29               30                     Date Details   04/11 This INR check       Date of next INR:  4/13/2018         How to take your warfarin dose     Hold Do not take your warfarin dose. See the Details table to the right for additional instructions.

## 2018-04-13 NOTE — PROGRESS NOTES
ANTICOAGULATION FOLLOW-UP CLINIC VISIT    Patient Name:  Etta Cervantes  Date:  4/13/2018  Contact Type:  Chart Review    SUBJECTIVE:     Patient Findings     Positives Change in medications    Comments Discontinue warfarin per Dr. Cassidy.           OBJECTIVE    INR   Date Value Ref Range Status   04/10/2018 6.0  Final       ASSESSMENT / PLAN  No question data found.  Anticoagulation Summary as of 4/13/2018     INR goal 2.0-3.0   Today's INR No new INR was available at the time of this encounter.   Maintenance plan No maintenance plan   Full instructions No maintenance plan   Weekly total 8.75 mg   Plan last modified Juliette Schmidt RN (3/30/2018)   Next INR check    Priority INR   Target end date     Indications   Acute deep vein thrombosis (DVT) of right upper extremity  unspecified vein (H) [I82.621]  Long-term (current) use of anticoagulants [Z79.01] [Z79.01]         Anticoagulation Episode Summary     INR check location     Preferred lab     Send INR reminders to [place] [facility] POOL    Comments * monthly Doxil  infusions for metastatic breast ca starting 3/23/18  (may result in increased risk for elevated INR and subsequent bleeding) . Pt prefers [facility]. Vanco taper for C-diff, OK to send instructions via [employer]      Anticoagulation Care Providers     Provider Role Specialty Phone number    Brodie Cassidy MD Referring Hematology & Oncology [phone]    Johana Fairbanks MD [facility] [phone]            See the Encounter Report to view Anticoagulation Flowsheet and Dosing Calendar (Go to Encounters tab in chart review, and find the Anticoagulation Therapy Visit)        Kusum Lofton RN

## 2018-04-13 NOTE — LETTER
4/13/2018         RE: Etta Cervantes  [address]  [place] [address]        Dear Colleague,    Thank you for referring your patient, Etta Cervantes, to the [facility]. Please see a copy of my visit note below.    In basket message sent to Anticoagulation pool with Dr. Cassidy's order to stop coumadin.    Hematology/ Oncology Follow-up Visit:  Apr 13, 2018    Reason for Visit:   Chief Complaint   Patient presents with     Oncology Clinic Visit     Follow up breast CA. Review lab and biopsy results.        Oncologic History:    Breast cancer (H)  Etta Cervantes presented with increasing lump in the right axilla that s lump has been growing without any pain or associated symptoms. Subsequently she was evaluated by primary care physician. Diagnostic digital mammogram was done on 01/13/2015 showing enlarged lymph nodes in the right axilla. There was some skin thickening in the right breast anteriorly and laterally. There are no parenchymal changes in the right breast. The left breast shows a 1.7 cm spiculated mass at approximately 2 to 3 o'clock position, 15.2 cm away from the nipple. A right breast ultrasound was subsequently done and ultrasound guided biopsy was done. Needle biopsy of the left breast did show infiltrating ductal carcinoma grade I of III with no angiolymphatic invasion seen. No associated ductal carcinoma in situ. Estrogen receptor, progresterone receptor were positive. HER-2/sadie receptor came back negative.. Another biopsy from the right axilla did show metastatic ductal carcinoma. PET scan was done on January 26, 2015 showing few small right posterior cervical chain nodes the largest is 1.2 cm. There is extensive bulky right axillary adenopathy demonstrating hypermetabolic FDG uptake with SUV of 10.6. There is skin thickening involving the right breast which demonstrated low-grade FDG uptake. A 1.2 cm lesion on the lateral aspect of the left breast demonstrated minimally  increased FDG uptake with SUV of 2. Scattered hypermetabolic mediastinal lymph nodes located in the left superior anterior mediastinum, right paratracheal and precarinal U, subcranial adenopathy with javon metastases. This focus hypermetabolic FDG uptake identified definitive Amanda posterior aspect off intertrochanteric region of the proximal left femur consistent with bone metastases. There is also 1.4 cm hypermetabolic FDG uptake identified in the anterior medial segment of the left hepatic lobe consistent with liver metastases. Additional 2.1 cm low-attenuation lesion anterior aspect of the right lobe which needs to be determined. The patient was started on chemotherapy with Taxotere and Cytoxan on February 9, 2015.  She concluded 4 cycles of Taxotere and Cytoxan. She started on Arimidex 1 mg orally daily. Currently she is on Faslodex and ibrance. Subsequently the patient went on to receive Afinitor. The patient also started treatment with Xeloda.     Interval History:  Patient returns a day because of increasing fatigue, nausea, weight loss and sleepiness.  Patient pain is currently well controlled.  Denies any shortness of breath or cough or wheezing.    Review Of Systems:  Constitutional: Negative for fever, chills, and night sweats.  Skin: negative.  Eyes: negative.  Ears/Nose/Throat: negative.  Respiratory: No shortness of breath, dyspnea on exertion, cough, or hemoptysis.  Cardiovascular: negative.  Gastrointestinal: negative.  Genitourinary: negative.  Musculoskeletal: negative.  Neurologic: negative.  Psychiatric: negative.  Hematologic/Lymphatic/Immunologic: negative.  Endocrine: negative.    All other ROS negative unless mentioned in interval history.    Past medical, social, surgical, and family histories reviewed.    Allergies:  Allergies as of 04/13/2018 - Manuel as Reviewed 04/13/2018   Allergen Reaction Noted     Animal dander Cough 01/23/2015     Cats  07/15/2010     Dust mites Cough 01/23/2015      Pollen extract  01/23/2015     Seasonal allergies  07/15/2010     Levaquin [levofloxacin] Rash 07/17/2017       Current Medications:  Current Outpatient Prescriptions   Medication Sig Dispense Refill     budesonide-formoterol (SYMBICORT) 160-4.5 MCG/ACT Inhaler INHALE 2 PUFFS INTO THE LUNGS 2 TIMES DAILY 10.2 Inhaler 0     oxybutynin (DITROPAN XL) 10 MG 24 hr tablet Take 10 mg by mouth daily       HYDROcodone-acetaminophen (NORCO) 5-325 MG per tablet Take 1-2 tablets by mouth every 4 hours as needed for moderate to severe pain 20 tablet 0     vancomycin (VANCOCIN) 125 MG capsule Take 1 capsule (125 mg) by mouth 4 times daily 60 capsule 0     oxyCODONE IR (ROXICODONE) 5 MG tablet Take 1 tablet (5 mg) by mouth every 4 hours as needed for moderate to severe pain 60 tablet 0     prochlorperazine (COMPAZINE) 10 MG tablet Take 1 tablet (10 mg) by mouth every 6 hours as needed (Nausea/Vomiting) 30 tablet 2     diclofenac (VOLTAREN) 1 % GEL topical gel Place 2 g onto the skin 4 times daily 100 g 0     loratadine (CLARITIN) 10 MG tablet Take 10 mg by mouth daily as needed for allergies       order for DME Equipment being ordered: AnyiviKiran post-lumpectomy compression pad x2 1 each 0     zolpidem (AMBIEN CR) 12.5 MG CR tablet Take 1 tablet by mouth at bed time. 30 tablet 5     diphenhydrAMINE (BENADRYL) 25 MG tablet Take 1 tablet (25 mg) by mouth every 8 hours as needed for itching or allergies 1 tablet 0     QVAR 80 MCG/ACT Inhaler INHALE 1 PUFFS INTO THE EACH NOSTRIL 2 TIMES DAILY 26.1 g 3     Phenylephrine-DM-GG (ROBITUSSIN COUGH/COLD CF PO) Take 10 mLs by mouth as needed        levothyroxine (SYNTHROID/LEVOTHROID) 25 MCG tablet TAKE 1 TABLET (25 MCG) BY MOUTH DAILY 90 tablet 2     fexofenadine (ALLEGRA ALLERGY) 180 MG tablet Take 180 mg by mouth daily as needed for allergies       calcium carb-cholecalciferol (KP CALCIUM 600+D3) 600-500 MG-UNIT CAPS Take 2 tablets by mouth daily       CRANBERRY PO Take 1 capsule by  "mouth daily        warfarin (COUMADIN) 1 MG tablet HOLD UNTIL DIRECTED OTHERWISE 90 tablet 0     LORazepam (ATIVAN) 0.5 MG tablet Take 1 tablet (0.5 mg) by mouth every 4 hours as needed (Anxiety, Nausea/Vomiting or Sleep) (Patient not taking: Reported on 3/30/2018) 30 tablet 2     enoxaparin (LOVENOX) 100 MG/ML injection Inject 0.9 mLs (90 mg) Subcutaneous every 12 hours (Patient not taking: Reported on 3/30/2018) 12 Syringe 0     fluticasone (FLONASE) 50 MCG/ACT spray Spray 1-2 sprays into both nostrils daily (Patient not taking: Reported on 3/21/2018) 1 Bottle 11     Beclomethasone Dipropionate 80 MCG/ACT AERS Nasal Spray Spray 2 sprays into both nostrils daily (Patient not taking: Reported on 3/30/2018) 8.7 g 3     warfarin (COUMADIN) 2.5 MG tablet HOLD UNTIL DIRECTED OTHERWISE 30 tablet 1     Lidocaine (LIDOCARE) 4 % Patch Place 1-2 patches onto the skin every 24 hours (Patient not taking: Reported on 1/24/2018) 30 patch 0     metoclopramide (REGLAN) 10 MG tablet Take 1 tablet (10 mg) by mouth 2 times daily as needed (Patient not taking: Reported on 3/30/2018) 120 tablet 1     albuterol (2.5 MG/3ML) 0.083% neb solution Take 1 vial (2.5 mg) by nebulization every 4 hours as needed for shortness of breath / dyspnea (Patient not taking: Reported on 3/21/2018) 30 vial 3     azelastine (ASTELIN) 0.1 % nasal spray Spray 1-2 sprays into both nostrils 2 times daily as needed for rhinitis (Patient not taking: Reported on 3/30/2018) 3 Bottle 3     albuterol (VENTOLIN HFA) 108 (90 BASE) MCG/ACT inhaler Inhale 2 puffs into the lungs every 4 hours as needed (Patient not taking: Reported on 3/21/2018) 1 Inhaler 2        Physical Exam:  /73 (BP Location: Left arm, Patient Position: Sitting, Cuff Size: Adult Regular)  Pulse 115  Temp 99.6  F (37.6  C) (Tympanic)  Resp 18  Ht 1.632 m (5' 4.25\")  Wt 85.4 kg (188 lb 3.2 oz)  LMP 02/06/2013  SpO2 95%  Breastfeeding? No  BMI 32.05 kg/m2  Wt Readings from Last 12 " Encounters:   04/13/18 85.4 kg (188 lb 3.2 oz)   03/30/18 83.6 kg (184 lb 6.4 oz)   03/21/18 84.3 kg (185 lb 12.8 oz)   03/07/18 85.1 kg (187 lb 9.6 oz)   03/01/18 83.5 kg (184 lb)   02/21/18 83.9 kg (185 lb)   02/07/18 83.5 kg (184 lb)   02/06/18 83.7 kg (184 lb 8 oz)   01/24/18 83.2 kg (183 lb 6.4 oz)   01/17/18 85.4 kg (188 lb 3.2 oz)   01/10/18 83.9 kg (185 lb)   01/05/18 83.9 kg (185 lb)     ECOG performance status: 1  GENERAL APPEARANCE: Healthy, alert and in no acute distress.  HEENT: Sclerae anicteric. PERRLA. Oropharynx without ulcers, lesions, or thrush.  NECK: Supple. No asymmetry or masses.  LYMPHATICS: No palpable cervical, supraclavicular, axillary, or inguinal lymphadenopathy.  RESP: Lungs clear to auscultation bilaterally without rales, rhonchi or wheezes.  CARDIOVASCULAR: Regular rate and rhythm. Normal S1, S2; no S3 or S4. No murmur, gallop, or rub.  ABDOMEN: Soft, nontender. Bowel sounds normal. No palpable organomegaly or masses.  MUSCULOSKELETAL: Extremities without gross deformities noted. No edema of bilateral lower extremities.  SKIN: No suspicious lesions or rashes.  NEURO: Alert and oriented x 3. Cranial nerves II-XII grossly intact.  PSYCHIATRIC: Mentation and affect appear normal.    Laboratory/Imaging Studies:  Anticoagulation Therapy Visit on 04/11/2018   Component Date Value Ref Range Status     INR 04/10/2018 6.0   Final   Orders Only on 04/04/2018   Component Date Value Ref Range Status     INR 04/04/2018 6.01* 0.86 - 1.14 Final    Comment: Documentation to follow  Critical Value called to and read back by  JAHAIRA OCHOA [facility]. 4/5/18 AT 0808 AS     Anticoagulation Therapy Visit on 04/04/2018   Component Date Value Ref Range Status     INR Protime 04/04/2018 >8  0.86 - 1.14 Final        Recent Results (from the past 744 hour(s))   CT Chest/Abdomen/Pelvis w Contrast    Narrative    CT CHEST/ABDOMEN/PELVIS W CONTRAST 3/19/2018 9:56 AM    HISTORY: Breast cancer.  Follow-up.    CONTRAST:  92 mL Isovue 370.    TECHNIQUE: CT of the chest, abdomen, and pelvis is performed with IV  contrast.    Routine evaluated structures in the chest include the lungs,  mediastinal structures, pleura, and chest wall.    Routine assessed structures in the abdomen include the liver, spleen,  pancreas, adrenal glands, and kidneys. Also assessed are the  retroperitoneum, gastrointestinal tract, and the abdominal wall.    Intrapelvic anatomy is also assessed.    Radiation dose for this scan is reduced using automated exposure  control, adjustment of the mA and/or kV according to patient size, or  iterative reconstruction technique.    COMPARISON: [facility] 6 2017.    FINDINGS:    Chest: Bandlike parenchymal abnormality in the right hilar region is  stable. Bilateral pleural effusions are larger. To the contrary the  previously seen pericardial effusion is much smaller with only a small  amount of a pericardial fluid remaining. There is some nodular  enhancement of the pericardium seen on today's study and it most  likely relates to neoplastic involvement. Inflammatory etiology is  also in the differential. Right axillary adenopathy has worsened. The  dominant area measures 4.4 cm, compared to 3.7 cm on the prior study.  Another lymph node measures 1.3 cm compared to 1 cm on the prior  study. Other small areas of nodular enhancement at the margin of the  right breast and right axilla are new and consistent with additional  disease. A multinodular thyroid gland is again demonstrated. There is  a focal area of blastic change involving the right aspect of the C6  vertebral body on image #1. Images didn't extend quite this high  before. New or more obvious blastic foci are seen on today's study in  the region of T3 and T8. There also is a new blastic focus involving a  right transverse process in the lower thoracic spine on image #43. A  blastic focus involving the body of the sternum on image #17 is  more  evident. Blastic foci involving a posterior right rib arc on image #17  is more stable.    Abdomen: There is much more extensive neoplastic disease in the liver  now compared to the prior study. Large confluent areas of neoplastic  disease are seen in both the right and left lobes. There has been  interval placement of a biliary stent with resolution of the  previously seen biliary dilatation. New/more prominent nodules are  seen in the omentum, consistent with peritoneal carcinomatosis. There  are new nodules involving the anterior abdominal wall subcutaneous  fat. They could relate to injection granulomas from subcutaneous  injections, with neoplastic disease considered less likely. New  blastic foci are seen involving the L3 and L4 vertebral bodies.    Pelvis: There is moderately prominent mucosal enhancement of the  gallbladder. There is a small amount of free pelvic fluid, less  compared to the prior study. New blastic foci are seen involving the  midline and right paramidline sacrum. There is a new blastic focus  involving the right symphysis pubis on image #112.      Impression    IMPRESSION:  1.  There is worsening neoplastic disease in the right axilla/lateral  right breast margin, and abdomen. Osseous metastatic disease is also  worse.  2. New enhancing nodularity of the pericardium is of concern for  metastatic involvement.  3. Bilateral pleural effusions are larger.  4. There is a mucosal enhancement involving the bladder. This could  relate to cystitis in the appropriate clinical setting.    LETI CHASE MD   US Biopsy Lymph Node Core    Narrative    HISTORY: Widespread metastatic breast cancer. Needs additional tissue  for extra labs.    COMPARISON: CT dated 3/19/2018.    ULTRASOUND-GUIDED NEEDLE CORE BIOPSY OF RIGHT AXILLARY LYMPH NODE  CONGLOMERATE: Risks and benefits of the procedure are discussed with  the patient. Sonographic guidance and 8 mL of 1% lidocaine (local  anesthetic) are  utilized. Six 14-gauge core biopsy samples are  obtained. The patient tolerated the procedure well.  There is no  significant blood loss.      Impression    IMPRESSION: Technically uneventful ultrasound-guided needle core  biopsy right axillary lymph node conglomerate. Pathologic results are  pending.    LETI CHASE MD       Assessment and plan:  (C50.911,  Z17.0,  C50.912) Bilateral malignant neoplasm of breast in female, estrogen receptor positive, unspecified site of breast (H)  (primary encounter diagnosis)  (C50.911,  C77.3) Carcinoma of right breast metastatic to axillary lymph node (H)  (C50.919,  C78.7) Carcinoma of breast metastatic to liver, unspecified laterality (H)  Patient concluded first cycle of chemotherapy with Doxil.  Apparently the patient continues to be asymptomatic.  Tumor marker continued to increase.  We will continue to monitor the patient's symptoms I will see her again in 2 weeks time or sooner if there are new the vomits or concerns.    (C79.51) Bone metastasis (H)  Patient currently on Sinemet every 3 months.    (I82.621) Acute deep vein thrombosis (DVT) of right upper extremity, unspecified vein (H)  At this time I will stop anticoagulation was warfarin.    (G89.3) Cancer associated pain  Pain is currently well-controlled on the current regimen    (A04.72) C. difficile colitis  No new episode of diarrhea.    (D70.1,  T45.1X5A) Chemotherapy-induced neutropenia (H)  Absolute neutrophil count has been stable    (J45.40) Moderate persistent asthma without complication  Patient asthma is currently well-controlled    (K12.31) Mucositis due to chemotherapy  Patient is using Magic mouthwash if needed    (E03.9) Hypothyroidism, unspecified type  Patient continues on Synthroid 25 mcg daily.    (J90) Pleural effusion  Patient currently is asymptomatic.    The patient is ready to learn, no apparent learning barriers were identified.  Diagnosis and treatment plans were explained to the patient.  The patient expressed understanding of the content. The patient asked appropriate questions. The patient questions were answered to her satisfaction.    Chart documentation with Dragon Voice recognition Software. Although reviewed after completion, some words and grammatical errors may remain.    Again, thank you for allowing me to participate in the care of your patient.        Sincerely,        Brodie Cassidy MD

## 2018-04-13 NOTE — MR AVS SNAPSHOT
After Visit Summary   4/13/2018    Etta Cervantes    MRN: [id number]           Patient Information     Date Of Birth          1960        Visit Information        Provider Department      4/13/2018 2:45 PM ROOM 2 [facility]        Today's Diagnoses     Long-term (current) use of anticoagulants        Acute deep vein thrombosis (DVT) of right upper extremity, unspecified vein (H)        Bilateral malignant neoplasm of breast in female, estrogen receptor positive, unspecified site of breast (H)           Follow-ups after your visit        Your next 10 appointments already scheduled     Apr 20, 2018  8:30 AM CDT   Level 2 with ROOM 5 [facility] ([facility])    [facility]  [address]  [facility] [place] [address] [phone]            Apr 20, 2018  8:45 AM CDT   Return Visit with Brodie Cassidy MD   [facility] ([facility])    [facility]  [address]  [facility] [place] [address]   [phone]              Future tests that were ordered for you today     Open Future Orders        Priority Expected Expires Ordered    Ammonia Routine 4/20/2018 4/13/2019 4/13/2018            Who to contact     If you have questions or need follow up information about today's clinic visit or your schedule please contact [facility] directly at [phone].  Normal or non-critical lab and imaging results will be communicated to you by MyChart, letter or phone within 4 business days after the clinic has received the results. If you do not hear from us within 7 days, please contact the clinic through MyChart or phone. If you have a critical or abnormal lab result, we will notify you by phone as soon as possible.  Submit refill requests through [employer] or call your pharmacy and they will forward the refill request to us. Please allow 3 business days for your refill to be completed.           Additional Information About Your Visit        MyChart Information     Note gives you secure access to your electronic health record. If you see a primary care provider, you can also send messages to your care team and make appointments. If you have questions, please call your primary care clinic.  If you do not have a primary care provider, please call [phone] and they will assist you.        Care EveryWhere ID     This is your Care EveryWhere ID. This could be used by other organizations to access your [facility] medical records  [id number]        Your Vitals Were     Last Period                   02/06/2013            Blood Pressure from Last 3 Encounters:   04/13/18 106/73   03/30/18 124/67   03/23/18 110/52    Weight from Last 3 Encounters:   04/13/18 85.4 kg (188 lb 3.2 oz)   03/30/18 83.6 kg (184 lb 6.4 oz)   03/21/18 84.3 kg (185 lb 12.8 oz)              We Performed the Following     Ca27.29  breast tumor marker     CBC with platelets differential     Comprehensive metabolic panel     INR        Primary Care Provider Office Phone # Fax #    Johana Fairbanks [phone] [address] [facility] [address]        Equal Access to Services     ANIYAH MIX : Hadii han deleono Soisaura, waaxda luqadaha, qaybta kaalmada adelynnyada, hari guardado. So [facility] [phone].    ATENCIÓN: Si habla español, tiene a parekh disposición servicios gratuitos de asistencia lingüística. [facility] [phone].    We comply with applicable federal civil rights laws and [place] laws. We do not discriminate on the basis of race, color, national origin, age, disability, sex, sexual orientation, or gender identity.            Thank you!     Thank you for choosing [facility]  for your care. Our goal is always to provide you with excellent care. Hearing back from our patients is one way we can continue to improve our services. Please take a few minutes to  complete the written survey that you may receive in the mail after your visit with us. Thank you!             Your Updated Medication List - Protect others around you: Learn how to safely use, store and throw away your medicines at www.disposemymeds.org.          This list is accurate as of 4/13/18  4:20 PM.  Always use your most recent med list.                   Brand Name Dispense Instructions for use Diagnosis    * albuterol 108 (90 Base) MCG/ACT Inhaler    VENTOLIN HFA    1 Inhaler    Inhale 2 puffs into the lungs every 4 hours as needed    Moderate persistent asthma, uncomplicated       * albuterol (2.5 MG/3ML) 0.083% neb solution     30 vial    Take 1 vial (2.5 mg) by nebulization every 4 hours as needed for shortness of breath / dyspnea    Moderate persistent asthma, uncomplicated       ALLEGRA ALLERGY 180 MG tablet   Generic drug:  fexofenadine      Take 180 mg by mouth daily as needed for allergies        azelastine 0.1 % spray    ASTELIN    3 Bottle    Spray 1-2 sprays into both nostrils 2 times daily as needed for rhinitis    Chronic rhinitis       Beclomethasone Dipropionate 80 MCG/ACT Aers Nasal Spray     8.7 g    Spray 2 sprays into both nostrils daily    Other chronic rhinitis       budesonide-formoterol 160-4.5 MCG/ACT Inhaler    SYMBICORT    10.2 Inhaler    INHALE 2 PUFFS INTO THE LUNGS 2 TIMES DAILY    Moderate persistent asthma without complication       CRANBERRY PO      Take 1 capsule by mouth daily        diclofenac 1 % Gel topical gel    VOLTAREN    100 g    Place 2 g onto the skin 4 times daily    Carcinoma of breast metastatic to liver, unspecified laterality (H)       diphenhydrAMINE 25 MG tablet    BENADRYL    1 tablet    Take 1 tablet (25 mg) by mouth every 8 hours as needed for itching or allergies    Carcinoma of breast metastatic to liver, unspecified laterality (H), Bone metastasis (H), Pericardial effusion, Bilateral malignant neoplasm of breast in female, estrogen receptor  positive, unspecified site of breast (H), Carcinoma of right breast metastatic to axillary lymph node (H), Secondary malignant neoplasm of liver (H), Chemotherapy-induced neutropenia (H), Emphysematous bleb of lung (H), Hypothyroidism, unspecified type, Hyperlipidemia LDL goal <130, Moderate persistent asthma without complication, Mucositis due to chemotherapy       DITROPAN XL 10 MG 24 hr tablet   Generic drug:  oxybutynin      Take 10 mg by mouth daily    Post-op pain, Carcinoma of right breast metastatic to axillary lymph node (H), Bone metastasis (H), Carcinoma of breast metastatic to liver, unspecified laterality (H), Secondary malignant neoplasm of liver (H)       enoxaparin 100 MG/ML injection    LOVENOX    12 Syringe    Inject 0.9 mLs (90 mg) Subcutaneous every 12 hours    Long-term (current) use of anticoagulants, Acute deep vein thrombosis (DVT) of right upper extremity, unspecified vein (H)       fluticasone 50 MCG/ACT spray    FLONASE    1 Bottle    Spray 1-2 sprays into both nostrils daily    Other chronic rhinitis       HYDROcodone-acetaminophen 5-325 MG per tablet    NORCO    20 tablet    Take 1-2 tablets by mouth every 4 hours as needed for moderate to severe pain    Post-op pain, Carcinoma of right breast metastatic to axillary lymph node (H), Bone metastasis (H), Carcinoma of breast metastatic to liver, unspecified laterality (H), Secondary malignant neoplasm of liver (H)       KP CALCIUM 600+D3 600-500 MG-UNIT Caps   Generic drug:  calcium carb-cholecalciferol      Take 2 tablets by mouth daily        levothyroxine 25 MCG tablet    SYNTHROID/LEVOTHROID    90 tablet    TAKE 1 TABLET (25 MCG) BY MOUTH DAILY    Hypothyroidism due to acquired atrophy of thyroid       Lidocaine 4 % Patch    LIDOCARE    30 patch    Place 1-2 patches onto the skin every 24 hours    Carcinoma of breast metastatic to liver, unspecified laterality (H)       loratadine 10 MG tablet    CLARITIN     Take 10 mg by mouth daily  as needed for allergies        LORazepam 0.5 MG tablet    ATIVAN    30 tablet    Take 1 tablet (0.5 mg) by mouth every 4 hours as needed (Anxiety, Nausea/Vomiting or Sleep)    Secondary malignant neoplasm of liver (H), Carcinoma of breast metastatic to liver, unspecified laterality (H), Bone metastasis (H), Carcinoma of right breast metastatic to axillary lymph node (H)       metoclopramide 10 MG tablet    REGLAN    120 tablet    Take 1 tablet (10 mg) by mouth 2 times daily as needed    Bilateral malignant neoplasm of breast in female, estrogen receptor positive, unspecified site of breast (H)       order for DME     1 each    Equipment being ordered: AdiPakorina post-lumpectomy compression pad x2    Lymphedema, Lymphedema of breast       oxyCODONE IR 5 MG tablet    ROXICODONE    60 tablet    Take 1 tablet (5 mg) by mouth every 4 hours as needed for moderate to severe pain    Secondary malignant neoplasm of liver (H), Carcinoma of breast metastatic to liver, unspecified laterality (H), Carcinoma of right breast metastatic to axillary lymph node (H), Bone metastasis (H), Pericardial effusion       prochlorperazine 10 MG tablet    COMPAZINE    30 tablet    Take 1 tablet (10 mg) by mouth every 6 hours as needed (Nausea/Vomiting)    Secondary malignant neoplasm of liver (H), Carcinoma of breast metastatic to liver, unspecified laterality (H), Bone metastasis (H), Carcinoma of right breast metastatic to axillary lymph node (H), Bilateral malignant neoplasm of breast in female, estrogen receptor positive, unspecified site of breast (H)       QVAR 80 MCG/ACT Inhaler   Generic drug:  beclomethasone     26.1 g    INHALE 1 PUFFS INTO THE EACH NOSTRIL 2 TIMES DAILY    Chronic rhinitis       ROBITUSSIN COUGH/COLD CF PO      Take 10 mLs by mouth as needed    Breast cancer metastasized to axillary lymph node, right (H)       vancomycin 125 MG capsule    VANCOCIN    60 capsule    Take 1 capsule (125 mg) by mouth 4 times daily     Clostridium difficile diarrhea       * warfarin 2.5 MG tablet    COUMADIN    30 tablet    HOLD UNTIL DIRECTED OTHERWISE    Long-term (current) use of anticoagulants, Acute deep vein thrombosis (DVT) of right upper extremity, unspecified vein (H)       * warfarin 1 MG tablet    COUMADIN    90 tablet    HOLD UNTIL DIRECTED OTHERWISE    Acute deep vein thrombosis (DVT) of right upper extremity, unspecified vein (H), Long-term (current) use of anticoagulants       zolpidem 12.5 MG CR tablet    AMBIEN CR    30 tablet    Take 1 tablet by mouth at bed time.    Insomnia due to medical condition       * Notice:  This list has 4 medication(s) that are the same as other medications prescribed for you. Read the directions carefully, and ask your doctor or other care provider to review them with you.

## 2018-04-13 NOTE — MR AVS SNAPSHOT
Etta Bostonjose   4/13/2018   Anticoagulation Therapy Visit    Description:  57 year old female   Provider:  Kusum Lofton, RN   Department:  [facility]           INR as of 4/13/2018     Today's INR No new INR was available at the time of this encounter.      Anticoagulation Summary as of 4/13/2018     INR goal 2.0-3.0   Today's INR No new INR was available at the time of this encounter.   Full instructions No maintenance plan   Next INR check     Indications   Acute deep vein thrombosis (DVT) of right upper extremity  unspecified vein (H) [I82.621]  Long-term (current) use of anticoagulants [Z79.01] [Z79.01]         Anticoagulation Episode Summary     Resolved date 4/13/2018    Resolved reason Therapy  Complete

## 2018-04-13 NOTE — NURSING NOTE
"Oncology Rooming Note    April 13, 2018 1:17 PM   Etta Cervantes is a 57 year old female who presents for:    Chief Complaint   Patient presents with     Oncology Clinic Visit     Follow up breast CA. Review lab and biopsy results.      Initial Vitals: /73 (BP Location: Left arm, Patient Position: Sitting, Cuff Size: Adult Regular)  Pulse 115  Temp 99.6  F (37.6  C) (Tympanic)  Resp 18  Ht 1.632 m (5' 4.25\")  Wt 85.4 kg (188 lb 3.2 oz)  LMP 02/06/2013  SpO2 95%  Breastfeeding? No  BMI 32.05 kg/m2 Estimated body mass index is 32.05 kg/(m^2) as calculated from the following:    Height as of this encounter: 1.632 m (5' 4.25\").    Weight as of this encounter: 85.4 kg (188 lb 3.2 oz). Body surface area is 1.97 meters squared.  No Pain (0) Comment: Data Unavailable   Patient's last menstrual period was 02/06/2013.  Allergies reviewed: Yes  Medications reviewed: Yes    Medications: Medication refills not needed today.  Pharmacy name entered into [employer]:    [id number] [place], [place] - [address] [place] [address] [facility] - [place], [place] - [address] [place] [address] AVE     Clinical concerns: Follow up breast CA. Review lab and biopsy results. C/o of deep dry cough x 1 month, now noticing some shortness of breath.     8 minutes for nursing intake (face to face time)     Claudia Rodriguez, [facility]            "

## 2018-04-13 NOTE — PATIENT INSTRUCTIONS
Dr. Cassidy recommends you stop coumadin.  We will update the anticoagulation clinic.  You will need to have labs drawn today. We would like to see you back in clinic with Dr. Cassidy next to review [facility] and today's lab results.      When you are in need of a refill, please call your pharmacy and they will send us a request.      Copy of appointments, and after visit summary (AVS) given to patient.      If you have any questions during business hours (M-F 8 AM- 4PM), please call Funmi Ray RN, BSN, OCN Oncology Hematology /Breast Cancer Navigator at [facility] [phone].       For questions after business hours, or on holidays/weekends, please call our after hours Nurse Triage line [phone]. Thank you.

## 2018-04-13 NOTE — PROGRESS NOTES
Infusion Nursing Note:  Etta Cervantes presents today for PAC labs.    Patient seen by provider today: Yes: Dr. Cassidy    present during visit today: Not Applicable.    Note: N/A.    Intravenous Access:  Implanted Port.    Treatment Conditions:  Not Applicable.      Post Infusion Assessment:  Patient tolerated PAC labs without incident.  Blood return noted  Access discontinued per protocol.    Discharge Plan:   Patient discharged in stable condition accompanied by: .  Departure Mode: Ambulatory.  Pt to return on 4/20/18 at 8:30 am for C2D1 Doxil.    Pratima Felipe RN

## 2018-04-13 NOTE — MR AVS SNAPSHOT
After Visit Summary   4/13/2018    Etta Cervantes    MRN: [id number]           Patient Information     Date Of Birth          1960        Visit Information        Provider Department      4/13/2018 1:30 PM Brodie Cassidy MD [facility] ONCOLOGY      Today's Diagnoses     Bilateral malignant neoplasm of breast in female, estrogen receptor positive, unspecified site of breast (H)    -  1    Bone metastasis (H)        Carcinoma of right breast metastatic to axillary lymph node (H)        Carcinoma of breast metastatic to liver, unspecified laterality (H)        Secondary malignant neoplasm of liver (H)        Acute deep vein thrombosis (DVT) of right upper extremity, unspecified vein (H)        Cancer associated pain        C. difficile colitis        Chemotherapy-induced neutropenia (H)        Moderate persistent asthma without complication        Mucositis due to chemotherapy        Hyperlipidemia LDL goal <130        Hypothyroidism, unspecified type        Pleural effusion          Care Instructions    Dr. Cassidy recommends you stop coumadin.  We will update the anticoagulation clinic.  You will need to have labs drawn today. We would like to see you back in clinic with Dr. Cassidy next to review [facility] and today's lab results.      When you are in need of a refill, please call your pharmacy and they will send us a request.      Copy of appointments, and after visit summary (AVS) given to patient.      If you have any questions during business hours (M-F 8 AM- 4PM), please call Funmi Ray RN, BSN, OCN Oncology Hematology /Breast Cancer Navigator at [facility] [phone].       For questions after business hours, or on holidays/weekends, please call our after hours Nurse Triage line [phone]. Thank you.            Follow-ups after your visit        Your next 10 appointments already scheduled     Apr 13, 2018  2:45 PM CDT    Level O with ROOM 2 [facility] ([facility])    [facility]  [address]  [facility] [address]   [phone]            Apr 20, 2018  8:30 AM CDT   Level 2 with ROOM 5 [facility] ([facility])    [facility]  [address]  [facility] [address]   [phone]            Apr 20, 2018  8:45 AM CDT   Return Visit with Brodie Cassidy MD   [facility] ([facility])    [facility]  [address]  [facility] [address]   [phone]              Future tests that were ordered for you today     Open Future Orders        Priority Expected Expires Ordered    Comprehensive metabolic panel Routine 4/13/2018 4/13/2019 4/13/2018    Ca27.29  breast tumor marker Routine 4/13/2018 4/13/2019 4/13/2018    CBC with platelets differential Routine 4/13/2018 4/13/2019 4/13/2018            Who to contact     If you have questions or need follow up information about today's clinic visit or your schedule please contact [facility] directly at [phone].  Normal or non-critical lab and imaging results will be communicated to you by [employer], letter or phone within 4 business days after the clinic has received the results. If you do not hear from us within 7 days, please contact the clinic through [employer] or phone. If you have a critical or abnormal lab result, we will notify you by phone as soon as possible.  Submit refill requests through [employer] or call your pharmacy and they will forward the refill request to us. Please allow 3 business days for your refill to be completed.          Additional Information About Your Visit        [employer] Information     [employer] gives you secure access to your electronic health record. If you see a primary care provider, you can also send messages to your care team and make appointments. If you have  "questions, please call your primary care clinic.  If you do not have a primary care provider, please call [phone] and they will assist you.        Care EveryWhere ID     This is your Care EveryWhere ID. This could be used by other organizations to access your [facility] medical records  [id number]        Your Vitals Were     Pulse Temperature Respirations Height Last Period Pulse Oximetry    115 99.6  F (37.6  C) (Tympanic) 18 1.632 m (5' 4.25\") 02/06/2013 95%    Breastfeeding? BMI (Body Mass Index)                No 32.05 kg/m2           Blood Pressure from Last 3 Encounters:   04/13/18 106/73   03/30/18 124/67   03/23/18 110/52    Weight from Last 3 Encounters:   04/13/18 85.4 kg (188 lb 3.2 oz)   03/30/18 83.6 kg (184 lb 6.4 oz)   03/21/18 84.3 kg (185 lb 12.8 oz)              We Performed the Following     Ammonia     [facility]        Primary Care Provider Office Phone # Fax #    Johana Fairbanks [phone] [address] [facility] [address]        Equal Access to Services     [facility] Center[facility] : Hadii han ku hadasho Sofabienali, waaxda luqadaha, qaybta kaalmada adelynnyada, hari guardado. So [facility] [phone].    ATENCIÓN: Si habla español, tiene a parekh disposición servicios gratuitos de asistencia lingüística. Ll[facility] [phone].    We comply with applicable federal civil rights laws and [place] laws. We do not discriminate on the basis of race, color, national origin, age, disability, sex, sexual orientation, or gender identity.            Thank you!     Thank you for choosing [facility]  for your care. Our goal is always to provide you with excellent care. Hearing back from our patients is one way we can continue to improve our services. Please take a few minutes to complete the written survey that you may receive in the mail after your visit with us. Thank you!             Your Updated Medication List - Protect others around you: Learn how to safely " use, store and throw away your medicines at www.disposemymeds.org.          This list is accurate as of 4/13/18  2:32 PM.  Always use your most recent med list.                   Brand Name Dispense Instructions for use Diagnosis    * albuterol 108 (90 Base) MCG/ACT Inhaler    VENTOLIN HFA    1 Inhaler    Inhale 2 puffs into the lungs every 4 hours as needed    Moderate persistent asthma, uncomplicated       * albuterol (2.5 MG/3ML) 0.083% neb solution     30 vial    Take 1 vial (2.5 mg) by nebulization every 4 hours as needed for shortness of breath / dyspnea    Moderate persistent asthma, uncomplicated       ALLEGRA ALLERGY 180 MG tablet   Generic drug:  fexofenadine      Take 180 mg by mouth daily as needed for allergies        azelastine 0.1 % spray    ASTELIN    3 Bottle    Spray 1-2 sprays into both nostrils 2 times daily as needed for rhinitis    Chronic rhinitis       Beclomethasone Dipropionate 80 MCG/ACT Aers Nasal Spray     8.7 g    Spray 2 sprays into both nostrils daily    Other chronic rhinitis       budesonide-formoterol 160-4.5 MCG/ACT Inhaler    SYMBICORT    10.2 Inhaler    INHALE 2 PUFFS INTO THE LUNGS 2 TIMES DAILY    Moderate persistent asthma without complication       CRANBERRY PO      Take 1 capsule by mouth daily        diclofenac 1 % Gel topical gel    VOLTAREN    100 g    Place 2 g onto the skin 4 times daily    Carcinoma of breast metastatic to liver, unspecified laterality (H)       diphenhydrAMINE 25 MG tablet    BENADRYL    1 tablet    Take 1 tablet (25 mg) by mouth every 8 hours as needed for itching or allergies    Carcinoma of breast metastatic to liver, unspecified laterality (H), Bone metastasis (H), Pericardial effusion, Bilateral malignant neoplasm of breast in female, estrogen receptor positive, unspecified site of breast (H), Carcinoma of right breast metastatic to axillary lymph node (H), Secondary malignant neoplasm of liver (H), Chemotherapy-induced neutropenia (H),  Emphysematous bleb of lung (H), Hypothyroidism, unspecified type, Hyperlipidemia LDL goal <130, Moderate persistent asthma without complication, Mucositis due to chemotherapy       DITROPAN XL 10 MG 24 hr tablet   Generic drug:  oxybutynin      Take 10 mg by mouth daily    Post-op pain, Carcinoma of right breast metastatic to axillary lymph node (H), Bone metastasis (H), Carcinoma of breast metastatic to liver, unspecified laterality (H), Secondary malignant neoplasm of liver (H)       enoxaparin 100 MG/ML injection    LOVENOX    12 Syringe    Inject 0.9 mLs (90 mg) Subcutaneous every 12 hours    Long-term (current) use of anticoagulants, Acute deep vein thrombosis (DVT) of right upper extremity, unspecified vein (H)       fluticasone 50 MCG/ACT spray    FLONASE    1 Bottle    Spray 1-2 sprays into both nostrils daily    Other chronic rhinitis       HYDROcodone-acetaminophen 5-325 MG per tablet    NORCO    20 tablet    Take 1-2 tablets by mouth every 4 hours as needed for moderate to severe pain    Post-op pain, Carcinoma of right breast metastatic to axillary lymph node (H), Bone metastasis (H), Carcinoma of breast metastatic to liver, unspecified laterality (H), Secondary malignant neoplasm of liver (H)       KP CALCIUM 600+D3 600-500 MG-UNIT Caps   Generic drug:  calcium carb-cholecalciferol      Take 2 tablets by mouth daily        levothyroxine 25 MCG tablet    SYNTHROID/LEVOTHROID    90 tablet    TAKE 1 TABLET (25 MCG) BY MOUTH DAILY    Hypothyroidism due to acquired atrophy of thyroid       Lidocaine 4 % Patch    LIDOCARE    30 patch    Place 1-2 patches onto the skin every 24 hours    Carcinoma of breast metastatic to liver, unspecified laterality (H)       loratadine 10 MG tablet    CLARITIN     Take 10 mg by mouth daily as needed for allergies        LORazepam 0.5 MG tablet    ATIVAN    30 tablet    Take 1 tablet (0.5 mg) by mouth every 4 hours as needed (Anxiety, Nausea/Vomiting or Sleep)    Secondary  malignant neoplasm of liver (H), Carcinoma of breast metastatic to liver, unspecified laterality (H), Bone metastasis (H), Carcinoma of right breast metastatic to axillary lymph node (H)       metoclopramide 10 MG tablet    REGLAN    120 tablet    Take 1 tablet (10 mg) by mouth 2 times daily as needed    Bilateral malignant neoplasm of breast in female, estrogen receptor positive, unspecified site of breast (H)       order for DME     1 each    Equipment being ordered: Venu post-lumpectomy compression pad x2    Lymphedema, Lymphedema of breast       oxyCODONE IR 5 MG tablet    ROXICODONE    60 tablet    Take 1 tablet (5 mg) by mouth every 4 hours as needed for moderate to severe pain    Secondary malignant neoplasm of liver (H), Carcinoma of breast metastatic to liver, unspecified laterality (H), Carcinoma of right breast metastatic to axillary lymph node (H), Bone metastasis (H), Pericardial effusion       prochlorperazine 10 MG tablet    COMPAZINE    30 tablet    Take 1 tablet (10 mg) by mouth every 6 hours as needed (Nausea/Vomiting)    Secondary malignant neoplasm of liver (H), Carcinoma of breast metastatic to liver, unspecified laterality (H), Bone metastasis (H), Carcinoma of right breast metastatic to axillary lymph node (H), Bilateral malignant neoplasm of breast in female, estrogen receptor positive, unspecified site of breast (H)       QVAR 80 MCG/ACT Inhaler   Generic drug:  beclomethasone     26.1 g    INHALE 1 PUFFS INTO THE EACH NOSTRIL 2 TIMES DAILY    Chronic rhinitis       ROBITUSSIN COUGH/COLD CF PO      Take 10 mLs by mouth as needed    Breast cancer metastasized to axillary lymph node, right (H)       vancomycin 125 MG capsule    VANCOCIN    60 capsule    Take 1 capsule (125 mg) by mouth 4 times daily    Clostridium difficile diarrhea       * warfarin 2.5 MG tablet    COUMADIN    30 tablet    HOLD UNTIL DIRECTED OTHERWISE    Long-term (current) use of anticoagulants, Acute deep vein  thrombosis (DVT) of right upper extremity, unspecified vein (H)       * warfarin 1 MG tablet    COUMADIN    90 tablet    HOLD UNTIL DIRECTED OTHERWISE    Acute deep vein thrombosis (DVT) of right upper extremity, unspecified vein (H), Long-term (current) use of anticoagulants       zolpidem 12.5 MG CR tablet    AMBIEN CR    30 tablet    Take 1 tablet by mouth at bed time.    Insomnia due to medical condition       * Notice:  This list has 4 medication(s) that are the same as other medications prescribed for you. Read the directions carefully, and ask your doctor or other care provider to review them with you.

## 2018-04-14 NOTE — TELEPHONE ENCOUNTER
Received call from lab with critical INR of 5.25.  Chart reviewed, INR has been trending downward from a high of >8 on 4/4/18.  Per note in chart from today warfarin was discontinued today due to bleeding risk when combined with her new chemo therapy.    It appears pt was given this information at her clinic visit today with Dr Cassidy.      Called and left detailed message on cell number in chart per demographics regarding current INR and discontinuation of warfarin..  Encouraged pt to call Nurse Advisors with any questions.    Raiza Rubio

## 2018-04-16 NOTE — PROGRESS NOTES
Hematology/ Oncology Follow-up Visit:  Apr 13, 2018    Reason for Visit:   Chief Complaint   Patient presents with     Oncology Clinic Visit     Follow up breast CA. Review lab and biopsy results.        Oncologic History:    Breast cancer (H)  Etta Cervantes presented with increasing lump in the right axilla that s lump has been growing without any pain or associated symptoms. Subsequently she was evaluated by primary care physician. Diagnostic digital mammogram was done on 01/13/2015 showing enlarged lymph nodes in the right axilla. There was some skin thickening in the right breast anteriorly and laterally. There are no parenchymal changes in the right breast. The left breast shows a 1.7 cm spiculated mass at approximately 2 to 3 o'clock position, 15.2 cm away from the nipple. A right breast ultrasound was subsequently done and ultrasound guided biopsy was done. Needle biopsy of the left breast did show infiltrating ductal carcinoma grade I of III with no angiolymphatic invasion seen. No associated ductal carcinoma in situ. Estrogen receptor, progresterone receptor were positive. HER-2/sadie receptor came back negative.. Another biopsy from the right axilla did show metastatic ductal carcinoma. PET scan was done on January 26, 2015 showing few small right posterior cervical chain nodes the largest is 1.2 cm. There is extensive bulky right axillary adenopathy demonstrating hypermetabolic FDG uptake with SUV of 10.6. There is skin thickening involving the right breast which demonstrated low-grade FDG uptake. A 1.2 cm lesion on the lateral aspect of the left breast demonstrated minimally increased FDG uptake with SUV of 2. Scattered hypermetabolic mediastinal lymph nodes located in the left superior anterior mediastinum, right paratracheal and precarinal U, subcranial adenopathy with javon metastases. This focus hypermetabolic FDG uptake identified definitive Amanda posterior aspect off intertrochanteric region of the  proximal left femur consistent with bone metastases. There is also 1.4 cm hypermetabolic FDG uptake identified in the anterior medial segment of the left hepatic lobe consistent with liver metastases. Additional 2.1 cm low-attenuation lesion anterior aspect of the right lobe which needs to be determined. The patient was started on chemotherapy with Taxotere and Cytoxan on February 9, 2015.  She concluded 4 cycles of Taxotere and Cytoxan. She started on Arimidex 1 mg orally daily. Currently she is on Faslodex and ibrance. Subsequently the patient went on to receive Afinitor. The patient also started treatment with Xeloda.     Interval History:  Patient returns a day because of increasing fatigue, nausea, weight loss and sleepiness.  Patient pain is currently well controlled.  Denies any shortness of breath or cough or wheezing.    Review Of Systems:  Constitutional: Negative for fever, chills, and night sweats.  Skin: negative.  Eyes: negative.  Ears/Nose/Throat: negative.  Respiratory: No shortness of breath, dyspnea on exertion, cough, or hemoptysis.  Cardiovascular: negative.  Gastrointestinal: negative.  Genitourinary: negative.  Musculoskeletal: negative.  Neurologic: negative.  Psychiatric: negative.  Hematologic/Lymphatic/Immunologic: negative.  Endocrine: negative.    All other ROS negative unless mentioned in interval history.    Past medical, social, surgical, and family histories reviewed.    Allergies:  Allergies as of 04/13/2018 - Manuel as Reviewed 04/13/2018   Allergen Reaction Noted     Animal dander Cough 01/23/2015     Cats  07/15/2010     Dust mites Cough 01/23/2015     Pollen extract  01/23/2015     Seasonal allergies  07/15/2010     Levaquin [levofloxacin] Rash 07/17/2017       Current Medications:  Current Outpatient Prescriptions   Medication Sig Dispense Refill     budesonide-formoterol (SYMBICORT) 160-4.5 MCG/ACT Inhaler INHALE 2 PUFFS INTO THE LUNGS 2 TIMES DAILY 10.2 Inhaler 0     oxybutynin  (DITROPAN XL) 10 MG 24 hr tablet Take 10 mg by mouth daily       HYDROcodone-acetaminophen (NORCO) 5-325 MG per tablet Take 1-2 tablets by mouth every 4 hours as needed for moderate to severe pain 20 tablet 0     vancomycin (VANCOCIN) 125 MG capsule Take 1 capsule (125 mg) by mouth 4 times daily 60 capsule 0     oxyCODONE IR (ROXICODONE) 5 MG tablet Take 1 tablet (5 mg) by mouth every 4 hours as needed for moderate to severe pain 60 tablet 0     prochlorperazine (COMPAZINE) 10 MG tablet Take 1 tablet (10 mg) by mouth every 6 hours as needed (Nausea/Vomiting) 30 tablet 2     diclofenac (VOLTAREN) 1 % GEL topical gel Place 2 g onto the skin 4 times daily 100 g 0     loratadine (CLARITIN) 10 MG tablet Take 10 mg by mouth daily as needed for allergies       order for DME Equipment being ordered: Venu post-lumpectomy compression pad x2 1 each 0     zolpidem (AMBIEN CR) 12.5 MG CR tablet Take 1 tablet by mouth at bed time. 30 tablet 5     diphenhydrAMINE (BENADRYL) 25 MG tablet Take 1 tablet (25 mg) by mouth every 8 hours as needed for itching or allergies 1 tablet 0     QVAR 80 MCG/ACT Inhaler INHALE 1 PUFFS INTO THE EACH NOSTRIL 2 TIMES DAILY 26.1 g 3     Phenylephrine-DM-GG (ROBITUSSIN COUGH/COLD CF PO) Take 10 mLs by mouth as needed        levothyroxine (SYNTHROID/LEVOTHROID) 25 MCG tablet TAKE 1 TABLET (25 MCG) BY MOUTH DAILY 90 tablet 2     fexofenadine (ALLEGRA ALLERGY) 180 MG tablet Take 180 mg by mouth daily as needed for allergies       calcium carb-cholecalciferol ( CALCIUM 600+D3) 600-500 MG-UNIT CAPS Take 2 tablets by mouth daily       CRANBERRY PO Take 1 capsule by mouth daily        warfarin (COUMADIN) 1 MG tablet HOLD UNTIL DIRECTED OTHERWISE 90 tablet 0     LORazepam (ATIVAN) 0.5 MG tablet Take 1 tablet (0.5 mg) by mouth every 4 hours as needed (Anxiety, Nausea/Vomiting or Sleep) (Patient not taking: Reported on 3/30/2018) 30 tablet 2     enoxaparin (LOVENOX) 100 MG/ML injection Inject 0.9 mLs (90  "mg) Subcutaneous every 12 hours (Patient not taking: Reported on 3/30/2018) 12 Syringe 0     fluticasone (FLONASE) 50 MCG/ACT spray Spray 1-2 sprays into both nostrils daily (Patient not taking: Reported on 3/21/2018) 1 Bottle 11     Beclomethasone Dipropionate 80 MCG/ACT AERS Nasal Spray Spray 2 sprays into both nostrils daily (Patient not taking: Reported on 3/30/2018) 8.7 g 3     warfarin (COUMADIN) 2.5 MG tablet HOLD UNTIL DIRECTED OTHERWISE 30 tablet 1     Lidocaine (LIDOCARE) 4 % Patch Place 1-2 patches onto the skin every 24 hours (Patient not taking: Reported on 1/24/2018) 30 patch 0     metoclopramide (REGLAN) 10 MG tablet Take 1 tablet (10 mg) by mouth 2 times daily as needed (Patient not taking: Reported on 3/30/2018) 120 tablet 1     albuterol (2.5 MG/3ML) 0.083% neb solution Take 1 vial (2.5 mg) by nebulization every 4 hours as needed for shortness of breath / dyspnea (Patient not taking: Reported on 3/21/2018) 30 vial 3     azelastine (ASTELIN) 0.1 % nasal spray Spray 1-2 sprays into both nostrils 2 times daily as needed for rhinitis (Patient not taking: Reported on 3/30/2018) 3 Bottle 3     albuterol (VENTOLIN HFA) 108 (90 BASE) MCG/ACT inhaler Inhale 2 puffs into the lungs every 4 hours as needed (Patient not taking: Reported on 3/21/2018) 1 Inhaler 2        Physical Exam:  /73 (BP Location: Left arm, Patient Position: Sitting, Cuff Size: Adult Regular)  Pulse 115  Temp 99.6  F (37.6  C) (Tympanic)  Resp 18  Ht 1.632 m (5' 4.25\")  Wt 85.4 kg (188 lb 3.2 oz)  LMP 02/06/2013  SpO2 95%  Breastfeeding? No  BMI 32.05 kg/m2  Wt Readings from Last 12 Encounters:   04/13/18 85.4 kg (188 lb 3.2 oz)   03/30/18 83.6 kg (184 lb 6.4 oz)   03/21/18 84.3 kg (185 lb 12.8 oz)   03/07/18 85.1 kg (187 lb 9.6 oz)   03/01/18 83.5 kg (184 lb)   02/21/18 83.9 kg (185 lb)   02/07/18 83.5 kg (184 lb)   02/06/18 83.7 kg (184 lb 8 oz)   01/24/18 83.2 kg (183 lb 6.4 oz)   01/17/18 85.4 kg (188 lb 3.2 oz) "   01/10/18 83.9 kg (185 lb)   01/05/18 83.9 kg (185 lb)     ECOG performance status: 1  GENERAL APPEARANCE: Healthy, alert and in no acute distress.  HEENT: Sclerae anicteric. PERRLA. Oropharynx without ulcers, lesions, or thrush.  NECK: Supple. No asymmetry or masses.  LYMPHATICS: No palpable cervical, supraclavicular, axillary, or inguinal lymphadenopathy.  RESP: Lungs clear to auscultation bilaterally without rales, rhonchi or wheezes.  CARDIOVASCULAR: Regular rate and rhythm. Normal S1, S2; no S3 or S4. No murmur, gallop, or rub.  ABDOMEN: Soft, nontender. Bowel sounds normal. No palpable organomegaly or masses.  MUSCULOSKELETAL: Extremities without gross deformities noted. No edema of bilateral lower extremities.  SKIN: No suspicious lesions or rashes.  NEURO: Alert and oriented x 3. Cranial nerves II-XII grossly intact.  PSYCHIATRIC: Mentation and affect appear normal.    Laboratory/Imaging Studies:  Anticoagulation Therapy Visit on 04/11/2018   Component Date Value Ref Range Status     INR 04/10/2018 6.0   Final   Orders Only on 04/04/2018   Component Date Value Ref Range Status     INR 04/04/2018 6.01* 0.86 - 1.14 Final    Comment: Documentation to follow  Critical Value called to and read back by  JAHAIRA OCHOA [facility]. 4/5/18 AT 0808 AS     Anticoagulation Therapy Visit on 04/04/2018   Component Date Value Ref Range Status     INR Protime 04/04/2018 >8  0.86 - 1.14 Final        Recent Results (from the past 744 hour(s))   CT Chest/Abdomen/Pelvis w Contrast    Narrative    CT CHEST/ABDOMEN/PELVIS W CONTRAST 3/19/2018 9:56 AM    HISTORY: Breast cancer. Follow-up.    CONTRAST:  92 mL Isovue 370.    TECHNIQUE: CT of the chest, abdomen, and pelvis is performed with IV  contrast.    Routine evaluated structures in the chest include the lungs,  mediastinal structures, pleura, and chest wall.    Routine assessed structures in the abdomen include the liver, spleen,  pancreas, adrenal glands, and kidneys. Also  assessed are the  retroperitoneum, gastrointestinal tract, and the abdominal wall.    Intrapelvic anatomy is also assessed.    Radiation dose for this scan is reduced using automated exposure  control, adjustment of the mA and/or kV according to patient size, or  iterative reconstruction technique.    COMPARISON: [place] 6 2017.    FINDINGS:    Chest: Bandlike parenchymal abnormality in the right hilar region is  stable. Bilateral pleural effusions are larger. To the contrary the  previously seen pericardial effusion is much smaller with only a small  amount of a pericardial fluid remaining. There is some nodular  enhancement of the pericardium seen on today's study and it most  likely relates to neoplastic involvement. Inflammatory etiology is  also in the differential. Right axillary adenopathy has worsened. The  dominant area measures 4.4 cm, compared to 3.7 cm on the prior study.  Another lymph node measures 1.3 cm compared to 1 cm on the prior  study. Other small areas of nodular enhancement at the margin of the  right breast and right axilla are new and consistent with additional  disease. A multinodular thyroid gland is again demonstrated. There is  a focal area of blastic change involving the right aspect of the C6  vertebral body on image #1. Images didn't extend quite this high  before. New or more obvious blastic foci are seen on today's study in  the region of T3 and T8. There also is a new blastic focus involving a  right transverse process in the lower thoracic spine on image #43. A  blastic focus involving the body of the sternum on image #17 is more  evident. Blastic foci involving a posterior right rib arc on image #17  is more stable.    Abdomen: There is much more extensive neoplastic disease in the liver  now compared to the prior study. Large confluent areas of neoplastic  disease are seen in both the right and left lobes. There has been  interval placement of a biliary stent with resolution of  the  previously seen biliary dilatation. New/more prominent nodules are  seen in the omentum, consistent with peritoneal carcinomatosis. There  are new nodules involving the anterior abdominal wall subcutaneous  fat. They could relate to injection granulomas from subcutaneous  injections, with neoplastic disease considered less likely. New  blastic foci are seen involving the L3 and L4 vertebral bodies.    Pelvis: There is moderately prominent mucosal enhancement of the  gallbladder. There is a small amount of free pelvic fluid, less  compared to the prior study. New blastic foci are seen involving the  midline and right paramidline sacrum. There is a new blastic focus  involving the right symphysis pubis on image #112.      Impression    IMPRESSION:  1.  There is worsening neoplastic disease in the right axilla/lateral  right breast margin, and abdomen. Osseous metastatic disease is also  worse.  2. New enhancing nodularity of the pericardium is of concern for  metastatic involvement.  3. Bilateral pleural effusions are larger.  4. There is a mucosal enhancement involving the bladder. This could  relate to cystitis in the appropriate clinical setting.    LETI CHASE MD   US Biopsy Lymph Node Core    Narrative    HISTORY: Widespread metastatic breast cancer. Needs additional tissue  for extra labs.    COMPARISON: CT dated 3/19/2018.    ULTRASOUND-GUIDED NEEDLE CORE BIOPSY OF RIGHT AXILLARY LYMPH NODE  CONGLOMERATE: Risks and benefits of the procedure are discussed with  the patient. Sonographic guidance and 8 mL of 1% lidocaine (local  anesthetic) are utilized. Six 14-gauge core biopsy samples are  obtained. The patient tolerated the procedure well.  There is no  significant blood loss.      Impression    IMPRESSION: Technically uneventful ultrasound-guided needle core  biopsy right axillary lymph node conglomerate. Pathologic results are  pending.    LETI CHASE MD       Assessment and plan:  (C50.911,  Z17.0,   C50.912) Bilateral malignant neoplasm of breast in female, estrogen receptor positive, unspecified site of breast (H)  (primary encounter diagnosis)  (C50.911,  C77.3) Carcinoma of right breast metastatic to axillary lymph node (H)  (C50.919,  C78.7) Carcinoma of breast metastatic to liver, unspecified laterality (H)  Patient concluded first cycle of chemotherapy with Doxil.  Apparently the patient continues to be asymptomatic.  Tumor marker continued to increase.  We will continue to monitor the patient's symptoms I will see her again in 2 weeks time or sooner if there are new the vomits or concerns.    (C79.51) Bone metastasis (H)  Patient currently on Sinemet every 3 months.    (I82.621) Acute deep vein thrombosis (DVT) of right upper extremity, unspecified vein (H)  At this time I will stop anticoagulation was warfarin.    (G89.3) Cancer associated pain  Pain is currently well-controlled on the current regimen    (A04.72) C. difficile colitis  No new episode of diarrhea.    (D70.1,  T45.1X5A) Chemotherapy-induced neutropenia (H)  Absolute neutrophil count has been stable    (J45.40) Moderate persistent asthma without complication  Patient asthma is currently well-controlled    (K12.31) Mucositis due to chemotherapy  Patient is using Magic mouthwash if needed    (E03.9) Hypothyroidism, unspecified type  Patient continues on Synthroid 25 mcg daily.    (J90) Pleural effusion  Patient currently is asymptomatic.    The patient is ready to learn, no apparent learning barriers were identified.  Diagnosis and treatment plans were explained to the patient. The patient expressed understanding of the content. The patient asked appropriate questions. The patient questions were answered to her satisfaction.    Chart documentation with Dragon Voice recognition Software. Although reviewed after completion, some words and grammatical errors may remain.

## 2018-04-20 NOTE — PROGRESS NOTES
"Chemotherapy Education    Patient is a 57 year old female here today for chemotherapy education, accompanied by .  Pt has a cancer diagnosis of   Encounter Diagnoses   Name Primary?     Bilateral malignant neoplasm of breast in female, estrogen receptor positive, unspecified site of breast (H) Yes     Carcinoma of right breast metastatic to axillary lymph node (H)      Carcinoma of breast metastatic to liver, unspecified laterality (H)      Thrush      Pleural effusion      Mucositis due to chemotherapy      Moderate persistent asthma without complication      Hypothyroidism, unspecified type      Hyperlipidemia LDL goal <130      Chemotherapy-induced neutropenia (H)      Cancer associated pain      Acute deep vein thrombosis (DVT) of right upper extremity, unspecified vein (H)       and their main concern is \"hoping this one works to slow the cancer down\".  Their Oncologist is Dr. LUIS Cassidy, and PCP is Johana Fairbanks.  Reviewed the following with the patient and their support person:  Treatment goal: palliative  Treatment regimen & duration: Carboplatin, Gemzar day 1, Gemzar day 8 every 21 days until progression; and rationale for strict adherence to this schedule, including specific medication names including pre-treatment medications and at home scheduled or as needed medications, delivery methods,.  Potential side effects: Side effects and management; including skin changes/hand-foot syndrome, anemia, neutropenia, thrombocytopenia, diarrhea/constipation, hair loss syndrome, memory changes/ \"chemobrain\", mouth sores, taste changes, neuropathy, fatigue, myelosuppression, sexuality/infertility, and risk of extravasation or infiltration.  Infection prevention, and monitoring of lab values, what lab tests and what changes of these values meant, along with the possibility of hydration or blood product transfusion, or the need to defer or hold treatment.    Chemotherapy information, including ways it is " excreted from the body and cleaning and containment of vomitus or other bodily fluid, use of the bathroom, sexual health and intimacy, what to do if needing to miss a treatment, when to call a provider and the need for staff to wear protective equipment.  Importance of Central line care (port) or IV site care.    Written information: Written information provided on medications, side effects, management, and when to call provider, go to ED.  Contact numbers provided.  General orientation to the Medical Oncology department, Infusion Services department, Huc/scheduling, bathrooms and usual flow of the treatment day provided as well as introduction to the Infusion nurses.  No barriers to learning identified. Patient and family verbalized understanding of all written and verbal information. All questions answered to patients satisfaction.   Other concerns: none at this time  Pt instructed to call with further questions or concerns.  Patient states understanding and is in agreement with this plan.  Copy of appointments, and after visit summary (AVS) given to patient. Patient discharged ambulatory.    Face to Face time with patient: 25 min    Funmi Ray RN, BSN, OCN  Oncology Hematology   Breast Cancer Navigator  [facility]  [email or web address]  Phone [phone]

## 2018-04-20 NOTE — PROGRESS NOTES
"Infusion Nursing Note:  Etta Cervantes presents today for C1D1 Carbo Gemzar.    Patient seen by provider today: Yes: Khanh   present during visit today: Not Applicable.    Note: Written info on Carbo and Gemzar given to patient by staff and reviewed again during chemo infusion.    Intravenous Access:  Labs drawn without difficulty.  Implanted Port.    Treatment Conditions:  Lab Results   Component Value Date    HGB 12.6 04/20/2018     Lab Results   Component Value Date    WBC 14.9 04/20/2018      Lab Results   Component Value Date    ANEU 11.5 04/20/2018     Lab Results   Component Value Date     04/20/2018      Lab Results   Component Value Date     04/20/2018                   Lab Results   Component Value Date    POTASSIUM 3.5 04/20/2018           Lab Results   Component Value Date    MAG 2.1 07/13/2017            Lab Results   Component Value Date    CR 0.77 04/20/2018                   Lab Results   Component Value Date    BEN 8.8 04/20/2018                Lab Results   Component Value Date    BILITOTAL 1.5 04/20/2018           Lab Results   Component Value Date    ALBUMIN 2.8 04/20/2018                    Lab Results   Component Value Date    ALT 36 04/20/2018           Lab Results   Component Value Date     04/20/2018       Results reviewed, labs MET treatment parameters, ok to proceed with treatment.  Labs, including liver studies reviewed with Dr. Cassidy, who gave \"ok\" to go ahead with chemo today.      Post Infusion Assessment:  Patient tolerated infusion without incident.  Blood return noted pre and post infusion.  Site patent and intact, free from redness, edema or discomfort.  No evidence of extravasations.  Access discontinued per protocol.    Discharge Plan:   Discharge instructions reviewed with:Patient and .  Patient discharged in stable condition accompanied by: .  Departure Mode: Ambulatory.    Li Carney RN                        "

## 2018-04-20 NOTE — TELEPHONE ENCOUNTER
Writer left voicemail for patient to call [facility] to discuss INR result.    Juliette Schmidt RN, BSN, PHN

## 2018-04-20 NOTE — LETTER
"    4/20/2018         RE: Etta Cervantes  [address]  [place] [address]        Dear Colleague,    Thank you for referring your patient, Etta Cervantes, to the [facility]. Please see a copy of my visit note below.    Chemotherapy Education    Patient is a 57 year old female here today for chemotherapy education, accompanied by .  Pt has a cancer diagnosis of   Encounter Diagnoses   Name Primary?     Bilateral malignant neoplasm of breast in female, estrogen receptor positive, unspecified site of breast (H) Yes     Carcinoma of right breast metastatic to axillary lymph node (H)      Carcinoma of breast metastatic to liver, unspecified laterality (H)      Thrush      Pleural effusion      Mucositis due to chemotherapy      Moderate persistent asthma without complication      Hypothyroidism, unspecified type      Hyperlipidemia LDL goal <130      Chemotherapy-induced neutropenia (H)      Cancer associated pain      Acute deep vein thrombosis (DVT) of right upper extremity, unspecified vein (H)       and their main concern is \"hoping this one works to slow the cancer down\".  Their Oncologist is Dr. LUIS Cassidy, and PCP is Johana Fairbanks.  Reviewed the following with the patient and their support person:  Treatment goal: palliative  Treatment regimen & duration: Carboplatin, Gemzar day 1, Gemzar day 8 every 21 days until progression; and rationale for strict adherence to this schedule, including specific medication names including pre-treatment medications and at home scheduled or as needed medications, delivery methods,.  Potential side effects: Side effects and management; including skin changes/hand-foot syndrome, anemia, neutropenia, thrombocytopenia, diarrhea/constipation, hair loss syndrome, memory changes/ \"chemobrain\", mouth sores, taste changes, neuropathy, fatigue, myelosuppression, sexuality/infertility, and risk of extravasation or infiltration.  Infection prevention, and " monitoring of lab values, what lab tests and what changes of these values meant, along with the possibility of hydration or blood product transfusion, or the need to defer or hold treatment.    Chemotherapy information, including ways it is excreted from the body and cleaning and containment of vomitus or other bodily fluid, use of the bathroom, sexual health and intimacy, what to do if needing to miss a treatment, when to call a provider and the need for staff to wear protective equipment.  Importance of Central line care (port) or IV site care.    Written information: Written information provided on medications, side effects, management, and when to call provider, go to ED.  Contact numbers provided.  General orientation to the Medical Oncology department, Infusion Services department, Huc/scheduling, bathrooms and usual flow of the treatment day provided as well as introduction to the Infusion nurses.  No barriers to learning identified. Patient and family verbalized understanding of all written and verbal information. All questions answered to patients satisfaction.   Other concerns: none at this time  Pt instructed to call with further questions or concerns.  Patient states understanding and is in agreement with this plan.  Copy of appointments, and after visit summary (AVS) given to patient. Patient discharged ambulatory.    Face to Face time with patient: 25 min    Funmi Ray RN, BSN, OCN  Oncology Hematology   Breast Cancer Navigator  [facility]  [email or web address]  Phone [phone]      Hematology/ Oncology Follow-up Visit:  Apr 20, 2018    Reason for Visit:   Chief Complaint   Patient presents with     Oncology Clinic Visit     Follow up breast CA. Review lab results.        Oncologic History:  Breast cancer (H)  Etta Cervantes presented with increasing lump in the right axilla that s lump has been growing without any pain or associated symptoms. Subsequently  she was evaluated by primary care physician. Diagnostic digital mammogram was done on 01/13/2015 showing enlarged lymph nodes in the right axilla. There was some skin thickening in the right breast anteriorly and laterally. There are no parenchymal changes in the right breast. The left breast shows a 1.7 cm spiculated mass at approximately 2 to 3 o'clock position, 15.2 cm away from the nipple. A right breast ultrasound was subsequently done and ultrasound guided biopsy was done. Needle biopsy of the left breast did show infiltrating ductal carcinoma grade I of III with no angiolymphatic invasion seen. No associated ductal carcinoma in situ. Estrogen receptor, progresterone receptor were positive. HER-2/sadie receptor came back negative.. Another biopsy from the right axilla did show metastatic ductal carcinoma. PET scan was done on January 26, 2015 showing few small right posterior cervical chain nodes the largest is 1.2 cm. There is extensive bulky right axillary adenopathy demonstrating hypermetabolic FDG uptake with SUV of 10.6. There is skin thickening involving the right breast which demonstrated low-grade FDG uptake. A 1.2 cm lesion on the lateral aspect of the left breast demonstrated minimally increased FDG uptake with SUV of 2. Scattered hypermetabolic mediastinal lymph nodes located in the left superior anterior mediastinum, right paratracheal and precarinal U, subcranial adenopathy with javon metastases. This focus hypermetabolic FDG uptake identified definitive Amanda posterior aspect off intertrochanteric region of the proximal left femur consistent with bone metastases. There is also 1.4 cm hypermetabolic FDG uptake identified in the anterior medial segment of the left hepatic lobe consistent with liver metastases. Additional 2.1 cm low-attenuation lesion anterior aspect of the right lobe which needs to be determined. The patient was started on chemotherapy with Taxotere and Cytoxan on February 9, 2015.  " She concluded 4 cycles of Taxotere and Cytoxan. She started on Arimidex 1 mg orally daily. Currently she is on Faslodex and ibrance. Subsequently the patient went on to receive Afinitor. The patient also started treatment with Xeloda.       Interval History:  Patient is here today to discuss management plan in details.  Her tumor marker continued to increase the patient performance status continued to gradually decrease.  The patient continues to have intermittent abdominal pain poor appetite and weight loss.  She is having shortness of breath especially on exertion.  She denies any bruising or bleeding.     Review Of Systems:  Constitutional: Negative for fever, chills, and night sweats.  Fatigue  Skin: negative.  Eyes: negative.  Ears/Nose/Throat: negative.  Respiratory: No shortness of breath, dyspnea on exertion, cough, or hemoptysis.  Cardiovascular: negative.  Gastrointestinal: negative.  Genitourinary: negative.  Musculoskeletal: negative.  Neurologic: negative.  Psychiatric: negative.  Hematologic/Lymphatic/Immunologic: negative.  Endocrine: negative.    All other ROS negative unless mentioned in interval history.    Past medical, social, surgical, and family histories reviewed.    Allergies:  Allergies as of 04/20/2018 - Manuel as Reviewed 04/20/2018   Allergen Reaction Noted     Animal dander Cough 01/23/2015     Cats  07/15/2010     Dust mites Cough 01/23/2015     Pollen extract  01/23/2015     Seasonal allergies  07/15/2010     Levaquin [levofloxacin] Rash 07/17/2017       Current Medications:  Current Outpatient Prescriptions   Medication Sig Dispense Refill     HYDROcodone-acetaminophen (NORCO) 5-325 MG per tablet Take 1-2 tablets by mouth every 4 hours as needed for moderate to severe pain 60 tablet 0        Physical Exam:  /61 (BP Location: Left arm, Patient Position: Sitting, Cuff Size: Adult Regular)  Pulse 141  Temp 99.1  F (37.3  C) (Tympanic)  Resp 18  Ht 1.632 m (5' 4.25\")  Wt 80.1 kg " (176 lb 9.6 oz)  LMP 02/06/2013  SpO2 96%  Breastfeeding? No  BMI 30.08 kg/m2  Wt Readings from Last 12 Encounters:   04/25/18 84.4 kg (186 lb 1.1 oz)   04/20/18 80.1 kg (176 lb 9.6 oz)   04/20/18 80 kg (176 lb 6.4 oz)   04/13/18 85.4 kg (188 lb 3.2 oz)   03/30/18 83.6 kg (184 lb 6.4 oz)   03/21/18 84.3 kg (185 lb 12.8 oz)   03/07/18 85.1 kg (187 lb 9.6 oz)   03/01/18 83.5 kg (184 lb)   02/21/18 83.9 kg (185 lb)   02/07/18 83.5 kg (184 lb)   02/06/18 83.7 kg (184 lb 8 oz)   01/24/18 83.2 kg (183 lb 6.4 oz)     ECOG performance status: 1  GENERAL APPEARANCE: Healthy, alert and in no acute distress.  HEENT: Sclerae anicteric. PERRLA. Oropharynx without ulcers, lesions, or thrush.  NECK: Supple. No asymmetry or masses.  LYMPHATICS: No palpable cervical, supraclavicular, axillary, or inguinal lymphadenopathy.  RESP: Lungs clear to auscultation bilaterally without rales, rhonchi or wheezes.  CARDIOVASCULAR: Regular rate and rhythm. Normal S1, S2; no S3 or S4. No murmur, gallop, or rub.  ABDOMEN: Soft, nontender. Bowel sounds normal. No palpable organomegaly or masses.  MUSCULOSKELETAL: Extremities without gross deformities noted. No edema of bilateral lower extremities.  SKIN: No suspicious lesions or rashes.  NEURO: Alert and oriented x 3. Cranial nerves II-XII grossly intact.  PSYCHIATRIC: Mentation and affect appear normal.    Laboratory/Imaging Studies:  Infusion Therapy Visit on 04/20/2018   Component Date Value Ref Range Status     WBC 04/20/2018 14.9* 4.0 - 11.0 10e9/L Final     RBC Count 04/20/2018 4.54  3.8 - 5.2 10e12/L Final     Hemoglobin 04/20/2018 12.6  11.7 - 15.7 g/dL Final     Hematocrit 04/20/2018 39.0  35.0 - 47.0 % Final     MCV 04/20/2018 86  78 - 100 fl Final     MCH 04/20/2018 27.8  26.5 - 33.0 pg Final     MCHC 04/20/2018 32.3  31.5 - 36.5 g/dL Final     RDW 04/20/2018 17.3* 10.0 - 15.0 % Final     Platelet Count 04/20/2018 329  150 - 450 10e9/L Final     Diff Method 04/20/2018 Manual  Differential   Final     % Neutrophils 04/20/2018 77.0  % Final     % Lymphocytes 04/20/2018 6.0  % Final     % Monocytes 04/20/2018 12.0  % Final     % Eosinophils 04/20/2018 0.0  % Final     % Basophils 04/20/2018 0.0  % Final     % Metamyelocytes 04/20/2018 2.0  % Final     % Myelocytes 04/20/2018 3.0  % Final     Absolute Neutrophil 04/20/2018 11.5* 1.6 - 8.3 10e9/L Final     Absolute Lymphocytes 04/20/2018 0.9  0.8 - 5.3 10e9/L Final     Absolute Monocytes 04/20/2018 1.8* 0.0 - 1.3 10e9/L Final     Absolute Eosinophils 04/20/2018 0.0  0.0 - 0.7 10e9/L Final     Absolute Basophils 04/20/2018 0.0  0.0 - 0.2 10e9/L Final     Absolute Metamyelocytes 04/20/2018 0.3* 0 10e9/L Final     Absolute Myelocytes 04/20/2018 0.4* 0 10e9/L Final     Anisocytosis 04/20/2018 Slight   Final     Polychromasia 04/20/2018 Slight   Final     Platelet Estimate 04/20/2018 Automated count confirmed.  Platelet morphology is normal.   Final     Sodium 04/20/2018 135  133 - 144 mmol/L Final     Potassium 04/20/2018 3.5  3.4 - 5.3 mmol/L Final     Chloride 04/20/2018 101  94 - 109 mmol/L Final     Carbon Dioxide 04/20/2018 23  20 - 32 mmol/L Final     Anion Gap 04/20/2018 11  3 - 14 mmol/L Final     Glucose 04/20/2018 133* 70 - 99 mg/dL Final     Urea Nitrogen 04/20/2018 20  7 - 30 mg/dL Final     Creatinine 04/20/2018 0.77  0.52 - 1.04 mg/dL Final     GFR Estimate 04/20/2018 77  >60 mL/min/1.7m2 Final    Non  GFR Calc     GFR Estimate If Black 04/20/2018 >90  >60 mL/min/1.7m2 Final    African American GFR Calc     Calcium 04/20/2018 8.8  8.5 - 10.1 mg/dL Final     Bilirubin Total 04/20/2018 1.5* 0.2 - 1.3 mg/dL Final     Albumin 04/20/2018 2.8* 3.4 - 5.0 g/dL Final     Protein Total 04/20/2018 8.1  6.8 - 8.8 g/dL Final     Alkaline Phosphatase 04/20/2018 414* 40 - 150 U/L Final     ALT 04/20/2018 36  0 - 50 U/L Final     AST 04/20/2018 185* 0 - 45 U/L Final     INR 04/20/2018 5.05* 0.86 - 1.14 Final    Comment: Critical  Value called to and read back by  JASMYNE CORBETT RN PO 1003 4.20.18 JW       Ammonia 04/20/2018 43  10 - 50 umol/L Final   Infusion Therapy Visit on 04/13/2018   Component Date Value Ref Range Status     INR 04/13/2018 5.25* 0.86 - 1.14 Final    Comment: Critical Value called to and read back by  DR ELISABETH MINA ON CALL DR 04/13/2018 1930 YW.       WBC 04/13/2018 5.1  4.0 - 11.0 10e9/L Final     RBC Count 04/13/2018 3.95  3.8 - 5.2 10e12/L Final     Hemoglobin 04/13/2018 11.1* 11.7 - 15.7 g/dL Final     Hematocrit 04/13/2018 34.9* 35.0 - 47.0 % Final     MCV 04/13/2018 88  78 - 100 fl Final     MCH 04/13/2018 28.1  26.5 - 33.0 pg Final     MCHC 04/13/2018 31.8  31.5 - 36.5 g/dL Final     RDW 04/13/2018 17.0* 10.0 - 15.0 % Final     Platelet Count 04/13/2018 322  150 - 450 10e9/L Final     Diff Method 04/13/2018 Automated Method   Final     % Neutrophils 04/13/2018 66.6  % Final     % Lymphocytes 04/13/2018 13.2  % Final     % Monocytes 04/13/2018 18.2  % Final     % Eosinophils 04/13/2018 1.4  % Final     % Basophils 04/13/2018 0.4  % Final     % Immature Granulocytes 04/13/2018 0.2  % Final     Absolute Neutrophil 04/13/2018 3.4  1.6 - 8.3 10e9/L Final     Absolute Lymphocytes 04/13/2018 0.7* 0.8 - 5.3 10e9/L Final     Absolute Monocytes 04/13/2018 0.9  0.0 - 1.3 10e9/L Final     Absolute Eosinophils 04/13/2018 0.1  0.0 - 0.7 10e9/L Final     Absolute Basophils 04/13/2018 0.0  0.0 - 0.2 10e9/L Final     Abs Immature Granulocytes 04/13/2018 0.0  0 - 0.4 10e9/L Final     Sodium 04/13/2018 137  133 - 144 mmol/L Final     Potassium 04/13/2018 4.2  3.4 - 5.3 mmol/L Final     Chloride 04/13/2018 105  94 - 109 mmol/L Final     Carbon Dioxide 04/13/2018 24  20 - 32 mmol/L Final     Anion Gap 04/13/2018 8  3 - 14 mmol/L Final     Glucose 04/13/2018 97  70 - 99 mg/dL Final     Urea Nitrogen 04/13/2018 8  7 - 30 mg/dL Final     Creatinine 04/13/2018 0.71  0.52 - 1.04 mg/dL Final     GFR Estimate 04/13/2018 84  >60 mL/min/1.7m2 Final     Non  GFR Calc     GFR Estimate If Black 04/13/2018 >90  >60 mL/min/1.7m2 Final    African American GFR Calc     Calcium 04/13/2018 8.5  8.5 - 10.1 mg/dL Final     Bilirubin Total 04/13/2018 0.7  0.2 - 1.3 mg/dL Final     Albumin 04/13/2018 2.6* 3.4 - 5.0 g/dL Final     Protein Total 04/13/2018 6.5* 6.8 - 8.8 g/dL Final     Alkaline Phosphatase 04/13/2018 313* 40 - 150 U/L Final     ALT 04/13/2018 34  0 - 50 U/L Final     AST 04/13/2018 132* 0 - 45 U/L Final     CA 27-29 04/13/2018 536* 0 - 39 U/mL Final    Assay Method:  Chemiluminescence using Siemens Centaur XP   Anticoagulation Therapy Visit on 04/11/2018   Component Date Value Ref Range Status     INR 04/10/2018 6.0   Final        Recent Results (from the past 744 hour(s))   US Biopsy Lymph Node Core    Narrative    HISTORY: Widespread metastatic breast cancer. Needs additional tissue  for extra labs.    COMPARISON: CT dated 3/19/2018.    ULTRASOUND-GUIDED NEEDLE CORE BIOPSY OF RIGHT AXILLARY LYMPH NODE  CONGLOMERATE: Risks and benefits of the procedure are discussed with  the patient. Sonographic guidance and 8 mL of 1% lidocaine (local  anesthetic) are utilized. Six 14-gauge core biopsy samples are  obtained. The patient tolerated the procedure well.  There is no  significant blood loss.      Impression    IMPRESSION: Technically uneventful ultrasound-guided needle core  biopsy right axillary lymph node conglomerate. Pathologic results are  pending.    LETI CHASE MD   Chest XR,  PA & LAT    Narrative    XR CHEST 2 VW 4/23/2018 3:39 PM    HISTORY: Cough.    COMPARISON: Several prior studies, the most recent one being dated  1/10/2018..      Impression    IMPRESSION: 2 views of the chest show low lung volumes. This is  suspected to at least in part related to subpulmonic pleural effusions  which the patient has had before. Pulmonary vessels are modestly  congested. No focal consolidation is seen.    LETI CHASE MD       Assessment and  plan:    (C50.911,  Z17.0,  C50.912) Bilateral malignant neoplasm of breast in female, estrogen receptor positive, unspecified site of breast (H)  (primary encounter diagnosis)  (C50.911,  C77.3) Carcinoma of right breast metastatic to axillary lymph node (H)  (C50.919,  C78.7) Carcinoma of breast metastatic to liver, unspecified laterality (H)  I reviewed with the patient today most recent laboratory tests.  Based on the results patient has increasing progression of her disease.  At this time I will stop Doxil and start the patient on gemcitabine and carboplatin.  She will be receiving her first cycle of chemotherapy today.  I reviewed with the patient today benefits and risk of the medication.  We given an overview about potential side effects in details.  I will see the patient again in 1 week time or sooner if there are new developments or concerns.    (J90) Pleural effusion  Patient symptoms has been stable.    (K12.31) Mucositis due to chemotherapy  patient is using Magic mouthwash as needed    (J45.40) Moderate persistent asthma without complication  Patient asthma is currently well controlled     (E03.9) Hypothyroidism, unspecified type  Patient continues on Synthroid 25 mcg daily.    (D70.1,  T45.1X5A) Chemotherapy-induced neutropenia (H)  We will continue to monitor absolute neutrophil count and give Neupogen if needed    (G89.3) Cancer associated pain  Patient pain is currently well-controlled on the current regimen.    (I82.621) Acute deep vein thrombosis (DVT) of right upper extremity, unspecified vein (H)   because patient INR continue to be elevated.  I would recommend to stop warfarin at this time.    The patient is ready to learn, no apparent learning barriers were identified.  Diagnosis and treatment plans were explained to the patient. The patient expressed understanding of the content. The patient asked appropriate questions. The patient questions were answered to her satisfaction.    Time spent  45 minutes with the 50% of the time in counseling and coordination of care including discussion of management of metastatic breast cancer, potential side effects of medications, follow-up and prognosis    Chart documentation with Dragon Voice recognition Software. Although reviewed after completion, some words and grammatical errors may remain.      Again, thank you for allowing me to participate in the care of your patient.        Sincerely,        Brodie Cassidy MD

## 2018-04-20 NOTE — TELEPHONE ENCOUNTER
Tessie from lab called to report her INR today is 5.05. Please advise. Thank you.  Consuelo Payton RN

## 2018-04-20 NOTE — NURSING NOTE
"Oncology Rooming Note    April 20, 2018 9:13 AM   Etta Cervantes is a 57 year old female who presents for:    Chief Complaint   Patient presents with     Oncology Clinic Visit     Follow up breast CA. Review lab results.      Initial Vitals: /61 (BP Location: Left arm, Patient Position: Sitting, Cuff Size: Adult Regular)  Pulse 141  Temp 99.1  F (37.3  C) (Tympanic)  Resp 18  Ht 1.632 m (5' 4.25\")  Wt 80.1 kg (176 lb 9.6 oz)  LMP 02/06/2013  SpO2 96%  Breastfeeding? No  BMI 30.08 kg/m2 Estimated body mass index is 30.08 kg/(m^2) as calculated from the following:    Height as of this encounter: 1.632 m (5' 4.25\").    Weight as of this encounter: 80.1 kg (176 lb 9.6 oz). Body surface area is 1.91 meters squared.  No Pain (0) Comment: Data Unavailable   Patient's last menstrual period was 02/06/2013.  Allergies reviewed: Yes  Medications reviewed: Yes    Medications: Medication refills not needed today.  Pharmacy name entered into [employer]:    [id number] [place] [facility] - [place], [place] - [address] [place] [address] [facility] [place], [place] - [address] [place] [address]    Clinical concerns: Follow up breast CA. Review lab results. C/o very fatigued and tired, sleeping a lot, emesis today in clinic, noticing weight decrease, low appetite, diarrhea several bouts on 4/16/18 started on Imodium which has now subsided.     10 minutes for nursing intake (face to face time)     Claudia Rodriguez, TYLER            "

## 2018-04-20 NOTE — TELEPHONE ENCOUNTER
I spoke to INR clinic, they will call her.  Dr. Cassidy recommended d/c coumadin 4/13/18 so should not be high  Yoanna Ha PA-C

## 2018-04-20 NOTE — MR AVS SNAPSHOT
Etta Cervantes   4/20/2018   Anticoagulation Therapy Visit    Description:  57 year old female   Provider:  Kusum Lofton, RIANA   Department:  [facility]

## 2018-04-20 NOTE — PROGRESS NOTES
[facility] received an INR for this patient today. It was 5.0. Spoke with Dr. Cassidy to make sure he does not need [facility] to recheck future INR's or  If any interventions are needed. He declined and stated [facility] does not need to follow. Patient is off Coumadin.    Kusum Lofton RN, BSN, PHN  Anticoagulation Clinic   [phone]

## 2018-04-20 NOTE — MR AVS SNAPSHOT
After Visit Summary   4/20/2018    Etta Cervantes    MRN: [id number]           Patient Information     Date Of Birth          1960        Visit Information        Provider Department      4/20/2018 8:45 AM Brodie Cassidy MD [facility] ONCOLOGY      Today's Diagnoses     Bilateral malignant neoplasm of breast in female, estrogen receptor positive, unspecified site of breast (H)    -  1    Carcinoma of right breast metastatic to axillary lymph node (H)        Carcinoma of breast metastatic to liver, unspecified laterality (H)        Thrush        Pleural effusion        Mucositis due to chemotherapy        Moderate persistent asthma without complication        Hypothyroidism, unspecified type        Hyperlipidemia LDL goal <130        Chemotherapy-induced neutropenia (H)        Cancer associated pain        Acute deep vein thrombosis (DVT) of right upper extremity, unspecified vein (H)          Care Instructions    Dr. Cassidy is recommending a change in your chemotherapy regimen.  You will receive education today on Carboplatin, Gemzar today and receive C1D1 today as well. We would like to see you back in clinic with Dr. Cassidy in 1 week with chemotherapy to be scheduled per treatment plan.      Your prescriptions have been sent to:   [place] [address]  Phone: [phone] Fax: [phone]    Your prescription (morphine) has been: given to you to hand carry to the pharmacy of your choice.  When you are in need of a refill, please call your pharmacy and they will send us a request.      Copy of appointments, and after visit summary (AVS) given to patient.      If you have any questions during business hours (M-F 8 AM- 4PM), please call Funmi Ray RN, BSN, OCN Oncology Hematology /Breast Cancer Navigator at [facility] [phone].       For questions after  business hours, or on holidays/weekends, please call our after hours Nurse Triage line [phone]. Thank you.            Follow-ups after your visit        Follow-up notes from your care team     Return in about 1 week (around 4/27/2018) for Schedule for chemotherapy as per treatment plan.      Your next 10 appointments already scheduled     Apr 27, 2018  8:30 AM CDT   Level 1 with ROOM 9 [facility] ([facility])    [facility]  [address]  [facility] [address]   [phone]            Apr 27, 2018  9:30 AM CDT   Return Visit with Brodie Cassidy MD   [facility] ([facility])    [facility]  [address]  [facility] [place] [address]   [phone]            May 11, 2018  9:30 AM CDT   Level 3 with ROOM 7 [facility] ([facility])    [facility]  [address]  [facility] [place] [address]   [phone]            May 18, 2018  8:00 AM CDT   Level 1 with ROOM 1 [facility] ([facility])    [facility]  [address]  [facility] [place] [address]   [phone]              Future tests that were ordered for you today     Open Standing Orders        Priority Remaining Interval Expires Ordered    CEA Routine 1/1 AM DRAW  4/20/2018    Ca27.29  breast tumor marker Routine 1/1 AM DRAW  4/20/2018            Who to contact     If you have questions or need follow up information about today's clinic visit or your schedule please contact [facility] directly at [phone].  Normal or non-critical lab and imaging results will be communicated to you by MyChart, letter or phone within 4 business days after the clinic has received the results. If you do not hear from us within 7 days, please contact the clinic through MyChart or phone. If you have a critical or abnormal lab result, we  "will notify you by phone as soon as possible.  Submit refill requests through [employer] or call your pharmacy and they will forward the refill request to us. Please allow 3 business days for your refill to be completed.          Additional Information About Your Visit        [employer] Information     [employer] gives you secure access to your electronic health record. If you see a primary care provider, you can also send messages to your care team and make appointments. If you have questions, please call your primary care clinic.  If you do not have a primary care provider, please call [phone] and they will assist you.        Care EveryWhere ID     This is your Care EveryWhere ID. This could be used by other organizations to access your [place] medical records  [id number]        Your Vitals Were     Pulse Temperature Respirations Height Last Period Pulse Oximetry    141 99.1  F (37.3  C) (Tympanic) 18 1.632 m (5' 4.25\") 02/06/2013 96%    Breastfeeding? BMI (Body Mass Index)                No 30.08 kg/m2           Blood Pressure from Last 3 Encounters:   04/20/18 109/61   04/13/18 106/73   03/30/18 124/67    Weight from Last 3 Encounters:   04/20/18 80.1 kg (176 lb 9.6 oz)   04/20/18 80 kg (176 lb 6.4 oz)   04/13/18 85.4 kg (188 lb 3.2 oz)              Today, you had the following     No orders found for display       Primary Care Provider Office Phone # Fax #    Johana Fairbanks [phone] [address] [facility] [address]        Equal Access to Services     ANIYAH MIX AH: Hadii aad ku hadasho Soomaali, waaxda luqadaha, qaybta kaalmada adeegyarajesh, waxjoslyn lorenza rodriguez . [facility] [phone].    ATENCIÓN: Si habla español, tiene a parekh disposición servicios gratuitos de asistencia lingüística. Llame al [phone].    We comply with applicable federal civil rights laws and [place] laws. We do not discriminate on the basis of race, color, national origin, age, " disability, sex, sexual orientation, or gender identity.            Thank you!     Thank you for choosing [facility] [place]  for your care. Our goal is always to provide you with excellent care. Hearing back from our patients is one way we can continue to improve our services. Please take a few minutes to complete the written survey that you may receive in the mail after your visit with us. Thank you!             Your Updated Medication List - Protect others around you: Learn how to safely use, store and throw away your medicines at www.disposemymeds.org.          This list is accurate as of 4/20/18 10:08 AM.  Always use your most recent med list.                   Brand Name Dispense Instructions for use Diagnosis    * albuterol 108 (90 Base) MCG/ACT Inhaler    VENTOLIN HFA    1 Inhaler    Inhale 2 puffs into the lungs every 4 hours as needed    Moderate persistent asthma, uncomplicated       * albuterol (2.5 MG/3ML) 0.083% neb solution     30 vial    Take 1 vial (2.5 mg) by nebulization every 4 hours as needed for shortness of breath / dyspnea    Moderate persistent asthma, uncomplicated       ALLEGRA ALLERGY 180 MG tablet   Generic drug:  fexofenadine      Take 180 mg by mouth daily as needed for allergies        azelastine 0.1 % spray    ASTELIN    3 Bottle    Spray 1-2 sprays into both nostrils 2 times daily as needed for rhinitis    Chronic rhinitis       Beclomethasone Dipropionate 80 MCG/ACT Aers Nasal Spray     8.7 g    Spray 2 sprays into both nostrils daily    Other chronic rhinitis       budesonide-formoterol 160-4.5 MCG/ACT Inhaler    SYMBICORT    10.2 Inhaler    INHALE 2 PUFFS INTO THE LUNGS 2 TIMES DAILY    Moderate persistent asthma without complication       CRANBERRY PO      Take 1 capsule by mouth daily        diclofenac 1 % Gel topical gel    VOLTAREN    100 g    Place 2 g onto the skin 4 times daily    Carcinoma of breast metastatic to liver, unspecified laterality (H)       diphenhydrAMINE 25  MG tablet    BENADRYL    1 tablet    Take 1 tablet (25 mg) by mouth every 8 hours as needed for itching or allergies    Carcinoma of breast metastatic to liver, unspecified laterality (H), Bone metastasis (H), Pericardial effusion, Bilateral malignant neoplasm of breast in female, estrogen receptor positive, unspecified site of breast (H), Carcinoma of right breast metastatic to axillary lymph node (H), Secondary malignant neoplasm of liver (H), Chemotherapy-induced neutropenia (H), Emphysematous bleb of lung (H), Hypothyroidism, unspecified type, Hyperlipidemia LDL goal <130, Moderate persistent asthma without complication, Mucositis due to chemotherapy       DITROPAN XL 10 MG 24 hr tablet   Generic drug:  oxybutynin      Take 10 mg by mouth daily    Post-op pain, Carcinoma of right breast metastatic to axillary lymph node (H), Bone metastasis (H), Carcinoma of breast metastatic to liver, unspecified laterality (H), Secondary malignant neoplasm of liver (H)       enoxaparin 100 MG/ML injection    LOVENOX    12 Syringe    Inject 0.9 mLs (90 mg) Subcutaneous every 12 hours    Long-term (current) use of anticoagulants, Acute deep vein thrombosis (DVT) of right upper extremity, unspecified vein (H)       fluticasone 50 MCG/ACT spray    FLONASE    1 Bottle    Spray 1-2 sprays into both nostrils daily    Other chronic rhinitis       HYDROcodone-acetaminophen 5-325 MG per tablet    NORCO    20 tablet    Take 1-2 tablets by mouth every 4 hours as needed for moderate to severe pain    Post-op pain, Carcinoma of right breast metastatic to axillary lymph node (H), Bone metastasis (H), Carcinoma of breast metastatic to liver, unspecified laterality (H), Secondary malignant neoplasm of liver (H)       KP CALCIUM 600+D3 600-500 MG-UNIT Caps   Generic drug:  calcium carb-cholecalciferol      Take 2 tablets by mouth daily        levothyroxine 25 MCG tablet    SYNTHROID/LEVOTHROID    90 tablet    TAKE 1 TABLET (25 MCG) BY MOUTH  DAILY    Hypothyroidism due to acquired atrophy of thyroid       Lidocaine 4 % Patch    LIDOCARE    30 patch    Place 1-2 patches onto the skin every 24 hours    Carcinoma of breast metastatic to liver, unspecified laterality (H)       loratadine 10 MG tablet    CLARITIN     Take 10 mg by mouth daily as needed for allergies        metoclopramide 10 MG tablet    REGLAN    120 tablet    Take 1 tablet (10 mg) by mouth 2 times daily as needed    Bilateral malignant neoplasm of breast in female, estrogen receptor positive, unspecified site of breast (H)       order for DME     1 each    Equipment being ordered: JoviPak post-lumpectomy compression pad x2    Lymphedema, Lymphedema of breast       oxyCODONE IR 5 MG tablet    ROXICODONE    60 tablet    Take 1 tablet (5 mg) by mouth every 4 hours as needed for moderate to severe pain    Secondary malignant neoplasm of liver (H), Carcinoma of breast metastatic to liver, unspecified laterality (H), Carcinoma of right breast metastatic to axillary lymph node (H), Bone metastasis (H), Pericardial effusion       prochlorperazine 10 MG tablet    COMPAZINE    30 tablet    Take 1 tablet (10 mg) by mouth every 6 hours as needed (Nausea/Vomiting)    Secondary malignant neoplasm of liver (H), Carcinoma of breast metastatic to liver, unspecified laterality (H), Bone metastasis (H), Carcinoma of right breast metastatic to axillary lymph node (H), Bilateral malignant neoplasm of breast in female, estrogen receptor positive, unspecified site of breast (H)       QVAR 80 MCG/ACT Inhaler   Generic drug:  beclomethasone     26.1 g    INHALE 1 PUFFS INTO THE EACH NOSTRIL 2 TIMES DAILY    Chronic rhinitis       ROBITUSSIN COUGH/COLD CF PO      Take 10 mLs by mouth as needed    Breast cancer metastasized to axillary lymph node, right (H)       vancomycin 125 MG capsule    VANCOCIN    60 capsule    Take 1 capsule (125 mg) by mouth 4 times daily    Clostridium difficile diarrhea       * warfarin 2.5  MG tablet    COUMADIN    30 tablet    HOLD UNTIL DIRECTED OTHERWISE    Long-term (current) use of anticoagulants, Acute deep vein thrombosis (DVT) of right upper extremity, unspecified vein (H)       * warfarin 1 MG tablet    COUMADIN    90 tablet    HOLD UNTIL DIRECTED OTHERWISE    Acute deep vein thrombosis (DVT) of right upper extremity, unspecified vein (H), Long-term (current) use of anticoagulants       zolpidem 12.5 MG CR tablet    AMBIEN CR    30 tablet    Take 1 tablet by mouth at bed time.    Insomnia due to medical condition       * Notice:  This list has 4 medication(s) that are the same as other medications prescribed for you. Read the directions carefully, and ask your doctor or other care provider to review them with you.

## 2018-04-20 NOTE — PATIENT INSTRUCTIONS
Dr. Cassidy is recommending a change in your chemotherapy regimen.  You will receive education today on Carboplatin, Gemzar today and receive C1D1 today as well. We would like to see you back in clinic with Dr. Cassidy in 1 week with chemotherapy to be scheduled per treatment plan.      Your prescriptions have been sent to:   [facility] [address] [place] [facility] [address] [facility]  [address] [facility] [address]  Phone: [phone] Fax: [phone]    Your prescription (morphine) has been: given to you to hand carry to the pharmacy of your choice.  When you are in need of a refill, please call your pharmacy and they will send us a request.      Copy of appointments, and after visit summary (AVS) given to patient.      If you have any questions during business hours (M-F 8 AM- 4PM), please call Funmi Ray RN, BSN, OCN Oncology Hematology /[facility]ast Cancer Navigator at [facility] [phone].       For questions after business hours, or on holidays/weekends, please call our after hours Nurse Triage line [phone]. Thank you.

## 2018-04-20 NOTE — MR AVS SNAPSHOT
After Visit Summary   4/20/2018    Etta Cervantes    MRN: [id number]           Patient Information     Date Of Birth          1960        Visit Information        Provider Department      4/20/2018 8:30 AM ROOM 5 [facility]        Today's Diagnoses     Carcinoma of breast metastatic to liver (H)    -  1    Long-term (current) use of anticoagulants        Acute deep vein thrombosis (DVT) of right upper extremity, unspecified vein (H)        Bilateral malignant neoplasm of breast in female, estrogen receptor positive, unspecified site of breast (H)        Secondary malignant neoplasm of liver (H)        Carcinoma of breast metastatic to liver, unspecified laterality (H)        Bone metastasis (H)        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)           Follow-ups after your visit        Your next 10 appointments already scheduled     Apr 27, 2018  8:30 AM CDT   Level 1 with ROOM 9 [facility] ([facility])    [facility]  [address]  [facility] [address]   [phone]            Apr 27, 2018  9:30 AM CDT   Return Visit with Brodie Cassidy MD   [facility] ([facility])    [facility]  [address]  [facility] [address]   [phone]            May 11, 2018  9:30 AM CDT   Level 3 with ROOM 7 [facility] ([facility])    [facility]  [address]  [facility] [address]   [phone]            May 18, 2018  8:00 AM CDT   Level 1 with ROOM 1 [facility] ([facility])    [facility]  [address]  [place] [address]   [phone]              Future tests that were ordered for you today     Open Standing Orders        Priority Remaining Interval Expires Ordered    CEA Routine 1/1 AM DRAW  4/20/2018    Ca27.29   breast tumor marker Routine 1/1 AM DRAW  4/20/2018            Who to contact     If you have questions or need follow up information about today's clinic visit or your schedule please contact [facility] directly at [phone].  Normal or non-critical lab and imaging results will be communicated to you by Grasshoppers!hart, letter or phone within 4 business days after the clinic has received the results. If you do not hear from us within 7 days, please contact the clinic through [email or web address] or phone. If you have a critical or abnormal lab result, we will notify you by phone as soon as possible.  Submit refill requests through [employer] or call your pharmacy and they will forward the refill request to us. Please allow 3 business days for your refill to be completed.          Additional Information About Your Visit        [email or web address] Information     [employer] gives you secure access to your electronic health record. If you see a primary care provider, you can also send messages to your care team and make appointments. If you have questions, please call your primary care clinic.  If you do not have a primary care provider, please call [phone] and they will assist you.        Care EveryWhere ID     This is your Care EveryWhere ID. This could be used by other organizations to access your [place] medical records  [id number]        Your Vitals Were     Pulse Respirations Last Period Pulse Oximetry BMI (Body Mass Index)       107 18 02/06/2013 95% 30.04 kg/m2        Blood Pressure from Last 3 Encounters:   04/20/18 109/61   04/20/18 115/68   04/13/18 106/73    Weight from Last 3 Encounters:   04/20/18 80.1 kg (176 lb 9.6 oz)   04/20/18 80 kg (176 lb 6.4 oz)   04/13/18 85.4 kg (188 lb 3.2 oz)              We Performed the Following     Ammonia     CBC with platelets differential     Comprehensive metabolic panel     INR          Today's Medication Changes          These changes are accurate as of 4/20/18  2:00 PM.  If you have  any questions, ask your nurse or doctor.               Start taking these medicines.        Dose/Directions    morphine 15 MG 12 hr tablet   Commonly known as:  MS CONTIN   Used for:  Bilateral malignant neoplasm of breast in female, estrogen receptor positive, unspecified site of breast (H), Carcinoma of right breast metastatic to axillary lymph node (H), Carcinoma of breast metastatic to liver, unspecified laterality (H)   Started by:  Brodie Cassidy MD        Dose:  15 mg   Take 1 tablet (15 mg) by mouth every 12 hours maximum 2 tablet(s) per day   Quantity:  60 tablet   Refills:  0            Where to get your medicines      Some of these will need a paper prescription and others can be bought over the counter.  Ask your nurse if you have questions.     Bring a paper prescription for each of these medications     morphine 15 MG 12 hr tablet                Primary Care Provider Office Phone # Fax #    Johana Fairbanks [phone] [address] [facility] [address]        Equal Access to Services     [facility] AH: Hadii han deleono Soisaura, waaxda luqadaha, qaybta kaalmada adelynnyada, hari rodriguez . So [facility] [phone].    ATENCIÓN: Si habla español, tiene a parekh disposición servicios gratuitos de asistencia lingüística. [facility] [phone].    We comply with applicable federal civil rights laws and [place] laws. We do not discriminate on the basis of race, color, national origin, age, disability, sex, sexual orientation, or gender identity.            Thank you!     Thank you for choosing [facility]  for your care. Our goal is always to provide you with excellent care. Hearing back from our patients is one way we can continue to improve our services. Please take a few minutes to complete the written survey that you may receive in the mail after your visit with us. Thank you!             Your Updated Medication List - Protect others  around you: Learn how to safely use, store and throw away your medicines at www.disposemymeds.org.          This list is accurate as of 4/20/18  2:00 PM.  Always use your most recent med list.                   Brand Name Dispense Instructions for use Diagnosis    * albuterol 108 (90 Base) MCG/ACT Inhaler    VENTOLIN HFA    1 Inhaler    Inhale 2 puffs into the lungs every 4 hours as needed    Moderate persistent asthma, uncomplicated       * albuterol (2.5 MG/3ML) 0.083% neb solution     30 vial    Take 1 vial (2.5 mg) by nebulization every 4 hours as needed for shortness of breath / dyspnea    Moderate persistent asthma, uncomplicated       ALLEGRA ALLERGY 180 MG tablet   Generic drug:  fexofenadine      Take 180 mg by mouth daily as needed for allergies        azelastine 0.1 % spray    ASTELIN    3 Bottle    Spray 1-2 sprays into both nostrils 2 times daily as needed for rhinitis    Chronic rhinitis       Beclomethasone Dipropionate 80 MCG/ACT Aers Nasal Spray     8.7 g    Spray 2 sprays into both nostrils daily    Other chronic rhinitis       budesonide-formoterol 160-4.5 MCG/ACT Inhaler    SYMBICORT    10.2 Inhaler    INHALE 2 PUFFS INTO THE LUNGS 2 TIMES DAILY    Moderate persistent asthma without complication       CRANBERRY PO      Take 1 capsule by mouth daily        diclofenac 1 % Gel topical gel    VOLTAREN    100 g    Place 2 g onto the skin 4 times daily    Carcinoma of breast metastatic to liver, unspecified laterality (H)       diphenhydrAMINE 25 MG tablet    BENADRYL    1 tablet    Take 1 tablet (25 mg) by mouth every 8 hours as needed for itching or allergies    Carcinoma of breast metastatic to liver, unspecified laterality (H), Bone metastasis (H), Pericardial effusion, Bilateral malignant neoplasm of breast in female, estrogen receptor positive, unspecified site of breast (H), Carcinoma of right breast metastatic to axillary lymph node (H), Secondary malignant neoplasm of liver (H),  Chemotherapy-induced neutropenia (H), Emphysematous bleb of lung (H), Hypothyroidism, unspecified type, Hyperlipidemia LDL goal <130, Moderate persistent asthma without complication, Mucositis due to chemotherapy       DITROPAN XL 10 MG 24 hr tablet   Generic drug:  oxybutynin      Take 10 mg by mouth daily    Post-op pain, Carcinoma of right breast metastatic to axillary lymph node (H), Bone metastasis (H), Carcinoma of breast metastatic to liver, unspecified laterality (H), Secondary malignant neoplasm of liver (H)       enoxaparin 100 MG/ML injection    LOVENOX    12 Syringe    Inject 0.9 mLs (90 mg) Subcutaneous every 12 hours    Long-term (current) use of anticoagulants, Acute deep vein thrombosis (DVT) of right upper extremity, unspecified vein (H)       fluticasone 50 MCG/ACT spray    FLONASE    1 Bottle    Spray 1-2 sprays into both nostrils daily    Other chronic rhinitis       HYDROcodone-acetaminophen 5-325 MG per tablet    NORCO    20 tablet    Take 1-2 tablets by mouth every 4 hours as needed for moderate to severe pain    Post-op pain, Carcinoma of right breast metastatic to axillary lymph node (H), Bone metastasis (H), Carcinoma of breast metastatic to liver, unspecified laterality (H), Secondary malignant neoplasm of liver (H)       KP CALCIUM 600+D3 600-500 MG-UNIT Caps   Generic drug:  calcium carb-cholecalciferol      Take 2 tablets by mouth daily        levothyroxine 25 MCG tablet    SYNTHROID/LEVOTHROID    90 tablet    TAKE 1 TABLET (25 MCG) BY MOUTH DAILY    Hypothyroidism due to acquired atrophy of thyroid       Lidocaine 4 % Patch    LIDOCARE    30 patch    Place 1-2 patches onto the skin every 24 hours    Carcinoma of breast metastatic to liver, unspecified laterality (H)       loratadine 10 MG tablet    CLARITIN     Take 10 mg by mouth daily as needed for allergies        metoclopramide 10 MG tablet    REGLAN    120 tablet    Take 1 tablet (10 mg) by mouth 2 times daily as needed     Bilateral malignant neoplasm of breast in female, estrogen receptor positive, unspecified site of breast (H)       morphine 15 MG 12 hr tablet    MS CONTIN    60 tablet    Take 1 tablet (15 mg) by mouth every 12 hours maximum 2 tablet(s) per day    Bilateral malignant neoplasm of breast in female, estrogen receptor positive, unspecified site of breast (H), Carcinoma of right breast 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), Carcinoma of breast metastatic to liver, unspecified laterality (H)       order for DME     1 each    Equipment being ordered: Venu post-lumpectomy compression pad x2    Lymphedema, Lymphedema of breast       oxyCODONE IR 5 MG tablet    ROXICODONE    60 tablet    Take 1 tablet (5 mg) by mouth every 4 hours as needed for moderate to severe pain    Secondary malignant neoplasm of liver (H), Carcinoma of breast metastatic to liver, unspecified laterality (H), 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), Bone metastasis (H), Pericardial effusion       prochlorperazine 10 MG tablet    COMPAZINE    30 tablet    Take 1 tablet (10 mg) by mouth every 6 hours as needed (Nausea/Vomiting)    Secondary malignant neoplasm of liver (H), Carcinoma of breast metastatic to liver, unspecified laterality (H), Bone metastasis (H), 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), Bilateral malignant neoplasm of breast in female, estrogen receptor positive, unspecified site of breast (H)       QVAR 80 MCG/ACT Inhaler   Generic drug:  beclomethasone     26.1 g    INHALE 1 PUFFS INTO THE EACH NOSTRIL 2 TIMES DAILY    Chronic rhinitis       ROBITUSSIN COUGH/COLD CF PO      Take 10 mLs by mouth as needed    Breast cancer metastasized to axillary lymph node, right (H)       vancomycin 125 MG capsule    VANCOCIN    60 capsule    Take 1 capsule (125 mg) by mouth 4 times daily    Clostridium difficile diarrhea       * warfarin 2.5 MG tablet    COUMADIN    30 tablet    HOLD UNTIL DIRECTED  OTHERWISE    Long-term (current) use of anticoagulants, Acute deep vein thrombosis (DVT) of right upper extremity, unspecified vein (H)       * warfarin 1 MG tablet    COUMADIN    90 tablet    HOLD UNTIL DIRECTED OTHERWISE    Acute deep vein thrombosis (DVT) of right upper extremity, unspecified vein (H), Long-term (current) use of anticoagulants       zolpidem 12.5 MG CR tablet    AMBIEN CR    30 tablet    Take 1 tablet by mouth at bed time.    Insomnia due to medical condition       * Notice:  This list has 4 medication(s) that are the same as other medications prescribed for you. Read the directions carefully, and ask your doctor or other care provider to review them with you.

## 2018-04-23 PROBLEM — N39.0 URINARY TRACT INFECTION: Status: ACTIVE | Noted: 2018-01-01

## 2018-04-23 PROBLEM — I95.89 SECONDARY HYPOTENSION: Status: ACTIVE | Noted: 2018-01-01

## 2018-04-23 PROBLEM — K83.1 BILIARY OBSTRUCTION (H): Status: ACTIVE | Noted: 2017-01-01

## 2018-04-23 PROBLEM — R74.8 ELEVATED LIVER ENZYMES: Status: ACTIVE | Noted: 2017-01-01

## 2018-04-23 PROBLEM — R19.7 DIARRHEA OF PRESUMED INFECTIOUS ORIGIN: Status: ACTIVE | Noted: 2018-01-01

## 2018-04-23 PROBLEM — R79.1 PROLONGED INR: Status: ACTIVE | Noted: 2018-01-01

## 2018-04-23 PROBLEM — N30.00 ACUTE CYSTITIS WITHOUT HEMATURIA: Status: ACTIVE | Noted: 2018-01-01

## 2018-04-23 NOTE — IP AVS SNAPSHOT
MRN:[id number]                      After Visit Summary   4/23/2018    Etta Cervantes    MRN: [id number]           Thank you!     Thank you for choosing [facility] for your care. Our goal is always to provide you with excellent care. Hearing back from our patients is one way we can continue to improve our services. Please take a few minutes to complete the written survey that you may receive in the mail after you visit with us. Thank you!        Patient Information     Date Of Birth          1960        Designated Caregiver       Most Recent Value    Caregiver    Will someone help with your care after discharge? yes    Name of designated caregiver Lucian    Phone number of caregiver [phone]    Caregiver [place]      About your hospital stay     You were admitted on:  April 23, 2018 You last received care in the:  [facility]    You were discharged on:  May 3, 2018       Who to Call     For medical emergencies, please call 911.  For non-urgent questions about your medical care, please call your primary care provider or clinic, [phone]          Attending Provider     Provider Specialty    Nicki, Radha MCADAMS PA-C Physician Assistant    Demario Viveros MD Pulmonary    Kampfe, Jj HUTCHINS MD Family Practice    Eijhoan, Demario Harrison MD Family Practice    Clarence, Osorio Nelson MD Internal Medicine       Primary Care Provider Office Phone # Fax #    Johana Fairbanks [phone]      Your next 10 appointments already scheduled     May 09, 2018  9:00 AM CDT   New Visit with Ector Cobos MD   [facility] ([facility])    [facility]  [address]  [facility] [address]   [phone]            May 11, 2018  9:30 AM CDT   Level 3 with ROOM 7 [facility] ([facility])    [facility]  [address]  [facility] [address]   [phone]             May 18, 2018  8:00 AM CDT   Level 1 with ROOM 1 [facility] ([facility])    [facility]  [address] [facility] [address]  [facility] [address]   [phone]            May 25, 2018  2:30 PM CDT   (Arrive by 2:15 PM)   Return Visit with Bailey Santana MD   [facility] ([facility] [place])    [address] [place] [address]  [address]  [facility] [address] [phone]              Additional Services     Home Care Referral       *_________________________________________________________________    Your provider has referred you to: [facility]: [facility] [phone]   [email or web address]    Extended Emergency Contact Information  Primary Emergency Contact: Lucian Cervantes  Address: [address] [place] [address] [id number] [place] [address]  Home Phone: [phone]  Work Phone: [phone]  Mobile Phone: [phone]  Relation: Spouse    Patient Anticipated Discharge Date: 1-2 days   RN, PT, HHA to begin 24 - 48 hours after discharge.  PLEASE EVALUATE AND TREAT (Evaluation timeline is 24 - 48 hrs. Please call if there is need for a variance to this timeline).    REASON FOR REFERRAL: Assessment & Treatment: RN    ADDITIONAL SERVICES NEEDED: HHA    OTHER PERTINENT INFORMATION: Patient was last seen by provider on 4/26/18 for cystitis.    Current Outpatient Prescriptions:  HYDROcodone-acetaminophen (NORCO) 5-325 MG per tablet, Take 1-2 tablets by mouth every 4 hours as needed for moderate to severe pain, Disp: 60 tablet, Rfl: 0      Patient Active Problem List:     Hypothyroid     Fibroids     CARDIOVASCULAR SCREENING; LDL GOAL LESS THAN 160     Menorrhagia     Moderate persistent asthma     DIAMOND (obstructive sleep apnea)     Insomnia     Health Care Home     ASCUS favor benign     Breast cancer (H)     Carcinoma of breast metastatic to liver (H)      Bone metastasis (H)     Breast cancer metastasized to axillary lymph node (H)     Drug-related hair loss     Mucositis due to chemotherapy     Secondary malignant neoplasm of liver (H)     Thyroid nodule     Emphysematous bleb of lung (H)     Chemotherapy-induced neutropenia (H)     Hyperlipidemia LDL goal <130     Pneumothorax     Acute pyelonephritis     Elevated liver enzymes     Biliary obstruction     Cancer associated pain     Pleural effusion     Thrush     C. difficile colitis     Acute deep vein thrombosis (DVT) of right upper extremity, unspecified vein (H)     Long-term (current) use of anticoagulants [Z79.01]     Secondary hypotension     Acute cystitis      Prolonged INR     Diarrhea of presumed infectious origin     Urinary tract infection      Documentation of Face to Face and Certification for Home Health Services    I certify that patient, Etta Cervantes is under my care and that I, or a Nurse Practitioner or Physician's Assistant working with me, had a face-to-face encounter that meets the physician face-to-face encounter requirements with this patient on: 4/26/2018.    This encounter with the patient was in whole, or in part, for the following medical condition, which is the primary reason for Home Health Care: Weakness, chronic illness.    I certify that, based on my findings, the following services are medically necessary Home Health Services: Nursing and HHA.    My clinical findings support the need for the above services because: Nurse is needed: To assess needs for chronic illness.    Further, I certify that my clinical findings support that this patient is homebound (i.e. absences from home require considerable and taxing effort and are for medical reasons or Confucianist services or infrequently or of short duration when for other reasons) because: Requires assistance of another person or specialized equipment to access medical services because patient: Requires supervision of another for safe  transfer..    Based on the above findings, I certify that this patient is confined to the home and needs intermittent skilled nursing care, physical therapy and/or speech therapy.  The patient is under my care, and I have initiated the establishment of the plan of care.  This patient will be followed by a physician who will periodically review the plan of care.    Physician/Provider to provide follow up care: Johana Fairbanks certified Physician at time of discharge: Dr. Phillips    Please be aware that coverage of these services is subject to the terms and limitations of your health insurance plan.  Call member services at your health plan with any benefit or coverage questions.            INFECTIOUS DISEASE REFERRAL       Your provider has referred you to: [facility]: [facility] [place] [phone]   [email or web address]  [facility]: [facility] (Infectious Disease and HIV Clinic) - [place] [phone]   [email or web address]    Please be aware that coverage of these services is subject to the terms and limitations of your health insurance plan.  Call member services at your health plan with any benefit or coverage questions.      Please bring the following with you to your appointment:    (1) Any X-Rays, CTs or MRIs which have been performed.  Contact the facility where they were done to arrange for  prior to your scheduled appointment.    (2) List of current medications   (3) This referral request   (4) Any documents/labs given to you for this referral                  Further instructions from your care team       From Ector Campos, Palliative Care NP, Cell [phone]       As you know, your cancer has spread to your liver.  I think because of this, your liver hasn't been able to get rid of the by-products of your long-acting pain  medicine and long-acting Zolpidem/Ambien while you were in the hospital--both meds remained in your body, I believe, and made you very sleepy and somnolent for far longer than the medicines typically do.  This is common in patients with liver disease.  Therefore, you need to STOP the MS Contin (slow release morphine) and slow-release Zolpidem/Ambien.  In time, other doctors may say you can start the meds again, but for now, STOP them both.  Not all drugs are affected when one has liver disease, but both of these drugs are.       For pain, I would suggest you take only the hydrocodone/Vicodin or oxycodone.  Keep in mind that the oxycodone is typically 50% stronger than the Vicodin.     The palliative doctor in the Cancer Center is Dr Ector Cobos.  You have an appointment with him set up for May 9 at 9am.  Call the Cancer Center to reschedule if this is not convenient.      Take ritalin as directed to help keep you alert.  Use metrogel twice daily for vaginal irritation.  Take vancomycin liquid four times a day  Follow up with the infectious disease doctor in month.  Follow up with oncology within two weeks.  Use lovenox twice daily to prevent clots.  Call her primary MD on May 4 to check urine culture result.      As a system [facility] wants to ensure that across the care continuum that you have support through care coordination services that include nurses and social workers in the outpatient setting.  Due to your chronic illness I feel it is important you have this support when you discharge.  They will be calling you within 24-48 hours of your discharge.   A brochure describing the services was provided.  If you have questions you can reach out to your clinic directly and ask for the Care Coordinator assigned to your care.  Anjelica Finn RN, Care Coordinator [phone]      Pending Results     No orders found from 4/21/2018 to 4/24/2018.            Statement of Approval     Ordered          05/03/18 1122  I have  "reviewed and agree with all the recommendations and orders detailed in this document.  EFFECTIVE NOW     Approved and electronically signed by:  Demario Delgado MD             Admission Information     Date & Time Provider Department Dept. Phone    4/23/2018 Osorio Lima MD [facility] [phone]      Your Vitals Were     Blood Pressure Pulse Temperature Respirations Height Weight    113/64 (BP Location: Left arm) 122 99.3  F (37.4  C) (Oral) 18 1.651 m (5' 5\") 89 kg (196 lb 3.4 oz)    Last Period Pulse Oximetry BMI (Body Mass Index)             02/06/2013 93% 32.65 kg/m2         [facility] Information     [facility] gives you secure access to your electronic health record. If you see a primary care provider, you can also send messages to your care team and make appointments. If you have questions, please call your primary care clinic.  If you do not have a primary care provider, please call [phone] and they will assist you.        Care EveryWhere ID     This is your Care EveryWhere ID. This could be used by other organizations to access your [facility] medical records  [id number]        Equal Access to Services     JACKIE MIX : Hadii han Rodriguez, nish jauregui, qagerardo elliott, hari guardado. So [facility] [phone].    ATENCIÓN: Si habla español, tiene a parekh disposición servicios gratuitos de asistencia lingüística. [facility] [phone].    We comply with applicable federal civil rights laws and [place] laws. We do not discriminate on the basis of race, color, national origin, age, disability, sex, sexual orientation, or gender identity.               Review of your medicines      START taking        Dose / Directions    methylphenidate 10 MG tablet   Commonly known as:  RITALIN   Used for:  Carcinoma of right breast metastatic to axillary lymph node (H)        Dose:  10 mg   Take 1 tablet (10 mg) by mouth 2 times daily "   Quantity:  60 tablet   Refills:  0       metroNIDAZOLE 0.75 % vaginal gel   Commonly known as:  METROGEL   Used for:  Vaginitis and vulvovaginitis        Dose:  1 applicator   Place 1 applicator (5 g) vaginally 2 times daily for 5 days   Quantity:  50 g   Refills:  0       order for DME   Used for:  Carcinoma of breast metastatic to liver, unspecified laterality (H)        Equipment being ordered: walker - 4 wheeled walker with seat   Quantity:  1 Device   Refills:  0       vancomycin 50 mg/mL Solr   Commonly known as:  FIRVANQ   Indication:  Clostridium difficile Bacteria   Used for:  C. difficile colitis        Dose:  125 mg   Take 2.5 mLs (125 mg) by mouth 4 times daily for 28 days   Quantity:  280 mL   Refills:  0         CONTINUE these medicines which have NOT CHANGED        Dose / Directions    * albuterol 108 (90 Base) MCG/ACT Inhaler   Commonly known as:  VENTOLIN HFA   Used for:  Moderate persistent asthma, uncomplicated        Dose:  2 puff   Inhale 2 puffs into the lungs every 4 hours as needed   Quantity:  1 Inhaler   Refills:  2       * albuterol (2.5 MG/3ML) 0.083% neb solution   Used for:  Moderate persistent asthma, uncomplicated        Dose:  1 vial   Take 1 vial (2.5 mg) by nebulization every 4 hours as needed for shortness of breath / dyspnea   Quantity:  30 vial   Refills:  3       ALLEGRA ALLERGY 180 MG tablet   Generic drug:  fexofenadine        Dose:  180 mg   Take 180 mg by mouth daily as needed for allergies   Refills:  0       azelastine 0.1 % spray   Commonly known as:  ASTELIN   Used for:  Chronic rhinitis        Dose:  1-2 spray   Spray 1-2 sprays into both nostrils 2 times daily as needed for rhinitis   Quantity:  3 Bottle   Refills:  3       Beclomethasone Dipropionate 80 MCG/ACT Aers Nasal Spray   Used for:  Other chronic rhinitis        Dose:  2 spray   Spray 2 sprays into both nostrils daily   Quantity:  8.7 g   Refills:  3       budesonide-formoterol 160-4.5 MCG/ACT Inhaler    Commonly known as:  SYMBICORT   Used for:  Moderate persistent asthma without complication        INHALE 2 PUFFS INTO THE LUNGS 2 TIMES DAILY   Quantity:  10.2 Inhaler   Refills:  0       CRANBERRY PO        Dose:  1 capsule   Take 1 capsule by mouth daily   Refills:  0       diclofenac 1 % Gel topical gel   Commonly known as:  VOLTAREN   Used for:  Carcinoma of breast metastatic to liver, unspecified laterality (H)        Dose:  2 g   Place 2 g onto the skin 4 times daily   Quantity:  100 g   Refills:  0       diphenhydrAMINE 25 MG tablet   Commonly known as:  BENADRYL   Used for:  Carcinoma of breast metastatic to liver, unspecified laterality (H), Bone metastasis (H), Pericardial effusion, Bilateral malignant neoplasm of breast in female, estrogen receptor positive, unspecified site of breast (H), Carcinoma of right breast metastatic to axillary lymph node (H), Secondary malignant neoplasm of liver (H), Chemotherapy-induced neutropenia (H), Emphysematous bleb of lung (H), Hypothyroidism, unspecified type, Hyperlipidemia LDL goal <130, Moderate persistent asthma without complication, Mucositis due to chemotherapy        Dose:  25 mg   Take 1 tablet (25 mg) by mouth every 8 hours as needed for itching or allergies   Quantity:  1 tablet   Refills:  0       DITROPAN XL 10 MG 24 hr tablet   Used for:  Post-op pain, Carcinoma of right breast metastatic to axillary lymph node (H), Bone metastasis (H), Carcinoma of breast metastatic to liver, unspecified laterality (H), Secondary malignant neoplasm of liver (H)   Generic drug:  oxybutynin        Dose:  10 mg   Take 10 mg by mouth daily   Refills:  0       enoxaparin 100 MG/ML injection   Commonly known as:  LOVENOX   Used for:  Long-term (current) use of anticoagulants, Acute deep vein thrombosis (DVT) of right upper extremity, unspecified vein (H)   Notes to Patient:  4 am and 4 pm-you can tweak this by a hour-so essentially you can take at 5 pm tonight and then 5 am  tomorrow morning.  The next evening you can change it to 6 pm and 6 am if that is a better time for you         Dose:  90 mg   Inject 0.9 mLs (90 mg) Subcutaneous every 12 hours   Quantity:  14 Syringe   Refills:  0       HYDROcodone-acetaminophen 5-325 MG per tablet   Commonly known as:  NORCO   Used for:  Post-op pain, Carcinoma of right breast metastatic to axillary lymph node (H), Bone metastasis (H), Carcinoma of breast metastatic to liver, unspecified laterality (H), Secondary malignant neoplasm of liver (H)        Dose:  1-2 tablet   Take 1-2 tablets by mouth every 4 hours as needed for moderate to severe pain   Quantity:  60 tablet   Refills:  0       KP CALCIUM 600+D3 600-500 MG-UNIT Caps   Generic drug:  calcium carb-cholecalciferol        Dose:  2 tablet   Take 2 tablets by mouth daily   Refills:  0       levothyroxine 25 MCG tablet   Commonly known as:  SYNTHROID/LEVOTHROID   Used for:  Hypothyroidism due to acquired atrophy of thyroid        TAKE 1 TABLET (25 MCG) BY MOUTH DAILY   Quantity:  90 tablet   Refills:  2       loratadine 10 MG tablet   Commonly known as:  CLARITIN        Dose:  10 mg   Take 10 mg by mouth daily as needed for allergies   Refills:  0       metoclopramide 10 MG tablet   Commonly known as:  REGLAN   Used for:  Bilateral malignant neoplasm of breast in female, estrogen receptor positive, unspecified site of breast (H)        Dose:  10 mg   Take 1 tablet (10 mg) by mouth 2 times daily as needed   Quantity:  120 tablet   Refills:  1       order for DME   Used for:  Lymphedema, Lymphedema of breast        Equipment being ordered: JoviPak post-lumpectomy compression pad x2   Quantity:  1 each   Refills:  0       prochlorperazine 10 MG tablet   Commonly known as:  COMPAZINE   Used for:  Secondary malignant neoplasm of liver (H), Carcinoma of breast metastatic to liver, unspecified laterality (H), Bone metastasis (H), Carcinoma of right breast metastatic to axillary lymph node (H),  Bilateral malignant neoplasm of breast in female, estrogen receptor positive, unspecified site of breast (H)        Dose:  10 mg   Take 1 tablet (10 mg) by mouth every 6 hours as needed (Nausea/Vomiting)   Quantity:  30 tablet   Refills:  2       QVAR 80 MCG/ACT Inhaler   Generic drug:  beclomethasone        Dose:  1 puff   Spray 1 puff into both nostrils 2 times daily   Refills:  3       ROBITUSSIN COUGH/COLD CF PO        Dose:  10 mL   Take 10 mLs by mouth as needed   Refills:  0       * Notice:  This list has 2 medication(s) that are the same as other medications prescribed for you. Read the directions carefully, and ask your doctor or other care provider to review them with you.      STOP taking     Lidocaine 4 % Patch   Commonly known as:  LIDOCARE           morphine 15 MG 12 hr tablet   Commonly known as:  MS CONTIN           oxyCODONE IR 5 MG tablet   Commonly known as:  ROXICODONE           warfarin 1 MG tablet   Commonly known as:  COUMADIN           warfarin 2.5 MG tablet   Commonly known as:  COUMADIN           zolpidem 12.5 MG CR tablet   Commonly known as:  AMBIEN CR                Where to get your medicines      These medications were sent to [facility] [place], [place] - [address] [facility]  [address] [facility] [address]     Phone:  [phone]     enoxaparin 100 MG/ML injection    metroNIDAZOLE 0.75 % vaginal gel    vancomycin 50 mg/mL Solr         Some of these will need a paper prescription and others can be bought over the counter. Ask your nurse if you have questions.     Bring a paper prescription for each of these medications     HYDROcodone-acetaminophen 5-325 MG per tablet    methylphenidate 10 MG tablet    order for DME                Protect others around you: Learn how to safely use, store and throw away your medicines at www.disposemymeds.org.        ANTIBIOTIC INSTRUCTION     You've Been Prescribed an Antibiotic - Now What?  Your healthcare team thinks that you or  your loved one might have an infection. Some infections can be treated with antibiotics, which are powerful, life-saving drugs. Like all medications, antibiotics have side effects and should only be used when necessary. There are some important things you should know about your antibiotic treatment.      Your healthcare team may run tests before you start taking an antibiotic.    Your team may take samples (e.g., from your blood, urine or other areas) to run tests to look for bacteria. These test can be important to determine if you need an antibiotic at all and, if you do, which antibiotic will work best.      Within a few days, your healthcare team might change or even stop your antibiotic.    Your team may start you on an antibiotic while they are working to find out what is making you sick.    Your team might change your antibiotic because test results show that a different antibiotic would be better to treat your infection.    In some cases, once your team has more information, they learn that you do not need an antibiotic at all. They may find out that you don't have an infection, or that the antibiotic you're taking won't work against your infection. For example, an infection caused by a virus can't be treated with antibiotics. Staying on an antibiotic when you don't need it is more likely to be harmful than helpful.      You may experience side effects from your antibiotic.    Like all medications, antibiotics have side effects. Some of these can be serious.    Let you healthcare team know if you have any known allergies when you are admitted to the hospital.    One significant side effect of nearly all antibiotics is the risk of severe and sometimes deadly diarrhea caused by Clostridium difficile (C. Difficile). This occurs when a person takes antibiotics because some good germs are destroyed. Antibiotic use allows C. diificile to take over, putting patients at high risk for this serious infection.    As a  patient or caregiver, it is important to understand your or your loved one's antibiotic treatment. It is especially important for caregivers to speak up when patients can't speak for themselves. Here are some important questions to ask your healthcare team.    What infection is this antibiotic treating and how do you know I have that infection?    What side effects might occur from this antibiotic?    How long will I need to take this antibiotic?    Is it safe to take this antibiotic with other medications or supplements (e.g., vitamins) that I am taking?     Are there any special directions I need to know about taking this antibiotic? For example, should I take it with food?    How will I be monitored to know whether my infection is responding to the antibiotic?    What tests may help to make sure the right antibiotic is prescribed for me?      Information provided by:  www.cdc.gov/getsmart  U.S. Department of Health and Human Services  Centers for disease Control and Prevention  National Center for Emerging and Zoonotic Infectious Diseases  Division of Healthcare Quality Promotion        Information about OPIOIDS     PRESCRIPTION OPIOIDS: WHAT YOU NEED TO KNOW   You have a prescription for an opioid (narcotic) pain medicine. Opioids can cause addiction. If you have a history of chemical dependency of any type, you are at a higher risk of becoming addicted to opioids. Only take this medicine after all other options have been tried. Take it for as short a time and as few doses as possible.     Do not:    Drive. If you drive while taking these medicines, you could be arrested for driving under the influence (DUI).    Operate heavy machinery    Do any other dangerous activities while taking these medicines.     Drink any alcohol while taking these medicines.      Take with any other medicines that contain acetaminophen. Read all labels carefully. Look for the word  acetaminophen  or  Tylenol.  Ask your pharmacist if  you have questions or are unsure.    Store your pills in a secure place, locked if possible. We will not replace any lost or stolen medicine. If you don t finish your medicine, please throw away (dispose) as directed by your pharmacist. The [place] [employer] has more information about safe disposal: https://www.pca.Formerly [facility] 1928–1983.mn.us/living-green/managing-unwanted-medications    All opioids tend to cause constipation. Drink plenty of water and eat foods that have a lot of fiber, such as fruits, vegetables, prune juice, apple juice and high-fiber cereal. Take a laxative (Miralax, milk of magnesia, Colace, Senna) if you don t move your bowels at least every other day.              Medication List: This is a list of all your medications and when to take them. Check marks below indicate your daily home schedule. Keep this list as a reference.      Medications           Morning Afternoon Evening Bedtime As Needed    * albuterol 108 (90 Base) MCG/ACT Inhaler   Commonly known as:  VENTOLIN HFA   Inhale 2 puffs into the lungs every 4 hours as needed   Last time this was given:  2 puffs on 4/25/2018  3:22 PM                                   * albuterol (2.5 MG/3ML) 0.083% neb solution   Take 1 vial (2.5 mg) by nebulization every 4 hours as needed for shortness of breath / dyspnea                                   ALLEGRA ALLERGY 180 MG tablet   Take 180 mg by mouth daily as needed for allergies   Generic drug:  fexofenadine                                   azelastine 0.1 % spray   Commonly known as:  ASTELIN   Spray 1-2 sprays into both nostrils 2 times daily as needed for rhinitis                                   Beclomethasone Dipropionate 80 MCG/ACT Aers Nasal Spray   Spray 2 sprays into both nostrils daily   Last time this was given:  2 sprays on 5/3/2018  7:42 AM                                   budesonide-formoterol 160-4.5 MCG/ACT Inhaler   Commonly known as:  SYMBICORT   INHALE 2 PUFFS INTO THE LUNGS 2  TIMES DAILY                                      CRANBERRY PO   Take 1 capsule by mouth daily                                   diclofenac 1 % Gel topical gel   Commonly known as:  VOLTAREN   Place 2 g onto the skin 4 times daily                                            diphenhydrAMINE 25 MG tablet   Commonly known as:  BENADRYL   Take 1 tablet (25 mg) by mouth every 8 hours as needed for itching or allergies                                   DITROPAN XL 10 MG 24 hr tablet   Take 10 mg by mouth daily   Last time this was given:  10 mg on 5/3/2018  7:39 AM   Generic drug:  oxybutynin                                   enoxaparin 100 MG/ML injection   Commonly known as:  LOVENOX   Inject 0.9 mLs (90 mg) Subcutaneous every 12 hours   Last time this was given:  90 mg on 5/3/2018  3:59 AM   Notes to Patient:  4 am and 4 pm-you can tweak this by a hour-so essentially you can take at 5 pm tonight and then 5 am tomorrow morning.  The next evening you can change it to 6 pm and 6 am if that is a better time for you                                       HYDROcodone-acetaminophen 5-325 MG per tablet   Commonly known as:  NORCO   Take 1-2 tablets by mouth every 4 hours as needed for moderate to severe pain   Last time this was given:  1 tablet on 4/30/2018  8:43 AM                                   KP CALCIUM 600+D3 600-500 MG-UNIT Caps   Take 2 tablets by mouth daily   Generic drug:  calcium carb-cholecalciferol                                   levothyroxine 25 MCG tablet   Commonly known as:  SYNTHROID/LEVOTHROID   TAKE 1 TABLET (25 MCG) BY MOUTH DAILY   Last time this was given:  25 mcg on 5/3/2018  6:51 AM                                   loratadine 10 MG tablet   Commonly known as:  CLARITIN   Take 10 mg by mouth daily as needed for allergies                                   methylphenidate 10 MG tablet   Commonly known as:  RITALIN   Take 1 tablet (10 mg) by mouth 2 times daily   Last time this was given:  10 mg on  5/3/2018  7:40 AM                                      metoclopramide 10 MG tablet   Commonly known as:  REGLAN   Take 1 tablet (10 mg) by mouth 2 times daily as needed                                   metroNIDAZOLE 0.75 % vaginal gel   Commonly known as:  METROGEL   Place 1 applicator (5 g) vaginally 2 times daily for 5 days   Last time this was given:  5/3/2018  7:43 AM                                   order for DME   Equipment being ordered: JoviPak post-lumpectomy compression pad x2                                order for DME   Equipment being ordered: walker - 4 wheeled walker with seat                                prochlorperazine 10 MG tablet   Commonly known as:  COMPAZINE   Take 1 tablet (10 mg) by mouth every 6 hours as needed (Nausea/Vomiting)                                   QVAR 80 MCG/ACT Inhaler   Spray 1 puff into both nostrils 2 times daily   Generic drug:  beclomethasone                                      ROBITUSSIN COUGH/COLD CF PO   Take 10 mLs by mouth as needed                                   vancomycin 50 mg/mL Solr   Commonly known as:  FIRVANQ   Take 2.5 mLs (125 mg) by mouth 4 times daily for 28 days   Last time this was given:  125 mg on 5/3/2018  7:39 AM                                            * Notice:  This list has 2 medication(s) that are the same as other medications prescribed for you. Read the directions carefully, and ask your doctor or other care provider to review them with you.

## 2018-04-23 NOTE — IP AVS SNAPSHOT
[facility]    [address] [address]    Phone:  [phone]    Fax:  [phone]                                       After Visit Summary   4/23/2018    Etta Cervantes    MRN: [id number]           After Visit Summary Signature Page     I have received my discharge instructions, and my questions have been answered. I have discussed any challenges I see with this plan with the nurse or doctor.    ..........................................................................................................................................  Patient/Patient Representative Signature      ..........................................................................................................................................  Patient Representative Print Name and Relationship to Patient    ..................................................               ................................................  Date                                            Time    ..........................................................................................................................................  Reviewed by Signature/Title    ...................................................              ..............................................  Date                                                            Time

## 2018-04-23 NOTE — H&P
[facility]    History and Physical  Hospital Medicine       Date of Admission:  4/23/2018  Date of Service: 4/23/2018     Assessment & Plan   Etta Cervantes is a 57 year old female who presents on 4/23/2018 with a 48 hour history of fatigue, malaise, nausea, emesis, and diarrhea.    Principal Problem:    Acute cystitis - Urine micro is consistent, with culture pending. Previous urine cultures have grown E coli and K oxytoca, neither of which have shown extended resistance.  - Empiric CTAX underway.  - Await urine and blood cultures.  - Observe to see if admitting symptoms improve with treatment or urine alone, or if some systemic symptoms may be due to new chemo regimen started 4/20/18.    Active Problems:    Breast cancer metastasized to axillary lymph node (H) - also metastatic to bone and liver.  Chemo regimen switched to carboplatin and Gemzar (with methylprednisolone pretreatment), first treatment 4/20/18.  - Patient scheduled for next chemo 4/27/18.      Elevated liver enzymes - not new, but all values trending upward over the past month.  Probably due to liver metastasis.  - Avoid hepatotoxins.  - Trend LFT's.      Acute deep vein thrombosis (DVT) of right upper extremity, unspecified vein (H) - Has been treated since late 12/2017, most recently with warfarin, as patient told me she had side effects or lab abnormalities to the use of enoxaparin.  Warfarin has been on hold the past 3 or 4 days at the direction of Dr. Cassidy, Oncology, due to prolonged INR, see below.  Palpation of right arm reveals no cords, and there is no redness or swelling.  - Anticoagulation will remain on hold at least until INR normalizes.  However, now that she has completed 4 months anticoagulation, I'm not convinced that it is necessary to resume anticoagulation, particularly for an upper extremity clot which has a significantly lower risk of embolization compared to lower extremity.      Prolonged INR -  "was on warfarin, though warfarin was stopped 3 or 4 days prior to admission due to prolonged INR.  INR remains 5.19 despite this.  May reflect the effect of high volume of metastatic disease in the liver.  - See discussion above regarding treatment of RUE DVT.      Diarrhea of presumed infectious origin - Has had some element of diarrhea for \"weeks\", more severe for the past 48 hours.  Has a history of recurrent C diff, most recent antibiotic therapy being approximately 1/2018.  - Enteric isolation.  - Stool for C diff.      Secondary hypotension - volume depleted due to 48 hour illness, BP intially 83 syst, improved to normal after 1 L IVF.  - Continue maintenance IVF until eating and drinking more normally.      Low-voltage EKG - noted on admission.  Note made also of bilateral pleural effusions.  - Will send echo to exclude pericardial effusion.      DVT Prophylaxis: Not needed due to INR 5.19.  Code Status: Full Code    Lines: Peripheral IV.  Jamison catheter: Not needed.  Restraints: None.    Disposition: Anticipate discharge in 2 to 3 day(s). Appropriate for inpatient care.    Demario Viveros MD         Primary Care Physician   Johana Fairbanks [phone]    History is obtained from the patient, her , and review of old records via the EMR.    Past Medical History      Past Medical History:   Diagnosis Date     ASCUS favor benign 1/2015    Neg HPV     Cancer (H)      Cataract 2010    surgery both eyes     Emphysematous bleb of lung (H)      Fibroids      Hypercholesterolemia      Hypothyroid      Incontinence of urine     mixed     Menorrhagia      Obesity      Pneumothorax      PONV (postoperative nausea and vomiting)      Sleep apnea     uses a cpap     Uncomplicated asthma        Past Surgical History     Past Surgical History:   Procedure Laterality Date     BIOPSY  Jan 2015, 2016    Breast cancer, thyroid     BRONCHOSCOPY, INSERT BRONCHIAL VALVE N/A 7/20/2017    Procedure: BRONCHOSCOPY, INSERT " BRONCHIAL VALVE;  Flexible Bronchoscopy, Endobronchial Valve Insertion X5. 4 Valves into right upper lobe and 1 valve into Right middle lobe;  Surgeon: Carlos Mcgowan MD;  [facility]     BRONCHOSCOPY, REMOVE BRONCHIAL VALVE N/A 9/7/2017    Procedure: BRONCHOSCOPY, REMOVE BRONCHIAL VALVE;  Flexible Bronchoscopy, Removal Of Endobronchial Valves x 2;  Surgeon: Carlos Mcgowan MD;  Location: [facility]     BRONCHOSCOPY, REMOVE BRONCHIAL VALVE N/A 9/28/2017    Procedure: BRONCHOSCOPY, REMOVE BRONCHIAL VALVE;  Flexible Bronchoscopy, Removal Of Intra-Bronchial Valves x 3;  Surgeon: Carlos Mcgowan MD;  Location:  OR     CATARACT IOL, RT/LT  2010     COLONOSCOPY  2010     DILATION AND CURETTAGE, HYSTEROSCOPY, ABLATE ENDOMETRIUM NOVASURE, COMBINED  3/2/2011    COMBINED DILATION AND CURETTAGE, HYSTEROSCOPY, ABLATE ENDOMETRIUM NOVASURE performed by LAURA VARGAS at [facility]     ENDOSCOPIC RETROGRADE CHOLANGIOPANCREATOGRAM N/A 12/8/2017    Procedure: COMBINED ENDOSCOPIC RETROGRADE CHOLANGIOPANCREATOGRAPHY, PLACE TUBE/STENT;  Endoscopic Retrograde Cholangiopancreatogram with biliary sphincterotomy and stent placement;  Surgeon: Guru Coleen Mora MD;  Location:  OR     GENITOURINARY SURGERY  2005    Uretha sling     INSERT PORT VASCULAR ACCESS N/A 2/12/2015    Procedure: INSERT PORT VASCULAR ACCESS;  Surgeon: Noé Schaefer MD;  Location: [facility]     INSERT PORT VASCULAR ACCESS N/A 1/10/2018    Procedure: INSERT PORT VASCULAR ACCESS;  Portacath replacement;  Surgeon: Remi De La Paz MD;  Location: [facility]     SURGICAL HISTORY OF -   2005    bladder sling     SURGICAL HISTORY OF -       Cystectomy-lower lip     TUBAL LIGATION  1987        History of Present Illness   Etta Cervantes is a 57 year old female who presents on 4/23/2018 with a 48 hour history of fatigue, malaise, nausea, emesis, and diarrhea.    The patient is seen here in the [facility] emergency department  accompanied by her .  She has a history of breast cancer metastatic to axillary lymph nodes, liver, and bone.  She has had evidence of disease progression both clinically, radiographically, and by tumor markers, over the last few months.  As result, in consultation with her oncologist, Dr. Cassidy, a change was made in her chemotherapeutic regimen to carboplatin and Gemzar, along with pretreatment with methylprednisolone.  The patient received her first chemotherapy treatment almost 72 hours ago, on 4/20/2018.  She tolerated the infusion without incident, though became ill the next day.  Her predominant symptoms have been nausea, emesis, diarrhea, fatigue, and malaise.  She has not measured any fever at home.  She reports mild dysuria at the level of the urethra, but does not notice any flank pain.    On arrival in the emergency department, the patient was hypotensive, with a systolic blood pressure of 83.  Hypotension has corrected with the administration of 1 L IV fluid.  Labs have revealed a urine microscopic exam consistent with urinary tract infection.  She is now being admitted for treatment of what appears to be a UTI.    Prior to Admission Medications   Prior to Admission Medications   Prescriptions Last Dose Informant Patient Reported? Taking?   Beclomethasone Dipropionate 80 MCG/ACT AERS Nasal [place] 4/23/2018 at am Self No Yes   Sig: Spray 2 sprays into both nostrils daily   CRANBERRY PO 4/23/2018 at am Self Yes Yes   Sig: Take 1 capsule by mouth daily    HYDROcodone-acetaminophen (NORCO) 5-325 MG per tablet Past Month at Unknown time Self No Yes   Sig: Take 1-2 tablets by mouth every 4 hours as needed for moderate to severe pain   Lidocaine (LIDOCARE) 4 % Patch More than a month at Unknown time Self No No   Sig: Place 1-2 patches onto the skin every 24 hours   Patient not taking: Reported on 1/24/2018   Phenylephrine-DM-GG (ROBITUSSIN COUGH/COLD CF PO) 4/23/2018 at 1200 Self Yes Yes   Sig: Take 10  mLs by mouth as needed    QVAR 80 MCG/ACT Inhaler 4/23/2018 at am Self Yes Yes   Sig: Spray 1 puff into both nostrils 2 times daily   albuterol (2.5 MG/3ML) 0.083% neb solution More than a month at Unknown time Self No No   Sig: Take 1 vial (2.5 mg) by nebulization every 4 hours as needed for shortness of breath / dyspnea   Patient not taking: Reported on 3/21/2018   albuterol (VENTOLIN HFA) 108 (90 BASE) MCG/ACT inhaler More than a month at Unknown time Self No No   Sig: Inhale 2 puffs into the lungs every 4 hours as needed   Patient not taking: Reported on 3/21/2018   azelastine (ASTELIN) 0.1 % nasal spray Past Month at Unknown time Self No Yes   Sig: Spray 1-2 sprays into both nostrils 2 times daily as needed for rhinitis   budesonide-formoterol (SYMBICORT) 160-4.5 MCG/ACT Inhaler 4/23/2018 at am Self No Yes   Sig: INHALE 2 PUFFS INTO THE LUNGS 2 TIMES DAILY   calcium carb-cholecalciferol ( CALCIUM 600+D3) 600-500 MG-UNIT CAPS 4/23/2018 at am Self Yes Yes   Sig: Take 2 tablets by mouth daily   diclofenac (VOLTAREN) 1 % GEL topical gel More than a month at Unknown time Self No No   Sig: Place 2 g onto the skin 4 times daily   diphenhydrAMINE (BENADRYL) 25 MG tablet More than a month at Unknown time Self No No   Sig: Take 1 tablet (25 mg) by mouth every 8 hours as needed for itching or allergies   enoxaparin (LOVENOX) 100 MG/ML injection Past Month at Unknown time Self No Yes   Sig: Inject 0.9 mLs (90 mg) Subcutaneous every 12 hours   fexofenadine (ALLEGRA ALLERGY) 180 MG tablet 4/23/2018 at am Self Yes Yes   Sig: Take 180 mg by mouth daily as needed for allergies   fluticasone (FLONASE) 50 MCG/ACT spray More than a month at Unknown time Self No No   Sig: Spray 1-2 sprays into both nostrils daily   Patient not taking: Reported on 3/21/2018   levothyroxine (SYNTHROID/LEVOTHROID) 25 MCG tablet 4/23/2018 at am Self No Yes   Sig: TAKE 1 TABLET (25 MCG) BY MOUTH DAILY   loratadine (CLARITIN) 10 MG tablet More than a  month at Unknown time Self Yes No   Sig: Take 10 mg by mouth daily as needed for allergies   metoclopramide (REGLAN) 10 MG tablet More than a month at Unknown time Self No No   Sig: Take 1 tablet (10 mg) by mouth 2 times daily as needed   Patient not taking: Reported on 3/30/2018   morphine (MS CONTIN) 15 MG 12 hr tablet 4/23/2018 at 0830 Self No Yes   Sig: Take 1 tablet (15 mg) by mouth every 12 hours maximum 2 tablet(s) per day   order for DME  Self No No   Sig: Equipment being ordered: JoviPak post-lumpectomy compression pad x2   oxyCODONE IR (ROXICODONE) 5 MG tablet Past Month at Unknown time Self No Yes   Sig: Take 1 tablet (5 mg) by mouth every 4 hours as needed for moderate to severe pain   oxybutynin (DITROPAN XL) 10 MG 24 hr tablet 4/23/2018 at am Self Yes Yes   Sig: Take 10 mg by mouth daily   prochlorperazine (COMPAZINE) 10 MG tablet 4/23/2018 at 1230 Self No Yes   Sig: Take 1 tablet (10 mg) by mouth every 6 hours as needed (Nausea/Vomiting)   vancomycin (VANCOCIN) 125 MG capsule dc-done Self No No   Sig: Take 1 capsule (125 mg) by mouth 4 times daily   warfarin (COUMADIN) 1 MG tablet More than a month at Unknown time Self Yes No   Sig: HOLD UNTIL DIRECTED OTHERWISE   warfarin (COUMADIN) 2.5 MG tablet More than a month at Unknown time Self Yes No   Sig: HOLD UNTIL DIRECTED OTHERWISE   zolpidem (AMBIEN CR) 12.5 MG CR tablet 4/22/2018 at hs Self No Yes   Sig: Take 1 tablet by mouth at bed time.   Patient taking differently: Take 12.5 mg by mouth At Bedtime       Facility-Administered Medications: None     Allergies   Allergies   Allergen Reactions     Animal Dander Cough     Itchy eyes     Cats      Dust Mites Cough     Itchy eyes     Pollen Extract Other (See Comments)     Cough, Itchy eyes     Seasonal Allergies      Levaquin [Levofloxacin] Rash     States she has violent dreams from taking this       Family History    Family History   Problem Relation Age of Onset     Depression Mother      Eye Disorder  "Mother      Gynecology Mother      Alzheimer Disease Mother      CANCER Father      Other Cancer Father      HEART DISEASE Maternal Grandfather      Allergies Paternal Grandfather      Allergies Son        Social History   Social History     Social History     Marital status:      Spouse name: Lucian Cervantes     Number of children: 2     Years of education: 15+     Occupational History     Customer Service [employer]     Social History Main Topics     Smoking status: Never Smoker     Smokeless tobacco: Never Used     Alcohol use No     Drug use: No     Sexual activity: Yes     Partners: Male     Birth control/ protection: Post-menopausal     Other Topics Concern     Parent/Sibling W/ Cabg, Mi Or Angioplasty Before 65f 55m? No     Social History Narrative       Review of Systems   ROS:  CONSTITUTIONAL: NEGATIVE for chills, fever, sweats.  EYES: NEGATIVE for visual changes, eye irritation.  ENT/MOUTH: NEGATIVE for nasal congestion, postnasal drainage or sinus pressure  RESP: Notable for non-productive cough present for several months, not changed.  NEGATIVE for dyspnea, wheeze, or respiratory chest pain.   CV: NEGATIVE for chest pain, palpitations, orthopnea, or lower extremity edema.  GI: NEGATIVE for difficulties swallowing, abdominal pain.  : See above.  MUSCULOSKELETAL: NEGATIVE for back pain or joint swelling.  NEURO: NEGATIVE for focal numbness or weakness, syncope, stroke or seizure.  PSYCHIATRIC: NEGATIVE for anxiety and depression, panic, or recent change in mood.      Physical Exam   /61  Temp 97.3  F (36.3  C) (Temporal)  Resp 18  Ht 1.651 m (5' 5\")  Wt 79.8 kg (176 lb)  LMP 02/06/2013  SpO2 94%  BMI 29.29 kg/m2     Weight: 176 lbs 0 oz Body mass index is 29.29 kg/(m^2).     GENERAL: Ill-appearing woman seen in ED.  EYES: Eyes grossly normal to inspection, extraocular movements intact  HENT: Nares patent bilaterally.  Nasal mucosa normal, no discharge.   NECK: Trachea midline, no " stridor.    RESP: No accessory muscle use.  Symmetrical breath sounds.  Aside from a few dependent crackles on the the left, lungs are clear throughout on inspiration.  Expiration not prolonged, no wheeze.  CV: Regular rate and rhythm, non-tachycardic.  Normal S1 S2, no murmur or extra sound.  No lower extremity edema.  ABDOMEN: Soft, non-tender, no guarding.  Liver edge is palpable about 2 cm below the right costal margin and is mildly tender.  Spleen is not enlarged, no masses palpable.  Bowel sounds positive.  MS: No bony deformities noted.  No red or inflamed joints.  SKIN: Warm and dry, no rashes where skin visible.  NEURO: Alert, oriented, conversant.  Cranial nerves II - XII grossly intact.  No gross motor or sensory deficits.    PSYCH: Calm, alert, conversant.  Able to articulate logical thoughts, no tangential thoughts, no hallucinations or delusions.          Data   Data reviewed today:     Recent Labs  Lab 04/23/18  1350 04/20/18  0845   WBC 11.1* 14.9*   HGB 11.4* 12.6   MCV 86 86    329   INR 5.19* 5.05*    135   POTASSIUM 3.4 3.5   CHLORIDE 102 101   CO2 26 23   BUN 25 20   CR 0.69 0.77   ANIONGAP 9 11   BEN 8.1* 8.8   * 133*   ALBUMIN 2.4* 2.8*   PROTTOTAL 6.5* 8.1   BILITOTAL 1.4* 1.5*   ALKPHOS 395* 414*   ALT 49 36   * 185*   LIPASE 407*  --    TROPI <0.015  --        Recent Results (from the past 24 hour(s))   Chest XR,  PA & LAT    Narrative    XR CHEST 2 VW 4/23/2018 3:39 PM    HISTORY: Cough.    COMPARISON: Several prior studies, the most recent one being dated  1/10/2018..      Impression    IMPRESSION: 2 views of the chest show low lung volumes. This is  suspected to at least in part related to subpulmonic pleural effusions  which the patient has had before. Pulmonary vessels are modestly  congested. No focal consolidation is seen.    LETI CHASE MD       I personally reviewed the EKG tracing showing sinus rhythm and low voltage, without acute ST or T changes, and  the chest x-ray image(s) showing small bilateral pleural effusions which are not new, but no acute infiltrate..    I spent 45 minutes on this admission, 25 of which were spent in care coordination and counseling.    Demario Viveros

## 2018-04-23 NOTE — LETTER
Transition Communication Hand-off for Care Transitions to Next Level of Care Provider    Name: Etta Cervantes  : 1960  MRN #: [id number]  Primary Care Provider: Johana Fairbanks  Primary Care MD Name: Dr. Fairbanks  [facility] [address] [facility] [facility] [address]  [facility] Name: [facility]  Reason for Hospitalization:  Urinary tract infection [N39.0]  Admit Date/Time: 2018  1:24 PM  Discharge Date: 2018  Payor Source: Payor: MEDICA / Plan: MEDICA [employer] EMPLOYER / Product Type: HMO /     Readmission Assessment Measure (BENNETT) Risk Score/category: Average         Reason for Communication Hand-off Referral: Per palliative care pt cont to want to pursue Chemotherapy.      Discharge Plan:Plan for pt to return home with spouse and have [facility] ([phone] Fax: [phone]).      Concern for non-adherence with plan of care:   Y/N no    Follow-up specialty is recommended: Yes    Follow-up plan:  Future Appointments  Date Time Provider Department Center   2018 9:00 AM Ector Cobos MD [facility]   2018 9:30 AM ROOM 7 [facility]   2018 8:00 AM ROOM 1 [facility]   2018 2:30 PM Bailey Santana MD [facility]      Key Recommendations:  Per palliative care pt cont to want to pursue Chemotherapy.  is her care giver.    Lilly Nicolas    AVS/Discharge Summary is the source of truth; this is a helpful guide for improved communication of patient story

## 2018-04-23 NOTE — LETTER
Transition Communication Hand-off for Care Transitions to Next Level of Care Provider    Name: Etta Cervantes  : 1960  MRN #: [id number]  Primary Care Provider: Johana Fairbanks  Primary Care MD Name: Dr. Fairbanks  Primary Clinic: [address] [facility] [address]  Primary Care Clinic Name: [facility]  Reason for Hospitalization:  Urinary tract infection [N39.0]  Admit Date/Time: 2018  1:24 PM  Discharge Date: 5/3/18  Payor Source: Payor: MEDICA / Plan: MEDICA [employer] EMPLOYER / Product Type: HMO /     Readmission Assessment Measure (BENNETT) Risk Score/category: Average        Reason for Communication Hand-off Referral: Home Care and chronic illness    Discharge Plan:  Home with [facility] ([phone] Fax: [phone])       Concern for non-adherence with plan of care:   Y/N No  Discharge Needs Assessment:  Needs       Most Recent Value    Transportation Available family or friend will provide        Follow-up plan:  Future Appointments  Date Time Provider Department Center   2018 9:00 AM Ector Cobos MD [facility]   2018 9:30 AM ROOM 7 [facility]   2018 8:00 AM ROOM 1 [facility]   2018 2:30 PM Bailey Santana MD [facility]             Key Recommendations:   Patient stated, she needs assist with showering at home.   is able to assist as needed and is supportive.  Agreed to clinic care coordination.        Anjelica Finn RN, Care Coordinator    AVS/Discharge Summary is the source of truth; this is a helpful guide for improved communication of patient story

## 2018-04-23 NOTE — ED PROVIDER NOTES
History     Chief Complaint   Patient presents with     Generalized Weakness     chemo treatment for breast cancer with mets.  decreased appetite, diarrhea, weakness, nausea for the weekend.     HPI  Etta Cervantes is a 57 year old female with past medical history significant for breast cancer with metastases to the liver and bone, obstructive sleep apnea, hypothyroidism, asthma, emphysematous bleb of lung who presents to the emergency department with concern over generalized weakness, fatigue, nausea and vomiting which is been ongoing for the last 3 days.  Patient states that she had chemotherapy on Friday, 4/20/18.  She received 2 new medications at that time per her report however she is unsure the name of them over the next 24 hours she developed nausea, 2-3 episodes of emesis daily, 3-4 episodes of diarrhea daily as well as, fatigue, generalized weakness.  She has had ongoing cough, nasal congestion as well as shortness of breath, wheezing.  She denies any actual fever, chest pain, palpitations, abdominal pain, melena, hematochezia, dysuria, increased frequency or urgency.  She did attempt to treat vomiting with compazine immediately prior to arrival.  She denies any close contacts with GI symptoms.  No suspected bad food exposures.  No recent travel.    Problem List:    Patient Active Problem List    Diagnosis Date Noted     Secondary malignant neoplasm of liver (H) 04/22/2015     Priority: High     Carcinoma of breast metastatic to liver (H) 02/04/2015     Priority: High     Diagnosis updated by automated process. Provider to review and confirm.       Bone metastasis (H) 02/04/2015     Priority: High     Breast cancer metastasized to axillary lymph node (H) 02/04/2015     Priority: High     Long-term (current) use of anticoagulants [Z79.01] 01/26/2018     Priority: Medium     Acute deep vein thrombosis (DVT) of right upper extremity, unspecified vein (H) 01/17/2018     Priority: Medium     Thrush 12/27/2017      Priority: Medium     C. difficile colitis 12/27/2017     Priority: Medium     Pleural effusion 12/21/2017     Priority: Medium     Cancer associated pain 12/14/2017     Priority: Medium     Biliary obstruction 12/07/2017     Priority: Medium     Elevated liver enzymes 12/01/2017     Priority: Medium     Pneumothorax 07/12/2017     Priority: Medium     Acute pyelonephritis 07/12/2017     Priority: Medium     Hyperlipidemia LDL goal <130 05/18/2017     Priority: Medium     Chemotherapy-induced neutropenia (H) 10/12/2016     Priority: Medium     Thyroid nodule 05/14/2015     Priority: Medium     Emphysematous bleb of lung (H) 05/14/2015     Priority: Medium     Mucositis due to chemotherapy 02/19/2015     Priority: Medium     Drug-related hair loss 02/11/2015     Priority: Medium     Breast cancer (H) 01/23/2015     Priority: Medium     ASCUS favor benign 01/06/2015     Priority: Medium     1/6/15: Pap - ASCUS, Neg HPV. Plan pap/hpv in 3 years.        DIAMOND (obstructive sleep apnea) 01/24/2012     Priority: Medium     AHI 44       Insomnia 01/24/2012     Priority: Medium     Problem list name updated by automated process. Provider to review       Moderate persistent asthma 03/28/2011     Priority: Medium     Menorrhagia 02/24/2011     Priority: Medium     CARDIOVASCULAR SCREENING; LDL GOAL LESS THAN 160 10/31/2010     Priority: Medium     Hypothyroid      Priority: Medium     Fibroids      Priority: Medium     Health Care Home 05/28/2013     Priority: Low     EMERGENCY CARE PLAN  May 28, 2013: No current Care Coordination follow up planned. Please refer if Care Coordination services are needed.    Presenting Problem Signs and Symptoms Treatment Plan   Questions or concerns   during clinic hours   I will call my clinic directly:  [place] [address] [place], [place] [id number] [phone].    Questions or concerns outside clinic hours   I will call the 24 hour nurse line at   [phone] or  [facility].   Need to schedule an appointment   I will call the 24 hour scheduling team at [phone] or my clinic directly at [phone].    Same day treatment     I will call my clinic first, nurse line if after hours, urgent care and express care if needed.   Clinic care coordination services (regular clinic hours)     I will call a clinic care coordinator directly:     Mannie Blevins RN  Mon, Tues, Fri - [phone]  Wed, Thurs - [phone]    Barbara Burk SW:    [phone]    Or call my clinic at [phone] and ask to speak with care coordination.   Crisis Services: Behavioral or Mental Health  [facility] 24 Hour Phone Line  [phone]    [facility] 24 Hour Crisis Services  [phone]    [facility] [phone]    [facility]   [phone]       Emergency treatment -- Immediately    [facility]All 821             Past Medical History:    Past Medical History:   Diagnosis Date     ASCUS favor benign 1/2015     Cancer (H)      Cataract 2010     Emphysematous bleb of lung (H)      Fibroids      Hypercholesterolemia      Hypothyroid      Incontinence of urine      Menorrhagia      Obesity      Pneumothorax      PONV (postoperative nausea and vomiting)      Sleep apnea      Uncomplicated asthma      Past Surgical History:    Past Surgical History:   Procedure Laterality Date     BIOPSY  Jan 2015, 2016    Breast cancer, thyroid     BRONCHOSCOPY, INSERT BRONCHIAL VALVE N/A 7/20/2017    Procedure: BRONCHOSCOPY, INSERT BRONCHIAL VALVE;  Flexible Bronchoscopy, Endobronchial Valve Insertion X5. 4 Valves into right upper lobe and 1 valve into Right middle lobe;  Surgeon: Carlos Mcgowan MD;  Location: [facility] OR     BRONCHOSCOPY, REMOVE BRONCHIAL VALVE N/A 9/7/2017    Procedure: BRONCHOSCOPY, REMOVE BRONCHIAL VALVE;  Flexible Bronchoscopy, Removal Of Endobronchial Valves x 2;  Surgeon: Carlos Mcgowan MD;  Location: [facility] OR     BRONCHOSCOPY, REMOVE  BRONCHIAL VALVE N/A 9/28/2017    Procedure: BRONCHOSCOPY, REMOVE BRONCHIAL VALVE;  Flexible Bronchoscopy, Removal Of Intra-Bronchial Valves x 3;  Surgeon: Carlos Mcgowan MD;  Location:  OR     CATARACT IOL, RT/LT  2010     COLONOSCOPY  2010     DILATION AND CURETTAGE, HYSTEROSCOPY, ABLATE ENDOMETRIUM NOVASURE, COMBINED  3/2/2011    COMBINED DILATION AND CURETTAGE, HYSTEROSCOPY, ABLATE ENDOMETRIUM NOVASURE performed by LAURA VARGAS at [facility]     ENDOSCOPIC RETROGRADE CHOLANGIOPANCREATOGRAM N/A 12/8/2017    Procedure: COMBINED ENDOSCOPIC RETROGRADE CHOLANGIOPANCREATOGRAPHY, PLACE TUBE/STENT;  Endoscopic Retrograde Cholangiopancreatogram with biliary sphincterotomy and stent placement;  Surgeon: Guru Coleen Mora MD;  Location:  OR     GENITOURINARY SURGERY  2005    Uretha sling     INSERT PORT VASCULAR ACCESS N/A 2/12/2015    Procedure: INSERT PORT VASCULAR ACCESS;  Surgeon: Noé Schaefer MD;  Location: [facility]     INSERT PORT VASCULAR ACCESS N/A 1/10/2018    Procedure: INSERT PORT VASCULAR ACCESS;  Portacath replacement;  Surgeon: Remi De La Paz MD;  Location: [facility]     SURGICAL HISTORY OF -   2005    bladder sling     SURGICAL HISTORY OF -       Cystectomy-lower lip     TUBAL LIGATION  1987     Family History:    Family History   Problem Relation Age of Onset     Depression Mother      Eye Disorder Mother      Gynecology Mother      Alzheimer Disease Mother      CANCER Father      Other Cancer Father      HEART DISEASE Maternal Grandfather      Allergies Paternal Grandfather      Allergies Son      Social History:  Marital Status:   [2]  Social History   Substance Use Topics     Smoking status: Never Smoker     Smokeless tobacco: Never Used     Alcohol use No      Medications:      prochlorperazine (COMPAZINE) 10 MG tablet   albuterol (2.5 MG/3ML) 0.083% neb solution   albuterol (VENTOLIN HFA) 108 (90 BASE) MCG/ACT inhaler   azelastine (ASTELIN) 0.1 % nasal spray    Beclomethasone Dipropionate 80 MCG/ACT AERS Nasal Spray   budesonide-formoterol (SYMBICORT) 160-4.5 MCG/ACT Inhaler   calcium carb-cholecalciferol ( CALCIUM 600+D3) 600-500 MG-UNIT CAPS   CRANBERRY PO   diclofenac (VOLTAREN) 1 % GEL topical gel   diphenhydrAMINE (BENADRYL) 25 MG tablet   enoxaparin (LOVENOX) 100 MG/ML injection   fexofenadine (ALLEGRA ALLERGY) 180 MG tablet   fluticasone (FLONASE) 50 MCG/ACT spray   HYDROcodone-acetaminophen (NORCO) 5-325 MG per tablet   levothyroxine (SYNTHROID/LEVOTHROID) 25 MCG tablet   Lidocaine (LIDOCARE) 4 % Patch   loratadine (CLARITIN) 10 MG tablet   metoclopramide (REGLAN) 10 MG tablet   morphine (MS CONTIN) 15 MG 12 hr tablet   order for DME   oxybutynin (DITROPAN XL) 10 MG 24 hr tablet   oxyCODONE IR (ROXICODONE) 5 MG tablet   Phenylephrine-DM-GG (ROBITUSSIN COUGH/COLD CF PO)   QVAR 80 MCG/ACT Inhaler   vancomycin (VANCOCIN) 125 MG capsule   warfarin (COUMADIN) 1 MG tablet   warfarin (COUMADIN) 2.5 MG tablet   zolpidem (AMBIEN CR) 12.5 MG CR tablet     Review of Systems   Constitutional: Positive for activity change, appetite change, chills and fatigue. Negative for fever.   HENT: Positive for congestion.    Eyes: Negative for photophobia, pain, discharge, redness and visual disturbance.   Respiratory: Positive for cough, shortness of breath and wheezing. Negative for stridor.    Cardiovascular: Negative for chest pain and palpitations.   Gastrointestinal: Positive for abdominal pain, diarrhea, nausea and vomiting. Negative for blood in stool and constipation.   Genitourinary: Negative for dysuria, frequency, hematuria and urgency.   Skin: Negative for color change, rash and wound.   Neurological: Positive for dizziness, weakness and light-headedness. Negative for seizures, syncope, speech difficulty, numbness and headaches.   All other systems reviewed and are negative.    Physical Exam   BP: 105/75  Heart Rate: 136  Temp: 97.3  F (36.3  C)  Resp: 18  Height: 165.1  "cm (5' 5\")  Weight: 79.8 kg (176 lb)  SpO2: 95 %     Physical Exam   Constitutional: She is oriented to person, place, and time.   Patient appears older than stated age, mild distress   HENT:   Head: Normocephalic and atraumatic.   Mouth/Throat: Uvula is midline. Mucous membranes are dry. No oropharyngeal exudate, posterior oropharyngeal edema or posterior oropharyngeal erythema.   Cardiovascular: Regular rhythm.  Tachycardia present.  Exam reveals no gallop and no friction rub.    No murmur heard.  Pulmonary/Chest: Effort normal and breath sounds normal. No respiratory distress. She has no wheezes. She has no rales.   Abdominal: Soft. Bowel sounds are normal. There is no rebound and no guarding.   Liver edge is palpable below costal border and is minimally tender to palpation   Neurological: She is alert and oriented to person, place, and time.   Skin: Skin is warm and dry. No rash noted. No erythema.   Psychiatric: She has a normal mood and affect.       ED Course     ED Course     Procedures               EKG Interpretation:      Interpreted by Radha Caceres and Dr. Milton Mckay   Time reviewed: 13:50  Symptoms at time of EKG: dizziness, weakness    Rhythm: normal sinus   Rate: Tachycardia 126  Axis: Normal  Ectopy: none  Conduction: normal  ST Segments/ T Waves:  Diffuse non-specific T wave changes  Q Waves: none  Comparison to prior: No acute changes from 12/8/17    Clinical Impression: no acute changes    Critical Care time:  none          Results for orders placed or performed during the hospital encounter of 04/23/18 (from the past 24 hour(s))   CBC with platelets differential   Result Value Ref Range    WBC 11.1 (H) 4.0 - 11.0 10e9/L    RBC Count 4.16 3.8 - 5.2 10e12/L    Hemoglobin 11.4 (L) 11.7 - 15.7 g/dL    Hematocrit 35.8 35.0 - 47.0 %    MCV 86 78 - 100 fl    MCH 27.4 26.5 - 33.0 pg    MCHC 31.8 31.5 - 36.5 g/dL    RDW 17.3 (H) 10.0 - 15.0 %    Platelet Count 192 150 - 450 10e9/L    Diff Method " Automated Method     % Neutrophils 93.7 %    % Lymphocytes 4.0 %    % Monocytes 1.4 %    % Eosinophils 0.4 %    % Basophils 0.1 %    % Immature Granulocytes 0.4 %    Absolute Neutrophil 10.4 (H) 1.6 - 8.3 10e9/L    Absolute Lymphocytes 0.5 (L) 0.8 - 5.3 10e9/L    Absolute Monocytes 0.2 0.0 - 1.3 10e9/L    Absolute Eosinophils 0.0 0.0 - 0.7 10e9/L    Absolute Basophils 0.0 0.0 - 0.2 10e9/L    Abs Immature Granulocytes 0.0 0 - 0.4 10e9/L   Comprehensive metabolic panel   Result Value Ref Range    Sodium 137 133 - 144 mmol/L    Potassium 3.4 3.4 - 5.3 mmol/L    Chloride 102 94 - 109 mmol/L    Carbon Dioxide 26 20 - 32 mmol/L    Anion Gap 9 3 - 14 mmol/L    Glucose 119 (H) 70 - 99 mg/dL    Urea Nitrogen 25 7 - 30 mg/dL    Creatinine 0.69 0.52 - 1.04 mg/dL    GFR Estimate 87 >60 mL/min/1.7m2    GFR Estimate If Black >90 >60 mL/min/1.7m2    Calcium 8.1 (L) 8.5 - 10.1 mg/dL    Bilirubin Total 1.4 (H) 0.2 - 1.3 mg/dL    Albumin 2.4 (L) 3.4 - 5.0 g/dL    Protein Total 6.5 (L) 6.8 - 8.8 g/dL    Alkaline Phosphatase 395 (H) 40 - 150 U/L    ALT 49 0 - 50 U/L     (H) 0 - 45 U/L   Lipase   Result Value Ref Range    Lipase 407 (H) 73 - 393 U/L   Troponin I   Result Value Ref Range    Troponin I ES <0.015 0.000 - 0.045 ug/L   INR   Result Value Ref Range    INR 5.19 (HH) 0.86 - 1.14   UA reflex to Microscopic   Result Value Ref Range    Color Urine Radha     Appearance Urine Cloudy     Glucose Urine Negative NEG^Negative mg/dL    Bilirubin Urine Negative NEG^Negative    Ketones Urine Negative NEG^Negative mg/dL    Specific Gravity Urine 1.024 1.003 - 1.035    Blood Urine Small (A) NEG^Negative    pH Urine 5.0 5.0 - 7.0 pH    Protein Albumin Urine 100 (A) NEG^Negative mg/dL    Urobilinogen mg/dL 0.0 0.0 - 2.0 mg/dL    Nitrite Urine Positive (A) NEG^Negative    Leukocyte Esterase Urine Large (A) NEG^Negative    Source Midstream Urine     RBC Urine 31 (H) 0 - 2 /HPF    WBC Urine >182 (H) 0 - 5 /HPF    WBC Clumps Present (A)  NEG^Negative /HPF    Bacteria Urine Many (A) NEG^Negative /HPF    Squamous Epithelial /HPF Urine 5 (H) 0 - 1 /HPF    Transitional Epi 1 0 - 1 /HPF    Mucous Urine Present (A) NEG^Negative /LPF    Hyaline Casts 6 (H) 0 - 2 /LPF     Medications - No data to display    Assessments & Plan (with Medical Decision Making)     I have reviewed the nursing notes.    I have reviewed the findings, diagnosis, plan and need for follow up with the patient.       Current Discharge Medication List      START taking these medications    Details   fidaxomicin (DIFICID) 200 MG tablet Take 1 tablet (200 mg) by mouth 2 times daily for 10 days  Qty: 20 tablet, Refills: 0    Comments: Test prescription to check coverage/cost  Associated Diagnoses: C. difficile colitis      !! order for DME Equipment being ordered: walker - 4 wheeled walker with seat  Qty: 1 Device, Refills: 0    Associated Diagnoses: Carcinoma of breast metastatic to liver, unspecified laterality (H)       !! - Potential duplicate medications found. Please discuss with provider.        Final diagnoses:   Urinary tract infection   Generalized muscle weakness   Nausea vomiting and diarrhea     57 year old female with past medical history significant for breast cancer with metastases to the liver and bone, obstructive sleep apnea, hypothyroidism, asthma, emphysematous bleb of lung who presents to the emergency department with concern over generalized weakness, fatigue, nausea and vomiting which is been ongoing for the last 3 days.  Patient was noted to be tachycardic upon arrival, over than stated age.  She had dry mucous membranes consistent with dehydration and mild tenderness palpation of the abdomen in the right upper quadrant.  Patient had EKG which confirms sinus tachycardia, no acute ischemic changes.  Laboratory testing was significant for CBC which showed elevated white blood cell count at 11.1, mild anemia with hemoglobin of 11.4, urinalysis was positive for  infection both nitrate, leukocyte esterase positive with white blood cell clumps, many bacteria.  Her INR was elevated at 5.19. Her CMP showed abnormality of her liver enzymes and lipase was also mildly elevated however these appear consistent with her baseline.   Patient tolerated several liters of IV fluids, IV Zofran for nausea, rocephin to treat UTI and did report some symptomatic improvement.  Given her overall state of health, generalized weaknessI did recommend patient be admitted to the hospital for continued IV antibiotic therapy.  I discussed case with hospitalist on-call Dr. Demario Viveros who did agree to accept transfer of patient care, a chest X-ray given her ongoing cough was ordered and results pending at time of planned admission.  I did also discuss case with ER physician DR. Milton Mckay who did independently examine patient and states agreement with above outlined work up assessment and plan.      Disclaimer: This note consists of symbols derived from keyboarding, dictation, and/or voice recognition software. As a result, there may be errors in the script that have gone undetected.  Please consider this when interpreting information found in the chart.    4/23/2018   [facility] EMERGENCY DEPARTMENT     Radha Caceres PA-C  04/28/18 1127

## 2018-04-23 NOTE — ED NOTES
Provider updated on orthostatic VS, sepsis BPA alert, and critical lab INR 5.19. No new orders received.

## 2018-04-23 NOTE — ED NOTES
Lab called to draw the 2nd blood culture. Will wait to start antibiotics until blood culture is obtained.

## 2018-04-23 NOTE — ED NOTES
Pt had chemo tx on Friday, has had nausea, vomiting, diarrhea all weekend. Increased weakness, unable to walk around house. Pt also has loose cough for the past few weeks. No fevers. Took nausea medication this AM

## 2018-04-23 NOTE — TELEPHONE ENCOUNTER
Patient called and left message on voicemail requesting call back in regards to last infusion.    Call returned to patient. No answer.  Left message that I was returning her call and to call back.  Direct line provided.

## 2018-04-23 NOTE — ED NOTES
Port accessed via sterile technique per protocol. + blood return, no pain with flushing. Labs drawn from port per protocol.

## 2018-04-24 NOTE — PLAN OF CARE
"Problem: Patient Care Overview  Goal: Plan of Care/Patient Progress Review  Pt continues to get up frequently to the bathroom with SBA. incont of moderate amt loose stool at this time. Per pt \"it comes on suddenly\". Having loose brown, yellow mucousy stool. Denies pain. C-diff positive, page sent to Dr Viveros, oral vanco ordered & pt rec'd first dose at midnight. Denies nausea. Taking sips of water. IVF at 125cc/hr. Pt weak when up.       "

## 2018-04-24 NOTE — PLAN OF CARE
"Problem: Patient Care Overview  Goal: Plan of Care/Patient Progress Review  Outcome: No Change  [facility] ADMISSION NOTE    Patient admitted to room 2213 at approximately 1900 via cart from emergency room. Patient was accompanied by spouse and transport tech.     Verbal SBAR report received from Honey MAYERS prior to patient arrival.     Patient ambulated to bed with one assist. Patient alert and oriented X 3. The patient is not having any pain. 0-10 Pain Scale: 0. Admission vital signs: Blood pressure 120/58, pulse 117, temperature 99.3  F (37.4  C), resp. rate 16, height 1.651 m (5' 5\"), weight 79.8 kg (176 lb), last menstrual period 02/06/2013, SpO2 96 %, not currently breastfeeding. Patient and spouse were oriented to plan of care, call light, bed controls, tv, telephone, bathroom and visiting hours.     Risk Assessment    The following safety risks were identified during admission: fall and isolation. Yellow risk band applied: YES.              Nay De Jesus        "

## 2018-04-24 NOTE — PROGRESS NOTES
"CLINICAL NUTRITION SERVICES  -  ASSESSMENT NOTE     REASON FOR ASSESSMENT  Etta Cervantes is a 57 year old female seen by Registered Dietitian for Admission Nutrition Risk Screen - unintentional weight loss of 10#s or more in the past two months      NUTRITION HISTORY  - Information obtained from the patient and chart. The patient is only able to provide limited information due to she is very sleepy. She refused both her breakfast and lunch tray today, just ate jello. She will try chocolate Boost with her meals. Per chart, the patient's oral intake had been decreased for 2 days prior to admission.      CURRENT NUTRITION ORDERS  Diet Order:     Regular    Current Intake/Tolerance:  Very poor      PHYSICAL FINDINGS  Observed  No nutrition-related physical findings observed  Obtained from Chart/Interdisciplinary Team  None noted    ANTHROPOMETRICS  Height: 5' 5\"  Weight: 176 lbs 0 oz  Body mass index is 29.29 kg/(m^2).  Weight Status:  Overweight BMI 25-29.9  IBW: 125#s  % IBW: 141%  Weight History:   Wt Readings from Last 12 Encounters:   04/23/18 79.8 kg (176 lb)   04/20/18 80.1 kg (176 lb 9.6 oz)   04/20/18 80 kg (176 lb 6.4 oz)   04/13/18 85.4 kg (188 lb 3.2 oz)   03/30/18 83.6 kg (184 lb 6.4 oz)   03/21/18 84.3 kg (185 lb 12.8 oz)   03/07/18 85.1 kg (187 lb 9.6 oz)   03/01/18 83.5 kg (184 lb)   02/21/18 83.9 kg (185 lb)   02/07/18 83.5 kg (184 lb)   02/06/18 83.7 kg (184 lb 8 oz)   01/24/18 83.2 kg (183 lb 6.4 oz)         LABS  Labs reviewed    MEDICATIONS  Medications reviewed      ASSESSED NUTRITION NEEDS PER APPROVED PRACTICE GUIDELINES:    Dosing Weight 62 kg  Estimated Energy Needs: 1696-1961 kcals (30-35 Kcal/Kg)  Justification: cancer treated with chemotherapy  Estimated Protein Needs: 74-93 grams protein (1.2-1.5 g pro/Kg)  Justification: cancer treated with chemotherapy  Estimated Fluid Needs: 1 mL/Kcal  Justification: maintenance    MALNUTRITION:  % Weight Loss:  Weight loss does not meet criteria for " malnutrition, ~4% weight loss in the last three months  % Intake:  Decreased intake does not meet criteria for malnutrition at this time, per information gathered from chart  Subcutaneous Fat Loss:  Not assessed, patient sleepy  Muscle Loss:  Not assessed, patient sleepy  Fluid Retention:  No lower extremity edema per provider note    Malnutrition Diagnosis: Patient does not meet two of the above criteria necessary for diagnosing malnutrition at this time    NUTRITION DIAGNOSIS:  Inadequate protein-energy intake related to decreased appetite as evidenced by the patient refused both her breakfast and lunch tray today, only eating jello.      NUTRITION INTERVENTIONS  Recommendations / Nutrition Prescription  Will add high calorie, high protein oral nutrition supplement with meals - chocolate Boost  .      Implementation  Nutrition education: No education needs assessed at this time  Medical Food Supplement  .      Nutrition Goals  Patient to consume 50-75% of her meals in 2-3 days.  .      MONITORING AND EVALUATION:  Progress towards goals will be monitored and evaluated per protocol and Practice Guidelines, Food intake and Liquid meal replacement or supplement intake.    Areli Landry RD,LD  Clinical Dietitian

## 2018-04-24 NOTE — PROGRESS NOTES
Skin affirmation note    Admitting nurse, Silvia MAYERS, and I completed full skin assessment, Jean-Pierre score and Jean-Pierre interventions. This writer agrees with the initial skin assessment findings.  CUONG Brown RN

## 2018-04-24 NOTE — PLAN OF CARE
Problem: Patient Care Overview  Goal: Plan of Care/Patient Progress Review  Outcome: No Change  Pt states she is tired, slept on /off most of the day.  Denies pain. Awakens to verbal stimuli. Is alert, oriented but does not consistently use the call light. Bed alarm on. Speaks in a very soft voice. Ambulates to bathroom with SBA secondary to generalized weakness. Did not want not sit up in chair. Is able to lift her own legs in/out of bed and turn from side to side.  Voids small amounts and is incontinent of bowel and bladder at times. Has had 4 loose mucous green/brown stools today. Pt reports no appetite, denied nausea today. Taking bites this afternoon and evening. Drank 180 ml of pepsi brought in by . Sipping on chocolate ensure. NS with potassium infusing as ordered through port a cath, rocephin for UTI, oral vanco for C-Diff.  present at bedside since late morning.

## 2018-04-24 NOTE — PHARMACY
"The following home medications were NOT continued on inpatient admission per \"Discontinuation of nonessential home medications during hospitalization\" policy: cranberry capsule 200mg    If a therapeutic holiday is deemed inappropriate per the prescriber, please notify the pharmacist regarding the medication order.    The pharmacist is available to answer any questions and/or concerns the patient may have regarding discontinuation of non-essential medications.    Please ensure that these medications are restarted as needed upon discharge via the medication reconciliation discharge process and included on the discharge medication reconciliation report.    Thank you,  hSannan Dean    "

## 2018-04-24 NOTE — PROGRESS NOTES
[facility] Medicine Progress Note  Date of Service: 04/24/2018     Assessment & Plan   Etta Cervantes is a 57 year old female who presents on 4/23/2018 with a 48 hour history of fatigue, malaise, nausea, emesis, and diarrhea.      Diarrhea, likely due to C difficile colitis   C difficile tox screen positive. Had significant nausea and vomiting and also recently started new chemotherapy regimen, so possible this is due to chemo or gastroenteritis.    - vancomycin oral started   - Enteric pathogen ELENA pending      Acute cystitis    Urine micro is consistent, with culture pending. Previous urine cultures have grown E coli and K oxytoca, neither of which have shown extended resistance.   - Empiric CTAX underway.   - Await urine and blood cultures.        Breast cancer metastasized to axillary lymph node, metastatic to bone and liver   Chemo regimen switched to carboplatin and Gemzar (with methylprednisolone pretreatment), first treatment 4/20/18. This may be responsible for patient's generalized weakness and fatigue.   - Patient scheduled for next chemo 4/27/18.      Elevated liver enzymes    Not new, but all values trending upward over the past month.  Probably due to liver metastasis.  - Avoid hepatotoxins.      Acute deep vein thrombosis (DVT) of right upper extremity, unspecified vein (H)    Has been treated since late 12/2017, most recently with warfarin, as patient told me she had side effects or lab abnormalities to the use of enoxaparin.  Warfarin has been on hold the past 3 or 4 days at the direction of Dr. Cassidy, Oncology, due to prolonged INR, see below.  Palpation of right arm reveals no cords, and there is no redness or swelling.  - Anticoagulation will remain on hold at least until INR normalizes.  However, now that she has completed 4 months anticoagulation, may not be necessary to resume anticoagulation, particularly for an upper extremity clot which has a  significantly lower risk of embolization compared to lower extremity.      Prolonged INR    Was on warfarin, though warfarin was stopped 3 or 4 days prior to admission due to prolonged INR.  INR remains 5.19 despite this.  May reflect the effect of high volume of metastatic disease in the liver. Trending down 3.8  - See discussion above regarding treatment of RUE DVT.      Secondary hypotension    Volume depleted due to 48 hour illness, BP intially 83 syst, improved to normal after 1 L IVF.  - Continue maintenance IVF until eating and drinking more normally.      Low-voltage EKG    Noted on admission.  Note made also of bilateral pleural effusions. Echo with trivial effusion.       DVT Prophylaxis: Not needed due to INR 5.19.  Code Status: Full Code    Lines: Peripheral IV.  Jamison catheter: Not needed.  Restraints: None.      Discussion: Modest improvement    Disposition: Anticipate discharge 2-3 more days     Attestation:  Total time: 40 minutes    Osorio Lima MD  [facility] Medicine        Interval History   HPI reviewed briefly with patient and  at bedside.   Feels a little stronger though still weak and sleepy. Diarrhea has slowed considerably. No emesis and nausea improving but very little oral intake.     Physical Exam   Temp:  [98.3  F (36.8  C)-99.3  F (37.4  C)] 99.3  F (37.4  C)  Pulse:  [109-120] 110  Heart Rate:  [110] 110  Resp:  [16-20] 18  BP: ()/(38-78) 126/76  SpO2:  [89 %-96 %] 94 %    Weights:   Vitals:    04/23/18 1321   Weight: 79.8 kg (176 lb)    Body mass index is 29.29 kg/(m^2).    Constitutional: sleepy but arouses to voice, slow and weak voice, oriented, nontoxic appearing  CV: Regular, no edema  Respiratory: CTA bilaterally  GI: Soft, non-tender   Skin: Warm and dry    Data     Recent Labs  Lab 04/24/18  0545 04/23/18  1350 04/20/18  0845   WBC 5.0 11.1* 14.9*   HGB 10.1* 11.4* 12.6   MCV 86 86 86   * 192 329   INR 3.82* 5.19* 5.05*    137 135   POTASSIUM 3.0* 3.4  3.5   CHLORIDE 106 102 101   CO2 25 26 23   BUN 17 25 20   CR 0.57 0.69 0.77   ANIONGAP 7 9 11   BEN 7.1* 8.1* 8.8   GLC 87 119* 133*   ALBUMIN 2.2* 2.4* 2.8*   PROTTOTAL 5.6* 6.5* 8.1   BILITOTAL 1.1 1.4* 1.5*   ALKPHOS 342* 395* 414*   ALT 40 49 36   * 276* 185*   LIPASE  --  407*  --    TROPI  --  <0.015  --          Recent Labs  Lab 04/24/18  0545 04/23/18  1350 04/20/18  0845   GLC 87 119* 133*        Unresulted Labs Ordered in the Past 30 Days of this Admission     Date and Time Order Name Status Description    4/24/2018 0830 Enteric Bacteria and Virus Panel by ELENA Stool In process     4/23/2018 1522 Urine Culture Aerobic Bacterial Preliminary     4/23/2018 1506 Blood culture Preliminary     4/23/2018 1506 Blood culture Preliminary            Imaging:   Recent Results (from the past 24 hour(s))   Chest XR,  PA & LAT    Narrative    XR CHEST 2 VW 4/23/2018 3:39 PM    HISTORY: Cough.    COMPARISON: Several prior studies, the most recent one being dated  1/10/2018..      Impression    IMPRESSION: 2 views of the chest show low lung volumes. This is  suspected to at least in part related to subpulmonic pleural effusions  which the patient has had before. Pulmonary vessels are modestly  congested. No focal consolidation is seen.    LETI CHASE MD        I reviewed all new labs and imaging results over the last 24 hours. I personally reviewed no images or EKG's today.    Medications     0.9% sodium chloride + KCl 20 mEq/L 150 mL/hr at 04/24/18 0953     - MEDICATION INSTRUCTIONS -         Calcium carb-Vitamin D 500 mg Native-200 units  2 tablet Oral Daily     cefTRIAXone  1 g Intravenous Q24H     diclofenac  2 g Transdermal 4x Daily     fluticasone  1-2 spray Both Nostrils Daily     fluticasone-vilanterol  1 puff Inhalation Daily     levothyroxine  25 mcg Oral Daily     Lidocaine  1-2 patch Transdermal Q24H     lidocaine   Transdermal Q8H     lidocaine   Transdermal Q24H     morphine  15 mg Oral Q12H      oxybutynin  10 mg Oral Daily     vancomycin  125 mg Oral 4x Daily     zolpidem  10 mg Oral At Bedtime   Reviewed meds    Osorio Lima MD  [facility] Medicine

## 2018-04-24 NOTE — PLAN OF CARE
"Problem: Patient Care Overview  Goal: Plan of Care/Patient Progress Review  Pt tired, weak. Denies pain, nausea. Up to BSC with SBA, continues to have mixed urine & loose stool. Afebrile. 's. Pt urine always mixed with stool. Unable to correctly measure urine. Pt states she has \"a lot of bladder issues.\" will occasionally feel like she does not empty bladder completely.  Bladder scanned for 78cc's at this time. o2 sats 89-90% 1 liter oxygen applied & sats 93%. Pt has non-productive cough, states has had for awhile. Takes robitussin prn. Pt taking only sips of water tonight.       "

## 2018-04-24 NOTE — PLAN OF CARE
"Problem: Patient Care Overview  Goal: Plan of Care/Patient Progress Review  Dual skin check done. Pt has small area below right 5th toe that has blue tinge, skin feels thick ?calloused area. No open area noted.  Pt has pale raised area to right hip, states this was a cyst that \"popped\". No open area noted. Pt has dime-sized purple non-blanchable area in right gluteal cleft. Area firm to touch, pt states she was told this was a cyst. No open area. Pt changes position independently in bed, up frequently & having loose stools/diarrhea. No dressing applied to this area at this time due to frequent stooling. Pt has had 2 loose brown/yellow mucousy stools this evening. c-diff sample sent at 2030. +vdg Pt up with SBA. Denies pain, nausea. Taking po fluids well, had jello & 2 glasses of water so far. Paged Dr Viveros for robitussin per pt request.       "

## 2018-04-25 NOTE — PROGRESS NOTES
[facility] Medicine Progress Note  Date of Service: 04/25/2018     Assessment & Plan   Etta Cervantes is a 57 year old female who presents on 4/23/2018 with a 48 hour history of fatigue, malaise, nausea, emesis, and diarrhea.      Diarrhea, likely due to C difficile colitis   C difficile tox screen positive. Had significant nausea and vomiting and also recently started new chemotherapy regimen, so possible this is due to chemo or gastroenteritis. Enteric pathogen ELENA negative.    Diarrhea is slowing significantly.    - continue vancomycin oral      Acute cystitis    Urine micro is consistent, urine culture positive for Klebsiella pneumoniae sensitive to ceftriaxone.    - continue ceftriaxone day 3 of 5      Generalized weakness associated with increased sleepiness   Suspect primarily due to chemotherapy and underlying cancer. Patient had been increasingly sleepy prior to this round of chemo per . Likely effects of MS contin and possibly ambien at night. C difficile infection and UTI also likely contributors though these should be resolving.    - hold MS contin, resume if needed for pain   - hold Ambien    - Palliative care consult tomorrow for symptom control - agreeable to patient and , discussed with TT      Breast cancer metastasized to axillary lymph node, metastatic to bone and liver   Chemo regimen switched to carboplatin and Gemzar (with methylprednisolone pretreatment), first treatment 4/20/18. This may be primarily responsible for patient's generalized weakness and fatigue.    - Patient scheduled for next chemo 4/27/18.      Elevated liver enzymes    Not new, but all values trending upward over the past month.  Probably due to liver metastasis.   - Avoid hepatotoxins.      Acute deep vein thrombosis (DVT) of right upper extremity, unspecified vein (H)    Has been treated since late 12/2017, most recently with warfarin, as patient told me she had side  effects or lab abnormalities to the use of enoxaparin.  Warfarin has been on hold the past 3 or 4 days at the direction of Dr. Cassidy, Oncology, due to prolonged INR, see below.  Palpation of right arm reveals no cords, and there is no redness or swelling.   - Anticoagulation will remain on hold at least until INR normalizes.  However, now that she has completed 4 months anticoagulation, may not be necessary to resume anticoagulation, particularly for an upper extremity clot which has a significantly lower risk of embolization compared to lower extremity.      Prolonged INR    Was on warfarin, though warfarin was stopped 3 or 4 days prior to admission due to prolonged INR.  INR remains 5.19 despite this.  May reflect the effect of high volume of metastatic disease in the liver. Trending down slowly 3.47  - See discussion above regarding treatment of RUE DVT.      Secondary hypotension    Volume depleted due to 48 hour illness, BP intially 83 syst, improved to normal after 1 L IVF.   - will decrease IV fluids to see if helps with appetite and since patient has received significant IVF this admission so far      Low-voltage EKG    Noted on admission.  Note made also of bilateral pleural effusions. Echo with trivial effusion.     DVT Prophylaxis: Not needed due to INR 5.19.  Code Status: Full Code    Lines: Peripheral IV.  Jamison catheter: Not needed.  Restraints: None.      Discussion: CLinically infections are improving. Patient still very weak and fatigued and has another chemo round coming up. Patient needs to be mobile to go home. Discussed with patient and     Disposition: Anticipate discharge 2+ days     Attestation:  Total time: 45 minutes    Osorio Lima MD  [facility] Medicine        Interval History   Very sleepy. C/o no appetite. No chest pain, shortness of breath. No abdominal pain presently but has at times.  notes patient increasingly sleepy over past few weeks.     Physical Exam   Temp:   [97.2  F (36.2  C)-97.8  F (36.6  C)] 97.3  F (36.3  C)  Pulse:  [100-106] 106  Heart Rate:  [100] 100  Resp:  [16-18] 18  BP: (114-127)/(66-74) 121/71  SpO2:  [92 %-97 %] 97 %    Weights:   Vitals:    04/23/18 1321 04/25/18 0656   Weight: 79.8 kg (176 lb) 84.4 kg (186 lb 1.1 oz)    Body mass index is 30.96 kg/(m^2).    Constitutional: very sleepy, arouses to voice and answers questions quietly and slowly but easily falls back to sleep. Non toxic and NAD.  CV: Regular  Respiratory: CTA bilaterally  GI: Soft, mild diffuse tenderness to moderate palpation, bowel sounds present  Skin: Warm and dry    Data     Recent Labs  Lab 04/25/18  0840 04/24/18  0545 04/23/18  1350   WBC 5.1 5.0 11.1*   HGB 10.0* 10.1* 11.4*   MCV 88 86 86   * 137* 192   INR 3.47* 3.82* 5.19*    138 137   POTASSIUM 3.6 3.0* 3.4   CHLORIDE 110* 106 102   CO2 25 25 26   BUN 10 17 25   CR 0.50* 0.57 0.69   ANIONGAP 5 7 9   BEN 7.0* 7.1* 8.1*   * 87 119*   ALBUMIN 2.1* 2.2* 2.4*   PROTTOTAL 5.8* 5.6* 6.5*   BILITOTAL 0.9 1.1 1.4*   ALKPHOS 362* 342* 395*   ALT 36 40 49   * 232* 276*   LIPASE  --   --  407*   TROPI  --   --  <0.015         Recent Labs  Lab 04/25/18  0840 04/24/18  0545 04/23/18  1350 04/20/18  0845   * 87 119* 133*        Unresulted Labs Ordered in the Past 30 Days of this Admission     Date and Time Order Name Status Description    4/23/2018 1506 Blood culture Preliminary     4/23/2018 1506 Blood culture Preliminary            Imaging: No results found for this or any previous visit (from the past 24 hour(s)).     I reviewed all new labs and imaging results over the last 24 hours. I personally reviewed no images or EKG's today.    Medications     0.9% sodium chloride + KCl 20 mEq/L 150 mL/hr at 04/25/18 1333     - MEDICATION INSTRUCTIONS -         Beclomethasone Dipropionate  2 spray Both Nostrils Daily     Calcium carb-Vitamin D 500 mg Elim IRA-200 units  2 tablet Oral Daily     cefTRIAXone  1 g  Intravenous Q24H     diclofenac  2 g Transdermal 4x Daily     fluticasone-vilanterol  1 puff Inhalation Daily     levothyroxine  25 mcg Oral Daily     Lidocaine  1-2 patch Transdermal Q24H     lidocaine   Transdermal Q8H     lidocaine   Transdermal Q24H     morphine  15 mg Oral Q12H     oxybutynin  10 mg Oral Daily     vancomycin  125 mg Oral 4x Daily     zolpidem  10 mg Oral At Bedtime   Reviewed sarah Lima MD  [facility] Medicine

## 2018-04-25 NOTE — PLAN OF CARE
Problem: Patient Care Overview  Goal: Plan of Care/Patient Progress Review  Outcome: No Change  Patient slept soundly between cares. Oxygen applied at 1 lpm due to oximeter reading of 86 on room air. Patient incontinent of urine, 1 small stool this am. IV infusing at 150.  Lungs diminished. Frequent cough at bedtime, patient requested cough medicine with good results. Bed alarm on for safety.

## 2018-04-25 NOTE — PLAN OF CARE
Problem: Patient Care Overview  Goal: Plan of Care/Patient Progress Review  Outcome: No Change  Patient was more awake this morning, up to chair for breakfast. Poor appetite. Slept most of the afternoon, awakens to verbal stimuli.  Sipping on chocolate ensure and pepsi this afternoon. Ambulates to/from bathroom with stand by assist. Voids small amounts, incontinent of urine. No BM this shift. Denies pain. Upper airway wheezes noted with exertion. Albuterol inhaler given this afternoon. Instructed on IS, pulls to 500 at best with poor to fair effort. Weight up 4.6 kg from admission. IVF's at 150 ml/hr. Mouth dry.   Alarms on for safety. Continues on enteric precautions.

## 2018-04-25 NOTE — PROGRESS NOTES
Patient up in chair for dinner and ate about 25%. Jello,friut and water. Patient then up to bathroom had small bowel movement and voided. Patient  at bedside. Alarm on bed call light given to patient.

## 2018-04-26 NOTE — PROGRESS NOTES
Patient does not want her Voltaren or the Lidoderm patch. informed dr Lima of this.dr Lima states he would discontinue.

## 2018-04-26 NOTE — PLAN OF CARE
Problem: Urinary Tract Infection (Adult)  Goal: Signs and Symptoms of Listed Potential Problems Will be Absent, Minimized or Managed (Urinary Tract Infection)  Signs and symptoms of listed potential problems will be absent, minimized or managed by discharge/transition of care (reference Urinary Tract Infection (Adult) CPG).   Outcome: No Change  Sleeping quietly, arouses easily for cares.   Up and ambulated to bathroom with SBA, did require A-1 to sit up in the bed, otherwise indep with bed mobility.   Incont urine and 50cc in hat, haleigh and clear.   Small loose, dark brown stool.   No signs of discomfort. No discomfort voiced by patient.   Drinking water when awake.   RODRIGUEZ with dry, nonproductive cough, quiets at rest.   Bed alarm on for safety.

## 2018-04-26 NOTE — PROGRESS NOTES
04/26/18 1530   Quick Adds   Type of Visit Initial Occupational Therapy Evaluation   Living Environment   Lives With spouse   Living Arrangements house   Home Accessibility tub/shower is not walk in   Number of Stairs to Enter Home 0   Number of Stairs Within Home 0   Transportation Available family or friend will provide   Functional Level Prior   Ambulation 0-->independent   Transferring 0-->independent   Toileting 0-->independent   Bathing 0-->independent   Dressing 2-->assistive person   Eating 0-->independent   Communication 0-->understands/communicates without difficulty   Swallowing 0-->swallows foods/liquids without difficulty   Cognition 0 - no cognition issues reported   Fall history within last six months no   General Information   Onset of Illness/Injury or Date of Surgery - Date 04/23/18   Referring Physician Jj Phillips MD   Patient/Family Goals Statement To return home.    Additional Occupational Profile Info/Pertinent History of Current Problem Etta Cervantes is a 57 year old female who presents on 4/23/2018 with a 48 hour history of fatigue, malaise, nausea, emesis, and diarrhea. C-diff. Recent chemo.    Precautions/Limitations immunosuppressed   Cognitive Status Examination   Orientation orientation to person, place and time   Pain Assessment   Patient Currently in Pain No   Range of Motion (ROM)   ROM Comment B UE ROM: WNL   Strength   Strength Comments generalized weakness from C-diff and Chemo.    Hand Strength   Hand Strength Comments B  strength: WNL for ADLs.    Transfer Skill: Bed to Chair/Chair to Bed   Level of [place]: Bed to Chair independent   Physical Assist/Nonphysical Assist: Bed to Chair supervision   Weight-Bearing Restrictions full weight-bearing   Assistive Device - Transfer Skill Bed to Chair Chair to Bed Rehab Eval rolling walker   Transfer Skill: Sit to Stand   Level of [place]: Sit/Stand independent   Physical Assist/Nonphysical Assist: Sit/Stand supervision    Transfer Skill: Sit to Stand full weight-bearing   Assistive Device for Transfer: Sit/Stand rolling walker   Transfer Skill: Toilet Transfer   Level of [place]: Toilet independent   Physical Assist/Nonphysical Assist: Toilet supervision   Weight-Bearing Restrictions: Toilet full weight-bearing   Assistive Device rolling walker   Upper Body Dressing   Level of [place]: Dress Upper Body independent   Lower Body Dressing   Level of [place]: Dress Lower Body minimum assist (75% patients effort)   Physical Assist/Nonphysical Assist: Dress Lower Body 1 person assist  ( completes at home- not interested in AE. )   Toileting   Level of [place]: Toilet independent   Grooming   Level of [place]: Grooming independent   Eating/Self Feeding   Level of [place]: Eating independent   Instrumental Activities of Daily Living (IADL)   IADL Comments  completes IADLs and is attentive to pt.    Activities of Daily Living Analysis   Impairments Contributing to Impaired Activities of Daily Living strength decreased   General Therapy Interventions   Planned Therapy Interventions ADL retraining   Clinical Impression   Criteria for Skilled Therapeutic Interventions Met yes, treatment indicated   OT Diagnosis decreased strength/activity tolerance for ADLs.    Influenced by the following impairments c-diff, chemo, weakness   Assessment of Occupational Performance 1-3 Performance Deficits   Identified Performance Deficits showering, toileting,    Clinical Decision Making (Complexity) Low complexity   Therapy Frequency daily   Predicted Duration of Therapy Intervention (days/wks) 2-3 days   Anticipated Discharge Disposition Home with Assist   Risks and Benefits of Treatment have been explained. Yes   Patient, Family & other staff in agreement with plan of care Yes   Clinical Impression Comments Benefit from OT for energy conservation techniques education and to asses for AE needs for ADLs.    [place]  \"6 Clicks\" Daily Activity Inpatient Short Form   1. Putting on and taking off regular lower body clothing? 3 - A Little   2. Bathing (including washing, rinsing, drying)? 3 - A Little   3. Toileting, which includes using toilet, bedpan or urinal? 3 - A Little   4. Putting on and taking off regular upper body clothing? 4 - None   5. Taking care of personal grooming such as brushing teeth? 4 - None   6. Eating meals? 4 - None   Daily Activity Raw Score (Score out of 24.Lower scores equate to lower levels of function) 21   Total Evaluation Time   Total Evaluation Time (Minutes) 6     "

## 2018-04-26 NOTE — PROGRESS NOTES
Hematology/ Oncology Follow-up Visit:  Apr 20, 2018    Reason for Visit:   Chief Complaint   Patient presents with     Oncology Clinic Visit     Follow up breast CA. Review lab results.        Oncologic History:  Breast cancer (H)  Etta Cervantes presented with increasing lump in the right axilla that s lump has been growing without any pain or associated symptoms. Subsequently she was evaluated by primary care physician. Diagnostic digital mammogram was done on 01/13/2015 showing enlarged lymph nodes in the right axilla. There was some skin thickening in the right breast anteriorly and laterally. There are no parenchymal changes in the right breast. The left breast shows a 1.7 cm spiculated mass at approximately 2 to 3 o'clock position, 15.2 cm away from the nipple. A right breast ultrasound was subsequently done and ultrasound guided biopsy was done. Needle biopsy of the left breast did show infiltrating ductal carcinoma grade I of III with no angiolymphatic invasion seen. No associated ductal carcinoma in situ. Estrogen receptor, progresterone receptor were positive. HER-2/sadie receptor came back negative.. Another biopsy from the right axilla did show metastatic ductal carcinoma. PET scan was done on January 26, 2015 showing few small right posterior cervical chain nodes the largest is 1.2 cm. There is extensive bulky right axillary adenopathy demonstrating hypermetabolic FDG uptake with SUV of 10.6. There is skin thickening involving the right breast which demonstrated low-grade FDG uptake. A 1.2 cm lesion on the lateral aspect of the left breast demonstrated minimally increased FDG uptake with SUV of 2. Scattered hypermetabolic mediastinal lymph nodes located in the left superior anterior mediastinum, right paratracheal and precarinal U, subcranial adenopathy with javon metastases. This focus hypermetabolic FDG uptake identified definitive Amanda posterior aspect off intertrochanteric region of the proximal left  femur consistent with bone metastases. There is also 1.4 cm hypermetabolic FDG uptake identified in the anterior medial segment of the left hepatic lobe consistent with liver metastases. Additional 2.1 cm low-attenuation lesion anterior aspect of the right lobe which needs to be determined. The patient was started on chemotherapy with Taxotere and Cytoxan on February 9, 2015.  She concluded 4 cycles of Taxotere and Cytoxan. She started on Arimidex 1 mg orally daily. Currently she is on Faslodex and ibrance. Subsequently the patient went on to receive Afinitor. The patient also started treatment with Xeloda.       Interval History:  Patient is here today to discuss management plan in details.  Her tumor marker continued to increase the patient performance status continued to gradually decrease.  The patient continues to have intermittent abdominal pain poor appetite and weight loss.  She is having shortness of breath especially on exertion.  She denies any bruising or bleeding.     Review Of Systems:  Constitutional: Negative for fever, chills, and night sweats.  Fatigue  Skin: negative.  Eyes: negative.  Ears/Nose/Throat: negative.  Respiratory: No shortness of breath, dyspnea on exertion, cough, or hemoptysis.  Cardiovascular: negative.  Gastrointestinal: negative.  Genitourinary: negative.  Musculoskeletal: negative.  Neurologic: negative.  Psychiatric: negative.  Hematologic/Lymphatic/Immunologic: negative.  Endocrine: negative.    All other ROS negative unless mentioned in interval history.    Past medical, social, surgical, and family histories reviewed.    Allergies:  Allergies as of 04/20/2018 - Manuel as Reviewed 04/20/2018   Allergen Reaction Noted     Animal dander Cough 01/23/2015     Cats  07/15/2010     Dust mites Cough 01/23/2015     Pollen extract  01/23/2015     Seasonal allergies  07/15/2010     Levaquin [levofloxacin] Rash 07/17/2017       Current Medications:  Current Outpatient Prescriptions  "  Medication Sig Dispense Refill     HYDROcodone-acetaminophen (NORCO) 5-325 MG per tablet Take 1-2 tablets by mouth every 4 hours as needed for moderate to severe pain 60 tablet 0        Physical Exam:  /61 (BP Location: Left arm, Patient Position: Sitting, Cuff Size: Adult Regular)  Pulse 141  Temp 99.1  F (37.3  C) (Tympanic)  Resp 18  Ht 1.632 m (5' 4.25\")  Wt 80.1 kg (176 lb 9.6 oz)  LMP 02/06/2013  SpO2 96%  Breastfeeding? No  BMI 30.08 kg/m2  Wt Readings from Last 12 Encounters:   04/25/18 84.4 kg (186 lb 1.1 oz)   04/20/18 80.1 kg (176 lb 9.6 oz)   04/20/18 80 kg (176 lb 6.4 oz)   04/13/18 85.4 kg (188 lb 3.2 oz)   03/30/18 83.6 kg (184 lb 6.4 oz)   03/21/18 84.3 kg (185 lb 12.8 oz)   03/07/18 85.1 kg (187 lb 9.6 oz)   03/01/18 83.5 kg (184 lb)   02/21/18 83.9 kg (185 lb)   02/07/18 83.5 kg (184 lb)   02/06/18 83.7 kg (184 lb 8 oz)   01/24/18 83.2 kg (183 lb 6.4 oz)     ECOG performance status: 1  GENERAL APPEARANCE: Healthy, alert and in no acute distress.  HEENT: Sclerae anicteric. PERRLA. Oropharynx without ulcers, lesions, or thrush.  NECK: Supple. No asymmetry or masses.  LYMPHATICS: No palpable cervical, supraclavicular, axillary, or inguinal lymphadenopathy.  RESP: Lungs clear to auscultation bilaterally without rales, rhonchi or wheezes.  CARDIOVASCULAR: Regular rate and rhythm. Normal S1, S2; no S3 or S4. No murmur, gallop, or rub.  ABDOMEN: Soft, nontender. Bowel sounds normal. No palpable organomegaly or masses.  MUSCULOSKELETAL: Extremities without gross deformities noted. No edema of bilateral lower extremities.  SKIN: No suspicious lesions or rashes.  NEURO: Alert and oriented x 3. Cranial nerves II-XII grossly intact.  PSYCHIATRIC: Mentation and affect appear normal.    Laboratory/Imaging Studies:  Infusion Therapy Visit on 04/20/2018   Component Date Value Ref Range Status     WBC 04/20/2018 14.9* 4.0 - 11.0 10e9/L Final     RBC Count 04/20/2018 4.54  3.8 - 5.2 10e12/L Final "     Hemoglobin 04/20/2018 12.6  11.7 - 15.7 g/dL Final     Hematocrit 04/20/2018 39.0  35.0 - 47.0 % Final     MCV 04/20/2018 86  78 - 100 fl Final     MCH 04/20/2018 27.8  26.5 - 33.0 pg Final     MCHC 04/20/2018 32.3  31.5 - 36.5 g/dL Final     RDW 04/20/2018 17.3* 10.0 - 15.0 % Final     Platelet Count 04/20/2018 329  150 - 450 10e9/L Final     Diff Method 04/20/2018 Manual Differential   Final     % Neutrophils 04/20/2018 77.0  % Final     % Lymphocytes 04/20/2018 6.0  % Final     % Monocytes 04/20/2018 12.0  % Final     % Eosinophils 04/20/2018 0.0  % Final     % Basophils 04/20/2018 0.0  % Final     % Metamyelocytes 04/20/2018 2.0  % Final     % Myelocytes 04/20/2018 3.0  % Final     Absolute Neutrophil 04/20/2018 11.5* 1.6 - 8.3 10e9/L Final     Absolute Lymphocytes 04/20/2018 0.9  0.8 - 5.3 10e9/L Final     Absolute Monocytes 04/20/2018 1.8* 0.0 - 1.3 10e9/L Final     Absolute Eosinophils 04/20/2018 0.0  0.0 - 0.7 10e9/L Final     Absolute Basophils 04/20/2018 0.0  0.0 - 0.2 10e9/L Final     Absolute Metamyelocytes 04/20/2018 0.3* 0 10e9/L Final     Absolute Myelocytes 04/20/2018 0.4* 0 10e9/L Final     Anisocytosis 04/20/2018 Slight   Final     Polychromasia 04/20/2018 Slight   Final     Platelet Estimate 04/20/2018 Automated count confirmed.  Platelet morphology is normal.   Final     Sodium 04/20/2018 135  133 - 144 mmol/L Final     Potassium 04/20/2018 3.5  3.4 - 5.3 mmol/L Final     Chloride 04/20/2018 101  94 - 109 mmol/L Final     Carbon Dioxide 04/20/2018 23  20 - 32 mmol/L Final     Anion Gap 04/20/2018 11  3 - 14 mmol/L Final     Glucose 04/20/2018 133* 70 - 99 mg/dL Final     Urea Nitrogen 04/20/2018 20  7 - 30 mg/dL Final     Creatinine 04/20/2018 0.77  0.52 - 1.04 mg/dL Final     GFR Estimate 04/20/2018 77  >60 mL/min/1.7m2 Final    Non  GFR Calc     GFR Estimate If Black 04/20/2018 >90  >60 mL/min/1.7m2 Final    African American GFR Calc     Calcium 04/20/2018 8.8  8.5 - 10.1  mg/dL Final     Bilirubin Total 04/20/2018 1.5* 0.2 - 1.3 mg/dL Final     Albumin 04/20/2018 2.8* 3.4 - 5.0 g/dL Final     Protein Total 04/20/2018 8.1  6.8 - 8.8 g/dL Final     Alkaline Phosphatase 04/20/2018 414* 40 - 150 U/L Final     ALT 04/20/2018 36  0 - 50 U/L Final     AST 04/20/2018 185* 0 - 45 U/L Final     INR 04/20/2018 5.05* 0.86 - 1.14 Final    Comment: Critical Value called to and read back by  JASMYNE CORBETT RN PO 1003 4.20.18 JW       Ammonia 04/20/2018 43  10 - 50 umol/L Final   Infusion Therapy Visit on 04/13/2018   Component Date Value Ref Range Status     INR 04/13/2018 5.25* 0.86 - 1.14 Final    Comment: Critical Value called to and read back by  DR ELISABETH MINA ON CALL DR 04/13/2018 1930 YW.       WBC 04/13/2018 5.1  4.0 - 11.0 10e9/L Final     RBC Count 04/13/2018 3.95  3.8 - 5.2 10e12/L Final     Hemoglobin 04/13/2018 11.1* 11.7 - 15.7 g/dL Final     Hematocrit 04/13/2018 34.9* 35.0 - 47.0 % Final     MCV 04/13/2018 88  78 - 100 fl Final     MCH 04/13/2018 28.1  26.5 - 33.0 pg Final     MCHC 04/13/2018 31.8  31.5 - 36.5 g/dL Final     RDW 04/13/2018 17.0* 10.0 - 15.0 % Final     Platelet Count 04/13/2018 322  150 - 450 10e9/L Final     Diff Method 04/13/2018 Automated Method   Final     % Neutrophils 04/13/2018 66.6  % Final     % Lymphocytes 04/13/2018 13.2  % Final     % Monocytes 04/13/2018 18.2  % Final     % Eosinophils 04/13/2018 1.4  % Final     % Basophils 04/13/2018 0.4  % Final     % Immature Granulocytes 04/13/2018 0.2  % Final     Absolute Neutrophil 04/13/2018 3.4  1.6 - 8.3 10e9/L Final     Absolute Lymphocytes 04/13/2018 0.7* 0.8 - 5.3 10e9/L Final     Absolute Monocytes 04/13/2018 0.9  0.0 - 1.3 10e9/L Final     Absolute Eosinophils 04/13/2018 0.1  0.0 - 0.7 10e9/L Final     Absolute Basophils 04/13/2018 0.0  0.0 - 0.2 10e9/L Final     Abs Immature Granulocytes 04/13/2018 0.0  0 - 0.4 10e9/L Final     Sodium 04/13/2018 137  133 - 144 mmol/L Final     Potassium 04/13/2018 4.2  3.4 -  5.3 mmol/L Final     Chloride 04/13/2018 105  94 - 109 mmol/L Final     Carbon Dioxide 04/13/2018 24  20 - 32 mmol/L Final     Anion Gap 04/13/2018 8  3 - 14 mmol/L Final     Glucose 04/13/2018 97  70 - 99 mg/dL Final     Urea Nitrogen 04/13/2018 8  7 - 30 mg/dL Final     Creatinine 04/13/2018 0.71  0.52 - 1.04 mg/dL Final     GFR Estimate 04/13/2018 84  >60 mL/min/1.7m2 Final    Non  GFR Calc     GFR Estimate If Black 04/13/2018 >90  >60 mL/min/1.7m2 Final    African American GFR Calc     Calcium 04/13/2018 8.5  8.5 - 10.1 mg/dL Final     Bilirubin Total 04/13/2018 0.7  0.2 - 1.3 mg/dL Final     Albumin 04/13/2018 2.6* 3.4 - 5.0 g/dL Final     Protein Total 04/13/2018 6.5* 6.8 - 8.8 g/dL Final     Alkaline Phosphatase 04/13/2018 313* 40 - 150 U/L Final     ALT 04/13/2018 34  0 - 50 U/L Final     AST 04/13/2018 132* 0 - 45 U/L Final     CA 27-29 04/13/2018 536* 0 - 39 U/mL Final    Assay Method:  Chemiluminescence using Siemens Centaur XP   Anticoagulation Therapy Visit on 04/11/2018   Component Date Value Ref Range Status     INR 04/10/2018 6.0   Final        Recent Results (from the past 744 hour(s))   US Biopsy Lymph Node Core    Narrative    HISTORY: Widespread metastatic breast cancer. Needs additional tissue  for extra labs.    COMPARISON: CT dated 3/19/2018.    ULTRASOUND-GUIDED NEEDLE CORE BIOPSY OF RIGHT AXILLARY LYMPH NODE  CONGLOMERATE: Risks and benefits of the procedure are discussed with  the patient. Sonographic guidance and 8 mL of 1% lidocaine (local  anesthetic) are utilized. Six 14-gauge core biopsy samples are  obtained. The patient tolerated the procedure well.  There is no  significant blood loss.      Impression    IMPRESSION: Technically uneventful ultrasound-guided needle core  biopsy right axillary lymph node conglomerate. Pathologic results are  pending.    LETI CHASE MD   Chest XR,  PA & LAT    Narrative    XR CHEST 2 VW 4/23/2018 3:39 PM    HISTORY:  Cough.    COMPARISON: Several prior studies, the most recent one being dated  1/10/2018..      Impression    IMPRESSION: 2 views of the chest show low lung volumes. This is  suspected to at least in part related to subpulmonic pleural effusions  which the patient has had before. Pulmonary vessels are modestly  congested. No focal consolidation is seen.    LETI CHASE MD       Assessment and plan:    (C50.911,  Z17.0,  C50.912) Bilateral malignant neoplasm of breast in female, estrogen receptor positive, unspecified site of breast (H)  (primary encounter diagnosis)  (C50.911,  C77.3) Carcinoma of right breast metastatic to axillary lymph node (H)  (C50.919,  C78.7) Carcinoma of breast metastatic to liver, unspecified laterality (H)  I reviewed with the patient today most recent laboratory tests.  Based on the results patient has increasing progression of her disease.  At this time I will stop Doxil and start the patient on gemcitabine and carboplatin.  She will be receiving her first cycle of chemotherapy today.  I reviewed with the patient today benefits and risk of the medication.  We given an overview about potential side effects in details.  I will see the patient again in 1 week time or sooner if there are new developments or concerns.    (J90) Pleural effusion  Patient symptoms has been stable.    (K12.31) Mucositis due to chemotherapy  patient is using Magic mouthwash as needed    (J45.40) Moderate persistent asthma without complication  Patient asthma is currently well controlled     (E03.9) Hypothyroidism, unspecified type  Patient continues on Synthroid 25 mcg daily.    (D70.1,  T45.1X5A) Chemotherapy-induced neutropenia (H)  We will continue to monitor absolute neutrophil count and give Neupogen if needed    (G89.3) Cancer associated pain  Patient pain is currently well-controlled on the current regimen.    (I82.621) Acute deep vein thrombosis (DVT) of right upper extremity, unspecified vein (H)   because  patient INR continue to be elevated.  I would recommend to stop warfarin at this time.    The patient is ready to learn, no apparent learning barriers were identified.  Diagnosis and treatment plans were explained to the patient. The patient expressed understanding of the content. The patient asked appropriate questions. The patient questions were answered to her satisfaction.    Time spent 45 minutes with the 50% of the time in counseling and coordination of care including discussion of management of metastatic breast cancer, potential side effects of medications, follow-up and prognosis    Chart documentation with Dragon Voice recognition Software. Although reviewed after completion, some words and grammatical errors may remain.

## 2018-04-26 NOTE — PROGRESS NOTES
[facility]ist Progress Note           Assessment and Plan:     Etta Cervantes is a 57 year old female who presents on 4/23/2018 with a 48 hour history of fatigue, malaise, nausea, emesis, and diarrhea.       Diarrhea, likely due to C difficile colitis  4/23/18-4/25/18 -- C difficile tox screen positive. Had significant nausea and vomiting and also recently started new chemotherapy regimen, so possible this is due to chemo or gastroenteritis. Enteric pathogen ELENA negative.    Diarrhea is slowing significantly.    - continue vancomycin oral  4/26/2018 -- improving, continue oral vanco.  Patient seen by ID at [facility], has had recurrent episodes of this, sounds like this may be the 4th in the past few months.  As of Feb was started on a taper of 125 mg 4 times daily x 14 days, then 3 times daily x 7 days then twice daily x 7 days then stopped.  Discussed with her ID provider - likely plan for prolonged vanco taper over 2 months - follow-up with Dr. Santana in 1 month.       Acute cystitis    4/23/18-4/25/18 --Urine micro is consistent, urine culture positive for Klebsiella pneumoniae sensitive to ceftriaxone.    4/26/2018 -- continue ceftriaxone day 4 of 5       Generalized weakness associated with increased sleepiness   4/23/18-4/25/18 --Suspect primarily due to chemotherapy and underlying cancer. Patient had been increasingly sleepy prior to this round of chemo per . Likely effects of MS contin and possibly ambien at night. C difficile infection and UTI also likely contributors though these should be resolving.    - hold MS contin, resume if needed for pain   - hold Ambien    - Palliative care consult tomorrow for symptom control - agreeable to patient and , discussed with TT  4/26/2018 -- Sleepiness appears mildly improved but still there.   starting on on Ritalin and Remeron by palliative care today.  Physical therapy and occupational therapy consulted.        Breast cancer metastasized to  axillary lymph node, metastatic to bone and liver  4/23/18-4/25/18 -- Chemo regimen switched to carboplatin and Gemzar (with methylprednisolone pretreatment), first treatment 4/20/18. This may be primarily responsible for patient's generalized weakness and fatigue.   4/26/2018 -- oncology to see today to assess and discuss when to do her next chemo, but I would be very hesitant about doing this tomorrow as previously planned given her extreme weakness already.         Elevated liver enzymes due to metastatic disease    4/23/18-4/25/18 --Not new, but all values trending upward over the past month.  Probably due to liver metastasis.  4/26/2018 -- recheck in AM.          Acute deep vein thrombosis (DVT) of right upper extremity, unspecified vein (H)    4/23/18-4/25/18 --Has been treated since late 12/2017, most recently with warfarin, as patient told me she had side effects or lab abnormalities to the use of enoxaparin.  Warfarin has been on hold the past 3 or 4 days at the direction of Dr. Cassidy, Oncology, due to prolonged INR, see below.  Palpation of right arm reveals no cords, and there is no redness or swelling.   - Anticoagulation will remain on hold at least until INR normalizes.  However, now that she has completed 4 months anticoagulation, may not be necessary to resume anticoagulation, particularly for an upper extremity clot which has a significantly lower risk of embolization compared to lower extremity.  4/26/2018 -- discussed with oncology - does not want her on coumadin - as long as INR is therapeutic nothing else needed, plan to follow-up with oncology as outpatient.  If INR drops below therapeutic range would consider starting lovenox.          Prolonged INR    4/23/18-4/25/18 --Was on warfarin, though warfarin was stopped 3 or 4 days prior to admission due to prolonged INR.  INR remains 5.19 despite this.  May reflect the effect of high volume of metastatic disease in the liver. Trending down slowly  3.47  - See discussion above regarding treatment of RUE DVT.  2018 --  INR remains in 3's today.  Follow.        Secondary hypotension   18-18 -- Volume depleted due to 48 hour illness, BP intially 83 syst, improved to normal after 1 L IVF.   - will decrease IV fluids to see if helps with appetite and since patient has received significant IVF this admission so far       Low-voltage EKG   18-18 -- Noted on admission.  Note made also of bilateral pleural effusions. Echo with trivial effusion.      DVT Prophylaxis: Not needed due to INR >3     Code Status: Full Code     Lines: Peripheral IV.    Disposition  Hope for discharge home in 1-2 days if continues to improve.             Interval History:   Minimally better. Diarrhea slowing down but still had 1 episode this AM.  No pain at present,  Breathing feels good.  Still very generally weak, no focal weakness.  No trouble with speech or swallowing or vision.  She is a bit groggy today still although sounds less so than yesterday - answering questions appropriately and clearly, oriented x 2-3, but says she can't recall many details from the past couple of days.  No fever or chills.  No nausea or vomiting.  No other concerns.             Review of Systems:    ROS: 10 point ROS neg other than the symptoms noted above in the HPI.             Medications:   Current active medications and PTA medications reviewed, see medication list for details.            Physical Exam:   Vitals were reviewed  Patient Vitals for the past 24 hrs:   BP Temp Temp src Pulse Heart Rate Resp SpO2   18 0757 129/62 97.5  F (36.4  C) Oral 108 - 18 95 %   18 0300 128/81 99  F (37.2  C) Oral 100 - 18 94 %   18 2302 137/65 99  F (37.2  C) Oral 99 - 18 92 %   18 2010 137/69 96  F (35.6  C) Oral - 109 18 93 %   18 1500 121/71 97.3  F (36.3  C) Oral 106 - 18 97 %       Temperatures:  Current - Temp: 97.5  F (36.4  C); Max - Temp  Av.8  F (36.6   C)  Min: 96  F (35.6  C)  Max: 99  F (37.2  C)  Respiration range: Resp  Av  Min: 18  Max: 18  Pulse range: Pulse  Av.3  Min: 99  Max: 108  Blood pressure range: Systolic (24hrs), Av , Min:121 , Max:137   ; Diastolic (24hrs), Av, Min:62, Max:81    Pulse oximetry range: SpO2  Av.2 %  Min: 92 %  Max: 97 %  I/O last 3 completed shifts:  In: 1426 [P.O.:440; I.V.:986]  Out: 450 [Urine:400; Stool:50]    Intake/Output Summary (Last 24 hours) at 18 0844  Last data filed at 18 0700   Gross per 24 hour   Intake             1506 ml   Output              350 ml   Net             1156 ml     EXAM:  General: awake and alert, NAD, oriented x 3  Head: normocephalic  Neck: unremarkable, no lymphadenopathy   HEENT: oropharynx pink and moist    Heart: Regular rate and rhythm, no murmurs, rubs, or gallops  Lungs: clear to auscultation bilaterally with good air movement throughout  Abdomen: soft, minimally tender mid to right abdomen which patient says is baseline, no rebound or guarding, no masses or organomegaly, normal bowel sounds.    Extremities: no edema in lower extremities   Skin unremarkable.               Data:     Results for orders placed or performed during the hospital encounter of 18 (from the past 24 hour(s))   INR   Result Value Ref Range    INR 3.39 (H) 0.86 - 1.14           Attestation:  I have reviewed today's vital signs, notes, medications, labs and imaging.  Amount of time performed on this daily note: 40 minutes.     Jj Phillips MD, MD

## 2018-04-26 NOTE — CONSULTS
[facility]    Palliative Care Consultation--Inpatient  Admission Date: 4/23/2018   Visit Date: April 26, 2018  PCP: Johana Fairbanks   Requested by: Osorio Lima MD      HISTORY of PRESENT ILLNESS:  Etta Cervantes is a 57 year old year old female with a h/o breast cancer originally diagnosed in Maicol '15, already metastatic to axillary lymph nodes when first found so she never underwent any surgical excisions.  By my count, she is on her 6th or 7th chemo regiment, Carbo and Gemcytabine, with cycle 1 day 1 given on 4/20.   By the following day she had developed nausea, vomiting and diarrhea and had become so weak she needed help from her  to get out of the bathtub.  She has since tested positive for C diff, which she also had in January of this year.  She was referred to Palliative Care for symptom management.      ASSESSMENT & PLAN:    # Profound fatigue 2o metastatic breast cancer and chemotherapy  > Ritalin 5 mg bid, with 2nd dose of the day given no later than 2pm; dose later increased to 10 mg as she continued to sleep after her first dose of 5mg. On the 10 mg dose,  noted he now sees her more awake than he has since she was admitted.    # Anorexia with weight loss (22.3 kg since Ca diagnosed Maicol '15, and down 74 lbs from her high of 260 in 2011). Today, eating jello, a few bites of fresh fruit, and a few sips of Boost  > Remeron 7.5 mg qhs  > Marinol 2.5 mg, given 1 hr prior to noon and evening meal    # Chronic sleep disturbance treated for years with Ambien CR  > resumed Ambien CR 12.5 but as a PRN    # Pain in neck and lower posterior R thorax 2o cancer, well controlled at present, despite MS Contin being placed on Hold    # Advance Care Planning:  > Understanding of condition:  To be determined   > Decisional capacity:  intact  > Code status:  Full Code; states she still wants to fight  > Health Care Directive: NO  > POLST:  No    # Goals of Care:  > continue treatment for cancer  >  agreeable to home care support      Thank you for the opportunity to be of service to this patient and family.      Ector Burks) MARCELO Campos  Palliative Care  Private cell:  [phone]     Face to face:   2044-4594 and 0365-5525, 65 min, > 50% spent reviewing symptoms, goals; providing support   Non face to face:   0406-6509 and 1610 - 1710, 75 min, > 50% spent reviewing history, coordinating with nursing and Care Transitions and attending..       = = = = = = = = = = = = = = = =      PROBLEM LIST  Patient Active Problem List   Diagnosis     Hypothyroid     Fibroids     CARDIOVASCULAR SCREENING; LDL GOAL LESS THAN 160     Menorrhagia     Moderate persistent asthma     DIAMOND (obstructive sleep apnea)     Insomnia     Health Care Home     ASCUS favor benign     Breast cancer (H)     Carcinoma of breast metastatic to liver (H)     Bone metastasis (H)     Breast cancer metastasized to axillary lymph node (H)     Drug-related hair loss     Mucositis due to chemotherapy     Secondary malignant neoplasm of liver (H)     Thyroid nodule     Emphysematous bleb of lung (H)     Chemotherapy-induced neutropenia (H)     Hyperlipidemia LDL goal <130     Pneumothorax     Acute pyelonephritis     Elevated liver enzymes     Biliary obstruction     Cancer associated pain     Pleural effusion     Thrush     C. difficile colitis     Acute deep vein thrombosis (DVT) of right upper extremity, unspecified vein (H)     Long-term (current) use of anticoagulants [Z79.01]     Secondary hypotension     Acute cystitis      Prolonged INR     Diarrhea of presumed infectious origin     Urinary tract infection       PAST MEDICAL HISTORY  Past Medical History:   Diagnosis Date     ASCUS favor benign 1/2015    Neg HPV     Cancer (H)      Cataract 2010    surgery both eyes     Emphysematous bleb of lung (H)      Fibroids      Hypercholesterolemia      Hypothyroid      Incontinence of urine     mixed     Menorrhagia      Obesity      Pneumothorax       PONV (postoperative nausea and vomiting)      Sleep apnea     uses a cpap     Uncomplicated asthma        PAST SURGICAL HISTORY  Past Surgical History:   Procedure Laterality Date     BIOPSY  Jan 2015, 2016    Breast cancer, thyroid     BRONCHOSCOPY, INSERT BRONCHIAL VALVE N/A 7/20/2017    Procedure: BRONCHOSCOPY, INSERT BRONCHIAL VALVE;  Flexible Bronchoscopy, Endobronchial Valve Insertion X5. 4 Valves into right upper lobe and 1 valve into Right middle lobe;  Surgeon: Carlos Mcgowan MD;  Location: [facility] OR     BRONCHOSCOPY, REMOVE BRONCHIAL VALVE N/A 9/7/2017    Procedure: BRONCHOSCOPY, REMOVE BRONCHIAL VALVE;  Flexible Bronchoscopy, Removal Of Endobronchial Valves x 2;  Surgeon: Carlos Mcgowan MD;  Location: [facility] OR     BRONCHOSCOPY, REMOVE BRONCHIAL VALVE N/A 9/28/2017    Procedure: BRONCHOSCOPY, REMOVE BRONCHIAL VALVE;  Flexible Bronchoscopy, Removal Of Intra-Bronchial Valves x 3;  Surgeon: Carlos Mcgowan MD;  Location:  OR     CATARACT IOL, RT/LT  2010     COLONOSCOPY  2010     DILATION AND CURETTAGE, HYSTEROSCOPY, ABLATE ENDOMETRIUM NOVASURE, COMBINED  3/2/2011    COMBINED DILATION AND CURETTAGE, HYSTEROSCOPY, ABLATE ENDOMETRIUM NOVASURE performed by LAURA VARGAS at [facility]     ENDOSCOPIC RETROGRADE CHOLANGIOPANCREATOGRAM N/A 12/8/2017    Procedure: COMBINED ENDOSCOPIC RETROGRADE CHOLANGIOPANCREATOGRAPHY, PLACE TUBE/STENT;  Endoscopic Retrograde Cholangiopancreatogram with biliary sphincterotomy and stent placement;  Surgeon: Guru Coleen Mora MD;  Location:  OR     GENITOURINARY SURGERY  2005    Uretha sling     INSERT PORT VASCULAR ACCESS N/A 2/12/2015    Procedure: INSERT PORT VASCULAR ACCESS;  Surgeon: Noé Schaefer MD;  Location: [facility]     INSERT PORT VASCULAR ACCESS N/A 1/10/2018    Procedure: INSERT PORT VASCULAR ACCESS;  Portacath replacement;  Surgeon: Remi De La Paz MD;  Location: [facility] OR     SURGICAL HISTORY OF -   2005    bladder sling      SURGICAL HISTORY OF -       Cystectomy-lower lip     TUBAL LIGATION  1987       FAMILY MEDICAL HISTORY  Family History   Problem Relation Age of Onset     Depression Mother      Eye Disorder Mother      Gynecology Mother      Alzheimer Disease Mother      CANCER Father      Other Cancer Father      HEART DISEASE Maternal Grandfather      Allergies Paternal Grandfather      Allergies Son        SOCIAL HISTORY  History   Smoking Status     Never Smoker   Smokeless Tobacco     Never Used       Alcohol use No     History   Drug Use No     Social History     Social History Narrative    April 26, 2018:   for over 30 years to Lucian; live in [place].  Lucian works for [employer] and has some flexibility that allows him to work at home on Fridays and, if needed, on other days.  They have one son and one daughter who live nearby.  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  She never smoked but has asthma, DIAMOND and the largest bleb that most doctors have ever seen, which she attributes to growing up at [place] with constant exposure to environmental dusts and toxins.  She is not affiliated with any Spiritism but has a supportive group of friends and neighbors who have provided most of their meals since the start of the year.  At present, no community/home services.      Ector Campos CNP ([place])    Palliative Care    Private cell:  [phone]        SPIRITUAL HISTORY  As above    ALLERGIES  Allergies   Allergen Reactions     Animal Dander Cough     Itchy eyes     Cats      Dust Mites Cough     Itchy eyes     Pollen Extract Other (See Comments)     Cough, Itchy eyes     Seasonal Allergies      Levaquin [Levofloxacin] Rash     States she has violent dreams from taking this       MEDICATIONS  Medications Prior to Admission    No current facility-administered medications on file prior to encounter.   Current Outpatient Prescriptions on File Prior to  Encounter:  azelastine (ASTELIN) 0.1 % nasal spray Spray 1-2 sprays into both nostrils 2 times daily as needed for rhinitis   Beclomethasone Dipropionate 80 MCG/ACT AERS Nasal Spray Spray 2 sprays into both nostrils daily   budesonide-formoterol (SYMBICORT) 160-4.5 MCG/ACT Inhaler INHALE 2 PUFFS INTO THE LUNGS 2 TIMES DAILY   calcium carb-cholecalciferol (KP CALCIUM 600+D3) 600-500 MG-UNIT CAPS Take 2 tablets by mouth daily   CRANBERRY PO Take 1 capsule by mouth daily    enoxaparin (LOVENOX) 100 MG/ML injection Inject 0.9 mLs (90 mg) Subcutaneous every 12 hours   fexofenadine (ALLEGRA ALLERGY) 180 MG tablet Take 180 mg by mouth daily as needed for allergies   levothyroxine (SYNTHROID/LEVOTHROID) 25 MCG tablet TAKE 1 TABLET (25 MCG) BY MOUTH DAILY   morphine (MS CONTIN) 15 MG 12 hr tablet Take 1 tablet (15 mg) by mouth every 12 hours maximum 2 tablet(s) per day   oxybutynin (DITROPAN XL) 10 MG 24 hr tablet Take 10 mg by mouth daily   oxyCODONE IR (ROXICODONE) 5 MG tablet Take 1 tablet (5 mg) by mouth every 4 hours as needed for moderate to severe pain   Phenylephrine-DM-GG (ROBITUSSIN COUGH/COLD CF PO) Take 10 mLs by mouth as needed    prochlorperazine (COMPAZINE) 10 MG tablet Take 1 tablet (10 mg) by mouth every 6 hours as needed (Nausea/Vomiting)   zolpidem (AMBIEN CR) 12.5 MG CR tablet Take 1 tablet by mouth at bed time. (Patient taking differently: Take 12.5 mg by mouth At Bedtime )   albuterol (2.5 MG/3ML) 0.083% neb solution Take 1 vial (2.5 mg) by nebulization every 4 hours as needed for shortness of breath / dyspnea (Patient not taking: Reported on 3/21/2018)   albuterol (VENTOLIN HFA) 108 (90 BASE) MCG/ACT inhaler Inhale 2 puffs into the lungs every 4 hours as needed (Patient not taking: Reported on 3/21/2018)   diclofenac (VOLTAREN) 1 % GEL topical gel Place 2 g onto the skin 4 times daily   diphenhydrAMINE (BENADRYL) 25 MG tablet Take 1 tablet (25 mg) by mouth every 8 hours as needed for itching or  "allergies   Lidocaine (LIDOCARE) 4 % Patch Place 1-2 patches onto the skin every 24 hours (Patient not taking: Reported on 1/24/2018)   loratadine (CLARITIN) 10 MG tablet Take 10 mg by mouth daily as needed for allergies   metoclopramide (REGLAN) 10 MG tablet Take 1 tablet (10 mg) by mouth 2 times daily as needed (Patient not taking: Reported on 3/30/2018)   order for DME Equipment being ordered: Venu post-lumpectomy compression pad x2   warfarin (COUMADIN) 1 MG tablet HOLD UNTIL DIRECTED OTHERWISE   warfarin (COUMADIN) 2.5 MG tablet HOLD UNTIL DIRECTED OTHERWISE       Current Medications    Beclomethasone Dipropionate  2 spray Both Nostrils BID     Calcium carb-Vitamin D 500 mg [place]-200 units  2 tablet Oral Daily     cefTRIAXone  1 g Intravenous Q24H     dronabinol  2.5 mg Oral BID     fluticasone-vilanterol  1 puff Inhalation Daily     levothyroxine  25 mcg Oral Daily     methylphenidate  10 mg Oral BID     mirtazapine  7.5 mg Oral At Bedtime     oxybutynin  10 mg Oral Daily     vancomycin  125 mg Oral 4x Daily       PRN Medications  albuterol, albuterol, cranberry, diphenhydrAMINE, fexofenadine, guaiFENesin-dextromethorphan, HYDROcodone-acetaminophen, loratadine, melatonin, metoclopramide **OR** metoclopramide, naloxone, ondansetron **OR** ondansetron, oxyCODONE IR, - MEDICATION INSTRUCTIONS -, prochlorperazine **OR** prochlorperazine **OR** prochlorperazine      REVIEW OF SYSTEMS  CONSTITUTIONAL:  Overall, improving since time of admission.  Denies pain despite MS Contin being held  EYES:  negative  HEENT:  Has had posterior neck pain in the past, none at present.  Hearing loss.   RESPIRATORY:  Multiple environmental allergies, h/o moderate persistent asthma, h/o spontaneous pneumothorax; denies dyspnea at present. Also has h/o DIAMOND.  CARDIOVASCULAR:  Has \"nodules\" floating around her heart but denies chest pain  GASTROINTESTINAL:  Little appetite; 5 stools today, loose/not runny  GENITOURINARY:  Long h/o " stress/urge incontinence; has been incontinent here  INTEGUMENT/BREAST:  Bruise upper R arm which she attributes to a blood clot caused by excessively tight tourniquette prior to blood draw  HEMATOLOGIC/LYMPHATIC:  Coumadin on hold due to persistent high INRs  ALLERGIC/IMMUNOLOGIC:  Multiple seasonal allergies  ENDOCRINE:  Profound fatigue w/normal TSH  MUSCULOSKELETAL:  R flank pain prior,not at present.  Weak prior to admission and needed help from  to get out of the bathtub.  Never happened previously.   NEUROLOGICAL:  negative  BEHAVIOR/PSYCH:  Denies depressed mood; chronic insomnia.  Has been prescribed Ambien CR for years by Sleep Medicine    Performance Assessment:    Palliative Performance Scale, v.2     40%  Extensive disease. Mainly in bed w/little ambulation. ADLs/activities mainly w/assistance. Normal or reduced intake. Full LOC or drowsy or confused.      PHYSICAL EXAMINATION  Patient Vitals for the past 24 hrs:   BP Temp Temp src Pulse Heart Rate Resp SpO2   04/26/18 1519 128/71 97.9  F (36.6  C) Oral 112 - 18 96 %   04/26/18 0757 129/62 97.5  F (36.4  C) Oral 108 - 18 95 %   04/26/18 0300 128/81 99  F (37.2  C) Oral 100 - 18 94 %   04/25/18 2302 137/65 99  F (37.2  C) Oral 99 - 18 92 %   04/25/18 2010 137/69 96  F (35.6  C) Oral - 109 18 93 %      Wt Readings from Last 5 Encounters:   04/25/18 186 lb 1.1 oz (84.4 kg)   04/20/18 176 lb 9.6 oz (80.1 kg)   04/20/18 176 lb 6.4 oz (80 kg)   04/13/18 188 lb 3.2 oz (85.4 kg)   03/30/18 184 lb 6.4 oz (83.6 kg)   3/2017  207 lb  3/2016  225 lb  1/2015  237 lb  6/2011  260 lb    Constitutional:  More awake this afternoon after 10 mg Ritalin dose  Eyes:  Anicteric, PERRL  HEENT:  OP pink, moist, neck NT to light palpation  Lymph/Hematologic:  No epitrochlear, axillary, anterior or posterior cervical, or supraclavicular lymphadenopathy is appreciated  Cardiovascular:  RRR, tachy  Respiratory:  Shallow respirations, episodic dry cough, clear, diminished R  base  GI: Soft, non-tender, normal bowel sounds, no hepatosplenomegaly  Genitourinary:  deferred  Musculoskeletal:  Can lift legs into bed unassisted  Skin:  Pale, bruise upper R arm  Neurological:  nonfocal  Psych:  Very pleasant, denies depressed mood      DATA  ROUTINE ICU LABS (Last four results)  CMP    Recent Labs  Lab 04/26/18  0930 04/25/18  0840 04/24/18  0545 04/23/18  1350 04/20/18  0845    140 138 137 135   POTASSIUM 3.7 3.6 3.0* 3.4 3.5   CHLORIDE 109 110* 106 102 101   CO2 25 25 25 26 23   ANIONGAP 6 5 7 9 11   * 118* 87 119* 133*   BUN 8 10 17 25 20   CR 0.52 0.50* 0.57 0.69 0.77   GFRESTIMATED >90 >90 >90 87 77   GFRESTBLACK >90 >90 >90 >90 >90   BEN 7.5* 7.0* 7.1* 8.1* 8.8   PROTTOTAL  --  5.8* 5.6* 6.5* 8.1   ALBUMIN  --  2.1* 2.2* 2.4* 2.8*   BILITOTAL  --  0.9 1.1 1.4* 1.5*   ALKPHOS  --  362* 342* 395* 414*   AST  --  187* 232* 276* 185*   ALT  --  36 40 49 36     CBC    Recent Labs  Lab 04/26/18  0930 04/25/18  0840 04/24/18  0545 04/23/18  1350   WBC 7.9 5.1 5.0 11.1*   RBC 3.74* 3.64* 3.71* 4.16   HGB 10.1* 10.0* 10.1* 11.4*   HCT 32.8* 31.9* 32.0* 35.8   MCV 88 88 86 86   MCH 27.0 27.5 27.2 27.4   MCHC 30.8* 31.3* 31.6 31.8   RDW 17.4* 17.1* 17.0* 17.3*   PLT 73* 107* 137* 192     INR    Recent Labs  Lab 04/26/18  0600 04/25/18  0840 04/24/18  0545 04/23/18  1350   INR 3.39* 3.47* 3.82* 5.19*     Arterial Blood GasNo lab results found in last 7 days.      No results found for this or any previous visit (from the past 48 hour(s)).

## 2018-04-26 NOTE — PLAN OF CARE
Problem: Patient Care Overview  Goal: Plan of Care/Patient Progress Review  Discharge Planner OT   Patient plan for discharge: Home with home care and assist from .     Current status: Pt completes toileting task with SBA and FWW. Pt ditches walker outside of bathroom- therapist educated pt on importance of keeping walker near. Pt then with slight LOB while completing standing pericares; able to correct with use of walker- again reiterated importance of walker. Educated pt on energy conservation techniques during ADL tasks and benefits of extended tub bench as pt states reason for coming in was having a difficult time getting out of tub.     Barriers to return to prior living situation: None    Recommendations for discharge: Home with assist from spouse;  is very attentive- states he is getting her a RW and may be looking into extended tub bench.     Rationale for recommendations: Pt up with SBA and FWW.  is very attentive and can assist with ADLs/IADLs as needed.        Entered by: Nandini Hall 04/26/2018 3:57 PM

## 2018-04-26 NOTE — PLAN OF CARE
Problem: Patient Care Overview  Goal: Individualization & Mutuality  Outcome: Improving  Pt started on ritalin today, appears more alert this afternoon.  Sitting at edge of bed now, sipping on liquids, visiting with family.  Her  reports that she seems more alert now than she has since Friday.  Appetite remains poor, mostly sipping on boost, and iced tea, had a few bites of a sandwich and jello for lunch.  Pt has had 4 small loose stools this shift.  Denies pain or nausea.  Up in room frequently ambulating to bathroom.  Pt is steady on feet with walker and using call light appropriately.  She's forgetful at times, alarms left on for pt safety, call light in reach.

## 2018-04-26 NOTE — PLAN OF CARE
Problem: Patient Care Overview  Goal: Plan of Care/Patient Progress Review  Discharge Planner PT   Patient plan for discharge: to home with assist from family when medically stable  Current status: independent with bed mobility; supervision for gait and some transfers due to weakness and fatigue from illness and current chemo treatment  Barriers to return to prior living situation: none  Recommendations for discharge: continue to provide supervision for safety as needed.  Rationale for recommendations: Pt demonstrated appropriate movement patterns and safety precautions for transfers and mobility.  is attentive and helpful.  Patient reports feeling very tired from several days diarrhea and effects of chemo.  When she is healthier she would benefit from outpt Cancer Rehab if weakness and fatigue persist.       Entered by: Christina Curtis 04/26/2018 3:11 PM

## 2018-04-26 NOTE — CONSULTS
Care Transition Initial Assessment - RN  Reason For Consult: discharge planning   Met with: Patient and spouse.    DATA   Principal Problem:    Acute cystitis   Active Problems:    Bone metastasis (H)    Breast cancer metastasized to axillary lymph node (H)    Breast cancer (H)    Elevated liver enzymes    Acute deep vein thrombosis (DVT) of right upper extremity, unspecified vein (H)    Secondary hypotension    Prolonged INR    Diarrhea of presumed infectious origin    Urinary tract infection       [facility]  Primary Care MD Name: Dr. Fairbanks  Contact information and PCP information verified: Yes    ASSESSMENT  Cognitive Status: awake, alert and oriented.       Resources List: Home Care     Lives With: spouse  Living Arrangements: house     Description of Support System: Supportive, Involved   Who is your support system?:    Support Assessment: Adequate family and caregiver support   Insurance Concerns: No Insurance issues identified      This writer met with pt and , introduced self and role.  Discussed discharge planning and Medicare guidelines in regards to home care, TCU and LTC.  Patient stated, she needs assist with showering at home. Discussed home care, Pt was provided with Medicare certified home care list. Pt chooses to use [facility] ([phone] Fax: [phone]).  A referral was placed for home care.    PLAN    Home with [facility]      Discharge Planner   Discharge Plans in progress: [facility]  Barriers to discharge plan: Medical stability  Follow up plan: Referral to clinic care coordinator       Entered by: Anjelica Finn 04/26/2018 3:06 PM           Anjelica Finn RN, Care Coordinator [phone]

## 2018-04-26 NOTE — PROGRESS NOTES
Patient states she takes her AER'S inhaler two times daily .message to on call. Patient has a moderate cough and robitussin was given. Patient  Is incontinent of urine. Denies pain . Upper airway has some wheezes. Patient appears very sleepy easily aroused when asked a question.  at bedside most of evening. Patient uses call appropriately.

## 2018-04-27 NOTE — PLAN OF CARE
Problem: Patient Care Overview  Goal: Plan of Care/Patient Progress Review  Outcome: Improving  Patient more alert this evening  at bedside patient up to chair for dinner only had bites.patient 02 mid nineties on room air. Patient has a moderate dry cough. Patient denies  Pain at this time. Patient up to bathroom with STAND BY ASSIST and her walker which patient abandons at the bathroom door. Encourage patient to use walker and need for walker. Patient voiding and has had 3 loose stools this evening patient voids scant amount 50 or less, but has moderate amounts in her brief; incontinent mostly.patient is soft spoken with flat affect at times. Can be forgetful at times. Patient uses her call light at times, otherwise impulsive with getting out of bed.        Bed alarm on for safety.

## 2018-04-27 NOTE — PROGRESS NOTES
[facility] Hospitalist Progress Note           Assessment and Plan:     Etta Cervantes is a 57 year old female who presents on 4/23/2018 with a 48 hour history of fatigue, malaise, nausea, emesis, and diarrhea.        Diarrhea, likely due to C difficile colitis  4/23/18-4/25/18 -- C difficile tox screen positive. Had significant nausea and vomiting and also recently started new chemotherapy regimen, so possible this is due to chemo or gastroenteritis. Enteric pathogen ELENA negative.    Diarrhea is slowing significantly.    - continue vancomycin oral  4/26/2018 -- improving, continue oral vanco.  Patient seen by ID at [facility], has had recurrent episodes of this, sounds like this may be the 4th in the past few months.  As of Feb was started on a taper of 125 mg 4 times daily x 14 days, then 3 times daily x 7 days then twice daily x 7 days then stopped.  Discussed with her ID provider - likely plan for prolonged vanco taper over 2 months - follow-up with Dr. Santana in 1 month.   4/27/2018 -- minimal improvement so far.  Continue oral vanco for now, but if not improving over the next 24-48 hours would consider fidaxomicin if possible vs transfer for possible fecal transplant.         Acute cystitis    4/23/18-4/25/18 --Urine micro is consistent, urine culture positive for Klebsiella pneumoniae sensitive to ceftriaxone.    4/27/2018 -- continue ceftriaxone day 5 of 5        Generalized weakness associated with increased sleepiness   4/23/18-4/25/18 --Suspect primarily due to chemotherapy and underlying cancer. Patient had been increasingly sleepy prior to this round of chemo per . Likely effects of MS contin and possibly ambien at night. C difficile infection and UTI also likely contributors though these should be resolving.    - hold MS contin, resume if needed for pain   - hold Ambien    - Palliative care consult tomorrow for symptom control - agreeable to patient and , discussed with TT  4/26/2018 --  Sleepiness appears mildly improved but still there.   starting on on Ritalin and Remeron by palliative care today.  Physical therapy and occupational therapy consulted.    4/27/2018 -- had been improved, but groggy again this AM after ambien was restarted - decreased from 10 to 5 mg today.  Plan for walker on discharge.  (ordered)      Breast cancer metastasized to axillary lymph node, metastatic to bone and liver  4/23/18-4/25/18 -- Chemo regimen switched to carboplatin and Gemzar (with methylprednisolone pretreatment), first treatment 4/20/18. This may be primarily responsible for patient's generalized weakness and fatigue.   4/27/2018 -- follow-up with oncology after discharge to discuss when to restart chemo.           Elevated liver enzymes due to metastatic disease    4/23/18-4/25/18 --Not new, but all values trending upward over the past month.  Probably due to liver metastasis.  4/27/2018 -- slightly up but overall unchanged.  Follow.        History of deep vein thrombosis (DVT) of right upper extremity, unspecified vein (H)    4/23/18-4/25/18 --Has been treated since late 12/2017, most recently with warfarin, as patient told me she had side effects or lab abnormalities to the use of enoxaparin.  Warfarin has been on hold the past 3 or 4 days at the direction of Dr. Cassidy, Oncology, due to prolonged INR, see below.  Palpation of right arm reveals no cords, and there is no redness or swelling.   - Anticoagulation will remain on hold at least until INR normalizes.  However, now that she has completed 4 months anticoagulation, may not be necessary to resume anticoagulation, particularly for an upper extremity clot which has a significantly lower risk of embolization compared to lower extremity.  4/26/2018 -- discussed with oncology - does not want her on coumadin - as long as INR is therapeutic nothing else needed, plan to follow-up with oncology as outpatient.  If INR drops below therapeutic range would  consider starting lovenox.     4/27/2018 -- no change         Prolonged INR    4/23/18-4/25/18 --Was on warfarin, though warfarin was stopped 3 or 4 days prior to admission due to prolonged INR.  INR remains 5.19 despite this.  May reflect the effect of high volume of metastatic disease in the liver. Trending down slowly 3.47  - See discussion above regarding treatment of RUE DVT.  4/27/2018 -- INR stable in 3's, no change.         Secondary hypotension   4/23/18-4/25/18 -- Volume depleted due to 48 hour illness, BP intially 83 syst, improved to normal after 1 L IVF.   - will decrease IV fluids to see if helps with appetite and since patient has received significant IVF this admission so far  4/27/2018 -- resolved, doing well.           Low-voltage EKG   4/23/18-4/25/18 -- Noted on admission.  Note made also of bilateral pleural effusions. Echo with trivial effusion.   4/27/2018 -- no further issues.       DVT Prophylaxis: Not needed due to INR >3      Code Status: Full Code      Lines: Peripheral IV.    Disposition  Anticipate 2-3 more days inpatient, then discharge home when able.              Interval History:   Minimal change.  Sleepy again this am after ambien 10mg last night, but clear and oriented.  Strength unchanged, diarrhea about the same.  No pain, no new concerns.              Review of Systems:    ROS: 10 point ROS neg other than the symptoms noted above in the HPI.           Medications:   Current active medications and PTA medications reviewed, see medication list for details.            Physical Exam:   Vitals were reviewed  Patient Vitals for the past 24 hrs:   BP Temp Temp src Pulse Resp SpO2   04/27/18 1125 129/73 98.4  F (36.9  C) Oral 109 18 92 %   04/27/18 0725 146/88 98.3  F (36.8  C) Oral 120 18 94 %   04/27/18 0432 143/87 99.2  F (37.3  C) Oral 111 16 93 %   04/26/18 2242 140/85 97.8  F (36.6  C) Oral 111 18 95 %   04/26/18 1900 135/82 97.8  F (36.6  C) Oral 112 18 95 %   04/26/18 1519  128/71 97.9  F (36.6  C) Oral 112 18 96 %       Temperatures:  Current - Temp: 98.4  F (36.9  C); Max - Temp  Av.2  F (36.8  C)  Min: 97.8  F (36.6  C)  Max: 99.2  F (37.3  C)  Respiration range: Resp  Av.7  Min: 16  Max: 18  Pulse range: Pulse  Av.5  Min: 109  Max: 120  Blood pressure range: Systolic (24hrs), Av , Min:128 , Max:146   ; Diastolic (24hrs), Av, Min:71, Max:88    Pulse oximetry range: SpO2  Av.2 %  Min: 92 %  Max: 96 %  I/O last 3 completed shifts:  In: 400 [P.O.:320; I.V.:80]  Out: -     Intake/Output Summary (Last 24 hours) at 18 1241  Last data filed at 18 0800   Gross per 24 hour   Intake              180 ml   Output                0 ml   Net              180 ml     EXAM:  General: awake and alert, NAD, oriented x 3  Head: normocephalic  Neck: unremarkable, no lymphadenopathy   HEENT: oropharynx pink and moist    Heart: Regular rate and rhythm, no murmurs, rubs, or gallops  Lungs: clear to auscultation bilaterally with good air movement throughout  Abdomen: soft, non-tender, no masses or organomegaly  Extremities: no edema in lower extremities   Skin unremarkable.               Data:     Results for orders placed or performed during the hospital encounter of 18 (from the past 24 hour(s))   Care Transition RN/SW IP Consult    Narrative    Anjelica Finn RN     2018  3:12 PM  Care Transition Initial Assessment - RN  Reason For Consult: discharge planning   Met with: Patient and spouse.    DATA   Principal Problem:    Acute cystitis   Active Problems:    Bone metastasis (H)    Breast cancer metastasized to axillary lymph node (H)    Breast cancer (H)    Elevated liver enzymes    Acute deep vein thrombosis (DVT) of right upper extremity,   unspecified vein (H)    Secondary hypotension    Prolonged INR    Diarrhea of presumed infectious origin    Urinary tract infection       [facility]  Primary Care MD Name:   Tiki  Contact information and PCP information verified: Yes    ASSESSMENT  Cognitive Status: awake, alert and oriented.       Resources List: Home Care     Lives With: spouse  Living Arrangements: house     Description of Support System: Supportive, Involved   Who is your support system?:    Support Assessment: Adequate family and caregiver support   Insurance Concerns: No Insurance issues identified      This writer met with pt and , introduced self and role.    Discussed discharge planning and Medicare guidelines in regards   to home care, TCU and LTC.  Patient stated, she needs assist with   showering at home. Discussed home care, Pt was provided with   Medicare certified home care list. Pt chooses to use [facility] ([phone] Fax: [phone]).  A referral was   placed for home care.    PLAN    Home with [facility]      Discharge Planner   Discharge Plans in progress: [facility]  Barriers to discharge plan: Medical stability  Follow up plan: Referral to clinic care coordinator       Entered by: Anjelica Finn 04/26/2018 3:06 PM           Anjelica Finn RN, Care Coordinator [phone]       Comprehensive metabolic panel   Result Value Ref Range    Sodium 140 133 - 144 mmol/L    Potassium 3.5 3.4 - 5.3 mmol/L    Chloride 107 94 - 109 mmol/L    Carbon Dioxide 27 20 - 32 mmol/L    Anion Gap 6 3 - 14 mmol/L    Glucose 85 70 - 99 mg/dL    Urea Nitrogen 7 7 - 30 mg/dL    Creatinine 0.54 0.52 - 1.04 mg/dL    GFR Estimate >90 >60 mL/min/1.7m2    GFR Estimate If Black >90 >60 mL/min/1.7m2    Calcium 7.4 (L) 8.5 - 10.1 mg/dL    Bilirubin Total 0.8 0.2 - 1.3 mg/dL    Albumin 2.1 (L) 3.4 - 5.0 g/dL    Protein Total 5.5 (L) 6.8 - 8.8 g/dL    Alkaline Phosphatase 401 (H) 40 - 150 U/L    ALT 46 0 - 50 U/L     (H) 0 - 45 U/L   INR   Result Value Ref Range    INR Canceled, Test credited 0.86 - 1.14   CBC with platelets differential   Result Value Ref Range    WBC 6.8 4.0 - 11.0  10e9/L    RBC Count 3.72 (L) 3.8 - 5.2 10e12/L    Hemoglobin 10.3 (L) 11.7 - 15.7 g/dL    Hematocrit 31.6 (L) 35.0 - 47.0 %    MCV 85 78 - 100 fl    MCH 27.7 26.5 - 33.0 pg    MCHC 32.6 31.5 - 36.5 g/dL    RDW 16.9 (H) 10.0 - 15.0 %    Platelet Count 63 (L) 150 - 450 10e9/L    Diff Method Automated Method     % Neutrophils 71.3 %    % Lymphocytes 13.8 %    % Monocytes 13.0 %    % Eosinophils 0.3 %    % Basophils 0.4 %    % Immature Granulocytes 1.2 %    Absolute Neutrophil 4.8 1.6 - 8.3 10e9/L    Absolute Lymphocytes 0.9 0.8 - 5.3 10e9/L    Absolute Monocytes 0.9 0.0 - 1.3 10e9/L    Absolute Eosinophils 0.0 0.0 - 0.7 10e9/L    Absolute Basophils 0.0 0.0 - 0.2 10e9/L    Abs Immature Granulocytes 0.1 0 - 0.4 10e9/L   INR   Result Value Ref Range    INR 3.16 (H) 0.86 - 1.14           Attestation:  I have reviewed today's vital signs, notes, medications, labs and imaging.  Amount of time performed on this daily note: 30 minutes.     Jj Phillips MD, MD

## 2018-04-27 NOTE — PROGRESS NOTES
Reason for Follow up: DC Planning    Anticipated discharge needs: Plan cont to be for pt to return home with spouse and have [facility] ([phone] Fax: [phone]). Per palliative care pt cont to want to pursue Chemotherapy. Per MD note, pt slow to improve, will be here 2-3 more days.     Next steps: CTS to follow    Vanessa Camara MSSELAM, [facility], [facility] [phone]  Discharge Planner   Discharge Plans in progress: home care  Barriers to discharge plan: medical stabiltiy  Follow up plan: CTS to follow       Entered by: Vanessa Camara 04/27/2018 1:58 PM

## 2018-04-27 NOTE — PLAN OF CARE
Problem: Patient Care Overview  Goal: Plan of Care/Patient Progress Review  Discharge Planner OT   Patient plan for discharge: Home with assist from spouse and home care to assist with showers.     Current status: Pt SBA for supine to sit, sit to stand. Ambulates to/from bathroom with SBA- improved safety with walker. Brings into bathroom to assist with toilet transfer and standing pericares, however when walking out of bathroom ditches walker- cues provided for safety. Up in hallway with CGA and FWW. Activity very fatiguing. Pt states soft voice at baseline.     Barriers to return to prior living situation: generalized weakness, poor activity tolerance.     Recommendations for discharge: Home with spouse and home care.     Rationale for recommendations:  can provide assist with ADLs/IADLs as needed.        Entered by: Nandini Hall 04/27/2018 2:06 PM

## 2018-04-27 NOTE — PLAN OF CARE
Problem: Patient Care Overview  Goal: Plan of Care/Patient Progress Review  Outcome: Improving  Large loose, watery incontinent stool earlier today.  Poor appetite, grilled cheese ordered for lunch and she ate a few bites.  Medicated with norco this am and effective.  Is still sleepy today and arouses spontaneously.  Sat in chair x 2 today.   at bedside.  Up with assist of one and walker.  Quiet and withdrawn, verbalizes that she wished she felt better.   at bedside and is attentive.

## 2018-04-27 NOTE — PLAN OF CARE
Problem: Patient Care Overview  Goal: Plan of Care/Patient Progress Review  Outcome: No Change  Pt was very sedated tonight until about 0430. She would use her call light appropriately, however, would sit up and try to go to the bathroom without opening her eyes.  Pt was unsteady and at times wouldn't wait for staff to put on PPE before trying to get out of bed. Up to the commode with 2 people to ensure safety.   One medium dark brown soft BM for NOC's. VSS. Port running at TKO. No complaints of discomforts overnight.

## 2018-04-28 NOTE — PLAN OF CARE
"Problem: Urinary Tract Infection (Adult)  Goal: Signs and Symptoms of Listed Potential Problems Will be Absent, Minimized or Managed (Urinary Tract Infection)  Signs and symptoms of listed potential problems will be absent, minimized or managed by discharge/transition of care (reference Urinary Tract Infection (Adult) CPG).   Outcome: Improving  Reports some burning yet with urination.  Denies pain, last pain medication given this am.  Assisted patient to bathroom at 1830,  concerned that she seemed \"off\" and picking at his shirt and making non-sense statements.  Discussed with patient who was oriented to self and situation and date.  Reports it is difficult to track the time as the numbers seem to be changing too frequently on the clock.  She denies visual hallucinations.  Became very tearful about her diagnosis and the way she is feeling due to weakness, lack of appetite and cancer.  She thinks her current medication regime is not the ticket and her appetite and wakefulness Is not improving.   at bedside and supportive, patient making small snaps of frustration at her .   encouraging her to eat.      "

## 2018-04-28 NOTE — PLAN OF CARE
Problem: Patient Care Overview  Goal: Plan of Care/Patient Progress Review  Outcome: Improving  More awake and alert today.  Eating bites of foods and some liquids with a lot of encouragement-improvement from yesterday.  Up into chair throughout the day, and needs encouragement.  Is more social with staff.  Required as needed norco x 1 this afternoon.  Stools x 2 this shift, loose and semi-formed.  No nausea.   attentive and at bedside.

## 2018-04-28 NOTE — PROGRESS NOTES
[facility]           Assessment and Plan:     Etta Cervantes is a 57 year old female who presents on 4/23/2018 with a 48 hour history of fatigue, malaise, nausea, emesis, and diarrhea.        Diarrhea, likely due to C difficile colitis  4/23/18-4/25/18 -- C difficile tox screen positive. Had significant nausea and vomiting and also recently started new chemotherapy regimen, so possible this is due to chemo or gastroenteritis. Enteric pathogen ELEAN negative.    Diarrhea is slowing significantly.    - continue vancomycin oral  4/26/2018 -- improving, continue oral vanco.  Patient seen by ID at [facility], has had recurrent episodes of this, sounds like this may be the 4th in the past few months.  As of Feb was started on a taper of 125 mg 4 times daily x 14 days, then 3 times daily x 7 days then twice daily x 7 days then stopped.  Discussed with her ID provider - likely plan for prolonged vanco taper over 2 months - follow-up with Dr. Santana in 1 month.   4/28/2018 -- improving - continue vanco for now, if fails to continue to improve will transition to fidaxomicin.  stoppin rocephin as below.         Acute cystitis    4/23/18-4/25/18 --Urine micro is consistent, urine culture positive for Klebsiella pneumoniae sensitive to ceftriaxone.    4/28/2018 -- completed 5 days of rocephin, stopped today.          Generalized weakness associated with increased sleepiness   4/23/18-4/25/18 --Suspect primarily due to chemotherapy and underlying cancer. Patient had been increasingly sleepy prior to this round of chemo per . Likely effects of MS contin and possibly ambien at night. C difficile infection and UTI also likely contributors though these should be resolving.    - hold MS contin, resume if needed for pain   - hold Ambien    - Palliative care consult tomorrow for symptom control - agreeable to patient and , discussed with TT  4/26/2018 -- Sleepiness appears mildly improved but  still there.   starting on on Ritalin and Remeron by palliative care today.  Physical therapy and occupational therapy consulted.    4/27/2018 -- had been improved, but groggy again this AM after ambien was restarted - decreased from 10 to 5 mg today.  Plan for walker on discharge.  (ordered)   4/28/2018 -- improved without ambien - this was discontinued.       Breast cancer metastasized to axillary lymph node, metastatic to bone and liver  4/23/18-4/25/18 -- Chemo regimen switched to carboplatin and Gemzar (with methylprednisolone pretreatment), first treatment 4/20/18. This may be primarily responsible for patient's generalized weakness and fatigue.   4/28/2018 -- follow-up with oncology after discharge to discuss when to restart chemo.           Elevated liver enzymes due to metastatic disease    4/23/18-4/25/18 --Not new, but all values trending upward over the past month.  Probably due to liver metastasis.  4/28/2018 --  overall unchanged.  Follow.        History of deep vein thrombosis (DVT) of right upper extremity, unspecified vein (H)    4/23/18-4/25/18 --Has been treated since late 12/2017, most recently with warfarin, as patient told me she had side effects or lab abnormalities to the use of enoxaparin.  Warfarin has been on hold the past 3 or 4 days at the direction of Dr. Cassidy, Oncology, due to prolonged INR, see below.  Palpation of right arm reveals no cords, and there is no redness or swelling.   - Anticoagulation will remain on hold at least until INR normalizes.  However, now that she has completed 4 months anticoagulation, may not be necessary to resume anticoagulation, particularly for an upper extremity clot which has a significantly lower risk of embolization compared to lower extremity.  4/26/2018 -- discussed with oncology - does not want her on coumadin - as long as INR is therapeutic nothing else needed, plan to follow-up with oncology as outpatient.  If INR drops below therapeutic range  would consider starting lovenox.     4/28/2018 -- no change         Prolonged INR    4/23/18-4/25/18 --Was on warfarin, though warfarin was stopped 3 or 4 days prior to admission due to prolonged INR.  INR remains 5.19 despite this.  May reflect the effect of high volume of metastatic disease in the liver. Trending down slowly 3.47  - See discussion above regarding treatment of RUE DVT.  4/28/2018 -- INR stable in 3's, no change.          Secondary hypotension   4/23/18-4/25/18 -- Volume depleted due to 48 hour illness, BP intially 83 syst, improved to normal after 1 L IVF.   - will decrease IV fluids to see if helps with appetite and since patient has received significant IVF this admission so far  4/27/2018 -- resolved, doing well.           Low-voltage EKG   4/23/18-4/25/18 -- Noted on admission.  Note made also of bilateral pleural effusions. Echo with trivial effusion.   4/28/2018 -- no further issues.       DVT Prophylaxis: Not needed due to INR >3       Code Status: Full Code      Lines: Peripheral IV.      Disposition  Improving but slowly.  Hope for discharge in the next few days if continues to improve.  Goal is home to continue chemo.              Interval History:   Improving.  Much more alert and energetic today than she has been without any ambien last night.  No fever or chills.  No new pain or dyspnea.  Diarrhea still there but slowing at least and stools a bit more formed.  No black or bloody stools.             Review of Systems:    ROS: 10 point ROS neg other than the symptoms noted above in the HPI.             Medications:   Current active medications and PTA medications reviewed, see medication list for details.            Physical Exam:   Vitals were reviewed  Patient Vitals for the past 24 hrs:   BP Temp Temp src Pulse Heart Rate Resp SpO2   04/28/18 1126 129/69 98.1  F (36.7  C) Oral 118 - 18 98 %   04/28/18 0743 131/63 98  F (36.7  C) Oral 113 - 18 95 %   04/28/18 0226 134/88 98.3  F (36.8   C) Oral 87 - 18 93 %   18 0030 132/87 98.9  F (37.2  C) - 122 - 18 92 %   18 2148 116/87 98.6  F (37  C) Oral - 115 18 97 %   18 1609 110/81 98  F (36.7  C) Oral - 108 18 96 %       Temperatures:  Current - Temp: 98.1  F (36.7  C); Max - Temp  Av.3  F (36.8  C)  Min: 98  F (36.7  C)  Max: 98.9  F (37.2  C)  Respiration range: Resp  Av  Min: 18  Max: 18  Pulse range: Pulse  Av  Min: 87  Max: 122  Blood pressure range: Systolic (24hrs), Av , Min:110 , Max:134   ; Diastolic (24hrs), Av, Min:63, Max:88    Pulse oximetry range: SpO2  Av.2 %  Min: 92 %  Max: 98 %  I/O last 3 completed shifts:  In: 308 [P.O.:60; I.V.:248]  Out: -     Intake/Output Summary (Last 24 hours) at 18 1353  Last data filed at 18 1200   Gross per 24 hour   Intake              488 ml   Output                0 ml   Net              488 ml     EXAM:  General: awake and alert, NAD, oriented x 3  Head: normocephalic  Neck: unremarkable, no lymphadenopathy   HEENT: oropharynx pink and moist    Heart: Regular rate and rhythm, no murmurs, rubs, or gallops  Lungs: clear to auscultation bilaterally with good air movement throughout  Abdomen: soft, minimal mid abdominal tenderness, no rebound or guarding, normal bowel sounds, no masses or organomegaly  Extremities: no edema in lower extremities   Skin unremarkable.               Data:     Results for orders placed or performed during the hospital encounter of 18 (from the past 24 hour(s))   Comprehensive metabolic panel   Result Value Ref Range    Sodium 141 133 - 144 mmol/L    Potassium 3.9 3.4 - 5.3 mmol/L    Chloride 108 94 - 109 mmol/L    Carbon Dioxide 25 20 - 32 mmol/L    Anion Gap 8 3 - 14 mmol/L    Glucose 86 70 - 99 mg/dL    Urea Nitrogen 8 7 - 30 mg/dL    Creatinine 0.60 0.52 - 1.04 mg/dL    GFR Estimate >90 >60 mL/min/1.7m2    GFR Estimate If Black >90 >60 mL/min/1.7m2    Calcium 7.8 (L) 8.5 - 10.1 mg/dL    Bilirubin Total 0.7 0.2 -  1.3 mg/dL    Albumin 2.1 (L) 3.4 - 5.0 g/dL    Protein Total 5.8 (L) 6.8 - 8.8 g/dL    Alkaline Phosphatase 386 (H) 40 - 150 U/L    ALT 54 (H) 0 - 50 U/L     (H) 0 - 45 U/L   CBC with platelets   Result Value Ref Range    WBC 9.5 4.0 - 11.0 10e9/L    RBC Count 3.82 3.8 - 5.2 10e12/L    Hemoglobin 10.4 (L) 11.7 - 15.7 g/dL    Hematocrit 32.3 (L) 35.0 - 47.0 %    MCV 85 78 - 100 fl    MCH 27.2 26.5 - 33.0 pg    MCHC 32.2 31.5 - 36.5 g/dL    RDW 17.2 (H) 10.0 - 15.0 %    Platelet Count 57 (L) 150 - 450 10e9/L           Attestation:  I have reviewed today's vital signs, notes, medications, labs and imaging.  Amount of time performed on this daily note: 30 minutes.     Jj Phillips MD, MD

## 2018-04-28 NOTE — PLAN OF CARE
"Problem: Urinary Tract Infection (Adult)  Goal: Signs and Symptoms of Listed Potential Problems Will be Absent, Minimized or Managed (Urinary Tract Infection)  Signs and symptoms of listed potential problems will be absent, minimized or managed by discharge/transition of care (reference Urinary Tract Infection (Adult) CPG).   Outcome: Improving  Patient is alert, oriented, assist of one. IV fluids infusing. Denies pain, nausea, SOB. LS clear, diminished. One stool overnight. Pt has flat affect, weepy prior to hs. Rested comfortably overnight. Oral vancomycin given. /88 (BP Location: Left arm)  Pulse 87  Temp 98.3  F (36.8  C) (Oral)  Resp 18  Ht 1.651 m (5' 5\")  Wt 84.4 kg (186 lb 1.1 oz)  LMP 02/06/2013  SpO2 93%  BMI 30.96 kg/m2        "

## 2018-04-28 NOTE — PROGRESS NOTES
Care Transitions Discharge:    Name: Etta Cervantes    MRN: [id number]    Reason for Hospitalization: Urinary tract infection [N39.0]    Cognitive/Behavioral Status: awake, alert and oriented    Follow-up Appointments: Future Appointments  Date Time Provider Department Center   5/9/2018 9:00 AM Ector Cobos MD [facility]   5/11/2018 9:30 AM ROOM 7 [facility]   5/18/2018 8:00 AM ROOM 1 [facility]   5/25/2018 2:30 PM Bailey Santana MD [facility]       Discharge Date:  4/29/2018    Patient/Care Partner in agreement and understands the discharge plan:  Yes    Discharge Disposition:  home    Discharge Planner   Discharge Plans in progress: Plan cont to be for pt to return home with spouse and have [facility] ([phone] Fax: [phone]). Per palliative care pt cont to want to pursue Chemotherapy.    Barriers to discharge plan: Medical stability  Follow up plan: Palliative care       Entered by: Lilly Nicolas 04/28/2018 2:04 PM       Lilly Nicolas RN, BSN, PHN   RN Care Coordinator  [facility] [phone]  [facility] [phone]

## 2018-04-28 NOTE — PLAN OF CARE
Problem: Patient Care Overview  Goal: Plan of Care/Patient Progress Review  Problem: Patient Care Overview  Goal: Plan of Care/Patient Progress Review  Discharge Planner OT   Patient plan for discharge: Home with home care and assist from .      Current status: Pt seen bedside for ADLs, demonstrates limited activity tolerance. Needing SBA and 2ww for short distance mobility today. Requires cues for walker safety as she tends to leave walker to the side. Completes toileting with SBA, needs assist with LE dressing.    Barriers to return to prior living situation: None     Recommendations for discharge: Home with assist from spouse;  is very attentive- states he is getting her a RW and may be looking into extended tub bench.      Rationale for recommendations: Pt up with SBA and FWW.  is very attentive and can assist with ADLs/IADLs as needed.

## 2018-04-28 NOTE — PROGRESS NOTES
[facility]    Hospital Medicine   Care Update  Date of Service: 4/28/2018     Patient complains of vaginal dryness.  Requesting vagisil.     Plan:  - metronidazole cream BID    Reva Kiser PA-C

## 2018-04-29 NOTE — PROGRESS NOTES
[facility]           Assessment and Plan:     Etta Cervantes is a 57 year old female who presents on 4/23/2018 with a 48 hour history of fatigue, malaise, nausea, emesis, and diarrhea.        Diarrhea, likely due to C difficile colitis  4/23/18-4/25/18 -- C difficile tox screen positive. Had significant nausea and vomiting and also recently started new chemotherapy regimen, so possible this is due to chemo or gastroenteritis. Enteric pathogen ELENA negative.    Diarrhea is slowing significantly.    - continue vancomycin oral  4/26/2018 -- improving, continue oral vanco.  Patient seen by ID at [facility], has had recurrent episodes of this, sounds like this may be the 4th in the past few months.  As of Feb was started on a taper of 125 mg 4 times daily x 14 days, then 3 times daily x 7 days then twice daily x 7 days then stopped.  Discussed with her ID provider - likely plan for prolonged vanco taper over 2 months - follow-up with Dr. Santana in 1 month.   4/28/2018 -- improving - continue vanco for now, if fails to continue to improve will transition to fidaxomicin.  stoppin rocephin as below.    4/29/2018 -- clearly improving - hope for discharge on slow vanco taper as above once ready.        Acute cystitis    4/23/18-4/25/18 --Urine micro is consistent, urine culture positive for Klebsiella pneumoniae sensitive to ceftriaxone.    4/28/2018 -- completed 5 days of rocephin, stopped today.          Generalized weakness associated with increased sleepiness   4/23/18-4/25/18 --Suspect primarily due to chemotherapy and underlying cancer. Patient had been increasingly sleepy prior to this round of chemo per . Likely effects of MS contin and possibly ambien at night. C difficile infection and UTI also likely contributors though these should be resolving.    - hold MS contin, resume if needed for pain   - hold Ambien    - Palliative care consult tomorrow for symptom control - agreeable to  "patient and , discussed with TT  4/26/2018 -- Sleepiness appears mildly improved but still there.   starting on on Ritalin and Remeron by palliative care today.  Physical therapy and occupational therapy consulted.    4/27/2018 -- had been improved, but groggy again this AM after ambien was restarted - decreased from 10 to 5 mg today.  Plan for walker on discharge.  (ordered)   4/28/2018 -- improved without ambien - this was discontinued.    4/29/2018 -- mental status doing well after stopping ativan.  Poor intake continues to compound this as below    Poor intake  4/29/2018 -- no nausea or vomiting and diarrhea improving but patient still having poor intake, just \"isn't hungry and nothing tastes good\". Nutrition consult placed for tomorrow.  Patient getting boost or similar with meals when willing.  Encouraging to take frequent sips of liquids throughout the day - every 10-15 minutes.  Restarting IVF overnight to rehydrate her some prior to potential discharge in the next 1-2 days.        Breast cancer metastasized to axillary lymph node, metastatic to bone and liver  4/23/18-4/25/18 -- Chemo regimen switched to carboplatin and Gemzar (with methylprednisolone pretreatment), first treatment 4/20/18. This may be primarily responsible for patient's generalized weakness and fatigue.   4/28/2018 -- follow-up with oncology after discharge to discuss when to restart chemo.           Elevated liver enzymes due to metastatic disease    4/23/18-4/25/18 --Not new, but all values trending upward over the past month.  Probably due to liver metastasis.  4/28/2018 --  overall unchanged.  Follow.        History of deep vein thrombosis (DVT) of right upper extremity, unspecified vein (H)    4/23/18-4/25/18 --Has been treated since late 12/2017, most recently with warfarin, as patient told me she had side effects or lab abnormalities to the use of enoxaparin.  Warfarin has been on hold the past 3 or 4 days at the direction of " Dr. Cassidy, Oncology, due to prolonged INR, see below.  Palpation of right arm reveals no cords, and there is no redness or swelling.   - Anticoagulation will remain on hold at least until INR normalizes.  However, now that she has completed 4 months anticoagulation, may not be necessary to resume anticoagulation, particularly for an upper extremity clot which has a significantly lower risk of embolization compared to lower extremity.  4/26/2018 -- discussed with oncology - does not want her on coumadin - as long as INR is therapeutic nothing else needed, plan to follow-up with oncology as outpatient.  If INR drops below therapeutic range would consider starting lovenox.     4/28/2018 -- no change         Prolonged INR    4/23/18-4/25/18 --Was on warfarin, though warfarin was stopped 3 or 4 days prior to admission due to prolonged INR.  INR remains 5.19 despite this.  May reflect the effect of high volume of metastatic disease in the liver. Trending down slowly 3.47  - See discussion above regarding treatment of RUE DVT.  4/29/2018 -- recheck INR in AM.           Secondary hypotension   4/23/18-4/25/18 -- Volume depleted due to 48 hour illness, BP intially 83 syst, improved to normal after 1 L IVF.   - will decrease IV fluids to see if helps with appetite and since patient has received significant IVF this admission so far  4/27/2018 -- resolved, doing well.           Low-voltage EKG   4/23/18-4/25/18 -- Noted on admission.  Note made also of bilateral pleural effusions. Echo with trivial effusion.   4/28/2018 -- no further issues.       Mild sinus tachycardia  4/29/2018 -- has been about 110 by checks today, was about 100 on my exam.  Appears just a bit dry with poor intake still and no IVF for a number of days.  Will attempt to rehydrate overnight with IVF @ 100 and encouraging orals as above.      DVT Prophylaxis: Not needed due to INR >3 so far       Code Status: Full Code      Lines: Peripheral  IV.       Disposition  Improving, hope for discharge home in 1-2 days if continues to improve.              Interval History:   Improving, but still having poor oral intake.  Spouse encourages her, no nausea or vomiting, just poor apatite and motivation.  No fever or chills.  Only 1 bowel movement today, this has been much improved over the past 24h.  No other new concerns.              Review of Systems:    ROS: 10 point ROS neg other than the symptoms noted above in the HPI.             Medications:   Current active medications and PTA medications reviewed, see medication list for details.            Physical Exam:   Vitals were reviewed  Patient Vitals for the past 24 hrs:   BP Temp Temp src Pulse Heart Rate Resp SpO2   18 1109 127/71 98.4  F (36.9  C) Oral 111 - 18 93 %   18 0737 132/85 98.2  F (36.8  C) Oral 109 - 18 95 %   18 2352 120/72 98.5  F (36.9  C) Oral - 117 18 92 %   18 1926 133/68 98.3  F (36.8  C) Oral - 134 18 96 %   18 1552 129/74 97.9  F (36.6  C) Oral - 124 18 94 %       Temperatures:  Current - Temp: 98.4  F (36.9  C); Max - Temp  Av.3  F (36.8  C)  Min: 97.9  F (36.6  C)  Max: 98.5  F (36.9  C)  Respiration range: Resp  Av  Min: 18  Max: 18  Pulse range: Pulse  Av  Min: 109  Max: 111  Blood pressure range: Systolic (24hrs), Av , Min:120 , Max:133   ; Diastolic (24hrs), Av, Min:68, Max:85    Pulse oximetry range: SpO2  Av %  Min: 92 %  Max: 96 %  I/O last 3 completed shifts:  In: 1100.5 [P.O.:250; I.V.:850.5]  Out: -     Intake/Output Summary (Last 24 hours) at 18 1545  Last data filed at 18 1300   Gross per 24 hour   Intake           1100.5 ml   Output                0 ml   Net           1100.5 ml     EXAM:  General: awake and alert, NAD, oriented x 3  Head: normocephalic  Neck: unremarkable, no lymphadenopathy   HEENT: oropharynx pink and moist    Heart: Regular rate and rhythm, no murmurs, rubs, or gallops  Lungs: clear  to auscultation bilaterally with good air movement throughout  Abdomen: soft, tenderness resolving, no masses or organomegaly, normal bowel sounds, non-distended   Extremities: no edema in lower extremities   Skin unremarkable.               Data:     Results for orders placed or performed during the hospital encounter of 04/23/18 (from the past 24 hour(s))   CBC with platelets differential   Result Value Ref Range    WBC 11.0 4.0 - 11.0 10e9/L    RBC Count 3.93 3.8 - 5.2 10e12/L    Hemoglobin 10.7 (L) 11.7 - 15.7 g/dL    Hematocrit 33.1 (L) 35.0 - 47.0 %    MCV 84 78 - 100 fl    MCH 27.2 26.5 - 33.0 pg    MCHC 32.3 31.5 - 36.5 g/dL    RDW 17.6 (H) 10.0 - 15.0 %    Platelet Count 85 (L) 150 - 450 10e9/L    Diff Method Automated Method     % Neutrophils 75.2 %    % Lymphocytes 10.4 %    % Monocytes 12.0 %    % Eosinophils 0.5 %    % Basophils 0.4 %    % Immature Granulocytes 1.5 %    Absolute Neutrophil 8.3 1.6 - 8.3 10e9/L    Absolute Lymphocytes 1.2 0.8 - 5.3 10e9/L    Absolute Monocytes 1.3 0.0 - 1.3 10e9/L    Absolute Eosinophils 0.1 0.0 - 0.7 10e9/L    Absolute Basophils 0.0 0.0 - 0.2 10e9/L    Abs Immature Granulocytes 0.2 0 - 0.4 10e9/L   Comprehensive metabolic panel   Result Value Ref Range    Sodium 139 133 - 144 mmol/L    Potassium 4.0 3.4 - 5.3 mmol/L    Chloride 107 94 - 109 mmol/L    Carbon Dioxide 24 20 - 32 mmol/L    Anion Gap 8 3 - 14 mmol/L    Glucose 87 70 - 99 mg/dL    Urea Nitrogen 12 7 - 30 mg/dL    Creatinine 0.72 0.52 - 1.04 mg/dL    GFR Estimate 83 >60 mL/min/1.7m2    GFR Estimate If Black >90 >60 mL/min/1.7m2    Calcium 8.4 (L) 8.5 - 10.1 mg/dL    Bilirubin Total 0.8 0.2 - 1.3 mg/dL    Albumin 2.4 (L) 3.4 - 5.0 g/dL    Protein Total 6.2 (L) 6.8 - 8.8 g/dL    Alkaline Phosphatase 408 (H) 40 - 150 U/L    ALT 63 (H) 0 - 50 U/L     (H) 0 - 45 U/L           Attestation:  I have reviewed today's vital signs, notes, medications, labs and imaging.  Amount of time performed on this daily note:  30 minutes.     Jj Phillips MD, MD

## 2018-04-29 NOTE — PLAN OF CARE
Problem: Patient Care Overview  Goal: Plan of Care/Patient Progress Review  Occupational Therapy Discharge Summary    Reason for therapy discharge:    Discharged to home with home therapy.    Progress towards therapy goal(s). See goals on Care Plan in [facility] electronic health record for goal details.  Goals partially met.  Barriers to achieving goals:   limited tolerance for therapy.    Therapy recommendation(s):    Continued therapy is recommended.  Rationale/Recommendations:  Pt will benefit from ongoing home OT to maximize safety and ind with ADLs. .

## 2018-04-29 NOTE — PLAN OF CARE
Problem: Patient Care Overview  Goal: Plan of Care/Patient Progress Review  Outcome: Improving  No stools documented this shift.  UP with SBA.  Some lower ankle/foot edema.  Started vagisil yesterday per her request.  Questionable thrush, tongue full off yellow plaques.  She has a sore on roof of mouth and a bulging area-patient states that is not new.  She states she is not having any troubles eating and has had thrush in the past.  Very poor appetite.  Page sent to Dr. Phillips today as a FYI to look at mouth regarding thrush concern.   providing a lot of encouragement to get her to eat/drink.  Still weak and tired.  Took a shower today.  HR still tachy. Minimal pain and she will request medications as needed.  No confusion, but movements slow.  Requires assistance to get legs in and out of bed, otherwise she is able to complete bathroom cares and manage self with supervision.

## 2018-04-29 NOTE — PLAN OF CARE
Patient is A & O  Makes needs known  Patient's stool has decreased this shift  Patient up with STAND BY ASSIST X1  Denies pain

## 2018-04-30 NOTE — PLAN OF CARE
Problem: Patient Care Overview  Goal: Plan of Care/Patient Progress Review  Outcome: No Change  INR 5.43 this morning. Dr. Phillips notified.

## 2018-04-30 NOTE — PLAN OF CARE
Problem: Patient Care Overview  Goal: Plan of Care/Patient Progress Review  Outcome: No Change  Patient NPO for abdominal ultrasound at 1600. Receiving IV Vit K.

## 2018-04-30 NOTE — PLAN OF CARE
Problem: Patient Care Overview  Goal: Plan of Care/Patient Progress Review  Outcome: No Change   Lucian discussed with writer his concerns with increase confusion.  Explained to Lucian it could be disease process, meds, etc. Talk with physician.  Writer spent time with patient after  left; patient having intermittent confusion  However, re-orients quickly.  Grilled Cheese sandwich here at desk outside patient's room but patient does not want  [place] at this time. Put in refrig. With her name.

## 2018-04-30 NOTE — PROGRESS NOTES
CLINICAL NUTRITION SERVICES - REASSESSMENT NOTE    EVALUATION OF PROGRESS TOWARD GOALS   Diet:  Regular  Intake:  Patient reports that her intake is improving, she drank two chocolate Boost so far today, ate rice krispies for breakfast, ate 1/4 of her grilled cheese sandwich at lunch, ate Magic cup and has drank five 4 ounce containers of orange juice. She said fajardo sounds good to her so will send to her at breakfast, she would like a cheeseburger for lunch tomorrow.       ASSESSED NUTRITION NEEDS:  Dosing Weight::  62 kg  Estimated energy needs: 5505-8389 kcals (30-35 kcal/kg)  Estimated protein needs: 74-93 grams protein (1.2-1.5 grams protein/kg)      Previous Goals:   Patient to consume 50-75% of her meals in 2-3 days  Evaluation: Not met    Previous Nutrition Diagnosis:   Inadequate protein-energy intake related to decreased appetite as evidenced by the patient refused both her breakfast and lunch tray to day, only eating jello  Evaluation: Improving    CURRENT NUTRITION DIAGNOSIS  Same as previous    INTERVENTIONS  Recommendations / Nutrition Prescription  Continue chocolate Boost with meals  Assist patient with menu selections    Implementation  Composition of Meals and Snacks    Goals  Patient to consume 50-75% of her meals in 2-3 days.      MONITORING AND EVALUATION:  Progress towards goals will be monitored and evaluated per protocol and Practice Guidelines, Food intake and Liquid meal replacement or supplement intake    Areli Landry RD,FABIO  Clinical Dietitian

## 2018-04-30 NOTE — PLAN OF CARE
Problem: Patient Care Overview  Goal: Plan of Care/Patient Progress Review  Outcome: No Change  Pat RN from infusion therapy changed out Port needle today. Patient tolerated well.

## 2018-04-30 NOTE — PROGRESS NOTES
[facility] Hospitalist Progress Note           Assessment and Plan:     Etta Cervantes is a 57 year old female who presents on 4/23/2018 with a 48 hour history of fatigue, malaise, nausea, emesis, and diarrhea.      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 Had significant nausea and vomiting and also recently started new chemotherapy regimen, so possible this is due to chemo or gastroenteritis. Enteric pathogen ELENA negative.    Diarrhea is slowing significantly.    - continue vancomycin oral  4/26/2018 -- improving, continue oral vanco.  Patient seen by ID at [facility], has had recurrent episodes of this, sounds like this may be the 4th in the past few months.  As of Feb was started on a taper of 125 mg 4 times daily x 14 days, then 3 times daily x 7 days then twice daily x 7 days then stopped.  Discussed with her ID provider - likely plan for prolonged vanco taper over 2 months -   4/28/2018 -- improving - continue vanco for now, if fails to continue to improve will transition to fidaxomicin.  stoppin rocephin as below.    4/30/2018 --  improving - hope for discharge on slow vanco taper as above once ready. follow-up with Dr. Santana (ID) in 1 month.  Likely plan would be for long-term vanco given likely poor overall prognosis as discussed below.          Acute cystitis    4/23/18-4/25/18 --Urine micro is consistent, urine culture positive for Klebsiella pneumoniae sensitive to ceftriaxone.    4/28/2018 -- completed 5 days of rocephin, stopped 4/28.          Generalized weakness associated with increased sleepiness - mostly due to progressive cancer and poor intake, compounded acutely by UTI and CDiff as above.   4/23/18-4/25/18 --Suspect primarily due to chemotherapy and underlying cancer. Patient had been increasingly sleepy prior to this round of chemo per . Likely effects of MS contin and possibly ambien at night. C difficile infection and UTI  "also likely contributors though these should be resolving.    - hold MS contin, resume if needed for pain   - hold Ambien    - Palliative care consult tomorrow for symptom control - agreeable to patient and , discussed with TT  4/26/2018 -- Sleepiness appears mildly improved but still there.   starting on on Ritalin and Remeron by palliative care today.  Physical therapy and occupational therapy consulted.    4/27/2018 -- had been improved, but groggy again this AM after ambien was restarted - decreased from 10 to 5 mg today.  Plan for walker on discharge.  (ordered)   4/28/2018 -- improved without ambien - this was discontinued.    4/29/2018 -- mental status doing well after stopping ambien.  Poor intake continues to compound this as below  4/30/2018 -- very slow improvement, intake improving.       Poor intake  4/29/2018 -- no nausea or vomiting and diarrhea improving but patient still having poor intake, just \"isn't hungry and nothing tastes good\". Nutrition consult placed for tomorrow.  Patient getting boost or similar with meals when willing.  Encouraging to take frequent sips of liquids throughout the day - every 10-15 minutes.  Restarting IVF overnight to rehydrate her some prior to potential discharge in the next 1-2 days.    4/30/2018 -- improving but still poor.       Breast cancer metastasized to axillary lymph node, metastatic to bone and liver  4/23/18-4/25/18 -- Chemo regimen switched to carboplatin and Gemzar (with methylprednisolone pretreatment), first treatment 4/20/18. This may be primarily responsible for patient's generalized weakness and fatigue.   4/30/2018 -- follow-up with oncology after discharge to discuss plan going forward - unfortunately patient appears to be continuing to decline with clear progression of disease and generalized decline, recently started on new chemo but with hospitalization since that time - overall I think her prognosis is unfortunately poor and do not know " that further chemo will be of more benefit than harm.  Patient to discuss with oncology as outpatient.               Elevated liver enzymes due to metastatic disease    4/23/18-4/25/18 --Not new, but all values trending upward over the past month.  Probably due to liver metastasis.  4/30/2018 -- up a bit again today and INR climbing as below - will check US of abdomen today but suspect just due to metastatic disease.         History of deep vein thrombosis (DVT) of right upper extremity, unspecified vein (H) now with elevated INR even off coumadin due to liver mets    4/23/18-4/25/18 --Has been treated since late 12/2017, most recently with warfarin, as patient told me she had side effects or lab abnormalities to the use of enoxaparin.  Warfarin has been on hold the past 3 or 4 days at the direction of Dr. Cassidy, Oncology, due to prolonged INR, see below.  Palpation of right arm reveals no cords, and there is no redness or swelling.   - Anticoagulation will remain on hold at least until INR normalizes.  However, now that she has completed 4 months anticoagulation, may not be necessary to resume anticoagulation, particularly for an upper extremity clot which has a significantly lower risk of embolization compared to lower extremity.  4/26/2018 -- discussed with oncology - does not want her on coumadin - as long as INR is therapeutic nothing else needed, plan to follow-up with oncology as outpatient.  If INR drops below therapeutic range would consider starting lovenox.     4/30/2018 -- INR now climbing - will give 1mg vitamin K today.  Recheck in AM.  Checking US as above.          Secondary hypotension   4/23/18-4/25/18 -- Volume depleted due to 48 hour illness, BP intially 83 syst, improved to normal after 1 L IVF.   - will decrease IV fluids to see if helps with appetite and since patient has received significant IVF this admission so far  4/27/2018 -- resolved, doing well.           Low-voltage EKG    18-18 -- Noted on admission.  Note made also of bilateral pleural effusions. Echo with trivial effusion.   2018 -- no further issues.       Mild sinus tachycardia  2018 -- has been about 110 by checks today, was about 100 on my exam.  Appears just a bit dry with poor intake still and no IVF for a number of days.  Will attempt to rehydrate overnight with IVF @ 100 and encouraging orals as above.      2018 -- improved, 100's today.  Having mild edema, stopping IVF.      DVT Prophylaxis: Not needed due to INR >3 so far       Code Status: Full Code      Lines: Peripheral IV.    Disposition  Hope for discharge home tomorrow.              Interval History:   Minimal change.  Intake a bit better.  No new symptoms.  Breathing normally.  Still weak.  No pain.             Review of Systems:    ROS: 10 point ROS neg other than the symptoms noted above in the HPI.           Medications:   Current active medications and PTA medications reviewed, see medication list for details.            Physical Exam:   Vitals were reviewed  Patient Vitals for the past 24 hrs:   BP Temp Temp src Pulse Heart Rate Resp SpO2   18 1152 121/69 98.2  F (36.8  C) Oral 112 - 18 93 %   18 0725 130/72 97.4  F (36.3  C) Oral 104 - 18 95 %   18 2328 117/76 97.2  F (36.2  C) Oral 107 - 18 92 %   18 1948 116/68 99  F (37.2  C) Oral - 114 18 94 %   18 1545 121/68 98.7  F (37.1  C) Oral - 110 18 92 %       Temperatures:  Current - Temp: 98.2  F (36.8  C); Max - Temp  Av.1  F (36.7  C)  Min: 97.2  F (36.2  C)  Max: 99  F (37.2  C)  Respiration range: Resp  Av  Min: 18  Max: 18  Pulse range: Pulse  Av.7  Min: 104  Max: 112  Blood pressure range: Systolic (24hrs), Av , Min:116 , Max:130   ; Diastolic (24hrs), Av, Min:68, Max:76    Pulse oximetry range: SpO2  Av.2 %  Min: 92 %  Max: 95 %  I/O last 3 completed shifts:  In:  [P.O.:370; I.V.:1518]  Out: -     Intake/Output  Summary (Last 24 hours) at 04/30/18 1317  Last data filed at 04/30/18 1228   Gross per 24 hour   Intake             2238 ml   Output              200 ml   Net             2038 ml     EXAM:  General: awake and alert, NAD, oriented x 3  Head: normocephalic  Neck: unremarkable, no lymphadenopathy   HEENT: oropharynx pink and moist    Heart: Regular rate and rhythm, no murmurs, rubs, or gallops  Lungs: clear to auscultation bilaterally with good air movement throughout  Abdomen: soft, non-tender, no masses or organomegaly  Extremities: trace edema in lower extremities   Skin unremarkable.               Data:     Results for orders placed or performed during the hospital encounter of 04/23/18 (from the past 24 hour(s))   CBC with platelets   Result Value Ref Range    WBC 8.6 4.0 - 11.0 10e9/L    RBC Count 3.64 (L) 3.8 - 5.2 10e12/L    Hemoglobin 9.8 (L) 11.7 - 15.7 g/dL    Hematocrit 30.8 (L) 35.0 - 47.0 %    MCV 85 78 - 100 fl    MCH 26.9 26.5 - 33.0 pg    MCHC 31.8 31.5 - 36.5 g/dL    RDW 17.7 (H) 10.0 - 15.0 %    Platelet Count 85 (L) 150 - 450 10e9/L   Comprehensive metabolic panel   Result Value Ref Range    Sodium 141 133 - 144 mmol/L    Potassium 4.3 3.4 - 5.3 mmol/L    Chloride 110 (H) 94 - 109 mmol/L    Carbon Dioxide 24 20 - 32 mmol/L    Anion Gap 7 3 - 14 mmol/L    Glucose 102 (H) 70 - 99 mg/dL    Urea Nitrogen 9 7 - 30 mg/dL    Creatinine 0.56 0.52 - 1.04 mg/dL    GFR Estimate >90 >60 mL/min/1.7m2    GFR Estimate If Black >90 >60 mL/min/1.7m2    Calcium 8.3 (L) 8.5 - 10.1 mg/dL    Bilirubin Total 0.8 0.2 - 1.3 mg/dL    Albumin 2.1 (L) 3.4 - 5.0 g/dL    Protein Total 5.6 (L) 6.8 - 8.8 g/dL    Alkaline Phosphatase 402 (H) 40 - 150 U/L    ALT 74 (H) 0 - 50 U/L     (H) 0 - 45 U/L   INR   Result Value Ref Range    INR 5.43 (HH) 0.86 - 1.14           Attestation:  I have reviewed today's vital signs, notes, medications, labs and imaging.  Amount of time performed on this daily note: 50 minutes.     Jj HUTCHINS  MD Alan, MD

## 2018-05-01 NOTE — PLAN OF CARE
Problem: Urinary Tract Infection (Adult)  Goal: Signs and Symptoms of Listed Potential Problems Will be Absent, Minimized or Managed (Urinary Tract Infection)  Signs and symptoms of listed potential problems will be absent, minimized or managed by discharge/transition of care (reference Urinary Tract Infection (Adult) CPG).   Outcome: Improving  Pt up to BR x6 this shift, voiding clear yellow urine and occasional loose, semi-formed stools. Pt denies burning or discomfort with voiding. Had a low-grade temp of 100.7 at beginning of shift; room noted to be quite warm, thermostat adjusted. Temp later 97.7.

## 2018-05-01 NOTE — PROGRESS NOTES
Care Transitions Progress Note:    Reason for Follow up:  Discharge Planning.  The patient plans on returning home with [facility] ([phone] Fax: [phone]) upon discharge.  Per the discussion in Interdisciplinary Rounds she may benefit from hospice.  Dr. Cassidy, her oncologist will see the patient to determine if hospice is appropriate.  Care Transitions will continue to monitor through daily chart reviews and Interdisciplinary Rounds.     Discharge Plan:  Home w/ [facility] vs Hospice      Discharge Planner   Discharge Plans in progress: [facility] vs Hospice  Barriers to discharge plan: Medical stability  Follow up plan: Referral to clinic care coordination       Entered by: Anjelica Finn 05/01/2018 1:29 PM         Anjelica Finn RN, Care Coordinator  [phone]

## 2018-05-01 NOTE — PLAN OF CARE
Problem: Patient Care Overview  Goal: Plan of Care/Patient Progress Review  Outcome: No Change  Patient was not confused today. Answers questions appropriately. Ammonia level normal. Poor to fair oral intake. Fluids encouraged. Increased edema, IV fluids stopped and Port Heparin locked. Patient also drinking some Boost.  at bedside.

## 2018-05-01 NOTE — PROGRESS NOTES
[facility] Hospitalist Progress Note           Assessment and Plan:     Etta Cervantes is a 57 year old female who presents on 4/23/2018 with a 48 hour history of fatigue, malaise, nausea, emesis, and diarrhea.      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 Had significant nausea and vomiting and also recently started new chemotherapy regimen, so possible this is due to chemo or gastroenteritis. Enteric pathogen ELENA negative.    Diarrhea is slowing significantly.    - continue vancomycin oral  4/26/2018 -- improving, continue oral vanco.  Patient seen by ID at [facility], has had recurrent episodes of this, sounds like this may be the 4th in the past few months.  As of Feb was started on a taper of 125 mg 4 times daily x 14 days, then 3 times daily x 7 days then twice daily x 7 days then stopped.  Discussed with her ID provider - likely plan for prolonged vanco taper over 2 months -   4/28/2018 -- improving - continue vanco for now, if fails to continue to improve will transition to fidaxomicin.  stoppin rocephin as below.    5/1/2018 -- overall still  improving although had some more frequent small stools yesterday - hope for discharge on very slow vanco taper with plan to continue lower dose vanco long-term. follow-up with Dr. Santana (ID) in 1 month.  however, if stools worsen again would consider switching to fidaxomicin.          Acute cystitis    4/23/18-4/25/18 --Urine micro is consistent, urine culture positive for Klebsiella pneumoniae sensitive to ceftriaxone.    4/28/2018 -- completed 5 days of rocephin, stopped 4/28.          Generalized weakness associated with increased sleepiness - mostly due to progressive cancer and poor intake, compounded acutely by UTI and CDiff as above.   4/23/18-4/25/18 --Suspect primarily due to chemotherapy and underlying cancer. Patient had been increasingly sleepy prior to this round of chemo per . Likely  "effects of MS contin and possibly ambien at night. C difficile infection and UTI also likely contributors though these should be resolving.    - hold MS contin, resume if needed for pain   - hold Ambien    - Palliative care consult tomorrow for symptom control - agreeable to patient and , discussed with TT  4/26/2018 -- Sleepiness appears mildly improved but still there.   starting on on Ritalin and Remeron by palliative care today.  Physical therapy and occupational therapy consulted.    4/27/2018 -- had been improved, but groggy again this AM after ambien was restarted - decreased from 10 to 5 mg today.  Plan for walker on discharge.  (ordered)   4/28/2018 -- improved without ambien - this was discontinued.    4/29/2018 -- mental status doing well after stopping ambien.  Poor intake continues to compound this as below  5/1/2018 -- continuing to very slowly improve.  encouraging orals.  Stopping marinol.  Continue ritalin for now.       Poor intake  4/29/2018 -- no nausea or vomiting and diarrhea improving but patient still having poor intake, just \"isn't hungry and nothing tastes good\". Nutrition consult placed for tomorrow.  Patient getting boost or similar with meals when willing.  Encouraging to take frequent sips of liquids throughout the day - every 10-15 minutes.  Restarting IVF overnight to rehydrate her some prior to potential discharge in the next 1-2 days.    5/1/2018 -- improving but still poor - medications as above.        Breast cancer metastasized to axillary lymph node, metastatic to bone and liver  4/23/18-4/25/18 -- Chemo regimen switched to carboplatin and Gemzar (with methylprednisolone pretreatment), first treatment 4/20/18. This may be primarily responsible for patient's generalized weakness and fatigue.   4/30/2018 -- unfortunately patient appears to be continuing to decline with clear progression of disease and generalized decline, recently started on new chemo but with " hospitalization since that time - overall I think her prognosis is unfortunately poor and do not know that further chemo will be of more benefit than harm.    5/1/2018 -- oncology here tomorrow and planning to see patient to discuss plan/recs going forward.              Elevated liver enzymes due to metastatic disease    4/23/18-4/25/18 --Not new, but all values trending upward over the past month.  Probably due to liver metastasis.  4/30/2018 -- up a bit again today and INR climbing as below   5/1/2018 -- US showed no apparent biliary obstruction.  Some possible gallbladder sludging, but discussed with surgery who though this appeared all due to metastatic disease and would not recommend any further imaging or intervention.        History of deep vein thrombosis (DVT) of right upper extremity, unspecified vein (H) now with elevated INR even off coumadin due to liver mets    4/23/18-4/25/18 --Has been treated since late 12/2017, most recently with warfarin, as patient told me she had side effects or lab abnormalities to the use of enoxaparin.  Warfarin has been on hold the past 3 or 4 days at the direction of Dr. Cassidy, Oncology, due to prolonged INR, see below.  Palpation of right arm reveals no cords, and there is no redness or swelling.   - Anticoagulation will remain on hold at least until INR normalizes.  However, now that she has completed 4 months anticoagulation, may not be necessary to resume anticoagulation, particularly for an upper extremity clot which has a significantly lower risk of embolization compared to lower extremity.  4/26/2018 -- discussed with oncology - does not want her on coumadin - as long as INR is therapeutic nothing else needed, plan to follow-up with oncology as outpatient.  If INR drops below therapeutic range would consider starting lovenox.     4/30/2018 -- INR now climbing - will give 1mg vitamin K today.  Recheck in AM.  Checking US as above.    5/1/2018 -- INR down to 1.9  after Vitamin K - will recheck in AM - on mechanical prophylaxis for now - anticoagulation on discharge may be difficult as anticipate that INR will slowly climb again.           Secondary hypotension   4/23/18-4/25/18 -- Volume depleted due to 48 hour illness, BP intially 83 syst, improved to normal after 1 L IVF.   - will decrease IV fluids to see if helps with appetite and since patient has received significant IVF this admission so far  4/27/2018 -- resolved, doing well.           Low-voltage EKG   4/23/18-4/25/18 -- Noted on admission.  Note made also of bilateral pleural effusions. Echo with trivial effusion.   4/28/2018 -- no further issues.       Mild sinus tachycardia  4/29/2018 -- has been about 110 by checks today, was about 100 on my exam.  Appears just a bit dry with poor intake still and no IVF for a number of days.  Will attempt to rehydrate overnight with IVF @ 100 and encouraging orals as above.      4/30/2018 -- improved, 100's today.  Having mild edema, stopping IVF.    5/1/2018 -- appears to be 100's at rest, but to jump to 110-120's for a while after activity such as being up to the bathroom.  Continue to follow.      Edema  5/1/2018 -- mild edema, but appears intravascularly dry - suspect due to hypoalbuminemia.  Discussed that diuresis may make her more prone to dehydration, follow for now       DVT Prophylaxis: Not needed due to INR >3 so far       Code Status: Full Code      Lines: Peripheral IV.    Disposition  Hope for discharge home tomorrow.             Interval History:   Was a bit more groggy earlier today but now feels improved.  Feels like energy is improving slowly, best it's been today.  No pain at present.  No fever or chills.  No vomiting, continuing to try to take in more orals.  Had diarrhea x 1 today, after a number of small bowel movements yesterday.  Overall still improved.    No other pain             Review of Systems:    ROS: 10 point ROS neg other than the symptoms noted  above in the HPI.             Medications:   Current active medications and PTA medications reviewed, see medication list for details.            Physical Exam:   Vitals were reviewed  Patient Vitals for the past 24 hrs:   BP Temp Temp src Pulse Resp SpO2 Weight   18 1132 104/76 98.9  F (37.2  C) Oral 127 20 93 % -   18 1024 - - - - 20 - -   18 0741 126/89 - - 110 22 93 % -   18 0622 - - - - - - 89 kg (196 lb 3.4 oz)   18 0319 125/78 97.7  F (36.5  C) Oral 110 18 93 % -   18 2339 118/65 100  F (37.8  C) Oral 117 18 93 % -   18 2222 (!) 136/99 100  F (37.8  C) Oral 133 18 93 % -   18 1550 118/61 97.9  F (36.6  C) Oral 112 18 93 % -       Temperatures:  Current - Temp: 98.9  F (37.2  C); Max - Temp  Av.9  F (37.2  C)  Min: 97.7  F (36.5  C)  Max: 100  F (37.8  C)  Respiration range: Resp  Av.1  Min: 18  Max: 22  Pulse range: Pulse  Av.2  Min: 110  Max: 133  Blood pressure range: Systolic (24hrs), Av , Min:104 , Max:136   ; Diastolic (24hrs), Av, Min:61, Max:99    Pulse oximetry range: SpO2  Av %  Min: 93 %  Max: 93 %  I/O last 3 completed shifts:  In: 2970 [P.O.:1080; I.V.:1890]  Out: 1625 [Urine:1625]    Intake/Output Summary (Last 24 hours) at 18 1409  Last data filed at 18 1100   Gross per 24 hour   Intake             2250 ml   Output             1425 ml   Net              825 ml     EXAM:  General: awake and alert, NAD, oriented x 3  Head: normocephalic  Neck: unremarkable, no lymphadenopathy   HEENT: oropharynx pink and moist    Heart: Regular rate and rhythm, no murmurs, rubs, or gallops  Lungs: clear to auscultation bilaterally with good air movement throughout  Abdomen: soft, non-tender, no masses or organomegaly  Extremities: trace edema in lower extremities   Skin unremarkable.             Data:     Results for orders placed or performed during the hospital encounter of 18 (from the past 24 hour(s))   US Abdomen  Complete-1    Narrative    ABDOMINAL ULTRASOUND  4/30/2018 5:03 PM    HISTORY: Worsening LFTs. The patient has a history of liver metastases  and biliary obstruction.    COMPARISON: A CT on 3/19/2018 and a right upper quadrant ultrasound on  11/30/2017.    FINDINGS: The liver is normal in size. It is diffusely heterogeneous.  Several nodules or masses are noted. The largest seen currently is in  the right lobe measuring 3.1 x 2.2 x 2.1 cm. The gallbladder is normal  in size without stones. There may be a small amount of sludge. The  gallbladder wall is mildly thickened diffusely measuring 0.4 cm. No  pericholecystic fluid is seen. The common bile duct is normal  measuring 0.2 cm in diameter. The visualized portion of the pancreas  is normal. The spleen is normal. The kidneys are normal. There is no  hydronephrosis. The proximal abdominal aorta and IVC are within normal  limits.      Impression    IMPRESSION:   1. Liver metastases. The currently seen largest area of abnormality  measures 3.1 cm. On the previous ultrasound the largest area measured  3.0 cm.  2. Probable small amount of gallbladder sludge.    VERONICA LILLY MD   Ammonia   Result Value Ref Range    Ammonia 35 10 - 50 umol/L   INR   Result Value Ref Range    INR 1.96 (H) 0.86 - 1.14   Hepatic panel   Result Value Ref Range    Bilirubin Direct 0.4 (H) 0.0 - 0.2 mg/dL    Bilirubin Total 1.1 0.2 - 1.3 mg/dL    Albumin 2.2 (L) 3.4 - 5.0 g/dL    Protein Total 6.0 (L) 6.8 - 8.8 g/dL    Alkaline Phosphatase 473 (H) 40 - 150 U/L    ALT 70 (H) 0 - 50 U/L     (H) 0 - 45 U/L   Basic metabolic panel   Result Value Ref Range    Sodium 142 133 - 144 mmol/L    Potassium 4.4 3.4 - 5.3 mmol/L    Chloride 109 94 - 109 mmol/L    Carbon Dioxide 24 20 - 32 mmol/L    Anion Gap 9 3 - 14 mmol/L    Glucose 92 70 - 99 mg/dL    Urea Nitrogen 6 (L) 7 - 30 mg/dL    Creatinine 0.53 0.52 - 1.04 mg/dL    GFR Estimate >90 >60 mL/min/1.7m2    GFR Estimate If Black >90 >60  mL/min/1.7m2    Calcium 9.1 8.5 - 10.1 mg/dL   CBC with platelets   Result Value Ref Range    WBC 9.3 4.0 - 11.0 10e9/L    RBC Count 3.76 (L) 3.8 - 5.2 10e12/L    Hemoglobin 10.3 (L) 11.7 - 15.7 g/dL    Hematocrit 31.9 (L) 35.0 - 47.0 %    MCV 85 78 - 100 fl    MCH 27.4 26.5 - 33.0 pg    MCHC 32.3 31.5 - 36.5 g/dL    RDW 18.8 (H) 10.0 - 15.0 %    Platelet Count 99 (L) 150 - 450 10e9/L           Attestation:  I have reviewed today's vital signs, notes, medications, labs and imaging.  Amount of time performed on this daily note: 30 minutes.     Jj Phillips MD, MD

## 2018-05-01 NOTE — PROGRESS NOTES
Pt has been using her call light appropriately tonight to call for assistance to the BR. Sits at side of bed to wait for staff to don isolation gown & gloves. No confusion noted during conversation.

## 2018-05-02 NOTE — PROGRESS NOTES
Ultrasound results in epic  Started lovenox injection prior ro US and patient has done these at home before, but will need some re-teaching  Update sent to Reva regarding ultrasound results.

## 2018-05-02 NOTE — PROGRESS NOTES
[facility] Hospitalist Progress Note           Assessment and Plan:     Etta Cervantes is a 57 year old female who presents on 4/23/2018 with a 48 hour history of fatigue, malaise, nausea, emesis, and diarrhea.      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 Had significant nausea and vomiting and also recently started new chemotherapy regimen, so possible this is due to chemo or gastroenteritis. Enteric pathogen ELENA negative.    Diarrhea is slowing significantly.    - continue vancomycin oral  4/26/2018 -- improving, continue oral vanco.  Patient seen by ID at [facility], has had recurrent episodes of this, sounds like this may be the 4th in the past few months.  As of Feb was started on a taper of 125 mg 4 times daily x 14 days, then 3 times daily x 7 days then twice daily x 7 days then stopped.  Discussed with her ID provider - likely plan for prolonged vanco taper over 2 months -   4/28/2018 -- improving - continue vanco for now, if fails to continue to improve will transition to fidaxomicin.  stoppin rocephin as below.    5/2/2018 -- stools have fluctuated some past few days in frequency and consistency but overall still clearly improved from last week - hope for discharge on very slow vanco taper with plan to continue lower dose vanco long-term. follow-up with Dr. Santana (ID) in 1 month (scheduled).  however, if stools worsen again would consider switching to fidaxomicin (pharmacy has available, copay only about $40 if needed).           Acute cystitis    4/23/18-4/25/18 --Urine micro is consistent, urine culture positive for Klebsiella pneumoniae sensitive to ceftriaxone.    4/28/2018 -- completed 5 days of rocephin, stopped 4/28.    5/2/2018 -- patient having some mild recurrent symptoms - repeating UA.          Generalized weakness associated with increased sleepiness - mostly due to progressive cancer and poor intake, compounded acutely by UTI and  "CDiff as above.   4/23/18-4/25/18 --Suspect primarily due to chemotherapy and underlying cancer. Patient had been increasingly sleepy prior to this round of chemo per . Likely effects of MS contin and possibly ambien at night. C difficile infection and UTI also likely contributors though these should be resolving.    - hold MS contin, resume if needed for pain   - hold Ambien    - Palliative care consult tomorrow for symptom control - agreeable to patient and , discussed with TT  4/26/2018 -- Sleepiness appears mildly improved but still there.   starting on on Ritalin and Remeron by palliative care today.  Physical therapy and occupational therapy consulted.    4/27/2018 -- had been improved, but groggy again this AM after ambien was restarted - decreased from 10 to 5 mg today.  Plan for walker on discharge.  (ordered)   4/28/2018 -- improved without ambien - this was discontinued.    4/29/2018 -- mental status doing well after stopping ambien.  Poor intake continues to compound this as below  5/1/2018 --  Stopping marinol.  Continue ritalin for now.    5/2/2018 -- slowly improving, continue to encourage orals.  Hope for discharge tomorrow - has walker.        Poor intake  4/29/2018 -- no nausea or vomiting and diarrhea improving but patient still having poor intake, just \"isn't hungry and nothing tastes good\". Nutrition consult placed for tomorrow.  Patient getting boost or similar with meals when willing.  Encouraging to take frequent sips of liquids throughout the day - every 10-15 minutes.  Restarting IVF overnight to rehydrate her some prior to potential discharge in the next 1-2 days.    5/2/2018 -- improving but still poor - medications as above.         Breast cancer metastasized to axillary lymph node, metastatic to bone and liver  4/23/18-4/25/18 -- Chemo regimen switched to carboplatin and Gemzar (with methylprednisolone pretreatment), first treatment 4/20/18. This may be primarily " responsible for patient's generalized weakness and fatigue.   4/30/2018 -- unfortunately patient appears to be continuing to decline with clear progression of disease and generalized decline, recently started on new chemo but with hospitalization since that time  5/2/2018 -- patient discussed with oncology today - given some improvement past couple of days they are planning to have her follow-up in 1 week to discuss if further chemo or hospice would be more appropriate depending on improvement over the next week.  Overall prognosis unfortunately remains poor.                 Elevated liver enzymes due to metastatic disease    4/23/18-4/25/18 --Not new, but all values trending upward over the past month.  Probably due to liver metastasis.  4/30/2018 -- up a bit again today and INR climbing as below   5/1/2018 -- US showed no apparent biliary obstruction.  Some possible gallbladder sludging, but discussed with surgery who though this appeared all due to metastatic disease and would not recommend any further imaging or intervention.    5/2/2018 -- stable.        History of deep vein thrombosis (DVT) of right upper extremity, unspecified vein (H) now with elevated INR even off coumadin due to liver mets    4/23/18-4/25/18 --Has been treated since late 12/2017, most recently with warfarin, as patient told me she had side effects or lab abnormalities to the use of enoxaparin.  Warfarin has been on hold the past 3 or 4 days at the direction of Dr. Cassidy, Oncology, due to prolonged INR, see below.  Palpation of right arm reveals no cords, and there is no redness or swelling.   - Anticoagulation will remain on hold at least until INR normalizes.  However, now that she has completed 4 months anticoagulation, may not be necessary to resume anticoagulation, particularly for an upper extremity clot which has a significantly lower risk of embolization compared to lower extremity.  4/26/2018 -- discussed with oncology - does not  want her on coumadin - as long as INR is therapeutic nothing else needed, plan to follow-up with oncology as outpatient.  If INR drops below therapeutic range would consider starting lovenox.     4/30/2018 -- INR now climbing - will give 1mg vitamin K today.  Recheck in AM.  Checking US as above.    5/1/2018 -- INR down to 1.9 after Vitamin K - will recheck in AM - on mechanical prophylaxis for now - anticoagulation on discharge may be difficult as anticipate that INR will slowly climb again. 5/2/2018 -- INR down to 1.6 instead of climbing as anticipated - now with right upper extremity swelling - starting treatment dose lovenox, plan to discharge on this (no further coumadin).  Checking US of right upper extremity, although plan will be lovenox regardless of results.            Secondary hypotension   4/23/18-4/25/18 -- Volume depleted due to 48 hour illness, BP intially 83 syst, improved to normal after 1 L IVF.   - will decrease IV fluids to see if helps with appetite and since patient has received significant IVF this admission so far  4/27/2018 -- resolved, doing well.           Low-voltage EKG   4/23/18-4/25/18 -- Noted on admission.  Note made also of bilateral pleural effusions. Echo with trivial effusion.   4/28/2018 -- no further issues.       Mild sinus tachycardia  4/29/2018 -- has been about 110 by checks today, was about 100 on my exam.  Appears just a bit dry with poor intake still and no IVF for a number of days.  Will attempt to rehydrate overnight with IVF @ 100 and encouraging orals as above.      4/30/2018 -- improved, 100's today.  Having mild edema, stopping IVF.    5/1/2018 -- appears to be 100's at rest, but to jump to 110-120's for a while after activity such as being up to the bathroom.  Continue to follow.   5/2/2018 -- no change.     Edema  5/1/2018 -- mild edema, but appears intravascularly dry - suspect due to hypoalbuminemia.  Discussed that diuresis may make her more prone to  dehydration, follow for now     2018 -- lower extremity edema stable.  Weight up but don't think she's intravascularly volume up - just hypoalbuminemic leading to 3rd spacing and suspect initial weight was below baseline as she was fairly dehydrated on admission.  No clinical evidence of pulmonary edema.  Will follow for now, could diurese if needed but suspect this may just make her more prone to being volume down so will hold off for now.  Follow weight.       DVT Prophylaxis: Not needed due to INR >3 so far       Code Status: Full Code      Lines: Peripheral IV.    Disposition  Improving albeit very slowly - hope for discharge in the next 1-2 days with homecare and walker, follow-up with oncology in 1 week.              Interval History:   Now noticed swelling of distal right upper extremity which is the arm she previously had her clot in starting a few hours ago.  No pain.  No erythema or warmth.  No tingling or numbness.  No fever or chills.  Stools continue to fluctuate, some normal ones, some soft some liquid, but still much less often and smaller amounts than last week.  No pain.               Review of Systems:    ROS: 10 point ROS neg other than the symptoms noted above in the HPI.             Medications:   Current active medications and PTA medications reviewed, see medication list for details.            Physical Exam:   Vitals were reviewed  Patient Vitals for the past 24 hrs:   BP Temp Temp src Pulse Resp SpO2   18 1127 123/64 97.4  F (36.3  C) Oral 117 20 92 %   18 0706 127/71 97.8  F (36.6  C) Oral 114 20 93 %   18 0342 115/70 98.7  F (37.1  C) Oral 124 18 96 %   18 2334 119/68 98.9  F (37.2  C) - 113 18 96 %   18 1908 127/64 99  F (37.2  C) Oral 124 18 93 %   18 1541 114/74 99.1  F (37.3  C) Oral 131 20 91 %       Temperatures:  Current - Temp: 97.4  F (36.3  C); Max - Temp  Av.5  F (36.9  C)  Min: 97.4  F (36.3  C)  Max: 99.1  F (37.3  C)  Respiration  range: Resp  Av  Min: 18  Max: 20  Pulse range: Pulse  Av.5  Min: 113  Max: 131  Blood pressure range: Systolic (24hrs), Av , Min:114 , Max:127   ; Diastolic (24hrs), Av, Min:64, Max:74    Pulse oximetry range: SpO2  Av.5 %  Min: 91 %  Max: 96 %  I/O last 3 completed shifts:  In: -   Out: 1350 [Urine:1350]    Intake/Output Summary (Last 24 hours) at 18 1502  Last data filed at 18 0748   Gross per 24 hour   Intake                0 ml   Output             1350 ml   Net            -1350 ml     EXAM:  General: awake and alert, NAD, oriented x 3  Head: normocephalic  Neck: unremarkable, no lymphadenopathy   HEENT: oropharynx pink and moist    Heart: Regular rate and rhythm, no murmurs, rubs, or gallops  Lungs: clear to auscultation bilaterally with good air movement throughout  Abdomen: soft, non-tender, no masses or organomegaly  Extremities: 1+ edema in lower extremities, right upper extremity shows moderate swelling of mostly distal upper extremity, no erythema, no warmth, no coolness, pulses unremarkable, sensation, strength, and circulation intact throughout upper extremities.    Skin unremarkable.               Data:     Results for orders placed or performed during the hospital encounter of 18 (from the past 24 hour(s))   CBC with platelets   Result Value Ref Range    WBC 9.4 4.0 - 11.0 10e9/L    RBC Count 3.72 (L) 3.8 - 5.2 10e12/L    Hemoglobin 10.2 (L) 11.7 - 15.7 g/dL    Hematocrit 31.6 (L) 35.0 - 47.0 %    MCV 85 78 - 100 fl    MCH 27.4 26.5 - 33.0 pg    MCHC 32.3 31.5 - 36.5 g/dL    RDW 19.6 (H) 10.0 - 15.0 %    Platelet Count 115 (L) 150 - 450 10e9/L   Comprehensive metabolic panel   Result Value Ref Range    Sodium 140 133 - 144 mmol/L    Potassium 4.4 3.4 - 5.3 mmol/L    Chloride 108 94 - 109 mmol/L    Carbon Dioxide 26 20 - 32 mmol/L    Anion Gap 6 3 - 14 mmol/L    Glucose 101 (H) 70 - 99 mg/dL    Urea Nitrogen 7 7 - 30 mg/dL    Creatinine 0.52 0.52 - 1.04 mg/dL     GFR Estimate >90 >60 mL/min/1.7m2    GFR Estimate If Black >90 >60 mL/min/1.7m2    Calcium 8.7 8.5 - 10.1 mg/dL    Bilirubin Total 1.4 (H) 0.2 - 1.3 mg/dL    Albumin 2.1 (L) 3.4 - 5.0 g/dL    Protein Total 5.7 (L) 6.8 - 8.8 g/dL    Alkaline Phosphatase 501 (H) 40 - 150 U/L    ALT 63 (H) 0 - 50 U/L     (H) 0 - 45 U/L   INR   Result Value Ref Range    INR 1.61 (H) 0.86 - 1.14   Magnesium   Result Value Ref Range    Magnesium 1.7 1.6 - 2.3 mg/dL   Phosphorus   Result Value Ref Range    Phosphorus 3.6 2.5 - 4.5 mg/dL           Attestation:  I have reviewed today's vital signs, notes, medications, labs and imaging.  Amount of time performed on this daily note: 30 minutes.     Jj Phillips MD, MD

## 2018-05-02 NOTE — PLAN OF CARE
Problem: Urinary Tract Infection (Adult)  Goal: Signs and Symptoms of Listed Potential Problems Will be Absent, Minimized or Managed (Urinary Tract Infection)  Signs and symptoms of listed potential problems will be absent, minimized or managed by discharge/transition of care (reference Urinary Tract Infection (Adult) CPG).   Outcome: Improving  Pt voiding clear, yellow urine. Denies discomfort w/ urination. Pt has been afebrile, alert and oriented; steady with independent ambulation tonight.    Problem: Gastrointestinal Condition Comorbidity  Goal: Gastrointestinal Condition  Patient comorbidity will be monitored for signs and symptoms of Gastrointestinal condition.  Problems will be absent, minimized or managed by discharge/transition of care.   Outcome: Improving  Pt has been up to BR a few times tonight to void, but no BM. Pt denies abd pain, says she's happy the diarrhea seems to have resolved.

## 2018-05-02 NOTE — PLAN OF CARE
Problem: Patient Care Overview  Goal: Plan of Care/Patient Progress Review  Outcome: Improving  Patient still reports burning with urination and also vaginal burning.  Vagisil applied as ordered with relief and perineum barrier cream applied to assist with dryness.  Some blanchable redness on buttocks.  She received 4 wheeled walker that her  bought-education given on walker by nurse.       notified nurse that her right arm appeared more swollen.  Arm examined, and is notably swollen on the forearm.  Patient reports she noticed it at noonish.  No pain, no numbness or tingling.  Updated Dr. Phillips at this time to assess arm during patient visit.  Will elevate and apply heat when she is done with lunch.    Patient with 2 loose, small stools since day shift start.  She is more independent with her mobility and bathroom cares.  Portacath heparinized this shift.

## 2018-05-03 NOTE — PLAN OF CARE
Problem: Urinary Tract Infection (Adult)  Goal: Signs and Symptoms of Listed Potential Problems Will be Absent, Minimized or Managed (Urinary Tract Infection)  Signs and symptoms of listed potential problems will be absent, minimized or managed by discharge/transition of care (reference Urinary Tract Infection (Adult) CPG).   Outcome: Declining  Pt had been c/o burning and pain with voiding. Repeat UA sent and reported to PA. Antibiotics restarted during the night. Using barrier cream to groin and sore buttock areas.

## 2018-05-03 NOTE — PROGRESS NOTES
Care Transitions Discharge:    Name: Etta Cervantes    MRN: [id number]    Reason for Hospitalization: Urinary tract infection [N39.0]    Cognitive/Behavioral Status: awake, alert and oriented    Follow-up Appointments: Future Appointments  Date Time Provider Department Center   5/9/2018 9:00 AM [facility]   5/11/2018 9:30 AM ROOM 7 [facility]   5/18/2018 8:00 AM ROOM 1 [facility]   5/25/2018 2:30 PM Bailey Santana MD [facility]       Discharge Date:  5/3/2018    Patient/Care Partner in agreement and understands the discharge plan:  Yes    Discharge Disposition:  Home with [facility] ([phone] Fax: [phone]).    Discharge Planner   Discharge Plans in progress: [facility]  Barriers to discharge plan: None  Follow up plan: Follow up with PCP       Entered by: Anjelica Finn 05/03/2018 12:09 PM     As a system [facility] wants to ensure that across the care continuum that you have support through care coordination services that include nurses and social workers in the outpatient setting.  Due to your chronic illness I feel it is important you have this support when you discharge.  They will be calling you within 24-48 hours of your discharge.   A brochure describing the services was provided.  If you have questions you can reach out to your clinic directly and ask for the Care Coordinator assigned to your care.         Anjelica Finn RN Care Coordinator  [phone]

## 2018-05-03 NOTE — PLAN OF CARE
Problem: Urinary Tract Infection (Adult)  Goal: Signs and Symptoms of Listed Potential Problems Will be Absent, Minimized or Managed (Urinary Tract Infection)  Signs and symptoms of listed potential problems will be absent, minimized or managed by discharge/transition of care (reference Urinary Tract Infection (Adult) CPG).   Outcome: Declining  Pt c/o burning @ her vaginal area, burning w/ urination. Pt has been receiving Metrogel vaginal

## 2018-05-03 NOTE — PROGRESS NOTES
[place] [facility] DISCHARGE NOTE    Patient discharged to home at 3:55 PM via wheel chair. Accompanied by spouse and staff. Discharge instructions reviewed with patient and spouse, opportunity offered to ask questions. Prescriptions filled and sent with patient upon discharge. All belongings sent with patient.    Clair Richard

## 2018-05-03 NOTE — PROGRESS NOTES
Attempted to instruct pt on self administering of Lovenox injection, but she was pretty groggy at that time in the morning. Will need further teaching.

## 2018-05-03 NOTE — PROGRESS NOTES
[facility]    Hospital Medicine   Care Update  Date of Service: 5/3/2018     I was called due to abnormal UA.  Per round MD note, some dysuria today despite treatment of UTI prompting repeat of UA.  Shows 83 WBC, trace LE, negative nitrite.  Urine culture +klebsiella sensitive to rocephin from 5 days ago.    Plan:  - repeat urine culture  - again, resume rocephin 1g Q24 hours.      Reva Kiser PA-C

## 2018-05-03 NOTE — PROGRESS NOTES
Pt has kept R arm elevated on pile of blankets whenever she's been in bed. There is still significant swelling in her hand, but the skin is not as tight as it was last evening and hospital bracelets are looser fitting this am. Continue to encourage elevation of extremity to promote drainage of fluid.

## 2018-05-04 NOTE — LETTER
Health Care Home - Access Care Plan    About Me  Patient Name:  Etta Cervantes    YOB: 1960  Age:                             57 year old   [place] MRN:            [id number] Telephone Information:     Home Phone [phone]   Mobile [phone]       Address:    [address] [place] [address] Email address:  [email or web address]      Emergency Contact(s)  Name Relationship Lgl Grd Work Phone Home Phone Mobile Phone   JENA GAONA Spouse  [phone]             Health Maintenance:      My Access Plan  Medical Emergency 911   Questions or concerns during clinic hours Primary Clinic Line, I will call the clinic directly: [facility] [phone]   24 Hour Appointment Line [phone] or  [phone]) (toll free)   24 Hour Nurse Line [phone] (toll free)   Questions or concerns outside clinic hours 24 Hour Appointment Line, I will call the after-hours on-call line:   [facility] [phone] or [phone]) (toll-free)   Preferred Urgent Care [facility], [phone]   Preferred Hospital [place], [place]  [phone]   Preferred Pharmacy [facility] [address] [place], [place] - [address] [facility]     Behavioral Health Crisis Line The National Suicide Prevention Lifeline at 1-751.635.9702 or 911     My Care Team Members  Patient Care Team       Relationship Specialty Notifications Start End    Johana Fairbanks MD PCP - General Family Practice  2/11/11     Phone: [phone] Fax: [phone] [address] [facility] [facility] [address] [address]    Brodie Cassidy MD MD Hematology & Oncology Admissions 1/23/15     Phone: [phone] Fax: [phone] [address] [facility] [address]    Shasha Legre APRN CNP Nurse Practitioner Nurse Practitioner  1/23/15     Phone: [phone] Fax: [phone]         XXX RESIGNED XXX [address]  [facility] [address]    SELAM Grady MD MD Urology  1/23/15     Phone: [phone] Pager: [phone] Fax: [phone] [address] [facility] [address]    Rajesh Ahuja MD MD Family Practice  1/23/15     Phone: [phone] Fax: [phone] [address] [facility] [address]    Funmi Ray, RN Registered Nurse Hematology & Oncology Admissions 8/12/15     Care, [facility] ([facility] ([place])  4/28/18     Phone: [phone]         Rajesh Blevins, RN Clinic Care Coordinator Primary Care - [facility]  5/4/18     Phone: [phone] Fax: [phone] [address] [place] [address]           My Medical and Care Information  Problem List   Patient Active Problem List   Diagnosis     Hypothyroid     Fibroids     CARDIOVASCULAR SCREENING; LDL GOAL LESS THAN 160     Menorrhagia     Moderate persistent asthma     DIAMOND (obstructive sleep apnea)     Insomnia     Health Care Home     ASCUS favor benign     Breast cancer (H)     Carcinoma of breast metastatic to liver (H)     Bone metastasis (H)     Breast cancer metastasized to axillary lymph node (H)     Drug-related hair loss     Mucositis due to chemotherapy     Secondary malignant neoplasm of liver (H)     Thyroid nodule     Emphysematous bleb of lung (H)     Chemotherapy-induced neutropenia (H)     Hyperlipidemia LDL goal <130     Pneumothorax     Acute pyelonephritis     Elevated liver enzymes     Biliary obstruction     Cancer associated pain     Pleural effusion     Thrush     C. difficile colitis     Acute deep vein thrombosis (DVT) of right upper extremity, unspecified vein (H)     Long-term (current) use of anticoagulants [Z79.01]     Secondary hypotension     Acute cystitis      Prolonged INR     Diarrhea of presumed infectious origin     Urinary tract infection      Current Medications and Allergies:  See printed Medication Report

## 2018-05-04 NOTE — TELEPHONE ENCOUNTER
Reason for Call: Request for an order or referral:    Order or referral being requested: Skilled nursing once this week, 2 X a week for 4 weeks, 1 X week for 4 weeks and 3 prn. May add therapy depending how chemo goes.    Date needed: as soon as possible    Has the patient been seen by the PCP for this problem?     Additional comments: Please call back today with verbal order.    Phone number Patient can be reached at:  Other phone number:  [phone]    Best Time:  any    Can we leave a detailed message on this number?      Call taken on 5/4/2018 at 2:03 PM by Valery Madison

## 2018-05-04 NOTE — LETTER
[place] CARE COORDINATION  [facility]  [address] [place] [address]  [phone]    May 4, 2018    Etta Cervantes  [address] [place]  [place] [address]      Dear Etta,    I am a clinic care coordinator who works with Johana Fairbanks MD at [facility]. I wanted to thank you for spending the time to talk with me.  I wanted to introduce myself and provide you with my contact information so that you can call me with questions or concerns about your health care. Below is a description of clinic care coordination and how I can further assist you.     The clinic care coordinator is a registered nurse and/or  who understand the health care system. The goal of clinic care coordination is to help you manage your health and improve access to the [facility] system in the most efficient manner. The registered nurse can assist you in meeting your health care goals by providing education, coordinating services, and strengthening the communication among your providers. The  can assist you with financial, behavioral, psychosocial, chemical dependency, counseling, and/or psychiatric resources.    Please feel free to contact me at [phone], [phone], with any questions or concerns. We at [facility] are focused on providing you with the highest-quality healthcare experience possible and that all starts with you.     Sincerely,     Mannie Blevins RN  Clinic Care Coordinator    Enclosed: I have enclosed a copy of a 24 Hour Access Plan. This has helpful phone numbers for you to call when needed. Please keep this in an easy to access place to use as needed.

## 2018-05-04 NOTE — TELEPHONE ENCOUNTER
Spoke with Rae MAYERS. Patient has an appointment with homecare on Monday. Rae is hoping for a verbal today. Dr. Fairbanks is out of the office until Tuesday. Please advise in her absence. Thank you.  Consuelo Payton RN

## 2018-05-04 NOTE — TELEPHONE ENCOUNTER
"Requested Prescriptions   Pending Prescriptions Disp Refills     levothyroxine (SYNTHROID/LEVOTHROID) 25 MCG tablet [Pharmacy Med Name: LEVOTHYROXINE 25 MCG TABLET] 90 tablet 2    Last Written Prescription Date:  8/10/17  Last Fill Quantity: 90,  # refills: 2   Last office visit: 9/5/2017 with prescribing provider:  9/5/17   Future Office Visit:     Sig: TAKE 1 TABLET BY MOUTH ONCE DAILY.    Thyroid Protocol Passed    5/4/2018  4:01 AM       Passed - Patient is 12 years or older       Passed - Recent (12 mo) or future (30 days) visit within the authorizing provider's specialty    Patient had office visit in the last 12 months or has a visit in the next 30 days with authorizing provider or within the authorizing provider's specialty.  See \"Patient Info\" tab in inbasket, or \"Choose Columns\" in Meds & Orders section of the refill encounter.           Passed - Normal TSH on file in past 12 months    Recent Labs   Lab Test  08/17/17   1345   TSH  1.07             Passed - No active pregnancy on record    If patient is pregnant or has had a positive pregnancy test, please check TSH.         Passed - No positive pregnancy test in past 12 months    If patient is pregnant or has had a positive pregnancy test, please check TSH.            "

## 2018-05-04 NOTE — TELEPHONE ENCOUNTER
Prescription approved per [facility] Refill Protocol.  Consuelo Payton RN

## 2018-05-06 NOTE — TELEPHONE ENCOUNTER
"Patient hospitalized from 4/22-5/3 for UTI  Is supposed to be using   metroNIDAZOLE (METROGEL) 0.75 % vaginal gel 50 g 0 5/3/2018 5/8/2018 No      Sig: Place 1 applicator (5 g) vaginally 2 times daily for 5 days     Accidentally threw the medication out, requesting new prescription called in. Used the medication for about 2 days.   Declines any new or worsening symptoms.    Patient's primary PCP is Dr Fairbanks at the [facility], Patient's primary oncologist is Dr. Cassidy at [facility]. Dr. Antonio Barraza is on call for Dr. Fairbanks today and was paged via [facility] at 12:39pm to call this RN directly.  Dr. Barraza called immediately back. Gave verbal ok to refill the Metrogel as noted above, Patient to restart the medication and take it for a full 5 days.   Medication was refilled and Patient was advised of the refill and to use the medication for the full 5 days.   Advised to call back if further questions or concerns.     Message sent to clinic as FYI    Reason for Disposition    [1] Request for URGENT new prescription or refill of \"essential\" medication (i.e., likelihood of harm to patient if not taken) AND [2] triager unable to fill per unit policy    Protocols used: MEDICATION QUESTION CALL-ADULT-    "

## 2018-05-07 NOTE — DISCHARGE SUMMARY
Admit Date:     04/23/2018   Discharge Date:     05/03/2018      HISTORY OF PRESENT ILLNESS/HOSPITAL COURSE:  Etta Cervantes is a 57-year-old female with metastatic breast cancer.  She has mets to lymph nodes, liver and bone.  She has had disease progression based on clinical factors, radiographic factors and tumor markers over the last few months.  She had chemo on 04/20/2018.  She tolerated the infusion without incident, but she became ill the next day with nausea, vomiting, diarrhea, fatigue and malaise.  She presented to the emergency room with hypotension.      The patient was found to have recurrent C. difficile colitis.  She has been seen by ID at the [facility] for recurrent episodes.  This was approximately her fourth episode.  In February she had been started on a slow taper.  She was found to be C. difficile positive during this hospitalization.  She was started on vancomycin.  The case was discussed by  with ID.  They felt that she would need a prolonged taper over perhaps 2 months.  She had very slow improvement in the hospital and slowly her diarrhea improved and her p.o. intake improved to some extent.  Plan at this time would be to give her full-dose vancomycin for four weeks then have her see ID, and then continue a taper as per their recommendations.      The patient had some persistent complaints of vaginal burning and itching, and some dysuria.  Her urine culture was positive for Klebsiella.  She was treated with Rocephin by 5 days.  She was having some recurrent symptoms that sounded mostly external, but her UA again showed some pyuria, so she was given 1 more injection of Rocephin.  I picked her up on just the last day of her hospitalization and felt that we should stop the Rocephin and wait for the culture.  I would be disinclined to treat unless she has a very positive culture.  My perception was that her symptoms may be more vaginal in nature.  She had been started on MetroGel and  we are going to continue this at discharge.  She does deny vaginal discharge, however.  We will have her call for her culture results on the day after discharge to her primary doctor's office.      The patient had increased weakness and sleepiness.  It was thought that she probably was not metabolizing Ambien and MS Contin.  She was taken off these and put on hydrocodone.  She was also started on Ritalin.  Remeron was started but I did not continue this at the time of discharge.  Her mental status improved during her hospitalization.      She had poor p.o. intake during her hospitalization, but it slowly did improve.      She has elevated liver enzymes due to hepatic metastasis.  At the time of discharge, her bilirubin was 1.6, alkaline phosphatase 465, ALT 52, .  An abdominal ultrasound did not show obstruction during this hospitalization.      The patient has had a DVT in her right upper extremity and came in on Coumadin.  She had been on Lovenox in the past.  Her INR was high initially.  It was thought that this was partially related to liver mets.  Ultimately, her INR came down and was subtherapeutic, and she was restarted on Lovenox and will discharge on Lovenox.      Initially, the patient was volume depleted and had hypotension, and she received IV fluids.  She did have persistent sinus tachycardia throughout her stay despite being what appeared to be clinically euvolemic.  It was thought that perhaps the tachycardia was related to her advanced metastatic disease.  She did have some edema in the hospital, probably related to low albumin and her cancer.      ASSESSMENT:   1.  Recurrent Clostridium difficile colitis.   2.  Klebsiella urinary tract infection (UTI).   3.  Generalized weakness and lethargy related to progressive cancer, C. difficile, and slow metabolism of Ambien and MS Contin.   4.  Poor p.o. intake.   5.  Breast cancer metastasized to lymph nodes, bone and liver.   6.  Liver enzyme  elevation due to hepatic metastasis.   7.  History of deep venous thrombosis (DVT) of the right upper extremity, currently discharging on Lovenox.   8.  Persistent sinus tachycardia thought perhaps just simply to be related to her advanced metastatic disease.   9.  Edema, in part related to low albumin.      PLAN:  The patient is going to discharge off of MS Contin and Ambien.  She is using hydrocodone for pain.  She was put on Ritalin in the hospital, continue this.  She did have a little bit of tremor that may be from the Ritalin.  I am going to continue vancomycin 125, four times a day for 28 days then have her see ID for further taper since she has had recurrent problems.  She will follow up with Oncology within 2 weeks.  She is to use Lovenox twice a day to prevent clots.  She is to use MetroGel twice daily for 5 days.  She should call her primary MD on 05/04 to get the urine culture result.  She was treated with 1 dose of Rocephin since this urine culture was done.  I would try to keep any ongoing antibiotics to absolute minimum.  Her current symptoms seem to be perhaps more vaginal than UTI related.  Going forward her prognosis is poor.  She has an appointment with Dr. Cobos from palliative care on 05/09.     Discharge Medication List as of 5/3/2018 12:49 PM      START taking these medications    Details   methylphenidate (RITALIN) 10 MG tablet Take 1 tablet (10 mg) by mouth 2 times daily, Disp-60 tablet, R-0, Local Print      !! order for DME Equipment being ordered: walker - 4 wheeled walker with seatDisp-1 Device, R-0, Local Print      vancomycin (FIRVANQ) 50 mg/mL SOLR Take 2.5 mLs (125 mg) by mouth 4 times daily for 28 days, Disp-280 mL, R-0, E-Prescribe      metroNIDAZOLE (METROGEL) 0.75 % vaginal gel Place 1 applicator (5 g) vaginally 2 times daily for 5 daysDisp-50 g, [id number]       !! - Potential duplicate medications found. Please discuss with provider.      CONTINUE these medications which  have CHANGED    Details   enoxaparin (LOVENOX) 100 MG/ML injection Inject 0.9 mLs (90 mg) Subcutaneous every 12 hours, Disp-14 Syringe, R-0, E-Prescribe      HYDROcodone-acetaminophen (NORCO) 5-325 MG per tablet Take 1-2 tablets by mouth every 4 hours as needed for moderate to severe pain, Disp-60 tablet, R-0, Local Print         CONTINUE these medications which have NOT CHANGED    Details   albuterol (2.5 MG/3ML) 0.083% neb solution Take 1 vial (2.5 mg) by nebulization every 4 hours as needed for shortness of breath / dyspnea, Disp-30 vial, R-3, E-Prescribe      albuterol (VENTOLIN HFA) 108 (90 BASE) MCG/ACT inhaler Inhale 2 puffs into the lungs every 4 hours as needed, Disp-1 Inhaler, R-2, E-Prescribe      azelastine (ASTELIN) 0.1 % nasal spray Spray 1-2 sprays into both nostrils 2 times daily as needed for rhinitis, Disp-3 Bottle, R-3, E-Prescribe      Beclomethasone Dipropionate 80 MCG/ACT AERS Nasal Spray Spray 2 sprays into both nostrils daily, Disp-8.7 g, R-3, E-Prescribe      budesonide-formoterol (SYMBICORT) 160-4.5 MCG/ACT Inhaler INHALE 2 PUFFS INTO THE LUNGS 2 TIMES DAILY, Disp-10.2 Inhaler, R-0, E-Prescribe      calcium carb-cholecalciferol ( CALCIUM 600+D3) 600-500 MG-UNIT CAPS Take 2 tablets by mouth daily, Historical      CRANBERRY PO Take 1 capsule by mouth daily , Historical      diclofenac (VOLTAREN) 1 % GEL topical gel Place 2 g onto the skin 4 times dailyDisp-100 g, [id number]      diphenhydrAMINE (BENADRYL) 25 MG tablet Take 1 tablet (25 mg) by mouth every 8 hours as needed for itching or allergies, Disp-1 tablet, R-0, OTC      fexofenadine (ALLEGRA ALLERGY) 180 MG tablet Take 180 mg by mouth daily as needed for allergies, Historical      loratadine (CLARITIN) 10 MG tablet Take 10 mg by mouth daily as needed for allergies, Historical      metoclopramide (REGLAN) 10 MG tablet Take 1 tablet (10 mg) by mouth 2 times daily as needed, Disp-120 tablet, R-1, E-Prescribe      !! order for DME  Equipment being ordered: Venu post-lumpectomy compression pad [id number] each, R-0, MELE, Local Print      oxybutynin (DITROPAN XL) 10 MG 24 hr tablet Take 10 mg by mouth daily, Historical      Phenylephrine-DM-GG (ROBITUSSIN COUGH/COLD CF PO) Take 10 mLs by mouth as needed , Historical      prochlorperazine (COMPAZINE) 10 MG tablet Take 1 tablet (10 mg) by mouth every 6 hours as needed (Nausea/Vomiting), Disp-30 tablet, R-2, E-Prescribe      QVAR 80 MCG/ACT Inhaler Spray 1 puff into both nostrils 2 times daily, R-3, MELE, Historical      levothyroxine (SYNTHROID/LEVOTHROID) 25 MCG tablet TAKE 1 TABLET (25 MCG) BY MOUTH DAILY, Disp-90 tablet, R-2, E-Prescribe       !! - Potential duplicate medications found. Please discuss with provider.      STOP taking these medications       Lidocaine (LIDOCARE) 4 % Patch Comments:   Reason for Stopping:         morphine (MS CONTIN) 15 MG 12 hr tablet Comments:   Reason for Stopping:         oxyCODONE IR (ROXICODONE) 5 MG tablet Comments:   Reason for Stopping:         warfarin (COUMADIN) 1 MG tablet Comments:   Reason for Stopping:         warfarin (COUMADIN) 2.5 MG tablet Comments:   Reason for Stopping:         zolpidem (AMBIEN CR) 12.5 MG CR tablet Comments:   Reason for Stopping:             Unresulted Labs Ordered in the Past 30 Days of this Admission     No orders found from 2018 to 2018.              TOTAL TIME SPENT:  Greater than 30 minutes spent on this.         ANNABEL JAVIER MD             D: 2018   T: 2018   MT: CC      Name:     LISSETH STEPHENSON   MRN:      [id number]        Account:        [id number]   :      1960           Admit Date:     2018                                  Discharge Date: 2018      Document: [id number]

## 2018-05-07 NOTE — TELEPHONE ENCOUNTER
Patient notified of inconclusive result. She said that she is taking metrogel for vaginitis and her symptoms are resolving. Advised that if symptoms of urinary frequency or fever or burning upon urination to go to lab for a urine test. She agreed with plan.  Manoj Brewer RN

## 2018-05-07 NOTE — TELEPHONE ENCOUNTER
He urine test is inconclusive.  Shows irratation.  Could be from diarhea.  I is she having any symptoms?    If she is can she give us another sample.

## 2018-05-07 NOTE — TELEPHONE ENCOUNTER
Reason for Call:  Other call back    Detailed comments: Patient left a message stating they were waiting for results of her urine when she was discharged from the hospital. Patient would like to know what her urine lab test shows? She's wondering if she needs to be treated for a UTI?    Phone Number Patient can be reached at: Home number on file [phone] (home)    Best Time: any    Can we leave a detailed message on this number? YES    Call taken on 5/7/2018 at 1:26 PM by Kaitlynn Roger

## 2018-05-09 NOTE — PROGRESS NOTES
Palliative Care Outpatient Clinic Consultation Note    Patient:  Etta Cervantes    Chief Complaint:   Etta Cervantes 57 year old female who is presenting to the palliative medicine clinic today at the request of Ector Campos for a palliative care consultation secondary to breast cancer.   The patient's primary care provider is:  Johana Fairbanks     History of Present Illness:  She was recently hospitalized for weakness, anorexia, C. difficile colitis with diarrhea.  She was originally diagnosed with breast cancer in January 2015 which was metastatic at that time.  She has been through numerous rounds of chemotherapy and has had bouts of C. difficile colitis on several occasions.  Most recent chemotherapy along with colitis resulted in hospitalization from 4/23/18 through 5/3/18.  Her chemotherapy was put on hold during that time and she believes she is going to be scheduled to restart that.  Since discharge from the hospital she has been improving with no further diarrhea, increased appetite, increased weight.    Distressing Symptom/s:  She denies significant distressing symptoms at this time though previously the biggest issue was diarrhea and that has improved.  She does have some fatigue but again feels she is gradually improving.    Patient's Disease Understanding: She seems to have an understanding that her breast cancer is not curable though she is hopeful for continued improvement with continued treatment    Coping: She is doing well for the most part though is frustrated with multiple complications from treatment    Social History  Living Situation: She lives in an apartment on the first floor with her   Children: Children are grown and live nearby  Actual/Potential Caregiver(s):   Support System: Brother coming from [place] for 2 weeks  Occupation: On disability, former manager  Hobbies: Reading, television, video games  Substance Use/History of misuse: None  Financial Concerns:   Spiritual  Background:   Spiritual Concerns/Needs:   Social History   Substance Use Topics     Smoking status: Never Smoker     Smokeless tobacco: Never Used     Alcohol use No       Family History  Family History   Problem Relation Age of Onset     Depression Mother      Eye Disorder Mother      Gynecology Mother      Alzheimer Disease Mother      CANCER Father      Other Cancer Father      HEART DISEASE Maternal Grandfather      Allergies Paternal Grandfather      Allergies Son      Patient's Involvement with Prior History of Serious Illness in Family:     Advance Care Planning:  Advance Directive:    She is full code by her choice  Where is written copy located:   Health Care Agent Contact Information:   POLST:       Allergies   Allergen Reactions     Animal Dander Cough     Itchy eyes     Cats      Dust Mites Cough     Itchy eyes     Pollen Extract Other (See Comments)     Cough, Itchy eyes     Seasonal Allergies      Levaquin [Levofloxacin] Rash     States she has violent dreams from taking this     Current Outpatient Prescriptions   Medication Sig Dispense Refill     albuterol (2.5 MG/3ML) 0.083% neb solution Take 1 vial (2.5 mg) by nebulization every 4 hours as needed for shortness of breath / dyspnea 30 vial 3     albuterol (VENTOLIN HFA) 108 (90 BASE) MCG/ACT inhaler Inhale 2 puffs into the lungs every 4 hours as needed 1 Inhaler 2     budesonide-formoterol (SYMBICORT) 160-4.5 MCG/ACT Inhaler INHALE 2 PUFFS INTO THE LUNGS 2 TIMES DAILY 10.2 Inhaler 0     calcium carb-cholecalciferol (KP CALCIUM 600+D3) 600-500 MG-UNIT CAPS Take 2 tablets by mouth daily       CRANBERRY PO Take 1 capsule by mouth daily        diclofenac (VOLTAREN) 1 % GEL topical gel Place 2 g onto the skin 4 times daily 100 g 0     enoxaparin (LOVENOX) 100 MG/ML injection Inject 0.9 mLs (90 mg) Subcutaneous every 12 hours 14 Syringe 0     HYDROcodone-acetaminophen (NORCO) 5-325 MG per tablet Take 1-2 tablets by mouth every 4 hours as needed for  moderate to severe pain 60 tablet 0     levothyroxine (SYNTHROID/LEVOTHROID) 25 MCG tablet TAKE 1 TABLET BY MOUTH ONCE DAILY. 90 tablet 1     loratadine (CLARITIN) 10 MG tablet Take 10 mg by mouth daily as needed for allergies       methylphenidate (RITALIN) 10 MG tablet Take 1 tablet (10 mg) by mouth 2 times daily 60 tablet 0     metoclopramide (REGLAN) 10 MG tablet Take 1 tablet (10 mg) by mouth 2 times daily as needed 120 tablet 1     metroNIDAZOLE (METROGEL) 0.75 % vaginal gel Place 1 applicator (5 g) vaginally 2 times daily 50 g 0     oxybutynin (DITROPAN XL) 10 MG 24 hr tablet Take 10 mg by mouth daily       Phenylephrine-DM-GG (ROBITUSSIN COUGH/COLD CF PO) Take 10 mLs by mouth as needed        prochlorperazine (COMPAZINE) 10 MG tablet Take 1 tablet (10 mg) by mouth every 6 hours as needed (Nausea/Vomiting) 30 tablet 2     QVAR 80 MCG/ACT Inhaler Spray 1 puff into both nostrils 2 times daily  3     vancomycin (FIRVANQ) 50 mg/mL SOLR Take 2.5 mLs (125 mg) by mouth 4 times daily for 28 days 280 mL 0     azelastine (ASTELIN) 0.1 % nasal spray Spray 1-2 sprays into both nostrils 2 times daily as needed for rhinitis (Patient not taking: Reported on 5/9/2018) 3 Bottle 3     Beclomethasone Dipropionate 80 MCG/ACT AERS Nasal Spray Spray 2 sprays into both nostrils daily (Patient not taking: Reported on 5/9/2018) 8.7 g 3     diphenhydrAMINE (BENADRYL) 25 MG tablet Take 1 tablet (25 mg) by mouth every 8 hours as needed for itching or allergies (Patient not taking: Reported on 5/9/2018) 1 tablet 0     fexofenadine (ALLEGRA ALLERGY) 180 MG tablet Take 180 mg by mouth daily as needed for allergies       order for DME Equipment being ordered: Venu post-lumpectomy compression pad x2 1 each 0     order for DME Equipment being ordered: walker - 4 wheeled walker with seat 1 Device 0     Past Medical History:   Diagnosis Date     ASCUS favor benign 1/2015    Neg HPV     Cancer (H)      Cataract 2010    surgery both eyes      Emphysematous bleb of lung (H)      Fibroids      Hypercholesterolemia      Hypothyroid      Incontinence of urine     mixed     Menorrhagia      Obesity      Pneumothorax      PONV (postoperative nausea and vomiting)      Sleep apnea     uses a cpap     Uncomplicated asthma      Past Surgical History:   Procedure Laterality Date     BIOPSY  Jan 2015, 2016    Breast cancer, thyroid     BRONCHOSCOPY, INSERT BRONCHIAL VALVE N/A 7/20/2017    Procedure: BRONCHOSCOPY, INSERT BRONCHIAL VALVE;  Flexible Bronchoscopy, Endobronchial Valve Insertion X5. 4 Valves into right upper lobe and 1 valve into Right middle lobe;  Surgeon: Carlos Mcgowan MD;  [facility]: [facility]     BRONCHOSCOPY, REMOVE BRONCHIAL VALVE N/A 9/7/2017    Procedure: BRONCHOSCOPY, REMOVE BRONCHIAL VALVE;  Flexible Bronchoscopy, Removal Of Endobronchial Valves x 2;  Surgeon: Carlos Mcgowan MD;  Location: [facility]     BRONCHOSCOPY, REMOVE BRONCHIAL VALVE N/A 9/28/2017    Procedure: BRONCHOSCOPY, REMOVE BRONCHIAL VALVE;  Flexible Bronchoscopy, Removal Of Intra-Bronchial Valves x 3;  Surgeon: Carlos Mcgowan MD;  Location:  OR     CATARACT IOL, RT/LT  2010     COLONOSCOPY  2010     DILATION AND CURETTAGE, HYSTEROSCOPY, ABLATE ENDOMETRIUM NOVASURE, COMBINED  3/2/2011    COMBINED DILATION AND CURETTAGE, HYSTEROSCOPY, ABLATE ENDOMETRIUM NOVASURE performed by LAURA VARGAS at [facility]     ENDOSCOPIC RETROGRADE CHOLANGIOPANCREATOGRAM N/A 12/8/2017    Procedure: COMBINED ENDOSCOPIC RETROGRADE CHOLANGIOPANCREATOGRAPHY, PLACE TUBE/STENT;  Endoscopic Retrograde Cholangiopancreatogram with biliary sphincterotomy and stent placement;  Surgeon: Guru Coleen Mora MD;  Location:  OR     GENITOURINARY SURGERY  2005    Uretha sling     INSERT PORT VASCULAR ACCESS N/A 2/12/2015    Procedure: INSERT PORT VASCULAR ACCESS;  Surgeon: Noé Schaefer MD;  Location: [facility]     INSERT PORT VASCULAR ACCESS N/A 1/10/2018    Procedure:  "INSERT PORT VASCULAR ACCESS;  Portacath replacement;  Surgeon: Remi De La Paz MD;  Location: [facility]     SURGICAL HISTORY OF -   2005    bladder sling     SURGICAL HISTORY OF -       Cystectomy-lower lip     TUBAL LIGATION  1987       REVIEW OF SYSTEMS:   ROS: 10 point ROS neg other than the symptoms noted above in the HPI and here:  Palliative Symptom Review (0=no symptom/no concern, 1=mild, 2=moderate, 3=severe):      Pain: 0      Fatigue: 2      Nausea: 0      Constipation: 0      Diarrhea: 0      Depressive Symptoms: 0      Anxiety: 0      Drowsiness: 0      Poor Appetite: 0      Shortness of Breath: 0      Insomnia: 0      Other: 0      Overall (0 good/no concerns, 3 very poor):  1      /85 (BP Location: Right arm, Patient Position: Sitting, Cuff Size: Adult Regular)  Pulse 146  Temp 98.9  F (37.2  C) (Tympanic)  Resp 28  Ht 1.651 m (5' 5\")  Wt 86.2 kg (190 lb)  LMP 02/06/2013  SpO2 94%  Breastfeeding? No  BMI 31.62 kg/m2  HEAD: Atraumatic, normocephalic  EYES: PERRLA, EOMI, Sclerae clear, Fundi normal with sharp discs  EARS: TMs clear, canals normal  NOSE & THROAT: clear  NECK: Supple without adenopathy or thyromegaly  LUNGS: clear to auscultation, normal breath sounds  CV: RRR without murmur, no carotid bruits  ABD: BS+, soft, nontender, no masses, no hepatosplenomegaly  EXTREMITIES: without joint tenderness, swelling or erythema.  No muscle tenderness or abnormality.  BACK: straight, full ROM, no tenderness, no spasm  SKIN: No rashes or abnormalities  NEURO: Alert and oriented X 3, non focal exam, DTRs normal, motor and sensation normal, coordination and gait without abnormality.        Impressions:  Palliative Performance Score:  70  Decision Making Capacity:  intact         Post-op pain  Carcinoma of right breast metastatic to axillary lymph node (H)  Bone metastasis (H)  Carcinoma of breast metastatic to liver, unspecified laterality (H)  Secondary malignant neoplasm of liver (H)    1.  " Continue oncology care as planned  2.  She is doing well with intermittent Vicodin so that is refilled  3.  Recheck in palliative care clinic in 6-8 weeks.

## 2018-05-09 NOTE — MR AVS SNAPSHOT
After Visit Summary   5/9/2018    Etta Cervantes    MRN: [id number]           Patient Information     Date Of Birth          1960        Visit Information        Provider Department      5/9/2018 10:45 AM Brodie Cassidy MD [facility] ONCOLOGY      Today's Diagnoses     Carcinoma of breast metastatic to liver, unspecified laterality (H)    -  1    Bone metastasis (H)        Secondary malignant neoplasm of liver (H)        Carcinoma of right breast metastatic to axillary lymph node (H)        Bilateral malignant neoplasm of breast in female, estrogen receptor positive, unspecified site of breast (H)        Mucositis due to chemotherapy        Moderate persistent asthma without complication        Hypothyroidism, unspecified type        Cancer associated pain        C. difficile colitis        Acute deep vein thrombosis (DVT) of right upper extremity, unspecified vein (H)          Care Instructions    You will need to have labs drawn today. Please proceed with chemotherapy as scheduled on Friday. We would like to see you back in clinic with Dr. Cassidy on Wednesday with labs prior. Chemotherapy to be scheduled per treatment plan.     Copy of appointments, and after visit summary (AVS) given to patient.      If you have any questions during business hours (M-F 8 AM- 4PM), please call Funmi Ray RN, BSN, OCN Oncology Hematology /Breast Cancer Navigator at [facility] [phone].       For questions after business hours, or on holidays/weekends, please call our after hours Nurse Triage line [phone]. Thank you.            Follow-ups after your visit        Follow-up notes from your care team     Return in about 1 week (around 5/16/2018) for Schedule for chemotherapy as per treatment plan.      Your next 10 appointments already scheduled     May 11, 2018  9:30 AM CDT   Level 3 with ROOM 7 [facility]  [facility])    [facility]  [address]  [facility] [address]   [phone]            May 16, 2018  8:00 AM CDT   Level O with ROOM 1 [facility] ([facility])    [facility]  [address]  [facility] [address]   [phone]            May 16, 2018  8:45 AM CDT   Return Visit with Brodie Cassidy MD   [facility] ([facility])    [facility]  [address]  [facility] [address]   [phone]            May 18, 2018  8:00 AM CDT   Level 1 with ROOM 1 [facility] ([facility])    [facility]  [address]  [facility] [address]   [phone]            May 25, 2018  2:30 PM CDT   (Arrive by 2:15 PM)   Return Visit with Bailey Santana MD   [facility] ([facility])    [address] [place] [address]  [address]  [facility] [address] [phone]              Future tests that were ordered for you today     Open Future Orders        Priority Expected Expires Ordered    INR Routine 5/16/2018 5/9/2019 5/9/2018    CBC with platelets differential Routine 5/16/2018 5/9/2019 5/9/2018    Comprehensive metabolic panel Routine 5/16/2018 5/9/2019 5/9/2018    CEA Routine 5/16/2018 5/9/2019 5/9/2018    Ca27.29  breast tumor marker Routine 5/16/2018 5/9/2019 5/9/2018            Who to contact     If you have questions or need follow up information about today's clinic visit or your schedule please contact [facility] directly at [phone].  Normal or non-critical lab and imaging results will be communicated to you by MyChart, letter or phone within 4 business days after the clinic has received the results. If you do not hear from us within 7 days, please contact the clinic through MyChart or phone. If you have a critical or abnormal lab  "result, we will notify you by phone as soon as possible.  Submit refill requests through [employer] or call your pharmacy and they will forward the refill request to us. Please allow 3 business days for your refill to be completed.          Additional Information About Your Visit        [employer] Information     [employer] gives you secure access to your electronic health record. If you see a primary care provider, you can also send messages to your care team and make appointments. If you have questions, please call your primary care clinic.  If you do not have a primary care provider, please call [phone] and they will assist you.        Care EveryWhere ID     This is your Care EveryWhere ID. This could be used by other organizations to access your [facility] medical records  [id number]        Your Vitals Were     Pulse Temperature Respirations Height Last Period Pulse Oximetry    146 98.9  F (37.2  C) (Tympanic) 28 1.651 m (5' 5\") 02/06/2013 94%    Breastfeeding? BMI (Body Mass Index)                No 31.62 kg/m2           Blood Pressure from Last 3 Encounters:   05/09/18 151/85   05/09/18 151/85   05/03/18 113/64    Weight from Last 3 Encounters:   05/09/18 86.2 kg (190 lb)   05/09/18 86.2 kg (190 lb)   05/01/18 89 kg (196 lb 3.4 oz)              We Performed the Following     Ca27.29  breast tumor marker     CBC with platelets differential     CEA     Comprehensive metabolic panel     Magnesium          Where to get your medicines      Some of these will need a paper prescription and others can be bought over the counter.  Ask your nurse if you have questions.     Bring a paper prescription for each of these medications     HYDROcodone-acetaminophen 5-325 MG per tablet          Primary Care Provider Office Phone # Fax #    Johana Fairbanks [phone] [id number]        Equal Access to Services     JACKIE MIX : Rupali Rodriguez, nish jauregui, antolin " hari greenfieldlynn rodriguez ah. [facility] [phone].    ATENCIÓN: Si janell moser, tiene a parekh disposición servicios gratuitos de asistencia lingüística. [place] al [phone].    We comply with applicable federal civil rights laws and [place] laws. We do not discriminate on the basis of race, color, national origin, age, disability, sex, sexual orientation, or gender identity.            Thank you!     Thank you for choosing [facility]  for your care. Our goal is always to provide you with excellent care. Hearing back from our patients is one way we can continue to improve our services. Please take a few minutes to complete the written survey that you may receive in the mail after your visit with us. Thank you!             Your Updated Medication List - Protect others around you: Learn how to safely use, store and throw away your medicines at www.disposemymeds.org.          This list is accurate as of 5/9/18  3:25 PM.  Always use your most recent med list.                   Brand Name Dispense Instructions for use Diagnosis    * albuterol 108 (90 Base) MCG/ACT Inhaler    VENTOLIN HFA    1 Inhaler    Inhale 2 puffs into the lungs every 4 hours as needed    Moderate persistent asthma, uncomplicated       * albuterol (2.5 MG/3ML) 0.083% neb solution     30 vial    Take 1 vial (2.5 mg) by nebulization every 4 hours as needed for shortness of breath / dyspnea    Moderate persistent asthma, uncomplicated       ALLEGRA ALLERGY 180 MG tablet   Generic drug:  fexofenadine      Take 180 mg by mouth daily as needed for allergies        azelastine 0.1 % spray    ASTELIN    3 Bottle    Spray 1-2 sprays into both nostrils 2 times daily as needed for rhinitis    Chronic rhinitis       Beclomethasone Dipropionate 80 MCG/ACT Aers Nasal Spray     8.7 g    Spray 2 sprays into both nostrils daily    Other chronic rhinitis       budesonide-formoterol 160-4.5 MCG/ACT Inhaler    SYMBICORT     10.2 Inhaler    INHALE 2 PUFFS INTO THE LUNGS 2 TIMES DAILY    Moderate persistent asthma without complication       CRANBERRY PO      Take 1 capsule by mouth daily        diclofenac 1 % Gel topical gel    VOLTAREN    100 g    Place 2 g onto the skin 4 times daily    Carcinoma of breast metastatic to liver, unspecified laterality (H)       diphenhydrAMINE 25 MG tablet    BENADRYL    1 tablet    Take 1 tablet (25 mg) by mouth every 8 hours as needed for itching or allergies    Carcinoma of breast metastatic to liver, unspecified laterality (H), Bone metastasis (H), Pericardial effusion, Bilateral malignant neoplasm of breast in female, estrogen receptor positive, unspecified site of breast (H), Carcinoma of right breast metastatic to axillary lymph node (H), Secondary malignant neoplasm of liver (H), Chemotherapy-induced neutropenia (H), Emphysematous bleb of lung (H), Hypothyroidism, unspecified type, Hyperlipidemia LDL goal <130, Moderate persistent asthma without complication, Mucositis due to chemotherapy       DITROPAN XL 10 MG 24 hr tablet   Generic drug:  oxybutynin      Take 10 mg by mouth daily    Post-op pain, Carcinoma of right breast metastatic to axillary lymph node (H), Bone metastasis (H), Carcinoma of breast metastatic to liver, unspecified laterality (H), Secondary malignant neoplasm of liver (H)       enoxaparin 100 MG/ML injection    LOVENOX    14 Syringe    Inject 0.9 mLs (90 mg) Subcutaneous every 12 hours    Long-term (current) use of anticoagulants, Acute deep vein thrombosis (DVT) of right upper extremity, unspecified vein (H)       HYDROcodone-acetaminophen 5-325 MG per tablet    NORCO    60 tablet    Take 1-2 tablets by mouth every 4 hours as needed for moderate to severe pain    Post-op pain, Carcinoma of right breast metastatic to axillary lymph node (H), Bone metastasis (H), Carcinoma of breast metastatic to liver, unspecified laterality (H), Secondary malignant neoplasm of liver (H)        KP CALCIUM 600+D3 600-500 MG-UNIT Caps   Generic drug:  calcium carb-cholecalciferol      Take 2 tablets by mouth daily        levothyroxine 25 MCG tablet    SYNTHROID/LEVOTHROID    90 tablet    TAKE 1 TABLET BY MOUTH ONCE DAILY.    Hypothyroidism due to acquired atrophy of thyroid       loratadine 10 MG tablet    CLARITIN     Take 10 mg by mouth daily as needed for allergies        methylphenidate 10 MG tablet    RITALIN    60 tablet    Take 1 tablet (10 mg) by mouth 2 times daily    Carcinoma of right breast metastatic to axillary lymph node (H)       metoclopramide 10 MG tablet    REGLAN    120 tablet    Take 1 tablet (10 mg) by mouth 2 times daily as needed    Bilateral malignant neoplasm of breast in female, estrogen receptor positive, unspecified site of breast (H)       metroNIDAZOLE 0.75 % vaginal gel    METROGEL    50 g    Place 1 applicator (5 g) vaginally 2 times daily    Vaginitis and vulvovaginitis       order for DME     1 each    Equipment being ordered: JoviPak post-lumpectomy compression pad x2    Lymphedema, Lymphedema of breast       order for DME     1 Device    Equipment being ordered: walker - 4 wheeled walker with seat    Carcinoma of breast metastatic to liver, unspecified laterality (H)       prochlorperazine 10 MG tablet    COMPAZINE    30 tablet    Take 1 tablet (10 mg) by mouth every 6 hours as needed (Nausea/Vomiting)    Secondary malignant neoplasm of liver (H), Carcinoma of breast metastatic to liver, unspecified laterality (H), Bone metastasis (H), Carcinoma of right breast metastatic to axillary lymph node (H), Bilateral malignant neoplasm of breast in female, estrogen receptor positive, unspecified site of breast (H)       QVAR 80 MCG/ACT Inhaler   Generic drug:  beclomethasone      Spray 1 puff into both nostrils 2 times daily        ROBITUSSIN COUGH/COLD CF PO      Take 10 mLs by mouth as needed    Breast cancer metastasized to axillary lymph node, right (H)       vancomycin 50  mg/mL Solr    FIRVANQ    280 mL    Take 2.5 mLs (125 mg) by mouth 4 times daily for 28 days    C. difficile colitis       * Notice:  This list has 2 medication(s) that are the same as other medications prescribed for you. Read the directions carefully, and ask your doctor or other care provider to review them with you.

## 2018-05-09 NOTE — MR AVS SNAPSHOT
After Visit Summary   5/9/2018    Etta Cervantes    MRN: [id number]           Patient Information     Date Of Birth          1960        Visit Information        Provider Department      5/9/2018 9:00 AM Ector Cobos MD [facility]        Today's Diagnoses     Post-op pain        Carcinoma of right breast metastatic to axillary lymph node (H)        Bone metastasis (H)        Carcinoma of breast metastatic to liver, unspecified laterality (H)        Secondary malignant neoplasm of liver (H)           Follow-ups after your visit        Your next 10 appointments already scheduled     May 09, 2018 10:45 AM CDT   Return Visit with Brodie Cassidy MD   [facility] ([facility])    [facility]  [address]  [facility] [address]   [phone]            May 11, 2018  9:15 AM CDT   Return Visit with Brodie Cassidy MD   [facility] ([facility])    [facility]  [address]  [facility] [address]   [phone]            May 11, 2018  9:30 AM CDT   Level 3 with ROOM 7 [facility] ([facility])    [facility]  [address]  [facility] [address]   [phone]            May 18, 2018  8:00 AM CDT   Level 1 with ROOM 1 [facility] ([facility])    [facility]  [address]  [facility] [address]   [phone]            May 25, 2018  2:30 PM CDT   (Arrive by 2:15 PM)   Return Visit with Bailey Santana MD   [facility] ([facility] [place])    [address] [place] [address]  [address]  [facility] [address] [phone]              Who to contact     If you have questions or need follow up information about today's clinic visit or your schedule please contact [facility] directly at  "[phone].  Normal or non-critical lab and imaging results will be communicated to you by MyChart, letter or phone within 4 business days after the clinic has received the results. If you do not hear from us within 7 days, please contact the clinic through [employer] or phone. If you have a critical or abnormal lab result, we will notify you by phone as soon as possible.  Submit refill requests through [employer] or call your pharmacy and they will forward the refill request to us. Please allow 3 business days for your refill to be completed.          Additional Information About Your Visit        [employer] Information     [employer] gives you secure access to your electronic health record. If you see a primary care provider, you can also send messages to your care team and make appointments. If you have questions, please call your primary care clinic.  If you do not have a primary care provider, please call [phone] and they will assist you.        Care EveryWhere ID     This is your Care EveryWhere ID. This could be used by other organizations to access your [place] medical records  [id number]        Your Vitals Were     Pulse Temperature Respirations Height Last Period Pulse Oximetry    146 98.9  F (37.2  C) (Tympanic) 28 1.651 m (5' 5\") 02/06/2013 94%    Breastfeeding? BMI (Body Mass Index)                No 31.62 kg/m2           Blood Pressure from Last 3 Encounters:   05/09/18 151/85   05/03/18 113/64   04/20/18 109/61    Weight from Last 3 Encounters:   05/09/18 86.2 kg (190 lb)   05/01/18 89 kg (196 lb 3.4 oz)   04/20/18 80.1 kg (176 lb 9.6 oz)              Today, you had the following     No orders found for display         Where to get your medicines      Some of these will need a paper prescription and others can be bought over the counter.  Ask your nurse if you have questions.     Bring a paper prescription for each of these medications     HYDROcodone-acetaminophen 5-325 MG per tablet       "   Information about OPIOIDS     PRESCRIPTION OPIOIDS: WHAT YOU NEED TO KNOW   You have a prescription for an opioid (narcotic) pain medicine. Opioids can cause addiction. If you have a history of chemical dependency of any type, you are at a higher risk of becoming addicted to opioids. Only take this medicine after all other options have been tried. Take it for as short a time and as few doses as possible.     Do not:    Drive. If you drive while taking these medicines, you could be arrested for driving under the influence (DUI).    Operate heavy machinery    Do any other dangerous activities while taking these medicines.     Drink any alcohol while taking these medicines.      Take with any other medicines that contain acetaminophen. Read all labels carefully. Look for the word  acetaminophen  or  Tylenol.  Ask your pharmacist if you have questions or are unsure.    Store your pills in a secure place, locked if possible. We will not replace any lost or stolen medicine. If you don t finish your medicine, please throw away (dispose) as directed by your pharmacist. The [place] [employer]ution [employer] has more information about safe disposal: https://www.pca.[facility].[place].us/living-green/managing-unwanted-medications    All opioids tend to cause constipation. Drink plenty of water and eat foods that have a lot of fiber, such as fruits, vegetables, prune juice, apple juice and high-fiber cereal. Take a laxative (Miralax, milk of magnesia, Colace, Senna) if you don t move your bowels at least every other day.          Primary Care Provider Office Phone # Fax #    Johana Fairbanks [phone] [id number] [facility] [facility] [address]        Equal Access to Services     [facility] AH: Hadjavier gaitan Soisaura, waaxda luqadaha, qaybta kaalmada lexi, hari guardado. [facility] [phone].    ATENCIÓN: Si habla español, tiene a parekh disposición servicios gratuitos de  asistencia lingüística. [place] al [phone].    We comply with applicable federal civil rights laws and [place] laws. We do not discriminate on the basis of race, color, national origin, age, disability, sex, sexual orientation, or gender identity.            Thank you!     Thank you for choosing [facility] [place] [facility]  for your care. Our goal is always to provide you with excellent care. Hearing back from our patients is one way we can continue to improve our services. Please take a few minutes to complete the written survey that you may receive in the mail after your visit with us. Thank you!             Your Updated Medication List - Protect others around you: Learn how to safely use, store and throw away your medicines at www.disposemymeds.org.          This list is accurate as of 5/9/18 10:16 AM.  Always use your most recent med list.                   Brand Name Dispense Instructions for use Diagnosis    * albuterol 108 (90 Base) MCG/ACT Inhaler    VENTOLIN HFA    1 Inhaler    Inhale 2 puffs into the lungs every 4 hours as needed    Moderate persistent asthma, uncomplicated       * albuterol (2.5 MG/3ML) 0.083% neb solution     30 vial    Take 1 vial (2.5 mg) by nebulization every 4 hours as needed for shortness of breath / dyspnea    Moderate persistent asthma, uncomplicated       ALLEGRA ALLERGY 180 MG tablet   Generic drug:  fexofenadine      Take 180 mg by mouth daily as needed for allergies        azelastine 0.1 % spray    ASTELIN    3 Bottle    Spray 1-2 sprays into both nostrils 2 times daily as needed for rhinitis    Chronic rhinitis       Beclomethasone Dipropionate 80 MCG/ACT Aers Nasal Spray     8.7 g    Spray 2 sprays into both nostrils daily    Other chronic rhinitis       budesonide-formoterol 160-4.5 MCG/ACT Inhaler    SYMBICORT    10.2 Inhaler    INHALE 2 PUFFS INTO THE LUNGS 2 TIMES DAILY    Moderate persistent asthma without complication       CRANBERRY PO      Take 1 capsule by mouth daily         diclofenac 1 % Gel topical gel    VOLTAREN    100 g    Place 2 g onto the skin 4 times daily    Carcinoma of breast metastatic to liver, unspecified laterality (H)       diphenhydrAMINE 25 MG tablet    BENADRYL    1 tablet    Take 1 tablet (25 mg) by mouth every 8 hours as needed for itching or allergies    Carcinoma of breast metastatic to liver, unspecified laterality (H), Bone metastasis (H), Pericardial effusion, Bilateral malignant neoplasm of breast in female, estrogen receptor positive, unspecified site of breast (H), Carcinoma of right breast metastatic to axillary lymph node (H), Secondary malignant neoplasm of liver (H), Chemotherapy-induced neutropenia (H), Emphysematous bleb of lung (H), Hypothyroidism, unspecified type, Hyperlipidemia LDL goal <130, Moderate persistent asthma without complication, Mucositis due to chemotherapy       DITROPAN XL 10 MG 24 hr tablet   Generic drug:  oxybutynin      Take 10 mg by mouth daily    Post-op pain, Carcinoma of right breast metastatic to axillary lymph node (H), Bone metastasis (H), Carcinoma of breast metastatic to liver, unspecified laterality (H), Secondary malignant neoplasm of liver (H)       enoxaparin 100 MG/ML injection    LOVENOX    14 Syringe    Inject 0.9 mLs (90 mg) Subcutaneous every 12 hours    Long-term (current) use of anticoagulants, Acute deep vein thrombosis (DVT) of right upper extremity, unspecified vein (H)       HYDROcodone-acetaminophen 5-325 MG per tablet    NORCO    60 tablet    Take 1-2 tablets by mouth every 4 hours as needed for moderate to severe pain    Post-op pain, Carcinoma of right breast metastatic to axillary lymph node (H), Bone metastasis (H), Carcinoma of breast metastatic to liver, unspecified laterality (H), Secondary malignant neoplasm of liver (H)       KP CALCIUM 600+D3 600-500 MG-UNIT Caps   Generic drug:  calcium carb-cholecalciferol      Take 2 tablets by mouth daily        levothyroxine 25 MCG tablet     SYNTHROID/LEVOTHROID    90 tablet    TAKE 1 TABLET BY MOUTH ONCE DAILY.    Hypothyroidism due to acquired atrophy of thyroid       loratadine 10 MG tablet    CLARITIN     Take 10 mg by mouth daily as needed for allergies        methylphenidate 10 MG tablet    RITALIN    60 tablet    Take 1 tablet (10 mg) by mouth 2 times daily    Carcinoma of right breast metastatic to axillary lymph node (H)       metoclopramide 10 MG tablet    REGLAN    120 tablet    Take 1 tablet (10 mg) by mouth 2 times daily as needed    Bilateral malignant neoplasm of breast in female, estrogen receptor positive, unspecified site of breast (H)       metroNIDAZOLE 0.75 % vaginal gel    METROGEL    50 g    Place 1 applicator (5 g) vaginally 2 times daily    Vaginitis and vulvovaginitis       order for DME     1 each    Equipment being ordered: JoviPak post-lumpectomy compression pad x2    Lymphedema, Lymphedema of breast       order for DME     1 Device    Equipment being ordered: walker - 4 wheeled walker with seat    Carcinoma of breast metastatic to liver, unspecified laterality (H)       prochlorperazine 10 MG tablet    COMPAZINE    30 tablet    Take 1 tablet (10 mg) by mouth every 6 hours as needed (Nausea/Vomiting)    Secondary malignant neoplasm of liver (H), Carcinoma of breast metastatic to liver, unspecified laterality (H), Bone metastasis (H), Carcinoma of right breast metastatic to axillary lymph node (H), Bilateral malignant neoplasm of breast in female, estrogen receptor positive, unspecified site of breast (H)       QVAR 80 MCG/ACT Inhaler   Generic drug:  beclomethasone      Spray 1 puff into both nostrils 2 times daily        ROBITUSSIN COUGH/COLD CF PO      Take 10 mLs by mouth as needed    Breast cancer metastasized to axillary lymph node, right (H)       vancomycin 50 mg/mL Solr    FIRVANQ    280 mL    Take 2.5 mLs (125 mg) by mouth 4 times daily for 28 days    C. difficile colitis       * Notice:  This list has 2  medication(s) that are the same as other medications prescribed for you. Read the directions carefully, and ask your doctor or other care provider to review them with you.

## 2018-05-09 NOTE — PATIENT INSTRUCTIONS
You will need to have labs drawn today. Please proceed with chemotherapy as scheduled on Friday. We would like to see you back in clinic with Dr. Cassidy on Wednesday with labs prior. Chemotherapy to be scheduled per treatment plan.     Copy of appointments, and after visit summary (AVS) given to patient.      If you have any questions during business hours (M-F 8 AM- 4PM), please call Funmi Ray RN, BSN, OCN Oncology Hematology /Breast Cancer Navigator at [facility] [phone].       For questions after business hours, or on holidays/weekends, please call our after hours Nurse Triage line [phone]. Thank you.

## 2018-05-09 NOTE — NURSING NOTE
"Oncology Rooming Note    May 9, 2018 10:19 AM   Etta Cervantes is a 57 year old female who presents for:    Chief Complaint   Patient presents with     Oncology Clinic Visit     1 week recheck Breast, post hospital      Initial Vitals: /85 (BP Location: Left arm, Patient Position: Sitting, Cuff Size: Adult Regular)  Pulse 146  Temp 98.9  F (37.2  C) (Tympanic)  Resp 28  Ht 1.651 m (5' 5\")  Wt 86.2 kg (190 lb)  LMP 02/06/2013  SpO2 94%  Breastfeeding? No  BMI 31.62 kg/m2 Estimated body mass index is 31.62 kg/(m^2) as calculated from the following:    Height as of this encounter: 1.651 m (5' 5\").    Weight as of this encounter: 86.2 kg (190 lb). Body surface area is 1.99 meters squared.  No Pain (0) Comment: Data Unavailable   Patient's last menstrual period was 02/06/2013.  Allergies reviewed: Yes  Medications reviewed: Yes    Medications: Medication refills not needed today.  Pharmacy name entered into [facility]:    [id number] [place] [facility] - [place], [place] - [address] [place] [address] [facility] [place], [place] - [address] [place] [address] [facility]    Clinical concerns: 1 week recheck Breast, post hospital.     7 minutes for nursing intake (face to face time)     Josephine Virgen CMA              "

## 2018-05-09 NOTE — LETTER
5/9/2018         RE: Etta Cervantes  [address]  [place] [address]        Dear Colleague,    Thank you for referring your patient, Etta Cervantes, to the [facility]. Please see a copy of my visit note below.    Palliative Care Outpatient Clinic Consultation Note    Patient:  Etta Cervantes    Chief Complaint:   Etta Cervantes 57 year old female who is presenting to the palliative medicine clinic today at the request of Ector Campos for a palliative care consultation secondary to breast cancer.   The patient's primary care provider is:  Johana Fairbanks     History of Present Illness:  She was recently hospitalized for weakness, anorexia, C. difficile colitis with diarrhea.  She was originally diagnosed with breast cancer in January 2015 which was metastatic at that time.  She has been through numerous rounds of chemotherapy and has had bouts of C. difficile colitis on several occasions.  Most recent chemotherapy along with colitis resulted in hospitalization from 4/23/18 through 5/3/18.  Her chemotherapy was put on hold during that time and she believes she is going to be scheduled to restart that.  Since discharge from the hospital she has been improving with no further diarrhea, increased appetite, increased weight.    Distressing Symptom/s:  She denies significant distressing symptoms at this time though previously the biggest issue was diarrhea and that has improved.  She does have some fatigue but again feels she is gradually improving.    Patient's Disease Understanding: She seems to have an understanding that her breast cancer is not curable though she is hopeful for continued improvement with continued treatment    Coping: She is doing well for the most part though is frustrated with multiple complications from treatment    Social History  Living Situation: She lives in an apartment on the first floor with her   Children: Children are grown and live nearby  Actual/Potential  Caregiver(s):   Support System: Brother coming from [place] for 2 weeks  Occupation: On disability, former manager  Hobbies: Reading, television, video games  Substance Use/History of misuse: None  Financial Concerns:   Spiritual Background:   Spiritual Concerns/Needs:   Social History   Substance Use Topics     Smoking status: Never Smoker     Smokeless tobacco: Never Used     Alcohol use No       Family History  Family History   Problem Relation Age of Onset     Depression Mother      Eye Disorder Mother      Gynecology Mother      Alzheimer Disease Mother      CANCER Father      Other Cancer Father      HEART DISEASE Maternal Grandfather      Allergies Paternal Grandfather      Allergies Son      Patient's Involvement with Prior History of Serious Illness in Family:     Advance Care Planning:  Advance Directive:    She is full code by her choice  Where is written copy located:   Health Care Agent Contact Information:   POLST:       Allergies   Allergen Reactions     Animal Dander Cough     Itchy eyes     Cats      Dust Mites Cough     Itchy eyes     Pollen Extract Other (See Comments)     Cough, Itchy eyes     Seasonal Allergies      Levaquin [Levofloxacin] Rash     States she has violent dreams from taking this     Current Outpatient Prescriptions   Medication Sig Dispense Refill     albuterol (2.5 MG/3ML) 0.083% neb solution Take 1 vial (2.5 mg) by nebulization every 4 hours as needed for shortness of breath / dyspnea 30 vial 3     albuterol (VENTOLIN HFA) 108 (90 BASE) MCG/ACT inhaler Inhale 2 puffs into the lungs every 4 hours as needed 1 Inhaler 2     budesonide-formoterol (SYMBICORT) 160-4.5 MCG/ACT Inhaler INHALE 2 PUFFS INTO THE LUNGS 2 TIMES DAILY 10.2 Inhaler 0     calcium carb-cholecalciferol (KP CALCIUM 600+D3) 600-500 MG-UNIT CAPS Take 2 tablets by mouth daily       CRANBERRY PO Take 1 capsule by mouth daily        diclofenac (VOLTAREN) 1 % GEL topical gel Place 2 g onto the skin 4 times daily  100 g 0     enoxaparin (LOVENOX) 100 MG/ML injection Inject 0.9 mLs (90 mg) Subcutaneous every 12 hours 14 Syringe 0     HYDROcodone-acetaminophen (NORCO) 5-325 MG per tablet Take 1-2 tablets by mouth every 4 hours as needed for moderate to severe pain 60 tablet 0     levothyroxine (SYNTHROID/LEVOTHROID) 25 MCG tablet TAKE 1 TABLET BY MOUTH ONCE DAILY. 90 tablet 1     loratadine (CLARITIN) 10 MG tablet Take 10 mg by mouth daily as needed for allergies       methylphenidate (RITALIN) 10 MG tablet Take 1 tablet (10 mg) by mouth 2 times daily 60 tablet 0     metoclopramide (REGLAN) 10 MG tablet Take 1 tablet (10 mg) by mouth 2 times daily as needed 120 tablet 1     metroNIDAZOLE (METROGEL) 0.75 % vaginal gel Place 1 applicator (5 g) vaginally 2 times daily 50 g 0     oxybutynin (DITROPAN XL) 10 MG 24 hr tablet Take 10 mg by mouth daily       Phenylephrine-DM-GG (ROBITUSSIN COUGH/COLD CF PO) Take 10 mLs by mouth as needed        prochlorperazine (COMPAZINE) 10 MG tablet Take 1 tablet (10 mg) by mouth every 6 hours as needed (Nausea/Vomiting) 30 tablet 2     QVAR 80 MCG/ACT Inhaler Spray 1 puff into both nostrils 2 times daily  3     vancomycin (FIRVANQ) 50 mg/mL SOLR Take 2.5 mLs (125 mg) by mouth 4 times daily for 28 days 280 mL 0     azelastine (ASTELIN) 0.1 % nasal spray Spray 1-2 sprays into both nostrils 2 times daily as needed for rhinitis (Patient not taking: Reported on 5/9/2018) 3 Bottle 3     Beclomethasone Dipropionate 80 MCG/ACT AERS Nasal Spray Spray 2 sprays into both nostrils daily (Patient not taking: Reported on 5/9/2018) 8.7 g 3     diphenhydrAMINE (BENADRYL) 25 MG tablet Take 1 tablet (25 mg) by mouth every 8 hours as needed for itching or allergies (Patient not taking: Reported on 5/9/2018) 1 tablet 0     fexofenadine (ALLEGRA ALLERGY) 180 MG tablet Take 180 mg by mouth daily as needed for allergies       order for DME Equipment being ordered: JoviPak post-lumpectomy compression pad x2 1 each 0      order for DME Equipment being ordered: walker - 4 wheeled walker with seat 1 Device 0     Past Medical History:   Diagnosis Date     ASCUS favor benign 1/2015    Neg HPV     Cancer (H)      Cataract 2010    surgery both eyes     Emphysematous bleb of lung (H)      Fibroids      Hypercholesterolemia      Hypothyroid      Incontinence of urine     mixed     Menorrhagia      Obesity      Pneumothorax      PONV (postoperative nausea and vomiting)      Sleep apnea     uses a cpap     Uncomplicated asthma      Past Surgical History:   Procedure Laterality Date     BIOPSY  Jan 2015, 2016    Breast cancer, thyroid     BRONCHOSCOPY, INSERT BRONCHIAL VALVE N/A 7/20/2017    Procedure: BRONCHOSCOPY, INSERT BRONCHIAL VALVE;  Flexible Bronchoscopy, Endobronchial Valve Insertion X5. 4 Valves into right upper lobe and 1 valve into Right middle lobe;  Surgeon: Carlos Mcgowan MD;  Location: [facility]     BRONCHOSCOPY, REMOVE BRONCHIAL VALVE N/A 9/7/2017    Procedure: BRONCHOSCOPY, REMOVE BRONCHIAL VALVE;  Flexible Bronchoscopy, Removal Of Endobronchial Valves x 2;  Surgeon: Carlos Mcgowan MD;  Location: [facility] OR     BRONCHOSCOPY, REMOVE BRONCHIAL VALVE N/A 9/28/2017    Procedure: BRONCHOSCOPY, REMOVE BRONCHIAL VALVE;  Flexible Bronchoscopy, Removal Of Intra-Bronchial Valves x 3;  Surgeon: Carlos Mcgowan MD;  Location: [facility] OR     CATARACT IOL, RT/LT  2010     COLONOSCOPY  2010     DILATION AND CURETTAGE, HYSTEROSCOPY, ABLATE ENDOMETRIUM NOVASURE, COMBINED  3/2/2011    COMBINED DILATION AND CURETTAGE, HYSTEROSCOPY, ABLATE ENDOMETRIUM NOVASURE performed by LAURA VARGAS at [facility]     ENDOSCOPIC RETROGRADE CHOLANGIOPANCREATOGRAM N/A 12/8/2017    Procedure: COMBINED ENDOSCOPIC RETROGRADE CHOLANGIOPANCREATOGRAPHY, PLACE TUBE/STENT;  Endoscopic Retrograde Cholangiopancreatogram with biliary sphincterotomy and stent placement;  Surgeon: Guru Coleen Mora MD;  Location: [facility]     GENITOURINARY SURGERY   "2005    Uretha sling     INSERT PORT VASCULAR ACCESS N/A 2/12/2015    Procedure: INSERT PORT VASCULAR ACCESS;  Surgeon: Noé Schaefer MD;  Location: [facility]     INSERT PORT VASCULAR ACCESS N/A 1/10/2018    Procedure: INSERT PORT VASCULAR ACCESS;  Portacath replacement;  Surgeon: Remi De La Paz MD;  Location: [facility]     SURGICAL HISTORY OF -   2005    bladder sling     SURGICAL HISTORY OF -       Cystectomy-lower lip     TUBAL LIGATION  1987       REVIEW OF SYSTEMS:   ROS: 10 point ROS neg other than the symptoms noted above in the HPI and here:  Palliative Symptom Review (0=no symptom/no concern, 1=mild, 2=moderate, 3=severe):      Pain: 0      Fatigue: 2      Nausea: 0      Constipation: 0      Diarrhea: 0      Depressive Symptoms: 0      Anxiety: 0      Drowsiness: 0      Poor Appetite: 0      Shortness of Breath: 0      Insomnia: 0      Other: 0      Overall (0 good/no concerns, 3 very poor):  1      /85 (BP Location: Right arm, Patient Position: Sitting, Cuff Size: Adult Regular)  Pulse 146  Temp 98.9  F (37.2  C) (Tympanic)  Resp 28  Ht 1.651 m (5' 5\")  Wt 86.2 kg (190 lb)  LMP 02/06/2013  SpO2 94%  Breastfeeding? No  BMI 31.62 kg/m2  HEAD: Atraumatic, normocephalic  EYES: PERRLA, EOMI, Sclerae clear, Fundi normal with sharp discs  EARS: TMs clear, canals normal  NOSE & THROAT: clear  NECK: Supple without adenopathy or thyromegaly  LUNGS: clear to auscultation, normal breath sounds  CV: RRR without murmur, no carotid bruits  ABD: BS+, soft, nontender, no masses, no hepatosplenomegaly  EXTREMITIES: without joint tenderness, swelling or erythema.  No muscle tenderness or abnormality.  BACK: straight, full ROM, no tenderness, no spasm  SKIN: No rashes or abnormalities  NEURO: Alert and oriented X 3, non focal exam, DTRs normal, motor and sensation normal, coordination and gait without abnormality.        Impressions:  Palliative Performance Score:  70  Decision Making Capacity:  intact       "   Post-op pain  Carcinoma of right breast metastatic to axillary lymph node (H)  Bone metastasis (H)  Carcinoma of breast metastatic to liver, unspecified laterality (H)  Secondary malignant neoplasm of liver (H)    1.  Continue oncology care as planned  2.  She is doing well with intermittent Vicodin so that is refilled  3.  Recheck in palliative care clinic in 6-8 weeks.          Again, thank you for allowing me to participate in the care of your patient.        Sincerely,        Ector Cobos MD

## 2018-05-09 NOTE — LETTER
5/9/2018         RE: Etta Cervantes  [address]  [place] [address]        Dear Colleague,    Thank you for referring your patient, Etta Cervantes, to the [facility]. Please see a copy of my visit note below.    Hematology/ Oncology Follow-up Visit:  May 9, 2018    Reason for Visit:   Chief Complaint   Patient presents with     Oncology Clinic Visit     1 week recheck Breast, post hospital        Oncologic History:  Breast cancer (H)  Etta Cervantes presented with increasing lump in the right axilla that s lump has been growing without any pain or associated symptoms. Subsequently she was evaluated by primary care physician. Diagnostic digital mammogram was done on 01/13/2015 showing enlarged lymph nodes in the right axilla. There was some skin thickening in the right breast anteriorly and laterally. There are no parenchymal changes in the right breast. The left breast shows a 1.7 cm spiculated mass at approximately 2 to 3 o'clock position, 15.2 cm away from the nipple. A right breast ultrasound was subsequently done and ultrasound guided biopsy was done. Needle biopsy of the left breast did show infiltrating ductal carcinoma grade I of III with no angiolymphatic invasion seen. No associated ductal carcinoma in situ. Estrogen receptor, progresterone receptor were positive. HER-2/sadie receptor came back negative.. Another biopsy from the right axilla did show metastatic ductal carcinoma. PET scan was done on January 26, 2015 showing few small right posterior cervical chain nodes the largest is 1.2 cm. There is extensive bulky right axillary adenopathy demonstrating hypermetabolic FDG uptake with SUV of 10.6. There is skin thickening involving the right breast which demonstrated low-grade FDG uptake. A 1.2 cm lesion on the lateral aspect of the left breast demonstrated minimally increased FDG uptake with SUV of 2. Scattered hypermetabolic mediastinal lymph nodes located in the left superior  anterior mediastinum, right paratracheal and precarinal U, subcranial adenopathy with javon metastases. This focus hypermetabolic FDG uptake identified definitive Amanda posterior aspect off intertrochanteric region of the proximal left femur consistent with bone metastases. There is also 1.4 cm hypermetabolic FDG uptake identified in the anterior medial segment of the left hepatic lobe consistent with liver metastases. Additional 2.1 cm low-attenuation lesion anterior aspect of the right lobe which needs to be determined. The patient was started on chemotherapy with Taxotere and Cytoxan on February 9, 2015.  She concluded 4 cycles of Taxotere and Cytoxan. She started on Arimidex 1 mg orally daily. Currently she is on Faslodex and ibrance. Subsequently the patient went on to receive Afinitor. The patient also started treatment with Xeloda.       Interval History:  Patient is here today for follow-up of following her hospitalization.  She was hospitalized because of severe diarrhea and weakness related to C. difficile.  She is gradually improving since her hospitalization.  She denies any recent nausea or vomiting.  Her pain is currently well controlled.  She has exertional dyspnea.  She denies any chest pain.  She denies any bone aches or pains.  She denies any bruising or bleeding.    Review Of Systems:  Constitutional: Negative for fever, chills, and night sweats.  Skin: negative.  Eyes: negative.  Ears/Nose/Throat: negative.  Respiratory: No shortness of breath, dyspnea on exertion, cough, or hemoptysis.  Cardiovascular: negative.  Gastrointestinal: negative.  Genitourinary: negative.  Musculoskeletal: negative.  Neurologic: negative.  Psychiatric: negative.  Hematologic/Lymphatic/Immunologic: negative.  Endocrine: negative.    All other ROS negative unless mentioned in interval history.    Past medical, social, surgical, and family histories reviewed.    Allergies:  Allergies as of 05/09/2018 - Manuel as Reviewed  05/09/2018   Allergen Reaction Noted     Animal dander Cough 01/23/2015     Cats  07/15/2010     Dust mites Cough 01/23/2015     Pollen extract Other (See Comments) 01/23/2015     Seasonal allergies  07/15/2010     Levaquin [levofloxacin] Rash 07/17/2017       Current Medications:  Current Outpatient Prescriptions   Medication Sig Dispense Refill     albuterol (2.5 MG/3ML) 0.083% neb solution Take 1 vial (2.5 mg) by nebulization every 4 hours as needed for shortness of breath / dyspnea 30 vial 3     albuterol (VENTOLIN HFA) 108 (90 BASE) MCG/ACT inhaler Inhale 2 puffs into the lungs every 4 hours as needed 1 Inhaler 2     azelastine (ASTELIN) 0.1 % nasal spray Spray 1-2 sprays into both nostrils 2 times daily as needed for rhinitis (Patient not taking: Reported on 5/9/2018) 3 Bottle 3     Beclomethasone Dipropionate 80 MCG/ACT AERS Nasal Spray Spray 2 sprays into both nostrils daily (Patient not taking: Reported on 5/9/2018) 8.7 g 3     budesonide-formoterol (SYMBICORT) 160-4.5 MCG/ACT Inhaler INHALE 2 PUFFS INTO THE LUNGS 2 TIMES DAILY 10.2 Inhaler 0     calcium carb-cholecalciferol (KP CALCIUM 600+D3) 600-500 MG-UNIT CAPS Take 2 tablets by mouth daily       CRANBERRY PO Take 1 capsule by mouth daily        diclofenac (VOLTAREN) 1 % GEL topical gel Place 2 g onto the skin 4 times daily 100 g 0     diphenhydrAMINE (BENADRYL) 25 MG tablet Take 1 tablet (25 mg) by mouth every 8 hours as needed for itching or allergies (Patient not taking: Reported on 5/9/2018) 1 tablet 0     enoxaparin (LOVENOX) 100 MG/ML injection Inject 0.9 mLs (90 mg) Subcutaneous every 12 hours 14 Syringe 0     fexofenadine (ALLEGRA ALLERGY) 180 MG tablet Take 180 mg by mouth daily as needed for allergies       HYDROcodone-acetaminophen (NORCO) 5-325 MG per tablet Take 1-2 tablets by mouth every 4 hours as needed for moderate to severe pain 60 tablet 0     levothyroxine (SYNTHROID/LEVOTHROID) 25 MCG tablet TAKE 1 TABLET BY MOUTH ONCE DAILY. 90  "tablet 1     loratadine (CLARITIN) 10 MG tablet Take 10 mg by mouth daily as needed for allergies       methylphenidate (RITALIN) 10 MG tablet Take 1 tablet (10 mg) by mouth 2 times daily 60 tablet 0     metoclopramide (REGLAN) 10 MG tablet Take 1 tablet (10 mg) by mouth 2 times daily as needed 120 tablet 1     metroNIDAZOLE (METROGEL) 0.75 % vaginal gel Place 1 applicator (5 g) vaginally 2 times daily 50 g 0     order for DME Equipment being ordered: JoviPak post-lumpectomy compression pad x2 1 each 0     order for DME Equipment being ordered: walker - 4 wheeled walker with seat 1 Device 0     oxybutynin (DITROPAN XL) 10 MG 24 hr tablet Take 10 mg by mouth daily       Phenylephrine-DM-GG (ROBITUSSIN COUGH/COLD CF PO) Take 10 mLs by mouth as needed        prochlorperazine (COMPAZINE) 10 MG tablet Take 1 tablet (10 mg) by mouth every 6 hours as needed (Nausea/Vomiting) 30 tablet 2     QVAR 80 MCG/ACT Inhaler Spray 1 puff into both nostrils 2 times daily  3     vancomycin (FIRVANQ) 50 mg/mL SOLR Take 2.5 mLs (125 mg) by mouth 4 times daily for 28 days 280 mL 0        Physical Exam:  /85 (BP Location: Left arm, Patient Position: Sitting, Cuff Size: Adult Regular)  Pulse 146  Temp 98.9  F (37.2  C) (Tympanic)  Resp 28  Ht 1.651 m (5' 5\")  Wt 86.2 kg (190 lb)  LMP 02/06/2013  SpO2 94%  Breastfeeding? No  BMI 31.62 kg/m2  Wt Readings from Last 12 Encounters:   05/09/18 86.2 kg (190 lb)   05/09/18 86.2 kg (190 lb)   05/01/18 89 kg (196 lb 3.4 oz)   04/20/18 80.1 kg (176 lb 9.6 oz)   04/20/18 80 kg (176 lb 6.4 oz)   04/13/18 85.4 kg (188 lb 3.2 oz)   03/30/18 83.6 kg (184 lb 6.4 oz)   03/21/18 84.3 kg (185 lb 12.8 oz)   03/07/18 85.1 kg (187 lb 9.6 oz)   03/01/18 83.5 kg (184 lb)   02/21/18 83.9 kg (185 lb)   02/07/18 83.5 kg (184 lb)     ECOG performance status: 1  GENERAL APPEARANCE: Healthy, alert and in no acute distress.  HEENT: Sclerae anicteric. PERRLA. Oropharynx without ulcers, lesions, or " thrush.  NECK: Supple. No asymmetry or masses.  LYMPHATICS: No palpable cervical, supraclavicular, axillary, or inguinal lymphadenopathy.  RESP: Lungs clear to auscultation bilaterally without rales, rhonchi or wheezes.  CARDIOVASCULAR: Regular rate and rhythm. Normal S1, S2; no S3 or S4. No murmur, gallop, or rub.  ABDOMEN: Soft, nontender. Bowel sounds normal. No palpable organomegaly or masses.  MUSCULOSKELETAL: Extremities without gross deformities noted. No edema of bilateral lower extremities.  SKIN: No suspicious lesions or rashes.  NEURO: Alert and oriented x 3. Cranial nerves II-XII grossly intact.  PSYCHIATRIC: Mentation and affect appear normal.    Laboratory/Imaging Studies:  No visits with results within 2 Week(s) from this visit.  Latest known visit with results is:    Infusion Therapy Visit on 04/20/2018   Component Date Value Ref Range Status     WBC 04/20/2018 14.9* 4.0 - 11.0 10e9/L Final     RBC Count 04/20/2018 4.54  3.8 - 5.2 10e12/L Final     Hemoglobin 04/20/2018 12.6  11.7 - 15.7 g/dL Final     Hematocrit 04/20/2018 39.0  35.0 - 47.0 % Final     MCV 04/20/2018 86  78 - 100 fl Final     MCH 04/20/2018 27.8  26.5 - 33.0 pg Final     MCHC 04/20/2018 32.3  31.5 - 36.5 g/dL Final     RDW 04/20/2018 17.3* 10.0 - 15.0 % Final     Platelet Count 04/20/2018 329  150 - 450 10e9/L Final     Diff Method 04/20/2018 Manual Differential   Final     % Neutrophils 04/20/2018 77.0  % Final     % Lymphocytes 04/20/2018 6.0  % Final     % Monocytes 04/20/2018 12.0  % Final     % Eosinophils 04/20/2018 0.0  % Final     % Basophils 04/20/2018 0.0  % Final     % Metamyelocytes 04/20/2018 2.0  % Final     % Myelocytes 04/20/2018 3.0  % Final     Absolute Neutrophil 04/20/2018 11.5* 1.6 - 8.3 10e9/L Final     Absolute Lymphocytes 04/20/2018 0.9  0.8 - 5.3 10e9/L Final     Absolute Monocytes 04/20/2018 1.8* 0.0 - 1.3 10e9/L Final     Absolute Eosinophils 04/20/2018 0.0  0.0 - 0.7 10e9/L Final     Absolute Basophils  04/20/2018 0.0  0.0 - 0.2 10e9/L Final     Absolute Metamyelocytes 04/20/2018 0.3* 0 10e9/L Final     Absolute Myelocytes 04/20/2018 0.4* 0 10e9/L Final     Anisocytosis 04/20/2018 Slight   Final     Polychromasia 04/20/2018 Slight   Final     Platelet Estimate 04/20/2018 Automated count confirmed.  Platelet morphology is normal.   Final     Sodium 04/20/2018 135  133 - 144 mmol/L Final     Potassium 04/20/2018 3.5  3.4 - 5.3 mmol/L Final     Chloride 04/20/2018 101  94 - 109 mmol/L Final     Carbon Dioxide 04/20/2018 23  20 - 32 mmol/L Final     Anion Gap 04/20/2018 11  3 - 14 mmol/L Final     Glucose 04/20/2018 133* 70 - 99 mg/dL Final     Urea Nitrogen 04/20/2018 20  7 - 30 mg/dL Final     Creatinine 04/20/2018 0.77  0.52 - 1.04 mg/dL Final     GFR Estimate 04/20/2018 77  >60 mL/min/1.7m2 Final    Non  GFR Calc     GFR Estimate If Black 04/20/2018 >90  >60 mL/min/1.7m2 Final    African American GFR Calc     Calcium 04/20/2018 8.8  8.5 - 10.1 mg/dL Final     Bilirubin Total 04/20/2018 1.5* 0.2 - 1.3 mg/dL Final     Albumin 04/20/2018 2.8* 3.4 - 5.0 g/dL Final     Protein Total 04/20/2018 8.1  6.8 - 8.8 g/dL Final     Alkaline Phosphatase 04/20/2018 414* 40 - 150 U/L Final     ALT 04/20/2018 36  0 - 50 U/L Final     AST 04/20/2018 185* 0 - 45 U/L Final     INR 04/20/2018 5.05* 0.86 - 1.14 Final    Comment: Critical Value called to and read back by  RIANA KHAN [id number] 4.20.18 JW       Ammonia 04/20/2018 43  10 - 50 umol/L Final        Recent Results (from the past 744 hour(s))   Chest XR,  PA & LAT    Narrative    XR CHEST 2 VW 4/23/2018 3:39 PM    HISTORY: Cough.    COMPARISON: Several prior studies, the most recent one being dated  1/10/2018..      Impression    IMPRESSION: 2 views of the chest show low lung volumes. This is  suspected to at least in part related to subpulmonic pleural effusions  which the patient has had before. Pulmonary vessels are modestly  congested. No focal consolidation  is seen.    LETI CHASE MD   US Abdomen Complete-1    Narrative    ABDOMINAL ULTRASOUND  4/30/2018 5:03 PM    HISTORY: Worsening LFTs. The patient has a history of liver metastases  and biliary obstruction.    COMPARISON: A CT on 3/19/2018 and a right upper quadrant ultrasound on  11/30/2017.    FINDINGS: The liver is normal in size. It is diffusely heterogeneous.  Several nodules or masses are noted. The largest seen currently is in  the right lobe measuring 3.1 x 2.2 x 2.1 cm. The gallbladder is normal  in size without stones. There may be a small amount of sludge. The  gallbladder wall is mildly thickened diffusely measuring 0.4 cm. No  pericholecystic fluid is seen. The common bile duct is normal  measuring 0.2 cm in diameter. The visualized portion of the pancreas  is normal. The spleen is normal. The kidneys are normal. There is no  hydronephrosis. The proximal abdominal aorta and IVC are within normal  limits.      Impression    IMPRESSION:   1. Liver metastases. The currently seen largest area of abnormality  measures 3.1 cm. On the previous ultrasound the largest area measured  3.0 cm.  2. Probable small amount of gallbladder sludge.    VERONICA LILLY MD   US Extremity Upper Venous rt    Narrative    US UPPER EXTREMITY VENOUS DUPLEX RIGHT   5/2/2018 4:52 PM     HISTORY: Rule out clot, history of previous clot now with new swelling  today.     COMPARISON: 1/17/2018.    FINDINGS: Gray-scale, color and Doppler spectral analysis ultrasound  was performed of the right arm. Compression and augmentation imaging  was performed.    There is a large mass in the right upper outer breast and axilla  measuring approximately 5.7 x 3.1 x 3.4 cm. There is distortion of the  right subclavian and axillary veins due to this mass. The subclavian  vein is not well seen due to the mass. Only the distal axillary vein  is seen. There is chronic, nonocclusive thrombus in the internal  jugular vein. There is thrombus in the  basilic vein of the upper arm.  Remainder of the deep venous system of the arm is patent.      Impression    IMPRESSION:   1. Old, nonocclusive thrombus in the internal jugular vein.  2. Thrombus in the basilic vein of the upper arm.  3. Large mass in the upper outer breast and axilla obscures view of  the subclavian vein and the majority of the axillary vein.   4. The remainder of the veins in the arm appear widely patent.    BOSSMAN LANGFORD MD       Assessment and plan:    (C50.919,  C78.7) Carcinoma of breast metastatic to liver, unspecified laterality (H)  (primary encounter diagnosis)  (C78.7) Secondary malignant neoplasm of liver (H)  (C50.911,  C77.3) Carcinoma of right breast metastatic to axillary lymph node (H)  (C50.911,  Z17.0,  C50.912) Bilateral malignant neoplasm of breast in female, estrogen receptor positive, unspecified site of breast (H)  Patient is gradually improving since discharge from the hospital.  I reviewed with the patient today the management plan in details.  She will be receiving cycle #2 of carboplatin and gemcitabine this Friday.  I will see the patient again in 1 week time to reassess toxicity.    (C79.51) Bone metastasis (H)  She has been receiving Xgeva injection every 90 days.  The patient taking calcium and vitamin D supplements.    (K12.31) Mucositis due to chemotherapy  Patient using Magic mouthwash if needed.    (J45.40) Moderate persistent asthma without complication  Her asthma symptoms has been controlled with albuterol inhaler and Symbicort    (E03.9) Hypothyroidism, unspecified type  Patient currently on Synthroid 25 mcg daily.    (G89.3) Cancer associated pain  Patient pain is currently well controlled.  She seen by palliative care today for symptom management.    (A04.72) C. difficile colitis  Patient will continue on vancomycin orally    (I82.621) Acute deep vein thrombosis (DVT) of right upper extremity, unspecified vein (H)  Patient will continue on Lovenox.    The  patient is ready to learn, no apparent learning barriers were identified.  Diagnosis and treatment plans were explained to the patient. The patient expressed understanding of the content. The patient asked appropriate questions. The patient questions were answered to her satisfaction.    Chart documentation with Dragon Voice recognition Software. Although reviewed after completion, some words and grammatical errors may remain.    Again, thank you for allowing me to participate in the care of your patient.        Sincerely,        Brodie Cassidy MD

## 2018-05-09 NOTE — PROGRESS NOTES
Hematology/ Oncology Follow-up Visit:  May 9, 2018    Reason for Visit:   Chief Complaint   Patient presents with     Oncology Clinic Visit     1 week recheck Breast, post hospital        Oncologic History:  Breast cancer (H)  Etta Cervantes presented with increasing lump in the right axilla that s lump has been growing without any pain or associated symptoms. Subsequently she was evaluated by primary care physician. Diagnostic digital mammogram was done on 01/13/2015 showing enlarged lymph nodes in the right axilla. There was some skin thickening in the right breast anteriorly and laterally. There are no parenchymal changes in the right breast. The left breast shows a 1.7 cm spiculated mass at approximately 2 to 3 o'clock position, 15.2 cm away from the nipple. A right breast ultrasound was subsequently done and ultrasound guided biopsy was done. Needle biopsy of the left breast did show infiltrating ductal carcinoma grade I of III with no angiolymphatic invasion seen. No associated ductal carcinoma in situ. Estrogen receptor, progresterone receptor were positive. HER-2/sadie receptor came back negative.. Another biopsy from the right axilla did show metastatic ductal carcinoma. PET scan was done on January 26, 2015 showing few small right posterior cervical chain nodes the largest is 1.2 cm. There is extensive bulky right axillary adenopathy demonstrating hypermetabolic FDG uptake with SUV of 10.6. There is skin thickening involving the right breast which demonstrated low-grade FDG uptake. A 1.2 cm lesion on the lateral aspect of the left breast demonstrated minimally increased FDG uptake with SUV of 2. Scattered hypermetabolic mediastinal lymph nodes located in the left superior anterior mediastinum, right paratracheal and precarinal U, subcranial adenopathy with javon metastases. This focus hypermetabolic FDG uptake identified definitive Amanda posterior aspect off intertrochanteric region of the proximal left  femur consistent with bone metastases. There is also 1.4 cm hypermetabolic FDG uptake identified in the anterior medial segment of the left hepatic lobe consistent with liver metastases. Additional 2.1 cm low-attenuation lesion anterior aspect of the right lobe which needs to be determined. The patient was started on chemotherapy with Taxotere and Cytoxan on February 9, 2015.  She concluded 4 cycles of Taxotere and Cytoxan. She started on Arimidex 1 mg orally daily. Currently she is on Faslodex and ibrance. Subsequently the patient went on to receive Afinitor. The patient also started treatment with Xeloda.       Interval History:  Patient is here today for follow-up of following her hospitalization.  She was hospitalized because of severe diarrhea and weakness related to C. difficile.  She is gradually improving since her hospitalization.  She denies any recent nausea or vomiting.  Her pain is currently well controlled.  She has exertional dyspnea.  She denies any chest pain.  She denies any bone aches or pains.  She denies any bruising or bleeding.    Review Of Systems:  Constitutional: Negative for fever, chills, and night sweats.  Skin: negative.  Eyes: negative.  Ears/Nose/Throat: negative.  Respiratory: No shortness of breath, dyspnea on exertion, cough, or hemoptysis.  Cardiovascular: negative.  Gastrointestinal: negative.  Genitourinary: negative.  Musculoskeletal: negative.  Neurologic: negative.  Psychiatric: negative.  Hematologic/Lymphatic/Immunologic: negative.  Endocrine: negative.    All other ROS negative unless mentioned in interval history.    Past medical, social, surgical, and family histories reviewed.    Allergies:  Allergies as of 05/09/2018 - Maunel as Reviewed 05/09/2018   Allergen Reaction Noted     Animal dander Cough 01/23/2015     Cats  07/15/2010     Dust mites Cough 01/23/2015     Pollen extract Other (See Comments) 01/23/2015     Seasonal allergies  07/15/2010     Levaquin  [levofloxacin] Rash 07/17/2017       Current Medications:  Current Outpatient Prescriptions   Medication Sig Dispense Refill     albuterol (2.5 MG/3ML) 0.083% neb solution Take 1 vial (2.5 mg) by nebulization every 4 hours as needed for shortness of breath / dyspnea 30 vial 3     albuterol (VENTOLIN HFA) 108 (90 BASE) MCG/ACT inhaler Inhale 2 puffs into the lungs every 4 hours as needed 1 Inhaler 2     azelastine (ASTELIN) 0.1 % nasal spray Spray 1-2 sprays into both nostrils 2 times daily as needed for rhinitis (Patient not taking: Reported on 5/9/2018) 3 Bottle 3     Beclomethasone Dipropionate 80 MCG/ACT AERS Nasal Spray Spray 2 sprays into both nostrils daily (Patient not taking: Reported on 5/9/2018) 8.7 g 3     budesonide-formoterol (SYMBICORT) 160-4.5 MCG/ACT Inhaler INHALE 2 PUFFS INTO THE LUNGS 2 TIMES DAILY 10.2 Inhaler 0     calcium carb-cholecalciferol (KP CALCIUM 600+D3) 600-500 MG-UNIT CAPS Take 2 tablets by mouth daily       CRANBERRY PO Take 1 capsule by mouth daily        diclofenac (VOLTAREN) 1 % GEL topical gel Place 2 g onto the skin 4 times daily 100 g 0     diphenhydrAMINE (BENADRYL) 25 MG tablet Take 1 tablet (25 mg) by mouth every 8 hours as needed for itching or allergies (Patient not taking: Reported on 5/9/2018) 1 tablet 0     enoxaparin (LOVENOX) 100 MG/ML injection Inject 0.9 mLs (90 mg) Subcutaneous every 12 hours 14 Syringe 0     fexofenadine (ALLEGRA ALLERGY) 180 MG tablet Take 180 mg by mouth daily as needed for allergies       HYDROcodone-acetaminophen (NORCO) 5-325 MG per tablet Take 1-2 tablets by mouth every 4 hours as needed for moderate to severe pain 60 tablet 0     levothyroxine (SYNTHROID/LEVOTHROID) 25 MCG tablet TAKE 1 TABLET BY MOUTH ONCE DAILY. 90 tablet 1     loratadine (CLARITIN) 10 MG tablet Take 10 mg by mouth daily as needed for allergies       methylphenidate (RITALIN) 10 MG tablet Take 1 tablet (10 mg) by mouth 2 times daily 60 tablet 0     metoclopramide (REGLAN)  "10 MG tablet Take 1 tablet (10 mg) by mouth 2 times daily as needed 120 tablet 1     metroNIDAZOLE (METROGEL) 0.75 % vaginal gel Place 1 applicator (5 g) vaginally 2 times daily 50 g 0     order for DME Equipment being ordered: Venu post-lumpectomy compression pad x2 1 each 0     order for DME Equipment being ordered: walker - 4 wheeled walker with seat 1 Device 0     oxybutynin (DITROPAN XL) 10 MG 24 hr tablet Take 10 mg by mouth daily       Phenylephrine-DM-GG (ROBITUSSIN COUGH/COLD CF PO) Take 10 mLs by mouth as needed        prochlorperazine (COMPAZINE) 10 MG tablet Take 1 tablet (10 mg) by mouth every 6 hours as needed (Nausea/Vomiting) 30 tablet 2     QVAR 80 MCG/ACT Inhaler Spray 1 puff into both nostrils 2 times daily  3     vancomycin (FIRVANQ) 50 mg/mL SOLR Take 2.5 mLs (125 mg) by mouth 4 times daily for 28 days 280 mL 0        Physical Exam:  /85 (BP Location: Left arm, Patient Position: Sitting, Cuff Size: Adult Regular)  Pulse 146  Temp 98.9  F (37.2  C) (Tympanic)  Resp 28  Ht 1.651 m (5' 5\")  Wt 86.2 kg (190 lb)  LMP 02/06/2013  SpO2 94%  Breastfeeding? No  BMI 31.62 kg/m2  Wt Readings from Last 12 Encounters:   05/09/18 86.2 kg (190 lb)   05/09/18 86.2 kg (190 lb)   05/01/18 89 kg (196 lb 3.4 oz)   04/20/18 80.1 kg (176 lb 9.6 oz)   04/20/18 80 kg (176 lb 6.4 oz)   04/13/18 85.4 kg (188 lb 3.2 oz)   03/30/18 83.6 kg (184 lb 6.4 oz)   03/21/18 84.3 kg (185 lb 12.8 oz)   03/07/18 85.1 kg (187 lb 9.6 oz)   03/01/18 83.5 kg (184 lb)   02/21/18 83.9 kg (185 lb)   02/07/18 83.5 kg (184 lb)     ECOG performance status: 1  GENERAL APPEARANCE: Healthy, alert and in no acute distress.  HEENT: Sclerae anicteric. PERRLA. Oropharynx without ulcers, lesions, or thrush.  NECK: Supple. No asymmetry or masses.  LYMPHATICS: No palpable cervical, supraclavicular, axillary, or inguinal lymphadenopathy.  RESP: Lungs clear to auscultation bilaterally without rales, rhonchi or wheezes.  CARDIOVASCULAR: " Regular rate and rhythm. Normal S1, S2; no S3 or S4. No murmur, gallop, or rub.  ABDOMEN: Soft, nontender. Bowel sounds normal. No palpable organomegaly or masses.  MUSCULOSKELETAL: Extremities without gross deformities noted. No edema of bilateral lower extremities.  SKIN: No suspicious lesions or rashes.  NEURO: Alert and oriented x 3. Cranial nerves II-XII grossly intact.  PSYCHIATRIC: Mentation and affect appear normal.    Laboratory/Imaging Studies:  No visits with results within 2 Week(s) from this visit.  Latest known visit with results is:    Infusion Therapy Visit on 04/20/2018   Component Date Value Ref Range Status     WBC 04/20/2018 14.9* 4.0 - 11.0 10e9/L Final     RBC Count 04/20/2018 4.54  3.8 - 5.2 10e12/L Final     Hemoglobin 04/20/2018 12.6  11.7 - 15.7 g/dL Final     Hematocrit 04/20/2018 39.0  35.0 - 47.0 % Final     MCV 04/20/2018 86  78 - 100 fl Final     MCH 04/20/2018 27.8  26.5 - 33.0 pg Final     MCHC 04/20/2018 32.3  31.5 - 36.5 g/dL Final     RDW 04/20/2018 17.3* 10.0 - 15.0 % Final     Platelet Count 04/20/2018 329  150 - 450 10e9/L Final     Diff Method 04/20/2018 Manual Differential   Final     % Neutrophils 04/20/2018 77.0  % Final     % Lymphocytes 04/20/2018 6.0  % Final     % Monocytes 04/20/2018 12.0  % Final     % Eosinophils 04/20/2018 0.0  % Final     % Basophils 04/20/2018 0.0  % Final     % Metamyelocytes 04/20/2018 2.0  % Final     % Myelocytes 04/20/2018 3.0  % Final     Absolute Neutrophil 04/20/2018 11.5* 1.6 - 8.3 10e9/L Final     Absolute Lymphocytes 04/20/2018 0.9  0.8 - 5.3 10e9/L Final     Absolute Monocytes 04/20/2018 1.8* 0.0 - 1.3 10e9/L Final     Absolute Eosinophils 04/20/2018 0.0  0.0 - 0.7 10e9/L Final     Absolute Basophils 04/20/2018 0.0  0.0 - 0.2 10e9/L Final     Absolute Metamyelocytes 04/20/2018 0.3* 0 10e9/L Final     Absolute Myelocytes 04/20/2018 0.4* 0 10e9/L Final     Anisocytosis 04/20/2018 Slight   Final     Polychromasia 04/20/2018 Slight   Final      Platelet Estimate 04/20/2018 Automated count confirmed.  Platelet morphology is normal.   Final     Sodium 04/20/2018 135  133 - 144 mmol/L Final     Potassium 04/20/2018 3.5  3.4 - 5.3 mmol/L Final     Chloride 04/20/2018 101  94 - 109 mmol/L Final     Carbon Dioxide 04/20/2018 23  20 - 32 mmol/L Final     Anion Gap 04/20/2018 11  3 - 14 mmol/L Final     Glucose 04/20/2018 133* 70 - 99 mg/dL Final     Urea Nitrogen 04/20/2018 20  7 - 30 mg/dL Final     Creatinine 04/20/2018 0.77  0.52 - 1.04 mg/dL Final     GFR Estimate 04/20/2018 77  >60 mL/min/1.7m2 Final    Non  GFR Calc     GFR Estimate If Black 04/20/2018 >90  >60 mL/min/1.7m2 Final    African American GFR Calc     Calcium 04/20/2018 8.8  8.5 - 10.1 mg/dL Final     Bilirubin Total 04/20/2018 1.5* 0.2 - 1.3 mg/dL Final     Albumin 04/20/2018 2.8* 3.4 - 5.0 g/dL Final     Protein Total 04/20/2018 8.1  6.8 - 8.8 g/dL Final     Alkaline Phosphatase 04/20/2018 414* 40 - 150 U/L Final     ALT 04/20/2018 36  0 - 50 U/L Final     AST 04/20/2018 185* 0 - 45 U/L Final     INR 04/20/2018 5.05* 0.86 - 1.14 Final    Comment: Critical Value called to and read back by  JASMYNE CORBETT RN [place] 1003 4.20.18 JW       Ammonia 04/20/2018 43  10 - 50 umol/L Final        Recent Results (from the past 744 hour(s))   Chest XR,  PA & LAT    Narrative    XR CHEST 2 VW 4/23/2018 3:39 PM    HISTORY: Cough.    COMPARISON: Several prior studies, the most recent one being dated  1/10/2018..      Impression    IMPRESSION: 2 views of the chest show low lung volumes. This is  suspected to at least in part related to subpulmonic pleural effusions  which the patient has had before. Pulmonary vessels are modestly  congested. No focal consolidation is seen.    LETI CHASE MD   US Abdomen Complete-1    Narrative    ABDOMINAL ULTRASOUND  4/30/2018 5:03 PM    HISTORY: Worsening LFTs. The patient has a history of liver metastases  and biliary obstruction.    COMPARISON: A CT on 3/19/2018  and a right upper quadrant ultrasound on  11/30/2017.    FINDINGS: The liver is normal in size. It is diffusely heterogeneous.  Several nodules or masses are noted. The largest seen currently is in  the right lobe measuring 3.1 x 2.2 x 2.1 cm. The gallbladder is normal  in size without stones. There may be a small amount of sludge. The  gallbladder wall is mildly thickened diffusely measuring 0.4 cm. No  pericholecystic fluid is seen. The common bile duct is normal  measuring 0.2 cm in diameter. The visualized portion of the pancreas  is normal. The spleen is normal. The kidneys are normal. There is no  hydronephrosis. The proximal abdominal aorta and IVC are within normal  limits.      Impression    IMPRESSION:   1. Liver metastases. The currently seen largest area of abnormality  measures 3.1 cm. On the previous ultrasound the largest area measured  3.0 cm.  2. Probable small amount of gallbladder sludge.    VERONICA LILLY MD   US Extremity Upper Venous rt    Narrative    US UPPER EXTREMITY VENOUS DUPLEX RIGHT   5/2/2018 4:52 PM     HISTORY: Rule out clot, history of previous clot now with new swelling  today.     COMPARISON: 1/17/2018.    FINDINGS: Gray-scale, color and Doppler spectral analysis ultrasound  was performed of the right arm. Compression and augmentation imaging  was performed.    There is a large mass in the right upper outer breast and axilla  measuring approximately 5.7 x 3.1 x 3.4 cm. There is distortion of the  right subclavian and axillary veins due to this mass. The subclavian  vein is not well seen due to the mass. Only the distal axillary vein  is seen. There is chronic, nonocclusive thrombus in the internal  jugular vein. There is thrombus in the basilic vein of the upper arm.  Remainder of the deep venous system of the arm is patent.      Impression    IMPRESSION:   1. Old, nonocclusive thrombus in the internal jugular vein.  2. Thrombus in the basilic vein of the upper arm.  3. Large  mass in the upper outer breast and axilla obscures view of  the subclavian vein and the majority of the axillary vein.   4. The remainder of the veins in the arm appear widely patent.    BOSSMAN LANGFORD MD       Assessment and plan:    (C50.919,  C78.7) Carcinoma of breast metastatic to liver, unspecified laterality (H)  (primary encounter diagnosis)  (C78.7) Secondary malignant neoplasm of liver (H)  (C50.911,  C77.3) Carcinoma of right breast metastatic to axillary lymph node (H)  (C50.911,  Z17.0,  C50.912) Bilateral malignant neoplasm of breast in female, estrogen receptor positive, unspecified site of breast (H)  Patient is gradually improving since discharge from the hospital.  I reviewed with the patient today the management plan in details.  She will be receiving cycle #2 of carboplatin and gemcitabine this Friday.  I will see the patient again in 1 week time to reassess toxicity.    (C79.51) Bone metastasis (H)  She has been receiving Xgeva injection every 90 days.  The patient taking calcium and vitamin D supplements.    (K12.31) Mucositis due to chemotherapy  Patient using Magic mouthwash if needed.    (J45.40) Moderate persistent asthma without complication  Her asthma symptoms has been controlled with albuterol inhaler and Symbicort    (E03.9) Hypothyroidism, unspecified type  Patient currently on Synthroid 25 mcg daily.    (G89.3) Cancer associated pain  Patient pain is currently well controlled.  She seen by palliative care today for symptom management.    (A04.72) C. difficile colitis  Patient will continue on vancomycin orally    (I82.621) Acute deep vein thrombosis (DVT) of right upper extremity, unspecified vein (H)  Patient will continue on Lovenox.    The patient is ready to learn, no apparent learning barriers were identified.  Diagnosis and treatment plans were explained to the patient. The patient expressed understanding of the content. The patient asked appropriate questions. The patient  questions were answered to her satisfaction.    Chart documentation with Dragon Voice recognition Software. Although reviewed after completion, some words and grammatical errors may remain.

## 2018-05-09 NOTE — MR AVS SNAPSHOT
After Visit Summary   5/9/2018    Etta Cervanets    MRN: [id number]           Patient Information     Date Of Birth          1960        Visit Information        Provider Department      5/9/2018 9:00 AM ROOM 8 [facility]        Today's Diagnoses     Carcinoma of breast metastatic to liver (H)    -  1       Follow-ups after your visit        Your next 10 appointments already scheduled     May 11, 2018  9:15 AM CDT   Return Visit with Brodie Cassidy MD   [facility] ([facility])    [facility]  [address]  [facility] [address]   [phone]            May 11, 2018  9:30 AM CDT   Level 3 with ROOM 7 [facility]fusion ([facility])    [facility]  [address]  [facility] [address]   [phone]            May 18, 2018  8:00 AM CDT   Level 1 with ROOM 1 [facility]fusion ([facility])    [facility]  [address]  [facility] [address]   [phone]            May 25, 2018  2:30 PM CDT   (Arrive by 2:15 PM)   Return Visit with Bailey Santana MD   [facility] and Infectious Diseases ([facility] [place])    [address] [place] [address]  [address] [place] [address] [phone]              Future tests that were ordered for you today     Open Future Orders        Priority Expected Expires Ordered    INR Routine 5/16/2018 5/9/2019 5/9/2018    CBC with platelets differential Routine 5/16/2018 5/9/2019 5/9/2018    Comprehensive metabolic panel Routine 5/16/2018 5/9/2019 5/9/2018    CEA Routine 5/16/2018 5/9/2019 5/9/2018    Ca27.29  breast tumor marker Routine 5/16/2018 5/9/2019 5/9/2018            Who to contact     If you have questions or need follow up information about today's clinic visit or your schedule please contact [facility] directly at  [phone].  Normal or non-critical lab and imaging results will be communicated to you by MyChart, letter or phone within 4 business days after the clinic has received the results. If you do not hear from us within 7 days, please contact the clinic through TuneUphart or phone. If you have a critical or abnormal lab result, we will notify you by phone as soon as possible.  Submit refill requests through Ticketbud or call your pharmacy and they will forward the refill request to us. Please allow 3 business days for your refill to be completed.          Additional Information About Your Visit        TuneUphart Information     Ticketbud gives you secure access to your electronic health record. If you see a primary care provider, you can also send messages to your care team and make appointments. If you have questions, please call your primary care clinic.  If you do not have a primary care provider, please call [phone] and they will assist you.        Care EveryWhere ID     This is your Care EveryWhere ID. This could be used by other organizations to access your [place] medical records  [id number]        Your Vitals Were     Last Period                   02/06/2013            Blood Pressure from Last 3 Encounters:   05/09/18 151/85   05/09/18 151/85   05/03/18 113/64    Weight from Last 3 Encounters:   05/09/18 86.2 kg (190 lb)   05/09/18 86.2 kg (190 lb)   05/01/18 89 kg (196 lb 3.4 oz)              Today, you had the following     No orders found for display         Where to get your medicines      Some of these will need a paper prescription and others can be bought over the counter.  Ask your nurse if you have questions.     Bring a paper prescription for each of these medications     HYDROcodone-acetaminophen 5-325 MG per tablet          Primary Care Provider Office Phone # Fax #    Johana Fairbanks [phone] [id number]        Equal Access to Services     [facility]  GAAR : Hadii aad ku hadmelissa Rodriguez, waaxda luqadaha, qaybta kaalmada lexi, hari lorenza rachaelyvette hale rodrigue jennifer . So [facility] [phone].    ATENCIÓN: Si habla español, tiene a parekh disposición servicios gratuitos de asistencia lingüística. Llame al [phone].    We comply with applicable federal civil rights laws and [place] laws. We do not discriminate on the basis of race, color, national origin, age, disability, sex, sexual orientation, or gender identity.            Thank you!     Thank you for choosing [facility]  for your care. Our goal is always to provide you with excellent care. Hearing back from our patients is one way we can continue to improve our services. Please take a few minutes to complete the written survey that you may receive in the mail after your visit with us. Thank you!             Your Updated Medication List - Protect others around you: Learn how to safely use, store and throw away your medicines at www.disposemymeds.org.          This list is accurate as of 5/9/18  2:00 PM.  Always use your most recent med list.                   Brand Name Dispense Instructions for use Diagnosis    * albuterol 108 (90 Base) MCG/ACT Inhaler    VENTOLIN HFA    1 Inhaler    Inhale 2 puffs into the lungs every 4 hours as needed    Moderate persistent asthma, uncomplicated       * albuterol (2.5 MG/3ML) 0.083% neb solution     30 vial    Take 1 vial (2.5 mg) by nebulization every 4 hours as needed for shortness of breath / dyspnea    Moderate persistent asthma, uncomplicated       ALLEGRA ALLERGY 180 MG tablet   Generic drug:  fexofenadine      Take 180 mg by mouth daily as needed for allergies        azelastine 0.1 % spray    ASTELIN    3 Bottle    Spray 1-2 sprays into both nostrils 2 times daily as needed for rhinitis    Chronic rhinitis       Beclomethasone Dipropionate 80 MCG/ACT Aers Nasal Spray     8.7 g    Spray 2 sprays into both nostrils daily    Other chronic rhinitis        budesonide-formoterol 160-4.5 MCG/ACT Inhaler    SYMBICORT    10.2 Inhaler    INHALE 2 PUFFS INTO THE LUNGS 2 TIMES DAILY    Moderate persistent asthma without complication       CRANBERRY PO      Take 1 capsule by mouth daily        diclofenac 1 % Gel topical gel    VOLTAREN    100 g    Place 2 g onto the skin 4 times daily    Carcinoma of breast metastatic to liver, unspecified laterality (H)       diphenhydrAMINE 25 MG tablet    BENADRYL    1 tablet    Take 1 tablet (25 mg) by mouth every 8 hours as needed for itching or allergies    Carcinoma of breast metastatic to liver, unspecified laterality (H), Bone metastasis (H), Pericardial effusion, Bilateral malignant neoplasm of breast in female, estrogen receptor positive, unspecified site of breast (H), Carcinoma of right breast metastatic to axillary lymph node (H), Secondary malignant neoplasm of liver (H), Chemotherapy-induced neutropenia (H), Emphysematous bleb of lung (H), Hypothyroidism, unspecified type, Hyperlipidemia LDL goal <130, Moderate persistent asthma without complication, Mucositis due to chemotherapy       DITROPAN XL 10 MG 24 hr tablet   Generic drug:  oxybutynin      Take 10 mg by mouth daily    Post-op pain, Carcinoma of right breast metastatic to axillary lymph node (H), Bone metastasis (H), Carcinoma of breast metastatic to liver, unspecified laterality (H), Secondary malignant neoplasm of liver (H)       enoxaparin 100 MG/ML injection    LOVENOX    14 Syringe    Inject 0.9 mLs (90 mg) Subcutaneous every 12 hours    Long-term (current) use of anticoagulants, Acute deep vein thrombosis (DVT) of right upper extremity, unspecified vein (H)       HYDROcodone-acetaminophen 5-325 MG per tablet    NORCO    60 tablet    Take 1-2 tablets by mouth every 4 hours as needed for moderate to severe pain    Post-op pain, Carcinoma of right breast metastatic to axillary lymph node (H), Bone metastasis (H), Carcinoma of breast metastatic to liver, unspecified  laterality (H), Secondary malignant neoplasm of liver (H)       KP CALCIUM 600+D3 600-500 MG-UNIT Caps   Generic drug:  calcium carb-cholecalciferol      Take 2 tablets by mouth daily        levothyroxine 25 MCG tablet    SYNTHROID/LEVOTHROID    90 tablet    TAKE 1 TABLET BY MOUTH ONCE DAILY.    Hypothyroidism due to acquired atrophy of thyroid       loratadine 10 MG tablet    CLARITIN     Take 10 mg by mouth daily as needed for allergies        methylphenidate 10 MG tablet    RITALIN    60 tablet    Take 1 tablet (10 mg) by mouth 2 times daily    Carcinoma of right breast metastatic to axillary lymph node (H)       metoclopramide 10 MG tablet    REGLAN    120 tablet    Take 1 tablet (10 mg) by mouth 2 times daily as needed    Bilateral malignant neoplasm of breast in female, estrogen receptor positive, unspecified site of breast (H)       metroNIDAZOLE 0.75 % vaginal gel    METROGEL    50 g    Place 1 applicator (5 g) vaginally 2 times daily    Vaginitis and vulvovaginitis       order for DME     1 each    Equipment being ordered: JoviPak post-lumpectomy compression pad x2    Lymphedema, Lymphedema of breast       order for DME     1 Device    Equipment being ordered: walker - 4 wheeled walker with seat    Carcinoma of breast metastatic to liver, unspecified laterality (H)       prochlorperazine 10 MG tablet    COMPAZINE    30 tablet    Take 1 tablet (10 mg) by mouth every 6 hours as needed (Nausea/Vomiting)    Secondary malignant neoplasm of liver (H), Carcinoma of breast metastatic to liver, unspecified laterality (H), Bone metastasis (H), Carcinoma of right breast metastatic to axillary lymph node (H), Bilateral malignant neoplasm of breast in female, estrogen receptor positive, unspecified site of breast (H)       QVAR 80 MCG/ACT Inhaler   Generic drug:  beclomethasone      Spray 1 puff into both nostrils 2 times daily        ROBITUSSIN COUGH/COLD CF PO      Take 10 mLs by mouth as needed    Breast cancer  metastasized to axillary lymph node, right (H)       vancomycin 50 mg/mL Solr    FIRVANQ    280 mL    Take 2.5 mLs (125 mg) by mouth 4 times daily for 28 days    C. difficile colitis       * Notice:  This list has 2 medication(s) that are the same as other medications prescribed for you. Read the directions carefully, and ask your doctor or other care provider to review them with you.

## 2018-05-11 NOTE — MR AVS SNAPSHOT
After Visit Summary   5/11/2018    Etta Cervantes    MRN: [id number]           Patient Information     Date Of Birth          1960        Visit Information        Provider Department      5/11/2018 9:30 AM ROOM 7 [facility]fusion        Today's Diagnoses     Secondary malignant neoplasm of liver (H)    -  1    Carcinoma of breast metastatic to liver, unspecified laterality (H)        Bone metastasis (H)        Carcinoma of right breast metastatic to axillary lymph node (H)           Follow-ups after your visit        Your next 10 appointments already scheduled     May 16, 2018  8:00 AM CDT   Level O with ROOM 1 [facility] Infusion ([facility])    [facility]  [address]  [facility] [address]   [phone]            May 16, 2018  8:45 AM CDT   Return Visit with Brodie Cassidy MD   [facility] ([facility])    [facility]  [address]  [facility] [address]   [phone]            May 18, 2018  8:00 AM CDT   Level 1 with ROOM 1 [facility] ([facility])    [facility]  [address]  [facility] [address]   [phone]            May 25, 2018  2:30 PM CDT   (Arrive by 2:15 PM)   Return Visit with Bailey Santana MD   [facility] and Infectious Diseases ([facility] [place])    [address] [place] [address]  [address] [place] [address] [phone]              Who to contact     If you have questions or need follow up information about today's clinic visit or your schedule please contact [facility] directly at [phone].  Normal or non-critical lab and imaging results will be communicated to you by MyChart, letter or phone within 4 business days after the clinic has received the results. If you do not hear from us within 7 days, please contact the clinic through  Adapticshart or phone. If you have a critical or abnormal lab result, we will notify you by phone as soon as possible.  Submit refill requests through Phorest or call your pharmacy and they will forward the refill request to us. Please allow 3 business days for your refill to be completed.          Additional Information About Your Visit        Adapticshart Information     Phorest gives you secure access to your electronic health record. If you see a primary care provider, you can also send messages to your care team and make appointments. If you have questions, please call your primary care clinic.  If you do not have a primary care provider, please call [phone] and they will assist you.        Care EveryWhere ID     This is your Care EveryWhere ID. This could be used by other organizations to access your [place] medical records  [id number]        Your Vitals Were     Pulse Temperature Last Period Pulse Oximetry BMI (Body Mass Index)       96 96.1  F (35.6  C) (Oral) 02/06/2013 92% 31.55 kg/m2        Blood Pressure from Last 3 Encounters:   05/11/18 117/68   05/09/18 151/85   05/09/18 151/85    Weight from Last 3 Encounters:   05/11/18 86 kg (189 lb 9.6 oz)   05/09/18 86.2 kg (190 lb)   05/09/18 86.2 kg (190 lb)              We Performed the Following     CA 27.29 Breast tumor marker     CBC with platelets differential     CEA     Comprehensive metabolic panel        Primary Care Provider Office Phone # Fax #    Johana Fairbanks [phone] [address] [facility] [address]        Equal Access to Services     [facility] : Hadii aad ku hadasho Soomaali, waaxda luqadaha, qaybta kaalmada adeegyada, hari rodriguez . So [facility] [phone].    ATENCIÓN: Si habla español, tiene a parekh disposición servicios gratuitos de asistencia lingüística. Llame al [phone].    We comply with applicable federal civil rights laws and [place] laws. We do not discriminate on the  basis of race, color, national origin, age, disability, sex, sexual orientation, or gender identity.            Thank you!     Thank you for choosing [facility]  for your care. Our goal is always to provide you with excellent care. Hearing back from our patients is one way we can continue to improve our services. Please take a few minutes to complete the written survey that you may receive in the mail after your visit with us. Thank you!             Your Updated Medication List - Protect others around you: Learn how to safely use, store and throw away your medicines at www.disposemymeds.org.          This list is accurate as of 5/11/18  1:21 PM.  Always use your most recent med list.                   Brand Name Dispense Instructions for use Diagnosis    * albuterol 108 (90 Base) MCG/ACT Inhaler    VENTOLIN HFA    1 Inhaler    Inhale 2 puffs into the lungs every 4 hours as needed    Moderate persistent asthma, uncomplicated       * albuterol (2.5 MG/3ML) 0.083% neb solution     30 vial    Take 1 vial (2.5 mg) by nebulization every 4 hours as needed for shortness of breath / dyspnea    Moderate persistent asthma, uncomplicated       ALLEGRA ALLERGY 180 MG tablet   Generic drug:  fexofenadine      Take 180 mg by mouth daily as needed for allergies        azelastine 0.1 % spray    ASTELIN    3 Bottle    Spray 1-2 sprays into both nostrils 2 times daily as needed for rhinitis    Chronic rhinitis       Beclomethasone Dipropionate 80 MCG/ACT Aers Nasal Spray     8.7 g    Spray 2 sprays into both nostrils daily    Other chronic rhinitis       budesonide-formoterol 160-4.5 MCG/ACT Inhaler    SYMBICORT    10.2 Inhaler    INHALE 2 PUFFS INTO THE LUNGS 2 TIMES DAILY    Moderate persistent asthma without complication       CRANBERRY PO      Take 1 capsule by mouth daily        diclofenac 1 % Gel topical gel    VOLTAREN    100 g    Place 2 g onto the skin 4 times daily    Carcinoma of breast metastatic to liver,  unspecified laterality (H)       diphenhydrAMINE 25 MG tablet    BENADRYL    1 tablet    Take 1 tablet (25 mg) by mouth every 8 hours as needed for itching or allergies    Carcinoma of breast metastatic to liver, unspecified laterality (H), Bone metastasis (H), Pericardial effusion, Bilateral malignant neoplasm of breast in female, estrogen receptor positive, unspecified site of breast (H), Carcinoma of right breast metastatic to axillary lymph node (H), Secondary malignant neoplasm of liver (H), Chemotherapy-induced neutropenia (H), Emphysematous bleb of lung (H), Hypothyroidism, unspecified type, Hyperlipidemia LDL goal <130, Moderate persistent asthma without complication, Mucositis due to chemotherapy       DITROPAN XL 10 MG 24 hr tablet   Generic drug:  oxybutynin      Take 10 mg by mouth daily    Post-op pain, Carcinoma of right breast metastatic to axillary lymph node (H), Bone metastasis (H), Carcinoma of breast metastatic to liver, unspecified laterality (H), Secondary malignant neoplasm of liver (H)       enoxaparin 100 MG/ML injection    LOVENOX    30 Syringe    Inject 0.9 mLs (90 mg) Subcutaneous every 12 hours    Long-term (current) use of anticoagulants, Acute deep vein thrombosis (DVT) of right upper extremity, unspecified vein (H)       HYDROcodone-acetaminophen 5-325 MG per tablet    NORCO    60 tablet    Take 1-2 tablets by mouth every 4 hours as needed for moderate to severe pain    Post-op pain, Carcinoma of right breast metastatic to axillary lymph node (H), Bone metastasis (H), Carcinoma of breast metastatic to liver, unspecified laterality (H), Secondary malignant neoplasm of liver (H)       KP CALCIUM 600+D3 600-500 MG-UNIT Caps   Generic drug:  calcium carb-cholecalciferol      Take 2 tablets by mouth daily        levothyroxine 25 MCG tablet    SYNTHROID/LEVOTHROID    90 tablet    TAKE 1 TABLET BY MOUTH ONCE DAILY.    Hypothyroidism due to acquired atrophy of thyroid       loratadine 10 MG  tablet    CLARITIN     Take 10 mg by mouth daily as needed for allergies        methylphenidate 10 MG tablet    RITALIN    60 tablet    Take 1 tablet (10 mg) by mouth 2 times daily    Carcinoma of right breast metastatic to axillary lymph node (H)       metoclopramide 10 MG tablet    REGLAN    120 tablet    Take 1 tablet (10 mg) by mouth 2 times daily as needed    Bilateral malignant neoplasm of breast in female, estrogen receptor positive, unspecified site of breast (H)       metroNIDAZOLE 0.75 % vaginal gel    METROGEL    50 g    Place 1 applicator (5 g) vaginally 2 times daily    Vaginitis and vulvovaginitis       order for DME     1 each    Equipment being ordered: JoviPak post-lumpectomy compression pad x2    Lymphedema, Lymphedema of breast       order for DME     1 Device    Equipment being ordered: walker - 4 wheeled walker with seat    Carcinoma of breast metastatic to liver, unspecified laterality (H)       prochlorperazine 10 MG tablet    COMPAZINE    30 tablet    Take 1 tablet (10 mg) by mouth every 6 hours as needed (Nausea/Vomiting)    Secondary malignant neoplasm of liver (H), Carcinoma of breast metastatic to liver, unspecified laterality (H), Bone metastasis (H), Carcinoma of right breast metastatic to axillary lymph node (H), Bilateral malignant neoplasm of breast in female, estrogen receptor positive, unspecified site of breast (H)       QVAR 80 MCG/ACT Inhaler   Generic drug:  beclomethasone      Spray 1 puff into both nostrils 2 times daily        ROBITUSSIN COUGH/COLD CF PO      Take 10 mLs by mouth as needed    Breast cancer metastasized to axillary lymph node, right (H)       vancomycin 50 mg/mL Solr    FIRVANQ    280 mL    Take 2.5 mLs (125 mg) by mouth 4 times daily for 28 days    C. difficile colitis       * Notice:  This list has 2 medication(s) that are the same as other medications prescribed for you. Read the directions carefully, and ask your doctor or other care provider to review  them with you.

## 2018-05-11 NOTE — PROGRESS NOTES
Infusion Nursing Note:  Etta Cervantes presents today for C2D1 chemotherapy.    Patient seen by provider today: No   present during visit today: Not Applicable.    Note: Labs drawn via her port per [place] protocol. Labs OK for chemotherapy. Gemzar dose was decreased due to elevated LFT's. IV Premeds and chemotherapy infused via her port per [place] protocol. Port flushed per [place] protocol. Pt. To return on 5/16/18 for labs and an MD appt.    Intravenous Access:  Labs drawn without difficulty.  Implanted Port.    Treatment Conditions:  Lab Results   Component Value Date    HGB 12.1 05/11/2018     Lab Results   Component Value Date    WBC 8.7 05/11/2018      Lab Results   Component Value Date    ANEU 5.4 05/11/2018     Lab Results   Component Value Date     05/11/2018      Lab Results   Component Value Date     05/11/2018                   Lab Results   Component Value Date    POTASSIUM 4.7 05/11/2018           Lab Results   Component Value Date    MAG 1.8 05/09/2018            Lab Results   Component Value Date    CR 0.64 05/11/2018                   Lab Results   Component Value Date    BEN 9.6 05/11/2018                Lab Results   Component Value Date    BILITOTAL 2.6 05/11/2018           Lab Results   Component Value Date    ALBUMIN 2.2 05/11/2018                    Lab Results   Component Value Date    ALT 42 05/11/2018           Lab Results   Component Value Date     05/11/2018       Results reviewed, labs MET treatment parameters, ok to proceed with treatment.      Post Infusion Assessment:  Patient tolerated infusion without incident.  Blood return noted pre and post infusion.  Site patent and intact, free from redness, edema or discomfort.  No evidence of extravasations.  Access discontinued per protocol.    Discharge Plan:   Patient and/or family verbalized understanding of discharge instructions and all questions answered.  Patient discharged in stable condition  accompanied by: .  Departure Mode: Wheelchair.    Madison Katz RN

## 2018-05-14 PROBLEM — J90 PLEURAL EFFUSION: Status: ACTIVE | Noted: 2017-01-01

## 2018-05-14 PROBLEM — E78.5 HYPERLIPIDEMIA LDL GOAL <130: Status: ACTIVE | Noted: 2017-01-01

## 2018-05-14 PROBLEM — A04.72 C. DIFFICILE COLITIS: Status: ACTIVE | Noted: 2017-01-01

## 2018-05-14 PROBLEM — R00.0 SINUS TACHYCARDIA: Status: ACTIVE | Noted: 2018-01-01

## 2018-05-14 PROBLEM — G89.3 CANCER ASSOCIATED PAIN: Status: ACTIVE | Noted: 2017-01-01

## 2018-05-14 NOTE — ED NOTES
Pt has lung and breast CA arrives from home via EMS with SOA, confusion and weakness, sats on room air 83%.  Pt denies pain no emesis no diarrhea.  Placed pt on 3 liters via NC

## 2018-05-14 NOTE — TELEPHONE ENCOUNTER
and Pt called. [facility] for Breast Cancer and getting chemo 5/11/18 - weaker since chemo need help getting and Right arm is swollen and Hx of blood clot in arm and taking Lovenox  from  Brodie Cassidy MD . Hx of C diff  Hematology & Oncology.   Back is Sore  after fall 3-4 feet off bed  @ 225am today , without cut .  New confusion and weakness since last chemo  5/11/18 and  noting new R arm swelling in arm that she has had blood clot .   Triage with confusion - adult and disposition is 911 but  will take her into [facility] now instead .   .Gwen Arizmendi RN [place] nurse advisors.    Reason for Disposition    [1] Difficult to awaken or acting confused (disoriented, slurred speech) AND [2] present now AND [3] new onset    Additional Information    Negative: Dangerous mechanism of injury (e.g., MVA, contact sports, trampoline, diving, fall > 10 feet or 3 meters)  (Exception: back pain began > 1 hour after injury)    Negative: [1] Weakness (i.e., paralysis, loss of muscle strength) of the leg(s) or foot AND [2] sudden onset after back injury    Negative: [1] Numbness (i.e., loss of sensation) of the leg(s) or foot AND [2] sudden onset after back injury    Negative: [1] Major bleeding (e.g., actively dripping or spurting) AND [2] can't be stopped    Negative: [1] Difficult to awaken or acting confused (disoriented, slurred speech) AND [2] present now AND [3] diabetic    Protocols used: CONFUSION - DELIRIUM-ADULT-, BACK INJURY-ADULT-AH

## 2018-05-14 NOTE — ED PROVIDER NOTES
HPI  Patient is a 57-year-old female presenting with shortness of breath and weakness and altered mental status.  She has a complicated past medical history including primary breast cancer diagnosed in January, 2015.  She is known to have metastases to the lung, bone, and liver.  She has required biliary stent because of the mass-effect in the liver.  She has had a DVT involving her right upper extremity.  She is on Lovenox.  She was hospitalized from 4/23-5/3 for cancer related problems and side effects related to chemotherapy.  She had received her chemotherapy dosing only once prior to the need for hospitalization.  She took her second dose of chemotherapy on Friday, 3 days ago.  She has a known history of neutropenia.  She does not use oxygen on a regular basis.  She takes Vicodin occasionally for pain control.  She saw palliative care on 5/9.    The patient comes in with worsened weakness and trouble breathing since Saturday, 2 days ago.  There has been no change in cough or production with cough.  No fever.  No chest pain reported that is new or different.  She has been generally weak and has difficulty getting up on her own.  She fell to the ground today without significant injury.  She was helped up by her  and was able to stand and ultimately walk on her own, with assistance.  Her  tells me she has not been as clear in answering questions.  She has been nauseous over the past few days and has had a decreased level of oral intake.  Her  thought she did pretty well yesterday but the patient tells me she feels dry.  She denies having any urine output this morning.  No dysuria, urgency, or frequency reported.  It is uncertain whether or not she has had recurrent diarrhea-she would say she does but she cannot remember exactly, her  does not recall her having any since her last chemotherapy.    ROS: All other review of systems are negative other than that noted above.     Past Medical  History:   Diagnosis Date     ASCUS favor benign 1/2015    Neg HPV     Cancer (H)      Cataract 2010    surgery both eyes     Emphysematous bleb of lung (H)      Fibroids      Hypercholesterolemia      Hypothyroid      Incontinence of urine     mixed     Menorrhagia      Obesity      Pneumothorax      PONV (postoperative nausea and vomiting)      Sleep apnea     uses a cpap     Uncomplicated asthma      Past Surgical History:   Procedure Laterality Date     BIOPSY  Jan 2015, 2016    Breast cancer, thyroid     BRONCHOSCOPY, INSERT BRONCHIAL VALVE N/A 7/20/2017    Procedure: BRONCHOSCOPY, INSERT BRONCHIAL VALVE;  Flexible Bronchoscopy, Endobronchial Valve Insertion X5. 4 Valves into right upper lobe and 1 valve into Right middle lobe;  Surgeon: Carlos Mcgowan MD;  Location: [facility] OR     BRONCHOSCOPY, REMOVE BRONCHIAL VALVE N/A 9/7/2017    Procedure: BRONCHOSCOPY, REMOVE BRONCHIAL VALVE;  Flexible Bronchoscopy, Removal Of Endobronchial Valves x 2;  Surgeon: Carlos Mcgowan MD;  Location:  OR     BRONCHOSCOPY, REMOVE BRONCHIAL VALVE N/A 9/28/2017    Procedure: BRONCHOSCOPY, REMOVE BRONCHIAL VALVE;  Flexible Bronchoscopy, Removal Of Intra-Bronchial Valves x 3;  Surgeon: Carlos Mcgowan MD;  Location:  OR     CATARACT IOL, RT/LT  2010     COLONOSCOPY  2010     DILATION AND CURETTAGE, HYSTEROSCOPY, ABLATE ENDOMETRIUM NOVASURE, COMBINED  3/2/2011    COMBINED DILATION AND CURETTAGE, HYSTEROSCOPY, ABLATE ENDOMETRIUM NOVASURE performed by LAURA VARGAS at [facility]     ENDOSCOPIC RETROGRADE CHOLANGIOPANCREATOGRAM N/A 12/8/2017    Procedure: COMBINED ENDOSCOPIC RETROGRADE CHOLANGIOPANCREATOGRAPHY, PLACE TUBE/STENT;  Endoscopic Retrograde Cholangiopancreatogram with biliary sphincterotomy and stent placement;  Surgeon: Guru Coleen Mora MD;  Location:  OR     GENITOURINARY SURGERY  2005    Uretha sling     INSERT PORT VASCULAR ACCESS N/A 2/12/2015    Procedure: INSERT PORT VASCULAR  ACCESS;  Surgeon: Noé Schaefer MD;  Location: [facility]     INSERT PORT VASCULAR ACCESS N/A 1/10/2018    Procedure: INSERT PORT VASCULAR ACCESS;  Portacath replacement;  Surgeon: Remi De La Paz MD;  Location: [facility]     SURGICAL HISTORY OF -   2005    bladder sling     SURGICAL HISTORY OF -       Cystectomy-lower lip     TUBAL LIGATION  1987     Family History   Problem Relation Age of Onset     Depression Mother      Eye Disorder Mother      Gynecology Mother      Alzheimer Disease Mother      CANCER Father      Other Cancer Father      HEART DISEASE Maternal Grandfather      Allergies Paternal Grandfather      Allergies Son      Social History   Substance Use Topics     Smoking status: Never Smoker     Smokeless tobacco: Never Used     Alcohol use No         PHYSICAL  /71  Pulse 137  Temp 98.4  F (36.9  C) (Oral)  Resp 12  LMP 02/06/2013  SpO2 95%  General: Patient is alert and in severe distress.  Rapid respiratory rate and tachycardia.  She has difficulty answering questions because of her breathing.  She has difficulty focusing on our conversation and answering questions that I ask.  Neurological: Alert.  Moving upper and lower extremities equally, bilaterally.  Head / Neck: Atraumatic.  Ears: Not done.  Eyes: Pupils are equal, round, and reactive.  Normal conjunctiva.  Nose: Midline.  No epistaxis.  Mouth / Throat: No ulcerations or lesions.  Upper pharynx is not erythematous.  Dry.  Respiratory: Severe respiratory distress.  Wheeze, decreased breath sounds bilaterally.  Mild rales.  Cardiovascular: Regular rhythm.  Tachycardia.  Peripheral extremities are warm.  3+ edema in her lower extremities, bilaterally.  She has severe edema in her right upper extremity as well.  No calf tenderness.  Abdomen / Pelvis: Tender in her right upper quadrant.  Mild distention.  Soft throughout.  Genitalia: Not done.  Musculoskeletal: No tenderness over major muscles and joints.  Skin: No evidence of rash or  trauma.        ED COURSE  1031.  Patient presents with severe respiratory distress, tachycardia, change in mental status.  She has trouble answering questions and focusing on my conversation.  Symptoms seem to have started on Saturday, 2 days ago.  This was a day after her second round of chemotherapy.  She has been nauseous and appears to be dehydrated severely.  Despite this, she has diffuse peripheral edema.  No chest pain to suggest a primary cardiac cause.  She has a cough at baseline but no change in this cough but no production described.  No fever to suggest pneumonia.  She is quite pale.  Anemic?  No urinary symptoms.  Possible recurrence of diarrhea, as above.  She has been nauseous throughout.  Lactic acid is high and there is certainly concern for possible sepsis.    Labs Ordered and Resulted from Time of ED Arrival Up to the Time of Departure from the ED   CBC WITH PLATELETS DIFFERENTIAL - Abnormal; Notable for the following:        Result Value    Hemoglobin 11.4 (*)     Hematocrit 34.5 (*)     RDW 25.2 (*)     Platelet Count 98 (*)     Absolute Lymphocytes 0.5 (*)     All other components within normal limits   COMPREHENSIVE METABOLIC PANEL - Abnormal; Notable for the following:     Potassium 5.4 (*)     Glucose 109 (*)     Urea Nitrogen 35 (*)     Bilirubin Total 4.1 (*)     Albumin 2.2 (*)     Protein Total 6.1 (*)     Alkaline Phosphatase 972 (*)     ALT 58 (*)      (*)     All other components within normal limits   LACTIC ACID WHOLE BLOOD - Abnormal; Notable for the following:     Lactic Acid 3.6 (*)     All other components within normal limits   TROPONIN I - Abnormal; Notable for the following:     Troponin I ES 0.080 (*)     All other components within normal limits   BLOOD GAS VENOUS - Abnormal; Notable for the following:     PCO2 Venous 52 (*)     All other components within normal limits   LACTIC ACID WHOLE BLOOD - Abnormal; Notable for the following:     Lactic Acid 2.4 (*)     All  other components within normal limits   ROUTINE UA WITH MICROSCOPIC   OBTAIN AFFIRMATION OF INFORMED CONSENT   VITAL SIGNS   PERIPHERAL IV CATHETER   ACTIVITY   NO   VITAL SIGNS   ACTIVITY   MAY DISCHARGE WHEN   BLOOD CULTURE   BLOOD CULTURE   URINE CULTURE AEROBIC BACTERIAL     EKG  (0947)   Interpretation performed by me.  Rate: 141     Rhythm: sinus     Axis: nl  Intervals: DE (12-2) 100, QRS (<12) 89, QTc (>5) 393  P wave: nl     QRS complex: low height, diffusely  ST segment / T-wave: nl  Conclusion: Patient has sinus tachycardia otherwise similar EKG compared to previous.  Height of her QRS complexes are low diffusely but this is relatively similar to what was seen previously.    1051.  Respiratory therapy has yet to give the patient her DuoNeb.  They were just called.    1151.  She has had the albuterol/Atrovent neb and she reports some mild to moderate improvement of her breathing.  However, she does continue to have obvious respiratory distress.  CT imaging is still pending.  Her pulse continues to be fast though slightly improved.  Her blood pressure was documented as being low but her last systolic number was 115.  No fever here.  Second liter of lactated Ringer's has not yet started.    1345.  CT imaging has returned and shows bilateral large pleural effusions.  No PE.  No pneumonia.  No collapsed lung.  The patient has a history of DVT and is on Lovenox.  This presents a difficult challenge as I do think her symptoms are primarily related to the mass-effect of the effusion and I do not think this will get better with Lasix or other diuretic.  She needs the pleural fluid removed for improved ventilation.  There is a risk of bleeding in this situation.  I spoke with radiologist, Dr. Lebron, who is willing to see the patient and assess further for thoracentesis.  She continues to have a pulse of 135 and respiratory distress.    1624.  The patient has much improved ventilation after the thoracentesis.  She  is comfortable and now sleeping.  She continues to have a pulse of 140.  This was surprising given her new comfort after the procedure.  This was a narrow complex tachycardia which I was concerned could be related to supraventricular tachycardia or atrial flutter or fast atrial fibrillation.  Therefore, I spoke with the patient and her  about adenosine.  They agreed to trial this medication in hopes that it would relieve the tachycardia.  Adenosine 6 mg and then 12 mg were given IV.  The second EKG documented below shows her rhythm during the adenosine action.  This does not show atrial flutter or SVT or atrial fibrillation.  It appears to be a sinus rhythm.  She then was covered for possible sepsis despite her history of Clostridium difficile.  Lactic acid will be repeated.  Urine analysis and culture will be obtained.  I spoke with the hospitalist service and they are reviewed the case.  I think the patient should be in the ICU.  She remains full code.  There is certainly some time delay in the diagnostic workup and provision of care but the department was extremely busy during her stay and this along with the diagnostic confusion has contributed to her delayed care.    Sepsis Documentation    1030 Concern for infection:  1030    Initial lactic acid result is documented above.    Broad spectrum antibiotics were given after blood cultures.      Anti-infectives (Future)    Start     Dose/Rate Route Frequency Ordered Stop    05/14/18 1628  vancomycin (VANCOCIN) 2,000 mg in sodium chloride 0.9 % 500 mL intermittent infusion      2,000 mg  over 2 Hours Intravenous ONCE 05/14/18 1627            Initial fluid bolus: 2 L LR.    Vasopressors: none      Medications   ondansetron (ZOFRAN) injection 4 mg (4 mg Intravenous Given 5/14/18 1332)   lidocaine 1 % 1 mL (not administered)   lidocaine (LMX4) kit (not administered)   sodium chloride (PF) 0.9% PF flush 3 mL (not administered)   sodium chloride (PF) 0.9% PF flush  3 mL (not administered)   vancomycin (VANCOCIN) 2,000 mg in sodium chloride 0.9 % 500 mL intermittent infusion (2,000 mg Intravenous Given 5/14/18 1717)   sodium chloride 0.9% infusion (not administered)   lactated ringers BOLUS 1,000 mL (0 mLs Intravenous Stopped 5/14/18 1131)   ipratropium - albuterol 0.5 mg/2.5 mg/3 mL (DUONEB) neb solution 3 mL (3 mLs Nebulization Given 5/14/18 1052)   iopamidol (ISOVUE-370) solution 81 mL (81 mLs Intravenous Given 5/14/18 1205)   sodium chloride 0.9 % bag 500mL for CT scan flush use (104 mLs Intravenous Given 5/14/18 1205)   lactated ringers BOLUS 1,000 mL (0 mLs Intravenous Stopped 5/14/18 1242)   lidocaine 1 % 5-10 mL (8 mLs Intradermal Given 5/14/18 1508)   adenosine (ADENOCARD) injection 12 mg (6 mg Intravenous Given 5/14/18 1544)   adenosine (ADENOCARD) injection 6 mg (6 mg Intravenous Given 5/14/18 1552)   piperacillin-tazobactam (ZOSYN) intermittent infusion 4.5 g (0 g Intravenous Stopped 5/14/18 1721)     Six hour lactic acid: improved.    1623.  Patient is improved.  She is up to the commode at bedside.  She is talking though tired.  Maintaining an O2 saturation with less respiratory distress.  Lactic acid is improved.  The hospitalist here who will accept.      IMPRESSION    ICD-10-CM    1. Acute respiratory failure with hypoxia (H) J96.01    2. Pleural effusion J90     bilateral lungs   3. Sepsis, due to unspecified organism (H) A41.9    4. Dehydration E86.0                            Emmanuel Hester MD  05/14/18 [id number]

## 2018-05-14 NOTE — PROGRESS NOTES
Procedure and risks discuss by radiologist prior to the procedure.  Spouse present in the room during this discussion.  Consent signed.     Right sided thoracentesis performed by radiologist, Dr Cowan. 2000 ml of clear yellow fluid removed. Pressure held at site after catheter removed.  Band aid applied. No bleeding noted. Post procedure CXR performed and read by radiologist. The pt tolerated the procedure without difficulty. BP and oxygen saturations remained stable. Hr remained tachycardic.

## 2018-05-14 NOTE — DISCHARGE INSTRUCTIONS
Radiology  Discharge Instructions for Thoracentesis    You have had a thoracentesis procedure today.  A needle or catheter was inserted into your chest to remove fluid for laboratory studies and/or to remove the excess fluid from your chest.    AFTER YOU ARE HOME:    Rest at home today.    Limit heavy physical activity such as lifting, straining, or exercise for 48 hours.  You may resume normal activity in 24 hours.    Resume previous diet and medications.    You may remove the bandage in 24 hours.    CALL YOUR PRIMARY PROVIDER IF:    You develop a temperature over 101oF, or redness at the drainage site.    You have any other questions or concerns.    AFTER HOURS CALL [facility] AT [phone]    COME TO EMERGENCY ROOM IF:    You develop chest pain or shortness of breath, heavy bleeding from the thoracentesis site, severe lightheadedness or fainting.  DO NOT DRIVE YOURSELF.

## 2018-05-14 NOTE — PHARMACY-VANCOMYCIN DOSING SERVICE
Pharmacy Vancomycin Initial Note  Date of Service May 14, 2018  Patient's  1960  57 year old, female    Indication: Sepsis    Current estimated CrCl = Estimated Creatinine Clearance: 91.1 mL/min (based on Cr of 0.72).    Creatinine for last 3 days  2018:  9:55 AM Creatinine 0.72 mg/dL    Recent Vancomycin Level(s) for last 3 days  No results found for requested labs within last 72 hours.      Vancomycin IV Administrations (past 72 hours)      No vancomycin orders with administrations in past 72 hours.                Nephrotoxins and other renal medications (Future)    Start     Dose/Rate Route Frequency Ordered Stop    18 1628  vancomycin (VANCOCIN) 2,000 mg in sodium chloride 0.9 % 500 mL intermittent infusion      2,000 mg  over 2 Hours Intravenous ONCE 18 1627      18 1619  piperacillin-tazobactam (ZOSYN) intermittent infusion 4.5 g      4.5 g  200 mL/hr over 30 Minutes Intravenous ONCE 18 1618            Contrast Orders - past 72 hours (72h ago through future)    Start     Dose/Rate Route Frequency Ordered Stop    18 1052  iopamidol (ISOVUE-370) solution 81 mL      81 mL Intravenous ONCE 18 1051 18 1205                Plan:  1.  Start vancomycin  2000 mg IV once, then 1750 q12h.   2.  Goal Trough Level: 15-20 mg/L   3.  Pharmacy will check trough levels as appropriate in 1-3 Days.    4. Serum creatinine levels will be ordered daily for the first week of therapy and at least twice weekly for subsequent weeks.    5. [place] method utilized to dose vancomycin therapy: Method 2    Shannan Dean

## 2018-05-15 PROBLEM — J91.0 MALIGNANT PLEURAL EFFUSION (H): Status: ACTIVE | Noted: 2017-01-01

## 2018-05-15 PROBLEM — K72.00 SUBACUTE LIVER FAILURE WITHOUT HEPATIC COMA: Status: ACTIVE | Noted: 2017-01-01

## 2018-05-15 NOTE — CARE CONFERENCE
Care Conference Note    Met with patient's  outside patient's room; patient appeared to be sleeping comfortably and was not awakened by us.  Described the events since admission, including that I stopped the empiric antibiotics due to lack of a clear indication and concern that her C diff may be exacerbated on Rx, as well as evidence of worsening hepatic failure due to metastases since she was in the hospital a month ago.     continues to support patient's decision for full code status and ongoing aggressive care.     tells me that patient has an appointment to see Dr. Cassidy in office follow-up tomorrow, 5/16/18, wonders if Dr. Cassidy can see her as an inpatient.  I'll call the office tomorrow to see if this can be arranged.    Demario Viveros MD  Hospitalist

## 2018-05-15 NOTE — PROGRESS NOTES
Brief TELE ICU Note    57 rupindera krystal female metastatic breast CA, RUE DVT on lovenox presenting with dyspnea confusion with septic physiology s/p thoracentesis with 2 L drained. On broad spectrum abx,  LA decreasing.     Tele ICU continue to monitor.     Jonn Goncalves

## 2018-05-15 NOTE — PROGRESS NOTES
Patient continues to be very lethargic this morning. Sleeping in and out. Refuses any activity, didn't want to sit up in chair for meal. Able to transfer to commode with assist, present, attempted to help feed patient, but patient refusing. HR remains 120's. No report of shortenss of air , but LS poor air movement.

## 2018-05-15 NOTE — PROGRESS NOTES
Still poor oral intake due to sleepiness through out shift.  present.patient refusing to eat any thing significant. Will  Follow plan of care.

## 2018-05-15 NOTE — PROGRESS NOTES
[facility] Medicine Progress Note  Date of Service: 05/15/2018    Assessment & Plan   Etta Cervantes is a 57 year old female with a past medical history significant for metastatic breast cancer with mets to lymph nodes, liver, and bone on chemo, hypothyroidism, known right upper extremity DVT on Lovenox, recent C.diff infection, asthma, and allergic rhinitis who presents on 5/14/2018 with shortness of breath, confusion, generalized weakness, fatigue.      Carcinoma of breast metastatic to liver (H)  Bone metastasis (H)  Breast cancer metastasized to right axillary lymph node (H)  Breast cancer diagnosed in January 2015, now with mets to bone, liver, and lymph nodes (right axillary). Patient follows with Dr. Cassidy in oncology. Has been on multiple rounds of chemo. Recently started carboplatin and gemcitabine, after round #1 she had complications including C.diff requiring a 10 day hospitalization from April 23 - May 03, 2018. She completed round #2 of the regimen on 5/11. The following day she worsened with shortness of breath, confusion, lethargy, and generalized weakness, which has persisted and worsened. Overall prognosis is poor, although patient and family not ready to pursue comfort cares or hospice at this time.     - Admit to ICU. Attempted to admit patient to a larger hospital ICU that has inpatient oncology available, but no ICU beds were available on night of admission. Patient would be better served at a hospital that has in-house oncology service and can address her pleural effusions (see below), consider transfer as available based on patient and family's wishes to continue to pursue treatment.  - Consider palliative consult if family is willing.    Subacute hepatic failure due to metastases  Elevation of transaminses, ALP, ,and bili noted, as is albumin 1.8.  INR was recently prolonged; will recheck.  Decreased LOC could reflect hepatic encephalopathy.  Process  is irreversible.  - Check INR, anticipating it will be prolonged.     Pleural effusion  Emphysematous bleb of lung (H)  Patient presented with shortness of breath. CT pulmonary angio was negative for PE, but showed large bilateral pleural effusions, emphysematous bulla, lymphedema, and worsening liver mets. O2 sats around 92% on 3L. Tachycardic. Patient underwent right thoracentesis in the emergency department which drained 2L, after which she was breathing more comfortably, although tachycardia and tachypnea persisted.  - Pleurex catheters is a consideration, as recurrence of these effusions, possibly quickly, is very likely.     Sinus tachycardia  Patient presented with tachycardia in 140's which has persisted despite thoracentesis and attempted adenosine. Rhythm appears to be sinus tachycardia. Possibly secondary to sepsis (see below), although is more likely multifactorial given all of patient's cancer complications and poor prognosis.  - Monitor on tele in the ICU.  - Hesitant to give additional fluid given clinical evidence of whole-body volume overload, and third spacing is likely at albumin 1.8.     Elevated troponin  Presented with troponin 0.080. No chest pain, no ST changes on EKG. Likely secondary to demand ischemia.  - Monitor on tele  - No more troponin rechecks unless chest pain develops     Questionable sepsis  There was concern for sepsis in the emergency department. Patient afebrile, no leukocytosis (WBC 8.2) upon presentation, although tachycardic, soft blood pressures, and lactic acid elevation (3.6) which meets sepsis criteria. Chest CT was negative for infiltrate. UA questionable with mild pyuria at 14. Patient was started on Zosyn and vancomycin in the emergency department. No obvious source for infection at this time, combination of sepsis markers may be secondary to patient's overall cancer complications, and lactate elevation may be due to hepatic failure from metastasis.  Where  risk/benefit of empiric antibiotics falls in this patient still on oral vanco for C diff is not clear, but I favor discontinuing empiric antibiotics and watching closely for signs/symptoms of infection.  - Will D/C empiric Zosyn and vancomycin for now.  - Continue with NS @ 100, decrease if HR improves and/or patient taking PO fluids.  - Blood and urine cultures negative to date; follow to completion.     Hyperkalemia  Presented with potassium 5.4, normal at 5.2 this morning.  - Recheck in AM.        C. difficile colitis  Recently diagnosed during hospitalization from April 23 - May 03. Is currently on a course of oral vancomycin. Diarrhea has resolved.  - Continue oral vancomycin, stop date is 5/31/18 (28 day course after most recent discharge 5/3/18).     Acute deep vein thrombosis (DVT) of right upper extremity, unspecified vein (H)  Long-term (current) use of anticoagulants [Z79.01]  DVT recently diagnosed, patient is taking Lovenox prior to admission for treatment. Right arm is edematous, radial pulse palpable - suspect edema is due to worsening axillary node/mass from cancer hindering flow.  - Continue prior to admission Lovenox (although platelets are decreasing, so stop if platelets < 70).  - No ultrasound at this time, as it would not change treatment.     Edema  Extensive edema present throughout, probably due to third spacing from hypoalbuminemia due to hepatic failure.  Can't really try aggressive diuresis at .  - IV NS @ 100 will continue for now until HR improves.  - Check TSH.     Hypothyroidism  Previously diagnosed.  Clinically euthyroid or hypothyroid (diffuse edema/ansarca). Taking levothyroxine prior to admission, no TSH since 8/2017.  - Check TSH.     Moderate persistent asthma  Previously diagnosed, though not currently evident on exam.  Taking albuterol and beclomethasone prior to admission.  - Continue inhalers unchanged.        Decreased energy  Patient was prescribed Ritalin for her  "decreased energy. Patient's  is specifically requesting for this to be continued to help her be more alert. Discussed that this is a stimulant and will increase her heart rate, so it is not advised at this time.  - Continue to hold prior to admission Ritalin.  - Will send NH3 to see if some lethargy/fatigue is hepatic.        Fluids: NS @ 100  Electrolytes: Monitor  Nutrition: Clear liquid, advance as tolerated.     DVT Prophylaxis: Enoxaparin (Lovenox) SQ (therapeutic) as Rx for RUE DVT.  Code Status: Full Code - discussed directly the night of admission with patient's  (patient lethargic and not able to participate in discussion). Discussed poor prognosis with patient's . He states that patient's previous wishes during last hospitalization was to pursue resuscitation in a code situation, and he does not want to go against that at this time.     Lines: Peripheral  Jamison catheter: Not indicated     Disposition: Anticipate discharge in 2+ day(s). Appropriate for inpatient care.     Discussion: As above.  Very ill, much of her disease may turn out to be irreversible.    Attestation:  I have reviewed today's vital signs, notes, medications, labs and imaging.  Amount of time spent providing critical care service today: 45 minutes.    Demario Viveros MD      Interval History   \"Better\".  No additional verbal history obtained from this lethargic patient, though she shakes her head in the negative when asked if she has any pain.    ROS:  No additional positives or negatives obtained from this minimally conversant patient.      Physical Exam   Temp:  [97.8  F (36.6  C)-98.4  F (36.9  C)] 98.2  F (36.8  C)  Pulse:  [137-144] 137  Heart Rate:  [122-140] 126  Resp:  [12-33] 14  BP: ()/(35-99) 114/72  SpO2:  [83 %-100 %] 99 %    Weights:   Vitals:    05/14/18 2300   Weight: 87.3 kg (192 lb 7.4 oz)    Body mass index is 31.06 kg/(m^2).    GENERAL: Lethargic, though awakens to voice or slight nudge " to answer a few questions with one word answers, then appears to fall asleep.  EYES: Eyes grossly normal to inspection, extraocular movements intact  HENT: Nares patent bilaterally.  Nasal mucosa normal, no discharge.   NECK: Trachea midline, no stridor.    RESP: No accessory muscle use at rest.  Symmetrical breath sounds.  Exam limited by patient position and size, but sounds are diminished over left lateral and posterior, with mild inspiratory crackles over right lateral and base.  Expiration not prolonged, no wheeze.  CV: Regular rate and rhythm, tachycardic.  Normal S1 S2, no murmur heard, no extra sound.  Diffuse edema over face, trunk, extremities.  ABDOMEN: Soft, non-tender, no guarding.  Liver and spleen not palpable, no masses palpable.  Bowel sounds positive.  MS: No bony deformities noted.  No red or inflamed joints.  SKIN: Warm and dry, no rashes where skin visible.  NEURO: Somnolent, minimally conversant.  Cranial nerves II - XII grossly intact.  No gross motor or sensory deficits to limited exam.    PSYCH: Deferred in this somnolent patient.      Data     Recent Labs  Lab 05/15/18  0535 05/14/18  0955 05/11/18  0940 05/09/18  0910   WBC  --  8.2 8.7 8.0   HGB  --  11.4* 12.1 11.8   MCV  --  88 88 87   PLT  --  98* 207 192    134 134 137   POTASSIUM 5.2 5.4* 4.7 4.4   CHLORIDE 105 101 102 105   CO2 29 27 23 22   BUN 33* 35* 15 12   CR 0.69 0.72 0.64 0.67   ANIONGAP 4 6 9 10   BEN 9.4 9.8 9.6 9.2   GLC 91 109* 106* 118*   ALBUMIN 1.8* 2.2* 2.2* 2.2*   PROTTOTAL 5.1* 6.1* 6.4* 6.1*   BILITOTAL 4.9* 4.1* 2.6* 1.9*   ALKPHOS 803* 972* 984* 899*   ALT 50 58* 42 37   * 293* 219* 194*   TROPI  --  0.080*  --   --          Recent Labs  Lab 05/15/18  0535 05/14/18  0955 05/11/18  0940 05/09/18  0910   GLC 91 109* 106* 118*        Unresulted Labs Ordered in the Past 30 Days of this Admission     Date and Time Order Name Status Description    5/15/2018 0325 Lactic acid whole blood In process      5/15/2018 0001 CBC with platelets In process     5/14/2018 2310 Blood culture Preliminary     5/14/2018 2310 Procalcitonin In process     5/14/2018 2307 Methicillin Resist/Sens S. aureus PCR In process     5/14/2018 1619 Urine Culture Aerobic Bacterial Preliminary     5/14/2018 1000 Blood culture Preliminary            Imaging  Recent Results (from the past 24 hour(s))   POC US ECHO LIMITED    Impression    [facility] [place] Procedure Note      Limited Bedside ED Cardiac Ultrasound:    PROCEDURE: PERFORMED BY: Dr. Emmanuel Hester  INDICATIONS/SYMPTOM:  Shortness of Breath  PROBE: Cardiac phased array probe  BODY LOCATION: Chest  FINDINGS:   The ultrasound was performed utilizing the subcostal and parasternal short and long axis views.  Cardiac contractility:  Present  Gross estimation of cardiac kinesis: hyperkinetic  Pericardial Effusion: No obvious effusion though difficult to see completely around the heart and a small effusion could have been missed.  RV:LV ratio: not great views to assess  IVC:    Diameter:  IVC diameter expiration (IVCe) 1-2 cm                                                   IVC diameter inspiration (IVCi) same                                                 INTERPRETATION: The ultrasound was difficult given her underlying illness and body habitus.  The patient has a hyperdynamic heart.  No evidence for effusion though I did not have great views, as above.  The vena cava is collapsed throughout.  IMAGE DOCUMENTATION: Images were archived to hard drive.         CT Chest Pulmonary Embolism w Contrast    Narrative    CT CHEST PULMONARY EMBOLISM WITH CONTRAST May 14, 2018 12:23 PM     HISTORY: Chest pain and tachycardia with acute respiratory failure.  History of breast cancer.    TECHNIQUE: Helical axial scans from lung apices through lung bases  with 81 mL Isovue 370 IV contrast. Radiation dose for this scan was  reduced using automated exposure control, adjustment of the mA and/or  kV  according to patient size, or iterative reconstruction technique.    COMPARISON: 3/19/2018.    FINDINGS: No CT evidence for pulmonary embolism or other acute  vascular abnormality of the chest. Interval significant increase in  previously seen bilateral pleural effusions which are now large on  both sides. There is associated atelectatic change. This is  superimposed on a large emphysematous bulla in the right apex which is  not significantly changed. Mild nodular pericardial thickening without  change. Incompletely visualized right axillary adenopathy has probably  slightly increased. Subcutaneous edema, right greater than left,  presumably lymphedema. No definite mediastinal or hilar adenopathy. An  [id number] catheter is again noted, unchanged in position.    Visualized upper abdomen shows interval progression of innumerable  liver metastases with respect to both size and number. Biliary duct  stent is again noted.      Impression    IMPRESSION:  1. No evidence for pulmonary embolism.  2. Large bilateral pleural effusions with associated atelectatic  changes, significantly increased since 3/19/2018.  3. Stable mild nodular pericardial thickening and large right apical  emphysematous bulla.  4. Increased subcutaneous edema, right greater than left, presumably  lymphedema.  5. Persistent and possibly slightly increased right axillary  adenopathy.  6. Progression of innumerable liver metastases in the visualized  liver.    BRIGHT LENTZ MD   XR Chest 1 View    Narrative    CHEST ONE VIEW  5/14/2018 3:24 PM     HISTORY: Post thoracentesis.    COMPARISON: Chest CT from today.      Impression    IMPRESSION: Prominent decrease in right effusion. No pneumothorax.  MediPort type catheter in place. Moderate left pleural effusion.     KEYONNA JONES MD   Thoracentesis US    Narrative    ULTRASOUND THORACENTESIS  5/14/2018 3:31 PM     HISTORY: Bilateral pleural effusions large, moderate to severe  shortness of breath.       COMPARISON: CT scan from today.      Impression    IMPRESSION: The risks, benefits, and alternatives to right  thoracentesis were thoroughly discussed with patient and her .  She has been on Lovenox but her last dose was yesterday evening at  8:00 PM. She is having difficulty breathing and the procedure was  performed with her consent. Informed consent was signed.    Using usual strict sterile technique, 1% lidocaine for local  anesthesia, and fluoroscopic guidance, a thoracentesis catheter was  placed into the right posterior chest. 2 L of clear straw-colored  fluid were removed. No complications.    KEYONNA JONES MD        I reviewed all new labs and imaging results over the last 24 hours. I personally reviewed no images or EKG's today.    Medications     sodium chloride 100 mL/hr at 05/15/18 0531       diclofenac  2 g Transdermal 4x Daily     enoxaparin  90 mg Subcutaneous Q12H     fluticasone furoate  1 puff Inhalation Daily     levothyroxine  25 mcg Oral Daily     piperacillin-tazobactam  4.5 g Intravenous Q6H     sodium chloride (PF)  10 mL Intracatheter Q7 Days     vancomycin  125 mg Oral 4x Daily     vancomycin (VANCOCIN) IV  1,750 mg Intravenous Q12H       Demario Viveros MD

## 2018-05-15 NOTE — PROGRESS NOTES
Writer has reviewed initial/admission Skin assessment and Jean-Pierre assessment and agrees with documentation and assessment findings.

## 2018-05-15 NOTE — PROVIDER NOTIFICATION
Notified MD (Dr. Mckay) regarding patient's elevated HR. Patient consistently in 130s since writer took over for previous RN. Appears to be ST, BPs stable at this time & patient sleeping on cart.   -Spoke w/admitting PA (Dea) to discuss tachycardia & at this time she has instructed to continue to monitor, no interventions ordered.

## 2018-05-15 NOTE — PROGRESS NOTES
Return phone call from provider on call Stacy Melara, discussed increased HR with no further orders received at this time given,  Continue to monitor and support with plan of care.

## 2018-05-15 NOTE — PROGRESS NOTES
"[facility] ADMISSION NOTE    Patient admitted to room 1008 at approximately 2300 via cart from emergency room. Patient was accompanied by transport tech and nurse.     Verbal SBAR report received from RIANA Garrido prior to patient arrival.     Patient trasferred to bed via air teresa. Patient alert and oriented X 2. Pain is controlled without any medications.  . Admission vital signs: Blood pressure 102/60, pulse 137, temperature 97.8  F (36.6  C), temperature source Axillary, resp. rate 13, height 1.676 m (5' 6\"), weight 87.3 kg (192 lb 7.4 oz), last menstrual period 02/06/2013, SpO2 99 %, not currently breastfeeding. Patient was oriented to plan of care, call light, bed controls, tv, telephone, bathroom and visiting hours.     Risk Assessment    The following safety risks were identified during admission: fall. Yellow risk band applied: YES.     Skin Initial Assessment    This writer admitted this patient and completed a full skin assessment and Jean-Pierre score in the Adult PCS flowsheet. Appropriate interventions initiated as needed.     Secondary skin check completed by Katie Vásquez RN    Skin  Inspection of bony prominences: Full  Inspection under devices: Full  Skin WDL:  WDL except, all  Skin Color/Characteristics: bruised (ecchymotic), pale  Skin Temperature: cool  Skin Moisture: dry  Skin Elasticity: slow return to original state  Skin Integrity: bruise(s), cracked, scab(s)    Jean-Pierre Risk Assessment  Sensory Perception: 3-->slightly limited  Moisture: 3-->occasionally moist  Activity: 3-->walks occasionally  Mobility: 3-->slightly limited  Nutrition: 2-->probably inadequate  Friction and Shear: 2-->potential problem  Jean-Pierre Score: 16  Bed Support Surface: Atmos Air mattress    Cindy Johanson    "

## 2018-05-15 NOTE — H&P
[facility]    History and Physical  Hospital Medicine       Date of Admission:  5/14/2018  Date of Service: 5/14/2018     CC: shortness of breath, confusion, generalized weakness, fatigue    Assessment & Plan   Etta Cervantes is a 57 year old female with a past medical history significant for metastatic breast cancer with mets to lymph nodes, liver, and bone on chemo, hypothyroidism, known right upper extremity DVT on Lovenox, recent C.diff infection, asthma, and allergic rhinitis who presents on 5/14/2018 with shortness of breath, confusion, generalized weakness, fatigue.    Carcinoma of breast metastatic to liver (H)  Bone metastasis (H)  Breast cancer metastasized to axillary lymph node (H)  Cancer associated pain  Breast cancer diagnosed in January 2015, now with mets to bone, liver, and lymph nodes (right axillary). Patient follows with Dr. Cassidy in oncology. Has been on multiple rounds of chemo. Recently started carboplatin and gemcitabine, after round #1 she had complications including C.diff requiring a 10 day hospitalization from April 23 - May 03, 2018. She completed round #2 of the regimen on 5/11. The following day she worsened with shortness of breath, confusion, lethargy, and generalized weakness, which has persisted and worsened. Overall prognosis is poor, although patient and family not ready to pursue comfort cares or hospice at this time.    - Admit to ICU. Attempted to admit patient to a larger hospital ICU that has inpatient oncology available, but no ICU beds were available on night of admission. Patient would be better served at a hospital that has in-house oncology service and can address her pleural effusions (see below), consider transfer as available based on patient and family's wishes to continue to pursue treatment.  - Consider palliative consult if family is willing      Pleural effusion  Emphysematous bleb of lung (H)  Patient presented with shortness of  breath. CT pulmonary angio was negative for PE, but showed bilateral pleural effusions, emphysematous bulla, lymphedema, and worsening liver mets. O2 sats around 92% on 3L. Tachycardic. Patient underwent thoracentesis in the emergency department and drained 2L from the right lung, after which she was breathing more comfortably, although tachycardia persisted.  - Admit to ICU  - Patient would benefit from a chest tube, Pleurex catheters, and/or pleurodesis, which is not available at [facility]. If patient wishes to pursue further treatment, she may need to be transferred to a higher acuity hospital (no ICU beds were available at outside hospitals within network)      Sinus tachycardia  Patient presented with tachycardia in 140's which has persisted despite thoracentesis and attempted adenosine. EKG appears to be sinus tachycardia. Possibly secondary to sepsis (see below), although is more likely multifactorial given all of patient's cancer complications and poor prognosis.  - Monitor on tele in the ICU  - No beta blocker at this time    Elevated troponin  Presented with troponin 0.080. No chest pain, no ST changes on EKG. Likely secondary to demand ischemia.  - Monitor on tele  - No more troponin rechecks unless chest pain develops      Questionable sepsis  There was concern for sepsis in the emergency department. Patient afebrile, no leukocytosis (WBC 8.2) upon presentation, although tachycardic, soft blood pressures, and lactic acid elevation (3.6) which meets sepsis criteria. Chest CT was negative for infiltrate. UA questionable with mild pyuria at 14. Patient was started on Zosyn and vancomycin in the emergency department. No obvious source for infection at this time, combination of sepsis elements may be secondary to patient's overall cancer complications and poor prognosis.  - Continue Zosyn and vancomycin for empiric coverage  - Lactic acid trending down 3.6 -> 2.4 after 2L IV fluids  - Continue with NS @  100  - Blood and urine cultures pending    Hyperkalemia  Presented with potassium 5.4, only minimal elevation. No peaked T waves on EKG.  - Monitor on tele      C. difficile colitis  Recently diagnosed during hospitalization from April 23 - May 03. Is currently on a course of oral vancomycin. Diarrhea has resolved  - Continue oral vancomycin     Acute deep vein thrombosis (DVT) of right upper extremity, unspecified vein (H)  Long-term (current) use of anticoagulants [Z79.01]  DVT recently diagnosed, patient is taking Lovenox prior to admission for treatment. Right arm is edematous, radial pulse palpable - suspect edema is due to worsening axillary node enlargement hindering flow.  - Continue prior to admission Lovenox (although platelets are decreasing, so stop if platelets < 70)  - No ultrasound at this time, as it would not change treatment      Elevated liver enzymes / known liver mets  Presented with total bilirubin 4.1, alk phos 972, ALT 58, . LFT elevation is chronic. Abdominal ultrasound during previous hospitalization did not show any obstruction. LFT elevation is likely due to liver mets.  - No intervention, monitor    Edema  Edema present in bilateral lower extremities as well as right upper extremity (but not left). Suspect there is an element of third spacing or nephrotic syndrome with hypoalbuminemia and low protein.  - IV NS @ 100 as above for possible sepsis, although benefit of ongoing hydration needs to be reevaluated in the morning      Hypothyroidism  Previously diagnosed. Taking levothyroxine prior to admission, continue.      Moderate persistent asthma  Previously diagnosed. Taking albuterol and beclomethasone prior to admission, continue.      Decreased energy  Patient was prescribed Ritalin for her decreased energy. Patient's  is specifically requesting for this to be continued to help her be more alert. Discussed that this is a stimulant and will increase her heart rate, so it  is not advised at this time.  - Hold prior to admission Ritalin      Fluids: NS @ 100  Electrolytes: Monitor  Nutrition: clear liquid, advance as tolerated    DVT Prophylaxis: Enoxaparin (Lovenox) SQ  Code Status: Full Code - discussed directly with patient's  (patient lethargic and not able to participate in discussion). Discussed poor prognosis with patient's . He states that patient's previous wishes during last hospitalization was to pursue resuscitation in a code situation, and he does not want to go against that at this time.    Lines: Peripheral  Jamison catheter: Not indicated    Disposition: Anticipate discharge in 2+ day(s). Appropriate for inpatient care.    Discussion: I discussed case with Dr. Viveros over the phone. I discussed care plan with patient and her .    Stacy Melara PA-C  [facility]  Pager: [phone]          Primary Care Physician   Johana Fairbanks [phone]    History is obtained from the patient, her spouse, and review of old records via the EMR.    Past Medical History      Past Medical History:   Diagnosis Date     ASCUS favor benign 1/2015    Neg HPV     Cancer (H)      Cataract 2010    surgery both eyes     Emphysematous bleb of lung (H)      Fibroids      Hypercholesterolemia      Hypothyroid      Incontinence of urine     mixed     Menorrhagia      Obesity      Pneumothorax      PONV (postoperative nausea and vomiting)      Sleep apnea     uses a cpap     Uncomplicated asthma        Past Surgical History     Past Surgical History:   Procedure Laterality Date     BIOPSY  Jan 2015, 2016    Breast cancer, thyroid     BRONCHOSCOPY, INSERT BRONCHIAL VALVE N/A 7/20/2017    Procedure: BRONCHOSCOPY, INSERT BRONCHIAL VALVE;  Flexible Bronchoscopy, Endobronchial Valve Insertion X5. 4 Valves into right upper lobe and 1 valve into Right middle lobe;  Surgeon: Carlos Mcgowan MD;  Location: [facility] OR     BRONCHOSCOPY, REMOVE BRONCHIAL  VALVE N/A 9/7/2017    Procedure: BRONCHOSCOPY, REMOVE BRONCHIAL VALVE;  Flexible Bronchoscopy, Removal Of Endobronchial Valves x 2;  Surgeon: Carlos Mcgowan MD;  Location:  OR     BRONCHOSCOPY, REMOVE BRONCHIAL VALVE N/A 9/28/2017    Procedure: BRONCHOSCOPY, REMOVE BRONCHIAL VALVE;  Flexible Bronchoscopy, Removal Of Intra-Bronchial Valves x 3;  Surgeon: Carlos Mcgowan MD;  Location:  OR     CATARACT IOL, RT/LT  2010     COLONOSCOPY  2010     DILATION AND CURETTAGE, HYSTEROSCOPY, ABLATE ENDOMETRIUM NOVASURE, COMBINED  3/2/2011    COMBINED DILATION AND CURETTAGE, HYSTEROSCOPY, ABLATE ENDOMETRIUM NOVASURE performed by LAURA VARGAS at [facility]     ENDOSCOPIC RETROGRADE CHOLANGIOPANCREATOGRAM N/A 12/8/2017    Procedure: COMBINED ENDOSCOPIC RETROGRADE CHOLANGIOPANCREATOGRAPHY, PLACE TUBE/STENT;  Endoscopic Retrograde Cholangiopancreatogram with biliary sphincterotomy and stent placement;  Surgeon: Guru Coleen Mora MD;  Location:  OR     GENITOURINARY SURGERY  2005    Uretha sling     INSERT PORT VASCULAR ACCESS N/A 2/12/2015    Procedure: INSERT PORT VASCULAR ACCESS;  Surgeon: Noé Schaefer MD;  Location: [facility]     INSERT PORT VASCULAR ACCESS N/A 1/10/2018    Procedure: INSERT PORT VASCULAR ACCESS;  Portacath replacement;  Surgeon: Remi De La Paz MD;  Location: [facility] OR     SURGICAL HISTORY OF -   2005    bladder sling     SURGICAL HISTORY OF -       Cystectomy-lower lip     TUBAL LIGATION  1987        History of Present Illness   Etta Cervantes is a 57 year old female with the above past medical history now presents on 5/14/2018 with shortness of breath, confusion, and generalized weakness. Patient's spouse provides most of the history because of patient's lethargy and fatigue.     Patient underwent round #2 of her latest chemotherapy regimen on 5/11. The following day she developed shortness of breath, confusion, and generalized weakness, which has been worsening since  onset. Patient has been confused and extremely fatigued but has not been sleeping well at home. She has been short of breath but no known cough or wheezing. She has generalized weakness and had a fall out of bed 2 nights ago. She seemed to slip out of bed and landed on her buttocks, but did not hit her head, have a syncopal episode, or loose consciousness. She has been eating and drinking as well as possible, but does have occasional nausea. There is no known chest pain or palpitations. Patient has some low back pain that is not new, and has been responding well to topical Voltaren cream. Patient recently had C.diff and has been compliant with her oral vancomycin, and she no longer has diarrhea. There are no known complaints of dysuria, hematuria, or other urinary abnormalities. No new skin changes. No new pain. Patient's swelling has been worsening, especially in her right arm. Patient's  voices concern for patient's overall health but remains hopeful that her conditions can improve and be treated.      Prior to Admission Medications   Prior to Admission Medications   Prescriptions Last Dose Informant Patient Reported? Taking?   CRANBERRY PO 5/13/2018 at am Spouse/Significant Other Yes Yes   Sig: Take 1 capsule by mouth daily    HYDROcodone-acetaminophen (NORCO) 5-325 MG per tablet 5/14/2018 at 0700 Spouse/Significant Other No Yes   Sig: Take 1-2 tablets by mouth every 4 hours as needed for moderate to severe pain   Phenylephrine-DM-GG (ROBITUSSIN COUGH/COLD CF PO) Unknown at Unknown time Spouse/Significant Other Yes No   Sig: Take 10 mLs by mouth as needed    QVAR 80 MCG/ACT Inhaler 5/13/2018 at pm Spouse/Significant Other Yes Yes   Sig: Spray 1 puff into both nostrils 2 times daily   albuterol (2.5 MG/3ML) 0.083% neb solution 5/13/2018 at Unknown time Spouse/Significant Other No Yes   Sig: Take 1 vial (2.5 mg) by nebulization every 4 hours as needed for shortness of breath / dyspnea   albuterol (VENTOLIN  HFA) 108 (90 BASE) MCG/ACT inhaler 5/13/2018 at Unknown time Spouse/Significant Other No Yes   Sig: Inhale 2 puffs into the lungs every 4 hours as needed   azelastine (ASTELIN) 0.1 % nasal spray More than a month at Unknown time Spouse/Significant Other No No   Sig: Spray 1-2 sprays into both nostrils 2 times daily as needed for rhinitis   Patient not taking: Reported on 5/9/2018   budesonide-formoterol (SYMBICORT) 160-4.5 MCG/ACT Inhaler 5/13/2018 at pm Spouse/Significant Other No Yes   Sig: INHALE 2 PUFFS INTO THE LUNGS 2 TIMES DAILY   calcium carb-cholecalciferol ( CALCIUM 600+D3) 600-500 MG-UNIT CAPS 5/13/2018 at am Spouse/Significant Other Yes Yes   Sig: Take 2 tablets by mouth daily   diclofenac (VOLTAREN) 1 % GEL topical gel 5/14/2018 at 0700 Spouse/Significant Other No Yes   Sig: Place 2 g onto the skin 4 times daily   diphenhydrAMINE (BENADRYL) 25 MG tablet More than a month at Unknown time Spouse/Significant Other No No   Sig: Take 1 tablet (25 mg) by mouth every 8 hours as needed for itching or allergies   enoxaparin (LOVENOX) 100 MG/ML injection 5/13/2018 at pm Spouse/Significant Other No Yes   Sig: Inject 0.9 mLs (90 mg) Subcutaneous every 12 hours   fexofenadine (ALLEGRA ALLERGY) 180 MG tablet More than a month at Unknown time Spouse/Significant Other Yes No   Sig: Take 180 mg by mouth daily as needed for allergies   levothyroxine (SYNTHROID/LEVOTHROID) 25 MCG tablet 5/13/2018 at am Spouse/Significant Other No Yes   Sig: TAKE 1 TABLET BY MOUTH ONCE DAILY.   loratadine (CLARITIN) 10 MG tablet 5/13/2018 at Unknown time Spouse/Significant Other Yes Yes   Sig: Take 10 mg by mouth daily as needed for allergies   methylphenidate (RITALIN) 10 MG tablet 5/13/2018 at 1200 Spouse/Significant Other No Yes   Sig: Take 1 tablet (10 mg) by mouth 2 times daily   metoclopramide (REGLAN) 10 MG tablet Unknown at Unknown time Spouse/Significant Other No No   Sig: Take 1 tablet (10 mg) by mouth 2 times daily as needed    metroNIDAZOLE (METROGEL) 0.75 % vaginal gel 5/11/2018 Spouse/Significant Other No Yes   Sig: Place 1 applicator (5 g) vaginally 2 times daily   order for DME  Spouse/Significant Other No No   Sig: Equipment being ordered: JoviPak post-lumpectomy compression pad x2   order for DME  Spouse/Significant Other No No   Sig: Equipment being ordered: walker - 4 wheeled walker with seat   oxybutynin (DITROPAN XL) 10 MG 24 hr tablet 5/13/2018 at am Spouse/Significant Other Yes Yes   Sig: Take 10 mg by mouth daily   prochlorperazine (COMPAZINE) 10 MG tablet 5/14/2018 at 0700 Spouse/Significant Other No Yes   Sig: Take 1 tablet (10 mg) by mouth every 6 hours as needed (Nausea/Vomiting)   vancomycin (FIRVANQ) 50 mg/mL SOLR 5/14/2018 at 0800 Spouse/Significant Other No Yes   Sig: Take 2.5 mLs (125 mg) by mouth 4 times daily for 28 days      Facility-Administered Medications: None     Allergies   Allergies   Allergen Reactions     Animal Dander Cough     Itchy eyes     Cats      Dust Mites Cough     Itchy eyes     Pollen Extract Other (See Comments)     Cough, Itchy eyes     Seasonal Allergies      Levaquin [Levofloxacin] Rash     States she has violent dreams from taking this       Family History    Family History   Problem Relation Age of Onset     Depression Mother      Eye Disorder Mother      Gynecology Mother      Alzheimer Disease Mother      CANCER Father      Other Cancer Father      HEART DISEASE Maternal Grandfather      Allergies Paternal Grandfather      Allergies Son        Social History   Social History     Social History     Marital status:      Spouse name: Lucian Cervantes     Number of children: 2     Years of education: 15+     Occupational History     Customer Service [employer]     Social History Main Topics     Smoking status: Never Smoker     Smokeless tobacco: Never Used     Alcohol use No     Drug use: No     Sexual activity: Yes     Partners: Male     Birth control/ protection:  Post-menopausal     Other Topics Concern     Parent/Sibling W/ Cabg, Mi Or Angioplasty Before 65f 55m? No     Social History Narrative    April 26, 2018:   for over 30 years to Lucian; live in [place].  Lucian works for [employer] and has some flexibility that allows him to work at home on Fridays and, if needed, on other days.  They have one son and one daughter who live nearby.  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  She never smoked but has asthma, DIAMOND and the largest bleb that most doctors have ever seen, which she attributes to growing up at [place] with constant exposure to environmental dusts and toxins.  She is not affiliated with any Confucianist but has a supportive group of friends and neighbors who have provided most of their meals since the start of the year.  At present, no community/home services.      Ector Campos CNP ([place])    Palliative Care    Private cell:  [phone]        Review of Systems   Information provided by patient's  to the best of his knowledge.    General: No known fever, chills, loss of appetite. Extremely fatigued.  HEENT: No known vertigo, vision changes, nasal congestion.  Pulmonary: No known  coughing, wheezing. Recent shortness of breath.  Cardiovascular: No known chest pain, palpitations. Worsening edema, especially right upper extremity.  Gastrointestinal: No known abdominal pain, nausea, vomiting, diarrhea, constipation, melena, hematochezia, and changes in stool.  Genitourinary: No known  frequency, dysuria, and hematuria.  Neurologic: See HPI regarding fall out of bed. Patient generally weak. Has some chronic numbness in her feet. No known  dizziness, lightheadedness, fainting, headache.   Musculoskeletal: Denies joint and muscle pains other than some right low back pain and right posterior shoulder pain.  Skin: Denies rashes, unexplained bruising, lesions.   Endocrinology: Denies  excessive thirst, urination, sweating, and heat or cold intolerance.      Physical Exam   /61  Pulse 137  Temp 98.4  F (36.9  C) (Oral)  Resp 16  LMP 02/06/2013  SpO2 96%     Weight: 0 lbs 0 oz There is no height or weight on file to calculate BMI.     Constitutional: Lethargic, wakes to voice but quickly falls back asleep. Only provides 1-2 word responses before falling asleep.    Eyes: Eyes are clear, pupils are reactive. No scleral icterus. Extroccular movements intact.     HEENT: Oropharynx is clear and moist, no lesions. Normocephalic, no evidence of cranial trauma.      Cardiovascular: Regular rhythm, rapid rate, normal S1 and S2. No murmur, rubs, or gallops. Peripheral pulses in tact bilaterally. Bilateral +2 lower extremity edema.    Respiratory: Diminished lung sounds at bilateral bases. No wheezes, rhonchi, or crackles.    GI: Soft, non-distended. Non-tender, no rebound or guarding. No hepatosplenomegaly or masses appreciated. Normal bowel sounds.     Musculoskeletal: Without obvious deformity, normal range of motion. Normal muscle bulk and tone. Right upper extremity with +4 edema from shoulder to fingers.     Skin: Warm and dry, no rashes or ecchymoses. No mottling of skin.      Neurologic: Patient moves all extremities. Cranial nerves are grossly intact.  is symmetric. Gross strength and sensation are equal bilaterally.    Genitourinary: Deferred    Data   Data reviewed today:     Recent Labs  Lab 05/14/18  0955 05/11/18  0940 05/09/18  0910   WBC 8.2 8.7 8.0   HGB 11.4* 12.1 11.8   MCV 88 88 87   PLT 98* 207 192    134 137   POTASSIUM 5.4* 4.7 4.4   CHLORIDE 101 102 105   CO2 27 23 22   BUN 35* 15 12   CR 0.72 0.64 0.67   ANIONGAP 6 9 10   BEN 9.8 9.6 9.2   * 106* 118*   ALBUMIN 2.2* 2.2* 2.2*   PROTTOTAL 6.1* 6.4* 6.1*   BILITOTAL 4.1* 2.6* 1.9*   ALKPHOS 972* 984* 899*   ALT 58* 42 37   * 219* 194*   TROPI 0.080*  --   --        Recent Results (from the past 24  hour(s))   POC US ECHO LIMITED    Impression    [facility] Procedure Note      Limited Bedside ED Cardiac Ultrasound:    PROCEDURE: PERFORMED BY: Dr. Emmanuel Hester  INDICATIONS/SYMPTOM:  Shortness of Breath  PROBE: Cardiac phased array probe  BODY LOCATION: Chest  FINDINGS:   The ultrasound was performed utilizing the subcostal and parasternal short and long axis views.  Cardiac contractility:  Present  Gross estimation of cardiac kinesis: hyperkinetic  Pericardial Effusion: No obvious effusion though difficult to see completely around the heart and a small effusion could have been missed.  RV:LV ratio: not great views to assess  IVC:    Diameter:  IVC diameter expiration (IVCe) 1-2 cm                                                   IVC diameter inspiration (IVCi) same                                                 INTERPRETATION: The ultrasound was difficult given her underlying illness and body habitus.  The patient has a hyperdynamic heart.  No evidence for effusion though I did not have great views, as above.  The vena cava is collapsed throughout.  IMAGE DOCUMENTATION: Images were archived to hard drive.         CT Chest Pulmonary Embolism w Contrast    Narrative    CT CHEST PULMONARY EMBOLISM WITH CONTRAST May 14, 2018 12:23 PM     HISTORY: Chest pain and tachycardia with acute respiratory failure.  History of breast cancer.    TECHNIQUE: Helical axial scans from lung apices through lung bases  with 81 mL Isovue 370 IV contrast. Radiation dose for this scan was  reduced using automated exposure control, adjustment of the mA and/or  kV according to patient size, or iterative reconstruction technique.    COMPARISON: 3/19/2018.    FINDINGS: No CT evidence for pulmonary embolism or other acute  vascular abnormality of the chest. Interval significant increase in  previously seen bilateral pleural effusions which are now large on  both sides. There is associated atelectatic change. This is  superimposed  on a large emphysematous bulla in the right apex which is  not significantly changed. Mild nodular pericardial thickening without  change. Incompletely visualized right axillary adenopathy has probably  slightly increased. Subcutaneous edema, right greater than left,  presumably lymphedema. No definite mediastinal or hilar adenopathy. An  Ismosm-n-Bcek catheter is again noted, unchanged in position.    Visualized upper abdomen shows interval progression of innumerable  liver metastases with respect to both size and number. Biliary duct  stent is again noted.      Impression    IMPRESSION:  1. No evidence for pulmonary embolism.  2. Large bilateral pleural effusions with associated atelectatic  changes, significantly increased since 3/19/2018.  3. Stable mild nodular pericardial thickening and large right apical  emphysematous bulla.  4. Increased subcutaneous edema, right greater than left, presumably  lymphedema.  5. Persistent and possibly slightly increased right axillary  adenopathy.  6. Progression of innumerable liver metastases in the visualized  liver.    BRIGHT LENTZ MD   XR Chest 1 View    Narrative    CHEST ONE VIEW  5/14/2018 3:24 PM     HISTORY: Post thoracentesis.    COMPARISON: Chest CT from today.      Impression    IMPRESSION: Prominent decrease in right effusion. No pneumothorax.  MediPort type catheter in place. Moderate left pleural effusion.     KEYONNA JONES MD   Thoracentesis US    Narrative    ULTRASOUND THORACENTESIS  5/14/2018 3:31 PM     HISTORY: Bilateral pleural effusions large, moderate to severe  shortness of breath.      COMPARISON: CT scan from today.      Impression    IMPRESSION: The risks, benefits, and alternatives to right  thoracentesis were thoroughly discussed with patient and her .  She has been on Lovenox but her last dose was yesterday evening at  8:00 PM. She is having difficulty breathing and the procedure was  performed with her consent. Informed consent was  signed.    Using usual strict sterile technique, 1% lidocaine for local  anesthesia, and fluoroscopic guidance, a thoracentesis catheter was  placed into the right posterior chest. 2 L of clear straw-colored  fluid were removed. No complications.    KEYONNA JONES MD       I personally reviewed the chest CT image(s) showing large bilateral pleural effusions.    Stacy Melara PA-C  [facility]  Pager: [phone]

## 2018-05-15 NOTE — PROGRESS NOTES
Fax received from the [facility] requesting an updated attending physician's statement and medical records from February 2018-present.  Form completed, will have Dr. Cassidy review and sign tomorrow when he is here in clinic.  Will fax it back at that time.  Forwarded medical records request to our HIMS/HEIDE department and updated the [facility] that this request was forwarded to the correct department.  Phone and fax number provided to them.  Original forms will be placed in an envelope at the  for patient  and copy will be sent to scanning.

## 2018-05-15 NOTE — PLAN OF CARE
Problem: Cardiac: Heart Failure (Adult)  Goal: Signs and Symptoms of Listed Potential Problems Will be Absent, Minimized or Managed (Cardiac: Heart Failure)  Signs and symptoms of listed potential problems will be absent, minimized or managed by discharge/transition of care (reference Cardiac: Heart Failure (Adult) CPG).   Outcome: Declining  Continues to need 3 LPM of oxygen per Nasal Cannula to maintain saturations at 96%.  Continues to be very drowsy, needing much stimulation to awaken enough for cares.  Incontenient of urine to bed where previously was able to use call light and let staff know needs help to commode.  RUE continues to be more swollen than left with pitting +4 edema and generalized edema including dependent have had legs and arms up on a pillow thru the night.  HR continues in the 120's versus 130's  MD aware will continue to monitor.

## 2018-05-16 PROBLEM — K76.82 HEPATIC ENCEPHALOPATHY (H): Status: ACTIVE | Noted: 2018-01-01

## 2018-05-16 NOTE — PLAN OF CARE
Pt had been restless for the last few hours, couldn't get comfortable in bed, had a neb treatment, had taken 2 Norco for generalized pain.  MD was notified and he ordered Morphine for air hunger and Ativan for anxiety.  Pt did receive the Morphine and she is resting comfortably.  Will continue to monitor close for changes.

## 2018-05-16 NOTE — PLAN OF CARE
Pt had been up to the bedside commode several times within a couple hours but was only voiding  nl's each time.  Pt's HR would increase and it was getting more difficult for her to stand and pivot to the commode. Pt was up to the commode at 2200 and voided only 50 ml's she was bladder scanned for 280.  MD was notified, order was given for a for a burger catheter.  Will continue to monitor close for changes.

## 2018-05-16 NOTE — PROGRESS NOTES
Restless and more air hunger.    Will recheck CXR in AM and consider pleuridex for comfort  Morphine and ativan for now.

## 2018-05-16 NOTE — PROGRESS NOTES
Patient continues to sleep this morning , very lethargic , nonverbal response to staff this morning ,opens eyes briefly, unable to follow simple commands and unable to take oral medications safely. Normal saline bolus infusing. HR continue elevated. Will  continue plan of care.

## 2018-05-16 NOTE — PROGRESS NOTES
[facility]    Hospital Medicine Progress Note  Date of Service: 05/16/2018    Assessment & Plan   Etta Cervantes is a 57 year old female with a past medical history significant for metastatic breast cancer with mets to lymph nodes, liver, and bone on chemo, hypothyroidism, known right upper extremity DVT on Lovenox, recent C.diff infection, asthma, and allergic rhinitis who presents on 5/14/2018 with shortness of breath, confusion, generalized weakness, fatigue.      Carcinoma of breast metastatic to liver (H)  Bone metastasis (H)  Breast cancer metastasized to right axillary lymph node (H)  Breast cancer diagnosed in January 2015, now with mets to bone, liver, and lymph nodes (right axillary). Patient follows with Dr. Cassidy in oncology. Has been on multiple rounds of chemo. Recently started carboplatin and gemcitabine, after round #1 she had complications including C.diff requiring a 10 day hospitalization from April 23 - May 03, 2018. She completed round #2 of the regimen on 5/11. The following day she worsened with shortness of breath, confusion, lethargy, and generalized weakness, which has persisted and worsened. Overall prognosis is poor, although patient and family not ready to pursue comfort cares or hospice at this time; discussed most recently 5/15/18 with .  Overall clinical condition is unimproved from admission, see below for details.     - Continue ICU care. Attempted to admit patient to a larger hospital ICU that has inpatient oncology available, but no ICU beds were available on night of admission. Patient would be better served at a hospital that has in-house oncology service and can address her pleural effusions (see below), consider transfer as available based on patient and family's wishes to continue to pursue treatment.  - Consider palliative consult, though I addressed this with  5/15/18; he and patient continue to endorse all aggressive  measures.    Subacute hepatic failure due to metastases  Elevation of transaminses, ALP, and bili noted, as is severe hypoalbuminemia.  INR is prolonged at 1.66.  Decreased LOC probably reflects hepatic encephalopathy even though NH3 was not elevated when checked this stay.  Process is irreversible.  - Supportive care, follow INR intermittently.     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  Patient presented with shortness of breath. CT pulmonary angio was negative for PE, but showed large bilateral pleural effusions.  O2 sats around 92% on 3L. Tachycardic. Patient underwent right thoracentesis in the emergency department which drained 2L, after which she was breathing more comfortably, although tachycardia and tachypnea persisted.  - Pleurex catheters is a consideration, as recurrence of these effusions, possibly quickly, is very likely.     Sinus tachycardia  Patient presented with tachycardia in 140's which has persisted despite thoracentesis and attempted adenosine. Rhythm appears to be sinus tachycardia. Possibly secondary to sepsis (see below), although is more likely multifactorial given all of patient's cancer complications and poor prognosis.  - Monitor on tele in the ICU.  - Will order echocardiogram to see if the malignant pericardial effusion diagnosed 12/2017 has recurred.  - Hesitant to give additional fluid given clinical evidence of whole-body volume overload, and third spacing is likely at albumin 1.8, but will need to due to oliguria, see below.     Elevated troponin  Presented with troponin 0.080. No chest pain, no ST changes on EKG. Likely secondary to demand ischemia.  - Monitor on tele  - No more troponin rechecks unless chest pain develops     Questionable sepsis  There was concern for sepsis in the emergency department.  Patient afebrile, no leukocytosis (WBC 8.2) upon presentation, although tachycardic, soft blood pressures, and lactic acid elevation (3.6) which met sepsis criteria. Chest CT was negative for infiltrate. UA questionable with mild pyuria at 14, but subsequent culture grew only yeast.  Blood cultures remain negative. Patient was started on Zosyn and vancomycin in the emergency department. No obvious source for infection at this time, combination of sepsis markers may be secondary to patient's overall cancer complications, and lactate elevation may be due to hepatic failure from metastasis.  Where risk/benefit of empiric antibiotics falls in this patient still on oral vanco for C diff is not clear, but seemed to favor discontinuing empiric antibiotics and watching closely for signs/symptoms of infection.  Zosyn and vanco stopped 5/15/18.  Remains afebrile, WBC normal.  Procalcitonin is elevated, but patient has known C diff.  - Continue to observe off of antibiotics.  - Blood and urine cultures negative to date; follow to completion.  - Reculture blood and urine PRN fever.     Hyperkalemia  Presented with potassium 5.4, normal at 5.3 this morning.  - Recheck in AM.        C. difficile colitis  Recently diagnosed during hospitalization from April 23 - May 03. Is currently on a course of oral vancomycin. Diarrhea has resolved.  - Continue oral vancomycin, stop date is 5/31/18 (28 day course after most recent discharge 5/3/18).     Acute deep vein thrombosis (DVT) of right upper extremity, unspecified vein (H)  Long-term (current) use of anticoagulants [Z79.01]  DVT diagnosed 1/17/18, patient has been on enoxaparin for treatment, so has completed 4 months Rx. Right arm remains edematous, but this is probably due to the known right axillary malignant mass.  Had to stop enoxaparin yesterday, 5/15/18, due to thrombocytopenia, even though low platelets are probably due to 5/11/18 chemo, no enoxaparin.  - Will discontinue  enoxaparin with no plans to resume if platelets recover.  She has received the standard 4 month course of anticoagulation, and upper extremity clots have a lower risk of embolization anyway.    Thrombocytopenia  Plts 207K prior to 5/11/18 chemo, counts have slowly decreased since, currently 35K.  WBC is also trending down since chemo, but still in normal range.  - Follow plts.  - If pancytopenia worsens will ask Oncology consult service re use of GM-CSF.    Sinus tachycardia  HR has been 130 - 140 since admission, rhythm consistently sinus.  Tachycardia has persisted despite MSO4 for comfort, lorazepam as anxiolytic, and IVF resuscitation.  Note made of 12/2017 pericardiocentesis for malignant fluid.  - Will repeat echo today to see if effusion has recurred.    Oliguria  Present despite net fluid balance + 3 L since admission.  She has severe third spacing due to hypoalbuminemia, so could conceivably still be intravascularly dry.  - NaCl 500 mL bolus x1, repeat x1 if no increase in UOP.  - If no response to IVF, consider renal ultrasound to exclude obstruction, unlikely given presence of Jamison.     Edema  Extensive edema present throughout, probably due to third spacing from hypoalbuminemia due to hepatic failure.  Can't really try any diuresis at  - 140.  - IV NS @ 100 continues due to oliguria and tachycardia.     Hypothyroidism  Previously diagnosed.  Clinically euthyroid or hypothyroid (diffuse edema/ansarca), TSH normal 5/15/18.  - Continue levothyroxine.       Moderate persistent asthma  Previously diagnosed, though not currently evident on exam.  Taking albuterol and beclomethasone prior to admission.  - Continue inhalers unchanged.    Encephalopathy  Decreased alertness since admission, presumed due to hepatic failure (despite non-elevated NH3).  Exacerbated this morning following lorazepam 1 mg given at about 0400 due to agitation.  - Will hold further lorazepam as it's going to be hard for her to  clear it.  - Monitor mental status.     Decreased energy  Patient was prescribed Ritalin for her decreased energy. Patient's  is specifically requesting for this to be continued to help her be more alert. Discussed that this is a stimulant and will increase her heart rate, so it is not advised at this time.  - Continue to hold prior to admission Ritalin.        Fluids: NS @ 100 with boluses planned as above.  Electrolytes: Monitor  Nutrition: Regular, but not taking much PO.     DVT Prophylaxis: Mechanical.  Enoxaparin (Lovenox) had to be stopped 5/15/18 due to thrombocytopenia.    Code Status: Full Code - discussed again in conference with  5/15/18.     Lines: Peripheral  Jamison catheter: Not indicated     Disposition: Prognosis guarded for survival.  Appropriate for inpatient care.     Discussion: Prognosis very poor for survival/recovery.    Attestation:  I have reviewed today's vital signs, notes, medications, labs and imaging.  Amount of time spent providing critical care service today: 55 minutes.    Demario Viveros MD      Interval History   No verbal history obtained from this somnolent patient.  Opens eyes briefly to my exam, no other response seen.    ROS:  No additional positives or negatives obtained from this minimally conversant patient.      Physical Exam   Temp:  [97.5  F (36.4  C)-98.2  F (36.8  C)] 97.5  F (36.4  C)  Pulse:  [122] 122  Heart Rate:  [122-142] 141  Resp:  [12-29] 12  BP: (104-130)/(43-90) 121/76  SpO2:  [78 %-100 %] 94 %    Weights:   Vitals:    05/14/18 2300 05/15/18 0630 05/16/18 0600   Weight: 87.3 kg (192 lb 7.4 oz) 86.9 kg (191 lb 9.3 oz) 89.8 kg (197 lb 15.6 oz)    Body mass index is 31.95 kg/(m^2).    GENERAL: Somnolent, opens eyes briefly during my exam, no other response seen.  EYES: Eyes grossly normal to inspection, extraocular movements intact to limited exam.  HENT: Nares patent bilaterally.  Nasal mucosa normal, no discharge.   NECK: Trachea midline, no  stridor.    RESP: No accessory muscle use at rest.  Symmetrical breath sounds.  Exam limited by patient position and size, but sounds are diminished over left lateral and posterior, with mild inspiratory crackles over right lateral and base.  Expiration not prolonged, no wheeze.  CV: Regular rate and rhythm, tachycardic.  Normal S1 S2, no murmur heard, no extra sound.  Diffuse edema over face, trunk, extremities.  ABDOMEN: Soft, non-tender, no guarding.  Liver and spleen not palpable, no masses palpable.  Bowel sounds positive.  MS: No bony deformities noted.  No red or inflamed joints.  SKIN: Warm and dry, no rashes where skin visible.  NEURO: Somnolent, non-conversant.  Cranial nerves II - XII grossly intact.  No gross motor or sensory deficits to limited exam.    PSYCH: Deferred in this somnolent patient.      Data     Recent Labs  Lab 05/16/18  0615 05/15/18  1200 05/15/18  0535 05/14/18  0955   WBC 4.2  --  6.0 8.2   HGB 10.5*  --  9.9* 11.4*   MCV 88  --  88 88   PLT 35*  --  56* 98*   INR  --  1.66*  --   --      --  138 134   POTASSIUM 5.3  --  5.2 5.4*   CHLORIDE 107  --  105 101   CO2 26  --  29 27   BUN 36*  --  33* 35*   CR 0.70  --  0.69 0.72   ANIONGAP 5  --  4 6   BEN 8.7  --  9.4 9.8   *  --  91 109*   ALBUMIN 1.9*  --  1.8* 2.2*   PROTTOTAL 5.6*  --  5.1* 6.1*   BILITOTAL 3.9*  --  4.9* 4.1*   ALKPHOS 874*  --  803* 972*   ALT 63*  --  50 58*   *  --  269* 293*   TROPI  --   --   --  0.080*         Recent Labs  Lab 05/16/18  0615 05/15/18  0535 05/14/18  0955 05/11/18  0940 05/09/18  0910   * 91 109* 106* 118*        Unresulted Labs Ordered in the Past 30 Days of this Admission     Date and Time Order Name Status Description    5/14/2018 2310 Blood culture Preliminary     5/14/2018 1619 Urine Culture Aerobic Bacterial Preliminary     5/14/2018 1000 Blood culture Preliminary            Imaging  Recent Results (from the past 24 hour(s))   XR Chest Port 1 View    Narrative     XR CHEST PORT 1 VW 5/16/2018 6:25 AM    COMPARISON: 5/14/2018    HISTORY: Pleural effusions.      Impression    IMPRESSION: Moderate left and small right pleural effusions, not  significantly changed since postthoracentesis comparison radiograph.  No pneumothorax seen on either side. Left subclavian implanted central  venous port tip in the high right atrium.    DENISSE GIL MD        I reviewed all new labs and imaging results over the last 24 hours. I personally reviewed today's CXR, showing unchanged large left and small right pleural effusions from two days earlier, no other interval change.    Medications     sodium chloride 100 mL/hr at 05/15/18 2042     sodium chloride         diclofenac  2 g Transdermal 4x Daily     fluticasone furoate  1 puff Inhalation Daily     levothyroxine  25 mcg Oral Daily     sodium chloride (PF)  10 mL Intracatheter Q7 Days     vancomycin  125 mg Oral 4x Daily       Demario Viveros MD

## 2018-05-16 NOTE — PLAN OF CARE
Problem: Patient Care Overview  Goal: Plan of Care/Patient Progress Review  Outcome: Declining  Pt's lung sounds are diminished t/o, she remains on 3 L of oxygen per NC, sat's are >91%.  HR has been very Tachy 130's- 140's t/o the night.  Pt's weight is also up 2.9 kg from yesterday morning.  She did receive the Morphine at 0100 and then at 0215 1 mg of IV Ativan and she has been sleeping since.  Will continue to monitor close for changes.

## 2018-05-16 NOTE — PROGRESS NOTES
Patient still very somnolent through out shift. Unable to give oral vancomycin due to unsafe swallow at this time. ECHO completed.

## 2018-05-17 NOTE — PROVIDER NOTIFICATION
Pt placed on BiPAP for resp failure.  Titrated press up to 16/6 for returned Vt of ~ 390.  60% FiO2, 95% O2 sats.  Will monitor closely.    BiPAP settings/Parameters:       05/17/18 0640   Tech Time   $Tech Time (10 minute increments) 4   Mode: CPAP/ BiPAP/ AVAPS/ AVAPS AE   CPAP/BiPAP/ AVAPS/ AVAPS AE Mode BiPAP S/T   CPAP/BiPAP/Settings   IPAP/EPAP (cmH2O) 16/6   Rate (breaths/min) 14   Oxygen (%) 60   Timed Inspiration (sec) 0.7   IPAP RISE  Settings (V60) 2   CPAP/BiPAP Patient Parameters   IPAP (cm H2O) 16 cmH2O   EPAP (cm H2O) 6 cmH2O   Pressure Support (cm H2O) 10 cmH2O   RR Total (breaths/min) 17 breaths/min   Vt (mL) 388 mL   Minute Ventilation (L/min) 6.2 L/min   Peak Inspiratory Pressure (cm H2O) 17 cmH2O   Pt.  Leak (L/min) 29 L/min   CPAP/BiPAP/AVAPS/AVAPS AE Alarms   High Pressure (cm H2O) 28 cmH2O   Low Pressure (cm H2O) 6   Low Pressure Delay (sec) 20 sec   Lo Min Vent 2   High Rate (breaths/min) 50 breaths/min   Low Rate (breaths/min) 12

## 2018-05-17 NOTE — CONSULTS
"[facility]    Palliative Care Consultation--Inpatient  Admission Date: 5/14/2018   Visit Date: May 17, 2018  PCP: Johana Fairbanks   Requested by: Omar Hughes MD      HISTORY of PRESENT ILLNESS:  Etta Cervantes is a 57 year old year old female known to me from her last hospitalization following her previous cycle of chemo for metastatic breast ca with liver, lymph and possibly cardiac metastasis; the current chemo is her 6th or 7th regimen since being originally diagnosed in 2015.  Although was quite ill for several days during her last hospitalization, she did eventually improve and returned home with the desire to continue pursing chemo, to \"keep fighting.\"  She underwent her 2nd cycle of reduced-dose Carbo/Gemzar 3 days prior to this current admission and, as before, became too ill to remain at home on the 3rd day post chemo.  Etta was admitted three days ago with dyspnea and altered mental status.  On 5/14 she underwent thoracentesis and had 2 litres of fluid withdrawn; she appears to have re-accumulated fluid again on the right.  She was started on pressors yesterday, and this morning required the initiation of Bipap.  Palliative care was consulted to explore goals.  At the time I met Lucian again, he had already decided to forego resuscitation.        ASSESSMENT & PLAN:    # Widely metastatic R breast cancer with metastasis to liver, bone, lymph  > Through no efforts of mine (many thanks to Dr Hughes and RIANA Mayer), Lucian now agreeing to DNR/DNI    # Generalized edema  >  agreed only to reduce IV rate from 100 to 50  > agreed to stopping pressors    # Cough  > Ynes ESPINOZA        Thank you for the opportunity to be of service to this patient and family.      Ector Campos CNP ([place])  Palliative Care  Private cell:  [phone]     Face to face:   [id number], 75 min, > 50% spent discussing comfort measures, listening, discussing med changes, and coordinating care with  attending and " nursing.          = = = = = = = = = = = = = = = =      PROBLEM LIST  Patient Active Problem List   Diagnosis     Hypothyroid     Fibroids     CARDIOVASCULAR SCREENING; LDL GOAL LESS THAN 160     Menorrhagia     Moderate persistent asthma     DIAMOND (obstructive sleep apnea)     Insomnia     Health Care Home     ASCUS favor benign     Breast cancer (H)     Carcinoma of breast metastatic to liver (H)     Bone metastasis (H)     Breast cancer metastasized to axillary lymph node (H)     Drug-related hair loss     Mucositis due to chemotherapy     Secondary malignant neoplasm of liver (H)     Thyroid nodule     Emphysematous bleb of lung (H)     Chemotherapy-induced neutropenia (H)     Hyperlipidemia LDL goal <130     Pneumothorax     Acute pyelonephritis     Subacute liver failure without hepatic coma     Biliary obstruction     Cancer associated pain     Malignant pleural effusions, bilateral     Thrush     C. difficile colitis     Acute deep vein thrombosis (DVT) of right upper extremity, unspecified vein (H)     Long-term (current) use of anticoagulants [Z79.01]     Secondary hypotension     Acute cystitis      Prolonged INR     Diarrhea of presumed infectious origin     Urinary tract infection     Sinus tachycardia     Hepatic encephalopathy (H)       PAST MEDICAL HISTORY  Past Medical History:   Diagnosis Date     ASCUS favor benign 1/2015    Neg HPV     Cancer (H)      Cataract 2010    surgery both eyes     Emphysematous bleb of lung (H)      Fibroids      Hypercholesterolemia      Hypothyroid      Incontinence of urine     mixed     Menorrhagia      Obesity      Pneumothorax      PONV (postoperative nausea and vomiting)      Sleep apnea     uses a cpap     Uncomplicated asthma        PAST SURGICAL HISTORY  Past Surgical History:   Procedure Laterality Date     BIOPSY  Jan 2015, 2016    Breast cancer, thyroid     BRONCHOSCOPY, INSERT BRONCHIAL VALVE N/A 7/20/2017    Procedure: BRONCHOSCOPY, INSERT BRONCHIAL VALVE;   Flexible Bronchoscopy, Endobronchial Valve Insertion X5. 4 Valves into right upper lobe and 1 valve into Right middle lobe;  Surgeon: Carlos Mcgowan MD;  Location:  OR     BRONCHOSCOPY, REMOVE BRONCHIAL VALVE N/A 9/7/2017    Procedure: BRONCHOSCOPY, REMOVE BRONCHIAL VALVE;  Flexible Bronchoscopy, Removal Of Endobronchial Valves x 2;  Surgeon: Carlos Mcgowan MD;  Location:  OR     BRONCHOSCOPY, REMOVE BRONCHIAL VALVE N/A 9/28/2017    Procedure: BRONCHOSCOPY, REMOVE BRONCHIAL VALVE;  Flexible Bronchoscopy, Removal Of Intra-Bronchial Valves x 3;  Surgeon: Carlos Mcgowan MD;  Location:  OR     CATARACT IOL, RT/LT  2010     COLONOSCOPY  2010     DILATION AND CURETTAGE, HYSTEROSCOPY, ABLATE ENDOMETRIUM NOVASURE, COMBINED  3/2/2011    COMBINED DILATION AND CURETTAGE, HYSTEROSCOPY, ABLATE ENDOMETRIUM NOVASURE performed by LAURA VARGAS at [facility]     ENDOSCOPIC RETROGRADE CHOLANGIOPANCREATOGRAM N/A 12/8/2017    Procedure: COMBINED ENDOSCOPIC RETROGRADE CHOLANGIOPANCREATOGRAPHY, PLACE TUBE/STENT;  Endoscopic Retrograde Cholangiopancreatogram with biliary sphincterotomy and stent placement;  Surgeon: Guru Coleen Mora MD;  Location:  OR     GENITOURINARY SURGERY  2005    Uretha sling     INSERT PORT VASCULAR ACCESS N/A 2/12/2015    Procedure: INSERT PORT VASCULAR ACCESS;  Surgeon: Noé Schaefer MD;  Location: [facility]     INSERT PORT VASCULAR ACCESS N/A 1/10/2018    Procedure: INSERT PORT VASCULAR ACCESS;  Portacath replacement;  Surgeon: Remi De La Paz MD;  Location: [facility]     SURGICAL HISTORY OF -   2005    bladder sling     SURGICAL HISTORY OF -       Cystectomy-lower lip     TUBAL LIGATION  1987       FAMILY MEDICAL HISTORY  Family History   Problem Relation Age of Onset     Depression Mother      Eye Disorder Mother      Gynecology Mother      Alzheimer Disease Mother      CANCER Father      Other Cancer Father      HEART DISEASE Maternal Grandfather       Allergies Paternal Grandfather      Allergies Son        SOCIAL HISTORY  History   Smoking Status     Never Smoker   Smokeless Tobacco     Never Used       Alcohol use No     History   Drug Use No     Social History     Social History Narrative    April 26, 2018:   for over 30 years to Lucian; live in [place].  Lucian works for [employer] and has some flexibility that allows him to work at home on Fridays and, if needed, on other days.  They have one son and one daughter who live nearby.  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  She never smoked but has asthma, DIAMOND and the largest bleb that most doctors have ever seen, which she attributes to growing up at [place] with constant exposure to environmental dusts and toxins.  She is not affiliated with any Jainism but has a supportive group of friends and neighbors who have provided most of their meals since the start of the year.  At present, no community/home services.      Ector Campos ([place]), [facility]    Palliative Care    Private cell:  [phone]        SPIRITUAL HISTORY  As above    ALLERGIES  Allergies   Allergen Reactions     Animal Dander Cough     Itchy eyes     Cats      Dust Mites Cough     Itchy eyes     Pollen Extract Other (See Comments)     Cough, Itchy eyes     Seasonal Allergies      Levaquin [Levofloxacin] Rash     States she has violent dreams from taking this       MEDICATIONS  Medications Prior to Admission    No current facility-administered medications on file prior to encounter.   Current Outpatient Prescriptions on File Prior to Encounter:  albuterol (2.5 MG/3ML) 0.083% neb solution Take 1 vial (2.5 mg) by nebulization every 4 hours as needed for shortness of breath / dyspnea   albuterol (VENTOLIN HFA) 108 (90 BASE) MCG/ACT inhaler Inhale 2 puffs into the lungs every 4 hours as needed   budesonide-formoterol (SYMBICORT) 160-4.5 MCG/ACT Inhaler INHALE 2 PUFFS INTO  THE LUNGS 2 TIMES DAILY   calcium carb-cholecalciferol ( CALCIUM 600+D3) 600-500 MG-UNIT CAPS Take 2 tablets by mouth daily   CRANBERRY PO Take 1 capsule by mouth daily    diclofenac (VOLTAREN) 1 % GEL topical gel Place 2 g onto the skin 4 times daily   enoxaparin (LOVENOX) 100 MG/ML injection Inject 0.9 mLs (90 mg) Subcutaneous every 12 hours   HYDROcodone-acetaminophen (NORCO) 5-325 MG per tablet Take 1-2 tablets by mouth every 4 hours as needed for moderate to severe pain   levothyroxine (SYNTHROID/LEVOTHROID) 25 MCG tablet TAKE 1 TABLET BY MOUTH ONCE DAILY.   loratadine (CLARITIN) 10 MG tablet Take 10 mg by mouth daily as needed for allergies   methylphenidate (RITALIN) 10 MG tablet Take 1 tablet (10 mg) by mouth 2 times daily   metroNIDAZOLE (METROGEL) 0.75 % vaginal gel Place 1 applicator (5 g) vaginally 2 times daily   oxybutynin (DITROPAN XL) 10 MG 24 hr tablet Take 10 mg by mouth daily   prochlorperazine (COMPAZINE) 10 MG tablet Take 1 tablet (10 mg) by mouth every 6 hours as needed (Nausea/Vomiting)   QVAR 80 MCG/ACT Inhaler Spray 1 puff into both nostrils 2 times daily   vancomycin (FIRVANQ) 50 mg/mL SOLR Take 2.5 mLs (125 mg) by mouth 4 times daily for 28 days   azelastine (ASTELIN) 0.1 % nasal spray Spray 1-2 sprays into both nostrils 2 times daily as needed for rhinitis (Patient not taking: Reported on 5/9/2018)   diphenhydrAMINE (BENADRYL) 25 MG tablet Take 1 tablet (25 mg) by mouth every 8 hours as needed for itching or allergies   fexofenadine (ALLEGRA ALLERGY) 180 MG tablet Take 180 mg by mouth daily as needed for allergies   metoclopramide (REGLAN) 10 MG tablet Take 1 tablet (10 mg) by mouth 2 times daily as needed   order for DME Equipment being ordered: Venu post-lumpectomy compression pad x2   order for DME Equipment being ordered: walker - 4 wheeled walker with seat   Phenylephrine-DM-GG (ROBITUSSIN COUGH/COLD CF PO) Take 10 mLs by mouth as needed        Current Medications    diclofenac   2 g Transdermal 4x Daily     sodium chloride (PF)  10 mL Intracatheter Q7 Days       PRN Medications  albuterol, fexofenadine, glycopyrrolate, HYDROcodone-acetaminophen, metoclopramide, morphine, naloxone, ondansetron **OR** ondansetron, prochlorperazine **OR** prochlorperazine **OR** prochlorperazine, prochlorperazine, sodium chloride (PF)      REVIEW OF SYSTEMS  Unable due to mental status, Bipap    Performance Assessment:    Palliative Performance Scale, v.2     10%  Extensive disease. Bedbound & requires total care. No oral intake. Drowsy or comatose or confused.      PHYSICAL EXAMINATION  Patient Vitals for the past 24 hrs:   BP Temp Temp src Pulse Heart Rate Resp SpO2   05/17/18 1000 91/48 - - - 124 13 100 %   05/17/18 0900 (!) 92/34 - - - 124 16 90 %   05/17/18 0830 (!) 86/52 - - - 124 15 98 %   05/17/18 0800 (!) 88/50 - - - 121 10 -   05/17/18 0738 - - - - 121 - -   05/17/18 0730 (!) 82/49 97.7  F (36.5  C) Oral - 121 13 98 %   05/17/18 0700 (!) 86/53 - - - 122 17 96 %   05/17/18 0647 (!) 81/43 - - - 124 16 94 %   05/17/18 0624 - - - - 124 16 94 %   05/17/18 0606 (!) 85/35 - - - 125 16 92 %   05/17/18 0600 (!) 82/48 - - - 125 - -   05/17/18 0500 - - - - 125 16 92 %   05/17/18 0400 103/59 - - - 125 16 92 %   05/17/18 0300 - - - - 128 - -   05/17/18 0200 100/50 97.6  F (36.4  C) Axillary - 129 20 95 %   05/17/18 0010 - - - - 130 - -   05/16/18 2300 - - - - 131 - -   05/16/18 2200 109/66 - - - 133 20 95 %   05/16/18 2100 - - - - 135 - -   05/16/18 2020 - - - - 138 - -   05/16/18 2000 117/82 98  F (36.7  C) Axillary - 135 24 94 %   05/16/18 1900 - - - - 129 - -   05/16/18 1400 96/71 - - - - 28 94 %   05/16/18 1203 103/69 99  F (37.2  C) Axillary 129 - 16 96 %      Wt Readings from Last 5 Encounters:   05/16/18 197 lb 15.6 oz (89.8 kg)   05/11/18 189 lb 9.6 oz (86 kg)   05/09/18 190 lb (86.2 kg)   05/09/18 190 lb (86.2 kg)   05/01/18 196 lb 3.4 oz (89 kg)     Constitutional:  Minimally responsive on Bipap,  withdrawing to pain; in pain with turns  Eyes:  Pupils sluggish, sclera anicteric  HEENT:  Bipap in place, mouth dry  Lymph/Hematologic:  Dense R axillary lymphadenopathy; superolateral aspect of R breast hard and indurated   Cardiovascular:  Tachy, hypotensive as noted  Respiratory:  Bipap  GI: Soft, non-tender, normal bowel sounds, no hepatosplenomegaly  Genitourinary:  Jamison in place; only 10 cc out last 8 hrs  Musculoskeletal:  Episodic movement of extremities in response to pain  Skin:  Breast indurated as noted above; bruising around umbilicus  Neurological:  Moves R hand/arm only with considerable pain; R radial intact  Psych:  Nonverbal, nonsustained opening of eyes with pain/turning      DATA  ROUTINE ICU LABS (Last four results)  CMP  Recent Labs  Lab 05/17/18  0540 05/16/18  0615 05/15/18  0535 05/14/18  0955    138 138 134   POTASSIUM 6.3* 5.3 5.2 5.4*   CHLORIDE 109 107 105 101   CO2 24 26 29 27   ANIONGAP 6 5 4 6   GLC 80 101* 91 109*   BUN 45* 36* 33* 35*   CR 0.95 0.70 0.69 0.72   GFRESTIMATED 60* 85 87 82   GFRESTBLACK 72 >90 >90 >90   BEN 8.1* 8.7 9.4 9.8   PROTTOTAL 5.6* 5.6* 5.1* 6.1*   ALBUMIN 1.9* 1.9* 1.8* 2.2*   BILITOTAL 2.7* 3.9* 4.9* 4.1*   ALKPHOS 779* 874* 803* 972*   * 274* 269* 293*   ALT 70* 63* 50 58*     CBC  Recent Labs  Lab 05/17/18  0540 05/16/18  0615 05/15/18  0535 05/14/18  0955   WBC 6.0 4.2 6.0 8.2   RBC 3.53* 3.56* 3.44* 3.93   HGB 10.1* 10.5* 9.9* 11.4*   HCT 32.0* 31.4* 30.1* 34.5*   MCV 91 88 88 88   MCH 28.6 29.5 28.8 29.0   MCHC 31.6 33.4 32.9 33.0   RDW 24.8* 25.2* 25.1* 25.2*   PLT 20* 35* 56* 98*     INR  Recent Labs  Lab 05/17/18  0540 05/15/18  1200   INR 2.60* 1.66*     Arterial Blood Gas  Recent Labs  Lab 05/14/18  1020   O2PER 3LPM NC     CT CHEST PULMONARY EMBOLISM WITH CONTRAST May 14, 2018 12:23 PM   IMPRESSION:  1. No evidence for pulmonary embolism.  2. Large bilateral pleural effusions with associated atelectatic  changes, significantly  increased since 3/19/2018.  3. Stable mild nodular pericardial thickening and large right apical  emphysematous bulla.  4. Increased subcutaneous edema, right greater than left, presumably  lymphedema.  5. Persistent and possibly slightly increased right axillary  adenopathy.  6. Progression of innumerable liver metastases in the visualized  liver.     BRIGHT LENTZ MD      Recent Results (from the past 48 hour(s))   XR Chest Port 1 View    Narrative    XR CHEST PORT 1 VW 5/16/2018 6:25 AM    COMPARISON: 5/14/2018    HISTORY: Pleural effusions.      Impression    IMPRESSION: Moderate left and small right pleural effusions, not  significantly changed since postthoracentesis comparison radiograph.  No pneumothorax seen on either side. Left subclavian implanted central  venous port tip in the high right atrium.    DENISSE GIL MD   XR Chest Port 1 View    Narrative    XR CHEST PORT 1 VW 5/17/2018 7:27 AM    HISTORY: Coronal process.    COMPARISON: 5/16/2018.      Impression    IMPRESSION: A single view shows an increase in right pleural fluid  posterior and stable left pleural fluid. Opacities in the lungs can  relate to posterior layering pleural fluid, atelectasis, and less  likely pneumonia (in the appropriate clinical setting). The PowerPort  catheter is stable with its tip in the expected region of the upper  right atrium.     LETI CHASE MD

## 2018-05-17 NOTE — PROGRESS NOTES
Assessment & Plan     Etta Cervantes is a 57 year old female with a past medical history significant for metastatic breast cancer with mets to lymph nodes, liver, and bone on chemo, hypothyroidism, known right upper extremity DVT on Lovenox, recent C.diff infection, asthma, and allergic rhinitis who presents on 5/14/2018 with shortness of breath, confusion, generalized weakness, fatigue.        Carcinoma of breast metastatic to liver (H), peritoneal carcinomatosis  Bone metastasis (H)  Breast cancer metastasized to right axillary lymph node (H)  Breast cancer diagnosed in January 2015, now with mets to bone, liver, and lymph nodes (right axillary). Patient follows with Dr. Cassidy in oncology. Has been on multiple rounds of chemo. Recently started carboplatin and gemcitabine, after round #1 she had complications including C.diff requiring a 10 day hospitalization from April 23 - May 03, 2018. She completed round #2 of the regimen on 5/11. The following day she worsened with shortness of breath, confusion, lethargy, and generalized weakness, which has persisted and worsened. Overall prognosis is poor, although patient and family not ready to pursue comfort cares or hospice at this time; discussed most recently 5/15/18 with .  Overall clinical condition is unimproved from admission, see below for details.      - now resp failure with elevated CO2 and reaccummulation of pleural effusion since yesterday.   -needing BIPAP.  Would need intubation if prolonged resuscitation wished but  is now recognizing that she is actively dying.  May consider limiting cares.       Subacute hepatic failure due to metastases  Elevation of transaminses, ALP, and bili noted, as is severe hypoalbuminemia.  INR is prolonged at 1.66.  Decreased LOC probably reflects hepatic encephalopathy even though NH3 was not elevated when checked this stay.  Process is irreversible.  - Supportive care, follow INR intermittently.  - anasarca due  to low albumen and Ca, INR up to 2.66.  Bili slightly down.    -this is overwhelming carcinomatosis.        Acute hypercapnic resp failure due to Bilateral pleural effusions  I don't find cytology from a previous thoracentesis, though malignant etiology is very likely given extent of patient's malignancy and the previous malignant pericardial effusion 12/2017.  Patient presented with shortness of breath. CT pulmonary angio was negative for PE, but showed large bilateral pleural effusions.  O2 sats around 92% on 3L. Tachycardic. Patient underwent right thoracentesis in the emergency department which drained 2L, after which she was breathing more comfortably, although tachycardia and tachypnea persisted.  - Pleurex catheters is a consideration, as recurrence of these effusions, possibly quickly, is very likely.  -re-accumulation after one day on the right.  Likely playing a role in her acute resp failure , may also be some hypoventilation due to obtunded status.      Acute encephalopathy:   -has been more obtunded since admission, now last 24 hrs minimal responsiveness, pulls off O2 but no intelligible speech.  CO2 narcosis may be playing a role and will see if she improves with BIPAP.        Hypotension/hypoperfusion/Sinus tachycardia  Patient presented with tachycardia in 140's which has persisted despite thoracentesis and attempted adenosine. Rhythm appears to be sinus tachycardia. Possibly secondary to sepsis (see below), although is more likely multifactorial given all of patient's cancer complications and poor prognosis.  - Monitor on tele in the ICU.  - Will order echocardiogram to see if the malignant pericardial effusion diagnosed 12/2017 has recurred.  - Hesitant to give additional fluid given clinical evidence of whole-body volume overload, and third spacing is likely at albumin 1.8, but will need to due to oliguria, see below.  - now on 5/17/2018 sunny hypotension, poor UO, anasarca.  Circulatory collapse  due to low albumen, liver failure, 3rd spacing of fluids.  Will give back some fluid and start pressors, but this is all temporizing.        Elevated troponin  Presented with troponin 0.080. No chest pain, no ST changes on EKG. Likely secondary to demand ischemia.  - Monitor on tele  - No more troponin rechecks unless chest pain develops      Questionable sepsis  There was concern for sepsis in the emergency department. Patient afebrile, no leukocytosis (WBC 8.2) upon presentation, although tachycardic, soft blood pressures, and lactic acid elevation (3.6) which met sepsis criteria. Chest CT was negative for infiltrate. UA questionable with mild pyuria at 14, but subsequent culture grew only yeast.  Blood cultures remain negative. Patient was started on Zosyn and vancomycin in the emergency department. No obvious source for infection at this time, combination of sepsis markers may be secondary to patient's overall cancer complications, and lactate elevation may be due to hepatic failure from metastasis.  Where risk/benefit of empiric antibiotics falls in this patient still on oral vanco for C diff is not clear, but seemed to favor discontinuing empiric antibiotics and watching closely for signs/symptoms of infection.  Zosyn and vanco stopped 5/15/18.  Remains afebrile, WBC normal.  Procalcitonin is elevated, but patient has known C diff.  - Continue to observe off of antibiotics.  - Blood and urine cultures negative to date; follow to completion.  - Reculture blood and urine PRN fever.      Hyperkalemia  Presented with potassium 5.4, normal at 5.3 this morning.  - Recheck in AM.  - K up to 6.0.  May in part be due to severe acidosis.            C. difficile colitis  Recently diagnosed during hospitalization from April 23 - May 03. Is currently on a course of oral vancomycin. Diarrhea has resolved.  - Continue oral vancomycin, stop date is 5/31/18 (28 day course after most recent discharge 5/3/18).      Acute deep  vein thrombosis (DVT) of right upper extremity, unspecified vein (H)  Long-term (current) use of anticoagulants [Z79.01]  DVT diagnosed 1/17/18, patient has been on enoxaparin for treatment, so has completed 4 months Rx. Right arm remains edematous, but this is probably due to the known right axillary malignant mass.  Had to stop enoxaparin yesterday, 5/15/18, due to thrombocytopenia, even though low platelets are probably due to 5/11/18 chemo, no enoxaparin.  - Will discontinue enoxaparin with no plans to resume if platelets recover.  She has received the standard 4 month course of anticoagulation, and upper extremity clots have a lower risk of embolization anyway.     Thrombocytopenia  Plts 207K prior to 5/11/18 chemo, counts have slowly decreased since, currently 35K.  WBC is also trending down since chemo, but still in normal range.  - Follow plts.  - If pancytopenia worsens will ask Oncology consult service re use of GM-CSF.  - down to 20K.  Would give platelet transfusion if re-tap of pleural effusion considered.       Sinus tachycardia  HR has been 130 - 140 since admission, rhythm consistently sinus.  Tachycardia has persisted despite MSO4 for comfort, lorazepam as anxiolytic, and IVF resuscitation.  Note made of 12/2017 pericardiocentesis for malignant fluid.  - Will repeat echo today to see if effusion has recurred.     Oliguria  Present despite net fluid balance + 3 L since admission.  She has severe third spacing due to hypoalbuminemia, so could conceivably still be intravascularly dry.  - NaCl 500 mL bolus x1, repeat x1 if no increase in UOP.  - If no response to IVF, consider renal ultrasound to exclude obstruction, unlikely given presence of Jamison.      Edema/anasarca   Extensive edema present throughout, probably due to third spacing from hypoalbuminemia due to hepatic failure.  Can't really try any diuresis at  - 140.  - IV NS @ 100 continues due to oliguria and  tachycardia.      Hypothyroidism  Previously diagnosed.  Clinically euthyroid or hypothyroid (diffuse edema/ansarca), TSH normal 5/15/18.  - Continue levothyroxine.         Moderate persistent asthma  Previously diagnosed, though not currently evident on exam.  Taking albuterol and beclomethasone prior to admission.  - Continue inhalers unchanged.     Encephalopathy  Decreased alertness since admission, presumed due to hepatic failure (despite non-elevated NH3).  Exacerbated this morning following lorazepam 1 mg given at about 0400 due to agitation.  - Will hold further lorazepam as it's going to be hard for her to clear it.  - Monitor mental status.      Decreased energy  Patient was prescribed Ritalin for her decreased energy. Patient's  is specifically requesting for this to be continued to help her be more alert. Discussed that this is a stimulant and will increase her heart rate, so it is not advised at this time.  - Continue to hold prior to admission Ritalin.          Fluids: NS @ 100 with boluses planned as above.  Electrolytes: Monitor  Nutrition: Regular, but not taking much PO.      DVT Prophylaxis: Mechanical.  Enoxaparin (Lovenox) had to be stopped 5/15/18 due to thrombocytopenia.     Code Status: Full Code - discussed again in conference with  5/15/18.      Lines: Peripheral  Jamison catheter: Not indicated      Disposition: Prognosis guarded for survival.  Appropriate for inpatient care.      Discussion: Prognosis very poor for survival/recovery.   finally realizing this.  Will have palliative see.  Holding off on intubation.   Would consider tapping pleural effusions again but INR high, platelets 20, BP low, now on pressors.    Everything we are doing may give her a day or 2.          SUBJECTIVE:   Obtunded last 24 hrs,   This AM some gasping resp, VBGs showed high CO2, pH 7.1  BP low.       ROS:4 point ROS including Respiratory, CV, GI and , other than that noted in the HPI,  is  "negative ,   Unable   Poor UO last 24 hrs.     .  OBJECTIVE:   BP (!) 88/50  Pulse 129  Temp 97.7  F (36.5  C) (Oral)  Resp 10  Ht 1.676 m (5' 6\")  Wt 89.8 kg (197 lb 15.6 oz)  LMP 02/06/2013  SpO2 98%  BMI 31.95 kg/m2    GENERAL APPEARANCE:  Opens eyes, doesn't squeeze      RESP:clear , decreased BSs bases      CV: regular rate and rhythm,  No  murmur , edema: diffuse.       Abdomen: soft, nontender, no liver or spleen enlargement, no masses, BSs normal   Skin: no cyanosis, pallor, or jaundice    CMP  Recent Labs  Lab 05/17/18  0540 05/16/18  0615 05/15/18  0535 05/14/18  0955    138 138 134   POTASSIUM 6.3* 5.3 5.2 5.4*   CHLORIDE 109 107 105 101   CO2 24 26 29 27   ANIONGAP 6 5 4 6   GLC 80 101* 91 109*   BUN 45* 36* 33* 35*   CR 0.95 0.70 0.69 0.72   GFRESTIMATED 60* 85 87 82   GFRESTBLACK 72 >90 >90 >90   BEN 8.1* 8.7 9.4 9.8   PROTTOTAL 5.6* 5.6* 5.1* 6.1*   ALBUMIN 1.9* 1.9* 1.8* 2.2*   BILITOTAL 2.7* 3.9* 4.9* 4.1*   ALKPHOS 779* 874* 803* 972*   * 274* 269* 293*   ALT 70* 63* 50 58*     CBC  Recent Labs  Lab 05/17/18  0540 05/16/18  0615 05/15/18  0535 05/14/18  0955   WBC 6.0 4.2 6.0 8.2   RBC 3.53* 3.56* 3.44* 3.93   HGB 10.1* 10.5* 9.9* 11.4*   HCT 32.0* 31.4* 30.1* 34.5*   MCV 91 88 88 88   MCH 28.6 29.5 28.8 29.0   MCHC 31.6 33.4 32.9 33.0   RDW 24.8* 25.2* 25.1* 25.2*   PLT 20* 35* 56* 98*     INR  Recent Labs  Lab 05/17/18  0540 05/15/18  1200   INR 2.60* 1.66*     Arterial BloodGas    Recent Labs  Lab 05/14/18  1020   O2PER 3LPM NC      Venous Blood Gas    Recent Labs  Lab 05/17/18  0724 05/17/18  0540 05/14/18  1020   PHV 7.19* 7.11* 7.34   PCO2V 60* 75* 52*   PO2V 50* 49* 38   HCO3V 23 24 28   ERIN  --   --  1.3   O2PER  --   --  3LPM NC       Medications     diclofenac  2 g Transdermal 4x Daily     fluticasone furoate  1 puff Inhalation Daily     levothyroxine  25 mcg Oral Daily     phytonadione  5 mg Intravenous Once     sodium chloride (PF)  10 mL Intracatheter Q7 Days     " vancomycin  125 mg Oral 4x Daily       Intake/Output Summary (Last 24 hours) at 05/17/18 0852  Last data filed at 05/17/18 0700   Gross per 24 hour   Intake             2800 ml   Output              390 ml   Net             2410 ml

## 2018-05-17 NOTE — PLAN OF CARE
Problem: Cardiac: Heart Failure (Adult)  Goal: Signs and Symptoms of Listed Potential Problems Will be Absent, Minimized or Managed (Cardiac: Heart Failure)  Signs and symptoms of listed potential problems will be absent, minimized or managed by discharge/transition of care (reference Cardiac: Heart Failure (Adult) CPG).   Pt moving BUE scratching and rubbing herself, rpositioned pt and she has a resp rate of 24 but with some abdominal muscle use and gasping mouth movements. Medicated with IV morphine for air hunger. Assess effectiveness.

## 2018-05-17 NOTE — DISCHARGE SUMMARY
[facility]   [facility]    Etta Cervantes MRN# [id number]   Age: 57 year old YOB: 1960     Date of Admission:  5/14/2018  Date of Death::        Home clinic:[facility]  PCP:  Johana Fairbanks       Identification and Chief Compaint: Etta Cervantes is a 57 year old female who presented on  5/14/2018 with complaint of weakness, confusion, shortness of breath..    Diagnosis at Death:       Carcinoma of breast metastatic to liver (H)    Bone metastasis (H)    Breast cancer metastasized to axillary lymph node (H)    Metastatic pleural effusions    hypercapnicRespiratory failure secondary to large pleural effusions plus central hypoventilation    Altered mental status secondary to hepatic encephalopathy    Anasarca secondary to diffuse carcinomatosis    Hypothyroid    Moderate persistent asthma    Breast cancer (H)    Emphysematous bleb of lung (H)    Subacute liver failure     Cancer associated pain    Malignant pleural effusions, bilateral    C. difficile colitis    Acute deep vein thrombosis (DVT) of right upper extremity, unspecified vein (H)    Long-term (current) use of anticoagulants [Z79.01]    Sinus tachycardia    Hepatic encephalopathy (H)    * No resolved hospital problems. *      Procedures: Thoracentesis of 2000 cc    Consultations: Palliative care    Hospital Course:    Carcinoma of breast metastatic to liver (H), peritoneal carcinomatosis  Bone metastasis (H)  Breast cancer metastasized to right axillary lymph node (H)  Breast cancer diagnosed in January 2015, now with mets to bone, liver, and lymph nodes (right axillary). Patient follows with Dr. Cassidy in oncology. Has been on multiple rounds of chemo. Recently started carboplatin and gemcitabine, after round #1 she had complications including C.diff requiring a 10 day hospitalization from April 23 - May 03, 2018. She completed round #2 of the regimen on 5/11. The following day she worsened with shortness  of breath, confusion, lethargy, and generalized weakness, which has persisted and worsened. Overall prognosis is poor, although patient and family not ready to pursue comfort cares or hospice at this time; discussed most recently 5/15/18 with .  Overall clinical condition is unimproved from admission, see below for details.      - now resp failure with elevated CO2 and reaccummulation of pleural effusion since yesterday.   -needing BIPAP.  Would need intubation if prolonged resuscitation wished but  is now recognizing that she is actively dying.  May consider limiting cares.        Subacute hepatic failure due to metastases  Elevation of transaminses, ALP, and bili noted, as is severe hypoalbuminemia.  INR is prolonged at 1.66.  Decreased LOC probably reflects hepatic encephalopathy even though NH3 was not elevated when checked this stay.  Process is irreversible.  - Supportive care, follow INR intermittently.  - anasarca due to low albumen and Ca, INR up to 2.66.  Bili slightly down.    -this is overwhelming carcinomatosis.        Acute hypercapnic resp failure due to Bilateral pleural effusions  I don't find cytology from a previous thoracentesis, though malignant etiology is very likely given extent of patient's malignancy and the previous malignant pericardial effusion 12/2017.  Patient presented with shortness of breath. CT pulmonary angio was negative for PE, but showed large bilateral pleural effusions.  O2 sats around 92% on 3L. Tachycardic. Patient underwent right thoracentesis in the emergency department which drained 2L, after which she was breathing more comfortably, although tachycardia and tachypnea persisted.  - Pleurex catheters is a consideration, as recurrence of these effusions, possibly quickly, is very likely.  -re-accumulation after one day on the right.  Likely playing a role in her acute resp failure , may also be some hypoventilation due to obtunded status.    -patient  ultimately decompensated severely with marked acute hypercapnic respiratory failure and acidosis.  -Her middle status failed to improve despite improvement in her carbon dioxide levels on BiPAP,  -Long discussions held with family. She was also doubly made comfort cares. She  within 1 hour of stopping the BiPAP and pressors.     Acute encephalopathy:   -has been more obtunded since admission, now last 24 hrs minimal responsiveness, pulls off O2 but no intelligible speech.  CO2 narcosis may be playing a role and will see if she improves with BIPAP.        Hypotension/hypoperfusion/Sinus tachycardia  Patient presented with tachycardia in 140's which has persisted despite thoracentesis and attempted adenosine. Rhythm appears to be sinus tachycardia. Possibly secondary to sepsis (see below), although is more likely multifactorial given all of patient's cancer complications and poor prognosis.  - Monitor on tele in the ICU.  - Will order echocardiogram to see if the malignant pericardial effusion diagnosed 2017 has recurred.  - Hesitant to give additional fluid given clinical evidence of whole-body volume overload, and third spacing is likely at albumin 1.8, but will need to due to oliguria, see below.  - now on 2018 sunny hypotension, poor UO, anasarca.  Circulatory collapse due to low albumen, liver failure, 3rd spacing of fluids.  Will give back some fluid and start pressors, but this is all temporizing.        Elevated troponin  Presented with troponin 0.080. No chest pain, no ST changes on EKG. Likely secondary to demand ischemia.  - Monitor on tele  - No more troponin rechecks unless chest pain develops      Questionable sepsis  There was concern for sepsis in the emergency department. Patient afebrile, no leukocytosis (WBC 8.2) upon presentation, although tachycardic, soft blood pressures, and lactic acid elevation (3.6) which met sepsis criteria. Chest CT was negative for infiltrate. UA questionable  with mild pyuria at 14, but subsequent culture grew only yeast.  Blood cultures remain negative. Patient was started on Zosyn and vancomycin in the emergency department. No obvious source for infection at this time, combination of sepsis markers may be secondary to patient's overall cancer complications, and lactate elevation may be due to hepatic failure from metastasis.  Where risk/benefit of empiric antibiotics falls in this patient still on oral vanco for C diff is not clear, but seemed to favor discontinuing empiric antibiotics and watching closely for signs/symptoms of infection.  Zosyn and vanco stopped 5/15/18.  Remains afebrile, WBC normal.  Procalcitonin is elevated, but patient has known C diff.  - Continue to observe off of antibiotics.  - Blood and urine cultures negative to date; follow to completion.  - Reculture blood and urine PRN fever.      Hyperkalemia  Presented with potassium 5.4, normal at 5.3 this morning.  - Recheck in AM.  - K up to 6.0.  May in part be due to severe acidosis.            C. difficile colitis  Recently diagnosed during hospitalization from April 23 - May 03. Is currently on a course of oral vancomycin. Diarrhea has resolved.  - Continue oral vancomycin, stop date is 5/31/18 (28 day course after most recent discharge 5/3/18).      Acute deep vein thrombosis (DVT) of right upper extremity, unspecified vein (H)  Long-term (current) use of anticoagulants [Z79.01]  DVT diagnosed 1/17/18, patient has been on enoxaparin for treatment, so has completed 4 months Rx. Right arm remains edematous, but this is probably due to the known right axillary malignant mass.  Had to stop enoxaparin yesterday, 5/15/18, due to thrombocytopenia, even though low platelets are probably due to 5/11/18 chemo, no enoxaparin.  - Will discontinue enoxaparin with no plans to resume if platelets recover.  She has received the standard 4 month course of anticoagulation, and upper extremity clots have a lower  risk of embolization anyway.      Thrombocytopenia  Plts 207K prior to 5/11/18 chemo, counts have slowly decreased since, currently 35K.  WBC is also trending down since chemo, but still in normal range.  - Follow plts.  - If pancytopenia worsens will ask Oncology consult service re use of GM-CSF.  - down to 20K.  Would give platelet transfusion if re-tap of pleural effusion considered.        Sinus tachycardia  HR has been 130 - 140 since admission, rhythm consistently sinus.  Tachycardia has persisted despite MSO4 for comfort, lorazepam as anxiolytic, and IVF resuscitation.  Note made of 12/2017 pericardiocentesis for malignant fluid.  - Will repeat echo today to see if effusion has recurred.      Oliguria  Present despite net fluid balance + 3 L since admission.  She has severe third spacing due to hypoalbuminemia, so could conceivably still be intravascularly dry.  - NaCl 500 mL bolus x1, repeat x1 if no increase in UOP.  - If no response to IVF, consider renal ultrasound to exclude obstruction, unlikely given presence of Jamison.      Edema/anasarca   Extensive edema present throughout, probably due to third spacing from hypoalbuminemia due to hepatic failure.  Can't really try any diuresis at  - 140.  - IV NS @ 100 continues due to oliguria and tachycardia.      Hypothyroidism  Previously diagnosed.  Clinically euthyroid or hypothyroid (diffuse edema/ansarca), TSH normal 5/15/18.  - Continue levothyroxine.          Moderate persistent asthma  Previously diagnosed, though not currently evident on exam.  Taking albuterol and beclomethasone prior to admission.  - Continue inhalers unchanged.      Encephalopathy  Decreased alertness since admission, presumed due to hepatic failure (despite non-elevated NH3).  Exacerbated this morning following lorazepam 1 mg given at about 0400 due to agitation.  - Will hold further lorazepam as it's going to be hard for her to clear it.  - Monitor mental  status.        Omar Hughes MD

## 2018-05-17 NOTE — PROGRESS NOTES
"Immediately after removing bipap pt began \"agonal breathing\", arms restless,  family present, morphine 4 mg given for air hunger, comfort, applied 2 L NC per family request.   "

## 2018-05-17 NOTE — PROGRESS NOTES
Respiratory rate decreased, family came and notified nurse, pt with respiratory rate around 6, ceased at 1600, Dr. Hughes notified and pronounced

## 2018-05-17 NOTE — PLAN OF CARE
Problem: Cardiac: Heart Failure (Adult)  Goal: Signs and Symptoms of Listed Potential Problems Will be Absent, Minimized or Managed (Cardiac: Heart Failure)  Signs and symptoms of listed potential problems will be absent, minimized or managed by discharge/transition of care (reference Cardiac: Heart Failure (Adult) CPG).   Called pt's  Lucian and updated him with changes in pt's responsiveness this am. He stated he will be here around 7:30.

## 2018-05-17 NOTE — PROGRESS NOTES
No improvement in mental status or UO with bipap and IVF despite BP better and CO2 down.    This is end of life  Discussion with  and children.    Full comfort cares.  Stop BIPAP, stop fluids, stop monitors.

## 2018-05-17 NOTE — PROGRESS NOTES
Applied cream to back and adjusted position, pt started to move arms and moan, HR increased to 130's, 140. sats decreased to 85% on 50% FIO2, increased to 70%. Discussed with  and daughter about intubation, fluid in lungs and pt outcome in detail. Both state they don't want her to have intubation performed, clarified with them and pt is now a DNR DNI, notified Ector MEADOWS And Dr. Hughes.   Morphine 2 mg given for comfort, family requesting med to decrease secretions as pt was occasionally coughing congested cough, robinul given.

## 2018-05-17 NOTE — PROGRESS NOTES
Recheck of VBG 7.19/60/50, L.A. 2.3, web paged Dr. Hughes.   Pt opens eyes to voice and/or mild stimulus, withdraws from pain, no verbal or tracking, pupils size 5mm and sluggish.   Blood pressure 82/49, levo started.     0800: blood pressure 88/50 (69) at 0.03 mkg/kg/min Levophed

## 2018-05-17 NOTE — PLAN OF CARE
Problem: Cardiac: Heart Failure (Adult)  Goal: Signs and Symptoms of Listed Potential Problems Will be Absent, Minimized or Managed (Cardiac: Heart Failure)  Signs and symptoms of listed potential problems will be absent, minimized or managed by discharge/transition of care (reference Cardiac: Heart Failure (Adult) CPG).   Pt given 2nd dose of 2mg IV morphine for continued air hunger. HR remains about 130, resp rate 20-24 using abdominal muscles but does have less gasping and is more comfortable,less restlessness noted. She does move all extremities spont and purposeful but at present quiet and restful. Changed from nasal cannula to oxymask and pt has not pulled the mask off. She did drop down to 82% when she pulled the oxygen cannula off. She has been maintaing a sat of 95% on 4L oxymask. Cont to assess comfort needs.

## 2018-05-17 NOTE — PROGRESS NOTES
Se BRODERICK here to assess pt. 500ml bolus given for decreased BP. Pt will open eyes momentarily to stimulation.

## 2018-05-17 NOTE — PROGRESS NOTES
No urine output at 0800, Dr. Hughes aware.  Lucian, Dr. Hughes and palliative care nurse had long discussion this am, outcome is modified comfort care, currently bipap remains on, IVF decreased to 50 ml/hr, levophed stopped. as needed vital signs but leaving on monitor, per  pt is more comfortable supine he requests that currently pt be left on her back. Informed him about increase risk of pressure ulcers and that currently she has reddened area on right buttock, states he is ok with that.   Opens eyes with turning, moves all extremities to discomfort, does not follow commands, moans with turning, attempted to take bipap off after oral cares, currently  declines morphine form comfort.   platlets 20, INR 2.60. Infusing Vitamin K.   09:40, pt moving extremities, grimacing, discussed with  and will given 2 mg of morphine.

## 2018-05-17 NOTE — PROGRESS NOTES
[facility]    Hospital Medicine   Cross Cover Note  Date of Service: 5/17/2018     Subjective:  I was called due to change in status. Patient less responsive this morning. Respiratory status appears slightly worse. Patient unable to converse.    Objective:  Temp: 97.6  F (36.4  C) Temp src: Axillary BP: (!) 85/35 Pulse: 129 Heart Rate: 124 Resp: 16 SpO2: 94 % O2 Device: Oxymask Oxygen Delivery: 6 LPM    Neuro: Opens eyes briefly to voice and stimulation. Does not respond to commands.   Lungs: Significant respiratory muscle usage. Gasping for air. Wet sounding upper airway sounds. Lungs coarse, diminished in the bases, poor air movement.  Cardiac: fast rate  MSK: edema in upper and lower extremities    Venous Blood Gas  Lab 05/17/18  0540   PHV 7.11*   PCO2V 75*   PO2V 49*   HCO3V 24   ERIN  --    O2PER  --    Platelets: 20  Potassium: 6.3    Assessment & Plan:  Respiratory status worsening this morning, likely will require intubation. VBG shows respiratory acidosis. Differential includes hypoventilation vs worsening of pleural effusions vs other. Patient's status remains guarded. Plan for family meeting today to readdress goals of care.  - trial BiPAP  - recheck VBG 30-45 minutes after initiating BiPAP  - 500 cc bolus, may repeat x 1 if pressures remain low  - stat CXR to re-evaluate pleural effusions    Yoanna Vallejo PA-C    I have discussed patient with Dr. Omar Hughes, plan as above. Patient will be reassessed after BiPAP initiated.

## 2018-05-17 NOTE — PLAN OF CARE
Problem: Cardiac: Heart Failure (Adult)  Goal: Signs and Symptoms of Listed Potential Problems Will be Absent, Minimized or Managed (Cardiac: Heart Failure)  Signs and symptoms of listed potential problems will be absent, minimized or managed by discharge/transition of care (reference Cardiac: Heart Failure (Adult) CPG).   Pt not responding on this check. Not opening eyes, not moving extremities. Lungs clear but with coarse expiratory sounds. Pt not coughing when asked . Turned and repositioned. sats remain 95% on 4L oxymask though RR decreased and HR decreased some to 125 ST. AM labs to be drawn.

## 2018-05-17 NOTE — PROGRESS NOTES
No cardiac activity, flat line on monitor, no respirations, pupils fixed and dilated, no response to pain.    Pt pronounced dead.  Family was at the bedside.    MD DEATH PRONOUNCEMENT    Called to pronounce Etta Cervantes dead.    Physical Exam: Unresponsive to noxious stimuli, Spontaneous respirations absent, Breath sounds absent, Carotid pulse absent, Heart sounds absent, Pupillary light reflex absent and Corneal blink reflex absent    Patient was pronounced dead at 1600 PM, May 17, 2018.    Active Problems:    Carcinoma of breast metastatic to liver (H)    Bone metastasis (H)    Breast cancer metastasized to axillary lymph node (H)    Hypothyroid    Moderate persistent asthma    Breast cancer (H)    Emphysematous bleb of lung (H)    Subacute liver failure without hepatic coma    Cancer associated pain    Malignant pleural effusions, bilateral    C. difficile colitis    Acute deep vein thrombosis (DVT) of right upper extremity, unspecified vein (H)    Long-term (current) use of anticoagulants [Z79.01]    Sinus tachycardia    Hepatic encephalopathy (H)       Infectious disease present?: YES    Communicable disease present? (examples: HIV, chicken pox, TB, Ebola, CJD) :  NO    Multi-drug resistant organism present? (example: MRSA): NO    Please consider an autopsy if any of the following exist:  NO Unexpected or unexplained death during or following any dental, medical, or surgical diagnostic treatment procedures.   NO Death of mother at or up to seven days after delivery.     NO All  and pediatric deaths.     NO Death where the cause is sufficiently obscure to delay completion of the death certificate.   NO Deaths in which autopsy would confirm a suspected illness/condition that would affect surviving family members or recipients of transplanted organs.     The following deaths must be reported to the 's Office:  NO A death that may be due entirely or in part to any factors other than natural disease  (recent surgery, recent trauma, suspected abuse/neglect).   NO A death that may be an accident, suicide, or homicide.     NO Any sudden, unexpected death in which there is no prior history of significant heart disease or any other condition associated with sudden death.   NO A death under suspicious, unusual, or unexpected circumstances.    NO Any death which is apparently due to natural causes but in which the  does not have a personal physician familiar with the patient s medical history, social, or environmental situation or the circumstances of the terminal event.   NO Any death apparently due to Sudden Infant Death Syndrome.     NO Deaths that occur during, in association with, or as consequences of a diagnostic, therapeutic, or anesthetic procedure.   NO Any death in which a fracture of a major bone has occurred within the past (6) six months.   NO A death of persons note seen by their physician within 120 days of demise.     NO Any death in which the  was an inmate of a public institution or was in the custody of Law Enforcement personnel.   NO  All unexpected deaths of children   NO Solid organ donors   NO Unidentified bodies   YES Deaths of persons whose bodies are to be cremated or otherwise disposed of so that the bodies will later be unavailable for examination;   NO Deaths unattended by a physician outside of a licensed healthcare facility or licensed residential hospice program   NO Deaths occurring within 24 hours of arrival to a health care facility if death is unexpected.    NO Deaths associated with the decedent s employment.   NO Deaths attributed to acts of terrorism.   NO   Any death in which there is uncertainty as to whether it is a medical examiner s care should be discussed with the medical investigator.        Body disposition: Autopsy was discussed with family member:  Spouse in person.  Permission for autopsy was declined.

## 2018-05-17 NOTE — PLAN OF CARE
Problem: Cardiac: Heart Failure (Adult)  Goal: Signs and Symptoms of Listed Potential Problems Will be Absent, Minimized or Managed (Cardiac: Heart Failure)  Signs and symptoms of listed potential problems will be absent, minimized or managed by discharge/transition of care (reference Cardiac: Heart Failure (Adult) CPG).   Updated the MD on call with status changes in pt.

## 2018-05-20 LAB
BACTERIA SPEC CULT: NO GROWTH
Lab: NORMAL
SPECIMEN SOURCE: NORMAL

## 2018-05-21 LAB
BACTERIA SPEC CULT: NO GROWTH
Lab: NORMAL
SPECIMEN SOURCE: NORMAL

## 2020-07-22 NOTE — ED PROVIDER NOTES
SUBJECTIVE  Etta Cervantes is a 57 year old female who has symptoms of urinary urgency and frequency for 1 day(s).  she denies dysuria, back pain, nausea, vomiting, fever and chills.  She has had these in the past and states that this feels the same.  She is currently undergoing outpatient Chemo.  The infections usually come on very fast, and she has had to be hospitalized for them in the past.  She is hoping to catch it early before a fever starts.     OBJECTIVE     Vital signs noted and reviewed by Tobi Jimenez  /67  Temp 98.5  F (36.9  C) (Oral)  Resp 16  Wt 88.7 kg (195 lb 8.8 oz)  LMP 02/06/2013  SpO2 99%  BMI 33.06 kg/m2     PEFR:  General appearance: healthy, alert and no distress  Ears: R TM - normal: no effusions, no erythema, and normal landmarks, L TM - normal: no effusions, no erythema, and normal landmarks  Eyes: R normal, L normal  Nose: normal  Oropharynx: normal  Neck: supple and no adenopathy  Lungs: normal and clear to auscultation  Heart: S1, S2 normal, no murmur, click, rub or gallop, regular rate and rhythm  Abdomen: Abdomen soft, non-tender without masses or organomegaly           Labs:     Results for orders placed or performed during the hospital encounter of 10/22/17   UA reflex to Microscopic   Result Value Ref Range    Color Urine Yellow     Appearance Urine Slightly Cloudy     Glucose Urine 30 (A) NEG^Negative mg/dL    Bilirubin Urine Negative NEG^Negative    Ketones Urine Negative NEG^Negative mg/dL    Specific Gravity Urine 1.012 1.003 - 1.035    Blood Urine Negative NEG^Negative    pH Urine 5.0 5.0 - 7.0 pH    Protein Albumin Urine Negative NEG^Negative mg/dL    Urobilinogen mg/dL Normal 0.0 - 2.0 mg/dL    Nitrite Urine Negative NEG^Negative    Leukocyte Esterase Urine Large (A) NEG^Negative    Source Midstream Urine     RBC Urine 5 (H) 0 - 2 /HPF    WBC Urine 82 (H) 0 - 2 /HPF    Squamous Epithelial /HPF Urine 1 0 - 1 /HPF    Mucous Urine Present (A) NEG^Negative /LPF          ASSESSMENT     (N30.00) Acute cystitis without hematuria       PLAN  Urinalysis discussed with patient  Treatment currentguidelines - also push fluids, may use Pyridium OTC prn. Follow up with PCP if these symptoms worsen or fail to improve as anticipated.    Rx for Bactrim sent in today.    We will call with culture results if resistant.        Tobi Jimenez  10/22/2017, 2:52 PM  '     Tobi Jimenez PA-C  10/22/17 1512     I, Glen Chery, performed a face to face bedside interview with this patient regarding history of present illness, review of symptoms and relevant past medical, social and family history.  I completed an independent physical examination. I have communicated the patient’s plan of care and disposition with the resident  32 year old male with PMh HTn, HLD, CKD, DM with poor medication compliance presents with AMS. EMS stated that the pt had been confused and altered for several days. He arrived following commands and moving all extremities but with aphasia. Initially, believed to be outside of the window of any intervention. CTH with no bleed, given labetalol and then hydralazine in attempt to decrease the MAP by 25%. However, when spoke with the family to gain collateral information, they provided starkly different hx that the pt was last seen normal at 11 am, walking and speaking. At the time of arrival, the pt was just at the 4.5 hour tpa window, and would have been unable to obtain CT and aggressive bp control. Once this additional Hx was obtained, discussed the risks/benefits with neuro-intervention Norberto Schwartz, and she and I agreed that the benefits of contrast administration to see if there was a lesion that could be intervened on outweighed the risks. No LVO identified. After more aggressive bp control the aphasia resolved. Pt admitted to MICU for hypertensive emergency

## 2020-08-23 NOTE — DISCHARGE INSTRUCTIONS
From Ector Campos, Palliative Care NP, Cell [phone]       As you know, your cancer has spread to your liver.  I think because of this, your liver hasn't been able to get rid of the by-products of your long-acting pain medicine and long-acting Zolpidem/Ambien while you were in the hospital--both meds remained in your body, I believe, and made you very sleepy and somnolent for far longer than the medicines typically do.  This is common in patients with liver disease.  Therefore, you need to STOP the MS Contin (slow release morphine) and slow-release Zolpidem/Ambien.  In time, other doctors may say you can start the meds again, but for now, STOP them both.  Not all drugs are affected when one has liver disease, but both of these drugs are.       For pain, I would suggest you take only the hydrocodone/Vicodin or oxycodone.  Keep in mind that the oxycodone is typically 50% stronger than the Vicodin.     The palliative doctor in the [facility] is Dr Ector Cobos.  You have an appointment with him set up for May 9 at 9am.  Call the Cancer Center to reschedule if this is not convenient.      Take ritalin as directed to help keep you alert.  Use metrogel twice daily for vaginal irritation.  Take vancomycin liquid four times a day  Follow up with the infectious disease doctor in month.  Follow up with oncology within two weeks.  Use lovenox twice daily to prevent clots.  Call her primary MD on May 4 to check urine culture result.      As a system [facility] wants to ensure that across the care continuum that you have support through care coordination services that include nurses and social workers in the outpatient setting.  Due to your chronic illness I feel it is important you have this support when you discharge.  They will be calling you within 24-48 hours of your discharge.   A brochure describing the services was provided.  If you have questions you can reach out to your clinic directly and ask for the Care  Coordinator assigned to your care.  Anjelica Finn RN, Care Coordinator [phone]     no

## 2020-10-29 NOTE — MR AVS SNAPSHOT
After Visit Summary   1/27/2017    Etta Cervantes    MRN: [id number]           Patient Information     Date Of Birth          1960        Visit Information        Provider Department      1/27/2017 3:30 PM Brodie Cassidy MD [facility] ONCOLOGY      Today's Diagnoses     Secondary malignant neoplasm of liver (H)    -  1     Uterine leiomyoma, unspecified location         Carcinoma of breast metastatic to liver, unspecified laterality (H)         Bilateral malignant neoplasm of breast in female, unspecified site of breast (H)         Bone metastasis (H)         Breast cancer metastasized to axillary lymph node, right (H)           Care Instructions    We are stopping your Ibrane and Faslodex.  Dr. Cassidy would like you to start taking Afinitor and Aromasin.  The Afinitor is a specialty medication and will be mailed to you.  The pharmacy will be contacting you with coverage and shipment information.  We would like to see you back in clinic with Dr. Cassidy 1 month after starting Aromasin and Afinitor.  You will receive education on these medications today.  Your prescription (Afinitor) has been sent to:  Specialty pharmacy.  [phone].  Your prescription for aromasin has been sent to [facility]. When you are in need of a refill, please call your pharmacy and they will send us a request.  You will need to have your labs drawn monthly (CBC, CMP, lipid panel) prior to each cycle of Afinitor. Copy of appointments, and after visit summary (AVS) given to patient.  If you have any questions during business hours (M-F 8 AM- 4PM), please call Funmi Ray RN, BSN, OCN Oncology Hematology /Breast Cancer Navigator at [facility] [phone].   For questions after business hours, or on holidays/weekends, please call our after hours Nurse Triage line [phone]. Thank you.          Follow-ups after your visit        Who to contact     If  "you have questions or need follow up information about today's clinic visit or your schedule please contact [facility] directly at [phone].  Normal or non-critical lab and imaging results will be communicated to you by [employer], letter or phone within 4 business days after the clinic has received the results. If you do not hear from us within 7 days, please contact the clinic through [employer] or phone. If you have a critical or abnormal lab result, we will notify you by phone as soon as possible.  Submit refill requests through [employer] or call your pharmacy and they will forward the refill request to us. Please allow 3 business days for your refill to be completed.          Additional Information About Your Visit        [facility] Medical Information     [employer] gives you secure access to your electronic health record. If you see a primary care provider, you can also send messages to your care team and make appointments. If you have questions, please call your primary care clinic.  If you do not have a primary care provider, please call [phone] and they will assist you.        Care EveryWhere ID     This is your Care EveryWhere ID. This could be used by other organizations to access your [place] medical records  [id number]        Your Vitals Were     Pulse Temperature Respirations    69 99.9  F (37.7  C) (Tympanic) 18    Height BMI (Body Mass Index) Pulse Oximetry    1.638 m (5' 4.5\") 34.66 kg/m2 97%    Breastfeeding?          No         Blood Pressure from Last 3 Encounters:   01/27/17 144/78   01/19/17 131/75   12/22/16 133/52    Weight from Last 3 Encounters:   01/27/17 92.987 kg (205 lb)   01/19/17 95.029 kg (209 lb 8 oz)   11/23/16 95.664 kg (210 lb 14.4 oz)              Today, you had the following     No orders found for display         Today's Medication Changes          These changes are accurate as of: 1/27/17  3:59 PM.  If you have any questions, ask your nurse or doctor.               Start " taking these medicines.        Dose/Directions    dexamethasone 0.1 MG/ML solution   Used for:  Secondary malignant neoplasm of liver (H), Uterine leiomyoma, unspecified location, Carcinoma of breast metastatic to liver, unspecified laterality (H), Bilateral malignant neoplasm of breast in female, unspecified site of breast (H), Bone metastasis (H), Breast cancer metastasized to axillary lymph node, right (H)   Started by:  Brodie Cassidy MD        swish for 2 minutes and spit 4 times per day and not to eat for 1 hour after using the mouthwash.   Quantity:  500 mL   Refills:  1       everolimus 10 MG tablet CHEMO   Commonly known as:  AFINITOR   Used for:  Secondary malignant neoplasm of liver (H), Carcinoma of breast metastatic to liver, unspecified laterality (H), Bone metastasis (H), Breast cancer metastasized to axillary lymph node, right (H), Uterine leiomyoma, unspecified location, Bilateral malignant neoplasm of breast in female, unspecified site of breast (H)   Started by:  Brodie Cassidy MD        Dose:  10 mg   Take 1 tablet (10 mg) by mouth daily for 28 days Avoid grapefruit and grapefruit juice. May be given with or without food, but should be consistent.   Quantity:  28 tablet   Refills:  0       exemestane 25 MG tablet   Commonly known as:  AROMASIN   Used for:  Secondary malignant neoplasm of liver (H), Carcinoma of breast metastatic to liver, unspecified laterality (H), Bone metastasis (H), Breast cancer metastasized to axillary lymph node, right (H), Uterine leiomyoma, unspecified location, Bilateral malignant neoplasm of breast in female, unspecified site of breast (H)   Started by:  Brodie Cassidy MD        Dose:  25 mg   Take 1 tablet (25 mg) by mouth daily for 28 days Take after a meal.   Quantity:  28 tablet   Refills:  0            Where to get your medicines      These medications were sent to [id number] [facility] - [place] [place] [address]  [address],  [facility] [address]     Phone:  [phone]    - dexamethasone 0.1 MG/ML solution  - exemestane 25 MG tablet      These medications were sent to [facility] - [place], [place] - [address]   [address] , [facility] [address]    Hours:  Mon-Fri 8:30am-5:00pm Toll Free [phone] Phone:  [phone]    - everolimus 10 MG tablet CHEMO             Primary Care Provider Office Phone # Fax #    Johana Fairbanks [phone]       [facility] [address] [facility] [address]        Thank you!     Thank you for choosing [facility]  for your care. Our goal is always to provide you with excellent care. Hearing back from our patients is one way we can continue to improve our services. Please take a few minutes to complete the written survey that you may receive in the mail after your visit with us. Thank you!             Your Updated Medication List - Protect others around you: Learn how to safely use, store and throw away your medicines at www.disposemymeds.org.          This list is accurate as of: 1/27/17  3:59 PM.  Always use your most recent med list.                   Brand Name Dispense Instructions for use    * albuterol (2.5 MG/3ML) 0.083% neb solution     30 vial    Take 1 vial (2.5 mg) by nebulization every 4 hours as needed for shortness of breath / dyspnea       * albuterol 108 (90 BASE) MCG/ACT Inhaler    VENTOLIN HFA    1 Inhaler    Inhale 2 puffs into the lungs every 4 hours as needed       ASPIRIN PO      Take 81 mg by mouth Takes 4 tablets at a time for leg pain       azelastine 0.1 % spray    ASTELIN    3 Bottle    Spray 1-2 sprays into both nostrils 2 times daily as needed for rhinitis       beclomethasone 80 MCG/ACT Inhaler    QVAR    3 Inhaler    Inhale 2 puffs into the lungs 2 times daily       calcium 600 MG tablet     60 tablet    Take 2 tablets by mouth daily       cholecalciferol 1000 UNIT tablet    vitamin D    100  tablet    Take 1 tablet (1,000 Units) by mouth daily       dexamethasone 0.1 MG/ML solution     500 mL    swish for 2 minutes and spit 4 times per day and not to eat for 1 hour after using the mouthwash.       everolimus 10 MG tablet CHEMO    AFINITOR    28 tablet    Take 1 tablet (10 mg) by mouth daily for 28 days Avoid grapefruit and grapefruit juice. May be given with or without food, but should be consistent.       exemestane 25 MG tablet    AROMASIN    28 tablet    Take 1 tablet (25 mg) by mouth daily for 28 days Take after a meal.       FASLODEX IM      Inject 500 mLs into the muscle       levothyroxine 25 MCG tablet    SYNTHROID/LEVOTHROID    90 tablet    Take 1 tablet (25 mcg) by mouth daily       lidocaine 2 % solution    XYLOCAINE    100 mL    Apply 5 mLs topically every 3 hours as needed for moderate pain       lidocaine visc 2% 2.5mL/5mL & maalox/mylanta w/ simeth 2.5mL/5mL & diphenhydrAMINE 5mg/5mL Susp suspension    MAGIC Mouthwash    240 mL    Swish and swallow 10 mLs in mouth every 6 hours as needed for mouth sores       loratadine 10 MG tablet    CLARITIN     Take 10 mg by mouth daily       mometasone-formoterol 200-5 MCG/ACT oral inhaler    DULERA    3 Inhaler    Inhale 2 puffs into the lungs 2 times daily       * order for DME      F&P Zest medium nasal mask       * order for DME      Auto-CPAP: Max 11 cm H2O Min 9 cm H2O Changed in clinic Continuous  Lifetime need and heated humidity.       order for DME     1 Device    Equipment being ordered: accessory kit       oxybutynin 10 MG 24 hr tablet    DITROPAN XL    90 tablet    Take 1 tablet (10 mg) by mouth daily (Needs follow-up appointment for this medication)       oxyCODONE 5 MG IR tablet    ROXICODONE    30 tablet    Take 1 tablet (5 mg) by mouth every 4 hours as needed for moderate to severe pain       palbociclib 100 MG capsule CHEMO    palbociclib    21 capsule    Take 1 capsule (100 mg) by mouth daily with food followed by a 7-day rest  period to complete a 28-day treatment cycle       DANIELLE LC PLUS NEBULIZER Misc          predniSONE 20 MG tablet    DELTASONE    10 tablet    Take 1 tablet (20 mg) by mouth 2 times daily For Yellow or Red zone on Asthma Action Plan.       TYLENOL PO      Take 650 mg by mouth every 4 hours as needed for mild pain or fever       zolpidem 10 MG tablet    AMBIEN    30 tablet    Take 1 tablet 30 minutes prior to bedtime       * Notice:  This list has 4 medication(s) that are the same as other medications prescribed for you. Read the directions carefully, and ask your doctor or other care provider to review them with you.       Quality 137: Melanoma: Continuity Of Care - Recall System: Patient information entered into a recall system that includes: target date for the next exam specified AND a process to follow up with patients regarding missed or unscheduled appointments Detail Level: Detailed

## 2021-06-01 ENCOUNTER — RECORDS - HEALTHEAST (OUTPATIENT)
Dept: ADMINISTRATIVE | Facility: CLINIC | Age: 61
End: 2021-06-01

## 2021-11-09 NOTE — PROGRESS NOTES
Refill request received from pharmacy. This is ordered by Dr Rousseau. Request forwarded to ENT office.    Reviewed results and recommendations with patient.  Denies questions or concerns.  Will call back if any arise.  Direct line provided.

## 2021-11-10 NOTE — PROGRESS NOTES
Etta,  Your lab results were normal/stable. Please feel free to my chart or call the office with questions. Johana Fairbanks M.D.
Port labs drawn per protocol, with good blood return noted. Needle removed, pt discharged to home. Dionne Zayas RN    
no neck pain

## 2021-12-30 NOTE — PROGRESS NOTES
ANTICOAGULATION INITIAL CLINIC VISIT    Patient Name:  Etta Cervantes  Date:  1/29/2018  Referred by: Dr. Cassidy  Contact Type:  Face to Face    SUBJECTIVE:  Coumadin education was completed today.  Topics covered include:  -Introduction to coumadin  -Proper Administration  -INR Testing (will have INR drawn with other labs when she is at [facility] for chemo. Otherwise she would like to go to [facility])  -Sign/Symptoms of Bleeding  -Signs/Symptoms of Clot Formation or Stroke  -Dietary Intake of Vitamin K  -Drug Interactions  -Anticoagulation Identification (bracelet, necklace or wallet card)  -Future Surgery (stent that will need to be removed) ERCP with biliary sphincterotomy and stent placement done in December, the stent will have to be removed in the next few months per pt.   -Effects of Alcohol, Tobacco, and Exercise on Coumadin    Coumadin Education Booklet and Coumadin Identification Wallet Card were given to the patient.       Patient Findings     Positives Change in medications (pt has been on Lovenox since 1/17/18 - has enough through Thurs AM 2/1/18), Initiation of therapy (1/26/18)    Comments This Wednesday will emily week two of abraxane (does three weeks on and one week off).             OBJECTIVE    INR Protime   Date Value Ref Range Status   01/29/2018 2.0 (A) 0.86 - 1.14 Final       ASSESSMENT / PLAN  INR assessment THER    Recheck INR In: 2 DAYS    INR Location Clinic      Anticoagulation Summary as of 1/29/2018     INR goal 2.0-3.0   Today's INR 2.0   Maintenance plan No maintenance plan   Full instructions 1/29: 2.5 mg; 1/30: 2.5 mg   Plan last modified Juliette Schmidt RN (1/26/2018)   Next INR check 1/31/2018   Target end date     Indications   Acute deep vein thrombosis (DVT) of right upper extremity  unspecified vein (H) [I82.621]  Long-term (current) use of anticoagulants [Z79.01] [Z79.01]         Anticoagulation Episode Summary     INR check location     Preferred lab     Send INR reminders to [place]  Pt called and left a VM say that she is still having issues with her UTI. She has been on Macrocrystal-monohydrate 100 mg for ten days. Started 12/07/21. Pt wants to know if we can just call in another prescription. [facility] CLINIC POOL    Comments * weekly abraxane infusions for metastatic breast cx (this can increase or decrease INR per chemo reference list). Pt prefers [facility]. INR referral has undetermined listed for therapy length. OK to send instructions via [employer]      Anticoagulation Care Providers     Provider Role Specialty Phone number    Brodie Cassidy MD Referring Hematology & Oncology [phone]    Johana Fairbanks MD [facility] [phone]            See the Encounter Report to view Anticoagulation Flowsheet and Dosing Calendar (Go to Encounters tab in chart review, and find the Anticoagulation Therapy Visit)    Dosage adjustment made based on physician directed care plan.    Jewels Harrison RN

## 2022-08-08 NOTE — PROGRESS NOTES
Call placed to the patient per [facility] referral     Voice message left for patient to call back  Phone number and hours of business provided  This is the 1st attempt to reach the patient  Will defer per protocol  Discharge    Patient had pericardial drain removed this afternoon. Dressing is c/d/i. Port-a-cath deaccessed. AVS reviewed with patient and , questions answered. Medications reviewed. Discharge and home meds sent with patient. Belongings sent with patient. Patient instructed to follow up with oncologist in regards to [facility] and will need follow up appointment scheduled. Patient taken down to vehicle by CNA and accompanied home by .

## 2023-03-21 NOTE — PROGRESS NOTES
Reason for Follow up: DC planning    Anticipated discharge needs: This writer spoke with pt and pts spouse at length regarding dc planning. Plan remains unchanged, pt will dc home with [facility] ([phone] Fax: [phone]). Discussed home care at length with pt and spouse.     Next steps: DC home tomorrow?    Vanessa Camara MSW, [facility], [facility] [phone]  Discharge Planner   Discharge Plans in progress: home care  Barriers to discharge plan: medical stability  Follow up plan: CTS to follow       Entered by: Vanessa Camara 04/30/2018 2:17 PM          no

## 2023-09-19 NOTE — MR AVS SNAPSHOT
Etta Cervantes   4/10/2018   Anticoagulation Therapy Visit    Description:  57 year old female   Provider:  Joselin Boothe, RN   [facility]           INR as of 4/10/2018     Today's INR No new INR was available at the time of this encounter.      Anticoagulation Summary as of 4/10/2018     INR goal 2.0-3.0   Today's INR No new INR was available at the time of this encounter.   Full instructions 4/10: 1 mg   Next INR check 4/11/2018    Indications   Acute deep vein thrombosis (DVT) of right upper extremity  unspecified vein (H) [I82.621]  Long-term (current) use of anticoagulants [Z79.01] [Z79.01]         Your next Anticoagulation Clinic appointment(s)     Apr 13, 2018  2:15 PM CDT   [facility] with [place] [facility]    [address] [facility] [address]   [phone]              April 2018 Details    Sun Mon Tue Wed Thu Fri Sat     1               2               3               4               5               6               7                 8               9               10      1 mg   See details      11            12               13               14                 15               16               17               18               19               20               21                 22               23               24               25               26               27               28                 29               30                     Date Details   04/10 This INR check       Date of next INR:  4/11/2018         How to take your warfarin dose     To take:  1 mg Take 1 of the 1 mg tablets.            25

## (undated) DEVICE — ENDO VALVE BX EVIS MAJ-210

## (undated) DEVICE — ENDO ADPT BRONCH SWIVEL Y A1002

## (undated) DEVICE — GLOVE PROTEXIS BLUE W/NEU-THERA 6.5  2D73EB65

## (undated) DEVICE — SOL WATER IRRIG 1000ML BOTTLE 07139-09

## (undated) DEVICE — ESU GROUND PAD ADULT W/CORD E7507

## (undated) DEVICE — GLOVE PROTEXIS MICRO 7.0  2D73PM70

## (undated) DEVICE — CONNECTOR OMNI-FLEX 3222

## (undated) DEVICE — SYR 10ML SLIP TIP W/O NDL 303134

## (undated) DEVICE — KIT ENDO FIRST STEP DISINFECTANT 200ML W/POUCH EP-4

## (undated) DEVICE — CATH FOGARTY EMBOLECTOMY 6FR 80CM LATEX 120806FP

## (undated) DEVICE — ENDO TUBING CO2 SMARTCAP STERILE DISP 100145CO2EXT

## (undated) DEVICE — TUBING SUCTION 10'X3/16" N510

## (undated) DEVICE — GLOVE PROTEXIS W/NEU-THERA 7.5  2D73TE75

## (undated) DEVICE — GOWN LG DISP 9515

## (undated) DEVICE — PITCHER STERILE 1000ML  SSK9004A

## (undated) DEVICE — SYR 30ML SLIP TIP W/O NDL 302833

## (undated) DEVICE — SU VICRYL 3-0 SH 27" J316H

## (undated) DEVICE — DECANTER BAG 2002S

## (undated) DEVICE — GUIDEWIRE NOVAGOLD .018X260CM STR TIP M00552000

## (undated) DEVICE — ESU PENCIL W/COATED BLADE E2450H

## (undated) DEVICE — LIGHT HANDLE X2

## (undated) DEVICE — ENDO VALVE SUCTION BRONCH EVIS MAJ-209

## (undated) DEVICE — SOL NACL 0.9% IRRIG 1000ML BOTTLE 07138-09

## (undated) DEVICE — Device

## (undated) DEVICE — DRAPE C-ARM 60X42" 1013

## (undated) DEVICE — DRAPE SHEET REV FOLD 3/4 9349

## (undated) DEVICE — GLOVE PROTEXIS W/NEU-THERA 6.5  2D73TE65

## (undated) DEVICE — CATH RETRIEVAL BALLOON EXTRACTOR PRO RX-S INJ ABOVE 9-12MM

## (undated) DEVICE — PREP CHLORAPREP 26ML TINTED ORANGE  260815

## (undated) DEVICE — BLADE KNIFE SURG 11 371111

## (undated) DEVICE — ENDO BITE BLOCK ADULT OMNI-BLOC

## (undated) DEVICE — BIOPSY VALVE BIOSHIELD 00711135

## (undated) DEVICE — NDL 22GA 1.5"

## (undated) DEVICE — SYR 10ML FINGER CONTROL W/O NDL 309695

## (undated) DEVICE — SOL NACL 0.9% IRRIG 1000ML BOTTLE 2F7124

## (undated) DEVICE — GOWN XLG DISP 9545

## (undated) DEVICE — SU PROLENE 2-0 SHDA 36" 8523H

## (undated) DEVICE — SPONGE RAY-TEC 4X8" 7318

## (undated) DEVICE — SOL WATER IRRIG 1000ML BOTTLE 2F7114

## (undated) DEVICE — SOL NACL 0.9% 100ML BAG 2B1302

## (undated) DEVICE — ADHESIVE SWIFTSET 0.8ML OCTYL SS6

## (undated) DEVICE — GLOVE PROTEXIS BLUE W/NEU-THERA 7.0  2D73EB70

## (undated) DEVICE — LINEN TOWEL PACK X5 5464

## (undated) DEVICE — SYR 03ML LL W/O NDL 309657

## (undated) DEVICE — ENDO FORCEP ALLIGATOR JAW BIOPSY 2MMX100CM FB-211D

## (undated) DEVICE — DECANTER VIAL 2006S

## (undated) DEVICE — SU VICRYL 3-0 SH 27" UND J416H

## (undated) DEVICE — SU VICRYL 4-0 FS-2 27" J422-H

## (undated) DEVICE — PACK ENDOSCOPY GI CUSTOM UMMC

## (undated) DEVICE — GLOVE PROTEXIS W/NEU-THERA 7.0  2D73TE70

## (undated) DEVICE — ENDO FUSION OMNI-TOME G31903

## (undated) DEVICE — LABEL MEDICATION SYSTEM  3304

## (undated) DEVICE — BLADE KNIFE SURG 15 371115

## (undated) DEVICE — BASIN SET MINOR DISP

## (undated) DEVICE — ENDO FUSION OMNI-TOME 21 FS-OMNI-21 G48675

## (undated) DEVICE — ACROBAT 2 CALIBRATED TIP WIRE GUIDE

## (undated) DEVICE — PACK LAP TRANSVERSE STD

## (undated) DEVICE — NDL COUNTER 20CT 31142493

## (undated) DEVICE — SYR 10ML LL W/O NDL

## (undated) DEVICE — TAPE CLOTH 3" CARDINAL 3TRCL03

## (undated) RX ORDER — LIDOCAINE HYDROCHLORIDE AND EPINEPHRINE 10; 10 MG/ML; UG/ML
INJECTION, SOLUTION INFILTRATION; PERINEURAL
Status: DISPENSED
Start: 2018-01-01

## (undated) RX ORDER — HEPARIN SODIUM (PORCINE) LOCK FLUSH IV SOLN 100 UNIT/ML 100 UNIT/ML
SOLUTION INTRAVENOUS
Status: DISPENSED
Start: 2017-05-16

## (undated) RX ORDER — LIDOCAINE HYDROCHLORIDE 20 MG/ML
INJECTION, SOLUTION EPIDURAL; INFILTRATION; INTRACAUDAL; PERINEURAL
Status: DISPENSED
Start: 2017-01-01

## (undated) RX ORDER — FENTANYL CITRATE 50 UG/ML
INJECTION, SOLUTION INTRAMUSCULAR; INTRAVENOUS
Status: DISPENSED
Start: 2017-01-01

## (undated) RX ORDER — ALBUTEROL SULFATE 90 UG/1
AEROSOL, METERED RESPIRATORY (INHALATION)
Status: DISPENSED
Start: 2017-01-01

## (undated) RX ORDER — PROPOFOL 10 MG/ML
INJECTION, EMULSION INTRAVENOUS
Status: DISPENSED
Start: 2017-01-01

## (undated) RX ORDER — HEPARIN SODIUM (PORCINE) LOCK FLUSH IV SOLN 100 UNIT/ML 100 UNIT/ML
SOLUTION INTRAVENOUS
Status: DISPENSED
Start: 2017-01-26

## (undated) RX ORDER — PROPOFOL 10 MG/ML
INJECTION, EMULSION INTRAVENOUS
Status: DISPENSED
Start: 2018-01-01

## (undated) RX ORDER — SODIUM CHLORIDE 9 MG/ML
INJECTION, SOLUTION INTRAVENOUS
Status: DISPENSED
Start: 2017-01-01

## (undated) RX ORDER — IPRATROPIUM BROMIDE AND ALBUTEROL SULFATE 2.5; .5 MG/3ML; MG/3ML
SOLUTION RESPIRATORY (INHALATION)
Status: DISPENSED
Start: 2017-01-01

## (undated) RX ORDER — DIPHENHYDRAMINE HYDROCHLORIDE 50 MG/ML
INJECTION INTRAMUSCULAR; INTRAVENOUS
Status: DISPENSED
Start: 2017-01-01

## (undated) RX ORDER — SCOLOPAMINE TRANSDERMAL SYSTEM 1 MG/1
PATCH, EXTENDED RELEASE TRANSDERMAL
Status: DISPENSED
Start: 2017-01-01

## (undated) RX ORDER — ALBUTEROL SULFATE 0.83 MG/ML
SOLUTION RESPIRATORY (INHALATION)
Status: DISPENSED
Start: 2018-01-01

## (undated) RX ORDER — ONDANSETRON 2 MG/ML
INJECTION INTRAMUSCULAR; INTRAVENOUS
Status: DISPENSED
Start: 2017-01-01

## (undated) RX ORDER — DEXAMETHASONE SODIUM PHOSPHATE 4 MG/ML
INJECTION, SOLUTION INTRA-ARTICULAR; INTRALESIONAL; INTRAMUSCULAR; INTRAVENOUS; SOFT TISSUE
Status: DISPENSED
Start: 2017-01-01

## (undated) RX ORDER — ALBUTEROL SULFATE 0.83 MG/ML
SOLUTION RESPIRATORY (INHALATION)
Status: DISPENSED
Start: 2017-01-01

## (undated) RX ORDER — IOPAMIDOL 510 MG/ML
INJECTION, SOLUTION INTRAVASCULAR
Status: DISPENSED
Start: 2017-01-01

## (undated) RX ORDER — HEPARIN SODIUM (PORCINE) LOCK FLUSH IV SOLN 100 UNIT/ML 100 UNIT/ML
SOLUTION INTRAVENOUS
Status: DISPENSED
Start: 2017-01-01

## (undated) RX ORDER — ACETAMINOPHEN 325 MG/1
TABLET ORAL
Status: DISPENSED
Start: 2017-01-01

## (undated) RX ORDER — PHENYLEPHRINE HCL IN 0.9% NACL 1 MG/10 ML
SYRINGE (ML) INTRAVENOUS
Status: DISPENSED
Start: 2018-01-01

## (undated) RX ORDER — HYDROMORPHONE HYDROCHLORIDE 1 MG/ML
INJECTION, SOLUTION INTRAMUSCULAR; INTRAVENOUS; SUBCUTANEOUS
Status: DISPENSED
Start: 2017-01-01

## (undated) RX ORDER — ROCURONIUM BROMIDE 50 MG/5 ML
SYRINGE (ML) INTRAVENOUS
Status: DISPENSED
Start: 2017-01-01

## (undated) RX ORDER — HEPARIN SODIUM (PORCINE) LOCK FLUSH IV SOLN 100 UNIT/ML 100 UNIT/ML
SOLUTION INTRAVENOUS
Status: DISPENSED
Start: 2018-01-01

## (undated) RX ORDER — HYDROMORPHONE HCL/0.9% NACL/PF 0.2MG/0.2
SYRINGE (ML) INTRAVENOUS
Status: DISPENSED
Start: 2017-01-01

## (undated) RX ORDER — HEPARIN SODIUM,PORCINE 10 UNIT/ML
VIAL (ML) INTRAVENOUS
Status: DISPENSED
Start: 2017-01-01

## (undated) RX ORDER — BUPIVACAINE HYDROCHLORIDE 2.5 MG/ML
INJECTION, SOLUTION INFILTRATION; PERINEURAL
Status: DISPENSED
Start: 2018-01-01

## (undated) RX ORDER — FENTANYL CITRATE 50 UG/ML
INJECTION, SOLUTION INTRAMUSCULAR; INTRAVENOUS
Status: DISPENSED
Start: 2018-01-01

## (undated) RX ORDER — LABETALOL HYDROCHLORIDE 5 MG/ML
INJECTION, SOLUTION INTRAVENOUS
Status: DISPENSED
Start: 2017-01-01